# Patient Record
Sex: MALE | Race: ASIAN | NOT HISPANIC OR LATINO | Employment: OTHER | URBAN - METROPOLITAN AREA
[De-identification: names, ages, dates, MRNs, and addresses within clinical notes are randomized per-mention and may not be internally consistent; named-entity substitution may affect disease eponyms.]

---

## 2018-10-16 LAB
CHOL/HDLC RATIO: 2.2
CHOLESTEROL, TOTAL: 133
CREATININE RANDOM URINE: 62 MG/DL (ref 20–320)
HBA1C MFR BLD: 6.2 % (ref 4–5.7)
HDLC SERPL-MCNC: 61 MG/DL
LDLC SERPL CALC-MCNC: 59 MG/DL
MICROALBUMIN URINE RANDOM: 32.2
MICROALBUMIN/CREATININE RATIO: 519 MCG/MG
NONHDLC SERPL-MCNC: 72 MG/DL
TRIGL SERPL-MCNC: 53 MG/DL

## 2018-11-06 ENCOUNTER — OFFICE VISIT (OUTPATIENT)
Dept: FAMILY MEDICINE | Facility: CLINIC | Age: 68
End: 2018-11-06
Payer: MEDICARE

## 2018-11-06 VITALS
BODY MASS INDEX: 25.97 KG/M2 | HEART RATE: 97 BPM | OXYGEN SATURATION: 98 % | WEIGHT: 161.63 LBS | DIASTOLIC BLOOD PRESSURE: 80 MMHG | TEMPERATURE: 99 F | SYSTOLIC BLOOD PRESSURE: 150 MMHG | HEIGHT: 66 IN

## 2018-11-06 DIAGNOSIS — N18.30 CONTROLLED TYPE 2 DIABETES MELLITUS WITH STAGE 3 CHRONIC KIDNEY DISEASE, WITHOUT LONG-TERM CURRENT USE OF INSULIN: ICD-10-CM

## 2018-11-06 DIAGNOSIS — I10 ESSENTIAL HYPERTENSION, BENIGN: ICD-10-CM

## 2018-11-06 DIAGNOSIS — E11.22 CONTROLLED TYPE 2 DIABETES MELLITUS WITH STAGE 3 CHRONIC KIDNEY DISEASE, WITHOUT LONG-TERM CURRENT USE OF INSULIN: ICD-10-CM

## 2018-11-06 DIAGNOSIS — M35.1 MCTD (MIXED CONNECTIVE TISSUE DISEASE): ICD-10-CM

## 2018-11-06 DIAGNOSIS — E78.5 HYPERLIPIDEMIA, UNSPECIFIED HYPERLIPIDEMIA TYPE: ICD-10-CM

## 2018-11-06 DIAGNOSIS — Z00.00 ANNUAL PHYSICAL EXAM: Primary | ICD-10-CM

## 2018-11-06 DIAGNOSIS — M35.00 SJOGREN'S SYNDROME, WITH UNSPECIFIED ORGAN INVOLVEMENT: ICD-10-CM

## 2018-11-06 PROCEDURE — 3077F SYST BP >= 140 MM HG: CPT | Mod: CPTII,S$GLB,, | Performed by: FAMILY MEDICINE

## 2018-11-06 PROCEDURE — 99999 PR PBB SHADOW E&M-EST. PATIENT-LVL III: CPT | Mod: PBBFAC,,, | Performed by: FAMILY MEDICINE

## 2018-11-06 PROCEDURE — 99397 PER PM REEVAL EST PAT 65+ YR: CPT | Mod: S$GLB,,, | Performed by: FAMILY MEDICINE

## 2018-11-06 PROCEDURE — 3079F DIAST BP 80-89 MM HG: CPT | Mod: CPTII,S$GLB,, | Performed by: FAMILY MEDICINE

## 2018-11-16 NOTE — PROGRESS NOTES
Routine Office Visit    Patient Name: Darrion Bennett    : 1950  MRN: [id number]    Subjective:  Darrion is a 68 y.o. male who presents today for     1. Annual exam / establish care / new to me - pt has no issues/complaints. Pt is doing well on current medication regimen. He has most of his medical care at [facility]. His wife comes here and wants him to be here as well.       Review of Systems   Constitutional: Negative for chills and fever.   HENT: Negative for congestion.    Eyes: Negative for blurred vision.   Respiratory: Negative for cough.    Cardiovascular: Negative for chest pain.   Gastrointestinal: Negative for abdominal pain, constipation, diarrhea, heartburn, nausea and vomiting.   Genitourinary: Negative for dysuria.   Musculoskeletal: Negative for myalgias.   Skin: Negative for itching and rash.   Neurological: Negative for dizziness and headaches.   Psychiatric/Behavioral: Negative for depression.       Active Problem List  Patient Active Problem List   Diagnosis    Essential hypertension, benign    Controlled type 2 diabetes mellitus with complication, without long-term current use of insulin    Hyperlipidemia    MCTD (mixed connective tissue disease)    Sjogren's disease    Bilateral edema of lower extremity    Glaucoma    Body mass index 29.0-29.9, adult    Thyroid dysfunction    Chronic kidney disease, stage III (moderate)    Abdominal bloating    Abdominal distension    Weight gain    Jackson's esophagus without dysplasia    Anemia       Past Surgical History  Past Surgical History:   Procedure Laterality Date    COLONOSCOPY      EYE SURGERY         Family History  Family History   Problem Relation Age of Onset    Hypertension Father     Stroke Father     Rheum arthritis Maternal Aunt     Rheum arthritis Maternal Uncle     Throat cancer Paternal Grandmother     Celiac disease Neg Hx     Colon cancer Neg Hx     Cirrhosis Neg Hx     Colon polyps Neg Hx     Crohn's  disease Neg Hx     Cystic fibrosis Neg Hx     Hemochromatosis Neg Hx     Esophageal cancer Neg Hx     Inflammatory bowel disease Neg Hx     Irritable bowel syndrome Neg Hx     Liver cancer Neg Hx     Liver disease Neg Hx     Rectal cancer Neg Hx     Stomach cancer Neg Hx     Ulcerative colitis Neg Hx     Chase's disease Neg Hx        Social History  Social History     Socioeconomic History    Marital status:      Spouse name: Not on file    Number of children: 3    Years of education: Not on file    Highest education level: Not on file   Social Needs    Financial resource strain: Not on file    Food insecurity - worry: Not on file    Food insecurity - inability: Not on file    Transportation needs - medical: Not on file    Transportation needs - non-medical: Not on file   Occupational History    Not on file   Tobacco Use    Smoking status: Never Smoker    Smokeless tobacco: Never Used   Substance and Sexual Activity    Alcohol use: No    Drug use: No    Sexual activity: No   Other Topics Concern    Not on file   Social History Narrative    Not on file       Medications and Allergies  Reviewed and updated.   Current Outpatient Medications   Medication Sig    amlodipine (NORVASC) 10 MG tablet Take 10 mg by mouth once daily.    aspirin (ECOTRIN) 81 MG EC tablet Take 81 mg by mouth once daily.    azathioprine (IMURAN) 50 mg Tab Take 75 mg by mouth once daily.    cloNIDine 0.3 mg/24 hr td ptwk (CATAPRES) 0.3 mg/24 hr     docusate sodium (COLACE) 100 MG capsule     hydroxychloroquine (PLAQUENIL) 200 mg tablet Take 200 mg by mouth 2 (two) times daily.    insulin glargine (LANTUS) 100 unit/mL injection Inject 20 Units into the skin every evening.    isosorbide dinitrate (ISORDIL) 10 MG tablet     linaclotide (LINZESS) 145 mcg Cap capsule Take 145 mcg by mouth once daily.    methylPREDNISolone (MEDROL) 4 MG Tab Take 4 mg by mouth once daily.     omeprazole (PRILOSEC) 40 MG  "capsule Take 40 mg by mouth once daily.    torsemide (DEMADEX) 20 MG Tab     TRADJENTA 5 mg Tab tablet     TRAVATAN Z 0.004 % Drop     acetaminophen (TYLENOL) 500 mg Cap     adhesive bandage 1 X 3 " Bndg     atorvastatin (LIPITOR) 40 MG tablet Take 1 tablet (40 mg total) by mouth once daily.     No current facility-administered medications for this visit.        Physical Exam  BP (!) 150/80 (BP Location: Left arm, Patient Position: Standing, BP Method: Large (Manual))   Pulse 97   Temp 98.6 °F (37 °C) (Oral)   Ht 5' 6" (1.676 m)   Wt 73.3 kg (161 lb 9.6 oz)   SpO2 98%   BMI 26.08 kg/m²   Physical Exam   Constitutional: He is oriented to person, place, and time. He appears well-developed and well-nourished.   HENT:   Head: Normocephalic and atraumatic.   Eyes: Conjunctivae and EOM are normal. Pupils are equal, round, and reactive to light.   Neck: Normal range of motion. Neck supple. No JVD present. No thyromegaly present.   Cardiovascular: Normal rate, regular rhythm and normal heart sounds.   Pulmonary/Chest: Effort normal and breath sounds normal. He has no wheezes.   Abdominal: Soft. Bowel sounds are normal. He exhibits no distension. There is no tenderness. There is no guarding.   Musculoskeletal: Normal range of motion.   Lymphadenopathy:     He has no cervical adenopathy.   Neurological: He is alert and oriented to person, place, and time.   Skin: Skin is warm and dry.   Psychiatric: He has a normal mood and affect. His behavior is normal.         Assessment/Plan:  Darrion Bennett is a 68 y.o. male who presents today for :    Annual physical exam  Health Maintenance       Date Due Completion Date    Hepatitis C Screening 1950 ---    Eye Exam 02/12/1960 ---    TETANUS VACCINE 02/12/1968 ---    Zoster Vaccine 02/12/2010 ---    Hemoglobin A1c 11/30/2014 5/30/2014    Pneumococcal (65+) (1 of 2 - PCV13) 02/12/2015 ---    Lipid Panel 05/30/2015 5/30/2014    Foot Exam 07/23/2016 7/23/2015    Influenza " Vaccine 08/01/2018 5/16/2018 (Done)    [facility]/16/2018[facility]    Colonoscopy 05/22/2024 5/22/2014 (N/S)    Override on 5/22/2014: (N/S)        Will obtain Flu, pneumonia, shingles and tetanus vaccine information - [facility] [place]   TERRI signed  Eye exam - Dr. Hall   Colonoscopy - Dr. Tin Chambers    Essential hypertension, benign  The current medical regimen is effective;  continue present plan and medications.  Continue f/u with Cardiology - Dr. Brannon     Hyperlipidemia, unspecified hyperlipidemia type  The current medical regimen is effective;  continue present plan and medications.    Sjogren's syndrome, with unspecified organ involvement / MCTD (mixed connective tissue disease)  Continue f/u with Dr. Vazquez (\[facility]") - Rheumatology  The current medical regimen is effective;  continue present plan and medications.    Controlled type 2 diabetes mellitus with stage 3 chronic kidney disease, without long-term current use of insulin  Continue f/u with Endocrine (Dr. Marrufo)   Obtain Blood work              Follow-up in about 6 months (around 5/6/2019).

## 2018-11-20 ENCOUNTER — TELEPHONE (OUTPATIENT)
Dept: ADMINISTRATIVE | Facility: HOSPITAL | Age: 68
End: 2018-11-20

## 2018-12-06 ENCOUNTER — HOSPITAL ENCOUNTER (OUTPATIENT)
Dept: RADIOLOGY | Facility: HOSPITAL | Age: 68
Discharge: HOME | End: 2018-12-06
Attending: NURSE PRACTITIONER
Payer: MEDICARE

## 2018-12-06 ENCOUNTER — CLINICAL SUPPORT (OUTPATIENT)
Dept: OPHTHALMOLOGY | Facility: CLINIC | Age: 68
End: 2018-12-06
Attending: NURSE PRACTITIONER
Payer: MEDICARE

## 2018-12-06 ENCOUNTER — OFFICE VISIT (OUTPATIENT)
Dept: FAMILY MEDICINE | Facility: CLINIC | Age: 68
End: 2018-12-06
Payer: MEDICARE

## 2018-12-06 VITALS
TEMPERATURE: 98 F | SYSTOLIC BLOOD PRESSURE: 156 MMHG | WEIGHT: 163.56 LBS | HEIGHT: 66 IN | DIASTOLIC BLOOD PRESSURE: 88 MMHG | HEART RATE: 77 BPM | BODY MASS INDEX: 26.29 KG/M2 | OXYGEN SATURATION: 97 %

## 2018-12-06 DIAGNOSIS — E11.8 CONTROLLED TYPE 2 DIABETES MELLITUS WITH COMPLICATION, WITHOUT LONG-TERM CURRENT USE OF INSULIN: ICD-10-CM

## 2018-12-06 DIAGNOSIS — M79.89 SWELLING OF LEFT FOOT: ICD-10-CM

## 2018-12-06 DIAGNOSIS — R05.9 COUGH: ICD-10-CM

## 2018-12-06 DIAGNOSIS — M79.672 LEFT FOOT PAIN: Primary | ICD-10-CM

## 2018-12-06 DIAGNOSIS — Z11.59 NEED FOR HEPATITIS C SCREENING TEST: ICD-10-CM

## 2018-12-06 DIAGNOSIS — I10 ESSENTIAL HYPERTENSION, BENIGN: ICD-10-CM

## 2018-12-06 DIAGNOSIS — H40.9 GLAUCOMA OF BOTH EYES, UNSPECIFIED GLAUCOMA TYPE: Primary | ICD-10-CM

## 2018-12-06 DIAGNOSIS — M79.672 LEFT FOOT PAIN: ICD-10-CM

## 2018-12-06 DIAGNOSIS — Z23 NEED FOR TD VACCINE: ICD-10-CM

## 2018-12-06 PROCEDURE — 90714 TD VACC NO PRESV 7 YRS+ IM: CPT | Mod: S$GLB,,, | Performed by: NURSE PRACTITIONER

## 2018-12-06 PROCEDURE — 92250 FUNDUS PHOTOGRAPHY W/I&R: CPT | Mod: S$GLB,,, | Performed by: OPHTHALMOLOGY

## 2018-12-06 PROCEDURE — 73630 X-RAY EXAM OF FOOT: CPT | Mod: 26,LT,, | Performed by: RADIOLOGY

## 2018-12-06 PROCEDURE — 3079F DIAST BP 80-89 MM HG: CPT | Mod: CPTII,S$GLB,, | Performed by: NURSE PRACTITIONER

## 2018-12-06 PROCEDURE — 73630 X-RAY EXAM OF FOOT: CPT | Mod: TC,FY,PO,LT

## 2018-12-06 PROCEDURE — 3077F SYST BP >= 140 MM HG: CPT | Mod: CPTII,S$GLB,, | Performed by: NURSE PRACTITIONER

## 2018-12-06 PROCEDURE — 90471 IMMUNIZATION ADMIN: CPT | Mod: S$GLB,,, | Performed by: NURSE PRACTITIONER

## 2018-12-06 PROCEDURE — 99214 OFFICE O/P EST MOD 30 MIN: CPT | Mod: 25,S$GLB,, | Performed by: NURSE PRACTITIONER

## 2018-12-06 PROCEDURE — 1101F PT FALLS ASSESS-DOCD LE1/YR: CPT | Mod: CPTII,S$GLB,, | Performed by: NURSE PRACTITIONER

## 2018-12-06 PROCEDURE — 3044F HG A1C LEVEL LT 7.0%: CPT | Mod: CPTII,S$GLB,, | Performed by: NURSE PRACTITIONER

## 2018-12-06 PROCEDURE — 99999 PR PBB SHADOW E&M-EST. PATIENT-LVL V: CPT | Mod: PBBFAC,,, | Performed by: NURSE PRACTITIONER

## 2018-12-06 RX ORDER — INSULIN ASPART 100 [IU]/ML
INJECTION, SOLUTION INTRAVENOUS; SUBCUTANEOUS
COMMUNITY
Start: 2018-09-23 | End: 2019-07-08 | Stop reason: ALTCHOICE

## 2018-12-06 RX ORDER — ACETAMINOPHEN AND CODEINE PHOSPHATE 300; 30 MG/1; MG/1
1 TABLET ORAL 2 TIMES DAILY PRN
Qty: 12 TABLET | Refills: 0 | Status: SHIPPED | OUTPATIENT
Start: 2018-12-06 | End: 2018-12-11 | Stop reason: SDUPTHER

## 2018-12-06 RX ORDER — METHYLPREDNISOLONE 4 MG/1
TABLET ORAL
Qty: 1 PACKAGE | Refills: 0 | Status: SHIPPED | OUTPATIENT
Start: 2018-12-06 | End: 2019-05-02

## 2018-12-06 RX ORDER — BENZONATATE 200 MG/1
200 CAPSULE ORAL 3 TIMES DAILY PRN
Qty: 30 CAPSULE | Refills: 0 | Status: SHIPPED | OUTPATIENT
Start: 2018-12-06 | End: 2018-12-16

## 2018-12-06 RX ORDER — CLONIDINE HYDROCHLORIDE 0.1 MG/1
0.1 TABLET ORAL 3 TIMES DAILY
Refills: 5 | COMMUNITY
Start: 2018-11-30 | End: 2019-02-26

## 2018-12-06 RX ORDER — ACETAMINOPHEN 500 MG
TABLET ORAL
COMMUNITY
Start: 2018-10-01 | End: 2019-07-08

## 2018-12-06 RX ORDER — DORZOLAMIDE HCL 20 MG/ML
SOLUTION/ DROPS OPHTHALMIC
COMMUNITY
Start: 2018-11-25 | End: 2019-08-27 | Stop reason: SDUPTHER

## 2018-12-06 RX ORDER — VALSARTAN 160 MG/1
160 TABLET ORAL DAILY
Refills: 2 | COMMUNITY
Start: 2018-11-02 | End: 2019-05-02 | Stop reason: SDUPTHER

## 2018-12-06 RX ORDER — PEDIATRIC MULTIVITAMIN NO.238
TABLET,CHEWABLE ORAL
COMMUNITY
Start: 2018-10-01

## 2018-12-06 NOTE — PROGRESS NOTES
Subjective:       Patient ID: Darrion Bennett is a 68 y.o. male.    Chief Complaint: Foot Pain (left foot pain X yesterday ) and URI (cough X 2 weeks )    Pt is a 69 y/o male who presents with a chief complaint of left foot pain. He reports that his foot was hurting when he woke up. He tried ice, heat, and tylenol for the pain and no relief was provided.       Pain   This is a new problem. The current episode started yesterday. The problem occurs constantly. The problem has been gradually worsening. Associated symptoms include arthralgias. Pertinent negatives include no joint swelling, numbness or weakness. The symptoms are aggravated by walking. He has tried ice, heat and acetaminophen for the symptoms. The treatment provided no relief.       Past Medical History:   Diagnosis Date    Anemia     Arthritis     Cataract     Chronic constipation     Diabetes mellitus, type 2     Glaucoma     Hypertension     MCTD (mixed connective tissue disease)     Sjogren's disease     Ulcerative colitis     Urolithiasis        Social History     Socioeconomic History    Marital status:      Spouse name: Not on file    Number of children: 3    Years of education: Not on file    Highest education level: Not on file   Social Needs    Financial resource strain: Not on file    Food insecurity - worry: Not on file    Food insecurity - inability: Not on file    Transportation needs - medical: Not on file    Transportation needs - non-medical: Not on file   Occupational History    Not on file   Tobacco Use    Smoking status: Never Smoker    Smokeless tobacco: Never Used   Substance and Sexual Activity    Alcohol use: No    Drug use: No    Sexual activity: No   Other Topics Concern    Not on file   Social History Narrative    Not on file       Past Surgical History:   Procedure Laterality Date    COLONOSCOPY  2014    EYE SURGERY         Review of Systems   Musculoskeletal: Positive for arthralgias and gait  "problem. Negative for joint swelling.   Neurological: Negative for weakness and numbness.   All other systems reviewed and are negative.      Objective:   BP (!) 156/88 (BP Location: Right arm, Patient Position: Sitting, BP Method: Large (Manual))   Pulse 77   Temp 98.3 °F (36.8 °C) (Oral)   Ht 5' 6" (1.676 m)   Wt 74.2 kg (163 lb 9.3 oz)   SpO2 97%   BMI 26.40 kg/m²      Physical Exam   Constitutional: He is oriented to person, place, and time. He appears well-developed and well-nourished.   HENT:   Head: Normocephalic and atraumatic.   Cardiovascular: Normal rate, regular rhythm and normal heart sounds.   Pulmonary/Chest: Effort normal and breath sounds normal. No stridor. No respiratory distress. He has no decreased breath sounds. He has no wheezes. He has no rhonchi. He has no rales.   Musculoskeletal:        Left foot: There is decreased range of motion, tenderness, bony tenderness and swelling. There is no deformity.        Feet:    Feet:   Left Foot:   Skin Integrity: Positive for erythema and warmth.   Neurological: He is alert and oriented to person, place, and time.   Skin: He is not diaphoretic. No pallor.   Psychiatric: He has a normal mood and affect. His speech is normal and behavior is normal.       Assessment:       1. Left foot pain    2. Swelling of left foot    3. Controlled type 2 diabetes mellitus with complication, without long-term current use of insulin    4. Need for Td vaccine    5. Need for hepatitis C screening test    6. Cough        Plan:       Darrion was seen today for foot pain and uri.    Diagnoses and all orders for this visit:    Left foot pain  -     Uric acid; Future  -     X-Ray Foot Complete Left; Future  -     methylPREDNISolone (MEDROL DOSEPACK) 4 mg tablet; use as directed  -     acetaminophen-codeine 300-30mg (TYLENOL #3) 300-30 mg Tab; Take 1 tablet by mouth 2 (two) times daily as needed.    Swelling of left foot  -     Uric acid; Future    Controlled type 2 diabetes " mellitus with complication, without long-term current use of insulin  -     Diabetic Eye Screening Photo; Future    Need for Td vaccine  -     (In Office Administered) Td Vaccine - Preservative Free    Need for hepatitis C screening test  -     Hepatitis C antibody; Future    Cough  -     benzonatate (TESSALON) 200 MG capsule; Take 1 capsule (200 mg total) by mouth 3 (three) times daily as needed for Cough.        Follow-up if symptoms worsen or fail to improve.

## 2018-12-06 NOTE — PROGRESS NOTES
HPI     69 Y/o here for screening for diabetic retinopathy with non-dilated   fundus photos per      Last edited by Jose Vicente MA on 12/6/2018  9:49 AM. (History)            Assessment /Plan     For exam results, see Encounter Report.    Controlled type 2 diabetes mellitus with complication, without long-term current use of insulin  -     Diabetic Eye Screening Photo      Please see Dr. Vivas progress note for interpretation

## 2018-12-06 NOTE — PATIENT INSTRUCTIONS
Parts of a Foot  Your foot is made up of soft tissue and bones that work together to form a healthy, functioning, and pain-free foot.    Date Last Reviewed: 9/10/2015  © 7588-4475 The "[employer]. [address] [place] [address], [place] [address]. All rights reserved. This information is not intended as a substitute for professional medical care. Always follow your healthcare professional's instructions.

## 2018-12-06 NOTE — ASSESSMENT & PLAN NOTE
This problem is currently not controlled. Please follow up with your PCP as planned to discuss adjustments to your treatment plan.  The patient is asked to make an attempt to improve diet and exercise patterns to aid in medical management of this problem.  Patient has not taken his medication today. He will continue to monitor his blood pressure and keep a log. He is followed by cardiology at [facility].

## 2018-12-07 ENCOUNTER — TELEPHONE (OUTPATIENT)
Dept: FAMILY MEDICINE | Facility: CLINIC | Age: 68
End: 2018-12-07

## 2018-12-07 NOTE — TELEPHONE ENCOUNTER
Patient wife informed regarding results. Also advised per Rg that a referral can be put in for the patient to see podiatry. If pain does not subside over the weekend after taking medication.

## 2018-12-07 NOTE — TELEPHONE ENCOUNTER
Spoke to spouse concerning the labs and medication prescribed. She stated she wants to see Dr. Brock. She wanted earlier appointment I explained Dr. Brock does not have any appointments sooner.

## 2018-12-07 NOTE — TELEPHONE ENCOUNTER
----- Message from Waldo Callaway, MATP-C sent at 12/6/2018  3:42 PM CST -----  Please inform patient his hepatitis C screening test is normal. Uric acid is elevated. This may be due to gout or kidney disease. May be due to kidney disease.

## 2018-12-07 NOTE — TELEPHONE ENCOUNTER
----- Message from Laisha Claudio sent at 12/7/2018  7:21 AM CST -----  Contact: Pam/ Wife/ [phone]  Wife calling to request call back from Dr. Brock of results from OV with BRANNON Callaway 12/6/18. Thank you.

## 2018-12-11 ENCOUNTER — OFFICE VISIT (OUTPATIENT)
Dept: FAMILY MEDICINE | Facility: CLINIC | Age: 68
End: 2018-12-11
Payer: MEDICARE

## 2018-12-11 ENCOUNTER — TELEPHONE (OUTPATIENT)
Dept: OPHTHALMOLOGY | Facility: CLINIC | Age: 68
End: 2018-12-11

## 2018-12-11 VITALS
OXYGEN SATURATION: 99 % | DIASTOLIC BLOOD PRESSURE: 78 MMHG | TEMPERATURE: 98 F | SYSTOLIC BLOOD PRESSURE: 152 MMHG | WEIGHT: 165.13 LBS | BODY MASS INDEX: 26.54 KG/M2 | HEART RATE: 84 BPM | HEIGHT: 66 IN

## 2018-12-11 DIAGNOSIS — E78.5 HYPERLIPIDEMIA, UNSPECIFIED HYPERLIPIDEMIA TYPE: ICD-10-CM

## 2018-12-11 DIAGNOSIS — M35.1 MCTD (MIXED CONNECTIVE TISSUE DISEASE): ICD-10-CM

## 2018-12-11 DIAGNOSIS — M10.9 ACUTE GOUT OF LEFT FOOT, UNSPECIFIED CAUSE: Primary | ICD-10-CM

## 2018-12-11 DIAGNOSIS — E11.8 CONTROLLED TYPE 2 DIABETES MELLITUS WITH COMPLICATION, WITHOUT LONG-TERM CURRENT USE OF INSULIN: ICD-10-CM

## 2018-12-11 DIAGNOSIS — I10 ESSENTIAL HYPERTENSION, BENIGN: ICD-10-CM

## 2018-12-11 DIAGNOSIS — M35.00 SJOGREN'S SYNDROME, WITH UNSPECIFIED ORGAN INVOLVEMENT: ICD-10-CM

## 2018-12-11 DIAGNOSIS — N18.30 CHRONIC KIDNEY DISEASE, STAGE III (MODERATE): ICD-10-CM

## 2018-12-11 PROCEDURE — 1101F PT FALLS ASSESS-DOCD LE1/YR: CPT | Mod: CPTII,S$GLB,, | Performed by: FAMILY MEDICINE

## 2018-12-11 PROCEDURE — 99214 OFFICE O/P EST MOD 30 MIN: CPT | Mod: S$GLB,,, | Performed by: FAMILY MEDICINE

## 2018-12-11 PROCEDURE — 99999 PR PBB SHADOW E&M-EST. PATIENT-LVL IV: CPT | Mod: PBBFAC,,, | Performed by: FAMILY MEDICINE

## 2018-12-11 PROCEDURE — 3078F DIAST BP <80 MM HG: CPT | Mod: CPTII,S$GLB,, | Performed by: FAMILY MEDICINE

## 2018-12-11 PROCEDURE — 3077F SYST BP >= 140 MM HG: CPT | Mod: CPTII,S$GLB,, | Performed by: FAMILY MEDICINE

## 2018-12-11 PROCEDURE — 3044F HG A1C LEVEL LT 7.0%: CPT | Mod: CPTII,S$GLB,, | Performed by: FAMILY MEDICINE

## 2018-12-11 RX ORDER — ISOSORBIDE DINITRATE 10 MG/1
TABLET ORAL
Refills: 1 | COMMUNITY
Start: 2018-12-06 | End: 2019-05-09

## 2018-12-11 RX ORDER — ACETAMINOPHEN AND CODEINE PHOSPHATE 300; 30 MG/1; MG/1
1 TABLET ORAL 2 TIMES DAILY PRN
Qty: 30 TABLET | Refills: 0 | Status: SHIPPED | OUTPATIENT
Start: 2018-12-11 | End: 2018-12-21

## 2018-12-11 NOTE — TELEPHONE ENCOUNTER
Called patient no answer regarding diabetic eye screenig results;Follw up in 1 month        ----- Message from Padmini Lopes sent at 12/10/2018  9:19 AM CST -----      ----- Message -----  From: Sergey Vivas MD  Sent: 12/8/2018   4:28 PM  To: [facility] Staff    May have outside eye doc

## 2018-12-11 NOTE — PROGRESS NOTES
Routine Office Visit    Patient Name: Darrion Bennett    : 1950  MRN: [id number]    Subjective:  Darrion is a 68 y.o. male who presents today for     1. Left gout flare up - pt c/o severe pain in his left foot. He was recently diagnosed with gout flare and states that redness, swelling and pain have improved in his foot. He still has some residual pain on dorsal lateral foot. He has completed the course of steroid. He has poor kidney function and is unable to take nsaids or take additional steroid course. He is requesting a refill on pain medication. No fever. No trauma / injuries.     Review of Systems   Constitutional: Negative for chills and fever.   HENT: Negative for congestion.    Eyes: Negative for blurred vision.   Respiratory: Negative for cough.    Cardiovascular: Negative for chest pain.   Gastrointestinal: Negative for abdominal pain, constipation, diarrhea, heartburn, nausea and vomiting.   Genitourinary: Negative for dysuria.   Musculoskeletal: Negative for myalgias.   Skin: Negative for itching and rash.   Neurological: Negative for dizziness and headaches.   Psychiatric/Behavioral: Negative for depression.       Active Problem List  Patient Active Problem List   Diagnosis    Essential hypertension, benign    Controlled type 2 diabetes mellitus with complication, without long-term current use of insulin    Hyperlipidemia    MCTD (mixed connective tissue disease)    Sjogren's disease    Bilateral edema of lower extremity    Glaucoma    Body mass index 29.0-29.9, adult    Thyroid dysfunction    Chronic kidney disease, stage III (moderate)    Abdominal bloating    Abdominal distension    Weight gain    Jackson's esophagus without dysplasia    Anemia       Past Surgical History  Past Surgical History:   Procedure Laterality Date    COLONOSCOPY      EYE SURGERY         Family History  Family History   Problem Relation Age of Onset    Hypertension Father     Stroke Father     Rheum  arthritis Maternal Aunt     Rheum arthritis Maternal Uncle     Throat cancer Paternal Grandmother     Celiac disease Neg Hx     Colon cancer Neg Hx     Cirrhosis Neg Hx     Colon polyps Neg Hx     Crohn's disease Neg Hx     Cystic fibrosis Neg Hx     Hemochromatosis Neg Hx     Esophageal cancer Neg Hx     Inflammatory bowel disease Neg Hx     Irritable bowel syndrome Neg Hx     Liver cancer Neg Hx     Liver disease Neg Hx     Rectal cancer Neg Hx     Stomach cancer Neg Hx     Ulcerative colitis Neg Hx     Chase's disease Neg Hx        Social History  Social History     Socioeconomic History    Marital status:      Spouse name: Not on file    Number of children: 3    Years of education: Not on file    Highest education level: Not on file   Social Needs    Financial resource strain: Not on file    Food insecurity - worry: Not on file    Food insecurity - inability: Not on file    Transportation needs - medical: Not on file    Transportation needs - non-medical: Not on file   Occupational History    Not on file   Tobacco Use    Smoking status: Never Smoker    Smokeless tobacco: Never Used   Substance and Sexual Activity    Alcohol use: No    Drug use: No    Sexual activity: No   Other Topics Concern    Not on file   Social History Narrative    Not on file       Medications and Allergies  Reviewed and updated.   Current Outpatient Medications   Medication Sig    acetaminophen-codeine 300-30mg (TYLENOL #3) 300-30 mg Tab Take 1 tablet by mouth 2 (two) times daily as needed.    amlodipine (NORVASC) 10 MG tablet Take 10 mg by mouth once daily.    aspirin (ECOTRIN) 81 MG EC tablet Take 81 mg by mouth once daily.    benzonatate (TESSALON) 200 MG capsule Take 1 capsule (200 mg total) by mouth 3 (three) times daily as needed for Cough.    cloNIDine (CATAPRES) 0.1 MG tablet Take 0.1 mg by mouth 3 (three) times daily.    cloNIDine 0.3 mg/24 hr td ptwk (CATAPRES) 0.3 mg/24 hr      "dorzolamide (TRUSOPT) 2 % ophthalmic solution     FLUZONE HIGH-DOSE 2018-19, PF, 180 mcg/0.5 mL vaccine TO BE ADMINISTERED BY PHARMACIST FOR IMMUNIZATION    hydroxychloroquine (PLAQUENIL) 200 mg tablet Take 200 mg by mouth 2 (two) times daily.    insulin glargine (LANTUS) 100 unit/mL injection Inject 20 Units into the skin every evening. (Patient taking differently: Inject 20 Units into the skin every evening. Pt states 10 units every evening.)    isosorbide dinitrate (ISORDIL) 10 MG tablet TAKE 1 TABLET EVERY 8 HOURS DAILY.    linaclotide (LINZESS) 145 mcg Cap capsule Take 145 mcg by mouth once daily.    melatonin 5 mg Tab     methylPREDNISolone (MEDROL DOSEPACK) 4 mg tablet use as directed    multivit with min-folic acid 200 mcg Chew     NOVOLOG U-100 INSULIN ASPART 100 unit/mL injection     torsemide (DEMADEX) 20 MG Tab 10 mg.     TRADJENTA 5 mg Tab tablet     TRAVATAN Z 0.004 % Drop     valsartan (DIOVAN) 160 MG tablet Take 160 mg by mouth once daily.     No current facility-administered medications for this visit.        Physical Exam  BP (!) 152/78 (BP Location: Left arm, Patient Position: Sitting, BP Method: Large (Manual))   Pulse 84   Temp 97.6 °F (36.4 °C) (Oral)   Ht 5' 6" (1.676 m)   Wt 74.9 kg (165 lb 2 oz)   SpO2 99%   BMI 26.65 kg/m²   Physical Exam   Constitutional: He is oriented to person, place, and time. He appears well-developed and well-nourished.   HENT:   Head: Normocephalic and atraumatic.   Eyes: Conjunctivae and EOM are normal. Pupils are equal, round, and reactive to light.   Neck: Normal range of motion. Neck supple. No JVD present. No thyromegaly present.   Cardiovascular: Normal rate, regular rhythm and normal heart sounds.   Pulmonary/Chest: Effort normal and breath sounds normal. He has no wheezes.   Abdominal: Soft. Bowel sounds are normal. He exhibits no distension. There is no tenderness. There is no guarding.   Musculoskeletal: Normal range of motion.        " Feet:    Feet:   Left Foot:   Skin Integrity: Positive for dry skin. Negative for ulcer, blister, skin breakdown, erythema, warmth or callus.   Lymphadenopathy:     He has no cervical adenopathy.   Neurological: He is alert and oriented to person, place, and time.   Skin: Skin is warm and dry.   Psychiatric: He has a normal mood and affect. His behavior is normal.         Assessment/Plan:  Darrion Bennett is a 68 y.o. male who presents today for :    Problem List Items Addressed This Visit        Cardiac/Vascular    Essential hypertension, benign    Relevant Medications    isosorbide dinitrate (ISORDIL) 10 MG tablet  The current medical regimen is effective;  continue present plan and medications.      Hyperlipidemia  The current medical regimen is effective;  continue present plan and medications.         Renal/    Chronic kidney disease, stage III (moderate)  Noted in chart         Immunology/Multi System    MCTD (mixed connective tissue disease)    Sjogren's disease  Continue f/u with rheumatology - Dr. Vazquez.        Endocrine    Controlled type 2 diabetes mellitus with complication, without long-term current use of insulin  The current medical regimen is effective;  continue present plan and medications.  F/u with Dr. Marrufo         Other Visit Diagnoses     Acute gout of left foot, unspecified cause    -  Primary    Relevant Medications    acetaminophen-codeine 300-30mg (TYLENOL #3) 300-30 mg Tab    Other Relevant Orders    Comprehensive metabolic panel    Uric acid  Reviewed LA   F/u with uric acid in ~4 weeks to repeat uric acid level  May benefit from uric acid lowering medications             Follow-up in about 6 months (around 6/11/2019), or if symptoms worsen or fail to improve.

## 2018-12-11 NOTE — PATIENT INSTRUCTIONS

## 2018-12-13 ENCOUNTER — TELEPHONE (OUTPATIENT)
Dept: OPHTHALMOLOGY | Facility: CLINIC | Age: 68
End: 2018-12-13

## 2018-12-13 NOTE — TELEPHONE ENCOUNTER
Called patient regarding diabetic eye exam spoke to his wife will follow up with there optometrist.      ----- Message from Padmini Lopes sent at 12/10/2018  9:19 AM CST -----      ----- Message -----  From: Sergey Vivas MD  Sent: 12/8/2018   4:28 PM  To: [facility] Staff    May have outside eye doc

## 2019-01-08 ENCOUNTER — LAB VISIT (OUTPATIENT)
Dept: LAB | Facility: HOSPITAL | Age: 69
End: 2019-01-08
Attending: FAMILY MEDICINE
Payer: MEDICARE

## 2019-01-08 DIAGNOSIS — M10.9 ACUTE GOUT OF LEFT FOOT, UNSPECIFIED CAUSE: ICD-10-CM

## 2019-01-08 LAB
ALBUMIN SERPL BCP-MCNC: 3.3 G/DL
ALP SERPL-CCNC: 67 U/L
ALT SERPL W/O P-5'-P-CCNC: 15 U/L
ANION GAP SERPL CALC-SCNC: 8 MMOL/L
AST SERPL-CCNC: 14 U/L
BILIRUB SERPL-MCNC: 0.3 MG/DL
BUN SERPL-MCNC: 29 MG/DL
CALCIUM SERPL-MCNC: 9.2 MG/DL
CHLORIDE SERPL-SCNC: 102 MMOL/L
CO2 SERPL-SCNC: 25 MMOL/L
CREAT SERPL-MCNC: 1.3 MG/DL
EST. GFR  (AFRICAN AMERICAN): >60 ML/MIN/1.73 M^2
EST. GFR  (NON AFRICAN AMERICAN): 56.1 ML/MIN/1.73 M^2
GLUCOSE SERPL-MCNC: 128 MG/DL
POTASSIUM SERPL-SCNC: 4.2 MMOL/L
PROT SERPL-MCNC: 6.5 G/DL
SODIUM SERPL-SCNC: 135 MMOL/L
URATE SERPL-MCNC: 8 MG/DL

## 2019-01-08 PROCEDURE — 36415 COLL VENOUS BLD VENIPUNCTURE: CPT | Mod: PO

## 2019-01-08 PROCEDURE — 84550 ASSAY OF BLOOD/URIC ACID: CPT

## 2019-01-08 PROCEDURE — 80053 COMPREHEN METABOLIC PANEL: CPT

## 2019-01-16 RX ORDER — ALLOPURINOL 100 MG/1
100 TABLET ORAL DAILY
Qty: 90 TABLET | Refills: 1 | Status: SHIPPED | OUTPATIENT
Start: 2019-01-16 | End: 2019-06-15 | Stop reason: SDUPTHER

## 2019-02-19 ENCOUNTER — TELEPHONE (OUTPATIENT)
Dept: FAMILY MEDICINE | Facility: CLINIC | Age: 69
End: 2019-02-19

## 2019-02-19 ENCOUNTER — LAB VISIT (OUTPATIENT)
Dept: LAB | Facility: HOSPITAL | Age: 69
End: 2019-02-19
Attending: INTERNAL MEDICINE
Payer: MEDICARE

## 2019-02-19 ENCOUNTER — OFFICE VISIT (OUTPATIENT)
Dept: RHEUMATOLOGY | Facility: CLINIC | Age: 69
End: 2019-02-19
Payer: MEDICARE

## 2019-02-19 ENCOUNTER — TELEPHONE (OUTPATIENT)
Dept: RHEUMATOLOGY | Facility: CLINIC | Age: 69
End: 2019-02-19

## 2019-02-19 VITALS
BODY MASS INDEX: 26.87 KG/M2 | HEART RATE: 100 BPM | WEIGHT: 166.44 LBS | SYSTOLIC BLOOD PRESSURE: 147 MMHG | DIASTOLIC BLOOD PRESSURE: 73 MMHG

## 2019-02-19 DIAGNOSIS — E11.8 CONTROLLED TYPE 2 DIABETES MELLITUS WITH COMPLICATION, WITHOUT LONG-TERM CURRENT USE OF INSULIN: Primary | ICD-10-CM

## 2019-02-19 DIAGNOSIS — M25.50 POLYARTHRALGIA: ICD-10-CM

## 2019-02-19 DIAGNOSIS — M25.50 POLYARTHRALGIA: Primary | ICD-10-CM

## 2019-02-19 DIAGNOSIS — D61.818 PANCYTOPENIA: Primary | ICD-10-CM

## 2019-02-19 LAB
ALBUMIN SERPL BCP-MCNC: 3.8 G/DL
ALP SERPL-CCNC: 88 U/L
ALT SERPL W/O P-5'-P-CCNC: 22 U/L
ANION GAP SERPL CALC-SCNC: 10 MMOL/L
ANISOCYTOSIS BLD QL SMEAR: SLIGHT
AST SERPL-CCNC: 20 U/L
BASOPHILS NFR BLD: 0 %
BILIRUB SERPL-MCNC: 0.4 MG/DL
BUN SERPL-MCNC: 32 MG/DL
C3 SERPL-MCNC: 124 MG/DL
C4 SERPL-MCNC: 31 MG/DL
CALCIUM SERPL-MCNC: 9.9 MG/DL
CCP AB SER IA-ACNC: 1.4 U/ML
CHLORIDE SERPL-SCNC: 110 MMOL/L
CO2 SERPL-SCNC: 22 MMOL/L
CREAT SERPL-MCNC: 1.6 MG/DL
CRP SERPL-MCNC: 11.4 MG/L
DIFFERENTIAL METHOD: ABNORMAL
EOSINOPHIL NFR BLD: 21 %
ERYTHROCYTE [DISTWIDTH] IN BLOOD BY AUTOMATED COUNT: 14.4 %
ERYTHROCYTE [SEDIMENTATION RATE] IN BLOOD BY WESTERGREN METHOD: 48 MM/HR
EST. GFR  (AFRICAN AMERICAN): 50.1 ML/MIN/1.73 M^2
EST. GFR  (NON AFRICAN AMERICAN): 43.3 ML/MIN/1.73 M^2
GLUCOSE SERPL-MCNC: 139 MG/DL
HBV CORE IGM SERPL QL IA: NEGATIVE
HBV SURFACE AB SER-ACNC: NEGATIVE M[IU]/ML
HBV SURFACE AG SERPL QL IA: NEGATIVE
HCT VFR BLD AUTO: 26.5 %
HCV AB SERPL QL IA: NEGATIVE
HGB BLD-MCNC: 8.4 G/DL
IMM GRANULOCYTES # BLD AUTO: ABNORMAL K/UL
IMM GRANULOCYTES NFR BLD AUTO: ABNORMAL %
LYMPHOCYTES NFR BLD: 22 %
MCH RBC QN AUTO: 30.7 PG
MCHC RBC AUTO-ENTMCNC: 31.7 G/DL
MCV RBC AUTO: 97 FL
MONOCYTES NFR BLD: 0 %
NEUTROPHILS NFR BLD: 56 %
NEUTS BAND NFR BLD MANUAL: 1 %
NRBC BLD-RTO: 0 /100 WBC
OVALOCYTES BLD QL SMEAR: ABNORMAL
PLATELET # BLD AUTO: 82 K/UL
PLATELET BLD QL SMEAR: ABNORMAL
PMV BLD AUTO: 10.9 FL
POIKILOCYTOSIS BLD QL SMEAR: SLIGHT
POTASSIUM SERPL-SCNC: 4.3 MMOL/L
PROT SERPL-MCNC: 7.1 G/DL
RBC # BLD AUTO: 2.74 M/UL
RHEUMATOID FACT SERPL-ACNC: <10 IU/ML
SODIUM SERPL-SCNC: 142 MMOL/L
URATE SERPL-MCNC: 7.1 MG/DL
WBC # BLD AUTO: 1.14 K/UL

## 2019-02-19 PROCEDURE — 99205 PR OFFICE/OUTPT VISIT, NEW, LEVL V, 60-74 MIN: ICD-10-PCS | Mod: S$GLB,,, | Performed by: INTERNAL MEDICINE

## 2019-02-19 PROCEDURE — 85652 RBC SED RATE AUTOMATED: CPT

## 2019-02-19 PROCEDURE — 86200 CCP ANTIBODY: CPT

## 2019-02-19 PROCEDURE — 86140 C-REACTIVE PROTEIN: CPT

## 2019-02-19 PROCEDURE — 86235 NUCLEAR ANTIGEN ANTIBODY: CPT | Mod: 59

## 2019-02-19 PROCEDURE — 87340 HEPATITIS B SURFACE AG IA: CPT

## 2019-02-19 PROCEDURE — 86706 HEP B SURFACE ANTIBODY: CPT

## 2019-02-19 PROCEDURE — 99999 PR PBB SHADOW E&M-EST. PATIENT-LVL III: CPT | Mod: PBBFAC,,, | Performed by: INTERNAL MEDICINE

## 2019-02-19 PROCEDURE — 86803 HEPATITIS C AB TEST: CPT

## 2019-02-19 PROCEDURE — 3078F DIAST BP <80 MM HG: CPT | Mod: CPTII,S$GLB,, | Performed by: INTERNAL MEDICINE

## 2019-02-19 PROCEDURE — 84550 ASSAY OF BLOOD/URIC ACID: CPT

## 2019-02-19 PROCEDURE — 3077F SYST BP >= 140 MM HG: CPT | Mod: CPTII,S$GLB,, | Performed by: INTERNAL MEDICINE

## 2019-02-19 PROCEDURE — 86038 ANTINUCLEAR ANTIBODIES: CPT

## 2019-02-19 PROCEDURE — 99999 PR PBB SHADOW E&M-EST. PATIENT-LVL III: ICD-10-PCS | Mod: PBBFAC,,, | Performed by: INTERNAL MEDICINE

## 2019-02-19 PROCEDURE — 86039 ANTINUCLEAR ANTIBODIES (ANA): CPT

## 2019-02-19 PROCEDURE — 85007 BL SMEAR W/DIFF WBC COUNT: CPT

## 2019-02-19 PROCEDURE — 3077F PR MOST RECENT SYSTOLIC BLOOD PRESSURE >= 140 MM HG: ICD-10-PCS | Mod: CPTII,S$GLB,, | Performed by: INTERNAL MEDICINE

## 2019-02-19 PROCEDURE — 86160 COMPLEMENT ANTIGEN: CPT

## 2019-02-19 PROCEDURE — 86705 HEP B CORE ANTIBODY IGM: CPT

## 2019-02-19 PROCEDURE — 1101F PT FALLS ASSESS-DOCD LE1/YR: CPT | Mod: CPTII,S$GLB,, | Performed by: INTERNAL MEDICINE

## 2019-02-19 PROCEDURE — 86431 RHEUMATOID FACTOR QUANT: CPT

## 2019-02-19 PROCEDURE — 3078F PR MOST RECENT DIASTOLIC BLOOD PRESSURE < 80 MM HG: ICD-10-PCS | Mod: CPTII,S$GLB,, | Performed by: INTERNAL MEDICINE

## 2019-02-19 PROCEDURE — 1101F PR PT FALLS ASSESS DOC 0-1 FALLS W/OUT INJ PAST YR: ICD-10-PCS | Mod: CPTII,S$GLB,, | Performed by: INTERNAL MEDICINE

## 2019-02-19 PROCEDURE — 85027 COMPLETE CBC AUTOMATED: CPT

## 2019-02-19 PROCEDURE — 99205 OFFICE O/P NEW HI 60 MIN: CPT | Mod: S$GLB,,, | Performed by: INTERNAL MEDICINE

## 2019-02-19 PROCEDURE — 80053 COMPREHEN METABOLIC PANEL: CPT

## 2019-02-19 RX ORDER — HYDROXYCHLOROQUINE SULFATE 200 MG/1
200 TABLET, FILM COATED ORAL 2 TIMES DAILY
Qty: 60 TABLET | Refills: 6 | Status: SHIPPED | OUTPATIENT
Start: 2019-02-19 | End: 2019-08-02

## 2019-02-19 ASSESSMENT — ROUTINE ASSESSMENT OF PATIENT INDEX DATA (RAPID3)
TOTAL RAPID3 SCORE: 4.05
PSYCHOLOGICAL DISTRESS SCORE: 1.1
PAIN SCORE: 0
FATIGUE SCORE: 0
PATIENT GLOBAL ASSESSMENT SCORE: 5.5
AM STIFFNESS SCORE: 0, NO
MDHAQ FUNCTION SCORE: 2

## 2019-02-19 NOTE — TELEPHONE ENCOUNTER
Spoke with patient's wife regarding abnormal results.  The last labs I have from patient are done outside on  1/2017  Wbc-2.9  Hematocrit-26.1  Platelets-213  Creat-1.4  Ssa>8  Ssb>8    Patients former rheumatologist Dr. Lau asked patient to stop both imuran and plaquenil in October.  Patient decided to start them back on his own in late October.  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  I have asked them to stop plaquenil and imuran and we will do labs in a week.  I discussed admission for closer monitoring, but patient and wife decline.  We will also set them up with hematologist.

## 2019-02-19 NOTE — TELEPHONE ENCOUNTER
----- Message from Elizabeth Bronson sent at 2/19/2019 10:42 AM CST -----  Contact: Qi-Wife  Wife called to request a referral for podiatry. Please call at [phone]

## 2019-02-19 NOTE — PROGRESS NOTES
Subjective:       Patient ID: Darrion Bennett is a 69 y.o. male.    Chief Complaint: Follow-up     HPI  69 year old male with type II, cataracts, HTN, gout,  Sjogrens, urolithiasis, CHF, hypothyroidism, CKD here for evaluation.  He reports that he was diagnosed with Sjogrens when he had dry eyes and dry mouth in 2014.. He takes plaquenil 200mg po BID. He is  taking azathioprine 50mg po TID and medrol 4mg po qqday.   He was put on imuran by Dr. Corona.  He was followed by Dr. Corona from 2014 to 2017.  In October 2018, imuran and medrol was stopped. Patient restarted imuran in November 2018.  Reports that he feels that imuran helps him with joint.   Today he denies pain, stiffness or swelling.  He denies sry eyes or dry mouth.  Denies trouble making a fist.    He was diagnosed in January in left aspect of foot with pain, swelling and redness.  He is on allopurinol po qday since January.  He stopped allopurinol about 3 weeks ago.   He reports today of right shoulder pain aspect.   Denies chest pain or sob.  Past Medical History:   Diagnosis Date    Anemia     Arthritis     Cataract     Chronic constipation     Diabetes mellitus, type 2     Glaucoma     Hypertension     MCTD (mixed connective tissue disease)     Sjogren's disease     Ulcerative colitis     Urolithiasis        Review of Systems   Constitutional: Negative for activity change, appetite change, chills, diaphoresis, fatigue and fever.   HENT: Negative for ear discharge, ear pain, hearing loss, mouth sores, nosebleeds and sinus pressure.    Eyes: Negative.  Negative for photophobia, pain, discharge, redness and itching.   Respiratory: Negative for cough, chest tightness and shortness of breath.    Cardiovascular: Negative for chest pain, palpitations and leg swelling.   Gastrointestinal: Negative for abdominal distention, blood in stool and nausea.   Endocrine: Negative for cold intolerance, heat intolerance, polydipsia and polyphagia.    Genitourinary: Negative for flank pain, genital sores and hematuria.   Musculoskeletal: Positive for arthralgias. Negative for back pain, gait problem, joint swelling, myalgias, neck pain and neck stiffness.   Skin: Negative for color change, pallor and rash.   Neurological: Negative for dizziness, weakness, light-headedness and headaches.   Hematological: Negative for adenopathy. Does not bruise/bleed easily.   Psychiatric/Behavioral: Negative for decreased concentration and hallucinations. The patient is not nervous/anxious.              Objective:   BP (!) 147/73   Pulse 100   Wt 75.5 kg (166 lb 7.2 oz)   BMI 26.87 kg/m²      Physical Exam   Constitutional: He is well-developed, well-nourished, and in no distress. No distress.   HENT:   Head: Normocephalic and atraumatic.   Right Ear: External ear normal.   Left Ear: External ear normal.   Nose: Nose normal.   Mouth/Throat: Oropharynx is clear and moist. No oropharyngeal exudate.   Eyes: Conjunctivae and EOM are normal. Pupils are equal, round, and reactive to light. Right eye exhibits no discharge. Left eye exhibits no discharge. No scleral icterus.   Neck: Normal range of motion. Neck supple. No JVD present. No tracheal deviation present. No thyromegaly present.   Cardiovascular: Normal rate, normal heart sounds and intact distal pulses.  Exam reveals no gallop and no friction rub.    No murmur heard.  Pulmonary/Chest: Effort normal. No stridor. No respiratory distress. He has no wheezes. He has no rales. He exhibits no tenderness.   Abdominal: Soft. Bowel sounds are normal. He exhibits no distension and no mass. There is no tenderness. There is no rebound and no guarding.   Lymphadenopathy:     He has no cervical adenopathy.   Neurological: No cranial nerve deficit. Coordination normal.   Skin: Skin is warm and dry. No rash noted. He is not diaphoretic. No erythema. No pallor.     Musculoskeletal: Normal range of motion. He exhibits no edema, tenderness  or deformity.           Outside labs: reviewed  Outside notes reviewed       Assessment:       69 year old male with type II, cataracts, HTN, gout,  Sjogrens, urolithiasis, CHF, hypothyroidism, CKD here for evaluation.  He reports that he was diagnosed with Sjogrens when he had dry eyes and dry mouth in 2014.. He takes plaquenil 200mg po BID, azathioprine 50mg po TID and medrol 4mg po qqday. His last rheumatologist discontinued his medications and patient decided to restart the medications himself since he was having so much pain.  I told him in the future not to restart medications without talking to his physician.    No diagnosis found.        Plan:     labs  Will plan on medication regimen after I review his labs   last eye exam was in November 2018 -lainey magana   45 minutes spent with patient and wife to answer questions  Outside labs and notes reviewed    **

## 2019-02-19 NOTE — TELEPHONE ENCOUNTER
Spoke to spouse to ask the reason for the referral to podiatry. She said because he is a diabetic and it has been a whild since he had  His feet checked.

## 2019-02-20 ENCOUNTER — HOSPITAL ENCOUNTER (EMERGENCY)
Facility: HOSPITAL | Age: 69
Discharge: HOME | End: 2019-02-20
Attending: EMERGENCY MEDICINE
Payer: MEDICARE

## 2019-02-20 ENCOUNTER — OFFICE VISIT (OUTPATIENT)
Dept: FAMILY MEDICINE | Facility: CLINIC | Age: 69
End: 2019-02-20
Payer: MEDICARE

## 2019-02-20 VITALS
DIASTOLIC BLOOD PRESSURE: 82 MMHG | TEMPERATURE: 99 F | SYSTOLIC BLOOD PRESSURE: 160 MMHG | HEIGHT: 65 IN | HEART RATE: 106 BPM | BODY MASS INDEX: 26.66 KG/M2 | WEIGHT: 160 LBS | OXYGEN SATURATION: 97 %

## 2019-02-20 VITALS
HEART RATE: 94 BPM | DIASTOLIC BLOOD PRESSURE: 78 MMHG | BODY MASS INDEX: 26.03 KG/M2 | HEIGHT: 66 IN | TEMPERATURE: 99 F | RESPIRATION RATE: 18 BRPM | OXYGEN SATURATION: 96 % | SYSTOLIC BLOOD PRESSURE: 186 MMHG | WEIGHT: 162 LBS

## 2019-02-20 DIAGNOSIS — R05.9 COUGH: ICD-10-CM

## 2019-02-20 DIAGNOSIS — M35.00 SJOGREN'S SYNDROME, WITH UNSPECIFIED ORGAN INVOLVEMENT: ICD-10-CM

## 2019-02-20 DIAGNOSIS — R09.89 SYMPTOMS OF UPPER RESPIRATORY INFECTION (URI): ICD-10-CM

## 2019-02-20 DIAGNOSIS — D61.818 PANCYTOPENIA: Primary | ICD-10-CM

## 2019-02-20 DIAGNOSIS — E11.8 CONTROLLED TYPE 2 DIABETES MELLITUS WITH COMPLICATION, WITHOUT LONG-TERM CURRENT USE OF INSULIN: ICD-10-CM

## 2019-02-20 DIAGNOSIS — N18.30 CHRONIC KIDNEY DISEASE, STAGE III (MODERATE): ICD-10-CM

## 2019-02-20 DIAGNOSIS — N18.30 STAGE 3 CHRONIC KIDNEY DISEASE: ICD-10-CM

## 2019-02-20 DIAGNOSIS — M35.1 MCTD (MIXED CONNECTIVE TISSUE DISEASE): ICD-10-CM

## 2019-02-20 LAB
ALBUMIN SERPL BCP-MCNC: 3.6 G/DL
ALP SERPL-CCNC: 87 U/L
ALT SERPL W/O P-5'-P-CCNC: 22 U/L
ANA SER QL IF: POSITIVE
ANA TITR SER IF: NORMAL {TITER}
ANION GAP SERPL CALC-SCNC: 8 MMOL/L
ANISOCYTOSIS BLD QL SMEAR: SLIGHT
AST SERPL-CCNC: 22 U/L
BACTERIA #/AREA URNS AUTO: NORMAL /HPF
BASOPHILS # BLD AUTO: ABNORMAL K/UL
BASOPHILS NFR BLD: 0 %
BILIRUB SERPL-MCNC: 0.5 MG/DL
BILIRUB UR QL STRIP: NEGATIVE
BUN SERPL-MCNC: 24 MG/DL
CALCIUM SERPL-MCNC: 10 MG/DL
CHLORIDE SERPL-SCNC: 110 MMOL/L
CLARITY UR REFRACT.AUTO: CLEAR
CO2 SERPL-SCNC: 21 MMOL/L
COLOR UR AUTO: ABNORMAL
CREAT SERPL-MCNC: 1.5 MG/DL
CTP QC/QA: YES
DIFFERENTIAL METHOD: ABNORMAL
EOSINOPHIL # BLD AUTO: ABNORMAL K/UL
EOSINOPHIL NFR BLD: 7.5 %
ERYTHROCYTE [DISTWIDTH] IN BLOOD BY AUTOMATED COUNT: 14.2 %
EST. GFR  (AFRICAN AMERICAN): 54.1 ML/MIN/1.73 M^2
EST. GFR  (NON AFRICAN AMERICAN): 46.8 ML/MIN/1.73 M^2
GLUCOSE SERPL-MCNC: 105 MG/DL
GLUCOSE UR QL STRIP: NEGATIVE
HCT VFR BLD AUTO: 25 %
HGB BLD-MCNC: 8.3 G/DL
HGB UR QL STRIP: NEGATIVE
HYALINE CASTS UR QL AUTO: 0 /LPF
HYPOCHROMIA BLD QL SMEAR: ABNORMAL
IMM GRANULOCYTES # BLD AUTO: ABNORMAL K/UL
IMM GRANULOCYTES NFR BLD AUTO: ABNORMAL %
KETONES UR QL STRIP: NEGATIVE
LACTATE SERPL-SCNC: 1 MMOL/L
LEUKOCYTE ESTERASE UR QL STRIP: NEGATIVE
LYMPHOCYTES # BLD AUTO: ABNORMAL K/UL
LYMPHOCYTES NFR BLD: 27.5 %
MCH RBC QN AUTO: 30.1 PG
MCHC RBC AUTO-ENTMCNC: 33.2 G/DL
MCV RBC AUTO: 91 FL
MICROSCOPIC COMMENT: NORMAL
MONOCYTES # BLD AUTO: ABNORMAL K/UL
MONOCYTES NFR BLD: 2.5 %
NEUTROPHILS NFR BLD: 62.5 %
NITRITE UR QL STRIP: NEGATIVE
NRBC BLD-RTO: 0 /100 WBC
OVALOCYTES BLD QL SMEAR: ABNORMAL
PH UR STRIP: 5 [PH] (ref 5–8)
PLATELET # BLD AUTO: 84 K/UL
PLATELET BLD QL SMEAR: ABNORMAL
PMV BLD AUTO: 11.3 FL
POC MOLECULAR INFLUENZA A AGN: NEGATIVE
POC MOLECULAR INFLUENZA B AGN: NEGATIVE
POIKILOCYTOSIS BLD QL SMEAR: SLIGHT
POLYCHROMASIA BLD QL SMEAR: ABNORMAL
POTASSIUM SERPL-SCNC: 4.4 MMOL/L
PROT SERPL-MCNC: 7.2 G/DL
PROT UR QL STRIP: ABNORMAL
RBC # BLD AUTO: 2.76 M/UL
RBC #/AREA URNS AUTO: 1 /HPF (ref 0–4)
SODIUM SERPL-SCNC: 139 MMOL/L
SP GR UR STRIP: 1 (ref 1–1.03)
URN SPEC COLLECT METH UR: ABNORMAL
WBC # BLD AUTO: 0.99 K/UL
WBC #/AREA URNS AUTO: 0 /HPF (ref 0–5)

## 2019-02-20 PROCEDURE — 99999 PR PBB SHADOW E&M-EST. PATIENT-LVL IV: CPT | Mod: PBBFAC,,, | Performed by: PHYSICIAN ASSISTANT

## 2019-02-20 PROCEDURE — 99285 EMERGENCY DEPT VISIT HI MDM: CPT | Mod: 25

## 2019-02-20 PROCEDURE — 93010 ELECTROCARDIOGRAM REPORT: CPT | Mod: ,,, | Performed by: INTERNAL MEDICINE

## 2019-02-20 PROCEDURE — 3044F HG A1C LEVEL LT 7.0%: CPT | Mod: CPTII,S$GLB,, | Performed by: PHYSICIAN ASSISTANT

## 2019-02-20 PROCEDURE — 99999 PR PBB SHADOW E&M-EST. PATIENT-LVL IV: ICD-10-PCS | Mod: PBBFAC,,, | Performed by: PHYSICIAN ASSISTANT

## 2019-02-20 PROCEDURE — 3079F DIAST BP 80-89 MM HG: CPT | Mod: CPTII,S$GLB,, | Performed by: PHYSICIAN ASSISTANT

## 2019-02-20 PROCEDURE — 1101F PR PT FALLS ASSESS DOC 0-1 FALLS W/OUT INJ PAST YR: ICD-10-PCS | Mod: CPTII,S$GLB,, | Performed by: PHYSICIAN ASSISTANT

## 2019-02-20 PROCEDURE — 85007 BL SMEAR W/DIFF WBC COUNT: CPT

## 2019-02-20 PROCEDURE — 3077F PR MOST RECENT SYSTOLIC BLOOD PRESSURE >= 140 MM HG: ICD-10-PCS | Mod: CPTII,S$GLB,, | Performed by: PHYSICIAN ASSISTANT

## 2019-02-20 PROCEDURE — 99215 PR OFFICE/OUTPT VISIT, EST, LEVL V, 40-54 MIN: ICD-10-PCS | Mod: S$GLB,,, | Performed by: PHYSICIAN ASSISTANT

## 2019-02-20 PROCEDURE — 1101F PT FALLS ASSESS-DOCD LE1/YR: CPT | Mod: CPTII,S$GLB,, | Performed by: PHYSICIAN ASSISTANT

## 2019-02-20 PROCEDURE — 87040 BLOOD CULTURE FOR BACTERIA: CPT | Mod: 59

## 2019-02-20 PROCEDURE — 3077F SYST BP >= 140 MM HG: CPT | Mod: CPTII,S$GLB,, | Performed by: PHYSICIAN ASSISTANT

## 2019-02-20 PROCEDURE — 81001 URINALYSIS AUTO W/SCOPE: CPT

## 2019-02-20 PROCEDURE — 3079F PR MOST RECENT DIASTOLIC BLOOD PRESSURE 80-89 MM HG: ICD-10-PCS | Mod: CPTII,S$GLB,, | Performed by: PHYSICIAN ASSISTANT

## 2019-02-20 PROCEDURE — 80053 COMPREHEN METABOLIC PANEL: CPT

## 2019-02-20 PROCEDURE — 99285 EMERGENCY DEPT VISIT HI MDM: CPT | Mod: ,,, | Performed by: EMERGENCY MEDICINE

## 2019-02-20 PROCEDURE — 3044F PR MOST RECENT HEMOGLOBIN A1C LEVEL <7.0%: ICD-10-PCS | Mod: CPTII,S$GLB,, | Performed by: PHYSICIAN ASSISTANT

## 2019-02-20 PROCEDURE — 85027 COMPLETE CBC AUTOMATED: CPT

## 2019-02-20 PROCEDURE — 93010 EKG 12-LEAD: ICD-10-PCS | Mod: ,,, | Performed by: INTERNAL MEDICINE

## 2019-02-20 PROCEDURE — 93005 ELECTROCARDIOGRAM TRACING: CPT

## 2019-02-20 PROCEDURE — 99285 PR EMERGENCY DEPT VISIT,LEVEL V: ICD-10-PCS | Mod: ,,, | Performed by: EMERGENCY MEDICINE

## 2019-02-20 PROCEDURE — 99215 OFFICE O/P EST HI 40 MIN: CPT | Mod: S$GLB,,, | Performed by: PHYSICIAN ASSISTANT

## 2019-02-20 PROCEDURE — 83605 ASSAY OF LACTIC ACID: CPT

## 2019-02-20 PROCEDURE — 87502 INFLUENZA DNA AMP PROBE: CPT

## 2019-02-20 NOTE — ED NOTES
All discharge instructions reviewed with patient and questions addressed. VSS, NAD noted, and patient A&Ox4. All belongings with patient at time of departure. Patient ambulating without difficulty from department; steady gait noted.

## 2019-02-20 NOTE — ASSESSMENT & PLAN NOTE
chronic and stable as reviewed in record, controlled with medication   followed by Rheumatology   Continue current treatment plan

## 2019-02-20 NOTE — ED PROVIDER NOTES
Encounter Date: 2/20/2019    SCRIBE #1 NOTE: I, Esteban Rosa, am scribing for, and in the presence of,  Dr. Thomas. I have scribed the following portions of the note - the APC attestation.       History     Chief Complaint   Patient presents with    Abnormal Lab     low wbc, cough     Patient is a 69 year old male with PMHX of Sjogren's disease, mixed connective tissue disease, HTN, DM2, CKD stage 3, ulcerative colitis, and anemia. He presents to the ED for ABN lab. Patient seen in rheumatology clinic yesterday and found to have new pancytopenia. He reports having dry non productive cough for approximately five days. Reports associated bilateral ear pain and SOB. Patient last dose of imuran two days ago. Patient reports son is physician and prescribed 500 mg levofloxacin, which patient has taken one dose. He denies fever,chills, nausea, vomiting, chest pain, abd pain, dysuria, diarrhea, or constipation. He is a non smoker and denies alcohol use.          Review of patient's allergies indicates:   Allergen Reactions    Penicillins Nausea And Vomiting     Past Medical History:   Diagnosis Date    Anemia     Arthritis     Cataract     Chronic constipation     Diabetes mellitus, type 2     Glaucoma     Hypertension     MCTD (mixed connective tissue disease)     Sjogren's disease     Ulcerative colitis     Urolithiasis      Past Surgical History:   Procedure Laterality Date    COLONOSCOPY  2014    EYE SURGERY       Family History   Problem Relation Age of Onset    Hypertension Father     Stroke Father     Rheum arthritis Maternal Aunt     Rheum arthritis Maternal Uncle     Throat cancer Paternal Grandmother     Celiac disease Neg Hx     Colon cancer Neg Hx     Cirrhosis Neg Hx     Colon polyps Neg Hx     Crohn's disease Neg Hx     Cystic fibrosis Neg Hx     Hemochromatosis Neg Hx     Esophageal cancer Neg Hx     Inflammatory bowel disease Neg Hx     Irritable bowel syndrome Neg Hx      Liver cancer Neg Hx     Liver disease Neg Hx     Rectal cancer Neg Hx     Stomach cancer Neg Hx     Ulcerative colitis Neg Hx     Chase's disease Neg Hx      Social History     Tobacco Use    Smoking status: Never Smoker    Smokeless tobacco: Never Used   Substance Use Topics    Alcohol use: No    Drug use: No     Review of Systems   Constitutional: Negative for fever.   HENT: Positive for ear pain. Negative for sore throat.    Respiratory: Positive for cough and shortness of breath.    Cardiovascular: Negative for chest pain.   Gastrointestinal: Negative for abdominal pain, nausea and vomiting.   Genitourinary: Negative for dysuria.   Musculoskeletal: Negative for back pain.   Skin: Negative for rash.   Neurological: Negative for weakness.   Hematological: Does not bruise/bleed easily.       Physical Exam     Initial Vitals [02/20/19 0916]   BP Pulse Resp Temp SpO2   (!) 168/76 103 18 98.7 °F (37.1 °C) 100 %      MAP       --         Physical Exam    Vitals reviewed.  Constitutional: He appears well-developed and well-nourished. No distress.   HENT:   Head: Normocephalic.   Right Ear: Tympanic membrane, external ear and ear canal normal. No tenderness.   Left Ear: Tympanic membrane, external ear and ear canal normal. No tenderness.   Mouth/Throat: Uvula is midline, oropharynx is clear and moist and mucous membranes are normal. No trismus in the jaw. No uvula swelling. No oropharyngeal exudate, posterior oropharyngeal edema or posterior oropharyngeal erythema.   Eyes: Conjunctivae are normal.   Neck: Normal range of motion.   Cardiovascular: Normal rate and regular rhythm.   No murmur heard.  Pulmonary/Chest: Breath sounds normal. No respiratory distress. He has no wheezes. He has no rales.   Abdominal: Soft. Bowel sounds are normal. He exhibits no distension. There is no tenderness.   Musculoskeletal: Normal range of motion.   Neurological: He is alert and oriented to person, place, and time. He has  normal strength.   Skin: Skin is warm and dry. No erythema.         ED Course   Procedures  Labs Reviewed   CBC W/ AUTO DIFFERENTIAL - Abnormal; Notable for the following components:       Result Value    WBC 0.99 (*)     RBC 2.76 (*)     Hemoglobin 8.3 (*)     Hematocrit 25.0 (*)     Platelets 84 (*)     Mono% 2.5 (*)     Platelet Estimate Decreased (*)     All other components within normal limits    Narrative:     WBC critical result(s) called and verbal readback obtained from DIANA DEXTER RN, 02/20/2019 10:46   COMPREHENSIVE METABOLIC PANEL - Abnormal; Notable for the following components:    CO2 21 (*)     BUN, Bld 24 (*)     Creatinine 1.5 (*)     eGFR if  54.1 (*)     eGFR if non  46.8 (*)     All other components within normal limits   URINALYSIS, REFLEX TO URINE CULTURE - Abnormal; Notable for the following components:    Protein, UA 1+ (*)     All other components within normal limits    Narrative:     Preferred Collection Type->Urine, Clean Catch   CULTURE, BLOOD    Narrative:     Aerobic and anaerobic   CULTURE, BLOOD    Narrative:     Aerobic and anaerobic   LACTIC ACID, PLASMA   URINALYSIS MICROSCOPIC    Narrative:     Preferred Collection Type->Urine, Clean Catch   POCT INFLUENZA A/B MOLECULAR        Imaging Results          X-Ray Chest PA And Lateral (Final result)  Result time 02/20/19 11:26:37    Final result by Gage Brown MD (02/20/19 11:26:37)                 Impression:      See above      Electronically signed by: Gage Brown MD  Date:    02/20/2019  Time:    11:26             Narrative:    EXAMINATION:  XR CHEST PA AND LATERAL    CLINICAL HISTORY:  Cough    TECHNIQUE:  PA and lateral views of the chest were performed.    COMPARISON:  None    FINDINGS:  Cardiac size is normal.  Lungs are clear.  No infiltrate is seen.                                 Medical Decision Making:   History:   Old Medical Records: I decided to obtain old medical  records.  Clinical Tests:   Lab Tests: Ordered and Reviewed  Radiological Study: Ordered and Reviewed  Medical Tests: Ordered and Reviewed       APC / Resident Notes:   Patient is a 69 year old male presents to the ED for emergent evaluation of ABN lab and cough.     Will order septic work up. Will consult hematology, awaiting recommendations. Will continue to monitor.     Differential diagnoses include, but are not limited to: sepsis, pneumonia, influenza, or electrolyte imbalance.     Influenza negative. Moderate neutropenia. hemodynamically stable. Thrombocytopenia. Elevated BUN 24 and Cr 1.5. Patient with hx of CKD stage 3. Lactate WNL. Blood cultures pending. UA unremarkable for infectious process. CXR found to have no acute process.     Appreciate hematology consult. They believe patient is stable for discharge and to F/U in clinic tomorrow. They believe new pancytopenia to be secondary to imuran. Patient instructed again to discontinue medication.     Patient reassessed, reports symptoms improved with ED management. I have discussed emergency department findings, and plan with the patient. Will discharge home with F/U hematology tomorrow in clinic. Patient verbalizes understanding of plan and agrees. Return precautions given.     I have discussed and reviewed with my supervising physician.       Scribe Attestation:   Scribe #1: I performed the above scribed service and the documentation accurately describes the services I performed. I attest to the accuracy of the note.    Attending Attestation:     Physician Attestation Statement for NP/PA:   I have conducted a face to face encounter with this patient in addition to the NP/PA, due to Medical Complexity    Other NP/PA Attestation Additions:    History of Present Illness: Patient sent in for pancytopenia   Physical Exam: Patient is nontoxic.  There is no petechiae or purpura   Medical Decision Making: We reviewed labs medical record and discussed with  Heme-Onc.  They will see tomorrow in clinic           Clinical Impression:   The primary encounter diagnosis was Pancytopenia. Diagnoses of Cough and Stage 3 chronic kidney disease were also pertinent to this visit.      Disposition:   Disposition: Discharged  Condition: Stable                        Jelena Rhoades PA-C  02/20/19 2110

## 2019-02-20 NOTE — ASSESSMENT & PLAN NOTE
chronic and stable as reviewed in record, controlled with medication   followed by PCP and Rheumatology   Continue current treatment plan

## 2019-02-20 NOTE — PROGRESS NOTES
Patient Name: Darrion Bennett    : 1950  MRN: [id number]    Subjective:  Darrion is a 69 y.o. male who presents today for:    Chief Complaint   Patient presents with    Cough    Sinus Problem    Tinnitus    Results     low WBC please review labs     Anorexia     poor    Back Pain     possibly from coughing       HPI  Patient has multiple medical diagnoses as listed below in the history. The patient is new to me. I have reviewed his records and recent labs. He was seen yesterday by rheumatology and had labs done. His blood counts showed critical pancytopenia. The rheumatologist discontinued his Imuran immediately and placed a referral for hematology. The rheumatologist also suggested the patient be admitted for closer monitoring, her note reports the patient decline and would like to wait for the hematology appt.  Today, he complains of upper respiratory symptoms since yesterday that are gradually worsening. He has taken one dose of Levaquin which was prescribed by his son, an MD. Associated symptoms include fatigue, rhinorrhea, cough, wheezing and body aches. He reports not feeling well. He is accompanied by his wife who is concerned about his blood counts and possible infection. He denies fever, dyspnea or chest pain.    I spoke with his son via phone, who is a physician with [facility], about his recent labs and current clinical status. His son is in [place] currently. He requested that the ER be notified prior to the arrival of his father. I reassured him this would be done.     Past Medical History  Past Medical History:   Diagnosis Date    Anemia     Arthritis     Cataract     Chronic constipation     Diabetes mellitus, type 2     Glaucoma     Hypertension     MCTD (mixed connective tissue disease)     Sjogren's disease     Ulcerative colitis     Urolithiasis        Past Surgical History  Past Surgical History:   Procedure Laterality Date    COLONOSCOPY      EYE SURGERY         Family  History  Family History   Problem Relation Age of Onset    Hypertension Father     Stroke Father     Rheum arthritis Maternal Aunt     Rheum arthritis Maternal Uncle     Throat cancer Paternal Grandmother     Celiac disease Neg Hx     Colon cancer Neg Hx     Cirrhosis Neg Hx     Colon polyps Neg Hx     Crohn's disease Neg Hx     Cystic fibrosis Neg Hx     Hemochromatosis Neg Hx     Esophageal cancer Neg Hx     Inflammatory bowel disease Neg Hx     Irritable bowel syndrome Neg Hx     Liver cancer Neg Hx     Liver disease Neg Hx     Rectal cancer Neg Hx     Stomach cancer Neg Hx     Ulcerative colitis Neg Hx     Chase's disease Neg Hx        Social History  Social History     Socioeconomic History    Marital status:      Spouse name: Not on file    Number of children: 3    Years of education: Not on file    Highest education level: Not on file   Social Needs    Financial resource strain: Not on file    Food insecurity - worry: Not on file    Food insecurity - inability: Not on file    Transportation needs - medical: Not on file    Transportation needs - non-medical: Not on file   Occupational History    Not on file   Tobacco Use    Smoking status: Never Smoker    Smokeless tobacco: Never Used   Substance and Sexual Activity    Alcohol use: No    Drug use: No    Sexual activity: No   Other Topics Concern    Not on file   Social History Narrative    Not on file       Current Medications  Current Outpatient Medications on File Prior to Visit   Medication Sig Dispense Refill    allopurinol (ZYLOPRIM) 100 MG tablet Take 1 tablet (100 mg total) by mouth once daily. 90 tablet 1    amlodipine (NORVASC) 10 MG tablet Take 10 mg by mouth once daily.  2    aspirin (ECOTRIN) 81 MG EC tablet Take 81 mg by mouth once daily.      cloNIDine (CATAPRES) 0.1 MG tablet Take 0.1 mg by mouth 3 (three) times daily.  5    cloNIDine 0.3 mg/24 hr td ptwk (CATAPRES) 0.3 mg/24 hr   1     dorzolamide (TRUSOPT) 2 % ophthalmic solution       FLUZONE HIGH-DOSE 2018-19, PF, 180 mcg/0.5 mL vaccine TO BE ADMINISTERED BY PHARMACIST FOR IMMUNIZATION  0    hydroxychloroquine (PLAQUENIL) 200 mg tablet Take 1 tablet (200 mg total) by mouth 2 (two) times daily. 60 tablet 6    insulin glargine (LANTUS) 100 unit/mL injection Inject 20 Units into the skin every evening. (Patient taking differently: Inject 20 Units into the skin every evening. Pt states 10 units every evening.) 6 vial 6    isosorbide dinitrate (ISORDIL) 10 MG tablet TAKE 1 TABLET EVERY 8 HOURS DAILY.  1    linaclotide (LINZESS) 145 mcg Cap capsule Take 145 mcg by mouth once daily.      melatonin 5 mg Tab       methylPREDNISolone (MEDROL DOSEPACK) 4 mg tablet use as directed 1 Package 0    multivit with min-folic acid 200 mcg Chew       NOVOLOG U-100 INSULIN ASPART 100 unit/mL injection       torsemide (DEMADEX) 20 MG Tab 10 mg.       TRADJENTA 5 mg Tab tablet       TRAVATAN Z 0.004 % Drop       valsartan (DIOVAN) 160 MG tablet Take 160 mg by mouth once daily.  2     No current facility-administered medications on file prior to visit.        Allergies   Review of patient's allergies indicates:   Allergen Reactions    Penicillins Nausea And Vomiting         ROS  Review of Systems   Constitutional: Positive for appetite change and fatigue. Negative for chills and fever.   HENT: Positive for congestion, ear pain, postnasal drip and rhinorrhea. Negative for sinus pressure, sinus pain and sore throat.    Respiratory: Positive for cough and wheezing. Negative for shortness of breath.    Cardiovascular: Negative for chest pain and palpitations.   Gastrointestinal: Negative for abdominal pain and nausea.   Musculoskeletal: Positive for arthralgias, back pain and myalgias.   Skin: Positive for pallor. Negative for rash.   Allergic/Immunologic: Negative for environmental allergies.   Neurological: Positive for light-headedness. Negative for  "syncope and headaches.   Hematological: Negative for adenopathy.         Objective:    BP (!) 160/82 (BP Location: Left arm, Patient Position: Sitting, BP Method: Medium (Manual))   Pulse 106   Temp 98.9 °F (37.2 °C) (Oral)   Ht 5' 5" (1.651 m)   Wt 72.6 kg (160 lb)   SpO2 97%   BMI 26.63 kg/m²     Physical Exam   Constitutional: He appears ill.   HENT:   Head: Normocephalic.   Right Ear: Hearing, tympanic membrane, external ear and ear canal normal. Tympanic membrane is not erythematous and not bulging. No middle ear effusion.   Left Ear: Hearing, tympanic membrane, external ear and ear canal normal. Tympanic membrane is not erythematous and not bulging.  No middle ear effusion.   Nose: Mucosal edema and rhinorrhea present. Right sinus exhibits no maxillary sinus tenderness and no frontal sinus tenderness. Left sinus exhibits no maxillary sinus tenderness and no frontal sinus tenderness.   Mouth/Throat: Uvula is midline. Mucous membranes are pale. Posterior oropharyngeal erythema present.   Eyes: Conjunctivae, EOM and lids are normal. Pupils are equal, round, and reactive to light.   Neck: Carotid bruit is not present. No thyroid mass and no thyromegaly present.   Cardiovascular: Regular rhythm, S1 normal, S2 normal and normal heart sounds. Tachycardia present. Exam reveals no gallop and no friction rub.   No murmur heard.  Pulmonary/Chest: Effort normal. He has wheezes in the right upper field, the left upper field and the left lower field.   Skin: Skin is warm and dry. No rash noted. There is pallor.       Assessment/Plan:  Darrion Bennett is a 69 y.o. male who presents today for :    Darrion was seen today for cough, sinus problem, tinnitus, results, anorexia and back pain.    Diagnoses and all orders for this visit:    Pancytopenia, likely drug toxicity due to Imuran, Advised yesterday by current Rheumatology physician to discontinue immediately. Based on critically low blood counts and my exam today I feel " it is in the best interest of the patient to go to the ER. A referral to hematology was ordered yesterday by rheumatology, I feel the patient needs urgent care and monitoring due to low blood counts posing a threat to bodily function. Patient has agreed to go to ER on [address]   WBC 1.14  Platelets 82  RBC 2.74  Hemoglobin 8.4      Symptoms of upper respiratory infection (URI)  Patient is afebrile in clinic today, but with such a low white count and acute symptoms infection cannot be ruled out.  In setting of pancytopenia and possible infection based on physical exam findings, I feel the patient needs urgent Hematologic monitoring  Patient was sent to the ER on [address]     Patient gave verbal understanding and agreement of plan    I spent >50% of this 25 minute encounter counseling the patient on diagnoses, risk factors, and treatment.      Problem list issues addresses during this visit    MCTD (mixed connective tissue disease)  chronic and stable as reviewed in record, controlled with medication   followed by PCP and Rheumatology   Continue current treatment plan      Controlled type 2 diabetes mellitus with complication, without long-term current use of insulin  chronic and stable as reviewed in record, controlled with medication   followed by PCP  Continue current treatment plan      Chronic kidney disease, stage III (moderate)  chronic and stable as reviewed in record  followed by PCP  Continue current treatment plans      Sjogren's disease  chronic and stable as reviewed in record, controlled with medication   followed by Rheumatology   Continue current treatment plan         Health maintenance reviewed and disussed, deferred at this time due to acute illness         Encouraged to call/return to clinic if symptoms persist or worsen    Kassandra Philip PA-C  [facility]/ Internal Med/ Peds

## 2019-02-20 NOTE — PLAN OF CARE
Patient seen in the ED for pancytopenia  Patient has sjogrens and has been on azathioprine and Plaquenil since 2014.  In October 2018 patient was advised to stop the azathioprine by his rheumatologist however he himself restarted in November 2018 as it helped him with his joints  He was seen at Rheumatology Clinic yesterday and his lab revealed pancytopenia and was advised to follow-up with the Hematology Clinic  He had recent viral infection but currently denies any shortness of breath, cough, fever, chills, chest pain or dizziness.  He presented to emergency room today for pancytopenia  Labs reviewed.  He has flu negative.  Urinalysis is clear.  Chest x-ray is normal    Vitals:    02/20/19 1345   BP: (!) 186/78   Pulse: 94   Resp: 18   Temp:      Physical exam  He appears well  Lungs are clear  Heart examination- normal S1,S2  Neuro- no acute focal neurological deficits  Abdominal- soft, nontender, bowel sounds present  Extremity- no edema    Assessment  1.  Pancytopenia likely secondary to medication induced and recent viral illness.   2.  Sjogren syndrome  3.  Hypertension  4.  Type 2 diabetes mellitus  5.  CKD stage 3    Plan  1.  Recommended to follow up with Hematology tomorrow at 3:00 p.m..  We made an appointment for him and he is aware  2.  I personally spoke to his wife Donald Zelaya who will accompany him to the appointment tomorrow  3.  We believe his leukopenia is partly related to the drug Imuran and partly from his recent viral infection.  His pancytopenia is more drug related, as documented in the past  4.  Patient has been off azathioprine for the last 2 days, the plan is to recheck his labs in a few weeks and if the counts do not improve then we might consider for a bone marrow biopsy.       Plan of care discussed with Dr. Jesus Campos MD PGY4  Hematology/oncology

## 2019-02-20 NOTE — ED TRIAGE NOTES
Patient presents for further evaluation of low WBC. Patient seen in rheumatology clinic yesterday for blood work. Patient also reports 5 day hx of dry cough. Patient is A&Ox4 and following commands at this time.

## 2019-02-21 LAB
ANTI SM ANTIBODY: 3.9 EU
ANTI SM/RNP ANTIBODY: 216.3 EU
ANTI-SM INTERPRETATION: NEGATIVE
ANTI-SM/RNP INTERPRETATION: POSITIVE
ANTI-SSA ANTIBODY: 77.03 EU
ANTI-SSA INTERPRETATION: POSITIVE
ANTI-SSB ANTIBODY: 101.12 EU
ANTI-SSB INTERPRETATION: POSITIVE
DSDNA AB SER-ACNC: ABNORMAL [IU]/ML

## 2019-02-25 LAB
BACTERIA BLD CULT: NORMAL
BACTERIA BLD CULT: NORMAL

## 2019-02-26 ENCOUNTER — TELEPHONE (OUTPATIENT)
Dept: HEMATOLOGY/ONCOLOGY | Facility: CLINIC | Age: 69
End: 2019-02-26

## 2019-02-26 ENCOUNTER — INITIAL CONSULT (OUTPATIENT)
Dept: HEMATOLOGY/ONCOLOGY | Facility: CLINIC | Age: 69
End: 2019-02-26
Payer: MEDICARE

## 2019-02-26 ENCOUNTER — PATIENT MESSAGE (OUTPATIENT)
Dept: FAMILY MEDICINE | Facility: CLINIC | Age: 69
End: 2019-02-26

## 2019-02-26 ENCOUNTER — LAB VISIT (OUTPATIENT)
Dept: LAB | Facility: HOSPITAL | Age: 69
End: 2019-02-26
Attending: INTERNAL MEDICINE
Payer: MEDICARE

## 2019-02-26 ENCOUNTER — PATIENT MESSAGE (OUTPATIENT)
Dept: RHEUMATOLOGY | Facility: CLINIC | Age: 69
End: 2019-02-26

## 2019-02-26 VITALS
WEIGHT: 158.75 LBS | HEART RATE: 83 BPM | SYSTOLIC BLOOD PRESSURE: 121 MMHG | DIASTOLIC BLOOD PRESSURE: 82 MMHG | TEMPERATURE: 98 F | HEIGHT: 65 IN | OXYGEN SATURATION: 99 % | BODY MASS INDEX: 26.45 KG/M2

## 2019-02-26 DIAGNOSIS — D69.6 THROMBOCYTOPENIA: ICD-10-CM

## 2019-02-26 DIAGNOSIS — D64.9 ANEMIA, UNSPECIFIED TYPE: ICD-10-CM

## 2019-02-26 DIAGNOSIS — M35.9 CONNECTIVE TISSUE DISORDER: ICD-10-CM

## 2019-02-26 DIAGNOSIS — D70.9 NEUTROPENIA, UNSPECIFIED TYPE: ICD-10-CM

## 2019-02-26 DIAGNOSIS — D64.9 ANEMIA, UNSPECIFIED TYPE: Primary | ICD-10-CM

## 2019-02-26 PROCEDURE — 99999 PR PBB SHADOW E&M-EST. PATIENT-LVL III: ICD-10-PCS | Mod: PBBFAC,,, | Performed by: INTERNAL MEDICINE

## 2019-02-26 PROCEDURE — 85027 COMPLETE CBC AUTOMATED: CPT

## 2019-02-26 PROCEDURE — 3074F SYST BP LT 130 MM HG: CPT | Mod: CPTII,S$GLB,, | Performed by: INTERNAL MEDICINE

## 2019-02-26 PROCEDURE — 99205 OFFICE O/P NEW HI 60 MIN: CPT | Mod: S$GLB,,, | Performed by: INTERNAL MEDICINE

## 2019-02-26 PROCEDURE — 99205 PR OFFICE/OUTPT VISIT, NEW, LEVL V, 60-74 MIN: ICD-10-PCS | Mod: S$GLB,,, | Performed by: INTERNAL MEDICINE

## 2019-02-26 PROCEDURE — 99999 PR PBB SHADOW E&M-EST. PATIENT-LVL III: CPT | Mod: PBBFAC,,, | Performed by: INTERNAL MEDICINE

## 2019-02-26 PROCEDURE — 1101F PT FALLS ASSESS-DOCD LE1/YR: CPT | Mod: CPTII,S$GLB,, | Performed by: INTERNAL MEDICINE

## 2019-02-26 PROCEDURE — 3079F DIAST BP 80-89 MM HG: CPT | Mod: CPTII,S$GLB,, | Performed by: INTERNAL MEDICINE

## 2019-02-26 PROCEDURE — 85007 BL SMEAR W/DIFF WBC COUNT: CPT

## 2019-02-26 PROCEDURE — 3079F PR MOST RECENT DIASTOLIC BLOOD PRESSURE 80-89 MM HG: ICD-10-PCS | Mod: CPTII,S$GLB,, | Performed by: INTERNAL MEDICINE

## 2019-02-26 PROCEDURE — 36415 COLL VENOUS BLD VENIPUNCTURE: CPT

## 2019-02-26 PROCEDURE — 3074F PR MOST RECENT SYSTOLIC BLOOD PRESSURE < 130 MM HG: ICD-10-PCS | Mod: CPTII,S$GLB,, | Performed by: INTERNAL MEDICINE

## 2019-02-26 PROCEDURE — 1101F PR PT FALLS ASSESS DOC 0-1 FALLS W/OUT INJ PAST YR: ICD-10-PCS | Mod: CPTII,S$GLB,, | Performed by: INTERNAL MEDICINE

## 2019-02-26 RX ORDER — TORSEMIDE 10 MG/1
10 TABLET ORAL DAILY
Refills: 3 | COMMUNITY
Start: 2019-01-18 | End: 2019-08-02 | Stop reason: SDUPTHER

## 2019-02-26 RX ORDER — LEVOTHYROXINE SODIUM 25 UG/1
25 CAPSULE ORAL
COMMUNITY
End: 2019-07-08

## 2019-02-26 RX ORDER — AZATHIOPRINE 50 MG/1
TABLET ORAL
COMMUNITY
Start: 2015-03-18 | End: 2019-05-09

## 2019-02-26 NOTE — LETTER
February 26, 2019      Roxanna Salazar MD  [address]  [address]  [place] [address]           [facility]  [address] [facility] [address]  [place] [address]  Phone: [phone]          Patient: Darrion Bennett   MR Number: [id number]   YOB: 1950   Date of Visit: 2/26/2019       Dear Dr. Roxanna Salazar:    Thank you for referring Darrion Bennett to me for evaluation. Attached you will find relevant portions of my assessment and plan of care.    If you have questions, please do not hesitate to call me. I look forward to following Darrion Bennett along with you.    Sincerely,    Shalini Kirk MD    Enclosure  CC:  No Recipients    If you would like to receive this communication electronically, please contact [email or web address] or [phone] to request more information on [facility] Link access.    For providers and/or their staff who would like to refer a patient to [facility], please contact us through our one-stop-shop provider referral line, [facility], at [phone].    If you feel you have received this communication in error or would no longer like to receive these types of communications, please e-mail [email or web address]

## 2019-02-26 NOTE — PROGRESS NOTES
Chief Complaint :   Pancytopenia.     Hx of Present illness :  Patient is a 69 y.o. year old male who presents to the clinic today for   Oncology evaluation. Labs revealed significant decrease in blood counts. Dx'd to have Sjogren's syndrome four years ago.  Was on Plaquenil & immuran  Stopped one week ago.      Allergies :    Review of patient's allergies indicates:   Allergen Reactions    Penicillins Nausea And Vomiting       Occupation :   Businessman    Transfusion :  None    Menstrual & obstetric Hx :      Present Meds :   Medication List with Changes/Refills   Current Medications    ALLOPURINOL (ZYLOPRIM) 100 MG TABLET    Take 1 tablet (100 mg total) by mouth once daily.    AMLODIPINE (NORVASC) 10 MG TABLET    Take 10 mg by mouth once daily.    ASPIRIN (ECOTRIN) 81 MG EC TABLET    Take 81 mg by mouth once daily.    CLONIDINE (CATAPRES) 0.1 MG TABLET    Take 0.1 mg by mouth 3 (three) times daily.    CLONIDINE 0.3 MG/24 HR TD PTWK (CATAPRES) 0.3 MG/24 HR        DORZOLAMIDE (TRUSOPT) 2 % OPHTHALMIC SOLUTION        FLUZONE HIGH-DOSE 2018-19, PF, 180 MCG/0.5 ML VACCINE    TO BE ADMINISTERED BY PHARMACIST FOR IMMUNIZATION    HYDROXYCHLOROQUINE (PLAQUENIL) 200 MG TABLET    Take 1 tablet (200 mg total) by mouth 2 (two) times daily.    INSULIN GLARGINE (LANTUS) 100 UNIT/ML INJECTION    Inject 20 Units into the skin every evening.    ISOSORBIDE DINITRATE (ISORDIL) 10 MG TABLET    TAKE 1 TABLET EVERY 8 HOURS DAILY.    LINACLOTIDE (LINZESS) 145 MCG CAP CAPSULE    Take 145 mcg by mouth once daily.    MELATONIN 5 MG TAB        METHYLPREDNISOLONE (MEDROL DOSEPACK) 4 MG TABLET    use as directed    MULTIVIT WITH MIN-FOLIC ACID 200 MCG CHEW        NOVOLOG U-100 INSULIN ASPART 100 UNIT/ML INJECTION        TORSEMIDE (DEMADEX) 20 MG TAB    10 mg.     TRADJENTA 5 MG TAB TABLET        TRAVATAN Z 0.004 % DROP        VALSARTAN (DIOVAN) 160 MG TABLET    Take 160 mg by mouth once daily.       Past Medical Hx :  Reviewed.     Past  Medical Hx :  Past Medical History:   Diagnosis Date    Anemia     Arthritis     Cataract     Chronic constipation     Diabetes mellitus, type 2     Glaucoma     Hypertension     MCTD (mixed connective tissue disease)     Sjogren's disease     Ulcerative colitis     Urolithiasis        Travel Hx :  N/A    Immunization :  Immunization History   Administered Date(s) Administered    Influenza 10/26/2014    Influenza - High Dose 10/12/2015, 08/21/2017, 09/12/2018    Influenza - Quadrivalent 10/17/2016    Influenza A (H1N1) 2009 Monovalent - IM 10/26/2009    Pneumococcal Conjugate - 13 Valent 11/20/2015    Td - PF (ADULT) 12/06/2018    Zoster 09/26/2014       Family Hx :  Family History   Problem Relation Age of Onset    Hypertension Father     Stroke Father     Rheum arthritis Maternal Aunt     Rheum arthritis Maternal Uncle     Throat cancer Paternal Grandmother     Celiac disease Neg Hx     Colon cancer Neg Hx     Cirrhosis Neg Hx     Colon polyps Neg Hx     Crohn's disease Neg Hx     Cystic fibrosis Neg Hx     Hemochromatosis Neg Hx     Esophageal cancer Neg Hx     Inflammatory bowel disease Neg Hx     Irritable bowel syndrome Neg Hx     Liver cancer Neg Hx     Liver disease Neg Hx     Rectal cancer Neg Hx     Stomach cancer Neg Hx     Ulcerative colitis Neg Hx     Chase's disease Neg Hx        Social Hx :  Social History     Socioeconomic History    Marital status:      Spouse name: Not on file    Number of children: 3    Years of education: Not on file    Highest education level: Not on file   Social Needs    Financial resource strain: Not on file    Food insecurity - worry: Not on file    Food insecurity - inability: Not on file    Transportation needs - medical: Not on file    Transportation needs - non-medical: Not on file   Occupational History    Not on file   Tobacco Use    Smoking status: Never Smoker    Smokeless tobacco: Never Used   Substance and  Sexual Activity    Alcohol use: No    Drug use: No    Sexual activity: No   Other Topics Concern    Not on file   Social History Narrative    Not on file       Surgery :  Bilateral cataract surgery; Colonoscopy 2018.  Kidney stone surgery thirty five years ago.    Symptoms :    Review of Systems   Constitutional: Positive for malaise/fatigue and weight loss (lost two pounds). Negative for chills and fever.   HENT: Negative for congestion, hearing loss, nosebleeds, sore throat and tinnitus.    Eyes: Negative.  Negative for blurred vision, double vision, photophobia, pain, discharge and redness.   Respiratory: Positive for shortness of breath (Mild). Negative for cough and hemoptysis.    Cardiovascular: Negative for chest pain, palpitations, orthopnea, claudication and leg swelling.   Gastrointestinal: Positive for constipation and vomiting. Negative for abdominal pain, blood in stool, diarrhea, heartburn and nausea.   Genitourinary: Negative.  Negative for dysuria, flank pain, frequency, hematuria and urgency.   Musculoskeletal: Positive for back pain (Lot of back pain). Negative for joint pain.   Skin: Positive for itching.   Neurological: Negative for dizziness, tingling, tremors, sensory change and headaches.   Endo/Heme/Allergies: Negative for environmental allergies. Does not bruise/bleed easily.   Psychiatric/Behavioral: Negative for depression. The patient has insomnia. The patient is not nervous/anxious.        Physical Exam :   Physical Exam   Constitutional: He is oriented to person, place, and time and well-developed, well-nourished, and in no distress. Vital signs are normal. No distress.   HENT:   Head: Normocephalic and atraumatic.   Right Ear: External ear normal.   Left Ear: External ear normal.   Nose: Nose normal.   Mouth/Throat: Oropharynx is clear and moist. No oropharyngeal exudate.   Eyes: Conjunctivae, EOM and lids are normal. Pupils are equal, round, and reactive to light. Lids are  everted and swept, no foreign bodies found. Right eye exhibits no discharge. Left eye exhibits no discharge. No scleral icterus.       Neck: Trachea normal, normal range of motion, full passive range of motion without pain and phonation normal. Neck supple. Normal carotid pulses, no hepatojugular reflux and no JVD present. No tracheal tenderness present. Carotid bruit is not present. No tracheal deviation present. No thyroid mass and no thyromegaly present.   Cardiovascular: Normal rate, regular rhythm, normal heart sounds, intact distal pulses and normal pulses. PMI is not displaced. Exam reveals no gallop and no friction rub.   No murmur heard.  Pulmonary/Chest: Effort normal and breath sounds normal. No stridor. No apnea. No respiratory distress. He has no wheezes. He has no rales. He exhibits no tenderness.   Abdominal: Soft. Normal appearance, normal aorta and bowel sounds are normal. He exhibits no distension, no ascites and no mass. There is no hepatosplenomegaly. There is no tenderness. There is no rebound, no guarding and no CVA tenderness. No hernia.   Musculoskeletal: Normal range of motion. He exhibits no edema, tenderness or deformity.   Lymphadenopathy:        Head (right side): No submental, no submandibular, no tonsillar, no preauricular, no posterior auricular and no occipital adenopathy present.        Head (left side): No submental, no submandibular, no tonsillar, no preauricular, no posterior auricular and no occipital adenopathy present.     He has no cervical adenopathy.     He has no axillary adenopathy.        Right: No inguinal, no supraclavicular and no epitrochlear adenopathy present.        Left: No inguinal, no supraclavicular and no epitrochlear adenopathy present.   Neurological: He is alert and oriented to person, place, and time. He has normal motor skills, normal sensation, normal strength, normal reflexes and intact cranial nerves. He displays normal reflexes. No cranial nerve  deficit. He exhibits normal muscle tone. Gait normal. Coordination normal. GCS score is 15.   Skin: Skin is warm, dry and intact. No rash noted. He is not diaphoretic. No cyanosis or erythema. No pallor. Nails show no clubbing.   Psychiatric: Mood, memory, affect and judgment normal.         Labs & Imaging :  GILMA +. Titre 1:640.  Rheumatoid Factor normal.  02/20/19 :  NFBS 105; Cr.1.5; eGFR 46.5;  Ca 10.0.  ALP 87; Bili 9.5.  ; Hgb 8.3; Hct 25; MCV 91. Platelets 84,000; hepatis B & C studies normal.  2/19/19 : chest X ray : no acute pathology.    Dx :  Severe pancytopenia. Mixed connective tissue Disorder. DM. CKD 3.      Assessment & Plan: reviewed with patient & spouse. Pancytopenia of multifactorial etiology. Connective tissue disorder, therapeutic use of plaquenil and immuran. underlying bone Marrow problems should be kept in differential dx. Will get CBC, Diff today. Re evaluate with results. Continue followup with Rheumatologist & PCP.

## 2019-02-27 ENCOUNTER — OFFICE VISIT (OUTPATIENT)
Dept: FAMILY MEDICINE | Facility: CLINIC | Age: 69
End: 2019-02-27
Payer: MEDICARE

## 2019-02-27 ENCOUNTER — HOSPITAL ENCOUNTER (OUTPATIENT)
Dept: RADIOLOGY | Facility: HOSPITAL | Age: 69
Discharge: HOME | End: 2019-02-27
Attending: INTERNAL MEDICINE
Payer: MEDICARE

## 2019-02-27 ENCOUNTER — TELEPHONE (OUTPATIENT)
Dept: HEMATOLOGY/ONCOLOGY | Facility: CLINIC | Age: 69
End: 2019-02-27

## 2019-02-27 ENCOUNTER — PATIENT MESSAGE (OUTPATIENT)
Dept: RHEUMATOLOGY | Facility: CLINIC | Age: 69
End: 2019-02-27

## 2019-02-27 VITALS
TEMPERATURE: 98 F | BODY MASS INDEX: 26.94 KG/M2 | HEIGHT: 65 IN | OXYGEN SATURATION: 98 % | SYSTOLIC BLOOD PRESSURE: 140 MMHG | DIASTOLIC BLOOD PRESSURE: 80 MMHG | HEART RATE: 90 BPM | WEIGHT: 161.69 LBS

## 2019-02-27 DIAGNOSIS — D69.6 THROMBOCYTOPENIA: ICD-10-CM

## 2019-02-27 DIAGNOSIS — G89.29 CHRONIC MIDLINE THORACIC BACK PAIN: ICD-10-CM

## 2019-02-27 DIAGNOSIS — M54.6 CHRONIC MIDLINE THORACIC BACK PAIN: ICD-10-CM

## 2019-02-27 DIAGNOSIS — G89.29 CHRONIC MIDLINE THORACIC BACK PAIN: Primary | ICD-10-CM

## 2019-02-27 DIAGNOSIS — M35.9 CONNECTIVE TISSUE DISORDER: ICD-10-CM

## 2019-02-27 DIAGNOSIS — D70.9 NEUTROPENIA, UNSPECIFIED TYPE: ICD-10-CM

## 2019-02-27 DIAGNOSIS — M54.6 CHRONIC MIDLINE THORACIC BACK PAIN: Primary | ICD-10-CM

## 2019-02-27 DIAGNOSIS — Z79.52 CURRENT CHRONIC USE OF SYSTEMIC STEROIDS: ICD-10-CM

## 2019-02-27 DIAGNOSIS — M54.2 NECK PAIN: ICD-10-CM

## 2019-02-27 DIAGNOSIS — S22.000A COMPRESSION FRACTURE OF BODY OF THORACIC VERTEBRA: ICD-10-CM

## 2019-02-27 DIAGNOSIS — D64.9 ANEMIA, UNSPECIFIED TYPE: Primary | ICD-10-CM

## 2019-02-27 PROCEDURE — 72040 XR CERVICAL SPINE AP LATERAL: ICD-10-PCS | Mod: 26,,, | Performed by: RADIOLOGY

## 2019-02-27 PROCEDURE — 72040 X-RAY EXAM NECK SPINE 2-3 VW: CPT | Mod: 26,,, | Performed by: RADIOLOGY

## 2019-02-27 PROCEDURE — 72040 X-RAY EXAM NECK SPINE 2-3 VW: CPT | Mod: TC,FY,PO

## 2019-02-27 PROCEDURE — 3077F PR MOST RECENT SYSTOLIC BLOOD PRESSURE >= 140 MM HG: ICD-10-PCS | Mod: CPTII,S$GLB,, | Performed by: INTERNAL MEDICINE

## 2019-02-27 PROCEDURE — 1101F PT FALLS ASSESS-DOCD LE1/YR: CPT | Mod: CPTII,S$GLB,, | Performed by: INTERNAL MEDICINE

## 2019-02-27 PROCEDURE — 3077F SYST BP >= 140 MM HG: CPT | Mod: CPTII,S$GLB,, | Performed by: INTERNAL MEDICINE

## 2019-02-27 PROCEDURE — 72070 X-RAY EXAM THORAC SPINE 2VWS: CPT | Mod: TC,FY,PO

## 2019-02-27 PROCEDURE — 99999 PR PBB SHADOW E&M-EST. PATIENT-LVL IV: CPT | Mod: PBBFAC,,, | Performed by: INTERNAL MEDICINE

## 2019-02-27 PROCEDURE — 99214 OFFICE O/P EST MOD 30 MIN: CPT | Mod: S$GLB,,, | Performed by: INTERNAL MEDICINE

## 2019-02-27 PROCEDURE — 72070 XR THORACIC SPINE AP LATERAL: ICD-10-PCS | Mod: 26,,, | Performed by: RADIOLOGY

## 2019-02-27 PROCEDURE — 3079F PR MOST RECENT DIASTOLIC BLOOD PRESSURE 80-89 MM HG: ICD-10-PCS | Mod: CPTII,S$GLB,, | Performed by: INTERNAL MEDICINE

## 2019-02-27 PROCEDURE — 1101F PR PT FALLS ASSESS DOC 0-1 FALLS W/OUT INJ PAST YR: ICD-10-PCS | Mod: CPTII,S$GLB,, | Performed by: INTERNAL MEDICINE

## 2019-02-27 PROCEDURE — 72070 X-RAY EXAM THORAC SPINE 2VWS: CPT | Mod: 26,,, | Performed by: RADIOLOGY

## 2019-02-27 PROCEDURE — 99214 PR OFFICE/OUTPT VISIT, EST, LEVL IV, 30-39 MIN: ICD-10-PCS | Mod: S$GLB,,, | Performed by: INTERNAL MEDICINE

## 2019-02-27 PROCEDURE — 99999 PR PBB SHADOW E&M-EST. PATIENT-LVL IV: ICD-10-PCS | Mod: PBBFAC,,, | Performed by: INTERNAL MEDICINE

## 2019-02-27 PROCEDURE — 3079F DIAST BP 80-89 MM HG: CPT | Mod: CPTII,S$GLB,, | Performed by: INTERNAL MEDICINE

## 2019-02-27 RX ORDER — ALENDRONATE SODIUM 70 MG/1
70 TABLET ORAL
Qty: 4 TABLET | Refills: 11 | Status: SHIPPED | OUTPATIENT
Start: 2019-02-27 | End: 2019-08-02

## 2019-02-27 RX ORDER — ACETAMINOPHEN AND CODEINE PHOSPHATE 300; 30 MG/1; MG/1
1 TABLET ORAL DAILY
Qty: 7 TABLET | Refills: 0 | Status: SHIPPED | OUTPATIENT
Start: 2019-02-27 | End: 2019-03-06

## 2019-02-27 NOTE — PATIENT INSTRUCTIONS
RESTART THE METHYLPREDNISONE    TYLENOL FOR PAIN    IF ABSOLUTELY NECESSARY - TYLENOL #3.    LINZESS CAN TAKE 2 TABLETS DAILY FOR TEMPORARY.

## 2019-02-27 NOTE — PROGRESS NOTES
Bones are demineralized.  Compression T12 anteriorly.  Mild disc space narrowing.  Mild exaggeration of the normal kyphosis at T12.    Notified wife of results. Start fosamax. Results to pt via my [facility].

## 2019-02-27 NOTE — Clinical Note
Hello, saw this pt for acute follow up, reports longstanding methylprednisone and stopped a week ago, I advised him to restart at previous dose 4 mg. Is that OK? May I defer to you re: further recommendations?  Thanks,  Farooq

## 2019-02-27 NOTE — PROGRESS NOTES
Bones are demineralized.  Disc space narrowing C4-5, C5-6 and C6-7.  Vascular calcification in the region of the carotid vessels.  Tip of the odontoid not seen on the frontal projection.    Results to pt via my [facility]. Started alendronate for T12 compression fx. Hx of statin. Is he still taking?

## 2019-02-27 NOTE — TELEPHONE ENCOUNTER
Reviewed Labs from 02/26/19 : Platelets 258,000. WBC 1,980.  Hgb 8.4; Hct 23.7. Spoke with Patient's wife. Reviewed results. Asked patient to stay off plaquenil and Immura. Recheck CBC,platelets in 2 weeks and see MD. She understood and verbalised.

## 2019-02-27 NOTE — PROGRESS NOTES
This note was created by combination of typed  and Dragon dictation.  Transcription errors may be present.  If there are any questions, please contact me.    Assessment & Plan:   Chronic midline thoracic back pain  Neck pain  Connective tissue disorder  -notes longstanding mixed connective tissue disorder and reports a longstanding history of methylprednisone daily use for years.  Also Plaquenil and Imuran which were stopped with possible side effects of pancytopenia.  He has been off of these for the past week.  I do not know the alternative treatments to use instead of Plaquenil or Imuran.  I will need to defer to Rheumatology.  Given that he has been using methylprednisone 4 mg daily for years, I have recommended that he resume that.  I will give him Tylenol No.  3 for nighttime use for breakthrough pain.  He is already having troubles with constipation and this may worsen it.  He will use it only if needed.  Check x-ray of the C-spine and T-spine to rule out anything new IE new fractures or lytic lesions or compression fracture.  -     X-Ray Thoracic Spine AP Lateral; Future; Expected date: 02/27/2019  -     acetaminophen-codeine 300-30mg (TYLENOL #3) 300-30 mg Tab; Take 1 tablet by mouth once daily. for 7 days  Dispense: 7 tablet; Refill: 0  -     X-Ray Cervical Spine AP And Lateral; Future; Expected date: 02/27/2019    Other orders  -     Cancel: (In Office Administered) Pneumococcal Polysaccharide Vaccine (23 Valent) (SQ/IM)        Medications Discontinued During This Encounter   Medication Reason    FLUZONE HIGH-DOSE 2018-19, PF, 180 mcg/0.5 mL vaccine Therapy completed       meds sent this encounter:       Follow Up: No Follow-up on file.    Subjective:     Chief Complaint   Patient presents with    Back Pain       HPI  Darrion is a 69 y.o. male, last appointment with this clinic was 2/20/2019.    Sjogrens.  MCTD    Comes in today accompanied by his wife.  He had taken methylprednisone 4 mg daily  for the past 4 years or so.  He has also been taking Imuran and Plaquenil on a regular basis.  He was seen by Rheumatology recently, had labs done which showed pancytopenia.  He stopped the methylprednisone, Imuran and Plaquenil and saw Hematology.  Repeat labs done yesterday showed significant improvement in the platelet count and some modest improvement in the white blood cell count.    With stopping the medicines he has had significant worsening of the pain in his cervical spine as well as thoracic spine.  Radiating to the shoulders and somewhat distal to that.  No complaints of dyspnea, though he does feel some fatigue.  And also some general itching in his arms.  He also has chronic constipation for which he uses Linzess.  Has found that of late the Linzess at his current dose 145 isn't really helping.  He did try taking it 2 in a day and did find it helpful.  History of MiraLax which did not really help.    Answers for HPI/ROS submitted by the patient on 2/27/2019   Back pain  Chronicity: new  Onset: in the past 7 days  Frequency: constantly  Progression since onset: waxing and waning  Pain location: thoracic spine  Pain quality: stabbing  Radiates to: does not radiate  Pain - numeric: 9/10  Pain is: the same all the time  Aggravated by: sitting  Stiffness is present: all day  bowel incontinence: Yes  leg pain: No  numbness: No  paresis: No  paresthesias: No  pelvic pain: No  perianal numbness: No  genital pain: No  Risk factors: history of steroid use, history of renal stones  Pain severity: severe  Treatments tried: NSAIDs, bed rest, heat, ice, muscle relaxant  Improvement on treatment: mild      Patient Care Team:  Alisha Brock MD as PCP - General (Family Medicine)    Patient Active Problem List    Diagnosis Date Noted    Neutropenia 02/26/2019    Thrombocytopenia 02/26/2019    Connective tissue disorder 02/26/2019    Abdominal bloating 03/14/2016    Abdominal distension 03/14/2016    Weight gain  03/14/2016    Jackson's esophagus without dysplasia 03/14/2016    Anemia 03/14/2016    Body mass index 29.0-29.9, adult 07/15/2014    Thyroid dysfunction 07/15/2014    Chronic kidney disease, stage III (moderate) 07/15/2014    Essential hypertension, benign 05/13/2014    Controlled type 2 diabetes mellitus with complication, without long-term current use of insulin 05/13/2014    Hyperlipidemia 05/13/2014    MCTD (mixed connective tissue disease) 05/13/2014    Sjogren's disease 05/13/2014    Bilateral edema of lower extremity 05/13/2014    Glaucoma 05/13/2014       PAST MEDICAL HISTORY:  Past Medical History:   Diagnosis Date    Anemia     Arthritis     Cataract     Chronic constipation     Diabetes mellitus, type 2     Glaucoma     Hypertension     MCTD (mixed connective tissue disease)     Sjogren's disease     Ulcerative colitis     Urolithiasis        PAST SURGICAL HISTORY:  Past Surgical History:   Procedure Laterality Date    COLONOSCOPY  2014    EYE SURGERY         SOCIAL HISTORY:  Social History     Socioeconomic History    Marital status:      Spouse name: Not on file    Number of children: 3    Years of education: Not on file    Highest education level: Not on file   Social Needs    Financial resource strain: Not on file    Food insecurity - worry: Not on file    Food insecurity - inability: Not on file    Transportation needs - medical: Not on file    Transportation needs - non-medical: Not on file   Occupational History    Not on file   Tobacco Use    Smoking status: Never Smoker    Smokeless tobacco: Never Used   Substance and Sexual Activity    Alcohol use: No    Drug use: No    Sexual activity: No   Other Topics Concern    Not on file   Social History Narrative    Not on file       ALLERGIES AND MEDICATIONS: updated and reviewed.  Review of patient's allergies indicates:   Allergen Reactions    Penicillins Nausea And Vomiting     Current Outpatient  Medications   Medication Sig Dispense Refill    allopurinol (ZYLOPRIM) 100 MG tablet Take 1 tablet (100 mg total) by mouth once daily. 90 tablet 1    amlodipine (NORVASC) 10 MG tablet Take 10 mg by mouth once daily.  2    aspirin (ECOTRIN) 81 MG EC tablet Take 81 mg by mouth once daily.      azaTHIOprine (IMURAN) 50 mg Tab       cloNIDine 0.3 mg/24 hr td ptwk (CATAPRES) 0.3 mg/24 hr   1    dorzolamide (TRUSOPT) 2 % ophthalmic solution       hydroxychloroquine (PLAQUENIL) 200 mg tablet Take 1 tablet (200 mg total) by mouth 2 (two) times daily. 60 tablet 6    insulin glargine (LANTUS) 100 unit/mL injection Inject 20 Units into the skin every evening. (Patient taking differently: Inject 20 Units into the skin every evening. Pt states 10 units every evening.) 6 vial 6    isosorbide dinitrate (ISORDIL) 10 MG tablet TAKE 1 TABLET EVERY 8 HOURS DAILY.  1    levothyroxine (TIROSINT) 25 mcg Cap Take 25 mcg by mouth.      linaclotide (LINZESS) 145 mcg Cap capsule Take 145 mcg by mouth once daily.      melatonin 5 mg Tab       methylPREDNISolone (MEDROL DOSEPACK) 4 mg tablet use as directed 1 Package 0    multivit with min-folic acid 200 mcg Chew       NOVOLOG U-100 INSULIN ASPART 100 unit/mL injection       torsemide (DEMADEX) 10 MG Tab Take 10 mg by mouth once daily.  3    TRADJENTA 5 mg Tab tablet       TRAVATAN Z 0.004 % Drop       valsartan (DIOVAN) 160 MG tablet Take 160 mg by mouth once daily.  2     No current facility-administered medications for this visit.        Review of Systems   Constitutional: Negative for fever and weight loss.   Cardiovascular: Negative for chest pain.   Genitourinary: Negative for dysuria and hematuria.   Musculoskeletal: Positive for back pain.   Neurological: Positive for weakness. Negative for tingling and headaches.       Objective:   Physical Exam   Vitals:    02/27/19 1417   BP: (!) 140/80   Pulse: 90   Temp: 98.3 °F (36.8 °C)   SpO2: 98%   Weight: 73.3 kg (161 lb 11  "oz)   Height: 5' 4.5" (1.638 m)    Body mass index is 27.32 kg/m².  Weight: 73.3 kg (161 lb 11 oz)   Height: 5' 4.5" (163.8 cm)     Physical Exam   Constitutional: He is oriented to person, place, and time. He appears well-developed and well-nourished. No distress.   Eyes: EOM are normal.   Cardiovascular: Normal rate, regular rhythm and normal heart sounds.   No murmur heard.  Pulmonary/Chest: Effort normal and breath sounds normal.   Musculoskeletal: Normal range of motion.   Some mild tenderness on palpation of the paraspinal muscles the cervical spine posteriorly.  And the lower thoracic back without obvious deformity or asymmetry   Neurological: He is alert and oriented to person, place, and time. Coordination normal.   Skin: Skin is warm and dry.   Psychiatric: He has a normal mood and affect. His behavior is normal. Thought content normal.     "

## 2019-02-28 LAB
BASOPHILS NFR BLD: 0 %
DIFFERENTIAL METHOD: ABNORMAL
EOSINOPHIL NFR BLD: 2 %
ERYTHROCYTE [DISTWIDTH] IN BLOOD BY AUTOMATED COUNT: 15.4 %
HCT VFR BLD AUTO: 23.7 %
HGB BLD-MCNC: 8.4 G/DL
LYMPHOCYTES NFR BLD: 17 %
MCH RBC QN AUTO: 31.8 PG
MCHC RBC AUTO-ENTMCNC: 35.4 G/DL
MCV RBC AUTO: 90 FL
MONOCYTES NFR BLD: 35 %
NEUTROPHILS NFR BLD: 41 %
NEUTS BAND NFR BLD MANUAL: 5 %
PLATELET # BLD AUTO: 231 K/UL
PMV BLD AUTO: 10.2 FL
RBC # BLD AUTO: 2.64 M/UL
WBC # BLD AUTO: 1.98 K/UL

## 2019-03-06 ENCOUNTER — PATIENT MESSAGE (OUTPATIENT)
Dept: FAMILY MEDICINE | Facility: CLINIC | Age: 69
End: 2019-03-06

## 2019-03-08 ENCOUNTER — PATIENT MESSAGE (OUTPATIENT)
Dept: FAMILY MEDICINE | Facility: CLINIC | Age: 69
End: 2019-03-08

## 2019-03-08 NOTE — TELEPHONE ENCOUNTER
Spoke with wife who states she will call back with name of cardiologist who patient is requesting to see under [facility]

## 2019-03-11 ENCOUNTER — PATIENT MESSAGE (OUTPATIENT)
Dept: RHEUMATOLOGY | Facility: CLINIC | Age: 69
End: 2019-03-11

## 2019-03-11 ENCOUNTER — LAB VISIT (OUTPATIENT)
Dept: LAB | Facility: HOSPITAL | Age: 69
End: 2019-03-11
Attending: INTERNAL MEDICINE
Payer: MEDICARE

## 2019-03-11 DIAGNOSIS — D70.9 NEUTROPENIA, UNSPECIFIED TYPE: ICD-10-CM

## 2019-03-11 DIAGNOSIS — M35.9 CONNECTIVE TISSUE DISORDER: ICD-10-CM

## 2019-03-11 DIAGNOSIS — D69.6 THROMBOCYTOPENIA: ICD-10-CM

## 2019-03-11 DIAGNOSIS — D64.9 ANEMIA, UNSPECIFIED TYPE: ICD-10-CM

## 2019-03-11 LAB
ANISOCYTOSIS BLD QL SMEAR: SLIGHT
BASOPHILS # BLD AUTO: 0.02 K/UL
BASOPHILS NFR BLD: 0.3 %
DIFFERENTIAL METHOD: ABNORMAL
EOSINOPHIL # BLD AUTO: 0.1 K/UL
EOSINOPHIL NFR BLD: 1 %
ERYTHROCYTE [DISTWIDTH] IN BLOOD BY AUTOMATED COUNT: 21.2 %
HCT VFR BLD AUTO: 26.7 %
HGB BLD-MCNC: 8.5 G/DL
LYMPHOCYTES # BLD AUTO: 1.3 K/UL
LYMPHOCYTES NFR BLD: 21.5 %
MCH RBC QN AUTO: 30.6 PG
MCHC RBC AUTO-ENTMCNC: 31.8 G/DL
MCV RBC AUTO: 96 FL
MONOCYTES # BLD AUTO: 1.5 K/UL
MONOCYTES NFR BLD: 25 %
NEUTROPHILS # BLD AUTO: 3.1 K/UL
NEUTROPHILS NFR BLD: 52.2 %
PLATELET # BLD AUTO: 330 K/UL
PLATELET BLD QL SMEAR: ABNORMAL
PMV BLD AUTO: 9.8 FL
POLYCHROMASIA BLD QL SMEAR: ABNORMAL
RBC # BLD AUTO: 2.78 M/UL
WBC # BLD AUTO: 5.91 K/UL

## 2019-03-11 PROCEDURE — 36415 COLL VENOUS BLD VENIPUNCTURE: CPT | Mod: PO

## 2019-03-11 PROCEDURE — 85025 COMPLETE CBC W/AUTO DIFF WBC: CPT | Mod: PO

## 2019-03-13 ENCOUNTER — PATIENT MESSAGE (OUTPATIENT)
Dept: RHEUMATOLOGY | Facility: CLINIC | Age: 69
End: 2019-03-13

## 2019-03-13 ENCOUNTER — OFFICE VISIT (OUTPATIENT)
Dept: HEMATOLOGY/ONCOLOGY | Facility: CLINIC | Age: 69
End: 2019-03-13
Payer: MEDICARE

## 2019-03-13 VITALS
HEART RATE: 72 BPM | BODY MASS INDEX: 26.43 KG/M2 | HEIGHT: 66 IN | WEIGHT: 164.44 LBS | SYSTOLIC BLOOD PRESSURE: 160 MMHG | OXYGEN SATURATION: 99 % | TEMPERATURE: 98 F | DIASTOLIC BLOOD PRESSURE: 67 MMHG

## 2019-03-13 DIAGNOSIS — D63.1 ANEMIA OF CHRONIC RENAL FAILURE, STAGE 3 (MODERATE): ICD-10-CM

## 2019-03-13 DIAGNOSIS — N18.30 ANEMIA OF CHRONIC RENAL FAILURE, STAGE 3 (MODERATE): ICD-10-CM

## 2019-03-13 DIAGNOSIS — D70.9 NEUTROPENIA, UNSPECIFIED TYPE: ICD-10-CM

## 2019-03-13 DIAGNOSIS — N18.30 CHRONIC KIDNEY DISEASE, STAGE III (MODERATE): ICD-10-CM

## 2019-03-13 DIAGNOSIS — M35.9 CONNECTIVE TISSUE DISORDER: ICD-10-CM

## 2019-03-13 DIAGNOSIS — D69.6 THROMBOCYTOPENIA: Primary | ICD-10-CM

## 2019-03-13 PROCEDURE — 1101F PT FALLS ASSESS-DOCD LE1/YR: CPT | Mod: CPTII,S$GLB,, | Performed by: INTERNAL MEDICINE

## 2019-03-13 PROCEDURE — 99999 PR PBB SHADOW E&M-EST. PATIENT-LVL III: ICD-10-PCS | Mod: PBBFAC,,, | Performed by: INTERNAL MEDICINE

## 2019-03-13 PROCEDURE — 99999 PR PBB SHADOW E&M-EST. PATIENT-LVL III: CPT | Mod: PBBFAC,,, | Performed by: INTERNAL MEDICINE

## 2019-03-13 PROCEDURE — 3077F SYST BP >= 140 MM HG: CPT | Mod: CPTII,S$GLB,, | Performed by: INTERNAL MEDICINE

## 2019-03-13 PROCEDURE — 99213 OFFICE O/P EST LOW 20 MIN: CPT | Mod: S$GLB,,, | Performed by: INTERNAL MEDICINE

## 2019-03-13 PROCEDURE — 3077F PR MOST RECENT SYSTOLIC BLOOD PRESSURE >= 140 MM HG: ICD-10-PCS | Mod: CPTII,S$GLB,, | Performed by: INTERNAL MEDICINE

## 2019-03-13 PROCEDURE — 99213 PR OFFICE/OUTPT VISIT, EST, LEVL III, 20-29 MIN: ICD-10-PCS | Mod: S$GLB,,, | Performed by: INTERNAL MEDICINE

## 2019-03-13 PROCEDURE — 3078F PR MOST RECENT DIASTOLIC BLOOD PRESSURE < 80 MM HG: ICD-10-PCS | Mod: CPTII,S$GLB,, | Performed by: INTERNAL MEDICINE

## 2019-03-13 PROCEDURE — 1101F PR PT FALLS ASSESS DOC 0-1 FALLS W/OUT INJ PAST YR: ICD-10-PCS | Mod: CPTII,S$GLB,, | Performed by: INTERNAL MEDICINE

## 2019-03-13 PROCEDURE — 3078F DIAST BP <80 MM HG: CPT | Mod: CPTII,S$GLB,, | Performed by: INTERNAL MEDICINE

## 2019-03-13 RX ORDER — CLONIDINE HYDROCHLORIDE 0.1 MG/1
0.1 TABLET ORAL 3 TIMES DAILY
Refills: 5 | COMMUNITY
Start: 2019-03-01 | End: 2019-05-31 | Stop reason: ALTCHOICE

## 2019-03-13 NOTE — PROGRESS NOTES
Chief Complaint :   Pancytopenia.     Hx of Present illness :  Patient is a 69 y.o. year old male who presents to the clinic today for   Oncology evaluation. Labs revealed significant decrease in blood counts. Dx'd to have Sjogren's syndrome four years ago.  Was on Plaquenil & immuran  Stopped one week ago.      Allergies :    Review of patient's allergies indicates:   Allergen Reactions    Penicillins Nausea And Vomiting       Occupation :   Businessman    Transfusion :  None    Menstrual & obstetric Hx :      Present Meds :   Medication List with Changes/Refills   Current Medications    ALENDRONATE (FOSAMAX) 70 MG TABLET    Take 1 tablet (70 mg total) by mouth every 7 days.    ALLOPURINOL (ZYLOPRIM) 100 MG TABLET    Take 1 tablet (100 mg total) by mouth once daily.    AMLODIPINE (NORVASC) 10 MG TABLET    Take 10 mg by mouth once daily.    ASPIRIN (ECOTRIN) 81 MG EC TABLET    Take 81 mg by mouth once daily.    AZATHIOPRINE (IMURAN) 50 MG TAB        CLONIDINE 0.3 MG/24 HR TD PTWK (CATAPRES) 0.3 MG/24 HR        DORZOLAMIDE (TRUSOPT) 2 % OPHTHALMIC SOLUTION        HYDROXYCHLOROQUINE (PLAQUENIL) 200 MG TABLET    Take 1 tablet (200 mg total) by mouth 2 (two) times daily.    INSULIN GLARGINE (LANTUS) 100 UNIT/ML INJECTION    Inject 20 Units into the skin every evening.    ISOSORBIDE DINITRATE (ISORDIL) 10 MG TABLET    TAKE 1 TABLET EVERY 8 HOURS DAILY.    LEVOTHYROXINE (TIROSINT) 25 MCG CAP    Take 25 mcg by mouth.    LINACLOTIDE (LINZESS) 145 MCG CAP CAPSULE    Take 145 mcg by mouth once daily.    MELATONIN 5 MG TAB        METHYLPREDNISOLONE (MEDROL DOSEPACK) 4 MG TABLET    use as directed    MULTIVIT WITH MIN-FOLIC ACID 200 MCG CHEW        NOVOLOG U-100 INSULIN ASPART 100 UNIT/ML INJECTION        TORSEMIDE (DEMADEX) 10 MG TAB    Take 10 mg by mouth once daily.    TRADJENTA 5 MG TAB TABLET        TRAVATAN Z 0.004 % DROP        VALSARTAN (DIOVAN) 160 MG TABLET    Take 160 mg by mouth once daily.       Past Medical Hx :   Reviewed.     Past Medical Hx :  Past Medical History:   Diagnosis Date    Anemia     Arthritis     Cataract     Chronic constipation     Diabetes mellitus, type 2     Glaucoma     Hypertension     MCTD (mixed connective tissue disease)     Sjogren's disease     Ulcerative colitis     Urolithiasis        Travel Hx :  N/A    Immunization :  Immunization History   Administered Date(s) Administered    Influenza 10/26/2014    Influenza - High Dose 10/12/2015, 08/21/2017, 09/12/2018    Influenza - Quadrivalent 10/17/2016    Influenza - Quadrivalent - PF 10/26/2014    Influenza A (H1N1) 2009 Monovalent - IM 10/26/2009    Pneumococcal Conjugate - 13 Valent 11/20/2015    Td - PF (ADULT) 12/06/2018    Zoster 09/26/2014       Family Hx :  Family History   Problem Relation Age of Onset    Hypertension Father     Stroke Father     Rheum arthritis Maternal Aunt     Rheum arthritis Maternal Uncle     Throat cancer Paternal Grandmother     Celiac disease Neg Hx     Colon cancer Neg Hx     Cirrhosis Neg Hx     Colon polyps Neg Hx     Crohn's disease Neg Hx     Cystic fibrosis Neg Hx     Hemochromatosis Neg Hx     Esophageal cancer Neg Hx     Inflammatory bowel disease Neg Hx     Irritable bowel syndrome Neg Hx     Liver cancer Neg Hx     Liver disease Neg Hx     Rectal cancer Neg Hx     Stomach cancer Neg Hx     Ulcerative colitis Neg Hx     Chase's disease Neg Hx        Social Hx :  Social History     Socioeconomic History    Marital status:      Spouse name: Not on file    Number of children: 3    Years of education: Not on file    Highest education level: Not on file   Social Needs    Financial resource strain: Not on file    Food insecurity - worry: Not on file    Food insecurity - inability: Not on file    Transportation needs - medical: Not on file    Transportation needs - non-medical: Not on file   Occupational History    Not on file   Tobacco Use    Smoking status:  Never Smoker    Smokeless tobacco: Never Used   Substance and Sexual Activity    Alcohol use: No    Drug use: No    Sexual activity: No   Other Topics Concern    Not on file   Social History Narrative    Not on file       Surgery :  Bilateral cataract surgery; Colonoscopy 2018.  Kidney stone surgery thirty five years ago.    Symptoms :    Review of Systems   Constitutional: Positive for malaise/fatigue and weight loss (lost two pounds). Negative for chills and fever.   HENT: Negative for congestion, hearing loss, nosebleeds, sore throat and tinnitus.    Eyes: Negative.  Negative for blurred vision, double vision, photophobia, pain, discharge and redness.   Respiratory: Positive for shortness of breath (Mild). Negative for cough and hemoptysis.    Cardiovascular: Negative for chest pain, palpitations, orthopnea, claudication and leg swelling.   Gastrointestinal: Positive for constipation and vomiting. Negative for abdominal pain, blood in stool, diarrhea, heartburn and nausea.   Genitourinary: Negative.  Negative for dysuria, flank pain, frequency, hematuria and urgency.   Musculoskeletal: Positive for back pain (Lot of back pain). Negative for joint pain.   Skin: Positive for itching.   Neurological: Negative for dizziness, tingling, tremors, sensory change and headaches.   Endo/Heme/Allergies: Negative for environmental allergies. Does not bruise/bleed easily.   Psychiatric/Behavioral: Negative for depression. The patient has insomnia. The patient is not nervous/anxious.        Physical Exam :   Physical Exam   Constitutional: He is oriented to person, place, and time and well-developed, well-nourished, and in no distress. Vital signs are normal. No distress.   HENT:   Head: Normocephalic and atraumatic.   Right Ear: External ear normal.   Left Ear: External ear normal.   Nose: Nose normal.   Mouth/Throat: Oropharynx is clear and moist. No oropharyngeal exudate.   Eyes: Conjunctivae, EOM and lids are normal.  Pupils are equal, round, and reactive to light. Lids are everted and swept, no foreign bodies found. Right eye exhibits no discharge. Left eye exhibits no discharge. No scleral icterus.       Neck: Trachea normal, normal range of motion, full passive range of motion without pain and phonation normal. Neck supple. Normal carotid pulses, no hepatojugular reflux and no JVD present. No tracheal tenderness present. Carotid bruit is not present. No tracheal deviation present. No thyroid mass and no thyromegaly present.   Cardiovascular: Normal rate, regular rhythm, normal heart sounds, intact distal pulses and normal pulses. PMI is not displaced. Exam reveals no gallop and no friction rub.   No murmur heard.  Pulmonary/Chest: Effort normal and breath sounds normal. No stridor. No apnea. No respiratory distress. He has no wheezes. He has no rales. He exhibits no tenderness.   Abdominal: Soft. Normal appearance, normal aorta and bowel sounds are normal. He exhibits no distension, no ascites and no mass. There is no hepatosplenomegaly. There is no tenderness. There is no rebound, no guarding and no CVA tenderness. No hernia.   Musculoskeletal: Normal range of motion. He exhibits no edema, tenderness or deformity.   Lymphadenopathy:        Head (right side): No submental, no submandibular, no tonsillar, no preauricular, no posterior auricular and no occipital adenopathy present.        Head (left side): No submental, no submandibular, no tonsillar, no preauricular, no posterior auricular and no occipital adenopathy present.     He has no cervical adenopathy.     He has no axillary adenopathy.        Right: No inguinal, no supraclavicular and no epitrochlear adenopathy present.        Left: No inguinal, no supraclavicular and no epitrochlear adenopathy present.   Neurological: He is alert and oriented to person, place, and time. He has normal motor skills, normal sensation, normal strength, normal reflexes and intact cranial  nerves. No cranial nerve deficit. He exhibits normal muscle tone. Gait normal. Coordination normal. GCS score is 15.   Skin: Skin is warm, dry and intact. No rash noted. He is not diaphoretic. No cyanosis or erythema. No pallor. Nails show no clubbing.   Psychiatric: Mood, memory, affect and judgment normal.         Labs & Imaging :  GILMA +. Titre 1:640.  Rheumatoid Factor normal.  02/20/19 :  NFBS 105; Cr.1.5; eGFR 46.5;  Ca 10.0.  ALP 87; Bili 9.5.  ; Hgb 8.3; Hct 25; MCV 91. Platelets 84,000; hepatis B & C studies normal.  2/19/19 : chest X ray : no acute pathology.  03/11/19 :  WBC 5,910; ANC 3,100. Hgb 8.5; Hct 26.2. MCV 96. Platelets 330,000.    Dx :  Severe pancytopenia. Mixed connective tissue Disorder. DM. CKD 3.      Assessment & Plan: reviewed with patient & spouse. Labs show continued improvement  Since Immuran and Plaquenil, placed on hold.  Continue followup with Rheumatologist & PCP.  Initiate Rx with  Procrit for management of anemia of CKD. . Monitor CBC,

## 2019-03-19 ENCOUNTER — OFFICE VISIT (OUTPATIENT)
Dept: PODIATRY | Facility: CLINIC | Age: 69
End: 2019-03-19
Payer: MEDICARE

## 2019-03-19 VITALS
DIASTOLIC BLOOD PRESSURE: 80 MMHG | WEIGHT: 164 LBS | SYSTOLIC BLOOD PRESSURE: 172 MMHG | BODY MASS INDEX: 27.32 KG/M2 | HEIGHT: 65 IN

## 2019-03-19 DIAGNOSIS — M20.5X1 HALLUX LIMITUS, ACQUIRED, RIGHT: ICD-10-CM

## 2019-03-19 DIAGNOSIS — M20.42 HAMMER TOES OF BOTH FEET: ICD-10-CM

## 2019-03-19 DIAGNOSIS — E11.9 COMPREHENSIVE DIABETIC FOOT EXAMINATION, TYPE 2 DM, ENCOUNTER FOR: Primary | ICD-10-CM

## 2019-03-19 DIAGNOSIS — N18.30 CHRONIC KIDNEY DISEASE, STAGE III (MODERATE): ICD-10-CM

## 2019-03-19 DIAGNOSIS — B35.1 ONYCHOMYCOSIS DUE TO DERMATOPHYTE: ICD-10-CM

## 2019-03-19 DIAGNOSIS — M20.5X2 HALLUX LIMITUS, ACQUIRED, LEFT: ICD-10-CM

## 2019-03-19 DIAGNOSIS — M20.41 HAMMER TOES OF BOTH FEET: ICD-10-CM

## 2019-03-19 PROCEDURE — 99999 PR PBB SHADOW E&M-EST. PATIENT-LVL III: CPT | Mod: PBBFAC,,, | Performed by: PODIATRIST

## 2019-03-19 PROCEDURE — 99214 OFFICE O/P EST MOD 30 MIN: CPT | Mod: S$GLB,,, | Performed by: PODIATRIST

## 2019-03-19 PROCEDURE — 1101F PR PT FALLS ASSESS DOC 0-1 FALLS W/OUT INJ PAST YR: ICD-10-PCS | Mod: CPTII,S$GLB,, | Performed by: PODIATRIST

## 2019-03-19 PROCEDURE — 99214 PR OFFICE/OUTPT VISIT, EST, LEVL IV, 30-39 MIN: ICD-10-PCS | Mod: S$GLB,,, | Performed by: PODIATRIST

## 2019-03-19 PROCEDURE — 2024F 7 FLD RTA PHOTO EVC RTNOPTHY: CPT | Mod: S$GLB,,, | Performed by: PODIATRIST

## 2019-03-19 PROCEDURE — 1101F PT FALLS ASSESS-DOCD LE1/YR: CPT | Mod: CPTII,S$GLB,, | Performed by: PODIATRIST

## 2019-03-19 PROCEDURE — 3079F DIAST BP 80-89 MM HG: CPT | Mod: CPTII,S$GLB,, | Performed by: PODIATRIST

## 2019-03-19 PROCEDURE — 3044F HG A1C LEVEL LT 7.0%: CPT | Mod: CPTII,S$GLB,, | Performed by: PODIATRIST

## 2019-03-19 PROCEDURE — 2024F PR 7 FIELD PHOTOS WITH INTERP/ REVIEW: ICD-10-PCS | Mod: S$GLB,,, | Performed by: PODIATRIST

## 2019-03-19 PROCEDURE — 3077F SYST BP >= 140 MM HG: CPT | Mod: CPTII,S$GLB,, | Performed by: PODIATRIST

## 2019-03-19 PROCEDURE — 3044F PR MOST RECENT HEMOGLOBIN A1C LEVEL <7.0%: ICD-10-PCS | Mod: CPTII,S$GLB,, | Performed by: PODIATRIST

## 2019-03-19 PROCEDURE — 99999 PR PBB SHADOW E&M-EST. PATIENT-LVL III: ICD-10-PCS | Mod: PBBFAC,,, | Performed by: PODIATRIST

## 2019-03-19 PROCEDURE — 3077F PR MOST RECENT SYSTOLIC BLOOD PRESSURE >= 140 MM HG: ICD-10-PCS | Mod: CPTII,S$GLB,, | Performed by: PODIATRIST

## 2019-03-19 PROCEDURE — 3079F PR MOST RECENT DIASTOLIC BLOOD PRESSURE 80-89 MM HG: ICD-10-PCS | Mod: CPTII,S$GLB,, | Performed by: PODIATRIST

## 2019-03-19 NOTE — PROGRESS NOTES
Subjective:      Patient ID: Darrion Bennett is a 69 y.o. male.    Chief Complaint: Diabetes Mellitus (Pcp Dr. Brock  seen Dr. Domingo 2/27/19); Diabetic Foot Exam; and Nail Care    Darrion is a 69 y.o. male who presents to the clinic upon referral from Dr. Giraldo  for evaluation and treatment of diabetic feet. Darrion has a past medical history of Anemia, Arthritis, Cataract, Chronic constipation, Diabetes mellitus, type 2, Glaucoma, Hypertension, MCTD (mixed connective tissue disease), Sjogren's disease, Ulcerative colitis, and Urolithiasis. Patient relates no major problem with feet. Only complaints today consist of discolored, thick, elongated nails.    PCP: Alisha Brock MD    Date Last Seen by PCP:   Chief Complaint   Patient presents with    Diabetes Mellitus     Pcp Dr. Brock  seen Dr. Domingo 2/27/19    Diabetic Foot Exam    Nail Care       Current shoe gear:  rx shoes    Hemoglobin A1C   Date Value Ref Range Status   10/16/2018 6.2 (H) 4.0 - 5.7 % Final     Comment:     Dr. Jurgen Ward ( Endocrinology )   05/30/2014 7.9 (H) 4.8 - 5.6 % Final     Comment:              Increased risk for diabetes: 5.7 - 6.4           Diabetes: >6.4           Glycemic control for adults with diabetes: <7.0       Patient Active Problem List   Diagnosis    Essential hypertension, benign    Controlled type 2 diabetes mellitus with complication, without long-term current use of insulin    Hyperlipidemia    MCTD (mixed connective tissue disease)    Sjogren's disease    Bilateral edema of lower extremity    Glaucoma    Body mass index 29.0-29.9, adult    Thyroid dysfunction    Chronic kidney disease, stage III (moderate)    Abdominal bloating    Abdominal distension    Weight gain    Jackson's esophagus without dysplasia    Anemia    Neutropenia    Thrombocytopenia    Connective tissue disorder    Current chronic use of systemic steroids    Compression fracture of body of thoracic vertebra on XR 2/2019; 2/2019 alendronate     Anemia of chronic renal failure, stage 3 (moderate)     Current Outpatient Medications on File Prior to Visit   Medication Sig Dispense Refill    alendronate (FOSAMAX) 70 MG tablet Take 1 tablet (70 mg total) by mouth every 7 days. 4 tablet 11    allopurinol (ZYLOPRIM) 100 MG tablet Take 1 tablet (100 mg total) by mouth once daily. 90 tablet 1    amlodipine (NORVASC) 10 MG tablet Take 10 mg by mouth once daily.  2    aspirin (ECOTRIN) 81 MG EC tablet Take 81 mg by mouth once daily.      azaTHIOprine (IMURAN) 50 mg Tab       cloNIDine (CATAPRES) 0.1 MG tablet Take 0.1 mg by mouth 3 (three) times daily.  5    dorzolamide (TRUSOPT) 2 % ophthalmic solution       hydroxychloroquine (PLAQUENIL) 200 mg tablet Take 1 tablet (200 mg total) by mouth 2 (two) times daily. 60 tablet 6    insulin glargine (LANTUS) 100 unit/mL injection Inject 20 Units into the skin every evening. (Patient taking differently: Inject 20 Units into the skin every evening. Pt states 10 units every evening.) 6 vial 6    isosorbide dinitrate (ISORDIL) 10 MG tablet TAKE 1 TABLET EVERY 8 HOURS DAILY.  1    levothyroxine (TIROSINT) 25 mcg Cap Take 25 mcg by mouth.      linaclotide (LINZESS) 145 mcg Cap capsule Take 145 mcg by mouth once daily.      melatonin 5 mg Tab       methylPREDNISolone (MEDROL DOSEPACK) 4 mg tablet use as directed (Patient taking differently: Take 4 mg by mouth once daily. use as directed) 1 Package 0    multivit with min-folic acid 200 mcg Chew       NOVOLOG U-100 INSULIN ASPART 100 unit/mL injection       torsemide (DEMADEX) 10 MG Tab Take 10 mg by mouth once daily.  3    TRADJENTA 5 mg Tab tablet       TRAVATAN Z 0.004 % Drop       valsartan (DIOVAN) 160 MG tablet Take 160 mg by mouth once daily.  2     No current facility-administered medications on file prior to visit.      Review of patient's allergies indicates:   Allergen Reactions    Penicillins Nausea And Vomiting        Past Surgical History:    Procedure Laterality Date    COLONOSCOPY  2014    EYE SURGERY       Family History   Problem Relation Age of Onset    Hypertension Father     Stroke Father     Rheum arthritis Maternal Aunt     Rheum arthritis Maternal Uncle     Throat cancer Paternal Grandmother     Celiac disease Neg Hx     Colon cancer Neg Hx     Cirrhosis Neg Hx     Colon polyps Neg Hx     Crohn's disease Neg Hx     Cystic fibrosis Neg Hx     Hemochromatosis Neg Hx     Esophageal cancer Neg Hx     Inflammatory bowel disease Neg Hx     Irritable bowel syndrome Neg Hx     Liver cancer Neg Hx     Liver disease Neg Hx     Rectal cancer Neg Hx     Stomach cancer Neg Hx     Ulcerative colitis Neg Hx     Chase's disease Neg Hx      Social History     Socioeconomic History    Marital status:      Spouse name: Not on file    Number of children: 3    Years of education: Not on file    Highest education level: Not on file   Social Needs    Financial resource strain: Not on file    Food insecurity - worry: Not on file    Food insecurity - inability: Not on file    Transportation needs - medical: Not on file    Transportation needs - non-medical: Not on file   Occupational History    Not on file   Tobacco Use    Smoking status: Never Smoker    Smokeless tobacco: Never Used   Substance and Sexual Activity    Alcohol use: No    Drug use: No    Sexual activity: No   Other Topics Concern    Not on file   Social History Narrative    Not on file     Review of Systems   Constitution: Negative for chills, fever, weakness and malaise/fatigue.   Cardiovascular: Positive for leg swelling. Negative for chest pain and claudication.   Respiratory: Negative for cough and shortness of breath.    Skin: Positive for dry skin and nail changes. Negative for itching and rash.   Musculoskeletal: Positive for arthritis, back pain and joint pain. Negative for falls, joint swelling and muscle weakness.   Gastrointestinal: Negative  "for diarrhea, nausea and vomiting.   Neurological: Negative for numbness, paresthesias and tremors.   Psychiatric/Behavioral: Negative for altered mental status and hallucinations.         Objective:       Vitals:    03/19/19 1640   BP: (!) 172/80   Weight: 74.4 kg (164 lb)   Height: 5' 5" (1.651 m)   PainSc: 0-No pain       Physical Exam   Constitutional:   General: Pt. is well-developed, well-nourished, appears stated age, in no acute distress, alert and oriented x 3. No evidence of depression, anxiety, or agitation. Calm, cooperative, and communicative. Appropriate interactions and affect.       Cardiovascular:   Pulses:       Dorsalis pedis pulses are 2+ on the right side, and 2+ on the left side.        Posterior tibial pulses are 1+ on the right side, and 1+ on the left side.   There is decreased digital hair. Skin is atrophic   Musculoskeletal:        Right ankle: He exhibits normal range of motion and no swelling. No tenderness. Achilles tendon exhibits no pain and no defect.        Left ankle: He exhibits normal range of motion and no swelling. No tenderness. Achilles tendon exhibits no pain and no defect.        Right foot: There is normal range of motion and no tenderness.        Left foot: There is normal range of motion and no tenderness.   There is equinus deformity bilateral with decreased dorsiflexion at the ankle joint bilateral.     Decreased first MPJ range of motion both weightbearing and nonweightbearing, no crepitus observed the first MP joint, + dorsal flag sign. Mild  bunion deformity is observed .    Patient has hammertoes of digits 2-5 bilateral partially reducible without symptom today.    Fat pad atrophy to heels and met heads bilateral   Neurological: A sensory deficit is present.   [place] 5.07 monofilamant testing is diminished Fareed feet. Sharp/dull sensation diminished Bilaterally. Light touch absent Bilaterally.       Skin: Skin is warm, dry and intact. No abrasion, no " ecchymosis, no lesion and no rash noted. He is not diaphoretic. No cyanosis or erythema. No pallor. Nails show no clubbing.   Toenails 1-5 of the left foot are thickened by 2-3 mm, discolored/yellowed, dystrophic, brittle with subungual debris    Interdigital Spaces clean, dry and without evidence of break in skin integrity   Psychiatric: He has a normal mood and affect. His speech is normal.   Nursing note and vitals reviewed.            Assessment:       Encounter Diagnoses   Name Primary?    Comprehensive diabetic foot examination, type 2 DM, encounter for Yes    Chronic kidney disease, stage III (moderate)     Hammer toes of both feet     Hallux limitus, acquired, left     Hallux limitus, acquired, right     Onychomycosis due to dermatophyte - Left Foot          Plan:       Darrion was seen today for diabetes mellitus, diabetic foot exam and nail care.    Diagnoses and all orders for this visit:    Comprehensive diabetic foot examination, type 2 DM, encounter for  -     DIABETIC SHOES FOR HOME USE    Chronic kidney disease, stage III (moderate)    Hammer toes of both feet  -     DIABETIC SHOES FOR HOME USE    Hallux limitus, acquired, left  -     DIABETIC SHOES FOR HOME USE    Hallux limitus, acquired, right  -     DIABETIC SHOES FOR HOME USE    Onychomycosis due to dermatophyte - Left Foot      I counseled the patient on his conditions, their implications and medical management.      Greater than 50% of this visit spent on counseling and coordination of care.    Education about the diabetic foot, neuropathy, and prevention of limb loss.    Shoe inspection. Diabetic Foot Education. Patient reminded of the importance of good nutrition/healthy diet/weight management and blood sugar control to help prevent podiatric complications of diabetes. Patient instructed on proper foot hygeine. Wear comfortable, proper fitting shoes. Wash feet daily. Dry well. After drying, apply moisturizer to feet (no lotion to  bob). Inspect feet daily for skin breaks, blisters, swelling, or redness. Wear cotton socks (preferably white)  Change socks every day. Do NOT walk barefoot. Do NOT use heating pads or hot water soaks. We discussed wearing proper shoe gear, daily foot inspections, never walking without protective shoe gear.     Discussed edema control and the importance of daily moisturizer to the feet such as Gold bonds diabetic foot cream    Recommend applying vicks vaporub to thick abnormal toenails daily x 6 months to treat fungal nail infection.    Rx diabetic shoes for protection and support    He will continue to monitor the areas daily, inspect his feet, wear protective shoe gear when ambulatory, moisturizer to maintain skin integrity and follow in this office in approximately 12 months, sooner p.r.n.

## 2019-03-19 NOTE — PATIENT INSTRUCTIONS
Recommend lotions: eucerin, eucerin for diabetics, aquaphor, A&D ointment, gold bond for diabetics, sween, [place]'s Bees all purpose baby ointment,  urea 40 with aloe (found on amazon.com)    Shoe recommendations: (try 6pm.com, zappos.com , nordstromrack.Guvera, or shoes.Guvera for discounted prices) you can visit DSW shoes in [place]  or [employer] [facility] in the [facility] (there are also several shoe brand outlets in the [facility])    Asics (GT 2000 or gel foundations), new balance stability type shoes, saucony (stabil c3),  Rush (GTS or Beast or transcend), propet (tennis shoe)    Sofgregg Spring (women) Kulwant&Trace (men), clarks, crocs, aerosoles, naturalizers, SAS, ecco, born, jazzy yates, rockports (dress shoes)    Vionic, burkenstocks, fitflops, propet (sandals)  Nike comfort thong sandals, crocs, propet (house shoes)    Nail Home remedy:  Vicks Vapor rub to nails for easier manageability      Diabetes: Inspecting Your Feet  Diabetes increases your chances of developing foot problems. So inspect your feet every day. This helps you find small skin irritations before they become serious infections. If you have trouble seeing the bottoms of your feet, use a mirror or ask a family member or friend to help.     Pressure spots on the bottom of the foot are common areas where problems develop.   How to check your feet  Below are tips to help you look for foot problems. Try to check your feet at the same time each day, such as when you get out of bed in the morning:  · Check the top of each foot. The tops of toes, back of the heel, and outer edge of the foot can get a lot of rubbing from poor-fitting shoes.  · Check the bottom of each foot. Daily wear and tear often leads to problems at pressure spots.  · Check the toes and nails. Fungal infections often occur between toes. Toenail problems can also be a sign of fungal infections or lead to breaks in the skin.  · Check your shoes, too. Loose objects inside a shoe can injure the  foot. Use your hand to feel inside your shoes for things like chani, loose stitching, or rough areas that could irritate your skin.  Warning signs  Look for any color changes in the foot. Redness with streaks can signal a severe infection, which needs immediate medical attention. Tell your doctor right away if you have any of these problems:  · Swelling, sometimes with color changes, may be a sign of poor blood flow or infection. Symptoms include tenderness and an increase in the size of your foot.  · Warm or hot areas on your feet may be signs of infection. A foot that is cold may not be getting enough blood.  · Sensations such as burning, tingling, or pins and needles can be signs of a problem. Also check for areas that may be numb.  · Hot spots are caused by friction or pressure. Look for hot spots in areas that get a lot of rubbing. Hot spots can turn into blisters, calluses, or sores.  · Cracks and sores are caused by dry or irritated skin. They are a sign that the skin is breaking down, which can lead to infection.  · Toenail problems to watch for include nails growing into the skin (ingrown toenail) and causing redness or pain. Thick, yellow, or discolored nails can signal a fungal infection.  · Drainage and odor can develop from untreated sores and ulcers. Call your doctor right away if you notice white or yellow drainage, bleeding, or unpleasant odor.   © 2067-0284 [employer] [address] [place] [address]. All rights reserved. This information is not intended as a substitute for professional medical care. Always follow your healthcare professional's instructions.        Step-by-Step:  Inspecting Your Feet (Diabetes)    Date Last Reviewed: 10/1/2016  © 3951-8269 [employer] [address] [place] [address]. All rights reserved. This information is not intended as a substitute for professional medical care. Always follow your healthcare professional's  instructions.

## 2019-03-20 ENCOUNTER — INFUSION (OUTPATIENT)
Dept: INFUSION THERAPY | Facility: HOSPITAL | Age: 69
End: 2019-03-20
Attending: INTERNAL MEDICINE
Payer: MEDICARE

## 2019-03-20 VITALS
HEART RATE: 77 BPM | SYSTOLIC BLOOD PRESSURE: 169 MMHG | TEMPERATURE: 98 F | OXYGEN SATURATION: 100 % | DIASTOLIC BLOOD PRESSURE: 71 MMHG | RESPIRATION RATE: 20 BRPM

## 2019-03-20 DIAGNOSIS — N18.30 ANEMIA OF CHRONIC RENAL FAILURE, STAGE 3 (MODERATE): Primary | ICD-10-CM

## 2019-03-20 DIAGNOSIS — D63.1 ANEMIA OF CHRONIC RENAL FAILURE, STAGE 3 (MODERATE): Primary | ICD-10-CM

## 2019-03-20 DIAGNOSIS — M35.1 MCTD (MIXED CONNECTIVE TISSUE DISEASE): ICD-10-CM

## 2019-03-20 PROCEDURE — 96372 THER/PROPH/DIAG INJ SC/IM: CPT

## 2019-03-20 PROCEDURE — 63600175 PHARM REV CODE 636 W HCPCS: Mod: JG | Performed by: INTERNAL MEDICINE

## 2019-03-20 RX ADMIN — ERYTHROPOIETIN 20000 UNITS: 20000 INJECTION, SOLUTION INTRAVENOUS; SUBCUTANEOUS at 11:03

## 2019-03-20 NOTE — PLAN OF CARE
Problem: Adult Inpatient Plan of Care  Goal: Plan of Care Review  Outcome: Ongoing (interventions implemented as appropriate)  Reviewed indications and possible side effects of Procrit with patient. Verbalized understanding. Received Procrit injection. Monitored 10 minutes after administration. No reactions noted during visit. Received discharge instructions and follow up appointments. Verbalized understanding and ambulated unassisted off unit.

## 2019-03-25 DIAGNOSIS — N18.30 ANEMIA OF CHRONIC RENAL FAILURE, STAGE 3 (MODERATE): ICD-10-CM

## 2019-03-25 DIAGNOSIS — D63.1 ANEMIA OF CHRONIC RENAL FAILURE, STAGE 3 (MODERATE): ICD-10-CM

## 2019-03-25 DIAGNOSIS — D64.9 ANEMIA, UNSPECIFIED TYPE: Primary | ICD-10-CM

## 2019-03-25 DIAGNOSIS — N18.30 CHRONIC KIDNEY DISEASE, STAGE III (MODERATE): ICD-10-CM

## 2019-03-25 DIAGNOSIS — M35.9 CONNECTIVE TISSUE DISORDER: ICD-10-CM

## 2019-04-03 LAB
LEFT EYE DM RETINOPATHY: NEGATIVE
RIGHT EYE DM RETINOPATHY: NEGATIVE

## 2019-04-09 ENCOUNTER — PATIENT MESSAGE (OUTPATIENT)
Dept: RHEUMATOLOGY | Facility: CLINIC | Age: 69
End: 2019-04-09

## 2019-04-09 ENCOUNTER — PATIENT MESSAGE (OUTPATIENT)
Dept: HEMATOLOGY/ONCOLOGY | Facility: CLINIC | Age: 69
End: 2019-04-09

## 2019-04-18 ENCOUNTER — PATIENT OUTREACH (OUTPATIENT)
Dept: ADMINISTRATIVE | Facility: HOSPITAL | Age: 69
End: 2019-04-18

## 2019-04-18 DIAGNOSIS — E11.8 CONTROLLED TYPE 2 DIABETES MELLITUS WITH COMPLICATION, WITHOUT LONG-TERM CURRENT USE OF INSULIN: Primary | ICD-10-CM

## 2019-04-18 NOTE — LETTER
April 18, 2019      Dr. Tin Chambers             [facility]  [address]  [facility] [address]  Phone: [phone] April 18, 2019     Patient: Darrion Bennett    YOB: 1950   Date of Visit: 4/18/2019       To Whom It May Concern:                                             We are seeing Darrion Bennett in the clinic at [facility].  Alisha Brock MD is their PCP.  She/He has an outstanding lab/procedure at the time we reviewed their chart.  To help with our Health Maintenance records will you please supply the following report(s):      []  Mammogram                                                [x]  Colonoscopy   []  Pap Smear                                                   []  Outside Lab Results   []  Dexa scans                                                   []  Eye Exam   []  Foot Exam                                                     [] Other___________   []  Outside Immunizations                                               Please Fax to [facility] at [phone].    Thank you for your help. If my Care Coordinator can be of any assistance, you can call Amy Gamez MA-[facility] at [phone].

## 2019-04-26 ENCOUNTER — PATIENT OUTREACH (OUTPATIENT)
Dept: ADMINISTRATIVE | Facility: HOSPITAL | Age: 69
End: 2019-04-26

## 2019-04-26 DIAGNOSIS — Z12.11 COLON CANCER SCREENING: ICD-10-CM

## 2019-04-27 ENCOUNTER — PATIENT MESSAGE (OUTPATIENT)
Dept: FAMILY MEDICINE | Facility: CLINIC | Age: 69
End: 2019-04-27

## 2019-04-29 ENCOUNTER — PATIENT MESSAGE (OUTPATIENT)
Dept: FAMILY MEDICINE | Facility: CLINIC | Age: 69
End: 2019-04-29

## 2019-05-02 ENCOUNTER — OFFICE VISIT (OUTPATIENT)
Dept: FAMILY MEDICINE | Facility: CLINIC | Age: 69
End: 2019-05-02
Payer: MEDICARE

## 2019-05-02 VITALS
HEART RATE: 50 BPM | HEIGHT: 65 IN | SYSTOLIC BLOOD PRESSURE: 112 MMHG | WEIGHT: 163 LBS | BODY MASS INDEX: 27.16 KG/M2 | DIASTOLIC BLOOD PRESSURE: 58 MMHG | TEMPERATURE: 98 F | OXYGEN SATURATION: 99 %

## 2019-05-02 DIAGNOSIS — I10 ESSENTIAL HYPERTENSION, BENIGN: ICD-10-CM

## 2019-05-02 DIAGNOSIS — Z12.11 SCREENING FOR COLON CANCER: ICD-10-CM

## 2019-05-02 DIAGNOSIS — Z23 NEED FOR SHINGLES VACCINE: ICD-10-CM

## 2019-05-02 DIAGNOSIS — I50.22 CHRONIC SYSTOLIC CONGESTIVE HEART FAILURE: ICD-10-CM

## 2019-05-02 DIAGNOSIS — I10 ESSENTIAL HYPERTENSION: ICD-10-CM

## 2019-05-02 DIAGNOSIS — K59.09 CHRONIC CONSTIPATION: ICD-10-CM

## 2019-05-02 DIAGNOSIS — J06.9 VIRAL URI WITH COUGH: Primary | ICD-10-CM

## 2019-05-02 DIAGNOSIS — E78.00 PURE HYPERCHOLESTEROLEMIA: ICD-10-CM

## 2019-05-02 DIAGNOSIS — E11.8 CONTROLLED TYPE 2 DIABETES MELLITUS WITH COMPLICATION, WITHOUT LONG-TERM CURRENT USE OF INSULIN: ICD-10-CM

## 2019-05-02 PROCEDURE — 3078F PR MOST RECENT DIASTOLIC BLOOD PRESSURE < 80 MM HG: ICD-10-PCS | Mod: CPTII,S$GLB,, | Performed by: INTERNAL MEDICINE

## 2019-05-02 PROCEDURE — 99999 PR PBB SHADOW E&M-EST. PATIENT-LVL IV: ICD-10-PCS | Mod: PBBFAC,,, | Performed by: INTERNAL MEDICINE

## 2019-05-02 PROCEDURE — 99214 PR OFFICE/OUTPT VISIT, EST, LEVL IV, 30-39 MIN: ICD-10-PCS | Mod: S$GLB,,, | Performed by: INTERNAL MEDICINE

## 2019-05-02 PROCEDURE — 2024F 7 FLD RTA PHOTO EVC RTNOPTHY: CPT | Mod: S$GLB,,, | Performed by: INTERNAL MEDICINE

## 2019-05-02 PROCEDURE — 99999 PR PBB SHADOW E&M-EST. PATIENT-LVL IV: CPT | Mod: PBBFAC,,, | Performed by: INTERNAL MEDICINE

## 2019-05-02 PROCEDURE — 99214 OFFICE O/P EST MOD 30 MIN: CPT | Mod: S$GLB,,, | Performed by: INTERNAL MEDICINE

## 2019-05-02 PROCEDURE — 3074F SYST BP LT 130 MM HG: CPT | Mod: CPTII,S$GLB,, | Performed by: INTERNAL MEDICINE

## 2019-05-02 PROCEDURE — 1101F PR PT FALLS ASSESS DOC 0-1 FALLS W/OUT INJ PAST YR: ICD-10-PCS | Mod: CPTII,S$GLB,, | Performed by: INTERNAL MEDICINE

## 2019-05-02 PROCEDURE — 3074F PR MOST RECENT SYSTOLIC BLOOD PRESSURE < 130 MM HG: ICD-10-PCS | Mod: CPTII,S$GLB,, | Performed by: INTERNAL MEDICINE

## 2019-05-02 PROCEDURE — 3078F DIAST BP <80 MM HG: CPT | Mod: CPTII,S$GLB,, | Performed by: INTERNAL MEDICINE

## 2019-05-02 PROCEDURE — 3044F PR MOST RECENT HEMOGLOBIN A1C LEVEL <7.0%: ICD-10-PCS | Mod: CPTII,S$GLB,, | Performed by: INTERNAL MEDICINE

## 2019-05-02 PROCEDURE — 3044F HG A1C LEVEL LT 7.0%: CPT | Mod: CPTII,S$GLB,, | Performed by: INTERNAL MEDICINE

## 2019-05-02 PROCEDURE — 1101F PT FALLS ASSESS-DOCD LE1/YR: CPT | Mod: CPTII,S$GLB,, | Performed by: INTERNAL MEDICINE

## 2019-05-02 PROCEDURE — 2024F PR 7 FIELD PHOTOS WITH INTERP/ REVIEW: ICD-10-PCS | Mod: S$GLB,,, | Performed by: INTERNAL MEDICINE

## 2019-05-02 RX ORDER — BENZONATATE 100 MG/1
100 CAPSULE ORAL 3 TIMES DAILY PRN
Qty: 30 CAPSULE | Refills: 0 | Status: SHIPPED | OUTPATIENT
Start: 2019-05-02 | End: 2019-05-12

## 2019-05-02 RX ORDER — VALSARTAN 160 MG/1
160 TABLET ORAL DAILY
Qty: 90 TABLET | Refills: 3 | Status: SHIPPED | OUTPATIENT
Start: 2019-05-02 | End: 2019-05-09

## 2019-05-02 RX ORDER — IPRATROPIUM BROMIDE 42 UG/1
2 SPRAY, METERED NASAL 3 TIMES DAILY
Qty: 30 ML | Refills: 0 | Status: SHIPPED | OUTPATIENT
Start: 2019-05-02 | End: 2019-06-06 | Stop reason: SDUPTHER

## 2019-05-02 RX ORDER — ATORVASTATIN CALCIUM 20 MG/1
20 TABLET, FILM COATED ORAL DAILY
Qty: 90 TABLET | Refills: 3 | Status: SHIPPED | OUTPATIENT
Start: 2019-05-02 | End: 2019-05-07 | Stop reason: SINTOL

## 2019-05-02 RX ORDER — POLYETHYLENE GLYCOL 3350 17 G/17G
17 POWDER, FOR SOLUTION ORAL DAILY
Qty: 850 G | Refills: 11 | Status: SHIPPED | OUTPATIENT
Start: 2019-05-02 | End: 2020-04-29 | Stop reason: ALTCHOICE

## 2019-05-02 NOTE — PROGRESS NOTES
This note was created by combination of typed  and Dragon dictation.  Transcription errors may be present.  If there are any questions, please contact me.    Assessment & Plan:   Viral URI with cough  -Tessalon Atrovent prescriptions to pharmacy.  No indication for antibiotic at this time.  -     benzonatate (TESSALON) 100 MG capsule; Take 1 capsule (100 mg total) by mouth 3 (three) times daily as needed for Cough.  Dispense: 30 capsule; Refill: 0  -     ipratropium (ATROVENT) 42 mcg (0.06 %) nasal spray; 2 sprays by Nasal route 3 (three) times daily. AS NEEDED FOR NASAL CONGESTION AND DRIP for 7 days  Dispense: 30 mL; Refill: 0    Chronic systolic congestive heart failure  Essential hypertension, benign  -has been followed by [facility] but he would like to transfer care to [facility].  Referral submitted.  History of CHF, last echo function that I could see in the system from 2014 at [facility], LVEF greater than 55%.  Normal diastolic function.  Refilled valsartan.  He is also on diuretic, not on beta-blocker.  Restart statin.  -     valsartan (DIOVAN) 160 MG tablet; Take 1 tablet (160 mg total) by mouth once daily.  Dispense: 90 tablet; Refill: 3  -     Ambulatory referral to Cardiology    Controlled type 2 diabetes mellitus with complication, without long-term current use of insulin  -last A1c was good.  On Lantus and NovoLog, also on Tradjenta  -     Ambulatory referral to Endocrinology    Chronic constipation  -on max recommended dose of Linzess.  He can supplement with MiraLax, and I have sent in prescriptions for both.  -     linaclotide (LINZESS) 145 mcg Cap capsule; Take 1 capsule (145 mcg total) by mouth once daily.  Dispense: 90 capsule; Refill: 3  -     polyethylene glycol (GLYCOLAX) 17 gram/dose powder; Take 17 g by mouth once daily.  Dispense: 850 g; Refill: 11    Pure hypercholesterolemia  -low-dose atorvastatin.  Last lipid profile was actually pretty good not on statin.  History of  statin myalgias.  If he experiences that he can take over-the-counter coenzyme Q10 100.  -     atorvastatin (LIPITOR) 20 MG tablet; Take 1 tablet (20 mg total) by mouth once daily.  Dispense: 90 tablet; Refill: 3    Need for shingles vaccine  -prescription sent to pharmacy.  -     varicella-zoster gE-AS01B, PF, (SHINGRIX, PF,) 50 mcg/0.5 mL injection; Inject 0.5 mLs into the muscle once. Repeat in 2 months for 1 dose  Dispense: 0.5 mL; Refill: 1        Medications Discontinued During This Encounter   Medication Reason    methylPREDNISolone (MEDROL DOSEPACK) 4 mg tablet Patient no longer taking    valsartan (DIOVAN) 160 MG tablet Reorder    linaclotide (LINZESS) 145 mcg Cap capsule Reorder       meds sent this encounter:  Medications Ordered This Encounter   Medications    atorvastatin (LIPITOR) 20 MG tablet     Sig: Take 1 tablet (20 mg total) by mouth once daily.     Dispense:  90 tablet     Refill:  3    benzonatate (TESSALON) 100 MG capsule     Sig: Take 1 capsule (100 mg total) by mouth 3 (three) times daily as needed for Cough.     Dispense:  30 capsule     Refill:  0    ipratropium (ATROVENT) 42 mcg (0.06 %) nasal spray     Si sprays by Nasal route 3 (three) times daily. AS NEEDED FOR NASAL CONGESTION AND DRIP for 7 days     Dispense:  30 mL     Refill:  0    linaclotide (LINZESS) 145 mcg Cap capsule     Sig: Take 1 capsule (145 mcg total) by mouth once daily.     Dispense:  90 capsule     Refill:  3    polyethylene glycol (GLYCOLAX) 17 gram/dose powder     Sig: Take 17 g by mouth once daily.     Dispense:  850 g     Refill:  11    valsartan (DIOVAN) 160 MG tablet     Sig: Take 1 tablet (160 mg total) by mouth once daily.     Dispense:  90 tablet     Refill:  3    varicella-zoster gE-AS01B, PF, (SHINGRIX, PF,) 50 mcg/0.5 mL injection     Sig: Inject 0.5 mLs into the muscle once. Repeat in 2 months for 1 dose     Dispense:  0.5 mL     Refill:  1       Follow Up: No follow-ups on file.  Follow-up 3  months.  Plan for labs at that time.    Subjective:     Chief Complaint   Patient presents with    Follow-up     Blood pressure     Cough       HPI  Darrion is a 69 y.o. male, last appointment with this clinic was 2/27/2019.    Requesting to change to me.    Mixed connective tissue disorder, on chronic steroids.  History of Imuran and Plaquenil side effect of pancytopenia    Hypertension on amlodipine, clonidine, valsartan  Hx of lipitor 80    C/o 1 week duration of cough, rhinorrhea, ear congestion bilaterally; no headache.  No sore throat.  The cough is nonproductive. Compared to inception no improvement no worse. Worse with lying down. OTC meds for this delsym and Flonase with modest improvement.     Needs RF for flonase.  Takes PRN for rhinorrhea.     He was seeing [facility] and wants to transfer all care to [facility] so is requesting cardiology consult.     Is off of statin x years. Due to myalgias.  Lipid 10/2018 was actually good not on statin.    Is planning travel to [facility]. No definite scheduled trip.  Would recommend typhoid, check for malaria wherever he is going.  Outside labs, from 2014, hepatitis a IgM was negative.  No IgG available.    Still with constipation. linzess and miralax.  Found linzess helpful with 2 daily doses.    Answers for HPI/ROS submitted by the patient on 4/27/2019   Cough  Chronicity: new  Onset: in the past 7 days  Progression since onset: unchanged  Frequency: every few minutes  Cough characteristics: non-productive  ear congestion: No  nasal congestion: Yes  postnasal drip: Yes  rhinorrhea: Yes  sweats: No  Aggravated by: nothing  asthma: No  bronchiectasis: No  bronchitis: No  COPD: No  emphysema: No  pneumonia: No  Improvement on treatment: no relief      Patient Care Team:  Alisha Brock MD as PCP - General (Family Medicine)  Tin Chambers MD (Gastroenterology)    Patient Active Problem List    Diagnosis Date Noted    Anemia of chronic renal failure, stage 3 (moderate) 03/13/2019     Current chronic use of systemic steroids 02/27/2019    Compression fracture of body of thoracic vertebra on XR 2/2019; 2/2019 alendronate 02/27/2019    Neutropenia 02/26/2019    Thrombocytopenia 02/26/2019    Connective tissue disorder 02/26/2019    Abdominal bloating 03/14/2016    Abdominal distension 03/14/2016    Weight gain 03/14/2016    Jackson's esophagus without dysplasia 03/14/2016    Anemia 03/14/2016    Body mass index 29.0-29.9, adult 07/15/2014    Thyroid dysfunction 07/15/2014    Chronic kidney disease, stage III (moderate) 07/15/2014    Essential hypertension, benign 05/13/2014    Controlled type 2 diabetes mellitus with complication, without long-term current use of insulin 05/13/2014    Hyperlipidemia 05/13/2014    MCTD (mixed connective tissue disease) 05/13/2014    Sjogren's disease 05/13/2014    Bilateral edema of lower extremity 05/13/2014    Glaucoma 05/13/2014       PAST MEDICAL HISTORY:  Past Medical History:   Diagnosis Date    Anemia     Arthritis     Cataract     Chronic constipation     Diabetes mellitus, type 2     Glaucoma     Hypertension     MCTD (mixed connective tissue disease)     Sjogren's disease     Ulcerative colitis     Urolithiasis        PAST SURGICAL HISTORY:  Past Surgical History:   Procedure Laterality Date    COLONOSCOPY  2014    EYE SURGERY         SOCIAL HISTORY:  Social History     Socioeconomic History    Marital status:      Spouse name: Not on file    Number of children: 3    Years of education: Not on file    Highest education level: Not on file   Occupational History    Not on file   Social Needs    Financial resource strain: Somewhat hard    Food insecurity:     Worry: Never true     Inability: Never true    Transportation needs:     Medical: No     Non-medical: No   Tobacco Use    Smoking status: Never Smoker    Smokeless tobacco: Never Used   Substance and Sexual Activity    Alcohol use: No     Frequency:  Never     Drinks per session: Patient refused     Binge frequency: Never    Drug use: No    Sexual activity: Never   Lifestyle    Physical activity:     Days per week: 3 days     Minutes per session: Not on file    Stress: Only a little   Relationships    Social connections:     Talks on phone: Once a week     Gets together: Once a week     Attends Mosque service: Not on file     Active member of club or organization: No     Attends meetings of clubs or organizations: Never     Relationship status:    Other Topics Concern    Not on file   Social History Narrative    Not on file       ALLERGIES AND MEDICATIONS: updated and reviewed.  Review of patient's allergies indicates:   Allergen Reactions    Penicillins Nausea And Vomiting     Current Outpatient Medications   Medication Sig Dispense Refill    alendronate (FOSAMAX) 70 MG tablet Take 1 tablet (70 mg total) by mouth every 7 days. 4 tablet 11    allopurinol (ZYLOPRIM) 100 MG tablet Take 1 tablet (100 mg total) by mouth once daily. 90 tablet 1    amlodipine (NORVASC) 10 MG tablet Take 10 mg by mouth once daily.  2    aspirin (ECOTRIN) 81 MG EC tablet Take 81 mg by mouth once daily.      azaTHIOprine (IMURAN) 50 mg Tab       cloNIDine (CATAPRES) 0.1 MG tablet Take 0.1 mg by mouth 3 (three) times daily.  5    dorzolamide (TRUSOPT) 2 % ophthalmic solution       hydroxychloroquine (PLAQUENIL) 200 mg tablet Take 1 tablet (200 mg total) by mouth 2 (two) times daily. 60 tablet 6    insulin glargine (LANTUS) 100 unit/mL injection Inject 20 Units into the skin every evening. (Patient taking differently: Inject 20 Units into the skin every evening. Pt states 10 units every evening.) 6 vial 6    isosorbide dinitrate (ISORDIL) 10 MG tablet TAKE 1 TABLET EVERY 8 HOURS DAILY.  1    levothyroxine (TIROSINT) 25 mcg Cap Take 25 mcg by mouth.      linaclotide (LINZESS) 145 mcg Cap capsule Take 145 mcg by mouth once daily.      melatonin 5 mg Tab        "methylPREDNISolone (MEDROL DOSEPACK) 4 mg tablet use as directed (Patient taking differently: Take 4 mg by mouth once daily. use as directed) 1 Package 0    multivit with min-folic acid 200 mcg Chew       NOVOLOG U-100 INSULIN ASPART 100 unit/mL injection       torsemide (DEMADEX) 10 MG Tab Take 10 mg by mouth once daily.  3    TRADJENTA 5 mg Tab tablet       TRAVATAN Z 0.004 % Drop       valsartan (DIOVAN) 160 MG tablet Take 160 mg by mouth once daily.  2     No current facility-administered medications for this visit.        Review of Systems   Constitutional: Negative for chills, fever and weight loss.   HENT: Positive for sore throat. Negative for ear pain.    Respiratory: Positive for cough. Negative for hemoptysis, shortness of breath and wheezing.    Cardiovascular: Negative for chest pain.   Gastrointestinal: Negative for heartburn.   Musculoskeletal: Negative for myalgias.   Skin: Negative for rash.   Neurological: Negative for headaches.   Endo/Heme/Allergies: Negative for environmental allergies.       Objective:   Physical Exam   Vitals:    05/02/19 0838   BP: (!) 112/58   BP Location: Right arm   Patient Position: Sitting   BP Method: Medium (Manual)   Pulse: (!) 50   Temp: 97.9 °F (36.6 °C)   TempSrc: Oral   SpO2: 99%   Weight: 73.9 kg (163 lb)   Height: 5' 5" (1.651 m)    Body mass index is 27.12 kg/m².            Physical Exam   Constitutional: He is oriented to person, place, and time. He appears well-developed and well-nourished.   HENT:   TMs grey/clear bilaterally.  OP no erythema no exudates   Eyes: EOM are normal.   Neck: Neck supple.   Cardiovascular: Normal rate, regular rhythm and normal heart sounds.   No JVD   Pulmonary/Chest: Effort normal and breath sounds normal. He has no wheezes.   Musculoskeletal: He exhibits edema.   Mild edema ankles bilaterally   Lymphadenopathy:     He has no cervical adenopathy.   Neurological: He is alert and oriented to person, place, and time.   Skin: " Skin is warm and dry.   Psychiatric: He has a normal mood and affect. His behavior is normal.

## 2019-05-06 ENCOUNTER — PATIENT MESSAGE (OUTPATIENT)
Dept: RHEUMATOLOGY | Facility: CLINIC | Age: 69
End: 2019-05-06

## 2019-05-07 ENCOUNTER — TELEPHONE (OUTPATIENT)
Dept: FAMILY MEDICINE | Facility: CLINIC | Age: 69
End: 2019-05-07

## 2019-05-07 ENCOUNTER — TELEPHONE (OUTPATIENT)
Dept: RHEUMATOLOGY | Facility: CLINIC | Age: 69
End: 2019-05-07

## 2019-05-07 DIAGNOSIS — E78.00 PURE HYPERCHOLESTEROLEMIA: Primary | ICD-10-CM

## 2019-05-07 DIAGNOSIS — M79.644 FINGER PAIN, RIGHT: Primary | ICD-10-CM

## 2019-05-07 RX ORDER — ROSUVASTATIN CALCIUM 10 MG/1
10 TABLET, COATED ORAL DAILY
Qty: 90 TABLET | Refills: 3 | Status: SHIPPED | OUTPATIENT
Start: 2019-05-07 | End: 2019-10-15

## 2019-05-07 NOTE — TELEPHONE ENCOUNTER
Patient was given name of new medications. Patient wanted to know what should he do about the swelling and pain to middle finger. Per Dr Domingo; will see if improvement with change of medication. If no improvement; he will need to be seen in the office. Patient verbalize understanding.

## 2019-05-07 NOTE — TELEPHONE ENCOUNTER
----- Message from Gloria Reed sent at 5/7/2019  8:54 AM CDT -----  Contact: Darrion [phone]  Type: Patient Call Back    Who called:Darrion     What is the request in detail: The patient is requesting a call back from the staff. He reports that he has side effects such as joint pain, every time he takes his cholesterol medication: atorvastatin (LIPITOR) 20 MG table. He would like to discuss another alternative medication.    Can the clinic reply by [facility]?no    Would the patient rather a call back or a response via [facility]? Call back    Best call back number:[phone]

## 2019-05-07 NOTE — TELEPHONE ENCOUNTER
I will try him on crestor 10 mg. Is he taking coenzyme Q10?  If not start 100 mg.  If he is - keep taking it.

## 2019-05-07 NOTE — TELEPHONE ENCOUNTER
Spoke with patient and he informed me that he started taking his Lipitor on Saturday 5/4/19 and now having joint pain. He said that the middle finger on his right hand is swollen and painful. He said that he remembered in the pass that when he was on cholesterol medication; he experienced joint pain.

## 2019-05-09 ENCOUNTER — OFFICE VISIT (OUTPATIENT)
Dept: FAMILY MEDICINE | Facility: CLINIC | Age: 69
End: 2019-05-09
Payer: MEDICARE

## 2019-05-09 ENCOUNTER — PATIENT MESSAGE (OUTPATIENT)
Dept: FAMILY MEDICINE | Facility: CLINIC | Age: 69
End: 2019-05-09

## 2019-05-09 ENCOUNTER — OFFICE VISIT (OUTPATIENT)
Dept: ORTHOPEDICS | Facility: CLINIC | Age: 69
End: 2019-05-09
Payer: MEDICARE

## 2019-05-09 VITALS
HEIGHT: 65 IN | HEART RATE: 60 BPM | WEIGHT: 162 LBS | SYSTOLIC BLOOD PRESSURE: 146 MMHG | TEMPERATURE: 98 F | DIASTOLIC BLOOD PRESSURE: 74 MMHG | OXYGEN SATURATION: 99 % | BODY MASS INDEX: 26.99 KG/M2

## 2019-05-09 VITALS
DIASTOLIC BLOOD PRESSURE: 60 MMHG | HEART RATE: 60 BPM | HEIGHT: 65 IN | SYSTOLIC BLOOD PRESSURE: 120 MMHG | BODY MASS INDEX: 27.22 KG/M2 | WEIGHT: 163.38 LBS

## 2019-05-09 DIAGNOSIS — L03.011 CELLULITIS OF RIGHT MIDDLE FINGER: Primary | ICD-10-CM

## 2019-05-09 DIAGNOSIS — M79.641 PAIN OF RIGHT HAND: Primary | ICD-10-CM

## 2019-05-09 DIAGNOSIS — I10 ESSENTIAL HYPERTENSION, BENIGN: ICD-10-CM

## 2019-05-09 DIAGNOSIS — M1A.9XX1 TOPHACEOUS GOUT: Primary | ICD-10-CM

## 2019-05-09 DIAGNOSIS — L03.011 FELON OF FINGER OF RIGHT HAND: ICD-10-CM

## 2019-05-09 DIAGNOSIS — M1A.9XX1 TOPHACEOUS GOUT: ICD-10-CM

## 2019-05-09 DIAGNOSIS — I50.22 CHRONIC SYSTOLIC CONGESTIVE HEART FAILURE: ICD-10-CM

## 2019-05-09 PROCEDURE — 1101F PT FALLS ASSESS-DOCD LE1/YR: CPT | Mod: CPTII,S$GLB,, | Performed by: ORTHOPAEDIC SURGERY

## 2019-05-09 PROCEDURE — 3074F PR MOST RECENT SYSTOLIC BLOOD PRESSURE < 130 MM HG: ICD-10-PCS | Mod: CPTII,S$GLB,, | Performed by: ORTHOPAEDIC SURGERY

## 2019-05-09 PROCEDURE — 3077F SYST BP >= 140 MM HG: CPT | Mod: CPTII,S$GLB,, | Performed by: INTERNAL MEDICINE

## 2019-05-09 PROCEDURE — 3078F PR MOST RECENT DIASTOLIC BLOOD PRESSURE < 80 MM HG: ICD-10-PCS | Mod: CPTII,S$GLB,, | Performed by: ORTHOPAEDIC SURGERY

## 2019-05-09 PROCEDURE — 3077F PR MOST RECENT SYSTOLIC BLOOD PRESSURE >= 140 MM HG: ICD-10-PCS | Mod: CPTII,S$GLB,, | Performed by: INTERNAL MEDICINE

## 2019-05-09 PROCEDURE — 99214 OFFICE O/P EST MOD 30 MIN: CPT | Mod: 25,S$GLB,, | Performed by: INTERNAL MEDICINE

## 2019-05-09 PROCEDURE — 10060 PR DRAIN SKIN ABSCESS SIMPLE: ICD-10-PCS | Mod: S$GLB,,, | Performed by: INTERNAL MEDICINE

## 2019-05-09 PROCEDURE — 3074F SYST BP LT 130 MM HG: CPT | Mod: CPTII,S$GLB,, | Performed by: ORTHOPAEDIC SURGERY

## 2019-05-09 PROCEDURE — 3078F PR MOST RECENT DIASTOLIC BLOOD PRESSURE < 80 MM HG: ICD-10-PCS | Mod: CPTII,S$GLB,, | Performed by: INTERNAL MEDICINE

## 2019-05-09 PROCEDURE — 99999 PR PBB SHADOW E&M-EST. PATIENT-LVL V: CPT | Mod: PBBFAC,,, | Performed by: ORTHOPAEDIC SURGERY

## 2019-05-09 PROCEDURE — 99999 PR PBB SHADOW E&M-EST. PATIENT-LVL IV: ICD-10-PCS | Mod: PBBFAC,,, | Performed by: INTERNAL MEDICINE

## 2019-05-09 PROCEDURE — 99203 OFFICE O/P NEW LOW 30 MIN: CPT | Mod: S$GLB,,, | Performed by: ORTHOPAEDIC SURGERY

## 2019-05-09 PROCEDURE — 3078F DIAST BP <80 MM HG: CPT | Mod: CPTII,S$GLB,, | Performed by: INTERNAL MEDICINE

## 2019-05-09 PROCEDURE — 1101F PT FALLS ASSESS-DOCD LE1/YR: CPT | Mod: CPTII,S$GLB,, | Performed by: INTERNAL MEDICINE

## 2019-05-09 PROCEDURE — 99999 PR PBB SHADOW E&M-EST. PATIENT-LVL IV: CPT | Mod: PBBFAC,,, | Performed by: INTERNAL MEDICINE

## 2019-05-09 PROCEDURE — 99999 PR PBB SHADOW E&M-EST. PATIENT-LVL V: ICD-10-PCS | Mod: PBBFAC,,, | Performed by: ORTHOPAEDIC SURGERY

## 2019-05-09 PROCEDURE — 3078F DIAST BP <80 MM HG: CPT | Mod: CPTII,S$GLB,, | Performed by: ORTHOPAEDIC SURGERY

## 2019-05-09 PROCEDURE — 99203 PR OFFICE/OUTPT VISIT, NEW, LEVL III, 30-44 MIN: ICD-10-PCS | Mod: S$GLB,,, | Performed by: ORTHOPAEDIC SURGERY

## 2019-05-09 PROCEDURE — 10060 I&D ABSCESS SIMPLE/SINGLE: CPT | Mod: S$GLB,,, | Performed by: INTERNAL MEDICINE

## 2019-05-09 PROCEDURE — 1101F PR PT FALLS ASSESS DOC 0-1 FALLS W/OUT INJ PAST YR: ICD-10-PCS | Mod: CPTII,S$GLB,, | Performed by: INTERNAL MEDICINE

## 2019-05-09 PROCEDURE — 1101F PR PT FALLS ASSESS DOC 0-1 FALLS W/OUT INJ PAST YR: ICD-10-PCS | Mod: CPTII,S$GLB,, | Performed by: ORTHOPAEDIC SURGERY

## 2019-05-09 PROCEDURE — 99214 PR OFFICE/OUTPT VISIT, EST, LEVL IV, 30-39 MIN: ICD-10-PCS | Mod: 25,S$GLB,, | Performed by: INTERNAL MEDICINE

## 2019-05-09 RX ORDER — VALSARTAN 160 MG/1
160 TABLET ORAL 2 TIMES DAILY
Qty: 180 TABLET | Refills: 3 | Status: SHIPPED | OUTPATIENT
Start: 2019-05-09 | End: 2019-07-10 | Stop reason: SDUPTHER

## 2019-05-09 RX ORDER — SULFAMETHOXAZOLE AND TRIMETHOPRIM 800; 160 MG/1; MG/1
1 TABLET ORAL 2 TIMES DAILY
Qty: 20 TABLET | Refills: 0 | Status: SHIPPED | OUTPATIENT
Start: 2019-05-09 | End: 2019-05-19

## 2019-05-09 RX ORDER — COLCHICINE 0.6 MG/1
0.6 TABLET ORAL DAILY
Qty: 7 TABLET | Refills: 0 | Status: SHIPPED | OUTPATIENT
Start: 2019-05-09 | End: 2020-03-04

## 2019-05-09 RX ORDER — AMLODIPINE BESYLATE 5 MG/1
5 TABLET ORAL DAILY
Refills: 2 | COMMUNITY
Start: 2019-04-25 | End: 2019-05-21 | Stop reason: SDUPTHER

## 2019-05-09 RX ORDER — HYDROCODONE BITARTRATE AND ACETAMINOPHEN 5; 325 MG/1; MG/1
1 TABLET ORAL EVERY 12 HOURS PRN
Qty: 14 TABLET | Refills: 0 | Status: SHIPPED | OUTPATIENT
Start: 2019-05-09 | End: 2019-08-02

## 2019-05-09 NOTE — PROGRESS NOTES
Chief Complaint   Patient presents with    Right Hand - Pain, Swelling, index finger       This patient was seen in consultation at the request of Dr. Farooq Domingo     HPI: Darrion Bennett is a 69 y.o. male who presents today complaining of Pain, Swelling, and index finger of the Right Hand     He first noted swelling and pain of the finger on Saturday almost a week ago.  He does have a dx of gout but has not had symptoms in his hand or fingers before.  The swelling nad pain got worse and he noted a white discoloration of the tip of the finger. He presented to his PCP where I&D was attempted and PO antibiotics were prescribed.  His PCP did not feel that an adequate drainage was performed and referred him to me for further evaluation.  He denies fevers or chills. No numbness or paresthesias in the finger     This is the extent of the patient's complaints at this time.     Hand dominance: Right       Review of Systems   All other systems reviewed and are negative.       Review of patient's allergies indicates:   Allergen Reactions    Penicillins Nausea And Vomiting         Current Outpatient Medications:     alendronate (FOSAMAX) 70 MG tablet, Take 1 tablet (70 mg total) by mouth every 7 days., Disp: 4 tablet, Rfl: 11    allopurinol (ZYLOPRIM) 100 MG tablet, Take 1 tablet (100 mg total) by mouth once daily., Disp: 90 tablet, Rfl: 1    amLODIPine (NORVASC) 5 MG tablet, Take 5 mg by mouth once daily., Disp: , Rfl: 2    aspirin (ECOTRIN) 81 MG EC tablet, Take 81 mg by mouth once daily., Disp: , Rfl:     benzonatate (TESSALON) 100 MG capsule, Take 1 capsule (100 mg total) by mouth 3 (three) times daily as needed for Cough., Disp: 30 capsule, Rfl: 0    cloNIDine (CATAPRES) 0.1 MG tablet, Take 0.1 mg by mouth 3 (three) times daily., Disp: , Rfl: 5    dorzolamide (TRUSOPT) 2 % ophthalmic solution, , Disp: , Rfl:     HYDROcodone-acetaminophen (NORCO) 5-325 mg per tablet, Take 1 tablet by mouth every 12 (twelve)  hours as needed for Pain., Disp: 14 tablet, Rfl: 0    hydroxychloroquine (PLAQUENIL) 200 mg tablet, Take 1 tablet (200 mg total) by mouth 2 (two) times daily., Disp: 60 tablet, Rfl: 6    insulin glargine (LANTUS) 100 unit/mL injection, Inject 20 Units into the skin every evening. (Patient taking differently: Inject 20 Units into the skin every evening. Pt states 10 units every evening.), Disp: 6 vial, Rfl: 6    ipratropium (ATROVENT) 42 mcg (0.06 %) nasal spray, 2 sprays by Nasal route 3 (three) times daily. AS NEEDED FOR NASAL CONGESTION AND DRIP for 7 days, Disp: 30 mL, Rfl: 0    levothyroxine (TIROSINT) 25 mcg Cap, Take 25 mcg by mouth., Disp: , Rfl:     linaclotide (LINZESS) 145 mcg Cap capsule, Take 1 capsule (145 mcg total) by mouth once daily., Disp: 90 capsule, Rfl: 3    melatonin 5 mg Tab, , Disp: , Rfl:     multivit with min-folic acid 200 mcg Chew, , Disp: , Rfl:     NOVOLOG U-100 INSULIN ASPART 100 unit/mL injection, , Disp: , Rfl:     polyethylene glycol (GLYCOLAX) 17 gram/dose powder, Take 17 g by mouth once daily., Disp: 850 g, Rfl: 11    rosuvastatin (CRESTOR) 10 MG tablet, Take 1 tablet (10 mg total) by mouth once daily., Disp: 90 tablet, Rfl: 3    sulfamethoxazole-trimethoprim 800-160mg (BACTRIM DS) 800-160 mg Tab, Take 1 tablet by mouth 2 (two) times daily. for 10 days, Disp: 20 tablet, Rfl: 0    torsemide (DEMADEX) 10 MG Tab, Take 10 mg by mouth once daily., Disp: , Rfl: 3    TRADJENTA 5 mg Tab tablet, , Disp: , Rfl:     TRAVATAN Z 0.004 % Drop, , Disp: , Rfl:     valsartan (DIOVAN) 160 MG tablet, Take 1 tablet (160 mg total) by mouth 2 (two) times daily., Disp: 180 tablet, Rfl: 3    Past Medical History:   Diagnosis Date    Anemia     Arthritis     Cataract     Chronic constipation     Diabetes mellitus, type 2     Glaucoma     Hyperlipidemia 5/13/2014    Hypertension     MCTD (mixed connective tissue disease)     Sjogren's disease     Ulcerative colitis      Urolithiasis        Patient Active Problem List   Diagnosis    Essential hypertension    Controlled type 2 diabetes mellitus with complication, without long-term current use of insulin    Pure hypercholesterolemia    MCTD (mixed connective tissue disease)    Sjogren's disease    Bilateral edema of lower extremity    Glaucoma    Body mass index 29.0-29.9, adult    Thyroid dysfunction    Chronic kidney disease, stage III (moderate)    Weight gain    Jackson's esophagus without dysplasia    Anemia from plaquenil and imuran    Neutropenia    Thrombocytopenia    Connective tissue disorder    Current chronic use of systemic steroids    Compression fracture of body of thoracic vertebra on XR 2/2019; 2/2019 alendronate    Anemia of chronic renal failure, stage 3 (moderate)    Chronic constipation    Chronic systolic congestive heart failure though TTE 2014 normal    Screening for colon cancer 07/26/2018 colonoscopy transverse colon polyp path showing benign inflammatory polyp.    Tophaceous gout       Past Surgical History:   Procedure Laterality Date    COLONOSCOPY  2014    EYE SURGERY         Social History     Tobacco Use    Smoking status: Never Smoker    Smokeless tobacco: Never Used   Substance Use Topics    Alcohol use: No     Frequency: Never     Drinks per session: Patient refused     Binge frequency: Never    Drug use: No       Family History   Problem Relation Age of Onset    Hypertension Father     Stroke Father     Rheum arthritis Maternal Aunt     Rheum arthritis Maternal Uncle     Throat cancer Paternal Grandmother     Celiac disease Neg Hx     Colon cancer Neg Hx     Cirrhosis Neg Hx     Colon polyps Neg Hx     Crohn's disease Neg Hx     Cystic fibrosis Neg Hx     Hemochromatosis Neg Hx     Esophageal cancer Neg Hx     Inflammatory bowel disease Neg Hx     Irritable bowel syndrome Neg Hx     Liver cancer Neg Hx     Liver disease Neg Hx     Rectal cancer Neg Hx      Stomach cancer Neg Hx     Ulcerative colitis Neg Hx     Chase's disease Neg Hx        Physical Exam:     Vitals:    05/09/19 1454   BP: 120/60   Pulse: 60           General: Weight: 74.1 kg (163 lb 5.8 oz) Body mass index is 27.18 kg/m².  Patient is alert, awake and oriented to time, place and person. Mood and affect are appropriate.  Patient does not appear to be in any distress, denies any constitutional symptoms and appears stated age.   HEENT: Pupils are equal and round, sclera are not injected. External examination of ears and nose reveals no abnormalities. Cranial nerves II-X are grossly intact  Skin: no rashes, abrasions or open wounds on the affected extremity   Resp: No respiratory distress or audible wheezing   CV: 2+  pulses, all extremities warm and well perfused   Right hand  Swelling and erythema of tip of long finger consistent in appearance with a felon. There is a pre-I&d photograph in the media section  There is a transverse incision previously made during the I&D this morning with a small amount of sanguinous drainage  Negative Knavel signs -- does not appear to involve flexor tendon sheath   The cellulitis and swelling do not extend significantly proximal to the DIP joint   After digital block, the wound was explored with sterile hemostats.  There was a white granular material expressed consistent in appearance with uric acid crystals. No purulent material.   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  Models and/or pictures were used to help in the explanation.    Plan:   - Repeat I&D done in clinic today, wound irrigated with sterile saline and dressing applied  - Continue abx, recommended gout treatment to PCP in addition to treatment of  infection   - Return to clinic in 1 week. Return sooner if symptoms worsen or fail to improve.    All questions were answered in detail. The patient is in full agreement with the treatment plan and will proceed accordingly.    A note notifying Dr. Farooq Domingo of my findings was sent via the electronic medical record     This note was created by combination of typed  and M-Modal dictation. Transcription and phonetic errors may be present.  If there are any questions, please contact me.

## 2019-05-09 NOTE — LETTER
May 11, 2019      Farooq Domingo MD  [address]  [place] [address]           [facility]  [address] [place] [address]  Phone: [phone]          Patient: Darrion Bennett   MR Number: [id number]   YOB: 1950   Date of Visit: 5/9/2019       Dear Dr. Farooq Domingo:    Thank you for referring Darrion Bennett to me for evaluation. Attached you will find relevant portions of my assessment and plan of care.    If you have questions, please do not hesitate to call me. I look forward to following Darrion Bennett along with you.    Sincerely,    Brynn Bo MD    Enclosure  CC:  No Recipients    If you would like to receive this communication electronically, please contact [email or web address] or [phone] to request more information on [employer] Link access.    For providers and/or their staff who would like to refer a patient to [facility], please contact us through our one-stop-shop provider referral line, [facility], at [phone].    If you feel you have received this communication in error or would no longer like to receive these types of communications, please e-mail [email or web address]

## 2019-05-09 NOTE — PROGRESS NOTES
This note was created by combination of typed  and Dragon dictation.  Transcription errors may be present.  If there are any questions, please contact me.    Assessment & Plan:   Cellulitis of right middle finger  -status post incision and drainage today.  Urgent referral to Orthopedics for follow-up.  Bactrim antibiotic in the meantime.  Hydrocodone for breakthrough pain.  -     sulfamethoxazole-trimethoprim 800-160mg (BACTRIM DS) 800-160 mg Tab; Take 1 tablet by mouth 2 (two) times daily. for 10 days  Dispense: 20 tablet; Refill: 0  -     Ambulatory referral to General Surgery  -     HYDROcodone-acetaminophen (NORCO) 5-325 mg per tablet; Take 1 tablet by mouth every 12 (twelve) hours as needed for Pain.  Dispense: 14 tablet; Refill: 0  -     Ambulatory referral to Orthopedics    Chronic systolic congestive heart failure  Essential hypertension, benign  Revised his valsartan to reflect his previous dosing, 160 mg b.i.d.  -     valsartan (DIOVAN) 160 MG tablet; Take 1 tablet (160 mg total) by mouth 2 (two) times daily.  Dispense: 180 tablet; Refill: 3      Medications Discontinued During This Encounter   Medication Reason    azaTHIOprine (IMURAN) 50 mg Tab Patient no longer taking    isosorbide dinitrate (ISORDIL) 10 MG tablet Patient no longer taking    amlodipine (NORVASC) 10 MG tablet Patient no longer taking    valsartan (DIOVAN) 160 MG tablet        meds sent this encounter:  Medications Ordered This Encounter   Medications    HYDROcodone-acetaminophen (NORCO) 5-325 mg per tablet     Sig: Take 1 tablet by mouth every 12 (twelve) hours as needed for Pain.     Dispense:  14 tablet     Refill:  0    sulfamethoxazole-trimethoprim 800-160mg (BACTRIM DS) 800-160 mg Tab     Sig: Take 1 tablet by mouth 2 (two) times daily. for 10 days     Dispense:  20 tablet     Refill:  0    valsartan (DIOVAN) 160 MG tablet     Sig: Take 1 tablet (160 mg total) by mouth 2 (two) times daily.     Dispense:  180 tablet      Refill:  3       Follow Up: No follow-ups on file.    Subjective:     Chief Complaint   Patient presents with    Edema     middle finger on right hand    Medication Reaction       VANESSA Maier is a 69 y.o. male, last appointment with this clinic was 5/2/2019.    Comes in with a painful swollen finger.  Right middle finger pad.  Started maybe on Saturday with some initial redness but then became white and then started growing.  It is painful.  No systemic fevers or chills.  Cannot recall any sort of injury to the finger pad, did not cut it, did not hit it on anything.    Started the statin 1 day prior.  I do not think that this is related.    I had filled his valsartan and he reports that he had previously taken it twice daily and so I need to amend the prescription.    Patient Care Team:  Farooq Domingo MD as PCP - General (Internal Medicine)  Tin Chambers MD (Gastroenterology)  Harrison Brannon MD (Cardiology)  Roxanna Salazar MD as Nurse Practitioner (Rheumatology)  Malcolm Irwin MD as  (Nephrology)    Patient Active Problem List    Diagnosis Date Noted    Chronic constipation 05/02/2019    Chronic systolic congestive heart failure though TTE 2014 normal 05/02/2019    Screening for colon cancer 07/26/2018 colonoscopy transverse colon polyp path showing benign inflammatory polyp. 05/02/2019    Anemia of chronic renal failure, stage 3 (moderate) 03/13/2019    Current chronic use of systemic steroids 02/27/2019    Compression fracture of body of thoracic vertebra on XR 2/2019; 2/2019 alendronate 02/27/2019    Neutropenia 02/26/2019    Thrombocytopenia 02/26/2019    Connective tissue disorder 02/26/2019    Weight gain 03/14/2016    Jackson's esophagus without dysplasia 03/14/2016    Anemia from plaquenil and imuran 03/14/2016    Body mass index 29.0-29.9, adult 07/15/2014    Thyroid dysfunction 07/15/2014    Chronic kidney disease, stage III (moderate) 07/15/2014     Essential hypertension 05/13/2014    Controlled type 2 diabetes mellitus with complication, without long-term current use of insulin 05/13/2014    Pure hypercholesterolemia 05/13/2014    MCTD (mixed connective tissue disease) 05/13/2014    Sjogren's disease 05/13/2014    Bilateral edema of lower extremity 05/13/2014    Glaucoma 05/13/2014       PAST MEDICAL HISTORY:  Past Medical History:   Diagnosis Date    Anemia     Arthritis     Cataract     Chronic constipation     Diabetes mellitus, type 2     Glaucoma     Hyperlipidemia 5/13/2014    Hypertension     MCTD (mixed connective tissue disease)     Sjogren's disease     Ulcerative colitis     Urolithiasis        PAST SURGICAL HISTORY:  Past Surgical History:   Procedure Laterality Date    COLONOSCOPY  2014    EYE SURGERY         SOCIAL HISTORY:  Social History     Socioeconomic History    Marital status:      Spouse name: Not on file    Number of children: 3    Years of education: Not on file    Highest education level: Not on file   Occupational History    Not on file   Social Needs    Financial resource strain: Somewhat hard    Food insecurity:     Worry: Never true     Inability: Never true    Transportation needs:     Medical: No     Non-medical: No   Tobacco Use    Smoking status: Never Smoker    Smokeless tobacco: Never Used   Substance and Sexual Activity    Alcohol use: No     Frequency: Never     Drinks per session: Patient refused     Binge frequency: Never    Drug use: No    Sexual activity: Never   Lifestyle    Physical activity:     Days per week: 3 days     Minutes per session: Not on file    Stress: Only a little   Relationships    Social connections:     Talks on phone: Once a week     Gets together: Once a week     Attends Ten[employer] service: Not on file     Active member of club or organization: No     Attends meetings of clubs or organizations: Never     Relationship status:    Other Topics Concern     Not on file   Social History Narrative    Not on file       ALLERGIES AND MEDICATIONS: updated and reviewed.  Review of patient's allergies indicates:   Allergen Reactions    Penicillins Nausea And Vomiting     Current Outpatient Medications   Medication Sig Dispense Refill    alendronate (FOSAMAX) 70 MG tablet Take 1 tablet (70 mg total) by mouth every 7 days. 4 tablet 11    allopurinol (ZYLOPRIM) 100 MG tablet Take 1 tablet (100 mg total) by mouth once daily. 90 tablet 1    amLODIPine (NORVASC) 5 MG tablet Take 5 mg by mouth once daily.  2    aspirin (ECOTRIN) 81 MG EC tablet Take 81 mg by mouth once daily.      benzonatate (TESSALON) 100 MG capsule Take 1 capsule (100 mg total) by mouth 3 (three) times daily as needed for Cough. 30 capsule 0    cloNIDine (CATAPRES) 0.1 MG tablet Take 0.1 mg by mouth 3 (three) times daily.  5    dorzolamide (TRUSOPT) 2 % ophthalmic solution       hydroxychloroquine (PLAQUENIL) 200 mg tablet Take 1 tablet (200 mg total) by mouth 2 (two) times daily. 60 tablet 6    insulin glargine (LANTUS) 100 unit/mL injection Inject 20 Units into the skin every evening. (Patient taking differently: Inject 20 Units into the skin every evening. Pt states 10 units every evening.) 6 vial 6    ipratropium (ATROVENT) 42 mcg (0.06 %) nasal spray 2 sprays by Nasal route 3 (three) times daily. AS NEEDED FOR NASAL CONGESTION AND DRIP for 7 days 30 mL 0    levothyroxine (TIROSINT) 25 mcg Cap Take 25 mcg by mouth.      linaclotide (LINZESS) 145 mcg Cap capsule Take 1 capsule (145 mcg total) by mouth once daily. 90 capsule 3    melatonin 5 mg Tab       multivit with min-folic acid 200 mcg Chew       NOVOLOG U-100 INSULIN ASPART 100 unit/mL injection       polyethylene glycol (GLYCOLAX) 17 gram/dose powder Take 17 g by mouth once daily. 850 g 11    torsemide (DEMADEX) 10 MG Tab Take 10 mg by mouth once daily.  3    TRADJENTA 5 mg Tab tablet       TRAVATAN Z 0.004 % Drop        "valsartan (DIOVAN) 160 MG tablet Take 1 tablet (160 mg total) by mouth once daily. 90 tablet 3    rosuvastatin (CRESTOR) 10 MG tablet Take 1 tablet (10 mg total) by mouth once daily. 90 tablet 3     No current facility-administered medications for this visit.        Review of Systems   Constitutional: Negative for chills and fever.       Objective:   Physical Exam   Vitals:    05/09/19 0805   BP: (!) 146/74   BP Location: Right arm   Patient Position: Sitting   BP Method: Medium (Manual)   Pulse: 60   Temp: 97.7 °F (36.5 °C)   TempSrc: Oral   SpO2: 99%   Weight: 73.5 kg (162 lb)   Height: 5' 5" (1.651 m)    Body mass index is 26.96 kg/m².  Weight: 73.5 kg (162 lb)   Height: 5' 5" (165.1 cm)     Physical Exam   Constitutional: He is oriented to person, place, and time. He appears well-developed and well-nourished. No distress.   HENT:   Head: Normocephalic and atraumatic.   Eyes: No scleral icterus.   Pulmonary/Chest: Effort normal.   Musculoskeletal:   See the photographs-the pad of his right hand middle finger distal phalanx is swollen, firm with subcutaneous purulence and fluctuance.  No proximal streaking or swelling   Neurological: He is alert and oriented to person, place, and time.   Skin: Skin is warm and dry.   Psychiatric: He has a normal mood and affect. His behavior is normal. Thought content normal.           Procedure note-incision and drainage-areas clean with rubbing alcohol and using a 15.  Scalpel blade a 5 mm incision was made in the superficial skin with return of significant purulent material, approximately 1 mL of purulent drainage returned, towards the end it was mixed with sanguinous fluid-I used the blunt end of a sterile Q-tip to explore the wound area and break up any loculations.  It did appear that there was still some significant amount of clot remaining in the finger pad but I was able to remove most of the purulent drainage.  "

## 2019-05-11 PROBLEM — L03.011 FELON OF FINGER OF RIGHT HAND: Status: ACTIVE | Noted: 2019-05-11

## 2019-05-11 NOTE — PROCEDURES
Digital block of the right long finger performed with 10 cc of plain lidocaine.  Once adequate anesthesia had been achieved, the wound was cleaned with chlorhexidine and reexplored with sterile hemostats.  About 2 cc of white granular material was expressed, consistent in appearance with a gout tophus.  There is no purulence found.  Upon exploration, the wound was copiously irrigated with sterile saline and sterile dressings were reapplied.   The patient tolerated procedure well

## 2019-05-12 ENCOUNTER — PATIENT MESSAGE (OUTPATIENT)
Dept: RHEUMATOLOGY | Facility: CLINIC | Age: 69
End: 2019-05-12

## 2019-05-13 ENCOUNTER — TELEPHONE (OUTPATIENT)
Dept: ADMINISTRATIVE | Facility: HOSPITAL | Age: 69
End: 2019-05-13

## 2019-05-13 ENCOUNTER — OFFICE VISIT (OUTPATIENT)
Dept: ORTHOPEDICS | Facility: CLINIC | Age: 69
End: 2019-05-13
Payer: MEDICARE

## 2019-05-13 VITALS
WEIGHT: 163.38 LBS | BODY MASS INDEX: 27.22 KG/M2 | HEIGHT: 65 IN | DIASTOLIC BLOOD PRESSURE: 52 MMHG | SYSTOLIC BLOOD PRESSURE: 128 MMHG | HEART RATE: 46 BPM

## 2019-05-13 DIAGNOSIS — L03.011 FELON OF FINGER OF RIGHT HAND: Primary | ICD-10-CM

## 2019-05-13 PROCEDURE — 3074F SYST BP LT 130 MM HG: CPT | Mod: CPTII,S$GLB,, | Performed by: ORTHOPAEDIC SURGERY

## 2019-05-13 PROCEDURE — 1101F PT FALLS ASSESS-DOCD LE1/YR: CPT | Mod: CPTII,S$GLB,, | Performed by: ORTHOPAEDIC SURGERY

## 2019-05-13 PROCEDURE — 1101F PR PT FALLS ASSESS DOC 0-1 FALLS W/OUT INJ PAST YR: ICD-10-PCS | Mod: CPTII,S$GLB,, | Performed by: ORTHOPAEDIC SURGERY

## 2019-05-13 PROCEDURE — 99999 PR PBB SHADOW E&M-EST. PATIENT-LVL IV: ICD-10-PCS | Mod: PBBFAC,,, | Performed by: ORTHOPAEDIC SURGERY

## 2019-05-13 PROCEDURE — 99999 PR PBB SHADOW E&M-EST. PATIENT-LVL IV: CPT | Mod: PBBFAC,,, | Performed by: ORTHOPAEDIC SURGERY

## 2019-05-13 PROCEDURE — 3078F DIAST BP <80 MM HG: CPT | Mod: CPTII,S$GLB,, | Performed by: ORTHOPAEDIC SURGERY

## 2019-05-13 PROCEDURE — 3078F PR MOST RECENT DIASTOLIC BLOOD PRESSURE < 80 MM HG: ICD-10-PCS | Mod: CPTII,S$GLB,, | Performed by: ORTHOPAEDIC SURGERY

## 2019-05-13 PROCEDURE — 99213 OFFICE O/P EST LOW 20 MIN: CPT | Mod: S$GLB,,, | Performed by: ORTHOPAEDIC SURGERY

## 2019-05-13 PROCEDURE — 3074F PR MOST RECENT SYSTOLIC BLOOD PRESSURE < 130 MM HG: ICD-10-PCS | Mod: CPTII,S$GLB,, | Performed by: ORTHOPAEDIC SURGERY

## 2019-05-13 PROCEDURE — 99213 PR OFFICE/OUTPT VISIT, EST, LEVL III, 20-29 MIN: ICD-10-PCS | Mod: S$GLB,,, | Performed by: ORTHOPAEDIC SURGERY

## 2019-05-13 NOTE — PROGRESS NOTES
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  He underwent an I&D in his primary care physician's clinic last week and was seen by me later that same day for revision I&D  But he is concerned about the appearance of the finger  No fevers or chills  He is still taking a total of 1 week of Bactrim and Colchrys prescribed by his PCP    ROS: unremarkable and no change since last visit    Physical Examination:    NAD  Right hand  Swelling and erythema over the distal aspect of the long finger have resolved  He is tender over the area of the incision but otherwise is not tender  There is serous drainage on his dressing, no purulence  Gout remains visible however there is healthy granulation tissue visible at the base of the incision   NV status: Unchanged    Radiographic imaging:  No new imaging    Assessment/Plan:  1. Right long finger romain, gout tophus    We discussed the etiology of persistent pain and further treatment options.  1. Continue Bactrim for total of 7 days  2. Continue Colchrys as prescribed by his PCP  3. I recommended follow-up in 1 week for a wound check however he and his wife will be in [place] for the next 2 weeks.  I will see him back whenever he returns to [facility].  I educated him and his wife on signs and symptoms of worsening infection instructed him to go to the ER in [place] if he experiences any of these changes    All questions were answered in detail. The patient  verbalized the understanding of the treatment plan and is in full agreement with the treatment plan.

## 2019-05-14 ENCOUNTER — TELEPHONE (OUTPATIENT)
Dept: FAMILY MEDICINE | Facility: CLINIC | Age: 69
End: 2019-05-14

## 2019-05-14 NOTE — TELEPHONE ENCOUNTER
Left a message to contact the clinic regarding a referral to Endocrinology and Cardiology. Letter mailed

## 2019-05-14 NOTE — LETTER
May 14, 2019    Darrion Bennett  [address]  [facility] [address]             [facility]  [address] [address]  Phone: [phone]  Fax: [phone] Dear Mr. Bennett:    I have been unable to reach you by phone for your appointment to Cardiology and Endocrinology.  Please call me at the clinic [phone] to book your appointment.      If you have any questions or concerns, please don't hesitate to call.    Sincerely,        Delaney Miller MA

## 2019-05-17 ENCOUNTER — PATIENT MESSAGE (OUTPATIENT)
Dept: FAMILY MEDICINE | Facility: CLINIC | Age: 69
End: 2019-05-17

## 2019-05-17 DIAGNOSIS — K59.09 CHRONIC CONSTIPATION: Primary | ICD-10-CM

## 2019-05-20 RX ORDER — LUBIPROSTONE 8 UG/1
8 CAPSULE ORAL 2 TIMES DAILY WITH MEALS
Qty: 60 CAPSULE | Refills: 5 | Status: SHIPPED | OUTPATIENT
Start: 2019-05-20 | End: 2019-06-12

## 2019-05-21 ENCOUNTER — OFFICE VISIT (OUTPATIENT)
Dept: CARDIOLOGY | Facility: CLINIC | Age: 69
End: 2019-05-21
Payer: MEDICARE

## 2019-05-21 ENCOUNTER — TELEPHONE (OUTPATIENT)
Dept: FAMILY MEDICINE | Facility: CLINIC | Age: 69
End: 2019-05-21

## 2019-05-21 VITALS
OXYGEN SATURATION: 100 % | HEIGHT: 66 IN | WEIGHT: 158.75 LBS | DIASTOLIC BLOOD PRESSURE: 66 MMHG | HEART RATE: 59 BPM | BODY MASS INDEX: 25.51 KG/M2 | SYSTOLIC BLOOD PRESSURE: 152 MMHG

## 2019-05-21 DIAGNOSIS — I10 HYPERTENSION, UNSPECIFIED TYPE: ICD-10-CM

## 2019-05-21 DIAGNOSIS — I10 HYPERTENSION: ICD-10-CM

## 2019-05-21 DIAGNOSIS — I10 ESSENTIAL HYPERTENSION: ICD-10-CM

## 2019-05-21 DIAGNOSIS — I73.9 CLAUDICATION: Primary | ICD-10-CM

## 2019-05-21 DIAGNOSIS — N18.30 CHRONIC KIDNEY DISEASE, STAGE III (MODERATE): ICD-10-CM

## 2019-05-21 DIAGNOSIS — I50.22 CHRONIC SYSTOLIC CONGESTIVE HEART FAILURE: ICD-10-CM

## 2019-05-21 DIAGNOSIS — E78.00 PURE HYPERCHOLESTEROLEMIA: ICD-10-CM

## 2019-05-21 PROCEDURE — 3077F PR MOST RECENT SYSTOLIC BLOOD PRESSURE >= 140 MM HG: ICD-10-PCS | Mod: CPTII,S$GLB,, | Performed by: INTERNAL MEDICINE

## 2019-05-21 PROCEDURE — 3078F PR MOST RECENT DIASTOLIC BLOOD PRESSURE < 80 MM HG: ICD-10-PCS | Mod: CPTII,S$GLB,, | Performed by: INTERNAL MEDICINE

## 2019-05-21 PROCEDURE — 93000 ELECTROCARDIOGRAM COMPLETE: CPT | Mod: S$GLB,,, | Performed by: INTERNAL MEDICINE

## 2019-05-21 PROCEDURE — 99204 OFFICE O/P NEW MOD 45 MIN: CPT | Mod: S$GLB,,, | Performed by: INTERNAL MEDICINE

## 2019-05-21 PROCEDURE — 93000 EKG 12-LEAD: ICD-10-PCS | Mod: S$GLB,,, | Performed by: INTERNAL MEDICINE

## 2019-05-21 PROCEDURE — 99204 PR OFFICE/OUTPT VISIT, NEW, LEVL IV, 45-59 MIN: ICD-10-PCS | Mod: S$GLB,,, | Performed by: INTERNAL MEDICINE

## 2019-05-21 PROCEDURE — 3077F SYST BP >= 140 MM HG: CPT | Mod: CPTII,S$GLB,, | Performed by: INTERNAL MEDICINE

## 2019-05-21 PROCEDURE — 1101F PR PT FALLS ASSESS DOC 0-1 FALLS W/OUT INJ PAST YR: ICD-10-PCS | Mod: CPTII,S$GLB,, | Performed by: INTERNAL MEDICINE

## 2019-05-21 PROCEDURE — 3078F DIAST BP <80 MM HG: CPT | Mod: CPTII,S$GLB,, | Performed by: INTERNAL MEDICINE

## 2019-05-21 PROCEDURE — 99999 PR PBB SHADOW E&M-EST. PATIENT-LVL IV: ICD-10-PCS | Mod: PBBFAC,,, | Performed by: INTERNAL MEDICINE

## 2019-05-21 PROCEDURE — 99999 PR PBB SHADOW E&M-EST. PATIENT-LVL IV: CPT | Mod: PBBFAC,,, | Performed by: INTERNAL MEDICINE

## 2019-05-21 PROCEDURE — 1101F PT FALLS ASSESS-DOCD LE1/YR: CPT | Mod: CPTII,S$GLB,, | Performed by: INTERNAL MEDICINE

## 2019-05-21 RX ORDER — AMLODIPINE BESYLATE 10 MG/1
10 TABLET ORAL DAILY
Qty: 90 TABLET | Refills: 3 | Status: SHIPPED | OUTPATIENT
Start: 2019-05-21 | End: 2019-07-10 | Stop reason: SDUPTHER

## 2019-05-21 NOTE — LETTER
May 21, 2019      Farooq Domingo MD  [address]  [place] [address]           [facility]  [address] [facility] [address]  [place] [address]  Phone: [phone]          Patient: Darrion Bennett   MR Number: [id number]   YOB: 1950   Date of Visit: 5/21/2019       Dear Dr. Farooq Domingo:    Thank you for referring Darrion Bennett to me for evaluation. Attached you will find relevant portions of my assessment and plan of care.    If you have questions, please do not hesitate to call me. I look forward to following Darrion Bennett along with you.    Sincerely,    Greg Alvares MD    Enclosure  CC:  No Recipients    If you would like to receive this communication electronically, please contact [email or web address] or [phone] to request more information on [employer] access.    For providers and/or their staff who would like to refer a patient to [facility], please contact us through our one-stop-shop provider referral line, [facility], at [phone].    If you feel you have received this communication in error or would no longer like to receive these types of communications, please e-mail [email or web address]

## 2019-05-21 NOTE — PROGRESS NOTES
CARDIOVASCULAR CONSULTATION    REASON FOR CONSULT:   Darrion Bennett is a 69 y.o. male who presents for establishing care with Cardiology.      HISTORY OF PRESENT ILLNESS:     Patient is a very pleasant 69-year-old man with a past medical history significant for hypertension, diabetes, Sjogren's disease who wants to establish care with [facility].  Patient has been followed with [facility].  Patient states that in 2014 he was diagnosed with heart failure.  He states that he had passed out and had gone to the hospital, he was feeling short of breath also and at that point he was told that he had heart failure.  I can see an echocardiogram from his outside medical records in 2014 which showed normal left ventricular systolic function.  He also had a stress echocardiogram done in 2013 which did not reveal any ischemia.  He denies any chest pains at rest on exertion, orthopnea, PND, swelling of feet.  States that since then he has been okay and walks about 2-3 blocks every day.  On walking about 2-3 blocks he does experience some tightness in his left calf.  This goes away after rest.  He denies any resting calf tightness.  He denies any ulcer on his feet.  He states that he checks his blood pressure at home and has found that it is elevated around 160-170 mm of systolic multiple times.    PAST MEDICAL HISTORY:     Past Medical History:   Diagnosis Date    Anemia     Arthritis     Cataract     Chronic constipation     Diabetes mellitus, type 2     Glaucoma     Hyperlipidemia 5/13/2014    Hypertension     MCTD (mixed connective tissue disease)     Sjogren's disease     Ulcerative colitis     Urolithiasis        PAST SURGICAL HISTORY:     Past Surgical History:   Procedure Laterality Date    COLONOSCOPY  2014    EYE SURGERY         ALLERGIES AND MEDICATION:     Review of patient's allergies indicates:   Allergen Reactions    Penicillins Nausea And Vomiting        Medication List            Accurate as of 5/21/19  8:19 AM. If you have any questions, ask your nurse or doctor.               CHANGE how you take these medications    insulin glargine 100 unit/mL injection  Commonly known as:  LANTUS U-100 INSULIN  Inject 20 Units into the skin every evening.  What changed:  additional instructions        CONTINUE taking these medications    alendronate 70 MG tablet  Commonly known as:  FOSAMAX  Take 1 tablet (70 mg total) by mouth every 7 days.     allopurinol 100 MG tablet  Commonly known as:  ZYLOPRIM  Take 1 tablet (100 mg total) by mouth once daily.     amLODIPine 5 MG tablet  Commonly known as:  NORVASC     aspirin 81 MG EC tablet  Commonly known as:  ECOTRIN     cloNIDine 0.1 MG tablet  Commonly known as:  CATAPRES     colchicine 0.6 mg tablet  Commonly known as:  COLCRYS  Take 1 tablet (0.6 mg total) by mouth once daily. for 7 days     dorzolamide 2 % ophthalmic solution  Commonly known as:  TRUSOPT     HYDROcodone-acetaminophen 5-325 mg per tablet  Commonly known as:  NORCO  Take 1 tablet by mouth every 12 (twelve) hours as needed for Pain.     hydroxychloroquine 200 mg tablet  Commonly known as:  PLAQUENIL  Take 1 tablet (200 mg total) by mouth 2 (two) times daily.     levothyroxine 25 mcg Cap  Commonly known as:  TIROSINT     lubiprostone 8 MCG Cap  Commonly known as:  AMITIZA  Take 1 capsule (8 mcg total) by mouth 2 (two) times daily with meals.     melatonin 5 mg Tab     multivit with min-folic acid 200 mcg Chew     NovoLOG U-100 Insulin aspart 100 unit/mL injection  Generic drug:  insulin aspart U-100     polyethylene glycol 17 gram/dose powder  Commonly known as:  GLYCOLAX  Take 17 g by mouth once daily.     rosuvastatin 10 MG tablet  Commonly known as:  CRESTOR  Take 1 tablet (10 mg total) by mouth once daily.     torsemide 10 MG Tab  Commonly known as:  DEMADEX     TRADJENTA 5 mg Tab tablet  Generic drug:  linagliptin     TRAVATAN Z 0.004 % ophthalmic solution  Generic drug:  travoprost      valsartan 160 MG tablet  Commonly known as:  DIOVAN  Take 1 tablet (160 mg total) by mouth 2 (two) times daily.            SOCIAL HISTORY:     Social History     Socioeconomic History    Marital status:      Spouse name: Not on file    Number of children: 3    Years of education: Not on file    Highest education level: Not on file   Occupational History    Not on file   Social Needs    Financial resource strain: Somewhat hard    Food insecurity:     Worry: Never true     Inability: Never true    Transportation needs:     Medical: No     Non-medical: No   Tobacco Use    Smoking status: Never Smoker    Smokeless tobacco: Never Used   Substance and Sexual Activity    Alcohol use: No     Frequency: Never     Drinks per session: Patient refused     Binge frequency: Never    Drug use: No    Sexual activity: Never   Lifestyle    Physical activity:     Days per week: 3 days     Minutes per session: Not on file    Stress: Only a little   Relationships    Social connections:     Talks on phone: Once a week     Gets together: Once a week     Attends Orthodoxy service: Not on file     Active member of club or organization: No     Attends meetings of clubs or organizations: Never     Relationship status:    Other Topics Concern    Not on file   Social History Narrative    Not on file       FAMILY HISTORY:     Family History   Problem Relation Age of Onset    Hypertension Father     Stroke Father     Rheum arthritis Maternal Aunt     Rheum arthritis Maternal Uncle     Throat cancer Paternal Grandmother     Celiac disease Neg Hx     Colon cancer Neg Hx     Cirrhosis Neg Hx     Colon polyps Neg Hx     Crohn's disease Neg Hx     Cystic fibrosis Neg Hx     Hemochromatosis Neg Hx     Esophageal cancer Neg Hx     Inflammatory bowel disease Neg Hx     Irritable bowel syndrome Neg Hx     Liver cancer Neg Hx     Liver disease Neg Hx     Rectal cancer Neg Hx     Stomach cancer Neg Hx   "   Ulcerative colitis Neg Hx     Chase's disease Neg Hx        REVIEW OF SYSTEMS:   Review of Systems   Constitutional: Negative.    HENT: Negative.    Eyes: Negative.    Respiratory: Negative.    Cardiovascular: Positive for claudication.   Gastrointestinal: Negative.    Genitourinary: Negative.    Musculoskeletal: Negative.    Skin: Negative.    Neurological: Negative.    Endo/Heme/Allergies: Negative.    Psychiatric/Behavioral: Negative.    All other systems reviewed and are negative.      A 10 point review of systems was performed and all the pertinent positives have been mentioned. Rest of review of systems was negative.        PHYSICAL EXAM:     Vitals:    05/21/19 0816   BP: (!) 152/66   Pulse: (!) 59    Body mass index is 26.01 kg/m².  Weight: 72 kg (158 lb 11.7 oz)   Height: 5' 5.5" (166.4 cm)     Physical Exam   Constitutional: He is oriented to person, place, and time. He appears well-developed and well-nourished. He is active.   HENT:   Head: Normocephalic and atraumatic.   Right Ear: Hearing normal.   Left Ear: Hearing normal.   Nose: Nose normal.   Mouth/Throat: Mucous membranes are normal.   Eyes: Pupils are equal, round, and reactive to light. Conjunctivae and lids are normal.   Neck: Normal range of motion. Neck supple.   Cardiovascular: Normal rate, regular rhythm, normal heart sounds, intact distal pulses and normal pulses.   Pulmonary/Chest: Effort normal and breath sounds normal.   Abdominal: Soft. Normal appearance. There is no tenderness.   Musculoskeletal: He exhibits no edema or deformity.   Neurological: He is alert and oriented to person, place, and time.   Skin: Skin is warm, dry and intact.   Psychiatric: He has a normal mood and affect. His speech is normal.   Nursing note and vitals reviewed.        DATA:     Laboratory:  CBC:  Recent Labs   Lab 02/20/19  0959 02/26/19  1514 03/11/19  0854   WHITE BLOOD CELL COUNT 0.99 LL 1.98 LL 5.91   HEMOGLOBIN 8.3 L 8.4 L 8.5 L   HEMATOCRIT 25.0 " L 23.7 L 26.7 L   PLATELETS 84 L 231 330       CHEMISTRIES:  Recent Labs   Lab 01/08/19  0738 02/19/19  0907 02/20/19  0959   GLUCOSE 128 H 139 H 105   SODIUM 135 L 142 139   POTASSIUM 4.2 4.3 4.4   BUN BLD 29 H 32 H 24 H   CREATININE 1.3 1.6 H 1.5 H   EGFR IF  >60.0 50.1 A 54.1 A   EGFR IF NON- 56.1 A 43.3 A 46.8 A   CALCIUM 9.2 9.9 10.0       CARDIAC BIOMARKERS:        COAGS:        LIPIDS/LFTS:  Recent Labs   Lab 10/16/18 01/08/19  0738 02/19/19  0907 02/20/19  0959   TRIGLYCERIDES 53  --   --   --    HDL 61  --   --   --    LDL CHOLESTEROL 59  --   --   --    NON-HDL CHOLESTEROL 72  --   --   --    AST  --  14 20 22   ALT  --  15 22 22       Hemoglobin A1C   Date Value Ref Range Status   10/16/2018 6.2 (H) 4.0 - 5.7 % Final     Comment:     Dr. Jurgen Ward ( Endocrinology )   05/30/2014 7.9 (H) 4.8 - 5.6 % Final     Comment:              Increased risk for diabetes: 5.7 - 6.4           Diabetes: >6.4           Glycemic control for adults with diabetes: <7.0         TSH        The ASCVD Risk score (Meloluis carlos POOLE Jr., et al., 2013) failed to calculate for the following reasons:    Cannot find a previous total cholesterol lab           Cardiovascular Testing:    EKG: (personally reviewed tracing)  Sinus bradycardia with first-degree AV block left anterior fascicle, septal infarct.      2D echocardiogram 2014 done [facility]    HEIGHT: 167.6 cm  WEIGHT: 74.8 kg  BP: 157/82  BSA:  MEASUREMENTS  ------------  IVSd: 1.2 cm  LVIDd: 3.8 cm  LVPWd: 1.2 cm  LVIDs: 2.6 cm  LA Diam: 3.2 cm  Ao Diam: 3.1 cm  LAESV Index (A-L): 32.40 ml/m²  LVCO Dopp: 6.98 l/min  LVCI Dopp: 3.79 l/minm²    FINDINGS  -------  CPT CODE:40886-79.  Transthoracic echocardiogram (complete).  The attending physician   listed herein personally reviewed all stored images and directly supervised the   fellow-in-training (if listed) in the study's acquistion and/or report   generation.     Study priority:  Routine.  ICD-9  Indication(s):786.05 - Dyspnea.  Study Quality:The study was technically adequate.  ECG Rhythm:Sinus rhythm.  CHAMBER SIZE:All cardiac chamber sizes are within normal limits.  AORTA:Normal aortic root and proximal ascending aorta.  VALVULAR STRUCTURES:The visualized cardiac valves are structurally normal.  LEFT/RIGHT VENTRICULAR FUNCTION:LV size, wall thickness and systolic function are normal, with an EF > 55%.       The right ventricle is normal in size and function.  DOPPLER:  Color:No valvular insufficiencies.  DIASTOLOGY:The diastolic filling pattern is normal for the age of the patient.  PERICARDIUM / EFFUSIONS:No effusions noted.  INFERIOR VENA CAVA:Normal size inferior vena cava.  PA PRESSURE:The estimated systolic pulmonary artery pressure is normal at < 35 mm Hg (RA   pressure = 3 mm Hg).  CARDIOLOGY:    Electronically signed:  STAFF:ELIAS JAIME M.D.  Fellow:G. MOHSEN, M.D.    CONCLUSIONS  -----------  1. LV size, wall thickness and systolic function are normal, with an EF > 55%.  2. The diastolic filling pattern is normal for the age of the patient.    ASSESSMENT AND PLAN     Patient Active Problem List   Diagnosis    Essential hypertension    Controlled type 2 diabetes mellitus with complication, without long-term current use of insulin    Pure hypercholesterolemia    MCTD (mixed connective tissue disease)    Sjogren's disease    Bilateral edema of lower extremity    Glaucoma    Body mass index 29.0-29.9, adult    Thyroid dysfunction    Chronic kidney disease, stage III (moderate)    Weight gain    Jackson's esophagus without dysplasia    Anemia from plaquenil and imuran    Neutropenia    Thrombocytopenia    Connective tissue disorder    Current chronic use of systemic steroids    Compression fracture of body of thoracic vertebra on XR 2/2019; 2/2019 alendronate    Anemia of chronic renal failure, stage 3 (moderate)    Chronic constipation not responding to linzess, miralax, senna     Chronic systolic congestive heart failure though TTE 2014 normal    Screening for colon cancer 07/26/2018 colonoscopy transverse colon polyp path showing benign inflammatory polyp.    Tophaceous gout    Felon of finger of right hand       1.  Patient states he was diagnosed with congestive heart failure in the past.  Currently he is euvolemic.  His last echocardiogram in 2014 demonstrated normal left ventricular systolic function.  Not on long-acting beta blockers because of bradycardia.  I will recheck a 2D echocardiogram on the patient    2.  Hypertension:  Uncontrolled I will increase his Norvasc to 10 mg daily.    3.  Dyslipidemia:  On rosuvastatin.     4.  Claudication check rest and stress ABIs.      Follow-up in 1 month            Thank you very much for involving me in the care of your patient.  Please do not hesitate to contact me if there are any questions.      Greg Alvares MD, FACC, [facility]  Interventional Cardiologist, [facility].           This note was dictated with the help of speech recognition software.  There might be un-intended errors and/or substitutions.

## 2019-05-30 ENCOUNTER — OFFICE VISIT (OUTPATIENT)
Dept: ORTHOPEDICS | Facility: CLINIC | Age: 69
End: 2019-05-30
Payer: MEDICARE

## 2019-05-30 VITALS
HEIGHT: 65 IN | BODY MASS INDEX: 26.74 KG/M2 | HEART RATE: 85 BPM | DIASTOLIC BLOOD PRESSURE: 70 MMHG | WEIGHT: 160.5 LBS | SYSTOLIC BLOOD PRESSURE: 140 MMHG

## 2019-05-30 DIAGNOSIS — L03.011 FELON OF FINGER OF RIGHT HAND: Primary | ICD-10-CM

## 2019-05-30 PROCEDURE — 3077F PR MOST RECENT SYSTOLIC BLOOD PRESSURE >= 140 MM HG: ICD-10-PCS | Mod: CPTII,S$GLB,, | Performed by: ORTHOPAEDIC SURGERY

## 2019-05-30 PROCEDURE — 99213 OFFICE O/P EST LOW 20 MIN: CPT | Mod: S$GLB,,, | Performed by: ORTHOPAEDIC SURGERY

## 2019-05-30 PROCEDURE — 99999 PR PBB SHADOW E&M-EST. PATIENT-LVL IV: CPT | Mod: PBBFAC,,, | Performed by: ORTHOPAEDIC SURGERY

## 2019-05-30 PROCEDURE — 3078F PR MOST RECENT DIASTOLIC BLOOD PRESSURE < 80 MM HG: ICD-10-PCS | Mod: CPTII,S$GLB,, | Performed by: ORTHOPAEDIC SURGERY

## 2019-05-30 PROCEDURE — 99213 PR OFFICE/OUTPT VISIT, EST, LEVL III, 20-29 MIN: ICD-10-PCS | Mod: S$GLB,,, | Performed by: ORTHOPAEDIC SURGERY

## 2019-05-30 PROCEDURE — 3077F SYST BP >= 140 MM HG: CPT | Mod: CPTII,S$GLB,, | Performed by: ORTHOPAEDIC SURGERY

## 2019-05-30 PROCEDURE — 99999 PR PBB SHADOW E&M-EST. PATIENT-LVL IV: ICD-10-PCS | Mod: PBBFAC,,, | Performed by: ORTHOPAEDIC SURGERY

## 2019-05-30 PROCEDURE — 1101F PT FALLS ASSESS-DOCD LE1/YR: CPT | Mod: CPTII,S$GLB,, | Performed by: ORTHOPAEDIC SURGERY

## 2019-05-30 PROCEDURE — 3078F DIAST BP <80 MM HG: CPT | Mod: CPTII,S$GLB,, | Performed by: ORTHOPAEDIC SURGERY

## 2019-05-30 PROCEDURE — 1101F PR PT FALLS ASSESS DOC 0-1 FALLS W/OUT INJ PAST YR: ICD-10-PCS | Mod: CPTII,S$GLB,, | Performed by: ORTHOPAEDIC SURGERY

## 2019-05-30 NOTE — PROGRESS NOTES
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 He no longer has pain. Swelling has improved but the wound has not completely healed yet     ROS: unremarkable and no change since last visit    Physical Examination:    NAD  Right long finger  AROM: MCP 0-90, PIP 0-70, DIP 0-20  Minimal swelling over tip of finger  Wound with good granulation tissue at base, healing by secondary intention  No sign of active infection   NV status: Unchanged    Radiographic imaging: no new imaging     Assessment/Plan:  1. Gout tophus right long finger  2. Felon right long finger      Patient is improving with conservative management.   1. Oral antiinflammatory agents (Aleve/Advil/Tylenol-OTC, prn if tolerated)     2. Instructed in ROM of the digit.  3. RCT 2 weeks     All questions were answered in detail. The patient  verbalized the understanding of the treatment plan and is in full agreement with the treatment plan.

## 2019-05-31 ENCOUNTER — OFFICE VISIT (OUTPATIENT)
Dept: FAMILY MEDICINE | Facility: CLINIC | Age: 69
End: 2019-05-31
Payer: MEDICARE

## 2019-05-31 VITALS
HEART RATE: 77 BPM | HEIGHT: 65 IN | WEIGHT: 158 LBS | OXYGEN SATURATION: 99 % | SYSTOLIC BLOOD PRESSURE: 150 MMHG | TEMPERATURE: 98 F | BODY MASS INDEX: 26.33 KG/M2 | DIASTOLIC BLOOD PRESSURE: 78 MMHG

## 2019-05-31 DIAGNOSIS — L50.9 URTICARIA: Primary | ICD-10-CM

## 2019-05-31 DIAGNOSIS — I10 ESSENTIAL HYPERTENSION: ICD-10-CM

## 2019-05-31 DIAGNOSIS — I50.22 CHRONIC SYSTOLIC CONGESTIVE HEART FAILURE: ICD-10-CM

## 2019-05-31 PROCEDURE — 3077F PR MOST RECENT SYSTOLIC BLOOD PRESSURE >= 140 MM HG: ICD-10-PCS | Mod: CPTII,S$GLB,, | Performed by: INTERNAL MEDICINE

## 2019-05-31 PROCEDURE — 3078F PR MOST RECENT DIASTOLIC BLOOD PRESSURE < 80 MM HG: ICD-10-PCS | Mod: CPTII,S$GLB,, | Performed by: INTERNAL MEDICINE

## 2019-05-31 PROCEDURE — 99999 PR PBB SHADOW E&M-EST. PATIENT-LVL III: ICD-10-PCS | Mod: PBBFAC,,, | Performed by: INTERNAL MEDICINE

## 2019-05-31 PROCEDURE — 3078F DIAST BP <80 MM HG: CPT | Mod: CPTII,S$GLB,, | Performed by: INTERNAL MEDICINE

## 2019-05-31 PROCEDURE — 99214 PR OFFICE/OUTPT VISIT, EST, LEVL IV, 30-39 MIN: ICD-10-PCS | Mod: S$GLB,,, | Performed by: INTERNAL MEDICINE

## 2019-05-31 PROCEDURE — 99999 PR PBB SHADOW E&M-EST. PATIENT-LVL III: CPT | Mod: PBBFAC,,, | Performed by: INTERNAL MEDICINE

## 2019-05-31 PROCEDURE — 1101F PR PT FALLS ASSESS DOC 0-1 FALLS W/OUT INJ PAST YR: ICD-10-PCS | Mod: CPTII,S$GLB,, | Performed by: INTERNAL MEDICINE

## 2019-05-31 PROCEDURE — 3077F SYST BP >= 140 MM HG: CPT | Mod: CPTII,S$GLB,, | Performed by: INTERNAL MEDICINE

## 2019-05-31 PROCEDURE — 1101F PT FALLS ASSESS-DOCD LE1/YR: CPT | Mod: CPTII,S$GLB,, | Performed by: INTERNAL MEDICINE

## 2019-05-31 PROCEDURE — 99214 OFFICE O/P EST MOD 30 MIN: CPT | Mod: S$GLB,,, | Performed by: INTERNAL MEDICINE

## 2019-05-31 RX ORDER — LEVOCETIRIZINE DIHYDROCHLORIDE 5 MG/1
5 TABLET, FILM COATED ORAL NIGHTLY
Qty: 30 TABLET | Refills: 0 | Status: SHIPPED | OUTPATIENT
Start: 2019-05-31 | End: 2019-06-22 | Stop reason: SDUPTHER

## 2019-05-31 RX ORDER — CLONIDINE HYDROCHLORIDE 0.1 MG/1
0.1 TABLET ORAL
COMMUNITY
Start: 2019-05-29 | End: 2019-05-31 | Stop reason: SDUPTHER

## 2019-05-31 RX ORDER — CLONIDINE 0.1 MG/24H
1 PATCH, EXTENDED RELEASE TRANSDERMAL
Qty: 4 PATCH | Refills: 11 | Status: SHIPPED | OUTPATIENT
Start: 2019-05-31 | End: 2019-08-02 | Stop reason: SDUPTHER

## 2019-05-31 NOTE — PROGRESS NOTES
This note was created by combination of typed  and Dragon dictation.  Transcription errors may be present.  If there are any questions, please contact me.    Assessment & Plan:   Urticaria  -generalized, for the past week or so.  Relatively recently started on Crestor as well as allopurinol.  Stop both of those.  Xyzal for itching.  When the itching stops, rechallenge 1 at a time-hyper further Crestor.  Restart that and take it monotherapy for 2 weeks.  If no itching rechallenge on allopurinol and if itching resumes, would stop allopurinol.  Alternative maybe Uloric.  History of atorvastatin with myalgias.  No rash.  Denies any new environmental changes.  If urticaria does not resolve would have him see allergist.  -     levocetirizine (XYZAL) 5 MG tablet; Take 1 tablet (5 mg total) by mouth every evening.  Dispense: 30 tablet; Refill: 0    Essential hypertension  Chronic systolic congestive heart failure though TTE 2014 normal  -not controlled.  He has only been taking valsartan 160 daily.  He tells me that previously he was on 160 b.i.d..  Even though I sent in new prescription for b.i.d. he is still only taking it once daily.  Increase it to twice a day.  He also reports a history of clonidine patch and he is taking the pills t.i.d..  He found the patch more convenience I will change to the patch.  Has room to increase needed.  He is taking amlodipine 10, stay on that dose.  -     cloNIDine 0.1 mg/24 hr td ptwk (CATAPRES) 0.1 mg/24 hr; Place 1 patch onto the skin every 7 days.  Dispense: 4 patch; Refill: 11        Medications Discontinued During This Encounter   Medication Reason    cloNIDine (CATAPRES) 0.1 MG tablet Duplicate Order    cloNIDine (CATAPRES) 0.1 MG tablet Therapy completed       meds sent this encounter:  Medications Ordered This Encounter   Medications    cloNIDine 0.1 mg/24 hr td ptwk (CATAPRES) 0.1 mg/24 hr     Sig: Place 1 patch onto the skin every 7 days.     Dispense:  4 patch      Refill:  11    levocetirizine (XYZAL) 5 MG tablet     Sig: Take 1 tablet (5 mg total) by mouth every evening.     Dispense:  30 tablet     Refill:  0       Follow Up: No follow-ups on file.  Follow-up with me 1 month.  Follow-up on BP check.  Would probably repeat BMP and A1c at that time.    Subjective:     Chief Complaint   Patient presents with    Itching    Gout    Constipation       VANESSA Maier is a 69 y.o. male, last appointment with this clinic was 5/9/2019.    Last seen for what I thought was cellulitis/abscess, but was gout tophus    10/16/2018 lipid profile good  A1c 6.2    Finished the abx > 10 days now. Abdirahman is healing.     But in the past week itching on hands and legs. And on trunk and chest.  Seems to be anywhere he can reach. No fever no chills.  No new diarrhea.  No dyspnea.  No fevers no chills.  He is off of his levothyroxine.  He is not taking Plaquenil either.    Had not been on allopurinol, though had previously been rx'd.  Restarted with the gouty tophus.      His amitiza was approved; has not started that yet.    And started on statin. crestor earlier this month.  Hx of atorvastatin with SE. Aching.     No new foods; no new lotions or soaps or detergents.      BP still high.  Has been taking the valsartan 160, once daily; he had previously been taking it 160 BID.  I sent in the new rx for 160 BID but he is still taking once daily    Cardiology increased amlodipine from 5 to 10. So he is taking 10 mg of that.     Hx of clonidine patch and now the pill.     Patient Care Team:  Farooq Domingo MD as PCP - General (Internal Medicine)  Tin Chamebrs MD (Gastroenterology)  Harrison Brannon MD (Cardiology)  Roxanna Salazar MD as Nurse Practitioner (Rheumatology)  Malcolm Irwin MD as  (Nephrology)    Patient Active Problem List    Diagnosis Date Noted    Felon of finger of right hand 05/11/2019    Tophaceous gout 05/09/2019    Chronic constipation not responding  to linzess, miralax, senna 05/02/2019    Chronic systolic congestive heart failure though TTE 2014 normal 05/02/2019    Screening for colon cancer 07/26/2018 colonoscopy transverse colon polyp path showing benign inflammatory polyp. 05/02/2019    Anemia of chronic renal failure, stage 3 (moderate) 03/13/2019    Current chronic use of systemic steroids 02/27/2019    Compression fracture of body of thoracic vertebra on XR 2/2019; 2/2019 alendronate 02/27/2019    Neutropenia 02/26/2019    Thrombocytopenia 02/26/2019    Connective tissue disorder 02/26/2019    Weight gain 03/14/2016    Jackson's esophagus without dysplasia 03/14/2016    Anemia from plaquenil and imuran 03/14/2016    Body mass index 29.0-29.9, adult 07/15/2014    Thyroid dysfunction 07/15/2014    Chronic kidney disease, stage III (moderate) 07/15/2014    Essential hypertension 05/13/2014    Controlled type 2 diabetes mellitus with complication, without long-term current use of insulin 05/13/2014    Pure hypercholesterolemia 05/13/2014    MCTD (mixed connective tissue disease) 05/13/2014    Sjogren's disease 05/13/2014    Bilateral edema of lower extremity 05/13/2014    Glaucoma 05/13/2014       PAST MEDICAL HISTORY:  Past Medical History:   Diagnosis Date    Anemia     Arthritis     Cataract     Chronic constipation     Diabetes mellitus, type 2     Glaucoma     Hyperlipidemia 5/13/2014    Hypertension     MCTD (mixed connective tissue disease)     Sjogren's disease     Ulcerative colitis     Urolithiasis        PAST SURGICAL HISTORY:  Past Surgical History:   Procedure Laterality Date    COLONOSCOPY  2014    EYE SURGERY         SOCIAL HISTORY:  Social History     Socioeconomic History    Marital status:      Spouse name: Not on file    Number of children: 3    Years of education: Not on file    Highest education level: Not on file   Occupational History    Not on file   Social Needs    Financial resource  strain: Somewhat hard    Food insecurity:     Worry: Never true     Inability: Never true    Transportation needs:     Medical: No     Non-medical: No   Tobacco Use    Smoking status: Never Smoker    Smokeless tobacco: Never Used   Substance and Sexual Activity    Alcohol use: No     Frequency: Never     Drinks per session: Patient refused     Binge frequency: Never    Drug use: No    Sexual activity: Never   Lifestyle    Physical activity:     Days per week: 3 days     Minutes per session: Not on file    Stress: Only a little   Relationships    Social connections:     Talks on phone: Once a week     Gets together: Once a week     Attends Quaker service: Not on file     Active member of club or organization: No     Attends meetings of clubs or organizations: Never     Relationship status:    Other Topics Concern    Not on file   Social History Narrative    Not on file       ALLERGIES AND MEDICATIONS: updated and reviewed.  Review of patient's allergies indicates:   Allergen Reactions    Penicillins Nausea And Vomiting     Current Outpatient Medications   Medication Sig Dispense Refill    alendronate (FOSAMAX) 70 MG tablet Take 1 tablet (70 mg total) by mouth every 7 days. 4 tablet 11    allopurinol (ZYLOPRIM) 100 MG tablet Take 1 tablet (100 mg total) by mouth once daily. 90 tablet 1    amLODIPine (NORVASC) 10 MG tablet Take 1 tablet (10 mg total) by mouth once daily. 90 tablet 3    aspirin (ECOTRIN) 81 MG EC tablet Take 81 mg by mouth once daily.      cloNIDine (CATAPRES) 0.1 MG tablet Take 0.1 mg by mouth 3 (three) times daily.  5    dorzolamide (TRUSOPT) 2 % ophthalmic solution       HYDROcodone-acetaminophen (NORCO) 5-325 mg per tablet Take 1 tablet by mouth every 12 (twelve) hours as needed for Pain. 14 tablet 0    hydroxychloroquine (PLAQUENIL) 200 mg tablet Take 1 tablet (200 mg total) by mouth 2 (two) times daily. 60 tablet 6    insulin glargine (LANTUS) 100 unit/mL injection  "Inject 20 Units into the skin every evening. (Patient taking differently: Inject 20 Units into the skin every evening. Pt states 10 units every evening.) 6 vial 6    levothyroxine (TIROSINT) 25 mcg Cap Take 25 mcg by mouth.      lubiprostone (AMITIZA) 8 MCG Cap Take 1 capsule (8 mcg total) by mouth 2 (two) times daily with meals. 60 capsule 5    melatonin 5 mg Tab       multivit with min-folic acid 200 mcg Chew       NOVOLOG U-100 INSULIN ASPART 100 unit/mL injection       polyethylene glycol (GLYCOLAX) 17 gram/dose powder Take 17 g by mouth once daily. 850 g 11    rosuvastatin (CRESTOR) 10 MG tablet Take 1 tablet (10 mg total) by mouth once daily. 90 tablet 3    torsemide (DEMADEX) 10 MG Tab Take 10 mg by mouth once daily.  3    TRADJENTA 5 mg Tab tablet       TRAVATAN Z 0.004 % Drop       valsartan (DIOVAN) 160 MG tablet Take 1 tablet (160 mg total) by mouth 2 (two) times daily. 180 tablet 3    colchicine (COLCRYS) 0.6 mg tablet Take 1 tablet (0.6 mg total) by mouth once daily. for 7 days 7 tablet 0     No current facility-administered medications for this visit.        Review of Systems   All other systems reviewed and are negative.      Objective:   Physical Exam   Vitals:    05/31/19 0820   BP: (!) 150/78   BP Location: Left arm   Patient Position: Sitting   BP Method: Medium (Manual)   Pulse: 77   Temp: 97.7 °F (36.5 °C)   TempSrc: Oral   SpO2: 99%   Weight: 71.7 kg (158 lb)   Height: 5' 5" (1.651 m)    Body mass index is 26.29 kg/m².  Weight: 71.7 kg (158 lb)   Height: 5' 5" (165.1 cm)     Physical Exam   Constitutional: He is oriented to person, place, and time. He appears well-developed and well-nourished. No distress.   Eyes: EOM are normal.   Cardiovascular: Normal rate, regular rhythm and normal heart sounds.   No murmur heard.  Pulmonary/Chest: Effort normal and breath sounds normal.   Musculoskeletal: Normal range of motion. He exhibits no edema.   Neurological: He is alert and oriented to " person, place, and time. Coordination normal.   Skin: Skin is warm and dry.   Excoriation on the extensor arms  The right middle finger distal phalanx digit pad with healing felon.    Psychiatric: He has a normal mood and affect. His behavior is normal. Thought content normal.

## 2019-06-05 ENCOUNTER — HOSPITAL ENCOUNTER (OUTPATIENT)
Dept: CARDIOLOGY | Facility: HOSPITAL | Age: 69
Discharge: HOME | End: 2019-06-05
Attending: INTERNAL MEDICINE
Payer: MEDICARE

## 2019-06-05 VITALS
SYSTOLIC BLOOD PRESSURE: 150 MMHG | DIASTOLIC BLOOD PRESSURE: 78 MMHG | WEIGHT: 158 LBS | HEART RATE: 68 BPM | BODY MASS INDEX: 26.33 KG/M2 | HEIGHT: 65 IN

## 2019-06-05 DIAGNOSIS — I10 HYPERTENSION, UNSPECIFIED TYPE: ICD-10-CM

## 2019-06-05 DIAGNOSIS — I73.9 CLAUDICATION: ICD-10-CM

## 2019-06-05 LAB
AORTIC ROOT ANNULUS: 3.2 CM
AORTIC VALVE CUSP SEPERATION: 1.81 CM
ASCENDING AORTA: 3.37 CM
AV INDEX (PROSTH): 0.66
AV MEAN GRADIENT: 7.37 MMHG
AV PEAK GRADIENT: 12.67 MMHG
AV VALVE AREA: 2.37 CM2
AV VELOCITY RATIO: 0.62
BSA FOR ECHO PROCEDURE: 1.81 M2
CV ECHO LV RWT: 0.53 CM
DOP CALC AO PEAK VEL: 1.78 M/S
DOP CALC AO VTI: 46.66 CM
DOP CALC LVOT AREA: 3.56 CM2
DOP CALC LVOT DIAMETER: 2.13 CM
DOP CALC LVOT PEAK VEL: 1.1 M/S
DOP CALC LVOT STROKE VOLUME: 110.37 CM3
DOP CALCLVOT PEAK VEL VTI: 30.99 CM
E WAVE DECELERATION TIME: 235.13 MSEC
E/A RATIO: 0.98
ECHO LV POSTERIOR WALL: 1.1 CM (ref 0.6–1.1)
FRACTIONAL SHORTENING: 39 % (ref 28–44)
IMMEDIATE ARM BP: 192 MMHG
IMMEDIATE LEFT ABI: 1.14
IMMEDIATE LEFT TIBIAL: 218 MMHG
IMMEDIATE RIGHT ABI: 1.08
IMMEDIATE RIGHT TIBIAL: 207 MMHG
INTERVENTRICULAR SEPTUM: 1.09 CM (ref 0.6–1.1)
IVRT: 0.1 MSEC
LA MAJOR: 4.66 CM
LA MINOR: 6.31 CM
LA WIDTH: 4.53 CM
LEFT ABI: 1.16
LEFT ARM BP: 171 MMHG
LEFT ATRIUM SIZE: 3.91 CM
LEFT ATRIUM VOLUME INDEX: 45.1 ML/M2
LEFT ATRIUM VOLUME: 80.71 CM3
LEFT DORSALIS PEDIS: 192 MMHG
LEFT INTERNAL DIMENSION IN SYSTOLE: 2.53 CM (ref 2.1–4)
LEFT POSTERIOR TIBIAL: 199 MMHG
LEFT TBI: 0.7
LEFT TOE PRESSURE: 120 MMHG
LEFT VENTRICLE DIASTOLIC VOLUME INDEX: 42.66 ML/M2
LEFT VENTRICLE DIASTOLIC VOLUME: 76.35 ML
LEFT VENTRICLE MASS INDEX: 85.6 G/M2
LEFT VENTRICLE SYSTOLIC VOLUME INDEX: 12.9 ML/M2
LEFT VENTRICLE SYSTOLIC VOLUME: 23.05 ML
LEFT VENTRICULAR INTERNAL DIMENSION IN DIASTOLE: 4.15 CM (ref 3.5–6)
LEFT VENTRICULAR MASS: 153.16 G
MV PEAK A VEL: 1.07 M/S
MV PEAK E VEL: 1.05 M/S
PISA TR MAX VEL: 1.68 M/S
PULM VEIN S/D RATIO: 0.96
PV PEAK D VEL: 0.54 M/S
PV PEAK S VEL: 0.52 M/S
PV PEAK VELOCITY: 1.24 CM/S
RA MAJOR: 4.82 CM
RA PRESSURE: 3 MMHG
RA WIDTH: 3.19 CM
RIGHT ABI: 1.13
RIGHT ARM BP: 168 MMHG
RIGHT DORSALIS PEDIS: 193 MMHG
RIGHT POSTERIOR TIBIAL: 194 MMHG
RIGHT TBI: 0.55
RIGHT TOE PRESSURE: 94 MMHG
RIGHT VENTRICULAR END-DIASTOLIC DIMENSION: 2.33 CM
RV TISSUE DOPPLER FREE WALL SYSTOLIC VELOCITY 1 (APICAL 4 CHAMBER VIEW): 7.2 M/S
SINUS: 3.54 CM
STJ: 2.76 CM
TOE RAISES: 100
TR MAX PG: 11.29 MMHG
TRICUSPID ANNULAR PLANE SYSTOLIC EXCURSION: 2.54 CM
TV REST PULMONARY ARTERY PRESSURE: 14 MMHG

## 2019-06-05 PROCEDURE — 93924 LWR XTR VASC STDY BILAT: CPT | Mod: 26,,, | Performed by: INTERNAL MEDICINE

## 2019-06-05 PROCEDURE — 93924 CV US ANKLE BRACHIAL INDICES W STRESS W TOE TRACINGS (CUPID ONLY): ICD-10-PCS | Mod: 26,,, | Performed by: INTERNAL MEDICINE

## 2019-06-05 PROCEDURE — 93306 TTE W/DOPPLER COMPLETE: CPT

## 2019-06-05 PROCEDURE — 93306 TRANSTHORACIC ECHO (TTE) COMPLETE (CUPID ONLY): ICD-10-PCS | Mod: 26,,, | Performed by: INTERNAL MEDICINE

## 2019-06-05 PROCEDURE — 93306 TTE W/DOPPLER COMPLETE: CPT | Mod: 26,,, | Performed by: INTERNAL MEDICINE

## 2019-06-05 PROCEDURE — 93924 LWR XTR VASC STDY BILAT: CPT | Mod: 50

## 2019-06-06 DIAGNOSIS — J06.9 VIRAL URI WITH COUGH: ICD-10-CM

## 2019-06-06 RX ORDER — IPRATROPIUM BROMIDE 42 UG/1
SPRAY, METERED NASAL
Qty: 30 ML | Refills: 0 | Status: SHIPPED | OUTPATIENT
Start: 2019-06-06 | End: 2021-02-09

## 2019-06-11 ENCOUNTER — LAB VISIT (OUTPATIENT)
Dept: LAB | Facility: HOSPITAL | Age: 69
End: 2019-06-11
Attending: INTERNAL MEDICINE
Payer: MEDICARE

## 2019-06-11 DIAGNOSIS — N18.30 ANEMIA OF CHRONIC RENAL FAILURE, STAGE 3 (MODERATE): ICD-10-CM

## 2019-06-11 DIAGNOSIS — E11.8 CONTROLLED TYPE 2 DIABETES MELLITUS WITH COMPLICATION, WITHOUT LONG-TERM CURRENT USE OF INSULIN: ICD-10-CM

## 2019-06-11 DIAGNOSIS — N18.30 CHRONIC KIDNEY DISEASE, STAGE III (MODERATE): ICD-10-CM

## 2019-06-11 DIAGNOSIS — M35.9 CONNECTIVE TISSUE DISORDER: ICD-10-CM

## 2019-06-11 DIAGNOSIS — D63.1 ANEMIA OF CHRONIC RENAL FAILURE, STAGE 3 (MODERATE): ICD-10-CM

## 2019-06-11 LAB
ALBUMIN SERPL BCP-MCNC: 3.5 G/DL (ref 3.5–5.2)
ALP SERPL-CCNC: 69 U/L (ref 55–135)
ALT SERPL W/O P-5'-P-CCNC: 13 U/L (ref 10–44)
ANION GAP SERPL CALC-SCNC: 8 MMOL/L (ref 8–16)
AST SERPL-CCNC: 17 U/L (ref 10–40)
BASOPHILS # BLD AUTO: 0.01 K/UL (ref 0–0.2)
BASOPHILS NFR BLD: 0.2 % (ref 0–1.9)
BILIRUB SERPL-MCNC: 0.3 MG/DL (ref 0.1–1)
BUN SERPL-MCNC: 29 MG/DL (ref 8–23)
CALCIUM SERPL-MCNC: 9.6 MG/DL (ref 8.7–10.5)
CHLORIDE SERPL-SCNC: 102 MMOL/L (ref 95–110)
CO2 SERPL-SCNC: 23 MMOL/L (ref 23–29)
CREAT SERPL-MCNC: 1.4 MG/DL (ref 0.5–1.4)
DIFFERENTIAL METHOD: ABNORMAL
EOSINOPHIL # BLD AUTO: 0.1 K/UL (ref 0–0.5)
EOSINOPHIL NFR BLD: 2.5 % (ref 0–8)
ERYTHROCYTE [DISTWIDTH] IN BLOOD BY AUTOMATED COUNT: 15.2 % (ref 11.5–14.5)
EST. GFR  (AFRICAN AMERICAN): 58.8 ML/MIN/1.73 M^2
EST. GFR  (NON AFRICAN AMERICAN): 50.9 ML/MIN/1.73 M^2
ESTIMATED AVG GLUCOSE: 151 MG/DL (ref 68–131)
GLUCOSE SERPL-MCNC: 137 MG/DL (ref 70–110)
HBA1C MFR BLD HPLC: 6.9 % (ref 4–5.6)
HCT VFR BLD AUTO: 32.5 % (ref 40–54)
HGB BLD-MCNC: 10.8 G/DL (ref 14–18)
LYMPHOCYTES # BLD AUTO: 0.9 K/UL (ref 1–4.8)
LYMPHOCYTES NFR BLD: 16.1 % (ref 18–48)
MCH RBC QN AUTO: 29.3 PG (ref 27–31)
MCHC RBC AUTO-ENTMCNC: 33.2 G/DL (ref 32–36)
MCV RBC AUTO: 88 FL (ref 82–98)
MONOCYTES # BLD AUTO: 0.9 K/UL (ref 0.3–1)
MONOCYTES NFR BLD: 15.7 % (ref 4–15)
NEUTROPHILS # BLD AUTO: 3.6 K/UL (ref 1.8–7.7)
NEUTROPHILS NFR BLD: 65.5 % (ref 38–73)
PLATELET # BLD AUTO: 171 K/UL (ref 150–350)
PMV BLD AUTO: 10.7 FL (ref 9.2–12.9)
POTASSIUM SERPL-SCNC: 4.4 MMOL/L (ref 3.5–5.1)
PROT SERPL-MCNC: 7.2 G/DL (ref 6–8.4)
RBC # BLD AUTO: 3.69 M/UL (ref 4.6–6.2)
SODIUM SERPL-SCNC: 133 MMOL/L (ref 136–145)
WBC # BLD AUTO: 5.53 K/UL (ref 3.9–12.7)

## 2019-06-11 PROCEDURE — 36415 COLL VENOUS BLD VENIPUNCTURE: CPT | Mod: PO

## 2019-06-11 PROCEDURE — 85025 COMPLETE CBC W/AUTO DIFF WBC: CPT | Mod: PO

## 2019-06-11 PROCEDURE — 83036 HEMOGLOBIN GLYCOSYLATED A1C: CPT

## 2019-06-11 PROCEDURE — 80053 COMPREHEN METABOLIC PANEL: CPT

## 2019-06-12 ENCOUNTER — PATIENT MESSAGE (OUTPATIENT)
Dept: FAMILY MEDICINE | Facility: CLINIC | Age: 69
End: 2019-06-12

## 2019-06-12 ENCOUNTER — OFFICE VISIT (OUTPATIENT)
Dept: HEMATOLOGY/ONCOLOGY | Facility: CLINIC | Age: 69
End: 2019-06-12
Payer: MEDICARE

## 2019-06-12 ENCOUNTER — OFFICE VISIT (OUTPATIENT)
Dept: CARDIOLOGY | Facility: CLINIC | Age: 69
End: 2019-06-12
Payer: MEDICARE

## 2019-06-12 VITALS
WEIGHT: 163.38 LBS | HEART RATE: 62 BPM | BODY MASS INDEX: 27.22 KG/M2 | TEMPERATURE: 98 F | SYSTOLIC BLOOD PRESSURE: 152 MMHG | HEIGHT: 65 IN | DIASTOLIC BLOOD PRESSURE: 69 MMHG | OXYGEN SATURATION: 98 %

## 2019-06-12 VITALS
WEIGHT: 163.5 LBS | DIASTOLIC BLOOD PRESSURE: 60 MMHG | BODY MASS INDEX: 27.24 KG/M2 | HEART RATE: 61 BPM | OXYGEN SATURATION: 100 % | RESPIRATION RATE: 16 BRPM | SYSTOLIC BLOOD PRESSURE: 132 MMHG | HEIGHT: 65 IN

## 2019-06-12 DIAGNOSIS — K59.09 CHRONIC CONSTIPATION: ICD-10-CM

## 2019-06-12 DIAGNOSIS — E78.00 PURE HYPERCHOLESTEROLEMIA: ICD-10-CM

## 2019-06-12 DIAGNOSIS — M35.9 CONNECTIVE TISSUE DISORDER: ICD-10-CM

## 2019-06-12 DIAGNOSIS — D64.9 ANEMIA, UNSPECIFIED TYPE: Primary | ICD-10-CM

## 2019-06-12 DIAGNOSIS — E11.8 CONTROLLED TYPE 2 DIABETES MELLITUS WITH COMPLICATION, WITHOUT LONG-TERM CURRENT USE OF INSULIN: Primary | ICD-10-CM

## 2019-06-12 DIAGNOSIS — N18.30 CHRONIC KIDNEY DISEASE, STAGE III (MODERATE): ICD-10-CM

## 2019-06-12 DIAGNOSIS — I50.22 CHRONIC SYSTOLIC CONGESTIVE HEART FAILURE: ICD-10-CM

## 2019-06-12 DIAGNOSIS — I10 ESSENTIAL HYPERTENSION: ICD-10-CM

## 2019-06-12 PROCEDURE — 2024F PR 7 FIELD PHOTOS WITH INTERP/ REVIEW: ICD-10-PCS | Mod: S$GLB,,, | Performed by: INTERNAL MEDICINE

## 2019-06-12 PROCEDURE — 3044F HG A1C LEVEL LT 7.0%: CPT | Mod: CPTII,S$GLB,, | Performed by: INTERNAL MEDICINE

## 2019-06-12 PROCEDURE — 3078F PR MOST RECENT DIASTOLIC BLOOD PRESSURE < 80 MM HG: ICD-10-PCS | Mod: CPTII,S$GLB,, | Performed by: INTERNAL MEDICINE

## 2019-06-12 PROCEDURE — 99999 PR PBB SHADOW E&M-EST. PATIENT-LVL III: CPT | Mod: PBBFAC,,, | Performed by: INTERNAL MEDICINE

## 2019-06-12 PROCEDURE — 99213 PR OFFICE/OUTPT VISIT, EST, LEVL III, 20-29 MIN: ICD-10-PCS | Mod: S$GLB,,, | Performed by: INTERNAL MEDICINE

## 2019-06-12 PROCEDURE — 3078F DIAST BP <80 MM HG: CPT | Mod: CPTII,S$GLB,, | Performed by: INTERNAL MEDICINE

## 2019-06-12 PROCEDURE — 3075F SYST BP GE 130 - 139MM HG: CPT | Mod: CPTII,S$GLB,, | Performed by: INTERNAL MEDICINE

## 2019-06-12 PROCEDURE — 1101F PR PT FALLS ASSESS DOC 0-1 FALLS W/OUT INJ PAST YR: ICD-10-PCS | Mod: CPTII,S$GLB,, | Performed by: INTERNAL MEDICINE

## 2019-06-12 PROCEDURE — 99213 OFFICE O/P EST LOW 20 MIN: CPT | Mod: S$GLB,,, | Performed by: INTERNAL MEDICINE

## 2019-06-12 PROCEDURE — 1101F PT FALLS ASSESS-DOCD LE1/YR: CPT | Mod: CPTII,S$GLB,, | Performed by: INTERNAL MEDICINE

## 2019-06-12 PROCEDURE — 3044F PR MOST RECENT HEMOGLOBIN A1C LEVEL <7.0%: ICD-10-PCS | Mod: CPTII,S$GLB,, | Performed by: INTERNAL MEDICINE

## 2019-06-12 PROCEDURE — 3077F SYST BP >= 140 MM HG: CPT | Mod: CPTII,S$GLB,, | Performed by: INTERNAL MEDICINE

## 2019-06-12 PROCEDURE — 3075F PR MOST RECENT SYSTOLIC BLOOD PRESS GE 130-139MM HG: ICD-10-PCS | Mod: CPTII,S$GLB,, | Performed by: INTERNAL MEDICINE

## 2019-06-12 PROCEDURE — 3077F PR MOST RECENT SYSTOLIC BLOOD PRESSURE >= 140 MM HG: ICD-10-PCS | Mod: CPTII,S$GLB,, | Performed by: INTERNAL MEDICINE

## 2019-06-12 PROCEDURE — 99214 OFFICE O/P EST MOD 30 MIN: CPT | Mod: S$GLB,,, | Performed by: INTERNAL MEDICINE

## 2019-06-12 PROCEDURE — 99214 PR OFFICE/OUTPT VISIT, EST, LEVL IV, 30-39 MIN: ICD-10-PCS | Mod: S$GLB,,, | Performed by: INTERNAL MEDICINE

## 2019-06-12 PROCEDURE — 99999 PR PBB SHADOW E&M-EST. PATIENT-LVL III: ICD-10-PCS | Mod: PBBFAC,,, | Performed by: INTERNAL MEDICINE

## 2019-06-12 PROCEDURE — 2024F 7 FLD RTA PHOTO EVC RTNOPTHY: CPT | Mod: S$GLB,,, | Performed by: INTERNAL MEDICINE

## 2019-06-12 RX ORDER — LUBIPROSTONE 24 UG/1
24 CAPSULE ORAL 2 TIMES DAILY WITH MEALS
Qty: 60 CAPSULE | Refills: 11 | Status: SHIPPED | OUTPATIENT
Start: 2019-06-12 | End: 2019-06-14 | Stop reason: SDUPTHER

## 2019-06-12 NOTE — PROGRESS NOTES
Chief Complaint :   Pancytopenia.     Hx of Present illness :  Patient is a 69 y.o. year old male who presents to the clinic today for   Oncology evaluation. Labs revealed significant decrease in blood counts. Dx'd to have Sjogren's syndrome four years ago.  Was on Plaquenil & immuran in the past. That is still on hold.  Last Month had flare up of gout.      Allergies :    Review of patient's allergies indicates:   Allergen Reactions    Penicillins Nausea And Vomiting       Occupation :   Businessman    Transfusion :  None    Menstrual & obstetric Hx :      Present Meds :   Medication List with Changes/Refills   Current Medications    ALENDRONATE (FOSAMAX) 70 MG TABLET    Take 1 tablet (70 mg total) by mouth every 7 days.    ALLOPURINOL (ZYLOPRIM) 100 MG TABLET    Take 1 tablet (100 mg total) by mouth once daily.    AMLODIPINE (NORVASC) 10 MG TABLET    Take 1 tablet (10 mg total) by mouth once daily.    ASPIRIN (ECOTRIN) 81 MG EC TABLET    Take 81 mg by mouth once daily.    CLONIDINE 0.1 MG/24 HR TD PTWK (CATAPRES) 0.1 MG/24 HR    Place 1 patch onto the skin every 7 days.    COLCHICINE (COLCRYS) 0.6 MG TABLET    Take 1 tablet (0.6 mg total) by mouth once daily. for 7 days    DORZOLAMIDE (TRUSOPT) 2 % OPHTHALMIC SOLUTION        HYDROCODONE-ACETAMINOPHEN (NORCO) 5-325 MG PER TABLET    Take 1 tablet by mouth every 12 (twelve) hours as needed for Pain.    HYDROXYCHLOROQUINE (PLAQUENIL) 200 MG TABLET    Take 1 tablet (200 mg total) by mouth 2 (two) times daily.    INSULIN GLARGINE (LANTUS) 100 UNIT/ML INJECTION    Inject 20 Units into the skin every evening.    IPRATROPIUM (ATROVENT) 42 MCG (0.06 %) NASAL SPRAY    INSTILL 2 SPRAYS BY NASAL ROUTE 3 TIMES DAILY. AS NEEDED FOR NASAL CONGESTION AND DRIP FOR 7 DAYS    LEVOCETIRIZINE (XYZAL) 5 MG TABLET    Take 1 tablet (5 mg total) by mouth every evening.    LEVOTHYROXINE (TIROSINT) 25 MCG CAP    Take 25 mcg by mouth.    LUBIPROSTONE (AMITIZA) 8 MCG CAP    Take 1 capsule (8  mcg total) by mouth 2 (two) times daily with meals.    MELATONIN 5 MG TAB        MULTIVIT WITH MIN-FOLIC ACID 200 MCG CHEW        NOVOLOG U-100 INSULIN ASPART 100 UNIT/ML INJECTION        POLYETHYLENE GLYCOL (GLYCOLAX) 17 GRAM/DOSE POWDER    Take 17 g by mouth once daily.    ROSUVASTATIN (CRESTOR) 10 MG TABLET    Take 1 tablet (10 mg total) by mouth once daily.    TORSEMIDE (DEMADEX) 10 MG TAB    Take 10 mg by mouth once daily.    TRADJENTA 5 MG TAB TABLET        TRAVATAN Z 0.004 % DROP        VALSARTAN (DIOVAN) 160 MG TABLET    Take 1 tablet (160 mg total) by mouth 2 (two) times daily.       Past Medical Hx :  Reviewed.     Past Medical Hx :  Past Medical History:   Diagnosis Date    Anemia     Arthritis     Cataract     Chronic constipation     Diabetes mellitus, type 2     Glaucoma     Hyperlipidemia 5/13/2014    Hypertension     MCTD (mixed connective tissue disease)     Sjogren's disease     Ulcerative colitis     Urolithiasis        Travel Hx :  N/A    Immunization :  Immunization History   Administered Date(s) Administered    Influenza 10/26/2014    Influenza - High Dose 10/12/2015, 08/21/2017, 09/12/2018    Influenza - Quadrivalent 10/17/2016    Influenza - Quadrivalent - PF 10/26/2014    Influenza A (H1N1) 2009 Monovalent - IM 10/26/2009    Pneumococcal Conjugate - 13 Valent 11/20/2015    Pneumococcal Polysaccharide - 23 Valent 05/25/2018    Td - PF (ADULT) 12/06/2018    Zoster 09/26/2014    Zoster Recombinant 05/25/2018       Family Hx :  Family History   Problem Relation Age of Onset    Hypertension Father     Stroke Father     Rheum arthritis Maternal Aunt     Rheum arthritis Maternal Uncle     Throat cancer Paternal Grandmother     Celiac disease Neg Hx     Colon cancer Neg Hx     Cirrhosis Neg Hx     Colon polyps Neg Hx     Crohn's disease Neg Hx     Cystic fibrosis Neg Hx     Hemochromatosis Neg Hx     Esophageal cancer Neg Hx     Inflammatory bowel disease Neg Hx      Irritable bowel syndrome Neg Hx     Liver cancer Neg Hx     Liver disease Neg Hx     Rectal cancer Neg Hx     Stomach cancer Neg Hx     Ulcerative colitis Neg Hx     Chase's disease Neg Hx        Social Hx :  Social History     Socioeconomic History    Marital status:      Spouse name: Not on file    Number of children: 3    Years of education: Not on file    Highest education level: Not on file   Occupational History    Not on file   Social Needs    Financial resource strain: Somewhat hard    Food insecurity:     Worry: Never true     Inability: Never true    Transportation needs:     Medical: No     Non-medical: No   Tobacco Use    Smoking status: Never Smoker    Smokeless tobacco: Never Used   Substance and Sexual Activity    Alcohol use: No     Frequency: Never     Drinks per session: Patient refused     Binge frequency: Never    Drug use: No    Sexual activity: Never   Lifestyle    Physical activity:     Days per week: 3 days     Minutes per session: Not on file    Stress: Only a little   Relationships    Social connections:     Talks on phone: Once a week     Gets together: Once a week     Attends [employer] service: Not on file     Active member of club or organization: No     Attends meetings of clubs or organizations: Never     Relationship status:    Other Topics Concern    Not on file   Social History Narrative    Not on file       Surgery :  Bilateral cataract surgery; Colonoscopy 2018.  Kidney stone surgery thirty five years ago.    Symptoms :    Review of Systems   Constitutional: Positive for malaise/fatigue. Negative for chills, fever and weight loss (lost two pounds).   HENT: Negative for congestion, hearing loss, nosebleeds, sore throat and tinnitus.    Eyes: Negative.  Negative for blurred vision, double vision, photophobia, pain, discharge and redness.   Respiratory: Positive for shortness of breath (Mild). Negative for cough and hemoptysis.     Cardiovascular: Negative for chest pain, palpitations, orthopnea, claudication and leg swelling.   Gastrointestinal: Positive for constipation. Negative for abdominal pain, blood in stool, diarrhea, heartburn, nausea and vomiting.   Genitourinary: Negative.  Negative for dysuria, flank pain, frequency, hematuria and urgency.   Musculoskeletal: Positive for back pain (Lot of back pain) and joint pain.   Skin: Positive for itching.   Neurological: Negative for dizziness, tingling, tremors, sensory change and headaches.   Endo/Heme/Allergies: Negative for environmental allergies. Does not bruise/bleed easily.   Psychiatric/Behavioral: Negative for depression. The patient has insomnia. The patient is not nervous/anxious.        Physical Exam :   Physical Exam   Constitutional: He is oriented to person, place, and time and well-developed, well-nourished, and in no distress. Vital signs are normal. No distress.   HENT:   Head: Normocephalic and atraumatic.   Right Ear: External ear normal.   Left Ear: External ear normal.   Nose: Nose normal.   Mouth/Throat: Oropharynx is clear and moist. No oropharyngeal exudate.   Eyes: Pupils are equal, round, and reactive to light. Conjunctivae, EOM and lids are normal. Lids are everted and swept, no foreign bodies found. Right eye exhibits no discharge. Left eye exhibits no discharge. No scleral icterus.       Neck: Trachea normal, normal range of motion, full passive range of motion without pain and phonation normal. Neck supple. Normal carotid pulses, no hepatojugular reflux and no JVD present. No tracheal tenderness present. Carotid bruit is not present. No tracheal deviation present. No thyroid mass and no thyromegaly present.   Cardiovascular: Normal rate, regular rhythm, normal heart sounds, intact distal pulses and normal pulses. PMI is not displaced. Exam reveals no gallop and no friction rub.   No murmur heard.  Pulmonary/Chest: Effort normal and breath sounds normal. No  stridor. No apnea. No respiratory distress. He has no wheezes. He has no rales. He exhibits no tenderness.   Abdominal: Soft. Normal appearance, normal aorta and bowel sounds are normal. He exhibits no distension, no ascites and no mass. There is no hepatosplenomegaly. There is no tenderness. There is no rebound, no guarding and no CVA tenderness. No hernia.   Musculoskeletal: Normal range of motion. He exhibits no edema, tenderness or deformity.   Lymphadenopathy:        Head (right side): No submental, no submandibular, no tonsillar, no preauricular, no posterior auricular and no occipital adenopathy present.        Head (left side): No submental, no submandibular, no tonsillar, no preauricular, no posterior auricular and no occipital adenopathy present.     He has no cervical adenopathy.     He has no axillary adenopathy.        Right: No inguinal, no supraclavicular and no epitrochlear adenopathy present.        Left: No inguinal, no supraclavicular and no epitrochlear adenopathy present.   Neurological: He is alert and oriented to person, place, and time. He has normal motor skills, normal sensation, normal strength, normal reflexes and intact cranial nerves. No cranial nerve deficit. He exhibits normal muscle tone. Gait normal. Coordination normal. GCS score is 15.   Skin: Skin is warm, dry and intact. No rash noted. He is not diaphoretic. No cyanosis or erythema. No pallor. Nails show no clubbing.   Psychiatric: Mood, memory, affect and judgment normal.         Labs & Imaging :  GILMA +. Titre 1:640.  Rheumatoid Factor normal.  06/11/19 :  NFBS 137; cr.1.4; eGFR 50.9; Ca 9.6; Bili  0.3; liver enzymes normal.  Hgb 10.8; Hct 32.5; MCV 88; platelet 171,000. ANC 3,600    Dx :  Anemia.  Mixed connective tissue Disorder. DM. CKD 3.      Assessment & Plan: Reviewed with patient . Labs show continued improvement   Continue followup with Rheumatologist & PCP.   Does not meet criteria for Procrit therapy. . Monitor CBC,  and Iron Studies.

## 2019-06-12 NOTE — PROGRESS NOTES
CBC normocytic anemia, seen previously.  Platelet count greater than 150.  History of thrombocytopenia  CMP with chronic kidney disease stable  A1c 6.9 from 6.2  Results to patient via my [facility]. No changes.

## 2019-06-12 NOTE — PROGRESS NOTES
CARDIOVASCULAR CONSULTATION    REASON FOR CONSULT:   Darrion Bennett is a 69 y.o. male who presents for establishing care with Cardiology.      HISTORY OF PRESENT ILLNESS:     Patient is here for follow-up today.  Denies any chest pains at rest on exertion, orthopnea, PND.  Denies any.  Of dizziness or passing out.  Blood pressure is much better controlled in clinic today.    Initial clinic visit:  Patient is a very pleasant 69-year-old man with a past medical history significant for hypertension, diabetes, Sjogren's disease who wants to establish care with [facility].  Patient has been followed with [facility].  Patient states that in 2014 he was diagnosed with heart failure.  He states that he had passed out and had gone to the hospital, he was feeling short of breath also and at that point he was told that he had heart failure.  I can see an echocardiogram from his outside medical records in 2014 which showed normal left ventricular systolic function.  He also had a stress echocardiogram done in 2013 which did not reveal any ischemia.  He denies any chest pains at rest on exertion, orthopnea, PND, swelling of feet.  States that since then he has been okay and walks about 2-3 blocks every day.  On walking about 2-3 blocks he does experience some tightness in his left calf.  This goes away after rest.  He denies any resting calf tightness.  He denies any ulcer on his feet.  He states that he checks his blood pressure at home and has found that it is elevated around 160-170 mm of systolic multiple times.    PAST MEDICAL HISTORY:     Past Medical History:   Diagnosis Date    Anemia     Arthritis     Cataract     Chronic constipation     Diabetes mellitus, type 2     Glaucoma     Hyperlipidemia 5/13/2014    Hypertension     MCTD (mixed connective tissue disease)     Sjogren's disease     Ulcerative colitis     Urolithiasis        PAST SURGICAL HISTORY:     Past Surgical History:   Procedure  Laterality Date    COLONOSCOPY  2014    EYE SURGERY         ALLERGIES AND MEDICATION:     Review of patient's allergies indicates:   Allergen Reactions    Penicillins Nausea And Vomiting        Medication List           Accurate as of 6/12/19 11:11 AM. If you have any questions, ask your nurse or doctor.               CHANGE how you take these medications    insulin glargine 100 unit/mL injection  Commonly known as:  LANTUS U-100 INSULIN  Inject 20 Units into the skin every evening.  What changed:  additional instructions        CONTINUE taking these medications    alendronate 70 MG tablet  Commonly known as:  FOSAMAX  Take 1 tablet (70 mg total) by mouth every 7 days.     allopurinol 100 MG tablet  Commonly known as:  ZYLOPRIM  Take 1 tablet (100 mg total) by mouth once daily.     amLODIPine 10 MG tablet  Commonly known as:  NORVASC  Take 1 tablet (10 mg total) by mouth once daily.     aspirin 81 MG EC tablet  Commonly known as:  ECOTRIN     cloNIDine 0.1 mg/24 hr td ptwk 0.1 mg/24 hr  Commonly known as:  CATAPRES  Place 1 patch onto the skin every 7 days.     colchicine 0.6 mg tablet  Commonly known as:  COLCRYS  Take 1 tablet (0.6 mg total) by mouth once daily. for 7 days     dorzolamide 2 % ophthalmic solution  Commonly known as:  TRUSOPT     HYDROcodone-acetaminophen 5-325 mg per tablet  Commonly known as:  NORCO  Take 1 tablet by mouth every 12 (twelve) hours as needed for Pain.     hydroxychloroquine 200 mg tablet  Commonly known as:  PLAQUENIL  Take 1 tablet (200 mg total) by mouth 2 (two) times daily.     ipratropium 42 mcg (0.06 %) nasal spray  Commonly known as:  ATROVENT  INSTILL 2 SPRAYS BY NASAL ROUTE 3 TIMES DAILY. AS NEEDED FOR NASAL CONGESTION AND DRIP FOR 7 DAYS     levocetirizine 5 MG tablet  Commonly known as:  XYZAL  Take 1 tablet (5 mg total) by mouth every evening.     levothyroxine 25 mcg Cap  Commonly known as:  TIROSINT     lubiprostone 8 MCG Cap  Commonly known as:  AMITIZA  Take 1  capsule (8 mcg total) by mouth 2 (two) times daily with meals.     melatonin 5 mg Tab     multivit with min-folic acid 200 mcg Chew     NovoLOG U-100 Insulin aspart 100 unit/mL injection  Generic drug:  insulin aspart U-100     polyethylene glycol 17 gram/dose powder  Commonly known as:  GLYCOLAX  Take 17 g by mouth once daily.     rosuvastatin 10 MG tablet  Commonly known as:  CRESTOR  Take 1 tablet (10 mg total) by mouth once daily.     torsemide 10 MG Tab  Commonly known as:  DEMADEX     TRADJENTA 5 mg Tab tablet  Generic drug:  linagliptin     TRAVATAN Z 0.004 % ophthalmic solution  Generic drug:  travoprost     valsartan 160 MG tablet  Commonly known as:  DIOVAN  Take 1 tablet (160 mg total) by mouth 2 (two) times daily.            SOCIAL HISTORY:     Social History     Socioeconomic History    Marital status:      Spouse name: Not on file    Number of children: 3    Years of education: Not on file    Highest education level: Not on file   Occupational History    Not on file   Social Needs    Financial resource strain: Somewhat hard    Food insecurity:     Worry: Never true     Inability: Never true    Transportation needs:     Medical: No     Non-medical: No   Tobacco Use    Smoking status: Never Smoker    Smokeless tobacco: Never Used   Substance and Sexual Activity    Alcohol use: No     Frequency: Never     Drinks per session: Patient refused     Binge frequency: Never    Drug use: No    Sexual activity: Never   Lifestyle    Physical activity:     Days per week: 3 days     Minutes per session: Not on file    Stress: Only a little   Relationships    Social connections:     Talks on phone: Once a week     Gets together: Once a week     Attends [employer] service: Not on file     Active member of club or organization: No     Attends meetings of clubs or organizations: Never     Relationship status:    Other Topics Concern    Not on file   Social History Narrative    Not on file  "      FAMILY HISTORY:     Family History   Problem Relation Age of Onset    Hypertension Father     Stroke Father     Rheum arthritis Maternal Aunt     Rheum arthritis Maternal Uncle     Throat cancer Paternal Grandmother     Celiac disease Neg Hx     Colon cancer Neg Hx     Cirrhosis Neg Hx     Colon polyps Neg Hx     Crohn's disease Neg Hx     Cystic fibrosis Neg Hx     Hemochromatosis Neg Hx     Esophageal cancer Neg Hx     Inflammatory bowel disease Neg Hx     Irritable bowel syndrome Neg Hx     Liver cancer Neg Hx     Liver disease Neg Hx     Rectal cancer Neg Hx     Stomach cancer Neg Hx     Ulcerative colitis Neg Hx     Chase's disease Neg Hx        REVIEW OF SYSTEMS:   Review of Systems   Constitutional: Negative.    HENT: Negative.    Eyes: Negative.    Respiratory: Negative.    Gastrointestinal: Negative.    Genitourinary: Negative.    Musculoskeletal: Negative.    Skin: Negative.    Neurological: Negative.    Endo/Heme/Allergies: Negative.    Psychiatric/Behavioral: Negative.    All other systems reviewed and are negative.      A 10 point review of systems was performed and all the pertinent positives have been mentioned. Rest of review of systems was negative.        PHYSICAL EXAM:     Vitals:    06/12/19 1102   BP: 132/60   Pulse: 61   Resp: 16    Body mass index is 27.2 kg/m².  Weight: 74.2 kg (163 lb 7.5 oz)   Height: 5' 5" (165.1 cm)     Physical Exam   Constitutional: He is oriented to person, place, and time. He appears well-developed and well-nourished. He is active.   HENT:   Head: Normocephalic and atraumatic.   Right Ear: Hearing normal.   Left Ear: Hearing normal.   Nose: Nose normal.   Mouth/Throat: Mucous membranes are normal.   Eyes: Pupils are equal, round, and reactive to light. Conjunctivae and lids are normal.   Neck: Normal range of motion. Neck supple.   Cardiovascular: Normal rate, regular rhythm, intact distal pulses and normal pulses.   Murmur heard.   " Systolic murmur is present with a grade of 2/6.  Pulmonary/Chest: Effort normal and breath sounds normal.   Abdominal: Soft. Normal appearance. There is no tenderness.   Musculoskeletal: He exhibits no edema or deformity.   Neurological: He is alert and oriented to person, place, and time.   Skin: Skin is warm, dry and intact.   Psychiatric: He has a normal mood and affect. His speech is normal.   Nursing note and vitals reviewed.        DATA:     Laboratory:  CBC:  Recent Labs   Lab 02/26/19  1514 03/11/19  0854 06/11/19  0949   WHITE BLOOD CELL COUNT 1.98 LL 5.91 5.53   HEMOGLOBIN 8.4 L 8.5 L 10.8 L   HEMATOCRIT 23.7 L 26.7 L 32.5 L   PLATELETS 231 330 171       CHEMISTRIES:  Recent Labs   Lab 02/19/19  0907 02/20/19  0959 06/11/19  0949   GLUCOSE 139 H 105 137 H   SODIUM 142 139 133 L   POTASSIUM 4.3 4.4 4.4   BUN BLD 32 H 24 H 29 H   CREATININE 1.6 H 1.5 H 1.4   EGFR IF  50.1 A 54.1 A 58.8 A   EGFR IF NON- 43.3 A 46.8 A 50.9 A   CALCIUM 9.9 10.0 9.6       CARDIAC BIOMARKERS:        COAGS:        LIPIDS/LFTS:  Recent Labs   Lab 10/16/18  02/19/19  0907 02/20/19  0959 06/11/19  0949   TRIGLYCERIDES 53  --   --   --   --    HDL 61  --   --   --   --    LDL CHOLESTEROL 59  --   --   --   --    NON-HDL CHOLESTEROL 72  --   --   --   --    AST  --    < > 20 22 17   ALT  --    < > 22 22 13    < > = values in this interval not displayed.       Hemoglobin A1C   Date Value Ref Range Status   06/11/2019 6.9 (H) 4.0 - 5.6 % Final     Comment:     ADA Screening Guidelines:  5.7-6.4%  Consistent with prediabetes  >or=6.5%  Consistent with diabetes  High levels of fetal hemoglobin interfere with the HbA1C  assay. Heterozygous hemoglobin variants (HbS, HgC, etc)do  not significantly interfere with this assay.   However, presence of multiple variants may affect accuracy.     10/16/2018 6.2 (H) 4.0 - 5.7 % Final     Comment:     Dr. Jurgen Ward ( Endocrinology )   05/30/2014 7.9 (H) 4.8 - 5.6 %  Final     Comment:              Increased risk for diabetes: 5.7 - 6.4           Diabetes: >6.4           Glycemic control for adults with diabetes: <7.0         TSH        The ASCVD Risk score ([place] ZULEMA Jr., et al., 2013) failed to calculate for the following reasons:    Cannot find a previous total cholesterol lab           Cardiovascular Testing:    EKG: (personally reviewed tracing)  Sinus bradycardia with first-degree AV block left anterior fascicle, septal infarct.    KIRT in June 2019:  Normal resting KIRT/PVR waveforms bilaterally.  Normal exercise KIRT bilaterally (with minimal drop from resting KIRT measurements).        2D echocardiogram in June 2019:  Normal left ventricular systolic function. The estimated ejection fraction is 65%  Moderate left atrial enlargement.  Normal LV diastolic function.  Mild right atrial enlargement.  Normal central venous pressure (3 mm Hg).  The estimated PA systolic pressure is 14 mm Hg            2D echocardiogram 2014 done at [facility]    HEIGHT: 167.6 cm  WEIGHT: 74.8 kg  BP: 157/82  BSA:  MEASUREMENTS  ------------  IVSd: 1.2 cm  LVIDd: 3.8 cm  LVPWd: 1.2 cm  LVIDs: 2.6 cm  LA Diam: 3.2 cm  Ao Diam: 3.1 cm  LAESV Index (A-L): 32.40 ml/m²  LVCO Dopp: 6.98 l/min  LVCI Dopp: 3.79 l/minm²    FINDINGS  -------  CPT CODE:55374-17.  Transthoracic echocardiogram (complete).  The attending physician   listed herein personally reviewed all stored images and directly supervised the   fellow-in-training (if listed) in the study's acquistion and/or report   generation.     Study priority:  Routine.  ICD-9 Indication(s):786.05 - Dyspnea.  Study Quality:The study was technically adequate.  ECG Rhythm:Sinus rhythm.  CHAMBER SIZE:All cardiac chamber sizes are within normal limits.  AORTA:Normal aortic root and proximal ascending aorta.  VALVULAR STRUCTURES:The visualized cardiac valves are structurally normal.  LEFT/RIGHT VENTRICULAR FUNCTION:LV size, wall thickness and systolic function are  normal, with an EF > 55%.       The right ventricle is normal in size and function.  DOPPLER:  Color:No valvular insufficiencies.  DIASTOLOGY:The diastolic filling pattern is normal for the age of the patient.  PERICARDIUM / EFFUSIONS:No effusions noted.  INFERIOR VENA CAVA:Normal size inferior vena cava.  PA PRESSURE:The estimated systolic pulmonary artery pressure is normal at < 35 mm Hg (RA   pressure = 3 mm Hg).  CARDIOLOGY:    Electronically signed:  STAFF:ELIAS JAIME M.D.  Fellow:G. MOHSEN, M.D.    CONCLUSIONS  -----------  1. LV size, wall thickness and systolic function are normal, with an EF > 55%.  2. The diastolic filling pattern is normal for the age of the patient.    ASSESSMENT AND PLAN     Patient Active Problem List   Diagnosis    Essential hypertension    Controlled type 2 diabetes mellitus with complication, without long-term current use of insulin    Pure hypercholesterolemia    MCTD (mixed connective tissue disease)    Sjogren's disease    Bilateral edema of lower extremity    Glaucoma    Body mass index 29.0-29.9, adult    Thyroid dysfunction    Chronic kidney disease, stage III (moderate)    Weight gain    Jackson's esophagus without dysplasia    Anemia from plaquenil and imuran    Neutropenia    Thrombocytopenia    Connective tissue disorder    Current chronic use of systemic steroids    Compression fracture of body of thoracic vertebra on XR 2/2019; 2/2019 alendronate    Anemia of chronic renal failure, stage 3 (moderate)    Chronic constipation not responding to linzess, miralax, senna    Chronic systolic congestive heart failure though TTE 2014 normal    Screening for colon cancer 07/26/2018 colonoscopy transverse colon polyp path showing benign inflammatory polyp.    Tophaceous gout    Felon of finger of right hand       1.  Patient states he was diagnosed with congestive heart failure in the past.  Currently he is euvolemic.  His recent echocardiogram demonstrated  normal left ventricular systolic function in 2019.    2.  Hypertension:  Well controlled now on current therapy    3.  Dyslipidemia:  On rosuvastatin.     4.  ABIs in June 2019 were within normal limits.      Follow-up in 3 months            Thank you very much for involving me in the care of your patient.  Please do not hesitate to contact me if there are any questions.      Greg Alvares MD, FACC, [facility]  Interventional Cardiologist, [facility].           This note was dictated with the help of speech recognition software.  There might be un-intended errors and/or substitutions.

## 2019-06-13 ENCOUNTER — OFFICE VISIT (OUTPATIENT)
Dept: RHEUMATOLOGY | Facility: CLINIC | Age: 69
End: 2019-06-13
Payer: MEDICARE

## 2019-06-13 ENCOUNTER — LAB VISIT (OUTPATIENT)
Dept: LAB | Facility: HOSPITAL | Age: 69
End: 2019-06-13
Attending: INTERNAL MEDICINE
Payer: MEDICARE

## 2019-06-13 ENCOUNTER — PATIENT MESSAGE (OUTPATIENT)
Dept: FAMILY MEDICINE | Facility: CLINIC | Age: 69
End: 2019-06-13

## 2019-06-13 VITALS
BODY MASS INDEX: 27.81 KG/M2 | DIASTOLIC BLOOD PRESSURE: 71 MMHG | SYSTOLIC BLOOD PRESSURE: 146 MMHG | HEIGHT: 65 IN | WEIGHT: 166.88 LBS | HEART RATE: 65 BPM

## 2019-06-13 DIAGNOSIS — K59.09 CHRONIC CONSTIPATION: ICD-10-CM

## 2019-06-13 DIAGNOSIS — M1A.9XX1 TOPHACEOUS GOUT: ICD-10-CM

## 2019-06-13 DIAGNOSIS — M35.01 SJOGREN'S SYNDROME WITH KERATOCONJUNCTIVITIS SICCA: Primary | ICD-10-CM

## 2019-06-13 DIAGNOSIS — R80.9 PROTEINURIA, UNSPECIFIED TYPE: ICD-10-CM

## 2019-06-13 DIAGNOSIS — M35.01 SJOGREN'S SYNDROME WITH KERATOCONJUNCTIVITIS SICCA: ICD-10-CM

## 2019-06-13 LAB
C3 SERPL-MCNC: 129 MG/DL (ref 50–180)
C4 SERPL-MCNC: 30 MG/DL (ref 11–44)
CRP SERPL-MCNC: 11.4 MG/L (ref 0–8.2)
ERYTHROCYTE [SEDIMENTATION RATE] IN BLOOD BY WESTERGREN METHOD: 69 MM/HR (ref 0–23)

## 2019-06-13 PROCEDURE — 86160 COMPLEMENT ANTIGEN: CPT

## 2019-06-13 PROCEDURE — 84165 PROTEIN E-PHORESIS SERUM: CPT

## 2019-06-13 PROCEDURE — 84165 PATHOLOGIST INTERPRETATION SPE: ICD-10-PCS | Mod: 26,,, | Performed by: PATHOLOGY

## 2019-06-13 PROCEDURE — 86225 DNA ANTIBODY NATIVE: CPT

## 2019-06-13 PROCEDURE — 3077F SYST BP >= 140 MM HG: CPT | Mod: CPTII,S$GLB,, | Performed by: INTERNAL MEDICINE

## 2019-06-13 PROCEDURE — 1101F PR PT FALLS ASSESS DOC 0-1 FALLS W/OUT INJ PAST YR: ICD-10-PCS | Mod: CPTII,S$GLB,, | Performed by: INTERNAL MEDICINE

## 2019-06-13 PROCEDURE — 99999 PR PBB SHADOW E&M-EST. PATIENT-LVL III: ICD-10-PCS | Mod: PBBFAC,,, | Performed by: INTERNAL MEDICINE

## 2019-06-13 PROCEDURE — 3077F PR MOST RECENT SYSTOLIC BLOOD PRESSURE >= 140 MM HG: ICD-10-PCS | Mod: CPTII,S$GLB,, | Performed by: INTERNAL MEDICINE

## 2019-06-13 PROCEDURE — 86235 NUCLEAR ANTIGEN ANTIBODY: CPT

## 2019-06-13 PROCEDURE — 3078F DIAST BP <80 MM HG: CPT | Mod: CPTII,S$GLB,, | Performed by: INTERNAL MEDICINE

## 2019-06-13 PROCEDURE — 86147 CARDIOLIPIN ANTIBODY EA IG: CPT

## 2019-06-13 PROCEDURE — 84165 PROTEIN E-PHORESIS SERUM: CPT | Mod: 26,,, | Performed by: PATHOLOGY

## 2019-06-13 PROCEDURE — 86160 COMPLEMENT ANTIGEN: CPT | Mod: 59

## 2019-06-13 PROCEDURE — 99999 PR PBB SHADOW E&M-EST. PATIENT-LVL III: CPT | Mod: PBBFAC,,, | Performed by: INTERNAL MEDICINE

## 2019-06-13 PROCEDURE — 99214 OFFICE O/P EST MOD 30 MIN: CPT | Mod: S$GLB,,, | Performed by: INTERNAL MEDICINE

## 2019-06-13 PROCEDURE — 85598 HEXAGNAL PHOSPH PLTLT NEUTRL: CPT

## 2019-06-13 PROCEDURE — 86146 BETA-2 GLYCOPROTEIN ANTIBODY: CPT | Mod: 59

## 2019-06-13 PROCEDURE — 99214 PR OFFICE/OUTPT VISIT, EST, LEVL IV, 30-39 MIN: ICD-10-PCS | Mod: S$GLB,,, | Performed by: INTERNAL MEDICINE

## 2019-06-13 PROCEDURE — 1101F PT FALLS ASSESS-DOCD LE1/YR: CPT | Mod: CPTII,S$GLB,, | Performed by: INTERNAL MEDICINE

## 2019-06-13 PROCEDURE — 85613 RUSSELL VIPER VENOM DILUTED: CPT

## 2019-06-13 PROCEDURE — 84550 ASSAY OF BLOOD/URIC ACID: CPT

## 2019-06-13 PROCEDURE — 86481 TB AG RESPONSE T-CELL SUSP: CPT

## 2019-06-13 PROCEDURE — 86140 C-REACTIVE PROTEIN: CPT

## 2019-06-13 PROCEDURE — 85652 RBC SED RATE AUTOMATED: CPT

## 2019-06-13 PROCEDURE — 3078F PR MOST RECENT DIASTOLIC BLOOD PRESSURE < 80 MM HG: ICD-10-PCS | Mod: CPTII,S$GLB,, | Performed by: INTERNAL MEDICINE

## 2019-06-13 RX ORDER — FEBUXOSTAT 40 MG/1
40 TABLET, FILM COATED ORAL DAILY
Qty: 30 TABLET | Refills: 4 | Status: SHIPPED | OUTPATIENT
Start: 2019-06-13 | End: 2019-08-02

## 2019-06-13 ASSESSMENT — ROUTINE ASSESSMENT OF PATIENT INDEX DATA (RAPID3)
PSYCHOLOGICAL DISTRESS SCORE: 0
PAIN SCORE: 3
FATIGUE SCORE: 4
AM STIFFNESS SCORE: 0, NO
MDHAQ FUNCTION SCORE: 1
TOTAL RAPID3 SCORE: 3.44
PATIENT GLOBAL ASSESSMENT SCORE: 4

## 2019-06-13 NOTE — PROGRESS NOTES
Subjective:       Patient ID: Darrion Bennett is a 69 y.o. male.    Chief Complaint: Follow-up     HPI  69 year old male with type II, cataracts, HTN, gout,  Sjogrens, urolithiasis, CHF, hypothyroidism, CKD here for evaluation.  He reports that he was diagnosed with Sjogrens when he had dry eyes and dry mouth in 2014.. He takes plaquenil 200mg po BID. He is  taking azathioprine 50mg po TID and medrol 4mg po qqday.   He was put on imuran by Dr. Corona.  He was followed by Dr. Corona from 2014 to 2017.  In October 2018, imuran and medrol was stopped. Patient restarted imuran in November 2018.  Reports that he feels that imuran helps him with joint.   Today he denies pain, stiffness or swelling.  He denies sry eyes or dry mouth.  Denies trouble making a fist.    He was diagnosed in January in left aspect of foot with pain, swelling and redness.            Interval history: He had diagnosed initially with cellulitis of right middle finger and then saw orthopedic in may .    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   He recently started allopurinol and had rash. Denies swelling of hands or feet.  Denies rashes, oral ulcers,  raynauds, or pleurisy.      Past Medical History:   Diagnosis Date    Anemia     Arthritis     Cataract     Chronic constipation     Diabetes mellitus, type 2     Glaucoma     Hypertension     MCTD (mixed connective tissue disease)     Sjogren's disease     Ulcerative colitis     Urolithiasis        Review of Systems   Constitutional: Negative for activity change, appetite change, chills, diaphoresis, fatigue and fever.   HENT: Negative for ear discharge, ear pain, hearing loss, mouth sores, nosebleeds and sinus pressure.    Eyes: Negative.  Negative for photophobia, pain, discharge, redness and itching.   Respiratory: Negative for cough, chest tightness and shortness of breath.    Cardiovascular: Negative for chest pain, palpitations and leg swelling.    Gastrointestinal: Negative for abdominal distention, blood in stool and nausea.   Endocrine: Negative for cold intolerance, heat intolerance, polydipsia and polyphagia.   Genitourinary: Negative for flank pain, genital sores and hematuria.   Musculoskeletal: Positive for arthralgias. Negative for back pain, gait problem, joint swelling, myalgias, neck pain and neck stiffness.   Skin: Negative for color change, pallor and rash.   Neurological: Negative for dizziness, weakness, light-headedness and headaches.   Hematological: Negative for adenopathy. Does not bruise/bleed easily.   Psychiatric/Behavioral: Negative for decreased concentration and hallucinations. The patient is not nervous/anxious.              Objective:   BP (!) 147/73   Pulse 100   Wt 75.5 kg (166 lb 7.2 oz)   BMI 26.87 kg/m²      Physical Exam   Constitutional: He is well-developed, well-nourished, and in no distress. No distress.   HENT:   Head: Normocephalic and atraumatic.   Right Ear: External ear normal.   Left Ear: External ear normal.   Nose: Nose normal.   Mouth/Throat: Oropharynx is clear and moist. No oropharyngeal exudate.   Eyes: Conjunctivae and EOM are normal. Pupils are equal, round, and reactive to light. Right eye exhibits no discharge. Left eye exhibits no discharge. No scleral icterus.   Neck: Normal range of motion. Neck supple. No JVD present. No tracheal deviation present. No thyromegaly present.   Cardiovascular: Normal rate, normal heart sounds and intact distal pulses.  Exam reveals no gallop and no friction rub.    No murmur heard.  Pulmonary/Chest: Effort normal. No stridor. No respiratory distress. He has no wheezes. He has no rales. He exhibits no tenderness.   Abdominal: Soft. Bowel sounds are normal. He exhibits no distension and no mass. There is no tenderness. There is no rebound and no guarding.   Lymphadenopathy:     He has no cervical adenopathy.   Neurological: No cranial nerve deficit. Coordination normal.    Skin: Skin is warm and dry. No rash noted. He is not diaphoretic. No erythema. No pallor.     Musculoskeletal: Normal range of motion. He exhibits no edema, tenderness or deformity.     Labs:  Gilma-+  Ssa+  Ssb+  +RNP  Ccp,rf-negative      Right hand xray (5/2019): I personally reviewed; Soft tissue swelling tip of long finger.  Negative for fracture.     Assessment:       69 year old male with type II, cataracts, HTN, gout,  Sjogrens, urolithiasis, CHF, hypothyroidism, CKD here for follow up. He was previously followed by Dr. Lau.  He reports that he was diagnosed with Sjogrens when he had dry eyes and dry mouth in 2014. Serologies are +GILMA, +SSA,+SSB, and +RNP. He denies raynauds, rashes, oral ulcers or symptoms of SLE. His main issue before we met was inflammatory arthritis which was being treated with plaquenil, imuran, and medrol.  I noted proteinuria in last labs so asked him to make sure he follows up with nephrology ASAP.    When we initially met in feb 2019 , he was taking plaquenil 200mg po BID, azathioprine 50mg po TID and medrol 4mg po qqday. His last rheumatologist discontinued his medications and patient decided to restart the medications himself since he was having so much pain. He developed imuran toxicity so all his medications were discontinued.      With regards to gout, he was recently diagnosed with tophaceous gout, but had allergy to allopurinol so will have to use uloric.  Goal uric acid will be less than 5.        Plan:    -nephrology consult  -labs  -plan to start uloric 40mg a day (risks discussed) with labs in a month

## 2019-06-14 ENCOUNTER — PATIENT MESSAGE (OUTPATIENT)
Dept: FAMILY MEDICINE | Facility: CLINIC | Age: 69
End: 2019-06-14

## 2019-06-14 LAB
ALBUMIN SERPL ELPH-MCNC: 3.83 G/DL (ref 3.35–5.55)
ALPHA1 GLOB SERPL ELPH-MCNC: 0.34 G/DL (ref 0.17–0.41)
ALPHA2 GLOB SERPL ELPH-MCNC: 0.9 G/DL (ref 0.43–0.99)
ANTI SM/RNP ANTIBODY: 192.74 EU (ref 0–19.99)
ANTI-SM/RNP INTERPRETATION: POSITIVE
B-GLOBULIN SERPL ELPH-MCNC: 0.87 G/DL (ref 0.5–1.1)
CARDIOLIPIN IGG SER IA-ACNC: <9.4 GPL (ref 0–14.99)
CARDIOLIPIN IGM SER IA-ACNC: <9.4 MPL (ref 0–12.49)
DSDNA AB SER-ACNC: NORMAL [IU]/ML
GAMMA GLOB SERPL ELPH-MCNC: 1.15 G/DL (ref 0.67–1.58)
PATHOLOGIST INTERPRETATION SPE: NORMAL
PROT SERPL-MCNC: 7.1 G/DL (ref 6–8.4)
URATE SERPL-MCNC: 9 MG/DL (ref 3.4–7)

## 2019-06-14 RX ORDER — LUBIPROSTONE 24 UG/1
24 CAPSULE ORAL 2 TIMES DAILY WITH MEALS
Qty: 60 CAPSULE | Refills: 11 | Status: SHIPPED | OUTPATIENT
Start: 2019-06-14 | End: 2020-04-29 | Stop reason: ALTCHOICE

## 2019-06-15 LAB
B2 GLYCOPROT1 IGA SER QL: <9 SAU
B2 GLYCOPROT1 IGG SER QL: <9 SGU
B2 GLYCOPROT1 IGM SER QL: <9 SMU

## 2019-06-17 ENCOUNTER — PATIENT MESSAGE (OUTPATIENT)
Dept: FAMILY MEDICINE | Facility: CLINIC | Age: 69
End: 2019-06-17

## 2019-06-17 RX ORDER — ALLOPURINOL 100 MG/1
TABLET ORAL
Qty: 90 TABLET | Refills: 0 | Status: SHIPPED | OUTPATIENT
Start: 2019-06-17 | End: 2019-07-08 | Stop reason: SINTOL

## 2019-06-19 LAB — T-SPOT TB SCREENING TEST: NORMAL

## 2019-06-21 LAB
APTT HEX PL PPP: POSITIVE S
LA PPP-IMP: POSITIVE

## 2019-06-22 ENCOUNTER — PATIENT MESSAGE (OUTPATIENT)
Dept: RHEUMATOLOGY | Facility: CLINIC | Age: 69
End: 2019-06-22

## 2019-06-22 DIAGNOSIS — L50.9 URTICARIA: ICD-10-CM

## 2019-06-22 RX ORDER — LEVOCETIRIZINE DIHYDROCHLORIDE 5 MG/1
TABLET, FILM COATED ORAL
Qty: 30 TABLET | Refills: 5 | Status: SHIPPED | OUTPATIENT
Start: 2019-06-22 | End: 2019-07-08

## 2019-06-25 ENCOUNTER — PATIENT MESSAGE (OUTPATIENT)
Dept: RHEUMATOLOGY | Facility: CLINIC | Age: 69
End: 2019-06-25

## 2019-06-25 ENCOUNTER — TELEPHONE (OUTPATIENT)
Dept: RHEUMATOLOGY | Facility: CLINIC | Age: 69
End: 2019-06-25

## 2019-06-25 DIAGNOSIS — M10.9 GOUTY ARTHRITIS: Primary | ICD-10-CM

## 2019-06-26 ENCOUNTER — PATIENT OUTREACH (OUTPATIENT)
Dept: ADMINISTRATIVE | Facility: HOSPITAL | Age: 69
End: 2019-06-26

## 2019-07-01 ENCOUNTER — OFFICE VISIT (OUTPATIENT)
Dept: ORTHOPEDICS | Facility: CLINIC | Age: 69
End: 2019-07-01
Payer: MEDICARE

## 2019-07-01 VITALS
HEIGHT: 65 IN | BODY MASS INDEX: 27.58 KG/M2 | HEART RATE: 61 BPM | DIASTOLIC BLOOD PRESSURE: 60 MMHG | SYSTOLIC BLOOD PRESSURE: 112 MMHG | WEIGHT: 165.56 LBS

## 2019-07-01 DIAGNOSIS — M1A.9XX1 TOPHACEOUS GOUT: Primary | ICD-10-CM

## 2019-07-01 PROCEDURE — 3074F PR MOST RECENT SYSTOLIC BLOOD PRESSURE < 130 MM HG: ICD-10-PCS | Mod: CPTII,S$GLB,, | Performed by: ORTHOPAEDIC SURGERY

## 2019-07-01 PROCEDURE — 3074F SYST BP LT 130 MM HG: CPT | Mod: CPTII,S$GLB,, | Performed by: ORTHOPAEDIC SURGERY

## 2019-07-01 PROCEDURE — 3078F DIAST BP <80 MM HG: CPT | Mod: CPTII,S$GLB,, | Performed by: ORTHOPAEDIC SURGERY

## 2019-07-01 PROCEDURE — 1101F PR PT FALLS ASSESS DOC 0-1 FALLS W/OUT INJ PAST YR: ICD-10-PCS | Mod: CPTII,S$GLB,, | Performed by: ORTHOPAEDIC SURGERY

## 2019-07-01 PROCEDURE — 99213 PR OFFICE/OUTPT VISIT, EST, LEVL III, 20-29 MIN: ICD-10-PCS | Mod: S$GLB,,, | Performed by: ORTHOPAEDIC SURGERY

## 2019-07-01 PROCEDURE — 3078F PR MOST RECENT DIASTOLIC BLOOD PRESSURE < 80 MM HG: ICD-10-PCS | Mod: CPTII,S$GLB,, | Performed by: ORTHOPAEDIC SURGERY

## 2019-07-01 PROCEDURE — 99999 PR PBB SHADOW E&M-EST. PATIENT-LVL IV: ICD-10-PCS | Mod: PBBFAC,,, | Performed by: ORTHOPAEDIC SURGERY

## 2019-07-01 PROCEDURE — 99999 PR PBB SHADOW E&M-EST. PATIENT-LVL IV: CPT | Mod: PBBFAC,,, | Performed by: ORTHOPAEDIC SURGERY

## 2019-07-01 PROCEDURE — 99213 OFFICE O/P EST LOW 20 MIN: CPT | Mod: S$GLB,,, | Performed by: ORTHOPAEDIC SURGERY

## 2019-07-01 PROCEDURE — 1101F PT FALLS ASSESS-DOCD LE1/YR: CPT | Mod: CPTII,S$GLB,, | Performed by: ORTHOPAEDIC SURGERY

## 2019-07-01 NOTE — PROGRESS NOTES
Follow up visit    History of Present Illness:   Darrion comes to the office for follow up evaluation of right long finger romain gay.  He was treated with I&D and PO abx.  He is here today for wound check.  The finger does not bother him most the time. Occasionally he does have some burning pain and some redness over the finger tip but overall he is happy with his progress    ROS: unremarkable and no change since last visit    Physical Examination:    NAD  Right hand   Wound over long finger tip healed   No erythema or swelling  FROM all digits  LTSI m/u/r  2+ RP  + EPL, IO, FDS, FDP    Radiographic imaging:  No new images    Assessment/Plan:  69 y.o. male  with Right long finger romain, tophaceous gout     Patient is improving with conservative management.   1. Oral antiinflammatory agents (Aleve/Advil/Tylenol-OTC, prn if tolerated)     2. Continue gout treatment  3. Follow-up as needed    All questions were answered in detail. The patient  verbalized the understanding of the treatment plan and is in full agreement with the treatment plan.

## 2019-07-04 ENCOUNTER — PATIENT MESSAGE (OUTPATIENT)
Dept: FAMILY MEDICINE | Facility: CLINIC | Age: 69
End: 2019-07-04

## 2019-07-05 PROBLEM — I50.22 CHRONIC SYSTOLIC CONGESTIVE HEART FAILURE: Status: RESOLVED | Noted: 2019-05-02 | Resolved: 2019-07-05

## 2019-07-08 ENCOUNTER — PATIENT MESSAGE (OUTPATIENT)
Dept: ENDOCRINOLOGY | Facility: CLINIC | Age: 69
End: 2019-07-08

## 2019-07-08 ENCOUNTER — OFFICE VISIT (OUTPATIENT)
Dept: ENDOCRINOLOGY | Facility: CLINIC | Age: 69
End: 2019-07-08
Payer: MEDICARE

## 2019-07-08 VITALS
DIASTOLIC BLOOD PRESSURE: 56 MMHG | WEIGHT: 165.31 LBS | SYSTOLIC BLOOD PRESSURE: 111 MMHG | HEART RATE: 52 BPM | BODY MASS INDEX: 27.51 KG/M2

## 2019-07-08 DIAGNOSIS — E78.00 PURE HYPERCHOLESTEROLEMIA: ICD-10-CM

## 2019-07-08 DIAGNOSIS — E11.8 CONTROLLED TYPE 2 DIABETES MELLITUS WITH COMPLICATION, WITHOUT LONG-TERM CURRENT USE OF INSULIN: Primary | ICD-10-CM

## 2019-07-08 DIAGNOSIS — I10 ESSENTIAL HYPERTENSION: ICD-10-CM

## 2019-07-08 PROCEDURE — 99999 PR PBB SHADOW E&M-EST. PATIENT-LVL IV: CPT | Mod: PBBFAC,,, | Performed by: NURSE PRACTITIONER

## 2019-07-08 PROCEDURE — 3074F PR MOST RECENT SYSTOLIC BLOOD PRESSURE < 130 MM HG: ICD-10-PCS | Mod: CPTII,S$GLB,, | Performed by: NURSE PRACTITIONER

## 2019-07-08 PROCEDURE — 3044F HG A1C LEVEL LT 7.0%: CPT | Mod: CPTII,S$GLB,, | Performed by: NURSE PRACTITIONER

## 2019-07-08 PROCEDURE — 1101F PT FALLS ASSESS-DOCD LE1/YR: CPT | Mod: CPTII,S$GLB,, | Performed by: NURSE PRACTITIONER

## 2019-07-08 PROCEDURE — 3044F PR MOST RECENT HEMOGLOBIN A1C LEVEL <7.0%: ICD-10-PCS | Mod: CPTII,S$GLB,, | Performed by: NURSE PRACTITIONER

## 2019-07-08 PROCEDURE — 99999 PR PBB SHADOW E&M-EST. PATIENT-LVL IV: ICD-10-PCS | Mod: PBBFAC,,, | Performed by: NURSE PRACTITIONER

## 2019-07-08 PROCEDURE — 1101F PR PT FALLS ASSESS DOC 0-1 FALLS W/OUT INJ PAST YR: ICD-10-PCS | Mod: CPTII,S$GLB,, | Performed by: NURSE PRACTITIONER

## 2019-07-08 PROCEDURE — 99214 OFFICE O/P EST MOD 30 MIN: CPT | Mod: S$GLB,,, | Performed by: NURSE PRACTITIONER

## 2019-07-08 PROCEDURE — 3078F DIAST BP <80 MM HG: CPT | Mod: CPTII,S$GLB,, | Performed by: NURSE PRACTITIONER

## 2019-07-08 PROCEDURE — 3078F PR MOST RECENT DIASTOLIC BLOOD PRESSURE < 80 MM HG: ICD-10-PCS | Mod: CPTII,S$GLB,, | Performed by: NURSE PRACTITIONER

## 2019-07-08 PROCEDURE — 99214 PR OFFICE/OUTPT VISIT, EST, LEVL IV, 30-39 MIN: ICD-10-PCS | Mod: S$GLB,,, | Performed by: NURSE PRACTITIONER

## 2019-07-08 PROCEDURE — 3074F SYST BP LT 130 MM HG: CPT | Mod: CPTII,S$GLB,, | Performed by: NURSE PRACTITIONER

## 2019-07-08 RX ORDER — SYRING-NEEDL,DISP,INSUL,0.3 ML 31GX15/64"
SYRINGE, EMPTY DISPOSABLE MISCELLANEOUS
Refills: 3 | COMMUNITY
Start: 2019-06-06 | End: 2019-10-10 | Stop reason: ALTCHOICE

## 2019-07-08 NOTE — LETTER
July 8, 2019      Farooq Domingo MD  [address] [place] [address]           [facility]  [address], [place] [address]  Phone: [phone]  Fax: [phone]          Patient: Darrion Bennett   MR Number: [id number]   YOB: 1950   Date of Visit: 7/8/2019       Dear Dr. Farooq Domingo:    Thank you for referring Darrion Bennett to me for evaluation. Attached you will find relevant portions of my assessment and plan of care.    If you have questions, please do not hesitate to call me. I look forward to following Darrion Bennett along with you.    Sincerely,    Bianca Mares NP    Enclosure  CC:  No Recipients    If you would like to receive this communication electronically, please contact [email or web address] or [phone] to request more information on [employer] access.    For providers and/or their staff who would like to refer a patient to [facility], please contact us through our one-stop-shop provider referral line, [facility], at [phone].    If you feel you have received this communication in error or would no longer like to receive these types of communications, please e-mail [email or web address]

## 2019-07-08 NOTE — PROGRESS NOTES
"CC: This 69 y.o.  male  is here for evaluation of  T2DM along with comorbidities indicated in the Visit Diagnosis section of this encounter.    HPI: Darrion Bennett was diagnosed with T2DM at age 35. Oral medications started years later.      New to Endocrine.    Previously seen by Dr. Marrufo's office, Endocrinology at [place]. He is transferring all his care to [facility].   Patient states that his Humalog dose was cut down in May from 5 units ac to 2-5 units ac per Mya Harvey NP, at Dr. Marrufo's office, d/t low A1c of 5.9%.     [facility] - 4/23/19  Total chol 188 - HDL 44 - trig 98 -   Creatinine 1.37   eGFR non AA - 52   A1c 5.9%   Hemoglobin 10/hematocrit 31  Vitamin D 42      PMHX: Sjogren's syndrome - sees Rheumatology     LAST DIABETES EDUCATION: multiple times, years ago     HOSPITALIZED FOR DIABETES  -  No     PRESCRIBED DIABETES MEDICATIONS: Tradjenta 5 mg once daily, Lantus (vial) 10 units hs, Humalog (vial) 1-5 units ac     Misses medication doses - No    DM COMPLICATIONS: nephropathy    SIGNIFICANT DIABETES MED HISTORY: has been told that metformin is bad for his kidneys and that's why he stopped it     SELF MONITORING BLOOD GLUCOSE: Checks blood glucose at home 1x/day. Mostly fasting glucoses ; 136 today is high for him, r/t eating candy last night. Usually less than 100; as low as 70s.   Checks BG more often if he feels BG is high - Feels dry mouth and fatigue if his BG is in the 200s.     HYPOGLYCEMIC EPISODES: denies symptoms; has no symptoms at BGs of 70s      CURRENT DIET: drinks water, diet soda; has some juice to take with his meds. Eats 3 meals/day but does not have an appetite to eat. Breakfast today was regular Cheerios with milk. Dinner was "bad" and starchy, typical [place] food - his wife brings food home from [place] on Sundays.     CURRENT EXERCISE: none x 2 months - exercise limited by joint pains, fatigue, stiffness     SOCIAL: his son is neurologist, daughter in law " is gastroenterologist, daughter is internal med resident       BP (!) 111/56 (BP Location: Left arm, Patient Position: Sitting, BP Method: Large (Automatic))   Pulse (!) 52   Wt 75 kg (165 lb 4.8 oz)   BMI 27.51 kg/m²     ROS:   CONSTITUTIONAL: Appetite - low, + fatigue  SKIN: No rash or pruritis   EYES: No visual disturbances  RESPIRATORY: No shortness of breath or cough  CARDIAC: No chest pain or palpitations  GI:  + chronic constipation   : No urinary frequency or dysuria   MS: + generalized arthralgias and back pain    NEURO: No paresthesias or tremors.   OTHER: + dry mouth       PHYSICAL EXAM:  GENERAL: Well developed, well nourished. No acute distress.   PSYCH: AAOx3, appropriate mood and affect, conversant, well-groomed. Judgement and insight good.   NEURO: Cranial nerves grossly intact. Speech clear, no tremor.   NECK: Trachea midline, no thyromegaly or lymphadenopathy.   CHEST: Respirations even and unlabored. CTA bilaterally.  CARDIOVASCULAR: Regular rate and rhythm. No bruits. No murmur. + 1 pitting edema to BLE   ABDOMEN: Soft, non-tender, non-distended. Bowel sounds present.   MS: Gait steady. No clubbing.   SKIN: Normal skin turgor. Skin warm and dry. No areas of breakdown. No acanthosis nigricans.          Hemoglobin A1C   Date Value Ref Range Status   06/11/2019 6.9 (H) 4.0 - 5.6 % Final     Comment:     ADA Screening Guidelines:  5.7-6.4%  Consistent with prediabetes  >or=6.5%  Consistent with diabetes  High levels of fetal hemoglobin interfere with the HbA1C  assay. Heterozygous hemoglobin variants (HbS, HgC, etc)do  not significantly interfere with this assay.   However, presence of multiple variants may affect accuracy.     10/16/2018 6.2 (H) 4.0 - 5.7 % Final     Comment:     Dr. Jurgen Ward ( Endocrinology )   05/30/2014 7.9 (H) 4.8 - 5.6 % Final     Comment:              Increased risk for diabetes: 5.7 - 6.4           Diabetes: >6.4           Glycemic control for adults with  diabetes: <7.0             Chemistry        Component Value Date/Time     (L) 06/11/2019 0949    K 4.4 06/11/2019 0949     06/11/2019 0949    CO2 23 06/11/2019 0949    BUN 29 (H) 06/11/2019 0949    CREATININE 1.4 06/11/2019 0949     (H) 06/11/2019 0949        Component Value Date/Time    CALCIUM 9.6 06/11/2019 0949    ALKPHOS 69 06/11/2019 0949    AST 17 06/11/2019 0949    ALT 13 06/11/2019 0949    BILITOT 0.3 06/11/2019 0949    ESTGFRAFRICA 58.8 (A) 06/11/2019 0949    EGFRNONAA 50.9 (A) 06/11/2019 0949          Lab Results   Component Value Date    LDLCALC 59 10/16/2018       Lab Results   Component Value Date    MICALBCREAT 519 (H) 10/16/2018               ASSESSMENT and PLAN:    A1C GOAL: 7 %     1. Controlled type 2 diabetes mellitus with complication, without long-term current use of insulin  Will try to limit hypoglycemia risk by eliminating prandial insulin. He is taking very minimal amounts anyway.   Start combination basal insulin with GLP1-RA.     Start Xultophy (degludec insulin/liraglutide) 10 units once daily.   Stop Lantus since Xultophy includes a long acting insulin already.   Stop Tradjenta since liraglutide is a stronger version of it.   Stop Humalog.     Monitor blood sugar 2x/day - fasting and bedtime or before dinner.   Keep a blood sugar log and bring to each visit.     Return to clinic in 4 weeks.      2. Essential hypertension  controlled   3. Pure hypercholesterolemia  Continue crestor        No orders of the defined types were placed in this encounter.       Follow up in about 1 month (around 8/5/2019).     Thank you very much for allowing me to participate in Darrion Serranore's care.

## 2019-07-08 NOTE — PATIENT INSTRUCTIONS
Will try to limit hypoglycemia risk by eliminating prandial insulin. He is taking very minimal amounts anyway.   Start combination basal insulin with GLP1-RA.     Start Xultophy (degludec insulin/liraglutide) 10 units once daily.   Stop Lantus since Xultophy includes a long acting insulin already.   Stop Tradjenta since liraglutide is a stronger version of it.   Stop Humalog.     Monitor blood sugar 2x/day - fasting and bedtime or before dinner.   Keep a blood sugar log and bring to each visit.     Return to clinic in 4 weeks.

## 2019-07-09 ENCOUNTER — PATIENT MESSAGE (OUTPATIENT)
Dept: ENDOCRINOLOGY | Facility: CLINIC | Age: 69
End: 2019-07-09

## 2019-07-09 ENCOUNTER — TELEPHONE (OUTPATIENT)
Dept: ENDOCRINOLOGY | Facility: CLINIC | Age: 69
End: 2019-07-09

## 2019-07-09 RX ORDER — PEN NEEDLE, DIABETIC 30 GX3/16"
1 NEEDLE, DISPOSABLE MISCELLANEOUS DAILY
Qty: 100 EACH | Refills: 3 | Status: SHIPPED | OUTPATIENT
Start: 2019-07-09 | End: 2019-10-10 | Stop reason: SDUPTHER

## 2019-07-09 NOTE — TELEPHONE ENCOUNTER
----- Message from Mary Fuentes LPN sent at 7/9/2019  9:02 AM CDT -----  Contact: Self       ----- Message -----  From: Candy Pelaez  Sent: 7/9/2019   8:14 AM  To: [facility] Trivedi Staff    Type: Patient Call Back    What is the request in detail: Pt calling to speak to a nurse regarding his insulin needles.    Can the clinic reply by [facility]? no    Would the patient rather a call back or a response via [facility]? call    Best call back number: [phone]

## 2019-07-09 NOTE — PROGRESS NOTES
This note was created by combination of typed  and Dragon dictation.  Transcription errors may be present.  If there are any questions, please contact me.    Assessment & Plan:   Malaise and fatigue  Unspecified symptoms and signs involving cognitive functions and awareness   -unclear etiology.  No fever on intake.  Lung exam is unremarkable.  Slight pitting edema to the legs.  Recent echo showed normal systolic and diastolic function.  He has been taking torsemide on a regular basis.  His blood pressures are reasonable and he is only taking amlodipine 5 currently.  Not taking valsartan.  He is still taking clonidine.  Check CBC, CMP.  Check BNP.  Checking EKG.  Check chest x-ray, urinalysis and urine culture.  Recalls previous episode that seem to improve with B12 subcutaneous.  No formal diagnosis of B12 deficiency however.  I would check a B12 level.  If normal I would not supplement.  -     Urinalysis; Future; Expected date: 07/10/2019  -     Urine culture; Future; Expected date: 07/10/2019  -     X-Ray Chest PA And Lateral; Future; Expected date: 07/10/2019  -     Brain natriuretic peptide; Future; Expected date: 07/10/2019  -     EKG 12-lead  -     Vitamin B12; Future; Expected date: 07/10/2019    Chronic systolic congestive heart failure  Essential hypertension, benign  -he needs a refill on his valsartan 160 b.i.d..  And amlodipine 10.  Echo recently showed normal systolic and diastolic function.  I have asked him to hold off on his valsartan for the time being with his blood pressures being controlled currently with amlodipine 5.  -     valsartan (DIOVAN) 160 MG tablet; Take 1 tablet (160 mg total) by mouth 2 (two) times daily.  Dispense: 180 tablet; Refill: 3  -     amLODIPine (NORVASC) 10 MG tablet; Take 1 tablet (10 mg total) by mouth once daily.  Dispense: 90 tablet; Refill: 3    Medications Discontinued During This Encounter   Medication Reason    valsartan (DIOVAN) 160 MG tablet Reorder     "amLODIPine (NORVASC) 10 MG tablet Reorder       meds sent this encounter:  Medications Ordered This Encounter   Medications    amLODIPine (NORVASC) 10 MG tablet     Sig: Take 1 tablet (10 mg total) by mouth once daily.     Dispense:  90 tablet     Refill:  3    valsartan (DIOVAN) 160 MG tablet     Sig: Take 1 tablet (160 mg total) by mouth 2 (two) times daily.     Dispense:  180 tablet     Refill:  3       Follow Up: No follow-ups on file.    Subjective:     Chief Complaint   Patient presents with    Fatigue     times two weeks    Generalized Body Aches     times two weeks     Dry mouth       HPI  Darrion is a 69 y.o. male, last appointment with this clinic was 5/31/2019.    Gout, with felon on finger, allergic rxn to allopurinol, rheum started on uloric  Saw ortho in follow up - stable  Rheum called in uloric  Proteinuria referred to nephrology  Constipation rx sent for amitiza high dose.  Saw cardiology - no CHF on TTE (was told years ago he had heart failure)  Sx of claudication but normal ABIs - cardiology just wanted to monitor this.  Endo DM NP changed insulin to combo insulin    2 weeks sensation of chills in the evening; weakness; arthralgias. Sleepy. He's checking BP and they have been systolic 130, but has been taking amlodipine 5 and not 10 mg. And not taking valsartan. Is taking torsemide and clonidine.  Out of valsartan and pharmacy has not refilled "too early". Has been out of valsartan x 1 week and taking amlodipine 5.  Last rx was for 10 mg. He needs rx's re-sent for amlodipine 10 and valsartan 160 BID to pharmacy.    Minimal cough. Urination is OK. No burning but urinary hesitancy. That is chronic/not new. Constipation chronic.  Did get amitiza approved.     Symptoms preceded change of insulin regimen.     Recalls similar episode previously a few years ago and was given B12 SC. This was a few years ago.  Never formal dx of B12 deficiency.     Taking torsemide daily longstanding; changed " allopurinol to uloric. 6/13/19    Answers for HPI/ROS submitted by the patient on 7/3/2019   activity change: No  unexpected weight change: No  rhinorrhea: No  trouble swallowing: No  visual disturbance: No  chest tightness: No  polyuria: No  difficulty urinating: No  joint swelling: Yes  arthralgias: Yes  confusion: No  dysphoric mood: No      Patient Care Team:  Farooq Domingo MD as PCP - General (Internal Medicine)  Tin Chambers MD (Gastroenterology)  Harrison Brannon MD (Cardiology)  Roxanna Salazar MD as Nurse Practitioner (Rheumatology)  Malcolm Irwin MD as  (Nephrology)  Amy Gamez MA as Care Coordinator    Patient Active Problem List    Diagnosis Date Noted    Felon of finger of right hand 05/11/2019    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 05/02/2019    Anemia of chronic renal failure, stage 3 (moderate) 03/13/2019    Current chronic use of systemic steroids 02/27/2019    Compression fracture of body of thoracic vertebra on XR 2/2019; 2/2019 alendronate 02/27/2019    Neutropenia 02/26/2019    Thrombocytopenia 02/26/2019    Connective tissue disorder 02/26/2019    Weight gain 03/14/2016    Jackson's esophagus without dysplasia 03/14/2016    Anemia from plaquenil and imuran 03/14/2016    Body mass index 29.0-29.9, adult 07/15/2014    Thyroid dysfunction 07/15/2014    Chronic kidney disease, stage III (moderate) 07/15/2014    Essential hypertension 05/13/2014 6/2019 TTE normal LV systolic and diastolic function; ABIs normal      Controlled type 2 diabetes mellitus with complication, without long-term current use of insulin 05/13/2014    Pure hypercholesterolemia 05/13/2014    MCTD (mixed connective tissue disease) 05/13/2014    Sjogren's disease 05/13/2014     Bilateral edema of lower extremity 6/2019 TTE normal LV systolic and diastolic function; ABIs normal 05/13/2014    Glaucoma 05/13/2014       PAST MEDICAL HISTORY:  Past Medical History:   Diagnosis Date    Anemia     Arthritis     Cataract     Chronic constipation     Diabetes mellitus, type 2     Glaucoma     Hyperlipidemia 5/13/2014    Hypertension     MCTD (mixed connective tissue disease)     Sjogren's disease     Ulcerative colitis     Urolithiasis        PAST SURGICAL HISTORY:  Past Surgical History:   Procedure Laterality Date    COLONOSCOPY  2014    EYE SURGERY         SOCIAL HISTORY:  Social History     Socioeconomic History    Marital status:      Spouse name: Not on file    Number of children: 3    Years of education: Not on file    Highest education level: Not on file   Occupational History    Not on file   Social Needs    Financial resource strain: Somewhat hard    Food insecurity:     Worry: Never true     Inability: Never true    Transportation needs:     Medical: No     Non-medical: No   Tobacco Use    Smoking status: Never Smoker    Smokeless tobacco: Never Used   Substance and Sexual Activity    Alcohol use: No     Frequency: Never     Drinks per session: Patient refused     Binge frequency: Never    Drug use: No    Sexual activity: Never   Lifestyle    Physical activity:     Days per week: 3 days     Minutes per session: Not on file    Stress: Only a little   Relationships    Social connections:     Talks on phone: Once a week     Gets together: Once a week     Attends Church service: Not on file     Active member of club or organization: No     Attends meetings of clubs or organizations: Never     Relationship status:    Other Topics Concern    Not on file   Social History Narrative    Not on file       ALLERGIES AND MEDICATIONS: updated and reviewed.  Review of patient's allergies indicates:   Allergen Reactions    Penicillins Nausea And  "Vomiting    Allopurinol analogues Rash     Current Outpatient Medications   Medication Sig Dispense Refill    alendronate (FOSAMAX) 70 MG tablet Take 1 tablet (70 mg total) by mouth every 7 days. 4 tablet 11    amLODIPine (NORVASC) 10 MG tablet Take 1 tablet (10 mg total) by mouth once daily. 90 tablet 3    aspirin (ECOTRIN) 81 MG EC tablet Take 81 mg by mouth once daily.      BD VEO INSULIN SYRINGE UF 1/2 mL 31 gauge x 15/64" Syrg BASAL/BOLUS INSULIN 4-5X/DAY  3    cloNIDine 0.1 mg/24 hr td ptwk (CATAPRES) 0.1 mg/24 hr Place 1 patch onto the skin every 7 days. 4 patch 11    colchicine (COLCRYS) 0.6 mg tablet Take 1 tablet (0.6 mg total) by mouth once daily. for 7 days 7 tablet 0    dorzolamide (TRUSOPT) 2 % ophthalmic solution       febuxostat (ULORIC) 40 mg Tab Take 1 tablet (40 mg total) by mouth once daily. 30 tablet 4    insulin degludec-liraglutide (XULTOPHY 100/3.6) 100 unit-3.6 mg /mL (3 mL) InPn Inject 16 Units into the skin once daily. 5 Syringe 0    ipratropium (ATROVENT) 42 mcg (0.06 %) nasal spray INSTILL 2 SPRAYS BY NASAL ROUTE 3 TIMES DAILY. AS NEEDED FOR NASAL CONGESTION AND DRIP FOR 7 DAYS 30 mL 0    lubiprostone (AMITIZA) 24 MCG Cap Take 1 capsule (24 mcg total) by mouth 2 (two) times daily with meals. 60 capsule 11    multivit with min-folic acid 200 mcg Chew       pen needle, diabetic (BD ULTRA-FINE RICHARD PEN NEEDLE) 32 gauge x 5/32" Ndle 1 Device by Misc.(Non-Drug; Combo Route) route once daily. 100 each 3    polyethylene glycol (GLYCOLAX) 17 gram/dose powder Take 17 g by mouth once daily. 850 g 11    rosuvastatin (CRESTOR) 10 MG tablet Take 1 tablet (10 mg total) by mouth once daily. 90 tablet 3    torsemide (DEMADEX) 10 MG Tab Take 10 mg by mouth once daily.  3    TRAVATAN Z 0.004 % Drop       valsartan (DIOVAN) 160 MG tablet Take 1 tablet (160 mg total) by mouth 2 (two) times daily. 180 tablet 3    HYDROcodone-acetaminophen (NORCO) 5-325 mg per tablet Take 1 tablet by mouth " "every 12 (twelve) hours as needed for Pain. 14 tablet 0    hydroxychloroquine (PLAQUENIL) 200 mg tablet Take 1 tablet (200 mg total) by mouth 2 (two) times daily. 60 tablet 6     No current facility-administered medications for this visit.        Review of Systems   HENT: Negative for hearing loss.    Eyes: Negative for discharge.   Respiratory: Negative for wheezing.    Cardiovascular: Negative for chest pain and palpitations.   Gastrointestinal: Positive for constipation. Negative for blood in stool, diarrhea and vomiting.   Genitourinary: Negative for hematuria and urgency.   Musculoskeletal: Negative for neck pain.   Neurological: Positive for weakness. Negative for headaches.   Endo/Heme/Allergies: Negative for polydipsia.       Objective:   Physical Exam   Vitals:    07/10/19 0905   BP: 132/62   BP Location: Left arm   Patient Position: Sitting   BP Method: Medium (Manual)   Pulse: (!) 57   Temp: 99.1 °F (37.3 °C)   TempSrc: Oral   SpO2: 99%   Weight: 74.4 kg (164 lb 0.4 oz)   Height: 5' 5" (1.651 m)    Body mass index is 27.29 kg/m².  Weight: 74.4 kg (164 lb 0.4 oz)   Height: 5' 5" (165.1 cm)     Physical Exam   Constitutional: He is oriented to person, place, and time. He appears well-developed and well-nourished. No distress.   Tired but nontoxic appearing   Eyes: EOM are normal.   Cardiovascular: Normal rate, regular rhythm and normal heart sounds.   No murmur heard.  No JVD I could see   Pulmonary/Chest: Effort normal and breath sounds normal.   Abdominal: Soft. He exhibits no distension. There is no tenderness. There is no guarding.   Musculoskeletal: Normal range of motion. He exhibits edema.   Slight pitting edema ankles bilaterally   Neurological: He is alert and oriented to person, place, and time. Coordination normal.   Skin: Skin is warm and dry.   Psychiatric: He has a normal mood and affect. His behavior is normal. Thought content normal.       eKG no acute findings. 1st degree AVB  "

## 2019-07-10 ENCOUNTER — OFFICE VISIT (OUTPATIENT)
Dept: FAMILY MEDICINE | Facility: CLINIC | Age: 69
End: 2019-07-10
Payer: MEDICARE

## 2019-07-10 ENCOUNTER — HOSPITAL ENCOUNTER (OUTPATIENT)
Dept: RADIOLOGY | Facility: HOSPITAL | Age: 69
Discharge: HOME | End: 2019-07-10
Attending: INTERNAL MEDICINE
Payer: MEDICARE

## 2019-07-10 VITALS
WEIGHT: 164 LBS | HEART RATE: 57 BPM | TEMPERATURE: 99 F | SYSTOLIC BLOOD PRESSURE: 132 MMHG | HEIGHT: 65 IN | OXYGEN SATURATION: 99 % | DIASTOLIC BLOOD PRESSURE: 62 MMHG | BODY MASS INDEX: 27.32 KG/M2

## 2019-07-10 DIAGNOSIS — R53.83 MALAISE AND FATIGUE: ICD-10-CM

## 2019-07-10 DIAGNOSIS — R53.81 MALAISE AND FATIGUE: ICD-10-CM

## 2019-07-10 DIAGNOSIS — I50.22 CHRONIC SYSTOLIC CONGESTIVE HEART FAILURE: ICD-10-CM

## 2019-07-10 DIAGNOSIS — I10 ESSENTIAL HYPERTENSION, BENIGN: ICD-10-CM

## 2019-07-10 DIAGNOSIS — R53.81 MALAISE AND FATIGUE: Primary | ICD-10-CM

## 2019-07-10 DIAGNOSIS — R53.83 MALAISE AND FATIGUE: Primary | ICD-10-CM

## 2019-07-10 DIAGNOSIS — R41.9 UNSPECIFIED SYMPTOMS AND SIGNS INVOLVING COGNITIVE FUNCTIONS AND AWARENESS: ICD-10-CM

## 2019-07-10 PROCEDURE — 3075F PR MOST RECENT SYSTOLIC BLOOD PRESS GE 130-139MM HG: ICD-10-PCS | Mod: CPTII,S$GLB,, | Performed by: INTERNAL MEDICINE

## 2019-07-10 PROCEDURE — 71046 X-RAY EXAM CHEST 2 VIEWS: CPT | Mod: 26,,, | Performed by: RADIOLOGY

## 2019-07-10 PROCEDURE — 93010 EKG 12-LEAD: ICD-10-PCS | Mod: S$GLB,,, | Performed by: INTERNAL MEDICINE

## 2019-07-10 PROCEDURE — 1101F PT FALLS ASSESS-DOCD LE1/YR: CPT | Mod: CPTII,S$GLB,, | Performed by: INTERNAL MEDICINE

## 2019-07-10 PROCEDURE — 3078F DIAST BP <80 MM HG: CPT | Mod: CPTII,S$GLB,, | Performed by: INTERNAL MEDICINE

## 2019-07-10 PROCEDURE — 93005 ELECTROCARDIOGRAM TRACING: CPT | Mod: S$GLB,,, | Performed by: INTERNAL MEDICINE

## 2019-07-10 PROCEDURE — 1101F PR PT FALLS ASSESS DOC 0-1 FALLS W/OUT INJ PAST YR: ICD-10-PCS | Mod: CPTII,S$GLB,, | Performed by: INTERNAL MEDICINE

## 2019-07-10 PROCEDURE — 3078F PR MOST RECENT DIASTOLIC BLOOD PRESSURE < 80 MM HG: ICD-10-PCS | Mod: CPTII,S$GLB,, | Performed by: INTERNAL MEDICINE

## 2019-07-10 PROCEDURE — 99999 PR PBB SHADOW E&M-EST. PATIENT-LVL III: ICD-10-PCS | Mod: PBBFAC,,, | Performed by: INTERNAL MEDICINE

## 2019-07-10 PROCEDURE — 99999 PR PBB SHADOW E&M-EST. PATIENT-LVL III: CPT | Mod: PBBFAC,,, | Performed by: INTERNAL MEDICINE

## 2019-07-10 PROCEDURE — 99214 OFFICE O/P EST MOD 30 MIN: CPT | Mod: S$GLB,,, | Performed by: INTERNAL MEDICINE

## 2019-07-10 PROCEDURE — 93005 EKG 12-LEAD: ICD-10-PCS | Mod: S$GLB,,, | Performed by: INTERNAL MEDICINE

## 2019-07-10 PROCEDURE — 71046 X-RAY EXAM CHEST 2 VIEWS: CPT | Mod: TC,FY,PO

## 2019-07-10 PROCEDURE — 93010 ELECTROCARDIOGRAM REPORT: CPT | Mod: S$GLB,,, | Performed by: INTERNAL MEDICINE

## 2019-07-10 PROCEDURE — 99214 PR OFFICE/OUTPT VISIT, EST, LEVL IV, 30-39 MIN: ICD-10-PCS | Mod: S$GLB,,, | Performed by: INTERNAL MEDICINE

## 2019-07-10 PROCEDURE — 71046 XR CHEST PA AND LATERAL: ICD-10-PCS | Mod: 26,,, | Performed by: RADIOLOGY

## 2019-07-10 PROCEDURE — 3075F SYST BP GE 130 - 139MM HG: CPT | Mod: CPTII,S$GLB,, | Performed by: INTERNAL MEDICINE

## 2019-07-10 RX ORDER — VALSARTAN 160 MG/1
160 TABLET ORAL 2 TIMES DAILY
Qty: 180 TABLET | Refills: 3 | Status: SHIPPED | OUTPATIENT
Start: 2019-07-10 | End: 2019-08-02 | Stop reason: SDUPTHER

## 2019-07-10 RX ORDER — AMLODIPINE BESYLATE 10 MG/1
10 TABLET ORAL DAILY
Qty: 90 TABLET | Refills: 3 | Status: SHIPPED | OUTPATIENT
Start: 2019-07-10 | End: 2019-08-02

## 2019-07-10 NOTE — PATIENT INSTRUCTIONS
For right now, stay on the amlodipine 5 mg.  Hold on the valsartan while your blood pressure is low.

## 2019-07-10 NOTE — PROGRESS NOTES
1. Borderline cardiomegaly.  2. No acute pulmonary process.  3. Chronic wedge compression fracture of T12.    CXR no pneumonia await labs/urine; results to pt via [facility].

## 2019-07-11 ENCOUNTER — TELEPHONE (OUTPATIENT)
Dept: FAMILY MEDICINE | Facility: CLINIC | Age: 69
End: 2019-07-11

## 2019-07-11 ENCOUNTER — PATIENT MESSAGE (OUTPATIENT)
Dept: CARDIOLOGY | Facility: CLINIC | Age: 69
End: 2019-07-11

## 2019-07-11 ENCOUNTER — PATIENT MESSAGE (OUTPATIENT)
Dept: FAMILY MEDICINE | Facility: CLINIC | Age: 69
End: 2019-07-11

## 2019-07-11 NOTE — TELEPHONE ENCOUNTER
----- Message from Olimpia Cabrales sent at 7/11/2019 11:09 AM CDT -----  Contact: Spouse-Qi  Type:  Test Results    Who Called: Qi    Name of Test (Lab/Mammo/Etc): LAB    Date of Test: 07/10/19    Ordering Provider:     [facility]      Would the patient rather a call back or a response via [facility]? Call    Best Call Back Number: [phone]

## 2019-07-15 ENCOUNTER — TELEPHONE (OUTPATIENT)
Dept: RHEUMATOLOGY | Facility: CLINIC | Age: 69
End: 2019-07-15

## 2019-07-15 ENCOUNTER — PATIENT MESSAGE (OUTPATIENT)
Dept: RHEUMATOLOGY | Facility: CLINIC | Age: 69
End: 2019-07-15

## 2019-07-15 NOTE — TELEPHONE ENCOUNTER
Please schedule Nephrology consult as previously requested by Dr. Salazar 6/13/19. He should discuss his anemia with Dr. Domingo his primary. Thanks KARI

## 2019-07-16 ENCOUNTER — TELEPHONE (OUTPATIENT)
Dept: NEPHROLOGY | Facility: CLINIC | Age: 69
End: 2019-07-16

## 2019-07-16 NOTE — TELEPHONE ENCOUNTER
Samantha Morel, ASHLEY  P [facility] Nephro Clinical Staff   Cc: Yovani Babb MD   Caller: Unspecified (Yesterday,  4:45 PM)             Hi,     Are you able to help get this patient scheduled? I could not do so on your schedule. Let me know if I can be of assistance.       Thanks, Samantha Rowley    Previous Messages         appt scheduled and mailed

## 2019-07-18 ENCOUNTER — PATIENT MESSAGE (OUTPATIENT)
Dept: FAMILY MEDICINE | Facility: CLINIC | Age: 69
End: 2019-07-18

## 2019-07-18 DIAGNOSIS — R53.81 DEBILITY: ICD-10-CM

## 2019-07-18 DIAGNOSIS — N18.30 CHRONIC KIDNEY DISEASE, STAGE III (MODERATE): Primary | ICD-10-CM

## 2019-07-19 ENCOUNTER — PATIENT OUTREACH (OUTPATIENT)
Dept: ADMINISTRATIVE | Facility: HOSPITAL | Age: 69
End: 2019-07-19

## 2019-07-22 ENCOUNTER — PATIENT MESSAGE (OUTPATIENT)
Dept: FAMILY MEDICINE | Facility: CLINIC | Age: 69
End: 2019-07-22

## 2019-08-01 NOTE — PROGRESS NOTES
This note was created by combination of typed  and Dragon dictation.  Transcription errors may be present.  If there are any questions, please contact me.    Assessment & Plan:   Tophaceous gout  -feeling better off of uloric.  History of allopurinol with rash. Will discuss with rheumatology about any other options.   On valsartan and may change to losartan as it has some modest uricosuric properties.    Essential hypertension  -he has been taking clonidine pills t.i.d. and supplementing with clonidine patch with elevated blood pressures.  I thought we had changed him from oral to patch only.  Written instructions provided for the patient-use clonidine patch and change out every week; take amlodipine, he finds that 10 mg controlled his blood pressure and 5 mg does not; take the valsartan; take the torsemide.  If systolic blood pressure greater than 160 may take supplemental oral clonidine but marked down how frequently this happens.  If it occurs frequently may increase the clonidine patch to 0.2 mg.  -     cloNIDine 0.1 mg/24 hr td ptwk (CATAPRES) 0.1 mg/24 hr; Place 1 patch onto the skin every 7 days.  Dispense: 4 patch; Refill: 11  -     torsemide (DEMADEX) 10 MG Tab; Take 1 tablet (10 mg total) by mouth once daily.  Dispense: 90 tablet; Refill: 3  -     valsartan (DIOVAN) 160 MG tablet; Take 1 tablet (160 mg total) by mouth 2 (two) times daily.  Dispense: 180 tablet; Refill: 3  -     amLODIPine (NORVASC) 10 MG tablet; Take 1 tablet (10 mg total) by mouth once daily.  Dispense: 30 tablet; Refill: 11    History of heart failure though most recent echo showed normal LV systolic and diastolic function.  Not on beta-blocker because of bradycardia.    Chronic kidney disease, stage III (moderate)  -he has an upcoming visit with [facility].  On torsemide.  -     torsemide (DEMADEX) 10 MG Tab; Take 1 tablet (10 mg total) by mouth once daily.  Dispense: 90 tablet; Refill: 3    Glaucoma of both eyes,  unspecified glaucoma type  -     Ambulatory referral to Optometry    Controlled type 2 diabetes mellitus with complication, without long-term current use of insulin  -he would like to see [facility] for glaucoma.  I will refer to our Optometry.  He has an upcoming visit with diabetes nurse practitioner.  Future labs ordered.  -     Ambulatory referral to Optometry  -     Comprehensive metabolic panel; Future; Expected date: 10/31/2019  -     Lipid panel; Future; Expected date: 10/31/2019  -     Hemoglobin A1c; Future; Expected date: 10/31/2019    Polypharmacy-I think he is getting refills from previous Cardiology, our Cardiology, previous primary care, and me, and I printed out an active list of medications for him and asked him to bring it to the pharmacy and have them synchronize med lists.    We have given him Crestor but I think he has gotten refills on atorvastatin.  I asked him to stay with medications I have listed for him.    Medications Discontinued During This Encounter   Medication Reason    alendronate (FOSAMAX) 70 MG tablet Patient no longer taking    febuxostat (ULORIC) 40 mg Tab Patient no longer taking    HYDROcodone-acetaminophen (NORCO) 5-325 mg per tablet Patient no longer taking    hydroxychloroquine (PLAQUENIL) 200 mg tablet Patient no longer taking    amLODIPine (NORVASC) 10 MG tablet Patient no longer taking    cloNIDine 0.1 mg/24 hr td ptwk (CATAPRES) 0.1 mg/24 hr Reorder    torsemide (DEMADEX) 10 MG Tab Reorder    valsartan (DIOVAN) 160 MG tablet Reorder    amLODIPine (NORVASC) 5 MG tablet Dose adjustment    atorvastatin (LIPITOR) 20 MG tablet Therapy completed    LINZESS 145 mcg Cap capsule Therapy completed       meds sent this encounter:  Medications Ordered This Encounter   Medications    amLODIPine (NORVASC) 10 MG tablet     Sig: Take 1 tablet (10 mg total) by mouth once daily.     Dispense:  30 tablet     Refill:  11    cloNIDine 0.1 mg/24 hr td ptwk (CATAPRES) 0.1  mg/24 hr     Sig: Place 1 patch onto the skin every 7 days.     Dispense:  4 patch     Refill:  11    torsemide (DEMADEX) 10 MG Tab     Sig: Take 1 tablet (10 mg total) by mouth once daily.     Dispense:  90 tablet     Refill:  3    valsartan (DIOVAN) 160 MG tablet     Sig: Take 1 tablet (160 mg total) by mouth 2 (two) times daily.     Dispense:  180 tablet     Refill:  3       Follow Up: No follow-ups on file.  Follow-up with me 3 months, pre visit labs ordered, hopefully will be tracking his blood pressures and frequency of as needed clonidine use.  Hopefully he will be taking just Crestor at that time and not Lipitor.    Subjective:     Chief Complaint   Patient presents with    Hypertension       HPI  Darrion is a 69 y.o. male, last appointment with this clinic was 7/10/2019.    I previously saw him in mid July.  At the time malaise and fatigue.  Unclear the etiology.  Labs were relatively unrevealing.  Did show worsening of chronic kidney disease.  Was referred to Nephrology.  At the time his blood pressures were actually low, off of medication, and I instructed him to hold his valsartan and take the lower dose amlodipine  Trial of stopping Uloric.    Thinks that since stopping the uloric, the aches and pains and stiffness got better.  It's been about 2 weeks off of the meds. Still gets stiffness and back pain but not as severe.   Fatigue seems to have improved.     And no more hypotension.  In fact, with 5 mg of amlodipine his systolic will be in the 150 range.  When he takes amlodipine 10 mg it seems to be better controlled.    Reviewed his med list.  In addition to the amlodipine and the valsartan and the torsemide, it seems he is simultaneously taking clonidine patch and the pill.  His cardiologist was formerly WILNER.  Was seeing him every 6 months until changing to Dr. Alvares.  Takes clonidine 0.1 mg po TID in addition to the patch 0.1 mg.  Has been doing this for about 6 months by his estimate.     In May  2019 we had discussed changing from pill to patch. At that time he told me he was just taking oral not the patch.    Now he tells me that he takes the pill TID and if BP high > 160 he'll apply a patch and just leave it on for 2 weeks. Cannot tell me how often this occurs/frequency.    Is taking valsartan; the torsemide; amlodipine; and the clonidine pill supplemented by patch.     Sees Dr. Hall every 6 months for glaucoma [facility]     It seems he is taking atorvastatin.  I had sent in rx for crestor.  I think the pharmacy ascending refill requests to his previous cardiologist as well as requests to our cardiologist, sending in refill request to his previous primary care doctor and sending in request to me as well.    Answers for HPI/ROS submitted by the patient on 7/26/2019   activity change: No  unexpected weight change: No  rhinorrhea: No  trouble swallowing: No  visual disturbance: No  chest tightness: No  polyuria: No  difficulty urinating: No  joint swelling: No  arthralgias: Yes  confusion: No  dysphoric mood: No      Patient Care Team:  Farooq Domingo MD as PCP - General (Internal Medicine)  Tin Chambers MD (Gastroenterology)  Harrison Brannon MD (Cardiology)  Roxanna Salazar MD as Nurse Practitioner (Rheumatology)  Malcolm Irwin MD as  (Nephrology)  Amy Gamez MA as Care Coordinator    Patient Active Problem List    Diagnosis Date Noted    Felon of finger of right hand 05/11/2019    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 05/02/2019    Anemia of chronic renal failure, stage 3 (moderate) 03/13/2019    Current chronic use of systemic steroids 02/27/2019    Compression fracture of body of thoracic vertebra on XR 2/2019; 2/2019 alendronate 02/27/2019     Neutropenia 02/26/2019    Thrombocytopenia 02/26/2019    Connective tissue disorder 02/26/2019    Weight gain 03/14/2016    Jackson's esophagus without dysplasia 03/14/2016    Anemia from plaquenil and imuran 03/14/2016    Body mass index 29.0-29.9, adult 07/15/2014    Thyroid dysfunction 07/15/2014    Chronic kidney disease, stage III (moderate) 07/15/2014    Essential hypertension 05/13/2014 6/2019 TTE normal LV systolic and diastolic function; ABIs normal      Controlled type 2 diabetes mellitus with complication, without long-term current use of insulin 05/13/2014    Pure hypercholesterolemia 05/13/2014    MCTD (mixed connective tissue disease) 05/13/2014    Sjogren's disease 05/13/2014    Bilateral edema of lower extremity 6/2019 TTE normal LV systolic and diastolic function; ABIs normal 05/13/2014    Glaucoma 05/13/2014       PAST MEDICAL HISTORY:  Past Medical History:   Diagnosis Date    Anemia     Arthritis     Cataract     Chronic constipation     Diabetes mellitus, type 2     Glaucoma     Hyperlipidemia 5/13/2014    Hypertension     MCTD (mixed connective tissue disease)     Sjogren's disease     Ulcerative colitis     Urolithiasis        PAST SURGICAL HISTORY:  Past Surgical History:   Procedure Laterality Date    COLONOSCOPY  2014    EYE SURGERY         SOCIAL HISTORY:  Social History     Socioeconomic History    Marital status:      Spouse name: Not on file    Number of children: 3    Years of education: Not on file    Highest education level: Not on file   Occupational History    Not on file   Social Needs    Financial resource strain: Somewhat hard    Food insecurity:     Worry: Never true     Inability: Never true    Transportation needs:     Medical: No     Non-medical: No   Tobacco Use    Smoking status: Never Smoker    Smokeless tobacco: Never Used   Substance and Sexual Activity    Alcohol use: No     Frequency: Never     Drinks per session:  "Patient refused     Binge frequency: Never    Drug use: No    Sexual activity: Never   Lifestyle    Physical activity:     Days per week: 3 days     Minutes per session: Not on file    Stress: Only a little   Relationships    Social connections:     Talks on phone: Once a week     Gets together: Once a week     Attends [employer] witness service: Not on file     Active member of club or organization: No     Attends meetings of clubs or organizations: Never     Relationship status:    Other Topics Concern    Not on file   Social History Narrative    Not on file       ALLERGIES AND MEDICATIONS: updated and reviewed.  Review of patient's allergies indicates:   Allergen Reactions    Penicillins Nausea And Vomiting    Allopurinol analogues Rash     Current Outpatient Medications   Medication Sig Dispense Refill    amLODIPine (NORVASC) 5 MG tablet Take 5 mg by mouth once daily.  2    aspirin (ECOTRIN) 81 MG EC tablet Take 81 mg by mouth once daily.      atorvastatin (LIPITOR) 20 MG tablet Take 20 mg by mouth once daily.  3    BD VEO INSULIN SYRINGE UF 1/2 mL 31 gauge x 15/64" Syrg BASAL/BOLUS INSULIN 4-5X/DAY  3    cloNIDine 0.1 mg/24 hr td ptwk (CATAPRES) 0.1 mg/24 hr Place 1 patch onto the skin every 7 days. 4 patch 11    dorzolamide (TRUSOPT) 2 % ophthalmic solution       insulin degludec-liraglutide (XULTOPHY 100/3.6) 100 unit-3.6 mg /mL (3 mL) InPn Inject 16 Units into the skin once daily. 5 Syringe 0    ipratropium (ATROVENT) 42 mcg (0.06 %) nasal spray INSTILL 2 SPRAYS BY NASAL ROUTE 3 TIMES DAILY. AS NEEDED FOR NASAL CONGESTION AND DRIP FOR 7 DAYS 30 mL 0    LINZESS 145 mcg Cap capsule Take 145 mcg by mouth once daily.  1    lubiprostone (AMITIZA) 24 MCG Cap Take 1 capsule (24 mcg total) by mouth 2 (two) times daily with meals. 60 capsule 11    multivit with min-folic acid 200 mcg Chew       pen needle, diabetic (BD ULTRA-FINE RICHARD PEN NEEDLE) 32 gauge x 5/32" Ndle 1 Device by Misc.(Non-Drug; " "Combo Route) route once daily. 100 each 3    polyethylene glycol (GLYCOLAX) 17 gram/dose powder Take 17 g by mouth once daily. 850 g 11    rosuvastatin (CRESTOR) 10 MG tablet Take 1 tablet (10 mg total) by mouth once daily. 90 tablet 3    torsemide (DEMADEX) 10 MG Tab Take 10 mg by mouth once daily.  3    TRAVATAN Z 0.004 % Drop       valsartan (DIOVAN) 160 MG tablet Take 1 tablet (160 mg total) by mouth 2 (two) times daily. 180 tablet 3    colchicine (COLCRYS) 0.6 mg tablet Take 1 tablet (0.6 mg total) by mouth once daily. for 7 days 7 tablet 0     No current facility-administered medications for this visit.        Review of Systems   HENT: Negative for hearing loss.    Eyes: Negative for discharge.   Respiratory: Negative for wheezing.    Cardiovascular: Negative for chest pain and palpitations.   Gastrointestinal: Positive for constipation. Negative for blood in stool, diarrhea and vomiting.   Genitourinary: Negative for hematuria and urgency.   Musculoskeletal: Positive for neck pain.   Neurological: Negative for weakness and headaches.   Endo/Heme/Allergies: Negative for polydipsia.       Objective:   Physical Exam   Vitals:    08/02/19 0751   Pulse: 70   Temp: 98.3 °F (36.8 °C)   TempSrc: Oral   SpO2: 99%   Weight: 71.2 kg (157 lb)   Height: 5' 5" (1.651 m)    Body mass index is 26.13 kg/m².  Weight: 71.2 kg (157 lb)   Height: 5' 5" (165.1 cm)     Physical Exam   Constitutional: He is oriented to person, place, and time. He appears well-developed and well-nourished. No distress.   Eyes: EOM are normal.   Cardiovascular: Normal rate and regular rhythm.   Murmur heard.  2/6 systolic murmur   Pulmonary/Chest: Effort normal and breath sounds normal.   Musculoskeletal: Normal range of motion. He exhibits edema.   Trace ankle edema   Neurological: He is alert and oriented to person, place, and time. Coordination normal.   Skin: Skin is warm and dry.   Psychiatric: He has a normal mood and affect. His behavior " is normal. Thought content normal.

## 2019-08-02 ENCOUNTER — OFFICE VISIT (OUTPATIENT)
Dept: FAMILY MEDICINE | Facility: CLINIC | Age: 69
End: 2019-08-02
Payer: MEDICARE

## 2019-08-02 VITALS
BODY MASS INDEX: 26.16 KG/M2 | OXYGEN SATURATION: 99 % | SYSTOLIC BLOOD PRESSURE: 108 MMHG | HEIGHT: 65 IN | WEIGHT: 157 LBS | DIASTOLIC BLOOD PRESSURE: 60 MMHG | TEMPERATURE: 98 F | HEART RATE: 70 BPM

## 2019-08-02 DIAGNOSIS — E11.8 CONTROLLED TYPE 2 DIABETES MELLITUS WITH COMPLICATION, WITHOUT LONG-TERM CURRENT USE OF INSULIN: ICD-10-CM

## 2019-08-02 DIAGNOSIS — M1A.9XX1 TOPHACEOUS GOUT: Primary | ICD-10-CM

## 2019-08-02 DIAGNOSIS — N18.30 CHRONIC KIDNEY DISEASE, STAGE III (MODERATE): ICD-10-CM

## 2019-08-02 DIAGNOSIS — H40.9 GLAUCOMA OF BOTH EYES, UNSPECIFIED GLAUCOMA TYPE: ICD-10-CM

## 2019-08-02 DIAGNOSIS — I10 ESSENTIAL HYPERTENSION: ICD-10-CM

## 2019-08-02 PROCEDURE — 3044F PR MOST RECENT HEMOGLOBIN A1C LEVEL <7.0%: ICD-10-PCS | Mod: HCNC,CPTII,S$GLB, | Performed by: INTERNAL MEDICINE

## 2019-08-02 PROCEDURE — 2024F 7 FLD RTA PHOTO EVC RTNOPTHY: CPT | Mod: HCNC,S$GLB,, | Performed by: INTERNAL MEDICINE

## 2019-08-02 PROCEDURE — 99999 PR PBB SHADOW E&M-EST. PATIENT-LVL IV: CPT | Mod: PBBFAC,HCNC,, | Performed by: INTERNAL MEDICINE

## 2019-08-02 PROCEDURE — 2024F PR 7 FIELD PHOTOS WITH INTERP/ REVIEW: ICD-10-PCS | Mod: HCNC,S$GLB,, | Performed by: INTERNAL MEDICINE

## 2019-08-02 PROCEDURE — 99214 PR OFFICE/OUTPT VISIT, EST, LEVL IV, 30-39 MIN: ICD-10-PCS | Mod: HCNC,S$GLB,, | Performed by: INTERNAL MEDICINE

## 2019-08-02 PROCEDURE — 3044F HG A1C LEVEL LT 7.0%: CPT | Mod: HCNC,CPTII,S$GLB, | Performed by: INTERNAL MEDICINE

## 2019-08-02 PROCEDURE — 99214 OFFICE O/P EST MOD 30 MIN: CPT | Mod: HCNC,S$GLB,, | Performed by: INTERNAL MEDICINE

## 2019-08-02 PROCEDURE — 3078F PR MOST RECENT DIASTOLIC BLOOD PRESSURE < 80 MM HG: ICD-10-PCS | Mod: HCNC,CPTII,S$GLB, | Performed by: INTERNAL MEDICINE

## 2019-08-02 PROCEDURE — 3074F PR MOST RECENT SYSTOLIC BLOOD PRESSURE < 130 MM HG: ICD-10-PCS | Mod: HCNC,CPTII,S$GLB, | Performed by: INTERNAL MEDICINE

## 2019-08-02 PROCEDURE — 99999 PR PBB SHADOW E&M-EST. PATIENT-LVL IV: ICD-10-PCS | Mod: PBBFAC,HCNC,, | Performed by: INTERNAL MEDICINE

## 2019-08-02 PROCEDURE — 3078F DIAST BP <80 MM HG: CPT | Mod: HCNC,CPTII,S$GLB, | Performed by: INTERNAL MEDICINE

## 2019-08-02 PROCEDURE — 1101F PR PT FALLS ASSESS DOC 0-1 FALLS W/OUT INJ PAST YR: ICD-10-PCS | Mod: HCNC,CPTII,S$GLB, | Performed by: INTERNAL MEDICINE

## 2019-08-02 PROCEDURE — 1101F PT FALLS ASSESS-DOCD LE1/YR: CPT | Mod: HCNC,CPTII,S$GLB, | Performed by: INTERNAL MEDICINE

## 2019-08-02 PROCEDURE — 3074F SYST BP LT 130 MM HG: CPT | Mod: HCNC,CPTII,S$GLB, | Performed by: INTERNAL MEDICINE

## 2019-08-02 RX ORDER — AMLODIPINE BESYLATE 10 MG/1
10 TABLET ORAL DAILY
Qty: 90 TABLET | Refills: 3 | Status: CANCELLED | OUTPATIENT
Start: 2019-08-02

## 2019-08-02 RX ORDER — LINACLOTIDE 145 UG/1
145 CAPSULE, GELATIN COATED ORAL DAILY
Refills: 1 | COMMUNITY
Start: 2019-07-29 | End: 2019-08-02 | Stop reason: ALTCHOICE

## 2019-08-02 RX ORDER — ATORVASTATIN CALCIUM 20 MG/1
20 TABLET, FILM COATED ORAL DAILY
Refills: 3 | COMMUNITY
Start: 2019-07-28 | End: 2019-08-02 | Stop reason: ALTCHOICE

## 2019-08-02 RX ORDER — CLONIDINE 0.1 MG/24H
1 PATCH, EXTENDED RELEASE TRANSDERMAL
Qty: 4 PATCH | Refills: 11 | Status: SHIPPED | OUTPATIENT
Start: 2019-08-02 | End: 2020-08-06

## 2019-08-02 RX ORDER — AMLODIPINE BESYLATE 5 MG/1
5 TABLET ORAL DAILY
Refills: 2 | COMMUNITY
Start: 2019-07-10 | End: 2019-08-02 | Stop reason: DRUGHIGH

## 2019-08-02 RX ORDER — VALSARTAN 160 MG/1
160 TABLET ORAL 2 TIMES DAILY
Qty: 180 TABLET | Refills: 3 | Status: SHIPPED | OUTPATIENT
Start: 2019-08-02 | End: 2020-09-14

## 2019-08-02 RX ORDER — AMLODIPINE BESYLATE 10 MG/1
10 TABLET ORAL DAILY
Qty: 30 TABLET | Refills: 11 | Status: SHIPPED | OUTPATIENT
Start: 2019-08-02 | End: 2019-09-12

## 2019-08-02 RX ORDER — TORSEMIDE 10 MG/1
10 TABLET ORAL DAILY
Qty: 90 TABLET | Refills: 3 | Status: SHIPPED | OUTPATIENT
Start: 2019-08-02 | End: 2019-12-10 | Stop reason: SDUPTHER

## 2019-08-02 NOTE — PATIENT INSTRUCTIONS
BLOOD PRESSURE  TAKE AMLODIPINE 10, VALSARTAN, TORSEMIDE, AND CLONIDINE PATCH.  THE PATCH - CHANGE EVERY WEEK AS INSTRUCTED.    CHECK BP.  IF BLOOD PRESSURE > 160 - TAKE A CLONIDINE PILL, AND CHECK YOUR PRESSURE 8 HOURS LATER.  IF STILL ABOVE 160 - TAKE ANOTHER CLONIDINE.  IF LESS THAN 160, DO NOT TAKE ANOTHER CLONIDINE PILL.    PLEASE WRITE DOWN YOUR BLOOD PRESSURE READINGS AND WRITE DOWN IF YOU TAKE AN ADDITIONAL CLONIDINE PILL.    IF YOU KEEP TAKING CLONIDINE PILLS FREQUENTLY THEN I WILL HAVE TO INCREASE THE PATCH.      FOLLOW UP 3 MONTHS.  DO BLOOD TESTS 3 DAYS BEFORE YOUR VISIT.

## 2019-08-06 ENCOUNTER — TELEPHONE (OUTPATIENT)
Dept: RHEUMATOLOGY | Facility: CLINIC | Age: 69
End: 2019-08-06

## 2019-08-06 ENCOUNTER — PATIENT MESSAGE (OUTPATIENT)
Dept: RHEUMATOLOGY | Facility: CLINIC | Age: 69
End: 2019-08-06

## 2019-08-06 DIAGNOSIS — M1A.9XX1 TOPHACEOUS GOUT: Primary | ICD-10-CM

## 2019-08-06 RX ORDER — FEBUXOSTAT 80 MG/1
80 TABLET, FILM COATED ORAL DAILY
Qty: 30 TABLET | Refills: 3 | Status: SHIPPED | OUTPATIENT
Start: 2019-08-06 | End: 2019-09-05

## 2019-08-06 NOTE — TELEPHONE ENCOUNTER
----- Message from Melisa Boyd MA sent at 8/6/2019  9:37 AM CDT -----  Pt's wife is calling asking what the pt's blood work stated that he had a few months ago. He is Dr. Salazar's pt and whether or not he needs to be seen by some one else before she comes back    Melisa

## 2019-08-06 NOTE — TELEPHONE ENCOUNTER
Spoke with the wife aayush and let her know that dr baron was still on maternity leave and that I will send message to dr robbins to check his lab work

## 2019-08-06 NOTE — TELEPHONE ENCOUNTER
----- Message from Mansoor Zheng MD sent at 8/6/2019 12:05 PM CDT -----  His uric acid level is better but still not as low as it should be.  He should increase his Uloric to 80 mg daily.  He should take 2 of the 40 mg tablets until he runs out.  I will call in a prescription for the 80 mg size.  He will need repeat uric acid in 1 month.  His blood count is better but his kidney test is worse.  He is scheduled to see Nephrology next month.  The elevated inflammation test (ESR) can be due to his kidney disease.  ----- Message -----  From: Melisa Boyd MA  Sent: 8/6/2019   9:37 AM  To: Mansoor Zheng MD    Pt's wife is calling asking what the pt's blood work stated that he had a few months ago. He is Dr. Salazar's pt and whether or not he needs to be seen by some one else before she comes back    Melisa

## 2019-08-08 DIAGNOSIS — E78.00 PURE HYPERCHOLESTEROLEMIA: ICD-10-CM

## 2019-08-08 RX ORDER — ATORVASTATIN CALCIUM 20 MG/1
TABLET, FILM COATED ORAL
Qty: 90 TABLET | Refills: 3 | OUTPATIENT
Start: 2019-08-08

## 2019-08-12 ENCOUNTER — OFFICE VISIT (OUTPATIENT)
Dept: ENDOCRINOLOGY | Facility: CLINIC | Age: 69
End: 2019-08-12
Payer: MEDICARE

## 2019-08-12 VITALS
DIASTOLIC BLOOD PRESSURE: 71 MMHG | WEIGHT: 154.31 LBS | SYSTOLIC BLOOD PRESSURE: 127 MMHG | HEIGHT: 65 IN | BODY MASS INDEX: 25.71 KG/M2 | HEART RATE: 77 BPM

## 2019-08-12 DIAGNOSIS — E11.649 HYPOGLYCEMIA ASSOCIATED WITH DIABETES: ICD-10-CM

## 2019-08-12 DIAGNOSIS — I10 ESSENTIAL HYPERTENSION: ICD-10-CM

## 2019-08-12 DIAGNOSIS — N18.30 CKD (CHRONIC KIDNEY DISEASE) STAGE 3, GFR 30-59 ML/MIN: ICD-10-CM

## 2019-08-12 PROCEDURE — 99214 PR OFFICE/OUTPT VISIT, EST, LEVL IV, 30-39 MIN: ICD-10-PCS | Mod: HCNC,S$GLB,, | Performed by: NURSE PRACTITIONER

## 2019-08-12 PROCEDURE — 3044F HG A1C LEVEL LT 7.0%: CPT | Mod: HCNC,CPTII,S$GLB, | Performed by: NURSE PRACTITIONER

## 2019-08-12 PROCEDURE — 3074F SYST BP LT 130 MM HG: CPT | Mod: HCNC,CPTII,S$GLB, | Performed by: NURSE PRACTITIONER

## 2019-08-12 PROCEDURE — 2024F PR 7 FIELD PHOTOS WITH INTERP/ REVIEW: ICD-10-PCS | Mod: HCNC,S$GLB,, | Performed by: NURSE PRACTITIONER

## 2019-08-12 PROCEDURE — 2024F 7 FLD RTA PHOTO EVC RTNOPTHY: CPT | Mod: HCNC,S$GLB,, | Performed by: NURSE PRACTITIONER

## 2019-08-12 PROCEDURE — 3078F PR MOST RECENT DIASTOLIC BLOOD PRESSURE < 80 MM HG: ICD-10-PCS | Mod: HCNC,CPTII,S$GLB, | Performed by: NURSE PRACTITIONER

## 2019-08-12 PROCEDURE — 99214 OFFICE O/P EST MOD 30 MIN: CPT | Mod: HCNC,S$GLB,, | Performed by: NURSE PRACTITIONER

## 2019-08-12 PROCEDURE — 3074F PR MOST RECENT SYSTOLIC BLOOD PRESSURE < 130 MM HG: ICD-10-PCS | Mod: HCNC,CPTII,S$GLB, | Performed by: NURSE PRACTITIONER

## 2019-08-12 PROCEDURE — 3044F PR MOST RECENT HEMOGLOBIN A1C LEVEL <7.0%: ICD-10-PCS | Mod: HCNC,CPTII,S$GLB, | Performed by: NURSE PRACTITIONER

## 2019-08-12 PROCEDURE — 99999 PR PBB SHADOW E&M-EST. PATIENT-LVL V: ICD-10-PCS | Mod: PBBFAC,HCNC,, | Performed by: NURSE PRACTITIONER

## 2019-08-12 PROCEDURE — 99999 PR PBB SHADOW E&M-EST. PATIENT-LVL V: CPT | Mod: PBBFAC,HCNC,, | Performed by: NURSE PRACTITIONER

## 2019-08-12 PROCEDURE — 1101F PR PT FALLS ASSESS DOC 0-1 FALLS W/OUT INJ PAST YR: ICD-10-PCS | Mod: HCNC,CPTII,S$GLB, | Performed by: NURSE PRACTITIONER

## 2019-08-12 PROCEDURE — 1101F PT FALLS ASSESS-DOCD LE1/YR: CPT | Mod: HCNC,CPTII,S$GLB, | Performed by: NURSE PRACTITIONER

## 2019-08-12 PROCEDURE — 3078F DIAST BP <80 MM HG: CPT | Mod: HCNC,CPTII,S$GLB, | Performed by: NURSE PRACTITIONER

## 2019-08-12 NOTE — PATIENT INSTRUCTIONS
Please lower Xultophy dose to 7 units once daily.   Maintain healthy diet.   Continue to test blood sugar 2x/day.   Please call if blood sugars continue to be less than 80.   Return to clinic in 4 weeks with a1c prior. Will not start glp1-RA monotherapy like Trulicity because he already has low A1c.

## 2019-08-12 NOTE — PROGRESS NOTES
CC: This 69 y.o.  male  is here for evaluation of  T2DM along with comorbidities indicated in the Visit Diagnosis section of this encounter.    HPI: Darrion Bennett was diagnosed with T2DM at age 35. Oral medications started years later.      Initial visit 7/8/19  New to Endocrine.  Previously seen by Dr. Marrufo's office, Endocrinology at [place]. He is transferring all his care to [facility].   Patient states that his Humalog dose was cut down in May from 5 units ac to 2-5 units ac per Mya Harvey NP, at Dr. Marrufo's office, d/t low A1c of 5.9%.   Plan Will try to limit hypoglycemia risk by eliminating prandial insulin. He is taking very minimal amounts anyway.   Start combination basal insulin with GLP1-RA.   Start Xultophy (degludec insulin/liraglutide) 10 units once daily.   Stop Lantus since Xultophy includes a long acting insulin already.   Stop Tradjenta since liraglutide is a stronger version of it.   Stop Humalog.   Monitor blood sugar 2x/day - fasting and bedtime or before dinner.   Keep a blood sugar log and bring to each visit.   Return to clinic in 4 weeks.     Interval history  Patient has started Xultophy and dc'd all other DM meds as previously advised. Denies GI upset or other complaints on Xultophy. Appetite continues to be low but no worse.       [facility] - 4/23/19  Total chol 188 - HDL 44 - trig 98 -   Creatinine 1.37   eGFR non AA - 52   A1c 5.9%   Hemoglobin 10/hematocrit 31  Vitamin D 42      PMHX: Sjogren's syndrome - sees Rheumatology     LAST DIABETES EDUCATION: multiple times, years ago     HOSPITALIZED FOR DIABETES  -  No     PRESCRIBED DIABETES MEDICATIONS: Xultophy 10 units once daily     Misses medication doses - No    DM COMPLICATIONS: nephropathy    SIGNIFICANT DIABETES MED HISTORY: has been told that metformin is bad for his kidneys and that's why he stopped it     SELF MONITORING BLOOD GLUCOSE: Checks blood glucose at home 2x/day.             HYPOGLYCEMIC EPISODES:  "feels different at BGs in the 60s.      CURRENT DIET: drinks water, diet soda; has some juice to take with his meds. Eats 3 meals/day but does not have an appetite to eat.     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 No acute distress.   PSYCH: AAOx3, appropriate mood and affect, conversant, well-groomed. Judgement and insight good.   NEURO: Cranial nerves grossly intact. Speech clear, no tremor.   CHEST: Respirations even and unlabored.       Hemoglobin A1C   Date Value Ref Range Status   06/11/2019 6.9 (H) 4.0 - 5.6 % Final     Comment:     ADA Screening Guidelines:  5.7-6.4%  Consistent with prediabetes  >or=6.5%  Consistent with diabetes  High levels of fetal hemoglobin interfere with the HbA1C  assay. Heterozygous hemoglobin variants (HbS, HgC, etc)do  not significantly interfere with this assay.   However, presence of multiple variants may affect accuracy.     10/16/2018 6.2 (H) 4.0 - 5.7 % Final     Comment:     Dr. Jurgen Ward ( Endocrinology )   05/30/2014 7.9 (H) 4.8 - 5.6 % Final     Comment:              Increased risk for diabetes: 5.7 - 6.4           Diabetes: >6.4           Glycemic control for adults with diabetes: <7.0             Chemistry        Component Value Date/Time     07/10/2019 1002     07/10/2019 1002    K 4.6 07/10/2019 1002    K 4.6 07/10/2019 1002     07/10/2019 1002     07/10/2019 1002    CO2 24 07/10/2019 1002    CO2 24 07/10/2019 1002    BUN 37 (H) " 07/10/2019 1002    BUN 37 (H) 07/10/2019 1002    CREATININE 1.8 (H) 07/10/2019 1002    CREATININE 1.8 (H) 07/10/2019 1002     (H) 07/10/2019 1002     (H) 07/10/2019 1002        Component Value Date/Time    CALCIUM 9.4 07/10/2019 1002    CALCIUM 9.4 07/10/2019 1002    ALKPHOS 70 07/10/2019 1002    ALKPHOS 70 07/10/2019 1002    AST 17 07/10/2019 1002    AST 17 07/10/2019 1002    ALT 18 07/10/2019 1002    ALT 18 07/10/2019 1002    BILITOT 0.5 07/10/2019 1002    BILITOT 0.5 07/10/2019 1002    ESTGFRAFRICA 43.4 (A) 07/10/2019 1002    ESTGFRAFRICA 43.4 (A) 07/10/2019 1002    EGFRNONAA 37.6 (A) 07/10/2019 1002    EGFRNONAA 37.6 (A) 07/10/2019 1002          Lab Results   Component Value Date    LDLCALC 59 10/16/2018       Lab Results   Component Value Date    MICALBCREAT 519 (H) 10/16/2018               ASSESSMENT and PLAN:    A1C GOAL: 7.5 %     1. Diabetes mellitus type 2, uncontrolled, with complications  Will decrease Xultophy dose d/t low FBGs but this will unfortunately affect his daytime postprandial BGs. Will see if we need to change therapy at next visit. Advised pt -     Please lower Xultophy dose to 7 units once daily.   Maintain healthy diet.   Continue to test blood sugar 2x/day.   Please call if blood sugars continue to be less than 80.   Return to clinic in 4 weeks with a1c prior. Will not start glp1-RA monotherapy like Trulicity because he already has low A1c.           Hemoglobin A1c    C-peptide    Glucose, random   2. CKD (chronic kidney disease) stage 3, GFR 30-59 ml/min  Avoid hypoglycemia.      3. Essential hypertension  controlled   4. Hypoglycemia associated with diabetes  Mild hypoglycemia - as above.      Orders Placed This Encounter   Procedures    Hemoglobin A1c     Standing Status:   Future     Standing Expiration Date:   10/10/2020    C-peptide     Standing Status:   Future     Standing Expiration Date:   10/10/2020    Glucose, random     Standing Status:   Future     Standing  Expiration Date:   10/10/2020        Follow up in about 4 weeks (around 9/9/2019).

## 2019-08-16 ENCOUNTER — CLINICAL SUPPORT (OUTPATIENT)
Dept: REHABILITATION | Facility: HOSPITAL | Age: 69
End: 2019-08-16
Attending: INTERNAL MEDICINE
Payer: MEDICARE

## 2019-08-16 DIAGNOSIS — M62.81 MUSCLE WEAKNESS OF LOWER EXTREMITY: ICD-10-CM

## 2019-08-16 DIAGNOSIS — Z74.09 IMPAIRED FUNCTIONAL MOBILITY AND ENDURANCE: ICD-10-CM

## 2019-08-16 DIAGNOSIS — R29.3 POSTURAL IMBALANCE: ICD-10-CM

## 2019-08-16 PROCEDURE — G8978 MOBILITY CURRENT STATUS: HCPCS | Mod: CL,HCNC,PN

## 2019-08-16 PROCEDURE — 97161 PT EVAL LOW COMPLEX 20 MIN: CPT | Mod: HCNC,PN

## 2019-08-16 PROCEDURE — G8979 MOBILITY GOAL STATUS: HCPCS | Mod: CK,HCNC,PN

## 2019-08-16 NOTE — PROGRESS NOTES
[facility]  Physical Therapy Initial Evaluation    Name: Darrion Bennett  Clinic Number: [id number]    Therapy Diagnosis:   Encounter Diagnoses   Name Primary?    Muscle weakness of lower extremity     Postural imbalance     Impaired functional mobility and endurance      Physician: Farooq Domingo MD    Physician Orders: PT Eval and Treat   Medical Diagnosis from Referral: debility  Evaluation Date: 8/16/2019  Authorization Period Expiration: 12/31/19  Plan of Care Expiration: 11/16/19  Visit # / Visits authorized: 1/ 20    Time In: 0910  Time Out: 1002  Total Billable Time: 52 minutes    Precautions: CHF, DM 2, stage 4 kidney disease, glaucoma- blurry vision    See full physical therapy evaluation in POC.     CMS Impairment/Limitation/Restriction for FOTO NOC-musculo-skeletal disorder Survey    Therapist reviewed FOTO scores for Darrion Bennett on 8/16/2019.   FOTO documents entered into [employer] - see Media section.    Limitation Score: 62%  Category: Mobility    Current : CL = least 60% but < 80% impaired, limited or restricted  Goal: CK = at least 40% but < 60% impaired, limited or restricted  Discharge: CL = least 60% but < 80% impaired, limited or restricted       TREATMENT     No treatment provided this session.     Home Exercises and Patient Education Provided    Education provided:   - importance of proper upright posture    Assessment   Darrion is a 69 y.o. male referred to outpatient Physical Therapy with a medical diagnosis of debility. Pt presents with back and B LE pain, B LE and UE weakness, muscle tightness, postural imbalance, and impaired mobility, endurance, gait, and functional mobility. He demonstrates SOB with transfers and low level activities indicating decreased cardiovascular endurance and functional mobility.    Pt prognosis is Good.   Pt will benefit from skilled outpatient Physical Therapy to address the deficits stated above and in the chart below, provide pt/family  education, and to maximize pt's level of independence.     Plan of care discussed with patient: Yes  Pt's spiritual, cultural and educational needs considered and patient is agreeable to the plan of care and goals as stated below:     Anticipated Barriers for therapy: none    Medical Necessity is demonstrated by the following  History  Co-morbidities and personal factors that may impact the plan of care Co-morbidities:   CHF, CKD stage 4, diabetes and transportation assistance required    Personal Factors:   lifestyle     high   Examination  Body Structures and Functions, activity limitations and participation restrictions that may impact the plan of care Body Regions:   neck  back  lower extremities  upper extremities  trunk    Body Systems:    gross symmetry  ROM  strength  balance  gait  transfers  transitions  motor control  motor learning  heart rate  respiratory rate  blood pressure    Participation Restrictions:   ADLs, IADLs, domestic duties    Activity limitations:   Learning and applying knowledge  no deficits    General Tasks and Commands  no deficits    Communication  no deficits    Mobility  lifting and carrying objects  walking  driving (bike, car, motorcycle)    Self care  no deficits    Domestic Life  shopping  cooking  doing house work (cleaning house, washing dishes, laundry)    Interactions/Relationships  no deficits    Life Areas  no deficits    Community and Social Life  recreation and leisure         high   Clinical Presentation stable and uncomplicated low   Decision Making/ Complexity Score: low     Medical necessity is demonstrated by the following IMPAIRMENTS/PROBLEM LIST:   1) Increase in pain level limiting function   2) LE weakness   3) Difficulty walking long distances   4) Decreased endurance   5) Lack of HEP    GOALS: Short Term Goals:  6 weeks  1. Report decreased body pain  <  / =  5/10 at worst to increase tolerance for prolonged standing.   2. Pt will be able to tolerate  multi-directional LE strengthening in order to improve ability to perform household chores.  3. Pt will report 50% improvement in ability to walk long distances since start of care to indicate improved functional mobility.   4. Pt will ambulate on treadmill for 5 minutes without fatigue to indicate improved endurance.   5. Pt to tolerate HEP to improve ROM and independence with ADL's.    Long Term Goals: 12 weeks  1. Report decreased body pain  <  / =  3/10 at worst to increase tolerance for prolonged standing.   2. Pt will be able to perform 2 x 10 multi-directional LE strengthening without fatigue in order to improve ability to perform household chores.  3. Pt will report 80% improvement in ability to walk long distances since start of care to indicate improved functional mobility.   4. Pt will ambulate on treadmill for 10 minutes without fatigue to indicate improved endurance.   5. Pt to be Independent with HEP to improve ROM and independence with ADL's.    Plan   Plan of care Certification: 8/16/2019 to 11/16/19.    Outpatient Physical Therapy 2 times weekly for 12 weeks to include the following interventions: Gait Training, Manual Therapy, Moist Heat/ Ice, Neuromuscular Re-ed, Patient Education, Therapeutic Activites and Therapeutic Exercise.     Tiffanie Hinojosa, PT

## 2019-08-16 NOTE — PLAN OF CARE
[facility]  Physical Therapy Initial Evaluation    Name: Darrion Bennett  Clinic Number: [id number]    Therapy Diagnosis:   Encounter Diagnoses   Name Primary?    Muscle weakness of lower extremity     Postural imbalance     Impaired functional mobility and endurance      Physician: Farooq Domingo MD    Physician Orders: PT Eval and Treat   Medical Diagnosis from Referral: debility  Evaluation Date: 8/16/2019  Authorization Period Expiration: 12/31/19  Plan of Care Expiration: 11/16/19  Visit # / Visits authorized: 1/ 20    Time In: 0910  Time Out: 1002  Total Billable Time: 52 minutes    Precautions: CHF, DM 2, stage 4 kidney disease, glaucoma- blurry vision    Subjective   Date of onset: 1512-5638  History of current condition - Darrion reports: in 7190-1588 he was pass out spontaneously. At the time, he was working at a [employer] store. After seeking medical attention, it was found that his kidneys were failing and his blood count was really low. Pt requiring supplemental O2 and CPAP machine but no longer needs it. He started to feel better with treatment. Since 2015, he has slowly declined in strength and stopped working. Pt with history of L foot gout with improvements following treatment. R 3rd digit had gout and was infected resulting in need for injections and draining about 3 months ago. Pt is R hand dominant.      Medical History:   Past Medical History:   Diagnosis Date    Anemia     Arthritis     Cataract     Chronic constipation     Diabetes mellitus, type 2     Glaucoma     Hyperlipidemia 5/13/2014    Hypertension     MCTD (mixed connective tissue disease)     Sjogren's disease     Ulcerative colitis     Urolithiasis        Surgical History:   Darrion Bennett  has a past surgical history that includes Eye surgery and Colonoscopy (2014).    Medications:   Darrion has a current medication list which includes the following prescription(s): amlodipine, aspirin, bd veo  "insulin syringe uf, clonidine 0.1 mg/24 hr td ptwk, colchicine, dorzolamide, febuxostat, insulin degludec-liraglutide, ipratropium, lubiprostone, multivit with min-folic acid, pen needle, diabetic, polyethylene glycol, rosuvastatin, torsemide, travatan z, and valsartan.    Allergies:   Review of patient's allergies indicates:   Allergen Reactions    Penicillins Nausea And Vomiting    Uloric [febuxostat]      Aches and pain    Allopurinol analogues Rash      Imagin/10/19 chest x-ray: "1. Borderline cardiomegaly.  2. No acute pulmonary process.  3. Chronic wedge compression fracture of T12."    Prior Therapy: none  Social History: lives with wife  Home environment: 1 story home with 0 steps to enter  Physical activity: tried walking about half a block about 2 weeks ago  Hobbies: loved to cook but it is difficult  Occupation: not employed  Prior Level of Function: independent with all ADLs, was driving prior to onset of deconditioning   Current Level of Function: difficulty with walking long distances, prolonged standing, stair ambulation, household chores, cooking    Pain:  Current 8/10, worst 9/10, best 7/10   Location: neck, back, shoulders, and B LEs (L>R)  Description: Sharp  Aggravating Factors: Standing, Walking, and movement  Easing Factors: heating pad and rest    Pts goals: to improve strength and endurance to improve ability to work and household chores    Objective     Observation: pleasant and cooperative    Posture: forward head, slouched, rounded shoulders    Upper Extremity Strength  (R) UE  (L) UE    Elbow flexion: 4+/5 Elbow flexion: 4+/5   Elbow extension: 5/5 Elbow extension: 5/5   Shoulder flexion: 4+/5 Shoulder flexion: 4+/5   Shoulder Abduction: 5/5 Shoulder abduction: 5/5     Lower Extremity Strength    Right LE  Left LE    Hip flexion: 4/5 Hip flexion: 4-/5   Knee extension: 5/5 Knee extension: 5/5   Knee flexion: 4+/5 Knee flexion: 4+/5   Hip extension:  4+/5 Hip extension: 4+/5   Hip " abduction: 4+/5 Hip abduction: 4/5   Hip adduction: 4+/5 Hip adduction 4/5   Ankle dorsiflexion: 4+/5 Ankle dorsiflexion: 5/5   Ankle plantarflexion: 5/5 Ankle plantarflexion: 5/5     Special Tests:  -6MWT on treadmill (max 0.6 mph):    Rigo 0-10 Rating Scale of Perceived Exertion Scale:   Rest 0   1 min 1   2 min 2   3 min 2   4 min 3   5 min 3   6 min 3    Distance: 0.06 miles   Start HR: 65 bpm   End HR: 90 bpm   Able to carry on conversation    Sensation: B LEs and UEs grossly intact to light touch    Flexibility:    Ely's test: B WNL   Popliteal Angle: R = -45 degrees ; L = -45 degrees    CMS Impairment/Limitation/Restriction for FOTO NOC-musculo-skeletal disorder Survey    Therapist reviewed FOTO scores for Darrion Bennett on 8/16/2019.   FOTO documents entered into [employer] - see Media section.    Limitation Score: 62%  Category: Mobility    Current : CL = least 60% but < 80% impaired, limited or restricted  Goal: CK = at least 40% but < 60% impaired, limited or restricted  Discharge: CL = least 60% but < 80% impaired, limited or restricted       TREATMENT     No treatment provided this session.     Home Exercises and Patient Education Provided    Education provided:   - importance of proper upright posture    Assessment   Darrion is a 69 y.o. male referred to outpatient Physical Therapy with a medical diagnosis of debility. Pt presents with back and B LE pain, B LE and UE weakness, muscle tightness, postural imbalance, and impaired mobility, endurance, gait, and functional mobility. He demonstrates SOB with transfers and low level activities indicating decreased cardiovascular endurance and functional mobility.    Pt prognosis is Good.   Pt will benefit from skilled outpatient Physical Therapy to address the deficits stated above and in the chart below, provide pt/family education, and to maximize pt's level of independence.     Plan of care discussed with patient: Yes  Pt's spiritual, cultural and educational needs  considered and patient is agreeable to the plan of care and goals as stated below:     Anticipated Barriers for therapy: none    Medical Necessity is demonstrated by the following  History  Co-morbidities and personal factors that may impact the plan of care Co-morbidities:   CHF, CKD stage 4, diabetes and transportation assistance required    Personal Factors:   lifestyle     high   Examination  Body Structures and Functions, activity limitations and participation restrictions that may impact the plan of care Body Regions:   neck  back  lower extremities  upper extremities  trunk    Body Systems:    gross symmetry  ROM  strength  balance  gait  transfers  transitions  motor control  motor learning  heart rate  respiratory rate  blood pressure    Participation Restrictions:   ADLs, IADLs, domestic duties    Activity limitations:   Learning and applying knowledge  no deficits    General Tasks and Commands  no deficits    Communication  no deficits    Mobility  lifting and carrying objects  walking  driving (bike, car, motorcycle)    Self care  no deficits    Domestic Life  shopping  cooking  doing house work (cleaning house, washing dishes, laundry)    Interactions/Relationships  no deficits    Life Areas  no deficits    Community and Social Life  recreation and leisure         high   Clinical Presentation stable and uncomplicated low   Decision Making/ Complexity Score: low     Medical necessity is demonstrated by the following IMPAIRMENTS/PROBLEM LIST:   1) Increase in pain level limiting function   2) LE weakness   3) Difficulty walking long distances   4) Decreased endurance   5) Lack of HEP    GOALS: Short Term Goals:  6 weeks  1. Report decreased body pain  <  / =  5/10 at worst to increase tolerance for prolonged standing.   2. Pt will be able to tolerate multi-directional LE strengthening in order to improve ability to perform household chores.  3. Pt will report 50% improvement in ability to walk long  distances since start of care to indicate improved functional mobility.   4. Pt will ambulate on treadmill for 5 minutes without fatigue to indicate improved endurance.   5. Pt to tolerate HEP to improve ROM and independence with ADL's.    Long Term Goals: 12 weeks  1. Report decreased body pain  <  / =  3/10 at worst to increase tolerance for prolonged standing.   2. Pt will be able to perform 2 x 10 multi-directional LE strengthening without fatigue in order to improve ability to perform household chores.  3. Pt will report 80% improvement in ability to walk long distances since start of care to indicate improved functional mobility.   4. Pt will ambulate on treadmill for 10 minutes without fatigue to indicate improved endurance.   5. Pt to be Independent with HEP to improve ROM and independence with ADL's.    Plan   Plan of care Certification: 8/16/2019 to 11/16/19.    Outpatient Physical Therapy 2 times weekly for 12 weeks to include the following interventions: Gait Training, Manual Therapy, Moist Heat/ Ice, Neuromuscular Re-ed, Patient Education, Therapeutic Activites and Therapeutic Exercise.     iTffanie Hinojosa, PT

## 2019-08-26 ENCOUNTER — CLINICAL SUPPORT (OUTPATIENT)
Dept: REHABILITATION | Facility: HOSPITAL | Age: 69
End: 2019-08-26
Attending: INTERNAL MEDICINE
Payer: MEDICARE

## 2019-08-26 DIAGNOSIS — Z74.09 IMPAIRED FUNCTIONAL MOBILITY AND ENDURANCE: Primary | ICD-10-CM

## 2019-08-26 DIAGNOSIS — R29.3 POSTURAL IMBALANCE: ICD-10-CM

## 2019-08-26 DIAGNOSIS — M62.81 MUSCLE WEAKNESS OF LOWER EXTREMITY: ICD-10-CM

## 2019-08-26 PROCEDURE — 97110 THERAPEUTIC EXERCISES: CPT | Mod: HCNC,PN

## 2019-08-26 NOTE — PROGRESS NOTES
"                                                  Physical Therapy Daily Note     Date: 08/26/2019  Name: Darrion Bennett  [facility] Number: [id number]  Diagnosis:   Encounter Diagnoses   Name Primary?    Muscle weakness of lower extremity     Postural imbalance     Impaired functional mobility and endurance Yes     Physician: Farooq Domingo MD    Physician: Farooq Domingo MD    Physician Orders: PT Eval and Treat   Medical Diagnosis from Referral: debility  Evaluation Date: 8/16/2019  Authorization Period Expiration: 12/31/19  Plan of Care Expiration: 11/16/19  Visit # / Visits authorized: 2/ 20    Time In: 800  Time Out: 908  Total Billable Time: 35 minutes    Precautions: CHF, DM 2, stage 4 kidney disease, glaucoma- blurry vision      Subjective     Pt reports low back and BLE pain pre-tx.    Objective     Patient received individual therapy to increase strength, endurance, ROM, flexibility, posture and core stabilization with activities as follows:     Darrion received therapeutic exercises to develop strength, endurance, ROM, flexibility, posture and core stabilization for 35 minutes including:     Adduction squeeze 20x5"  Bridges 2x10  SLR 2x10 B  Mini squats 2x10 at // bars  Standing hip abduction/extension 2x10  Calf raises 2x10  Calf st on slant board x1 min  Side steps with PT guard x2 laps  Step ups on foam x15 B  Step ups 4" step 2x10 B  Standing shoulder extension RTB 2x10  Rows RTB 2x10  Tandem stance 2x30" B  Walking laps around gym x6 min (6 laps)      Pt received moist heat to low back for 10 minutes post tx. Skin checked post theramally.   Supine with bloster    Written Home Exercises Provided: no new exercises provided this session  Pt demo good understanding of the education provided. Darrion demonstrated good return demonstration of activities.     Education provided: AJSMIN Maier verbalized good understanding of education provided.   No spiritual or educational barriers to learning " identified.    Assessment     Patient tolerated tx well. Pt with poor motor control and difficulty with proper form despite max verbal and tactile cues. Added various exercises to improve strength and balance. Patient with reports that walking on the treadmill gives him blisters despite wearing recommended diabetic shoes. Walking performed around the gym today with close supervision due to instability, particularly on curves. Patient will continue to benefit from skilled PT in order to decrease pain, increase strength/stability/balance, and improve functional mobility.     GOALS: Short Term Goals:  6 weeks  1. Report decreased body pain  <  / =  5/10 at worst to increase tolerance for prolonged standing.   2. Pt will be able to tolerate multi-directional LE strengthening in order to improve ability to perform household chores.  3. Pt will report 50% improvement in ability to walk long distances since start of care to indicate improved functional mobility.   4. Pt will ambulate on treadmill for 5 minutes without fatigue to indicate improved endurance.   5. Pt to tolerate HEP to improve ROM and independence with ADL's.     Long Term Goals: 12 weeks  1. Report decreased body pain  <  / =  3/10 at worst to increase tolerance for prolonged standing.   2. Pt will be able to perform 2 x 10 multi-directional LE strengthening without fatigue in order to improve ability to perform household chores.  3. Pt will report 80% improvement in ability to walk long distances since start of care to indicate improved functional mobility.   4. Pt will ambulate on treadmill for 10 minutes without fatigue to indicate improved endurance.   5. Pt to be Independent with HEP to improve ROM and independence with ADL's.     Plan   Continue with established Plan of Care towards PT goals.      Therapist: Sherin Harley, PT, DPT

## 2019-08-27 ENCOUNTER — OFFICE VISIT (OUTPATIENT)
Dept: OPTOMETRY | Facility: CLINIC | Age: 69
End: 2019-08-27
Payer: MEDICARE

## 2019-08-27 DIAGNOSIS — H40.1134 PRIMARY OPEN ANGLE GLAUCOMA (POAG) OF BOTH EYES, INDETERMINATE STAGE: ICD-10-CM

## 2019-08-27 DIAGNOSIS — E11.9 DIABETIC EYE EXAM: Primary | ICD-10-CM

## 2019-08-27 DIAGNOSIS — Z96.1 PSEUDOPHAKIA OF BOTH EYES: ICD-10-CM

## 2019-08-27 DIAGNOSIS — Z01.00 DIABETIC EYE EXAM: Primary | ICD-10-CM

## 2019-08-27 DIAGNOSIS — H52.7 REFRACTIVE ERROR: ICD-10-CM

## 2019-08-27 PROCEDURE — 99999 PR PBB SHADOW E&M-EST. PATIENT-LVL II: CPT | Mod: PBBFAC,HCNC,, | Performed by: OPTOMETRIST

## 2019-08-27 PROCEDURE — 92015 DETERMINE REFRACTIVE STATE: CPT | Mod: HCNC,S$GLB,, | Performed by: OPTOMETRIST

## 2019-08-27 PROCEDURE — 92004 COMPRE OPH EXAM NEW PT 1/>: CPT | Mod: HCNC,S$GLB,, | Performed by: OPTOMETRIST

## 2019-08-27 PROCEDURE — 92015 PR REFRACTION: ICD-10-PCS | Mod: HCNC,S$GLB,, | Performed by: OPTOMETRIST

## 2019-08-27 PROCEDURE — 92004 PR EYE EXAM, NEW PATIENT,COMPREHESV: ICD-10-PCS | Mod: HCNC,S$GLB,, | Performed by: OPTOMETRIST

## 2019-08-27 PROCEDURE — 92133 POSTERIOR SEGMENT OCT OPTIC NERVE(OCULAR COHERENCE TOMOGRAPHY) - OU - BOTH EYES: ICD-10-PCS | Mod: HCNC,S$GLB,, | Performed by: OPTOMETRIST

## 2019-08-27 PROCEDURE — 92133 CPTRZD OPH DX IMG PST SGM ON: CPT | Mod: HCNC,S$GLB,, | Performed by: OPTOMETRIST

## 2019-08-27 PROCEDURE — 99999 PR PBB SHADOW E&M-EST. PATIENT-LVL II: ICD-10-PCS | Mod: PBBFAC,HCNC,, | Performed by: OPTOMETRIST

## 2019-08-27 RX ORDER — TRAVOPROST OPHTHALMIC SOLUTION 0.04 MG/ML
1 SOLUTION OPHTHALMIC NIGHTLY
Qty: 5 ML | Refills: 4 | Status: SHIPPED | OUTPATIENT
Start: 2019-08-27 | End: 2020-05-18 | Stop reason: SDUPTHER

## 2019-08-27 RX ORDER — DORZOLAMIDE HCL 20 MG/ML
1 SOLUTION/ DROPS OPHTHALMIC 2 TIMES DAILY
Qty: 10 ML | Refills: 3 | Status: SHIPPED | OUTPATIENT
Start: 2019-08-27 | End: 2020-03-09 | Stop reason: SDUPTHER

## 2019-08-27 NOTE — PROGRESS NOTES
Subjective:       Patient ID: Darrion Bennett is a 69 y.o. male      Chief Complaint   Patient presents with    Glaucoma    Diabetic Eye Exam     History of Present Illness  Dls: 6 months Dr. Hall    68 y/o male presents today for diabetic eye exam and glaucoma ck.  Pt states no changes in vision. Pt wears otc readers.      this am     No tearing  No itching  No burning  No pain  No ha's  No floaters  No flashes    Eye meds  Trusopt OU BID last dose last night  Travatan Z OU Q HS last dose last night     Hemoglobin A1C       Date                     Value               Ref Range           Status               06/11/2019               6.9 (H)             4.0 - 5.6 %         Final        10/16/2018               6.2 (H)             4.0 - 5.7 %         Final            05/30/2014               7.9 (H)             4.8 - 5.6 %         Final    Assessment/Plan:     1. Diabetic eye exam  [facility] vision    No diabetic retinopathy. Discussed with pt the effects of diabetes on vision, importance of good blood sugar control, compliance with meds, and follow up care with PCP. Return in 1 year for dilated eye exam, sooner PRN.    2. Primary open angle glaucoma (POAG) of both eyes, indeterminate stage  Pt states glc x 20 years. Previously being followed by Dr. Hall. +FHx (father). OCT RNFL with thinning OD > OS. Doing well with travatan QHS OU and trusopt BID OU. CPM. 6 months IOP/HVF/CCT.  - Posterior Segment OCT Optic Nerve- Both eyes  - dorzolamide (TRUSOPT) 2 % ophthalmic solution; Place 1 drop into both eyes 2 (two) times daily.  Dispense: 10 mL; Refill: 3  - travoprost (TRAVATAN Z) 0.004 % ophthalmic solution; Place 1 drop into both eyes every evening.  Dispense: 5 mL; Refill: 4  - [place] Visual Field - OU - Extended - Both Eyes; Future    3. Pseudophakia of both eyes  Well-centered. Clear.     4. Refractive error  Optional Rx. Readers PRN.    Eyeglass Final Rx     Eyeglass Final Rx       Sphere Cylinder  [place] Add    Right [place] +0.50 170 +2.50    Left -0.75 Sphere  +2.50    Expiration Date:  8/27/2020                  Follow up in about 6 months (around 2/27/2020) for IOP check, HVF 24-2, CCT.

## 2019-08-27 NOTE — LETTER
August 27, 2019      Farooq Domingo MD  [address]  [place] [address]           [facility]  [address]  [place] [address]  Phone: [phone]  Fax: [phone]          Patient: Darrion Bennett   MR Number: [id number]   YOB: 1950   Date of Visit: 8/27/2019       Dear Dr. Farooq Domingo:    Thank you for referring Darrion Bennett to me for evaluation. Attached you will find relevant portions of my assessment and plan of care.    If you have questions, please do not hesitate to call me. I look forward to following Darrion Bennett along with you.    Sincerely,    Viry Esquivel, OD    Enclosure  CC:  No Recipients    If you would like to receive this communication electronically, please contact [email or web address] or [phone] to request more information on [employer] access.    For providers and/or their staff who would like to refer a patient to [facility], please contact us through our one-stop-shop provider referral line, [facility], at [phone].    If you feel you have received this communication in error or would no longer like to receive these types of communications, please e-mail [email or web address]

## 2019-08-28 ENCOUNTER — PATIENT MESSAGE (OUTPATIENT)
Dept: FAMILY MEDICINE | Facility: CLINIC | Age: 69
End: 2019-08-28

## 2019-08-28 ENCOUNTER — CLINICAL SUPPORT (OUTPATIENT)
Dept: REHABILITATION | Facility: HOSPITAL | Age: 69
End: 2019-08-28
Attending: INTERNAL MEDICINE
Payer: MEDICARE

## 2019-08-28 DIAGNOSIS — R29.3 POSTURAL IMBALANCE: ICD-10-CM

## 2019-08-28 DIAGNOSIS — Z74.09 IMPAIRED FUNCTIONAL MOBILITY AND ENDURANCE: ICD-10-CM

## 2019-08-28 DIAGNOSIS — M62.81 MUSCLE WEAKNESS OF LOWER EXTREMITY: ICD-10-CM

## 2019-08-28 PROCEDURE — 97110 THERAPEUTIC EXERCISES: CPT | Mod: HCNC,PN

## 2019-08-28 NOTE — PROGRESS NOTES
"                                                  Physical Therapy Daily Note     Date: 08/28/2019  Name: Darrion Bennett  [facility] Number: [id number]  Diagnosis:   Encounter Diagnoses   Name Primary?    Muscle weakness of lower extremity     Postural imbalance     Impaired functional mobility and endurance      Physician: Farooq Domingo MD    Physician: Farooq Domingo MD    Physician Orders: PT Eval and Treat   Medical Diagnosis from Referral: debility  Evaluation Date: 8/16/2019  Authorization Period Expiration: 12/31/19  Plan of Care Expiration: 11/16/19  Visit # / Visits authorized: 3/ 20    Time In: 0835  Time Out: 0940  Total Time: 65 minutes  Total Billable Time: 30 minutes    Precautions: CHF, DM 2, stage 4 kidney disease, glaucoma- blurry vision      Subjective     Pt reports he is in pain all the time.  Pt reports pain all over.  Pt with reports of B cervical, B back and B knee pain.    Objective     Patient received individual therapy to increase strength, endurance, ROM, flexibility, posture and core stabilization with activities as follows:     Darrion received therapeutic exercises to develop strength, endurance, ROM, flexibility, posture and core stabilization for 65 minutes including:     Supine:  Adduction squeeze 20x5"  HSS 2x30"  Hip flexor stretch (no strap) 3x30"    Standing:  Mini squats 2x10 at // bars  Standing hip abduction/extension 2x10  Calf raises 2x10  Calf st on slant board 3x30" (2nd low level on gray wedge)  Side steps with PT guard x2 laps  Step ups on foam x15 B  Step ups 4" step 2x10 B  Standing shoulder extension RTB 2x10  Rows RTB 2x10  Tandem stance 2x30" B  Walking laps around gym x6 min (6 laps)    NP:  Bridges 2x10- NP  SLR 2x10 B- NP  Pt received moist heat to low back for 10 minutes post tx. Skin checked post theramally.   Supine with bloster    Written Home Exercises Provided: no new exercises provided this session  Pt demo good understanding of the education provided. " Darrion demonstrated good return demonstration of activities.     Education provided: JASMIN Maier verbalized good understanding of education provided.   No spiritual or educational barriers to learning identified.    Assessment     Patient tolerated tx good today.  Pt requires frequent rest breaks due to being deconditioned.  Muscle restriction throughout B hip flexor and hamstring region L>R with tenderness noted.  Decreased pain to BLEs post stretches. Pt would continue to benefit from stretches to improve muscle extensibility and strengthening.  Continue to progress as tolerated by pt.         GOALS: Short Term Goals:  6 weeks  1. Report decreased body pain  <  / =  5/10 at worst to increase tolerance for prolonged standing.   2. Pt will be able to tolerate multi-directional LE strengthening in order to improve ability to perform household chores.  3. Pt will report 50% improvement in ability to walk long distances since start of care to indicate improved functional mobility.   4. Pt will ambulate on treadmill for 5 minutes without fatigue to indicate improved endurance.   5. Pt to tolerate HEP to improve ROM and independence with ADL's.     Long Term Goals: 12 weeks  1. Report decreased body pain  <  / =  3/10 at worst to increase tolerance for prolonged standing.   2. Pt will be able to perform 2 x 10 multi-directional LE strengthening without fatigue in order to improve ability to perform household chores.  3. Pt will report 80% improvement in ability to walk long distances since start of care to indicate improved functional mobility.   4. Pt will ambulate on treadmill for 10 minutes without fatigue to indicate improved endurance.   5. Pt to be Independent with HEP to improve ROM and independence with ADL's.     Plan   Continue with established Plan of Care towards PT goals.      Therapist: Jessi Curry, PTA, DPT

## 2019-08-30 ENCOUNTER — OFFICE VISIT (OUTPATIENT)
Dept: FAMILY MEDICINE | Facility: CLINIC | Age: 69
End: 2019-08-30
Payer: MEDICARE

## 2019-08-30 VITALS
HEART RATE: 70 BPM | WEIGHT: 154 LBS | OXYGEN SATURATION: 99 % | DIASTOLIC BLOOD PRESSURE: 80 MMHG | HEIGHT: 65 IN | SYSTOLIC BLOOD PRESSURE: 144 MMHG | BODY MASS INDEX: 25.66 KG/M2

## 2019-08-30 DIAGNOSIS — L03.012 FELON OF FINGER OF LEFT HAND: Primary | ICD-10-CM

## 2019-08-30 DIAGNOSIS — E78.00 PURE HYPERCHOLESTEROLEMIA: ICD-10-CM

## 2019-08-30 DIAGNOSIS — I10 ESSENTIAL HYPERTENSION: ICD-10-CM

## 2019-08-30 DIAGNOSIS — E11.8 CONTROLLED TYPE 2 DIABETES MELLITUS WITH COMPLICATION, WITHOUT LONG-TERM CURRENT USE OF INSULIN: ICD-10-CM

## 2019-08-30 PROCEDURE — 99214 PR OFFICE/OUTPT VISIT, EST, LEVL IV, 30-39 MIN: ICD-10-PCS | Mod: HCNC,S$GLB,, | Performed by: FAMILY MEDICINE

## 2019-08-30 PROCEDURE — 3044F PR MOST RECENT HEMOGLOBIN A1C LEVEL <7.0%: ICD-10-PCS | Mod: HCNC,CPTII,S$GLB, | Performed by: FAMILY MEDICINE

## 2019-08-30 PROCEDURE — 1101F PT FALLS ASSESS-DOCD LE1/YR: CPT | Mod: HCNC,CPTII,S$GLB, | Performed by: FAMILY MEDICINE

## 2019-08-30 PROCEDURE — 3079F DIAST BP 80-89 MM HG: CPT | Mod: HCNC,CPTII,S$GLB, | Performed by: FAMILY MEDICINE

## 2019-08-30 PROCEDURE — 3079F PR MOST RECENT DIASTOLIC BLOOD PRESSURE 80-89 MM HG: ICD-10-PCS | Mod: HCNC,CPTII,S$GLB, | Performed by: FAMILY MEDICINE

## 2019-08-30 PROCEDURE — 3044F HG A1C LEVEL LT 7.0%: CPT | Mod: HCNC,CPTII,S$GLB, | Performed by: FAMILY MEDICINE

## 2019-08-30 PROCEDURE — 99999 PR PBB SHADOW E&M-EST. PATIENT-LVL III: CPT | Mod: PBBFAC,HCNC,, | Performed by: FAMILY MEDICINE

## 2019-08-30 PROCEDURE — 99214 OFFICE O/P EST MOD 30 MIN: CPT | Mod: HCNC,S$GLB,, | Performed by: FAMILY MEDICINE

## 2019-08-30 PROCEDURE — 3077F SYST BP >= 140 MM HG: CPT | Mod: HCNC,CPTII,S$GLB, | Performed by: FAMILY MEDICINE

## 2019-08-30 PROCEDURE — 3077F PR MOST RECENT SYSTOLIC BLOOD PRESSURE >= 140 MM HG: ICD-10-PCS | Mod: HCNC,CPTII,S$GLB, | Performed by: FAMILY MEDICINE

## 2019-08-30 PROCEDURE — 99999 PR PBB SHADOW E&M-EST. PATIENT-LVL III: ICD-10-PCS | Mod: PBBFAC,HCNC,, | Performed by: FAMILY MEDICINE

## 2019-08-30 PROCEDURE — 1101F PR PT FALLS ASSESS DOC 0-1 FALLS W/OUT INJ PAST YR: ICD-10-PCS | Mod: HCNC,CPTII,S$GLB, | Performed by: FAMILY MEDICINE

## 2019-08-30 RX ORDER — CLINDAMYCIN HYDROCHLORIDE 300 MG/1
300 CAPSULE ORAL EVERY 8 HOURS
Qty: 30 CAPSULE | Refills: 0 | Status: SHIPPED | OUTPATIENT
Start: 2019-08-30 | End: 2019-10-15

## 2019-08-30 NOTE — PROGRESS NOTES
Office Visit    Patient Name: Darrion Bennett    : 1950  MRN: [id number]      Assessment/Plan:  Darrion Bennett is a 69 y.o. male who presents today for :    Felon of finger of left hand  -     clindamycin (CLEOCIN) 300 MG capsule; Take 1 capsule (300 mg total) by mouth every 8 (eight) hours.  Dispense: 30 capsule; Refill: 0  -Tetanus prophylaxis UTD - advised warm soaks  -Elevate the hand, Tylenol PRN for pain.  -Antibiotics for 7 to 10 days   -f/u as needed    Essential hypertension  Controlled type 2 diabetes mellitus with complication, without long-term current use of insulin  Pure hypercholesterolemia  Body mass index 29.0-29.9, adult  -BP acceptable for age - continue current med regimen  -previous labs reviewed  -BP controlled - continue current med regimen  -advised DASH diet, regular exercise - as well as portion control.   -f/u with BP logs in 2 weeks if BP is not consistently <150/90      Follow up for worsening Sx. Urgent care/ED precautions provided.     This note was created by combination of typed  and Dragon dictation.  Transcription errors may be present.  If there are any questions, please contact me.        ----------------------------------------------------------------------------------------------------------------------      HPI:  Patient Care Team:  Farooq Domingo MD as PCP - General (Internal Medicine)  Tin Chambers MD (Gastroenterology)  Harrison Brannon MD (Cardiology)  Roxanna Salazar MD as Nurse Practitioner (Rheumatology)  Malcolm Irwin MD as  (Nephrology)  Amy Gamez MA as Care Coordinator  Dangelo Hall MD as Consulting Physician (Ophthalmology)    Darrion is a 69 y.o. male with      Patient Active Problem List   Diagnosis    Essential hypertension    Controlled type 2 diabetes mellitus with complication, without long-term current use of insulin    Pure hypercholesterolemia    MCTD (mixed connective tissue disease)     Sjogren's disease    Bilateral edema of lower extremity 6/2019 TTE normal LV systolic and diastolic function; ABIs normal    Glaucoma    Body mass index 29.0-29.9, adult    Thyroid dysfunction    Chronic kidney disease, stage III (moderate)    Weight gain    Jackson's esophagus without dysplasia    Anemia from plaquenil and imuran    Neutropenia    Thrombocytopenia    Connective tissue disorder    Current chronic use of systemic steroids    Compression fracture of body of thoracic vertebra on XR 2/2019; 2/2019 alendronate    Anemia of chronic renal failure, stage 3 (moderate)    Chronic constipation not responding to linzess, miralax, senna    Screening for colon cancer 07/26/2018 colonoscopy transverse colon polyp path showing benign inflammatory polyp.    Tophaceous gout    Felon of finger of right hand    Muscle weakness of lower extremity    Postural imbalance    Impaired functional mobility and endurance    Pseudophakia of both eyes    Refractive error     This patient is new to me      Patient presents today for :  finger infection  of the L ring finger that started 5 days ago. He had a similar occurrence about 3 months ago that required Abx and I&D. 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  No fever/chills/pain spreading to L hand.     Pt is compliant with daily DM/BP medications at home - no side effects, FBG/BP log is noted to be controlled at home.  No CP/SOB/leg swelling.  No polyuria/polydipsia/hypoglycemia/tingling/numbness/weakness        Additional ROS    No F/C/wt changes/fatigue  No dysphagia/sore throat/rhinorrhea  No CP/SOB/palpitations/swelling  No cough/wheezing/SOB  No nausea/vomiting/abd pain/no diarrhea  No MSK weakness/HA/tingling/numbness  No weakness/HA/tingling/numbness          Current  "Medications  Medications reviewed and updated.       Current Outpatient Medications:     amLODIPine (NORVASC) 10 MG tablet, Take 1 tablet (10 mg total) by mouth once daily., Disp: 30 tablet, Rfl: 11    aspirin (ECOTRIN) 81 MG EC tablet, Take 81 mg by mouth once daily., Disp: , Rfl:     BD VEO INSULIN SYRINGE UF 1/2 mL 31 gauge x 15/64" Syrg, BASAL/BOLUS INSULIN 4-5X/DAY, Disp: , Rfl: 3    cloNIDine 0.1 mg/24 hr td ptwk (CATAPRES) 0.1 mg/24 hr, Place 1 patch onto the skin every 7 days., Disp: 4 patch, Rfl: 11    dorzolamide (TRUSOPT) 2 % ophthalmic solution, Place 1 drop into both eyes 2 (two) times daily., Disp: 10 mL, Rfl: 3    febuxostat (ULORIC) 80 mg Tab, Take 1 tablet (80 mg total) by mouth once daily., Disp: 30 tablet, Rfl: 3    insulin degludec-liraglutide (XULTOPHY 100/3.6) 100 unit-3.6 mg /mL (3 mL) InPn, Inject 7 Units into the skin once daily., Disp: 5 Syringe, Rfl: 0    ipratropium (ATROVENT) 42 mcg (0.06 %) nasal spray, INSTILL 2 SPRAYS BY NASAL ROUTE 3 TIMES DAILY. AS NEEDED FOR NASAL CONGESTION AND DRIP FOR 7 DAYS, Disp: 30 mL, Rfl: 0    lubiprostone (AMITIZA) 24 MCG Cap, Take 1 capsule (24 mcg total) by mouth 2 (two) times daily with meals., Disp: 60 capsule, Rfl: 11    multivit with min-folic acid 200 mcg Chew, , Disp: , Rfl:     pen needle, diabetic (BD ULTRA-FINE RICHARD PEN NEEDLE) 32 gauge x 5/32" Ndle, 1 Device by Misc.(Non-Drug; Combo Route) route once daily., Disp: 100 each, Rfl: 3    polyethylene glycol (GLYCOLAX) 17 gram/dose powder, Take 17 g by mouth once daily., Disp: 850 g, Rfl: 11    rosuvastatin (CRESTOR) 10 MG tablet, Take 1 tablet (10 mg total) by mouth once daily., Disp: 90 tablet, Rfl: 3    torsemide (DEMADEX) 10 MG Tab, Take 1 tablet (10 mg total) by mouth once daily., Disp: 90 tablet, Rfl: 3    travoprost (TRAVATAN Z) 0.004 % ophthalmic solution, Place 1 drop into both eyes every evening., Disp: 5 mL, Rfl: 4    valsartan (DIOVAN) 160 MG tablet, Take 1 tablet (160 mg " total) by mouth 2 (two) times daily., Disp: 180 tablet, Rfl: 3    clindamycin (CLEOCIN) 300 MG capsule, Take 1 capsule (300 mg total) by mouth every 8 (eight) hours., Disp: 30 capsule, Rfl: 0    colchicine (COLCRYS) 0.6 mg tablet, Take 1 tablet (0.6 mg total) by mouth once daily. for 7 days, Disp: 7 tablet, Rfl: 0    Past Surgical History:   Procedure Laterality Date    CATARACT EXTRACTION Bilateral 2005    COLONOSCOPY  2014    EYE SURGERY         Family History   Problem Relation Age of Onset    Hypertension Father     Stroke Father     Cataracts Father     Glaucoma Father     Cataracts Mother     No Known Problems Sister     No Known Problems Brother     Rheum arthritis Maternal Aunt     Rheum arthritis Maternal Uncle     No Known Problems Paternal Aunt     No Known Problems Paternal Uncle     No Known Problems Maternal Grandmother     No Known Problems Maternal Grandfather     Throat cancer Paternal Grandmother     Glaucoma Paternal Grandfather     Celiac disease Neg Hx     Colon cancer Neg Hx     Cirrhosis Neg Hx     Colon polyps Neg Hx     Crohn's disease Neg Hx     Cystic fibrosis Neg Hx     Hemochromatosis Neg Hx     Esophageal cancer Neg Hx     Inflammatory bowel disease Neg Hx     Irritable bowel syndrome Neg Hx     Liver cancer Neg Hx     Liver disease Neg Hx     Rectal cancer Neg Hx     Stomach cancer Neg Hx     Ulcerative colitis Neg Hx     Chase's disease Neg Hx     Amblyopia Neg Hx     Blindness Neg Hx     Cancer Neg Hx     Diabetes Neg Hx     Macular degeneration Neg Hx     Retinal detachment Neg Hx     Strabismus Neg Hx     Thyroid disease Neg Hx        Social History     Socioeconomic History    Marital status:      Spouse name: Not on file    Number of children: 3    Years of education: Not on file    Highest education level: Not on file   Occupational History    Not on file   Social Needs    Financial resource strain: Somewhat hard    Food  "insecurity:     Worry: Never true     Inability: Never true    Transportation needs:     Medical: No     Non-medical: No   Tobacco Use    Smoking status: Never Smoker    Smokeless tobacco: Never Used   Substance and Sexual Activity    Alcohol use: No     Frequency: Never     Drinks per session: Patient refused     Binge frequency: Never    Drug use: No    Sexual activity: Never   Lifestyle    Physical activity:     Days per week: 3 days     Minutes per session: Not on file    Stress: Only a little   Relationships    Social connections:     Talks on phone: Once a week     Gets together: Once a week     Attends [employer] day service: Not on file     Active member of club or organization: No     Attends meetings of clubs or organizations: Never     Relationship status:    Other Topics Concern    Not on file   Social History Narrative    Not on file           Allergies   Review of patient's allergies indicates:   Allergen Reactions    Penicillins Nausea And Vomiting    Uloric [febuxostat]      Aches and pain    Allopurinol analogues Rash             Review of Systems  See HPI      Physical Exam  BP (!) 144/80   Pulse 70   Ht 5' 5" (1.651 m)   Wt 69.9 kg (154 lb)   SpO2 99%   BMI 25.63 kg/m²     GEN: NAD, well developed, pleasant, well nourished  HEENT: NCAT, PERRLA, EOMI, sclera clear, anicteric, O/P clear, MMM with no lesions  NECK: normal, supple with midline trachea, no LAD, no thyromegaly  LUNGS: CTAB, no w/r/r, no increased work of breathing   HEART: RRR, normal S1 and S2, no m/r/g, no edema  ABD: s/nt/nd, NABS  SKIN: normal turgor, +L 4th finger felon localized distal to DIP - mild TTP with redness and swelling  PSYCH: AOx3, appropriate mood and affect  MSK: warm/well perfused, normal ROM in all extremities, no c/c/e.              "

## 2019-09-04 ENCOUNTER — CLINICAL SUPPORT (OUTPATIENT)
Dept: REHABILITATION | Facility: HOSPITAL | Age: 69
End: 2019-09-04
Attending: INTERNAL MEDICINE
Payer: MEDICARE

## 2019-09-04 DIAGNOSIS — M62.81 MUSCLE WEAKNESS OF LOWER EXTREMITY: ICD-10-CM

## 2019-09-04 DIAGNOSIS — R29.3 POSTURAL IMBALANCE: ICD-10-CM

## 2019-09-04 DIAGNOSIS — Z74.09 IMPAIRED FUNCTIONAL MOBILITY AND ENDURANCE: ICD-10-CM

## 2019-09-04 PROCEDURE — 97110 THERAPEUTIC EXERCISES: CPT | Mod: HCNC,PN

## 2019-09-04 NOTE — PROGRESS NOTES
"                                                  Physical Therapy Daily Note     Date: 09/04/2019  Name: Darrion Bennett  [facility] Number: [id number]  Diagnosis:   Encounter Diagnoses   Name Primary?    Muscle weakness of lower extremity     Postural imbalance     Impaired functional mobility and endurance      Physician: Farooq Domingo MD    Physician Orders: PT Eval and Treat   Medical Diagnosis from Referral: debility  Evaluation Date: 8/16/2019  Authorization Period Expiration: 12/31/19  Plan of Care Expiration: 11/16/19  Visit # / Visits authorized: 4/ 20    Time In: 0900  Time Out: 0955  Total Time: 55 minutes  Total Billable Time: 34 minutes    Precautions: CHF, DM 2, stage 4 kidney disease, glaucoma- blurry vision    Subjective     Pt reports: he is not performing exercises at home because he is lazy but he is walking. He does not like to walk on treadmills because it gives him blisters on his feet.   He was not compliant with home exercise program.  Response to previous treatment: good  Functional change: walking better and more    Pain: 0/10  Location: NA    Objective     BOLD= performed today    Patient received individual therapy to increase strength, endurance, ROM, flexibility, posture and core stabilization with activities as follows:     Darrion received therapeutic exercises to develop strength, endurance, ROM, flexibility, posture and core stabilization for 55 minutes including:     +Upright bike x 10 min     Supine:  +Bridges x 20  +SL hip abd x 20 ea  Adduction squeeze 20x5"  HSS 2x30"  Hip flexor stretch (no strap) 3x30"    Standing:  Mini squats 2x10 at // bars  Hip abduction x 20 ea  Hip extension 2x10 ea  Calf raises 2x10  Calf st on slant board 3x30" (2nd low level on gray wedge)  Side steps with green TB 40' x 2 laps  Step ups on foam x15 B  Step ups 6" step 2x10 B  Standing shoulder extension RTB 2x10  Rows green TB 2x10  Tandem stance 2x30" B  Walking laps around gym x6 min (6 " laps)  +Standing march x 20 ea  +Sit to stand from chair x 20  +Shuttle squats 2.5 bands 3 x 10    NP:  Bridges 2x10- NP  SLR 2x10 B- NP    Pt received moist heat to low back for 0 minutes post tx. Skin checked post theramally.   Supine with bloster    Written Home Exercises Provided: no new exercises provided this session  Pt demo good understanding of the education provided. Darrion demonstrated good return demonstration of activities.     Education provided: JASMIN  Darrion verbalized good understanding of education provided.   No spiritual or educational barriers to learning identified.    Assessment     Patient had good tolerance to treatment today with no adverse effects. Post-treatment pain rated as 0/10. Pt with good response to progression of exercise program. Appropriate exercise-induced fatigue achieved by end of session. Occasional need for rest breaks but decreased frequency compared to prior visits. He continues with decreased B LE strength and endurance.     GOALS: Short Term Goals:  6 weeks  1. Report decreased body pain  <  / =  5/10 at worst to increase tolerance for prolonged standing.   2. Pt will be able to tolerate multi-directional LE strengthening in order to improve ability to perform household chores.  3. Pt will report 50% improvement in ability to walk long distances since start of care to indicate improved functional mobility.   4. Pt will ambulate on treadmill for 5 minutes without fatigue to indicate improved endurance.   5. Pt to tolerate HEP to improve ROM and independence with ADL's.     Long Term Goals: 12 weeks  1. Report decreased body pain  <  / =  3/10 at worst to increase tolerance for prolonged standing.   2. Pt will be able to perform 2 x 10 multi-directional LE strengthening without fatigue in order to improve ability to perform household chores.  3. Pt will report 80% improvement in ability to walk long distances since start of care to indicate improved functional mobility.   4.  Pt will ambulate on treadmill for 10 minutes without fatigue to indicate improved endurance.   5. Pt to be Independent with HEP to improve ROM and independence with ADL's.     Plan     Progress B LE strength and endurance. Attempt treadmill walking if appropriate.     Therapist: Tiffanie Hinojosa, PT, DPT

## 2019-09-06 ENCOUNTER — CLINICAL SUPPORT (OUTPATIENT)
Dept: REHABILITATION | Facility: HOSPITAL | Age: 69
End: 2019-09-06
Attending: INTERNAL MEDICINE
Payer: MEDICARE

## 2019-09-06 DIAGNOSIS — Z74.09 IMPAIRED FUNCTIONAL MOBILITY AND ENDURANCE: ICD-10-CM

## 2019-09-06 DIAGNOSIS — R29.3 POSTURAL IMBALANCE: ICD-10-CM

## 2019-09-06 DIAGNOSIS — M62.81 MUSCLE WEAKNESS OF LOWER EXTREMITY: ICD-10-CM

## 2019-09-06 PROCEDURE — 97110 THERAPEUTIC EXERCISES: CPT | Mod: HCNC,PN

## 2019-09-06 NOTE — PROGRESS NOTES
"                                                  Physical Therapy Daily Note     Date: 09/06/2019  Name: Darrion Bennett  [facility] Number: [id number]  Diagnosis:   Encounter Diagnoses   Name Primary?    Muscle weakness of lower extremity     Postural imbalance     Impaired functional mobility and endurance      Physician: Farooq Domingo MD    Physician Orders: PT Eval and Treat   Medical Diagnosis from Referral: debility  Evaluation Date: 8/16/2019  Authorization Period Expiration: 12/31/19  Plan of Care Expiration: 11/16/19  Visit # / Visits authorized: 5/20    Time In: 0900  Time Out: 1000  Total Time: 60 minutes    Precautions: CHF, DM 2, stage 4 kidney disease, glaucoma- blurry vision    Subjective     Pt reports: he is at normal constant pain level now for his back and knees.  He was not compliant with home exercise program.  Response to previous treatment: good  Functional change: walking better and more    Pain: 7/10  Location: back and R knee    Objective     Patient received individual therapy to increase strength, endurance, ROM, flexibility, posture and core stabilization with activities as follows:     Darrion received therapeutic exercises to develop strength, endurance, ROM, flexibility, posture and core stabilization for 45 minutes including:   Upright bike x 8 min   Standing:  Mini squats 2x10 at // bars  Hip abduction x 30 ea  Hip extension 3x10 ea  Calf raises 3x10  Calf st on slant board 3x30" (2nd low level on gray wedge)  Side steps with green TB 40' x 2 laps  Step ups on foam x15 B  Step ups 6" step 2x10 B    Supine:  +Bridges x 20  +SL hip abd x 20 ea  Adduction squeeze 20x5"  HSS 2x30"  Hip flexor stretch (no strap) 3x30"    Standing shoulder extension RTB 2x10  Rows green TB 2x10  Tandem stance 2x30" B  Walking laps around gym x6 min (6 laps)  Standing march x 20 ea  Sit to stand from chair x 20  Shuttle squats 2.5 bands 3 x 10      Pt received moist heat to low back for 0 minutes post tx. Skin " checked post theramally.   Supine with bloster    Written Home Exercises Provided: no new exercises provided this session  Pt demo good understanding of the education provided. Darrion demonstrated good return demonstration of activities.     Education provided: JASMIN Maier verbalized good understanding of education provided.   No spiritual or educational barriers to learning identified.    Assessment   Pt with improved tolerance to strengthening as he only required 2 rest breaks today throughout activities.     GOALS: Short Term Goals:  6 weeks  1. Report decreased body pain  <  / =  5/10 at worst to increase tolerance for prolonged standing.   2. Pt will be able to tolerate multi-directional LE strengthening in order to improve ability to perform household chores.  3. Pt will report 50% improvement in ability to walk long distances since start of care to indicate improved functional mobility.   4. Pt will ambulate on treadmill for 5 minutes without fatigue to indicate improved endurance.   5. Pt to tolerate HEP to improve ROM and independence with ADL's.     Long Term Goals: 12 weeks  1. Report decreased body pain  <  / =  3/10 at worst to increase tolerance for prolonged standing.   2. Pt will be able to perform 2 x 10 multi-directional LE strengthening without fatigue in order to improve ability to perform household chores.  3. Pt will report 80% improvement in ability to walk long distances since start of care to indicate improved functional mobility.   4. Pt will ambulate on treadmill for 10 minutes without fatigue to indicate improved endurance.   5. Pt to be Independent with HEP to improve ROM and independence with ADL's.     Plan     Progress B LE strength and endurance. Attempt treadmill walking if appropriate.     Therapist: Fiona Tobias, PT, DPT

## 2019-09-09 ENCOUNTER — CLINICAL SUPPORT (OUTPATIENT)
Dept: REHABILITATION | Facility: HOSPITAL | Age: 69
End: 2019-09-09
Attending: INTERNAL MEDICINE
Payer: MEDICARE

## 2019-09-09 DIAGNOSIS — R29.3 POSTURAL IMBALANCE: ICD-10-CM

## 2019-09-09 DIAGNOSIS — Z74.09 IMPAIRED FUNCTIONAL MOBILITY AND ENDURANCE: ICD-10-CM

## 2019-09-09 DIAGNOSIS — M62.81 MUSCLE WEAKNESS OF LOWER EXTREMITY: ICD-10-CM

## 2019-09-09 PROCEDURE — G8978 MOBILITY CURRENT STATUS: HCPCS | Mod: CK,HCNC,PN

## 2019-09-09 PROCEDURE — G8979 MOBILITY GOAL STATUS: HCPCS | Mod: CK,HCNC,PN

## 2019-09-09 PROCEDURE — 97110 THERAPEUTIC EXERCISES: CPT | Mod: HCNC,PN

## 2019-09-09 NOTE — PROGRESS NOTES
"                                                  Physical Therapy Daily Note     Date: 09/09/2019  Name: Darrion Bennett  [facility] Number: [id number]  Diagnosis:   Encounter Diagnoses   Name Primary?    Muscle weakness of lower extremity     Postural imbalance     Impaired functional mobility and endurance      Physician: Farooq Domingo MD    Physician Orders: PT Eval and Treat   Medical Diagnosis from Referral: debility  Evaluation Date: 8/16/2019  Last PN: 9/9/19 (visit 6)  Authorization Period Expiration: 12/31/19  Plan of Care Expiration: 11/16/19  Visit # / Visits authorized: 6/20    Time In: 0702  Time Out: 0755  Total Time: 53 minutes    Precautions: CHF, DM 2, stage 4 kidney disease, glaucoma- blurry vision    Subjective     Pt reports: after performing HEP this weekend, he is sore. Pain is at worst 6/10. 15% improvement in ability to walk long distances since start of care. He still can't make the block but is making it farther down his street. Calf raises were bothering his ankle. He ate toast for breakfast. He lies in bed most of the day.   He was somewhat compliant with home exercise program.  Response to previous treatment: good- soreness that has improved since Friday.   Functional change: able to walk farther down his street    Pain: 6/10  Location: L foot/ankle    Objective     Patient received individual therapy to increase strength, endurance, ROM, flexibility, posture and core stabilization with activities as follows:     Darrion received therapeutic exercises to develop strength, endurance, ROM, flexibility, posture and core stabilization for 53 minutes including:     +Treadmill x 5 min @ 1.3-1.5 mph  Upright bike x 5 min level 2    Standing:  Mini squats 2x10 at // bar-  NP  Standing march x 30 ea  Hip abduction x 30 ea  Hip extension 3x10 ea  Sit to stand from chair x 20  Calf stretch on slant board 3x30"  Side steps with green TB 40' x 2 laps  Step ups 6" step 2x10 B  Tandem stance 2x30" B- " "NP  Walking laps around gym x6 min (6 laps)- NP  Shuttle squats 2.5 bands 3 x 10- NP    Standing shoulder extension RTB 2x10- NP  Rows green TB 2x10- NP    Supine:- NP  Bridges x 20  SL hip abd x 20 ea  Adduction squeeze 20x5"  HSS 2x30"  Hip flexor stretch (no strap) 3x30"    Pt received moist heat to low back for 0 minutes post tx. Skin checked post theramally.   Supine with bloster    Written Home Exercises Provided: no new exercises provided this session  Pt demo good understanding of the education provided. Darrion demonstrated good return demonstration of activities.     Education provided: DOMS, detriments of excessive bed rest  Darrion verbalized good understanding of education provided.   No spiritual or educational barriers to learning identified.    CMS Impairment/Limitation/Restriction for FOTO NOC-musculo-skeletal disorder Survey    Therapist reviewed FOTO scores for Darrion Bennett on 9/9/2019.   FOTO documents entered into [employer] - see Media section.    Limitation Score: 57%  Category: Mobility    Current : CK = at least 40% but < 60% impaired, limited or restricted  Goal: CK = at least 40% but < 60% impaired, limited or restricted       Assessment     Pt was re-assessed today with 3/5 STGs being met indicating improvements in walking endurance and tolerance for LE strengthening and HEP since start of care. He continues with B LE weakness, decreased endurance, difficulty walking long distances, body pain, decreased HEP compliance, and impaired functional mobility. Pt could benefit from continued physical therapy services to address deficits.     He had good tolerance to treatment today with no adverse effects. Post-treatment body pain rated as 5/10. Pt was challenged with standing exercises with little seated rest breaks. He only took 2 seated rest breaks throughout session. 1 seated rest break was due to feeling dizzy. Improvement in symptoms following rest. Able to return to standing LE strengthening until " end of session. Good response to addition of treadmill walking warm up. Pt strongly advised to avoid excessive bed rest throughout the day and to increase standing activity and compliance with HEP.     GOALS: Short Term Goals:  6 weeks  1. Report decreased body pain  <  / =  5/10 at worst to increase tolerance for prolonged standing.- in progress   2. Pt will be able to tolerate multi-directional LE strengthening in order to improve ability to perform household chores.- met 9/9/19  3. Pt will report 50% improvement in ability to walk long distances since start of care to indicate improved functional mobility. - in progress   4. Pt will ambulate on treadmill for 5 minutes without fatigue to indicate improved endurance.- met 9/9/19   5. Pt to tolerate HEP to improve ROM and independence with ADL's.- met 9/9/19     Long Term Goals: 12 weeks  1. Report decreased body pain  <  / =  3/10 at worst to increase tolerance for prolonged standing.   2. Pt will be able to perform 2 x 10 multi-directional LE strengthening without fatigue in order to improve ability to perform household chores.  3. Pt will report 80% improvement in ability to walk long distances since start of care to indicate improved functional mobility.   4. Pt will ambulate on treadmill for 10 minutes without fatigue to indicate improved endurance.   5. Pt to be Independent with HEP to improve ROM and independence with ADL's.     Plan     Progress B LE strength and endurance.     Therapist: Tiffanie Hinojosa, PT, DPT

## 2019-09-11 ENCOUNTER — CLINICAL SUPPORT (OUTPATIENT)
Dept: REHABILITATION | Facility: HOSPITAL | Age: 69
End: 2019-09-11
Attending: INTERNAL MEDICINE
Payer: MEDICARE

## 2019-09-11 DIAGNOSIS — M62.81 MUSCLE WEAKNESS OF LOWER EXTREMITY: ICD-10-CM

## 2019-09-11 DIAGNOSIS — R29.3 POSTURAL IMBALANCE: ICD-10-CM

## 2019-09-11 DIAGNOSIS — Z74.09 IMPAIRED FUNCTIONAL MOBILITY AND ENDURANCE: ICD-10-CM

## 2019-09-11 PROCEDURE — 97110 THERAPEUTIC EXERCISES: CPT | Mod: HCNC,PN

## 2019-09-11 NOTE — PROGRESS NOTES
"                                                  Physical Therapy Daily Note     Date: 09/11/2019  Name: Darrion Bennett  [facility] Number: [id number]  Diagnosis:   Encounter Diagnoses   Name Primary?    Muscle weakness of lower extremity     Postural imbalance     Impaired functional mobility and endurance      Physician: Farooq Domingo MD    Physician Orders: PT Eval and Treat   Medical Diagnosis from Referral: debility  Evaluation Date: 8/16/2019  Last PN: 9/9/19 (visit 6)  Authorization Period Expiration: 12/31/19  Plan of Care Expiration: 11/16/19  Visit # / Visits authorized: 7/20    Time In: 0803  Time Out: 0858  Total Time: 55 minutes    Precautions: CHF, DM 2, stage 4 kidney disease, glaucoma- blurry vision    Subjective     Pt reports: he is getting out of bed more often and feels like he has more energy.   He was compliant with home exercise program.  Response to previous treatment: good- no soreness  Functional change: none to note    Pain: 5/10  Location: L LE    Objective     Patient received individual therapy to increase strength, endurance, ROM, flexibility, posture and core stabilization with activities as follows:     Darrion received therapeutic exercises to develop strength, endurance, ROM, flexibility, posture and core stabilization for 55 minutes including:     Treadmill x 6 min @ 1.5 mph  Upright bike x 5 min level 2- NP    Mini squats 2x10 at // bar  Standing march x 30 ea  Hip abduction x 30 ea  Hip extension 3x10 ea  Sit to stand from chair x 20- NP  Calf stretch on slant board 3x30"  Side steps with green TB 40' x 2 laps  Step ups 6" step 2x10 B  Tandem stance 2x30" B- NP  Walking laps around gym x6 min (6 laps)- NP  Shuttle squats 2.5 bands 3 x 10- NP  +Matrix hip abd 40# 2 x 10  +Matrix HS curl 25# 2 x 10  +Standing hamstring stretch on steps 3 x 30 sec ea  +Standing L hip adductor stretch 3 x 30 sec     Standing shoulder extension RTB 2x10- NP  Rows green TB x 30  Wall push ups 2 x " "10  Bicep curls 3# x 30 ea    Supine:- NP  Bridges x 20  SL hip abd x 20 ea  Adduction squeeze 20x5"  HSS 2x30"  Hip flexor stretch (no strap) 3x30"    Pt received moist heat to low back for 0 minutes post tx. Skin checked post theramally.   Supine with bloster    Written Home Exercises Provided: no new exercises provided this session  Pt demo good understanding of the education provided. Darrion demonstrated good return demonstration of activities.     Education provided: DOMS, detriments of excessive bed rest  Darrion verbalized good understanding of education provided.   No spiritual or educational barriers to learning identified.    Assessment     Pt had good tolerance to treatment today with no adverse effects. Post-treatment body pain rated as 5/10. Pt with appropriate exercise-induced fatigue achieved by end of session. Good response to progression of exercise program. Pt with no seated rest breaks throughout session and able to ambulate for longer duration on treadmill indicating improvements. Pt required manual and verbal cueing for correct form with squats. Possible L hip adductor tightness. Improvements in L LE pain following stretching.     GOALS: Short Term Goals:  6 weeks  1. Report decreased body pain  <  / =  5/10 at worst to increase tolerance for prolonged standing.- in progress   2. Pt will be able to tolerate multi-directional LE strengthening in order to improve ability to perform household chores.- met 9/9/19  3. Pt will report 50% improvement in ability to walk long distances since start of care to indicate improved functional mobility. - in progress   4. Pt will ambulate on treadmill for 5 minutes without fatigue to indicate improved endurance.- met 9/9/19   5. Pt to tolerate HEP to improve ROM and independence with ADL's.- met 9/9/19     Long Term Goals: 12 weeks  1. Report decreased body pain  <  / =  3/10 at worst to increase tolerance for prolonged standing.   2. Pt will be able to perform 2 " x 10 multi-directional LE strengthening without fatigue in order to improve ability to perform household chores.  3. Pt will report 80% improvement in ability to walk long distances since start of care to indicate improved functional mobility.   4. Pt will ambulate on treadmill for 10 minutes without fatigue to indicate improved endurance.   5. Pt to be Independent with HEP to improve ROM and independence with ADL's.     Plan     Progress B LE strength and endurance.     Therapist: Tiffanie Hinojosa, PT, DPT

## 2019-09-12 ENCOUNTER — OFFICE VISIT (OUTPATIENT)
Dept: ORTHOPEDICS | Facility: CLINIC | Age: 69
End: 2019-09-12
Payer: MEDICARE

## 2019-09-12 ENCOUNTER — PATIENT MESSAGE (OUTPATIENT)
Dept: ORTHOPEDICS | Facility: CLINIC | Age: 69
End: 2019-09-12

## 2019-09-12 ENCOUNTER — OFFICE VISIT (OUTPATIENT)
Dept: CARDIOLOGY | Facility: CLINIC | Age: 69
End: 2019-09-12
Payer: MEDICARE

## 2019-09-12 VITALS
DIASTOLIC BLOOD PRESSURE: 60 MMHG | RESPIRATION RATE: 16 BRPM | SYSTOLIC BLOOD PRESSURE: 136 MMHG | HEART RATE: 54 BPM | WEIGHT: 149.5 LBS | HEIGHT: 66 IN | BODY MASS INDEX: 24.03 KG/M2 | OXYGEN SATURATION: 99 %

## 2019-09-12 VITALS
SYSTOLIC BLOOD PRESSURE: 137 MMHG | WEIGHT: 147.69 LBS | OXYGEN SATURATION: 100 % | DIASTOLIC BLOOD PRESSURE: 65 MMHG | HEIGHT: 66 IN | BODY MASS INDEX: 23.74 KG/M2 | HEART RATE: 59 BPM

## 2019-09-12 DIAGNOSIS — N18.30 CHRONIC KIDNEY DISEASE, STAGE III (MODERATE): ICD-10-CM

## 2019-09-12 DIAGNOSIS — E11.8 CONTROLLED TYPE 2 DIABETES MELLITUS WITH COMPLICATION, WITHOUT LONG-TERM CURRENT USE OF INSULIN: ICD-10-CM

## 2019-09-12 DIAGNOSIS — M79.644 FINGER PAIN, RIGHT: Primary | ICD-10-CM

## 2019-09-12 DIAGNOSIS — E78.00 PURE HYPERCHOLESTEROLEMIA: ICD-10-CM

## 2019-09-12 DIAGNOSIS — M79.645 PAIN IN FINGER OF LEFT HAND: ICD-10-CM

## 2019-09-12 DIAGNOSIS — I10 ESSENTIAL HYPERTENSION: Primary | ICD-10-CM

## 2019-09-12 DIAGNOSIS — L03.012 FELON OF FINGER OF LEFT HAND: Primary | ICD-10-CM

## 2019-09-12 PROCEDURE — 3075F PR MOST RECENT SYSTOLIC BLOOD PRESS GE 130-139MM HG: ICD-10-PCS | Mod: HCNC,CPTII,S$GLB, | Performed by: ORTHOPAEDIC SURGERY

## 2019-09-12 PROCEDURE — 3078F PR MOST RECENT DIASTOLIC BLOOD PRESSURE < 80 MM HG: ICD-10-PCS | Mod: HCNC,CPTII,S$GLB, | Performed by: INTERNAL MEDICINE

## 2019-09-12 PROCEDURE — 3075F PR MOST RECENT SYSTOLIC BLOOD PRESS GE 130-139MM HG: ICD-10-PCS | Mod: HCNC,CPTII,S$GLB, | Performed by: INTERNAL MEDICINE

## 2019-09-12 PROCEDURE — 99999 PR PBB SHADOW E&M-EST. PATIENT-LVL III: ICD-10-PCS | Mod: PBBFAC,HCNC,, | Performed by: INTERNAL MEDICINE

## 2019-09-12 PROCEDURE — 3044F PR MOST RECENT HEMOGLOBIN A1C LEVEL <7.0%: ICD-10-PCS | Mod: HCNC,CPTII,S$GLB, | Performed by: INTERNAL MEDICINE

## 2019-09-12 PROCEDURE — 99213 OFFICE O/P EST LOW 20 MIN: CPT | Mod: 25,HCNC,S$GLB, | Performed by: ORTHOPAEDIC SURGERY

## 2019-09-12 PROCEDURE — 1101F PR PT FALLS ASSESS DOC 0-1 FALLS W/OUT INJ PAST YR: ICD-10-PCS | Mod: HCNC,CPTII,S$GLB, | Performed by: ORTHOPAEDIC SURGERY

## 2019-09-12 PROCEDURE — 3078F DIAST BP <80 MM HG: CPT | Mod: HCNC,CPTII,S$GLB, | Performed by: ORTHOPAEDIC SURGERY

## 2019-09-12 PROCEDURE — 3078F PR MOST RECENT DIASTOLIC BLOOD PRESSURE < 80 MM HG: ICD-10-PCS | Mod: HCNC,CPTII,S$GLB, | Performed by: ORTHOPAEDIC SURGERY

## 2019-09-12 PROCEDURE — 99214 OFFICE O/P EST MOD 30 MIN: CPT | Mod: HCNC,S$GLB,, | Performed by: INTERNAL MEDICINE

## 2019-09-12 PROCEDURE — 3075F SYST BP GE 130 - 139MM HG: CPT | Mod: HCNC,CPTII,S$GLB, | Performed by: ORTHOPAEDIC SURGERY

## 2019-09-12 PROCEDURE — 3078F DIAST BP <80 MM HG: CPT | Mod: HCNC,CPTII,S$GLB, | Performed by: INTERNAL MEDICINE

## 2019-09-12 PROCEDURE — 99214 PR OFFICE/OUTPT VISIT, EST, LEVL IV, 30-39 MIN: ICD-10-PCS | Mod: HCNC,S$GLB,, | Performed by: INTERNAL MEDICINE

## 2019-09-12 PROCEDURE — 1101F PT FALLS ASSESS-DOCD LE1/YR: CPT | Mod: HCNC,CPTII,S$GLB, | Performed by: ORTHOPAEDIC SURGERY

## 2019-09-12 PROCEDURE — 10060 PR DRAIN SKIN ABSCESS SIMPLE: ICD-10-PCS | Mod: HCNC,S$GLB,, | Performed by: ORTHOPAEDIC SURGERY

## 2019-09-12 PROCEDURE — 3044F HG A1C LEVEL LT 7.0%: CPT | Mod: HCNC,CPTII,S$GLB, | Performed by: INTERNAL MEDICINE

## 2019-09-12 PROCEDURE — 99999 PR PBB SHADOW E&M-EST. PATIENT-LVL V: CPT | Mod: PBBFAC,HCNC,, | Performed by: ORTHOPAEDIC SURGERY

## 2019-09-12 PROCEDURE — 3075F SYST BP GE 130 - 139MM HG: CPT | Mod: HCNC,CPTII,S$GLB, | Performed by: INTERNAL MEDICINE

## 2019-09-12 PROCEDURE — 1101F PR PT FALLS ASSESS DOC 0-1 FALLS W/OUT INJ PAST YR: ICD-10-PCS | Mod: HCNC,CPTII,S$GLB, | Performed by: INTERNAL MEDICINE

## 2019-09-12 PROCEDURE — 99213 PR OFFICE/OUTPT VISIT, EST, LEVL III, 20-29 MIN: ICD-10-PCS | Mod: 25,HCNC,S$GLB, | Performed by: ORTHOPAEDIC SURGERY

## 2019-09-12 PROCEDURE — 1101F PT FALLS ASSESS-DOCD LE1/YR: CPT | Mod: HCNC,CPTII,S$GLB, | Performed by: INTERNAL MEDICINE

## 2019-09-12 PROCEDURE — 10060 I&D ABSCESS SIMPLE/SINGLE: CPT | Mod: HCNC,S$GLB,, | Performed by: ORTHOPAEDIC SURGERY

## 2019-09-12 PROCEDURE — 99999 PR PBB SHADOW E&M-EST. PATIENT-LVL V: ICD-10-PCS | Mod: PBBFAC,HCNC,, | Performed by: ORTHOPAEDIC SURGERY

## 2019-09-12 PROCEDURE — 99999 PR PBB SHADOW E&M-EST. PATIENT-LVL III: CPT | Mod: PBBFAC,HCNC,, | Performed by: INTERNAL MEDICINE

## 2019-09-12 RX ORDER — TRAMADOL HYDROCHLORIDE 50 MG/1
50 TABLET ORAL EVERY 8 HOURS PRN
Qty: 15 TABLET | Refills: 0 | Status: SHIPPED | OUTPATIENT
Start: 2019-09-12 | End: 2019-10-15

## 2019-09-12 RX ORDER — AMLODIPINE BESYLATE 5 MG/1
5 TABLET ORAL 2 TIMES DAILY
Qty: 180 TABLET | Refills: 3 | Status: SHIPPED | OUTPATIENT
Start: 2019-09-12 | End: 2020-10-27

## 2019-09-12 NOTE — PROGRESS NOTES
CARDIOVASCULAR CONSULTATION    REASON FOR CONSULT:   Darrion Bennett is a 69 y.o. male who presents for establishing care with Cardiology.      HISTORY OF PRESENT ILLNESS:     Notes from September 2019:  Patient here for follow-up today.  Denies any chest pains at rest on exertion, orthopnea, PND.  Denies any other cardiac symptoms.    Notes from June 2019:  Patient is here for follow-up today.  Denies any chest pains at rest on exertion, orthopnea, PND.  Denies any.  Of dizziness or passing out.  Blood pressure is much better controlled in clinic today.    Initial clinic visit:  Patient is a very pleasant 69-year-old man with a past medical history significant for hypertension, diabetes, Sjogren's disease who wants to establish care with [facility].  Patient has been followed with [facility].  Patient states that in 2014 he was diagnosed with heart failure.  He states that he had passed out and had gone to the hospital, he was feeling short of breath also and at that point he was told that he had heart failure.  I can see an echocardiogram from his outside medical records in 2014 which showed normal left ventricular systolic function.  He also had a stress echocardiogram done in 2013 which did not reveal any ischemia.  He denies any chest pains at rest on exertion, orthopnea, PND, swelling of feet.  States that since then he has been okay and walks about 2-3 blocks every day.  On walking about 2-3 blocks he does experience some tightness in his left calf.  This goes away after rest.  He denies any resting calf tightness.  He denies any ulcer on his feet.  He states that he checks his blood pressure at home and has found that it is elevated around 160-170 mm of systolic multiple times.    PAST MEDICAL HISTORY:     Past Medical History:   Diagnosis Date    Anemia     Arthritis     Cataract     Chronic constipation     Diabetes mellitus, type 2     Glaucoma     Hyperlipidemia 5/13/2014     "Hypertension     MCTD (mixed connective tissue disease)     Sjogren's disease     Ulcerative colitis     Urolithiasis        PAST SURGICAL HISTORY:     Past Surgical History:   Procedure Laterality Date    CATARACT EXTRACTION Bilateral 2005    COLONOSCOPY  2014    EYE SURGERY         ALLERGIES AND MEDICATION:     Review of patient's allergies indicates:   Allergen Reactions    Penicillins Nausea And Vomiting    Uloric [febuxostat]      Aches and pain    Allopurinol analogues Rash        Medication List           Accurate as of 9/12/19  8:58 AM. If you have any questions, ask your nurse or doctor.               CONTINUE taking these medications    amLODIPine 10 MG tablet  Commonly known as:  NORVASC  Take 1 tablet (10 mg total) by mouth once daily.     aspirin 81 MG EC tablet  Commonly known as:  ECOTRIN     BD VEO INSULIN SYRINGE UF 1/2 mL 31 gauge x 15/64" Syrg  Generic drug:  insulin syringe-needle U-100     clindamycin 300 MG capsule  Commonly known as:  CLEOCIN  Take 1 capsule (300 mg total) by mouth every 8 (eight) hours.     cloNIDine 0.1 mg/24 hr td ptwk 0.1 mg/24 hr  Commonly known as:  CATAPRES  Place 1 patch onto the skin every 7 days.     colchicine 0.6 mg tablet  Commonly known as:  COLCRYS  Take 1 tablet (0.6 mg total) by mouth once daily. for 7 days     dorzolamide 2 % ophthalmic solution  Commonly known as:  TRUSOPT  Place 1 drop into both eyes 2 (two) times daily.     insulin degludec-liraglutide 100 unit-3.6 mg /mL (3 mL) Inpn  Commonly known as:  XULTOPHY 100/3.6  Inject 7 Units into the skin once daily.     ipratropium 42 mcg (0.06 %) nasal spray  Commonly known as:  ATROVENT  INSTILL 2 SPRAYS BY NASAL ROUTE 3 TIMES DAILY. AS NEEDED FOR NASAL CONGESTION AND DRIP FOR 7 DAYS     lubiprostone 24 MCG Cap  Commonly known as:  AMITIZA  Take 1 capsule (24 mcg total) by mouth 2 (two) times daily with meals.     multivit with min-folic acid 200 mcg Chew     pen needle, diabetic 32 gauge x 5/32" " Ndle  Commonly known as:  BD ULTRA-FINE RICHARD PEN NEEDLE  1 Device by Misc.(Non-Drug; Combo Route) route once daily.     polyethylene glycol 17 gram/dose powder  Commonly known as:  GLYCOLAX  Take 17 g by mouth once daily.     rosuvastatin 10 MG tablet  Commonly known as:  CRESTOR  Take 1 tablet (10 mg total) by mouth once daily.     torsemide 10 MG Tab  Commonly known as:  DEMADEX  Take 1 tablet (10 mg total) by mouth once daily.     travoprost 0.004 % ophthalmic solution  Commonly known as:  TRAVATAN Z  Place 1 drop into both eyes every evening.     valsartan 160 MG tablet  Commonly known as:  DIOVAN  Take 1 tablet (160 mg total) by mouth 2 (two) times daily.            SOCIAL HISTORY:     Social History     Socioeconomic History    Marital status:      Spouse name: Not on file    Number of children: 3    Years of education: Not on file    Highest education level: Not on file   Occupational History    Not on file   Social Needs    Financial resource strain: Somewhat hard    Food insecurity:     Worry: Never true     Inability: Never true    Transportation needs:     Medical: No     Non-medical: No   Tobacco Use    Smoking status: Never Smoker    Smokeless tobacco: Never Used   Substance and Sexual Activity    Alcohol use: No     Frequency: Never     Drinks per session: Patient refused     Binge frequency: Never    Drug use: No    Sexual activity: Never   Lifestyle    Physical activity:     Days per week: 3 days     Minutes per session: Not on file    Stress: Only a little   Relationships    Social connections:     Talks on phone: Once a week     Gets together: Once a week     Attends Gnosticism service: Not on file     Active member of club or organization: No     Attends meetings of clubs or organizations: Never     Relationship status:    Other Topics Concern    Not on file   Social History Narrative    Not on file       FAMILY HISTORY:     Family History   Problem Relation Age of  "Onset    Hypertension Father     Stroke Father     Cataracts Father     Glaucoma Father     Cataracts Mother     No Known Problems Sister     No Known Problems Brother     Rheum arthritis Maternal Aunt     Rheum arthritis Maternal Uncle     No Known Problems Paternal Aunt     No Known Problems Paternal Uncle     No Known Problems Maternal Grandmother     No Known Problems Maternal Grandfather     Throat cancer Paternal Grandmother     Glaucoma Paternal Grandfather     Celiac disease Neg Hx     Colon cancer Neg Hx     Cirrhosis Neg Hx     Colon polyps Neg Hx     Crohn's disease Neg Hx     Cystic fibrosis Neg Hx     Hemochromatosis Neg Hx     Esophageal cancer Neg Hx     Inflammatory bowel disease Neg Hx     Irritable bowel syndrome Neg Hx     Liver cancer Neg Hx     Liver disease Neg Hx     Rectal cancer Neg Hx     Stomach cancer Neg Hx     Ulcerative colitis Neg Hx     Chase's disease Neg Hx     Amblyopia Neg Hx     Blindness Neg Hx     Cancer Neg Hx     Diabetes Neg Hx     Macular degeneration Neg Hx     Retinal detachment Neg Hx     Strabismus Neg Hx     Thyroid disease Neg Hx        REVIEW OF SYSTEMS:   Review of Systems   Constitutional: Negative.    HENT: Negative.    Eyes: Negative.    Respiratory: Negative.    Gastrointestinal: Negative.    Genitourinary: Negative.    Musculoskeletal: Negative.    Skin: Negative.    Neurological: Negative.    Endo/Heme/Allergies: Negative.    Psychiatric/Behavioral: Negative.    All other systems reviewed and are negative.      A 10 point review of systems was performed and all the pertinent positives have been mentioned. Rest of review of systems was negative.        PHYSICAL EXAM:     Vitals:    09/12/19 0852   BP: 137/65   Pulse: (!) 59    Body mass index is 23.84 kg/m².  Weight: 67 kg (147 lb 11.3 oz)   Height: 5' 6" (167.6 cm)     Physical Exam   Constitutional: He is oriented to person, place, and time. He appears well-developed " and well-nourished. He is active.   HENT:   Head: Normocephalic and atraumatic.   Right Ear: Hearing normal.   Left Ear: Hearing normal.   Nose: Nose normal.   Mouth/Throat: Mucous membranes are normal.   Eyes: Pupils are equal, round, and reactive to light. Conjunctivae and lids are normal.   Neck: Normal range of motion. Neck supple.   Cardiovascular: Normal rate, regular rhythm, intact distal pulses and normal pulses.   Murmur heard.   Systolic murmur is present with a grade of 2/6.  Pulmonary/Chest: Effort normal and breath sounds normal.   Abdominal: Soft. Normal appearance. There is no tenderness.   Musculoskeletal: He exhibits no edema or deformity.   Neurological: He is alert and oriented to person, place, and time.   Skin: Skin is warm, dry and intact.   Psychiatric: He has a normal mood and affect. His speech is normal.   Nursing note and vitals reviewed.        DATA:     Laboratory:  CBC:  Recent Labs   Lab 03/11/19  0854 06/11/19  0949 07/10/19  1002   WBC 5.91 5.53 7.69   Hemoglobin 8.5 L 10.8 L 10.6 L   Hematocrit 26.7 L 32.5 L 33.2 L   Platelets 330 171 206       CHEMISTRIES:  Recent Labs   Lab 02/20/19  0959 06/11/19  0949 07/10/19  1002   Glucose 105 137 H 126 H  126 H   Sodium 139 133 L 136  136   Potassium 4.4 4.4 4.6  4.6   BUN, Bld 24 H 29 H 37 H  37 H   Creatinine 1.5 H 1.4 1.8 H  1.8 H   eGFR if  54.1 A 58.8 A 43.4 A  43.4 A   eGFR if non  46.8 A 50.9 A 37.6 A  37.6 A   Calcium 10.0 9.6 9.4  9.4       CARDIAC BIOMARKERS:        COAGS:        LIPIDS/LFTS:  Recent Labs   Lab 10/16/18  02/20/19  0959 06/11/19  0949 07/10/19  1002   Triglycerides 53  --   --   --   --    HDL 61  --   --   --   --    LDL Cholesterol 59  --   --   --   --    Non-HDL Cholesterol 72  --   --   --   --    AST  --    < > 22 17 17  17   ALT  --    < > 22 13 18  18    < > = values in this interval not displayed.       Hemoglobin A1C   Date Value Ref Range Status   06/11/2019 6.9  (H) 4.0 - 5.6 % Final     Comment:     ADA Screening Guidelines:  5.7-6.4%  Consistent with prediabetes  >or=6.5%  Consistent with diabetes  High levels of fetal hemoglobin interfere with the HbA1C  assay. Heterozygous hemoglobin variants (HbS, HgC, etc)do  not significantly interfere with this assay.   However, presence of multiple variants may affect accuracy.     10/16/2018 6.2 (H) 4.0 - 5.7 % Final     Comment:     Dr. Jurgen Ward ( Endocrinology )   05/30/2014 7.9 (H) 4.8 - 5.6 % Final     Comment:              Increased risk for diabetes: 5.7 - 6.4           Diabetes: >6.4           Glycemic control for adults with diabetes: <7.0         TSH        The ASCVD Risk score (Meloluis carlos POOLE Jr., et al., 2013) failed to calculate for the following reasons:    Cannot find a previous total cholesterol lab           Cardiovascular Testing:    EKG: (personally reviewed tracing)  Sinus bradycardia with first-degree AV block left anterior fascicle, septal infarct.    KIRT in June 2019:  Normal resting KIRT/PVR waveforms bilaterally.  Normal exercise KIRT bilaterally (with minimal drop from resting KIRT measurements).        2D echocardiogram in June 2019:  Normal left ventricular systolic function. The estimated ejection fraction is 65%  Moderate left atrial enlargement.  Normal LV diastolic function.  Mild right atrial enlargement.  Normal central venous pressure (3 mm Hg).  The estimated PA systolic pressure is 14 mm Hg            2D echocardiogram 2014 [facility] at [facility]    HEIGHT: 167.6 cm  WEIGHT: 74.8 kg  BP: 157/82  BSA:  MEASUREMENTS  ------------  IVSd: 1.2 cm  LVIDd: 3.8 cm  LVPWd: 1.2 cm  LVIDs: 2.6 cm  LA Diam: 3.2 cm  Ao Diam: 3.1 cm  LAESV Index (A-L): 32.40 ml/m²  LVCO Dopp: 6.98 l/min  LVCI Dopp: 3.79 l/minm²    FINDINGS  -------  CPT CODE:46209-61.  Transthoracic echocardiogram (complete).  The attending physician   listed herein personally reviewed all stored images and directly supervised the   fellow-in-training  (if listed) in the study's acquistion and/or report   generation.     Study priority:  Routine.  ICD-9 Indication(s):786.05 - Dyspnea.  Study Quality:The study was technically adequate.  ECG Rhythm:Sinus rhythm.  CHAMBER SIZE:All cardiac chamber sizes are within normal limits.  AORTA:Normal aortic root and proximal ascending aorta.  VALVULAR STRUCTURES:The visualized cardiac valves are structurally normal.  LEFT/RIGHT VENTRICULAR FUNCTION:LV size, wall thickness and systolic function are normal, with an EF > 55%.       The right ventricle is normal in size and function.  DOPPLER:  Color:No valvular insufficiencies.  DIASTOLOGY:The diastolic filling pattern is normal for the age of the patient.  PERICARDIUM / EFFUSIONS:No effusions noted.  INFERIOR VENA CAVA:Normal size inferior vena cava.  PA PRESSURE:The estimated systolic pulmonary artery pressure is normal at < 35 mm Hg (RA   pressure = 3 mm Hg).  CARDIOLOGY:    Electronically signed:  STAFF:ELIAS JAIME M.D.  Fellow:G. MOHSEN, M.D.    CONCLUSIONS  -----------  1. LV size, wall thickness and systolic function are normal, with an EF > 55%.  2. The diastolic filling pattern is normal for the age of the patient.    ASSESSMENT AND PLAN     Patient Active Problem List   Diagnosis    Essential hypertension    Controlled type 2 diabetes mellitus with complication, without long-term current use of insulin    Pure hypercholesterolemia    MCTD (mixed connective tissue disease)    Sjogren's disease    Bilateral edema of lower extremity 6/2019 TTE normal LV systolic and diastolic function; ABIs normal    Glaucoma    Body mass index 29.0-29.9, adult    Thyroid dysfunction    Chronic kidney disease, stage III (moderate)    Weight gain    Jackson's esophagus without dysplasia    Anemia from plaquenil and imuran    Neutropenia    Thrombocytopenia    Connective tissue disorder    Current chronic use of systemic steroids    Compression fracture of body of thoracic  vertebra on XR 2/2019; 2/2019 alendronate    Anemia of chronic renal failure, stage 3 (moderate)    Chronic constipation not responding to linzess, miralax, senna    Screening for colon cancer 07/26/2018 colonoscopy transverse colon polyp path showing benign inflammatory polyp.    Tophaceous gout    Felon of finger of right hand    Muscle weakness of lower extremity    Postural imbalance    Impaired functional mobility and endurance    Pseudophakia of both eyes    Refractive error       1.  Patient states he was diagnosed with congestive heart failure in the past.  Currently he is euvolemic.  His recent echocardiogram demonstrated normal left ventricular systolic function in 2019.  Continues to be euvolemic.  Continue current therapy.    2.  Hypertension:  Well controlled now on current therapy.  Is requesting that Norvasc PE refilled as 5 mg twice daily as taking 10 mg at the same time makes him dizzy.    3.  Dyslipidemia:  On rosuvastatin.     4.  ABIs in June 2019 were within normal limits.    5.  Chronic kidney disease:  States that he is going to see a nephrologist tomorrow.    Follow-up in 6 months            Thank you very much for involving me in the care of your patient.  Please do not hesitate to contact me if there are any questions.      Greg Alvares MD, FACC, [facility]  Interventional Cardiologist, [facility].           This note was dictated with the help of speech recognition software.  There might be un-intended errors and/or substitutions.

## 2019-09-13 ENCOUNTER — PATIENT MESSAGE (OUTPATIENT)
Dept: ORTHOPEDICS | Facility: CLINIC | Age: 69
End: 2019-09-13

## 2019-09-13 ENCOUNTER — OFFICE VISIT (OUTPATIENT)
Dept: NEPHROLOGY | Facility: CLINIC | Age: 69
End: 2019-09-13
Payer: MEDICARE

## 2019-09-13 VITALS
WEIGHT: 147.69 LBS | HEART RATE: 76 BPM | SYSTOLIC BLOOD PRESSURE: 138 MMHG | BODY MASS INDEX: 21.87 KG/M2 | DIASTOLIC BLOOD PRESSURE: 64 MMHG | HEIGHT: 69 IN | OXYGEN SATURATION: 96 %

## 2019-09-13 DIAGNOSIS — E11.22 CONTROLLED TYPE 2 DIABETES MELLITUS WITH STAGE 3 CHRONIC KIDNEY DISEASE, WITH LONG-TERM CURRENT USE OF INSULIN: ICD-10-CM

## 2019-09-13 DIAGNOSIS — Z79.4 CONTROLLED TYPE 2 DIABETES MELLITUS WITH STAGE 3 CHRONIC KIDNEY DISEASE, WITH LONG-TERM CURRENT USE OF INSULIN: ICD-10-CM

## 2019-09-13 DIAGNOSIS — N18.30 CHRONIC KIDNEY DISEASE, STAGE III (MODERATE): Primary | ICD-10-CM

## 2019-09-13 DIAGNOSIS — N18.30 CONTROLLED TYPE 2 DIABETES MELLITUS WITH STAGE 3 CHRONIC KIDNEY DISEASE, WITH LONG-TERM CURRENT USE OF INSULIN: ICD-10-CM

## 2019-09-13 PROCEDURE — 3075F PR MOST RECENT SYSTOLIC BLOOD PRESS GE 130-139MM HG: ICD-10-PCS | Mod: HCNC,CPTII,S$GLB, | Performed by: INTERNAL MEDICINE

## 2019-09-13 PROCEDURE — 99999 PR PBB SHADOW E&M-EST. PATIENT-LVL III: ICD-10-PCS | Mod: PBBFAC,HCNC,, | Performed by: INTERNAL MEDICINE

## 2019-09-13 PROCEDURE — 99499 RISK ADDL DX/OHS AUDIT: ICD-10-PCS | Mod: HCNC,S$GLB,, | Performed by: INTERNAL MEDICINE

## 2019-09-13 PROCEDURE — 99999 PR PBB SHADOW E&M-EST. PATIENT-LVL III: CPT | Mod: PBBFAC,HCNC,, | Performed by: INTERNAL MEDICINE

## 2019-09-13 PROCEDURE — 99204 OFFICE O/P NEW MOD 45 MIN: CPT | Mod: HCNC,S$GLB,, | Performed by: INTERNAL MEDICINE

## 2019-09-13 PROCEDURE — 3078F PR MOST RECENT DIASTOLIC BLOOD PRESSURE < 80 MM HG: ICD-10-PCS | Mod: HCNC,CPTII,S$GLB, | Performed by: INTERNAL MEDICINE

## 2019-09-13 PROCEDURE — 3075F SYST BP GE 130 - 139MM HG: CPT | Mod: HCNC,CPTII,S$GLB, | Performed by: INTERNAL MEDICINE

## 2019-09-13 PROCEDURE — 3078F DIAST BP <80 MM HG: CPT | Mod: HCNC,CPTII,S$GLB, | Performed by: INTERNAL MEDICINE

## 2019-09-13 PROCEDURE — 99204 PR OFFICE/OUTPT VISIT, NEW, LEVL IV, 45-59 MIN: ICD-10-PCS | Mod: HCNC,S$GLB,, | Performed by: INTERNAL MEDICINE

## 2019-09-13 PROCEDURE — 1101F PR PT FALLS ASSESS DOC 0-1 FALLS W/OUT INJ PAST YR: ICD-10-PCS | Mod: HCNC,CPTII,S$GLB, | Performed by: INTERNAL MEDICINE

## 2019-09-13 PROCEDURE — 3044F HG A1C LEVEL LT 7.0%: CPT | Mod: HCNC,CPTII,S$GLB, | Performed by: INTERNAL MEDICINE

## 2019-09-13 PROCEDURE — 1101F PT FALLS ASSESS-DOCD LE1/YR: CPT | Mod: HCNC,CPTII,S$GLB, | Performed by: INTERNAL MEDICINE

## 2019-09-13 PROCEDURE — 3044F PR MOST RECENT HEMOGLOBIN A1C LEVEL <7.0%: ICD-10-PCS | Mod: HCNC,CPTII,S$GLB, | Performed by: INTERNAL MEDICINE

## 2019-09-13 PROCEDURE — 99499 UNLISTED E&M SERVICE: CPT | Mod: HCNC,S$GLB,, | Performed by: INTERNAL MEDICINE

## 2019-09-13 NOTE — LETTER
September 25, 2019      Roxanna Salazar MD  [address]  [address]  [place] [address]           [facility] - [facility]ph[facility]  [address]  [place] [address]  Phone: [phone]  Fax: [phone]          Patient: Darrion Bennett   MR Number: [id number]   YOB: 1950   Date of Visit: 9/13/2019       Dear Dr. Roxanna Salazar:    Thank you for referring Darrion Bennett to me for evaluation. Attached you will find relevant portions of my assessment and plan of care.    If you have questions, please do not hesitate to call me. I look forward to following Darrion Bennett along with you.    Sincerely,    Derek Vizcaino MD    Enclosure  CC:  No Recipients    If you would like to receive this communication electronically, please contact [email or web address] or [phone] to request more information on [employer] access.    For providers and/or their staff who would like to refer a patient to [facility], please contact us through our one-stop-shop provider referral line, [facility], at [phone].    If you feel you have received this communication in error or would no longer like to receive these types of communications, please e-mail [email or web address]

## 2019-09-13 NOTE — Clinical Note
Please call to let him know kidney function and proteinuria improved, we will continue to monitor.  Kidney US showed benign findings: one stone, a couple of simple cysts and one spot of vascular fatty tissue, all nothing to be concerned about.  Thank you.

## 2019-09-16 ENCOUNTER — HOSPITAL ENCOUNTER (OUTPATIENT)
Dept: RADIOLOGY | Facility: HOSPITAL | Age: 69
Discharge: HOME | End: 2019-09-16
Attending: INTERNAL MEDICINE
Payer: MEDICARE

## 2019-09-16 DIAGNOSIS — N18.30 CHRONIC KIDNEY DISEASE, STAGE III (MODERATE): ICD-10-CM

## 2019-09-16 PROCEDURE — 93975 US DOPPLER RENAL ARTERY AND VEIN (XPD): ICD-10-PCS | Mod: 26,HCNC,, | Performed by: RADIOLOGY

## 2019-09-16 PROCEDURE — 93975 VASCULAR STUDY: CPT | Mod: TC,HCNC

## 2019-09-16 PROCEDURE — 93975 VASCULAR STUDY: CPT | Mod: 26,HCNC,, | Performed by: RADIOLOGY

## 2019-09-17 ENCOUNTER — PATIENT MESSAGE (OUTPATIENT)
Dept: NEPHROLOGY | Facility: CLINIC | Age: 69
End: 2019-09-17

## 2019-09-18 ENCOUNTER — CLINICAL SUPPORT (OUTPATIENT)
Dept: REHABILITATION | Facility: HOSPITAL | Age: 69
End: 2019-09-18
Attending: INTERNAL MEDICINE
Payer: MEDICARE

## 2019-09-18 DIAGNOSIS — R29.3 POSTURAL IMBALANCE: ICD-10-CM

## 2019-09-18 DIAGNOSIS — Z74.09 IMPAIRED FUNCTIONAL MOBILITY AND ENDURANCE: ICD-10-CM

## 2019-09-18 DIAGNOSIS — M62.81 MUSCLE WEAKNESS OF LOWER EXTREMITY: ICD-10-CM

## 2019-09-18 PROCEDURE — 97110 THERAPEUTIC EXERCISES: CPT | Mod: HCNC,PN

## 2019-09-18 NOTE — PROGRESS NOTES
"Chief Complaint :   Pancytopenia.     Hx of Present illness :  Patient is a 69 y.o. year old male who presents to the clinic today for   Oncology evaluation. Labs revealed significant decrease in blood counts. Dx'd to have Sjogren's syndrome four years ago.  Was on Plaquenil & immuran in the past. That is still on hold.  Last Month had flare up of gout.      Allergies :    Review of patient's allergies indicates:   Allergen Reactions    Penicillins Nausea And Vomiting       Occupation :   Businessman    Transfusion :  None    Menstrual & obstetric Hx :      Present Meds :   Medication List with Changes/Refills   Current Medications    AMLODIPINE (NORVASC) 5 MG TABLET    Take 1 tablet (5 mg total) by mouth 2 (two) times daily.    ASPIRIN (ECOTRIN) 81 MG EC TABLET    Take 81 mg by mouth once daily.    BD VEO INSULIN SYRINGE UF 1/2 ML 31 GAUGE X 15/64" SYRG    BASAL/BOLUS INSULIN 4-5X/DAY    CLINDAMYCIN (CLEOCIN) 300 MG CAPSULE    Take 1 capsule (300 mg total) by mouth every 8 (eight) hours.    CLONIDINE 0.1 MG/24 HR TD PTWK (CATAPRES) 0.1 MG/24 HR    Place 1 patch onto the skin every 7 days.    COLCHICINE (COLCRYS) 0.6 MG TABLET    Take 1 tablet (0.6 mg total) by mouth once daily. for 7 days    DORZOLAMIDE (TRUSOPT) 2 % OPHTHALMIC SOLUTION    Place 1 drop into both eyes 2 (two) times daily.    INSULIN DEGLUDEC-LIRAGLUTIDE (XULTOPHY 100/3.6) 100 UNIT-3.6 MG /ML (3 ML) INPN    Inject 7 Units into the skin once daily.    IPRATROPIUM (ATROVENT) 42 MCG (0.06 %) NASAL SPRAY    INSTILL 2 SPRAYS BY NASAL ROUTE 3 TIMES DAILY. AS NEEDED FOR NASAL CONGESTION AND DRIP FOR 7 DAYS    LUBIPROSTONE (AMITIZA) 24 MCG CAP    Take 1 capsule (24 mcg total) by mouth 2 (two) times daily with meals.    MULTIVIT WITH MIN-FOLIC ACID 200 MCG CHEW        PEN NEEDLE, DIABETIC (BD ULTRA-FINE RICHARD PEN NEEDLE) 32 GAUGE X 5/32" NDLE    1 Device by Misc.(Non-Drug; Combo Route) route once daily.    POLYETHYLENE GLYCOL (GLYCOLAX) 17 GRAM/DOSE POWDER    " Take 17 g by mouth once daily.    ROSUVASTATIN (CRESTOR) 10 MG TABLET    Take 1 tablet (10 mg total) by mouth once daily.    TORSEMIDE (DEMADEX) 10 MG TAB    Take 1 tablet (10 mg total) by mouth once daily.    TRAMADOL (ULTRAM) 50 MG TABLET    Take 1 tablet (50 mg total) by mouth every 8 (eight) hours as needed for Pain.    TRAVOPROST (TRAVATAN Z) 0.004 % OPHTHALMIC SOLUTION    Place 1 drop into both eyes every evening.    VALSARTAN (DIOVAN) 160 MG TABLET    Take 1 tablet (160 mg total) by mouth 2 (two) times daily.       Past Medical Hx :  Reviewed.  Aortic atherosclerosis. No hx of DVT or PE.  No hx of cancer, chemotherapy or radiation therapy.     Past Medical Hx :  Past Medical History:   Diagnosis Date    Anemia     Arthritis     Cataract     Chronic constipation     Diabetes mellitus, type 2     Glaucoma     Hyperlipidemia 5/13/2014    Hypertension     MCTD (mixed connective tissue disease)     Sjogren's disease     Ulcerative colitis     Urolithiasis        Travel Hx :  N/A    Immunization :  Immunization History   Administered Date(s) Administered    Influenza 10/26/2014    Influenza - High Dose - PF (65 years and older) 10/12/2015, 08/21/2017, 09/12/2018    Influenza - Quadrivalent 10/17/2016    Influenza - Quadrivalent - PF (6 months and older) 10/26/2014    Influenza A (H1N1) 2009 Monovalent - IM 10/26/2009    Pneumococcal Conjugate - 13 Valent 11/20/2015    Pneumococcal Polysaccharide - 23 Valent 05/25/2018    Td - PF (ADULT) 12/06/2018    Zoster 09/26/2014    Zoster Recombinant 05/25/2018       Family Hx :  Family History   Problem Relation Age of Onset    Hypertension Father     Stroke Father     Cataracts Father     Glaucoma Father     Cataracts Mother     No Known Problems Sister     No Known Problems Brother     Rheum arthritis Maternal Aunt     Rheum arthritis Maternal Uncle     No Known Problems Paternal Aunt     No Known Problems Paternal Uncle     No Known  Problems Maternal Grandmother     No Known Problems Maternal Grandfather     Throat cancer Paternal Grandmother     Glaucoma Paternal Grandfather     Celiac disease Neg Hx     Colon cancer Neg Hx     Cirrhosis Neg Hx     Colon polyps Neg Hx     Crohn's disease Neg Hx     Cystic fibrosis Neg Hx     Hemochromatosis Neg Hx     Esophageal cancer Neg Hx     Inflammatory bowel disease Neg Hx     Irritable bowel syndrome Neg Hx     Liver cancer Neg Hx     Liver disease Neg Hx     Rectal cancer Neg Hx     Stomach cancer Neg Hx     Ulcerative colitis Neg Hx     Chase's disease Neg Hx     Amblyopia Neg Hx     Blindness Neg Hx     Cancer Neg Hx     Diabetes Neg Hx     Macular degeneration Neg Hx     Retinal detachment Neg Hx     Strabismus Neg Hx     Thyroid disease Neg Hx        Social Hx :  Social History     Socioeconomic History    Marital status:      Spouse name: Not on file    Number of children: 3    Years of education: Not on file    Highest education level: Not on file   Occupational History    Not on file   Social Needs    Financial resource strain: Somewhat hard    Food insecurity:     Worry: Never true     Inability: Never true    Transportation needs:     Medical: No     Non-medical: No   Tobacco Use    Smoking status: Never Smoker    Smokeless tobacco: Never Used   Substance and Sexual Activity    Alcohol use: No     Frequency: Never     Drinks per session: Patient refused     Binge frequency: Never    Drug use: No    Sexual activity: Never   Lifestyle    Physical activity:     Days per week: 3 days     Minutes per session: Not on file    Stress: Only a little   Relationships    Social connections:     Talks on phone: Once a week     Gets together: Once a week     Attends Confucianist service: Not on file     Active member of club or organization: No     Attends meetings of clubs or organizations: Never     Relationship status:    Other Topics Concern     Not on file   Social History Narrative    Not on file       Surgery :  Bilateral cataract surgery; Colonoscopy 2018.  Kidney stone surgery thirty five years ago.    Symptoms :    Review of Systems   Constitutional: Positive for malaise/fatigue. Negative for chills, fever and weight loss (lost two pounds).   HENT: Negative for congestion, hearing loss, nosebleeds, sore throat and tinnitus.    Eyes: Negative.  Negative for blurred vision, double vision, photophobia, pain, discharge and redness.   Respiratory: Positive for shortness of breath (Mild). Negative for cough and hemoptysis.    Cardiovascular: Negative for chest pain, palpitations, orthopnea, claudication and leg swelling.   Gastrointestinal: Positive for constipation. Negative for abdominal pain, blood in stool, diarrhea, heartburn, nausea and vomiting.   Genitourinary: Negative.  Negative for dysuria, flank pain, frequency, hematuria and urgency.   Musculoskeletal: Positive for back pain (Lot of back pain) and joint pain.   Skin: Positive for itching.   Neurological: Negative for dizziness, tingling, tremors, sensory change and headaches.   Endo/Heme/Allergies: Negative for environmental allergies. Does not bruise/bleed easily.   Psychiatric/Behavioral: Negative for depression. The patient has insomnia. The patient is not nervous/anxious.        Physical Exam :   Physical Exam   Constitutional: He is oriented to person, place, and time and well-developed, well-nourished, and in no distress. Vital signs are normal. No distress.   HENT:   Head: Normocephalic and atraumatic.   Right Ear: External ear normal.   Left Ear: External ear normal.   Nose: Nose normal.   Mouth/Throat: Oropharynx is clear and moist. No oropharyngeal exudate.   Eyes: Pupils are equal, round, and reactive to light. Conjunctivae, EOM and lids are normal. Lids are everted and swept, no foreign bodies found. Right eye exhibits no discharge. Left eye exhibits no discharge. No scleral  icterus.       Neck: Trachea normal, normal range of motion, full passive range of motion without pain and phonation normal. Neck supple. Normal carotid pulses, no hepatojugular reflux and no JVD present. No tracheal tenderness present. Carotid bruit is not present. No tracheal deviation present. No thyroid mass and no thyromegaly present.   Cardiovascular: Normal rate, regular rhythm, normal heart sounds, intact distal pulses and normal pulses. PMI is not displaced. Exam reveals no gallop and no friction rub.   No murmur heard.  Pulmonary/Chest: Effort normal and breath sounds normal. No stridor. No apnea. No respiratory distress. He has no wheezes. He has no rales. He exhibits no tenderness.   Abdominal: Soft. Normal appearance, normal aorta and bowel sounds are normal. He exhibits no distension, no ascites and no mass. There is no hepatosplenomegaly. There is no tenderness. There is no rebound, no guarding and no CVA tenderness. No hernia.   Musculoskeletal: Normal range of motion. He exhibits no edema, tenderness or deformity.   Lymphadenopathy:        Head (right side): No submental, no submandibular, no tonsillar, no preauricular, no posterior auricular and no occipital adenopathy present.        Head (left side): No submental, no submandibular, no tonsillar, no preauricular, no posterior auricular and no occipital adenopathy present.     He has no cervical adenopathy.     He has no axillary adenopathy.        Right: No inguinal, no supraclavicular and no epitrochlear adenopathy present.        Left: No inguinal, no supraclavicular and no epitrochlear adenopathy present.   Neurological: He is alert and oriented to person, place, and time. He has normal motor skills, normal sensation, normal strength, normal reflexes and intact cranial nerves. No cranial nerve deficit. He exhibits normal muscle tone. Gait normal. Coordination normal. GCS score is 15.   Skin: Skin is warm, dry and intact. No rash noted. He is  "not diaphoretic. No cyanosis or erythema. No pallor. Nails show no clubbing.   Psychiatric: Mood, memory, affect and judgment normal.         Labs & Imaging :  GILMA +. Titre 1:640.  Rheumatoid Factor normal.  06/11/19 :  NFBS 137; cr.1.4; eGFR 50.9; Ca 9.6; Bili  0.3; liver enzymes normal.  Hgb 10.8; Hct 32.5; MCV 88; platelet 171,000. ANC 3,600    Dx :  Anemia.  Mixed connective tissue Disorder. DM. CKD 3.      Assessment & Plan: Reviewed with patient . Labs show continued improvement   Continue followup with Rheumatologist & PCP.   Does not meet criteria for Procrit therapy. . Monitor CBC, and Iron Studies. RTC 3 months.   Chief Complaint :   Pancytopenia.     Hx of Present illness :  Patient is a 69 y.o. year old male who presents to the clinic today for   Oncology evaluation. Labs revealed significant decrease in blood counts. Dx'd to have Sjogren's syndrome four years ago.  Was on Plaquenil & immuran in the past. That is still on hold.  Last Month had flare up of gout.      Allergies :    Review of patient's allergies indicates:   Allergen Reactions    Penicillins Nausea And Vomiting       Occupation :   [employer]    Transfusion :  None    Menstrual & obstetric Hx :      Present Meds :   Medication List with Changes/Refills   Current Medications    AMLODIPINE (NORVASC) 5 MG TABLET    Take 1 tablet (5 mg total) by mouth 2 (two) times daily.    ASPIRIN (ECOTRIN) 81 MG EC TABLET    Take 81 mg by mouth once daily.    BD VEO INSULIN SYRINGE UF 1/2 ML 31 GAUGE X 15/64" SYRG    BASAL/BOLUS INSULIN 4-5X/DAY    CLINDAMYCIN (CLEOCIN) 300 MG CAPSULE    Take 1 capsule (300 mg total) by mouth every 8 (eight) hours.    CLONIDINE 0.1 MG/24 HR TD PTWK (CATAPRES) 0.1 MG/24 HR    Place 1 patch onto the skin every 7 days.    COLCHICINE (COLCRYS) 0.6 MG TABLET    Take 1 tablet (0.6 mg total) by mouth once daily. for 7 days    DORZOLAMIDE (TRUSOPT) 2 % OPHTHALMIC SOLUTION    Place 1 drop into both eyes 2 (two) times daily.    " "INSULIN DEGLUDEC-LIRAGLUTIDE (XULTOPHY 100/3.6) 100 UNIT-3.6 MG /ML (3 ML) INPN    Inject 7 Units into the skin once daily.    IPRATROPIUM (ATROVENT) 42 MCG (0.06 %) NASAL SPRAY    INSTILL 2 SPRAYS BY NASAL ROUTE 3 TIMES DAILY. AS NEEDED FOR NASAL CONGESTION AND DRIP FOR 7 DAYS    LUBIPROSTONE (AMITIZA) 24 MCG CAP    Take 1 capsule (24 mcg total) by mouth 2 (two) times daily with meals.    MULTIVIT WITH MIN-FOLIC ACID 200 MCG CHEW        PEN NEEDLE, DIABETIC (BD ULTRA-FINE RICHARD PEN NEEDLE) 32 GAUGE X 5/32" NDLE    1 Device by Misc.(Non-Drug; Combo Route) route once daily.    POLYETHYLENE GLYCOL (GLYCOLAX) 17 GRAM/DOSE POWDER    Take 17 g by mouth once daily.    ROSUVASTATIN (CRESTOR) 10 MG TABLET    Take 1 tablet (10 mg total) by mouth once daily.    TORSEMIDE (DEMADEX) 10 MG TAB    Take 1 tablet (10 mg total) by mouth once daily.    TRAMADOL (ULTRAM) 50 MG TABLET    Take 1 tablet (50 mg total) by mouth every 8 (eight) hours as needed for Pain.    TRAVOPROST (TRAVATAN Z) 0.004 % OPHTHALMIC SOLUTION    Place 1 drop into both eyes every evening.    VALSARTAN (DIOVAN) 160 MG TABLET    Take 1 tablet (160 mg total) by mouth 2 (two) times daily.       Past Medical Hx :  Reviewed.     Past Medical Hx :  Past Medical History:   Diagnosis Date    Anemia     Arthritis     Cataract     Chronic constipation     Diabetes mellitus, type 2     Glaucoma     Hyperlipidemia 5/13/2014    Hypertension     MCTD (mixed connective tissue disease)     Sjogren's disease     Ulcerative colitis     Urolithiasis        Travel Hx :  N/A    Immunization :  Immunization History   Administered Date(s) Administered    Influenza 10/26/2014    Influenza - High Dose - PF (65 years and older) 10/12/2015, 08/21/2017, 09/12/2018    Influenza - Quadrivalent 10/17/2016    Influenza - Quadrivalent - PF (6 months and older) 10/26/2014    Influenza A (H1N1) 2009 Monovalent - IM 10/26/2009    Pneumococcal Conjugate - 13 Valent 11/20/2015    " Pneumococcal Polysaccharide - 23 Valent 05/25/2018    Td - PF (ADULT) 12/06/2018    Zoster 09/26/2014    Zoster Recombinant 05/25/2018       Family Hx :  Family History   Problem Relation Age of Onset    Hypertension Father     Stroke Father     Cataracts Father     Glaucoma Father     Cataracts Mother     No Known Problems Sister     No Known Problems Brother     Rheum arthritis Maternal Aunt     Rheum arthritis Maternal Uncle     No Known Problems Paternal Aunt     No Known Problems Paternal Uncle     No Known Problems Maternal Grandmother     No Known Problems Maternal Grandfather     Throat cancer Paternal Grandmother     Glaucoma Paternal Grandfather     Celiac disease Neg Hx     Colon cancer Neg Hx     Cirrhosis Neg Hx     Colon polyps Neg Hx     Crohn's disease Neg Hx     Cystic fibrosis Neg Hx     Hemochromatosis Neg Hx     Esophageal cancer Neg Hx     Inflammatory bowel disease Neg Hx     Irritable bowel syndrome Neg Hx     Liver cancer Neg Hx     Liver disease Neg Hx     Rectal cancer Neg Hx     Stomach cancer Neg Hx     Ulcerative colitis Neg Hx     Chase's disease Neg Hx     Amblyopia Neg Hx     Blindness Neg Hx     Cancer Neg Hx     Diabetes Neg Hx     Macular degeneration Neg Hx     Retinal detachment Neg Hx     Strabismus Neg Hx     Thyroid disease Neg Hx        Social Hx :  Social History     Socioeconomic History    Marital status:      Spouse name: Not on file    Number of children: 3    Years of education: Not on file    Highest education level: Not on file   Occupational History    Not on file   Social Needs    Financial resource strain: Somewhat hard    Food insecurity:     Worry: Never true     Inability: Never true    Transportation needs:     Medical: No     Non-medical: No   Tobacco Use    Smoking status: Never Smoker    Smokeless tobacco: Never Used   Substance and Sexual Activity    Alcohol use: No     Frequency: Never     Drinks  per session: Patient refused     Binge frequency: Never    Drug use: No    Sexual activity: Never   Lifestyle    Physical activity:     Days per week: 3 days     Minutes per session: Not on file    Stress: Only a little   Relationships    Social connections:     Talks on phone: Once a week     Gets together: Once a week     Attends Rastafarian service: Not on file     Active member of club or organization: No     Attends meetings of clubs or organizations: Never     Relationship status:    Other Topics Concern    Not on file   Social History Narrative    Not on file       Surgery :  Bilateral cataract surgery; Colonoscopy 2018.  Kidney stone surgery thirty five years ago.    Symptoms :    Review of Systems   Constitutional: Positive for malaise/fatigue. Negative for chills, fever and weight loss (lost two pounds).        Appetite poor. Less fatigue. No fever, pruritis, night sweats.    HENT: Negative for congestion, hearing loss, nosebleeds, sore throat and tinnitus.    Eyes: Negative.  Negative for blurred vision, double vision, photophobia, pain, discharge and redness.   Respiratory: Positive for shortness of breath (Mild). Negative for cough and hemoptysis.    Cardiovascular: Negative for chest pain, palpitations, orthopnea, claudication and leg swelling.   Gastrointestinal: Positive for constipation. Negative for abdominal pain, blood in stool, diarrhea, heartburn, nausea and vomiting.   Genitourinary: Negative for dysuria, flank pain, frequency, hematuria and urgency.        Slow stream.   Musculoskeletal: Positive for back pain (Lot of back pain). Negative for joint pain.   Skin: Positive for itching.   Neurological: Negative for dizziness, tingling, tremors, sensory change and headaches.   Endo/Heme/Allergies: Negative for environmental allergies. Does not bruise/bleed easily.   Psychiatric/Behavioral: Negative for depression. The patient is not nervous/anxious and does not have insomnia.         Physical Exam :   Physical Exam   Constitutional: He is oriented to person, place, and time and well-developed, well-nourished, and in no distress. Vital signs are normal. No distress.   HENT:   Head: Normocephalic and atraumatic.   Right Ear: External ear normal.   Left Ear: External ear normal.   Nose: Nose normal.   Mouth/Throat: Oropharynx is clear and moist. No oropharyngeal exudate.   Eyes: Pupils are equal, round, and reactive to light. Conjunctivae, EOM and lids are normal. Lids are everted and swept, no foreign bodies found. Right eye exhibits no discharge. Left eye exhibits no discharge. No scleral icterus.       Neck: Trachea normal, normal range of motion, full passive range of motion without pain and phonation normal. Neck supple. Normal carotid pulses, no hepatojugular reflux and no JVD present. No tracheal tenderness present. Carotid bruit is not present. No tracheal deviation present. No thyroid mass and no thyromegaly present.   Cardiovascular: Normal rate, regular rhythm, normal heart sounds, intact distal pulses and normal pulses. PMI is not displaced. Exam reveals no gallop and no friction rub.   No murmur heard.  Pulmonary/Chest: Effort normal and breath sounds normal. No stridor. No apnea. No respiratory distress. He has no wheezes. He has no rales. He exhibits no tenderness.   Abdominal: Soft. Normal appearance, normal aorta and bowel sounds are normal. He exhibits no distension, no ascites and no mass. There is no hepatosplenomegaly. There is no tenderness. There is no rebound, no guarding and no CVA tenderness. No hernia.   Musculoskeletal: Normal range of motion. He exhibits no edema, tenderness or deformity.   Lymphadenopathy:        Head (right side): No submental, no submandibular, no tonsillar, no preauricular, no posterior auricular and no occipital adenopathy present.        Head (left side): No submental, no submandibular, no tonsillar, no preauricular, no posterior auricular  and no occipital adenopathy present.     He has no cervical adenopathy.     He has no axillary adenopathy.        Right: No inguinal, no supraclavicular and no epitrochlear adenopathy present.        Left: No inguinal, no supraclavicular and no epitrochlear adenopathy present.   Neurological: He is alert and oriented to person, place, and time. He has normal motor skills, normal sensation, normal strength, normal reflexes and intact cranial nerves. No cranial nerve deficit. He exhibits normal muscle tone. Gait normal. Coordination normal. GCS score is 15.   Skin: Skin is warm, dry and intact. No rash noted. He is not diaphoretic. No cyanosis or erythema. No pallor. Nails show no clubbing.   Psychiatric: Mood, memory, affect and judgment normal.       Labs & Imaging :  GILMA +. Titre 1:640.  Rheumatoid Factor normal.  09/19/19 :  Hgb 10.9; hct 34 .1. MCV 87; platelets 246,000 ANC 3,900. Serum iron 44; TIBC 296. Sat 15 . NFBS 170. Cr.1.6.  Ca 9.3  Bili 0.4. eGFR 43.3.  Liver Enzymes normal.     Dx :  Anemia.  Mixed connective tissue Disorder. DM. CKD 3.      Assessment & Plan: Reviewed with patient . Labs show continued improvement      Does not meet criteria for Procrit therapy. . Monitor CBC, and Iron Studies. Continue followup with rheumatologist and PCP,   Advance Care Planning     Power of   I initiated the process of advance care planning today and explained the importance of this process to the patient.  I introduced the concept of advance directives to the patient, as well. Then the patient received detailed information about the importance of designating a Health Care Power of  (HCPOA). He was also instructed to communicate with this person about their wishes for future healthcare, should he become sick and lose decision-making capacity. The patient has not previously appointed a HCPOA. After our discussion, the patient has not decided to complete a HCPOA.          Living Will  During this  visit, I engaged the {ACP PT MICHAEL [id number]}  in the advance care planning process.  The patient and I reviewed the role for advance directives and their purpose in directing future healthcare if the patient's unable to speak for him/herself.  At this point in time, the patient does have full decision-making capacity.  We discussed different extreme health states that he could experience, and reviewed what kind of medical care he would want in those  Conditions.  communicated that if he were comatose and had little chance of a meaningful recovery, he  want       Brochures given

## 2019-09-18 NOTE — PROGRESS NOTES
"                                                  Physical Therapy Daily Note     Date: 09/18/2019  Name: Darrion Bennett  [facility] Number: [id number]  Diagnosis:   Encounter Diagnoses   Name Primary?    Muscle weakness of lower extremity     Postural imbalance     Impaired functional mobility and endurance      Physician: Farooq Domingo MD    Physician Orders: PT Eval and Treat   Medical Diagnosis from Referral: debility  Evaluation Date: 8/16/2019  Last PN: 9/9/19 (visit 6)  Authorization Period Expiration: 12/31/19  Plan of Care Expiration: 11/16/19  Visit # / Visits authorized: 8/20    Time In: 0700  Time Out: 0755  Total Time: 55 minutes    Precautions: CHF, DM 2, stage 4 kidney disease, glaucoma- blurry vision    Subjective     Pt reports: he walked on the treadmill at home at a higher speed for 8 minutes. He feels blisters on his feet might be coming on.  He was compliant with home exercise program.  Response to previous treatment: good- no soreness  Functional change: none to note    Pain: 0/10  Location: L LE    Objective     BOLD= performed today    Patient received individual therapy to increase strength, endurance, ROM, flexibility, posture and core stabilization with activities as follows:     Darrion received therapeutic exercises to develop strength, endurance, ROM, flexibility, posture and core stabilization for 55 minutes including:     Treadmill x 8 min @ 1.8 mph  Upright bike x 5 min level 2- NP    Mini squats 2x10 at // bar  Standing march x 30 ea  Hip abduction x 30 ea  Hip extension 3x10 ea  Sit to stand from chair x 20- NP  Calf stretch on slant board 3x30"  Side steps with green TB 40' x 2 laps  Step ups 6" step 2x10 B  Tandem stance 2x30" B- NP  Walking laps around gym x6 min (6 laps)- NP  Shuttle squats 2.5 bands 3 x 10  Matrix hip abd 45# 4 x 10  Matrix HS curl 25# 2 x 10  Standing hamstring stretch on steps 3 x 30 sec ea  Standing L hip adductor stretch 3 x 30 sec     Standing shoulder " "extension RTB 2x10- NP  Rows green TB x 30  Wall push ups 2 x 10  +Freemotion rows 3# 3 x 10  +Freemotion B sh ext 3# 3 x 10  Bicep curls 5# x 30 ea    Supine:- NP  Bridges x 20  SL hip abd x 20 ea  Adduction squeeze 20x5"  HSS 2x30"  Hip flexor stretch (no strap) 3x30"    Pt received moist heat to low back for 0 minutes post tx. Skin checked post theramally.   Supine with bloster    Written Home Exercises Provided: no new exercises provided this session  Pt demo good understanding of the education provided. Darrion demonstrated good return demonstration of activities.     Education provided: DOMS, detriments of excessive bed rest  Darrion verbalized good understanding of education provided.   No spiritual or educational barriers to learning identified.    Assessment     Pt had good tolerance to treatment today with no adverse effects. Post-treatment body pain rated as 0/10. Able to increase duration and speed on treadmill without difficulties. He was able to progress to using only 1 UE support with standing hip strengthening. He continues to improve with standing endurance. Increased energy levels noted. He is progressing with repetitions and weight without difficulties.     GOALS: Short Term Goals:  6 weeks  1. Report decreased body pain  <  / =  5/10 at worst to increase tolerance for prolonged standing.- in progress   2. Pt will be able to tolerate multi-directional LE strengthening in order to improve ability to perform household chores.- met 9/9/19  3. Pt will report 50% improvement in ability to walk long distances since start of care to indicate improved functional mobility. - in progress   4. Pt will ambulate on treadmill for 5 minutes without fatigue to indicate improved endurance.- met 9/9/19   5. Pt to tolerate HEP to improve ROM and independence with ADL's.- met 9/9/19     Long Term Goals: 12 weeks  1. Report decreased body pain  <  / =  3/10 at worst to increase tolerance for prolonged standing.- in " progress    2. Pt will be able to perform 2 x 10 multi-directional LE strengthening without fatigue in order to improve ability to perform household chores.- in progress   3. Pt will report 80% improvement in ability to walk long distances since start of care to indicate improved functional mobility.- in progress    4. Pt will ambulate on treadmill for 10 minutes without fatigue to indicate improved endurance.- in progress    5. Pt to be Independent with HEP to improve ROM and independence with ADL's.- in progress      Plan     Progress B LE strength and endurance.     Therapist: Tiffanie Hinojosa, PT, DPT

## 2019-09-19 ENCOUNTER — LAB VISIT (OUTPATIENT)
Dept: LAB | Facility: HOSPITAL | Age: 69
End: 2019-09-19
Attending: INTERNAL MEDICINE
Payer: MEDICARE

## 2019-09-19 DIAGNOSIS — N18.30 CHRONIC KIDNEY DISEASE, STAGE III (MODERATE): ICD-10-CM

## 2019-09-19 DIAGNOSIS — M35.9 CONNECTIVE TISSUE DISORDER: ICD-10-CM

## 2019-09-19 DIAGNOSIS — D64.9 ANEMIA, UNSPECIFIED TYPE: ICD-10-CM

## 2019-09-19 LAB
ALBUMIN SERPL BCP-MCNC: 3.5 G/DL (ref 3.5–5.2)
ALP SERPL-CCNC: 69 U/L (ref 55–135)
ALT SERPL W/O P-5'-P-CCNC: 11 U/L (ref 10–44)
ANION GAP SERPL CALC-SCNC: 8 MMOL/L (ref 8–16)
AST SERPL-CCNC: 16 U/L (ref 10–40)
BASOPHILS # BLD AUTO: 0.01 K/UL (ref 0–0.2)
BASOPHILS NFR BLD: 0.2 % (ref 0–1.9)
BILIRUB SERPL-MCNC: 0.4 MG/DL (ref 0.1–1)
BUN SERPL-MCNC: 22 MG/DL (ref 8–23)
C PEPTIDE SERPL-MCNC: 1.33 NG/ML (ref 0.78–5.19)
CALCIUM SERPL-MCNC: 9.3 MG/DL (ref 8.7–10.5)
CHLORIDE SERPL-SCNC: 105 MMOL/L (ref 95–110)
CO2 SERPL-SCNC: 22 MMOL/L (ref 23–29)
CREAT SERPL-MCNC: 1.6 MG/DL (ref 0.5–1.4)
DIFFERENTIAL METHOD: ABNORMAL
EOSINOPHIL # BLD AUTO: 0.1 K/UL (ref 0–0.5)
EOSINOPHIL NFR BLD: 2.2 % (ref 0–8)
ERYTHROCYTE [DISTWIDTH] IN BLOOD BY AUTOMATED COUNT: 15.3 % (ref 11.5–14.5)
EST. GFR  (AFRICAN AMERICAN): 50.1 ML/MIN/1.73 M^2
EST. GFR  (NON AFRICAN AMERICAN): 43.3 ML/MIN/1.73 M^2
ESTIMATED AVG GLUCOSE: 151 MG/DL (ref 68–131)
GLUCOSE SERPL-MCNC: 170 MG/DL (ref 70–110)
GLUCOSE SERPL-MCNC: 170 MG/DL (ref 70–110)
HBA1C MFR BLD HPLC: 6.9 % (ref 4–5.6)
HCT VFR BLD AUTO: 34.1 % (ref 40–54)
HGB BLD-MCNC: 10.9 G/DL (ref 14–18)
IRON SERPL-MCNC: 44 UG/DL (ref 45–160)
LYMPHOCYTES # BLD AUTO: 1.1 K/UL (ref 1–4.8)
LYMPHOCYTES NFR BLD: 19.4 % (ref 18–48)
MCH RBC QN AUTO: 27.9 PG (ref 27–31)
MCHC RBC AUTO-ENTMCNC: 32 G/DL (ref 32–36)
MCV RBC AUTO: 87 FL (ref 82–98)
MONOCYTES # BLD AUTO: 0.7 K/UL (ref 0.3–1)
MONOCYTES NFR BLD: 11.5 % (ref 4–15)
NEUTROPHILS # BLD AUTO: 3.9 K/UL (ref 1.8–7.7)
NEUTROPHILS NFR BLD: 66.9 % (ref 38–73)
PLATELET # BLD AUTO: 246 K/UL (ref 150–350)
PMV BLD AUTO: 11.3 FL (ref 9.2–12.9)
POTASSIUM SERPL-SCNC: 4.4 MMOL/L (ref 3.5–5.1)
PROT SERPL-MCNC: 7.6 G/DL (ref 6–8.4)
RBC # BLD AUTO: 3.91 M/UL (ref 4.6–6.2)
SATURATED IRON: 15 % (ref 20–50)
SODIUM SERPL-SCNC: 135 MMOL/L (ref 136–145)
TOTAL IRON BINDING CAPACITY: 296 UG/DL (ref 250–450)
TRANSFERRIN SERPL-MCNC: 200 MG/DL (ref 200–375)
WBC # BLD AUTO: 5.89 K/UL (ref 3.9–12.7)

## 2019-09-19 PROCEDURE — 80053 COMPREHEN METABOLIC PANEL: CPT | Mod: HCNC

## 2019-09-19 PROCEDURE — 83540 ASSAY OF IRON: CPT | Mod: HCNC

## 2019-09-19 PROCEDURE — 84681 ASSAY OF C-PEPTIDE: CPT | Mod: HCNC

## 2019-09-19 PROCEDURE — 36415 COLL VENOUS BLD VENIPUNCTURE: CPT | Mod: HCNC,PO

## 2019-09-19 PROCEDURE — 83036 HEMOGLOBIN GLYCOSYLATED A1C: CPT | Mod: HCNC

## 2019-09-19 PROCEDURE — 85025 COMPLETE CBC W/AUTO DIFF WBC: CPT | Mod: HCNC

## 2019-09-19 NOTE — PROGRESS NOTES
"                                                  Physical Therapy Daily Note     Date: 09/20/2019  Name: Darrion Bennett  [facility] Number: [id number]  Diagnosis:   Encounter Diagnoses   Name Primary?    Muscle weakness of lower extremity     Postural imbalance     Impaired functional mobility and endurance      Physician: Farooq Domingo MD    Physician Orders: PT Eval and Treat   Medical Diagnosis from Referral: debility  Evaluation Date: 8/16/2019  Last PN: 9/9/19 (visit 6)  Authorization Period Expiration: 12/31/19  Plan of Care Expiration: 11/16/19  Visit # / Visits authorized: 9/20    Time In: 0900  Time Out: 1000  Total Time: 30 minutes    Precautions: CHF, DM 2, stage 4 kidney disease, glaucoma- blurry vision    Subjective     Pt reports: he didn't perform HEP yesterday because he was sore. His L leg is bothering him this morning.   He was compliant with home exercise program.  Response to previous treatment: good- soreness  Functional change: none to note    Pain: 3/10  Location: L LE    Objective     BOLD= performed today    Patient received individual therapy to increase strength, endurance, ROM, flexibility, posture and core stabilization with activities as follows:     Darrion received therapeutic exercises to develop strength, endurance, ROM, flexibility, posture and core stabilization for 60 minutes including:     Treadmill x 8 min @ 1.8 mph  Upright bike x 5 min level 2- NP    Mini squats 2x10 at // bar  Standing march x 30 ea  Hip abduction x 30 ea  Hip extension 3x10 ea  Sit to stand from chair x 20- NP  Calf stretch on slant board 3x30"  Side steps with green TB 40' x 2 laps  Step ups 6" step 2x10 B  Tandem stance 2x30" B- NP  Walking laps around gym x6 min (6 laps)- NP  Shuttle squats 2 bands 3 x 10  Matrix hip abd 45# 4 x 10  Matrix HS curl 25# 2 x 10  Standing hamstring stretch on steps 3 x 30 sec ea  Standing L hip adductor stretch 3 x 30 sec     Standing shoulder extension RTB 2x10- NP  Rows green " "TB x 30  Wall push ups 2 x 10  Freemotion rows 3# 3 x 10  Freemotion B sh ext 3# 3 x 10  Bicep curls 5# x 30 ea    Supine:- NP  Bridges x 20  SL hip abd x 20 ea  Adduction squeeze 20x5"  HSS 2x30"  Hip flexor stretch (no strap) 3x30"    Pt received moist heat to low back for 0 minutes post tx. Skin checked post theramally.   Supine with bloster    Written Home Exercises Provided: no new exercises provided this session  Pt demo good understanding of the education provided. Darrion demonstrated good return demonstration of activities.     Education provided: DOMS, detriments of excessive bed rest  Darrion verbalized good understanding of education provided.   No spiritual or educational barriers to learning identified.    Assessment     Pt had good tolerance to treatment today with no adverse effects. Post-treatment L LE pain rated as 0/10. Pt demonstrating increased independence with exercise program. No progression today due to soreness. Slight reduction in squat resistance. He continues with improvement in standing endurance. Will progress treadmill walking next visit.     GOALS: Short Term Goals:  6 weeks  1. Report decreased body pain  <  / =  5/10 at worst to increase tolerance for prolonged standing.- in progress   2. Pt will be able to tolerate multi-directional LE strengthening in order to improve ability to perform household chores.- met 9/9/19  3. Pt will report 50% improvement in ability to walk long distances since start of care to indicate improved functional mobility. - in progress   4. Pt will ambulate on treadmill for 5 minutes without fatigue to indicate improved endurance.- met 9/9/19   5. Pt to tolerate HEP to improve ROM and independence with ADL's.- met 9/9/19     Long Term Goals: 12 weeks  1. Report decreased body pain  <  / =  3/10 at worst to increase tolerance for prolonged standing.- in progress    2. Pt will be able to perform 2 x 10 multi-directional LE strengthening without fatigue in order " to improve ability to perform household chores.- in progress   3. Pt will report 80% improvement in ability to walk long distances since start of care to indicate improved functional mobility.- in progress    4. Pt will ambulate on treadmill for 10 minutes without fatigue to indicate improved endurance.- in progress    5. Pt to be Independent with HEP to improve ROM and independence with ADL's.- in progress      Plan     Progress B LE strength and endurance. Progress treadmill walking.     Therapist: Tiffanie Hinojosa, PT, DPT

## 2019-09-20 ENCOUNTER — CLINICAL SUPPORT (OUTPATIENT)
Dept: REHABILITATION | Facility: HOSPITAL | Age: 69
End: 2019-09-20
Attending: INTERNAL MEDICINE
Payer: MEDICARE

## 2019-09-20 ENCOUNTER — OFFICE VISIT (OUTPATIENT)
Dept: ORTHOPEDICS | Facility: CLINIC | Age: 69
End: 2019-09-20
Payer: MEDICARE

## 2019-09-20 VITALS
HEIGHT: 69 IN | HEART RATE: 77 BPM | OXYGEN SATURATION: 99 % | RESPIRATION RATE: 16 BRPM | SYSTOLIC BLOOD PRESSURE: 122 MMHG | BODY MASS INDEX: 22.27 KG/M2 | DIASTOLIC BLOOD PRESSURE: 80 MMHG | WEIGHT: 150.38 LBS

## 2019-09-20 DIAGNOSIS — Z74.09 IMPAIRED FUNCTIONAL MOBILITY AND ENDURANCE: ICD-10-CM

## 2019-09-20 DIAGNOSIS — R29.3 POSTURAL IMBALANCE: ICD-10-CM

## 2019-09-20 DIAGNOSIS — M62.81 MUSCLE WEAKNESS OF LOWER EXTREMITY: ICD-10-CM

## 2019-09-20 DIAGNOSIS — M1A.9XX1 TOPHACEOUS GOUT: Primary | ICD-10-CM

## 2019-09-20 PROCEDURE — 99999 PR PBB SHADOW E&M-EST. PATIENT-LVL IV: CPT | Mod: PBBFAC,HCNC,, | Performed by: ORTHOPAEDIC SURGERY

## 2019-09-20 PROCEDURE — 99024 PR POST-OP FOLLOW-UP VISIT: ICD-10-PCS | Mod: HCNC,S$GLB,, | Performed by: ORTHOPAEDIC SURGERY

## 2019-09-20 PROCEDURE — 3074F PR MOST RECENT SYSTOLIC BLOOD PRESSURE < 130 MM HG: ICD-10-PCS | Mod: HCNC,CPTII,S$GLB, | Performed by: ORTHOPAEDIC SURGERY

## 2019-09-20 PROCEDURE — 1101F PT FALLS ASSESS-DOCD LE1/YR: CPT | Mod: HCNC,CPTII,S$GLB, | Performed by: ORTHOPAEDIC SURGERY

## 2019-09-20 PROCEDURE — 3079F DIAST BP 80-89 MM HG: CPT | Mod: HCNC,CPTII,S$GLB, | Performed by: ORTHOPAEDIC SURGERY

## 2019-09-20 PROCEDURE — 1101F PR PT FALLS ASSESS DOC 0-1 FALLS W/OUT INJ PAST YR: ICD-10-PCS | Mod: HCNC,CPTII,S$GLB, | Performed by: ORTHOPAEDIC SURGERY

## 2019-09-20 PROCEDURE — 3074F SYST BP LT 130 MM HG: CPT | Mod: HCNC,CPTII,S$GLB, | Performed by: ORTHOPAEDIC SURGERY

## 2019-09-20 PROCEDURE — 3079F PR MOST RECENT DIASTOLIC BLOOD PRESSURE 80-89 MM HG: ICD-10-PCS | Mod: HCNC,CPTII,S$GLB, | Performed by: ORTHOPAEDIC SURGERY

## 2019-09-20 PROCEDURE — 99024 POSTOP FOLLOW-UP VISIT: CPT | Mod: HCNC,S$GLB,, | Performed by: ORTHOPAEDIC SURGERY

## 2019-09-20 PROCEDURE — 97110 THERAPEUTIC EXERCISES: CPT | Mod: HCNC,PN

## 2019-09-20 PROCEDURE — 99999 PR PBB SHADOW E&M-EST. PATIENT-LVL IV: ICD-10-PCS | Mod: PBBFAC,HCNC,, | Performed by: ORTHOPAEDIC SURGERY

## 2019-09-20 NOTE — PATIENT INSTRUCTIONS

## 2019-09-20 NOTE — PROGRESS NOTES
"                                                  Physical Therapy Daily Note     Date: 09/23/2019  Name: Darrion Bennett  [facility] Number: [id number]  Diagnosis:   Encounter Diagnoses   Name Primary?    Muscle weakness of lower extremity     Postural imbalance     Impaired functional mobility and endurance      Physician: Farooq Domingo MD    Physician Orders: PT Eval and Treat   Medical Diagnosis from Referral: debility  Evaluation Date: 8/16/2019  Last PN: 9/9/19 (visit 6)  Authorization Period Expiration: 12/31/19  Plan of Care Expiration: 11/16/19  Visit # / Visits authorized: 10/20    Time In: 0800  Time Out: 0858  Total Time: 29 minutes    Precautions: CHF, DM 2, stage 4 kidney disease, glaucoma- blurry vision    Subjective     Pt reports: he was able to cook over the weekend without difficulties. He did not lay in bed all day. Pt continues with L LE pain.  He was compliant with home exercise program.  Response to previous treatment: good- soreness  Functional change: able to perform more daily tasks    Pain: 8/10  Location: L LE    Objective     BOLD= performed today    Patient received individual therapy to increase strength, endurance, ROM, flexibility, posture and core stabilization with activities as follows:     Darrion received therapeutic exercises to develop strength, endurance, ROM, flexibility, posture and core stabilization for 48 minutes including:     Treadmill x 10 min @ 1.8 mph  Upright bike x 5 min level 2- NP    Mini squats 2x10 at // bar  Standing march x 30 ea  Hip abduction x 30 ea  Hip extension 3x10 ea  Sit to stand from chair x 20- NP  Calf stretch on slant board 3x30"  Side steps with green TB 40' x 2 laps  Step ups 6" step 2x10 B  +Heel raises on 4 in step x 20  +SL ankle inversion x 20   Tandem stance 2x30" B- NP  Walking laps around gym x6 min (6 laps)- NP  Shuttle squats 2 bands 3 x 10  Matrix hip abd 50# 4 x 10  Matrix HS curl 25# 2 x 10  Standing hamstring stretch on steps 3 x 30 " "sec ea  Standing L hip adductor stretch 3 x 30 sec     Standing shoulder extension RTB 2x10- NP  Rows green TB x 30  Mat push ups 2 x 10  Freemotion rows 3# 3 x 10  Freemotion B sh ext 3# 3 x 10  Bicep curls 5# x 30 ea    Supine:- NP  Bridges x 20  SL hip abd x 20 ea  Adduction squeeze 20x5"  HSS 2x30"  Hip flexor stretch (no strap) 3x30"    Pt received moist heat to low back for 0 minutes post tx. Skin checked post theramally.   Supine with bolster    Pt received the following manual therapy techniques x 10 minutes: STM to L posterior tibialis.    Written Home Exercises Provided: no new exercises provided this session  Pt demo good understanding of the education provided. Darrion demonstrated good return demonstration of activities.     Education provided: DOMS, detriments of excessive bed rest  Darrion verbalized good understanding of education provided.   No spiritual or educational barriers to learning identified.    Assessment     Pt had good tolerance to treatment today with no adverse effects. Post-treatment L LE pain rated as 5/10. Pt was challenged with progression to mat push ups. L posterior tibialis tenderness noted upon palpation. Improvement in symptoms following manual therapy techniques. Possible posterior shin splints. No difficulties with increased weight with hip abduction machine. Good response to addition of ankle inversion strengthening exercises. Able to ambulate for longer duration on treadmill.     GOALS: Short Term Goals:  6 weeks  1. Report decreased body pain  <  / =  5/10 at worst to increase tolerance for prolonged standing.- in progress   2. Pt will be able to tolerate multi-directional LE strengthening in order to improve ability to perform household chores.- met 9/9/19  3. Pt will report 50% improvement in ability to walk long distances since start of care to indicate improved functional mobility. - in progress   4. Pt will ambulate on treadmill for 5 minutes without fatigue to indicate " improved endurance.- met 9/9/19   5. Pt to tolerate HEP to improve ROM and independence with ADL's.- met 9/9/19     Long Term Goals: 12 weeks  1. Report decreased body pain  <  / =  3/10 at worst to increase tolerance for prolonged standing.- in progress    2. Pt will be able to perform 2 x 10 multi-directional LE strengthening without fatigue in order to improve ability to perform household chores.- in progress   3. Pt will report 80% improvement in ability to walk long distances since start of care to indicate improved functional mobility.- in progress    4. Pt will ambulate on treadmill for 10 minutes without fatigue to indicate improved endurance.- in progress    5. Pt to be Independent with HEP to improve ROM and independence with ADL's.- in progress      Plan     Progress B LE strength and endurance. Progress treadmill walking.     Therapist: Tiffanie Hinojosa, PT, DPT

## 2019-09-20 NOTE — PROGRESS NOTES
Follow up visit    History of Present Illness:   Darrion comes to the office for follow up evaluation of left finger pain.  I&D was done 1 week ago for suspected felon - no purulence, uric acid crystals present. I communicated with his PCP regarding need for gout treatment if possible. He is allergic to allopurinol. He follows the gout diet very closely and does not eat processed foods. He has an appt with his rheumatologist on 10/18    Since the last visit his finger has healed. It is no longer sensitive. He states that the tramadol gave him a rash    ROS: unremarkable and no change since last visit    Physical Examination:    NAD  Left hand   Incision over ring finger healing well  Swelling improved, skin peeling over distal phalanx   Non tender   LTSI m/u/r  2+ RP  + EPL, IO, FDS, FDP    Radiographic imaging: no new imaging     Assessment/Plan:  69 y.o. male  with Left ring finger gouty tophus, s/p I&D for suspected felon    We discussed the etiology of persistent pain and further treatment options.  1. OTC medications PRN  2. Keep f/u with rheumatology to see if there are any other options for gout treatment   3. I gave him the gout diet handout today. It sounds like he is already compliant with it but I would like for him to review it again to make sure he isn't eating anything that could be causing these flares  4. Return for follow up visit: PRN    All questions were answered in detail. The patient  verbalized the understanding of the treatment plan and is in full agreement with the treatment plan.

## 2019-09-23 ENCOUNTER — CLINICAL SUPPORT (OUTPATIENT)
Dept: REHABILITATION | Facility: HOSPITAL | Age: 69
End: 2019-09-23
Attending: INTERNAL MEDICINE
Payer: MEDICARE

## 2019-09-23 DIAGNOSIS — Z74.09 IMPAIRED FUNCTIONAL MOBILITY AND ENDURANCE: ICD-10-CM

## 2019-09-23 DIAGNOSIS — M62.81 MUSCLE WEAKNESS OF LOWER EXTREMITY: ICD-10-CM

## 2019-09-23 DIAGNOSIS — R29.3 POSTURAL IMBALANCE: ICD-10-CM

## 2019-09-23 PROCEDURE — 97110 THERAPEUTIC EXERCISES: CPT | Mod: HCNC,PN

## 2019-09-24 ENCOUNTER — OFFICE VISIT (OUTPATIENT)
Dept: HEMATOLOGY/ONCOLOGY | Facility: CLINIC | Age: 69
End: 2019-09-24
Payer: MEDICARE

## 2019-09-24 VITALS
DIASTOLIC BLOOD PRESSURE: 60 MMHG | HEIGHT: 65 IN | HEART RATE: 77 BPM | SYSTOLIC BLOOD PRESSURE: 127 MMHG | WEIGHT: 150.81 LBS | BODY MASS INDEX: 25.13 KG/M2 | TEMPERATURE: 99 F | OXYGEN SATURATION: 97 %

## 2019-09-24 DIAGNOSIS — N18.30 ANEMIA OF CHRONIC RENAL FAILURE, STAGE 3 (MODERATE): ICD-10-CM

## 2019-09-24 DIAGNOSIS — D64.9 ANEMIA, UNSPECIFIED TYPE: Primary | ICD-10-CM

## 2019-09-24 DIAGNOSIS — D63.1 ANEMIA OF CHRONIC RENAL FAILURE, STAGE 3 (MODERATE): ICD-10-CM

## 2019-09-24 DIAGNOSIS — M35.9 CONNECTIVE TISSUE DISORDER: ICD-10-CM

## 2019-09-24 DIAGNOSIS — N18.30 CHRONIC KIDNEY DISEASE, STAGE III (MODERATE): ICD-10-CM

## 2019-09-24 DIAGNOSIS — I70.0 AORTIC ATHEROSCLEROSIS: ICD-10-CM

## 2019-09-24 PROCEDURE — 99999 PR PBB SHADOW E&M-EST. PATIENT-LVL III: ICD-10-PCS | Mod: PBBFAC,HCNC,, | Performed by: INTERNAL MEDICINE

## 2019-09-24 PROCEDURE — 99499 RISK ADDL DX/OHS AUDIT: ICD-10-PCS | Mod: HCNC,S$GLB,, | Performed by: INTERNAL MEDICINE

## 2019-09-24 PROCEDURE — 3074F PR MOST RECENT SYSTOLIC BLOOD PRESSURE < 130 MM HG: ICD-10-PCS | Mod: HCNC,CPTII,S$GLB, | Performed by: INTERNAL MEDICINE

## 2019-09-24 PROCEDURE — 1101F PR PT FALLS ASSESS DOC 0-1 FALLS W/OUT INJ PAST YR: ICD-10-PCS | Mod: HCNC,CPTII,S$GLB, | Performed by: INTERNAL MEDICINE

## 2019-09-24 PROCEDURE — 3078F PR MOST RECENT DIASTOLIC BLOOD PRESSURE < 80 MM HG: ICD-10-PCS | Mod: HCNC,CPTII,S$GLB, | Performed by: INTERNAL MEDICINE

## 2019-09-24 PROCEDURE — 99213 OFFICE O/P EST LOW 20 MIN: CPT | Mod: HCNC,S$GLB,, | Performed by: INTERNAL MEDICINE

## 2019-09-24 PROCEDURE — 99499 UNLISTED E&M SERVICE: CPT | Mod: HCNC,S$GLB,, | Performed by: INTERNAL MEDICINE

## 2019-09-24 PROCEDURE — 3078F DIAST BP <80 MM HG: CPT | Mod: HCNC,CPTII,S$GLB, | Performed by: INTERNAL MEDICINE

## 2019-09-24 PROCEDURE — 99213 PR OFFICE/OUTPT VISIT, EST, LEVL III, 20-29 MIN: ICD-10-PCS | Mod: HCNC,S$GLB,, | Performed by: INTERNAL MEDICINE

## 2019-09-24 PROCEDURE — 99999 PR PBB SHADOW E&M-EST. PATIENT-LVL III: CPT | Mod: PBBFAC,HCNC,, | Performed by: INTERNAL MEDICINE

## 2019-09-24 PROCEDURE — 1101F PT FALLS ASSESS-DOCD LE1/YR: CPT | Mod: HCNC,CPTII,S$GLB, | Performed by: INTERNAL MEDICINE

## 2019-09-24 PROCEDURE — 3074F SYST BP LT 130 MM HG: CPT | Mod: HCNC,CPTII,S$GLB, | Performed by: INTERNAL MEDICINE

## 2019-09-24 NOTE — PROGRESS NOTES
"                                                  Physical Therapy Daily Note     Date: 09/25/2019  Name: Darrion Bennett  [facility] Number: [id number]  Diagnosis:   Encounter Diagnoses   Name Primary?    Muscle weakness of lower extremity     Postural imbalance     Impaired functional mobility and endurance      Physician: Farooq Domingo MD    Physician Orders: PT Eval and Treat   Medical Diagnosis from Referral: debility  Evaluation Date: 8/16/2019  Last PN: 9/9/19 (visit 6)  Authorization Period Expiration: 12/31/19  Plan of Care Expiration: 11/16/19  Visit # / Visits authorized: 11/20    Time In: 0805 (pt late to session)  Time Out: 0901  Total Time: 28 minutes    Precautions: CHF, DM 2, stage 4 kidney disease, glaucoma- blurry vision    Subjective     Pt reports: improvement in L leg pain.   He was compliant with home exercise program.  Response to previous treatment: good- soreness  Functional change: able to perform more daily tasks    Pain: 4/10  Location: L LE    Objective     BOLD= performed today    Patient received individual therapy to increase strength, endurance, ROM, flexibility, posture and core stabilization with activities as follows:     Darrion received therapeutic exercises to develop strength, endurance, ROM, flexibility, posture and core stabilization for 51 minutes including:     Treadmill x 10 min @ 1.8 mph  Upright bike x 5 min level 2- NP    Mini squats 2x10 at // bar  Standing march x 30 ea  Hip abduction x 30 ea  Hip extension 3x10 ea  Sit to stand from chair x 20- NP  Calf stretch on slant board 3x30"  Side steps with green TB 40' x 2 laps  Step ups 6" step 2x10 B  Heel raises on 4 in step x 20  SL ankle inversion x 20   Tandem stance 2x30" B- NP  Walking laps around gym x6 min (6 laps)- NP  Shuttle squats 2 bands 3 x 10  Matrix hip abd 50# 4 x 10  Matrix HS curl 25# 2 x 10  Standing hamstring stretch on steps 3 x 30 sec ea  Standing L hip adductor stretch 3 x 30 sec     Standing shoulder " "extension RTB 2x10- NP  Rows green TB x 30  Mat push ups 2 x 10  Freemotion rows 3# 3 x 10  Freemotion B sh ext 3# 3 x 10  Bicep curls 5# x 30 ea  +Overhead press 2# x 20 ea    Supine:- NP  Bridges x 20  SL hip abd x 20 ea  Adduction squeeze 20x5"  HSS 2x30"  Hip flexor stretch (no strap) 3x30"    Pt received moist heat to low back for 0 minutes post tx. Skin checked post theramally.   Supine with bolster    Pt received the following manual therapy techniques x 5 minutes: STM to L posterior tibialis.    Written Home Exercises Provided: no new exercises provided this session  Pt demo good understanding of the education provided. Darrion demonstrated good return demonstration of activities.     Education provided: DOMS, detriments of excessive bed rest  Darrion verbalized good understanding of education provided.   No spiritual or educational barriers to learning identified.    Assessment     Pt had good tolerance to treatment today with no adverse effects. Post-treatment L LE pain rated as 0/10. Pt with decreased tenderness to L posterior tibialis. Improvement in symptoms following manual therapy techniques. Good response to progression of exercise program. Decreased need for rest breaks indicating improvements in endurance.     GOALS: Short Term Goals:  6 weeks  1. Report decreased body pain  <  / =  5/10 at worst to increase tolerance for prolonged standing.- in progress   2. Pt will be able to tolerate multi-directional LE strengthening in order to improve ability to perform household chores.- met 9/9/19  3. Pt will report 50% improvement in ability to walk long distances since start of care to indicate improved functional mobility. - in progress   4. Pt will ambulate on treadmill for 5 minutes without fatigue to indicate improved endurance.- met 9/9/19   5. Pt to tolerate HEP to improve ROM and independence with ADL's.- met 9/9/19     Long Term Goals: 12 weeks  1. Report decreased body pain  <  / =  3/10 at worst to " increase tolerance for prolonged standing.- in progress    2. Pt will be able to perform 2 x 10 multi-directional LE strengthening without fatigue in order to improve ability to perform household chores.- in progress   3. Pt will report 80% improvement in ability to walk long distances since start of care to indicate improved functional mobility.- in progress    4. Pt will ambulate on treadmill for 10 minutes without fatigue to indicate improved endurance.- in progress    5. Pt to be Independent with HEP to improve ROM and independence with ADL's.- in progress      Plan     Progress B LE strength and endurance.     Therapist: Tiffanie Hinojosa, PT, DPT

## 2019-09-24 NOTE — PROGRESS NOTES
"Chief Complaint :   Pancytopenia.     Hx of Present illness :  Patient is a 69 y.o. year old male who presents to the clinic today for   Oncology evaluation. Labs revealed significant decrease in blood counts. Dx'd to have Sjogren's syndrome four years ago.  Was on Plaquenil & immuran in the past. That is still on hold.  Last Month had flare up of gout.      Allergies :    Review of patient's allergies indicates:   Allergen Reactions    Penicillins Nausea And Vomiting       Occupation :   Businessman    Transfusion :  None    Menstrual & obstetric Hx :      Present Meds :   Medication List with Changes/Refills   Current Medications    AMLODIPINE (NORVASC) 5 MG TABLET    Take 1 tablet (5 mg total) by mouth 2 (two) times daily.    ASPIRIN (ECOTRIN) 81 MG EC TABLET    Take 81 mg by mouth once daily.    BD VEO INSULIN SYRINGE UF 1/2 ML 31 GAUGE X 15/64" SYRG    BASAL/BOLUS INSULIN 4-5X/DAY    CLINDAMYCIN (CLEOCIN) 300 MG CAPSULE    Take 1 capsule (300 mg total) by mouth every 8 (eight) hours.    CLONIDINE 0.1 MG/24 HR TD PTWK (CATAPRES) 0.1 MG/24 HR    Place 1 patch onto the skin every 7 days.    COLCHICINE (COLCRYS) 0.6 MG TABLET    Take 1 tablet (0.6 mg total) by mouth once daily. for 7 days    DORZOLAMIDE (TRUSOPT) 2 % OPHTHALMIC SOLUTION    Place 1 drop into both eyes 2 (two) times daily.    INSULIN DEGLUDEC-LIRAGLUTIDE (XULTOPHY 100/3.6) 100 UNIT-3.6 MG /ML (3 ML) INPN    Inject 7 Units into the skin once daily.    IPRATROPIUM (ATROVENT) 42 MCG (0.06 %) NASAL SPRAY    INSTILL 2 SPRAYS BY NASAL ROUTE 3 TIMES DAILY. AS NEEDED FOR NASAL CONGESTION AND DRIP FOR 7 DAYS    LUBIPROSTONE (AMITIZA) 24 MCG CAP    Take 1 capsule (24 mcg total) by mouth 2 (two) times daily with meals.    MULTIVIT WITH MIN-FOLIC ACID 200 MCG CHEW        PEN NEEDLE, DIABETIC (BD ULTRA-FINE RICHARD PEN NEEDLE) 32 GAUGE X 5/32" NDLE    1 Device by Misc.(Non-Drug; Combo Route) route once daily.    POLYETHYLENE GLYCOL (GLYCOLAX) 17 GRAM/DOSE POWDER    " Take 17 g by mouth once daily.    ROSUVASTATIN (CRESTOR) 10 MG TABLET    Take 1 tablet (10 mg total) by mouth once daily.    TORSEMIDE (DEMADEX) 10 MG TAB    Take 1 tablet (10 mg total) by mouth once daily.    TRAMADOL (ULTRAM) 50 MG TABLET    Take 1 tablet (50 mg total) by mouth every 8 (eight) hours as needed for Pain.    TRAVOPROST (TRAVATAN Z) 0.004 % OPHTHALMIC SOLUTION    Place 1 drop into both eyes every evening.    VALSARTAN (DIOVAN) 160 MG TABLET    Take 1 tablet (160 mg total) by mouth 2 (two) times daily.       Past Medical Hx :  Reviewed.     Past Medical Hx :  Past Medical History:   Diagnosis Date    Anemia     Arthritis     Cataract     Chronic constipation     Diabetes mellitus, type 2     Glaucoma     Hyperlipidemia 5/13/2014    Hypertension     MCTD (mixed connective tissue disease)     Sjogren's disease     Ulcerative colitis     Urolithiasis        Travel Hx :  N/A    Immunization :  Immunization History   Administered Date(s) Administered    Influenza 10/26/2014    Influenza - High Dose - PF (65 years and older) 10/12/2015, 08/21/2017, 09/12/2018    Influenza - Quadrivalent 10/17/2016    Influenza - Quadrivalent - PF (6 months and older) 10/26/2014    Influenza A (H1N1) 2009 Monovalent - IM 10/26/2009    Pneumococcal Conjugate - 13 Valent 11/20/2015    Pneumococcal Polysaccharide - 23 Valent 05/25/2018    Td - PF (ADULT) 12/06/2018    Zoster 09/26/2014    Zoster Recombinant 05/25/2018       Family Hx :  Family History   Problem Relation Age of Onset    Hypertension Father     Stroke Father     Cataracts Father     Glaucoma Father     Cataracts Mother     No Known Problems Sister     No Known Problems Brother     Rheum arthritis Maternal Aunt     Rheum arthritis Maternal Uncle     No Known Problems Paternal Aunt     No Known Problems Paternal Uncle     No Known Problems Maternal Grandmother     No Known Problems Maternal Grandfather     Throat cancer Paternal  Grandmother     Glaucoma Paternal Grandfather     Celiac disease Neg Hx     Colon cancer Neg Hx     Cirrhosis Neg Hx     Colon polyps Neg Hx     Crohn's disease Neg Hx     Cystic fibrosis Neg Hx     Hemochromatosis Neg Hx     Esophageal cancer Neg Hx     Inflammatory bowel disease Neg Hx     Irritable bowel syndrome Neg Hx     Liver cancer Neg Hx     Liver disease Neg Hx     Rectal cancer Neg Hx     Stomach cancer Neg Hx     Ulcerative colitis Neg Hx     Chase's disease Neg Hx     Amblyopia Neg Hx     Blindness Neg Hx     Cancer Neg Hx     Diabetes Neg Hx     Macular degeneration Neg Hx     Retinal detachment Neg Hx     Strabismus Neg Hx     Thyroid disease Neg Hx        Social Hx :  Social History     Socioeconomic History    Marital status:      Spouse name: Not on file    Number of children: 3    Years of education: Not on file    Highest education level: Not on file   Occupational History    Not on file   Social Needs    Financial resource strain: Somewhat hard    Food insecurity:     Worry: Never true     Inability: Never true    Transportation needs:     Medical: No     Non-medical: No   Tobacco Use    Smoking status: Never Smoker    Smokeless tobacco: Never Used   Substance and Sexual Activity    Alcohol use: No     Frequency: Never     Drinks per session: Patient refused     Binge frequency: Never    Drug use: No    Sexual activity: Never   Lifestyle    Physical activity:     Days per week: 3 days     Minutes per session: Not on file    Stress: Only a little   Relationships    Social connections:     Talks on phone: Once a week     Gets together: Once a week     Attends Christian service: Not on file     Active member of club or organization: No     Attends meetings of clubs or organizations: Never     Relationship status:    Other Topics Concern    Not on file   Social History Narrative    Not on file       Surgery :  Bilateral cataract surgery;  Colonoscopy 2018.  Kidney stone surgery thirty five years ago.    Symptoms :    Review of Systems   Constitutional: Positive for malaise/fatigue. Negative for chills, fever and weight loss (lost two pounds).   HENT: Negative for congestion, hearing loss, nosebleeds, sore throat and tinnitus.    Eyes: Negative.  Negative for blurred vision, double vision, photophobia, pain, discharge and redness.   Respiratory: Positive for shortness of breath (Mild). Negative for cough and hemoptysis.    Cardiovascular: Negative for chest pain, palpitations, orthopnea, claudication and leg swelling.   Gastrointestinal: Positive for constipation. Negative for abdominal pain, blood in stool, diarrhea, heartburn, nausea and vomiting.   Genitourinary: Negative.  Negative for dysuria, flank pain, frequency, hematuria and urgency.   Musculoskeletal: Positive for back pain (Lot of back pain) and joint pain.   Skin: Positive for itching.   Neurological: Negative for dizziness, tingling, tremors, sensory change and headaches.   Endo/Heme/Allergies: Negative for environmental allergies. Does not bruise/bleed easily.   Psychiatric/Behavioral: Negative for depression. The patient has insomnia. The patient is not nervous/anxious.        Physical Exam :   Physical Exam   Constitutional: He is oriented to person, place, and time and well-developed, well-nourished, and in no distress. Vital signs are normal. No distress.   HENT:   Head: Normocephalic and atraumatic.   Right Ear: External ear normal.   Left Ear: External ear normal.   Nose: Nose normal.   Mouth/Throat: Oropharynx is clear and moist. No oropharyngeal exudate.   Eyes: Pupils are equal, round, and reactive to light. Conjunctivae, EOM and lids are normal. Lids are everted and swept, no foreign bodies found. Right eye exhibits no discharge. Left eye exhibits no discharge. No scleral icterus.       Neck: Trachea normal, normal range of motion, full passive range of motion without pain and  phonation normal. Neck supple. Normal carotid pulses, no hepatojugular reflux and no JVD present. No tracheal tenderness present. Carotid bruit is not present. No tracheal deviation present. No thyroid mass and no thyromegaly present.   Cardiovascular: Normal rate, regular rhythm, normal heart sounds, intact distal pulses and normal pulses. PMI is not displaced. Exam reveals no gallop and no friction rub.   No murmur heard.  Pulmonary/Chest: Effort normal and breath sounds normal. No stridor. No apnea. No respiratory distress. He has no wheezes. He has no rales. He exhibits no tenderness.   Abdominal: Soft. Normal appearance, normal aorta and bowel sounds are normal. He exhibits no distension, no ascites and no mass. There is no hepatosplenomegaly. There is no tenderness. There is no rebound, no guarding and no CVA tenderness. No hernia.   Musculoskeletal: Normal range of motion. He exhibits no edema, tenderness or deformity.   Lymphadenopathy:        Head (right side): No submental, no submandibular, no tonsillar, no preauricular, no posterior auricular and no occipital adenopathy present.        Head (left side): No submental, no submandibular, no tonsillar, no preauricular, no posterior auricular and no occipital adenopathy present.     He has no cervical adenopathy.     He has no axillary adenopathy.        Right: No inguinal, no supraclavicular and no epitrochlear adenopathy present.        Left: No inguinal, no supraclavicular and no epitrochlear adenopathy present.   Neurological: He is alert and oriented to person, place, and time. He has normal motor skills, normal sensation, normal strength, normal reflexes and intact cranial nerves. No cranial nerve deficit. He exhibits normal muscle tone. Gait normal. Coordination normal. GCS score is 15.   Skin: Skin is warm, dry and intact. No rash noted. He is not diaphoretic. No cyanosis or erythema. No pallor. Nails show no clubbing.   Psychiatric: Mood, memory,  affect and judgment normal.       Labs & Imaging :  GILMA +. Titre 1:640.  Rheumatoid Factor normal.  09/9/19 : NFBS 170. Cr.1.6. eGFR 43.3. Ca 9.3 Bili 0.4. Liver enzymes normal.  Hgb 10.9; Hct 34.1. MCV 87. Platelets 246,000 ANC 3,900    Dx :  Anemia.  Mixed connective tissue Disorder. DM. CKD 3.      Assessment & Plan: Reviewed with patient . Labs show continued improvement   Continue followup with Rheumatologist & PCP.   Does not meet criteria for Procrit therapy. . Monitor CBC,

## 2019-09-25 ENCOUNTER — CLINICAL SUPPORT (OUTPATIENT)
Dept: REHABILITATION | Facility: HOSPITAL | Age: 69
End: 2019-09-25
Attending: INTERNAL MEDICINE
Payer: MEDICARE

## 2019-09-25 DIAGNOSIS — M62.81 MUSCLE WEAKNESS OF LOWER EXTREMITY: ICD-10-CM

## 2019-09-25 DIAGNOSIS — R29.3 POSTURAL IMBALANCE: ICD-10-CM

## 2019-09-25 DIAGNOSIS — Z74.09 IMPAIRED FUNCTIONAL MOBILITY AND ENDURANCE: ICD-10-CM

## 2019-09-25 PROCEDURE — 97110 THERAPEUTIC EXERCISES: CPT | Mod: HCNC,PN

## 2019-09-26 ENCOUNTER — TELEPHONE (OUTPATIENT)
Dept: NEPHROLOGY | Facility: CLINIC | Age: 69
End: 2019-09-26

## 2019-09-26 NOTE — TELEPHONE ENCOUNTER
----- Message from Derek Vizcaino MD sent at 9/25/2019  8:13 PM CDT -----  Please call to let him know kidney function and proteinuria improved, we will continue to monitor.  Kidney US showed benign findings: one stone, a couple of simple cysts and one spot of vascular fatty tissue, all nothing to be concerned about.  Thank you.     Called pt and spoke to wife informed her of results per Dr. Vizcaino /verbalized understanding

## 2019-09-26 NOTE — PROGRESS NOTES
Subjective:       Patient ID: Darrion Bennett is a 69 y.o.  male who presents for re-evaluation of progression of Chronic Kidney Disease    HPI  Mr. Bennett is a 69 year old man with medical history of diabetes, hypertension, Sjogren's presenting for evaluation of chronic kidney disease.  Patient accompanied by his wife who assisted with history.  Patient creatinine mainly 1.3-1.4mg/dL, elevated February 2019 during ED visit, again elevated July 2019 to 1.8mg/dL.  Patient reports adequate fluid intake, denies any NSAID use.  He reports blood sugars usually well-controlled, blood pressure usually similar to that in clinic.  He otherwise denies any fever, chest pain, shortness of breath, abdominal pain, diarrhea, dysuria/hematuria.  He denies any recent acute illness/infections/injury, no recent hospitalizations/ED visits/procedures.    Review of Systems   Constitutional: Negative for appetite change, fatigue and fever.   Respiratory: Negative for cough and shortness of breath.    Cardiovascular: Negative for chest pain and leg swelling.   Gastrointestinal: Negative for abdominal pain, constipation, diarrhea, nausea and vomiting.   Genitourinary: Negative for dysuria, flank pain, frequency, hematuria and urgency.   Musculoskeletal: Negative for arthralgias, back pain and joint swelling.   Skin: Negative for rash.   Neurological: Negative for dizziness and light-headedness.   All other systems reviewed and are negative.      Objective:      Physical Exam   Constitutional: He appears well-developed and well-nourished.   Cardiovascular: Normal rate and regular rhythm. Exam reveals no gallop and no friction rub.   No murmur heard.  Pulmonary/Chest: Effort normal and breath sounds normal. No respiratory distress. He has no wheezes. He has no rales.   Musculoskeletal: He exhibits no edema.   Neurological: He is alert.   Skin: Skin is warm and dry. No rash noted.   Vitals reviewed.      Assessment:       1. Chronic kidney  disease, stage III (moderate)    2. Controlled type 2 diabetes mellitus with stage 3 chronic kidney disease, with long-term current use of insulin        Plan:      Mr. Bennett is a 69 year old man with medical history of diabetes, hypertension, Sjogren's presenting for evaluation of chronic kidney disease.  Patient creatinine mainly 1.3-1.4mg/dL, consistent with CKD stage III, suspect due to diabetic nephropathy given subnephrotic proteinuria.  Creatinine elevated February 2019 during ED visit, again elevated July 2019 to 1.8mg/dL, will repeat to trend along with proteinuria (patient on ARB).  Will obtain urine studies to rule out infectious/glomerular etiology, will obtain renal US with doppler to rule out obstructive/vascular etiology. Encouraged fluid intake with sodium restriction, stressed importance of blood pressure/glycemic control to prevent any further progression of kidney disease, patient voiced understanding.  Further recommendations pending results of above.  - Anemia: Hgb at goal, no indication for erythropoetin therapy  - Bone/mineral metabolism: will check PTH/VitD    Return to clinic in 2-4 months pending renal panel, then with renal/heme panel, iron/TIBC/ferritin, urinalysis/culture, urine protein/creatinine ratio, PTH/VitD prior to next visit

## 2019-09-30 ENCOUNTER — CLINICAL SUPPORT (OUTPATIENT)
Dept: REHABILITATION | Facility: HOSPITAL | Age: 69
End: 2019-09-30
Attending: INTERNAL MEDICINE
Payer: MEDICARE

## 2019-09-30 DIAGNOSIS — R29.3 POSTURAL IMBALANCE: ICD-10-CM

## 2019-09-30 DIAGNOSIS — Z74.09 IMPAIRED FUNCTIONAL MOBILITY AND ENDURANCE: ICD-10-CM

## 2019-09-30 DIAGNOSIS — M62.81 MUSCLE WEAKNESS OF LOWER EXTREMITY: ICD-10-CM

## 2019-09-30 PROCEDURE — 97110 THERAPEUTIC EXERCISES: CPT | Mod: HCNC,PN

## 2019-09-30 NOTE — PROGRESS NOTES
"                                                  Physical Therapy Daily Note     Date: 09/30/2019  Name: Darrion Bennett  [facility] Number: [id number]  Diagnosis:   Encounter Diagnoses   Name Primary?    Muscle weakness of lower extremity     Postural imbalance     Impaired functional mobility and endurance      Physician: Farooq Domingo MD    Physician Orders: PT Eval and Treat   Medical Diagnosis from Referral: debility  Evaluation Date: 8/16/2019  Last PN: 9/9/19 (visit 6)  Authorization Period Expiration: 12/31/19  Plan of Care Expiration: 11/16/19  Visit # / Visits authorized: 12/20    Time In: 0700  Time Out: 0800  Total Time: 60 minutes    Precautions: CHF, DM 2, stage 4 kidney disease, glaucoma- blurry vision    Subjective     Pt reports: he exercised and cooked this weekend. His L leg was bothering him yesterday but not today. He would like to go back to work at the [employer] but his fingers bother him and he has gout. Occasional L leg pain also keeps him from returning to work.   He was compliant with home exercise program.  Response to previous treatment: good  Functional change: none to note    Pain: 0/10  Location: L LE    Objective     BOLD= performed today    Patient received individual therapy to increase strength, endurance, ROM, flexibility, posture and core stabilization with activities as follows:     Darrion received therapeutic exercises to develop strength, endurance, ROM, flexibility, posture and core stabilization for 60 minutes including:     Treadmill x 10 min @ 1.8 mph + 3.0 incline  Upright bike x 5 min level 2- NP    Mini squats 2x10 at // bar  Standing march x 30 ea  Hip abduction x 30 ea  Hip extension 3x10 ea  Sit to stand from chair x 20- NP  Calf stretch on slant board 3x30"  +Standing quad stretch at // bars 3 x 30 sec ea  Side steps with green TB 40' x 2 laps  Step ups 6" step 2x10 B  Heel raises on 4 in step x 20  SL ankle inversion x 20   Tandem stance 2x30" B- NP  Walking laps " "around gym x6 min (6 laps)- NP  Shuttle squats 2 bands 3 x 10  Matrix hip abd 50# 3 x 10  Matrix HS curl 30# 3 x 10  +Matrix LAQ 25# 2 x 10  Standing hamstring stretch on steps 3 x 30 sec ea  Standing L hip adductor stretch 3 x 30 sec     Standing shoulder extension RTB 2x10- NP  Rows green TB x 30  Mat push ups 2 x 10  Freemotion rows 7# 3 x 10  Freemotion B sh ext 3# 3 x 10  Bicep curls 5# x 30 ea  Overhead press 2# x 20 ea    Supine:- NP  Bridges x 20  SL hip abd x 20 ea  Adduction squeeze 20x5"  HSS 2x30"  Hip flexor stretch (no strap) 3x30"    Pt received moist heat to low back for 0 minutes post tx. Skin checked post theramally.   Supine with bolster    Pt received the following manual therapy techniques x 0 minutes: STM to L posterior tibialis.    Written Home Exercises Provided: no new exercises provided this session  Pt demo good understanding of the education provided. Darrion demonstrated good return demonstration of activities.     Education provided: DOMS, detriments of excessive bed rest  Darrion verbalized good understanding of education provided.   No spiritual or educational barriers to learning identified.    Assessment     Pt had good tolerance to treatment today with no adverse effects. Post-treatment L LE pain rated as 0/10. Good response to addition of incline with treadmill walking. He also progressed with LE and UE strengthening on machines. Pt educated on independent use of gym equipment in preparation for self-management of condition following future discharge. B quad tightness noted with improvements following stretching. Pt advised to continue stretching after session and educated on potential soreness.     GOALS: Short Term Goals:  6 weeks  1. Report decreased body pain  <  / =  5/10 at worst to increase tolerance for prolonged standing.- in progress   2. Pt will be able to tolerate multi-directional LE strengthening in order to improve ability to perform household chores.- met 9/9/19  3. Pt " will report 50% improvement in ability to walk long distances since start of care to indicate improved functional mobility. - in progress   4. Pt will ambulate on treadmill for 5 minutes without fatigue to indicate improved endurance.- met 9/9/19   5. Pt to tolerate HEP to improve ROM and independence with ADL's.- met 9/9/19     Long Term Goals: 12 weeks  1. Report decreased body pain  <  / =  3/10 at worst to increase tolerance for prolonged standing.- in progress    2. Pt will be able to perform 2 x 10 multi-directional LE strengthening without fatigue in order to improve ability to perform household chores.- in progress   3. Pt will report 80% improvement in ability to walk long distances since start of care to indicate improved functional mobility.- in progress    4. Pt will ambulate on treadmill for 10 minutes without fatigue to indicate improved endurance.- in progress    5. Pt to be Independent with HEP to improve ROM and independence with ADL's.- in progress      Plan     Progress B LE strength and endurance.     Therapist: Tiffanie Hinojosa, PT, DPT

## 2019-10-02 ENCOUNTER — CLINICAL SUPPORT (OUTPATIENT)
Dept: REHABILITATION | Facility: HOSPITAL | Age: 69
End: 2019-10-02
Attending: INTERNAL MEDICINE
Payer: MEDICARE

## 2019-10-02 DIAGNOSIS — M62.81 MUSCLE WEAKNESS OF LOWER EXTREMITY: ICD-10-CM

## 2019-10-02 DIAGNOSIS — R29.3 POSTURAL IMBALANCE: ICD-10-CM

## 2019-10-02 DIAGNOSIS — Z74.09 IMPAIRED FUNCTIONAL MOBILITY AND ENDURANCE: ICD-10-CM

## 2019-10-02 PROCEDURE — 97110 THERAPEUTIC EXERCISES: CPT | Mod: HCNC,PN

## 2019-10-02 NOTE — PROGRESS NOTES
"                                                  Physical Therapy Daily Note     Date: 10/02/2019  Name: Darrion Bennett  [facility] Number: [id number]  Diagnosis:   Encounter Diagnoses   Name Primary?    Muscle weakness of lower extremity     Postural imbalance     Impaired functional mobility and endurance      Physician: Farooq Domingo MD    Physician Orders: PT Eval and Treat   Medical Diagnosis from Referral: debility  Evaluation Date: 8/16/2019  Last PN: 9/9/19 (visit 6)  Authorization Period Expiration: 12/31/19  Plan of Care Expiration: 11/16/19  Visit # / Visits authorized: 12/20    Time In: 0700  Time Out: 0758  Total Time: 26 minutes    Precautions: CHF, DM 2, stage 4 kidney disease, glaucoma- blurry vision    Subjective     Pt reports: he thinks therapy is helping. He is feeling good with no pain today.   He was compliant with home exercise program.  Response to previous treatment: good- some soreness that has resolved  Functional change: walking better    Pain: 0/10  Location: L LE    Objective     BOLD= performed today    Patient received individual therapy to increase strength, endurance, ROM, flexibility, posture and core stabilization with activities as follows:     Darrion received therapeutic exercises to develop strength, endurance, ROM, flexibility, posture and core stabilization for 58 minutes including:     Treadmill x 10 min @ 2.0 mph + 3.0 incline  Upright bike x 5 min level 2- NP    Mini squats 2x10 at // bar  Standing march x 30 ea  Hip abduction x 30 ea  Hip extension 3x10 ea  Sit to stand from chair x 20- NP  Calf stretch on slant board 3x30"  Standing quad stretch at // bars 3 x 30 sec ea  Side steps with green TB 40' x 2 laps  Step ups 12 in step 2x10 B  Heel raises on 4 in step x 20  SL ankle inversion x 20   Tandem stance 2x30" B- NP  Walking laps around gym x6 min (6 laps)- NP  Shuttle squats 2 bands 3 x 10  Matrix hip abd 50# 3 x 10  Matrix HS curl 30# 3 x 10  Matrix LAQ 25# 2 x " "10  Standing hamstring stretch on steps 3 x 30 sec ea  Standing L hip adductor stretch 3 x 30 sec     Standing shoulder extension RTB 2x10- NP  Rows green TB x 30  Mat push ups 2 x 10  Freemotion rows 7# 3 x 10  Freemotion B sh ext 3# 3 x 10  Bicep curls 5# x 30 ea  Overhead press 2# x 20 ea    Supine:- NP  Bridges x 20  SL hip abd x 20 ea  Adduction squeeze 20x5"  HSS 2x30"  Hip flexor stretch (no strap) 3x30"    Pt received moist heat to low back for 0 minutes post tx. Skin checked post theramally.   Supine with bolster    Pt received the following manual therapy techniques x 0 minutes: STM to L posterior tibialis.    Written Home Exercises Provided: no new exercises provided this session  Pt demo good understanding of the education provided. Darrion demonstrated good return demonstration of activities.     Education provided: DOMS, detriments of excessive bed rest  Darrion verbalized good understanding of education provided.   No spiritual or educational barriers to learning identified.    Assessment     Pt had good tolerance to treatment today with no adverse effects. Post-treatment L LE pain rated as 0/10. Pt challenged with progression to 12 inch step ups but no exacerbation of symptoms. He demonstrates increased independence with therapeutic exercises and decreased need for assistance with setup on gym machines. Able to increase speed on treadmill with no difficulties.     GOALS: Short Term Goals:  6 weeks  1. Report decreased body pain  <  / =  5/10 at worst to increase tolerance for prolonged standing.- in progress   2. Pt will be able to tolerate multi-directional LE strengthening in order to improve ability to perform household chores.- met 9/9/19  3. Pt will report 50% improvement in ability to walk long distances since start of care to indicate improved functional mobility. - in progress   4. Pt will ambulate on treadmill for 5 minutes without fatigue to indicate improved endurance.- met 9/9/19   5. Pt to " tolerate HEP to improve ROM and independence with ADL's.- met 9/9/19     Long Term Goals: 12 weeks  1. Report decreased body pain  <  / =  3/10 at worst to increase tolerance for prolonged standing.- in progress    2. Pt will be able to perform 2 x 10 multi-directional LE strengthening without fatigue in order to improve ability to perform household chores.- in progress   3. Pt will report 80% improvement in ability to walk long distances since start of care to indicate improved functional mobility.- in progress    4. Pt will ambulate on treadmill for 10 minutes without fatigue to indicate improved endurance.- in progress    5. Pt to be Independent with HEP to improve ROM and independence with ADL's.- in progress      Plan     Progress B LE strength and endurance.     Therapist: Tiffanie Hinojosa, PT, DPT

## 2019-10-07 ENCOUNTER — OFFICE VISIT (OUTPATIENT)
Dept: RHEUMATOLOGY | Facility: CLINIC | Age: 69
End: 2019-10-07
Payer: MEDICARE

## 2019-10-07 VITALS
HEIGHT: 66 IN | BODY MASS INDEX: 24.34 KG/M2 | HEART RATE: 82 BPM | SYSTOLIC BLOOD PRESSURE: 137 MMHG | DIASTOLIC BLOOD PRESSURE: 67 MMHG | WEIGHT: 151.44 LBS

## 2019-10-07 DIAGNOSIS — M10.9 GOUTY ARTHRITIS: Primary | ICD-10-CM

## 2019-10-07 DIAGNOSIS — M35.00 SJOGREN'S SYNDROME, WITH UNSPECIFIED ORGAN INVOLVEMENT: ICD-10-CM

## 2019-10-07 PROCEDURE — 99214 PR OFFICE/OUTPT VISIT, EST, LEVL IV, 30-39 MIN: ICD-10-PCS | Mod: HCNC,S$GLB,, | Performed by: INTERNAL MEDICINE

## 2019-10-07 PROCEDURE — 3075F SYST BP GE 130 - 139MM HG: CPT | Mod: HCNC,CPTII,S$GLB, | Performed by: INTERNAL MEDICINE

## 2019-10-07 PROCEDURE — 99499 UNLISTED E&M SERVICE: CPT | Mod: HCNC,S$GLB,, | Performed by: INTERNAL MEDICINE

## 2019-10-07 PROCEDURE — 3078F PR MOST RECENT DIASTOLIC BLOOD PRESSURE < 80 MM HG: ICD-10-PCS | Mod: HCNC,CPTII,S$GLB, | Performed by: INTERNAL MEDICINE

## 2019-10-07 PROCEDURE — 99214 OFFICE O/P EST MOD 30 MIN: CPT | Mod: HCNC,S$GLB,, | Performed by: INTERNAL MEDICINE

## 2019-10-07 PROCEDURE — 1101F PR PT FALLS ASSESS DOC 0-1 FALLS W/OUT INJ PAST YR: ICD-10-PCS | Mod: HCNC,CPTII,S$GLB, | Performed by: INTERNAL MEDICINE

## 2019-10-07 PROCEDURE — 99999 PR PBB SHADOW E&M-EST. PATIENT-LVL III: ICD-10-PCS | Mod: PBBFAC,HCNC,, | Performed by: INTERNAL MEDICINE

## 2019-10-07 PROCEDURE — 99999 PR PBB SHADOW E&M-EST. PATIENT-LVL III: CPT | Mod: PBBFAC,HCNC,, | Performed by: INTERNAL MEDICINE

## 2019-10-07 PROCEDURE — 1101F PT FALLS ASSESS-DOCD LE1/YR: CPT | Mod: HCNC,CPTII,S$GLB, | Performed by: INTERNAL MEDICINE

## 2019-10-07 PROCEDURE — 99499 RISK ADDL DX/OHS AUDIT: ICD-10-PCS | Mod: HCNC,S$GLB,, | Performed by: INTERNAL MEDICINE

## 2019-10-07 PROCEDURE — 3078F DIAST BP <80 MM HG: CPT | Mod: HCNC,CPTII,S$GLB, | Performed by: INTERNAL MEDICINE

## 2019-10-07 PROCEDURE — 3075F PR MOST RECENT SYSTOLIC BLOOD PRESS GE 130-139MM HG: ICD-10-PCS | Mod: HCNC,CPTII,S$GLB, | Performed by: INTERNAL MEDICINE

## 2019-10-07 ASSESSMENT — ROUTINE ASSESSMENT OF PATIENT INDEX DATA (RAPID3)
FATIGUE SCORE: 6.5
TOTAL RAPID3 SCORE: 4.94
PAIN SCORE: 5.5
MDHAQ FUNCTION SCORE: .7
AM STIFFNESS SCORE: 1, YES
PSYCHOLOGICAL DISTRESS SCORE: 1
PATIENT GLOBAL ASSESSMENT SCORE: 7

## 2019-10-07 NOTE — PROGRESS NOTES
Subjective:       Patient ID: Darrion Bennett is a 69 y.o. male.    Chief Complaint: Follow-up     HPI  69 year old male with type II, cataracts, HTN, gout,  Sjogrens, urolithiasis, CHF, hypothyroidism, CKD here for evaluation.  He reports that he was diagnosed with Sjogrens when he had dry eyes and dry mouth in 2014.. He takes plaquenil 200mg po BID. He is  taking azathioprine 50mg po TID and medrol 4mg po qqday.   He was put on imuran by Dr. Corona.  He was followed by Dr. Corona from 2014 to 2017.  In October 2018, imuran and medrol was stopped. Patient restarted imuran in November 2018.  Reports that he feels that imuran helps him with joint.   Today he denies pain, stiffness or swelling.  He denies sry eyes or dry mouth.  Denies trouble making a fist.    He was diagnosed in January in left aspect of foot with pain, swelling and redness.            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   He has new swelling in left fourth finger. He took uloric for a few days and then skin started itching.  When he stopped the uloric, the itching went away.Denies swelling of hands or feet.  Denies rashes, oral ulcers,  raynauds, or pleurisy. Denies sicca symptoms.      Past Medical History:   Diagnosis Date    Anemia     Arthritis     Cataract     Chronic constipation     Diabetes mellitus, type 2     Glaucoma     Hypertension     MCTD (mixed connective tissue disease)     Sjogren's disease     Ulcerative colitis     Urolithiasis        Review of Systems   Constitutional: Negative for activity change, appetite change, chills, diaphoresis, fatigue and fever.   HENT: Negative for ear discharge, ear pain, hearing loss, mouth sores, nosebleeds and sinus pressure.    Eyes: Negative.  Negative for photophobia, pain, discharge, redness and itching.   Respiratory: Negative for cough, chest tightness and shortness of breath.    Cardiovascular: Negative for chest pain, palpitations and leg swelling.    Gastrointestinal: Negative for abdominal distention, blood in stool and nausea.   Endocrine: Negative for cold intolerance, heat intolerance, polydipsia and polyphagia.   Genitourinary: Negative for flank pain, genital sores and hematuria.   Musculoskeletal: Positive for arthralgias. Negative for back pain, gait problem, joint swelling, myalgias, neck pain and neck stiffness.   Skin: Negative for color change, pallor and rash.   Neurological: Negative for dizziness, weakness, light-headedness and headaches.   Hematological: Negative for adenopathy. Does not bruise/bleed easily.   Psychiatric/Behavioral: Negative for decreased concentration and hallucinations. The patient is not nervous/anxious.              Objective:   BP (!) 147/73   Pulse 100   Wt 75.5 kg (166 lb 7.2 oz)   BMI 26.87 kg/m²      Physical Exam   Constitutional: He is well-developed, well-nourished, and in no distress. No distress.   HENT:   Head: Normocephalic and atraumatic.   Right Ear: External ear normal.   Left Ear: External ear normal.   Nose: Nose normal.   Mouth/Throat: Oropharynx is clear and moist. No oropharyngeal exudate.   Eyes: Conjunctivae and EOM are normal. Pupils are equal, round, and reactive to light. Right eye exhibits no discharge. Left eye exhibits no discharge. No scleral icterus.   Neck: Normal range of motion. Neck supple. No JVD present. No tracheal deviation present. No thyromegaly present.   Cardiovascular: Normal rate, normal heart sounds and intact distal pulses.  Exam reveals no gallop and no friction rub.    No murmur heard.  Pulmonary/Chest: Effort normal. No stridor. No respiratory distress. He has no wheezes. He has no rales. He exhibits no tenderness.   Abdominal: Soft. Bowel sounds are normal. He exhibits no distension and no mass. There is no tenderness. There is no rebound and no guarding.   Lymphadenopathy:     He has no cervical adenopathy.   Neurological: No cranial nerve deficit. Coordination normal.    Skin: Skin is warm and dry. No rash noted. He is not diaphoretic. No erythema. No pallor.     Musculoskeletal: Normal range of motion. He exhibits no edema, tenderness or deformity.     Labs:  Gilma-+  Ssa+  Ssb+  +RNP  Ccp,rf-negative      Right hand xray (5/2019): I personally reviewed; Soft tissue swelling tip of long finger.  Negative for fracture.     Assessment:       69 year old male with type II, cataracts, HTN, gout,  Sjogrens, urolithiasis, CHF, hypothyroidism, CKD here for follow up. He was previously followed by Dr. Lau.  He reports that he was diagnosed with Sjogrens when he had dry eyes and dry mouth in 2014. Serologies are +GILMA, +SSA,+SSB, and +RNP. He denies raynauds, rashes, oral ulcers or symptoms of SLE. His main issue before we met was inflammatory arthritis which was being treated with plaquenil, imuran, and medrol.    When we initially met in feb 2019 , he was taking plaquenil 200mg po BID, azathioprine 50mg po TID and medrol 4mg po qqday. His last rheumatologist discontinued his medications and patient decided to restart the medications himself since he was having so much pain. He developed imuran toxicity so all his medications were discontinued. He is asymptomatic from Sjogrens at this time.      With regards to gout, he was recently diagnosed with tophaceous gout, but had allergy to allopurinol so tried uloric but he reports itching with it.  Because of his kidney dysfunction, probenecid is not an option. Discussed with patient diet for gout.  Will hold off on urate lowering therapy.  Labs in nov  rtc in 3 months

## 2019-10-09 ENCOUNTER — CLINICAL SUPPORT (OUTPATIENT)
Dept: REHABILITATION | Facility: HOSPITAL | Age: 69
End: 2019-10-09
Attending: INTERNAL MEDICINE
Payer: MEDICARE

## 2019-10-09 DIAGNOSIS — Z74.09 IMPAIRED FUNCTIONAL MOBILITY AND ENDURANCE: ICD-10-CM

## 2019-10-09 DIAGNOSIS — R29.3 POSTURAL IMBALANCE: ICD-10-CM

## 2019-10-09 DIAGNOSIS — M62.81 MUSCLE WEAKNESS OF LOWER EXTREMITY: ICD-10-CM

## 2019-10-09 PROCEDURE — G8979 MOBILITY GOAL STATUS: HCPCS | Mod: CK,HCNC,PN

## 2019-10-09 PROCEDURE — 97110 THERAPEUTIC EXERCISES: CPT | Mod: HCNC,PN

## 2019-10-09 PROCEDURE — G8978 MOBILITY CURRENT STATUS: HCPCS | Mod: CK,HCNC,PN

## 2019-10-09 NOTE — PROGRESS NOTES
"                                                  Physical Therapy Daily Note     Date: 10/09/2019  Name: Darrion Bennett  [facility] Number: [id number]  Diagnosis:   Encounter Diagnoses   Name Primary?    Muscle weakness of lower extremity     Postural imbalance     Impaired functional mobility and endurance      Physician: Farooq Domingo MD    Physician Orders: PT Eval and Treat   Medical Diagnosis from Referral: debility  Evaluation Date: 8/16/2019  Last PN: 10/9/19 (visit 13)  Authorization Period Expiration: 12/31/19  Plan of Care Expiration: 11/16/19  Visit # / Visits authorized: 13/20    Time In: 0800  Time Out: 0900  Total Time: 26 minutes    Precautions: CHF, DM 2, stage 4 kidney disease, glaucoma- blurry vision    Subjective     Pt reports: he is doing well. He woke up this morning and his stomach was feeling bad. He wasn't sure if he was going to come to therapy. Pain is at worst 4/10. 60% improvement in ability to walk long distances since start of care. He attempted to climb stairs at the gym recently but he got out of breath.   He was compliant with home exercise program.  Response to previous treatment: good  Functional change: none to note    Pain: 0/10  Location: L LE    Objective     BOLD= performed today    Patient received individual therapy to increase strength, endurance, ROM, flexibility, posture and core stabilization with activities as follows:     Darrion received therapeutic exercises to develop strength, endurance, ROM, flexibility, posture and core stabilization for 60 minutes including:     Treadmill x 10 min @ 2.0 mph + 3.0 incline  Upright bike x 5 min level 2- NP    Mini squats 2x10 at // bar  Standing march x 30 ea  Hip abduction x 30 ea  Hip extension 3x10 ea  Sit to stand from chair x 20- NP  Calf stretch on slant board 3x30"  Standing quad stretch at // bars 3 x 30 sec ea  Side steps with green TB 40' x 2 laps  Step ups 12 in step 2x10 B  Heel raises on 4 in step x 20  SL ankle " "inversion x 20   Tandem stance 2x30" B- NP  Walking laps around gym x6 min (6 laps)- NP  Shuttle squats 2 bands 3 x 10  Matrix hip abd 50# 3 x 10  Matrix HS curl 30# 3 x 10  Matrix LAQ 25# 2 x 10  +Sled push + pull 80' x 2 laps  Standing hamstring stretch on steps 3 x 30 sec ea  Standing L hip adductor stretch 3 x 30 sec     Standing shoulder extension RTB 2x10- NP  Rows green TB x 30  Mat push ups 2 x 10  Freemotion rows 7# 3 x 10  Freemotion B sh ext 3# 3 x 10  Bicep curls 5# x 30 ea  Overhead press 2# x 20 ea    Supine:- NP  Bridges x 20  SL hip abd x 20 ea  Adduction squeeze 20x5"  HSS 2x30"  Hip flexor stretch (no strap) 3x30"    Pt received moist heat to low back for 0 minutes post tx. Skin checked post theramally.   Supine with bolster    Pt received the following manual therapy techniques x 0 minutes: STM to L posterior tibialis.    Written Home Exercises Provided: no new exercises provided this session  Pt demo good understanding of the education provided. Darrion demonstrated good return demonstration of activities.     Education provided: DOMS, detriments of excessive bed rest  Darrion verbalized good understanding of education provided.   No spiritual or educational barriers to learning identified.    CMS Impairment/Limitation/Restriction for FOTO NOC-musculo-skeletal disorder Survey    Therapist reviewed FOTO scores for Darrion Bennett on 10/9/2019.   FOTO documents entered into [employer] - see Media section.    Limitation Score: 45%  Category: Mobility    Current : CK = at least 40% but < 60% impaired, limited or restricted  Goal: CK = at least 40% but < 60% impaired, limited or restricted       Assessment     Pt was re-assessed today with 5/5 STGs and 2/5 LTGs being met indicating improvements in body pain, ability to walk long distances, and LE strengthening since start of care. He continues with B LE and UE weakness and impaired endurance and functional mobility. Pt could benefit from continued physical " therapy services to address deficits.    He had good tolerance to treatment today with no adverse effects. Post-treatment L LE pain rated as 0/10. Pt challenged with sled pushing and pulling requiring intermittent rest breaks. Increased ease noted with step ups but continues to be challenged. Increased independence with gym machines. Appropriate exercise-induced fatigue achieved by end of session.     GOALS: Short Term Goals:  6 weeks  1. Report decreased body pain  <  / =  5/10 at worst to increase tolerance for prolonged standing.- met 10/9/19  2. Pt will be able to tolerate multi-directional LE strengthening in order to improve ability to perform household chores.- met 9/9/19  3. Pt will report 50% improvement in ability to walk long distances since start of care to indicate improved functional mobility. - met 10/9/19  4. Pt will ambulate on treadmill for 5 minutes without fatigue to indicate improved endurance.- met 9/9/19   5. Pt to tolerate HEP to improve ROM and independence with ADL's.- met 9/9/19     Long Term Goals: 12 weeks  1. Report decreased body pain  <  / =  3/10 at worst to increase tolerance for prolonged standing.- in progress    2. Pt will be able to perform 2 x 10 multi-directional LE strengthening without fatigue in order to improve ability to perform household chores.- met 10/9/19  3. Pt will report 80% improvement in ability to walk long distances since start of care to indicate improved functional mobility.- in progress    4. Pt will ambulate on treadmill for 10 minutes without fatigue to indicate improved endurance.- met 10/9/19  5. Pt to be Independent with HEP to improve ROM and independence with ADL's.- in progress      Plan     Progress B LE strength and endurance. Attempt elliptical next visit.     Therapist: Tiffanie Hinojosa, PT, DPT

## 2019-10-10 ENCOUNTER — OFFICE VISIT (OUTPATIENT)
Dept: ENDOCRINOLOGY | Facility: CLINIC | Age: 69
End: 2019-10-10
Payer: MEDICARE

## 2019-10-10 VITALS
HEART RATE: 68 BPM | SYSTOLIC BLOOD PRESSURE: 136 MMHG | BODY MASS INDEX: 24.19 KG/M2 | WEIGHT: 149.88 LBS | DIASTOLIC BLOOD PRESSURE: 58 MMHG

## 2019-10-10 DIAGNOSIS — N18.30 CKD (CHRONIC KIDNEY DISEASE) STAGE 3, GFR 30-59 ML/MIN: ICD-10-CM

## 2019-10-10 DIAGNOSIS — I10 ESSENTIAL HYPERTENSION: ICD-10-CM

## 2019-10-10 PROCEDURE — 99214 PR OFFICE/OUTPT VISIT, EST, LEVL IV, 30-39 MIN: ICD-10-PCS | Mod: HCNC,S$GLB,, | Performed by: NURSE PRACTITIONER

## 2019-10-10 PROCEDURE — 1101F PR PT FALLS ASSESS DOC 0-1 FALLS W/OUT INJ PAST YR: ICD-10-PCS | Mod: HCNC,CPTII,S$GLB, | Performed by: NURSE PRACTITIONER

## 2019-10-10 PROCEDURE — 99999 PR PBB SHADOW E&M-EST. PATIENT-LVL V: CPT | Mod: PBBFAC,HCNC,, | Performed by: NURSE PRACTITIONER

## 2019-10-10 PROCEDURE — 99214 OFFICE O/P EST MOD 30 MIN: CPT | Mod: HCNC,S$GLB,, | Performed by: NURSE PRACTITIONER

## 2019-10-10 PROCEDURE — 3044F PR MOST RECENT HEMOGLOBIN A1C LEVEL <7.0%: ICD-10-PCS | Mod: HCNC,CPTII,S$GLB, | Performed by: NURSE PRACTITIONER

## 2019-10-10 PROCEDURE — 1101F PT FALLS ASSESS-DOCD LE1/YR: CPT | Mod: HCNC,CPTII,S$GLB, | Performed by: NURSE PRACTITIONER

## 2019-10-10 PROCEDURE — 99999 PR PBB SHADOW E&M-EST. PATIENT-LVL V: ICD-10-PCS | Mod: PBBFAC,HCNC,, | Performed by: NURSE PRACTITIONER

## 2019-10-10 PROCEDURE — 99499 UNLISTED E&M SERVICE: CPT | Mod: HCNC,S$GLB,, | Performed by: NURSE PRACTITIONER

## 2019-10-10 PROCEDURE — 99499 RISK ADDL DX/OHS AUDIT: ICD-10-PCS | Mod: HCNC,S$GLB,, | Performed by: NURSE PRACTITIONER

## 2019-10-10 PROCEDURE — 3078F PR MOST RECENT DIASTOLIC BLOOD PRESSURE < 80 MM HG: ICD-10-PCS | Mod: HCNC,CPTII,S$GLB, | Performed by: NURSE PRACTITIONER

## 2019-10-10 PROCEDURE — 3078F DIAST BP <80 MM HG: CPT | Mod: HCNC,CPTII,S$GLB, | Performed by: NURSE PRACTITIONER

## 2019-10-10 PROCEDURE — 3075F PR MOST RECENT SYSTOLIC BLOOD PRESS GE 130-139MM HG: ICD-10-PCS | Mod: HCNC,CPTII,S$GLB, | Performed by: NURSE PRACTITIONER

## 2019-10-10 PROCEDURE — 3044F HG A1C LEVEL LT 7.0%: CPT | Mod: HCNC,CPTII,S$GLB, | Performed by: NURSE PRACTITIONER

## 2019-10-10 PROCEDURE — 3075F SYST BP GE 130 - 139MM HG: CPT | Mod: HCNC,CPTII,S$GLB, | Performed by: NURSE PRACTITIONER

## 2019-10-10 RX ORDER — ISOPROPYL ALCOHOL 70 ML/100ML
1 SWAB TOPICAL 4 TIMES DAILY
Qty: 400 EACH | Refills: 3 | Status: SHIPPED | OUTPATIENT
Start: 2019-10-10 | End: 2020-05-07 | Stop reason: SDUPTHER

## 2019-10-10 RX ORDER — PEN NEEDLE, DIABETIC 30 GX3/16"
1 NEEDLE, DISPOSABLE MISCELLANEOUS 2 TIMES DAILY
Qty: 200 EACH | Refills: 3 | Status: SHIPPED | OUTPATIENT
Start: 2019-10-10 | End: 2020-02-24 | Stop reason: SDUPTHER

## 2019-10-10 RX ORDER — LANCETS
EACH MISCELLANEOUS
Qty: 300 EACH | Refills: 3 | Status: SHIPPED | OUTPATIENT
Start: 2019-10-10 | End: 2020-06-23 | Stop reason: SDUPTHER

## 2019-10-10 RX ORDER — INSULIN ASPART 100 [IU]/ML
INJECTION, SOLUTION INTRAVENOUS; SUBCUTANEOUS
Qty: 1 BOX | Refills: 1 | Status: SHIPPED | OUTPATIENT
Start: 2019-10-10 | End: 2020-01-10 | Stop reason: SDUPTHER

## 2019-10-10 NOTE — PATIENT INSTRUCTIONS
Continue Xultophy 7 units once daily.   Restart insulin before meals. Inject Novolog 3 units before lunch.   Test blood sugar 2-3x/day.   Return to clinic in 3 months with labs prior. Please call with any issues.

## 2019-10-11 ENCOUNTER — CLINICAL SUPPORT (OUTPATIENT)
Dept: REHABILITATION | Facility: HOSPITAL | Age: 69
End: 2019-10-11
Attending: INTERNAL MEDICINE
Payer: MEDICARE

## 2019-10-11 DIAGNOSIS — Z74.09 IMPAIRED FUNCTIONAL MOBILITY AND ENDURANCE: ICD-10-CM

## 2019-10-11 DIAGNOSIS — R29.3 POSTURAL IMBALANCE: ICD-10-CM

## 2019-10-11 DIAGNOSIS — M62.81 MUSCLE WEAKNESS OF LOWER EXTREMITY: ICD-10-CM

## 2019-10-11 PROCEDURE — 97110 THERAPEUTIC EXERCISES: CPT | Mod: HCNC,PN

## 2019-10-11 NOTE — PROGRESS NOTES
"                                                  Physical Therapy Daily Note     Date: 10/11/2019  Name: Darrion Bennett  [facility] Number: [id number]  Diagnosis:   Encounter Diagnoses   Name Primary?    Muscle weakness of lower extremity     Postural imbalance     Impaired functional mobility and endurance      Physician: Farooq Domingo MD    Physician Orders: PT Eval and Treat   Medical Diagnosis from Referral: debility  Evaluation Date: 8/16/2019  Last PN: 10/9/19 (visit 13)  Authorization Period Expiration: 12/31/19  Plan of Care Expiration: 11/16/19  Visit # / Visits authorized: 14/20    Time In: 0800  Time Out: 0858  Total Time: 58 minutes    Precautions: CHF, DM 2, stage 4 kidney disease, glaucoma- blurry vision    Subjective     Pt reports: he is feeling good. Therapy has helped his energy levels, digestion, and has decreased his need for medication. He does not stay in bed all day anymore.   He was compliant with home exercise program.  Response to previous treatment: good- some soreness  Functional change: none to note    Pain: 0/10  Location: L LE    Objective     BOLD= performed today    Patient received individual therapy to increase strength, endurance, ROM, flexibility, posture and core stabilization with activities as follows:     Darrion received therapeutic exercises to develop strength, endurance, ROM, flexibility, posture and core stabilization for 58 minutes including:     Treadmill x 10 min @ 2.0 mph + 3.0 incline  +Elliptical x 5 min at crossramp 5  Upright bike x 5 min level 2- NP    Mini squats 2x10 at // bar  Standing march x 30 ea  Hip abduction x 30 ea  Hip extension 3x10 ea  Sit to stand from chair x 20- NP  Calf stretch on slant board 3x30"  Standing quad stretch at // bars 3 x 30 sec ea  Side steps with green TB 40' x 2 laps  Step ups 12 in step 2x10 B  Heel raises on 4 in step x 20  SL ankle inversion x 20   Tandem stance 2x30" B- NP  Walking laps around gym x6 min (6 laps)- NP  Shuttle " "squats 2 bands 3 x 10  Matrix hip abd 50# 3 x 10  Matrix HS curl 30# 3 x 10  Matrix LAQ 25# 2 x 10  +Sled push + pull 80' x 2 laps  Standing hamstring stretch on steps 3 x 30 sec ea  Standing L hip adductor stretch 3 x 30 sec     Standing shoulder extension RTB 2x10- NP  Rows green TB x 30  Mat push ups 2 x 10  Freemotion rows 10# 3 x 10  Freemotion B sh ext 7# 3 x 10  +B sh horiz abduction w green TB x 20  +D2 flexion w green TB x 20 ea  Bicep curls 5# x 30 ea  Overhead press 2# x 20 ea    Supine:- NP  Bridges x 20  SL hip abd x 20 ea  Adduction squeeze 20x5"  HSS 2x30"  Hip flexor stretch (no strap) 3x30"    Pt received moist heat to low back for 0 minutes post tx. Skin checked post theramally.   Supine with bolster    Pt received the following manual therapy techniques x 0 minutes: STM to L posterior tibialis.    Written Home Exercises Provided: no new exercises provided this session. Pt given green theraband for home use.   Pt demo good understanding of the education provided. Darrion demonstrated good return demonstration of activities.     Education provided: DOMS, detriments of excessive bed rest  Darrion verbalized good understanding of education provided.   No spiritual or educational barriers to learning identified.    Assessment     Pt had good tolerance to treatment today with no adverse effects. Post-treatment L LE pain rated as 0/10. Appropriate exercise-induced fatigue achieved by of session. Pt with occasional need for standing rest break with sled pushing/pulling. Good response to progression of exercise program. Able to increase in weight resistance with UE strengthening. Pt with need for occasional verbal cues for posture throughout session.     GOALS: Short Term Goals:  6 weeks  1. Report decreased body pain  <  / =  5/10 at worst to increase tolerance for prolonged standing.- met 10/9/19  2. Pt will be able to tolerate multi-directional LE strengthening in order to improve ability to perform " household chores.- met 9/9/19  3. Pt will report 50% improvement in ability to walk long distances since start of care to indicate improved functional mobility. - met 10/9/19  4. Pt will ambulate on treadmill for 5 minutes without fatigue to indicate improved endurance.- met 9/9/19   5. Pt to tolerate HEP to improve ROM and independence with ADL's.- met 9/9/19     Long Term Goals: 12 weeks  1. Report decreased body pain  <  / =  3/10 at worst to increase tolerance for prolonged standing.- in progress    2. Pt will be able to perform 2 x 10 multi-directional LE strengthening without fatigue in order to improve ability to perform household chores.- met 10/9/19  3. Pt will report 80% improvement in ability to walk long distances since start of care to indicate improved functional mobility.- in progress    4. Pt will ambulate on treadmill for 10 minutes without fatigue to indicate improved endurance.- met 10/9/19  5. Pt to be Independent with HEP to improve ROM and independence with ADL's.- in progress      Plan     Progress B LE strength and endurance.     Therapist: Tiffanie Hinojosa, PT, DPT

## 2019-10-14 ENCOUNTER — CLINICAL SUPPORT (OUTPATIENT)
Dept: REHABILITATION | Facility: HOSPITAL | Age: 69
End: 2019-10-14
Attending: INTERNAL MEDICINE
Payer: MEDICARE

## 2019-10-14 DIAGNOSIS — Z74.09 IMPAIRED FUNCTIONAL MOBILITY AND ENDURANCE: ICD-10-CM

## 2019-10-14 DIAGNOSIS — M62.81 MUSCLE WEAKNESS OF LOWER EXTREMITY: ICD-10-CM

## 2019-10-14 DIAGNOSIS — R29.3 POSTURAL IMBALANCE: ICD-10-CM

## 2019-10-14 PROCEDURE — 97110 THERAPEUTIC EXERCISES: CPT | Mod: HCNC,PN

## 2019-10-14 NOTE — PROGRESS NOTES
"                                                  Physical Therapy Daily Note     Date: 10/14/2019  Name: Darrion Bennett  [facility] Number: [id number]  Diagnosis:   Encounter Diagnoses   Name Primary?    Muscle weakness of lower extremity     Postural imbalance     Impaired functional mobility and endurance      Physician: Farooq Domingo MD    Physician Orders: PT Eval and Treat   Medical Diagnosis from Referral: debility  Evaluation Date: 8/16/2019  Last PN: 10/9/19 (visit 13)  Authorization Period Expiration: 12/31/19  Plan of Care Expiration: 11/16/19  Visit # / Visits authorized: 15/20    Time In: 0800  Time Out: 0900  Total Time:  60 minutes    Precautions: CHF, DM 2, stage 4 kidney disease, glaucoma- blurry vision    Subjective     Pt reports: Saturday and Sunday he did calf stretches on incline. He hurt his foot on Sunday. Pain reported with walking.    He was compliant with home exercise program.  Response to previous treatment: good- no soreness  Functional change: difficulty walking    Pain: 9/10  Location: L foot    Objective     BOLD= performed today    Patient received individual therapy to increase strength, endurance, ROM, flexibility, posture and core stabilization with activities as follows:     Darrion received therapeutic exercises to develop strength, endurance, ROM, flexibility, posture and core stabilization for 60 minutes including:     Treadmill x 10 min @ 2.0 mph + 3.0 incline- NP  Elliptical x 5 min at crossramp 5- NP  Upright bike x 10 min level 3    Mini squats 2x10 at // bar  Standing march x 30 ea  Hip abduction x 30 ea  Hip extension 3x10 ea  Sit to stand from chair x 20- NP  Calf stretch on slant board 3x30"  Standing quad stretch at // bars 3 x 30 sec ea  Side steps with green TB 40' x 2 laps  Step ups 6 in step 2x10 B  +SL L ankle eversion x 20  +SL L ankle inversion x 20  Heel raises x 20  SL ankle inversion x 20   Tandem stance 2x30" B- NP  Walking laps around gym x6 min (6 laps)- " "NP  Shuttle squats 2 bands 3 x 10  Matrix hip abd 50# 3 x 10  Matrix HS curl 30# 3 x 10  Matrix LAQ 25# 2 x 10  Sled push + pull 80' x 2 laps  Standing hamstring stretch on steps 3 x 30 sec ea  Standing L hip adductor stretch 3 x 30 sec     Standing shoulder extension RTB 2x10- NP  Rows green TB x 30  Mat push ups 2 x 10  Freemotion rows 10# 3 x 10  Freemotion B sh ext 7# 3 x 10  B sh horiz abduction w green TB x 20  D2 flexion w green TB x 20 ea  Bicep curls 5# x 30 ea  Overhead press 2# x 20 ea    Supine:- NP  Bridges x 20  SL hip abd x 20 ea  Adduction squeeze 20x5"  HSS 2x30"  Hip flexor stretch (no strap) 3x30"    Pt received moist heat to low back for 0 minutes post tx. Skin checked post theramally.   Supine with bolster    Pt received the following manual therapy techniques x 0 minutes: STM to L posterior tibialis.    Written Home Exercises Provided: no new exercises provided this session. Pt given green theraband for home use.   Pt demo good understanding of the education provided. Darrion demonstrated good return demonstration of activities.     Education provided: DOMS, detriments of excessive bed rest  Darrion verbalized good understanding of education provided.   No spiritual or educational barriers to learning identified.    Assessment     Pt had good tolerance to treatment today with no adverse effects. Post-treatment L LE pain rated as 0/10. Pt presents to clinic with antalgic gait and pain in L foot. Possible foot strain when stretching on incline at gym. Improvement in symptoms following AROM ankle inversion and eversion with verbal cueing to avoid L ankle pronation and toe out in standing and when walking. Decreased L foot pain with metatarsal joint mobilizations. Pt able to perform sled pushing/pulling with less rest breaks indicating progression in endurance. No exacerbation of L foot pain throughout treatment session.    GOALS: Short Term Goals:  6 weeks  1. Report decreased body pain  <  / =  5/10 " at worst to increase tolerance for prolonged standing.- met 10/9/19  2. Pt will be able to tolerate multi-directional LE strengthening in order to improve ability to perform household chores.- met 9/9/19  3. Pt will report 50% improvement in ability to walk long distances since start of care to indicate improved functional mobility. - met 10/9/19  4. Pt will ambulate on treadmill for 5 minutes without fatigue to indicate improved endurance.- met 9/9/19   5. Pt to tolerate HEP to improve ROM and independence with ADL's.- met 9/9/19     Long Term Goals: 12 weeks  1. Report decreased body pain  <  / =  3/10 at worst to increase tolerance for prolonged standing.- in progress    2. Pt will be able to perform 2 x 10 multi-directional LE strengthening without fatigue in order to improve ability to perform household chores.- met 10/9/19  3. Pt will report 80% improvement in ability to walk long distances since start of care to indicate improved functional mobility.- in progress    4. Pt will ambulate on treadmill for 10 minutes without fatigue to indicate improved endurance.- met 10/9/19  5. Pt to be Independent with HEP to improve ROM and independence with ADL's.- in progress      Plan     Progress B LE strength and endurance.     Therapist: Tiffanie Hinojosa, PT, DPT

## 2019-10-15 ENCOUNTER — OFFICE VISIT (OUTPATIENT)
Dept: FAMILY MEDICINE | Facility: CLINIC | Age: 69
End: 2019-10-15
Payer: MEDICARE

## 2019-10-15 ENCOUNTER — PATIENT MESSAGE (OUTPATIENT)
Dept: FAMILY MEDICINE | Facility: CLINIC | Age: 69
End: 2019-10-15

## 2019-10-15 ENCOUNTER — HOSPITAL ENCOUNTER (OUTPATIENT)
Dept: RADIOLOGY | Facility: HOSPITAL | Age: 69
Discharge: HOME | End: 2019-10-15
Attending: NURSE PRACTITIONER
Payer: MEDICARE

## 2019-10-15 VITALS
HEART RATE: 67 BPM | DIASTOLIC BLOOD PRESSURE: 72 MMHG | SYSTOLIC BLOOD PRESSURE: 136 MMHG | BODY MASS INDEX: 23.77 KG/M2 | HEIGHT: 66 IN | WEIGHT: 147.94 LBS | TEMPERATURE: 99 F | OXYGEN SATURATION: 99 % | RESPIRATION RATE: 16 BRPM

## 2019-10-15 DIAGNOSIS — I10 ESSENTIAL HYPERTENSION: ICD-10-CM

## 2019-10-15 DIAGNOSIS — M79.672 LEFT FOOT PAIN: Primary | ICD-10-CM

## 2019-10-15 DIAGNOSIS — E78.00 PURE HYPERCHOLESTEROLEMIA: ICD-10-CM

## 2019-10-15 DIAGNOSIS — M79.672 LEFT FOOT PAIN: ICD-10-CM

## 2019-10-15 PROBLEM — J90 PLEURAL EFFUSION: Status: ACTIVE | Noted: 2019-10-15

## 2019-10-15 PROBLEM — L03.012 FELON OF FINGER OF LEFT HAND: Status: RESOLVED | Noted: 2019-05-11 | Resolved: 2019-10-15

## 2019-10-15 PROCEDURE — 3078F DIAST BP <80 MM HG: CPT | Mod: HCNC,CPTII,S$GLB, | Performed by: NURSE PRACTITIONER

## 2019-10-15 PROCEDURE — 1101F PT FALLS ASSESS-DOCD LE1/YR: CPT | Mod: HCNC,CPTII,S$GLB, | Performed by: NURSE PRACTITIONER

## 2019-10-15 PROCEDURE — 99214 PR OFFICE/OUTPT VISIT, EST, LEVL IV, 30-39 MIN: ICD-10-PCS | Mod: HCNC,S$GLB,, | Performed by: NURSE PRACTITIONER

## 2019-10-15 PROCEDURE — 73630 X-RAY EXAM OF FOOT: CPT | Mod: TC,HCNC,FY,PO,LT

## 2019-10-15 PROCEDURE — 3075F PR MOST RECENT SYSTOLIC BLOOD PRESS GE 130-139MM HG: ICD-10-PCS | Mod: HCNC,CPTII,S$GLB, | Performed by: NURSE PRACTITIONER

## 2019-10-15 PROCEDURE — 1101F PR PT FALLS ASSESS DOC 0-1 FALLS W/OUT INJ PAST YR: ICD-10-PCS | Mod: HCNC,CPTII,S$GLB, | Performed by: NURSE PRACTITIONER

## 2019-10-15 PROCEDURE — 99999 PR PBB SHADOW E&M-EST. PATIENT-LVL IV: CPT | Mod: PBBFAC,HCNC,, | Performed by: NURSE PRACTITIONER

## 2019-10-15 PROCEDURE — 73630 X-RAY EXAM OF FOOT: CPT | Mod: 26,HCNC,LT, | Performed by: RADIOLOGY

## 2019-10-15 PROCEDURE — 99999 PR PBB SHADOW E&M-EST. PATIENT-LVL IV: ICD-10-PCS | Mod: PBBFAC,HCNC,, | Performed by: NURSE PRACTITIONER

## 2019-10-15 PROCEDURE — 3075F SYST BP GE 130 - 139MM HG: CPT | Mod: HCNC,CPTII,S$GLB, | Performed by: NURSE PRACTITIONER

## 2019-10-15 PROCEDURE — 3078F PR MOST RECENT DIASTOLIC BLOOD PRESSURE < 80 MM HG: ICD-10-PCS | Mod: HCNC,CPTII,S$GLB, | Performed by: NURSE PRACTITIONER

## 2019-10-15 PROCEDURE — 99214 OFFICE O/P EST MOD 30 MIN: CPT | Mod: HCNC,S$GLB,, | Performed by: NURSE PRACTITIONER

## 2019-10-15 PROCEDURE — 73630 XR FOOT COMPLETE 3 VIEW LEFT: ICD-10-PCS | Mod: 26,HCNC,LT, | Performed by: RADIOLOGY

## 2019-10-15 RX ORDER — DICLOFENAC SODIUM 10 MG/G
2-4 GEL TOPICAL DAILY
Qty: 100 G | Refills: 2 | Status: SHIPPED | OUTPATIENT
Start: 2019-10-15 | End: 2020-01-05 | Stop reason: SDUPTHER

## 2019-10-15 RX ORDER — COLCHICINE 0.6 MG/1
0.6 TABLET ORAL DAILY
Qty: 7 TABLET | Refills: 0 | Status: CANCELLED | OUTPATIENT
Start: 2019-10-15 | End: 2019-10-22

## 2019-10-15 NOTE — PROGRESS NOTES
Subjective:       Patient ID: Darrion Bennett is a 69 y.o. male.    Chief Complaint: Foot Pain (Left foot. Started yesterday. )    Chief Complaint   Patient presents with    Foot Pain     Left foot. Started yesterday.        HPI  Darrion Bennett is a 69 y.o. male with multiple medical diagnoses as listed in the medical history and problem list that presents for left lateral midfoot pain since yesterday.  This patient is new to me. He reports that he was walking on an incline machine at the gym yesterday and his foot started hurting afterwards and he went to PT yesterday as well with pain. He denies numbness or tingling to the extremity. He has been having difficulty bearing weight and has needed to walk with a cane over the last 24 hours. He has applied ice with some relief. He has not taken any other medication.     He presents today with his wife who is extremely concerned that he may have fractured his foot.     Additionally, he has been experiencing dry/itchy skin to the back of his neck, behind his ears and to his upper back. He reports that he does not apply any daily moisturizer. He does take very hot baths daily.         PAST MEDICAL HISTORY:  Past Medical History:   Diagnosis Date    Anemia     Arthritis     Cataract     Chronic constipation     Diabetes mellitus, type 2     Felon of finger of left hand 5/11/2019    Glaucoma     Hyperlipidemia 5/13/2014    Hypertension     MCTD (mixed connective tissue disease)     Sjogren's disease     Ulcerative colitis     Urolithiasis        PAST SURGICAL HISTORY:  Past Surgical History:   Procedure Laterality Date    CATARACT EXTRACTION Bilateral 2005    COLONOSCOPY  2014    EYE SURGERY         SOCIAL HISTORY:  Social History     Socioeconomic History    Marital status:      Spouse name: Not on file    Number of children: 3    Years of education: Not on file    Highest education level: Not on file   Occupational History    Not on file   Social  Needs    Financial resource strain: Somewhat hard    Food insecurity:     Worry: Never true     Inability: Never true    Transportation needs:     Medical: No     Non-medical: No   Tobacco Use    Smoking status: Never Smoker    Smokeless tobacco: Never Used   Substance and Sexual Activity    Alcohol use: No     Frequency: Never     Drinks per session: Patient refused     Binge frequency: Never    Drug use: No    Sexual activity: Never   Lifestyle    Physical activity:     Days per week: 3 days     Minutes per session: Not on file    Stress: Only a little   Relationships    Social connections:     Talks on phone: Once a week     Gets together: Once a week     Attends Alevism service: Not on file     Active member of club or organization: No     Attends meetings of clubs or organizations: Never     Relationship status:    Other Topics Concern    Not on file   Social History Narrative    Not on file       FAMILY HISTORY:  Family History   Problem Relation Age of Onset    Hypertension Father     Stroke Father     Cataracts Father     Glaucoma Father     Cataracts Mother     No Known Problems Sister     No Known Problems Brother     Rheum arthritis Maternal Aunt     Rheum arthritis Maternal Uncle     No Known Problems Paternal Aunt     No Known Problems Paternal Uncle     No Known Problems Maternal Grandmother     No Known Problems Maternal Grandfather     Throat cancer Paternal Grandmother     Glaucoma Paternal Grandfather     Celiac disease Neg Hx     Colon cancer Neg Hx     Cirrhosis Neg Hx     Colon polyps Neg Hx     Crohn's disease Neg Hx     Cystic fibrosis Neg Hx     Hemochromatosis Neg Hx     Esophageal cancer Neg Hx     Inflammatory bowel disease Neg Hx     Irritable bowel syndrome Neg Hx     Liver cancer Neg Hx     Liver disease Neg Hx     Rectal cancer Neg Hx     Stomach cancer Neg Hx     Ulcerative colitis Neg Hx     Chase's disease Neg Hx     Amblyopia  "Neg Hx     Blindness Neg Hx     Cancer Neg Hx     Diabetes Neg Hx     Macular degeneration Neg Hx     Retinal detachment Neg Hx     Strabismus Neg Hx     Thyroid disease Neg Hx        ALLERGIES AND MEDICATIONS: updated and reviewed.  Review of patient's allergies indicates:   Allergen Reactions    Penicillins Nausea And Vomiting    Uloric [febuxostat]      Aches and pain    Allopurinol analogues Rash     Current Outpatient Medications   Medication Sig Dispense Refill    alcohol swabs (ALCOHOL PADS) PadM Apply 1 each topically 4 (four) times daily. 400 each 3    amLODIPine (NORVASC) 5 MG tablet Take 1 tablet (5 mg total) by mouth 2 (two) times daily. 180 tablet 3    aspirin (ECOTRIN) 81 MG EC tablet Take 81 mg by mouth once daily.      blood sugar diagnostic Strp Check blood glucose 3x/day. e11.65 300 strip 3    cloNIDine 0.1 mg/24 hr td ptwk (CATAPRES) 0.1 mg/24 hr Place 1 patch onto the skin every 7 days. 4 patch 11    dorzolamide (TRUSOPT) 2 % ophthalmic solution Place 1 drop into both eyes 2 (two) times daily. 10 mL 3    insulin aspart U-100 (NOVOLOG FLEXPEN U-100 INSULIN) 100 unit/mL (3 mL) InPn pen Inject 3 units once daily 1 Box 1    insulin degludec-liraglutide (XULTOPHY 100/3.6) 100 unit-3.6 mg /mL (3 mL) InPn Inject 7 Units into the skin once daily. 5 Syringe 1    ipratropium (ATROVENT) 42 mcg (0.06 %) nasal spray INSTILL 2 SPRAYS BY NASAL ROUTE 3 TIMES DAILY. AS NEEDED FOR NASAL CONGESTION AND DRIP FOR 7 DAYS 30 mL 0    lancets Misc Check blood glucose 3x/day. e11.65 300 each 3    lubiprostone (AMITIZA) 24 MCG Cap Take 1 capsule (24 mcg total) by mouth 2 (two) times daily with meals. 60 capsule 11    multivit with min-folic acid 200 mcg Chew       pen needle, diabetic (BD ULTRA-FINE RICHARD PEN NEEDLE) 32 gauge x 5/32" Ndle 1 Device by Misc.(Non-Drug; Combo Route) route 2 (two) times daily. 200 each 3    polyethylene glycol (GLYCOLAX) 17 gram/dose powder Take 17 g by mouth once daily. " "850 g 11    torsemide (DEMADEX) 10 MG Tab Take 1 tablet (10 mg total) by mouth once daily. 90 tablet 3    travoprost (TRAVATAN Z) 0.004 % ophthalmic solution Place 1 drop into both eyes every evening. 5 mL 4    valsartan (DIOVAN) 160 MG tablet Take 1 tablet (160 mg total) by mouth 2 (two) times daily. 180 tablet 3    colchicine (COLCRYS) 0.6 mg tablet Take 1 tablet (0.6 mg total) by mouth once daily. for 7 days (Patient not taking: Reported on 10/15/2019) 7 tablet 0    diclofenac sodium (VOLTAREN) 1 % Gel Apply 2-4 g topically once daily. 100 g 2     No current facility-administered medications for this visit.          ROS  Review of Systems   Constitutional: Negative for chills, diaphoresis and fever.   Cardiovascular: Negative for chest pain.   Gastrointestinal: Negative for constipation, diarrhea, nausea and vomiting.   Genitourinary: Negative for difficulty urinating, dysuria and enuresis.   Musculoskeletal: Positive for arthralgias and gait problem. Negative for back pain and joint swelling.   Skin: Negative for color change and wound.        Dry, itchy skin to back of neck/upper back     Neurological: Negative for dizziness and headaches.   Psychiatric/Behavioral: Negative for dysphoric mood. The patient is not nervous/anxious.            Objective:     Physical Exam  Vitals:    10/15/19 1125 10/15/19 1201   BP: (!) 148/68 136/72   BP Location: Left arm Right arm   Patient Position: Sitting Sitting   BP Method: Small (Manual) Small (Manual)   Pulse: 67    Resp: 16    Temp: 98.5 °F (36.9 °C)    TempSrc: Oral    SpO2: 99%    Weight: 67.1 kg (147 lb 14.9 oz)    Height: 5' 6" (1.676 m)     Body mass index is 23.88 kg/m².  Weight: 67.1 kg (147 lb 14.9 oz)   Height: 5' 6" (167.6 cm)   Physical Exam   Constitutional: He appears well-developed and well-nourished. No distress.   HENT:   Head: Normocephalic and atraumatic.   Eyes: Pupils are equal, round, and reactive to light. Conjunctivae and EOM are normal. "   Cardiovascular: Normal rate.   Musculoskeletal: He exhibits tenderness.        Left ankle: Normal.        Left foot: There is decreased range of motion, tenderness (as  depicted), bony tenderness and swelling. There is normal capillary refill, no crepitus, no deformity and no laceration.        Feet:    Neurological: He is alert. He displays normal reflexes. No cranial nerve deficit or sensory deficit. He exhibits normal muscle tone.   Skin: Skin is warm, dry and intact. Capillary refill takes less than 2 seconds. No rash noted.   Psychiatric: He has a normal mood and affect. His behavior is normal.         Assessment:     1. Left foot pain    2. Essential hypertension    3. Pure hypercholesterolemia      Plan:     Darrion was seen today for foot pain.    Diagnoses and all orders for this visit:    Left foot pain  -     X-Ray Foot Complete Left; Future - to be completed today.  -     diclofenac sodium (VOLTAREN) 1 % Gel; Apply 2-4 g topically once daily.  -     Will avoid oral NSAIDs to protect kidney function  -     Will rule out fracture. Encouraged RICE. ACE bandage applied following x-ray.     Essential hypertension  -    BP controlled presently - reviewed anti-hypertensive regimen - continue current therapy      Health Maintenance       Date Due Completion Date    Shingles Vaccine (3 of 3) 07/20/2018 5/25/2018    Lipid Panel 10/16/2019 10/16/2018    Foot Exam 03/19/2020 3/19/2019 (Done)    Override on 3/19/2019: Done    Hemoglobin A1c 03/19/2020 9/19/2019    Eye Exam 08/27/2020 8/27/2019    Override on 8/27/2019: Done    High Dose Statin 10/10/2020 10/10/2019    Colonoscopy 07/26/2028 7/26/2018    TETANUS VACCINE 12/06/2028 12/6/2018          Health Maintenance reviewed, as per orders.    Follow-up: Follow up in about 1 week (around 10/22/2019), or if symptoms worsen or fail to improve.    The patient expressed understanding and no barriers to adherence were identified.     1. The patient indicates  understanding of these issues and agrees with the plan. Brief care plan is updated and reviewed with the patient as applicable.     2. The patient is given an After Visit Summary that lists all medications with directions, allergies, orders placed during this encounter and follow-up instructions.     3. I have reviewed the patient's medical information including past medical, family, and social history sections including the medications and allergies.     4. We discussed the patient's current medications. I reconciled the patient's medication list and prepared and supplied needed refills.     Jen Hernandez, DNP, APRN, FNP-c  Family Medicine    My collaborating physicians are Dr. Damien Garcia and Dr. Boogie Morris

## 2019-10-15 NOTE — PATIENT INSTRUCTIONS

## 2019-10-21 ENCOUNTER — PATIENT MESSAGE (OUTPATIENT)
Dept: FAMILY MEDICINE | Facility: CLINIC | Age: 69
End: 2019-10-21

## 2019-10-21 ENCOUNTER — CLINICAL SUPPORT (OUTPATIENT)
Dept: REHABILITATION | Facility: HOSPITAL | Age: 69
End: 2019-10-21
Attending: INTERNAL MEDICINE
Payer: MEDICARE

## 2019-10-21 DIAGNOSIS — Z74.09 IMPAIRED FUNCTIONAL MOBILITY AND ENDURANCE: ICD-10-CM

## 2019-10-21 DIAGNOSIS — M62.81 MUSCLE WEAKNESS OF LOWER EXTREMITY: ICD-10-CM

## 2019-10-21 DIAGNOSIS — R29.3 POSTURAL IMBALANCE: ICD-10-CM

## 2019-10-21 PROCEDURE — 97110 THERAPEUTIC EXERCISES: CPT | Mod: HCNC,PN

## 2019-10-21 PROCEDURE — 97140 MANUAL THERAPY 1/> REGIONS: CPT | Mod: HCNC,PN

## 2019-10-21 NOTE — PROGRESS NOTES
"                                                  Physical Therapy Daily Note     Date: 10/21/2019  Name: Darrion Bennett  [facility] Number: [id number]  Diagnosis:   Encounter Diagnoses   Name Primary?    Muscle weakness of lower extremity     Postural imbalance     Impaired functional mobility and endurance      Physician: Farooq Domingo MD    Physician Orders: PT Eval and Treat   Medical Diagnosis from Referral: debility  Evaluation Date: 8/16/2019  Last PN: 10/9/19 (visit 13)  Authorization Period Expiration: 12/31/19  Plan of Care Expiration: 11/16/19  Visit # / Visits authorized: 16/20    Time In: 0800  Time Out: 0856  Total Time:  56 minutes    Precautions: CHF, DM 2, stage 4 kidney disease, glaucoma- blurry vision    Subjective     Pt reports: he was not able to attend his last treatment due to L foot pain and going to the doctor. X-ray revealed no fracture and he was told he has gout but he doesn't feel like it's gout pain. He had trouble walking after last treatment.  He was compliant with home exercise program.  Response to previous treatment: L foot pain  Functional change: difficulty walking    Pain: 4/10  Location: L foot    Objective     BOLD= performed today    Patient received individual therapy to increase strength, endurance, ROM, flexibility, posture and core stabilization with activities as follows:     Darrion received therapeutic exercises to develop strength, endurance, ROM, flexibility, posture and core stabilization for 46 minutes including:     Treadmill x 10 min @ 2.0 mph + 3.0 incline- NP  Elliptical x 5 min at crossramp 5- NP  Upright bike x 8 min level 3    Mini squats 2x10 at // bar  Standing march x 30 ea  Hip abduction x 30 ea  Hip extension 3x10 ea  Sit to stand from chair x 20- NP  Calf stretch on slant board 3x30"  Standing quad stretch at // bars 3 x 30 sec ea  Side steps with green TB 40' x 2 laps  Step ups 6 in step 2x10 B  SL L ankle eversion x 20  SL L ankle inversion x 20  Heel " "raises x 20  SL ankle inversion x 20   Tandem stance 2x30" B- NP  Walking laps around gym x6 min (6 laps)- NP  Shuttle squats 2 bands 3 x 10  Matrix hip abd 50# 3 x 10  Matrix HS curl 35# 3 x 10  Matrix LAQ 25# 3 x 10  Sled push + pull 80' x 2 laps  Standing hamstring stretch on steps 3 x 30 sec ea  Standing L hip adductor stretch 3 x 30 sec     Standing shoulder extension RTB 2x10- NP  Rows green TB x 30  Mat push ups 2 x 10  Freemotion rows 10# 3 x 10  Freemotion B sh ext 7# 3 x 10  B sh horiz abduction w green TB x 20  D2 flexion w green TB x 20 ea  Bicep curls 5# x 30 ea  Overhead press 2# x 20 ea    Pt received moist heat to low back for 0 minutes post tx. Skin checked post theramally.   Supine with bolster    Pt received the following manual therapy techniques x 10 minutes:   -Kinesiotape: I-strip for anterior support to 4th and 5th metatarsal heads. Pt received education regarding appropriate care and removal of Kinesiotape. Pt instructed in proper removal techniques if skin irritation occur.    Written Home Exercises Provided: no new exercises provided this session. Pt given green theraband for home use.   Pt demo good understanding of the education provided. Darrion demonstrated good return demonstration of activities.     Education provided: DOMS, detriments of excessive bed rest  Darrion verbalized good understanding of education provided.   No spiritual or educational barriers to learning identified.    Assessment     Pt had good tolerance to treatment today with no adverse effects. Post-treatment L foot pain rated as 0/10. Improvement in L foot pain with taping techniques at beginning of session. Pt able to weight bear and ambulate with less pain. Possible 4th and 5th metatarsal head strain. He demonstrates decreased rest break needed with sled push and pull indicating improvements in strength and endurance. Able to set gym machines with more independence.     GOALS: Short Term Goals:  6 weeks  1. Report " decreased body pain  <  / =  5/10 at worst to increase tolerance for prolonged standing.- met 10/9/19  2. Pt will be able to tolerate multi-directional LE strengthening in order to improve ability to perform household chores.- met 9/9/19  3. Pt will report 50% improvement in ability to walk long distances since start of care to indicate improved functional mobility. - met 10/9/19  4. Pt will ambulate on treadmill for 5 minutes without fatigue to indicate improved endurance.- met 9/9/19   5. Pt to tolerate HEP to improve ROM and independence with ADL's.- met 9/9/19     Long Term Goals: 12 weeks  1. Report decreased body pain  <  / =  3/10 at worst to increase tolerance for prolonged standing.- in progress    2. Pt will be able to perform 2 x 10 multi-directional LE strengthening without fatigue in order to improve ability to perform household chores.- met 10/9/19  3. Pt will report 80% improvement in ability to walk long distances since start of care to indicate improved functional mobility.- in progress    4. Pt will ambulate on treadmill for 10 minutes without fatigue to indicate improved endurance.- met 10/9/19  5. Pt to be Independent with HEP to improve ROM and independence with ADL's.- in progress      Plan     Progress B LE strength and endurance.     Therapist: Tiffanie Hinojosa, PT, DPT

## 2019-10-22 NOTE — PROGRESS NOTES
"                                                  Physical Therapy Daily Note     Date: 10/23/2019  Name: Darrion Bennett  [facility] Number: [id number]  Diagnosis:   Encounter Diagnoses   Name Primary?    Muscle weakness of lower extremity     Postural imbalance     Impaired functional mobility and endurance      Physician: Farooq Domingo MD    Physician Orders: PT Eval and Treat   Medical Diagnosis from Referral: debility  Evaluation Date: 8/16/2019  Last PN: 10/9/19 (visit 13)  Authorization Period Expiration: 12/31/19  Plan of Care Expiration: 11/16/19  Visit # / Visits authorized: 17/20    Time In: 0803  Time Out: 0900  Total Time:  27 minutes    Precautions: CHF, DM 2, stage 4 kidney disease, glaucoma- blurry vision    Subjective     Pt reports: his foot is feeling better. He was sick yesterday with a slight cough and body pain. That is gone.   He was compliant with home exercise program.  Response to previous treatment: good  Functional change: improvement in walking    Pain: 2/10   Location: L foot    Objective     BOLD= performed today    Patient received individual therapy to increase strength, endurance, ROM, flexibility, posture and core stabilization with activities as follows:     Darrion received therapeutic exercises to develop strength, endurance, ROM, flexibility, posture and core stabilization for 57 minutes including:     Treadmill x 10 min @ 2.0 mph + 3.0 incline  Elliptical x 5 min at crossramp 5- NP  Upright bike x 8 min level 3- NP    Mini squats 2x10 at // bar  Standing march x 30 ea  Hip abduction x 30 ea  Hip extension 3x10 ea  Sit to stand from chair x 20- NP  Calf stretch on slant board 3x30"  Standing quad stretch at // bars 3 x 30 sec ea  Side steps with green TB 40' x 2 laps  Step ups 6 in step 2x10 B  SL L ankle eversion x 20  SL L ankle inversion x 20  Heel raises x 20  SL ankle inversion x 20   Tandem stance 2x30" B- NP  Walking laps around gym x6 min (6 laps)- NP  Shuttle squats 2 bands " 3 x 10  Matrix hip abd 50# 3 x 10  Matrix HS curl 40# 3 x 10  Matrix LAQ 25# 3 x 10  Sled push + pull 80' x 2 laps  +Epps carries 10# unilaterally x 1 lap around clinic then switch hands  Standing hamstring stretch on steps 3 x 30 sec ea  Standing L hip adductor stretch 3 x 30 sec     Standing shoulder extension RTB 2x10- NP  Rows green TB x 30  Mat push ups 2 x 10  Freemotion rows 10# 3 x 10  Freemotion B sh ext 7# 3 x 10  B sh horiz abduction w green TB x 20  D2 flexion w green TB x 20 ea  Bicep curls 5# x 30 ea  Overhead press 2# x 20 ea    Pt received moist heat to low back for 0 minutes post tx. Skin checked post theramally.   Supine with bolster    Pt received the following manual therapy techniques x 0 minutes:   -Kinesiotape: I-strip for anterior support to 4th and 5th metatarsal heads. Pt received education regarding appropriate care and removal of Kinesiotape. Pt instructed in proper removal techniques if skin irritation occur.    Written Home Exercises Provided: no new exercises provided this session. Pt given green theraband for home use.   Pt demo good understanding of the education provided. Darrion demonstrated good return demonstration of activities.     Education provided: DOMS, detriments of excessive bed rest  Darrion verbalized good understanding of education provided.   No spiritual or educational barriers to learning identified.    Assessment     Pt had good tolerance to treatment today with no adverse effects. Post-treatment L foot pain rated as 0/10. Appropriate exercise-induced fatigue achieved by end of session. Good independence with gym machines. He demonstrates need for occasional short rest breaks. No exacerbation of L foot pain with treatment and able to return to treadmill walking.     GOALS: Short Term Goals:  6 weeks  1. Report decreased body pain  <  / =  5/10 at worst to increase tolerance for prolonged standing.- met 10/9/19  2. Pt will be able to tolerate multi-directional LE  strengthening in order to improve ability to perform household chores.- met 9/9/19  3. Pt will report 50% improvement in ability to walk long distances since start of care to indicate improved functional mobility. - met 10/9/19  4. Pt will ambulate on treadmill for 5 minutes without fatigue to indicate improved endurance.- met 9/9/19   5. Pt to tolerate HEP to improve ROM and independence with ADL's.- met 9/9/19     Long Term Goals: 12 weeks  1. Report decreased body pain  <  / =  3/10 at worst to increase tolerance for prolonged standing.- in progress    2. Pt will be able to perform 2 x 10 multi-directional LE strengthening without fatigue in order to improve ability to perform household chores.- met 10/9/19  3. Pt will report 80% improvement in ability to walk long distances since start of care to indicate improved functional mobility.- in progress    4. Pt will ambulate on treadmill for 10 minutes without fatigue to indicate improved endurance.- met 10/9/19  5. Pt to be Independent with HEP to improve ROM and independence with ADL's.- in progress      Plan     Progress B LE and UE strength and endurance.     Therapist: Tiffanie Hinojosa, PT, DPT

## 2019-10-23 ENCOUNTER — CLINICAL SUPPORT (OUTPATIENT)
Dept: REHABILITATION | Facility: HOSPITAL | Age: 69
End: 2019-10-23
Attending: INTERNAL MEDICINE
Payer: MEDICARE

## 2019-10-23 DIAGNOSIS — Z74.09 IMPAIRED FUNCTIONAL MOBILITY AND ENDURANCE: ICD-10-CM

## 2019-10-23 DIAGNOSIS — M62.81 MUSCLE WEAKNESS OF LOWER EXTREMITY: ICD-10-CM

## 2019-10-23 DIAGNOSIS — R29.3 POSTURAL IMBALANCE: ICD-10-CM

## 2019-10-23 PROCEDURE — 97110 THERAPEUTIC EXERCISES: CPT | Mod: HCNC,PN

## 2019-10-25 NOTE — PROGRESS NOTES
"                                                  Physical Therapy Daily Note     Date: 10/28/2019  Name: Darrion Bennett  [facility] Number: [id number]  Diagnosis:   Encounter Diagnoses   Name Primary?    Muscle weakness of lower extremity     Postural imbalance     Impaired functional mobility and endurance      Physician: Farooq Domingo MD    Physician Orders: PT Eval and Treat   Medical Diagnosis from Referral: debility  Evaluation Date: 8/16/2019  Last PN: 10/9/19 (visit 13)  Authorization Period Expiration: 12/31/19  Plan of Care Expiration: 11/16/19  Visit # / Visits authorized: 18/20    Time In: 0800  Time Out: 0856  Total Time:  28 minutes    Precautions: CHF, DM 2, stage 4 kidney disease, glaucoma- blurry vision    Subjective     Pt reports: no new complaints.   He was compliant with home exercise program.  Response to previous treatment: good   Functional change: none to note    Pain: 0/10   Location: L foot    Objective     BOLD= performed today    Patient received individual therapy to increase strength, endurance, ROM, flexibility, posture and core stabilization with activities as follows:     Darrion received therapeutic exercises to develop strength, endurance, ROM, flexibility, posture and core stabilization for 56 minutes including:     Treadmill x 10 min @ 2.0 mph + 3.0 incline- NP  Elliptical x 7 min at crossramp 10  Upright bike x 8 min level 3- NP    Mini squats 2x10 at // bar  Standing march x 30 ea  Hip abduction x 30 ea  Hip extension 3x10 ea  Sit to stand from chair x 20- NP  Calf stretch on slant board 3x30"  Standing quad stretch at // bars 3 x 30 sec ea  Side steps with green TB 40' x 2 laps  Step ups 6 in step 2x10 B  SL L ankle eversion x 20  SL L ankle inversion x 20  Heel raises x 20  SL ankle inversion x 20   Tandem stance 2x30" B- NP  Walking laps around gym x6 min (6 laps)- NP  Shuttle squats 2 bands 3 x 10  +Leg press 30# 2 x 10  Matrix hip abd 55# 2 x 10  Matrix HS curl 40# 3 x " 10  Matrix LAQ 25# 3 x 10  Sled push + pull 80' x 2 laps  Epps carries 10# unilaterally x 1 lap around clinic then switch hands  Standing hamstring stretch on steps 3 x 30 sec ea  Standing L hip adductor stretch 3 x 30 sec     Standing shoulder extension RTB 2x10- NP  Rows green TB x 30  Mat push ups 2 x 10  Freemotion rows 10# 3 x 10  Freemotion B sh ext 7# 3 x 10  +Matrix chest press 25# 2 x 10  +Matrix tricep ext 27.5-37.5# 2 x 10  +Matrix biceps curl 10# 2 x 10  B sh horiz abduction w green TB x 20  D2 flexion w green TB x 20 ea  Bicep curls 5# x 30 ea  Overhead press 2# x 20 ea  +Corner stretch 3 x 30 sec    Pt received moist heat to low back for 0 minutes post tx. Skin checked post theramally.   Supine with bolster    Pt received the following manual therapy techniques x 0 minutes:   -Kinesiotape: I-strip for anterior support to 4th and 5th metatarsal heads. Pt received education regarding appropriate care and removal of Kinesiotape. Pt instructed in proper removal techniques if skin irritation occur.    Written Home Exercises Provided: no new exercises provided this session. Pt given green theraband for home use.   Pt demo good understanding of the education provided. Darrion demonstrated good return demonstration of activities.     Education provided: DOMS, detriments of excessive bed rest  Darrion verbalized good understanding of education provided.   No spiritual or educational barriers to learning identified.    Assessment     Pt had good tolerance to treatment today with no adverse effects. Post-treatment L foot pain rated as 0/10. Appropriate exercise-induced fatigue achieved. Good response to progression of exercise program. He was challenged with addition of UE strengthening on gym machines. Slight increase in L foot pain with step ups but pt reporting it was tolerable throughout session. Reduction in symptoms with leg press at end of session.     GOALS: Short Term Goals:  6 weeks  1. Report decreased  body pain  <  / =  5/10 at worst to increase tolerance for prolonged standing.- met 10/9/19  2. Pt will be able to tolerate multi-directional LE strengthening in order to improve ability to perform household chores.- met 9/9/19  3. Pt will report 50% improvement in ability to walk long distances since start of care to indicate improved functional mobility. - met 10/9/19  4. Pt will ambulate on treadmill for 5 minutes without fatigue to indicate improved endurance.- met 9/9/19   5. Pt to tolerate HEP to improve ROM and independence with ADL's.- met 9/9/19     Long Term Goals: 12 weeks  1. Report decreased body pain  <  / =  3/10 at worst to increase tolerance for prolonged standing.- in progress    2. Pt will be able to perform 2 x 10 multi-directional LE strengthening without fatigue in order to improve ability to perform household chores.- met 10/9/19  3. Pt will report 80% improvement in ability to walk long distances since start of care to indicate improved functional mobility.- in progress    4. Pt will ambulate on treadmill for 10 minutes without fatigue to indicate improved endurance.- met 10/9/19  5. Pt to be Independent with HEP to improve ROM and independence with ADL's.- in progress      Plan     Progress B LE and UE strength and endurance.     Therapist: Tiffanie Hinojosa, PT, DPT

## 2019-10-28 ENCOUNTER — CLINICAL SUPPORT (OUTPATIENT)
Dept: REHABILITATION | Facility: HOSPITAL | Age: 69
End: 2019-10-28
Attending: INTERNAL MEDICINE
Payer: MEDICARE

## 2019-10-28 DIAGNOSIS — Z74.09 IMPAIRED FUNCTIONAL MOBILITY AND ENDURANCE: ICD-10-CM

## 2019-10-28 DIAGNOSIS — M62.81 MUSCLE WEAKNESS OF LOWER EXTREMITY: ICD-10-CM

## 2019-10-28 DIAGNOSIS — R29.3 POSTURAL IMBALANCE: ICD-10-CM

## 2019-10-28 PROCEDURE — 97110 THERAPEUTIC EXERCISES: CPT | Mod: PN

## 2019-10-29 NOTE — PROGRESS NOTES
"                                                  Physical Therapy Daily Note     Date: 10/30/2019  Name: Darrion Bennett  [facility] Number: [id number]  Diagnosis:   Encounter Diagnoses   Name Primary?    Muscle weakness of lower extremity     Postural imbalance     Impaired functional mobility and endurance      Physician: Farooq Domingo MD    Physician Orders: PT Eval and Treat   Medical Diagnosis from Referral: debility  Evaluation Date: 8/16/2019  Last PN: 10/9/19 (visit 13)  Authorization Period Expiration: 12/31/19  Plan of Care Expiration: 11/16/19  Visit # / Visits authorized: 19/20    Time In: 0800  Time Out: 0855  Total Time:  49 minutes    Precautions: CHF, DM 2, stage 4 kidney disease, glaucoma- blurry vision    Subjective     Pt reports: his foot still bothers him.   He was compliant with home exercise program.  Response to previous treatment: good- soreness in arms that has improved  Functional change: none to note     Pain: 3/10   Location: L foot    Objective     BOLD= performed today    Patient received individual therapy to increase strength, endurance, ROM, flexibility, posture and core stabilization with activities as follows:     Darrion received therapeutic exercises to develop strength, endurance, ROM, flexibility, posture and core stabilization for 50 minutes including:     Treadmill x 10 min @ 2.0 mph + 3.0 incline- NP  Elliptical x 8 min at crossramp 10  Upright bike x 8 min level 3- NP    Mini squats 2x10 at // bar  Standing march x 30 ea  Hip abduction x 30 ea  Hip extension 3x10 ea  Sit to stand from chair x 20- NP  Calf stretch on slant board 3x30"  Standing quad stretch at // bars 3 x 30 sec ea  Side steps with green TB 40' x 2 laps  Step ups 6 in step 2x10 B  SL L ankle eversion x 20  SL L ankle inversion x 20  Heel raises x 20  SL ankle inversion x 20   Tandem stance 2x30" B- NP  Walking laps around gym x6 min (6 laps)- NP  Shuttle squats 2 bands 3 x 10  Leg press 30# 3 x 10  Matrix hip " abd 55# 2 x 10  Matrix HS curl 40# 3 x 10  Matrix LAQ 25# 3 x 10  Sled push + pull 80' x 2 laps  Epps carries 10# unilaterally x 1 lap around clinic then switch hands  Standing hamstring stretch on steps 3 x 30 sec ea  Standing L hip adductor stretch 3 x 30 sec     Standing shoulder extension RTB 2x10- NP  Rows green TB x 30  Mat push ups 2 x 10  Freemotion rows 10# 3 x 10  Freemotion B sh ext 7# 3 x 10  Matrix chest press 25# 2 x 10  Matrix tricep ext 37.5# 4 x 10  Matrix biceps curl 10# 3 x 10  B sh horiz abduction w green TB x 20  D2 flexion w green TB x 20 ea  Bicep curls 5# x 30 ea  Overhead press 2# x 20 ea  Corner stretch 3 x 30 sec    Pt received moist heat to low back for 0 minutes post tx. Skin checked post theramally.   Supine with bolster    Pt received the following manual therapy techniques x 5 minutes:   -Kinesiotape: I-strip for anterior support to 4th and 5th metatarsal heads. Pt received education regarding appropriate care and removal of Kinesiotape. Pt instructed in proper removal techniques if skin irritation occur.    Written Home Exercises Provided: no new exercises provided this session. Pt given green theraband for home use.   Pt demo good understanding of the education provided. Darrion demonstrated good return demonstration of activities.     Education provided: DOMS, detriments of excessive bed rest  Darrion verbalized good understanding of education provided.   No spiritual or educational barriers to learning identified.    Assessment     Pt had good tolerance to treatment today with no adverse effects. Post-treatment L foot pain rated as 0/10. Pt presents to clinic with L foot pain. Improvements in symptoms following taping techniques. Pt with possible strain of 4th and 5th metatarsal. He demonstrates good response to progression of duration on elliptical. Increase in L foot pain with sled push but improvements with rest. Pt challenged with UE strengthening but no pain.     GOALS: Short  Term Goals:  6 weeks  1. Report decreased body pain  <  / =  5/10 at worst to increase tolerance for prolonged standing.- met 10/9/19  2. Pt will be able to tolerate multi-directional LE strengthening in order to improve ability to perform household chores.- met 9/9/19  3. Pt will report 50% improvement in ability to walk long distances since start of care to indicate improved functional mobility. - met 10/9/19  4. Pt will ambulate on treadmill for 5 minutes without fatigue to indicate improved endurance.- met 9/9/19   5. Pt to tolerate HEP to improve ROM and independence with ADL's.- met 9/9/19     Long Term Goals: 12 weeks  1. Report decreased body pain  <  / =  3/10 at worst to increase tolerance for prolonged standing.- in progress    2. Pt will be able to perform 2 x 10 multi-directional LE strengthening without fatigue in order to improve ability to perform household chores.- met 10/9/19  3. Pt will report 80% improvement in ability to walk long distances since start of care to indicate improved functional mobility.- in progress    4. Pt will ambulate on treadmill for 10 minutes without fatigue to indicate improved endurance.- met 10/9/19  5. Pt to be Independent with HEP to improve ROM and independence with ADL's.- in progress      Plan     Progress B LE and UE strength and endurance.     Therapist: Tiffanie Hinojosa, PT, DPT

## 2019-10-30 ENCOUNTER — CLINICAL SUPPORT (OUTPATIENT)
Dept: REHABILITATION | Facility: HOSPITAL | Age: 69
End: 2019-10-30
Attending: INTERNAL MEDICINE
Payer: MEDICARE

## 2019-10-30 DIAGNOSIS — R29.3 POSTURAL IMBALANCE: ICD-10-CM

## 2019-10-30 DIAGNOSIS — M62.81 MUSCLE WEAKNESS OF LOWER EXTREMITY: ICD-10-CM

## 2019-10-30 DIAGNOSIS — Z74.09 IMPAIRED FUNCTIONAL MOBILITY AND ENDURANCE: ICD-10-CM

## 2019-10-30 PROCEDURE — 97110 THERAPEUTIC EXERCISES: CPT | Mod: HCNC,PN

## 2019-11-06 ENCOUNTER — CLINICAL SUPPORT (OUTPATIENT)
Dept: REHABILITATION | Facility: HOSPITAL | Age: 69
End: 2019-11-06
Attending: INTERNAL MEDICINE
Payer: MEDICARE

## 2019-11-06 DIAGNOSIS — M62.81 MUSCLE WEAKNESS OF LOWER EXTREMITY: ICD-10-CM

## 2019-11-06 DIAGNOSIS — Z74.09 IMPAIRED FUNCTIONAL MOBILITY AND ENDURANCE: ICD-10-CM

## 2019-11-06 DIAGNOSIS — R29.3 POSTURAL IMBALANCE: ICD-10-CM

## 2019-11-06 PROCEDURE — 97110 THERAPEUTIC EXERCISES: CPT | Mod: HCNC,PN

## 2019-11-06 NOTE — PROGRESS NOTES
"                                                  Physical Therapy Daily Note     Date: 11/11/2019  Name: Darrion Bennett  [facility] Number: [id number]  Diagnosis:   Encounter Diagnoses   Name Primary?    Muscle weakness of lower extremity     Postural imbalance     Impaired functional mobility and endurance      Physician: Farooq Domingo MD    Physician Orders: PT Eval and Treat   Medical Diagnosis from Referral: debility  Evaluation Date: 8/16/2019  Last PN: 11/11/19 (visit 20)   Authorization Period Expiration: 12/31/19  Plan of Care Expiration: 11/16/19  Visit # / Visits authorized: 3/5 (visit 20)    Time In: 0800  Time Out: 0902  Total Time: 31 minutes    Precautions: CHF, DM 2, stage 4 kidney disease, glaucoma- blurry vision    Subjective     Pt reports: the sled might be making his foot hurt. 100% improvement in ability to walk long distances since start of care. He tried to tape his foot yesterday that has already fallen off. It helped with his swelling.   He was compliant with home exercise program.  Response to previous treatment: good- L foot swelling  Functional change: daily activities are easier    Pain: 4/10   Location: L foot    Objective     BOLD= performed today    Patient received individual therapy to increase strength, endurance, ROM, flexibility, posture and core stabilization with activities as follows:     Darrion received therapeutic exercises to develop strength, endurance, ROM, flexibility, posture and core stabilization for 57 minutes including:     Treadmill x 10 min @ 2.0 mph + 3.0 incline  Elliptical x 8 min at crossramp 10- NP  Upright bike x 8 min level 3- NP    Mini squats 2x10 at // bar  Standing march x 30 ea  Hip abduction x 30 ea  Hip extension 3x10 ea  Sit to stand from chair x 20- NP  Calf stretch on slant board 3x30"  Standing quad stretch at // bars 3 x 30 sec ea  Side steps with green TB 40' x 2 laps  Step ups 6 in step 2x10 B  SL L ankle eversion 2# x 30  SL L ankle inversion x " "20  Heel raises x 20  SL ankle inversion x 20   Tandem stance 2x30" B- NP  Walking laps around gym x6 min (6 laps)- NP  Shuttle squats 2 bands 3 x 10  Leg press 30# 3 x 10  Matrix hip abd 55# 2 x 10  Matrix HS curl 40# 3 x 10  Matrix LAQ 25# 3 x 10  Sled push + pull 80' x 2 laps- NP  Epps carries 10# unilaterally x 1 lap around clinic then switch hands  Standing hamstring stretch on steps 3 x 30 sec ea  Standing L hip adductor stretch 3 x 30 sec     Standing shoulder extension RTB 2x10- NP  Rows green TB x 30  Mat push ups 2 x 10  Freemotion rows 10# 3 x 10  Freemotion B sh ext 7# 3 x 10  Matrix chest press 25# 2 x 10  Matrix tricep ext 37.5# 4 x 10  Matrix biceps curl 10# 3 x 10  B sh horiz abduction w green TB x 20  D2 flexion w green TB x 20 ea  Bicep curls 5# x 30 ea  Overhead press 2# x 20 ea  Corner stretch 3 x 30 sec    Pt received moist heat to low back for 0 minutes post tx. Skin checked post theramally.   Supine with bolster    Pt received the following manual therapy techniques x 5 minutes:   -Kinesiotape: I-strip for anterior support to 4th and 5th metatarsal heads. Pt received education regarding appropriate care and removal of Kinesiotape. Pt instructed in proper removal techniques if skin irritation occur.    Written Home Exercises Provided: no new exercises provided this session. Pt given green theraband for home use.   Pt demo good understanding of the education provided. Darrion demonstrated good return demonstration of activities.     Education provided: DOMS, detriments of excessive bed rest  Darrion verbalized good understanding of education provided.   No spiritual or educational barriers to learning identified.    CMS Impairment/Limitation/Restriction for FOTO NOC-musculo-skeletal disorder Survey    Therapist reviewed FOTO scores for Darrion Bennett on 11/11/2019.   FOTO documents entered into [email or web address] - see Media section.    Limitation Score: 44%  Category: Mobility    Current : CK = at least 40% but " < 60% impaired, limited or restricted  Goal: CK = at least 40% but < 60% impaired, limited or restricted       Assessment     Pt was re-assessed today with 5/5 STGs and 4/5 LTGs being met indicating improvements in body pain, LE strength, ability to walk long distances, and independence with HEP since start of care. He continues with L foot pain but improvements with taping techniques. Pt demonstrates ability to self-manage condition. Will see pt for 1 more visit to address HEP and independence with taping.     He had good tolerance to treatment today with no adverse effects. Post-treatment L foot pain rated as 0/10. Pt with improvement in symptoms following kinesiotaping. He reports no longer having pain with ambulation. Some increase in L foot symptoms with sidestepping. Relief with overloading foot and performing ankle eversion. Increased independence with exercise program.     GOALS: Short Term Goals:  6 weeks  1. Report decreased body pain  <  / =  5/10 at worst to increase tolerance for prolonged standing.- met 10/9/19  2. Pt will be able to tolerate multi-directional LE strengthening in order to improve ability to perform household chores.- met 9/9/19  3. Pt will report 50% improvement in ability to walk long distances since start of care to indicate improved functional mobility. - met 10/9/19  4. Pt will ambulate on treadmill for 5 minutes without fatigue to indicate improved endurance.- met 9/9/19   5. Pt to tolerate HEP to improve ROM and independence with ADL's.- met 9/9/19     Long Term Goals: 12 weeks  1. Report decreased body pain  <  / =  3/10 at worst to increase tolerance for prolonged standing.- in progress    2. Pt will be able to perform 2 x 10 multi-directional LE strengthening without fatigue in order to improve ability to perform household chores.- met 10/9/19  3. Pt will report 80% improvement in ability to walk long distances since start of care to indicate improved functional mobility.- met  11/11/19  4. Pt will ambulate on treadmill for 10 minutes without fatigue to indicate improved endurance.- met 10/9/19  5. Pt to be Independent with HEP to improve ROM and independence with ADL's.- met 11/11/19     Plan     Progress independence with L foot pain management. Teach taping techniques.     Therapist: Tiffanie Hinojosa, PT, DPT

## 2019-11-06 NOTE — PROGRESS NOTES
"                                                  Physical Therapy Daily Note     Date: 11/06/2019  Name: Darrion Bennett  [facility] Number: [id number]  Diagnosis:   Encounter Diagnoses   Name Primary?    Muscle weakness of lower extremity     Postural imbalance     Impaired functional mobility and endurance      Physician: Farooq Domingo MD    Physician Orders: PT Eval and Treat   Medical Diagnosis from Referral: debility  Evaluation Date: 8/16/2019  Last PN: 10/9/19 (visit 13)  Authorization Period Expiration: 12/31/19  Plan of Care Expiration: 11/16/19  Visit # / Visits authorized: 20/20  New Auth 1/5    Time In: 0800  Time Out: 0900  Total Time:  30 minutes    Precautions: CHF, DM 2, stage 4 kidney disease, glaucoma- blurry vision    Subjective     Pt reports: continued pain in L foot, reports he was still feeling pain with and without taping.    He was compliant with home exercise program.  Response to previous treatment: good- soreness in arms that has improved  Functional change: none to note     Pain: 3/10   Location: L foot    Objective     BOLD= performed today    Patient received individual therapy to increase strength, endurance, ROM, flexibility, posture and core stabilization with activities as follows:     Darrion received therapeutic exercises to develop strength, endurance, ROM, flexibility, posture and core stabilization for 50 minutes including:     Treadmill x 10 min @ 2.0 mph + 3.0 incline   Elliptical x 8 min at crossramp 10 NP  Upright bike x 8 min level 3- NP    Mini squats 2x10 at // bar  Standing march x 30 ea  Hip abduction x 30 ea  Hip extension 3x10 ea  Sit to stand from chair x 20- NP  Calf stretch on slant board 3x30"  Standing quad stretch at // bars 3 x 30 sec ea  Side steps with green TB 40' x 2 laps  Step ups 6 in step 2x10 B  SL L ankle eversion x 20  SL L ankle inversion x 20  Heel raises x 20  SL ankle inversion x 20   Tandem stance 2x30" B- NP  Walking laps around gym x6 min (6 " laps)- NP  Shuttle squats 2 bands 3 x 10  Leg press 30# 3 x 10  Matrix hip abd 55# 2 x 10  Matrix HS curl 40# 3 x 10  Matrix LAQ 25# 3 x 10  Sled push + pull 80' x 2 laps  Epps carries 10# unilaterally x 30 ft then switch hands (progressed to tandem walking)  Standing hamstring stretch on steps 3 x 30 sec ea  Standing L hip adductor stretch 3 x 30 sec     Standing shoulder extension RTB 2x10- NP  Rows green TB x 30  Mat push ups 2 x 10  Freemotion rows 10# 3 x 10  Freemotion B sh ext 7# 3 x 10  +Matrix Rows wide  #20 2 x 10  +Matrix Rows narrow  #25 2 x 10  Matrix chest press 25# 2 x 10  Matrix tricep ext 37.5# 4 x 10  Matrix biceps curl 10# 3 x 10  B sh horiz abduction w green TB x 20  D2 flexion w green TB x 20 ea  Bicep curls 5# x 30 ea  Overhead press 2# x 20 ea  Corner stretch 3 x 30 sec    Pt received moist heat to low back for 0 minutes post tx. Skin checked post theramally.   Supine with bolster    Pt received the following manual therapy techniques x 0 minutes:   -Kinesiotape: I-strip for anterior support to 4th and 5th metatarsal heads. Pt received education regarding appropriate care and removal of Kinesiotape. Pt instructed in proper removal techniques if skin irritation occur.    Written Home Exercises Provided: no new exercises provided this session. Pt given green theraband for home use.   Pt demo good understanding of the education provided. Darrion demonstrated good return demonstration of activities.     Education provided: DOMS, detriments of excessive bed rest  Darrion verbalized good understanding of education provided.   No spiritual or educational barriers to learning identified.    Assessment     Pt tolerated treatment well with appropriate response to prescribed therex. Pt notes continued L lateral foot pain, limiting activity tolerance. Pt progressed with farmers carry to tandem walks to promote ankle stability/balance strategy. Increase in L foot pain with sled push but improvements  with rest.      GOALS: Short Term Goals:  6 weeks  1. Report decreased body pain  <  / =  5/10 at worst to increase tolerance for prolonged standing.- met 10/9/19  2. Pt will be able to tolerate multi-directional LE strengthening in order to improve ability to perform household chores.- met 9/9/19  3. Pt will report 50% improvement in ability to walk long distances since start of care to indicate improved functional mobility. - met 10/9/19  4. Pt will ambulate on treadmill for 5 minutes without fatigue to indicate improved endurance.- met 9/9/19   5. Pt to tolerate HEP to improve ROM and independence with ADL's.- met 9/9/19     Long Term Goals: 12 weeks  1. Report decreased body pain  <  / =  3/10 at worst to increase tolerance for prolonged standing.- in progress    2. Pt will be able to perform 2 x 10 multi-directional LE strengthening without fatigue in order to improve ability to perform household chores.- met 10/9/19  3. Pt will report 80% improvement in ability to walk long distances since start of care to indicate improved functional mobility.- in progress    4. Pt will ambulate on treadmill for 10 minutes without fatigue to indicate improved endurance.- met 10/9/19  5. Pt to be Independent with HEP to improve ROM and independence with ADL's.- in progress      Plan     Progress B LE and UE strength and endurance.     Therapist: Samira Woodward, PTA

## 2019-11-08 ENCOUNTER — CLINICAL SUPPORT (OUTPATIENT)
Dept: REHABILITATION | Facility: HOSPITAL | Age: 69
End: 2019-11-08
Attending: INTERNAL MEDICINE
Payer: MEDICARE

## 2019-11-08 DIAGNOSIS — R29.3 POSTURAL IMBALANCE: ICD-10-CM

## 2019-11-08 DIAGNOSIS — M62.81 MUSCLE WEAKNESS OF LOWER EXTREMITY: ICD-10-CM

## 2019-11-08 DIAGNOSIS — Z74.09 IMPAIRED FUNCTIONAL MOBILITY AND ENDURANCE: ICD-10-CM

## 2019-11-08 PROCEDURE — 97110 THERAPEUTIC EXERCISES: CPT | Mod: HCNC,PN

## 2019-11-08 NOTE — PROGRESS NOTES
"                                                  Physical Therapy Daily Note     Date: 11/08/2019  Name: Darrion Bennett  [facility] Number: [id number]  Diagnosis:   Encounter Diagnoses   Name Primary?    Muscle weakness of lower extremity     Postural imbalance     Impaired functional mobility and endurance      Physician: Farooq Domingo MD    Physician Orders: PT Eval and Treat   Medical Diagnosis from Referral: debility  Evaluation Date: 8/16/2019  Last PN: 10/9/19 (visit 13)  Authorization Period Expiration: 12/31/19  Plan of Care Expiration: 11/16/19  Visit # / Visits authorized: 20/20  New Auth 2/5    Time In: 0755  Time Out: 0855  Total Time:  30 minutes    Precautions: CHF, DM 2, stage 4 kidney disease, glaucoma- blurry vision    Subjective     Pt reports: continued pain in L foot. Reports pain is no better and no worse.  He was compliant with home exercise program.  Response to previous treatment: good- soreness in arms that has improved  Functional change: none to note     Pain: 3/10   Location: L foot    Objective     BOLD= performed today    Patient received individual therapy to increase strength, endurance, ROM, flexibility, posture and core stabilization with activities as follows:     Darrion received therapeutic exercises to develop strength, endurance, ROM, flexibility, posture and core stabilization for 50 minutes including:     Treadmill x 10 min @ 2.0 mph + 3.0 incline   Elliptical x 8 min at crossramp 10 NP  Upright bike x 8 min level 3- NP    Mini squats 2x10 at // bar  Standing march x 30 ea  Hip abduction x 30 ea  Hip extension 3x10 ea  Sit to stand from chair x 20- NP  Calf stretch on slant board 3x30"  Standing quad stretch at // bars 3 x 30 sec ea  Side steps with green TB 40' x 2 laps  Step ups 6 in step 2x10 B  SL L ankle eversion x 20  SL L ankle inversion x 20  Heel raises x 20  SL ankle inversion x 20   Tandem stance 2x30" B- NP  Walking laps around gym x6 min (6 laps)- NP  Shuttle squats " 2 bands 3 x 10  Leg press 30# 3 x 10  Matrix hip abd 55# 2 x 10  Matrix HS curl 40# 3 x 10  Matrix LAQ 25# 3 x 10  Sled push + pull 80' x 2 laps  Epps carries 10# unilaterally x 30 ft then switch hands (progressed to tandem walking)  Standing hamstring stretch on steps 3 x 30 sec ea  Standing L hip adductor stretch 3 x 30 sec     Standing shoulder extension RTB 2x10- NP  Rows green TB x 30  Mat push ups 2 x 10  Freemotion rows 10# 3 x 10  Freemotion B sh ext 7# 3 x 10  Matrix Rows wide  #25 2 x 10  +Matrix Rows narrow  #25 2 x 10  Matrix chest press 25# 2 x 10  Matrix tricep ext 37.5# 4 x 10  Matrix biceps curl 10# 3 x 10  B sh horiz abduction w green TB x 20  D2 flexion w green TB x 20 ea  Bicep curls 5# x 30 ea  Overhead press 2# x 20 ea  Corner stretch 3 x 30 sec    Pt received moist heat to low back for 0 minutes post tx. Skin checked post theramally.   Supine with bolster    Pt received the following manual therapy techniques x 0 minutes:   -Kinesiotape: I-strip for anterior support to 4th and 5th metatarsal heads. Pt received education regarding appropriate care and removal of Kinesiotape. Pt instructed in proper removal techniques if skin irritation occur.    Written Home Exercises Provided: no new exercises provided this session. Pt given green theraband for home use.   Pt demo good understanding of the education provided. Darrion demonstrated good return demonstration of activities.     Education provided: DOMS, detriments of excessive bed rest  Darrion verbalized good understanding of education provided.   No spiritual or educational barriers to learning identified.    Assessment     Pt tolerated treatment well with no provoked pain. Pt demonstrates difficulty with balance and stability during tandem walks, requiring modification.  Pt progressing well with both LE and UE strengthening. Pt given green tb to perform side steps at home.     GOALS: Short Term Goals:  6 weeks  1. Report decreased body  pain  <  / =  5/10 at worst to increase tolerance for prolonged standing.- met 10/9/19  2. Pt will be able to tolerate multi-directional LE strengthening in order to improve ability to perform household chores.- met 9/9/19  3. Pt will report 50% improvement in ability to walk long distances since start of care to indicate improved functional mobility. - met 10/9/19  4. Pt will ambulate on treadmill for 5 minutes without fatigue to indicate improved endurance.- met 9/9/19   5. Pt to tolerate HEP to improve ROM and independence with ADL's.- met 9/9/19     Long Term Goals: 12 weeks  1. Report decreased body pain  <  / =  3/10 at worst to increase tolerance for prolonged standing.- in progress    2. Pt will be able to perform 2 x 10 multi-directional LE strengthening without fatigue in order to improve ability to perform household chores.- met 10/9/19  3. Pt will report 80% improvement in ability to walk long distances since start of care to indicate improved functional mobility.- in progress    4. Pt will ambulate on treadmill for 10 minutes without fatigue to indicate improved endurance.- met 10/9/19  5. Pt to be Independent with HEP to improve ROM and independence with ADL's.- in progress      Plan     Progress B LE and UE strength and endurance.     Therapist: Samira Woodward, PTA

## 2019-11-11 ENCOUNTER — CLINICAL SUPPORT (OUTPATIENT)
Dept: REHABILITATION | Facility: HOSPITAL | Age: 69
End: 2019-11-11
Attending: INTERNAL MEDICINE
Payer: MEDICARE

## 2019-11-11 DIAGNOSIS — Z74.09 IMPAIRED FUNCTIONAL MOBILITY AND ENDURANCE: ICD-10-CM

## 2019-11-11 DIAGNOSIS — R29.3 POSTURAL IMBALANCE: ICD-10-CM

## 2019-11-11 DIAGNOSIS — M62.81 MUSCLE WEAKNESS OF LOWER EXTREMITY: ICD-10-CM

## 2019-11-11 PROCEDURE — G8978 MOBILITY CURRENT STATUS: HCPCS | Mod: CK,HCNC,PN

## 2019-11-11 PROCEDURE — G8979 MOBILITY GOAL STATUS: HCPCS | Mod: CK,HCNC,PN

## 2019-11-11 PROCEDURE — 97110 THERAPEUTIC EXERCISES: CPT | Mod: HCNC,PN

## 2019-11-15 ENCOUNTER — CLINICAL SUPPORT (OUTPATIENT)
Dept: REHABILITATION | Facility: HOSPITAL | Age: 69
End: 2019-11-15
Attending: INTERNAL MEDICINE
Payer: MEDICARE

## 2019-11-15 DIAGNOSIS — R29.3 POSTURAL IMBALANCE: ICD-10-CM

## 2019-11-15 DIAGNOSIS — Z74.09 IMPAIRED FUNCTIONAL MOBILITY AND ENDURANCE: ICD-10-CM

## 2019-11-15 DIAGNOSIS — M62.81 MUSCLE WEAKNESS OF LOWER EXTREMITY: ICD-10-CM

## 2019-11-15 PROCEDURE — G8980 MOBILITY D/C STATUS: HCPCS | Mod: CK,HCNC,PN

## 2019-11-15 PROCEDURE — 97110 THERAPEUTIC EXERCISES: CPT | Mod: HCNC,PN

## 2019-11-15 PROCEDURE — G8979 MOBILITY GOAL STATUS: HCPCS | Mod: CK,HCNC,PN

## 2019-11-15 NOTE — PROGRESS NOTES
"                                                  Physical Therapy Daily Note     Date: 11/15/2019  Name: Darrion Bennett  [facility] Number: [id number]  Diagnosis:   Encounter Diagnoses   Name Primary?    Muscle weakness of lower extremity     Postural imbalance     Impaired functional mobility and endurance      Physician: Farooq Domingo MD    Physician Orders: PT Eval and Treat   Medical Diagnosis from Referral: debility  Evaluation Date: 8/16/2019  Last PN: 11/11/19 (visit 20)   Authorization Period Expiration: 12/31/19  Plan of Care Expiration: 11/16/19  Visit # / Visits authorized: 4/5 (visit 21)    Time In: 0800  Time Out: 0856  Total Time: 56 minutes    Precautions: CHF, DM 2, stage 4 kidney disease, glaucoma- blurry vision    Subjective     Pt reports: he has had tape on his foot since Monday and it feels good. He is tired this morning. His daughter came into town at 2 AM.   He was compliant with home exercise program.  Response to previous treatment: good  Functional change: able to perform daily activities without difficulty    Pain: 0/10   Location: L foot    Objective     BOLD= performed today    Patient received individual therapy to increase strength, endurance, ROM, flexibility, posture and core stabilization with activities as follows:     Darrion received therapeutic exercises to develop strength, endurance, ROM, flexibility, posture and core stabilization for 56 minutes including:     Treadmill x 10 min @ 2.3 mph + 3.0 incline  Elliptical x 8 min at crossramp 10- NP  Upright bike x 8 min level 3- NP    Mini squats 2x10 at // bar  Standing march x 30 ea  Hip abduction x 30 ea  Hip extension 3x10 ea  Sit to stand from chair x 20- NP  Calf stretch on slant board 3x30"  Standing quad stretch at // bars 3 x 30 sec ea  Side steps with green TB 40' x 2 laps  Step ups 6 in step 2x10 B  SL L ankle eversion 2# x 30  SL L ankle inversion x 20  Heel raises x 20  SL ankle inversion x 20   Tandem stance 2x30" B- " NP  Walking laps around gym x6 min (6 laps)- NP  Shuttle squats 2 bands 3 x 10  Leg press 40# 3 x 10  Matrix hip abd 55# 2 x 10  Matrix HS curl 40-25# 3 x 10  Matrix LAQ 35# 3 x 10  Sled push + pull 80' x 2 laps- NP  Epps carries 10# unilaterally x 1 lap around clinic then switch hands  Standing hamstring stretch on steps 3 x 30 sec ea  Standing L hip adductor stretch 3 x 30 sec     Standing shoulder extension RTB 2x10- NP  Rows green TB x 30  Mat push ups 2 x 10  Freemotion rows 10# 3 x 10  Freemotion B sh ext 7# 3 x 10   Matrix chest press 25# 3 x 10  Matrix tricep ext 37.5# 3 x 10  Matrix biceps curl 15# 2 x 10  B sh horiz abduction w green TB x 20  D2 flexion w green TB x 20 ea  Bicep curls 5# x 30 ea  Overhead press 2# x 20 ea  Corner stretch 3 x 30 sec    Pt received moist heat to low back for 0 minutes post tx. Skin checked post theramally.   Supine with bolster    Pt received the following manual therapy techniques x 10 minutes:   -Kinesiotape: I-strip for anterior support to 4th and 5th metatarsal heads. Pt received education regarding appropriate care and removal of Kinesiotape. Pt instructed in proper removal techniques if skin irritation occur.    Written Home Exercises Provided: no new exercises provided this session.   Pt demo good understanding of the education provided. Darrion demonstrated good return demonstration of activities.     Education provided: DOMS, detriments of excessive bed rest  Darrion verbalized good understanding of education provided.   No spiritual or educational barriers to learning identified.    CMS Impairment/Limitation/Restriction for FOTO NOC-musculo-skeletal disorder Survey    Therapist reviewed FOTO scores for Darrion Bennett on 11/15/2019.   FOTO documents entered into [id number] - see Media section.    Limitation Score: 44%  Category: Mobility    Current : CK = at least 40% but < 60% impaired, limited or restricted  Goal: CK = at least 40% but < 60% impaired, limited or  restricted  Discharge: CK = at least 40% but < 60% impaired, limited or restricted       Assessment     Pt was re-assessed today with 5/5 STGs and 5/5 LTGs being met indicating improvements in body pain, LE strength, ability to walk long distances, and independence with HEP since start of care. Pt has made great progress with physical therapy services. He had good tolerance to treatment today with no adverse effects. Post-treatment L foot pain rated as 0/10. No exacerbation of pain throughout treatment. Pt able to perform treadmill walking with increased speed without difficulty. Good response to B UE and LE strengthening. He has demonstrates ability to self-manage condition. Pt is appropriate for discharge at this time.     GOALS: Short Term Goals:  6 weeks  1. Report decreased body pain  <  / =  5/10 at worst to increase tolerance for prolonged standing.- met 10/9/19  2. Pt will be able to tolerate multi-directional LE strengthening in order to improve ability to perform household chores.- met 9/9/19  3. Pt will report 50% improvement in ability to walk long distances since start of care to indicate improved functional mobility. - met 10/9/19  4. Pt will ambulate on treadmill for 5 minutes without fatigue to indicate improved endurance.- met 9/9/19   5. Pt to tolerate HEP to improve ROM and independence with ADL's.- met 9/9/19     Long Term Goals: 12 weeks  1. Report decreased body pain  <  / =  3/10 at worst to increase tolerance for prolonged standing.- met 11/15/19  2. Pt will be able to perform 2 x 10 multi-directional LE strengthening without fatigue in order to improve ability to perform household chores.- met 10/9/19  3. Pt will report 80% improvement in ability to walk long distances since start of care to indicate improved functional mobility.- met 11/11/19  4. Pt will ambulate on treadmill for 10 minutes without fatigue to indicate improved endurance.- met 10/9/19  5. Pt to be Independent with HEP to  improve ROM and independence with ADL's.- met 11/11/19     Plan     Pt is discharged from physical therapy services.    Therapist: Tiffanie Hinojosa, PT, DPT

## 2019-11-29 ENCOUNTER — LAB VISIT (OUTPATIENT)
Dept: LAB | Facility: HOSPITAL | Age: 69
End: 2019-11-29
Attending: INTERNAL MEDICINE
Payer: MEDICARE

## 2019-11-29 DIAGNOSIS — M10.9 GOUTY ARTHRITIS: ICD-10-CM

## 2019-11-29 LAB
ALBUMIN SERPL BCP-MCNC: 3.6 G/DL (ref 3.5–5.2)
ALP SERPL-CCNC: 80 U/L (ref 55–135)
ALT SERPL W/O P-5'-P-CCNC: 12 U/L (ref 10–44)
ANION GAP SERPL CALC-SCNC: 11 MMOL/L (ref 8–16)
AST SERPL-CCNC: 17 U/L (ref 10–40)
BASOPHILS # BLD AUTO: 0.03 K/UL (ref 0–0.2)
BASOPHILS NFR BLD: 0.4 % (ref 0–1.9)
BILIRUB SERPL-MCNC: 0.4 MG/DL (ref 0.1–1)
BUN SERPL-MCNC: 25 MG/DL (ref 8–23)
C3 SERPL-MCNC: 120 MG/DL (ref 50–180)
C4 SERPL-MCNC: 26 MG/DL (ref 11–44)
CALCIUM SERPL-MCNC: 9.9 MG/DL (ref 8.7–10.5)
CHLORIDE SERPL-SCNC: 102 MMOL/L (ref 95–110)
CO2 SERPL-SCNC: 24 MMOL/L (ref 23–29)
CREAT SERPL-MCNC: 1.8 MG/DL (ref 0.5–1.4)
CRP SERPL-MCNC: 5.6 MG/L (ref 0–8.2)
DIFFERENTIAL METHOD: ABNORMAL
EOSINOPHIL # BLD AUTO: 0.3 K/UL (ref 0–0.5)
EOSINOPHIL NFR BLD: 4.1 % (ref 0–8)
ERYTHROCYTE [DISTWIDTH] IN BLOOD BY AUTOMATED COUNT: 14.4 % (ref 11.5–14.5)
ERYTHROCYTE [SEDIMENTATION RATE] IN BLOOD BY WESTERGREN METHOD: 42 MM/HR (ref 0–23)
EST. GFR  (AFRICAN AMERICAN): 43.4 ML/MIN/1.73 M^2
EST. GFR  (NON AFRICAN AMERICAN): 37.6 ML/MIN/1.73 M^2
GLUCOSE SERPL-MCNC: 141 MG/DL (ref 70–110)
HCT VFR BLD AUTO: 38.6 % (ref 40–54)
HGB BLD-MCNC: 11.5 G/DL (ref 14–18)
IMM GRANULOCYTES # BLD AUTO: 0.02 K/UL (ref 0–0.04)
IMM GRANULOCYTES NFR BLD AUTO: 0.3 % (ref 0–0.5)
LYMPHOCYTES # BLD AUTO: 1.3 K/UL (ref 1–4.8)
LYMPHOCYTES NFR BLD: 16.6 % (ref 18–48)
MCH RBC QN AUTO: 26.7 PG (ref 27–31)
MCHC RBC AUTO-ENTMCNC: 29.8 G/DL (ref 32–36)
MCV RBC AUTO: 90 FL (ref 82–98)
MONOCYTES # BLD AUTO: 1.2 K/UL (ref 0.3–1)
MONOCYTES NFR BLD: 15.4 % (ref 4–15)
NEUTROPHILS # BLD AUTO: 4.8 K/UL (ref 1.8–7.7)
NEUTROPHILS NFR BLD: 63.2 % (ref 38–73)
NRBC BLD-RTO: 0 /100 WBC
PLATELET # BLD AUTO: 248 K/UL (ref 150–350)
PMV BLD AUTO: 12.3 FL (ref 9.2–12.9)
POTASSIUM SERPL-SCNC: 4.5 MMOL/L (ref 3.5–5.1)
PROT SERPL-MCNC: 8.2 G/DL (ref 6–8.4)
RBC # BLD AUTO: 4.3 M/UL (ref 4.6–6.2)
SODIUM SERPL-SCNC: 137 MMOL/L (ref 136–145)
URATE SERPL-MCNC: 10.1 MG/DL (ref 3.4–7)
WBC # BLD AUTO: 7.64 K/UL (ref 3.9–12.7)

## 2019-11-29 PROCEDURE — 84550 ASSAY OF BLOOD/URIC ACID: CPT | Mod: HCNC

## 2019-11-29 PROCEDURE — 80053 COMPREHEN METABOLIC PANEL: CPT | Mod: HCNC

## 2019-11-29 PROCEDURE — 86225 DNA ANTIBODY NATIVE: CPT | Mod: HCNC

## 2019-11-29 PROCEDURE — 85652 RBC SED RATE AUTOMATED: CPT | Mod: HCNC

## 2019-11-29 PROCEDURE — 86160 COMPLEMENT ANTIGEN: CPT | Mod: HCNC

## 2019-11-29 PROCEDURE — 86160 COMPLEMENT ANTIGEN: CPT | Mod: 59,HCNC

## 2019-11-29 PROCEDURE — 36415 COLL VENOUS BLD VENIPUNCTURE: CPT | Mod: HCNC,PO

## 2019-11-29 PROCEDURE — 85025 COMPLETE CBC W/AUTO DIFF WBC: CPT | Mod: HCNC

## 2019-11-29 PROCEDURE — 86140 C-REACTIVE PROTEIN: CPT | Mod: HCNC

## 2019-12-02 LAB — DSDNA AB SER-ACNC: NORMAL [IU]/ML

## 2019-12-04 RX ORDER — FEBUXOSTAT 80 MG/1
TABLET, FILM COATED ORAL
Qty: 90 TABLET | Refills: 1 | OUTPATIENT
Start: 2019-12-04

## 2019-12-09 ENCOUNTER — PATIENT MESSAGE (OUTPATIENT)
Dept: RHEUMATOLOGY | Facility: CLINIC | Age: 69
End: 2019-12-09

## 2019-12-09 ENCOUNTER — PATIENT MESSAGE (OUTPATIENT)
Dept: NEPHROLOGY | Facility: CLINIC | Age: 69
End: 2019-12-09

## 2019-12-10 ENCOUNTER — OFFICE VISIT (OUTPATIENT)
Dept: FAMILY MEDICINE | Facility: CLINIC | Age: 69
End: 2019-12-10
Payer: MEDICARE

## 2019-12-10 VITALS
OXYGEN SATURATION: 97 % | BODY MASS INDEX: 23.46 KG/M2 | HEIGHT: 66 IN | HEART RATE: 67 BPM | DIASTOLIC BLOOD PRESSURE: 58 MMHG | TEMPERATURE: 98 F | WEIGHT: 146 LBS | SYSTOLIC BLOOD PRESSURE: 108 MMHG

## 2019-12-10 DIAGNOSIS — I10 ESSENTIAL HYPERTENSION: ICD-10-CM

## 2019-12-10 DIAGNOSIS — Z23 NEED FOR SHINGLES VACCINE: ICD-10-CM

## 2019-12-10 DIAGNOSIS — N18.30 CHRONIC KIDNEY DISEASE, STAGE III (MODERATE): ICD-10-CM

## 2019-12-10 DIAGNOSIS — L50.9 URTICARIA: Primary | ICD-10-CM

## 2019-12-10 PROCEDURE — 1126F PR PAIN SEVERITY QUANTIFIED, NO PAIN PRESENT: ICD-10-PCS | Mod: HCNC,S$GLB,, | Performed by: INTERNAL MEDICINE

## 2019-12-10 PROCEDURE — 99214 OFFICE O/P EST MOD 30 MIN: CPT | Mod: HCNC,S$GLB,, | Performed by: INTERNAL MEDICINE

## 2019-12-10 PROCEDURE — 1159F MED LIST DOCD IN RCRD: CPT | Mod: HCNC,S$GLB,, | Performed by: INTERNAL MEDICINE

## 2019-12-10 PROCEDURE — 99999 PR PBB SHADOW E&M-EST. PATIENT-LVL IV: ICD-10-PCS | Mod: PBBFAC,HCNC,, | Performed by: INTERNAL MEDICINE

## 2019-12-10 PROCEDURE — 99214 PR OFFICE/OUTPT VISIT, EST, LEVL IV, 30-39 MIN: ICD-10-PCS | Mod: HCNC,S$GLB,, | Performed by: INTERNAL MEDICINE

## 2019-12-10 PROCEDURE — 1126F AMNT PAIN NOTED NONE PRSNT: CPT | Mod: HCNC,S$GLB,, | Performed by: INTERNAL MEDICINE

## 2019-12-10 PROCEDURE — 3074F PR MOST RECENT SYSTOLIC BLOOD PRESSURE < 130 MM HG: ICD-10-PCS | Mod: HCNC,CPTII,S$GLB, | Performed by: INTERNAL MEDICINE

## 2019-12-10 PROCEDURE — 1159F PR MEDICATION LIST DOCUMENTED IN MEDICAL RECORD: ICD-10-PCS | Mod: HCNC,S$GLB,, | Performed by: INTERNAL MEDICINE

## 2019-12-10 PROCEDURE — 3074F SYST BP LT 130 MM HG: CPT | Mod: HCNC,CPTII,S$GLB, | Performed by: INTERNAL MEDICINE

## 2019-12-10 PROCEDURE — 3078F PR MOST RECENT DIASTOLIC BLOOD PRESSURE < 80 MM HG: ICD-10-PCS | Mod: HCNC,CPTII,S$GLB, | Performed by: INTERNAL MEDICINE

## 2019-12-10 PROCEDURE — 1101F PR PT FALLS ASSESS DOC 0-1 FALLS W/OUT INJ PAST YR: ICD-10-PCS | Mod: HCNC,CPTII,S$GLB, | Performed by: INTERNAL MEDICINE

## 2019-12-10 PROCEDURE — 99999 PR PBB SHADOW E&M-EST. PATIENT-LVL IV: CPT | Mod: PBBFAC,HCNC,, | Performed by: INTERNAL MEDICINE

## 2019-12-10 PROCEDURE — 1101F PT FALLS ASSESS-DOCD LE1/YR: CPT | Mod: HCNC,CPTII,S$GLB, | Performed by: INTERNAL MEDICINE

## 2019-12-10 PROCEDURE — 3078F DIAST BP <80 MM HG: CPT | Mod: HCNC,CPTII,S$GLB, | Performed by: INTERNAL MEDICINE

## 2019-12-10 RX ORDER — TORSEMIDE 10 MG/1
10 TABLET ORAL DAILY
Qty: 90 TABLET | Refills: 3 | Status: SHIPPED | OUTPATIENT
Start: 2019-12-10 | End: 2020-01-28 | Stop reason: SDUPTHER

## 2019-12-10 RX ORDER — LINACLOTIDE 145 UG/1
CAPSULE, GELATIN COATED ORAL
Refills: 0 | COMMUNITY
Start: 2019-11-01 | End: 2020-07-29

## 2019-12-10 NOTE — PROGRESS NOTES
This note was created by combination of typed  and M-Modal dictation.  Transcription errors may be present.  If there are any questions, please contact me.    Assessment & Plan:   Urticaria  -unclear etiology but it seems to affect him the most from the chest on up as that seems to be the most excoriated.  Do not know if this is just a matter of reach.  Unclear the trigger.  It has been going on for maybe 2 months.  He did start Xultophy around July.   Otherwise all other meds are chronic  Will discuss with endo NP leonard changing back to levemir and seeing if this resolves?  Referral to derm.  If unable to elucidate cause then may have him see allergist  Bilirubin was normal. BUN was only mildly elevated  Eosinophil fraction on labs was normal.    He had had urticaria or around the time of trial of allopurinol back in May.  Is he very sensitive to medication?  Or is there are some other underlying process coincidental with initiation of medication?  -     Ambulatory referral to Dermatology    Essential hypertension  Chronic kidney disease, stage III (moderate)  -refilled torsemide  -     torsemide (DEMADEX) 10 MG Tab; Take 1 tablet (10 mg total) by mouth once daily.  Dispense: 90 tablet; Refill: 3    Need for shingles vaccine  -shingrix #2 to pharmacy  -     varicella-zoster gE-AS01B, PF, (SHINGRIX, PF,) 50 mcg/0.5 mL injection; Inject 0.5 mLs into the muscle once. This will be #2 for 1 dose  Dispense: 0.5 mL; Refill: 0        Medications Discontinued During This Encounter   Medication Reason    torsemide (DEMADEX) 10 MG Tab Reorder       meds sent this encounter:  Medications Ordered This Encounter   Medications    torsemide (DEMADEX) 10 MG Tab     Sig: Take 1 tablet (10 mg total) by mouth once daily.     Dispense:  90 tablet     Refill:  3    varicella-zoster gE-AS01B, PF, (SHINGRIX, PF,) 50 mcg/0.5 mL injection     Sig: Inject 0.5 mLs into the muscle once. This will be #2 for 1 dose     Dispense:   0.5 mL     Refill:  0       Follow Up: No follow-ups on file.    Subjective:     Chief Complaint   Patient presents with    Itching    Rash       VANESSA Maier is a 69 y.o. male, last appointment with this clinic was 10/15/2019.    Last visit with endo - decreased the Xultophy b/c of low fasting glc  Rheum - rising uric acid - consider restarting uloric with zyrtec    Recent labs - CKD stage 3 closer to stage 4  Uric acid high   bilirubin normal  BUN mild elevated.  C3, C4 and dsDNA normal    Comes in complaining of urticaria.  Itching all over.  The arms, the head, the back, the abdomen, the legs.  It has been going on for maybe 2 months.  Unclear the precipitant.  No new lotions, soaps, detergents.  Most of his medications have been chronic/longstanding.  He did start Xultophy around July.    Not taking amitiza in case that could have been a side effect of that.  He started Linzess for the past month.  But the rash has persisted.    His excoriations seem to be most present on his upper back, some on his chest, and his arms.  I can see a little bit on the back of his neck but nothing on his face and really nothing on the legs with the abdomen or the lower back.  He does demonstrate that he can reach the lower back and the abdomen and the legs    No systemic fevers or chills.  No generalized aches or pains.    No new lotions/soaps/detergents.        Answers for HPI/ROS submitted by the patient on 12/9/2019   Rash  Chronicity: recurrent  Onset: 1 to 4 weeks ago  Progression since onset: waxing and waning  Affected locations: back  Characteristics: dryness  Exposed to: nothing  anorexia: No  facial edema: No  fatigue: No  nail changes: No  rhinorrhea: No  Treatments tried: moisturizer  Improvement on treatment: no relief  asthma: No  allergies: No  eczema: No  varicella: No      Patient Care Team:  Farooq Domingo MD as PCP - General (Internal Medicine)  Tin Chambers MD (Gastroenterology)  Harrison Brannon MD  (Cardiology)  Roxanna Salazar MD as Nurse Practitioner (Rheumatology)  Malcolm Irwin MD as  (Nephrology)  Amy Gamez MA as Care Coordinator  Dangelo Hall MD as Consulting Physician (Ophthalmology)    Patient Active Problem List    Diagnosis Date Noted    Pleural effusion 10/15/2019     Overview:   thoracentesis 01/2015      Aortic atherosclerosis 09/24/2019    Pseudophakia of both eyes 08/27/2019    Refractive error 08/27/2019    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 05/02/2019    Anemia of chronic renal failure, stage 3 (moderate) 03/13/2019    Current chronic use of systemic steroids 02/27/2019    Compression fracture of body of thoracic vertebra on XR 2/2019; 2/2019 alendronate 02/27/2019    Neutropenia 02/26/2019    Thrombocytopenia 02/26/2019    Jackson's esophagus without dysplasia 03/14/2016    Anemia from plaquenil and imuran 03/14/2016    Diastolic heart failure, NYHA class 2 09/26/2014    BMI 23.0-23.9, adult 07/15/2014    Essential hypertension 05/13/2014 6/2019 TTE normal LV systolic and diastolic function; ABIs normal      Controlled type 2 diabetes mellitus with complication, without long-term current use of insulin 05/13/2014    Pure hypercholesterolemia 05/13/2014             MCTD (mixed connective tissue disease) 05/13/2014    Sjogren's disease 05/13/2014    Bilateral edema of lower extremity 6/2019 TTE normal LV systolic and diastolic function; ABIs normal 05/13/2014    Glaucoma 05/13/2014    Celiac disease 11/12/2013     Overview:   Gluten intolerant         PAST MEDICAL HISTORY:  Past Medical History:   Diagnosis Date    Anemia     Arthritis     Cataract     Chronic constipation     Diabetes mellitus, type 2     Felon of finger of left hand  5/11/2019    Glaucoma     Hyperlipidemia 5/13/2014    Hypertension     MCTD (mixed connective tissue disease)     Sjogren's disease     Ulcerative colitis     Urolithiasis        PAST SURGICAL HISTORY:  Past Surgical History:   Procedure Laterality Date    CATARACT EXTRACTION Bilateral 2005    COLONOSCOPY  2014    EYE SURGERY         SOCIAL HISTORY:  Social History     Socioeconomic History    Marital status:      Spouse name: Not on file    Number of children: 3    Years of education: Not on file    Highest education level: Not on file   Occupational History    Not on file   Social Needs    Financial resource strain: Somewhat hard    Food insecurity:     Worry: Never true     Inability: Never true    Transportation needs:     Medical: No     Non-medical: No   Tobacco Use    Smoking status: Never Smoker    Smokeless tobacco: Never Used   Substance and Sexual Activity    Alcohol use: No     Frequency: Never     Drinks per session: Patient refused     Binge frequency: Never    Drug use: No    Sexual activity: Never   Lifestyle    Physical activity:     Days per week: 3 days     Minutes per session: Not on file    Stress: Only a little   Relationships    Social connections:     Talks on phone: Once a week     Gets together: Once a week     Attends Yarsanism service: Not on file     Active member of club or organization: No     Attends meetings of clubs or organizations: Never     Relationship status:    Other Topics Concern    Not on file   Social History Narrative    Not on file       ALLERGIES AND MEDICATIONS: updated and reviewed.  Review of patient's allergies indicates:   Allergen Reactions    Penicillins Nausea And Vomiting    Uloric [febuxostat]      Aches and pain    Allopurinol analogues Rash     Current Outpatient Medications   Medication Sig Dispense Refill    alcohol swabs (ALCOHOL PADS) PadM Apply 1 each topically 4 (four) times daily. 400 each 3    amLODIPine  "(NORVASC) 5 MG tablet Take 1 tablet (5 mg total) by mouth 2 (two) times daily. 180 tablet 3    aspirin (ECOTRIN) 81 MG EC tablet Take 81 mg by mouth once daily.      blood sugar diagnostic Strp Check blood glucose 3x/day. e11.65 300 strip 3    cloNIDine 0.1 mg/24 hr td ptwk (CATAPRES) 0.1 mg/24 hr Place 1 patch onto the skin every 7 days. 4 patch 11    diclofenac sodium (VOLTAREN) 1 % Gel Apply 2-4 g topically once daily. 100 g 2    dorzolamide (TRUSOPT) 2 % ophthalmic solution Place 1 drop into both eyes 2 (two) times daily. 10 mL 3    insulin aspart U-100 (NOVOLOG FLEXPEN U-100 INSULIN) 100 unit/mL (3 mL) InPn pen Inject 3 units once daily 1 Box 1    insulin degludec-liraglutide (XULTOPHY 100/3.6) 100 unit-3.6 mg /mL (3 mL) InPn Inject 7 Units into the skin once daily. 5 Syringe 1    ipratropium (ATROVENT) 42 mcg (0.06 %) nasal spray INSTILL 2 SPRAYS BY NASAL ROUTE 3 TIMES DAILY. AS NEEDED FOR NASAL CONGESTION AND DRIP FOR 7 DAYS 30 mL 0    lancets Misc Check blood glucose 3x/day. e11.65 300 each 3    LINZESS 145 mcg Cap capsule TAKE ONE CAPSULE BY MOUTH EVERY DAY ON EMPTY STOMACH 30 MINUTES BEFORE BREAKFAST  0    lubiprostone (AMITIZA) 24 MCG Cap Take 1 capsule (24 mcg total) by mouth 2 (two) times daily with meals. 60 capsule 11    multivit with min-folic acid 200 mcg Chew       pen needle, diabetic (BD ULTRA-FINE RICHARD PEN NEEDLE) 32 gauge x 5/32" Ndle 1 Device by Misc.(Non-Drug; Combo Route) route 2 (two) times daily. 200 each 3    polyethylene glycol (GLYCOLAX) 17 gram/dose powder Take 17 g by mouth once daily. 850 g 11    torsemide (DEMADEX) 10 MG Tab Take 1 tablet (10 mg total) by mouth once daily. 90 tablet 3    travoprost (TRAVATAN Z) 0.004 % ophthalmic solution Place 1 drop into both eyes every evening. 5 mL 4    valsartan (DIOVAN) 160 MG tablet Take 1 tablet (160 mg total) by mouth 2 (two) times daily. 180 tablet 3    colchicine (COLCRYS) 0.6 mg tablet Take 1 tablet (0.6 mg total) by " "mouth once daily. for 7 days (Patient not taking: Reported on 10/15/2019) 7 tablet 0     No current facility-administered medications for this visit.        Review of Systems   Constitutional: Negative for fever.   HENT: Negative for congestion and sore throat.    Eyes: Negative for pain.   Respiratory: Negative for cough and shortness of breath.    Gastrointestinal: Negative for diarrhea and vomiting.   Musculoskeletal: Negative for joint pain.   Skin: Positive for rash.       Objective:   Physical Exam   Vitals:    12/10/19 1023   BP: (!) 108/58   BP Location: Left arm   Patient Position: Sitting   BP Method: Medium (Manual)   Pulse: 67   Temp: 97.9 °F (36.6 °C)   TempSrc: Oral   SpO2: 97%   Weight: 66.2 kg (146 lb)   Height: 5' 6" (1.676 m)    Body mass index is 23.57 kg/m².  Weight: 66.2 kg (146 lb)   Height: 5' 6" (167.6 cm)     Physical Exam   Constitutional: He is oriented to person, place, and time. He appears well-developed and well-nourished. No distress.   Eyes: EOM are normal.   Cardiovascular: Normal rate, regular rhythm and normal heart sounds.   No murmur heard.  Pulmonary/Chest: Effort normal and breath sounds normal.   Abdominal: Soft. He exhibits no distension and no mass. There is no tenderness. There is no guarding.   Musculoskeletal: Normal range of motion.   Neurological: He is alert and oriented to person, place, and time. Coordination normal.   Skin: Skin is warm and dry.   Excoriations on the flexor arms, the upper back, few areas of excoriation on the chest, a few areas of excoriation on the posterior neck but the face is spared, the scalp, the anterior neck, the lower back, the abdomen without significant excoriation, no papules or macules, no discoloration.  The lower extremities as well are generally free of excoriations.   Psychiatric: He has a normal mood and affect. His behavior is normal. Thought content normal.       Component      Latest Ref Rng & Units 11/29/2019   WBC      3.90 - " 12.70 K/uL 7.64   RBC      4.60 - 6.20 M/uL 4.30 (L)   Hemoglobin      14.0 - 18.0 g/dL 11.5 (L)   Hematocrit      40.0 - 54.0 % 38.6 (L)   MCV      82 - 98 fL 90   MCH      27.0 - 31.0 pg 26.7 (L)   MCHC      32.0 - 36.0 g/dL 29.8 (L)   RDW      11.5 - 14.5 % 14.4   Platelets      150 - 350 K/uL 248   MPV      9.2 - 12.9 fL 12.3   Immature Granulocytes      0.0 - 0.5 % 0.3   Gran # (ANC)      1.8 - 7.7 K/uL 4.8   Immature Grans (Abs)      0.00 - 0.04 K/uL 0.02   Lymph #      1.0 - 4.8 K/uL 1.3   Mono #      0.3 - 1.0 K/uL 1.2 (H)   Eos #      0.0 - 0.5 K/uL 0.3   Baso #      0.00 - 0.20 K/uL 0.03   nRBC      0 /100 WBC 0   Gran%      38.0 - 73.0 % 63.2   Lymph%      18.0 - 48.0 % 16.6 (L)   Mono%      4.0 - 15.0 % 15.4 (H)   Eosinophil%      0.0 - 8.0 % 4.1   Basophil%      0.0 - 1.9 % 0.4   Differential Method       Automated   Sodium      136 - 145 mmol/L 137   Potassium      3.5 - 5.1 mmol/L 4.5   Chloride      95 - 110 mmol/L 102   CO2      23 - 29 mmol/L 24   Glucose      70 - 110 mg/dL 141 (H)   BUN, Bld      8 - 23 mg/dL 25 (H)   Creatinine      0.5 - 1.4 mg/dL 1.8 (H)   Calcium      8.7 - 10.5 mg/dL 9.9   PROTEIN TOTAL      6.0 - 8.4 g/dL 8.2   Albumin      3.5 - 5.2 g/dL 3.6   BILIRUBIN TOTAL      0.1 - 1.0 mg/dL 0.4   Alkaline Phosphatase      55 - 135 U/L 80   AST      10 - 40 U/L 17   ALT      10 - 44 U/L 12   Anion Gap      8 - 16 mmol/L 11   eGFR if African American      >60 mL/min/1.73 m:2 43.4 (A)   eGFR if non African American      >60 mL/min/1.73 m:2 37.6 (A)   Uric Acid      3.4 - 7.0 mg/dL 10.1 (H)   CRP      0.0 - 8.2 mg/L 5.6   Sed Rate      0 - 23 mm/Hr 42 (H)   Complement (C-3)      50 - 180 mg/dL 120   Complement (C-4)      11 - 44 mg/dL 26   ds DNA Ab      Negative 1:10 Negative 1:10

## 2019-12-11 ENCOUNTER — TELEPHONE (OUTPATIENT)
Dept: ENDOCRINOLOGY | Facility: CLINIC | Age: 69
End: 2019-12-11

## 2019-12-11 RX ORDER — INSULIN GLARGINE 100 [IU]/ML
8 INJECTION, SOLUTION SUBCUTANEOUS DAILY
Qty: 1 BOX | Refills: 0 | Status: SHIPPED | OUTPATIENT
Start: 2019-12-11 | End: 2020-01-08

## 2019-12-11 NOTE — TELEPHONE ENCOUNTER
----- Message from Farooq Domingo MD sent at 12/10/2019  2:42 PM CST -----  Regarding: urticaria possible SE of xultophy?  Hello,    Saw our mutual pt today.  He has urticaria for the past few months.  Only new med I could think of was the xultophy which was started in august.  I am referring him to derm.  I believe he was taking levemir before - would it be OK to change him back to that, to see if his itching goes away? He seems to have hypersensitivity to a lot of meds - to allopurinol and uloric too.    Thanks,    Farooq

## 2019-12-11 NOTE — TELEPHONE ENCOUNTER
Please advise pt that Dr. Domingo reached out to me about switching from Xultophy to Levemir to see if that will help to relieve the itching. Please go ahead and stop Xultophy. Restart Lantus (gray pen), which he reported taking back in July, at 8 units once daily. A rx has been sent to [facility].

## 2019-12-12 ENCOUNTER — PATIENT MESSAGE (OUTPATIENT)
Dept: RHEUMATOLOGY | Facility: CLINIC | Age: 69
End: 2019-12-12

## 2019-12-12 DIAGNOSIS — L29.9 PRURITUS: Primary | ICD-10-CM

## 2020-01-05 DIAGNOSIS — M79.672 LEFT FOOT PAIN: ICD-10-CM

## 2020-01-05 RX ORDER — DICLOFENAC SODIUM 10 MG/G
2 GEL TOPICAL 4 TIMES DAILY PRN
Qty: 100 G | Refills: 0 | Status: SHIPPED | OUTPATIENT
Start: 2020-01-05 | End: 2020-03-18 | Stop reason: SDUPTHER

## 2020-01-08 RX ORDER — INSULIN GLARGINE 100 [IU]/ML
INJECTION, SOLUTION SUBCUTANEOUS
Qty: 5 SYRINGE | Refills: 0 | Status: SHIPPED | OUTPATIENT
Start: 2020-01-08 | End: 2020-01-10 | Stop reason: ALTCHOICE

## 2020-01-09 ENCOUNTER — PATIENT MESSAGE (OUTPATIENT)
Dept: ENDOCRINOLOGY | Facility: CLINIC | Age: 70
End: 2020-01-09

## 2020-01-09 ENCOUNTER — TELEPHONE (OUTPATIENT)
Dept: ENDOCRINOLOGY | Facility: CLINIC | Age: 70
End: 2020-01-09

## 2020-01-09 ENCOUNTER — LAB VISIT (OUTPATIENT)
Dept: LAB | Facility: HOSPITAL | Age: 70
End: 2020-01-09
Attending: INTERNAL MEDICINE
Payer: MEDICARE

## 2020-01-09 DIAGNOSIS — M1A.9XX1 TOPHACEOUS GOUT: ICD-10-CM

## 2020-01-09 DIAGNOSIS — E11.8 CONTROLLED TYPE 2 DIABETES MELLITUS WITH COMPLICATION, WITHOUT LONG-TERM CURRENT USE OF INSULIN: ICD-10-CM

## 2020-01-09 LAB
ALBUMIN SERPL BCP-MCNC: 3.1 G/DL (ref 3.5–5.2)
ALP SERPL-CCNC: 82 U/L (ref 55–135)
ALT SERPL W/O P-5'-P-CCNC: 11 U/L (ref 10–44)
ANION GAP SERPL CALC-SCNC: 9 MMOL/L (ref 8–16)
AST SERPL-CCNC: 16 U/L (ref 10–40)
BILIRUB SERPL-MCNC: 0.3 MG/DL (ref 0.1–1)
BUN SERPL-MCNC: 27 MG/DL (ref 8–23)
CALCIUM SERPL-MCNC: 9.4 MG/DL (ref 8.7–10.5)
CHLORIDE SERPL-SCNC: 103 MMOL/L (ref 95–110)
CHOLEST SERPL-MCNC: 231 MG/DL (ref 120–199)
CHOLEST/HDLC SERPL: 6.4 {RATIO} (ref 2–5)
CO2 SERPL-SCNC: 25 MMOL/L (ref 23–29)
CREAT SERPL-MCNC: 1.6 MG/DL (ref 0.5–1.4)
EST. GFR  (AFRICAN AMERICAN): 50.1 ML/MIN/1.73 M^2
EST. GFR  (NON AFRICAN AMERICAN): 43.3 ML/MIN/1.73 M^2
ESTIMATED AVG GLUCOSE: 212 MG/DL (ref 68–131)
GLUCOSE SERPL-MCNC: 137 MG/DL (ref 70–110)
HBA1C MFR BLD HPLC: 9 % (ref 4–5.6)
HDLC SERPL-MCNC: 36 MG/DL (ref 40–75)
HDLC SERPL: 15.6 % (ref 20–50)
LDLC SERPL CALC-MCNC: 177 MG/DL (ref 63–159)
NONHDLC SERPL-MCNC: 195 MG/DL
POTASSIUM SERPL-SCNC: 4.8 MMOL/L (ref 3.5–5.1)
PROT SERPL-MCNC: 7.9 G/DL (ref 6–8.4)
SODIUM SERPL-SCNC: 137 MMOL/L (ref 136–145)
TRIGL SERPL-MCNC: 90 MG/DL (ref 30–150)
URATE SERPL-MCNC: 8.3 MG/DL (ref 3.4–7)

## 2020-01-09 PROCEDURE — 83036 HEMOGLOBIN GLYCOSYLATED A1C: CPT | Mod: HCNC

## 2020-01-09 PROCEDURE — 80061 LIPID PANEL: CPT | Mod: HCNC

## 2020-01-09 PROCEDURE — 36415 COLL VENOUS BLD VENIPUNCTURE: CPT | Mod: HCNC,PO

## 2020-01-09 PROCEDURE — 80053 COMPREHEN METABOLIC PANEL: CPT | Mod: HCNC

## 2020-01-09 PROCEDURE — 84550 ASSAY OF BLOOD/URIC ACID: CPT | Mod: HCNC

## 2020-01-09 NOTE — TELEPHONE ENCOUNTER
Spoke with pt, he had already seen his A1C results before I can call him and he requested and earlier appt. He wants 01/10/2020 @10:30AM. Pt wanted an earlier time but I informed him [facility] did not have anything early until 01/22/2020.

## 2020-01-10 ENCOUNTER — PES CALL (OUTPATIENT)
Dept: ADMINISTRATIVE | Facility: CLINIC | Age: 70
End: 2020-01-10

## 2020-01-10 ENCOUNTER — OFFICE VISIT (OUTPATIENT)
Dept: ENDOCRINOLOGY | Facility: CLINIC | Age: 70
End: 2020-01-10
Payer: MEDICARE

## 2020-01-10 VITALS
DIASTOLIC BLOOD PRESSURE: 67 MMHG | HEART RATE: 70 BPM | SYSTOLIC BLOOD PRESSURE: 128 MMHG | BODY MASS INDEX: 23.47 KG/M2 | WEIGHT: 145.38 LBS

## 2020-01-10 DIAGNOSIS — I10 ESSENTIAL HYPERTENSION: ICD-10-CM

## 2020-01-10 DIAGNOSIS — N18.30 CKD (CHRONIC KIDNEY DISEASE) STAGE 3, GFR 30-59 ML/MIN: ICD-10-CM

## 2020-01-10 DIAGNOSIS — E78.5 HYPERLIPIDEMIA, UNSPECIFIED HYPERLIPIDEMIA TYPE: ICD-10-CM

## 2020-01-10 PROCEDURE — 1159F PR MEDICATION LIST DOCUMENTED IN MEDICAL RECORD: ICD-10-PCS | Mod: HCNC,S$GLB,, | Performed by: NURSE PRACTITIONER

## 2020-01-10 PROCEDURE — 3074F PR MOST RECENT SYSTOLIC BLOOD PRESSURE < 130 MM HG: ICD-10-PCS | Mod: HCNC,CPTII,S$GLB, | Performed by: NURSE PRACTITIONER

## 2020-01-10 PROCEDURE — 99999 PR PBB SHADOW E&M-EST. PATIENT-LVL IV: ICD-10-PCS | Mod: PBBFAC,HCNC,, | Performed by: NURSE PRACTITIONER

## 2020-01-10 PROCEDURE — 3074F SYST BP LT 130 MM HG: CPT | Mod: HCNC,CPTII,S$GLB, | Performed by: NURSE PRACTITIONER

## 2020-01-10 PROCEDURE — 3078F PR MOST RECENT DIASTOLIC BLOOD PRESSURE < 80 MM HG: ICD-10-PCS | Mod: HCNC,CPTII,S$GLB, | Performed by: NURSE PRACTITIONER

## 2020-01-10 PROCEDURE — 99214 PR OFFICE/OUTPT VISIT, EST, LEVL IV, 30-39 MIN: ICD-10-PCS | Mod: HCNC,S$GLB,, | Performed by: NURSE PRACTITIONER

## 2020-01-10 PROCEDURE — 1126F PR PAIN SEVERITY QUANTIFIED, NO PAIN PRESENT: ICD-10-PCS | Mod: HCNC,S$GLB,, | Performed by: NURSE PRACTITIONER

## 2020-01-10 PROCEDURE — 99214 OFFICE O/P EST MOD 30 MIN: CPT | Mod: HCNC,S$GLB,, | Performed by: NURSE PRACTITIONER

## 2020-01-10 PROCEDURE — 99999 PR PBB SHADOW E&M-EST. PATIENT-LVL IV: CPT | Mod: PBBFAC,HCNC,, | Performed by: NURSE PRACTITIONER

## 2020-01-10 PROCEDURE — 1126F AMNT PAIN NOTED NONE PRSNT: CPT | Mod: HCNC,S$GLB,, | Performed by: NURSE PRACTITIONER

## 2020-01-10 PROCEDURE — 1101F PR PT FALLS ASSESS DOC 0-1 FALLS W/OUT INJ PAST YR: ICD-10-PCS | Mod: HCNC,CPTII,S$GLB, | Performed by: NURSE PRACTITIONER

## 2020-01-10 PROCEDURE — 1159F MED LIST DOCD IN RCRD: CPT | Mod: HCNC,S$GLB,, | Performed by: NURSE PRACTITIONER

## 2020-01-10 PROCEDURE — 1101F PT FALLS ASSESS-DOCD LE1/YR: CPT | Mod: HCNC,CPTII,S$GLB, | Performed by: NURSE PRACTITIONER

## 2020-01-10 PROCEDURE — 3078F DIAST BP <80 MM HG: CPT | Mod: HCNC,CPTII,S$GLB, | Performed by: NURSE PRACTITIONER

## 2020-01-10 RX ORDER — INSULIN ASPART 100 [IU]/ML
INJECTION, SOLUTION INTRAVENOUS; SUBCUTANEOUS
Qty: 1 BOX | Refills: 1 | Status: SHIPPED | OUTPATIENT
Start: 2020-01-10 | End: 2020-01-30

## 2020-01-10 NOTE — PROGRESS NOTES
CC: This 69 y.o.  male  is here for evaluation of  T2DM along with comorbidities indicated in the Visit Diagnosis section of this encounter.    HPI: Darrion Bennett was diagnosed with T2DM at age 35. Oral medications started years later.    Previously seen by Dr. Marrufo's office, Endocrinology at [facility]. He is transferred all his care to [facility].         Prior visit 10/10/19  a1c is the same at 6.9%.   Plan A1c is lower than expected compared to glucose readings - perhaps because d/t anemia.   Will restart prandial insulin -  start Novolog 3 units before lunch to help avoid glucoses in the 200s.   Continue Xultophy 7 units once daily.   Test blood sugar 2-3x/day.   Return to clinic in 3 months with labs prior. Please call with any issues.       Interval history  Upon Dr. Domingo's suggestion (pt's PCP), a rx for Lantus was sent to replace Xultophy d/t question of it causing hives. However, patient never got the message to switch and has continued to take Xultophy. Rash has resolved.       PMHX: Sjogren's syndrome, gouty arthritis - sees Rheumatology     LAST DIABETES EDUCATION: multiple times, years ago     HOSPITALIZED FOR DIABETES  -  No     PRESCRIBED DIABETES MEDICATIONS: Xultophy 7 units once daily, Novolog 3 units before lunch     Misses medication doses - No    DM COMPLICATIONS: nephropathy    SIGNIFICANT DIABETES MED HISTORY: has been told that metformin is bad for his kidneys and that's why he stopped it   Lantus, Humalog, and Tradjenta stopped at initial visit 7/2019 and he was switched to Xultophy.     SELF MONITORING BLOOD GLUCOSE: Checks blood glucose at home 2x/day fasting and predinner. No logs.    -140s  predinner 240-260s, lowest 180s     HYPOGLYCEMIC EPISODES: none recent;  feels different at BGs in the 60s.      CURRENT DIET: drinks water, diet soda; has some juice to take with his meds. Eats 3 meals/day but does not have an appetite to eat. Skips breakfast sometimes. Lunch is  steamed vegetables. Eats home cooked foods, no processed foods.     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 No acute distress.   PSYCH: AAOx3, appropriate mood and affect, conversant, well-groomed. Judgement and insight good.   NEURO: Cranial nerves grossly intact. Speech clear, no tremor.   CHEST: Respirations even and unlabored.         [facility] - 4/23/19  Total chol 188 - HDL 44 - trig 98 -   Creatinine 1.37   eGFR non AA - 52   A1c 5.9%   Hemoglobin 10/hematocrit 31  Vitamin D 42      Hemoglobin A1C   Date Value Ref Range Status   01/09/2020 9.0 (H) 4.0 - 5.6 % Final     Comment:     ADA Screening Guidelines:  5.7-6.4%  Consistent with prediabetes  >or=6.5%  Consistent with diabetes  High levels of fetal hemoglobin interfere with the HbA1C  assay. Heterozygous hemoglobin variants (HbS, HgC, etc)do  not significantly interfere with this assay.   However, presence of multiple variants may affect accuracy.     09/19/2019 6.9 (H) 4.0 - 5.6 % Final     Comment:     ADA Screening Guidelines:  5.7-6.4%  Consistent with prediabetes  >or=6.5%  Consistent with diabetes  High levels of fetal hemoglobin interfere with the HbA1C  assay. Heterozygous hemoglobin variants (HbS, HgC, etc)do  not significantly interfere with this assay.   However, presence of multiple variants may affect accuracy.     06/11/2019 6.9 (H) 4.0 - 5.6 % Final     Comment:     ADA Screening Guidelines:  5.7-6.4%  Consistent with prediabetes  >or=6.5%  Consistent with diabetes  High levels of  fetal hemoglobin interfere with the HbA1C  assay. Heterozygous hemoglobin variants (HbS, HgC, etc)do  not significantly interfere with this assay.   However, presence of multiple variants may affect accuracy.     10/16/2018 6.2 (H) 4.0 - 5.7 % Final     Comment:     Dr. Jurgen Ward ( Endocrinology )        Ref. Range 9/19/2019 08:13   Glucose Latest Ref Range: 70 - 110 mg/dL 170 (H)   C-Peptide Latest Ref Range: 0.78 - 5.19 ng/mL 1.33         Chemistry        Component Value Date/Time     01/09/2020 0742    K 4.8 01/09/2020 0742     01/09/2020 0742    CO2 25 01/09/2020 0742    BUN 27 (H) 01/09/2020 0742    CREATININE 1.6 (H) 01/09/2020 0742     (H) 01/09/2020 0742        Component Value Date/Time    CALCIUM 9.4 01/09/2020 0742    ALKPHOS 82 01/09/2020 0742    AST 16 01/09/2020 0742    ALT 11 01/09/2020 0742    BILITOT 0.3 01/09/2020 0742    ESTGFRAFRICA 50.1 (A) 01/09/2020 0742    EGFRNONAA 43.3 (A) 01/09/2020 0742          Lab Results   Component Value Date    LDLCALC 177.0 (H) 01/09/2020       Lab Results   Component Value Date    MICALBCREAT 519 (H) 10/16/2018               ASSESSMENT and PLAN:    A1C GOAL: 7.5 %     1. Diabetes mellitus type 2, uncontrolled, with complications  Patient is taking such a small amount of Xultophy at 7 units a day that he is not really getting much of any benefit from the liraglutide component. Reluctant to start patient on Trulcity or similar agent (with goal of eliminating prandial insulin) due to appetite suppressant effect, as his appetite is already low.  Advised -     When you finish the Xultophy, you can switch back to Lantus which you already have at home. The lantus will probably be just as effective.     Continue Novolog for now - start taking Novolog twice daily. Inject Novolog 3 units before breakfast and increase to 4 units before lunch.   Test blood sugar 3x/day. Keep a log and note on the log when you skip breakfast.   Return to clinic in 4-6  weeks. Send a blood sugar log in 1-2 weeks. Bring written blood sugar logs to every visit.      2. CKD (chronic kidney disease) stage 3, GFR 30-59 ml/min  Improve glycemic control.      3. Essential hypertension  controlled   4.  HLD H/o statin intolerance.   Prefers not to start Zetia d/t potential high cost.          No orders of the defined types were placed in this encounter.       Follow up in about 4 weeks (around 2/7/2020).

## 2020-01-10 NOTE — PATIENT INSTRUCTIONS
Patient is taking such a small amount of Xultophy at 7 units a day that he is not really getting much of any benefit from the liraglutide component.   When you finish the Xultophy, you can switch back to Lantus which you already have at home. The lantus will probably be just as effective.   Reluctant to start patient on Trulcity or similar agent (with goal of eliminating prandial insulin) due to appetite suppressant effect, as his appetite is already low.   Continue Novolog for now - start taking Novolog twice daily. Inject Novolog 3 units before breakfast and increase to 4 units before lunch.   Test blood sugar 3x/day. Keep a log and note on the log when you skip breakfast.   Return to clinic in 4-6 weeks. Send a blood sugar log in 1-2 weeks. Bring written blood sugar logs to every visit.

## 2020-01-16 ENCOUNTER — OFFICE VISIT (OUTPATIENT)
Dept: ORTHOPEDICS | Facility: CLINIC | Age: 70
End: 2020-01-16
Payer: MEDICARE

## 2020-01-16 ENCOUNTER — OFFICE VISIT (OUTPATIENT)
Dept: HEMATOLOGY/ONCOLOGY | Facility: CLINIC | Age: 70
End: 2020-01-16
Payer: MEDICARE

## 2020-01-16 VITALS
RESPIRATION RATE: 16 BRPM | HEART RATE: 61 BPM | WEIGHT: 146.38 LBS | DIASTOLIC BLOOD PRESSURE: 80 MMHG | HEIGHT: 65 IN | SYSTOLIC BLOOD PRESSURE: 122 MMHG | OXYGEN SATURATION: 97 % | BODY MASS INDEX: 24.39 KG/M2

## 2020-01-16 VITALS
DIASTOLIC BLOOD PRESSURE: 65 MMHG | WEIGHT: 145.5 LBS | TEMPERATURE: 98 F | OXYGEN SATURATION: 100 % | HEIGHT: 65 IN | HEART RATE: 66 BPM | BODY MASS INDEX: 24.24 KG/M2 | SYSTOLIC BLOOD PRESSURE: 140 MMHG

## 2020-01-16 DIAGNOSIS — N18.30 CHRONIC KIDNEY DISEASE, STAGE III (MODERATE): ICD-10-CM

## 2020-01-16 DIAGNOSIS — N18.30 ANEMIA OF CHRONIC RENAL FAILURE, STAGE 3 (MODERATE): ICD-10-CM

## 2020-01-16 DIAGNOSIS — D63.1 ANEMIA OF CHRONIC RENAL FAILURE, STAGE 3 (MODERATE): ICD-10-CM

## 2020-01-16 DIAGNOSIS — M35.9 CONNECTIVE TISSUE DISORDER: Primary | ICD-10-CM

## 2020-01-16 DIAGNOSIS — M1A.9XX1 TOPHUS OF HAND DUE TO GOUT: ICD-10-CM

## 2020-01-16 DIAGNOSIS — M1A.9XX1 TOPHACEOUS GOUT: Primary | ICD-10-CM

## 2020-01-16 PROCEDURE — 1159F MED LIST DOCD IN RCRD: CPT | Mod: HCNC,S$GLB,, | Performed by: ORTHOPAEDIC SURGERY

## 2020-01-16 PROCEDURE — 99999 PR PBB SHADOW E&M-EST. PATIENT-LVL III: ICD-10-PCS | Mod: PBBFAC,HCNC,, | Performed by: INTERNAL MEDICINE

## 2020-01-16 PROCEDURE — 99213 OFFICE O/P EST LOW 20 MIN: CPT | Mod: HCNC,S$GLB,, | Performed by: INTERNAL MEDICINE

## 2020-01-16 PROCEDURE — 1159F MED LIST DOCD IN RCRD: CPT | Mod: HCNC,S$GLB,, | Performed by: INTERNAL MEDICINE

## 2020-01-16 PROCEDURE — 88305 TISSUE EXAM BY PATHOLOGIST: ICD-10-PCS | Mod: 26,HCNC,, | Performed by: PATHOLOGY

## 2020-01-16 PROCEDURE — 3078F DIAST BP <80 MM HG: CPT | Mod: HCNC,CPTII,S$GLB, | Performed by: INTERNAL MEDICINE

## 2020-01-16 PROCEDURE — 3079F DIAST BP 80-89 MM HG: CPT | Mod: HCNC,CPTII,S$GLB, | Performed by: ORTHOPAEDIC SURGERY

## 2020-01-16 PROCEDURE — 1125F PR PAIN SEVERITY QUANTIFIED, PAIN PRESENT: ICD-10-PCS | Mod: HCNC,S$GLB,, | Performed by: INTERNAL MEDICINE

## 2020-01-16 PROCEDURE — 99213 PR OFFICE/OUTPT VISIT, EST, LEVL III, 20-29 MIN: ICD-10-PCS | Mod: 25,HCNC,S$GLB, | Performed by: ORTHOPAEDIC SURGERY

## 2020-01-16 PROCEDURE — 99999 PR PBB SHADOW E&M-EST. PATIENT-LVL V: CPT | Mod: PBBFAC,HCNC,, | Performed by: ORTHOPAEDIC SURGERY

## 2020-01-16 PROCEDURE — 3078F PR MOST RECENT DIASTOLIC BLOOD PRESSURE < 80 MM HG: ICD-10-PCS | Mod: HCNC,CPTII,S$GLB, | Performed by: INTERNAL MEDICINE

## 2020-01-16 PROCEDURE — 99213 OFFICE O/P EST LOW 20 MIN: CPT | Mod: 25,HCNC,S$GLB, | Performed by: ORTHOPAEDIC SURGERY

## 2020-01-16 PROCEDURE — 1101F PR PT FALLS ASSESS DOC 0-1 FALLS W/OUT INJ PAST YR: ICD-10-PCS | Mod: HCNC,CPTII,S$GLB, | Performed by: INTERNAL MEDICINE

## 2020-01-16 PROCEDURE — 99499 UNLISTED E&M SERVICE: CPT | Mod: HCNC,S$GLB,, | Performed by: INTERNAL MEDICINE

## 2020-01-16 PROCEDURE — 99499 RISK ADDL DX/OHS AUDIT: ICD-10-PCS | Mod: HCNC,S$GLB,, | Performed by: INTERNAL MEDICINE

## 2020-01-16 PROCEDURE — 99999 PR PBB SHADOW E&M-EST. PATIENT-LVL V: ICD-10-PCS | Mod: PBBFAC,HCNC,, | Performed by: ORTHOPAEDIC SURGERY

## 2020-01-16 PROCEDURE — 1101F PT FALLS ASSESS-DOCD LE1/YR: CPT | Mod: HCNC,CPTII,S$GLB, | Performed by: INTERNAL MEDICINE

## 2020-01-16 PROCEDURE — 3079F PR MOST RECENT DIASTOLIC BLOOD PRESSURE 80-89 MM HG: ICD-10-PCS | Mod: HCNC,CPTII,S$GLB, | Performed by: ORTHOPAEDIC SURGERY

## 2020-01-16 PROCEDURE — 3077F PR MOST RECENT SYSTOLIC BLOOD PRESSURE >= 140 MM HG: ICD-10-PCS | Mod: HCNC,CPTII,S$GLB, | Performed by: INTERNAL MEDICINE

## 2020-01-16 PROCEDURE — 88305 TISSUE EXAM BY PATHOLOGIST: CPT | Mod: 26,HCNC,, | Performed by: PATHOLOGY

## 2020-01-16 PROCEDURE — 1101F PT FALLS ASSESS-DOCD LE1/YR: CPT | Mod: HCNC,CPTII,S$GLB, | Performed by: ORTHOPAEDIC SURGERY

## 2020-01-16 PROCEDURE — 99213 PR OFFICE/OUTPT VISIT, EST, LEVL III, 20-29 MIN: ICD-10-PCS | Mod: HCNC,S$GLB,, | Performed by: INTERNAL MEDICINE

## 2020-01-16 PROCEDURE — 99999 PR PBB SHADOW E&M-EST. PATIENT-LVL III: CPT | Mod: PBBFAC,HCNC,, | Performed by: INTERNAL MEDICINE

## 2020-01-16 PROCEDURE — 3074F SYST BP LT 130 MM HG: CPT | Mod: HCNC,CPTII,S$GLB, | Performed by: ORTHOPAEDIC SURGERY

## 2020-01-16 PROCEDURE — 1125F PR PAIN SEVERITY QUANTIFIED, PAIN PRESENT: ICD-10-PCS | Mod: HCNC,S$GLB,, | Performed by: ORTHOPAEDIC SURGERY

## 2020-01-16 PROCEDURE — 3074F PR MOST RECENT SYSTOLIC BLOOD PRESSURE < 130 MM HG: ICD-10-PCS | Mod: HCNC,CPTII,S$GLB, | Performed by: ORTHOPAEDIC SURGERY

## 2020-01-16 PROCEDURE — 1159F PR MEDICATION LIST DOCUMENTED IN MEDICAL RECORD: ICD-10-PCS | Mod: HCNC,S$GLB,, | Performed by: INTERNAL MEDICINE

## 2020-01-16 PROCEDURE — 1125F AMNT PAIN NOTED PAIN PRSNT: CPT | Mod: HCNC,S$GLB,, | Performed by: ORTHOPAEDIC SURGERY

## 2020-01-16 PROCEDURE — 1125F AMNT PAIN NOTED PAIN PRSNT: CPT | Mod: HCNC,S$GLB,, | Performed by: INTERNAL MEDICINE

## 2020-01-16 PROCEDURE — 88305 TISSUE EXAM BY PATHOLOGIST: CPT | Mod: HCNC | Performed by: PATHOLOGY

## 2020-01-16 PROCEDURE — 3077F SYST BP >= 140 MM HG: CPT | Mod: HCNC,CPTII,S$GLB, | Performed by: INTERNAL MEDICINE

## 2020-01-16 PROCEDURE — 1159F PR MEDICATION LIST DOCUMENTED IN MEDICAL RECORD: ICD-10-PCS | Mod: HCNC,S$GLB,, | Performed by: ORTHOPAEDIC SURGERY

## 2020-01-16 PROCEDURE — 1101F PR PT FALLS ASSESS DOC 0-1 FALLS W/OUT INJ PAST YR: ICD-10-PCS | Mod: HCNC,CPTII,S$GLB, | Performed by: ORTHOPAEDIC SURGERY

## 2020-01-16 NOTE — Clinical Note
CBC today at [facility]. I will call results. Standing orders for CBC,CMP before next visit.

## 2020-01-16 NOTE — PROGRESS NOTES
Follow up visit    History of Present Illness:   Darrion comes to the office for follow up evaluation of tophaceous gout. Has has previous tophi drained from tips of multiple fingers. Has seen rheum regarding treatment - they requested drained material be sent for path. Cannot tolerate uric acid lowering meds because of CKD/itching    Now has swelling and pain to right index and long fingers. Wants them drained    ROS: unremarkable and no change since last visit    Physical Examination:    NAD  Right hand   Visible tophus below skin on volar tip of index finger. With associated tenderness and erythema  + tenderness and erythema over volar long finger tip but no palpable mass or visible tophus   LTSI m/u/r  2+ RP  + EPL, IO, FDS, FDP    Radiographic imaging: no new imaging     Assessment/Plan:  69 y.o. male  With right index gouty tophus    We discussed the etiology of persistent pain and further treatment options.  1. Tophus removed in clinic, sent for path. See procedure note.   2. Will observe long finger     All questions were answered in detail. The patient  verbalized the understanding of the treatment plan and is in full agreement with the treatment plan.

## 2020-01-16 NOTE — PROGRESS NOTES
A1c jumped.  Had subsequent follow-up with diabetes nurse practitioner  Uric acid still high.  Had had adverse reactions to both allopurinol as well as Uloric.  Last plan with Rheumatology was to stay off of these and monitor  Chronic kidney disease. Stable.  Followed by Nephrology  Hyperlipidemia, history of statin with adverse reaction.  Zetia was expensive.  upcoming consult with derm next month

## 2020-01-16 NOTE — PROCEDURES
The right index finger was anesthetized using 10 cc of plain lidocaine with a digital block. Incision was made over the visible tophus.  It was drained from the finger & sent to pathology. No purulent material was seen. The patient tolerated the procedure well

## 2020-01-16 NOTE — PROGRESS NOTES
Chief Complaint :   Pancytopenia.     Hx of Present illness :  Patient is a 69 y.o. year old male who presents to the clinic today for   Oncology evaluation. Labs revealed significant decrease in blood counts. Dx'd to have Sjogren's syndrome four years ago.  Was on Plaquenil & immuran in the past. That is still on hold.   Has been having problems with gout. Followed by orthopedic surgeon .      Allergies :    Review of patient's allergies indicates:   Allergen Reactions    Penicillins Nausea And Vomiting       Occupation :   [employer]    Transfusion :  None    Menstrual & obstetric Hx :      Present Meds :   Medication List with Changes/Refills   Current Medications    ALCOHOL SWABS (ALCOHOL PADS) PADM    Apply 1 each topically 4 (four) times daily.    AMLODIPINE (NORVASC) 5 MG TABLET    Take 1 tablet (5 mg total) by mouth 2 (two) times daily.    ASPIRIN (ECOTRIN) 81 MG EC TABLET    Take 81 mg by mouth once daily.    BLOOD SUGAR DIAGNOSTIC STRP    Check blood glucose 3x/day. e11.65    CLONIDINE 0.1 MG/24 HR TD PTWK (CATAPRES) 0.1 MG/24 HR    Place 1 patch onto the skin every 7 days.    COLCHICINE (COLCRYS) 0.6 MG TABLET    Take 1 tablet (0.6 mg total) by mouth once daily. for 7 days    DICLOFENAC SODIUM (VOLTAREN) 1 % GEL    Apply 2 g topically 4 (four) times daily as needed. Apply to aching joints    DORZOLAMIDE (TRUSOPT) 2 % OPHTHALMIC SOLUTION    Place 1 drop into both eyes 2 (two) times daily.    INSULIN ASPART U-100 (NOVOLOG FLEXPEN U-100 INSULIN) 100 UNIT/ML (3 ML) INPN PEN    Inject 5 units three times daily before each meal    IPRATROPIUM (ATROVENT) 42 MCG (0.06 %) NASAL SPRAY    INSTILL 2 SPRAYS BY NASAL ROUTE 3 TIMES DAILY. AS NEEDED FOR NASAL CONGESTION AND DRIP FOR 7 DAYS    LANCETS MISC    Check blood glucose 3x/day. e11.65    LINZESS 145 MCG CAP CAPSULE    TAKE ONE CAPSULE BY MOUTH EVERY DAY ON EMPTY STOMACH 30 MINUTES BEFORE BREAKFAST    LUBIPROSTONE (AMITIZA) 24 MCG CAP    Take 1 capsule  "(24 mcg total) by mouth 2 (two) times daily with meals.    MULTIVIT WITH MIN-FOLIC ACID 200 MCG CHEW        PEN NEEDLE, DIABETIC (BD ULTRA-FINE RICHARD PEN NEEDLE) 32 GAUGE X 5/32" NDLE    1 Device by Misc.(Non-Drug; Combo Route) route 2 (two) times daily.    POLYETHYLENE GLYCOL (GLYCOLAX) 17 GRAM/DOSE POWDER    Take 17 g by mouth once daily.    TORSEMIDE (DEMADEX) 10 MG TAB    Take 1 tablet (10 mg total) by mouth once daily.    TRAVOPROST (TRAVATAN Z) 0.004 % OPHTHALMIC SOLUTION    Place 1 drop into both eyes every evening.    VALSARTAN (DIOVAN) 160 MG TABLET    Take 1 tablet (160 mg total) by mouth 2 (two) times daily.       Past Medical Hx :  Reviewed.     Past Medical Hx :  Past Medical History:   Diagnosis Date    Anemia     Arthritis     Cataract     Chronic constipation     Diabetes mellitus, type 2     Felon of finger of left hand 5/11/2019    Glaucoma     Hyperlipidemia 5/13/2014    Hypertension     MCTD (mixed connective tissue disease)     Sjogren's disease     Ulcerative colitis     Urolithiasis        Travel Hx :  N/A    Immunization :  Immunization History   Administered Date(s) Administered    Influenza 10/26/2014, 08/18/2019    Influenza - High Dose - PF (65 years and older) 10/12/2015, 08/21/2017, 09/12/2018    Influenza - Quadrivalent 10/17/2016    Influenza - Quadrivalent - PF (6 months and older) 10/26/2014    Influenza A (H1N1) 2009 Monovalent - IM 10/26/2009    Pneumococcal Conjugate - 13 Valent 11/20/2015    Pneumococcal Polysaccharide - 23 Valent 05/25/2018    Td - PF (ADULT) 12/06/2018    Zoster 09/26/2014    Zoster Recombinant 05/25/2018       Family Hx :  Family History   Problem Relation Age of Onset    Hypertension Father     Stroke Father     Cataracts Father     Glaucoma Father     Cataracts Mother     No Known Problems Sister     No Known Problems Brother     Rheum arthritis Maternal Aunt     Rheum arthritis Maternal Uncle     No Known Problems Paternal " Aunt     No Known Problems Paternal Uncle     No Known Problems Maternal Grandmother     No Known Problems Maternal Grandfather     Throat cancer Paternal Grandmother     Glaucoma Paternal Grandfather     Celiac disease Neg Hx     Colon cancer Neg Hx     Cirrhosis Neg Hx     Colon polyps Neg Hx     Crohn's disease Neg Hx     Cystic fibrosis Neg Hx     Hemochromatosis Neg Hx     Esophageal cancer Neg Hx     Inflammatory bowel disease Neg Hx     Irritable bowel syndrome Neg Hx     Liver cancer Neg Hx     Liver disease Neg Hx     Rectal cancer Neg Hx     Stomach cancer Neg Hx     Ulcerative colitis Neg Hx     Chase's disease Neg Hx     Amblyopia Neg Hx     Blindness Neg Hx     Cancer Neg Hx     Diabetes Neg Hx     Macular degeneration Neg Hx     Retinal detachment Neg Hx     Strabismus Neg Hx     Thyroid disease Neg Hx        Social Hx :  Social History     Socioeconomic History    Marital status:      Spouse name: Not on file    Number of children: 3    Years of education: Not on file    Highest education level: Not on file   Occupational History    Not on file   Social Needs    Financial resource strain: Somewhat hard    Food insecurity:     Worry: Never true     Inability: Never true    Transportation needs:     Medical: No     Non-medical: No   Tobacco Use    Smoking status: Never Smoker    Smokeless tobacco: Never Used   Substance and Sexual Activity    Alcohol use: No     Frequency: Never     Drinks per session: Patient refused     Binge frequency: Never    Drug use: No    Sexual activity: Never   Lifestyle    Physical activity:     Days per week: 3 days     Minutes per session: Not on file    Stress: Only a little   Relationships    Social connections:     Talks on phone: Once a week     Gets together: Once a week     Attends Worship service: Not on file     Active member of club or organization: No     Attends meetings of clubs or organizations: Never      Relationship status:    Other Topics Concern    Not on file   Social History Narrative    Not on file       Surgery :  Bilateral cataract surgery; Colonoscopy 2018.  Kidney stone surgery thirty five years ago.    Symptoms :    Review of Systems   Constitutional: Positive for malaise/fatigue. Negative for chills, fever and weight loss (lost two pounds).   HENT: Negative for congestion, hearing loss, nosebleeds, sore throat and tinnitus.    Eyes: Negative.  Negative for blurred vision, double vision, photophobia, pain, discharge and redness.   Respiratory: Positive for cough (Non productive) and shortness of breath (Mild). Negative for hemoptysis.    Cardiovascular: Negative for chest pain, palpitations, orthopnea, claudication and leg swelling.   Gastrointestinal: Positive for constipation. Negative for abdominal pain, blood in stool, diarrhea, heartburn, nausea and vomiting.   Genitourinary: Negative.  Negative for dysuria, flank pain, frequency, hematuria and urgency.   Musculoskeletal: Positive for back pain (Lot of back pain) and joint pain.        Problems related to gout.   Skin: Positive for itching.   Neurological: Negative for dizziness, tingling, tremors, sensory change and headaches.   Endo/Heme/Allergies: Negative for environmental allergies. Does not bruise/bleed easily.   Psychiatric/Behavioral: Negative for depression. The patient is not nervous/anxious and does not have insomnia.        Physical Exam :   Physical Exam   Constitutional: He is oriented to person, place, and time and well-developed, well-nourished, and in no distress. Vital signs are normal. No distress.   HENT:   Head: Normocephalic and atraumatic.   Right Ear: External ear normal.   Left Ear: External ear normal.   Nose: Nose normal.   Mouth/Throat: Oropharynx is clear and moist. No oropharyngeal exudate.   Eyes: Pupils are equal, round, and reactive to light. Conjunctivae, EOM and lids are normal. Lids are everted and swept,  no foreign bodies found. Right eye exhibits no discharge. Left eye exhibits no discharge. No scleral icterus.       Neck: Trachea normal, normal range of motion, full passive range of motion without pain and phonation normal. Neck supple. Normal carotid pulses, no hepatojugular reflux and no JVD present. No tracheal tenderness present. Carotid bruit is not present. No tracheal deviation present. No thyroid mass and no thyromegaly present.   Cardiovascular: Normal rate, regular rhythm, normal heart sounds, intact distal pulses and normal pulses. PMI is not displaced. Exam reveals no gallop and no friction rub.   No murmur heard.  Pulmonary/Chest: Effort normal and breath sounds normal. No stridor. No apnea. No respiratory distress. He has no wheezes. He has no rales. He exhibits no tenderness.   Abdominal: Soft. Normal appearance, normal aorta and bowel sounds are normal. He exhibits no distension, no ascites and no mass. There is no hepatosplenomegaly. There is no tenderness. There is no rebound, no guarding and no CVA tenderness. No hernia.   Musculoskeletal: Normal range of motion. He exhibits no edema, tenderness or deformity.   Lymphadenopathy:        Head (right side): No submental, no submandibular, no tonsillar, no preauricular, no posterior auricular and no occipital adenopathy present.        Head (left side): No submental, no submandibular, no tonsillar, no preauricular, no posterior auricular and no occipital adenopathy present.     He has no cervical adenopathy.     He has no axillary adenopathy.        Right: No inguinal, no supraclavicular and no epitrochlear adenopathy present.        Left: No inguinal, no supraclavicular and no epitrochlear adenopathy present.   Neurological: He is alert and oriented to person, place, and time. He has normal motor skills, normal sensation, normal strength, normal reflexes and intact cranial nerves. No cranial nerve deficit. He exhibits normal muscle tone. Gait  normal. Coordination normal. GCS score is 15.   Skin: Skin is warm, dry and intact. No rash noted. He is not diaphoretic. No cyanosis or erythema. No pallor. Nails show no clubbing.   Psychiatric: Mood, memory, affect and judgment normal.       Labs & Imaging :  GILMA +. Titre 1:640.  Rheumatoid Factor normal.  01/09/20 : hgb a1 c 9.0; Uric acid 8.3;  NFBS 137; cr. 1.6; Ca 9.4 Bili 0.3. Liver enzymes normal;. eGFR 43.7    Dx :  Anemia.  Mixed connective tissue Disorder. DM. CKD 3.      Assessment & Plan: Reviewed with patient .  NO CBC done. Check CBC today.  Continue followup with Rheumatologist & PCP.   Does not meet criteria for Procrit therapy. . Monitor CBC,

## 2020-01-21 ENCOUNTER — OFFICE VISIT (OUTPATIENT)
Dept: CARDIOLOGY | Facility: CLINIC | Age: 70
End: 2020-01-21
Payer: MEDICARE

## 2020-01-21 ENCOUNTER — LAB VISIT (OUTPATIENT)
Dept: LAB | Facility: HOSPITAL | Age: 70
End: 2020-01-21
Attending: INTERNAL MEDICINE
Payer: MEDICARE

## 2020-01-21 VITALS
HEART RATE: 94 BPM | OXYGEN SATURATION: 98 % | RESPIRATION RATE: 16 BRPM | BODY MASS INDEX: 24.14 KG/M2 | DIASTOLIC BLOOD PRESSURE: 65 MMHG | WEIGHT: 145.06 LBS | SYSTOLIC BLOOD PRESSURE: 127 MMHG

## 2020-01-21 DIAGNOSIS — I50.30 HEART FAILURE WITH PRESERVED EJECTION FRACTION, UNSPECIFIED HF CHRONICITY: ICD-10-CM

## 2020-01-21 DIAGNOSIS — D63.1 ANEMIA OF CHRONIC RENAL FAILURE, STAGE 3 (MODERATE): ICD-10-CM

## 2020-01-21 DIAGNOSIS — N18.30 ANEMIA OF CHRONIC RENAL FAILURE, STAGE 3 (MODERATE): ICD-10-CM

## 2020-01-21 DIAGNOSIS — D64.9 ANEMIA, UNSPECIFIED TYPE: ICD-10-CM

## 2020-01-21 DIAGNOSIS — M35.9 CONNECTIVE TISSUE DISORDER: ICD-10-CM

## 2020-01-21 DIAGNOSIS — I10 ESSENTIAL HYPERTENSION: Primary | ICD-10-CM

## 2020-01-21 DIAGNOSIS — N18.30 CHRONIC KIDNEY DISEASE, STAGE III (MODERATE): ICD-10-CM

## 2020-01-21 LAB
ALBUMIN SERPL BCP-MCNC: 3.2 G/DL (ref 3.5–5.2)
ALP SERPL-CCNC: 77 U/L (ref 55–135)
ALT SERPL W/O P-5'-P-CCNC: 11 U/L (ref 10–44)
ANION GAP SERPL CALC-SCNC: 6 MMOL/L (ref 8–16)
AST SERPL-CCNC: 17 U/L (ref 10–40)
BASOPHILS # BLD AUTO: 0.02 K/UL (ref 0–0.2)
BASOPHILS NFR BLD: 0.3 % (ref 0–1.9)
BILIRUB SERPL-MCNC: 0.3 MG/DL (ref 0.1–1)
BUN SERPL-MCNC: 28 MG/DL (ref 8–23)
CALCIUM SERPL-MCNC: 9.3 MG/DL (ref 8.7–10.5)
CHLORIDE SERPL-SCNC: 104 MMOL/L (ref 95–110)
CO2 SERPL-SCNC: 26 MMOL/L (ref 23–29)
CREAT SERPL-MCNC: 1.6 MG/DL (ref 0.5–1.4)
DIFFERENTIAL METHOD: ABNORMAL
EOSINOPHIL # BLD AUTO: 0.2 K/UL (ref 0–0.5)
EOSINOPHIL NFR BLD: 3.4 % (ref 0–8)
ERYTHROCYTE [DISTWIDTH] IN BLOOD BY AUTOMATED COUNT: 14.3 % (ref 11.5–14.5)
EST. GFR  (AFRICAN AMERICAN): 50.1 ML/MIN/1.73 M^2
EST. GFR  (NON AFRICAN AMERICAN): 43.3 ML/MIN/1.73 M^2
GLUCOSE SERPL-MCNC: 127 MG/DL (ref 70–110)
HCT VFR BLD AUTO: 35 % (ref 40–54)
HGB BLD-MCNC: 11 G/DL (ref 14–18)
LYMPHOCYTES # BLD AUTO: 0.8 K/UL (ref 1–4.8)
LYMPHOCYTES NFR BLD: 11.2 % (ref 18–48)
MCH RBC QN AUTO: 27.8 PG (ref 27–31)
MCHC RBC AUTO-ENTMCNC: 31.4 G/DL (ref 32–36)
MCV RBC AUTO: 88 FL (ref 82–98)
MONOCYTES # BLD AUTO: 1.1 K/UL (ref 0.3–1)
MONOCYTES NFR BLD: 15.5 % (ref 4–15)
NEUTROPHILS # BLD AUTO: 4.7 K/UL (ref 1.8–7.7)
NEUTROPHILS NFR BLD: 69.3 % (ref 38–73)
NRBC BLD-RTO: 0 /100 WBC
PLATELET # BLD AUTO: 217 K/UL (ref 150–350)
PMV BLD AUTO: 11.9 FL (ref 9.2–12.9)
POTASSIUM SERPL-SCNC: 4.2 MMOL/L (ref 3.5–5.1)
PROT SERPL-MCNC: 8.2 G/DL (ref 6–8.4)
RBC # BLD AUTO: 3.96 M/UL (ref 4.6–6.2)
SODIUM SERPL-SCNC: 136 MMOL/L (ref 136–145)
WBC # BLD AUTO: 6.79 K/UL (ref 3.9–12.7)

## 2020-01-21 PROCEDURE — 99499 RISK ADDL DX/OHS AUDIT: ICD-10-PCS | Mod: HCNC,S$GLB,, | Performed by: INTERNAL MEDICINE

## 2020-01-21 PROCEDURE — 99999 PR PBB SHADOW E&M-EST. PATIENT-LVL III: ICD-10-PCS | Mod: PBBFAC,HCNC,, | Performed by: INTERNAL MEDICINE

## 2020-01-21 PROCEDURE — 3078F PR MOST RECENT DIASTOLIC BLOOD PRESSURE < 80 MM HG: ICD-10-PCS | Mod: HCNC,CPTII,S$GLB, | Performed by: INTERNAL MEDICINE

## 2020-01-21 PROCEDURE — 1159F PR MEDICATION LIST DOCUMENTED IN MEDICAL RECORD: ICD-10-PCS | Mod: HCNC,S$GLB,, | Performed by: INTERNAL MEDICINE

## 2020-01-21 PROCEDURE — 3078F DIAST BP <80 MM HG: CPT | Mod: HCNC,CPTII,S$GLB, | Performed by: INTERNAL MEDICINE

## 2020-01-21 PROCEDURE — 1126F PR PAIN SEVERITY QUANTIFIED, NO PAIN PRESENT: ICD-10-PCS | Mod: HCNC,S$GLB,, | Performed by: INTERNAL MEDICINE

## 2020-01-21 PROCEDURE — 1126F AMNT PAIN NOTED NONE PRSNT: CPT | Mod: HCNC,S$GLB,, | Performed by: INTERNAL MEDICINE

## 2020-01-21 PROCEDURE — 80053 COMPREHEN METABOLIC PANEL: CPT | Mod: HCNC

## 2020-01-21 PROCEDURE — 99999 PR PBB SHADOW E&M-EST. PATIENT-LVL III: CPT | Mod: PBBFAC,HCNC,, | Performed by: INTERNAL MEDICINE

## 2020-01-21 PROCEDURE — 3074F SYST BP LT 130 MM HG: CPT | Mod: HCNC,CPTII,S$GLB, | Performed by: INTERNAL MEDICINE

## 2020-01-21 PROCEDURE — 99214 PR OFFICE/OUTPT VISIT, EST, LEVL IV, 30-39 MIN: ICD-10-PCS | Mod: HCNC,S$GLB,, | Performed by: INTERNAL MEDICINE

## 2020-01-21 PROCEDURE — 1159F MED LIST DOCD IN RCRD: CPT | Mod: HCNC,S$GLB,, | Performed by: INTERNAL MEDICINE

## 2020-01-21 PROCEDURE — 1101F PR PT FALLS ASSESS DOC 0-1 FALLS W/OUT INJ PAST YR: ICD-10-PCS | Mod: HCNC,CPTII,S$GLB, | Performed by: INTERNAL MEDICINE

## 2020-01-21 PROCEDURE — 99214 OFFICE O/P EST MOD 30 MIN: CPT | Mod: HCNC,S$GLB,, | Performed by: INTERNAL MEDICINE

## 2020-01-21 PROCEDURE — 3074F PR MOST RECENT SYSTOLIC BLOOD PRESSURE < 130 MM HG: ICD-10-PCS | Mod: HCNC,CPTII,S$GLB, | Performed by: INTERNAL MEDICINE

## 2020-01-21 PROCEDURE — 85025 COMPLETE CBC W/AUTO DIFF WBC: CPT | Mod: HCNC

## 2020-01-21 PROCEDURE — 99499 UNLISTED E&M SERVICE: CPT | Mod: HCNC,S$GLB,, | Performed by: INTERNAL MEDICINE

## 2020-01-21 PROCEDURE — 36415 COLL VENOUS BLD VENIPUNCTURE: CPT | Mod: HCNC,PO

## 2020-01-21 PROCEDURE — 1101F PT FALLS ASSESS-DOCD LE1/YR: CPT | Mod: HCNC,CPTII,S$GLB, | Performed by: INTERNAL MEDICINE

## 2020-01-21 NOTE — PROGRESS NOTES
CARDIOVASCULAR CONSULTATION    REASON FOR CONSULT:   Darrion Bennett is a 69 y.o. male who presents for establishing care with Cardiology.      HISTORY OF PRESENT ILLNESS:     Notes from September 2019:  Patient here for follow-up today.  Denies any chest pains at rest on exertion, orthopnea, PND.  Denies any other cardiac symptoms.    Notes from June 2019:  Patient is here for follow-up today.  Denies any chest pains at rest on exertion, orthopnea, PND.  Denies any.  Of dizziness or passing out.  Blood pressure is much better controlled in clinic today.    Initial clinic visit:  Patient is a very pleasant 69-year-old man with a past medical history significant for hypertension, diabetes, Sjogren's disease who wants to establish care with [facility].  Patient has been followed with [facility].  Patient states that in 2014 he was diagnosed with heart failure.  He states that he had passed out and had gone to the hospital, he was feeling short of breath also and at that point he was told that he had heart failure.  I can see an echocardiogram from his outside medical records in 2014 which showed normal left ventricular systolic function.  He also had a stress echocardiogram done in 2013 which did not reveal any ischemia.  He denies any chest pains at rest on exertion, orthopnea, PND, swelling of feet.  States that since then he has been okay and walks about 2-3 blocks every day.  On walking about 2-3 blocks he does experience some tightness in his left calf.  This goes away after rest.  He denies any resting calf tightness.  He denies any ulcer on his feet.  He states that he checks his blood pressure at home and has found that it is elevated around 160-170 mm of systolic multiple times.    PAST MEDICAL HISTORY:     Past Medical History:   Diagnosis Date    Anemia     Arthritis     Cataract     Chronic constipation     Diabetes mellitus, type 2     Felon of finger of left hand 5/11/2019    Glaucoma      Hyperlipidemia 5/13/2014    Hypertension     MCTD (mixed connective tissue disease)     Sjogren's disease     Ulcerative colitis     Urolithiasis        PAST SURGICAL HISTORY:     Past Surgical History:   Procedure Laterality Date    CATARACT EXTRACTION Bilateral 2005    COLONOSCOPY  2014    EYE SURGERY         ALLERGIES AND MEDICATION:     Review of patient's allergies indicates:   Allergen Reactions    Penicillins Nausea And Vomiting    Uloric [febuxostat]      Aches and pain    Allopurinol analogues Rash        Medication List           Accurate as of January 21, 2020  8:54 AM. If you have any questions, ask your nurse or doctor.               CONTINUE taking these medications    alcohol swabs Padm  Commonly known as:  Alcohol Pads  Apply 1 each topically 4 (four) times daily.     amLODIPine 5 MG tablet  Commonly known as:  NORVASC  Take 1 tablet (5 mg total) by mouth 2 (two) times daily.     aspirin 81 MG EC tablet  Commonly known as:  ECOTRIN     blood sugar diagnostic Strp  Check blood glucose 3x/day. e11.65     cloNIDine 0.1 mg/24 hr td ptwk 0.1 mg/24 hr  Commonly known as:  CATAPRES  Place 1 patch onto the skin every 7 days.     colchicine 0.6 mg tablet  Commonly known as:  COLCRYS  Take 1 tablet (0.6 mg total) by mouth once daily. for 7 days     diclofenac sodium 1 % Gel  Commonly known as:  VOLTAREN  Apply 2 g topically 4 (four) times daily as needed. Apply to aching joints     dorzolamide 2 % ophthalmic solution  Commonly known as:  TRUSOPT  Place 1 drop into both eyes 2 (two) times daily.     insulin aspart U-100 100 unit/mL (3 mL) Inpn pen  Commonly known as:  NovoLOG Flexpen U-100 Insulin  Inject 5 units three times daily before each meal     ipratropium 42 mcg (0.06 %) nasal spray  Commonly known as:  ATROVENT  INSTILL 2 SPRAYS BY NASAL ROUTE 3 TIMES DAILY. AS NEEDED FOR NASAL CONGESTION AND DRIP FOR 7 DAYS     lancets Misc  Check blood glucose 3x/day. e11.65     Linzess 145 mcg Cap  "capsule  Generic drug:  linaCLOtide     lubiprostone 24 MCG Cap  Commonly known as:  AMITIZA  Take 1 capsule (24 mcg total) by mouth 2 (two) times daily with meals.     multivit with min-folic acid 200 mcg Chew     pen needle, diabetic 32 gauge x 5/32" Ndle  Commonly known as:  BD Ultra-Fine Sylvie Pen Needle  1 Device by Misc.(Non-Drug; Combo Route) route 2 (two) times daily.     polyethylene glycol 17 gram/dose powder  Commonly known as:  GLYCOLAX  Take 17 g by mouth once daily.     torsemide 10 MG Tab  Commonly known as:  DEMADEX  Take 1 tablet (10 mg total) by mouth once daily.     travoprost 0.004 % ophthalmic solution  Commonly known as:  Travatan Z  Place 1 drop into both eyes every evening.     valsartan 160 MG tablet  Commonly known as:  DIOVAN  Take 1 tablet (160 mg total) by mouth 2 (two) times daily.            SOCIAL HISTORY:     Social History     Socioeconomic History    Marital status:      Spouse name: Not on file    Number of children: 3    Years of education: Not on file    Highest education level: Not on file   Occupational History    Not on file   Social Needs    Financial resource strain: Somewhat hard    Food insecurity:     Worry: Never true     Inability: Never true    Transportation needs:     Medical: No     Non-medical: No   Tobacco Use    Smoking status: Never Smoker    Smokeless tobacco: Never Used   Substance and Sexual Activity    Alcohol use: No     Frequency: Never     Drinks per session: Patient refused     Binge frequency: Never    Drug use: No    Sexual activity: Never   Lifestyle    Physical activity:     Days per week: 3 days     Minutes per session: Not on file    Stress: Only a little   Relationships    Social connections:     Talks on phone: Once a week     Gets together: Once a week     Attends Druze service: Not on file     Active member of club or organization: No     Attends meetings of clubs or organizations: Never     Relationship status: "    Other Topics Concern    Not on file   Social History Narrative    Not on file       FAMILY HISTORY:     Family History   Problem Relation Age of Onset    Hypertension Father     Stroke Father     Cataracts Father     Glaucoma Father     Cataracts Mother     No Known Problems Sister     No Known Problems Brother     Rheum arthritis Maternal Aunt     Rheum arthritis Maternal Uncle     No Known Problems Paternal Aunt     No Known Problems Paternal Uncle     No Known Problems Maternal Grandmother     No Known Problems Maternal Grandfather     Throat cancer Paternal Grandmother     Glaucoma Paternal Grandfather     Celiac disease Neg Hx     Colon cancer Neg Hx     Cirrhosis Neg Hx     Colon polyps Neg Hx     Crohn's disease Neg Hx     Cystic fibrosis Neg Hx     Hemochromatosis Neg Hx     Esophageal cancer Neg Hx     Inflammatory bowel disease Neg Hx     Irritable bowel syndrome Neg Hx     Liver cancer Neg Hx     Liver disease Neg Hx     Rectal cancer Neg Hx     Stomach cancer Neg Hx     Ulcerative colitis Neg Hx     Chase's disease Neg Hx     Amblyopia Neg Hx     Blindness Neg Hx     Cancer Neg Hx     Diabetes Neg Hx     Macular degeneration Neg Hx     Retinal detachment Neg Hx     Strabismus Neg Hx     Thyroid disease Neg Hx        REVIEW OF SYSTEMS:   Review of Systems   Constitutional: Negative.    HENT: Negative.    Eyes: Negative.    Respiratory: Negative.    Gastrointestinal: Negative.    Genitourinary: Negative.    Musculoskeletal: Negative.    Skin: Negative.    Neurological: Negative.    Endo/Heme/Allergies: Negative.    Psychiatric/Behavioral: Negative.    All other systems reviewed and are negative.      A 10 point review of systems was performed and all the pertinent positives have been mentioned. Rest of review of systems was negative.        PHYSICAL EXAM:     Vitals:    01/21/20 0838   BP: 127/65   Pulse: 94   Resp: 16    Body mass index is 24.14  kg/m².  Weight: 65.8 kg (145 lb 1 oz)         Physical Exam   Constitutional: He is oriented to person, place, and time. He appears well-developed and well-nourished. He is active.   HENT:   Head: Normocephalic and atraumatic.   Right Ear: Hearing normal.   Left Ear: Hearing normal.   Nose: Nose normal.   Mouth/Throat: Mucous membranes are normal.   Eyes: Pupils are equal, round, and reactive to light. Conjunctivae and lids are normal.   Neck: Normal range of motion. Neck supple.   Cardiovascular: Normal rate, regular rhythm, intact distal pulses and normal pulses.   Murmur heard.   Systolic murmur is present with a grade of 2/6.  Pulmonary/Chest: Effort normal and breath sounds normal.   Abdominal: Soft. Normal appearance. There is no tenderness.   Musculoskeletal: He exhibits no edema or deformity.   Neurological: He is alert and oriented to person, place, and time.   Skin: Skin is warm, dry and intact.   Psychiatric: He has a normal mood and affect. His speech is normal.   Nursing note and vitals reviewed.        DATA:     Laboratory:  CBC:  Recent Labs   Lab 09/19/19  0813 11/29/19  0715 01/16/20  1020   WBC 5.89 7.64 7.31   Hemoglobin 10.9 L 11.5 L 11.2 L   Hematocrit 34.1 L 38.6 L 35.4 L   Platelets 246 248 211       CHEMISTRIES:  Recent Labs   Lab 09/19/19  0813 11/29/19  0715 01/09/20  0742   Glucose 170 H  170 H 141 H 137 H   Sodium 135 L 137 137   Potassium 4.4 4.5 4.8   BUN, Bld 22 25 H 27 H   Creatinine 1.6 H 1.8 H 1.6 H   eGFR if  50.1 A 43.4 A 50.1 A   eGFR if non  43.3 A 37.6 A 43.3 A   Calcium 9.3 9.9 9.4       CARDIAC BIOMARKERS:        COAGS:        LIPIDS/LFTS:  Recent Labs   Lab 10/16/18  09/19/19  0813 11/29/19  0715 01/09/20  0742   Cholesterol  --   --   --   --  231 H   Triglycerides 53  --   --   --  90   HDL 61  --   --   --  36 L   LDL Cholesterol 59  --   --   --  177.0 H   Non-HDL Cholesterol 72  --   --   --  195   AST  --    < > 16 17 16   ALT  --    <  > 11 12 11    < > = values in this interval not displayed.       Hemoglobin A1C   Date Value Ref Range Status   01/09/2020 9.0 (H) 4.0 - 5.6 % Final     Comment:     ADA Screening Guidelines:  5.7-6.4%  Consistent with prediabetes  >or=6.5%  Consistent with diabetes  High levels of fetal hemoglobin interfere with the HbA1C  assay. Heterozygous hemoglobin variants (HbS, HgC, etc)do  not significantly interfere with this assay.   However, presence of multiple variants may affect accuracy.     09/19/2019 6.9 (H) 4.0 - 5.6 % Final     Comment:     ADA Screening Guidelines:  5.7-6.4%  Consistent with prediabetes  >or=6.5%  Consistent with diabetes  High levels of fetal hemoglobin interfere with the HbA1C  assay. Heterozygous hemoglobin variants (HbS, HgC, etc)do  not significantly interfere with this assay.   However, presence of multiple variants may affect accuracy.     06/11/2019 6.9 (H) 4.0 - 5.6 % Final     Comment:     ADA Screening Guidelines:  5.7-6.4%  Consistent with prediabetes  >or=6.5%  Consistent with diabetes  High levels of fetal hemoglobin interfere with the HbA1C  assay. Heterozygous hemoglobin variants (HbS, HgC, etc)do  not significantly interfere with this assay.   However, presence of multiple variants may affect accuracy.     10/16/2018 6.2 (H) 4.0 - 5.7 % Final     Comment:     Dr. Jurgen Ward ( Endocrinology )       TSH        The 10-year ASCVD risk score (Elmsfordluis carlos POOLE Jr., et al., 2013) is: 41.1%    Values used to calculate the score:      Age: 69 years      Sex: Male      Is Non- : No      Diabetic: Yes      Tobacco smoker: No      Systolic Blood Pressure: 127 mmHg      Is BP treated: Yes      HDL Cholesterol: 36 mg/dL      Total Cholesterol: 231 mg/dL           Cardiovascular Testing:    EKG: (personally reviewed tracing)  Sinus bradycardia with first-degree AV block left anterior fascicle, septal infarct.    KIRT in June 2019:  Normal resting KIRT/PVR waveforms  bilaterally.  Normal exercise KIRT bilaterally (with minimal drop from resting KIRT measurements).        2D echocardiogram in June 2019:  Normal left ventricular systolic function. The estimated ejection fraction is 65%  Moderate left atrial enlargement.  Normal LV diastolic function.  Mild right atrial enlargement.  Normal central venous pressure (3 mm Hg).  The estimated PA systolic pressure is 14 mm Hg            2D echocardiogram 2014 [place] [facility] [place]    HEIGHT: 167.6 cm  WEIGHT: 74.8 kg  BP: 157/82  BSA:  MEASUREMENTS  ------------  IVSd: 1.2 cm  LVIDd: 3.8 cm  LVPWd: 1.2 cm  LVIDs: 2.6 cm  LA Diam: 3.2 cm  Ao Diam: 3.1 cm  LAESV Index (A-L): 32.40 ml/m²  LVCO Dopp: 6.98 l/min  LVCI Dopp: 3.79 l/minm²    FINDINGS  -------  CPT CODE:09612-37.  Transthoracic echocardiogram (complete).  The attending physician   listed herein personally reviewed all stored images and directly supervised the   fellow-in-training (if listed) in the study's acquistion and/or report   generation.     Study priority:  Routine.  ICD-9 Indication(s):786.05 - Dyspnea.  Study Quality:The study was technically adequate.  ECG Rhythm:Sinus rhythm.  CHAMBER SIZE:All cardiac chamber sizes are within normal limits.  AORTA:Normal aortic root and proximal ascending aorta.  VALVULAR STRUCTURES:The visualized cardiac valves are structurally normal.  LEFT/RIGHT VENTRICULAR FUNCTION:LV size, wall thickness and systolic function are normal, with an EF > 55%.       The right ventricle is normal in size and function.  DOPPLER:  Color:No valvular insufficiencies.  DIASTOLOGY:The diastolic filling pattern is normal for the age of the patient.  PERICARDIUM / EFFUSIONS:No effusions noted.  INFERIOR VENA CAVA:Normal size inferior vena cava.  PA PRESSURE:The estimated systolic pulmonary artery pressure is normal at < 35 mm Hg (RA   pressure = 3 mm Hg).  CARDIOLOGY:    Electronically signed:  STAFF:ELIAS JAIME M.D.  Fellow:G. MOHSEN,  M.D.    CONCLUSIONS  -----------  1. LV size, wall thickness and systolic function are normal, with an EF > 55%.  2. The diastolic filling pattern is normal for the age of the patient.    ASSESSMENT AND PLAN     Patient Active Problem List   Diagnosis    Essential hypertension    Controlled type 2 diabetes mellitus with complication, without long-term current use of insulin    Pure hypercholesterolemia    MCTD (mixed connective tissue disease)    Sjogren's disease    Bilateral edema of lower extremity 6/2019 TTE normal LV systolic and diastolic function; ABIs normal    Glaucoma    BMI 23.0-23.9, adult    Jackson's esophagus without dysplasia    Anemia from plaquenil and imuran    Neutropenia    Thrombocytopenia    Current chronic use of systemic steroids    Compression fracture of body of thoracic vertebra on XR 2/2019; 2/2019 alendronate    Anemia of chronic renal failure, stage 3 (moderate)    Chronic constipation not responding to linzess, miralax, senna    Screening for colon cancer 07/26/2018 colonoscopy transverse colon polyp path showing benign inflammatory polyp.    Tophaceous gout    Pseudophakia of both eyes    Refractive error    Aortic atherosclerosis    Celiac disease    Diastolic heart failure, NYHA class 2    Pleural effusion    Connective tissue disorder    Chronic kidney disease, stage III (moderate)       1.  Patient states he was diagnosed with congestive heart failure in the past.  Currently he is euvolemic.  His recent echocardiogram demonstrated normal left ventricular systolic function in 2019.  Continues to be euvolemic.  Continue current therapy.    2.  Hypertension:  Well controlled now on current therapy.  Is requesting that Norvasc PE refilled as 5 mg twice daily as taking 10 mg at the same time makes him dizzy.    3.  Dyslipidemia:  On rosuvastatin.     4.  ABIs in June 2019 were within normal limits.    5.  Chronic kidney disease:  States that he is going to  see a nephrologist tomorrow.    Follow-up in 6 months            Thank you very much for involving me in the care of your patient.  Please do not hesitate to contact me if there are any questions.      Greg Alvares MD, FAC, [facility]  Interventional Cardiologist, [facility].           This note was dictated with the help of speech recognition software.  There might be un-intended errors and/or substitutions.

## 2020-01-28 ENCOUNTER — OFFICE VISIT (OUTPATIENT)
Dept: NEPHROLOGY | Facility: CLINIC | Age: 70
End: 2020-01-28
Payer: MEDICARE

## 2020-01-28 VITALS
HEIGHT: 65 IN | OXYGEN SATURATION: 95 % | HEART RATE: 73 BPM | DIASTOLIC BLOOD PRESSURE: 70 MMHG | SYSTOLIC BLOOD PRESSURE: 160 MMHG | WEIGHT: 145.06 LBS | BODY MASS INDEX: 24.17 KG/M2

## 2020-01-28 DIAGNOSIS — N18.30 CHRONIC KIDNEY DISEASE, STAGE III (MODERATE): Primary | ICD-10-CM

## 2020-01-28 DIAGNOSIS — I10 ESSENTIAL HYPERTENSION: ICD-10-CM

## 2020-01-28 PROCEDURE — 99213 PR OFFICE/OUTPT VISIT, EST, LEVL III, 20-29 MIN: ICD-10-PCS | Mod: HCNC,S$GLB,, | Performed by: INTERNAL MEDICINE

## 2020-01-28 PROCEDURE — 1159F PR MEDICATION LIST DOCUMENTED IN MEDICAL RECORD: ICD-10-PCS | Mod: HCNC,S$GLB,, | Performed by: INTERNAL MEDICINE

## 2020-01-28 PROCEDURE — 3077F PR MOST RECENT SYSTOLIC BLOOD PRESSURE >= 140 MM HG: ICD-10-PCS | Mod: HCNC,CPTII,S$GLB, | Performed by: INTERNAL MEDICINE

## 2020-01-28 PROCEDURE — 1126F AMNT PAIN NOTED NONE PRSNT: CPT | Mod: HCNC,S$GLB,, | Performed by: INTERNAL MEDICINE

## 2020-01-28 PROCEDURE — 99999 PR PBB SHADOW E&M-EST. PATIENT-LVL III: ICD-10-PCS | Mod: PBBFAC,HCNC,, | Performed by: INTERNAL MEDICINE

## 2020-01-28 PROCEDURE — 1101F PR PT FALLS ASSESS DOC 0-1 FALLS W/OUT INJ PAST YR: ICD-10-PCS | Mod: HCNC,CPTII,S$GLB, | Performed by: INTERNAL MEDICINE

## 2020-01-28 PROCEDURE — 99999 PR PBB SHADOW E&M-EST. PATIENT-LVL III: CPT | Mod: PBBFAC,HCNC,, | Performed by: INTERNAL MEDICINE

## 2020-01-28 PROCEDURE — 3078F PR MOST RECENT DIASTOLIC BLOOD PRESSURE < 80 MM HG: ICD-10-PCS | Mod: HCNC,CPTII,S$GLB, | Performed by: INTERNAL MEDICINE

## 2020-01-28 PROCEDURE — 1126F PR PAIN SEVERITY QUANTIFIED, NO PAIN PRESENT: ICD-10-PCS | Mod: HCNC,S$GLB,, | Performed by: INTERNAL MEDICINE

## 2020-01-28 PROCEDURE — 1159F MED LIST DOCD IN RCRD: CPT | Mod: HCNC,S$GLB,, | Performed by: INTERNAL MEDICINE

## 2020-01-28 PROCEDURE — 1101F PT FALLS ASSESS-DOCD LE1/YR: CPT | Mod: HCNC,CPTII,S$GLB, | Performed by: INTERNAL MEDICINE

## 2020-01-28 PROCEDURE — 99213 OFFICE O/P EST LOW 20 MIN: CPT | Mod: HCNC,S$GLB,, | Performed by: INTERNAL MEDICINE

## 2020-01-28 PROCEDURE — 3078F DIAST BP <80 MM HG: CPT | Mod: HCNC,CPTII,S$GLB, | Performed by: INTERNAL MEDICINE

## 2020-01-28 PROCEDURE — 3077F SYST BP >= 140 MM HG: CPT | Mod: HCNC,CPTII,S$GLB, | Performed by: INTERNAL MEDICINE

## 2020-01-28 RX ORDER — TORSEMIDE 10 MG/1
10 TABLET ORAL DAILY
Qty: 90 TABLET | Refills: 3 | Status: SHIPPED | OUTPATIENT
Start: 2020-01-28 | End: 2020-05-07 | Stop reason: SDUPTHER

## 2020-01-28 NOTE — PROGRESS NOTES
Subjective:       Patient ID: Darrion Bennett is a 69 y.o.  male who presents for follow up of Chronic Kidney Disease    HPI    Mr. Bennett is a 69 year old man with medical history of diabetes, hypertension, Sjogren's presenting for follow up of chronic kidney disease.  Patient creatinine mainly 1.3-1.4mg/dL, elevated February 2019 during ED visit, again elevated July 2019 to 1.8mg/dL.  Patient reports adequate fluid intake, denies any NSAID use.  He reports blood sugars usually well-controlled, blood pressure usually similar to that in clinic.  He otherwise denies any fever, chest pain, shortness of breath, abdominal pain, diarrhea, dysuria/hematuria.  He denies any recent acute illness/infections/injury, no recent hospitalizations/ED visits/procedures.    Review of Systems   Constitutional: Negative for appetite change, fatigue and fever.   Respiratory: Negative for cough and shortness of breath.    Cardiovascular: Negative for chest pain and leg swelling.   Gastrointestinal: Negative for abdominal pain, constipation, diarrhea, nausea and vomiting.   Genitourinary: Negative for dysuria, flank pain, frequency, hematuria and urgency.   Musculoskeletal: Negative for arthralgias, back pain and joint swelling.   Skin: Negative for rash.   Neurological: Negative for dizziness and light-headedness.   All other systems reviewed and are negative.      Objective:      Physical Exam   Constitutional: He appears well-developed and well-nourished.   Cardiovascular: Normal rate and regular rhythm. Exam reveals no gallop and no friction rub.   No murmur heard.  Pulmonary/Chest: Effort normal and breath sounds normal. No respiratory distress. He has no wheezes. He has no rales.   Musculoskeletal: He exhibits no edema.   Neurological: He is alert.   Skin: Skin is warm and dry. No rash noted.   Vitals reviewed.      Assessment:       1. Chronic kidney disease, stage III (moderate)    2. Essential hypertension        Plan:       Mr. Bennett is a 69 year old man with medical history of diabetes, hypertension, Sjogren's presenting for follow up of chronic kidney disease.  Patient creatinine mainly 1.3-1.4mg/dL, consistent with CKD stage III, suspect due to diabetic nephropathy given subnephrotic proteinuria.  Creatinine elevated February 2019 during ED visit, again elevated July 2019 to 1.8mg/dL, improved since to 1.6mg/dL, will repeat to trend, along with subnephrotic proteinuria (patient on ARB).  Reviewed renal US with doppler 9.19 to rule out obstructive/vascular etiology. Encouraged fluid intake with sodium restriction, stressed importance of blood pressure/glycemic control to prevent any further progression of kidney disease, patient voiced understanding.  Further recommendations pending results of above.  - Anemia: Hgb at goal, no indication for erythropoetin therapy  - Bone/mineral metabolism: will check PTH/VitD    Return to clinic in 4-6 months pending renal panel 2.28.20, then with renal/heme panel, iron/TIBC/ferritin, urinalysis/culture, urine protein/creatinine ratio, PTH/VitD prior to next visit

## 2020-01-29 ENCOUNTER — OFFICE VISIT (OUTPATIENT)
Dept: ORTHOPEDICS | Facility: CLINIC | Age: 70
End: 2020-01-29
Payer: MEDICARE

## 2020-01-29 VITALS
HEART RATE: 73 BPM | WEIGHT: 147.25 LBS | RESPIRATION RATE: 18 BRPM | BODY MASS INDEX: 24.53 KG/M2 | SYSTOLIC BLOOD PRESSURE: 140 MMHG | OXYGEN SATURATION: 97 % | HEIGHT: 65 IN | DIASTOLIC BLOOD PRESSURE: 80 MMHG

## 2020-01-29 DIAGNOSIS — M1A.9XX1 TOPHACEOUS GOUT: Primary | ICD-10-CM

## 2020-01-29 LAB
FINAL PATHOLOGIC DIAGNOSIS: NORMAL
GROSS: NORMAL

## 2020-01-29 PROCEDURE — 3079F DIAST BP 80-89 MM HG: CPT | Mod: HCNC,CPTII,S$GLB, | Performed by: ORTHOPAEDIC SURGERY

## 2020-01-29 PROCEDURE — 99212 OFFICE O/P EST SF 10 MIN: CPT | Mod: HCNC,S$GLB,, | Performed by: ORTHOPAEDIC SURGERY

## 2020-01-29 PROCEDURE — 3077F SYST BP >= 140 MM HG: CPT | Mod: HCNC,CPTII,S$GLB, | Performed by: ORTHOPAEDIC SURGERY

## 2020-01-29 PROCEDURE — 3079F PR MOST RECENT DIASTOLIC BLOOD PRESSURE 80-89 MM HG: ICD-10-PCS | Mod: HCNC,CPTII,S$GLB, | Performed by: ORTHOPAEDIC SURGERY

## 2020-01-29 PROCEDURE — 1159F MED LIST DOCD IN RCRD: CPT | Mod: HCNC,S$GLB,, | Performed by: ORTHOPAEDIC SURGERY

## 2020-01-29 PROCEDURE — 1101F PR PT FALLS ASSESS DOC 0-1 FALLS W/OUT INJ PAST YR: ICD-10-PCS | Mod: HCNC,CPTII,S$GLB, | Performed by: ORTHOPAEDIC SURGERY

## 2020-01-29 PROCEDURE — 1159F PR MEDICATION LIST DOCUMENTED IN MEDICAL RECORD: ICD-10-PCS | Mod: HCNC,S$GLB,, | Performed by: ORTHOPAEDIC SURGERY

## 2020-01-29 PROCEDURE — 99999 PR PBB SHADOW E&M-EST. PATIENT-LVL IV: CPT | Mod: PBBFAC,HCNC,, | Performed by: ORTHOPAEDIC SURGERY

## 2020-01-29 PROCEDURE — 1126F PR PAIN SEVERITY QUANTIFIED, NO PAIN PRESENT: ICD-10-PCS | Mod: HCNC,S$GLB,, | Performed by: ORTHOPAEDIC SURGERY

## 2020-01-29 PROCEDURE — 1101F PT FALLS ASSESS-DOCD LE1/YR: CPT | Mod: HCNC,CPTII,S$GLB, | Performed by: ORTHOPAEDIC SURGERY

## 2020-01-29 PROCEDURE — 3077F PR MOST RECENT SYSTOLIC BLOOD PRESSURE >= 140 MM HG: ICD-10-PCS | Mod: HCNC,CPTII,S$GLB, | Performed by: ORTHOPAEDIC SURGERY

## 2020-01-29 PROCEDURE — 1126F AMNT PAIN NOTED NONE PRSNT: CPT | Mod: HCNC,S$GLB,, | Performed by: ORTHOPAEDIC SURGERY

## 2020-01-29 PROCEDURE — 99212 PR OFFICE/OUTPT VISIT, EST, LEVL II, 10-19 MIN: ICD-10-PCS | Mod: HCNC,S$GLB,, | Performed by: ORTHOPAEDIC SURGERY

## 2020-01-29 PROCEDURE — 99999 PR PBB SHADOW E&M-EST. PATIENT-LVL IV: ICD-10-PCS | Mod: PBBFAC,HCNC,, | Performed by: ORTHOPAEDIC SURGERY

## 2020-01-29 NOTE — PROGRESS NOTES
Follow up visit    History of Present Illness:   Darrion comes to the office for follow up evaluation of tophaceous gout.  I&D of tophus of right index finger tip performed at last visit. Pain has resolved. Wound healed. No other flares at this time. pathology was sent of crystals per rheumatology request    ROS: \unremarkable and no change since last visit    Physical Examination:    NAD  Right hand   Incision over finger tip healed  Mild soreness over radial ulnar aspects of the digit, improving  LTSI m/u/r.  Sensation intact to radial aspects of ring finger tip  2+ RP  + EPL, IO, FDS, FDP    Pathology:  Crystals consistent with gout    Radiographic imaging:  No new images    Assessment/Plan:  69 y.o. male  with tophaceous gout, here for wound check after right index finger I & D    We discussed the etiology of persistent pain and further treatment options.  1. Keep follow up with rheumatology  2. Return to clinic as needed    All questions were answered in detail. The patient  verbalized the understanding of the treatment plan and is in full agreement with the treatment plan.

## 2020-01-30 ENCOUNTER — OFFICE VISIT (OUTPATIENT)
Dept: RHEUMATOLOGY | Facility: CLINIC | Age: 70
End: 2020-01-30
Payer: MEDICARE

## 2020-01-30 ENCOUNTER — TELEPHONE (OUTPATIENT)
Dept: ENDOCRINOLOGY | Facility: CLINIC | Age: 70
End: 2020-01-30

## 2020-01-30 VITALS
BODY MASS INDEX: 24.06 KG/M2 | SYSTOLIC BLOOD PRESSURE: 137 MMHG | WEIGHT: 149.69 LBS | DIASTOLIC BLOOD PRESSURE: 65 MMHG | HEART RATE: 65 BPM | HEIGHT: 66 IN

## 2020-01-30 DIAGNOSIS — M1A.9XX1 TOPHACEOUS GOUT: Primary | ICD-10-CM

## 2020-01-30 PROCEDURE — 99214 OFFICE O/P EST MOD 30 MIN: CPT | Mod: HCNC,S$GLB,, | Performed by: INTERNAL MEDICINE

## 2020-01-30 PROCEDURE — 3075F SYST BP GE 130 - 139MM HG: CPT | Mod: HCNC,CPTII,S$GLB, | Performed by: INTERNAL MEDICINE

## 2020-01-30 PROCEDURE — 3078F PR MOST RECENT DIASTOLIC BLOOD PRESSURE < 80 MM HG: ICD-10-PCS | Mod: HCNC,CPTII,S$GLB, | Performed by: INTERNAL MEDICINE

## 2020-01-30 PROCEDURE — 1126F AMNT PAIN NOTED NONE PRSNT: CPT | Mod: HCNC,S$GLB,, | Performed by: INTERNAL MEDICINE

## 2020-01-30 PROCEDURE — 3078F DIAST BP <80 MM HG: CPT | Mod: HCNC,CPTII,S$GLB, | Performed by: INTERNAL MEDICINE

## 2020-01-30 PROCEDURE — 99999 PR PBB SHADOW E&M-EST. PATIENT-LVL III: ICD-10-PCS | Mod: PBBFAC,HCNC,, | Performed by: INTERNAL MEDICINE

## 2020-01-30 PROCEDURE — 1159F MED LIST DOCD IN RCRD: CPT | Mod: HCNC,S$GLB,, | Performed by: INTERNAL MEDICINE

## 2020-01-30 PROCEDURE — 99214 PR OFFICE/OUTPT VISIT, EST, LEVL IV, 30-39 MIN: ICD-10-PCS | Mod: HCNC,S$GLB,, | Performed by: INTERNAL MEDICINE

## 2020-01-30 PROCEDURE — 3075F PR MOST RECENT SYSTOLIC BLOOD PRESS GE 130-139MM HG: ICD-10-PCS | Mod: HCNC,CPTII,S$GLB, | Performed by: INTERNAL MEDICINE

## 2020-01-30 PROCEDURE — 1101F PT FALLS ASSESS-DOCD LE1/YR: CPT | Mod: HCNC,CPTII,S$GLB, | Performed by: INTERNAL MEDICINE

## 2020-01-30 PROCEDURE — 1126F PR PAIN SEVERITY QUANTIFIED, NO PAIN PRESENT: ICD-10-PCS | Mod: HCNC,S$GLB,, | Performed by: INTERNAL MEDICINE

## 2020-01-30 PROCEDURE — 99999 PR PBB SHADOW E&M-EST. PATIENT-LVL III: CPT | Mod: PBBFAC,HCNC,, | Performed by: INTERNAL MEDICINE

## 2020-01-30 PROCEDURE — 1159F PR MEDICATION LIST DOCUMENTED IN MEDICAL RECORD: ICD-10-PCS | Mod: HCNC,S$GLB,, | Performed by: INTERNAL MEDICINE

## 2020-01-30 PROCEDURE — 1101F PR PT FALLS ASSESS DOC 0-1 FALLS W/OUT INJ PAST YR: ICD-10-PCS | Mod: HCNC,CPTII,S$GLB, | Performed by: INTERNAL MEDICINE

## 2020-01-30 RX ORDER — DIPHENHYDRAMINE HCL 25 MG
25 CAPSULE ORAL NIGHTLY
Qty: 30 CAPSULE | Refills: 6 | Status: SHIPPED | OUTPATIENT
Start: 2020-01-30 | End: 2021-08-02

## 2020-01-30 RX ORDER — FEBUXOSTAT 40 MG/1
40 TABLET, FILM COATED ORAL EVERY OTHER DAY
Qty: 30 TABLET | Refills: 3 | Status: SHIPPED | OUTPATIENT
Start: 2020-01-30 | End: 2020-02-07 | Stop reason: SDUPTHER

## 2020-01-30 RX ORDER — CETIRIZINE HYDROCHLORIDE 10 MG/1
10 TABLET ORAL DAILY
Qty: 30 TABLET | Refills: 11 | Status: SHIPPED | OUTPATIENT
Start: 2020-01-30 | End: 2020-03-04 | Stop reason: ALTCHOICE

## 2020-01-30 RX ORDER — INSULIN ASPART 100 [IU]/ML
INJECTION, SOLUTION INTRAVENOUS; SUBCUTANEOUS
Qty: 1 BOX | Refills: 1
Start: 2020-01-30 | End: 2020-02-24 | Stop reason: SDUPTHER

## 2020-01-30 NOTE — PROGRESS NOTES
Subjective:       Patient ID: Darrion Bennett is a 69 y.o. male.    Chief Complaint: Follow-up     HPI  69 year old male with type II, cataracts, HTN, gout,  Sjogrens, urolithiasis, CHF, hypothyroidism, CKD here for evaluation.  He reports that he was diagnosed with Sjogrens when he had dry eyes and dry mouth in 2014.. He takes plaquenil 200mg po BID. He is  taking azathioprine 50mg po TID and medrol 4mg po qqday.   He was put on imuran by Dr. Corona.  He was followed by Dr. Corona from 2014 to 2017.  In October 2018, imuran and medrol was stopped. Patient restarted imuran in November 2018.  Reports that he feels that imuran helps him with joint.   Today he denies pain, stiffness or swelling.  He denies sry eyes or dry mouth.  Denies trouble making a fist.    He was diagnosed in January in left aspect of foot with pain, swelling and redness.            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  Denies more itching.  He is concerned about recurrent tophi.    Past Medical History:   Diagnosis Date    Anemia     Arthritis     Cataract     Chronic constipation     Diabetes mellitus, type 2     Glaucoma     Hypertension     MCTD (mixed connective tissue disease)     Sjogren's disease     Ulcerative colitis     Urolithiasis        Review of Systems   Constitutional: Negative for activity change, appetite change, chills, diaphoresis, fatigue and fever.   HENT: Negative for ear discharge, ear pain, hearing loss, mouth sores, nosebleeds and sinus pressure.    Eyes: Negative.  Negative for photophobia, pain, discharge, redness and itching.   Respiratory: Negative for cough, chest tightness and shortness of breath.    Cardiovascular: Negative for chest pain, palpitations and leg swelling.   Gastrointestinal: Negative for abdominal distention, blood in stool and nausea.   Endocrine: Negative for cold intolerance, heat intolerance, polydipsia and polyphagia.   Genitourinary: Negative for flank  pain, genital sores and hematuria.   Musculoskeletal: Positive for arthralgias. Negative for back pain, gait problem, joint swelling, myalgias, neck pain and neck stiffness.   Skin: Negative for color change, pallor and rash.   Neurological: Negative for dizziness, weakness, light-headedness and headaches.   Hematological: Negative for adenopathy. Does not bruise/bleed easily.   Psychiatric/Behavioral: Negative for decreased concentration and hallucinations. The patient is not nervous/anxious.              Objective:        Physical Exam   Constitutional: He is well-developed, well-nourished, and in no distress. No distress.   HENT:   Head: Normocephalic and atraumatic.   Right Ear: External ear normal.   Left Ear: External ear normal.   Nose: Nose normal.   Mouth/Throat: Oropharynx is clear and moist. No oropharyngeal exudate.   Eyes: Conjunctivae and EOM are normal. Pupils are equal, round, and reactive to light. Right eye exhibits no discharge. Left eye exhibits no discharge. No scleral icterus.   Neck: Normal range of motion. Neck supple. No JVD present. No tracheal deviation present. No thyromegaly present.   Cardiovascular: Normal rate, normal heart sounds and intact distal pulses.  Exam reveals no gallop and no friction rub.    No murmur heard.  Pulmonary/Chest: Effort normal. No stridor. No respiratory distress. He has no wheezes. He has no rales. He exhibits no tenderness.   Abdominal: Soft. Bowel sounds are normal. He exhibits no distension and no mass. There is no tenderness. There is no rebound and no guarding.   Lymphadenopathy:     He has no cervical adenopathy.   Neurological: No cranial nerve deficit. Coordination normal.   Skin: Skin is warm and dry. No rash noted. He is not diaphoretic. No erythema. No pallor.     Musculoskeletal: Normal range of motion. He exhibits no edema, tenderness or deformity.     Labs:  Marisa-+  Ssa+  Ssb+  +RNP  Ccp,rf-negative      Right hand xray (5/2019): I personally  reviewed; Soft tissue swelling tip of long finger.  Negative for fracture.     Assessment:       69 year old male with type II, cataracts, HTN, gout,  Sjogrens, urolithiasis, CHF, hypothyroidism, CKD here for follow up. He was previously followed by Dr. Lau.  He reports that he was diagnosed with Sjogrens when he had dry eyes and dry mouth in 2014. Serologies are +GILMA, +SSA,+SSB, and +RNP. He denies raynauds, rashes, oral ulcers or symptoms of SLE. His main issue before we met was inflammatory arthritis which was being treated with plaquenil, imuran, and medrol.    When we initially met in feb 2019 , he was taking plaquenil 200mg po BID, azathioprine 50mg po TID and medrol 4mg po qqday. His last rheumatologist discontinued his medications and patient decided to restart the medications himself since he was having so much pain. He developed imuran toxicity so all his medications were discontinued. He is asymptomatic from Sjogrens at this time.      With regards to gout, he was recently diagnosed with tophaceous gout, but had allergy to allopurinol so tried uloric but he reports itching with it.  Because of his kidney dysfunction, probenecid is not an option. Discussed with patient diet for gout.  I discussed with wife we will try uloric 40mg every other day. He will take zyrtec in the morning and benadryl before bedtime.  If he still has itching, will send him to allergy for desensitization.  Labs in a month

## 2020-01-30 NOTE — TELEPHONE ENCOUNTER
bg log received.   Advised pt to increase Novolog from 4 to 5 units before lunch. He voiced understanding.

## 2020-02-06 ENCOUNTER — PATIENT MESSAGE (OUTPATIENT)
Dept: RHEUMATOLOGY | Facility: CLINIC | Age: 70
End: 2020-02-06

## 2020-02-07 DIAGNOSIS — M1A.9XX1 TOPHACEOUS GOUT: ICD-10-CM

## 2020-02-07 RX ORDER — FEBUXOSTAT 40 MG/1
40 TABLET, FILM COATED ORAL EVERY OTHER DAY
Qty: 30 TABLET | Refills: 3 | Status: SHIPPED | OUTPATIENT
Start: 2020-02-07 | End: 2020-03-04 | Stop reason: SDUPTHER

## 2020-02-14 ENCOUNTER — PATIENT MESSAGE (OUTPATIENT)
Dept: FAMILY MEDICINE | Facility: CLINIC | Age: 70
End: 2020-02-14

## 2020-02-20 ENCOUNTER — OFFICE VISIT (OUTPATIENT)
Dept: DERMATOLOGY | Facility: CLINIC | Age: 70
End: 2020-02-20
Payer: MEDICARE

## 2020-02-20 DIAGNOSIS — L29.9 PRURITUS: Primary | ICD-10-CM

## 2020-02-20 PROCEDURE — 1126F PR PAIN SEVERITY QUANTIFIED, NO PAIN PRESENT: ICD-10-PCS | Mod: HCNC,S$GLB,, | Performed by: DERMATOLOGY

## 2020-02-20 PROCEDURE — 99202 PR OFFICE/OUTPT VISIT, NEW, LEVL II, 15-29 MIN: ICD-10-PCS | Mod: HCNC,S$GLB,, | Performed by: DERMATOLOGY

## 2020-02-20 PROCEDURE — 99202 OFFICE O/P NEW SF 15 MIN: CPT | Mod: HCNC,S$GLB,, | Performed by: DERMATOLOGY

## 2020-02-20 PROCEDURE — 1101F PT FALLS ASSESS-DOCD LE1/YR: CPT | Mod: HCNC,CPTII,S$GLB, | Performed by: DERMATOLOGY

## 2020-02-20 PROCEDURE — 99999 PR PBB SHADOW E&M-EST. PATIENT-LVL II: CPT | Mod: PBBFAC,HCNC,, | Performed by: DERMATOLOGY

## 2020-02-20 PROCEDURE — 1126F AMNT PAIN NOTED NONE PRSNT: CPT | Mod: HCNC,S$GLB,, | Performed by: DERMATOLOGY

## 2020-02-20 PROCEDURE — 1159F MED LIST DOCD IN RCRD: CPT | Mod: HCNC,S$GLB,, | Performed by: DERMATOLOGY

## 2020-02-20 PROCEDURE — 99999 PR PBB SHADOW E&M-EST. PATIENT-LVL II: ICD-10-PCS | Mod: PBBFAC,HCNC,, | Performed by: DERMATOLOGY

## 2020-02-20 PROCEDURE — 1101F PR PT FALLS ASSESS DOC 0-1 FALLS W/OUT INJ PAST YR: ICD-10-PCS | Mod: HCNC,CPTII,S$GLB, | Performed by: DERMATOLOGY

## 2020-02-20 PROCEDURE — 1159F PR MEDICATION LIST DOCUMENTED IN MEDICAL RECORD: ICD-10-PCS | Mod: HCNC,S$GLB,, | Performed by: DERMATOLOGY

## 2020-02-20 NOTE — PROGRESS NOTES
Subjective:       Patient ID:  Darrion Bennett is a 70 y.o. male who presents for   Chief Complaint   Patient presents with    Itching     all over     Pt sent to me for diffuse itching (no rash) that started immediately after beginning Uloric for gout 10/19. Took x 2 weeks then d/c'ed 2/2 severe itching. Restarted uloric 2 weeks ago and itching began again. + worse day of dose (pt takes qoday). Takes hot showers. Failed allopurinol. Benadryl qhs helps - pt able to sleep through night      Review of Systems   Skin: Positive for itching. Negative for rash.        Objective:    Physical Exam   Constitutional: He appears well-developed and well-nourished. No distress.   Neurological: He is alert and oriented to person, place, and time. He is not disoriented.   Psychiatric: He has a normal mood and affect.   Skin:   Areas Examined (abnormalities noted in diagram):   Head / Face Inspection Performed  Neck Inspection Performed  Chest / Axilla Inspection Performed  Abdomen Inspection Performed  Back Inspection Performed  RUE Inspected  LUE Inspection Performed              Diagram Legend     Erythematous scaling macule/papule c/w actinic keratosis       Vascular papule c/w angioma      Pigmented verrucoid papule/plaque c/w seborrheic keratosis      Yellow umbilicated papule c/w sebaceous hyperplasia      Irregularly shaped tan macule c/w lentigo     1-2 mm smooth white papules consistent with Milia      Movable subcutaneous cyst with punctum c/w epidermal inclusion cyst      Subcutaneous movable cyst c/w pilar cyst      Firm pink to brown papule c/w dermatofibroma      Pedunculated fleshy papule(s) c/w skin tag(s)      Evenly pigmented macule c/w junctional nevus     Mildly variegated pigmented, slightly irregular-bordered macule c/w mildly atypical nevus      Flesh colored to evenly pigmented papule c/w intradermal nevus       Pink pearly papule/plaque c/w basal cell carcinoma      Erythematous hyperkeratotic cursted  plaque c/w SCC      Surgical scar with no sign of skin cancer recurrence      Open and closed comedones      Inflammatory papules and pustules      Verrucoid papule consistent consistent with wart     Erythematous eczematous patches and plaques     Dystrophic onycholytic nail with subungual debris c/w onychomycosis     Umbilicated papule    Erythematous-base heme-crusted tan verrucoid plaque consistent with inflamed seborrheic keratosis     Erythematous Silvery Scaling Plaque c/w Psoriasis     See annotation      Assessment / Plan:        Pruritus 2/2 uloric  Risks and benefits for continuing uloric must be weighed  Rec cont benadryl qhs  Start claritin or zyrtec or allegra qam  D/c hot showers  Rec cerave anti itch lotion - can put in fridge.              Follow up if symptoms worsen or fail to improve.

## 2020-02-20 NOTE — PATIENT INSTRUCTIONS
XEROSIS (DRY SKIN)        1. Definition    Xerosis is the term for dry skin.  We all have a natural oil coating over our skin produced by the skin oil glands.  If this oil is removed, the skin becomes dry which can lead to cracking, which can lead to inflammation.  Xerosis is usually a long-term problem that recurs often, especially in the winter.    2. Cause     Long hot baths or showers can remove our natural oil and lead to xerosis.  One should never take more than one bath or shower a day and for no longer than ten minutes.   Use of harsh soaps such as Zest, Dial, and Ivory can worsen and cause xerosis.   Cold winter weather worsens xerosis because the amount of moisture contained in cold air is much less than the amount of moisture in warm air.    3. Treatment     Treatment is intended to restore the natural oil to your skin.  Keep the skin lubricated.     Do not take more than one bath or shower a day.  Use lukewarm water, not hot.  Hot water dries out the skin.     Use a gentle moisturizing soap such as Cetaphil soap, Oil of Olay, Dove, Basis, Ivory moisture care, Restoraderm cleanser.     When toweling dry, dont rub.  Blot the skin so there is still some water left on the skin.  You should apply a moisturizing cream to all of the skin such as Cerave cream, Cetaphil cream, Restoraderm or Eucerin Original Formula cream.   Alpha hydroxyacid lotions, i.e., AmLactin, also work very well for preventing dry skin, but may burn when used on inflamed or reddened skin.     If you like to swim during the winter months, you should not use soap when getting out of the pool.  When you have finished swimming, rinse off the chlorine with cool to warm water.  If this will be the only shower of the day, then you may use Cetaphil or another mild soap to cleanse your skin.  After the shower, apply a moisturizing cream to all of the skin as above.        [address] [place] [address]/ [phone]  [phone] FAX/ [email or web address]    Continue benadryl every night  Start zyrtec or allegra or claritin every morning

## 2020-02-20 NOTE — LETTER
February 20, 2020      Roxanna Salazar MD  [address]  [address]  [facility] [address]           [facility]  [address]  [place] [address]  Phone: [phone]  Fax: [phone]          Patient: Darrion Bennett   MR Number: [id number]   YOB: 1950   Date of Visit: 2/20/2020       Dear Dr. Roxanna Salazar:    Thank you for referring Darrion Bennett to me for evaluation. Attached you will find relevant portions of my assessment and plan of care.    If you have questions, please do not hesitate to call me. I look forward to following Darrion Bennett along with you.    Sincerely,    Lizette Hutchins MD    Enclosure  CC:  No Recipients    If you would like to receive this communication electronically, please contact [email or web address] or [phone] to request more information on [employer] access.    For providers and/or their staff who would like to refer a patient to [facility], please contact us through our one-stop-shop provider referral line, [facility], at [phone].    If you feel you have received this communication in error or would no longer like to receive these types of communications, please e-mail [email or web address]

## 2020-02-24 ENCOUNTER — OFFICE VISIT (OUTPATIENT)
Dept: ENDOCRINOLOGY | Facility: CLINIC | Age: 70
End: 2020-02-24
Payer: MEDICARE

## 2020-02-24 VITALS
WEIGHT: 145.19 LBS | SYSTOLIC BLOOD PRESSURE: 142 MMHG | DIASTOLIC BLOOD PRESSURE: 70 MMHG | HEART RATE: 83 BPM | BODY MASS INDEX: 23.44 KG/M2

## 2020-02-24 DIAGNOSIS — N18.30 CKD (CHRONIC KIDNEY DISEASE) STAGE 3, GFR 30-59 ML/MIN: ICD-10-CM

## 2020-02-24 DIAGNOSIS — I10 ESSENTIAL HYPERTENSION: ICD-10-CM

## 2020-02-24 PROCEDURE — 99214 PR OFFICE/OUTPT VISIT, EST, LEVL IV, 30-39 MIN: ICD-10-PCS | Mod: HCNC,S$GLB,, | Performed by: NURSE PRACTITIONER

## 2020-02-24 PROCEDURE — 1101F PR PT FALLS ASSESS DOC 0-1 FALLS W/OUT INJ PAST YR: ICD-10-PCS | Mod: HCNC,CPTII,S$GLB, | Performed by: NURSE PRACTITIONER

## 2020-02-24 PROCEDURE — 99999 PR PBB SHADOW E&M-EST. PATIENT-LVL V: CPT | Mod: PBBFAC,HCNC,, | Performed by: NURSE PRACTITIONER

## 2020-02-24 PROCEDURE — 3052F HG A1C>EQUAL 8.0%<EQUAL 9.0%: CPT | Mod: HCNC,CPTII,S$GLB, | Performed by: NURSE PRACTITIONER

## 2020-02-24 PROCEDURE — 1125F PR PAIN SEVERITY QUANTIFIED, PAIN PRESENT: ICD-10-PCS | Mod: HCNC,S$GLB,, | Performed by: NURSE PRACTITIONER

## 2020-02-24 PROCEDURE — 99499 RISK ADDL DX/OHS AUDIT: ICD-10-PCS | Mod: HCNC,S$GLB,, | Performed by: NURSE PRACTITIONER

## 2020-02-24 PROCEDURE — 1101F PT FALLS ASSESS-DOCD LE1/YR: CPT | Mod: HCNC,CPTII,S$GLB, | Performed by: NURSE PRACTITIONER

## 2020-02-24 PROCEDURE — 1125F AMNT PAIN NOTED PAIN PRSNT: CPT | Mod: HCNC,S$GLB,, | Performed by: NURSE PRACTITIONER

## 2020-02-24 PROCEDURE — 1159F MED LIST DOCD IN RCRD: CPT | Mod: HCNC,S$GLB,, | Performed by: NURSE PRACTITIONER

## 2020-02-24 PROCEDURE — 99214 OFFICE O/P EST MOD 30 MIN: CPT | Mod: HCNC,S$GLB,, | Performed by: NURSE PRACTITIONER

## 2020-02-24 PROCEDURE — 3077F SYST BP >= 140 MM HG: CPT | Mod: HCNC,CPTII,S$GLB, | Performed by: NURSE PRACTITIONER

## 2020-02-24 PROCEDURE — 3078F PR MOST RECENT DIASTOLIC BLOOD PRESSURE < 80 MM HG: ICD-10-PCS | Mod: HCNC,CPTII,S$GLB, | Performed by: NURSE PRACTITIONER

## 2020-02-24 PROCEDURE — 3077F PR MOST RECENT SYSTOLIC BLOOD PRESSURE >= 140 MM HG: ICD-10-PCS | Mod: HCNC,CPTII,S$GLB, | Performed by: NURSE PRACTITIONER

## 2020-02-24 PROCEDURE — 3052F PR MOST RECENT HEMOGLOBIN A1C LEVEL 8.0 - < 9.0%: ICD-10-PCS | Mod: HCNC,CPTII,S$GLB, | Performed by: NURSE PRACTITIONER

## 2020-02-24 PROCEDURE — 3078F DIAST BP <80 MM HG: CPT | Mod: HCNC,CPTII,S$GLB, | Performed by: NURSE PRACTITIONER

## 2020-02-24 PROCEDURE — 99999 PR PBB SHADOW E&M-EST. PATIENT-LVL V: ICD-10-PCS | Mod: PBBFAC,HCNC,, | Performed by: NURSE PRACTITIONER

## 2020-02-24 PROCEDURE — 99499 UNLISTED E&M SERVICE: CPT | Mod: HCNC,S$GLB,, | Performed by: NURSE PRACTITIONER

## 2020-02-24 PROCEDURE — 1159F PR MEDICATION LIST DOCUMENTED IN MEDICAL RECORD: ICD-10-PCS | Mod: HCNC,S$GLB,, | Performed by: NURSE PRACTITIONER

## 2020-02-24 RX ORDER — INSULIN GLARGINE 100 [IU]/ML
7 INJECTION, SOLUTION SUBCUTANEOUS DAILY
Qty: 1 BOX | Refills: 0 | Status: SHIPPED | OUTPATIENT
Start: 2020-02-24 | End: 2020-03-27

## 2020-02-24 RX ORDER — PEN NEEDLE, DIABETIC 30 GX3/16"
1 NEEDLE, DISPOSABLE MISCELLANEOUS 3 TIMES DAILY
Qty: 100 EACH | Refills: 3 | Status: SHIPPED | OUTPATIENT
Start: 2020-02-24 | End: 2020-05-19

## 2020-02-24 RX ORDER — INSULIN ASPART 100 [IU]/ML
INJECTION, SOLUTION INTRAVENOUS; SUBCUTANEOUS
Qty: 1 SYRINGE | Refills: 0 | Status: SHIPPED | OUTPATIENT
Start: 2020-02-24 | End: 2020-03-27

## 2020-02-24 NOTE — PROGRESS NOTES
CC: This 70 y.o.  male  is here for evaluation of  T2DM along with comorbidities indicated in the Visit Diagnosis section of this encounter.    HPI: Darrion Benntet was diagnosed with T2DM at age 35. Oral medications started years later.    Previously seen by Dr. Marrufo's office, Endocrinology at [place]. He is transferred all his care to [facility].         Prior visit 1/10/20  Upon Dr. Domingo's suggestion (pt's PCP), a rx for Lantus was sent to replace Xultophy d/t question of it causing hives. However, patient never got the message to switch and has continued to take Xultophy. Rash has resolved.   Plan Patient is taking such a small amount of Xultophy at 7 units a day that he is not really getting much of any benefit from the liraglutide component. Reluctant to start patient on Trulcity or similar agent (with goal of eliminating prandial insulin) due to appetite suppressant effect, as his appetite is already low.  Advised -   When you finish the Xultophy, you can switch back to Lantus which you already have at home. The lantus will probably be just as effective.   Continue Novolog for now - start taking Novolog twice daily. Inject Novolog 3 units before breakfast and increase to 4 units before lunch.   Test blood sugar 3x/day. Keep a log and note on the log when you skip breakfast.   Return to clinic in 4-6 weeks. Send a blood sugar log in 1-2 weeks. Bring written blood sugar logs to every visit.       Interval history  He notes that his appetite has increased and his portions are larger. BGs karina to 200s over one weekend d/t eating out. Interested in Trulicity.       PMHX: Sjogren's syndrome, gouty arthritis - sees Rheumatology     LAST DIABETES EDUCATION: multiple times, years ago     HOSPITALIZED FOR DIABETES  -  No     PRESCRIBED DIABETES MEDICATIONS: Xultophy 7 units once daily, Novolog 3 units before lunch and 5 units before dinner     Misses medication doses - No    DM COMPLICATIONS:  nephropathy    SIGNIFICANT DIABETES MED HISTORY: has been told that metformin is bad for his kidneys and that's why he stopped it   Lantus, Humalog, and Tradjenta stopped at initial visit 7/2019 and he was switched to Xultophy.     SELF MONITORING BLOOD GLUCOSE: Checks blood glucose at home 3x/day             HYPOGLYCEMIC EPISODES: none recent;  feels different at BGs in the 60s.      CURRENT DIET: drinks water, diet soda; has some juice to take with his meds. Eats 3 meals/day. Dinner is bigger than lunch. Eats home cooked foods, no processed foods.     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  Pulse 83   Wt 65.9 kg (145 lb 3.2 oz)   BMI 23.44 kg/m²       ROS:   CONSTITUTIONAL: Appetite - higher now, + fatigue  MS: left thigh pain       PHYSICAL EXAM:  GENERAL: Well developed, well nourished. No acute distress.   PSYCH: AAOx3, appropriate mood and affect, conversant, well-groomed. Judgement and insight good.   NEURO: Cranial nerves grossly intact. Speech clear, no tremor.   CHEST: Respirations even and unlabored.         [facility] - 4/23/19  Total chol 188 - HDL 44 - trig 98 -   Creatinine 1.37   eGFR non AA - 52   A1c 5.9%   Hemoglobin 10/hematocrit 31  Vitamin D 42      Hemoglobin A1C   Date Value Ref Range Status   01/09/2020 9.0 (H) 4.0 - 5.6 % Final     Comment:     ADA Screening Guidelines:  5.7-6.4%  Consistent with prediabetes  >or=6.5%  Consistent with diabetes  High levels of fetal hemoglobin interfere with the HbA1C  assay. Heterozygous hemoglobin variants (HbS, HgC, etc)do  not significantly interfere with this assay.   However, presence of multiple variants may affect accuracy.     09/19/2019 6.9 (H) 4.0 - 5.6 % Final      Comment:     ADA Screening Guidelines:  5.7-6.4%  Consistent with prediabetes  >or=6.5%  Consistent with diabetes  High levels of fetal hemoglobin interfere with the HbA1C  assay. Heterozygous hemoglobin variants (HbS, HgC, etc)do  not significantly interfere with this assay.   However, presence of multiple variants may affect accuracy.     06/11/2019 6.9 (H) 4.0 - 5.6 % Final     Comment:     ADA Screening Guidelines:  5.7-6.4%  Consistent with prediabetes  >or=6.5%  Consistent with diabetes  High levels of fetal hemoglobin interfere with the HbA1C  assay. Heterozygous hemoglobin variants (HbS, HgC, etc)do  not significantly interfere with this assay.   However, presence of multiple variants may affect accuracy.     10/16/2018 6.2 (H) 4.0 - 5.7 % Final     Comment:     Dr. Jurgen Ward ( Endocrinology )        Ref. Range 9/19/2019 08:13   Glucose Latest Ref Range: 70 - 110 mg/dL 170 (H)   C-Peptide Latest Ref Range: 0.78 - 5.19 ng/mL 1.33         Chemistry        Component Value Date/Time     01/21/2020 0927    K 4.2 01/21/2020 0927     01/21/2020 0927    CO2 26 01/21/2020 0927    BUN 28 (H) 01/21/2020 0927    CREATININE 1.6 (H) 01/21/2020 0927     (H) 01/21/2020 0927        Component Value Date/Time    CALCIUM 9.3 01/21/2020 0927    ALKPHOS 77 01/21/2020 0927    AST 17 01/21/2020 0927    ALT 11 01/21/2020 0927    BILITOT 0.3 01/21/2020 0927    ESTGFRAFRICA 50.1 (A) 01/21/2020 0927    EGFRNONAA 43.3 (A) 01/21/2020 0927          Lab Results   Component Value Date    LDLCALC 177.0 (H) 01/09/2020       Lab Results   Component Value Date    MICALBCREAT 519 (H) 10/16/2018               ASSESSMENT and PLAN:    A1C GOAL: 7.5 %     1. Diabetes mellitus type 2, uncontrolled, with complications  Stop Xultophy.   Start Trulicity 0.75 mg once weekly.   Start Lantus 7 units once daily.   Continue Novolog at this time at 3 units before lunch and 5 units before dinner.   Test blood sugar 3x/day.   Return to  "clinic in a month. Call if blood sugars drop less than 80. Suspect you will not need any Novolog if Trulicity is effective.         pen needle, diabetic (BD ULTRA-FINE RICHARD PEN NEEDLE) 32 gauge x 5/32" Ndle    insulin aspart U-100 (NOVOLOG FLEXPEN U-100 INSULIN) 100 unit/mL (3 mL) InPn pen    insulin (LANTUS SOLOSTAR U-100 INSULIN) glargine 100 units/mL (3mL) SubQ pen    dulaglutide (TRULICITY) 0.75 mg/0.5 mL PnIj   2. Essential hypertension  Has not taken his meds yet.    3. CKD (chronic kidney disease) stage 3, GFR 30-59 ml/min  Improve glycemic control.   Avoid hypoglycemia. -- trulicity has low risk of hypoglycemia    4.  HLD H/o statin intolerance.   Declines Zetia d/t potential cost.        No orders of the defined types were placed in this encounter.       Follow up in about 4 weeks (around 3/23/2020).     "

## 2020-02-24 NOTE — PATIENT INSTRUCTIONS
Stop Xultophy.   Start Trulicity 0.75 mg once weekly.   Start Lantus 7 units once daily.   Continue Novolog at this time at 3 units before lunch and 5 units before dinner.   Test blood sugar 3x/day.   Return to clinic in a month. Call if blood sugars drop less than 80. Suspect you will not need any Novolog if Trulicity is effective.

## 2020-02-28 ENCOUNTER — LAB VISIT (OUTPATIENT)
Dept: LAB | Facility: HOSPITAL | Age: 70
End: 2020-02-28
Attending: INTERNAL MEDICINE
Payer: MEDICARE

## 2020-02-28 DIAGNOSIS — M1A.9XX1 TOPHACEOUS GOUT: ICD-10-CM

## 2020-02-28 LAB
ALBUMIN SERPL BCP-MCNC: 2.9 G/DL (ref 3.5–5.2)
ALP SERPL-CCNC: 76 U/L (ref 55–135)
ALT SERPL W/O P-5'-P-CCNC: 15 U/L (ref 10–44)
ANION GAP SERPL CALC-SCNC: 9 MMOL/L (ref 8–16)
AST SERPL-CCNC: 19 U/L (ref 10–40)
BILIRUB SERPL-MCNC: 0.3 MG/DL (ref 0.1–1)
BUN SERPL-MCNC: 31 MG/DL (ref 8–23)
CALCIUM SERPL-MCNC: 9 MG/DL (ref 8.7–10.5)
CHLORIDE SERPL-SCNC: 104 MMOL/L (ref 95–110)
CO2 SERPL-SCNC: 24 MMOL/L (ref 23–29)
CREAT SERPL-MCNC: 1.7 MG/DL (ref 0.5–1.4)
EST. GFR  (AFRICAN AMERICAN): 46.2 ML/MIN/1.73 M^2
EST. GFR  (NON AFRICAN AMERICAN): 40 ML/MIN/1.73 M^2
GLUCOSE SERPL-MCNC: 132 MG/DL (ref 70–110)
POTASSIUM SERPL-SCNC: 4.8 MMOL/L (ref 3.5–5.1)
PROT SERPL-MCNC: 7.7 G/DL (ref 6–8.4)
SODIUM SERPL-SCNC: 137 MMOL/L (ref 136–145)
URATE SERPL-MCNC: 7.8 MG/DL (ref 3.4–7)

## 2020-02-28 PROCEDURE — 36415 COLL VENOUS BLD VENIPUNCTURE: CPT | Mod: HCNC,PO

## 2020-02-28 PROCEDURE — 80053 COMPREHEN METABOLIC PANEL: CPT | Mod: HCNC

## 2020-02-28 PROCEDURE — 84550 ASSAY OF BLOOD/URIC ACID: CPT | Mod: HCNC

## 2020-03-04 ENCOUNTER — OFFICE VISIT (OUTPATIENT)
Dept: FAMILY MEDICINE | Facility: CLINIC | Age: 70
End: 2020-03-04
Payer: MEDICARE

## 2020-03-04 VITALS
BODY MASS INDEX: 23.63 KG/M2 | HEIGHT: 66 IN | TEMPERATURE: 99 F | DIASTOLIC BLOOD PRESSURE: 66 MMHG | OXYGEN SATURATION: 97 % | WEIGHT: 147 LBS | SYSTOLIC BLOOD PRESSURE: 124 MMHG | HEART RATE: 61 BPM

## 2020-03-04 DIAGNOSIS — M46.1 SACROILIITIS: Primary | ICD-10-CM

## 2020-03-04 DIAGNOSIS — M1A.9XX1 TOPHACEOUS GOUT: ICD-10-CM

## 2020-03-04 DIAGNOSIS — D69.6 THROMBOCYTOPENIA: ICD-10-CM

## 2020-03-04 DIAGNOSIS — M79.672 LEFT FOOT PAIN: ICD-10-CM

## 2020-03-04 DIAGNOSIS — S22.000A COMPRESSION FRACTURE OF BODY OF THORACIC VERTEBRA: ICD-10-CM

## 2020-03-04 DIAGNOSIS — K22.70 BARRETT'S ESOPHAGUS WITHOUT DYSPLASIA: ICD-10-CM

## 2020-03-04 DIAGNOSIS — J39.2 THROAT IRRITATION: ICD-10-CM

## 2020-03-04 DIAGNOSIS — R09.82 POST-NASAL DRIP: ICD-10-CM

## 2020-03-04 DIAGNOSIS — I70.0 AORTIC ATHEROSCLEROSIS: ICD-10-CM

## 2020-03-04 DIAGNOSIS — D70.9 NEUTROPENIA, UNSPECIFIED TYPE: ICD-10-CM

## 2020-03-04 PROCEDURE — 3074F SYST BP LT 130 MM HG: CPT | Mod: HCNC,CPTII,S$GLB, | Performed by: INTERNAL MEDICINE

## 2020-03-04 PROCEDURE — 1159F PR MEDICATION LIST DOCUMENTED IN MEDICAL RECORD: ICD-10-PCS | Mod: HCNC,S$GLB,, | Performed by: INTERNAL MEDICINE

## 2020-03-04 PROCEDURE — 1159F MED LIST DOCD IN RCRD: CPT | Mod: HCNC,S$GLB,, | Performed by: INTERNAL MEDICINE

## 2020-03-04 PROCEDURE — 3074F PR MOST RECENT SYSTOLIC BLOOD PRESSURE < 130 MM HG: ICD-10-PCS | Mod: HCNC,CPTII,S$GLB, | Performed by: INTERNAL MEDICINE

## 2020-03-04 PROCEDURE — 1101F PR PT FALLS ASSESS DOC 0-1 FALLS W/OUT INJ PAST YR: ICD-10-PCS | Mod: HCNC,CPTII,S$GLB, | Performed by: INTERNAL MEDICINE

## 2020-03-04 PROCEDURE — 1125F PR PAIN SEVERITY QUANTIFIED, PAIN PRESENT: ICD-10-PCS | Mod: HCNC,S$GLB,, | Performed by: INTERNAL MEDICINE

## 2020-03-04 PROCEDURE — 99499 RISK ADDL DX/OHS AUDIT: ICD-10-PCS | Mod: HCNC,S$GLB,, | Performed by: INTERNAL MEDICINE

## 2020-03-04 PROCEDURE — 99214 PR OFFICE/OUTPT VISIT, EST, LEVL IV, 30-39 MIN: ICD-10-PCS | Mod: HCNC,S$GLB,, | Performed by: INTERNAL MEDICINE

## 2020-03-04 PROCEDURE — 1125F AMNT PAIN NOTED PAIN PRSNT: CPT | Mod: HCNC,S$GLB,, | Performed by: INTERNAL MEDICINE

## 2020-03-04 PROCEDURE — 99999 PR PBB SHADOW E&M-EST. PATIENT-LVL V: ICD-10-PCS | Mod: PBBFAC,HCNC,, | Performed by: INTERNAL MEDICINE

## 2020-03-04 PROCEDURE — 99214 OFFICE O/P EST MOD 30 MIN: CPT | Mod: HCNC,S$GLB,, | Performed by: INTERNAL MEDICINE

## 2020-03-04 PROCEDURE — 99999 PR PBB SHADOW E&M-EST. PATIENT-LVL V: CPT | Mod: PBBFAC,HCNC,, | Performed by: INTERNAL MEDICINE

## 2020-03-04 PROCEDURE — 1101F PT FALLS ASSESS-DOCD LE1/YR: CPT | Mod: HCNC,CPTII,S$GLB, | Performed by: INTERNAL MEDICINE

## 2020-03-04 PROCEDURE — 3078F PR MOST RECENT DIASTOLIC BLOOD PRESSURE < 80 MM HG: ICD-10-PCS | Mod: HCNC,CPTII,S$GLB, | Performed by: INTERNAL MEDICINE

## 2020-03-04 PROCEDURE — 99499 UNLISTED E&M SERVICE: CPT | Mod: HCNC,S$GLB,, | Performed by: INTERNAL MEDICINE

## 2020-03-04 PROCEDURE — 3078F DIAST BP <80 MM HG: CPT | Mod: HCNC,CPTII,S$GLB, | Performed by: INTERNAL MEDICINE

## 2020-03-04 RX ORDER — BENZONATATE 100 MG/1
100 CAPSULE ORAL EVERY MORNING
Qty: 30 CAPSULE | Refills: 0 | Status: SHIPPED | OUTPATIENT
Start: 2020-03-04 | End: 2020-03-14

## 2020-03-04 RX ORDER — FEBUXOSTAT 40 MG/1
40 TABLET, FILM COATED ORAL DAILY
Status: CANCELLED | OUTPATIENT
Start: 2020-03-04

## 2020-03-04 RX ORDER — IPRATROPIUM BROMIDE 21 UG/1
2 SPRAY, METERED NASAL 3 TIMES DAILY
Qty: 15 ML | Refills: 0 | Status: SHIPPED | OUTPATIENT
Start: 2020-03-04 | End: 2020-04-03

## 2020-03-04 RX ORDER — OMEPRAZOLE 20 MG/1
40 CAPSULE, DELAYED RELEASE ORAL DAILY
Qty: 180 CAPSULE | Refills: 1 | Status: SHIPPED | OUTPATIENT
Start: 2020-03-04 | End: 2020-08-28

## 2020-03-04 RX ORDER — FEBUXOSTAT 40 MG/1
40 TABLET, FILM COATED ORAL EVERY OTHER DAY
Qty: 30 TABLET | Refills: 3 | Status: SHIPPED | OUTPATIENT
Start: 2020-03-04 | End: 2020-03-05 | Stop reason: SDUPTHER

## 2020-03-04 NOTE — PROGRESS NOTES
This note was created by combination of typed  and M-Modal dictation.  Transcription errors may be present.  If there are any questions, please contact me.    Assessment & Plan:   Sacroiliitis.  -seems to be having migratory arthralgias as well.  Seems to correlate with starting up the Uloric which is he is a possible side effect of the medication.  However he wants to continue the Uloric because he found the gout felon to be quite painful.   Heat packs, home PT. If no improvement to pain mgmt.    Tophaceous gout  -tolerating every other day but with itching.  Benadryl helps with this. Is wary of stopping b/c he found the tophi so painful to drain from his fingers.  Uric acid modestly decreased on recent labs.  Future labs ordered.  -     febuxostat (ULORIC) 40 mg Tab; Take 1 tablet (40 mg total) by mouth every other day.  Dispense: 30 tablet; Refill: 3  -     Uric acid; Future; Expected date: 06/04/2020    Post-nasal drip  Throat irritation  -only in the AM. Trial of atrovent.  And benzonatate.  He does not feel like this is acid brash.  But he does have a known history of GERD.  -     benzonatate (TESSALON) 100 MG capsule; Take 1 capsule (100 mg total) by mouth every morning. for 10 days  Dispense: 30 capsule; Refill: 0  -     ipratropium (ATROVENT) 0.03 % nasal spray; 2 sprays by Nasal route 3 (three) times daily.  Dispense: 15 mL; Refill: 0    Aortic atherosclerosis    Neutropenia, unspecified type  Thrombocytopenia  -followed by Hematology    Compression fracture of body of thoracic vertebra on XR 2/2019; 2/2019 alendronate  -stable.    Jackson's esophagus without dysplasia  -previously.  Had previously been on omeprazole but had not taken it in some time.  Does not have any and would like to take it again to take as needed.  Reports he had an outside EGD done 2019?  With [facility].  I will try to get those results.  But I will refer him back to our GI for recommendations about PPI  ongoing.  Talked about some associated risk between PPI therapy and kidney disease. So for the time being he can take it as needed and not scheduled.  [facility] for [facility] EGD  -     omeprazole (PRILOSEC) 20 MG capsule; Take 2 capsules (40 mg total) by mouth once daily.  Dispense: 180 capsule; Refill: 1  -     Ambulatory referral/consult to Gastroenterology; Future; Expected date: 03/11/2020    Left foot pain  -I think this may be loss of plantar fat pads.  Referral to Podiatry.  -     Ambulatory referral/consult to Podiatry; Future; Expected date: 03/11/2020      Medications Discontinued During This Encounter   Medication Reason    colchicine (COLCRYS) 0.6 mg tablet     cetirizine (ZYRTEC) 10 MG tablet Therapy completed    febuxostat (ULORIC) 40 mg Tab Reorder       meds sent this encounter:       Follow Up: No follow-ups on file.    Subjective:     Chief Complaint   Patient presents with    Cough     dry cough for several weeks now. using nasal spray and took robtussin    Joint Pain     using icey hot       HPI  Darrion is a 70 y.o. male, last appointment with this clinic was 12/10/2019.    Seen recently by DM NP; changed xultophy to lantus. And lantus. novolog no changes. And started on trulicity  Gout, uloric with generalized itching. Even with every other day.   Plan was referral to allergy to consider desensitization.  Hx of statin intolerance. hxof zetia expensive    6/2019 KIRT normal    2 weeks ago - pain in the left thigh.  Tylenol and icy hot.  It lasted 1 week. And now it's in the right buttock area.  It radiates into the right thigh maybe.  The right hand middle and index fingers hurt to flex. The PIPs. Started up after most recent rheum appointment.     Does seem to correlate with restarting the Uloric.  He has been taking it every other day.  For maybe the past month.  He ran out 2 days ago.  He finds that the every other day compared to daily, he still gets a lot of itching but not as  intolerable.  He takes Benadryl and that does seem to help.  At least it allows him to sleep.  He would like to continue the Uloric.  He found the to patient's gout to be quite painful and does not want to experience that again.    No fever no chills.     trulicity started first dose last Wednesday.     Morning cough. Tickling in the throat. Just in the morning. Hx of gerd with spicy foods.  Does not wake with acid brash taste.     Has not seen GI in some time.  Hx of Jackson's.  And history of PPI which he has not taken in some time and would like a refill.  He would like to take it as needed.  Thinks he had E[facility] done 2019 at [facility].    But I will get him back in to GI to reestablish and get the recommendations about screening intervals for EGD with history of Jackson's.  And recommendations about PPI.  He does have chronic kidney disease and we talked about the association between PPI use and risk for kidney disease. At this time he may just take it as needed.    And foot pain and pain with walking.  Would like to see podiatry. No swelling. Notes the left forefoot.    Answers for HPI/ROS submitted by the patient on 3/4/2020   activity change: No  unexpected weight change: No  rhinorrhea: No  trouble swallowing: No  visual disturbance: No  chest tightness: No  polyuria: No  difficulty urinating: No  joint swelling: No  arthralgias: No  confusion: No  dysphoric mood: No      Patient Care Team:  Farooq Domingo MD as PCP - General (Internal Medicine)  Tin Chambers MD (Gastroenterology)  Harrison Brannon MD (Cardiology)  Roxanna Salazar MD as Nurse Practitioner (Rheumatology)  Malcolm Irwin MD as  (Nephrology)  Amy Gamez MA as Care Coordinator  Dangelo Hall MD as Consulting Physician (Ophthalmology)    Patient Active Problem List    Diagnosis Date Noted    Connective tissue disorder 01/16/2020    Chronic kidney disease, stage III (moderate) 01/16/2020     Pleural effusion 10/15/2019     Overview:   thoracentesis 01/2015      Aortic atherosclerosis 09/24/2019    Pseudophakia of both eyes 08/27/2019    Refractive error 08/27/2019    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 05/02/2019    Anemia of chronic renal failure, stage 3 (moderate) 03/13/2019    Current chronic use of systemic steroids 02/27/2019    Compression fracture of body of thoracic vertebra on XR 2/2019; 2/2019 alendronate 02/27/2019    Neutropenia 02/26/2019    Thrombocytopenia 02/26/2019    Jackson's esophagus without dysplasia 03/14/2016    Anemia from plaquenil and imuran 03/14/2016    Diastolic heart failure, NYHA class 2 09/26/2014    BMI 23.0-23.9, adult 07/15/2014    Essential hypertension 05/13/2014 6/2019 TTE normal LV systolic and diastolic function; ABIs normal      Controlled type 2 diabetes mellitus with complication, without long-term current use of insulin 05/13/2014    Pure hypercholesterolemia 05/13/2014             MCTD (mixed connective tissue disease) 05/13/2014    Sjogren's disease 05/13/2014    Bilateral edema of lower extremity 6/2019 TTE normal LV systolic and diastolic function; ABIs normal 05/13/2014    Glaucoma 05/13/2014    Celiac disease 11/12/2013     Overview:   Gluten intolerant         PAST MEDICAL HISTORY:  Past Medical History:   Diagnosis Date    Anemia     Arthritis     Cataract     Chronic constipation     Diabetes mellitus, type 2     Felon of finger of left hand 5/11/2019    Glaucoma     Hyperlipidemia 5/13/2014    Hypertension     MCTD (mixed connective tissue disease)     Sjogren's disease     Ulcerative colitis     Urolithiasis        PAST SURGICAL HISTORY:  Past Surgical History:   Procedure Laterality Date    CATARACT EXTRACTION Bilateral 2005     COLONOSCOPY  2014    EYE SURGERY         SOCIAL HISTORY:  Social History     Socioeconomic History    Marital status:      Spouse name: Not on file    Number of children: 3    Years of education: Not on file    Highest education level: Not on file   Occupational History    Not on file   Social Needs    Financial resource strain: Somewhat hard    Food insecurity:     Worry: Never true     Inability: Never true    Transportation needs:     Medical: No     Non-medical: No   Tobacco Use    Smoking status: Never Smoker    Smokeless tobacco: Never Used   Substance and Sexual Activity    Alcohol use: No     Frequency: Never     Drinks per session: Patient refused     Binge frequency: Never    Drug use: No    Sexual activity: Never   Lifestyle    Physical activity:     Days per week: 3 days     Minutes per session: Not on file    Stress: Only a little   Relationships    Social connections:     Talks on phone: Once a week     Gets together: Once a week     Attends Oriental orthodox service: Not on file     Active member of club or organization: No     Attends meetings of clubs or organizations: Never     Relationship status:    Other Topics Concern    Not on file   Social History Narrative    Not on file       ALLERGIES AND MEDICATIONS: updated and reviewed.  Review of patient's allergies indicates:   Allergen Reactions    Penicillins Nausea And Vomiting    Uloric [febuxostat]      Aches and pain    Allopurinol analogues Rash     Current Outpatient Medications   Medication Sig Dispense Refill    alcohol swabs (ALCOHOL PADS) PadM Apply 1 each topically 4 (four) times daily. 400 each 3    amLODIPine (NORVASC) 5 MG tablet Take 1 tablet (5 mg total) by mouth 2 (two) times daily. 180 tablet 3    aspirin (ECOTRIN) 81 MG EC tablet Take 81 mg by mouth once daily.      blood sugar diagnostic Strp Check blood glucose 3x/day. e11.65 300 strip 3    cloNIDine 0.1 mg/24 hr td ptwk (CATAPRES) 0.1 mg/24  "hr Place 1 patch onto the skin every 7 days. 4 patch 11    diclofenac sodium (VOLTAREN) 1 % Gel Apply 2 g topically 4 (four) times daily as needed. Apply to aching joints 100 g 0    diphenhydrAMINE (BENADRYL) 25 mg capsule Take 1 capsule (25 mg total) by mouth every evening. 30 capsule 6    dorzolamide (TRUSOPT) 2 % ophthalmic solution Place 1 drop into both eyes 2 (two) times daily. 10 mL 3    dulaglutide (TRULICITY) 0.75 mg/0.5 mL PnIj Inject 0.5 mLs (0.75 mg total) into the skin every 7 days. 4 Syringe 1    febuxostat (ULORIC) 40 mg Tab Take 1 tablet (40 mg total) by mouth every other day. 30 tablet 3    insulin (LANTUS SOLOSTAR U-100 INSULIN) glargine 100 units/mL (3mL) SubQ pen Inject 7 Units into the skin once daily. 1 Box 0    insulin aspart U-100 (NOVOLOG FLEXPEN U-100 INSULIN) 100 unit/mL (3 mL) InPn pen Inject 3 units before breakfast and 5 units before lunch 1 Syringe 0    ipratropium (ATROVENT) 42 mcg (0.06 %) nasal spray INSTILL 2 SPRAYS BY NASAL ROUTE 3 TIMES DAILY. AS NEEDED FOR NASAL CONGESTION AND DRIP FOR 7 DAYS 30 mL 0    lancets Misc Check blood glucose 3x/day. e11.65 300 each 3    LINZESS 145 mcg Cap capsule TAKE ONE CAPSULE BY MOUTH EVERY DAY ON EMPTY STOMACH 30 MINUTES BEFORE BREAKFAST  0    lubiprostone (AMITIZA) 24 MCG Cap Take 1 capsule (24 mcg total) by mouth 2 (two) times daily with meals. 60 capsule 11    multivit with min-folic acid 200 mcg Chew       pen needle, diabetic (BD ULTRA-FINE RICHARD PEN NEEDLE) 32 gauge x 5/32" Ndle 1 Device by Misc.(Non-Drug; Combo Route) route 3 (three) times daily. 100 each 3    polyethylene glycol (GLYCOLAX) 17 gram/dose powder Take 17 g by mouth once daily. 850 g 11    torsemide (DEMADEX) 10 MG Tab Take 1 tablet (10 mg total) by mouth once daily. 90 tablet 3    travoprost (TRAVATAN Z) 0.004 % ophthalmic solution Place 1 drop into both eyes every evening. 5 mL 4    valsartan (DIOVAN) 160 MG tablet Take 1 tablet (160 mg total) by mouth 2 " "(two) times daily. 180 tablet 3    cetirizine (ZYRTEC) 10 MG tablet Take 1 tablet (10 mg total) by mouth once daily. (Patient not taking: Reported on 2/24/2020) 30 tablet 11     No current facility-administered medications for this visit.        Review of Systems   HENT: Negative for hearing loss.    Eyes: Negative for discharge.   Respiratory: Negative for wheezing.    Cardiovascular: Negative for chest pain and palpitations.   Gastrointestinal: Negative for blood in stool, constipation, diarrhea and vomiting.   Genitourinary: Negative for hematuria and urgency.   Musculoskeletal: Negative for neck pain.   Neurological: Negative for weakness and headaches.   Endo/Heme/Allergies: Negative for polydipsia.       Objective:   Physical Exam   Vitals:    03/04/20 1335   BP: 124/66   BP Location: Right arm   Patient Position: Sitting   BP Method: Medium (Manual)   Pulse: 61   Temp: 98.5 °F (36.9 °C)   TempSrc: Oral   SpO2: 97%   Weight: 66.7 kg (147 lb)   Height: 5' 6" (1.676 m)    Body mass index is 23.73 kg/m².  Weight: 66.7 kg (147 lb)   Height: 5' 6" (167.6 cm)     Physical Exam   Constitutional: He is oriented to person, place, and time. He appears well-developed and well-nourished. No distress.   Eyes: EOM are normal.   Cardiovascular: Normal rate, regular rhythm and normal heart sounds.   No murmur heard.  Pulmonary/Chest: Effort normal and breath sounds normal.   Musculoskeletal:   The right hand pointer and middle fingers, notes pain in the PIP is, no at right swelling or warmth or redness in these joints.  The right SI joint area is tender and elicits discomfort that he is complaining of.  Seated straight leg raise without pain.  Plantar flexion 5/5 bilaterally.  Toe dorsiflexion 5/5 against resistance.  Foot inversion and eversion on the right 5/5.  Hip flexion against resistance 5/5   Neurological: He is alert and oriented to person, place, and time. Coordination normal.   Skin: Skin is warm and dry. "   Psychiatric: He has a normal mood and affect. His behavior is normal. Thought content normal.

## 2020-03-04 NOTE — LETTER
March 4, 2020    [facility]               [facility]  [address]  [place] [address]  Phone: [phone]  Fax: [phone] March 4, 2020     Patient: Darrion Bennett    YOB: 1950   Date of Visit: 3/4/2020     AUTHORIZATION FOR RELEASE OF   CONFIDENTIAL INFORMATION     Dear Ryan TARIQ,     We are seeing Darrion Bennett, date of birth 1950, in the clinic at [facility] FAMILY [facility]/ INTERNAL MED/ PEDS. Farooq Domingo MD is the patient's PCP. Darrion Bennett  has an outstanding lab/procedure at the time we reviewed him chart. In order to help keep his health information updated, he has authorized us to request the following medical record(s):          (  )  MAMMOGRAM                                      (  )  COLONOSCOPY      (  )  PAP SMEAR                                          (  )  OUTSIDE LAB RESULTS      (  )  DEXA SCAN                                          (  )  EYE EXAM             (  )  FOOT EXAM                                          (  )  ENTIRE RECORD      (  )  OUTSIDE IMMUNIZATIONS                 (xx  )  EGD_______________            Please fax records to [facility], Marvin P Dair, MD,  [phone]   [address] [place] [address]         If you have any questions, please contact Amy Gamez MA at [phone].

## 2020-03-05 ENCOUNTER — TELEPHONE (OUTPATIENT)
Dept: PHARMACY | Facility: CLINIC | Age: 70
End: 2020-03-05

## 2020-03-05 ENCOUNTER — PATIENT MESSAGE (OUTPATIENT)
Dept: RHEUMATOLOGY | Facility: CLINIC | Age: 70
End: 2020-03-05

## 2020-03-05 DIAGNOSIS — M1A.9XX1 TOPHACEOUS GOUT: ICD-10-CM

## 2020-03-05 RX ORDER — FEBUXOSTAT 40 MG/1
40 TABLET, FILM COATED ORAL EVERY OTHER DAY
Qty: 30 TABLET | Refills: 11 | Status: SHIPPED | OUTPATIENT
Start: 2020-03-05 | End: 2020-03-05 | Stop reason: SDUPTHER

## 2020-03-05 RX ORDER — FEBUXOSTAT 40 MG/1
40 TABLET, FILM COATED ORAL EVERY OTHER DAY
Qty: 30 TABLET | Refills: 11 | Status: SHIPPED | OUTPATIENT
Start: 2020-03-05 | End: 2020-05-05 | Stop reason: SDUPTHER

## 2020-03-05 NOTE — TELEPHONE ENCOUNTER
I informed doctor the request assistance for uloric shows on his profile  under allergies that he is allergic to Uloric.  I'm waiting for clarification.

## 2020-03-06 ENCOUNTER — TELEPHONE (OUTPATIENT)
Dept: PHARMACY | Facility: CLINIC | Age: 70
End: 2020-03-06

## 2020-03-06 NOTE — TELEPHONE ENCOUNTER
The patient do not qualify for Pharmacy Patient Assistance because he has Low Income Subsidy.  The patient pays no more than $3.35 for generic and $8.50 for brand.  The patient stated [employer] told them a P/A needs to be done.

## 2020-03-09 DIAGNOSIS — H40.1134 PRIMARY OPEN ANGLE GLAUCOMA (POAG) OF BOTH EYES, INDETERMINATE STAGE: ICD-10-CM

## 2020-03-09 RX ORDER — DORZOLAMIDE HCL 20 MG/ML
1 SOLUTION/ DROPS OPHTHALMIC 2 TIMES DAILY
Qty: 10 ML | Refills: 0 | Status: SHIPPED | OUTPATIENT
Start: 2020-03-09 | End: 2020-04-27

## 2020-03-13 ENCOUNTER — TELEPHONE (OUTPATIENT)
Dept: PHARMACY | Facility: CLINIC | Age: 70
End: 2020-03-13

## 2020-03-17 ENCOUNTER — TELEPHONE (OUTPATIENT)
Dept: FAMILY MEDICINE | Facility: CLINIC | Age: 70
End: 2020-03-17

## 2020-03-18 ENCOUNTER — OFFICE VISIT (OUTPATIENT)
Dept: PODIATRY | Facility: CLINIC | Age: 70
End: 2020-03-18
Payer: MEDICARE

## 2020-03-18 VITALS
DIASTOLIC BLOOD PRESSURE: 69 MMHG | SYSTOLIC BLOOD PRESSURE: 149 MMHG | WEIGHT: 147.06 LBS | HEIGHT: 66 IN | HEART RATE: 76 BPM | BODY MASS INDEX: 23.64 KG/M2

## 2020-03-18 DIAGNOSIS — M20.40 HAMMER TOE, UNSPECIFIED LATERALITY: ICD-10-CM

## 2020-03-18 DIAGNOSIS — E11.9 COMPREHENSIVE DIABETIC FOOT EXAMINATION, TYPE 2 DM, ENCOUNTER FOR: Primary | ICD-10-CM

## 2020-03-18 DIAGNOSIS — M77.40 METATARSALGIA, UNSPECIFIED LATERALITY: ICD-10-CM

## 2020-03-18 DIAGNOSIS — M79.672 LEFT FOOT PAIN: ICD-10-CM

## 2020-03-18 PROCEDURE — 3078F DIAST BP <80 MM HG: CPT | Mod: HCNC,CPTII,S$GLB, | Performed by: PODIATRIST

## 2020-03-18 PROCEDURE — 3052F HG A1C>EQUAL 8.0%<EQUAL 9.0%: CPT | Mod: HCNC,CPTII,S$GLB, | Performed by: PODIATRIST

## 2020-03-18 PROCEDURE — 3077F SYST BP >= 140 MM HG: CPT | Mod: HCNC,CPTII,S$GLB, | Performed by: PODIATRIST

## 2020-03-18 PROCEDURE — 99999 PR PBB SHADOW E&M-EST. PATIENT-LVL IV: CPT | Mod: PBBFAC,HCNC,, | Performed by: PODIATRIST

## 2020-03-18 PROCEDURE — 3077F PR MOST RECENT SYSTOLIC BLOOD PRESSURE >= 140 MM HG: ICD-10-PCS | Mod: HCNC,CPTII,S$GLB, | Performed by: PODIATRIST

## 2020-03-18 PROCEDURE — 1101F PT FALLS ASSESS-DOCD LE1/YR: CPT | Mod: HCNC,CPTII,S$GLB, | Performed by: PODIATRIST

## 2020-03-18 PROCEDURE — 1101F PR PT FALLS ASSESS DOC 0-1 FALLS W/OUT INJ PAST YR: ICD-10-PCS | Mod: HCNC,CPTII,S$GLB, | Performed by: PODIATRIST

## 2020-03-18 PROCEDURE — 99214 OFFICE O/P EST MOD 30 MIN: CPT | Mod: HCNC,S$GLB,, | Performed by: PODIATRIST

## 2020-03-18 PROCEDURE — 1125F AMNT PAIN NOTED PAIN PRSNT: CPT | Mod: HCNC,S$GLB,, | Performed by: PODIATRIST

## 2020-03-18 PROCEDURE — 99999 PR PBB SHADOW E&M-EST. PATIENT-LVL IV: ICD-10-PCS | Mod: PBBFAC,HCNC,, | Performed by: PODIATRIST

## 2020-03-18 PROCEDURE — 1159F MED LIST DOCD IN RCRD: CPT | Mod: HCNC,S$GLB,, | Performed by: PODIATRIST

## 2020-03-18 PROCEDURE — 3052F PR MOST RECENT HEMOGLOBIN A1C LEVEL 8.0 - < 9.0%: ICD-10-PCS | Mod: HCNC,CPTII,S$GLB, | Performed by: PODIATRIST

## 2020-03-18 PROCEDURE — 3078F PR MOST RECENT DIASTOLIC BLOOD PRESSURE < 80 MM HG: ICD-10-PCS | Mod: HCNC,CPTII,S$GLB, | Performed by: PODIATRIST

## 2020-03-18 PROCEDURE — 1125F PR PAIN SEVERITY QUANTIFIED, PAIN PRESENT: ICD-10-PCS | Mod: HCNC,S$GLB,, | Performed by: PODIATRIST

## 2020-03-18 PROCEDURE — 99214 PR OFFICE/OUTPT VISIT, EST, LEVL IV, 30-39 MIN: ICD-10-PCS | Mod: HCNC,S$GLB,, | Performed by: PODIATRIST

## 2020-03-18 PROCEDURE — 1159F PR MEDICATION LIST DOCUMENTED IN MEDICAL RECORD: ICD-10-PCS | Mod: HCNC,S$GLB,, | Performed by: PODIATRIST

## 2020-03-18 RX ORDER — DICLOFENAC SODIUM 10 MG/G
2 GEL TOPICAL 4 TIMES DAILY PRN
Qty: 100 G | Refills: 3 | Status: SHIPPED | OUTPATIENT
Start: 2020-03-18

## 2020-03-18 NOTE — LETTER
March 24, 2020      Farooq Domingo MD  [address]  [place] [address]           [facility]  [address]  [place] [address]  Phone: [phone]          Patient: Darrion Bennett   MR Number: [id number]   YOB: 1950   Date of Visit: 3/18/2020       Dear Dr. Farooq Domingo:    Thank you for referring Darrion Bennett to me for evaluation. Attached you will find relevant portions of my assessment and plan of care.    If you have questions, please do not hesitate to call me. I look forward to following Darrion Bennett along with you.    Sincerely,    Mel Keenan, ANGEL    Enclosure  CC:  No Recipients    If you would like to receive this communication electronically, please contact [email or web address] or [phone] to request more information on [employer] access.    For providers and/or their staff who would like to refer a patient to [facility], please contact us through our one-stop-shop provider referral line, [facility] , at [phone].    If you feel you have received this communication in error or would no longer like to receive these types of communications, please e-mail [email or web address]

## 2020-03-20 ENCOUNTER — PATIENT MESSAGE (OUTPATIENT)
Dept: ADMINISTRATIVE | Facility: OTHER | Age: 70
End: 2020-03-20

## 2020-03-24 NOTE — PROGRESS NOTES
Subjective:      Patient ID: Darrion Bennett is a 70 y.o. male.    Chief Complaint: Diabetes Mellitus (ov 3/4/20 [facility] pcp) and Foot Pain (left foot dorsal)    Darrion is a 70 y.o. male who presents to the clinic upon referral from Dr. Domingo  for evaluation and treatment of diabetic feet. Darrion has a past medical history of Anemia, Arthritis, Cataract, Chronic constipation, Diabetes mellitus, type 2, Felon of finger of left hand (5/11/2019), Glaucoma, Hyperlipidemia (5/13/2014), Hypertension, MCTD (mixed connective tissue disease), Sjogren's disease, Ulcerative colitis, and Urolithiasis. Reports left foot pain worse after long periods of WB.     PCP: Farooq Domingo MD    Date Last Seen by PCP:   Chief Complaint   Patient presents with    Diabetes Mellitus     ov 3/4/20 [facility] pcp    Foot Pain     left foot dorsal       Current shoe gear:  rx shoes    Hemoglobin A1C   Date Value Ref Range Status   01/09/2020 9.0 (H) 4.0 - 5.6 % Final     Comment:     ADA Screening Guidelines:  5.7-6.4%  Consistent with prediabetes  >or=6.5%  Consistent with diabetes  High levels of fetal hemoglobin interfere with the HbA1C  assay. Heterozygous hemoglobin variants (HbS, HgC, etc)do  not significantly interfere with this assay.   However, presence of multiple variants may affect accuracy.     09/19/2019 6.9 (H) 4.0 - 5.6 % Final     Comment:     ADA Screening Guidelines:  5.7-6.4%  Consistent with prediabetes  >or=6.5%  Consistent with diabetes  High levels of fetal hemoglobin interfere with the HbA1C  assay. Heterozygous hemoglobin variants (HbS, HgC, etc)do  not significantly interfere with this assay.   However, presence of multiple variants may affect accuracy.     06/11/2019 6.9 (H) 4.0 - 5.6 % Final     Comment:     ADA Screening Guidelines:  5.7-6.4%  Consistent with prediabetes  >or=6.5%  Consistent with diabetes  High levels of fetal hemoglobin interfere with the HbA1C  assay. Heterozygous hemoglobin variants (HbS, HgC, etc)do  not  significantly interfere with this assay.   However, presence of multiple variants may affect accuracy.     10/16/2018 6.2 (H) 4.0 - 5.7 % Final     Comment:     Dr. Jurgen Ward ( Endocrinology )     Patient Active Problem List   Diagnosis    Essential hypertension    Controlled type 2 diabetes mellitus with complication, without long-term current use of insulin    Pure hypercholesterolemia    MCTD (mixed connective tissue disease)    Sjogren's disease    Bilateral edema of lower extremity 6/2019 TTE normal LV systolic and diastolic function; ABIs normal    Glaucoma    BMI 23.0-23.9, adult    Jackson's esophagus without dysplasia    Anemia from plaquenil and imuran    Neutropenia    Thrombocytopenia    Current chronic use of systemic steroids    Compression fracture of body of thoracic vertebra on XR 2/2019; 2/2019 alendronate    Anemia of chronic renal failure, stage 3 (moderate)    Chronic constipation not responding to linzess, miralax, senna    Screening for colon cancer 07/26/2018 colonoscopy transverse colon polyp path showing benign inflammatory polyp.    Tophaceous gout    Pseudophakia of both eyes    Refractive error    Aortic atherosclerosis    Celiac disease    Diastolic heart failure, NYHA class 2    Pleural effusion    Connective tissue disorder    Chronic kidney disease, stage III (moderate)     Current Outpatient Medications on File Prior to Visit   Medication Sig Dispense Refill    alcohol swabs (ALCOHOL PADS) PadM Apply 1 each topically 4 (four) times daily. 400 each 3    amLODIPine (NORVASC) 5 MG tablet Take 1 tablet (5 mg total) by mouth 2 (two) times daily. 180 tablet 3    aspirin (ECOTRIN) 81 MG EC tablet Take 81 mg by mouth once daily.      blood sugar diagnostic Strp Check blood glucose 3x/day. e11.65 300 strip 3    cloNIDine 0.1 mg/24 hr td ptwk (CATAPRES) 0.1 mg/24 hr Place 1 patch onto the skin every 7 days. 4 patch 11    diphenhydrAMINE (BENADRYL) 25 mg  "capsule Take 1 capsule (25 mg total) by mouth every evening. 30 capsule 6    dorzolamide (TRUSOPT) 2 % ophthalmic solution Place 1 drop into both eyes 2 (two) times daily. 10 mL 0    dulaglutide (TRULICITY) 0.75 mg/0.5 mL PnIj Inject 0.5 mLs (0.75 mg total) into the skin every 7 days. 4 Syringe 1    febuxostat (ULORIC) 40 mg Tab Take 1 tablet (40 mg total) by mouth every other day. 30 tablet 11    insulin (LANTUS SOLOSTAR U-100 INSULIN) glargine 100 units/mL (3mL) SubQ pen Inject 7 Units into the skin once daily. 1 Box 0    insulin aspart U-100 (NOVOLOG FLEXPEN U-100 INSULIN) 100 unit/mL (3 mL) InPn pen Inject 3 units before breakfast and 5 units before lunch 1 Syringe 0    ipratropium (ATROVENT) 0.03 % nasal spray 2 sprays by Nasal route 3 (three) times daily. 15 mL 0    ipratropium (ATROVENT) 42 mcg (0.06 %) nasal spray INSTILL 2 SPRAYS BY NASAL ROUTE 3 TIMES DAILY. AS NEEDED FOR NASAL CONGESTION AND DRIP FOR 7 DAYS 30 mL 0    lancets Misc Check blood glucose 3x/day. e11.65 300 each 3    LINZESS 145 mcg Cap capsule TAKE ONE CAPSULE BY MOUTH EVERY DAY ON EMPTY STOMACH 30 MINUTES BEFORE BREAKFAST  0    lubiprostone (AMITIZA) 24 MCG Cap Take 1 capsule (24 mcg total) by mouth 2 (two) times daily with meals. 60 capsule 11    multivit with min-folic acid 200 mcg Chew       omeprazole (PRILOSEC) 20 MG capsule Take 2 capsules (40 mg total) by mouth once daily. 180 capsule 1    pen needle, diabetic (BD ULTRA-FINE RICHARD PEN NEEDLE) 32 gauge x 5/32" Ndle 1 Device by Misc.(Non-Drug; Combo Route) route 3 (three) times daily. 100 each 3    polyethylene glycol (GLYCOLAX) 17 gram/dose powder Take 17 g by mouth once daily. 850 g 11    torsemide (DEMADEX) 10 MG Tab Take 1 tablet (10 mg total) by mouth once daily. 90 tablet 3    travoprost (TRAVATAN Z) 0.004 % ophthalmic solution Place 1 drop into both eyes every evening. 5 mL 4    valsartan (DIOVAN) 160 MG tablet Take 1 tablet (160 mg total) by mouth 2 (two) times " daily. 180 tablet 3     No current facility-administered medications on file prior to visit.      Review of patient's allergies indicates:   Allergen Reactions    Penicillins Nausea And Vomiting    Uloric [febuxostat]      Aches and pain    Allopurinol analogues Rash        Past Surgical History:   Procedure Laterality Date    CATARACT EXTRACTION Bilateral 2005    COLONOSCOPY  2014    EYE SURGERY       Family History   Problem Relation Age of Onset    Hypertension Father     Stroke Father     Cataracts Father     Glaucoma Father     Cataracts Mother     No Known Problems Sister     No Known Problems Brother     Rheum arthritis Maternal Aunt     Rheum arthritis Maternal Uncle     No Known Problems Paternal Aunt     No Known Problems Paternal Uncle     No Known Problems Maternal Grandmother     No Known Problems Maternal Grandfather     Throat cancer Paternal Grandmother     Glaucoma Paternal Grandfather     Celiac disease Neg Hx     Colon cancer Neg Hx     Cirrhosis Neg Hx     Colon polyps Neg Hx     Crohn's disease Neg Hx     Cystic fibrosis Neg Hx     Hemochromatosis Neg Hx     Esophageal cancer Neg Hx     Inflammatory bowel disease Neg Hx     Irritable bowel syndrome Neg Hx     Liver cancer Neg Hx     Liver disease Neg Hx     Rectal cancer Neg Hx     Stomach cancer Neg Hx     Ulcerative colitis Neg Hx     Chase's disease Neg Hx     Amblyopia Neg Hx     Blindness Neg Hx     Cancer Neg Hx     Diabetes Neg Hx     Macular degeneration Neg Hx     Retinal detachment Neg Hx     Strabismus Neg Hx     Thyroid disease Neg Hx     Melanoma Neg Hx      Social History     Socioeconomic History    Marital status:      Spouse name: Not on file    Number of children: 3    Years of education: Not on file    Highest education level: Not on file   Occupational History    Not on file   Social Needs    Financial resource strain: Somewhat hard    Food insecurity:     Worry:  "Never true     Inability: Never true    Transportation needs:     Medical: No     Non-medical: No   Tobacco Use    Smoking status: Never Smoker    Smokeless tobacco: Never Used   Substance and Sexual Activity    Alcohol use: No     Frequency: Never     Drinks per session: Patient refused     Binge frequency: Never    Drug use: No    Sexual activity: Never   Lifestyle    Physical activity:     Days per week: 3 days     Minutes per session: Not on file    Stress: Only a little   Relationships    Social connections:     Talks on phone: Once a week     Gets together: Once a week     Attends Rastafarian service: Not on file     Active member of club or organization: No     Attends meetings of clubs or organizations: Never     Relationship status:    Other Topics Concern    Not on file   Social History Narrative    Not on file     Review of Systems   Constitution: Negative for chills, fever and malaise/fatigue.   Cardiovascular: Positive for leg swelling. Negative for chest pain and claudication.   Respiratory: Negative for cough and shortness of breath.    Skin: Positive for dry skin and nail changes. Negative for itching and rash.   Musculoskeletal: Positive for arthritis, back pain and joint pain. Negative for falls, joint swelling and muscle weakness.   Gastrointestinal: Negative for diarrhea, nausea and vomiting.   Neurological: Negative for numbness, paresthesias, tremors and weakness.   Psychiatric/Behavioral: Negative for altered mental status and hallucinations.         Objective:       Vitals:    03/18/20 0839   BP: (!) 149/69   Pulse: 76   Weight: 66.7 kg (147 lb 0.8 oz)   Height: 5' 6" (1.676 m)   PainSc:   6       Physical Exam   Constitutional:   General: Pt. is well-developed, well-nourished, appears stated age, in no acute distress, alert and oriented x 3. No evidence of depression, anxiety, or agitation. Calm, cooperative, and communicative. Appropriate interactions and affect.     "   Cardiovascular:   Pulses:       Dorsalis pedis pulses are 2+ on the right side, and 2+ on the left side.        Posterior tibial pulses are 1+ on the right side, and 1+ on the left side.   There is decreased digital hair. Skin is atrophic   Musculoskeletal:        Right ankle: He exhibits normal range of motion and no swelling. No tenderness. Achilles tendon exhibits no pain and no defect.        Left ankle: He exhibits normal range of motion and no swelling. No tenderness. Achilles tendon exhibits no pain and no defect.        Right foot: There is normal range of motion and no tenderness.        Left foot: There is normal range of motion and no tenderness.   There is equinus deformity bilateral with decreased dorsiflexion at the ankle joint bilateral.     Decreased first MPJ range of motion both weightbearing and nonweightbearing, no crepitus observed the first MP joint, + dorsal flag sign. Mild  bunion deformity is observed .    Patient has hammertoes of digits 2-5 bilateral partially reducible without symptom today.    Fat pad atrophy to heels and met heads bilateral    .neuro   Neurological: A sensory deficit is present.   Barre-Kennedy 5.07 monofilamant testing is diminished Fareed feet. Sharp/dull sensation diminished Bilaterally. Light touch absent Bilaterally.       Skin: Skin is warm, dry and intact. No abrasion, no ecchymosis, no lesion and no rash noted. He is not diaphoretic. No cyanosis or erythema. No pallor. Nails show no clubbing.   Toenails 1-5 of the left foot are thickened by 2-3 mm, discolored/yellowed, dystrophic, brittle with subungual debris    Interdigital Spaces clean, dry and without evidence of break in skin integrity   Psychiatric: He has a normal mood and affect. His speech is normal.   Nursing note and vitals reviewed.            Assessment:       Encounter Diagnoses   Name Primary?    Left foot pain     Comprehensive diabetic foot examination, type 2 DM, encounter for Yes     Hammer toe, unspecified laterality     Metatarsalgia, unspecified laterality          Plan:       Darrion was seen today for diabetes mellitus and foot pain.    Diagnoses and all orders for this visit:    Comprehensive diabetic foot examination, type 2 DM, encounter for  -     DIABETIC SHOES FOR HOME USE    Left foot pain  -     Ambulatory referral/consult to Podiatry  -     diclofenac sodium (VOLTAREN) 1 % Gel; Apply 2 g topically 4 (four) times daily as needed. Apply to aching joints  -     DIABETIC SHOES FOR HOME USE    Hammer toe, unspecified laterality  -     DIABETIC SHOES FOR HOME USE    Metatarsalgia, unspecified laterality  -     DIABETIC SHOES FOR HOME USE      I counseled the patient on his conditions, their implications and medical management.    - Shoe inspection. Diabetic Foot Education. Patient reminded of the importance of good nutrition and blood sugar control to help prevent podiatric complications of diabetes. Patient instructed on proper foot hygeine. We discussed wearing proper shoe gear, daily foot inspections, never walking without protective shoe gear, caution putting sharp instruments to feet     - Discussed DM foot care:  Wear comfortable, proper fitting shoes. Wash feet daily. Dry well. After drying, apply moisturizer to feet (no lotion to webspaces). Inspect feet daily for skin breaks, blisters, swelling, or redness. Wear cotton socks (preferably white)  Change socks every day. Do NOT walk barefoot. Do NOT use heating pads or warm/hot water soaks     Rx Voltaren gel to be applied to affected area up to 3-4 x daily as needed for pain    Rx diabetic shoes with custom molded inserts to be worn at all times while ambulating. Prescription provided with list of local retailers.

## 2020-03-26 ENCOUNTER — PATIENT MESSAGE (OUTPATIENT)
Dept: ADMINISTRATIVE | Facility: OTHER | Age: 70
End: 2020-03-26

## 2020-03-27 ENCOUNTER — TELEPHONE (OUTPATIENT)
Dept: ENDOCRINOLOGY | Facility: CLINIC | Age: 70
End: 2020-03-27

## 2020-03-27 DIAGNOSIS — E11.9 TYPE 2 DIABETES MELLITUS: ICD-10-CM

## 2020-03-27 RX ORDER — INSULIN GLARGINE 100 [IU]/ML
7 INJECTION, SOLUTION SUBCUTANEOUS DAILY
Qty: 1 BOX | Refills: 2 | Status: SHIPPED | OUTPATIENT
Start: 2020-03-27 | End: 2020-06-23 | Stop reason: SDUPTHER

## 2020-03-27 RX ORDER — INSULIN ASPART 100 [IU]/ML
INJECTION, SOLUTION INTRAVENOUS; SUBCUTANEOUS
Qty: 1 BOX | Refills: 2 | Status: SHIPPED | OUTPATIENT
Start: 2020-03-27 | End: 2020-06-23 | Stop reason: SDUPTHER

## 2020-03-27 NOTE — TELEPHONE ENCOUNTER
Discussed with patient on the phone regarding diabetes instead of an in-person visit d/t COVID19 risk. Reviewed importance of DM control to avoid severe COVID-19 infection and death.    Taking the following meds:   Novolog 3 units before breakfast, 5 units before lunch   Lantus 7 units hs  Trulicity 0.75 mg once weekly     Tests 3x/day.   Fasting 120s   Lunch 180s   predinner 100-120s    Advised - increase Novolog to 4 units before breakfast. rtc in 3 mo with labs prior. He voiced understanding. Will consider increasing Trulicity at next visit.

## 2020-04-03 DIAGNOSIS — R09.82 POST-NASAL DRIP: ICD-10-CM

## 2020-04-03 RX ORDER — IPRATROPIUM BROMIDE 21 UG/1
2 SPRAY, METERED NASAL 3 TIMES DAILY
Qty: 30 ML | Refills: 0 | Status: SHIPPED | OUTPATIENT
Start: 2020-04-03 | End: 2020-04-28

## 2020-04-09 ENCOUNTER — TELEPHONE (OUTPATIENT)
Dept: RHEUMATOLOGY | Facility: CLINIC | Age: 70
End: 2020-04-09

## 2020-04-09 NOTE — TELEPHONE ENCOUNTER
----- Message from Roxanna Esparza sent at 3/6/2020  9:43 AM CST -----  Hi Dr. Salazar,   The patient do not qualify for Pharmacy Patient Assistance because he has Low Income Subsidy.  The patient pays no more than $3.35 for generic and $8.50 for brand.  The patient stated [employer] told them a P/A needs to be done.   ----- Message -----  From: Roxanna Salazar MD  Sent: 3/5/2020   4:06 PM CST  To: Roxanna Esparza    Hi  He gets itching. He is taking uloric right now and tolerating it well.  Dr. Salazar  ----- Message -----  From: Roxanna Esparza  Sent: 3/5/2020   3:31 PM CST  To: Roxanna Salazar MD    Hi Dr. Salazar,   Under the patient allergies it states he is allergic to Uloric.  Can you please advise    ----- Message -----  From: Roxanna Salazar MD  Sent: 3/5/2020   1:16 PM CST  To: Pharmacy Patient Assistance Team     Hi  This is Dr. Salazar. Will you be able to help us get the uloric (assist with PA).  He is allergic to allopurinol.  Dr. RIVAS

## 2020-04-14 ENCOUNTER — LAB VISIT (OUTPATIENT)
Dept: LAB | Facility: HOSPITAL | Age: 70
End: 2020-04-14
Attending: INTERNAL MEDICINE
Payer: MEDICARE

## 2020-04-14 DIAGNOSIS — D63.1 ANEMIA OF CHRONIC RENAL FAILURE, STAGE 3 (MODERATE): ICD-10-CM

## 2020-04-14 DIAGNOSIS — N18.30 CHRONIC KIDNEY DISEASE, STAGE III (MODERATE): ICD-10-CM

## 2020-04-14 DIAGNOSIS — N18.30 ANEMIA OF CHRONIC RENAL FAILURE, STAGE 3 (MODERATE): ICD-10-CM

## 2020-04-14 DIAGNOSIS — M35.9 CONNECTIVE TISSUE DISORDER: ICD-10-CM

## 2020-04-14 LAB
ALBUMIN SERPL BCP-MCNC: 3.4 G/DL (ref 3.5–5.2)
ALP SERPL-CCNC: 77 U/L (ref 55–135)
ALT SERPL W/O P-5'-P-CCNC: 10 U/L (ref 10–44)
ANION GAP SERPL CALC-SCNC: 8 MMOL/L (ref 8–16)
AST SERPL-CCNC: 17 U/L (ref 10–40)
BASOPHILS # BLD AUTO: 0.03 K/UL (ref 0–0.2)
BASOPHILS NFR BLD: 0.4 % (ref 0–1.9)
BILIRUB SERPL-MCNC: 0.3 MG/DL (ref 0.1–1)
BUN SERPL-MCNC: 33 MG/DL (ref 8–23)
CALCIUM SERPL-MCNC: 9.5 MG/DL (ref 8.7–10.5)
CHLORIDE SERPL-SCNC: 104 MMOL/L (ref 95–110)
CO2 SERPL-SCNC: 22 MMOL/L (ref 23–29)
CREAT SERPL-MCNC: 1.7 MG/DL (ref 0.5–1.4)
DIFFERENTIAL METHOD: ABNORMAL
EOSINOPHIL # BLD AUTO: 0.2 K/UL (ref 0–0.5)
EOSINOPHIL NFR BLD: 2.8 % (ref 0–8)
ERYTHROCYTE [DISTWIDTH] IN BLOOD BY AUTOMATED COUNT: 15 % (ref 11.5–14.5)
EST. GFR  (AFRICAN AMERICAN): 46 ML/MIN/1.73 M^2
EST. GFR  (NON AFRICAN AMERICAN): 40 ML/MIN/1.73 M^2
GLUCOSE SERPL-MCNC: 111 MG/DL (ref 70–110)
HCT VFR BLD AUTO: 36.9 % (ref 40–54)
HGB BLD-MCNC: 11.7 G/DL (ref 14–18)
IMM GRANULOCYTES # BLD AUTO: 0.03 K/UL (ref 0–0.04)
IMM GRANULOCYTES NFR BLD AUTO: 0.4 % (ref 0–0.5)
LYMPHOCYTES # BLD AUTO: 1.3 K/UL (ref 1–4.8)
LYMPHOCYTES NFR BLD: 16.5 % (ref 18–48)
MCH RBC QN AUTO: 26.7 PG (ref 27–31)
MCHC RBC AUTO-ENTMCNC: 31.7 G/DL (ref 32–36)
MCV RBC AUTO: 84 FL (ref 82–98)
MONOCYTES # BLD AUTO: 1.3 K/UL (ref 0.3–1)
MONOCYTES NFR BLD: 15.9 % (ref 4–15)
NEUTROPHILS # BLD AUTO: 5.2 K/UL (ref 1.8–7.7)
NEUTROPHILS NFR BLD: 64 % (ref 38–73)
NRBC BLD-RTO: 0 /100 WBC
PLATELET # BLD AUTO: 245 K/UL (ref 150–350)
PMV BLD AUTO: 11.5 FL (ref 9.2–12.9)
POTASSIUM SERPL-SCNC: 4.3 MMOL/L (ref 3.5–5.1)
PROT SERPL-MCNC: 8.8 G/DL (ref 6–8.4)
RBC # BLD AUTO: 4.38 M/UL (ref 4.6–6.2)
SODIUM SERPL-SCNC: 134 MMOL/L (ref 136–145)
WBC # BLD AUTO: 8.1 K/UL (ref 3.9–12.7)

## 2020-04-14 PROCEDURE — 80053 COMPREHEN METABOLIC PANEL: CPT | Mod: HCNC

## 2020-04-14 PROCEDURE — 85025 COMPLETE CBC W/AUTO DIFF WBC: CPT | Mod: HCNC

## 2020-04-14 PROCEDURE — 36415 COLL VENOUS BLD VENIPUNCTURE: CPT | Mod: HCNC,PO

## 2020-04-26 DIAGNOSIS — H40.1134 PRIMARY OPEN ANGLE GLAUCOMA (POAG) OF BOTH EYES, INDETERMINATE STAGE: ICD-10-CM

## 2020-04-27 ENCOUNTER — PATIENT MESSAGE (OUTPATIENT)
Dept: ENDOCRINOLOGY | Facility: CLINIC | Age: 70
End: 2020-04-27

## 2020-04-27 ENCOUNTER — PATIENT MESSAGE (OUTPATIENT)
Dept: FAMILY MEDICINE | Facility: CLINIC | Age: 70
End: 2020-04-27

## 2020-04-27 RX ORDER — DORZOLAMIDE HCL 20 MG/ML
SOLUTION/ DROPS OPHTHALMIC
Qty: 10 ML | Refills: 0 | Status: SHIPPED | OUTPATIENT
Start: 2020-04-27 | End: 2020-05-29 | Stop reason: SDUPTHER

## 2020-04-28 ENCOUNTER — TELEPHONE (OUTPATIENT)
Dept: FAMILY MEDICINE | Facility: CLINIC | Age: 70
End: 2020-04-28

## 2020-04-28 DIAGNOSIS — R09.82 POST-NASAL DRIP: ICD-10-CM

## 2020-04-28 RX ORDER — IPRATROPIUM BROMIDE 21 UG/1
SPRAY, METERED NASAL
Qty: 30 ML | Refills: 11 | Status: SHIPPED | OUTPATIENT
Start: 2020-04-28 | End: 2021-02-04

## 2020-04-29 ENCOUNTER — TELEPHONE (OUTPATIENT)
Dept: FAMILY MEDICINE | Facility: CLINIC | Age: 70
End: 2020-04-29

## 2020-04-29 ENCOUNTER — LAB VISIT (OUTPATIENT)
Dept: LAB | Facility: HOSPITAL | Age: 70
End: 2020-04-29
Attending: INTERNAL MEDICINE
Payer: MEDICARE

## 2020-04-29 ENCOUNTER — OFFICE VISIT (OUTPATIENT)
Dept: FAMILY MEDICINE | Facility: CLINIC | Age: 70
End: 2020-04-29
Payer: MEDICARE

## 2020-04-29 DIAGNOSIS — N18.30 CHRONIC KIDNEY DISEASE, STAGE III (MODERATE): ICD-10-CM

## 2020-04-29 DIAGNOSIS — M79.10 MYALGIA: ICD-10-CM

## 2020-04-29 DIAGNOSIS — M25.50 ARTHRALGIA, UNSPECIFIED JOINT: ICD-10-CM

## 2020-04-29 DIAGNOSIS — E11.8 CONTROLLED TYPE 2 DIABETES MELLITUS WITH COMPLICATION, WITHOUT LONG-TERM CURRENT USE OF INSULIN: Primary | ICD-10-CM

## 2020-04-29 DIAGNOSIS — M1A.9XX1 TOPHACEOUS GOUT: ICD-10-CM

## 2020-04-29 LAB
25(OH)D3+25(OH)D2 SERPL-MCNC: 41 NG/ML (ref 30–96)
ALBUMIN SERPL BCP-MCNC: 3 G/DL (ref 3.5–5.2)
ALP SERPL-CCNC: 71 U/L (ref 55–135)
ALT SERPL W/O P-5'-P-CCNC: 8 U/L (ref 10–44)
ANION GAP SERPL CALC-SCNC: 8 MMOL/L (ref 8–16)
AST SERPL-CCNC: 14 U/L (ref 10–40)
BASOPHILS # BLD AUTO: 0.03 K/UL (ref 0–0.2)
BASOPHILS NFR BLD: 0.3 % (ref 0–1.9)
BILIRUB SERPL-MCNC: 0.4 MG/DL (ref 0.1–1)
BUN SERPL-MCNC: 33 MG/DL (ref 8–23)
CALCIUM SERPL-MCNC: 9.3 MG/DL (ref 8.7–10.5)
CHLORIDE SERPL-SCNC: 102 MMOL/L (ref 95–110)
CHOLEST SERPL-MCNC: 213 MG/DL (ref 120–199)
CHOLEST/HDLC SERPL: 8.9 {RATIO} (ref 2–5)
CK SERPL-CCNC: 33 U/L (ref 20–200)
CO2 SERPL-SCNC: 22 MMOL/L (ref 23–29)
CREAT SERPL-MCNC: 1.9 MG/DL (ref 0.5–1.4)
CRP SERPL-MCNC: 23.9 MG/L (ref 0–8.2)
DIFFERENTIAL METHOD: ABNORMAL
EOSINOPHIL # BLD AUTO: 0.3 K/UL (ref 0–0.5)
EOSINOPHIL NFR BLD: 2.6 % (ref 0–8)
ERYTHROCYTE [DISTWIDTH] IN BLOOD BY AUTOMATED COUNT: 15.1 % (ref 11.5–14.5)
ERYTHROCYTE [SEDIMENTATION RATE] IN BLOOD BY WESTERGREN METHOD: >120 MM/HR (ref 0–23)
EST. GFR  (AFRICAN AMERICAN): 40.4 ML/MIN/1.73 M^2
EST. GFR  (NON AFRICAN AMERICAN): 34.9 ML/MIN/1.73 M^2
ESTIMATED AVG GLUCOSE: 160 MG/DL (ref 68–131)
GLUCOSE SERPL-MCNC: 134 MG/DL (ref 70–110)
HBA1C MFR BLD HPLC: 7.2 % (ref 4–5.6)
HCT VFR BLD AUTO: 35 % (ref 40–54)
HDLC SERPL-MCNC: 24 MG/DL (ref 40–75)
HDLC SERPL: 11.3 % (ref 20–50)
HGB BLD-MCNC: 10.8 G/DL (ref 14–18)
IMM GRANULOCYTES # BLD AUTO: 0.03 K/UL (ref 0–0.04)
IMM GRANULOCYTES NFR BLD AUTO: 0.3 % (ref 0–0.5)
LDLC SERPL CALC-MCNC: 155.2 MG/DL (ref 63–159)
LYMPHOCYTES # BLD AUTO: 1.3 K/UL (ref 1–4.8)
LYMPHOCYTES NFR BLD: 13.5 % (ref 18–48)
MCH RBC QN AUTO: 26.5 PG (ref 27–31)
MCHC RBC AUTO-ENTMCNC: 30.9 G/DL (ref 32–36)
MCV RBC AUTO: 86 FL (ref 82–98)
MONOCYTES # BLD AUTO: 1.4 K/UL (ref 0.3–1)
MONOCYTES NFR BLD: 14.5 % (ref 4–15)
NEUTROPHILS # BLD AUTO: 6.5 K/UL (ref 1.8–7.7)
NEUTROPHILS NFR BLD: 68.8 % (ref 38–73)
NONHDLC SERPL-MCNC: 189 MG/DL
NRBC BLD-RTO: 0 /100 WBC
PLATELET # BLD AUTO: 268 K/UL (ref 150–350)
PMV BLD AUTO: 11.9 FL (ref 9.2–12.9)
POTASSIUM SERPL-SCNC: 4.6 MMOL/L (ref 3.5–5.1)
PROT SERPL-MCNC: 8.5 G/DL (ref 6–8.4)
PTH-INTACT SERPL-MCNC: 79 PG/ML (ref 9–77)
RBC # BLD AUTO: 4.07 M/UL (ref 4.6–6.2)
SODIUM SERPL-SCNC: 132 MMOL/L (ref 136–145)
TRIGL SERPL-MCNC: 169 MG/DL (ref 30–150)
URATE SERPL-MCNC: 9.4 MG/DL (ref 3.4–7)
WBC # BLD AUTO: 9.44 K/UL (ref 3.9–12.7)

## 2020-04-29 PROCEDURE — 1101F PT FALLS ASSESS-DOCD LE1/YR: CPT | Mod: HCNC,CPTII,95, | Performed by: INTERNAL MEDICINE

## 2020-04-29 PROCEDURE — 82550 ASSAY OF CK (CPK): CPT | Mod: HCNC

## 2020-04-29 PROCEDURE — 1159F PR MEDICATION LIST DOCUMENTED IN MEDICAL RECORD: ICD-10-PCS | Mod: HCNC,95,, | Performed by: INTERNAL MEDICINE

## 2020-04-29 PROCEDURE — 1101F PR PT FALLS ASSESS DOC 0-1 FALLS W/OUT INJ PAST YR: ICD-10-PCS | Mod: HCNC,CPTII,95, | Performed by: INTERNAL MEDICINE

## 2020-04-29 PROCEDURE — 85652 RBC SED RATE AUTOMATED: CPT | Mod: HCNC

## 2020-04-29 PROCEDURE — 86140 C-REACTIVE PROTEIN: CPT | Mod: HCNC

## 2020-04-29 PROCEDURE — 1159F MED LIST DOCD IN RCRD: CPT | Mod: HCNC,95,, | Performed by: INTERNAL MEDICINE

## 2020-04-29 PROCEDURE — 84550 ASSAY OF BLOOD/URIC ACID: CPT | Mod: HCNC

## 2020-04-29 PROCEDURE — 83036 HEMOGLOBIN GLYCOSYLATED A1C: CPT | Mod: HCNC

## 2020-04-29 PROCEDURE — 36415 COLL VENOUS BLD VENIPUNCTURE: CPT | Mod: HCNC,PO

## 2020-04-29 PROCEDURE — 99214 PR OFFICE/OUTPT VISIT, EST, LEVL IV, 30-39 MIN: ICD-10-PCS | Mod: HCNC,95,, | Performed by: INTERNAL MEDICINE

## 2020-04-29 PROCEDURE — 82306 VITAMIN D 25 HYDROXY: CPT | Mod: HCNC

## 2020-04-29 PROCEDURE — 85025 COMPLETE CBC W/AUTO DIFF WBC: CPT | Mod: HCNC

## 2020-04-29 PROCEDURE — 99214 OFFICE O/P EST MOD 30 MIN: CPT | Mod: HCNC,95,, | Performed by: INTERNAL MEDICINE

## 2020-04-29 PROCEDURE — 83970 ASSAY OF PARATHORMONE: CPT | Mod: HCNC

## 2020-04-29 PROCEDURE — 80053 COMPREHEN METABOLIC PANEL: CPT | Mod: HCNC

## 2020-04-29 PROCEDURE — 80061 LIPID PANEL: CPT | Mod: HCNC

## 2020-04-29 NOTE — PROGRESS NOTES
This note was created by combination of typed  and M-Modal dictation.  Transcription errors may be present.  If there are any questions, please contact me.    Assessment & Plan:   Arthralgia, unspecified joint  Myalgia  -he wonders if this is due to over exertion but that was 3 weeks ago and he continues to have pain.  He had previous complaint of migratory arthralgias which I had attributed to Uloric use and he had found tolerable at that time.  His history of Sjogren's, is off of all immune suppressants at this time.  Had side effects of the Imuran.  Not taking statin.  He had previously found Tylenol No.  3 helpful in is inquiring about that.  But I want to do further testing 1st.  Denies headaches, pain chewing, vision changes.  But consider PMR  Check labs, CBC, CMP, ESR, CRP.  I will also include other labs such as A1c and lipid profile.  PTH and vitamin-D with a known history of chronic kidney disease.  If abnormal consider steroid  -     CBC auto differential; Future; Expected date: 04/29/2020  -     Comprehensive metabolic panel; Future; Expected date: 04/29/2020  -     C-Reactive Protein; Future; Expected date: 04/29/2020  -     Sedimentation rate; Future; Expected date: 04/29/2020  -     CK; Future; Expected date: 04/29/2020    Medications Discontinued During This Encounter   Medication Reason    polyethylene glycol (GLYCOLAX) 17 gram/dose powder Therapy completed    lubiprostone (AMITIZA) 24 MCG Cap Alternate therapy       meds sent this encounter:       Follow Up: No follow-ups on file.    Subjective:     Chief Complaint   Patient presents with    Muscle Pain       HPI  Darrion is a 70 y.o. male, last appointment with this clinic was 3/4/2020.    The patient location is: [place]  The chief complaint leading to consultation is: arthralgia and myalgia  Visit type: Virtual visit with synchronous audio and video  Total time spent with patient: 15 min  Each patient to whom he or she provides  medical services by telemedicine is:  (1) informed of the relationship between the physician and patient and the respective role of any other health care provider with respect to management of the patient; and (2) notified that he or she may decline to receive medical services by telemedicine and may withdraw from such care at any time.    Notes:  Last visit with me 3/4  Sacroiliitis and some migratory arthralgias.  Seemed to correlate temporally with starting up Uloric. But he wanted to continue the uloric.  Tophaceous gout, uloric every other day, SE of itching and aching.   Hx of Jackson's, I was trying to get most recent EGD results (2019?) and referred to GI    Uloric - rheum had inquired about PA program, pt did not qualify.     Saw podiatry for DM foot care.  voltaren gel.    Most recent labs - normal plt count, not neutropenic (hx of such)    Last note from rheum:  When we initially met in feb 2019 , he was taking plaquenil 200mg po BID, azathioprine 50mg po TID and medrol 4mg po qqday. His last rheumatologist discontinued his medications and patient decided to restart the medications himself since he was having so much pain. He developed imuran toxicity so all his medications were discontinued. He is asymptomatic from Sjogrens at this time.    Phone message from pt:  I am having lot of muscle pain since 10 days took Tylenol every night no relief. Applied ice & muscle rub ointment. Especially in my arms. When can I make appointment to see you.     For the past 3 weeks he has been having aches and pains.  Predominantly upper body.  The shoulders, the neck, the hands especially.  The right seems to be worse than the left.  No accompanying fevers or chills.  No pain with chewing.  No vision changes.  No headaches.  He wonders if this started 3 weeks ago when he and his wife did a lot of housework, pressure washing the driveway and cleaning out gutters.  He did this for 6 days, and spent maybe 4 hours at a time.   But this was 3 weeks ago and he continues to have symptoms.  Feels like yesterday was worse than usual.  Today not as severe.  It seems to occur all day.  Hard time gripping a cup with his right hand.  Has to use his left to assist.  Because the achiness in the shoulder as well as the hands.  Notes especially in the MCPs.  No swelling that he can see.  Not really in his hips.  But maybe some in his lower back/buttock.  His right knee has some pain with standing but not his left.  No ankle pain.    No fevers, no chills.  He went to get coronavirus testing yesterday through the community and has not received those results yet.    No change in regimen.  Taking all his medicines.  The Uloric he is taking every other day so there is no change in that.  Patient Care Team:  Farooq Domingo MD as PCP - General (Internal Medicine)  Tin Chambers MD (Gastroenterology)  Harrison Brannon MD (Cardiology)  Roxanna Salazar MD as Nurse Practitioner (Rheumatology)  Malcolm Irwin MD as  (Nephrology)  Amy Gamez MA as Care Coordinator  Dangelo Hall MD as Consulting Physician (Ophthalmology)  [facility] (Gastroenterology)    Patient Active Problem List    Diagnosis Date Noted    Connective tissue disorder 01/16/2020    Chronic kidney disease, stage III (moderate) 01/16/2020    Pleural effusion 10/15/2019     Overview:   thoracentesis 01/2015      Aortic atherosclerosis 09/24/2019    Pseudophakia of both eyes 08/27/2019    Refractive error 08/27/2019    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 05/02/2019    Anemia of chronic renal failure, stage 3 (moderate) 03/13/2019    Current chronic use of systemic steroids 02/27/2019    Compression fracture of body of  thoracic vertebra on XR 2/2019; 2/2019 alendronate 02/27/2019    Neutropenia 02/26/2019    Thrombocytopenia 02/26/2019    Jackson's esophagus without dysplasia 03/14/2016    Anemia from plaquenil and imuran 03/14/2016    Diastolic heart failure, NYHA class 2 09/26/2014    BMI 23.0-23.9, adult 07/15/2014    Essential hypertension 05/13/2014 6/2019 TTE normal LV systolic and diastolic function; ABIs normal      Controlled type 2 diabetes mellitus with complication, without long-term current use of insulin 05/13/2014    Pure hypercholesterolemia 05/13/2014             MCTD (mixed connective tissue disease) 05/13/2014    Sjogren's disease 05/13/2014    Bilateral edema of lower extremity 6/2019 TTE normal LV systolic and diastolic function; ABIs normal 05/13/2014    Glaucoma 05/13/2014    Celiac disease 11/12/2013     Overview:   Gluten intolerant         PAST MEDICAL HISTORY:  Past Medical History:   Diagnosis Date    Anemia     Arthritis     Cataract     Chronic constipation     Diabetes mellitus, type 2     Felon of finger of left hand 5/11/2019    Glaucoma     Hyperlipidemia 5/13/2014    Hypertension     MCTD (mixed connective tissue disease)     Sjogren's disease     Ulcerative colitis     Urolithiasis        PAST SURGICAL HISTORY:  Past Surgical History:   Procedure Laterality Date    CATARACT EXTRACTION Bilateral 2005    COLONOSCOPY  2014    EYE SURGERY         SOCIAL HISTORY:  Social History     Socioeconomic History    Marital status:      Spouse name: Not on file    Number of children: 3    Years of education: Not on file    Highest education level: Not on file   Occupational History    Not on file   Social Needs    Financial resource strain: Somewhat hard    Food insecurity:     Worry: Never true     Inability: Never true    Transportation needs:     Medical: No     Non-medical: No   Tobacco Use    Smoking status: Never Smoker    Smokeless tobacco: Never Used    Substance and Sexual Activity    Alcohol use: No     Frequency: Never     Drinks per session: Patient refused     Binge frequency: Never    Drug use: No    Sexual activity: Never   Lifestyle    Physical activity:     Days per week: 3 days     Minutes per session: Not on file    Stress: Only a little   Relationships    Social connections:     Talks on phone: Once a week     Gets together: Once a week     Attends Faith service: More than 4 times per year     Active member of club or organization: No     Attends meetings of clubs or organizations: Never     Relationship status:    Other Topics Concern    Not on file   Social History Narrative    Not on file       ALLERGIES AND MEDICATIONS: updated and reviewed.  Review of patient's allergies indicates:   Allergen Reactions    Penicillins Nausea And Vomiting    Uloric [febuxostat]      Aches and pain    Allopurinol analogues Rash     Current Outpatient Medications   Medication Sig Dispense Refill    alcohol swabs (ALCOHOL PADS) PadM Apply 1 each topically 4 (four) times daily. 400 each 3    amLODIPine (NORVASC) 5 MG tablet Take 1 tablet (5 mg total) by mouth 2 (two) times daily. 180 tablet 3    aspirin (ECOTRIN) 81 MG EC tablet Take 81 mg by mouth once daily.      blood sugar diagnostic Strp Check blood glucose 3x/day. e11.65 300 strip 3    cloNIDine 0.1 mg/24 hr td ptwk (CATAPRES) 0.1 mg/24 hr Place 1 patch onto the skin every 7 days. 4 patch 11    diclofenac sodium (VOLTAREN) 1 % Gel Apply 2 g topically 4 (four) times daily as needed. Apply to aching joints 100 g 3    diphenhydrAMINE (BENADRYL) 25 mg capsule Take 1 capsule (25 mg total) by mouth every evening. 30 capsule 6    dorzolamide (TRUSOPT) 2 % ophthalmic solution INSTILL 1 DROP INTO BOTH EYES TWICE A DAY 10 mL 0    dulaglutide (TRULICITY) 0.75 mg/0.5 mL PnIj Inject 0.5 mLs (0.75 mg total) into the skin every 7 days. 4 Syringe 5    febuxostat (ULORIC) 40 mg Tab Take 1 tablet  "(40 mg total) by mouth every other day. 30 tablet 11    insulin (LANTUS SOLOSTAR U-100 INSULIN) glargine 100 units/mL (3mL) SubQ pen Inject 7 Units into the skin once daily. 1 Box 2    insulin aspart U-100 (NOVOLOG FLEXPEN U-100 INSULIN) 100 unit/mL (3 mL) InPn pen Inject 4 units before breakfast and 5 units before lunch 1 Box 2    ipratropium (ATROVENT) 0.03 % nasal spray USE 2 SPRAYS BY NASAL ROUTE 3 (THREE) TIMES DAILY. 30 mL 11    ipratropium (ATROVENT) 42 mcg (0.06 %) nasal spray INSTILL 2 SPRAYS BY NASAL ROUTE 3 TIMES DAILY. AS NEEDED FOR NASAL CONGESTION AND DRIP FOR 7 DAYS 30 mL 0    lancets Misc Check blood glucose 3x/day. e11.65 300 each 3    LINZESS 145 mcg Cap capsule TAKE ONE CAPSULE BY MOUTH EVERY DAY ON EMPTY STOMACH 30 MINUTES BEFORE BREAKFAST  0    lubiprostone (AMITIZA) 24 MCG Cap Take 1 capsule (24 mcg total) by mouth 2 (two) times daily with meals. 60 capsule 11    multivit with min-folic acid 200 mcg Chew       omeprazole (PRILOSEC) 20 MG capsule Take 2 capsules (40 mg total) by mouth once daily. 180 capsule 1    pen needle, diabetic (BD ULTRA-FINE RICHARD PEN NEEDLE) 32 gauge x 5/32" Ndle 1 Device by Misc.(Non-Drug; Combo Route) route 3 (three) times daily. 100 each 3    polyethylene glycol (GLYCOLAX) 17 gram/dose powder Take 17 g by mouth once daily. 850 g 11    torsemide (DEMADEX) 10 MG Tab Take 1 tablet (10 mg total) by mouth once daily. 90 tablet 3    travoprost (TRAVATAN Z) 0.004 % ophthalmic solution Place 1 drop into both eyes every evening. 5 mL 4    valsartan (DIOVAN) 160 MG tablet Take 1 tablet (160 mg total) by mouth 2 (two) times daily. 180 tablet 3     No current facility-administered medications for this visit.        Review of Systems   Constitutional: Negative for chills and fever.   Respiratory: Negative for cough and shortness of breath.    Cardiovascular: Negative for chest pain and palpitations.   Gastrointestinal: Negative for abdominal pain.   Neurological: " Negative for sensory change, speech change, focal weakness and headaches.       Objective:   Physical Exam   There were no vitals filed for this visit. There is no height or weight on file to calculate BMI.            Physical Exam   Constitutional: He is oriented to person, place, and time. He appears well-developed and well-nourished. No distress.   HENT:   Head: Normocephalic and atraumatic.   Pulmonary/Chest: Effort normal.   Neurological: He is alert and oriented to person, place, and time.   Psychiatric: He has a normal mood and affect. His behavior is normal. Thought content normal.

## 2020-04-30 ENCOUNTER — PATIENT MESSAGE (OUTPATIENT)
Dept: FAMILY MEDICINE | Facility: CLINIC | Age: 70
End: 2020-04-30

## 2020-04-30 ENCOUNTER — PATIENT MESSAGE (OUTPATIENT)
Dept: NEPHROLOGY | Facility: CLINIC | Age: 70
End: 2020-04-30

## 2020-04-30 DIAGNOSIS — M35.3 POLYMYALGIA RHEUMATICA: Primary | ICD-10-CM

## 2020-04-30 DIAGNOSIS — E78.00 PURE HYPERCHOLESTEROLEMIA: Primary | ICD-10-CM

## 2020-04-30 RX ORDER — ROSUVASTATIN CALCIUM 5 MG/1
5 TABLET, COATED ORAL DAILY
Qty: 30 TABLET | Refills: 5 | Status: SHIPPED | OUTPATIENT
Start: 2020-04-30 | End: 2020-10-29

## 2020-05-01 RX ORDER — PREDNISONE 5 MG/1
15 TABLET ORAL DAILY
Qty: 90 TABLET | Refills: 0 | Status: SHIPPED | OUTPATIENT
Start: 2020-05-01 | End: 2020-06-16

## 2020-05-05 ENCOUNTER — OFFICE VISIT (OUTPATIENT)
Dept: RHEUMATOLOGY | Facility: CLINIC | Age: 70
End: 2020-05-05
Payer: MEDICARE

## 2020-05-05 DIAGNOSIS — M1A.9XX1 TOPHACEOUS GOUT: ICD-10-CM

## 2020-05-05 DIAGNOSIS — M10.9 GOUTY ARTHRITIS: Primary | ICD-10-CM

## 2020-05-05 DIAGNOSIS — M1A.9XX1 TOPHACEOUS GOUT: Primary | ICD-10-CM

## 2020-05-05 PROCEDURE — 1101F PT FALLS ASSESS-DOCD LE1/YR: CPT | Mod: HCNC,CPTII,95, | Performed by: INTERNAL MEDICINE

## 2020-05-05 PROCEDURE — 1159F MED LIST DOCD IN RCRD: CPT | Mod: HCNC,95,, | Performed by: INTERNAL MEDICINE

## 2020-05-05 PROCEDURE — 99215 OFFICE O/P EST HI 40 MIN: CPT | Mod: HCNC,95,, | Performed by: INTERNAL MEDICINE

## 2020-05-05 PROCEDURE — 1159F PR MEDICATION LIST DOCUMENTED IN MEDICAL RECORD: ICD-10-PCS | Mod: HCNC,95,, | Performed by: INTERNAL MEDICINE

## 2020-05-05 PROCEDURE — 1101F PR PT FALLS ASSESS DOC 0-1 FALLS W/OUT INJ PAST YR: ICD-10-PCS | Mod: HCNC,CPTII,95, | Performed by: INTERNAL MEDICINE

## 2020-05-05 PROCEDURE — 99215 PR OFFICE/OUTPT VISIT, EST, LEVL V, 40-54 MIN: ICD-10-PCS | Mod: HCNC,95,, | Performed by: INTERNAL MEDICINE

## 2020-05-05 RX ORDER — FEBUXOSTAT 40 MG/1
40 TABLET, FILM COATED ORAL DAILY
Qty: 30 TABLET | Refills: 11 | Status: SHIPPED | OUTPATIENT
Start: 2020-05-05 | End: 2020-05-11 | Stop reason: ALTCHOICE

## 2020-05-05 NOTE — PROGRESS NOTES
Subjective:       Patient ID: Drarion Bennett is a 69 y.o. male.    Chief Complaint: Follow-up     HPI  69 year old male with type II, cataracts, HTN, gout,  Sjogrens, urolithiasis, CHF, hypothyroidism, CKD here for evaluation.  He reports that he was diagnosed with Sjogrens when he had dry eyes and dry mouth in 2014.. He takes plaquenil 200mg po BID. He is  taking azathioprine 50mg po TID and medrol 4mg po qqday.   He was put on imuran by Dr. Corona.  He was followed by Dr. Corona from 2014 to 2017.  In October 2018, imuran and medrol was stopped. Patient restarted imuran in November 2018.  Reports that he feels that imuran helps him with joint.   Today he denies pain, stiffness or swelling.  He denies sry eyes or dry mouth.  Denies trouble making a fist.    He was diagnosed in January in left aspect of foot with pain, swelling and redness.            Interval history:  He is doing uloric every other day. About a week ago, he had difficulty with making a fist and also had pain in shoulders.  Denies any hip pain or, jaw claudication, or visual changes. He reports his hands and shoulders feel better.  He reports his diabetes and BP are worse on the steroid.      Denies any nodules.  Past Medical History:   Diagnosis Date    Anemia     Arthritis     Cataract     Chronic constipation     Diabetes mellitus, type 2     Glaucoma     Hypertension     MCTD (mixed connective tissue disease)     Sjogren's disease     Ulcerative colitis     Urolithiasis        Review of Systems   Constitutional: Negative for activity change, appetite change, chills, diaphoresis, fatigue and fever.   HENT: Negative for ear discharge, ear pain, hearing loss, mouth sores, nosebleeds and sinus pressure.    Eyes: Negative.  Negative for photophobia, pain, discharge, redness and itching.   Respiratory: Negative for cough, chest tightness and shortness of breath.    Cardiovascular: Negative for chest pain, palpitations and leg swelling.    Gastrointestinal: Negative for abdominal distention, blood in stool and nausea.   Endocrine: Negative for cold intolerance, heat intolerance, polydipsia and polyphagia.   Genitourinary: Negative for flank pain, genital sores and hematuria.   Musculoskeletal: Positive for arthralgias. Negative for back pain, gait problem, joint swelling, myalgias, neck pain and neck stiffness.   Skin: Negative for color change, pallor and rash.   Neurological: Negative for dizziness, weakness, light-headedness and headaches.   Hematological: Negative for adenopathy. Does not bruise/bleed easily.   Psychiatric/Behavioral: Negative for decreased concentration and hallucinations. The patient is not nervous/anxious.              Objective:        Physical Exam   Constitutional: He is well-developed, well-nourished, and in no distress. No distress.   HENT:   Head: Normocephalic and atraumatic.   Right Ear: External ear normal.   Left Ear: External ear normal.   Nose: Nose normal.   Mouth/Throat: Oropharynx is clear and moist. No oropharyngeal exudate.   Eyes: Conjunctivae and EOM are normal. Pupils are equal, round, and reactive to light. Right eye exhibits no discharge. Left eye exhibits no discharge. No scleral icterus.   Neck: Normal range of motion. Neck supple. No JVD present. No tracheal deviation present. No thyromegaly present.   Cardiovascular: Normal rate, normal heart sounds and intact distal pulses.  Exam reveals no gallop and no friction rub.    No murmur heard.  Pulmonary/Chest: Effort normal. No stridor. No respiratory distress. He has no wheezes. He has no rales. He exhibits no tenderness.   Abdominal: Soft. Bowel sounds are normal. He exhibits no distension and no mass. There is no tenderness. There is no rebound and no guarding.   Lymphadenopathy:     He has no cervical adenopathy.   Neurological: No cranial nerve deficit. Coordination normal.   Skin: Skin is warm and dry. No rash noted. He is not diaphoretic. No  erythema. No pallor.     Musculoskeletal: Normal range of motion. He exhibits no edema, tenderness or deformity.     Labs:  Gilma-+  Ssa+  Ssb+  +RNP  Ccp,rf-negative      Right hand xray (5/2019): I personally reviewed; Soft tissue swelling tip of long finger.  Negative for fracture.     Assessment:       69 year old male with type II, cataracts, HTN, gout,  Sjogrens, urolithiasis, CHF, hypothyroidism, CKD here for follow up. He was previously followed by Dr. Lau.  He reports that he was diagnosed with Sjogrens when he had dry eyes and dry mouth in 2014. Serologies are +GILMA, +SSA,+SSB, and +RNP. He denies raynauds, rashes, oral ulcers or symptoms of SLE. His main issue before we met was inflammatory arthritis which was being treated with plaquenil, imuran, and medrol.    When we initially met in feb 2019 , he was taking plaquenil 200mg po BID, azathioprine 50mg po TID and medrol 4mg po qqday. His last rheumatologist discontinued his medications and patient decided to restart the medications himself since he was having so much pain. He developed imuran toxicity so all his medications were discontinued. He is asymptomatic from Sjogrens at this time.      With regards to gout, he was recently diagnosed with tophaceous gout, but had allergy to allopurinol so tried uloric but he reports itching with it so he has been doing it every other day.  Will increase uloric to 40mg po qday to improve the uric acid.      With regards to elevated esr and arthralgias, it could be from his gout but it looks like he also had an inflammatory arthritis since he was on plaquenil and imuran.  I will also run additional studies with labs in a month.    Once I get his uric acid under control, can consider putting him on low dose plaquenil to see if it helps.  -decrease prednisone from 15mg a day to 10mg a day  -increase uloric from 40mg every other day to every day  Labs in a month  rtc in a month       The patient location is: home  The  chief complaint leading to consultation is: joint pain   Visit type: audiovisual  Total time spent with patient: 30 minutes  Each patient to whom he or she provides medical services by telemedicine is:  (1) informed of the relationship between the physician and patient and the respective role of any other health care provider with respect to management of the patient; and (2) notified that he or she may decline to receive medical services by telemedicine and may withdraw from such care at any time.

## 2020-05-06 ENCOUNTER — LAB VISIT (OUTPATIENT)
Dept: LAB | Facility: HOSPITAL | Age: 70
End: 2020-05-06
Attending: INTERNAL MEDICINE
Payer: MEDICARE

## 2020-05-06 DIAGNOSIS — M10.9 GOUTY ARTHRITIS: ICD-10-CM

## 2020-05-06 LAB
ALBUMIN SERPL BCP-MCNC: 3.1 G/DL (ref 3.5–5.2)
ALP SERPL-CCNC: 69 U/L (ref 55–135)
ALT SERPL W/O P-5'-P-CCNC: 14 U/L (ref 10–44)
ANION GAP SERPL CALC-SCNC: 9 MMOL/L (ref 8–16)
AST SERPL-CCNC: 18 U/L (ref 10–40)
BASOPHILS # BLD AUTO: 0.03 K/UL (ref 0–0.2)
BASOPHILS NFR BLD: 0.3 % (ref 0–1.9)
BILIRUB SERPL-MCNC: 0.2 MG/DL (ref 0.1–1)
BUN SERPL-MCNC: 32 MG/DL (ref 8–23)
C3 SERPL-MCNC: 103 MG/DL (ref 50–180)
C4 SERPL-MCNC: 17 MG/DL (ref 11–44)
CALCIUM SERPL-MCNC: 9.3 MG/DL (ref 8.7–10.5)
CHLORIDE SERPL-SCNC: 99 MMOL/L (ref 95–110)
CO2 SERPL-SCNC: 21 MMOL/L (ref 23–29)
CREAT SERPL-MCNC: 1.7 MG/DL (ref 0.5–1.4)
CRP SERPL-MCNC: 14.1 MG/L (ref 0–8.2)
DIFFERENTIAL METHOD: ABNORMAL
EOSINOPHIL # BLD AUTO: 0.1 K/UL (ref 0–0.5)
EOSINOPHIL NFR BLD: 0.9 % (ref 0–8)
ERYTHROCYTE [DISTWIDTH] IN BLOOD BY AUTOMATED COUNT: 15 % (ref 11.5–14.5)
ERYTHROCYTE [SEDIMENTATION RATE] IN BLOOD BY WESTERGREN METHOD: 117 MM/HR (ref 0–23)
EST. GFR  (AFRICAN AMERICAN): 46.2 ML/MIN/1.73 M^2
EST. GFR  (NON AFRICAN AMERICAN): 40 ML/MIN/1.73 M^2
GLUCOSE SERPL-MCNC: 257 MG/DL (ref 70–110)
HCT VFR BLD AUTO: 33.6 % (ref 40–54)
HGB BLD-MCNC: 10.3 G/DL (ref 14–18)
IMM GRANULOCYTES # BLD AUTO: 0.05 K/UL (ref 0–0.04)
IMM GRANULOCYTES NFR BLD AUTO: 0.5 % (ref 0–0.5)
LYMPHOCYTES # BLD AUTO: 1 K/UL (ref 1–4.8)
LYMPHOCYTES NFR BLD: 10.4 % (ref 18–48)
MCH RBC QN AUTO: 26.3 PG (ref 27–31)
MCHC RBC AUTO-ENTMCNC: 30.7 G/DL (ref 32–36)
MCV RBC AUTO: 86 FL (ref 82–98)
MONOCYTES # BLD AUTO: 0.8 K/UL (ref 0.3–1)
MONOCYTES NFR BLD: 7.9 % (ref 4–15)
NEUTROPHILS # BLD AUTO: 8 K/UL (ref 1.8–7.7)
NEUTROPHILS NFR BLD: 80 % (ref 38–73)
NRBC BLD-RTO: 0 /100 WBC
PLATELET # BLD AUTO: 315 K/UL (ref 150–350)
PMV BLD AUTO: 12.1 FL (ref 9.2–12.9)
POTASSIUM SERPL-SCNC: 4.4 MMOL/L (ref 3.5–5.1)
PROT SERPL-MCNC: 8.6 G/DL (ref 6–8.4)
RBC # BLD AUTO: 3.91 M/UL (ref 4.6–6.2)
SODIUM SERPL-SCNC: 129 MMOL/L (ref 136–145)
URATE SERPL-MCNC: 6.9 MG/DL (ref 3.4–7)
WBC # BLD AUTO: 10.04 K/UL (ref 3.9–12.7)

## 2020-05-06 PROCEDURE — 84550 ASSAY OF BLOOD/URIC ACID: CPT | Mod: HCNC

## 2020-05-06 PROCEDURE — 86038 ANTINUCLEAR ANTIBODIES: CPT | Mod: HCNC

## 2020-05-06 PROCEDURE — 84165 PATHOLOGIST INTERPRETATION SPE: ICD-10-PCS | Mod: 26,HCNC,, | Performed by: PATHOLOGY

## 2020-05-06 PROCEDURE — 86140 C-REACTIVE PROTEIN: CPT | Mod: HCNC

## 2020-05-06 PROCEDURE — 86160 COMPLEMENT ANTIGEN: CPT | Mod: HCNC

## 2020-05-06 PROCEDURE — 84165 PROTEIN E-PHORESIS SERUM: CPT | Mod: 26,HCNC,, | Performed by: PATHOLOGY

## 2020-05-06 PROCEDURE — 86235 NUCLEAR ANTIGEN ANTIBODY: CPT | Mod: 59,HCNC

## 2020-05-06 PROCEDURE — 86039 ANTINUCLEAR ANTIBODIES (ANA): CPT | Mod: HCNC

## 2020-05-06 PROCEDURE — 80053 COMPREHEN METABOLIC PANEL: CPT | Mod: HCNC

## 2020-05-06 PROCEDURE — 85652 RBC SED RATE AUTOMATED: CPT | Mod: HCNC

## 2020-05-06 PROCEDURE — 85025 COMPLETE CBC W/AUTO DIFF WBC: CPT | Mod: HCNC

## 2020-05-06 PROCEDURE — 86160 COMPLEMENT ANTIGEN: CPT | Mod: 59,HCNC

## 2020-05-06 PROCEDURE — 36415 COLL VENOUS BLD VENIPUNCTURE: CPT | Mod: HCNC,PO

## 2020-05-06 PROCEDURE — 84165 PROTEIN E-PHORESIS SERUM: CPT | Mod: HCNC

## 2020-05-07 ENCOUNTER — PATIENT MESSAGE (OUTPATIENT)
Dept: RHEUMATOLOGY | Facility: CLINIC | Age: 70
End: 2020-05-07

## 2020-05-07 LAB
ALBUMIN SERPL ELPH-MCNC: 3.3 G/DL (ref 3.35–5.55)
ALPHA1 GLOB SERPL ELPH-MCNC: 0.67 G/DL (ref 0.17–0.41)
ALPHA2 GLOB SERPL ELPH-MCNC: 1.02 G/DL (ref 0.43–0.99)
ANA SER QL IF: POSITIVE
ANA TITR SER IF: NORMAL {TITER}
B-GLOBULIN SERPL ELPH-MCNC: 0.91 G/DL (ref 0.5–1.1)
GAMMA GLOB SERPL ELPH-MCNC: 2.3 G/DL (ref 0.67–1.58)
PATHOLOGIST INTERPRETATION SPE: NORMAL
PROT SERPL-MCNC: 8.2 G/DL (ref 6–8.4)

## 2020-05-07 RX ORDER — FEBUXOSTAT 40 MG/1
40 TABLET, FILM COATED ORAL DAILY
Qty: 30 TABLET | Refills: 11 | Status: SHIPPED | OUTPATIENT
Start: 2020-05-07 | End: 2021-05-21 | Stop reason: SDUPTHER

## 2020-05-08 RX ORDER — ISOPROPYL ALCOHOL 70 ML/100ML
1 SWAB TOPICAL 4 TIMES DAILY
Qty: 400 EACH | Refills: 3 | Status: SHIPPED | OUTPATIENT
Start: 2020-05-08 | End: 2021-07-01

## 2020-05-11 ENCOUNTER — PATIENT MESSAGE (OUTPATIENT)
Dept: RHEUMATOLOGY | Facility: CLINIC | Age: 70
End: 2020-05-11

## 2020-05-11 ENCOUNTER — TELEPHONE (OUTPATIENT)
Dept: RHEUMATOLOGY | Facility: CLINIC | Age: 70
End: 2020-05-11

## 2020-05-11 DIAGNOSIS — M25.50 POLYARTHRALGIA: Primary | ICD-10-CM

## 2020-05-11 LAB
ANTI SM ANTIBODY: 1.91 RATIO (ref 0–0.99)
ANTI SM/RNP ANTIBODY: 6.6 RATIO (ref 0–0.99)
ANTI-SM INTERPRETATION: POSITIVE
ANTI-SM/RNP INTERPRETATION: POSITIVE
ANTI-SSA ANTIBODY: 3.67 RATIO (ref 0–0.99)
ANTI-SSA INTERPRETATION: POSITIVE
ANTI-SSB ANTIBODY: 6.09 RATIO (ref 0–0.99)
ANTI-SSB INTERPRETATION: POSITIVE
DSDNA AB SER-ACNC: ABNORMAL [IU]/ML

## 2020-05-11 NOTE — TELEPHONE ENCOUNTER
Spoke with the patient to see if he still wanted his visit today being that her saw the doctor on last week.   He said he just needed his lab results

## 2020-05-18 DIAGNOSIS — H40.1134 PRIMARY OPEN ANGLE GLAUCOMA (POAG) OF BOTH EYES, INDETERMINATE STAGE: ICD-10-CM

## 2020-05-18 RX ORDER — TRAVOPROST OPHTHALMIC SOLUTION 0.04 MG/ML
1 SOLUTION OPHTHALMIC NIGHTLY
Qty: 5 ML | Refills: 0 | Status: SHIPPED | OUTPATIENT
Start: 2020-05-18 | End: 2020-05-29 | Stop reason: SDUPTHER

## 2020-05-18 NOTE — PROGRESS NOTES
Assessment /Plan     For exam results, see Encounter Report.    Primary open angle glaucoma (POAG) of both eyes, indeterminate stage  -     travoprost (TRAVATAN Z) 0.004 % ophthalmic solution; Place 1 drop into both eyes every evening.  Dispense: 5 mL; Refill: 0      Date last seen 08/27/19. One refill sent to pharmacy. Needs appt for additional refills.

## 2020-05-19 ENCOUNTER — PATIENT MESSAGE (OUTPATIENT)
Dept: ENDOCRINOLOGY | Facility: CLINIC | Age: 70
End: 2020-05-19

## 2020-05-19 RX ORDER — PEN NEEDLE, DIABETIC 30 GX3/16"
1 NEEDLE, DISPOSABLE MISCELLANEOUS 3 TIMES DAILY
Qty: 100 EACH | Refills: 3 | Status: SHIPPED | OUTPATIENT
Start: 2020-05-19 | End: 2020-06-23 | Stop reason: SDUPTHER

## 2020-05-29 ENCOUNTER — OFFICE VISIT (OUTPATIENT)
Dept: OPTOMETRY | Facility: CLINIC | Age: 70
End: 2020-05-29
Attending: OPTOMETRIST
Payer: MEDICARE

## 2020-05-29 ENCOUNTER — CLINICAL SUPPORT (OUTPATIENT)
Dept: OPHTHALMOLOGY | Facility: CLINIC | Age: 70
End: 2020-05-29
Attending: OPTOMETRIST
Payer: MEDICARE

## 2020-05-29 DIAGNOSIS — H52.7 REFRACTIVE ERROR: Primary | ICD-10-CM

## 2020-05-29 DIAGNOSIS — H40.1132 PRIMARY OPEN ANGLE GLAUCOMA (POAG) OF BOTH EYES, MODERATE STAGE: ICD-10-CM

## 2020-05-29 DIAGNOSIS — H40.1134 PRIMARY OPEN ANGLE GLAUCOMA (POAG) OF BOTH EYES, INDETERMINATE STAGE: ICD-10-CM

## 2020-05-29 PROCEDURE — 99999 PR PBB SHADOW E&M-EST. PATIENT-LVL II: ICD-10-PCS | Mod: PBBFAC,HCNC,, | Performed by: OPTOMETRIST

## 2020-05-29 PROCEDURE — 92012 INTRM OPH EXAM EST PATIENT: CPT | Mod: HCNC,S$GLB,, | Performed by: OPTOMETRIST

## 2020-05-29 PROCEDURE — 92012 PR EYE EXAM, EST PATIENT,INTERMED: ICD-10-PCS | Mod: HCNC,S$GLB,, | Performed by: OPTOMETRIST

## 2020-05-29 PROCEDURE — 92083 HUMPHREY VISUAL FIELD - OU - BOTH EYES: ICD-10-PCS | Mod: HCNC,S$GLB,, | Performed by: OPTOMETRIST

## 2020-05-29 PROCEDURE — 76514 ECHO EXAM OF EYE THICKNESS: CPT | Mod: HCNC,S$GLB,, | Performed by: OPTOMETRIST

## 2020-05-29 PROCEDURE — 76514 PR  US, EYE, FOR CORNEAL THICKNESS: ICD-10-PCS | Mod: HCNC,S$GLB,, | Performed by: OPTOMETRIST

## 2020-05-29 PROCEDURE — 92083 EXTENDED VISUAL FIELD XM: CPT | Mod: HCNC,S$GLB,, | Performed by: OPTOMETRIST

## 2020-05-29 PROCEDURE — 99999 PR PBB SHADOW E&M-EST. PATIENT-LVL II: CPT | Mod: PBBFAC,HCNC,, | Performed by: OPTOMETRIST

## 2020-05-29 RX ORDER — DORZOLAMIDE HCL 20 MG/ML
1 SOLUTION/ DROPS OPHTHALMIC 2 TIMES DAILY
Qty: 10 ML | Refills: 3 | Status: SHIPPED | OUTPATIENT
Start: 2020-05-29 | End: 2020-12-09 | Stop reason: SDUPTHER

## 2020-05-29 RX ORDER — TRAVOPROST OPHTHALMIC SOLUTION 0.04 MG/ML
1 SOLUTION OPHTHALMIC NIGHTLY
Qty: 5 ML | Refills: 3 | Status: SHIPPED | OUTPATIENT
Start: 2020-05-29 | End: 2020-12-09 | Stop reason: SDUPTHER

## 2020-05-29 NOTE — PROGRESS NOTES
Subjective:       Patient ID: Darrion Bennett is a 70 y.o. male      Chief Complaint   Patient presents with    Concerns About Ocular Health    Glaucoma     History of Present Illness  Dls: 8/27/19 Dr. Esquivel     69 y/o male presents today for glaucoma ck.   Pt states no changes in vision. Pt wears otc readers.      this am     No tearing  No itching   No burning  No pain  No ha's  No floaters  No flashes    Eye meds  Trusopt OU BID last dose last night   Travatan Z OU Q HS last dose last night       Assessment/Plan:     1. Primary open angle glaucoma (POAG) of both eyes, moderate stage  IOP stable. Continue travatan QHS OU and Trusopt BID OU. 6 month IOP/OCT/DFE. Sooner PRN.   - travoprost (TRAVATAN Z) 0.004 % ophthalmic solution; Place 1 drop into both eyes every evening.  Dispense: 5 mL; Refill: 3  - dorzolamide (TRUSOPT) 2 % ophthalmic solution; Place 1 drop into both eyes 2 (two) times daily.  Dispense: 10 mL; Refill: 3    2. Refractive error  Pt c/o blurry vision OS. Did not get Rx from last visit filled. Reprinted Rx for pt.      Follow up in about 6 months (around 11/29/2020) for IOP check, OCT optic nerve, DFE.

## 2020-06-16 ENCOUNTER — LAB VISIT (OUTPATIENT)
Dept: LAB | Facility: HOSPITAL | Age: 70
End: 2020-06-16
Attending: NURSE PRACTITIONER
Payer: MEDICARE

## 2020-06-16 DIAGNOSIS — M25.50 POLYARTHRALGIA: ICD-10-CM

## 2020-06-16 LAB
ALBUMIN SERPL BCP-MCNC: 2.9 G/DL (ref 3.5–5.2)
ALP SERPL-CCNC: 67 U/L (ref 55–135)
ALT SERPL W/O P-5'-P-CCNC: 11 U/L (ref 10–44)
ANION GAP SERPL CALC-SCNC: 7 MMOL/L (ref 8–16)
AST SERPL-CCNC: 14 U/L (ref 10–40)
BILIRUB SERPL-MCNC: 0.4 MG/DL (ref 0.1–1)
BUN SERPL-MCNC: 27 MG/DL (ref 8–23)
CALCIUM SERPL-MCNC: 8.6 MG/DL (ref 8.7–10.5)
CHLORIDE SERPL-SCNC: 100 MMOL/L (ref 95–110)
CO2 SERPL-SCNC: 25 MMOL/L (ref 23–29)
CREAT SERPL-MCNC: 1.5 MG/DL (ref 0.5–1.4)
EST. GFR  (AFRICAN AMERICAN): 53.8 ML/MIN/1.73 M^2
EST. GFR  (NON AFRICAN AMERICAN): 46.5 ML/MIN/1.73 M^2
GLUCOSE SERPL-MCNC: 142 MG/DL (ref 70–110)
POTASSIUM SERPL-SCNC: 4.6 MMOL/L (ref 3.5–5.1)
PROT SERPL-MCNC: 7 G/DL (ref 6–8.4)
SODIUM SERPL-SCNC: 132 MMOL/L (ref 136–145)
URATE SERPL-MCNC: 4.7 MG/DL (ref 3.4–7)

## 2020-06-16 PROCEDURE — 80053 COMPREHEN METABOLIC PANEL: CPT | Mod: HCNC

## 2020-06-16 PROCEDURE — 36415 COLL VENOUS BLD VENIPUNCTURE: CPT | Mod: HCNC,PO

## 2020-06-16 PROCEDURE — 84550 ASSAY OF BLOOD/URIC ACID: CPT | Mod: HCNC

## 2020-06-18 ENCOUNTER — OFFICE VISIT (OUTPATIENT)
Dept: RHEUMATOLOGY | Facility: CLINIC | Age: 70
End: 2020-06-18
Payer: MEDICARE

## 2020-06-18 VITALS
DIASTOLIC BLOOD PRESSURE: 52 MMHG | BODY MASS INDEX: 22.92 KG/M2 | WEIGHT: 142.63 LBS | SYSTOLIC BLOOD PRESSURE: 102 MMHG | HEIGHT: 66 IN | HEART RATE: 70 BPM | TEMPERATURE: 99 F

## 2020-06-18 DIAGNOSIS — M1A.9XX1 TOPHACEOUS GOUT: Primary | ICD-10-CM

## 2020-06-18 DIAGNOSIS — Z51.81 ENCOUNTER FOR MONITORING FEBUXOSTAT THERAPY: ICD-10-CM

## 2020-06-18 DIAGNOSIS — M79.641 BILATERAL HAND PAIN: ICD-10-CM

## 2020-06-18 DIAGNOSIS — Z79.899 ENCOUNTER FOR MONITORING FEBUXOSTAT THERAPY: Primary | ICD-10-CM

## 2020-06-18 DIAGNOSIS — M79.642 BILATERAL HAND PAIN: ICD-10-CM

## 2020-06-18 DIAGNOSIS — Z79.899 ENCOUNTER FOR MONITORING FEBUXOSTAT THERAPY: ICD-10-CM

## 2020-06-18 DIAGNOSIS — M25.511 BILATERAL SHOULDER PAIN, UNSPECIFIED CHRONICITY: ICD-10-CM

## 2020-06-18 DIAGNOSIS — M25.512 BILATERAL SHOULDER PAIN, UNSPECIFIED CHRONICITY: ICD-10-CM

## 2020-06-18 DIAGNOSIS — Z51.81 ENCOUNTER FOR MONITORING FEBUXOSTAT THERAPY: Primary | ICD-10-CM

## 2020-06-18 DIAGNOSIS — M25.50 POLYARTHRALGIA: Primary | ICD-10-CM

## 2020-06-18 PROCEDURE — 99214 OFFICE O/P EST MOD 30 MIN: CPT | Mod: HCNC,S$GLB,, | Performed by: INTERNAL MEDICINE

## 2020-06-18 PROCEDURE — 99214 PR OFFICE/OUTPT VISIT, EST, LEVL IV, 30-39 MIN: ICD-10-PCS | Mod: HCNC,S$GLB,, | Performed by: INTERNAL MEDICINE

## 2020-06-18 PROCEDURE — 1101F PT FALLS ASSESS-DOCD LE1/YR: CPT | Mod: HCNC,CPTII,S$GLB, | Performed by: INTERNAL MEDICINE

## 2020-06-18 PROCEDURE — 3008F PR BODY MASS INDEX (BMI) DOCUMENTED: ICD-10-PCS | Mod: HCNC,CPTII,S$GLB, | Performed by: INTERNAL MEDICINE

## 2020-06-18 PROCEDURE — 3078F PR MOST RECENT DIASTOLIC BLOOD PRESSURE < 80 MM HG: ICD-10-PCS | Mod: HCNC,CPTII,S$GLB, | Performed by: INTERNAL MEDICINE

## 2020-06-18 PROCEDURE — 99999 PR PBB SHADOW E&M-EST. PATIENT-LVL V: ICD-10-PCS | Mod: PBBFAC,HCNC,, | Performed by: INTERNAL MEDICINE

## 2020-06-18 PROCEDURE — 3078F DIAST BP <80 MM HG: CPT | Mod: HCNC,CPTII,S$GLB, | Performed by: INTERNAL MEDICINE

## 2020-06-18 PROCEDURE — 1159F MED LIST DOCD IN RCRD: CPT | Mod: HCNC,S$GLB,, | Performed by: INTERNAL MEDICINE

## 2020-06-18 PROCEDURE — 99999 PR PBB SHADOW E&M-EST. PATIENT-LVL V: CPT | Mod: PBBFAC,HCNC,, | Performed by: INTERNAL MEDICINE

## 2020-06-18 PROCEDURE — 3008F BODY MASS INDEX DOCD: CPT | Mod: HCNC,CPTII,S$GLB, | Performed by: INTERNAL MEDICINE

## 2020-06-18 PROCEDURE — 3074F SYST BP LT 130 MM HG: CPT | Mod: HCNC,CPTII,S$GLB, | Performed by: INTERNAL MEDICINE

## 2020-06-18 PROCEDURE — 1125F PR PAIN SEVERITY QUANTIFIED, PAIN PRESENT: ICD-10-PCS | Mod: HCNC,S$GLB,, | Performed by: INTERNAL MEDICINE

## 2020-06-18 PROCEDURE — 1101F PR PT FALLS ASSESS DOC 0-1 FALLS W/OUT INJ PAST YR: ICD-10-PCS | Mod: HCNC,CPTII,S$GLB, | Performed by: INTERNAL MEDICINE

## 2020-06-18 PROCEDURE — 1125F AMNT PAIN NOTED PAIN PRSNT: CPT | Mod: HCNC,S$GLB,, | Performed by: INTERNAL MEDICINE

## 2020-06-18 PROCEDURE — 1159F PR MEDICATION LIST DOCUMENTED IN MEDICAL RECORD: ICD-10-PCS | Mod: HCNC,S$GLB,, | Performed by: INTERNAL MEDICINE

## 2020-06-18 PROCEDURE — 3074F PR MOST RECENT SYSTOLIC BLOOD PRESSURE < 130 MM HG: ICD-10-PCS | Mod: HCNC,CPTII,S$GLB, | Performed by: INTERNAL MEDICINE

## 2020-06-18 RX ORDER — TRAMADOL HYDROCHLORIDE 50 MG/1
50 TABLET ORAL EVERY 12 HOURS PRN
Qty: 60 TABLET | Refills: 5 | Status: SHIPPED | OUTPATIENT
Start: 2020-06-18 | End: 2020-11-30 | Stop reason: SDUPTHER

## 2020-06-18 RX ORDER — PREDNISONE 10 MG/1
10 TABLET ORAL DAILY
Qty: 90 TABLET | Refills: 3 | Status: SHIPPED | OUTPATIENT
Start: 2020-06-18 | End: 2020-09-16

## 2020-06-18 RX ORDER — COLCHICINE 0.6 MG/1
TABLET ORAL
Qty: 3 TABLET | Refills: 0 | Status: SHIPPED | OUTPATIENT
Start: 2020-06-18 | End: 2021-04-15 | Stop reason: SDUPTHER

## 2020-06-18 NOTE — PROGRESS NOTES
Subjective:       Patient ID: Darrion Bennett is a 69 y.o. male.    Chief Complaint: Follow-up     HPI  69 year old male with type II, cataracts, HTN, gout,  Sjogrens, urolithiasis, CHF, hypothyroidism, CKD here for evaluation.  He reports that he was diagnosed with Sjogrens when he had dry eyes and dry mouth in 2014.. He takes plaquenil 200mg po BID. He is  taking azathioprine 50mg po TID and medrol 4mg po qqday.   He was put on imuran by Dr. Corona.  He was followed by Dr. Corona from 2014 to 2017.  In October 2018, imuran and medrol was stopped. Patient restarted imuran in November 2018.  Reports that he feels that imuran helps him with joint.   Today he denies pain, stiffness or swelling.  He denies sry eyes or dry mouth.  Denies trouble making a fist.    He was diagnosed in January in left aspect of foot with pain, swelling and redness.        Interval history:  He ran out of the prednisone since June 14th the next day.   He is doing uloric every day since last time. He has pain in shoulders, hands, wrists. Reports swelling since the 15th.   About a week ago, he had difficulty with making a fist and also had pain in shoulders.  Denies any hip pain or, jaw claudication, or visual changes. He reports his hands and shoulders feel better.  He reports his diabetes and BP are worse on the steroid.  Denies any nodules.  Past Medical History:   Diagnosis Date    Anemia     Arthritis     Cataract     Chronic constipation     Diabetes mellitus, type 2     Glaucoma     Hypertension     MCTD (mixed connective tissue disease)     Sjogren's disease     Ulcerative colitis     Urolithiasis        Review of Systems   Constitutional: Negative for activity change, appetite change, chills, diaphoresis, fatigue and fever.   HENT: Negative for ear discharge, ear pain, hearing loss, mouth sores, nosebleeds and sinus pressure.    Eyes: Negative.  Negative for photophobia, pain, discharge, redness and itching.   Respiratory:  Negative for cough, chest tightness and shortness of breath.    Cardiovascular: Negative for chest pain, palpitations and leg swelling.   Gastrointestinal: Negative for abdominal distention, blood in stool and nausea.   Endocrine: Negative for cold intolerance, heat intolerance, polydipsia and polyphagia.   Genitourinary: Negative for flank pain, genital sores and hematuria.   Musculoskeletal: Positive for arthralgias. Negative for back pain, gait problem, joint swelling, myalgias, neck pain and neck stiffness.   Skin: Negative for color change, pallor and rash.   Neurological: Negative for dizziness, weakness, light-headedness and headaches.   Hematological: Negative for adenopathy. Does not bruise/bleed easily.   Psychiatric/Behavioral: Negative for decreased concentration and hallucinations. The patient is not nervous/anxious.              Objective:        Physical Exam   Constitutional: He is well-developed, well-nourished, and in no distress. No distress.   HENT:   Head: Normocephalic and atraumatic.   Right Ear: External ear normal.   Left Ear: External ear normal.   Nose: Nose normal.   Mouth/Throat: Oropharynx is clear and moist. No oropharyngeal exudate.   Eyes: Conjunctivae and EOM are normal. Pupils are equal, round, and reactive to light. Right eye exhibits no discharge. Left eye exhibits no discharge. No scleral icterus.   Neck: Normal range of motion. Neck supple. No JVD present. No tracheal deviation present. No thyromegaly present.   Cardiovascular: Normal rate, normal heart sounds and intact distal pulses.  Exam reveals no gallop and no friction rub.    No murmur heard.  Pulmonary/Chest: Effort normal. No stridor. No respiratory distress. He has no wheezes. He has no rales. He exhibits no tenderness.   Abdominal: Soft. Bowel sounds are normal. He exhibits no distension and no mass. There is no tenderness. There is no rebound and no guarding.   Lymphadenopathy:     He has no cervical adenopathy.    Neurological: No cranial nerve deficit. Coordination normal.   Skin: Skin is warm and dry. No rash noted. He is not diaphoretic. No erythema. No pallor.     Musculoskeletal: Normal range of motion. He exhibits no edema, tenderness or deformity.     Labs:  Gilma-+  Ssa+  Ssb+  +RNP  Ccp,rf-negative      Right hand xray (5/2019): I personally reviewed; Soft tissue swelling tip of long finger.  Negative for fracture.     Assessment:    70 year old male with type II DM, cataracts, HTN, gout,  Sjogrens, urolithiasis, CHF, hypothyroidism, CKD here for follow up. He was previously followed by Dr. Lau.  He reports that he was diagnosed with Sjogrens when he had dry eyes and dry mouth in 2014. Serologies are +GILMA, +SSA,+SSB, and +RNP. He denies raynauds, rashes, oral ulcers or symptoms of SLE. His main issue before we met was inflammatory arthritis which was being treated with plaquenil, imuran, and medrol.    When we initially met in feb 2019 , he was taking plaquenil 200mg po BID, azathioprine 50mg po TID and medrol 4mg po qqday. His last rheumatologist discontinued his medications and patient decided to restart the medications himself since he was having so much pain. He developed imuran toxicity so all his medications were discontinued. He is asymptomatic from Sjogrens at this time.      With regards to gout, he was recently diagnosed with tophaceous gout, but had allergy to allopurinol so tried uloric.  He has been taking uloric 40mg a day for a month and was taking prednisone 10mg a day and stopped it on his own.  He now has had flare.            With regards to elevated esr and arthralgias, it could be from his gout but it looks like he also had an inflammatory arthritis since he was on plaquenil and imuran.  -restart prednisone 10mg a day  Continue uloric 40mg po qday  Labs in a month and plan to increase uloric to BID   - Once I get his uric acid under control, can consider putting him on orencia for his  inflammatory arthritis  to see if it helps.    rtc in  2 months

## 2020-06-23 ENCOUNTER — OFFICE VISIT (OUTPATIENT)
Dept: ENDOCRINOLOGY | Facility: CLINIC | Age: 70
End: 2020-06-23
Payer: MEDICARE

## 2020-06-23 VITALS
WEIGHT: 139.38 LBS | SYSTOLIC BLOOD PRESSURE: 150 MMHG | HEART RATE: 96 BPM | DIASTOLIC BLOOD PRESSURE: 78 MMHG | BODY MASS INDEX: 22.5 KG/M2

## 2020-06-23 DIAGNOSIS — N18.30 CKD (CHRONIC KIDNEY DISEASE) STAGE 3, GFR 30-59 ML/MIN: ICD-10-CM

## 2020-06-23 DIAGNOSIS — E78.5 HYPERLIPIDEMIA, UNSPECIFIED HYPERLIPIDEMIA TYPE: ICD-10-CM

## 2020-06-23 DIAGNOSIS — R73.9 STEROID-INDUCED HYPERGLYCEMIA: ICD-10-CM

## 2020-06-23 DIAGNOSIS — T38.0X5A STEROID-INDUCED HYPERGLYCEMIA: ICD-10-CM

## 2020-06-23 PROCEDURE — 99999 PR PBB SHADOW E&M-EST. PATIENT-LVL V: ICD-10-PCS | Mod: PBBFAC,HCNC,, | Performed by: NURSE PRACTITIONER

## 2020-06-23 PROCEDURE — 1126F AMNT PAIN NOTED NONE PRSNT: CPT | Mod: HCNC,S$GLB,, | Performed by: NURSE PRACTITIONER

## 2020-06-23 PROCEDURE — 1159F PR MEDICATION LIST DOCUMENTED IN MEDICAL RECORD: ICD-10-PCS | Mod: HCNC,S$GLB,, | Performed by: NURSE PRACTITIONER

## 2020-06-23 PROCEDURE — 99999 PR PBB SHADOW E&M-EST. PATIENT-LVL V: CPT | Mod: PBBFAC,HCNC,, | Performed by: NURSE PRACTITIONER

## 2020-06-23 PROCEDURE — 3008F BODY MASS INDEX DOCD: CPT | Mod: HCNC,CPTII,S$GLB, | Performed by: NURSE PRACTITIONER

## 2020-06-23 PROCEDURE — 1126F PR PAIN SEVERITY QUANTIFIED, NO PAIN PRESENT: ICD-10-PCS | Mod: HCNC,S$GLB,, | Performed by: NURSE PRACTITIONER

## 2020-06-23 PROCEDURE — 99214 OFFICE O/P EST MOD 30 MIN: CPT | Mod: HCNC,S$GLB,, | Performed by: NURSE PRACTITIONER

## 2020-06-23 PROCEDURE — 1101F PT FALLS ASSESS-DOCD LE1/YR: CPT | Mod: HCNC,CPTII,S$GLB, | Performed by: NURSE PRACTITIONER

## 2020-06-23 PROCEDURE — 99214 PR OFFICE/OUTPT VISIT, EST, LEVL IV, 30-39 MIN: ICD-10-PCS | Mod: HCNC,S$GLB,, | Performed by: NURSE PRACTITIONER

## 2020-06-23 PROCEDURE — 3008F PR BODY MASS INDEX (BMI) DOCUMENTED: ICD-10-PCS | Mod: HCNC,CPTII,S$GLB, | Performed by: NURSE PRACTITIONER

## 2020-06-23 PROCEDURE — 3078F PR MOST RECENT DIASTOLIC BLOOD PRESSURE < 80 MM HG: ICD-10-PCS | Mod: HCNC,CPTII,S$GLB, | Performed by: NURSE PRACTITIONER

## 2020-06-23 PROCEDURE — 3051F PR MOST RECENT HEMOGLOBIN A1C LEVEL 7.0 - < 8.0%: ICD-10-PCS | Mod: HCNC,CPTII,S$GLB, | Performed by: NURSE PRACTITIONER

## 2020-06-23 PROCEDURE — 3077F PR MOST RECENT SYSTOLIC BLOOD PRESSURE >= 140 MM HG: ICD-10-PCS | Mod: HCNC,CPTII,S$GLB, | Performed by: NURSE PRACTITIONER

## 2020-06-23 PROCEDURE — 3078F DIAST BP <80 MM HG: CPT | Mod: HCNC,CPTII,S$GLB, | Performed by: NURSE PRACTITIONER

## 2020-06-23 PROCEDURE — 1159F MED LIST DOCD IN RCRD: CPT | Mod: HCNC,S$GLB,, | Performed by: NURSE PRACTITIONER

## 2020-06-23 PROCEDURE — 3051F HG A1C>EQUAL 7.0%<8.0%: CPT | Mod: HCNC,CPTII,S$GLB, | Performed by: NURSE PRACTITIONER

## 2020-06-23 PROCEDURE — 99499 UNLISTED E&M SERVICE: CPT | Mod: HCNC,S$GLB,, | Performed by: NURSE PRACTITIONER

## 2020-06-23 PROCEDURE — 3077F SYST BP >= 140 MM HG: CPT | Mod: HCNC,CPTII,S$GLB, | Performed by: NURSE PRACTITIONER

## 2020-06-23 PROCEDURE — 99499 RISK ADDL DX/OHS AUDIT: ICD-10-PCS | Mod: HCNC,S$GLB,, | Performed by: NURSE PRACTITIONER

## 2020-06-23 PROCEDURE — 1101F PR PT FALLS ASSESS DOC 0-1 FALLS W/OUT INJ PAST YR: ICD-10-PCS | Mod: HCNC,CPTII,S$GLB, | Performed by: NURSE PRACTITIONER

## 2020-06-23 RX ORDER — INSULIN GLARGINE 100 [IU]/ML
INJECTION, SOLUTION SUBCUTANEOUS
Qty: 2 BOX | Refills: 3 | Status: SHIPPED | OUTPATIENT
Start: 2020-06-23 | End: 2020-07-10

## 2020-06-23 RX ORDER — DULAGLUTIDE 1.5 MG/.5ML
1.5 INJECTION, SOLUTION SUBCUTANEOUS
Qty: 12 SYRINGE | Refills: 3 | Status: SHIPPED | OUTPATIENT
Start: 2020-06-23 | End: 2021-08-03

## 2020-06-23 RX ORDER — LANCETS
EACH MISCELLANEOUS
Qty: 400 EACH | Refills: 3 | Status: SHIPPED | OUTPATIENT
Start: 2020-06-23 | End: 2021-07-01 | Stop reason: SDUPTHER

## 2020-06-23 RX ORDER — PEN NEEDLE, DIABETIC 30 GX3/16"
1 NEEDLE, DISPOSABLE MISCELLANEOUS 4 TIMES DAILY
Qty: 400 EACH | Refills: 3 | Status: SHIPPED | OUTPATIENT
Start: 2020-06-23 | End: 2020-09-02 | Stop reason: SDUPTHER

## 2020-06-23 RX ORDER — INSULIN ASPART 100 [IU]/ML
INJECTION, SOLUTION INTRAVENOUS; SUBCUTANEOUS
Qty: 3 BOX | Refills: 2 | Status: SHIPPED | OUTPATIENT
Start: 2020-06-23 | End: 2020-07-10

## 2020-06-23 NOTE — PATIENT INSTRUCTIONS
Increase Trulicity to 1.5 mg once weekly.   Increase Lantus to 13 units once daily.   Increase Novolog to 10 units before each meal. After about 3-4 days, if blood sugars are still high, add a correction scale: 150-200=+1, 201-250=+2, 251-300=+3; 301-350=+4, over 350=+5 units  Test glucose 4x/day.   Drop off a glucose log in 1-2 weeks. Likely you will need additional insulin. Please alert office whenever prednisone dosing changes.   Return to clinic in 6 weeks or sooner as needed.

## 2020-06-23 NOTE — PROGRESS NOTES
CC: This 70 y.o.  male  is here for evaluation of  T2DM along with comorbidities indicated in the Visit Diagnosis section of this encounter.    HPI: Darrion Bennett was diagnosed with T2DM at age 35. Oral medications started years later.    Previously seen by Dr. Marrufo's office, Endocrinology at [place]. He is transferred all his care to [facility].         Prior visit 2/24/20  He notes that his appetite has increased and his portions are larger. BGs karina to 200s over one weekend d/t eating out. Interested in Trulicity.   Plan Stop Xultophy.   Start Trulicity 0.75 mg once weekly.   Start Lantus 7 units once daily.   Continue Novolog at this time at 3 units before lunch and 5 units before dinner.   Test blood sugar 3x/day.   Return to clinic in a month. Call if blood sugars drop less than 80. Suspect you will not need any Novolog if Trulicity is effective    Interval history  Most recent A1c was 7.2% on April 29. However, he now c/o highly uncontrolled BGs. He took prednisone from 5/15 to 6/14 for pain to his hands. Resumed it on 6/18 per Dr. Salazar, his rheumatologist. Takes prednisone 5 mg bid. He increased Novolog to 6 units ac since May when he started the prednisone.       PMHX: Sjogren's syndrome, gouty arthritis - sees Rheumatology     LAST DIABETES EDUCATION: multiple times, years ago     HOSPITALIZED FOR DIABETES  -  No     PRESCRIBED DIABETES MEDICATIONS: Trulicity 0.75 mg once daily, Lantus 10 units once daily, Novolog 6 units ac as above    Misses medication doses - No    DM COMPLICATIONS: nephropathy    SIGNIFICANT DIABETES MED HISTORY: has been told that metformin is bad for his kidneys and that's why he stopped it   Lantus, Humalog, and Tradjenta stopped at initial visit 7/2019 and he was switched to Xultophy.     SELF MONITORING BLOOD GLUCOSE: Checks blood glucose at home 3x/day   FBGs average 270s  prelunch 300s  predinner 390-450s.      HYPOGLYCEMIC EPISODES: none recent;  feels different at  BGs in the 60s.      CURRENT DIET: drinks water, diet soda; has some orange juice to take with his meds. Eats 3 meals/day. Dinner is bigger than lunch. Eats home cooked foods, no processed foods.     CURRENT EXERCISE: none; previously exercised in gym.     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 No acute distress.   PSYCH: AAOx3, appropriate mood and affect, conversant, well-groomed. Judgement and insight good.   NEURO: Cranial nerves grossly intact. Speech clear, no tremor.   CHEST: Respirations even and unlabored.         [facility] - 4/23/19  Total chol 188 - HDL 44 - trig 98 -   Creatinine 1.37   eGFR non AA - 52   A1c 5.9%   Hemoglobin 10/hematocrit 31  Vitamin D 42      Hemoglobin A1C   Date Value Ref Range Status   04/29/2020 7.2 (H) 4.0 - 5.6 % Final     Comment:     ADA Screening Guidelines:  5.7-6.4%  Consistent with prediabetes  >or=6.5%  Consistent with diabetes  High levels of fetal hemoglobin interfere with the HbA1C  assay. Heterozygous hemoglobin variants (HbS, HgC, etc)do  not significantly interfere with this assay.   However, presence of multiple variants may affect accuracy.     01/09/2020 9.0 (H) 4.0 - 5.6 % Final     Comment:     ADA Screening Guidelines:  5.7-6.4%  Consistent with prediabetes  >or=6.5%  Consistent with diabetes  High levels of fetal hemoglobin interfere with the HbA1C  assay. Heterozygous hemoglobin variants (HbS, HgC, etc)do  not significantly interfere with this assay.   However, presence of multiple variants may affect accuracy.     09/19/2019 6.9 (H) 4.0 - 5.6 % Final     Comment:     ADA Screening  "Guidelines:  5.7-6.4%  Consistent with prediabetes  >or=6.5%  Consistent with diabetes  High levels of fetal hemoglobin interfere with the HbA1C  assay. Heterozygous hemoglobin variants (HbS, HgC, etc)do  not significantly interfere with this assay.   However, presence of multiple variants may affect accuracy.     10/16/2018 6.2 (H) 4.0 - 5.7 % Final     Comment:     Dr. Jurgen Ward ( Endocrinology )        Ref. Range 9/19/2019 08:13   Glucose Latest Ref Range: 70 - 110 mg/dL 170 (H)   C-Peptide Latest Ref Range: 0.78 - 5.19 ng/mL 1.33         Chemistry        Component Value Date/Time     (L) 06/16/2020 0740    K 4.6 06/16/2020 0740     06/16/2020 0740    CO2 25 06/16/2020 0740    BUN 27 (H) 06/16/2020 0740    CREATININE 1.5 (H) 06/16/2020 0740     (H) 06/16/2020 0740        Component Value Date/Time    CALCIUM 8.6 (L) 06/16/2020 0740    ALKPHOS 67 06/16/2020 0740    AST 14 06/16/2020 0740    ALT 11 06/16/2020 0740    BILITOT 0.4 06/16/2020 0740    ESTGFRAFRICA 53.8 (A) 06/16/2020 0740    EGFRNONAA 46.5 (A) 06/16/2020 0740          Lab Results   Component Value Date    LDLCALC 155.2 04/29/2020       Lab Results   Component Value Date    MICALBCREAT 519 (H) 10/16/2018               ASSESSMENT and PLAN:    A1C GOAL: 7.5 %     1. Diabetes mellitus type 2, uncontrolled, with complications  Increase Trulicity to 1.5 mg once weekly.   Increase Lantus to 13 units once daily.   Increase Novolog to 10 units before each meal. After about 3-4 days, if blood sugars are still high, add a correction scale: 150-200=+1, 201-250=+2, 251-300=+3; 301-350=+4, over 350=+5 units  Test glucose 4x/day.   Drop off a glucose log in 1-2 weeks. Likely you will need additional insulin. Please alert office whenever prednisone dosing changes.   Return to clinic in 6 weeks or sooner as needed.       pen needle, diabetic (BD ULTRA-FINE RICHARD PEN NEEDLE) 32 gauge x 5/32" Ndle    insulin aspart U-100 (NOVOLOG FLEXPEN U-100 " INSULIN) 100 unit/mL (3 mL) InPn pen    insulin (LANTUS SOLOSTAR U-100 INSULIN) glargine 100 units/mL (3mL) SubQ pen    dulaglutide (TRULICITY) 1.5 mg/0.5 mL PnIj    lancets Misc    blood sugar diagnostic Strp   2. CKD (chronic kidney disease) stage 3, GFR 30-59 ml/min  Improve glycemic control.      3. Steroid-induced hyperglycemia  As above   4. Hyperlipidemia, unspecified hyperlipidemia type  H/o statin intolerance.   Declines Zetia d/t potential cost.          No orders of the defined types were placed in this encounter.       Follow up in about 6 weeks (around 8/4/2020).

## 2020-07-10 ENCOUNTER — TELEPHONE (OUTPATIENT)
Dept: ENDOCRINOLOGY | Facility: CLINIC | Age: 70
End: 2020-07-10

## 2020-07-10 RX ORDER — INSULIN GLARGINE 100 [IU]/ML
INJECTION, SOLUTION SUBCUTANEOUS
Qty: 2 BOX | Refills: 3
Start: 2020-07-10 | End: 2020-09-02 | Stop reason: SDUPTHER

## 2020-07-10 RX ORDER — INSULIN ASPART 100 [IU]/ML
INJECTION, SOLUTION INTRAVENOUS; SUBCUTANEOUS
Qty: 3 BOX | Refills: 2
Start: 2020-07-10 | End: 2020-09-02

## 2020-07-10 NOTE — TELEPHONE ENCOUNTER
BG log received. Advised pt to increase Lantus to 15 units and Novolog to 12 units before breakfast. He is still taking Trulicity 0.75 mg weekly and has 3 more syringes until he can  the 1.5 mg dose.   Will reschedule f/u appt to 6 weeks since BGs have stabilized. He voiced understanding.

## 2020-07-20 ENCOUNTER — OFFICE VISIT (OUTPATIENT)
Dept: NEPHROLOGY | Facility: CLINIC | Age: 70
End: 2020-07-20
Payer: MEDICARE

## 2020-07-20 DIAGNOSIS — N18.30 CONTROLLED TYPE 2 DIABETES MELLITUS WITH STAGE 3 CHRONIC KIDNEY DISEASE, WITH LONG-TERM CURRENT USE OF INSULIN: Primary | ICD-10-CM

## 2020-07-20 DIAGNOSIS — Z79.4 CONTROLLED TYPE 2 DIABETES MELLITUS WITH STAGE 3 CHRONIC KIDNEY DISEASE, WITH LONG-TERM CURRENT USE OF INSULIN: Primary | ICD-10-CM

## 2020-07-20 DIAGNOSIS — E11.22 CONTROLLED TYPE 2 DIABETES MELLITUS WITH STAGE 3 CHRONIC KIDNEY DISEASE, WITH LONG-TERM CURRENT USE OF INSULIN: Primary | ICD-10-CM

## 2020-07-20 PROCEDURE — 1101F PT FALLS ASSESS-DOCD LE1/YR: CPT | Mod: HCNC,CPTII,95, | Performed by: INTERNAL MEDICINE

## 2020-07-20 PROCEDURE — 1159F MED LIST DOCD IN RCRD: CPT | Mod: HCNC,95,, | Performed by: INTERNAL MEDICINE

## 2020-07-20 PROCEDURE — 3051F HG A1C>EQUAL 7.0%<8.0%: CPT | Mod: HCNC,CPTII,95, | Performed by: INTERNAL MEDICINE

## 2020-07-20 PROCEDURE — 3051F PR MOST RECENT HEMOGLOBIN A1C LEVEL 7.0 - < 8.0%: ICD-10-PCS | Mod: HCNC,CPTII,95, | Performed by: INTERNAL MEDICINE

## 2020-07-20 PROCEDURE — 99214 OFFICE O/P EST MOD 30 MIN: CPT | Mod: HCNC,95,, | Performed by: INTERNAL MEDICINE

## 2020-07-20 PROCEDURE — 1101F PR PT FALLS ASSESS DOC 0-1 FALLS W/OUT INJ PAST YR: ICD-10-PCS | Mod: HCNC,CPTII,95, | Performed by: INTERNAL MEDICINE

## 2020-07-20 PROCEDURE — 1159F PR MEDICATION LIST DOCUMENTED IN MEDICAL RECORD: ICD-10-PCS | Mod: HCNC,95,, | Performed by: INTERNAL MEDICINE

## 2020-07-20 PROCEDURE — 99499 RISK ADDL DX/OHS AUDIT: ICD-10-PCS | Mod: 95,,, | Performed by: INTERNAL MEDICINE

## 2020-07-20 PROCEDURE — 99499 UNLISTED E&M SERVICE: CPT | Mod: 95,,, | Performed by: INTERNAL MEDICINE

## 2020-07-20 PROCEDURE — 99214 PR OFFICE/OUTPT VISIT, EST, LEVL IV, 30-39 MIN: ICD-10-PCS | Mod: HCNC,95,, | Performed by: INTERNAL MEDICINE

## 2020-07-20 NOTE — Clinical Note
Dr. Salazar, Mr. Bennett would like to know if you wanted to try another med to replace his prednisone, before his labs/visit in August, especially if his uric acid is better controlled currently.  Thank you.

## 2020-07-21 NOTE — PROGRESS NOTES
Subjective:       Patient ID: Darrion Bennett is a 70 y.o.  male who presents for follow up of Chronic Kidney Disease    HPI  Mr. Bennett is a 70 year old man with medical history of diabetes, hypertension, Sjogren's, gout presenting for follow up of chronic kidney disease.  Patient reports minimal generalized arthralgias, controlled on prednisone (followed by Rheumatology).  Patient reports adequate fluid intake, denies any NSAID use.  He reports blood sugars usually well-controlled, though difficult to control on prednisone; he reports blood pressure usually well-controlled.  He otherwise denies any fever, chest pain, shortness of breath, abdominal pain, diarrhea, dysuria/hematuria.    Review of Systems   Constitutional: Negative for appetite change, fatigue and fever.   Respiratory: Negative for cough and shortness of breath.    Cardiovascular: Negative for chest pain and leg swelling.   Gastrointestinal: Negative for abdominal pain, constipation, diarrhea, nausea and vomiting.   Genitourinary: Negative for dysuria, flank pain, frequency, hematuria and urgency.   Musculoskeletal: Positive for arthralgias. Negative for back pain and joint swelling.   Skin: Negative for rash.   Neurological: Negative for dizziness and light-headedness.   All other systems reviewed and are negative.      Objective:      Physical Exam  Vitals signs reviewed.   Constitutional:       General: He is not in acute distress.     Appearance: He is well-developed.   Pulmonary:      Effort: Pulmonary effort is normal. No respiratory distress.   Musculoskeletal:         General: No swelling (per patient).   Neurological:      Mental Status: He is alert.         Assessment:       1. Controlled type 2 diabetes mellitus with stage 3 chronic kidney disease, with long-term current use of insulin        Plan:      Mr. Bennett is a 70 year old man with medical history of diabetes, hypertension, Sjogren's presenting for follow up of chronic kidney  disease.  Patient creatinine previously 1.3-1.4mg/dL, consistent with CKD stage III, suspect due to diabetic nephropathy given subnephrotic proteinuria.  Creatinine elevated since July 2019, mainly 1.6-1.8mg/dL, improved since to 1.5mg/dL recently, will repeat to trend (along with subnephrotic proteinuria, improved as well, patient on ARB).  Reviewed renal US with doppler 9.19 to rule out obstructive/vascular etiology.  Encouraged fluid intake with sodium restriction, stressed importance of blood pressure/glycemic control to prevent any further progression of kidney disease, patient voiced understanding.  Will discuss alternate therapy with Rheumatology (patient hoping to taper off prednisone).  Further recommendations pending results of above.  - Anemia: Hgb at goal, no indication for erythropoetin therapy  - Bone/mineral metabolism: patient with secondary hyperparathyroidism, PTH/VitD at goal for stage CKD, will continue to trend    Return to clinic in 4-6 months pending CMP 8.26.20, then with renal/heme panel, iron/TIBC/ferritin, urinalysis/culture, urine protein/creatinine ratio, PTH/VitD prior to next visit      The patient location is: Home  The chief complaint leading to consultation is: CKD    Visit type: audiovisual    Face to Face time with patient: 15 min  20 minutes of total time spent on the encounter, which includes face to face time and non-face to face time preparing to see the patient (eg, review of tests), Obtaining and/or reviewing separately obtained history, Documenting clinical information in the electronic or other health record, Independently interpreting results (not separately reported) and communicating results to the patient/family/caregiver, or Care coordination (not separately reported).         Each patient to whom he or she provides medical services by telemedicine is:  (1) informed of the relationship between the physician and patient and the respective role of any other health care  provider with respect to management of the patient; and (2) notified that he or she may decline to receive medical services by telemedicine and may withdraw from such care at any time.    Notes: n/a

## 2020-07-28 ENCOUNTER — TELEPHONE (OUTPATIENT)
Dept: FAMILY MEDICINE | Facility: CLINIC | Age: 70
End: 2020-07-28

## 2020-07-28 NOTE — TELEPHONE ENCOUNTER
----- Message from Cam Kc sent at 7/27/2020  3:51 PM CDT -----  Regarding: chart notes  Type: Patient Call Back    Who called:Josselyn Rodriguez    What is the request in detail:Need most recent chart notes within the past 6 months regarding diabetic history    Can the clinic reply by [facility]? NO    Would the patient rather a call back or a response via [facility]? Callback    Best call back number:[phone]

## 2020-07-29 RX ORDER — LINACLOTIDE 145 UG/1
CAPSULE, GELATIN COATED ORAL
Qty: 90 CAPSULE | Refills: 1 | Status: SHIPPED | OUTPATIENT
Start: 2020-07-29 | End: 2021-01-21

## 2020-08-18 DIAGNOSIS — M19.90 INFLAMMATORY ARTHRITIS: Primary | ICD-10-CM

## 2020-08-18 RX ORDER — PREDNISONE 2.5 MG/1
2.5 TABLET ORAL DAILY
Qty: 30 TABLET | Refills: 0 | Status: SHIPPED | OUTPATIENT
Start: 2020-08-18 | End: 2020-09-16

## 2020-08-26 ENCOUNTER — PATIENT OUTREACH (OUTPATIENT)
Dept: ADMINISTRATIVE | Facility: OTHER | Age: 70
End: 2020-08-26

## 2020-08-26 ENCOUNTER — LAB VISIT (OUTPATIENT)
Dept: LAB | Facility: HOSPITAL | Age: 70
End: 2020-08-26
Attending: INTERNAL MEDICINE
Payer: MEDICARE

## 2020-08-26 DIAGNOSIS — E11.8 CONTROLLED TYPE 2 DIABETES MELLITUS WITH COMPLICATION, WITHOUT LONG-TERM CURRENT USE OF INSULIN: Primary | ICD-10-CM

## 2020-08-26 DIAGNOSIS — M25.50 POLYARTHRALGIA: ICD-10-CM

## 2020-08-26 LAB
ALBUMIN SERPL BCP-MCNC: 3.2 G/DL (ref 3.5–5.2)
ALP SERPL-CCNC: 63 U/L (ref 55–135)
ALT SERPL W/O P-5'-P-CCNC: 11 U/L (ref 10–44)
ANION GAP SERPL CALC-SCNC: 12 MMOL/L (ref 8–16)
AST SERPL-CCNC: 21 U/L (ref 10–40)
BILIRUB SERPL-MCNC: 0.3 MG/DL (ref 0.1–1)
BUN SERPL-MCNC: 38 MG/DL (ref 8–23)
CALCIUM SERPL-MCNC: 9.1 MG/DL (ref 8.7–10.5)
CHLORIDE SERPL-SCNC: 103 MMOL/L (ref 95–110)
CO2 SERPL-SCNC: 18 MMOL/L (ref 23–29)
CREAT SERPL-MCNC: 2.7 MG/DL (ref 0.5–1.4)
EST. GFR  (AFRICAN AMERICAN): 26.4 ML/MIN/1.73 M^2
EST. GFR  (NON AFRICAN AMERICAN): 22.8 ML/MIN/1.73 M^2
GLUCOSE SERPL-MCNC: 90 MG/DL (ref 70–110)
POTASSIUM SERPL-SCNC: 4.7 MMOL/L (ref 3.5–5.1)
PROT SERPL-MCNC: 7.9 G/DL (ref 6–8.4)
SODIUM SERPL-SCNC: 133 MMOL/L (ref 136–145)
URATE SERPL-MCNC: 4.6 MG/DL (ref 3.4–7)

## 2020-08-26 PROCEDURE — 80053 COMPREHEN METABOLIC PANEL: CPT | Mod: HCNC

## 2020-08-26 PROCEDURE — 36415 COLL VENOUS BLD VENIPUNCTURE: CPT | Mod: HCNC,PO

## 2020-08-26 PROCEDURE — 84550 ASSAY OF BLOOD/URIC ACID: CPT | Mod: HCNC

## 2020-08-27 ENCOUNTER — OFFICE VISIT (OUTPATIENT)
Dept: RHEUMATOLOGY | Facility: CLINIC | Age: 70
End: 2020-08-27
Payer: MEDICARE

## 2020-08-27 ENCOUNTER — TELEPHONE (OUTPATIENT)
Dept: RHEUMATOLOGY | Facility: CLINIC | Age: 70
End: 2020-08-27

## 2020-08-27 DIAGNOSIS — Z79.899 ENCOUNTER FOR MONITORING FEBUXOSTAT THERAPY: Primary | ICD-10-CM

## 2020-08-27 DIAGNOSIS — M1A.9XX1 TOPHACEOUS GOUT: ICD-10-CM

## 2020-08-27 DIAGNOSIS — Z51.81 ENCOUNTER FOR MONITORING FEBUXOSTAT THERAPY: Primary | ICD-10-CM

## 2020-08-27 PROCEDURE — 1159F MED LIST DOCD IN RCRD: CPT | Mod: HCNC,95,, | Performed by: INTERNAL MEDICINE

## 2020-08-27 PROCEDURE — 1101F PT FALLS ASSESS-DOCD LE1/YR: CPT | Mod: HCNC,CPTII,95, | Performed by: INTERNAL MEDICINE

## 2020-08-27 PROCEDURE — 99499 RISK ADDL DX/OHS AUDIT: ICD-10-PCS | Mod: 95,,, | Performed by: INTERNAL MEDICINE

## 2020-08-27 PROCEDURE — 1159F PR MEDICATION LIST DOCUMENTED IN MEDICAL RECORD: ICD-10-PCS | Mod: HCNC,95,, | Performed by: INTERNAL MEDICINE

## 2020-08-27 PROCEDURE — 99499 UNLISTED E&M SERVICE: CPT | Mod: 95,,, | Performed by: INTERNAL MEDICINE

## 2020-08-27 PROCEDURE — 99215 OFFICE O/P EST HI 40 MIN: CPT | Mod: HCNC,95,, | Performed by: INTERNAL MEDICINE

## 2020-08-27 PROCEDURE — 99215 PR OFFICE/OUTPT VISIT, EST, LEVL V, 40-54 MIN: ICD-10-PCS | Mod: HCNC,95,, | Performed by: INTERNAL MEDICINE

## 2020-08-27 PROCEDURE — 1101F PR PT FALLS ASSESS DOC 0-1 FALLS W/OUT INJ PAST YR: ICD-10-PCS | Mod: HCNC,CPTII,95, | Performed by: INTERNAL MEDICINE

## 2020-08-27 NOTE — PROGRESS NOTES
Subjective:       Patient ID: Darrion Bennett is a 69 y.o. male.    Chief Complaint: Follow-up     HPI  69 year old male with type II, cataracts, HTN, gout,  Sjogrens, urolithiasis, CHF, hypothyroidism, CKD here for evaluation.  He reports that he was diagnosed with Sjogrens when he had dry eyes and dry mouth in 2014.. He takes plaquenil 200mg po BID. He is  taking azathioprine 50mg po TID and medrol 4mg po qqday.   He was put on imuran by Dr. Corona.  He was followed by Dr. Corona from 2014 to 2017.  In October 2018, imuran and medrol was stopped. Patient restarted imuran in November 2018.  Reports that he feels that imuran helps him with joint.   Today he denies pain, stiffness or swelling.  He denies sry eyes or dry mouth.  Denies trouble making a fist.    He was diagnosed in January in left aspect of foot with pain, swelling and redness.        Interval history:  He is taking prednisone 5mg  A day for 2 weeks.   He is doing uloric every day since last time. Denies any flares.Denies any hip pain or, jaw claudication, or visual changes. He reports his hands and shoulders feel better.    Past Medical History:   Diagnosis Date    Anemia     Arthritis     Cataract     Chronic constipation     Diabetes mellitus, type 2     Glaucoma     Hypertension     MCTD (mixed connective tissue disease)     Sjogren's disease     Ulcerative colitis     Urolithiasis        Review of Systems   Constitutional: Negative for activity change, appetite change, chills, diaphoresis, fatigue and fever.   HENT: Negative for ear discharge, ear pain, hearing loss, mouth sores, nosebleeds and sinus pressure.    Eyes: Negative.  Negative for photophobia, pain, discharge, redness and itching.   Respiratory: Negative for cough, chest tightness and shortness of breath.    Cardiovascular: Negative for chest pain, palpitations and leg swelling.   Gastrointestinal: Negative for abdominal distention, blood in stool and nausea.   Endocrine:  Negative for cold intolerance, heat intolerance, polydipsia and polyphagia.   Genitourinary: Negative for flank pain, genital sores and hematuria.   Musculoskeletal: Positive for arthralgias. Negative for back pain, gait problem, joint swelling, myalgias, neck pain and neck stiffness.   Skin: Negative for color change, pallor and rash.   Neurological: Negative for dizziness, weakness, light-headedness and headaches.   Hematological: Negative for adenopathy. Does not bruise/bleed easily.   Psychiatric/Behavioral: Negative for decreased concentration and hallucinations. The patient is not nervous/anxious.              Objective:        Physical Exam   Constitutional: He is well-developed, well-nourished, and in no distress. No distress.   HENT:   Head: Normocephalic and atraumatic.   Right Ear: External ear normal.   Left Ear: External ear normal.   Nose: Nose normal.   Mouth/Throat: Oropharynx is clear and moist. No oropharyngeal exudate.   Eyes: Conjunctivae and EOM are normal. Pupils are equal, round, and reactive to light. Right eye exhibits no discharge. Left eye exhibits no discharge. No scleral icterus.   Neck: Normal range of motion. Neck supple. No JVD present. No tracheal deviation present. No thyromegaly present.   Cardiovascular: Normal rate, normal heart sounds and intact distal pulses.  Exam reveals no gallop and no friction rub.    No murmur heard.  Pulmonary/Chest: Effort normal. No stridor. No respiratory distress. He has no wheezes. He has no rales. He exhibits no tenderness.   Abdominal: Soft. Bowel sounds are normal. He exhibits no distension and no mass. There is no tenderness. There is no rebound and no guarding.   Lymphadenopathy:     He has no cervical adenopathy.   Neurological: No cranial nerve deficit. Coordination normal.   Skin: Skin is warm and dry. No rash noted. He is not diaphoretic. No erythema. No pallor.     Musculoskeletal: Normal range of motion. He exhibits no edema, tenderness  or deformity.     Labs:  Gilma-+  Ssa+  Ssb+  +RNP  Ccp,rf-negative      Right hand xray (5/2019): I personally reviewed; Soft tissue swelling tip of long finger.  Negative for fracture.     Assessment:    70 year old male with type II DM, cataracts, HTN, gout,  Sjogrens, urolithiasis, CHF, hypothyroidism, CKD here for follow up. He was previously followed by Dr. Lau.  He reports that he was diagnosed with Sjogrens when he had dry eyes and dry mouth in 2014. Serologies are +GILMA, +SSA,+SSB, and +RNP. He denies raynauds, rashes, oral ulcers or symptoms of SLE. His main issue before we met was inflammatory arthritis which was being treated with plaquenil, imuran, and medrol.     When we initially met in feb 2019 , he was taking plaquenil 200mg po BID, azathioprine 50mg po TID and medrol 4mg po qqday. His last rheumatologist discontinued his medications and patient decided to restart the medications himself since he was having so much pain. He developed imuran toxicity so all his medications were discontinued. He is asymptomatic from Sjogrens at this time.      With regards to gout, he was recently diagnosed with tophaceous gout, but had allergy to allopurinol so tried uloric.  He has been taking uloric 40mg a day and doing well.  -wean prednisone from 5mg a day to 2.5mg a day  Continue uloric 40mg po qday  - Once I get his uric acid under control, can consider putting him on orencia for his inflammatory arthritis  to see if it helps.      #acute on chronic renal failure: I emailed his nephrologist and asked pt to reach out to him as well.    rtc in  2 months      The patient location is: home  The chief complaint leading to consultation is: joint pain    Visit type: audiovisual    Face to Face time with patient: 30 minutes   minutes of total time spent on the encounter, which includes face to face time and non-face to face time preparing to see the patient (eg, review of tests), Obtaining and/or reviewing separately  obtained history, Documenting clinical information in the electronic or other health record, Independently interpreting results (not separately reported) and communicating results to the patient/family/caregiver, or Care coordination (not separately reported).         Each patient to whom he or she provides medical services by telemedicine is:  (1) informed of the relationship between the physician and patient and the respective role of any other health care provider with respect to management of the patient; and (2) notified that he or she may decline to receive medical services by telemedicine and may withdraw from such care at any time.

## 2020-08-28 ENCOUNTER — TELEPHONE (OUTPATIENT)
Dept: NEPHROLOGY | Facility: CLINIC | Age: 70
End: 2020-08-28

## 2020-08-28 DIAGNOSIS — N17.9 ACUTE KIDNEY INJURY (NONTRAUMATIC): Primary | ICD-10-CM

## 2020-09-02 ENCOUNTER — OFFICE VISIT (OUTPATIENT)
Dept: ENDOCRINOLOGY | Facility: CLINIC | Age: 70
End: 2020-09-02
Payer: MEDICARE

## 2020-09-02 VITALS
TEMPERATURE: 98 F | WEIGHT: 144.19 LBS | BODY MASS INDEX: 23.27 KG/M2 | DIASTOLIC BLOOD PRESSURE: 67 MMHG | SYSTOLIC BLOOD PRESSURE: 147 MMHG | HEART RATE: 75 BPM

## 2020-09-02 DIAGNOSIS — T38.0X5A STEROID-INDUCED HYPERGLYCEMIA: ICD-10-CM

## 2020-09-02 DIAGNOSIS — I10 ESSENTIAL HYPERTENSION: ICD-10-CM

## 2020-09-02 DIAGNOSIS — N18.30 CKD (CHRONIC KIDNEY DISEASE) STAGE 3, GFR 30-59 ML/MIN: ICD-10-CM

## 2020-09-02 DIAGNOSIS — E11.649 HYPOGLYCEMIA ASSOCIATED WITH DIABETES: ICD-10-CM

## 2020-09-02 DIAGNOSIS — R73.9 STEROID-INDUCED HYPERGLYCEMIA: ICD-10-CM

## 2020-09-02 PROCEDURE — 3077F SYST BP >= 140 MM HG: CPT | Mod: HCNC,CPTII,S$GLB, | Performed by: NURSE PRACTITIONER

## 2020-09-02 PROCEDURE — 3078F DIAST BP <80 MM HG: CPT | Mod: HCNC,CPTII,S$GLB, | Performed by: NURSE PRACTITIONER

## 2020-09-02 PROCEDURE — 1159F MED LIST DOCD IN RCRD: CPT | Mod: HCNC,S$GLB,, | Performed by: NURSE PRACTITIONER

## 2020-09-02 PROCEDURE — 1101F PT FALLS ASSESS-DOCD LE1/YR: CPT | Mod: HCNC,CPTII,S$GLB, | Performed by: NURSE PRACTITIONER

## 2020-09-02 PROCEDURE — 99214 OFFICE O/P EST MOD 30 MIN: CPT | Mod: HCNC,S$GLB,, | Performed by: NURSE PRACTITIONER

## 2020-09-02 PROCEDURE — 3008F PR BODY MASS INDEX (BMI) DOCUMENTED: ICD-10-PCS | Mod: HCNC,CPTII,S$GLB, | Performed by: NURSE PRACTITIONER

## 2020-09-02 PROCEDURE — 3077F PR MOST RECENT SYSTOLIC BLOOD PRESSURE >= 140 MM HG: ICD-10-PCS | Mod: HCNC,CPTII,S$GLB, | Performed by: NURSE PRACTITIONER

## 2020-09-02 PROCEDURE — 1126F PR PAIN SEVERITY QUANTIFIED, NO PAIN PRESENT: ICD-10-PCS | Mod: HCNC,S$GLB,, | Performed by: NURSE PRACTITIONER

## 2020-09-02 PROCEDURE — 3078F PR MOST RECENT DIASTOLIC BLOOD PRESSURE < 80 MM HG: ICD-10-PCS | Mod: HCNC,CPTII,S$GLB, | Performed by: NURSE PRACTITIONER

## 2020-09-02 PROCEDURE — 3051F HG A1C>EQUAL 7.0%<8.0%: CPT | Mod: HCNC,CPTII,S$GLB, | Performed by: NURSE PRACTITIONER

## 2020-09-02 PROCEDURE — 3008F BODY MASS INDEX DOCD: CPT | Mod: HCNC,CPTII,S$GLB, | Performed by: NURSE PRACTITIONER

## 2020-09-02 PROCEDURE — 1126F AMNT PAIN NOTED NONE PRSNT: CPT | Mod: HCNC,S$GLB,, | Performed by: NURSE PRACTITIONER

## 2020-09-02 PROCEDURE — 3051F PR MOST RECENT HEMOGLOBIN A1C LEVEL 7.0 - < 8.0%: ICD-10-PCS | Mod: HCNC,CPTII,S$GLB, | Performed by: NURSE PRACTITIONER

## 2020-09-02 PROCEDURE — 99499 UNLISTED E&M SERVICE: CPT | Mod: HCNC,S$GLB,, | Performed by: NURSE PRACTITIONER

## 2020-09-02 PROCEDURE — 99499 RISK ADDL DX/OHS AUDIT: ICD-10-PCS | Mod: HCNC,S$GLB,, | Performed by: NURSE PRACTITIONER

## 2020-09-02 PROCEDURE — 99999 PR PBB SHADOW E&M-EST. PATIENT-LVL V: CPT | Mod: PBBFAC,HCNC,, | Performed by: NURSE PRACTITIONER

## 2020-09-02 PROCEDURE — 1159F PR MEDICATION LIST DOCUMENTED IN MEDICAL RECORD: ICD-10-PCS | Mod: HCNC,S$GLB,, | Performed by: NURSE PRACTITIONER

## 2020-09-02 PROCEDURE — 99999 PR PBB SHADOW E&M-EST. PATIENT-LVL V: ICD-10-PCS | Mod: PBBFAC,HCNC,, | Performed by: NURSE PRACTITIONER

## 2020-09-02 PROCEDURE — 1101F PR PT FALLS ASSESS DOC 0-1 FALLS W/OUT INJ PAST YR: ICD-10-PCS | Mod: HCNC,CPTII,S$GLB, | Performed by: NURSE PRACTITIONER

## 2020-09-02 PROCEDURE — 99214 PR OFFICE/OUTPT VISIT, EST, LEVL IV, 30-39 MIN: ICD-10-PCS | Mod: HCNC,S$GLB,, | Performed by: NURSE PRACTITIONER

## 2020-09-02 RX ORDER — INSULIN ASPART 100 [IU]/ML
INJECTION, SOLUTION INTRAVENOUS; SUBCUTANEOUS
Qty: 1 BOX | Refills: 2 | Status: SHIPPED | OUTPATIENT
Start: 2020-09-02 | End: 2021-05-05 | Stop reason: SDUPTHER

## 2020-09-02 RX ORDER — INSULIN GLARGINE 100 [IU]/ML
INJECTION, SOLUTION SUBCUTANEOUS
Qty: 1 BOX | Refills: 2 | Status: SHIPPED | OUTPATIENT
Start: 2020-09-02 | End: 2020-11-04

## 2020-09-02 RX ORDER — PEN NEEDLE, DIABETIC 30 GX3/16"
1 NEEDLE, DISPOSABLE MISCELLANEOUS 4 TIMES DAILY
Qty: 400 EACH | Refills: 3 | Status: SHIPPED | OUTPATIENT
Start: 2020-09-02 | End: 2021-08-09 | Stop reason: SDUPTHER

## 2020-09-02 NOTE — PROGRESS NOTES
CC: This 70 y.o.  male  is here for evaluation of  T2DM along with comorbidities indicated in the Visit Diagnosis section of this encounter.    HPI: Darrion Bennett was diagnosed with T2DM at age 35. Oral medications started years later.    Previously seen by Dr. Marrufo's office, Endocrinology at [place]. He is transferred all his care to [facility].         Prior visit 2/24/20  He notes that his appetite has increased and his portions are larger. BGs karina to 200s over one weekend d/t eating out. Interested in Trulicity.   Plan Stop Xultophy.   Start Trulicity 0.75 mg once weekly.   Start Lantus 7 units once daily.   Continue Novolog at this time at 3 units before lunch and 5 units before dinner.   Test blood sugar 3x/day.   Return to clinic in a month. Call if blood sugars drop less than 80. Suspect you will not need any Novolog if Trulicity is effective    Prior visit 6/23/20  Most recent A1c was 7.2% on April 29. However, he now c/o highly uncontrolled BGs. He took prednisone from 5/15 to 6/14 for pain to his hands. Resumed it on 6/18 per Dr. Salazar, his rheumatologist. Takes prednisone 5 mg bid. He increased Novolog to 6 units ac since May when he started the prednisone.   Plan Increase Trulicity to 1.5 mg once weekly.   Increase Lantus to 13 units once daily.   Increase Novolog to 10 units before each meal. After about 3-4 days, if blood sugars are still high, add a correction scale: 150-200=+1, 201-250=+2, 251-300=+3; 301-350=+4, over 350=+5 units  Test glucose 4x/day.   Drop off a glucose log in 1-2 weeks. Likely you will need additional insulin. Please alert office whenever prednisone dosing changes.   Return to clinic in 6 weeks or sooner as needed.       Interval history  He was advised shortly after lov to increase Lantus to 15 units and Novolog to 12 units before breakfast. Reports BGs were high last month but his prednisone was just reduced to 2.5 mg once daily by his rheumatologist last week.      Kidney function has worsened and eGFR is up from mid 40s to now 26.         PMHX: Sjogren's syndrome, gouty arthritis - sees Rheumatology     LAST DIABETES EDUCATION: multiple times, years ago     HOSPITALIZED FOR DIABETES  -  No     PRESCRIBED DIABETES MEDICATIONS: Trulicity 1.5 mg once daily, Lantus 15 units once daily, Novolog ac depending on his BG     Misses medication doses - No    DM COMPLICATIONS: nephropathy    SIGNIFICANT DIABETES MED HISTORY: has been told that metformin is bad for his kidneys and that's why he stopped it   Lantus, Humalog, and Tradjenta stopped at initial visit 7/2019 and he was switched to Xultophy.     SELF MONITORING BLOOD GLUCOSE: Checks blood glucose at home 3x/day                   HYPOGLYCEMIC EPISODES: feels cold when his BG is 60s - no major symptoms.      CURRENT DIET: drinks water, diet soda; has some orange juice to take with his meds. Eats 3 meals/day. Lunch is the biggest meal.  Eats home cooked foods, no processed foods.     CURRENT EXERCISE: none; previously exercised in gym.     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  Pulse 75   Temp 98.1 °F (36.7 °C) (Oral)   Wt 65.4 kg (144 lb 3.2 oz)   BMI 23.27 kg/m²       ROS:   CONSTITUTIONAL: Appetite normal, + fatigue  MS: arthralgias generalized     PHYSICAL EXAM:  GENERAL: Well developed, well nourished. No acute distress.   PSYCH: AAOx3, appropriate mood and affect, conversant, well-groomed. Judgement and insight good.   NEURO: Cranial nerves grossly intact. Speech clear, no tremor.   CHEST: Respirations even and unlabored.         [facility] - 4/23/19  Total chol 188 - HDL 44 - trig 98 -   Creatinine 1.37   eGFR non AA - 52   A1c 5.9%   Hemoglobin 10/hematocrit 31  Vitamin D 42      Hemoglobin A1C    Date Value Ref Range Status   04/29/2020 7.2 (H) 4.0 - 5.6 % Final     Comment:     ADA Screening Guidelines:  5.7-6.4%  Consistent with prediabetes  >or=6.5%  Consistent with diabetes  High levels of fetal hemoglobin interfere with the HbA1C  assay. Heterozygous hemoglobin variants (HbS, HgC, etc)do  not significantly interfere with this assay.   However, presence of multiple variants may affect accuracy.     01/09/2020 9.0 (H) 4.0 - 5.6 % Final     Comment:     ADA Screening Guidelines:  5.7-6.4%  Consistent with prediabetes  >or=6.5%  Consistent with diabetes  High levels of fetal hemoglobin interfere with the HbA1C  assay. Heterozygous hemoglobin variants (HbS, HgC, etc)do  not significantly interfere with this assay.   However, presence of multiple variants may affect accuracy.     09/19/2019 6.9 (H) 4.0 - 5.6 % Final     Comment:     ADA Screening Guidelines:  5.7-6.4%  Consistent with prediabetes  >or=6.5%  Consistent with diabetes  High levels of fetal hemoglobin interfere with the HbA1C  assay. Heterozygous hemoglobin variants (HbS, HgC, etc)do  not significantly interfere with this assay.   However, presence of multiple variants may affect accuracy.     10/16/2018 6.2 (H) 4.0 - 5.7 % Final     Comment:     Dr. Jurgen Ward ( Endocrinology )        Ref. Range 9/19/2019 08:13   Glucose Latest Ref Range: 70 - 110 mg/dL 170 (H)   C-Peptide Latest Ref Range: 0.78 - 5.19 ng/mL 1.33         Chemistry        Component Value Date/Time     (L) 08/26/2020 0805    K 4.7 08/26/2020 0805     08/26/2020 0805    CO2 18 (L) 08/26/2020 0805    BUN 38 (H) 08/26/2020 0805    CREATININE 2.7 (H) 08/26/2020 0805    GLU 90 08/26/2020 0805        Component Value Date/Time    CALCIUM 9.1 08/26/2020 0805    ALKPHOS 63 08/26/2020 0805    AST 21 08/26/2020 0805    ALT 11 08/26/2020 0805    BILITOT 0.3 08/26/2020 0805    ESTGFRAFRICA 26.4 (A) 08/26/2020 0805    EGFRNONAA 22.8 (A) 08/26/2020 0805          Lab Results    Component Value Date    LDLCALC 155.2 04/29/2020       Lab Results   Component Value Date    MICALBCREAT 519 (H) 10/16/2018               ASSESSMENT and PLAN:    A1C GOAL: 7.5 %     1. Diabetes mellitus type 2, uncontrolled, with complications  Decrease Lantus from 15 to 10 units once daily.   Take Novolog 5 units before most meals but take 7 units if eating very large  Meal.   Once prednisone is weaned off, you most likely can stop the Novolog.   Continue Trulicity.   Test glucose 4x/day. Keep a log.   Return to clinic in 2 months with a1c prior. a1c today.     Hemoglobin A1C    Hemoglobin A1C    insulin aspart U-100 (NOVOLOG FLEXPEN U-100 INSULIN) 100 unit/mL (3 mL) InPn pen    insulin (LANTUS SOLOSTAR U-100 INSULIN) glargine 100 units/mL (3mL) SubQ pen   2. CKD (chronic kidney disease) stage 3, GFR 30-59 ml/min  Improve glycemic control.      3. Hypoglycemia associated with diabetes  Reduce insulin as above.    4.  HTN Has not taken any meds yet today.    5.  Steroid induced hyperglycemia  As above.          Orders Placed This Encounter   Procedures    Hemoglobin A1C     Standing Status:   Future     Standing Expiration Date:   11/1/2021    Hemoglobin A1C     Standing Status:   Future     Standing Expiration Date:   11/1/2021        Follow up in about 3 months (around 12/2/2020).

## 2020-09-02 NOTE — PATIENT INSTRUCTIONS
Decrease Lantus from 15 to 10 units once daily.   Take Novolog 5 units before most meals but take 7 units if eating very large  Meal.   Once prednisone is weaned off, you most likely can stop the Novolog.   Continue Trulicity.   Test glucose 4x/day. Keep a log.   Return to clinic in 2 months with a1c prior. a1c today.

## 2020-09-17 ENCOUNTER — PATIENT MESSAGE (OUTPATIENT)
Dept: RHEUMATOLOGY | Facility: CLINIC | Age: 70
End: 2020-09-17

## 2020-10-01 ENCOUNTER — PATIENT MESSAGE (OUTPATIENT)
Dept: RHEUMATOLOGY | Facility: CLINIC | Age: 70
End: 2020-10-01

## 2020-10-01 DIAGNOSIS — M25.50 POLYARTHRALGIA: Primary | ICD-10-CM

## 2020-10-05 ENCOUNTER — LAB VISIT (OUTPATIENT)
Dept: LAB | Facility: HOSPITAL | Age: 70
End: 2020-10-05
Attending: INTERNAL MEDICINE
Payer: MEDICARE

## 2020-10-05 ENCOUNTER — PATIENT MESSAGE (OUTPATIENT)
Dept: RHEUMATOLOGY | Facility: CLINIC | Age: 70
End: 2020-10-05

## 2020-10-05 DIAGNOSIS — M25.50 POLYARTHRALGIA: ICD-10-CM

## 2020-10-05 PROCEDURE — 86480 TB TEST CELL IMMUN MEASURE: CPT | Mod: HCNC

## 2020-10-06 ENCOUNTER — PATIENT MESSAGE (OUTPATIENT)
Dept: RHEUMATOLOGY | Facility: CLINIC | Age: 70
End: 2020-10-06

## 2020-10-07 ENCOUNTER — PATIENT MESSAGE (OUTPATIENT)
Dept: RHEUMATOLOGY | Facility: CLINIC | Age: 70
End: 2020-10-07

## 2020-10-07 LAB
GAMMA INTERFERON BACKGROUND BLD IA-ACNC: 0.05 IU/ML
M TB IFN-G CD4+ BCKGRND COR BLD-ACNC: 0 IU/ML
MITOGEN IGNF BCKGRD COR BLD-ACNC: 0.7 IU/ML
TB GOLD PLUS: NEGATIVE
TB2 - NIL: 0 IU/ML

## 2020-10-12 ENCOUNTER — PATIENT MESSAGE (OUTPATIENT)
Dept: RHEUMATOLOGY | Facility: CLINIC | Age: 70
End: 2020-10-12

## 2020-10-14 ENCOUNTER — TELEPHONE (OUTPATIENT)
Dept: ENDOCRINOLOGY | Facility: CLINIC | Age: 70
End: 2020-10-14

## 2020-10-19 ENCOUNTER — HOSPITAL ENCOUNTER (OUTPATIENT)
Dept: RADIOLOGY | Facility: HOSPITAL | Age: 70
Discharge: HOME | End: 2020-10-19
Attending: PODIATRIST
Payer: MEDICARE

## 2020-10-19 ENCOUNTER — PATIENT MESSAGE (OUTPATIENT)
Dept: RHEUMATOLOGY | Facility: CLINIC | Age: 70
End: 2020-10-19

## 2020-10-19 ENCOUNTER — OFFICE VISIT (OUTPATIENT)
Dept: PODIATRY | Facility: CLINIC | Age: 70
End: 2020-10-19
Payer: MEDICARE

## 2020-10-19 VITALS
HEIGHT: 66 IN | DIASTOLIC BLOOD PRESSURE: 76 MMHG | WEIGHT: 144.19 LBS | SYSTOLIC BLOOD PRESSURE: 142 MMHG | BODY MASS INDEX: 23.17 KG/M2

## 2020-10-19 DIAGNOSIS — E11.49 TYPE II DIABETES MELLITUS WITH NEUROLOGICAL MANIFESTATIONS: ICD-10-CM

## 2020-10-19 DIAGNOSIS — M77.40 METATARSALGIA, UNSPECIFIED LATERALITY: ICD-10-CM

## 2020-10-19 DIAGNOSIS — M20.40 HAMMER TOE, UNSPECIFIED LATERALITY: ICD-10-CM

## 2020-10-19 DIAGNOSIS — M77.40 METATARSALGIA, UNSPECIFIED LATERALITY: Primary | ICD-10-CM

## 2020-10-19 PROCEDURE — 1125F AMNT PAIN NOTED PAIN PRSNT: CPT | Mod: HCNC,S$GLB,, | Performed by: PODIATRIST

## 2020-10-19 PROCEDURE — 99999 PR PBB SHADOW E&M-EST. PATIENT-LVL V: ICD-10-PCS | Mod: PBBFAC,HCNC,, | Performed by: PODIATRIST

## 2020-10-19 PROCEDURE — 3077F SYST BP >= 140 MM HG: CPT | Mod: HCNC,CPTII,S$GLB, | Performed by: PODIATRIST

## 2020-10-19 PROCEDURE — 99213 OFFICE O/P EST LOW 20 MIN: CPT | Mod: HCNC,S$GLB,, | Performed by: PODIATRIST

## 2020-10-19 PROCEDURE — 99999 PR PBB SHADOW E&M-EST. PATIENT-LVL V: CPT | Mod: PBBFAC,HCNC,, | Performed by: PODIATRIST

## 2020-10-19 PROCEDURE — 1125F PR PAIN SEVERITY QUANTIFIED, PAIN PRESENT: ICD-10-PCS | Mod: HCNC,S$GLB,, | Performed by: PODIATRIST

## 2020-10-19 PROCEDURE — 73630 XR FOOT COMPLETE 3 VIEW BILATERAL: ICD-10-PCS | Mod: 26,50,HCNC, | Performed by: RADIOLOGY

## 2020-10-19 PROCEDURE — 3008F BODY MASS INDEX DOCD: CPT | Mod: HCNC,CPTII,S$GLB, | Performed by: PODIATRIST

## 2020-10-19 PROCEDURE — 1159F MED LIST DOCD IN RCRD: CPT | Mod: HCNC,S$GLB,, | Performed by: PODIATRIST

## 2020-10-19 PROCEDURE — 3078F DIAST BP <80 MM HG: CPT | Mod: HCNC,CPTII,S$GLB, | Performed by: PODIATRIST

## 2020-10-19 PROCEDURE — 3078F PR MOST RECENT DIASTOLIC BLOOD PRESSURE < 80 MM HG: ICD-10-PCS | Mod: HCNC,CPTII,S$GLB, | Performed by: PODIATRIST

## 2020-10-19 PROCEDURE — 3077F PR MOST RECENT SYSTOLIC BLOOD PRESSURE >= 140 MM HG: ICD-10-PCS | Mod: HCNC,CPTII,S$GLB, | Performed by: PODIATRIST

## 2020-10-19 PROCEDURE — 1159F PR MEDICATION LIST DOCUMENTED IN MEDICAL RECORD: ICD-10-PCS | Mod: HCNC,S$GLB,, | Performed by: PODIATRIST

## 2020-10-19 PROCEDURE — 73630 X-RAY EXAM OF FOOT: CPT | Mod: TC,50,HCNC,FY,PO

## 2020-10-19 PROCEDURE — 99213 PR OFFICE/OUTPT VISIT, EST, LEVL III, 20-29 MIN: ICD-10-PCS | Mod: HCNC,S$GLB,, | Performed by: PODIATRIST

## 2020-10-19 PROCEDURE — 3008F PR BODY MASS INDEX (BMI) DOCUMENTED: ICD-10-PCS | Mod: HCNC,CPTII,S$GLB, | Performed by: PODIATRIST

## 2020-10-19 PROCEDURE — 1101F PR PT FALLS ASSESS DOC 0-1 FALLS W/OUT INJ PAST YR: ICD-10-PCS | Mod: HCNC,CPTII,S$GLB, | Performed by: PODIATRIST

## 2020-10-19 PROCEDURE — 1101F PT FALLS ASSESS-DOCD LE1/YR: CPT | Mod: HCNC,CPTII,S$GLB, | Performed by: PODIATRIST

## 2020-10-19 PROCEDURE — 3051F HG A1C>EQUAL 7.0%<8.0%: CPT | Mod: HCNC,CPTII,S$GLB, | Performed by: PODIATRIST

## 2020-10-19 PROCEDURE — 3051F PR MOST RECENT HEMOGLOBIN A1C LEVEL 7.0 - < 8.0%: ICD-10-PCS | Mod: HCNC,CPTII,S$GLB, | Performed by: PODIATRIST

## 2020-10-19 PROCEDURE — 73630 X-RAY EXAM OF FOOT: CPT | Mod: 26,50,HCNC, | Performed by: RADIOLOGY

## 2020-10-20 ENCOUNTER — CLINICAL SUPPORT (OUTPATIENT)
Dept: REHABILITATION | Facility: HOSPITAL | Age: 70
End: 2020-10-20
Attending: PODIATRIST
Payer: MEDICARE

## 2020-10-20 DIAGNOSIS — M20.40 HAMMER TOE, UNSPECIFIED LATERALITY: ICD-10-CM

## 2020-10-20 DIAGNOSIS — M25.50 ARTHRALGIA, UNSPECIFIED JOINT: ICD-10-CM

## 2020-10-20 DIAGNOSIS — M77.40 METATARSALGIA, UNSPECIFIED LATERALITY: ICD-10-CM

## 2020-10-20 DIAGNOSIS — E11.49 TYPE II DIABETES MELLITUS WITH NEUROLOGICAL MANIFESTATIONS: ICD-10-CM

## 2020-10-20 DIAGNOSIS — M25.632 DECREASED RANGE OF MOTION OF BOTH WRISTS: ICD-10-CM

## 2020-10-20 DIAGNOSIS — M25.631 DECREASED RANGE OF MOTION OF BOTH WRISTS: ICD-10-CM

## 2020-10-20 DIAGNOSIS — M25.672 DECREASED RANGE OF MOTION OF BOTH ANKLES: ICD-10-CM

## 2020-10-20 DIAGNOSIS — R29.898 WEAKNESS OF EXTREMITY: ICD-10-CM

## 2020-10-20 DIAGNOSIS — M25.671 DECREASED RANGE OF MOTION OF BOTH ANKLES: ICD-10-CM

## 2020-10-20 PROCEDURE — 97162 PT EVAL MOD COMPLEX 30 MIN: CPT | Mod: HCNC,PN

## 2020-10-20 PROCEDURE — 97110 THERAPEUTIC EXERCISES: CPT | Mod: HCNC,PN

## 2020-10-21 NOTE — PLAN OF CARE
[facility]  Physical Therapy Initial Evaluation    Date: 10/20/2020   Name: Darrion Bennett  Clinic Number: [id number]    Therapy Diagnosis:   Encounter Diagnoses   Name Primary?    Metatarsalgia, unspecified laterality     Hammer toe, unspecified laterality     Type II diabetes mellitus with neurological manifestations     Arthralgia, unspecified joint     Decreased range of motion of both ankles     Decreased range of motion of both wrists     Weakness of extremity      Physician: Mel Keenan DPM    Physician Orders: PT Eval and Treat   Medical Diagnosis from Referral:   M77.40 (ICD-10-CM) - Metatarsalgia, unspecified laterality   M20.40 (ICD-10-CM) - Hammer toe, unspecified laterality   E11.49 (ICD-10-CM) - Type II diabetes mellitus with neurological manifestations   Evaluation Date: 10/20/2020  Authorization Period Expiration: 11/03/2020  Plan of Care Expiration: 1/20/2020  Visit # / Visits authorized: 1/ 1    Time In: 0850  Time Out: 0945  Total Appointment Time (timed & untimed codes): 55 minutes    Precautions: Standard, Diabetes, CHF and stage 4 kidney disease, glaucoma - blurry vision    Subjective   Date of onset: August 2020   History of current condition - Darrion reports: Pain in all joints, most significantly fingers, wrists, shoulders, knees, and feet. He attributes this pain to rheumatoid arthritis. He is taking prednisone prescribed by rheumatoid arthritis. Pain is symmetrical and unaffected by time of day. Pain limits fine motor skills.      Medical History:   Past Medical History:   Diagnosis Date    Anemia     Arthritis     Cataract     Chronic constipation     Diabetes mellitus, type 2     Felon of finger of left hand 5/11/2019    Glaucoma     Hyperlipidemia 5/13/2014    Hypertension     MCTD (mixed connective tissue disease)     Sjogren's disease     Ulcerative colitis     Urolithiasis        Surgical History:   Darrion Bennett  has a past surgical  "history that includes Eye surgery; Colonoscopy (2014); and Cataract extraction (Bilateral, 2005).    Medications:   Darrion has a current medication list which includes the following prescription(s): alcohol swabs, amlodipine, aspirin, blood sugar diagnostic, clonidine 0.1 mg/24 hr td ptwk, colchicine, diclofenac sodium, diphenhydramine, dorzolamide, trulicity, febuxostat, lantus solostar u-100 insulin, insulin aspart u-100, ipratropium, ipratropium, lancets, linzess, multivit with min-folic acid, omeprazole, pen needle, diabetic, prednisone, prednisone, rosuvastatin, torsemide, tramadol, travoprost, and valsartan.    Allergies:   Review of patient's allergies indicates:   Allergen Reactions    Penicillins Nausea And Vomiting    Uloric [febuxostat]      Aches and pain    Allopurinol analogues Rash        Imaging, x-ray B foot (10/19/2020) : No evidence for acute fracture, bone destruction, or dislocation.    Prior Therapy: Yes, PT for debility   Social History:  lives with their spouse  Occupation: not employed   Prior Level of Function: independent with ADLs and household chores   Current Level of Function: Unable to  or open objects, unable to lift or carry larger items, poor endurance with activity     Pain:  Current 8/10, worst 10/10, best 7/10   Location: bilateral ankles, feet , fingers , hands , joint, knee , shoulder , toes and wrists    Description: Aching  Aggravating Factors: "Any activity"   Easing Factors: Stretching     Patients goals: Get hands "back to normal" to resume typical activities    Objective     Observation: Pleasant and cooperative, A/O x4     Posture: FHP, rounded shoulders, hyperkyphosis, decreased lumbar lordosis     Gait: shuffling gait, hip ER, forward trunk lean, antalgic pattern with decreased WBing on R.     Active Range of Motion:  Hip Left Right   Flexion WFL WFL   Abduction WFL WFL   Ext Rotation 14 degrees 21 degrees   Int Rotation 12 degrees 15 degrees     Knee Left " Right   Extension WFL WFL   Flexion WFL WFL       Ankle Left Right   Dorsiflexion Lacks 10 degrees Lacks 3 degrees   Plantarflexion 35 degrees 38 degrees   Inversion 19 degrees 21 degrees   Eversion 12 degrees 10 degrees       Strength:  Hip Left Right   Flexion 3-/5 4-/5   Abduction 4/5 4/5   Adduction 3+/5 3+/5   Extension 3+/5 3/5   External Rot 3/5 3+/5   Internal Rot 4-/5 4-/5     Knee Left Right   Extension 4/5 4/5   Flexion 4/5 4/5     Ankle Left Right   Dorsiflexion 4+/5 4+/5   Plantarflexion 4/5 4/5       Flexibility: Moderate limitation in flexibility of B hamstrings, gastroc/soleus, hip flexor, and piriformis     Active Range of Motion: Measured in degrees    Shoulder Left Right   Flexion WFL WFL   Abduction WFL WFL   ER at 0 WFL WFL   ER at 90 WFL WFL   IR WFL WFL       Elbow Left Right   Flexion WFL WFL   Extension WFL WFL   Pronation 41 37   Supination 66 24       Wrist Left Right   Flexion 28 27   Extension 32 30   Radial Deviation 16 18   Ulnar Deviation 11 8         Upper Extremity Strength  Elbow Left Right   Biceps  4-/5 4-/5   Triceps  3+/5 3+/5   Forearm Pronation 3+/5 3+/5   Forearm Supination 4-/5 4-/5     Wrist flexion 3/5 extension 3+/5    Shoulder Left Right   Shoulder flexion: 4/5 4-/5   Shoulder Abduction: 4/5 4/5     Shoulder Extension: 4/5 4/5   Shoulder ER 3/5 3/5   Shoulder IR 4-/5 4-/5        Strength: (SHIRLEY Dynamometer in lbs.) Average 3 trials:     10/20/2020 10/20/2020    Left Right   Elbow Flexed 14# 10#   Elbow Extended 15# 12#       Limitation/Restriction for FOTO Foot Survey    Therapist reviewed FOTO scores for Darrion Bennett on 10/20/2020.   FOTO documents entered into [employer] - see Media section.    Limitation Score: 74%         TREATMENT   Treatment Time In: 0932  Treatment Time Out: 0945  Total Treatment time (time-based codes) separate from Evaluation: 13 minutes    Darrion received therapeutic exercises to develop strength, endurance, ROM, flexibility and posture for 13  "minutes including:    Standing HS stretch 2x20" ea   Standing G/S stretch 2x20" ea   Supine piriformis stretch 2x20" ea   Wrist rolls x30"   Yellow Foam squeeze 5-way x5 ea    Home Exercises and Patient Education Provided    Education provided:   - role of PT, plan of care, involved anatomy and pathology,  goals, rational for treatment and exercise, scheduling limitations  - HEP, intended use, proper form    Written Home Exercises Provided: yes.  Exercises were reviewed and Darrion was able to demonstrate them prior to the end of the session.  Darrion demonstrated fair  understanding of the education provided.     See EMR under Patient Instructions for exercises provided 10/20/2020.    Assessment   Darrion is a 70 y.o. male referred to outpatient Physical Therapy with a medical diagnosis of metatarsalgia, hammer toe, and diabetes-related neuropathy. Patient presents with widespread joint pain secondary to rheumatoid arthritis. Pain is most notable in hands, feet, knees, and shoulders. Pain is accompanied by limited ROM, especially at wrists, ankles, hands, and feet, as well as widespread weakness. Symptoms have resulted in difficulty performing ADL's and household chores, as well as walking community distances. LE flexibility limitations noted at hamstring, hip flexor, piriformis, gastrocnemius, and soleus bilaterally. Together, these limitations have contributed to significant postural and gait abnormalities. Darrion would benefit from strengthening and stretching to improve muscular balance, as well as postural re-education and endurance training.     Patient prognosis is Good.   Patientt will benefit from skilled outpatient Physical Therapy to address the deficits stated above and in the chart below, provide patient /family education, and to maximize patientt's level of independence.     Plan of care discussed with patient: Yes  Patient's spiritual, cultural and educational needs considered and patient is agreeable to " the plan of care and goals as stated below:     Anticipated Barriers for therapy: Co-morbidities     Medical Necessity is demonstrated by the following  History  Co-morbidities and personal factors that may impact the plan of care Co-morbidities:   CHF, CKD stage 4, diabetes and transportation assistance required    Personal Factors:   lifestyle     high   Examination  Body Structures and Functions, activity limitations and participation restrictions that may impact the plan of care Body Regions:   neck  back  lower extremities  upper extremities  trunk    Body Systems:    gross symmetry  ROM  strength  gross coordinated movement  balance  gait  transfers  transitions  motor control  motor learning    Participation Restrictions:   ADLs, IADLs, household chores, walking in community     Activity limitations:   Learning and applying knowledge  no deficits    General Tasks and Commands  no deficits    Communication  no deficits    Mobility  lifting and carrying objects  fine hand use (grasping/picking up)  walking    Self care  caring for body parts (brushing teeth, shaving, grooming)  dressing  eating  drinking  no deficits    Domestic Life  shopping  cooking  doing house work (cleaning house, washing dishes, laundry)  assisting others    Interactions/Relationships  no deficits    Life Areas  no deficits    Community and Social Life  community life  recreation and leisure         high   Clinical Presentation evolving clinical presentation with changing clinical characteristics moderate   Decision Making/ Complexity Score: moderate     Goals:    Short Term Goals (6 Weeks):     1. Pt to increase strength of LE muscles to 4-/5 to allow for improvement in transitional movements.  2. Pt to increase strength of UE muscles to 4-/5 to allow for improvement in reaching, holding, and lifting.  2. Pt to improve wrist and ankle range of motion by >3 degrees to allow for improved balance and fine motor skills.   3. Pt to  demonstrate understanding of pathology and use of HEP for improved self-management of symptoms.   4. Pt to report decreased pain to 8/10 at worst for increased participation in social/community activities.   5. Pt to increased FOTO performance to 60% limitation for improved participation.   7. Pt to increase  strength to 17 pounds for improved grasping abilities.     Long Term Goals (12 Weeks):     1. Pt to increase strength of LE muscles to 4/5 to allow for improvement in transitional movements.  2. Pt to increase strength of UE muscles to 4/5 to allow for improvement in reaching, holding, and lifting.   2. Pt to improve wrist and ankle range of motion by >5 degrees to allow for improved balance and fine motor skills.   3. Pt to demonstrate proficiency with HEP for improved independence with self-management of condition/symptoms and prevention of re-injury.   4. Pt to report decreased pain to 6/10 at worst for increased participation in social/community activities.   5. Pt to increased FOTO performance to 47% limitation for improved participation.   7. Pt to increase  strength to 20 pounds for improved grasping abilities.       Plan   Plan of care Certification: 10/20/2020 to 1/20/2020.    Outpatient Physical Therapy 2 times weekly for 12 weeks to include the following interventions: Gait Training, Manual Therapy, Moist Heat/ Ice, Neuromuscular Re-ed, Orthotic Management and Training, Patient Education, Self Care, Therapeutic Activites and Therapeutic Exercise.     ZANDRA VENCES, PT  10/20/2020    I CERTIFY THE NEED FOR THESE SERVICES FURNISHED UNDER THIS PLAN OF TREATMENT AND WHILE UNDER MY CARE    Physician's comments:        Physician's Signature: ___________________________________________________

## 2020-10-27 ENCOUNTER — CLINICAL SUPPORT (OUTPATIENT)
Dept: REHABILITATION | Facility: HOSPITAL | Age: 70
End: 2020-10-27
Attending: PODIATRIST
Payer: MEDICARE

## 2020-10-27 DIAGNOSIS — M25.50 ARTHRALGIA, UNSPECIFIED JOINT: ICD-10-CM

## 2020-10-27 DIAGNOSIS — M25.632 DECREASED RANGE OF MOTION OF BOTH WRISTS: ICD-10-CM

## 2020-10-27 DIAGNOSIS — M25.671 DECREASED RANGE OF MOTION OF BOTH ANKLES: ICD-10-CM

## 2020-10-27 DIAGNOSIS — M25.631 DECREASED RANGE OF MOTION OF BOTH WRISTS: ICD-10-CM

## 2020-10-27 DIAGNOSIS — M25.672 DECREASED RANGE OF MOTION OF BOTH ANKLES: ICD-10-CM

## 2020-10-27 DIAGNOSIS — R29.898 WEAKNESS OF EXTREMITY: ICD-10-CM

## 2020-10-27 PROCEDURE — 97110 THERAPEUTIC EXERCISES: CPT | Mod: HCNC,PN

## 2020-10-27 NOTE — PROGRESS NOTES
"  Physical Therapy Treatment Note     Name: Drarion Bennett  Clinic Number: [id number]    Therapy Diagnosis: No diagnosis found.  Physician: Mel Keenan DPM    Visit Date: 10/27/2020    Physician Orders: PT Eval and Treat   Medical Diagnosis from Referral:   M77.40 (ICD-10-CM) - Metatarsalgia, unspecified laterality   M20.40 (ICD-10-CM) - Hammer toe, unspecified laterality   E11.49 (ICD-10-CM) - Type II diabetes mellitus with neurological manifestations   Evaluation Date: 10/20/2020  Authorization Period Expiration: 11/03/2020  Plan of Care Expiration: 1/20/2020  Visit # / Visits authorized: 1/ 5      Time In: 0731  Time Out: 0816  Total Billable Time: 45 minutes    Precautions: Standard, Diabetes, CHF and stage 4 kidney disease, glaucoma - blurry vision    Subjective     Pt reports: no significant changes in symptoms since evaluation. Has used HEP with good results.  He was compliant with home exercise program.  Response to previous treatment: No adverse reactions noted  Functional change: Ongoing     Pain: 8/10  Location: bilateral ankles, feet , fingers , hands , joint, knee , shoulder , toes and wrists     Objective     Darrion received therapeutic exercises to develop strength, endurance, ROM, flexibility, posture and core stabilization for 45 minutes including:     Nu-step x5'    Standing HS stretch 2x20" ea   Standing G/S stretch 2x20" ea   + Mini squats 2x10 in // bars   + Toe taps on 4" step 3x30" ea     Supine piriformis stretch 2x20" ea   + Ball squeezes 20x5" hold   + Bridges 2x8   + SL Clamshells x10 ea     + Matrix row 10# 2x10   + Seated pull down RTB 2x10   Wrist rolls x30"   Yellow Foam squeeze 5-way x5 ea    NV: ER, Supine hip flexor stretch 2x20" ea, Corner Stretch 3x20"     Darrion received the following manual therapy techniques: Joint mobilizations were applied to the:  for 00 minutes, including:    Darrion participated in neuromuscular re-education activities to improve: Balance and Coordination " for 00 minutes. The following activities were included:    Darrion participated in dynamic functional therapeutic activities to improve functional performance for 00  minutes, including:    Darrion participated in gait training to improve functional mobility and safety for 00  minutes, including:    Darrion received hot pack for 00 minutes to .    Darrion received cold pack for 00 minutes to .      Home Exercises Provided and Patient Education Provided     Education provided:   - Continued use of HEP   - Rationale for POC and progression     Written Home Exercises Provided: Patient instructed to cont prior HEP.  Exercises were reviewed and Darrion was able to demonstrate them prior to the end of the session.  Darrion demonstrated good  understanding of the education provided.     See EMR under Patient Instructions for exercises provided prior visit.    Assessment     Darrion was seen for first follow up since evaluation. He performed well, tolerating addition of new exercises without exacerbation of pain or significant difficulty. He did experience slight difficulty with seated pull downs due to decreased  strength; improvement noted with use of band handles. PT to continue to use thicker handles and decreasing size as  strength and tolerance improve. He continues to demonstrate slightly stooped posture and antalgic gait pattern with decreased WB on R. PT to continue with strengthening, endurance, and postural re-education.     Darrion is progressing well towards his goals.   Pt prognosis is Good.     Pt will continue to benefit from skilled outpatient physical therapy to address the deficits listed in the problem list box on initial evaluation, provide pt/family education and to maximize pt's level of independence in the home and community environment.     Pt's spiritual, cultural and educational needs considered and pt agreeable to plan of care and goals.     Anticipated barriers to physical therapy: Co-morbidities      Goals:   1. Pt to increase strength of LE muscles to 4-/5 to allow for improvement in transitional movements. -progressing  2. Pt to increase strength of UE muscles to 4-/5 to allow for improvement in reaching, holding, and lifting. -progressing  3. Pt to improve wrist and ankle range of motion by >3 degrees to allow for improved balance and fine motor skills. -progressing  4. Pt to demonstrate understanding of pathology and use of HEP for improved self-management of symptoms. -progressing  5. Pt to report decreased pain to 8/10 at worst for increased participation in social/community activities. -progressing  6. Pt to increased FOTO performance to 60% limitation for improved participation. -progressing  7. Pt to increase  strength to 17 pounds for improved grasping abilities. -progressing     Long Term Goals (12 Weeks):      1. Pt to increase strength of LE muscles to 4/5 to allow for improvement in transitional movements. -progressing  2. Pt to increase strength of UE muscles to 4/5 to allow for improvement in reaching, holding, and lifting. -progressing  3. Pt to improve wrist and ankle range of motion by >5 degrees to allow for improved balance and fine motor skills. -progressing  4. Pt to demonstrate proficiency with HEP for improved independence with self-management of condition/symptoms and prevention of re-injury. -progressing  5. Pt to report decreased pain to 6/10 at worst for increased participation in social/community activities. -progressing  6. Pt to increased FOTO performance to 47% limitation for improved participation. -progressing  7. Pt to increase  strength to 20 pounds for improved grasping abilities. -progressing    Plan     Cont with POC, progressing with strengthening, functional endurance, and postural re-education.    ZANDRA VENCES, PT, DPT  10/27/2020

## 2020-10-28 ENCOUNTER — LAB VISIT (OUTPATIENT)
Dept: LAB | Facility: HOSPITAL | Age: 70
End: 2020-10-28
Attending: NURSE PRACTITIONER
Payer: MEDICARE

## 2020-10-28 LAB
ESTIMATED AVG GLUCOSE: 134 MG/DL (ref 68–131)
HBA1C MFR BLD HPLC: 6.3 % (ref 4–5.6)

## 2020-10-28 PROCEDURE — 83036 HEMOGLOBIN GLYCOSYLATED A1C: CPT | Mod: HCNC

## 2020-10-28 PROCEDURE — 36415 COLL VENOUS BLD VENIPUNCTURE: CPT | Mod: HCNC,PO

## 2020-11-02 ENCOUNTER — PATIENT MESSAGE (OUTPATIENT)
Dept: RHEUMATOLOGY | Facility: CLINIC | Age: 70
End: 2020-11-02

## 2020-11-03 ENCOUNTER — PATIENT MESSAGE (OUTPATIENT)
Dept: OPTOMETRY | Facility: CLINIC | Age: 70
End: 2020-11-03

## 2020-11-03 NOTE — PROGRESS NOTES
CC: This 70 y.o.  male  is here for evaluation of  T2DM along with comorbidities indicated in the Visit Diagnosis section of this encounter.    HPI: Darrion Bennett was diagnosed with T2DM at age 35. Oral medications started years later.    Previously seen by Dr. Marrufo's office, Endocrinology at [place]. He is transferred all his care to [facility].         Prior visit 9/2/20  He was advised shortly after lov to increase Lantus to 15 units and Novolog to 12 units before breakfast. Reports BGs were high last month but his prednisone was just reduced to 2.5 mg once daily by his rheumatologist last week.   Kidney function has worsened and eGFR is up from mid 40s to now 26.   Plan Decrease Lantus from 15 to 10 units once daily.   Take Novolog 5 units before most meals but take 7 units if eating very large  Meal.   Once prednisone is weaned off, you most likely can stop the Novolog.   Continue Trulicity.   Test glucose 4x/day. Keep a log.   Return to clinic in 2 months with a1c prior. a1c today.       Interval history  a1c is down from 7.1 to 6.3%.   Patient continues to take prednisone, now at 5 mg once daily in AM for arthritis. C/o myalgias - wonders if it's r/t rosuvastatin.    He finds his BGs have been good. He inquires if there's anything else to take instead of multiple daily injections. Has been taking Lantus 7 units hs and Novolog 4-6 units ac. See log below.     States home BPs at home have been high with SBP > 160.     PMHX: Sjogren's syndrome, gouty arthritis - sees Rheumatology     LAST DIABETES EDUCATION: multiple times, years ago     HOSPITALIZED FOR DIABETES  -  No     PRESCRIBED DIABETES MEDICATIONS: Trulicity 1.5 mg once daily, Lantus 7 units hs, Novolog 4-6 units ac      Misses medication doses - No    DM COMPLICATIONS: nephropathy    SIGNIFICANT DIABETES MED HISTORY: has been told that metformin is bad for his kidneys and that's why he stopped it   - Lantus, Humalog, and Tradjenta stopped at  initial visit 7/2019 and he was switched to Xultophy.   - Xultophy stopped and switched to Trulicity and Lantus 2/2020    SELF MONITORING BLOOD GLUCOSE: Checks blood glucose at home 3x/day               HYPOGLYCEMIC EPISODES:none      CURRENT DIET: drinks water, diet soda; has some orange juice to take with his meds. Eats 3 meals/day. Lunch is the biggest meal.  Eats home cooked foods, no processed foods.     CURRENT EXERCISE: none; previously exercised in gym.     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 No acute distress.   PSYCH: AAOx3, appropriate mood and affect, conversant, well-groomed. Judgement and insight good.   NEURO: Cranial nerves grossly intact. Speech clear, no tremor.   CHEST: Respirations even and unlabored.         [facility] - 4/23/19  Total chol 188 - HDL 44 - trig 98 -   Creatinine 1.37   eGFR non AA - 52   A1c 5.9%   Hemoglobin 10/hematocrit 31  Vitamin D 42      Hemoglobin A1C   Date Value Ref Range Status   10/28/2020 6.3 (H) 4.0 - 5.6 % Final     Comment:     ADA Screening Guidelines:  5.7-6.4%  Consistent with prediabetes  >or=6.5%  Consistent with diabetes  High levels of fetal hemoglobin interfere with the HbA1C  assay. Heterozygous hemoglobin variants (HbS, HgC, etc)do  not significantly interfere with this assay.   However, presence of multiple variants may affect accuracy.     09/02/2020 7.1 (H) 4.0 - 5.6 % Final     Comment:     ADA Screening Guidelines:  5.7-6.4%  Consistent with prediabetes  >or=6.5%  Consistent with diabetes  High levels of fetal hemoglobin interfere with the  HbA1C  assay. Heterozygous hemoglobin variants (HbS, HgC, etc)do  not significantly interfere with this assay.   However, presence of multiple variants may affect accuracy.     04/29/2020 7.2 (H) 4.0 - 5.6 % Final     Comment:     ADA Screening Guidelines:  5.7-6.4%  Consistent with prediabetes  >or=6.5%  Consistent with diabetes  High levels of fetal hemoglobin interfere with the HbA1C  assay. Heterozygous hemoglobin variants (HbS, HgC, etc)do  not significantly interfere with this assay.   However, presence of multiple variants may affect accuracy.     10/16/2018 6.2 (H) 4.0 - 5.7 % Final     Comment:     Dr. Jurgen Ward ( Endocrinology )        Ref. Range 9/19/2019 08:13   Glucose Latest Ref Range: 70 - 110 mg/dL 170 (H)   C-Peptide Latest Ref Range: 0.78 - 5.19 ng/mL 1.33         Chemistry        Component Value Date/Time     10/05/2020 1025    K 4.1 10/05/2020 1025     10/05/2020 1025    CO2 20 (L) 10/05/2020 1025    BUN 30 (H) 10/05/2020 1025    CREATININE 1.4 10/05/2020 1025     (H) 10/05/2020 1025        Component Value Date/Time    CALCIUM 9.4 10/05/2020 1025    ALKPHOS 51 (L) 10/05/2020 1025    AST 13 10/05/2020 1025    ALT 11 10/05/2020 1025    BILITOT 0.4 10/05/2020 1025    ESTGFRAFRICA 58.4 (A) 10/05/2020 1025    EGFRNONAA 50.5 (A) 10/05/2020 1025          Lab Results   Component Value Date    LDLCALC 155.2 04/29/2020       Lab Results   Component Value Date    MICALBCREAT 519 (H) 10/16/2018               ASSESSMENT and PLAN:    A1C GOAL: 7.5 %      1. Diabetes mellitus type 2, controlled, with complications  Discussed switching from Novolog to glimepiride but he prefers  Injections over orals.   Continue current therapy.   Test glucose 4x/day.   Follow up in 3 months with labs prior.       blood sugar diagnostic Strp    Hemoglobin A1C   2. Essential hypertension  F/u with PCP.    3. Hyperlipidemia, unspecified hyperlipidemia type  F/u with PCP.    4. Steroid-induced hyperglycemia   Increases insulin resistance.   Prandial insulin >> basal    5. Stage 3a chronic kidney disease  Followed by Dr. Vizcaino.   Maintain diabetes control.            Orders Placed This Encounter   Procedures    Hemoglobin A1C     Standing Status:   Future     Standing Expiration Date:   1/3/2022        Follow up in about 3 months (around 2/4/2021).

## 2020-11-04 ENCOUNTER — OFFICE VISIT (OUTPATIENT)
Dept: ENDOCRINOLOGY | Facility: CLINIC | Age: 70
End: 2020-11-04
Payer: MEDICARE

## 2020-11-04 VITALS
BODY MASS INDEX: 22.6 KG/M2 | DIASTOLIC BLOOD PRESSURE: 75 MMHG | TEMPERATURE: 97 F | SYSTOLIC BLOOD PRESSURE: 168 MMHG | WEIGHT: 140 LBS | HEART RATE: 94 BPM

## 2020-11-04 DIAGNOSIS — E78.5 HYPERLIPIDEMIA, UNSPECIFIED HYPERLIPIDEMIA TYPE: ICD-10-CM

## 2020-11-04 DIAGNOSIS — R73.9 STEROID-INDUCED HYPERGLYCEMIA: ICD-10-CM

## 2020-11-04 DIAGNOSIS — N18.31 STAGE 3A CHRONIC KIDNEY DISEASE: ICD-10-CM

## 2020-11-04 DIAGNOSIS — E11.8 CONTROLLED TYPE 2 DIABETES MELLITUS WITH COMPLICATION, WITH LONG-TERM CURRENT USE OF INSULIN: Primary | ICD-10-CM

## 2020-11-04 DIAGNOSIS — I10 ESSENTIAL HYPERTENSION: ICD-10-CM

## 2020-11-04 DIAGNOSIS — Z79.4 CONTROLLED TYPE 2 DIABETES MELLITUS WITH COMPLICATION, WITH LONG-TERM CURRENT USE OF INSULIN: Primary | ICD-10-CM

## 2020-11-04 DIAGNOSIS — T38.0X5A STEROID-INDUCED HYPERGLYCEMIA: ICD-10-CM

## 2020-11-04 PROCEDURE — 1159F PR MEDICATION LIST DOCUMENTED IN MEDICAL RECORD: ICD-10-PCS | Mod: S$GLB,,, | Performed by: NURSE PRACTITIONER

## 2020-11-04 PROCEDURE — 3044F PR MOST RECENT HEMOGLOBIN A1C LEVEL <7.0%: ICD-10-PCS | Mod: CPTII,S$GLB,, | Performed by: NURSE PRACTITIONER

## 2020-11-04 PROCEDURE — 99214 OFFICE O/P EST MOD 30 MIN: CPT | Mod: S$GLB,,, | Performed by: NURSE PRACTITIONER

## 2020-11-04 PROCEDURE — 3008F PR BODY MASS INDEX (BMI) DOCUMENTED: ICD-10-PCS | Mod: CPTII,S$GLB,, | Performed by: NURSE PRACTITIONER

## 2020-11-04 PROCEDURE — 1125F AMNT PAIN NOTED PAIN PRSNT: CPT | Mod: S$GLB,,, | Performed by: NURSE PRACTITIONER

## 2020-11-04 PROCEDURE — 99214 PR OFFICE/OUTPT VISIT, EST, LEVL IV, 30-39 MIN: ICD-10-PCS | Mod: S$GLB,,, | Performed by: NURSE PRACTITIONER

## 2020-11-04 PROCEDURE — 3078F DIAST BP <80 MM HG: CPT | Mod: CPTII,S$GLB,, | Performed by: NURSE PRACTITIONER

## 2020-11-04 PROCEDURE — 1101F PR PT FALLS ASSESS DOC 0-1 FALLS W/OUT INJ PAST YR: ICD-10-PCS | Mod: CPTII,S$GLB,, | Performed by: NURSE PRACTITIONER

## 2020-11-04 PROCEDURE — 99499 RISK ADDL DX/OHS AUDIT: ICD-10-PCS | Mod: S$GLB,,, | Performed by: NURSE PRACTITIONER

## 2020-11-04 PROCEDURE — 1101F PT FALLS ASSESS-DOCD LE1/YR: CPT | Mod: CPTII,S$GLB,, | Performed by: NURSE PRACTITIONER

## 2020-11-04 PROCEDURE — 99999 PR PBB SHADOW E&M-EST. PATIENT-LVL V: ICD-10-PCS | Mod: PBBFAC,,, | Performed by: NURSE PRACTITIONER

## 2020-11-04 PROCEDURE — 1125F PR PAIN SEVERITY QUANTIFIED, PAIN PRESENT: ICD-10-PCS | Mod: S$GLB,,, | Performed by: NURSE PRACTITIONER

## 2020-11-04 PROCEDURE — 3044F HG A1C LEVEL LT 7.0%: CPT | Mod: CPTII,S$GLB,, | Performed by: NURSE PRACTITIONER

## 2020-11-04 PROCEDURE — 99999 PR PBB SHADOW E&M-EST. PATIENT-LVL V: CPT | Mod: PBBFAC,,, | Performed by: NURSE PRACTITIONER

## 2020-11-04 PROCEDURE — 3077F PR MOST RECENT SYSTOLIC BLOOD PRESSURE >= 140 MM HG: ICD-10-PCS | Mod: CPTII,S$GLB,, | Performed by: NURSE PRACTITIONER

## 2020-11-04 PROCEDURE — 3078F PR MOST RECENT DIASTOLIC BLOOD PRESSURE < 80 MM HG: ICD-10-PCS | Mod: CPTII,S$GLB,, | Performed by: NURSE PRACTITIONER

## 2020-11-04 PROCEDURE — 99499 UNLISTED E&M SERVICE: CPT | Mod: S$GLB,,, | Performed by: NURSE PRACTITIONER

## 2020-11-04 PROCEDURE — 3077F SYST BP >= 140 MM HG: CPT | Mod: CPTII,S$GLB,, | Performed by: NURSE PRACTITIONER

## 2020-11-04 PROCEDURE — 3008F BODY MASS INDEX DOCD: CPT | Mod: CPTII,S$GLB,, | Performed by: NURSE PRACTITIONER

## 2020-11-04 PROCEDURE — 1159F MED LIST DOCD IN RCRD: CPT | Mod: S$GLB,,, | Performed by: NURSE PRACTITIONER

## 2020-11-04 RX ORDER — INSULIN PUMP SYRINGE, 3 ML
EACH MISCELLANEOUS
Qty: 1 EACH | Refills: 0 | Status: SHIPPED | OUTPATIENT
Start: 2020-11-04 | End: 2021-07-01 | Stop reason: SDUPTHER

## 2020-11-04 RX ORDER — INSULIN GLARGINE 100 [IU]/ML
INJECTION, SOLUTION SUBCUTANEOUS
Qty: 1 BOX | Refills: 2
Start: 2020-11-04 | End: 2021-05-05

## 2020-11-04 NOTE — PATIENT INSTRUCTIONS
Discussed switching from Novolog to glimepiride but he prefers  Injections over orals.   Continue current therapy.   Test glucose 4x/day.   Follow up in 3 months with labs prior.

## 2020-11-06 ENCOUNTER — PATIENT MESSAGE (OUTPATIENT)
Dept: RHEUMATOLOGY | Facility: CLINIC | Age: 70
End: 2020-11-06

## 2020-11-06 ENCOUNTER — CLINICAL SUPPORT (OUTPATIENT)
Dept: REHABILITATION | Facility: HOSPITAL | Age: 70
End: 2020-11-06
Attending: PODIATRIST
Payer: MEDICARE

## 2020-11-06 DIAGNOSIS — R29.898 WEAKNESS OF EXTREMITY: ICD-10-CM

## 2020-11-06 DIAGNOSIS — M25.672 DECREASED RANGE OF MOTION OF BOTH ANKLES: ICD-10-CM

## 2020-11-06 DIAGNOSIS — M25.631 DECREASED RANGE OF MOTION OF BOTH WRISTS: ICD-10-CM

## 2020-11-06 DIAGNOSIS — M25.50 ARTHRALGIA, UNSPECIFIED JOINT: ICD-10-CM

## 2020-11-06 DIAGNOSIS — M25.632 DECREASED RANGE OF MOTION OF BOTH WRISTS: ICD-10-CM

## 2020-11-06 DIAGNOSIS — M25.671 DECREASED RANGE OF MOTION OF BOTH ANKLES: ICD-10-CM

## 2020-11-06 PROCEDURE — 97110 THERAPEUTIC EXERCISES: CPT | Mod: HCNC,PN

## 2020-11-06 NOTE — PROGRESS NOTES
"  Physical Therapy Treatment Note     Name: Darrion Bennett  [facility] Number: [id number]    Therapy Diagnosis:   Encounter Diagnoses   Name Primary?   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 Most frequent achy sensation in leg. Exercise helps with pain.     He was compliant with home exercise program.  Response to previous treatment: No adverse reactions noted  Functional change: Ongoing     Pain: 8/10  Location: bilateral ankles, feet , fingers , hands , joint, knee , shoulder , toes and wrists     Objective     Darrion received therapeutic exercises to develop strength, endurance, ROM, flexibility, posture and core stabilization for 45 minutes including:     Nu-step x5'    Standing HS stretch 2x20" ea   Standing G/S stretch 2x20" ea   Mini squats 2x10 in // bars   Toe taps on 4" step 3x30" ea   + Alt Step ups to 4" 2x8 ea   + Sit to stands from 18 " plyo + foam 2x10    Supine piriformis stretch 2x20" ea   Ball squeezes 20x5" hold   Bridges 2x8   + Supine hip flexor stretch 2x20" ea  SL Clamshells 2x10 ea     Matrix row 10# 2x10   Seated pull down 5# 2x10   Wrist rolls x30"   Yellow Foam squeeze 5-way x5 ea    NV: " "ER, Supine hip flexor stretch 2x20" ea, Corner Stretch 3x20"     Darrion received the following manual therapy techniques: Joint mobilizations were applied to the:  for 00 minutes, including:    Darrion participated in neuromuscular re-education activities to improve: Balance and Coordination for 00 minutes. The following activities were included:    Darrion participated in dynamic functional therapeutic activities to improve functional performance for 00  minutes, including:    Darrion participated in gait training to improve functional mobility and safety for 00  minutes, including:    Darrion received hot pack for 00 minutes to .    Darrion received cold pack for 00 minutes to .      Home Exercises Provided and Patient Education Provided     Education provided:   - Continued use of HEP   - Rationale for POC and progression     Written Home Exercises Provided: Patient instructed to cont prior HEP.  Exercises were reviewed and Darrion was able to demonstrate them prior to the end of the session.  Darrion demonstrated good  understanding of the education provided.     See EMR under Patient Instructions for exercises provided prior visit.    Assessment     Darrion performed well in today's session. Due to increased leg pain on arrival, increased emphasis on LE exercises this session. Most difficulty noted with supine hip flexor stretching; he reported pain with controlled lowering of leg from edge of mat, but felt a comfortable stretch once relaxed. Seated pull downs resumed this session at power rack, wide  bar utilized for improved  with good results. Ease noted with 10# of resistance on seated row machine; PT to progress to 15# NV.      Darrion is progressing well towards his goals.   Pt prognosis is Good.     Pt will continue to benefit from skilled outpatient physical therapy to address the deficits listed in the problem list box on initial evaluation, provide pt/family education and to maximize pt's level of independence " in the home and community environment.     Pt's spiritual, cultural and educational needs considered and pt agreeable to plan of care and goals.     Anticipated barriers to physical therapy: Co-morbidities     Goals:   1. Pt to increase strength of LE muscles to 4-/5 to allow for improvement in transitional movements. -progressing  2. Pt to increase strength of UE muscles to 4-/5 to allow for improvement in reaching, holding, and lifting. -progressing  3. Pt to improve wrist and ankle range of motion by >3 degrees to allow for improved balance and fine motor skills. -progressing  4. Pt to demonstrate understanding of pathology and use of HEP for improved self-management of symptoms. -progressing  5. Pt to report decreased pain to 8/10 at worst for increased participation in social/community activities. -progressing  6. Pt to increased FOTO performance to 60% limitation for improved participation. -progressing  7. Pt to increase  strength to 17 pounds for improved grasping abilities. -progressing     Long Term Goals (12 Weeks):      1. Pt to increase strength of LE muscles to 4/5 to allow for improvement in transitional movements. -progressing  2. Pt to increase strength of UE muscles to 4/5 to allow for improvement in reaching, holding, and lifting. -progressing  3. Pt to improve wrist and ankle range of motion by >5 degrees to allow for improved balance and fine motor skills. -progressing  4. Pt to demonstrate proficiency with HEP for improved independence with self-management of condition/symptoms and prevention of re-injury. -progressing  5. Pt to report decreased pain to 6/10 at worst for increased participation in social/community activities. -progressing  6. Pt to increased FOTO performance to 47% limitation for improved participation. -progressing  7. Pt to increase  strength to 20 pounds for improved grasping abilities. -progressing    Plan     Cont with POC, progressing with strengthening,  functional endurance, and postural re-education.    ZANDRA VENCES, PT, DPT  11/6/2020

## 2020-11-07 NOTE — PROGRESS NOTES
"  Physical Therapy Treatment Note     Name: Darrion Bennett  Clinic Number: [id number]    Therapy Diagnosis:  Encounter Diagnoses   Name Primary?   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 The pain is described as sharp pain.     He was compliant with home exercise program.  Response to previous treatment: sore  Functional change: Ongoing     Pain: 8/10  Location: bilateral ankles, feet , fingers , hands , joint, knee , shoulder , toes and wrists     Objective     Darrion received therapeutic exercises to develop strength, endurance, ROM, flexibility, posture and core stabilization for 45 minutes including:     Nu-step     5 min    Standing HS stretch   2x20" ea   Standing G/S stretch   2x20" ea   Mini squats in // bars   2x10   Toe taps on 4" step    3x30" ea   Sit<>stand from chair w/ foam  10x  Sit<> stand from chair w/o foam  10x  Doorway (Corner) Stretch   3x20"   Matrix row 10#    2x10 (15# NV)  Finger Flexion (making a fist)  20x    NV: ER, Supine hip flexor stretch 2x20" ea,     NP: Supine piriformis stretch 2x20" ea   Ball squeezes 20x5" hold   Bridges 2x8   + Supine hip " "flexor stretch 2x20" ea  SL Clamshells 2x10 ea   Seated pull down 5#   2x10  + Alt Step ups to 4" 2x8 ea   + Sit to stands from 18 " plyo + foam 2x10  Wrist rolls x30"   Yellow Foam squeeze 5-way x5 ea    Darrion received the following manual therapy techniques: Joint mobilizations were applied to the:  for 00 minutes, including:    Darrion participated in gait training to improve functional mobility and safety for 00  minutes, including:    Darrion received hot pack for 10 minutes to L knee & quad.    Darrion received cold pack for 00 minutes to .      Home Exercises Provided and Patient Education Provided     Education provided:   - Continued use of HEP   - Rationale for POC and progression     Written Home Exercises Provided: Patient instructed to cont prior HEP.  Exercises were reviewed and Darrion was able to demonstrate them prior to the end of the session.  Darrion demonstrated good  understanding of the education provided.     See EMR under Patient Instructions for exercises provided prior visit.    Assessment   Pt tolerated the above therex well today with minimal complaints of pain. Mr. Bennett presents to therapy amb w/ antalgic gait pattern with decreased step length on right side due to pain in L knee and decreased rc. He came in stating he had pain all over, and that he was having trouble opening bottles and holding onto his steering wheel. Mr. Bennett's hands were swollen, and making a fist was tough but opening and closing his hands did provide some relief in regards to pain. Overall, Mr. Bennett is doing well in spite of his ailments, and will continue to benefit from PT.   Darrion is progressing well towards his goals.   Pt prognosis is Good.     Pt will continue to benefit from skilled outpatient physical therapy to address the deficits listed in the problem list box on initial evaluation, provide pt/family education and to maximize pt's level of independence in the home and community environment.     Pt's " spiritual, cultural and educational needs considered and pt agreeable to plan of care and goals.     Anticipated barriers to physical therapy: Co-morbidities     Goals:   1. Pt to increase strength of LE muscles to 4-/5 to allow for improvement in transitional movements. -progressing  2. Pt to increase strength of UE muscles to 4-/5 to allow for improvement in reaching, holding, and lifting. -progressing  3. Pt to improve wrist and ankle range of motion by >3 degrees to allow for improved balance and fine motor skills. -progressing  4. Pt to demonstrate understanding of pathology and use of HEP for improved self-management of symptoms. -progressing  5. Pt to report decreased pain to 8/10 at worst for increased participation in social/community activities. -progressing  6. Pt to increased FOTO performance to 60% limitation for improved participation. -progressing  7. Pt to increase  strength to 17 pounds for improved grasping abilities. -progressing     Long Term Goals (12 Weeks):      1. Pt to increase strength of LE muscles to 4/5 to allow for improvement in transitional movements. -progressing  2. Pt to increase strength of UE muscles to 4/5 to allow for improvement in reaching, holding, and lifting. -progressing  3. Pt to improve wrist and ankle range of motion by >5 degrees to allow for improved balance and fine motor skills. -progressing  4. Pt to demonstrate proficiency with HEP for improved independence with self-management of condition/symptoms and prevention of re-injury. -progressing  5. Pt to report decreased pain to 6/10 at worst for increased participation in social/community activities. -progressing  6. Pt to increased FOTO performance to 47% limitation for improved participation. -progressing  7. Pt to increase  strength to 20 pounds for improved grasping abilities. -progressing    Plan     Cont with POC, progressing with strengthening, functional endurance, and postural re-education.    Irena  Galileo, PTA  11/10/2020

## 2020-11-10 ENCOUNTER — CLINICAL SUPPORT (OUTPATIENT)
Dept: REHABILITATION | Facility: HOSPITAL | Age: 70
End: 2020-11-10
Attending: PODIATRIST
Payer: MEDICARE

## 2020-11-10 DIAGNOSIS — M25.672 DECREASED RANGE OF MOTION OF BOTH ANKLES: ICD-10-CM

## 2020-11-10 DIAGNOSIS — M25.50 ARTHRALGIA, UNSPECIFIED JOINT: Primary | ICD-10-CM

## 2020-11-10 DIAGNOSIS — R29.898 WEAKNESS OF EXTREMITY: ICD-10-CM

## 2020-11-10 DIAGNOSIS — M25.632 DECREASED RANGE OF MOTION OF BOTH WRISTS: ICD-10-CM

## 2020-11-10 DIAGNOSIS — M25.671 DECREASED RANGE OF MOTION OF BOTH ANKLES: ICD-10-CM

## 2020-11-10 DIAGNOSIS — M25.631 DECREASED RANGE OF MOTION OF BOTH WRISTS: ICD-10-CM

## 2020-11-10 PROCEDURE — 97110 THERAPEUTIC EXERCISES: CPT | Mod: HCNC,PN,CQ

## 2020-11-12 ENCOUNTER — CLINICAL SUPPORT (OUTPATIENT)
Dept: REHABILITATION | Facility: HOSPITAL | Age: 70
End: 2020-11-12
Attending: PODIATRIST
Payer: MEDICARE

## 2020-11-12 DIAGNOSIS — R29.898 WEAKNESS OF EXTREMITY: ICD-10-CM

## 2020-11-12 DIAGNOSIS — M25.631 DECREASED RANGE OF MOTION OF BOTH WRISTS: ICD-10-CM

## 2020-11-12 DIAGNOSIS — M25.671 DECREASED RANGE OF MOTION OF BOTH ANKLES: ICD-10-CM

## 2020-11-12 DIAGNOSIS — M25.632 DECREASED RANGE OF MOTION OF BOTH WRISTS: ICD-10-CM

## 2020-11-12 DIAGNOSIS — M25.50 ARTHRALGIA, UNSPECIFIED JOINT: ICD-10-CM

## 2020-11-12 DIAGNOSIS — M25.672 DECREASED RANGE OF MOTION OF BOTH ANKLES: ICD-10-CM

## 2020-11-12 PROCEDURE — 97110 THERAPEUTIC EXERCISES: CPT | Mod: HCNC,PN,CQ

## 2020-11-12 NOTE — PROGRESS NOTES
"  Physical Therapy Treatment Note     Name: Darrion Bennett  [facility] Number: [id number]    Therapy Diagnosis:  Encounter Diagnoses   Name Primary?   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 He is having trouble closing his hands and making a fist.     He was compliant with home exercise program.  Response to previous treatment: sore  Functional change: Ongoing     Pain: 8/10  Location: bilateral ankles, feet , fingers , hands , joint, knee , shoulder , toes and wrists     Objective     Darrion received therapeutic exercises to develop strength, endurance, ROM, flexibility, posture and core stabilization for 45 minutes including:     Nu-step     5 min    Standing G/S stretch   2x20" ea   Supine HS stretch    2x20" ea     +Step ups to 4" step   x15 ea  +Sit<>stand from 18" w/ foam         2x10  Doorway (Corner) Stretch   3x20"     +Red digi-flex    20x ea  +Yellow flex-bar wrist flx/ext  20x ea  +Yellow flex-bar smiles/rainbows  20x ea    NV: ER, Supine hip flexor stretch 2x20" ea,     NP:   Mini squats in // bars   2x10   Matrix row 15#   2x10   Supine piriformis stretch 2x20" ea   Ball " "squeezes 20x5" hold   Bridges 2x8   + Supine hip flexor stretch 2x20" ea  SL Clamshells 2x10 ea   Seated pull down 5#   2x10    Wrist rolls x30"   Yellow Foam squeeze 5-way x5 ea    Darrion received the following manual therapy techniques: Joint mobilizations were applied to the:  for 00 minutes, including:    Darrion participated in gait training to improve functional mobility and safety for 00  minutes, including:    Darrion received hot pack for 10 minutes to L knee & quad.          Home Exercises Provided and Patient Education Provided     Education provided:   - Continued use of HEP   - Rationale for POC and progression     Written Home Exercises Provided: Patient instructed to cont prior HEP.  Exercises were reviewed and Darrion was able to demonstrate them prior to the end of the session.  Darrion demonstrated good  understanding of the education provided.     See EMR under Patient Instructions for exercises provided prior visit.    Assessment     Darrion performed fairly well in today's session with fair tolerance to exercises. He presents with reports of multi joint pain and c/o being unable to close hands 2' joint pain. Introduced light resistance  strengthening via digi-flex and flex-bar. He expressed ease of mobility and improved  tolerance following these exercises.  Also, progressed with various exercises to promote global LE and trunk strengthening and activity tolerance.  Will continue to progress multi region strengthening to promote improved functional mobility and pain/activity tolerance.      Darrion is progressing well towards his goals.   Pt prognosis is Good.     Pt will continue to benefit from skilled outpatient physical therapy to address the deficits listed in the problem list box on initial evaluation, provide pt/family education and to maximize pt's level of independence in the home and community environment.     Pt's spiritual, cultural and educational needs considered and pt agreeable to " plan of care and goals.     Anticipated barriers to physical therapy: Co-morbidities     Goals:   1. Pt to increase strength of LE muscles to 4-/5 to allow for improvement in transitional movements. -progressing  2. Pt to increase strength of UE muscles to 4-/5 to allow for improvement in reaching, holding, and lifting. -progressing  3. Pt to improve wrist and ankle range of motion by >3 degrees to allow for improved balance and fine motor skills. -progressing  4. Pt to demonstrate understanding of pathology and use of HEP for improved self-management of symptoms. -progressing  5. Pt to report decreased pain to 8/10 at worst for increased participation in social/community activities. -progressing  6. Pt to increased FOTO performance to 60% limitation for improved participation. -progressing  7. Pt to increase  strength to 17 pounds for improved grasping abilities. -progressing     Long Term Goals (12 Weeks):      1. Pt to increase strength of LE muscles to 4/5 to allow for improvement in transitional movements. -progressing  2. Pt to increase strength of UE muscles to 4/5 to allow for improvement in reaching, holding, and lifting. -progressing  3. Pt to improve wrist and ankle range of motion by >5 degrees to allow for improved balance and fine motor skills. -progressing  4. Pt to demonstrate proficiency with HEP for improved independence with self-management of condition/symptoms and prevention of re-injury. -progressing  5. Pt to report decreased pain to 6/10 at worst for increased participation in social/community activities. -progressing  6. Pt to increased FOTO performance to 47% limitation for improved participation. -progressing  7. Pt to increase  strength to 20 pounds for improved grasping abilities. -progressing    Plan     Cont with POC, progressing with strengthening, functional endurance, and postural re-education.    Samira Woodward, PTA  11/12/2020

## 2020-11-13 NOTE — PROGRESS NOTES
"  Physical Therapy Treatment Note     Name: Darrion Bennett  Clinic Number: [id number]    Therapy Diagnosis:  Encounter Diagnoses   Name Primary?   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     He was compliant with home exercise program.  Response to previous treatment: sore  Functional change: Ongoing     Pain: 8/10  Location: bilateral ankles, feet , fingers , hands , joint, knee , shoulder , toes and wrists     Objective     Darrion received therapeutic exercises to develop strength, endurance, ROM, flexibility, posture and core stabilization for 45 minutes including:     Nu-step     5 min  +UBE      3 min  Standing G/S stretch   3x20" ea  Step ups to 4" step    15x ea  +Sit<>stand from 18" w/ foam 5#     2x10    Doorway (Corner) Stretch   3x20"   +ER w/ YTB    2 x 10  Red digi-flex    20x ea  Yellow flex-bar wrist flx/ext  20x ea  +Wrist Flex/Ext Str   10 x 5" ea direction & hand  +4 way wrist ROM w/ YTB  10x ea    NP:  +Yellow flex-bar smiles/rainbows  20x ea  Supine HS stretch    2x20" ea   Mini squats in // bars   2x10   Matrix row 15#   2x10   Supine piriformis stretch 2x20" ea   Ball " "squeezes 20x5" hold   Bridges 2x8   + Supine hip flexor stretch 2x20" ea  SL Clamshells 2x10 ea   Seated pull down 5#   2x10  Wrist rolls x30"   Yellow Foam squeeze 5-way x5 ea    Darrion received the following manual therapy techniques: Joint mobilizations were applied to the:  for 00 minutes, including:    Darrion participated in gait training to improve functional mobility and safety for 00  minutes, including:    Darrion received hot pack for 10 minutes to hands in sitting position rested on table.     Home Exercises Provided and Patient Education Provided     Education provided:   - Continued use of HEP   - Rationale for POC and progression     Written Home Exercises Provided: Patient instructed to cont prior HEP.  Exercises were reviewed and Darrion was able to demonstrate them prior to the end of the session.  Darrion demonstrated good  understanding of the education provided.     See EMR under Patient Instructions for exercises provided prior visit.    Assessment   Mr. Maier tolerated the above therex fair to well today with minimal complaints of pain. He presents to therapy session with swelling in hands and complaints of pain in all joints. Pt is doing well with LE exercises, but still has difficulty with exercises for BUE. Wrist flex/ext stretch as well as 4-way wrist ROM with t-band was added today to aid in improvement of wrist ROM as noted in therapy goals. Pt had no exacerbation of symptoms with said exercises. Mr. Maier is progressing towards goals slowly, but surely and will continue to benefit from PT to address deficits in upper and lower extremities.     Darrion is progressing well towards his goals.   Pt prognosis is Good.     Pt will continue to benefit from skilled outpatient physical therapy to address the deficits listed in the problem list box on initial evaluation, provide pt/family education and to maximize pt's level of independence in the home and community environment.     Pt's spiritual, cultural " and educational needs considered and pt agreeable to plan of care and goals.     Anticipated barriers to physical therapy: Co-morbidities     Goals:   1. Pt to increase strength of LE muscles to 4-/5 to allow for improvement in transitional movements. -progressing  2. Pt to increase strength of UE muscles to 4-/5 to allow for improvement in reaching, holding, and lifting. -progressing  3. Pt to improve wrist and ankle range of motion by >3 degrees to allow for improved balance and fine motor skills. -progressing  4. Pt to demonstrate understanding of pathology and use of HEP for improved self-management of symptoms. -progressing  5. Pt to report decreased pain to 8/10 at worst for increased participation in social/community activities. -progressing  6. Pt to increased FOTO performance to 60% limitation for improved participation. -progressing  7. Pt to increase  strength to 17 pounds for improved grasping abilities. -progressing     Long Term Goals (12 Weeks):      1. Pt to increase strength of LE muscles to 4/5 to allow for improvement in transitional movements. -progressing  2. Pt to increase strength of UE muscles to 4/5 to allow for improvement in reaching, holding, and lifting. -progressing  3. Pt to improve wrist and ankle range of motion by >5 degrees to allow for improved balance and fine motor skills. -progressing  4. Pt to demonstrate proficiency with HEP for improved independence with self-management of condition/symptoms and prevention of re-injury. -progressing  5. Pt to report decreased pain to 6/10 at worst for increased participation in social/community activities. -progressing  6. Pt to increased FOTO performance to 47% limitation for improved participation. -progressing  7. Pt to increase  strength to 20 pounds for improved grasping abilities. -progressing    Plan     Cont with POC, progressing with strengthening, functional endurance, and postural re-education.    Irena Gurrola,  PTA  11/17/2020

## 2020-11-17 ENCOUNTER — CLINICAL SUPPORT (OUTPATIENT)
Dept: REHABILITATION | Facility: HOSPITAL | Age: 70
End: 2020-11-17
Attending: PODIATRIST
Payer: MEDICARE

## 2020-11-17 DIAGNOSIS — M25.671 DECREASED RANGE OF MOTION OF BOTH ANKLES: ICD-10-CM

## 2020-11-17 DIAGNOSIS — M25.631 DECREASED RANGE OF MOTION OF BOTH WRISTS: ICD-10-CM

## 2020-11-17 DIAGNOSIS — M25.632 DECREASED RANGE OF MOTION OF BOTH WRISTS: ICD-10-CM

## 2020-11-17 DIAGNOSIS — M25.672 DECREASED RANGE OF MOTION OF BOTH ANKLES: ICD-10-CM

## 2020-11-17 DIAGNOSIS — R29.898 WEAKNESS OF EXTREMITY: ICD-10-CM

## 2020-11-17 DIAGNOSIS — M25.50 ARTHRALGIA, UNSPECIFIED JOINT: Primary | ICD-10-CM

## 2020-11-17 PROCEDURE — 97110 THERAPEUTIC EXERCISES: CPT | Mod: HCNC,PN,CQ

## 2020-11-30 ENCOUNTER — TELEPHONE (OUTPATIENT)
Dept: RHEUMATOLOGY | Facility: CLINIC | Age: 70
End: 2020-11-30

## 2020-11-30 ENCOUNTER — OFFICE VISIT (OUTPATIENT)
Dept: RHEUMATOLOGY | Facility: CLINIC | Age: 70
End: 2020-11-30
Payer: MEDICARE

## 2020-11-30 DIAGNOSIS — M19.90 INFLAMMATORY ARTHRITIS: Primary | ICD-10-CM

## 2020-11-30 DIAGNOSIS — M06.9 RHEUMATOID ARTHRITIS INVOLVING MULTIPLE JOINTS: Primary | ICD-10-CM

## 2020-11-30 DIAGNOSIS — M35.00 SJOGREN'S SYNDROME, WITH UNSPECIFIED ORGAN INVOLVEMENT: ICD-10-CM

## 2020-11-30 DIAGNOSIS — M1A.9XX1 TOPHACEOUS GOUT: ICD-10-CM

## 2020-11-30 PROCEDURE — 99499 UNLISTED E&M SERVICE: CPT | Mod: S$PBB,,, | Performed by: INTERNAL MEDICINE

## 2020-11-30 PROCEDURE — 99499 RISK ADDL DX/OHS AUDIT: ICD-10-PCS | Mod: 95,,, | Performed by: INTERNAL MEDICINE

## 2020-11-30 PROCEDURE — 99215 OFFICE O/P EST HI 40 MIN: CPT | Mod: HCNC,95,, | Performed by: INTERNAL MEDICINE

## 2020-11-30 PROCEDURE — 99215 PR OFFICE/OUTPT VISIT, EST, LEVL V, 40-54 MIN: ICD-10-PCS | Mod: HCNC,95,, | Performed by: INTERNAL MEDICINE

## 2020-11-30 PROCEDURE — 1159F MED LIST DOCD IN RCRD: CPT | Mod: HCNC,95,, | Performed by: INTERNAL MEDICINE

## 2020-11-30 PROCEDURE — 99499 RISK ADDL DX/OHS AUDIT: ICD-10-PCS | Mod: S$PBB,,, | Performed by: INTERNAL MEDICINE

## 2020-11-30 PROCEDURE — 1159F PR MEDICATION LIST DOCUMENTED IN MEDICAL RECORD: ICD-10-PCS | Mod: HCNC,95,, | Performed by: INTERNAL MEDICINE

## 2020-11-30 PROCEDURE — 3072F LOW RISK FOR RETINOPATHY: CPT | Mod: HCNC,95,, | Performed by: INTERNAL MEDICINE

## 2020-11-30 PROCEDURE — 3072F PR LOW RISK FOR RETINOPATHY: ICD-10-PCS | Mod: HCNC,95,, | Performed by: INTERNAL MEDICINE

## 2020-11-30 PROCEDURE — 99499 UNLISTED E&M SERVICE: CPT | Mod: 95,,, | Performed by: INTERNAL MEDICINE

## 2020-11-30 RX ORDER — TRAMADOL HYDROCHLORIDE 50 MG/1
50 TABLET ORAL EVERY 12 HOURS PRN
Qty: 60 TABLET | Refills: 5 | Status: SHIPPED | OUTPATIENT
Start: 2020-11-30 | End: 2022-01-25 | Stop reason: SDUPTHER

## 2020-11-30 NOTE — PROGRESS NOTES
Subjective:       Patient ID: Darrion Bennett is a 69 y.o. male.    Chief Complaint: Follow-up     HPI  69 year old male with type II, cataracts, HTN, gout,  Sjogrens, urolithiasis, CHF, hypothyroidism, CKD here for evaluation.  He reports that he was diagnosed with Sjogrens when he had dry eyes and dry mouth in 2014.. He takes plaquenil 200mg po BID. He is  taking azathioprine 50mg po TID and medrol 4mg po qqday.   He was put on imuran by Dr. Corona.  He was followed by Dr. Corona from 2014 to 2017.  In October 2018, imuran and medrol was stopped. Patient restarted imuran in November 2018.  Reports that he feels that imuran helps him with joint.   Today he denies pain, stiffness or swelling.  He denies sry eyes or dry mouth.  Denies trouble making a fist.    He was diagnosed in January in left aspect of foot with pain, swelling and redness.        Interval history:  He is taking uloric 40mg once a day. He is taking prednisone 5 mg a day.  He has pain and swelling in both hands.He also has pain in shoulder, feet, knees. He is stiff for a long time in morning.  .Denies any hip pain or, jaw claudication, or visual changes. He reports his hands and shoulders feel better.    Past Medical History:   Diagnosis Date    Anemia     Arthritis     Cataract     Chronic constipation     Diabetes mellitus, type 2     Glaucoma     Hypertension     MCTD (mixed connective tissue disease)     Sjogren's disease     Ulcerative colitis     Urolithiasis        Review of Systems   Constitutional: Negative for activity change, appetite change, chills, diaphoresis, fatigue and fever.   HENT: Negative for ear discharge, ear pain, hearing loss, mouth sores, nosebleeds and sinus pressure.    Eyes: Negative.  Negative for photophobia, pain, discharge, redness and itching.   Respiratory: Negative for cough, chest tightness and shortness of breath.    Cardiovascular: Negative for chest pain, palpitations and leg swelling.    Gastrointestinal: Negative for abdominal distention, blood in stool and nausea.   Endocrine: Negative for cold intolerance, heat intolerance, polydipsia and polyphagia.   Genitourinary: Negative for flank pain, genital sores and hematuria.   Musculoskeletal: Positive for arthralgias. Negative for back pain, gait problem, joint swelling, myalgias, neck pain and neck stiffness.   Skin: Negative for color change, pallor and rash.   Neurological: Negative for dizziness, weakness, light-headedness and headaches.   Hematological: Negative for adenopathy. Does not bruise/bleed easily.   Psychiatric/Behavioral: Negative for decreased concentration and hallucinations. The patient is not nervous/anxious.              Objective:        Physical Exam   Constitutional: He is well-developed, well-nourished, and in no distress. No distress.   HENT:   Head: Normocephalic and atraumatic.   Right Ear: External ear normal.   Left Ear: External ear normal.   Nose: Nose normal.   Mouth/Throat: Oropharynx is clear and moist. No oropharyngeal exudate.   Eyes: Conjunctivae and EOM are normal. Pupils are equal, round, and reactive to light. Right eye exhibits no discharge. Left eye exhibits no discharge. No scleral icterus.   Neck: Normal range of motion. Neck supple. No JVD present. No tracheal deviation present. No thyromegaly present.   Cardiovascular: Normal rate, normal heart sounds and intact distal pulses.  Exam reveals no gallop and no friction rub.    No murmur heard.  Pulmonary/Chest: Effort normal. No stridor. No respiratory distress. He has no wheezes. He has no rales. He exhibits no tenderness.   Abdominal: Soft. Bowel sounds are normal. He exhibits no distension and no mass. There is no tenderness. There is no rebound and no guarding.   Lymphadenopathy:     He has no cervical adenopathy.   Neurological: No cranial nerve deficit. Coordination normal.   Skin: Skin is warm and dry. No rash noted. He is not diaphoretic. No  erythema. No pallor.     Musculoskeletal: Normal range of motion. He exhibits no edema, tenderness or deformity.     Labs:  Gilma-+  Ssa+  Ssb+  +RNP  Ccp,rf-negative      Right hand xray (5/2019): I personally reviewed; Soft tissue swelling tip of long finger.  Negative for fracture.     Assessment:    70 year old male with type II DM, cataracts, HTN, gout,  Sjogrens, urolithiasis, CHF, hypothyroidism, CKD here for follow up. He was previously followed by Dr. Lau.  He reports that he was diagnosed with Sjogrens when he had dry eyes and dry mouth in 2014. Serologies are +GILMA, +SSA,+SSB, and +RNP. He denies raynauds, rashes, oral ulcers or symptoms of SLE. His main issue before we met was inflammatory arthritis which was being treated with plaquenil, imuran, and medrol.     When we initially met in feb 2019 , he was taking plaquenil 200mg po BID, azathioprine 50mg po TID and medrol 4mg po qqday. His last rheumatologist discontinued his medications and patient decided to restart the medications himself since he was having so much pain. He developed imuran toxicity so all his medications were discontinued. He is asymptomatic from Sjogrens at this time.      # tophaceous gout, but had allergy to allopurinol so tried uloric.  He has been taking uloric 40mg a day and doing well.  -continue Continue uloric 40mg po qday  Labs in 6 weeks    #Inflammatory arthritis: secondary to Sjogrens. Was previously on plaquenil and imuran but developed cytopenias so had to stop.  Given +GILMA and +RNP, will avoid ANTI- TNF therapy  Start orencia 125 mg sub q once a week (Risks  discussed with patient and not limited to cell count abnormalities, malignancy, allergic  reaction to medication, and increased risk of infection. Patient agrees with starting medication.)  Labs in 6 weeks        #acute on chronic renal failure: I emailed his nephrologist and asked pt to reach out to him as well.    rtc in  2 months      The patient location is:  home  The chief complaint leading to consultation is: joint pain    Visit type: audiovisual    Face to Face time with patient: 30 minutes   minutes of total time spent on the encounter, which includes face to face time and non-face to face time preparing to see the patient (eg, review of tests), Obtaining and/or reviewing separately obtained history, Documenting clinical information in the electronic or other health record, Independently interpreting results (not separately reported) and communicating results to the patient/family/caregiver, or Care coordination (not separately reported).         Each patient to whom he or she provides medical services by telemedicine is:  (1) informed of the relationship between the physician and patient and the respective role of any other health care provider with respect to management of the patient; and (2) notified that he or she may decline to receive medical services by telemedicine and may withdraw from such care at any time.

## 2020-12-09 ENCOUNTER — SPECIALTY PHARMACY (OUTPATIENT)
Dept: PHARMACY | Facility: CLINIC | Age: 70
End: 2020-12-09

## 2020-12-09 ENCOUNTER — OFFICE VISIT (OUTPATIENT)
Dept: OPTOMETRY | Facility: CLINIC | Age: 70
End: 2020-12-09
Payer: MEDICARE

## 2020-12-09 DIAGNOSIS — H40.1132 PRIMARY OPEN ANGLE GLAUCOMA (POAG) OF BOTH EYES, MODERATE STAGE: Primary | ICD-10-CM

## 2020-12-09 DIAGNOSIS — Z96.1 PSEUDOPHAKIA OF BOTH EYES: ICD-10-CM

## 2020-12-09 DIAGNOSIS — E11.9 TYPE 2 DIABETES MELLITUS WITHOUT RETINOPATHY: ICD-10-CM

## 2020-12-09 DIAGNOSIS — M06.9 RHEUMATOID ARTHRITIS, INVOLVING UNSPECIFIED SITE, UNSPECIFIED WHETHER RHEUMATOID FACTOR PRESENT: ICD-10-CM

## 2020-12-09 PROCEDURE — 3288F FALL RISK ASSESSMENT DOCD: CPT | Mod: HCNC,CPTII,S$GLB, | Performed by: OPTOMETRIST

## 2020-12-09 PROCEDURE — 1101F PT FALLS ASSESS-DOCD LE1/YR: CPT | Mod: HCNC,CPTII,S$GLB, | Performed by: OPTOMETRIST

## 2020-12-09 PROCEDURE — 99999 PR PBB SHADOW E&M-EST. PATIENT-LVL III: ICD-10-PCS | Mod: PBBFAC,HCNC,, | Performed by: OPTOMETRIST

## 2020-12-09 PROCEDURE — 92014 COMPRE OPH EXAM EST PT 1/>: CPT | Mod: HCNC,S$GLB,, | Performed by: OPTOMETRIST

## 2020-12-09 PROCEDURE — 1126F PR PAIN SEVERITY QUANTIFIED, NO PAIN PRESENT: ICD-10-PCS | Mod: HCNC,S$GLB,, | Performed by: OPTOMETRIST

## 2020-12-09 PROCEDURE — 1101F PR PT FALLS ASSESS DOC 0-1 FALLS W/OUT INJ PAST YR: ICD-10-PCS | Mod: HCNC,CPTII,S$GLB, | Performed by: OPTOMETRIST

## 2020-12-09 PROCEDURE — 3288F PR FALLS RISK ASSESSMENT DOCUMENTED: ICD-10-PCS | Mod: HCNC,CPTII,S$GLB, | Performed by: OPTOMETRIST

## 2020-12-09 PROCEDURE — 1126F AMNT PAIN NOTED NONE PRSNT: CPT | Mod: HCNC,S$GLB,, | Performed by: OPTOMETRIST

## 2020-12-09 PROCEDURE — 92014 PR EYE EXAM, EST PATIENT,COMPREHESV: ICD-10-PCS | Mod: HCNC,S$GLB,, | Performed by: OPTOMETRIST

## 2020-12-09 PROCEDURE — 99999 PR PBB SHADOW E&M-EST. PATIENT-LVL III: CPT | Mod: PBBFAC,HCNC,, | Performed by: OPTOMETRIST

## 2020-12-09 RX ORDER — TRAVOPROST OPHTHALMIC SOLUTION 0.04 MG/ML
1 SOLUTION OPHTHALMIC NIGHTLY
Qty: 5 ML | Refills: 3 | Status: SHIPPED | OUTPATIENT
Start: 2020-12-09 | End: 2021-06-11 | Stop reason: SDUPTHER

## 2020-12-09 RX ORDER — DORZOLAMIDE HCL 20 MG/ML
1 SOLUTION/ DROPS OPHTHALMIC 2 TIMES DAILY
Qty: 10 ML | Refills: 3 | Status: SHIPPED | OUTPATIENT
Start: 2020-12-09 | End: 2020-12-16

## 2020-12-09 NOTE — PROGRESS NOTES
Subjective:       Patient ID: Darrion Bennett is a 70 y.o. male      Chief Complaint   Patient presents with    Concerns About Ocular Health    Glaucoma    Diabetic Eye Exam     History of Present Illness  Dls: 5/29/20 Vo.     71 y/o male presents today for diabetic eye exam and glaucoma ck.   Pt states no changes in vision. Pt wears otc readers.      this am     No tearing  No itching  No burning  No pain  No ha's  No floaters  No flashes    Eye meds  Trusopt OU BID last dose this am   Travatan Z OU Q HS last dose last night        Assessment/Plan:     1. Primary open angle glaucoma (POAG) of both eyes, moderate stage  Pt states glc x 20 years. Previously being followed by Dr. Hall. +FHx (father). Prior OCT RNFL with thinning OD > OS.     IOP doing well. Continue trusopt BID OU and travatan QHS OU. Pt overdue for testing. 6 month IOP/HVF/OCT  - dorzolamide (TRUSOPT) 2 % ophthalmic solution; Place 1 drop into both eyes 2 (two) times daily.  Dispense: 10 mL; Refill: 3  - travoprost (TRAVATAN Z) 0.004 % ophthalmic solution; Place 1 drop into both eyes every evening.  Dispense: 5 mL; Refill: 3    2. Type 2 diabetes mellitus without retinopathy  No diabetic retinopathy. Discussed with pt the effects of diabetes on vision, importance of good blood sugar control, compliance with meds, and follow up care with PCP. Return in 1 year for dilated eye exam, sooner PRN.    3. Pseudophakia of both eyes  Well-centered. Clear.   Declined MRx. Readers PRN.    Follow up in about 6 months (around 6/9/2021) for HVF 24-2, IOP check, OCT optic nerve.

## 2020-12-15 NOTE — TELEPHONE ENCOUNTER
Denial overturned. Appeal approved. [employer] approved 1/1/2020-12/31/21 Reference #[id number].     Patient forwarded to benefits investigation team.   
Orencia PA denied. Patient's plan prefers Cosentyx, Kevzara, or Rinvoq ER. Appeal submitted 12/12 tp fax #[phone].   
Orencia PA submitted: Key: [id number] - PA Case ID: [id number]  
Patent

## 2020-12-18 ENCOUNTER — SPECIALTY PHARMACY (OUTPATIENT)
Dept: PHARMACY | Facility: CLINIC | Age: 70
End: 2020-12-18

## 2020-12-18 ENCOUNTER — CLINICAL SUPPORT (OUTPATIENT)
Dept: REHABILITATION | Facility: HOSPITAL | Age: 70
End: 2020-12-18
Attending: PODIATRIST
Payer: MEDICARE

## 2020-12-18 DIAGNOSIS — M25.50 ARTHRALGIA, UNSPECIFIED JOINT: ICD-10-CM

## 2020-12-18 DIAGNOSIS — R29.898 WEAKNESS OF EXTREMITY: ICD-10-CM

## 2020-12-18 DIAGNOSIS — M25.631 DECREASED RANGE OF MOTION OF BOTH WRISTS: ICD-10-CM

## 2020-12-18 DIAGNOSIS — M25.671 DECREASED RANGE OF MOTION OF BOTH ANKLES: ICD-10-CM

## 2020-12-18 DIAGNOSIS — M25.632 DECREASED RANGE OF MOTION OF BOTH WRISTS: ICD-10-CM

## 2020-12-18 DIAGNOSIS — M25.672 DECREASED RANGE OF MOTION OF BOTH ANKLES: ICD-10-CM

## 2020-12-18 PROCEDURE — 97110 THERAPEUTIC EXERCISES: CPT | Mod: HCNC,PN

## 2020-12-18 NOTE — TELEPHONE ENCOUNTER
Specialty Pharmacy - Initial Clinical Assessment    Specialty Medication Orders Linked to Encounter      Most Recent Value   Medication #1  abatacept (ORENCIA CLICKJECT) 125 mg/mL AtIn (Order#[id number], Rx#[id number])        Subjective    Darrion Bennett is a 70 y.o. male, who is followed by the specialty pharmacy service for management and education.    Recent Encounters     Date Type Provider Description    12/18/2020 Specialty Pharmacy Alicia Vicente PharmD Initial Clinical Assessment    12/09/2020 Specialty Pharmacy Alicia Vicente PharmD Referral Authorization        Clinical call attempts since last clinical assessment   No call attempts found.     Today he received education before his first fill with [facility].    Current Outpatient Medications   Medication Sig    abatacept (ORENCIA CLICKJECT) 125 mg/mL AtIn Inject 125 mg into the skin once a week.    alcohol swabs (ALCOHOL PADS) PadM Apply 1 each topically 4 (four) times daily.    amLODIPine (NORVASC) 5 MG tablet TAKE 1 TABLET BY MOUTH TWICE A DAY    aspirin (ECOTRIN) 81 MG EC tablet Take 81 mg by mouth once daily.    blood sugar diagnostic Strp Check blood glucose 4x/day. e11.65    blood-glucose meter kit Use as instructed    cloNIDine 0.1 mg/24 hr td ptwk (CATAPRES) 0.1 mg/24 hr PLACE 1 PATCH ONTO THE SKIN EVERY 7 DAYS.    colchicine (COLCRYS) 0.6 mg tablet Take 2 pills at once and then one pill an hour    diclofenac sodium (VOLTAREN) 1 % Gel Apply 2 g topically 4 (four) times daily as needed. Apply to aching joints    diphenhydrAMINE (BENADRYL) 25 mg capsule Take 1 capsule (25 mg total) by mouth every evening.    dorzolamide (TRUSOPT) 2 % ophthalmic solution INSTILL 1 DROP INTO BOTH EYES TWICE A DAY    dulaglutide (TRULICITY) 1.5 mg/0.5 mL PnIj Inject 1.5 mg into the skin every 7 days.    febuxostat (ULORIC) 40 mg Tab Take 1 tablet (40 mg total) by mouth once daily.    insulin (LANTUS SOLOSTAR U-100 INSULIN) glargine 100  "units/mL (3mL) SubQ pen Inject 7 units once daily    insulin aspart U-100 (NOVOLOG FLEXPEN U-100 INSULIN) 100 unit/mL (3 mL) InPn pen Inject 5 units three times daily before each meal with sliding scale.    ipratropium (ATROVENT) 0.03 % nasal spray USE 2 SPRAYS BY NASAL ROUTE 3 (THREE) TIMES DAILY.    ipratropium (ATROVENT) 42 mcg (0.06 %) nasal spray INSTILL 2 SPRAYS BY NASAL ROUTE 3 TIMES DAILY. AS NEEDED FOR NASAL CONGESTION AND DRIP FOR 7 DAYS    lancets Misc Check blood glucose 4x/day. e11.65    LINZESS 145 mcg Cap capsule TAKE 1 CAPSULE (145 MCG TOTAL) BY MOUTH ONCE DAILY.    multivit with min-folic acid 200 mcg Chew     omeprazole (PRILOSEC) 20 MG capsule TAKE 2 CAPSULES (40 MG TOTAL) BY MOUTH ONCE DAILY.    pen needle, diabetic (BD ULTRA-FINE RICHARD PEN NEEDLE) 32 gauge x 5/32" Ndle 1 Device by Misc.(Non-Drug; Combo Route) route 4 (four) times daily.    predniSONE (DELTASONE) 5 MG tablet TAKE 3 TABLETS (15 MG TOTAL) BY MOUTH ONCE DAILY. (Patient taking differently: Take 5 mg by mouth once daily. )    rosuvastatin (CRESTOR) 5 MG tablet TAKE 1 TABLET BY MOUTH EVERY DAY (Patient taking differently: 5 mg once daily. )    torsemide (DEMADEX) 10 MG Tab TAKE 1 TABLET EVERY DAY    traMADoL (ULTRAM) 50 mg tablet Take 1 tablet (50 mg total) by mouth every 12 (twelve) hours as needed for Pain.    travoprost (TRAVATAN Z) 0.004 % ophthalmic solution Place 1 drop into both eyes every evening.    valsartan (DIOVAN) 160 MG tablet TAKE 1 TABLET (160 MG TOTAL) BY MOUTH 2 (TWO) TIMES DAILY.   Last reviewed on 12/18/2020 10:25 AM by Alicia Vicente PharmD    Review of patient's allergies indicates:   Allergen Reactions    Penicillins Nausea And Vomiting    Uloric [febuxostat]      Aches and pain    Allopurinol analogues Rash   Last reviewed on  12/9/2020 9:59 AM by Viry Esquivel        Adverse Effects    Unable to perform a full ROS: Yes           Objective    He has a past medical history of Anemia, Arthritis, " "Cataract, Chronic constipation, Diabetes mellitus, type 2, Felon of finger of left hand (5/11/2019), Glaucoma, Hyperlipidemia (5/13/2014), Hypertension, MCTD (mixed connective tissue disease), Sjogren's disease, Ulcerative colitis, and Urolithiasis.    Tried/failed medications: Plaquenil, Azathioprine.     BP Readings from Last 4 Encounters:   11/04/20 (!) 168/75   10/19/20 (!) 142/76   09/02/20 (!) 147/67   06/23/20 (!) 150/78     Ht Readings from Last 4 Encounters:   10/19/20 5' 6" (1.676 m)   06/18/20 5' 6" (1.676 m)   03/18/20 5' 6" (1.676 m)   03/04/20 5' 6" (1.676 m)     Wt Readings from Last 4 Encounters:   11/04/20 63.5 kg (139 lb 15.9 oz)   10/19/20 65.4 kg (144 lb 2.9 oz)   09/02/20 65.4 kg (144 lb 3.2 oz)   06/23/20 63.2 kg (139 lb 6.4 oz)     Recent Labs   Lab Result Units 10/05/20  1025   Creatinine mg/dL 1.4   ALT U/L 11   AST U/L 13   Hepatitis B Surface Ag  Negative   Hep B S Ab  Negative     The goals of rheumatoid arthritis treatment include:  · Relieving pain and suppressing inflammation  · Achieving remission and preventing joint and organ damage  · Increasing joint mobility and strength  · Preventing infection and other complications of treatment  · Reducing long term complications of rheumatoid arthritis  · Improving or maintaining physical function and optimal well-being  · Improving or maintaining quality of life  · Maintaining optimal therapy adherence  · Minimizing and managing side effects         Assessment/Plan  Patient plans to start therapy on  12/22/2020      Indication, dosage, appropriateness, effectiveness, safety and convenience of his specialty medication(s) were reviewed today.     Patient Counseling    Counseled the patient on the following: safe handling, storage, and disposal discussed, possible adverse effects and management discussed, possible drug and prescription drug interactions discussed, possible drug and OTC drug and food interactions discussed, lab monitoring and " follow-up discussed, use of contraception discussed, therapeutic rationale discussed, cost of medications and cost implications discussed, adherence and missed doses discussed, pharmacy contact information discussed       natalInitial Orencia Clickjet Injection consult completed on  and will be shipped on .  $ 0.00 copay. Patient  Pt will start Orencia Clickjet on . Address confirmed, CC on file. Confirmed 2 patient identifiers - name and . Therapy Appropriate.    Counseled patient on administration directions:  -  Inject Orencia Clickjet into the skin every 7 days.  .- Take out of the refrigerator 30 minutes prior to injection.  - Wash hands before and after injection.  - Monthly RX will come with gauze, bandaids, and alcohol swabs.  - Patient may inject in either the tops of the thighs, abdomen- but at least 2 inches away from her belly button. Patient was instructed to rotate injections sites.  - Patient is to wipe down the injection site with the alcohol pad, wait to dry.  Pull orange needle cover straight off (do not twist.) Gently squeeze the area of the cleaned skin and hold it firmly.   Push down on the raised area of skin that is being squeezed at a 90 degree angle to unlock the autoinjector.  Press button and hold for 15 seconds and wait until the blue indicator stops moving in  the window.    - Patient will use sharps container; once full, per [place] law, she/ he may lock the sharps container and place in her trash. She/ he can then contact the Pharmacy and we will replace the sharps at no additional charge.    Patient was counseled on possible side effects:  - Injection site reaction: redness, soreness, itching, bruising, which should resolve within 3-5 days.  - Increased risk for infections.  If patient becomes sick, patient is to hold Orencia use until she/ he is better.     DDIs:  Medication list reviewed and potential DDIs addressed.    Patient verbalized understanding. Compliance  stressed. Patient advised to keep a calendar marking dates of injections to ensure better compliance. Patient advised to call myself or provider should any questions arise. . Consultation included: indication; goals of treatment; administration; storage and handling; side effects; how to handle side effects; the importance of compliance; how to handle missed doses; the importance of laboratory monitoring; the importance of keeping all follow up appointments.  Patient understands to report any medication changes to [facility] and provider. All questions answered and addressed to patients satisfaction. I will f/u with her in 1 week from start, [facility] to contact patient in 3 weeks for refills.       Patient reports his RA pain today is 8.5/10. Paint and joint stiffness is mainly in his hands and he cannot open a bottle of war of hold a coffe cup. Patient going to therapy and has his wife for help.       Tasks added this encounter   1/11/2021 - Refill Call  3/11/2021 - Clinical - Follow Up Assesement (90 day)   Tasks due within next 3 months   No tasks due.     Alicia Vicente, Adriana  [facility]  [address] [place] [id number]  Phone: [phone]  Fax: [phone]

## 2020-12-18 NOTE — PROGRESS NOTES
Physical Therapy Treatment Note     Name: Darrion Bennett  [facility] Number: [id number]    Therapy Diagnosis:  Encounter Diagnoses   Name Primary?   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 He was unable to attend PT due to intense pain and swelling in B hands. He would like to work more on hands. Has started taking a pain medication prescribed by Dr. Keenan every 12 hours; believes this is decreasing pain slightly, though not fully.     He was compliant with home exercise program.  Response to previous treatment: sore  Functional change: Ongoing     Pain: 9/10  Location: bilateral ankles, feet , fingers , hands , joint, knee , shoulder , toes and wrists     Objective     Active Range of Motion:  Hip Left Right   Flexion WFL WFL   Abduction WFL WFL   Ext Rotation 23 degrees 26 degrees   Int Rotation 26 degrees 25 degrees      Knee Left Right   Extension WFL WFL   Flexion WFL WFL         Ankle  Left Right   Dorsiflexion Lacks 1 degrees 0   Plantarflexion 35 degrees 38 degrees   Inversion 19 degrees 21 degrees  "  Eversion 12 degrees 10 degrees         Strength:  Hip Left Right   Flexion 4-/5 4-/5   Abduction 4+/5 4+/5   Adduction 4-/5 4/5   Extension 3+/5 3+/5   External Rot 3+/5 3+/5   Internal Rot 4/5 4/5      Knee Left Right   Extension 4/5 4/5   Flexion 4/5 4/5      Ankle Left Right   Dorsiflexion 4+/5 4+/5   Plantarflexion 4+/5 4+/5        Darrion received therapeutic exercises to develop strength, endurance, ROM, flexibility, posture and core stabilization for 40 minutes including:     Nu-step     5 min  UBE      3 min  Standing G/S stretch    3x20" ea  Step ups to 4" step    15x ea  Sit<>stand from 18" w/ foam 5#      2x10    Doorway (Corner) Stretch   3x20"   ER w/ YTB    2 x 10  Red digi-flex    2x10 ea  + 5-way  c/ green foam   3x10 ea   Yellow flex-bar wrist flx/ext   20x ea  Wrist Flex/Ext Str   10 x 5" ea direction & hand  4 way wrist ROM w/ YTB  10x ea  + Digi Extend Y    2x10 ea   + Wrist flexion/extension 2# 2x10 ea   Yellow flex-bar smiles/rainbows   2x10 ea    Supine HS stretch    2x20" ea   Mini squats in // bars    2x10   Matrix row 15#    2x10   Supine piriformis stretch   2x20" ea   Ball squeezes    20x5" hold   Bridges     2x8   Supine hip flexor stretch  2x20" ea  SL Clamshells    2x10 ea   Seated pull down 5#    2x10  Wrist rolls     x30"       Darrion received the following manual therapy techniques: Joint mobilizations were applied to the:  for 00 minutes, including:    Darrion participated in gait training to improve functional mobility and safety for 00  minutes, including:    Darrion received hot pack for 5 minutes to hands in sitting position rested on table.     Home Exercises Provided and Patient Education Provided     Education provided:   - Continued use of HEP   - Rationale for POC and progression   - Potential benefits of OT     Written Home Exercises Provided: Patient instructed to cont prior HEP.  Exercises were reviewed and Darrion was able to demonstrate them prior to the end of the " session.  Darrion demonstrated good  understanding of the education provided.     See EMR under Patient Instructions for exercises provided prior visit.    Assessment   Darrion performed fairly well in today's session, though he was limited by significant pain in B hands and wrists. Due to recent increase in pain, PT educated on potential benefits of occupational therapy. Darrion demonstrates weakness, limited ROM, pain and swelling in B hands and wrists. Reassessment performed today for B LE, with Darrion demonstrating improvements in strength and ROM. Strength continues to be most limited at glutes, as demonstrated by weakness with hip extension and ER. FOTO administered with Darrion reporting improvement to 53% limitation; performance limited by focus on feet and ankles. UE not assessed fully this session due to anticipation of more thorough occupational therapy evaluation. Overall Darrion has met 3/7 STG.     Darrion is progressing well towards his goals.   Pt prognosis is Good.     Pt will continue to benefit from skilled outpatient physical therapy to address the deficits listed in the problem list box on initial evaluation, provide pt/family education and to maximize pt's level of independence in the home and community environment.     Pt's spiritual, cultural and educational needs considered and pt agreeable to plan of care and goals.     Anticipated barriers to physical therapy: Co-morbidities     Goals:   1. Pt to increase strength of LE muscles to 4-/5 to allow for improvement in transitional movements. -progressing  2. Pt to increase strength of UE muscles to 4-/5 to allow for improvement in reaching, holding, and lifting. -progressing  3. Pt to improve wrist and ankle range of motion by >3 degrees to allow for improved balance and fine motor skills. -MET 12/18/2020  4. Pt to demonstrate understanding of pathology and use of HEP for improved self-management of symptoms. -progressing  5. Pt to report decreased pain to  8/10 at worst for increased participation in social/community activities. - -MET 12/18/2020 with use of pain medication  6. Pt to increased FOTO performance to 60% limitation for improved participation. - -MET 12/18/2020  7. Pt to increase  strength to 17 pounds for improved grasping abilities. -progressing     Long Term Goals (12 Weeks):      1. Pt to increase strength of LE muscles to 4/5 to allow for improvement in transitional movements. -progressing  2. Pt to increase strength of UE muscles to 4/5 to allow for improvement in reaching, holding, and lifting. -progressing  3. Pt to improve wrist and ankle range of motion by >5 degrees to allow for improved balance and fine motor skills. -progressing  4. Pt to demonstrate proficiency with HEP for improved independence with self-management of condition/symptoms and prevention of re-injury. -progressing  5. Pt to report decreased pain to 6/10 at worst for increased participation in social/community activities. -progressing  6. Pt to increased FOTO performance to 47% limitation for improved participation. -progressing  7. Pt to increase  strength to 20 pounds for improved grasping abilities. -progressing    Plan     Cont with POC, progressing with LE and trunk strengthening, functional endurance, and postural re-education.    Recommend referral to OT for more thorough evaluation and treatment of B hands and wrists to address weakness, limited ROM, and pain.     ZANDRA VENCES, PT  12/18/2020

## 2020-12-19 DIAGNOSIS — M25.50 ARTHRALGIA, UNSPECIFIED JOINT: Primary | ICD-10-CM

## 2020-12-19 DIAGNOSIS — R53.81 DEBILITY: ICD-10-CM

## 2020-12-29 ENCOUNTER — CLINICAL SUPPORT (OUTPATIENT)
Dept: REHABILITATION | Facility: HOSPITAL | Age: 70
End: 2020-12-29
Attending: PODIATRIST
Payer: MEDICARE

## 2020-12-29 ENCOUNTER — CLINICAL SUPPORT (OUTPATIENT)
Dept: REHABILITATION | Facility: HOSPITAL | Age: 70
End: 2020-12-29
Attending: INTERNAL MEDICINE
Payer: MEDICARE

## 2020-12-29 DIAGNOSIS — M25.50 ARTHRALGIA, UNSPECIFIED JOINT: ICD-10-CM

## 2020-12-29 DIAGNOSIS — M25.632 DECREASED RANGE OF MOTION OF BOTH WRISTS: ICD-10-CM

## 2020-12-29 DIAGNOSIS — M25.671 DECREASED RANGE OF MOTION OF BOTH ANKLES: ICD-10-CM

## 2020-12-29 DIAGNOSIS — R53.81 DEBILITY: ICD-10-CM

## 2020-12-29 DIAGNOSIS — R29.898 DECREASED GRIP STRENGTH: ICD-10-CM

## 2020-12-29 DIAGNOSIS — R29.898 WEAKNESS OF EXTREMITY: ICD-10-CM

## 2020-12-29 DIAGNOSIS — M79.89 SWELLING OF BOTH HANDS: ICD-10-CM

## 2020-12-29 DIAGNOSIS — M25.50 ARTHRALGIA, UNSPECIFIED JOINT: Primary | ICD-10-CM

## 2020-12-29 DIAGNOSIS — M25.672 DECREASED RANGE OF MOTION OF BOTH ANKLES: ICD-10-CM

## 2020-12-29 DIAGNOSIS — M25.631 DECREASED RANGE OF MOTION OF BOTH WRISTS: ICD-10-CM

## 2020-12-29 PROCEDURE — 97110 THERAPEUTIC EXERCISES: CPT | Mod: HCNC,PN,CQ

## 2020-12-29 PROCEDURE — 97165 OT EVAL LOW COMPLEX 30 MIN: CPT | Mod: HCNC,PN

## 2020-12-29 NOTE — PROGRESS NOTES
Physical Therapy Treatment Note     Name: Darrion Bennett  [facility] Number: [id number]    Therapy Diagnosis:   Encounter Diagnoses   Name Primary?   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    He was compliant with home exercise program.  Response to previous treatment: sore  Functional change: Ongoing     Pain: 8/10  Location: left leg and balls of his feet    Objective     Active Range of Motion:  Hip Left Right   Flexion WFL WFL   Abduction WFL WFL   Ext Rotation 23 degrees 26 degrees   Int Rotation 26 degrees 25 degrees      Knee Left Right   Extension WFL WFL   Flexion WFL WFL         Ankle  Left Right   Dorsiflexion Lacks 1 degrees 0   Plantarflexion 35 degrees 38 degrees   Inversion 19 degrees 21 degrees   Eversion 12 degrees 10 degrees         Strength:  Hip Left Right   Flexion 4-/5 4-/5   Abduction 4+/5 4+/5   Adduction 4-/5 4/5   Extension 3+/5 3+/5   External Rot 3+/5 3+/5   Internal Rot 4/5 4/5      Knee Left Right   Extension 4/5 4/5   Flexion 4/5 4/5      Ankle Left Right   Dorsiflexion 4+/5 4+/5   Plantarflexion 4+/5 4+/5        Darrion received therapeutic exercises to develop  "strength, endurance, ROM, flexibility, posture and core stabilization for 45 minutes including:     Nu-step     5 min  UBE      3 min  Standing G/S stretch   3x30"   Step ups to 4" step    2x10 ea  Standing Marching 2#  2x10  Sit<>stand from 18" w/ foam 5#      2x10  Seated IR/ER w/ RTB  2 x 10 ea  Bridges     2x10, 3" hold  Supine HS stretch    3x30" ea (done by pta)  Supine Hip flexor stretch  2x30" ea (done by pta)    NP:  Doorway (Corner) Stretch   3x20"   Red digi-flex    2x10 ea  + 5-way  c/ green foam   3x10 ea   Yellow flex-bar wrist flx/ext   20x ea  Wrist Flex/Ext Str   10 x 5" ea direction & hand  4 way wrist ROM w/ YTB  10x ea  + Digi Extend Y    2x10 ea   + Wrist flexion/extension 2# 2x10 ea   Yellow flex-bar smiles/rainbows   2x10 ea  Mini squats in // bars    2x10   Matrix row 15#    2x10   Supine piriformis stretch   2x20" ea   Ball squeezes    20x5" hold ea  SL Clamshells    2x10 ea   Seated pull down 5#    2x10  Wrist rolls     x30"     Darrion received the following manual therapy techniques: Joint mobilizations were applied to the:  for 00 minutes, including:    Darrion participated in gait training to improve functional mobility and safety for 00  minutes, including:    Darrion received hot pack for 15 minutes to hamstrings, knees, and quads in supine.  Home Exercises Provided and Patient Education Provided     Education provided:   - Continued use of HEP   - Rationale for POC and progression   - Potential benefits of OT     Written Home Exercises Provided: Patient instructed to cont prior HEP.  Exercises were reviewed and Darrion was able to demonstrate them prior to the end of the session.  Darrion demonstrated good  understanding of the education provided.     See EMR under Patient Instructions for exercises provided prior visit.    Assessment   Darrion performed well during today's session. Pt has an occupational therapy eval later today, so we focused more on LE strengthening. Darrion had mild " difficulty with seated IR/ER on left leg, but there were no other exercises that exacerbated symptoms. Per PT reassessment we will continue to work on strength and ROM deficits in BLE progressing as tolerated.     Darrion is progressing well towards his goals.   Pt prognosis is Good.     Pt will continue to benefit from skilled outpatient physical therapy to address the deficits listed in the problem list box on initial evaluation, provide pt/family education and to maximize pt's level of independence in the home and community environment.     Pt's spiritual, cultural and educational needs considered and pt agreeable to plan of care and goals.     Anticipated barriers to physical therapy: Co-morbidities     Goals:   1. Pt to increase strength of LE muscles to 4-/5 to allow for improvement in transitional movements. -progressing  2. Pt to increase strength of UE muscles to 4-/5 to allow for improvement in reaching, holding, and lifting. -progressing  3. Pt to improve wrist and ankle range of motion by >3 degrees to allow for improved balance and fine motor skills. -MET 12/18/2020  4. Pt to demonstrate understanding of pathology and use of HEP for improved self-management of symptoms. -progressing  5. Pt to report decreased pain to 8/10 at worst for increased participation in social/community activities. - -MET 12/18/2020 with use of pain medication  6. Pt to increased FOTO performance to 60% limitation for improved participation. - -MET 12/18/2020  7. Pt to increase  strength to 17 pounds for improved grasping abilities. -progressing     Long Term Goals (12 Weeks):      1. Pt to increase strength of LE muscles to 4/5 to allow for improvement in transitional movements. -progressing  2. Pt to increase strength of UE muscles to 4/5 to allow for improvement in reaching, holding, and lifting. -progressing  3. Pt to improve wrist and ankle range of motion by >5 degrees to allow for improved balance and fine motor skills.  -progressing  4. Pt to demonstrate proficiency with HEP for improved independence with self-management of condition/symptoms and prevention of re-injury. -progressing  5. Pt to report decreased pain to 6/10 at worst for increased participation in social/community activities. -progressing  6. Pt to increased FOTO performance to 47% limitation for improved participation. -progressing  7. Pt to increase  strength to 20 pounds for improved grasping abilities. -progressing    Plan     Cont with POC, progressing with LE and trunk strengthening, functional endurance, and postural re-education.    Recommend referral to OT for more thorough evaluation and treatment of B hands and wrists to address weakness, limited ROM, and pain.     Irena Gurrola, PTA  12/29/2020

## 2020-12-31 ENCOUNTER — CLINICAL SUPPORT (OUTPATIENT)
Dept: REHABILITATION | Facility: HOSPITAL | Age: 70
End: 2020-12-31
Attending: PODIATRIST
Payer: MEDICARE

## 2020-12-31 DIAGNOSIS — M25.672 DECREASED RANGE OF MOTION OF BOTH ANKLES: ICD-10-CM

## 2020-12-31 DIAGNOSIS — M25.50 ARTHRALGIA, UNSPECIFIED JOINT: Primary | ICD-10-CM

## 2020-12-31 DIAGNOSIS — M25.631 DECREASED RANGE OF MOTION OF BOTH WRISTS: ICD-10-CM

## 2020-12-31 DIAGNOSIS — M25.632 DECREASED RANGE OF MOTION OF BOTH WRISTS: ICD-10-CM

## 2020-12-31 DIAGNOSIS — R29.898 DECREASED PINCH STRENGTH: ICD-10-CM

## 2020-12-31 DIAGNOSIS — M25.671 DECREASED RANGE OF MOTION OF BOTH ANKLES: ICD-10-CM

## 2020-12-31 PROCEDURE — 97110 THERAPEUTIC EXERCISES: CPT | Mod: HCNC,PN,CQ

## 2020-12-31 NOTE — PROGRESS NOTES
Physical Therapy Treatment Note     Name: Darrion Bennett  [facility] Number: [id number]    Therapy Diagnosis:   Encounter Diagnoses   Name Primary?   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    He was compliant with home exercise program.  Response to previous treatment: sore  Functional change: Ongoing     Pain: 0/10  Location: left leg and balls of his feet    Objective     Active Range of Motion:  Hip Left Right   Flexion WFL WFL   Abduction WFL WFL   Ext Rotation 23 degrees 26 degrees   Int Rotation 26 degrees 25 degrees      Knee Left Right   Extension WFL WFL   Flexion WFL WFL         Ankle  Left Right   Dorsiflexion Lacks 1 degrees 0   Plantarflexion 35 degrees 38 degrees   Inversion 19 degrees 21 degrees   Eversion 12 degrees 10 degrees         Strength:  Hip Left Right   Flexion 4-/5 4-/5   Abduction 4+/5 4+/5   Adduction 4-/5 4/5   Extension 3+/5 3+/5   External Rot 3+/5 3+/5   Internal Rot 4/5 4/5      Knee Left Right   Extension 4/5 4/5   Flexion 4/5 4/5      Ankle Left Right   Dorsiflexion 4+/5 4+/5   Plantarflexion 4+/5 4+/5        Darrion received therapeutic exercises to develop strength, endurance,  "ROM, flexibility, posture and core stabilization for 45 minutes including:     Recumbent Bike    5 min    Standing G/S stretch   3x30"   Step ups to 6" step    2x10 ea  Standing Marching 2#  2x10/ea  Standing Hip Abd 2#   2x10/ea  Sit<>stand from 18" 5# KB      2x10  Seated IR/ER w/ RTB  2 x 10 ea  +Hamstring Curls w/ RTB  2x10/ea  Bridges     2x10, 3" hold  HS stretch @stairs    3x30" ea   Supine Hip flexor stretch  2x30" ea (done by pta)      NP:UBE     3 min  Doorway (Corner) Stretch   3x20"   Red digi-flex    2x10 ea  + 5-way  c/ green foam   3x10 ea   Yellow flex-bar wrist flx/ext   20x ea  Wrist Flex/Ext Str   10 x 5" ea direction & hand  4 way wrist ROM w/ YTB  10x ea  + Digi Extend Y    2x10 ea   + Wrist flexion/extension 2# 2x10 ea   Yellow flex-bar smiles/rainbows   2x10 ea  Mini squats in // bars    2x10   Matrix row 15#    2x10   Supine piriformis stretch   2x20" ea   Ball squeezes    20x5" hold ea  SL Clamshells    2x10 ea   Seated pull down 5#    2x10  Wrist rolls     x30"     Darrion received the following manual therapy techniques: Joint mobilizations were applied to the:  for 00 minutes, including:    Darrion participated in gait training to improve functional mobility and safety for 00  minutes, including:    Darrion received hot pack for 10 minutes to knees in supine.  Home Exercises Provided and Patient Education Provided     Education provided:   - Continued use of HEP   - Rationale for POC and progression   - Potential benefits of OT     Written Home Exercises Provided: Patient instructed to cont prior HEP.  Exercises were reviewed and Darrion was able to demonstrate them prior to the end of the session.  Darrion demonstrated good  understanding of the education provided.     See EMR under Patient Instructions for exercises provided prior visit.    Assessment   Darrion tolerated the above therex well today without any complaints of pain. Today was the first time in a while that Mr. Maier has " presented to therapy with 0/10 pain which is really good. There was no provocation of pain noted during today's session. Mr. Maier remains an appropriate candidate for therapy in order to continue LE strengthen and overall functional mobility.     Darrion is progressing well towards his goals.   Pt prognosis is Good.     Pt will continue to benefit from skilled outpatient physical therapy to address the deficits listed in the problem list box on initial evaluation, provide pt/family education and to maximize pt's level of independence in the home and community environment.     Pt's spiritual, cultural and educational needs considered and pt agreeable to plan of care and goals.     Anticipated barriers to physical therapy: Co-morbidities     Goals:   1. Pt to increase strength of LE muscles to 4-/5 to allow for improvement in transitional movements. -progressing  2. Pt to increase strength of UE muscles to 4-/5 to allow for improvement in reaching, holding, and lifting. -progressing  3. Pt to improve wrist and ankle range of motion by >3 degrees to allow for improved balance and fine motor skills. -MET 12/18/2020  4. Pt to demonstrate understanding of pathology and use of HEP for improved self-management of symptoms. -progressing  5. Pt to report decreased pain to 8/10 at worst for increased participation in social/community activities. - -MET 12/18/2020 with use of pain medication  6. Pt to increased FOTO performance to 60% limitation for improved participation. - -MET 12/18/2020  7. Pt to increase  strength to 17 pounds for improved grasping abilities. -progressing     Long Term Goals (12 Weeks):      1. Pt to increase strength of LE muscles to 4/5 to allow for improvement in transitional movements. -progressing  2. Pt to increase strength of UE muscles to 4/5 to allow for improvement in reaching, holding, and lifting. -progressing  3. Pt to improve wrist and ankle range of motion by >5 degrees to allow for  improved balance and fine motor skills. -progressing  4. Pt to demonstrate proficiency with HEP for improved independence with self-management of condition/symptoms and prevention of re-injury. -progressing  5. Pt to report decreased pain to 6/10 at worst for increased participation in social/community activities. -progressing  6. Pt to increased FOTO performance to 47% limitation for improved participation. -progressing  7. Pt to increase  strength to 20 pounds for improved grasping abilities. -progressing    Plan     Pt to update POC (specifically goals). Progress with LE and trunk strengthening, functional endurance, and postural re-education as tolerated.    Irena Gurrola, PTA  12/31/2020

## 2021-01-04 ENCOUNTER — PATIENT MESSAGE (OUTPATIENT)
Dept: RHEUMATOLOGY | Facility: CLINIC | Age: 71
End: 2021-01-04

## 2021-01-04 ENCOUNTER — PATIENT MESSAGE (OUTPATIENT)
Dept: FAMILY MEDICINE | Facility: CLINIC | Age: 71
End: 2021-01-04

## 2021-01-04 ENCOUNTER — CLINICAL SUPPORT (OUTPATIENT)
Dept: REHABILITATION | Facility: HOSPITAL | Age: 71
End: 2021-01-04
Attending: PODIATRIST
Payer: MEDICARE

## 2021-01-04 DIAGNOSIS — R29.898 DECREASED GRIP STRENGTH: Primary | ICD-10-CM

## 2021-01-04 DIAGNOSIS — M25.632 DECREASED RANGE OF MOTION OF BOTH WRISTS: ICD-10-CM

## 2021-01-04 DIAGNOSIS — M25.541 ARTHRALGIA OF BOTH HANDS: ICD-10-CM

## 2021-01-04 DIAGNOSIS — M79.89 SWELLING OF BOTH HANDS: ICD-10-CM

## 2021-01-04 DIAGNOSIS — M25.631 DECREASED RANGE OF MOTION OF BOTH WRISTS: ICD-10-CM

## 2021-01-04 DIAGNOSIS — M25.542 ARTHRALGIA OF BOTH HANDS: ICD-10-CM

## 2021-01-04 PROCEDURE — 97140 MANUAL THERAPY 1/> REGIONS: CPT | Mod: HCNC,PN

## 2021-01-04 PROCEDURE — 97110 THERAPEUTIC EXERCISES: CPT | Mod: HCNC,PN

## 2021-01-09 ENCOUNTER — IMMUNIZATION (OUTPATIENT)
Dept: OBSTETRICS AND GYNECOLOGY | Facility: CLINIC | Age: 71
End: 2021-01-09
Payer: MEDICARE

## 2021-01-09 DIAGNOSIS — Z23 NEED FOR VACCINATION: ICD-10-CM

## 2021-01-09 PROCEDURE — 91300 COVID-19, MRNA, LNP-S, PF, 30 MCG/0.3 ML DOSE VACCINE: CPT | Mod: PBBFAC | Performed by: FAMILY MEDICINE

## 2021-01-11 ENCOUNTER — LAB VISIT (OUTPATIENT)
Dept: LAB | Facility: HOSPITAL | Age: 71
End: 2021-01-11
Attending: INTERNAL MEDICINE
Payer: MEDICARE

## 2021-01-11 DIAGNOSIS — M06.9 RHEUMATOID ARTHRITIS INVOLVING MULTIPLE JOINTS: ICD-10-CM

## 2021-01-11 LAB
ALBUMIN SERPL BCP-MCNC: 3.3 G/DL (ref 3.5–5.2)
ALP SERPL-CCNC: 65 U/L (ref 55–135)
ALT SERPL W/O P-5'-P-CCNC: 10 U/L (ref 10–44)
ANION GAP SERPL CALC-SCNC: 6 MMOL/L (ref 8–16)
AST SERPL-CCNC: 13 U/L (ref 10–40)
BASOPHILS # BLD AUTO: 0.02 K/UL (ref 0–0.2)
BASOPHILS NFR BLD: 0.3 % (ref 0–1.9)
BILIRUB SERPL-MCNC: 0.3 MG/DL (ref 0.1–1)
BUN SERPL-MCNC: 32 MG/DL (ref 8–23)
CALCIUM SERPL-MCNC: 9 MG/DL (ref 8.7–10.5)
CHLORIDE SERPL-SCNC: 104 MMOL/L (ref 95–110)
CO2 SERPL-SCNC: 26 MMOL/L (ref 23–29)
CREAT SERPL-MCNC: 1.5 MG/DL (ref 0.5–1.4)
CRP SERPL-MCNC: 9.1 MG/L (ref 0–8.2)
DIFFERENTIAL METHOD: ABNORMAL
EOSINOPHIL # BLD AUTO: 0.1 K/UL (ref 0–0.5)
EOSINOPHIL NFR BLD: 1.6 % (ref 0–8)
ERYTHROCYTE [DISTWIDTH] IN BLOOD BY AUTOMATED COUNT: 15.8 % (ref 11.5–14.5)
ERYTHROCYTE [SEDIMENTATION RATE] IN BLOOD BY WESTERGREN METHOD: 58 MM/HR (ref 0–23)
EST. GFR  (AFRICAN AMERICAN): 53.8 ML/MIN/1.73 M^2
EST. GFR  (NON AFRICAN AMERICAN): 46.5 ML/MIN/1.73 M^2
GLUCOSE SERPL-MCNC: 189 MG/DL (ref 70–110)
HCT VFR BLD AUTO: 37.2 % (ref 40–54)
HGB BLD-MCNC: 11.2 G/DL (ref 14–18)
IMM GRANULOCYTES # BLD AUTO: 0.02 K/UL (ref 0–0.04)
IMM GRANULOCYTES NFR BLD AUTO: 0.3 % (ref 0–0.5)
LYMPHOCYTES # BLD AUTO: 1.5 K/UL (ref 1–4.8)
LYMPHOCYTES NFR BLD: 20.5 % (ref 18–48)
MCH RBC QN AUTO: 26.4 PG (ref 27–31)
MCHC RBC AUTO-ENTMCNC: 30.1 G/DL (ref 32–36)
MCV RBC AUTO: 88 FL (ref 82–98)
MONOCYTES # BLD AUTO: 1 K/UL (ref 0.3–1)
MONOCYTES NFR BLD: 14.2 % (ref 4–15)
NEUTROPHILS # BLD AUTO: 4.6 K/UL (ref 1.8–7.7)
NEUTROPHILS NFR BLD: 63.1 % (ref 38–73)
NRBC BLD-RTO: 0 /100 WBC
PLATELET # BLD AUTO: 274 K/UL (ref 150–350)
PMV BLD AUTO: 12.1 FL (ref 9.2–12.9)
POTASSIUM SERPL-SCNC: 4.3 MMOL/L (ref 3.5–5.1)
PROT SERPL-MCNC: 7.5 G/DL (ref 6–8.4)
RBC # BLD AUTO: 4.25 M/UL (ref 4.6–6.2)
SODIUM SERPL-SCNC: 136 MMOL/L (ref 136–145)
URATE SERPL-MCNC: 4.1 MG/DL (ref 3.4–7)
WBC # BLD AUTO: 7.33 K/UL (ref 3.9–12.7)

## 2021-01-11 PROCEDURE — 85652 RBC SED RATE AUTOMATED: CPT | Mod: HCNC

## 2021-01-11 PROCEDURE — 85025 COMPLETE CBC W/AUTO DIFF WBC: CPT | Mod: HCNC

## 2021-01-11 PROCEDURE — 84550 ASSAY OF BLOOD/URIC ACID: CPT | Mod: HCNC

## 2021-01-11 PROCEDURE — 36415 COLL VENOUS BLD VENIPUNCTURE: CPT | Mod: HCNC,PO

## 2021-01-11 PROCEDURE — 80053 COMPREHEN METABOLIC PANEL: CPT | Mod: HCNC

## 2021-01-11 PROCEDURE — 86140 C-REACTIVE PROTEIN: CPT | Mod: HCNC

## 2021-01-13 ENCOUNTER — PATIENT MESSAGE (OUTPATIENT)
Dept: RHEUMATOLOGY | Facility: CLINIC | Age: 71
End: 2021-01-13

## 2021-01-13 ENCOUNTER — SPECIALTY PHARMACY (OUTPATIENT)
Dept: PHARMACY | Facility: CLINIC | Age: 71
End: 2021-01-13

## 2021-01-13 ENCOUNTER — CLINICAL SUPPORT (OUTPATIENT)
Dept: REHABILITATION | Facility: HOSPITAL | Age: 71
End: 2021-01-13
Attending: PODIATRIST
Payer: MEDICARE

## 2021-01-13 DIAGNOSIS — Z79.52 LONG TERM SYSTEMIC STEROID USER: Primary | ICD-10-CM

## 2021-01-13 DIAGNOSIS — R29.898 DECREASED PINCH STRENGTH: ICD-10-CM

## 2021-01-13 DIAGNOSIS — M25.50 ARTHRALGIA, UNSPECIFIED JOINT: ICD-10-CM

## 2021-01-13 DIAGNOSIS — M25.631 DECREASED RANGE OF MOTION OF BOTH WRISTS: ICD-10-CM

## 2021-01-13 DIAGNOSIS — M25.672 DECREASED RANGE OF MOTION OF BOTH ANKLES: ICD-10-CM

## 2021-01-13 DIAGNOSIS — M25.632 DECREASED RANGE OF MOTION OF BOTH WRISTS: ICD-10-CM

## 2021-01-13 DIAGNOSIS — M06.9 RHEUMATOID ARTHRITIS INVOLVING MULTIPLE JOINTS: ICD-10-CM

## 2021-01-13 DIAGNOSIS — M25.671 DECREASED RANGE OF MOTION OF BOTH ANKLES: ICD-10-CM

## 2021-01-13 PROCEDURE — 97110 THERAPEUTIC EXERCISES: CPT | Mod: HCNC,PN

## 2021-01-14 ENCOUNTER — CLINICAL SUPPORT (OUTPATIENT)
Dept: REHABILITATION | Facility: HOSPITAL | Age: 71
End: 2021-01-14
Attending: PODIATRIST
Payer: MEDICARE

## 2021-01-14 DIAGNOSIS — R29.898 DECREASED GRIP STRENGTH: Primary | ICD-10-CM

## 2021-01-14 DIAGNOSIS — M25.541 ARTHRALGIA OF BOTH HANDS: ICD-10-CM

## 2021-01-14 DIAGNOSIS — M25.632 DECREASED RANGE OF MOTION OF BOTH WRISTS: ICD-10-CM

## 2021-01-14 DIAGNOSIS — M25.631 DECREASED RANGE OF MOTION OF BOTH WRISTS: ICD-10-CM

## 2021-01-14 DIAGNOSIS — M25.542 ARTHRALGIA OF BOTH HANDS: ICD-10-CM

## 2021-01-14 DIAGNOSIS — M79.89 SWELLING OF BOTH HANDS: ICD-10-CM

## 2021-01-14 PROCEDURE — 97140 MANUAL THERAPY 1/> REGIONS: CPT | Mod: HCNC,PN

## 2021-01-14 PROCEDURE — 97110 THERAPEUTIC EXERCISES: CPT | Mod: HCNC,PN

## 2021-01-25 ENCOUNTER — CLINICAL SUPPORT (OUTPATIENT)
Dept: REHABILITATION | Facility: HOSPITAL | Age: 71
End: 2021-01-25
Attending: PODIATRIST
Payer: MEDICARE

## 2021-01-25 DIAGNOSIS — M25.632 DECREASED RANGE OF MOTION OF BOTH WRISTS: ICD-10-CM

## 2021-01-25 DIAGNOSIS — M25.631 DECREASED RANGE OF MOTION OF BOTH WRISTS: ICD-10-CM

## 2021-01-25 DIAGNOSIS — M25.541 ARTHRALGIA OF BOTH HANDS: ICD-10-CM

## 2021-01-25 DIAGNOSIS — M79.89 SWELLING OF BOTH HANDS: ICD-10-CM

## 2021-01-25 DIAGNOSIS — R29.898 DECREASED GRIP STRENGTH: Primary | ICD-10-CM

## 2021-01-25 DIAGNOSIS — M25.542 ARTHRALGIA OF BOTH HANDS: ICD-10-CM

## 2021-01-25 PROCEDURE — 97110 THERAPEUTIC EXERCISES: CPT | Mod: HCNC,PN

## 2021-01-25 PROCEDURE — 97140 MANUAL THERAPY 1/> REGIONS: CPT | Mod: HCNC,PN

## 2021-01-26 ENCOUNTER — CLINICAL SUPPORT (OUTPATIENT)
Dept: REHABILITATION | Facility: HOSPITAL | Age: 71
End: 2021-01-26
Attending: PODIATRIST
Payer: MEDICARE

## 2021-01-26 DIAGNOSIS — M25.671 DECREASED RANGE OF MOTION OF BOTH ANKLES: ICD-10-CM

## 2021-01-26 DIAGNOSIS — M25.50 ARTHRALGIA, UNSPECIFIED JOINT: Primary | ICD-10-CM

## 2021-01-26 DIAGNOSIS — R29.898 DECREASED PINCH STRENGTH: ICD-10-CM

## 2021-01-26 DIAGNOSIS — M25.672 DECREASED RANGE OF MOTION OF BOTH ANKLES: ICD-10-CM

## 2021-01-26 DIAGNOSIS — M25.631 DECREASED RANGE OF MOTION OF BOTH WRISTS: ICD-10-CM

## 2021-01-26 DIAGNOSIS — M25.632 DECREASED RANGE OF MOTION OF BOTH WRISTS: ICD-10-CM

## 2021-01-26 PROCEDURE — 97110 THERAPEUTIC EXERCISES: CPT | Mod: HCNC,PN,CQ

## 2021-01-27 ENCOUNTER — HOSPITAL ENCOUNTER (OUTPATIENT)
Dept: RADIOLOGY | Facility: CLINIC | Age: 71
Discharge: HOME | End: 2021-01-27
Attending: INTERNAL MEDICINE
Payer: MEDICARE

## 2021-01-27 DIAGNOSIS — Z79.52 LONG TERM SYSTEMIC STEROID USER: ICD-10-CM

## 2021-01-27 PROCEDURE — 77080 DEXA BONE DENSITY SPINE HIP: ICD-10-PCS | Mod: 26,,, | Performed by: INTERNAL MEDICINE

## 2021-01-27 PROCEDURE — 77080 DXA BONE DENSITY AXIAL: CPT | Mod: 26,,, | Performed by: INTERNAL MEDICINE

## 2021-01-27 PROCEDURE — 77080 DXA BONE DENSITY AXIAL: CPT | Mod: TC,HCNC

## 2021-01-28 ENCOUNTER — LAB VISIT (OUTPATIENT)
Dept: LAB | Facility: HOSPITAL | Age: 71
End: 2021-01-28
Attending: NURSE PRACTITIONER
Payer: MEDICARE

## 2021-01-28 DIAGNOSIS — E11.8 CONTROLLED TYPE 2 DIABETES MELLITUS WITH COMPLICATION, WITH LONG-TERM CURRENT USE OF INSULIN: ICD-10-CM

## 2021-01-28 DIAGNOSIS — Z79.4 CONTROLLED TYPE 2 DIABETES MELLITUS WITH COMPLICATION, WITH LONG-TERM CURRENT USE OF INSULIN: ICD-10-CM

## 2021-01-28 LAB
ESTIMATED AVG GLUCOSE: 137 MG/DL (ref 68–131)
HBA1C MFR BLD: 6.4 % (ref 4–5.6)

## 2021-01-28 PROCEDURE — 83036 HEMOGLOBIN GLYCOSYLATED A1C: CPT | Mod: HCNC

## 2021-01-28 PROCEDURE — 36415 COLL VENOUS BLD VENIPUNCTURE: CPT | Mod: HCNC,PO

## 2021-01-29 ENCOUNTER — CLINICAL SUPPORT (OUTPATIENT)
Dept: REHABILITATION | Facility: HOSPITAL | Age: 71
End: 2021-01-29
Attending: PODIATRIST
Payer: MEDICARE

## 2021-01-29 DIAGNOSIS — M25.672 DECREASED RANGE OF MOTION OF BOTH ANKLES: ICD-10-CM

## 2021-01-29 DIAGNOSIS — M25.50 ARTHRALGIA, UNSPECIFIED JOINT: ICD-10-CM

## 2021-01-29 DIAGNOSIS — M25.631 DECREASED RANGE OF MOTION OF BOTH WRISTS: ICD-10-CM

## 2021-01-29 DIAGNOSIS — R29.898 DECREASED PINCH STRENGTH: ICD-10-CM

## 2021-01-29 DIAGNOSIS — M25.671 DECREASED RANGE OF MOTION OF BOTH ANKLES: ICD-10-CM

## 2021-01-29 DIAGNOSIS — M25.632 DECREASED RANGE OF MOTION OF BOTH WRISTS: ICD-10-CM

## 2021-01-29 PROCEDURE — 97110 THERAPEUTIC EXERCISES: CPT | Mod: HCNC,PN

## 2021-01-30 ENCOUNTER — IMMUNIZATION (OUTPATIENT)
Dept: OBSTETRICS AND GYNECOLOGY | Facility: CLINIC | Age: 71
End: 2021-01-30
Payer: MEDICARE

## 2021-01-30 DIAGNOSIS — Z23 NEED FOR VACCINATION: Primary | ICD-10-CM

## 2021-01-30 PROCEDURE — 91300 COVID-19, MRNA, LNP-S, PF, 30 MCG/0.3 ML DOSE VACCINE: CPT | Mod: PBBFAC | Performed by: FAMILY MEDICINE

## 2021-01-30 PROCEDURE — 0002A COVID-19, MRNA, LNP-S, PF, 30 MCG/0.3 ML DOSE VACCINE: CPT | Mod: PBBFAC | Performed by: FAMILY MEDICINE

## 2021-02-04 ENCOUNTER — OFFICE VISIT (OUTPATIENT)
Dept: ENDOCRINOLOGY | Facility: CLINIC | Age: 71
End: 2021-02-04
Payer: MEDICARE

## 2021-02-04 VITALS
HEART RATE: 85 BPM | DIASTOLIC BLOOD PRESSURE: 86 MMHG | SYSTOLIC BLOOD PRESSURE: 167 MMHG | BODY MASS INDEX: 23.41 KG/M2 | HEIGHT: 66 IN | TEMPERATURE: 98 F | WEIGHT: 145.69 LBS

## 2021-02-04 DIAGNOSIS — T38.0X5A STEROID-INDUCED HYPERGLYCEMIA: ICD-10-CM

## 2021-02-04 DIAGNOSIS — N18.31 STAGE 3A CHRONIC KIDNEY DISEASE: ICD-10-CM

## 2021-02-04 DIAGNOSIS — E11.649 HYPOGLYCEMIA ASSOCIATED WITH DIABETES: ICD-10-CM

## 2021-02-04 DIAGNOSIS — I10 ESSENTIAL HYPERTENSION: ICD-10-CM

## 2021-02-04 DIAGNOSIS — R73.9 STEROID-INDUCED HYPERGLYCEMIA: ICD-10-CM

## 2021-02-04 PROCEDURE — 99214 OFFICE O/P EST MOD 30 MIN: CPT | Mod: S$GLB,,, | Performed by: NURSE PRACTITIONER

## 2021-02-04 PROCEDURE — 3008F PR BODY MASS INDEX (BMI) DOCUMENTED: ICD-10-PCS | Mod: CPTII,S$GLB,, | Performed by: NURSE PRACTITIONER

## 2021-02-04 PROCEDURE — 99499 UNLISTED E&M SERVICE: CPT | Mod: S$GLB,,, | Performed by: NURSE PRACTITIONER

## 2021-02-04 PROCEDURE — 3079F DIAST BP 80-89 MM HG: CPT | Mod: CPTII,S$GLB,, | Performed by: NURSE PRACTITIONER

## 2021-02-04 PROCEDURE — 3044F PR MOST RECENT HEMOGLOBIN A1C LEVEL <7.0%: ICD-10-PCS | Mod: CPTII,S$GLB,, | Performed by: NURSE PRACTITIONER

## 2021-02-04 PROCEDURE — 99499 RISK ADDL DX/OHS AUDIT: ICD-10-PCS | Mod: S$GLB,,, | Performed by: NURSE PRACTITIONER

## 2021-02-04 PROCEDURE — 99999 PR PBB SHADOW E&M-EST. PATIENT-LVL V: ICD-10-PCS | Mod: PBBFAC,,, | Performed by: NURSE PRACTITIONER

## 2021-02-04 PROCEDURE — 3077F SYST BP >= 140 MM HG: CPT | Mod: CPTII,S$GLB,, | Performed by: NURSE PRACTITIONER

## 2021-02-04 PROCEDURE — 99214 PR OFFICE/OUTPT VISIT, EST, LEVL IV, 30-39 MIN: ICD-10-PCS | Mod: S$GLB,,, | Performed by: NURSE PRACTITIONER

## 2021-02-04 PROCEDURE — 3077F PR MOST RECENT SYSTOLIC BLOOD PRESSURE >= 140 MM HG: ICD-10-PCS | Mod: CPTII,S$GLB,, | Performed by: NURSE PRACTITIONER

## 2021-02-04 PROCEDURE — 3044F HG A1C LEVEL LT 7.0%: CPT | Mod: CPTII,S$GLB,, | Performed by: NURSE PRACTITIONER

## 2021-02-04 PROCEDURE — 3079F PR MOST RECENT DIASTOLIC BLOOD PRESSURE 80-89 MM HG: ICD-10-PCS | Mod: CPTII,S$GLB,, | Performed by: NURSE PRACTITIONER

## 2021-02-04 PROCEDURE — 1159F PR MEDICATION LIST DOCUMENTED IN MEDICAL RECORD: ICD-10-PCS | Mod: S$GLB,,, | Performed by: NURSE PRACTITIONER

## 2021-02-04 PROCEDURE — 3008F BODY MASS INDEX DOCD: CPT | Mod: CPTII,S$GLB,, | Performed by: NURSE PRACTITIONER

## 2021-02-04 PROCEDURE — 99999 PR PBB SHADOW E&M-EST. PATIENT-LVL V: CPT | Mod: PBBFAC,,, | Performed by: NURSE PRACTITIONER

## 2021-02-04 PROCEDURE — 1159F MED LIST DOCD IN RCRD: CPT | Mod: S$GLB,,, | Performed by: NURSE PRACTITIONER

## 2021-02-05 ENCOUNTER — CLINICAL SUPPORT (OUTPATIENT)
Dept: DIABETES | Facility: CLINIC | Age: 71
End: 2021-02-05
Payer: MEDICARE

## 2021-02-05 ENCOUNTER — CLINICAL SUPPORT (OUTPATIENT)
Dept: REHABILITATION | Facility: HOSPITAL | Age: 71
End: 2021-02-05
Attending: PODIATRIST
Payer: MEDICARE

## 2021-02-05 DIAGNOSIS — E11.8 CONTROLLED TYPE 2 DIABETES MELLITUS WITH COMPLICATION, WITHOUT LONG-TERM CURRENT USE OF INSULIN: Primary | ICD-10-CM

## 2021-02-05 DIAGNOSIS — M25.631 DECREASED RANGE OF MOTION OF BOTH WRISTS: ICD-10-CM

## 2021-02-05 DIAGNOSIS — M25.632 DECREASED RANGE OF MOTION OF BOTH WRISTS: ICD-10-CM

## 2021-02-05 DIAGNOSIS — M79.89 SWELLING OF BOTH HANDS: ICD-10-CM

## 2021-02-05 DIAGNOSIS — R29.898 DECREASED GRIP STRENGTH: Primary | ICD-10-CM

## 2021-02-05 PROBLEM — N18.31 STAGE 3A CHRONIC KIDNEY DISEASE: Status: ACTIVE | Noted: 2020-01-16

## 2021-02-05 PROBLEM — N18.31 ANEMIA OF CHRONIC RENAL FAILURE, STAGE 3A: Status: ACTIVE | Noted: 2019-03-13

## 2021-02-05 PROCEDURE — 97018 PARAFFIN BATH THERAPY: CPT | Mod: PN

## 2021-02-05 PROCEDURE — G0108 DIAB MANAGE TRN  PER INDIV: HCPCS | Mod: S$GLB,,, | Performed by: DIETITIAN, REGISTERED

## 2021-02-05 PROCEDURE — 97140 MANUAL THERAPY 1/> REGIONS: CPT | Mod: PN

## 2021-02-05 PROCEDURE — 97110 THERAPEUTIC EXERCISES: CPT | Mod: PN

## 2021-02-05 PROCEDURE — G0108 PR DIAB MANAGE TRN  PER INDIV: ICD-10-PCS | Mod: S$GLB,,, | Performed by: DIETITIAN, REGISTERED

## 2021-02-08 ENCOUNTER — OFFICE VISIT (OUTPATIENT)
Dept: FAMILY MEDICINE | Facility: CLINIC | Age: 71
End: 2021-02-08
Payer: MEDICARE

## 2021-02-08 VITALS
DIASTOLIC BLOOD PRESSURE: 70 MMHG | BODY MASS INDEX: 23.3 KG/M2 | HEIGHT: 66 IN | WEIGHT: 145 LBS | HEART RATE: 65 BPM | TEMPERATURE: 98 F | SYSTOLIC BLOOD PRESSURE: 150 MMHG

## 2021-02-08 DIAGNOSIS — E11.8 CONTROLLED TYPE 2 DIABETES MELLITUS WITH COMPLICATION, WITHOUT LONG-TERM CURRENT USE OF INSULIN: ICD-10-CM

## 2021-02-08 DIAGNOSIS — D69.6 THROMBOCYTOPENIA: ICD-10-CM

## 2021-02-08 DIAGNOSIS — I50.30 DIASTOLIC HEART FAILURE, NYHA CLASS 2: ICD-10-CM

## 2021-02-08 DIAGNOSIS — E78.00 PURE HYPERCHOLESTEROLEMIA: ICD-10-CM

## 2021-02-08 DIAGNOSIS — N18.31 ANEMIA OF CHRONIC RENAL FAILURE, STAGE 3A: ICD-10-CM

## 2021-02-08 DIAGNOSIS — N25.81 SECONDARY HYPERPARATHYROIDISM OF RENAL ORIGIN: ICD-10-CM

## 2021-02-08 DIAGNOSIS — I10 ESSENTIAL HYPERTENSION: Primary | ICD-10-CM

## 2021-02-08 DIAGNOSIS — M35.00 SJOGREN'S SYNDROME, WITH UNSPECIFIED ORGAN INVOLVEMENT: ICD-10-CM

## 2021-02-08 DIAGNOSIS — M06.9 RHEUMATOID ARTHRITIS INVOLVING MULTIPLE JOINTS: ICD-10-CM

## 2021-02-08 DIAGNOSIS — M1A.9XX1 TOPHACEOUS GOUT: ICD-10-CM

## 2021-02-08 DIAGNOSIS — D70.9 NEUTROPENIA, UNSPECIFIED TYPE: ICD-10-CM

## 2021-02-08 DIAGNOSIS — D63.1 ANEMIA OF CHRONIC RENAL FAILURE, STAGE 3A: ICD-10-CM

## 2021-02-08 DIAGNOSIS — N18.31 STAGE 3A CHRONIC KIDNEY DISEASE: ICD-10-CM

## 2021-02-08 DIAGNOSIS — I70.0 AORTIC ATHEROSCLEROSIS: ICD-10-CM

## 2021-02-08 PROCEDURE — 3044F PR MOST RECENT HEMOGLOBIN A1C LEVEL <7.0%: ICD-10-PCS | Mod: CPTII,S$GLB,, | Performed by: INTERNAL MEDICINE

## 2021-02-08 PROCEDURE — 3008F PR BODY MASS INDEX (BMI) DOCUMENTED: ICD-10-PCS | Mod: CPTII,S$GLB,, | Performed by: INTERNAL MEDICINE

## 2021-02-08 PROCEDURE — 1159F MED LIST DOCD IN RCRD: CPT | Mod: S$GLB,,, | Performed by: INTERNAL MEDICINE

## 2021-02-08 PROCEDURE — 99999 PR PBB SHADOW E&M-EST. PATIENT-LVL V: ICD-10-PCS | Mod: PBBFAC,,, | Performed by: INTERNAL MEDICINE

## 2021-02-08 PROCEDURE — 1159F PR MEDICATION LIST DOCUMENTED IN MEDICAL RECORD: ICD-10-PCS | Mod: S$GLB,,, | Performed by: INTERNAL MEDICINE

## 2021-02-08 PROCEDURE — 3078F PR MOST RECENT DIASTOLIC BLOOD PRESSURE < 80 MM HG: ICD-10-PCS | Mod: CPTII,S$GLB,, | Performed by: INTERNAL MEDICINE

## 2021-02-08 PROCEDURE — 1126F PR PAIN SEVERITY QUANTIFIED, NO PAIN PRESENT: ICD-10-PCS | Mod: S$GLB,,, | Performed by: INTERNAL MEDICINE

## 2021-02-08 PROCEDURE — 3077F SYST BP >= 140 MM HG: CPT | Mod: CPTII,S$GLB,, | Performed by: INTERNAL MEDICINE

## 2021-02-08 PROCEDURE — 3077F PR MOST RECENT SYSTOLIC BLOOD PRESSURE >= 140 MM HG: ICD-10-PCS | Mod: CPTII,S$GLB,, | Performed by: INTERNAL MEDICINE

## 2021-02-08 PROCEDURE — 3078F DIAST BP <80 MM HG: CPT | Mod: CPTII,S$GLB,, | Performed by: INTERNAL MEDICINE

## 2021-02-08 PROCEDURE — 99499 UNLISTED E&M SERVICE: CPT | Mod: S$GLB,,, | Performed by: INTERNAL MEDICINE

## 2021-02-08 PROCEDURE — 99499 RISK ADDL DX/OHS AUDIT: ICD-10-PCS | Mod: S$GLB,,, | Performed by: INTERNAL MEDICINE

## 2021-02-08 PROCEDURE — 99214 OFFICE O/P EST MOD 30 MIN: CPT | Mod: S$GLB,,, | Performed by: INTERNAL MEDICINE

## 2021-02-08 PROCEDURE — 99999 PR PBB SHADOW E&M-EST. PATIENT-LVL V: CPT | Mod: PBBFAC,,, | Performed by: INTERNAL MEDICINE

## 2021-02-08 PROCEDURE — 99214 PR OFFICE/OUTPT VISIT, EST, LEVL IV, 30-39 MIN: ICD-10-PCS | Mod: S$GLB,,, | Performed by: INTERNAL MEDICINE

## 2021-02-08 PROCEDURE — 3044F HG A1C LEVEL LT 7.0%: CPT | Mod: CPTII,S$GLB,, | Performed by: INTERNAL MEDICINE

## 2021-02-08 PROCEDURE — 1126F AMNT PAIN NOTED NONE PRSNT: CPT | Mod: S$GLB,,, | Performed by: INTERNAL MEDICINE

## 2021-02-08 PROCEDURE — 3008F BODY MASS INDEX DOCD: CPT | Mod: CPTII,S$GLB,, | Performed by: INTERNAL MEDICINE

## 2021-02-08 RX ORDER — CLONIDINE 0.2 MG/24H
1 PATCH, EXTENDED RELEASE TRANSDERMAL
Qty: 4 PATCH | Refills: 11 | Status: SHIPPED | OUTPATIENT
Start: 2021-02-08 | End: 2021-11-05

## 2021-02-09 ENCOUNTER — OFFICE VISIT (OUTPATIENT)
Dept: RHEUMATOLOGY | Facility: CLINIC | Age: 71
End: 2021-02-09
Payer: MEDICARE

## 2021-02-09 DIAGNOSIS — M06.9 RHEUMATOID ARTHRITIS INVOLVING MULTIPLE JOINTS: ICD-10-CM

## 2021-02-09 DIAGNOSIS — M1A.9XX1 TOPHACEOUS GOUT: ICD-10-CM

## 2021-02-09 DIAGNOSIS — Z79.52 LONG TERM SYSTEMIC STEROID USER: Primary | ICD-10-CM

## 2021-02-09 PROCEDURE — 1125F AMNT PAIN NOTED PAIN PRSNT: CPT | Mod: 95,,, | Performed by: INTERNAL MEDICINE

## 2021-02-09 PROCEDURE — 1101F PT FALLS ASSESS-DOCD LE1/YR: CPT | Mod: CPTII,95,, | Performed by: INTERNAL MEDICINE

## 2021-02-09 PROCEDURE — 1125F PR PAIN SEVERITY QUANTIFIED, PAIN PRESENT: ICD-10-PCS | Mod: 95,,, | Performed by: INTERNAL MEDICINE

## 2021-02-09 PROCEDURE — 1101F PR PT FALLS ASSESS DOC 0-1 FALLS W/OUT INJ PAST YR: ICD-10-PCS | Mod: CPTII,95,, | Performed by: INTERNAL MEDICINE

## 2021-02-09 PROCEDURE — 1159F PR MEDICATION LIST DOCUMENTED IN MEDICAL RECORD: ICD-10-PCS | Mod: 95,,, | Performed by: INTERNAL MEDICINE

## 2021-02-09 PROCEDURE — 99214 OFFICE O/P EST MOD 30 MIN: CPT | Mod: 95,,, | Performed by: INTERNAL MEDICINE

## 2021-02-09 PROCEDURE — 3288F PR FALLS RISK ASSESSMENT DOCUMENTED: ICD-10-PCS | Mod: CPTII,95,, | Performed by: INTERNAL MEDICINE

## 2021-02-09 PROCEDURE — 99499 UNLISTED E&M SERVICE: CPT | Mod: 95,,, | Performed by: INTERNAL MEDICINE

## 2021-02-09 PROCEDURE — 1159F MED LIST DOCD IN RCRD: CPT | Mod: 95,,, | Performed by: INTERNAL MEDICINE

## 2021-02-09 PROCEDURE — 3288F FALL RISK ASSESSMENT DOCD: CPT | Mod: CPTII,95,, | Performed by: INTERNAL MEDICINE

## 2021-02-09 PROCEDURE — 99214 PR OFFICE/OUTPT VISIT, EST, LEVL IV, 30-39 MIN: ICD-10-PCS | Mod: 95,,, | Performed by: INTERNAL MEDICINE

## 2021-02-09 PROCEDURE — 99499 RISK ADDL DX/OHS AUDIT: ICD-10-PCS | Mod: 95,,, | Performed by: INTERNAL MEDICINE

## 2021-02-09 RX ORDER — ABATACEPT 125 MG/ML
125 INJECTION, SOLUTION SUBCUTANEOUS WEEKLY
Qty: 12 ML | Refills: 4 | Status: SHIPPED | OUTPATIENT
Start: 2021-02-09 | End: 2021-05-10

## 2021-02-09 ASSESSMENT — ROUTINE ASSESSMENT OF PATIENT INDEX DATA (RAPID3)
PAIN SCORE: 6
MDHAQ FUNCTION SCORE: 1.4
TOTAL RAPID3 SCORE: 5.39
PSYCHOLOGICAL DISTRESS SCORE: 1.1
WHEN YOU AWAKENED IN THE MORNING OVER THE LAST WEEK, PLEASE INDICATE THE AMOUNT OF TIME IT TAKES UNTIL YOU ARE AS LIMBER AS YOU WILL BE FOR THE DAY: 1 HOURS
FATIGUE SCORE: 7
PATIENT GLOBAL ASSESSMENT SCORE: 5.5
AM STIFFNESS SCORE: 1, YES

## 2021-02-10 ENCOUNTER — CLINICAL SUPPORT (OUTPATIENT)
Dept: REHABILITATION | Facility: HOSPITAL | Age: 71
End: 2021-02-10
Attending: PODIATRIST
Payer: MEDICARE

## 2021-02-10 DIAGNOSIS — R29.898 DECREASED GRIP STRENGTH: ICD-10-CM

## 2021-02-10 PROCEDURE — 97140 MANUAL THERAPY 1/> REGIONS: CPT | Mod: PN

## 2021-02-10 PROCEDURE — 97110 THERAPEUTIC EXERCISES: CPT | Mod: PN

## 2021-02-11 ENCOUNTER — CLINICAL SUPPORT (OUTPATIENT)
Dept: REHABILITATION | Facility: HOSPITAL | Age: 71
End: 2021-02-11
Attending: PODIATRIST
Payer: MEDICARE

## 2021-02-11 DIAGNOSIS — M25.631 DECREASED RANGE OF MOTION OF BOTH WRISTS: ICD-10-CM

## 2021-02-11 DIAGNOSIS — M25.50 ARTHRALGIA, UNSPECIFIED JOINT: ICD-10-CM

## 2021-02-11 DIAGNOSIS — M25.672 DECREASED RANGE OF MOTION OF BOTH ANKLES: ICD-10-CM

## 2021-02-11 DIAGNOSIS — R29.898 DECREASED PINCH STRENGTH: ICD-10-CM

## 2021-02-11 DIAGNOSIS — M25.671 DECREASED RANGE OF MOTION OF BOTH ANKLES: ICD-10-CM

## 2021-02-11 DIAGNOSIS — M25.632 DECREASED RANGE OF MOTION OF BOTH WRISTS: ICD-10-CM

## 2021-02-11 PROCEDURE — 97110 THERAPEUTIC EXERCISES: CPT | Mod: PN,CQ

## 2021-02-22 ENCOUNTER — CLINICAL SUPPORT (OUTPATIENT)
Dept: REHABILITATION | Facility: HOSPITAL | Age: 71
End: 2021-02-22
Attending: PODIATRIST
Payer: MEDICARE

## 2021-02-22 DIAGNOSIS — M25.672 DECREASED RANGE OF MOTION OF BOTH ANKLES: ICD-10-CM

## 2021-02-22 DIAGNOSIS — M25.632 DECREASED RANGE OF MOTION OF BOTH WRISTS: ICD-10-CM

## 2021-02-22 DIAGNOSIS — M25.50 ARTHRALGIA, UNSPECIFIED JOINT: ICD-10-CM

## 2021-02-22 DIAGNOSIS — M25.671 DECREASED RANGE OF MOTION OF BOTH ANKLES: ICD-10-CM

## 2021-02-22 DIAGNOSIS — M25.631 DECREASED RANGE OF MOTION OF BOTH WRISTS: ICD-10-CM

## 2021-02-22 DIAGNOSIS — R29.898 DECREASED PINCH STRENGTH: ICD-10-CM

## 2021-02-22 PROCEDURE — 97110 THERAPEUTIC EXERCISES: CPT | Mod: PN,CQ

## 2021-02-23 ENCOUNTER — PATIENT MESSAGE (OUTPATIENT)
Dept: RHEUMATOLOGY | Facility: CLINIC | Age: 71
End: 2021-02-23

## 2021-02-24 ENCOUNTER — CLINICAL SUPPORT (OUTPATIENT)
Dept: REHABILITATION | Facility: HOSPITAL | Age: 71
End: 2021-02-24
Attending: PODIATRIST
Payer: MEDICARE

## 2021-02-24 ENCOUNTER — OFFICE VISIT (OUTPATIENT)
Dept: NEPHROLOGY | Facility: CLINIC | Age: 71
End: 2021-02-24
Payer: MEDICARE

## 2021-02-24 ENCOUNTER — PATIENT MESSAGE (OUTPATIENT)
Dept: NEPHROLOGY | Facility: CLINIC | Age: 71
End: 2021-02-24

## 2021-02-24 VITALS
SYSTOLIC BLOOD PRESSURE: 150 MMHG | DIASTOLIC BLOOD PRESSURE: 70 MMHG | HEIGHT: 66 IN | WEIGHT: 146.63 LBS | BODY MASS INDEX: 23.57 KG/M2

## 2021-02-24 DIAGNOSIS — R29.898 DECREASED GRIP STRENGTH: Primary | ICD-10-CM

## 2021-02-24 DIAGNOSIS — N18.30 CONTROLLED TYPE 2 DIABETES MELLITUS WITH STAGE 3 CHRONIC KIDNEY DISEASE, WITH LONG-TERM CURRENT USE OF INSULIN: Primary | ICD-10-CM

## 2021-02-24 DIAGNOSIS — M25.631 DECREASED RANGE OF MOTION OF BOTH WRISTS: ICD-10-CM

## 2021-02-24 DIAGNOSIS — E11.22 CONTROLLED TYPE 2 DIABETES MELLITUS WITH STAGE 3 CHRONIC KIDNEY DISEASE, WITH LONG-TERM CURRENT USE OF INSULIN: Primary | ICD-10-CM

## 2021-02-24 DIAGNOSIS — M79.89 SWELLING OF BOTH HANDS: ICD-10-CM

## 2021-02-24 DIAGNOSIS — M25.632 DECREASED RANGE OF MOTION OF BOTH WRISTS: ICD-10-CM

## 2021-02-24 DIAGNOSIS — Z79.4 CONTROLLED TYPE 2 DIABETES MELLITUS WITH STAGE 3 CHRONIC KIDNEY DISEASE, WITH LONG-TERM CURRENT USE OF INSULIN: Primary | ICD-10-CM

## 2021-02-24 DIAGNOSIS — M25.542 ARTHRALGIA OF BOTH HANDS: ICD-10-CM

## 2021-02-24 DIAGNOSIS — M25.541 ARTHRALGIA OF BOTH HANDS: ICD-10-CM

## 2021-02-24 PROCEDURE — 1126F AMNT PAIN NOTED NONE PRSNT: CPT | Mod: S$GLB,,, | Performed by: INTERNAL MEDICINE

## 2021-02-24 PROCEDURE — 97110 THERAPEUTIC EXERCISES: CPT | Mod: PN

## 2021-02-24 PROCEDURE — 99213 PR OFFICE/OUTPT VISIT, EST, LEVL III, 20-29 MIN: ICD-10-PCS | Mod: S$GLB,,, | Performed by: INTERNAL MEDICINE

## 2021-02-24 PROCEDURE — 99213 OFFICE O/P EST LOW 20 MIN: CPT | Mod: S$GLB,,, | Performed by: INTERNAL MEDICINE

## 2021-02-24 PROCEDURE — 99999 PR PBB SHADOW E&M-EST. PATIENT-LVL IV: ICD-10-PCS | Mod: PBBFAC,,, | Performed by: INTERNAL MEDICINE

## 2021-02-24 PROCEDURE — 3078F PR MOST RECENT DIASTOLIC BLOOD PRESSURE < 80 MM HG: ICD-10-PCS | Mod: CPTII,S$GLB,, | Performed by: INTERNAL MEDICINE

## 2021-02-24 PROCEDURE — 3077F PR MOST RECENT SYSTOLIC BLOOD PRESSURE >= 140 MM HG: ICD-10-PCS | Mod: CPTII,S$GLB,, | Performed by: INTERNAL MEDICINE

## 2021-02-24 PROCEDURE — 1126F PR PAIN SEVERITY QUANTIFIED, NO PAIN PRESENT: ICD-10-PCS | Mod: S$GLB,,, | Performed by: INTERNAL MEDICINE

## 2021-02-24 PROCEDURE — 99999 PR PBB SHADOW E&M-EST. PATIENT-LVL IV: CPT | Mod: PBBFAC,,, | Performed by: INTERNAL MEDICINE

## 2021-02-24 PROCEDURE — 1159F PR MEDICATION LIST DOCUMENTED IN MEDICAL RECORD: ICD-10-PCS | Mod: S$GLB,,, | Performed by: INTERNAL MEDICINE

## 2021-02-24 PROCEDURE — 1101F PT FALLS ASSESS-DOCD LE1/YR: CPT | Mod: CPTII,S$GLB,, | Performed by: INTERNAL MEDICINE

## 2021-02-24 PROCEDURE — 3078F DIAST BP <80 MM HG: CPT | Mod: CPTII,S$GLB,, | Performed by: INTERNAL MEDICINE

## 2021-02-24 PROCEDURE — 3077F SYST BP >= 140 MM HG: CPT | Mod: CPTII,S$GLB,, | Performed by: INTERNAL MEDICINE

## 2021-02-24 PROCEDURE — 1101F PR PT FALLS ASSESS DOC 0-1 FALLS W/OUT INJ PAST YR: ICD-10-PCS | Mod: CPTII,S$GLB,, | Performed by: INTERNAL MEDICINE

## 2021-02-24 PROCEDURE — 3008F PR BODY MASS INDEX (BMI) DOCUMENTED: ICD-10-PCS | Mod: CPTII,S$GLB,, | Performed by: INTERNAL MEDICINE

## 2021-02-24 PROCEDURE — 3008F BODY MASS INDEX DOCD: CPT | Mod: CPTII,S$GLB,, | Performed by: INTERNAL MEDICINE

## 2021-02-24 PROCEDURE — 1159F MED LIST DOCD IN RCRD: CPT | Mod: S$GLB,,, | Performed by: INTERNAL MEDICINE

## 2021-02-24 PROCEDURE — 3044F PR MOST RECENT HEMOGLOBIN A1C LEVEL <7.0%: ICD-10-PCS | Mod: CPTII,S$GLB,, | Performed by: INTERNAL MEDICINE

## 2021-02-24 PROCEDURE — 3288F FALL RISK ASSESSMENT DOCD: CPT | Mod: CPTII,S$GLB,, | Performed by: INTERNAL MEDICINE

## 2021-02-24 PROCEDURE — 3288F PR FALLS RISK ASSESSMENT DOCUMENTED: ICD-10-PCS | Mod: CPTII,S$GLB,, | Performed by: INTERNAL MEDICINE

## 2021-02-24 PROCEDURE — 3044F HG A1C LEVEL LT 7.0%: CPT | Mod: CPTII,S$GLB,, | Performed by: INTERNAL MEDICINE

## 2021-03-01 ENCOUNTER — CLINICAL SUPPORT (OUTPATIENT)
Dept: REHABILITATION | Facility: HOSPITAL | Age: 71
End: 2021-03-01
Attending: PODIATRIST
Payer: MEDICARE

## 2021-03-01 DIAGNOSIS — M79.89 SWELLING OF BOTH HANDS: ICD-10-CM

## 2021-03-01 DIAGNOSIS — M25.632 DECREASED RANGE OF MOTION OF BOTH WRISTS: ICD-10-CM

## 2021-03-01 DIAGNOSIS — R29.898 DECREASED GRIP STRENGTH: ICD-10-CM

## 2021-03-01 DIAGNOSIS — M25.631 DECREASED RANGE OF MOTION OF BOTH WRISTS: ICD-10-CM

## 2021-03-01 PROCEDURE — 97110 THERAPEUTIC EXERCISES: CPT | Mod: PN,CQ

## 2021-03-04 ENCOUNTER — PATIENT MESSAGE (OUTPATIENT)
Dept: ADMINISTRATIVE | Facility: OTHER | Age: 71
End: 2021-03-04

## 2021-03-10 ENCOUNTER — TELEPHONE (OUTPATIENT)
Dept: ADMINISTRATIVE | Facility: HOSPITAL | Age: 71
End: 2021-03-10

## 2021-03-10 ENCOUNTER — CLINICAL SUPPORT (OUTPATIENT)
Dept: REHABILITATION | Facility: HOSPITAL | Age: 71
End: 2021-03-10
Attending: PODIATRIST
Payer: MEDICARE

## 2021-03-10 DIAGNOSIS — M25.632 DECREASED RANGE OF MOTION OF BOTH WRISTS: ICD-10-CM

## 2021-03-10 DIAGNOSIS — M25.671 DECREASED RANGE OF MOTION OF BOTH ANKLES: ICD-10-CM

## 2021-03-10 DIAGNOSIS — M25.50 ARTHRALGIA, UNSPECIFIED JOINT: ICD-10-CM

## 2021-03-10 DIAGNOSIS — R29.898 DECREASED PINCH STRENGTH: ICD-10-CM

## 2021-03-10 DIAGNOSIS — M25.672 DECREASED RANGE OF MOTION OF BOTH ANKLES: ICD-10-CM

## 2021-03-10 DIAGNOSIS — M25.631 DECREASED RANGE OF MOTION OF BOTH WRISTS: ICD-10-CM

## 2021-03-10 PROCEDURE — 97110 THERAPEUTIC EXERCISES: CPT | Mod: PN

## 2021-03-12 ENCOUNTER — CLINICAL SUPPORT (OUTPATIENT)
Dept: REHABILITATION | Facility: HOSPITAL | Age: 71
End: 2021-03-12
Attending: PODIATRIST
Payer: MEDICARE

## 2021-03-12 DIAGNOSIS — M25.542 ARTHRALGIA OF BOTH HANDS: ICD-10-CM

## 2021-03-12 DIAGNOSIS — M79.89 SWELLING OF BOTH HANDS: ICD-10-CM

## 2021-03-12 DIAGNOSIS — M25.632 DECREASED RANGE OF MOTION OF BOTH WRISTS: ICD-10-CM

## 2021-03-12 DIAGNOSIS — M25.541 ARTHRALGIA OF BOTH HANDS: ICD-10-CM

## 2021-03-12 DIAGNOSIS — M25.631 DECREASED RANGE OF MOTION OF BOTH WRISTS: ICD-10-CM

## 2021-03-12 DIAGNOSIS — R29.898 DECREASED GRIP STRENGTH: Primary | ICD-10-CM

## 2021-03-12 PROCEDURE — 97110 THERAPEUTIC EXERCISES: CPT | Mod: PN

## 2021-03-17 ENCOUNTER — CLINICAL SUPPORT (OUTPATIENT)
Dept: REHABILITATION | Facility: HOSPITAL | Age: 71
End: 2021-03-17
Attending: PODIATRIST
Payer: MEDICARE

## 2021-03-17 DIAGNOSIS — M25.631 DECREASED RANGE OF MOTION OF BOTH WRISTS: ICD-10-CM

## 2021-03-17 DIAGNOSIS — M25.671 DECREASED RANGE OF MOTION OF BOTH ANKLES: ICD-10-CM

## 2021-03-17 DIAGNOSIS — R29.898 DECREASED PINCH STRENGTH: ICD-10-CM

## 2021-03-17 DIAGNOSIS — M25.632 DECREASED RANGE OF MOTION OF BOTH WRISTS: ICD-10-CM

## 2021-03-17 DIAGNOSIS — M25.50 ARTHRALGIA, UNSPECIFIED JOINT: ICD-10-CM

## 2021-03-17 DIAGNOSIS — M25.672 DECREASED RANGE OF MOTION OF BOTH ANKLES: ICD-10-CM

## 2021-03-17 PROCEDURE — 97110 THERAPEUTIC EXERCISES: CPT | Mod: PN

## 2021-03-18 ENCOUNTER — OFFICE VISIT (OUTPATIENT)
Dept: CARDIOLOGY | Facility: CLINIC | Age: 71
End: 2021-03-18
Payer: MEDICARE

## 2021-03-18 VITALS
WEIGHT: 145.31 LBS | HEART RATE: 58 BPM | DIASTOLIC BLOOD PRESSURE: 82 MMHG | OXYGEN SATURATION: 96 % | HEIGHT: 66 IN | BODY MASS INDEX: 23.35 KG/M2 | SYSTOLIC BLOOD PRESSURE: 148 MMHG

## 2021-03-18 DIAGNOSIS — M25.672 DECREASED RANGE OF MOTION OF BOTH ANKLES: ICD-10-CM

## 2021-03-18 DIAGNOSIS — M25.671 DECREASED RANGE OF MOTION OF BOTH ANKLES: ICD-10-CM

## 2021-03-18 DIAGNOSIS — M35.1 MCTD (MIXED CONNECTIVE TISSUE DISEASE): ICD-10-CM

## 2021-03-18 DIAGNOSIS — N18.31 STAGE 3A CHRONIC KIDNEY DISEASE: ICD-10-CM

## 2021-03-18 DIAGNOSIS — I10 ESSENTIAL HYPERTENSION: ICD-10-CM

## 2021-03-18 DIAGNOSIS — K22.70 BARRETT'S ESOPHAGUS WITHOUT DYSPLASIA: ICD-10-CM

## 2021-03-18 DIAGNOSIS — M25.632 DECREASED RANGE OF MOTION OF BOTH WRISTS: ICD-10-CM

## 2021-03-18 DIAGNOSIS — E78.00 PURE HYPERCHOLESTEROLEMIA: ICD-10-CM

## 2021-03-18 DIAGNOSIS — N25.81 SECONDARY HYPERPARATHYROIDISM OF RENAL ORIGIN: ICD-10-CM

## 2021-03-18 DIAGNOSIS — I70.0 AORTIC ATHEROSCLEROSIS: Primary | ICD-10-CM

## 2021-03-18 DIAGNOSIS — Z79.52 CURRENT CHRONIC USE OF SYSTEMIC STEROIDS: ICD-10-CM

## 2021-03-18 DIAGNOSIS — R29.898 DECREASED GRIP STRENGTH: ICD-10-CM

## 2021-03-18 DIAGNOSIS — Z12.11 SCREENING FOR COLON CANCER: ICD-10-CM

## 2021-03-18 DIAGNOSIS — N18.31 ANEMIA OF CHRONIC RENAL FAILURE, STAGE 3A: ICD-10-CM

## 2021-03-18 DIAGNOSIS — E11.8 CONTROLLED TYPE 2 DIABETES MELLITUS WITH COMPLICATION, WITHOUT LONG-TERM CURRENT USE OF INSULIN: ICD-10-CM

## 2021-03-18 DIAGNOSIS — I50.30 DIASTOLIC HEART FAILURE, NYHA CLASS 2: ICD-10-CM

## 2021-03-18 DIAGNOSIS — M35.9 CONNECTIVE TISSUE DISORDER: ICD-10-CM

## 2021-03-18 DIAGNOSIS — M25.631 DECREASED RANGE OF MOTION OF BOTH WRISTS: ICD-10-CM

## 2021-03-18 DIAGNOSIS — M06.9 RHEUMATOID ARTHRITIS, INVOLVING UNSPECIFIED SITE, UNSPECIFIED WHETHER RHEUMATOID FACTOR PRESENT: ICD-10-CM

## 2021-03-18 DIAGNOSIS — K59.09 CHRONIC CONSTIPATION: ICD-10-CM

## 2021-03-18 DIAGNOSIS — D63.1 ANEMIA OF CHRONIC RENAL FAILURE, STAGE 3A: ICD-10-CM

## 2021-03-18 PROCEDURE — 99214 PR OFFICE/OUTPT VISIT, EST, LEVL IV, 30-39 MIN: ICD-10-PCS | Mod: S$GLB,,, | Performed by: INTERNAL MEDICINE

## 2021-03-18 PROCEDURE — 1159F PR MEDICATION LIST DOCUMENTED IN MEDICAL RECORD: ICD-10-PCS | Mod: S$GLB,,, | Performed by: INTERNAL MEDICINE

## 2021-03-18 PROCEDURE — 1159F MED LIST DOCD IN RCRD: CPT | Mod: S$GLB,,, | Performed by: INTERNAL MEDICINE

## 2021-03-18 PROCEDURE — 1126F AMNT PAIN NOTED NONE PRSNT: CPT | Mod: S$GLB,,, | Performed by: INTERNAL MEDICINE

## 2021-03-18 PROCEDURE — 99499 RISK ADDL DX/OHS AUDIT: ICD-10-PCS | Mod: S$GLB,,, | Performed by: INTERNAL MEDICINE

## 2021-03-18 PROCEDURE — 3044F HG A1C LEVEL LT 7.0%: CPT | Mod: CPTII,S$GLB,, | Performed by: INTERNAL MEDICINE

## 2021-03-18 PROCEDURE — 99214 OFFICE O/P EST MOD 30 MIN: CPT | Mod: S$GLB,,, | Performed by: INTERNAL MEDICINE

## 2021-03-18 PROCEDURE — 3044F PR MOST RECENT HEMOGLOBIN A1C LEVEL <7.0%: ICD-10-PCS | Mod: CPTII,S$GLB,, | Performed by: INTERNAL MEDICINE

## 2021-03-18 PROCEDURE — 99999 PR PBB SHADOW E&M-EST. PATIENT-LVL V: ICD-10-PCS | Mod: PBBFAC,,, | Performed by: INTERNAL MEDICINE

## 2021-03-18 PROCEDURE — 1101F PR PT FALLS ASSESS DOC 0-1 FALLS W/OUT INJ PAST YR: ICD-10-PCS | Mod: CPTII,S$GLB,, | Performed by: INTERNAL MEDICINE

## 2021-03-18 PROCEDURE — 1101F PT FALLS ASSESS-DOCD LE1/YR: CPT | Mod: CPTII,S$GLB,, | Performed by: INTERNAL MEDICINE

## 2021-03-18 PROCEDURE — 3008F PR BODY MASS INDEX (BMI) DOCUMENTED: ICD-10-PCS | Mod: CPTII,S$GLB,, | Performed by: INTERNAL MEDICINE

## 2021-03-18 PROCEDURE — 3079F DIAST BP 80-89 MM HG: CPT | Mod: CPTII,S$GLB,, | Performed by: INTERNAL MEDICINE

## 2021-03-18 PROCEDURE — 3288F PR FALLS RISK ASSESSMENT DOCUMENTED: ICD-10-PCS | Mod: CPTII,S$GLB,, | Performed by: INTERNAL MEDICINE

## 2021-03-18 PROCEDURE — 1126F PR PAIN SEVERITY QUANTIFIED, NO PAIN PRESENT: ICD-10-PCS | Mod: S$GLB,,, | Performed by: INTERNAL MEDICINE

## 2021-03-18 PROCEDURE — 3288F FALL RISK ASSESSMENT DOCD: CPT | Mod: CPTII,S$GLB,, | Performed by: INTERNAL MEDICINE

## 2021-03-18 PROCEDURE — 99499 UNLISTED E&M SERVICE: CPT | Mod: S$GLB,,, | Performed by: INTERNAL MEDICINE

## 2021-03-18 PROCEDURE — 3077F PR MOST RECENT SYSTOLIC BLOOD PRESSURE >= 140 MM HG: ICD-10-PCS | Mod: CPTII,S$GLB,, | Performed by: INTERNAL MEDICINE

## 2021-03-18 PROCEDURE — 3008F BODY MASS INDEX DOCD: CPT | Mod: CPTII,S$GLB,, | Performed by: INTERNAL MEDICINE

## 2021-03-18 PROCEDURE — 3077F SYST BP >= 140 MM HG: CPT | Mod: CPTII,S$GLB,, | Performed by: INTERNAL MEDICINE

## 2021-03-18 PROCEDURE — 99999 PR PBB SHADOW E&M-EST. PATIENT-LVL V: CPT | Mod: PBBFAC,,, | Performed by: INTERNAL MEDICINE

## 2021-03-18 PROCEDURE — 3079F PR MOST RECENT DIASTOLIC BLOOD PRESSURE 80-89 MM HG: ICD-10-PCS | Mod: CPTII,S$GLB,, | Performed by: INTERNAL MEDICINE

## 2021-03-18 RX ORDER — HYDRALAZINE HYDROCHLORIDE 25 MG/1
25 TABLET, FILM COATED ORAL 3 TIMES DAILY
Qty: 90 TABLET | Refills: 3 | Status: SHIPPED | OUTPATIENT
Start: 2021-03-18 | End: 2021-04-28

## 2021-03-19 ENCOUNTER — CLINICAL SUPPORT (OUTPATIENT)
Dept: REHABILITATION | Facility: HOSPITAL | Age: 71
End: 2021-03-19
Attending: PODIATRIST
Payer: MEDICARE

## 2021-03-19 DIAGNOSIS — M25.542 ARTHRALGIA OF BOTH HANDS: ICD-10-CM

## 2021-03-19 DIAGNOSIS — M25.632 DECREASED RANGE OF MOTION OF BOTH WRISTS: ICD-10-CM

## 2021-03-19 DIAGNOSIS — M25.541 ARTHRALGIA OF BOTH HANDS: ICD-10-CM

## 2021-03-19 DIAGNOSIS — M25.631 DECREASED RANGE OF MOTION OF BOTH WRISTS: ICD-10-CM

## 2021-03-19 DIAGNOSIS — M79.89 SWELLING OF BOTH HANDS: ICD-10-CM

## 2021-03-19 DIAGNOSIS — R29.898 DECREASED GRIP STRENGTH: Primary | ICD-10-CM

## 2021-03-19 PROCEDURE — 97110 THERAPEUTIC EXERCISES: CPT | Mod: PN

## 2021-03-24 ENCOUNTER — CLINICAL SUPPORT (OUTPATIENT)
Dept: REHABILITATION | Facility: HOSPITAL | Age: 71
End: 2021-03-24
Attending: PODIATRIST
Payer: MEDICARE

## 2021-03-24 DIAGNOSIS — M25.671 DECREASED RANGE OF MOTION OF BOTH ANKLES: ICD-10-CM

## 2021-03-24 DIAGNOSIS — R29.898 DECREASED PINCH STRENGTH: ICD-10-CM

## 2021-03-24 DIAGNOSIS — M25.50 ARTHRALGIA, UNSPECIFIED JOINT: ICD-10-CM

## 2021-03-24 DIAGNOSIS — M25.631 DECREASED RANGE OF MOTION OF BOTH WRISTS: ICD-10-CM

## 2021-03-24 DIAGNOSIS — M25.632 DECREASED RANGE OF MOTION OF BOTH WRISTS: ICD-10-CM

## 2021-03-24 DIAGNOSIS — M25.672 DECREASED RANGE OF MOTION OF BOTH ANKLES: ICD-10-CM

## 2021-03-24 PROCEDURE — 97110 THERAPEUTIC EXERCISES: CPT | Mod: PN

## 2021-03-26 ENCOUNTER — CLINICAL SUPPORT (OUTPATIENT)
Dept: REHABILITATION | Facility: HOSPITAL | Age: 71
End: 2021-03-26
Attending: PODIATRIST
Payer: MEDICARE

## 2021-03-26 DIAGNOSIS — M25.631 DECREASED RANGE OF MOTION OF BOTH WRISTS: ICD-10-CM

## 2021-03-26 DIAGNOSIS — R29.898 DECREASED GRIP STRENGTH: Primary | ICD-10-CM

## 2021-03-26 DIAGNOSIS — M25.541 ARTHRALGIA OF BOTH HANDS: ICD-10-CM

## 2021-03-26 DIAGNOSIS — M25.632 DECREASED RANGE OF MOTION OF BOTH WRISTS: ICD-10-CM

## 2021-03-26 DIAGNOSIS — M25.542 ARTHRALGIA OF BOTH HANDS: ICD-10-CM

## 2021-03-26 DIAGNOSIS — M79.89 SWELLING OF BOTH HANDS: ICD-10-CM

## 2021-03-26 PROCEDURE — 97110 THERAPEUTIC EXERCISES: CPT | Mod: PN

## 2021-04-07 ENCOUNTER — DOCUMENTATION ONLY (OUTPATIENT)
Dept: REHABILITATION | Facility: HOSPITAL | Age: 71
End: 2021-04-07

## 2021-04-07 ENCOUNTER — CLINICAL SUPPORT (OUTPATIENT)
Dept: REHABILITATION | Facility: HOSPITAL | Age: 71
End: 2021-04-07
Attending: PODIATRIST
Payer: MEDICARE

## 2021-04-07 DIAGNOSIS — R29.898 WEAKNESS OF EXTREMITY: ICD-10-CM

## 2021-04-07 DIAGNOSIS — M25.672 DECREASED RANGE OF MOTION OF BOTH ANKLES: ICD-10-CM

## 2021-04-07 DIAGNOSIS — M25.50 ARTHRALGIA, UNSPECIFIED JOINT: Primary | ICD-10-CM

## 2021-04-07 DIAGNOSIS — M25.671 DECREASED RANGE OF MOTION OF BOTH ANKLES: ICD-10-CM

## 2021-04-07 DIAGNOSIS — R29.3 POSTURAL IMBALANCE: ICD-10-CM

## 2021-04-07 PROCEDURE — 97110 THERAPEUTIC EXERCISES: CPT | Mod: PN

## 2021-04-15 ENCOUNTER — PATIENT MESSAGE (OUTPATIENT)
Dept: RHEUMATOLOGY | Facility: CLINIC | Age: 71
End: 2021-04-15

## 2021-04-15 DIAGNOSIS — Z51.81 ENCOUNTER FOR MONITORING FEBUXOSTAT THERAPY: ICD-10-CM

## 2021-04-15 DIAGNOSIS — Z79.899 ENCOUNTER FOR MONITORING FEBUXOSTAT THERAPY: ICD-10-CM

## 2021-04-16 RX ORDER — COLCHICINE 0.6 MG/1
TABLET ORAL
Qty: 3 TABLET | Refills: 3 | Status: SHIPPED | OUTPATIENT
Start: 2021-04-16 | End: 2021-05-19

## 2021-04-21 ENCOUNTER — CLINICAL SUPPORT (OUTPATIENT)
Dept: REHABILITATION | Facility: HOSPITAL | Age: 71
End: 2021-04-21
Attending: PODIATRIST
Payer: MEDICARE

## 2021-04-21 DIAGNOSIS — M79.89 SWELLING OF BOTH HANDS: ICD-10-CM

## 2021-04-21 DIAGNOSIS — R29.898 DECREASED GRIP STRENGTH: Primary | ICD-10-CM

## 2021-04-21 DIAGNOSIS — M25.541 ARTHRALGIA OF BOTH HANDS: ICD-10-CM

## 2021-04-21 DIAGNOSIS — M25.632 DECREASED RANGE OF MOTION OF BOTH WRISTS: ICD-10-CM

## 2021-04-21 DIAGNOSIS — M25.631 DECREASED RANGE OF MOTION OF BOTH WRISTS: ICD-10-CM

## 2021-04-21 DIAGNOSIS — M25.542 ARTHRALGIA OF BOTH HANDS: ICD-10-CM

## 2021-04-21 PROCEDURE — 97110 THERAPEUTIC EXERCISES: CPT | Mod: PN

## 2021-04-28 ENCOUNTER — LAB VISIT (OUTPATIENT)
Dept: LAB | Facility: HOSPITAL | Age: 71
End: 2021-04-28
Attending: INTERNAL MEDICINE
Payer: MEDICARE

## 2021-04-28 ENCOUNTER — OFFICE VISIT (OUTPATIENT)
Dept: CARDIOLOGY | Facility: CLINIC | Age: 71
End: 2021-04-28
Payer: MEDICARE

## 2021-04-28 VITALS
WEIGHT: 147.94 LBS | HEIGHT: 66 IN | OXYGEN SATURATION: 97 % | SYSTOLIC BLOOD PRESSURE: 148 MMHG | HEART RATE: 74 BPM | DIASTOLIC BLOOD PRESSURE: 70 MMHG | BODY MASS INDEX: 23.77 KG/M2

## 2021-04-28 DIAGNOSIS — S22.000A COMPRESSION FRACTURE OF BODY OF THORACIC VERTEBRA: ICD-10-CM

## 2021-04-28 DIAGNOSIS — I50.30 DIASTOLIC HEART FAILURE, NYHA CLASS 2: ICD-10-CM

## 2021-04-28 DIAGNOSIS — M06.9 RHEUMATOID ARTHRITIS, INVOLVING UNSPECIFIED SITE, UNSPECIFIED WHETHER RHEUMATOID FACTOR PRESENT: ICD-10-CM

## 2021-04-28 DIAGNOSIS — D69.6 THROMBOCYTOPENIA: ICD-10-CM

## 2021-04-28 DIAGNOSIS — I10 ESSENTIAL HYPERTENSION: ICD-10-CM

## 2021-04-28 DIAGNOSIS — E78.00 PURE HYPERCHOLESTEROLEMIA: ICD-10-CM

## 2021-04-28 DIAGNOSIS — Z79.4 CONTROLLED TYPE 2 DIABETES MELLITUS WITH STAGE 3 CHRONIC KIDNEY DISEASE, WITH LONG-TERM CURRENT USE OF INSULIN: ICD-10-CM

## 2021-04-28 DIAGNOSIS — I70.0 AORTIC ATHEROSCLEROSIS: ICD-10-CM

## 2021-04-28 DIAGNOSIS — E11.8 CONTROLLED TYPE 2 DIABETES MELLITUS WITH COMPLICATION, WITHOUT LONG-TERM CURRENT USE OF INSULIN: ICD-10-CM

## 2021-04-28 DIAGNOSIS — M25.671 DECREASED RANGE OF MOTION OF BOTH ANKLES: ICD-10-CM

## 2021-04-28 DIAGNOSIS — N18.30 CONTROLLED TYPE 2 DIABETES MELLITUS WITH STAGE 3 CHRONIC KIDNEY DISEASE, WITH LONG-TERM CURRENT USE OF INSULIN: ICD-10-CM

## 2021-04-28 DIAGNOSIS — M25.672 DECREASED RANGE OF MOTION OF BOTH ANKLES: ICD-10-CM

## 2021-04-28 DIAGNOSIS — E11.22 CONTROLLED TYPE 2 DIABETES MELLITUS WITH STAGE 3 CHRONIC KIDNEY DISEASE, WITH LONG-TERM CURRENT USE OF INSULIN: ICD-10-CM

## 2021-04-28 DIAGNOSIS — R06.02 SOB (SHORTNESS OF BREATH): ICD-10-CM

## 2021-04-28 DIAGNOSIS — N18.31 STAGE 3A CHRONIC KIDNEY DISEASE: ICD-10-CM

## 2021-04-28 DIAGNOSIS — R06.09 DOE (DYSPNEA ON EXERTION): Primary | ICD-10-CM

## 2021-04-28 LAB
ALBUMIN SERPL BCP-MCNC: 3.1 G/DL (ref 3.5–5.2)
ANION GAP SERPL CALC-SCNC: 9 MMOL/L (ref 8–16)
BASOPHILS # BLD AUTO: 0.03 K/UL (ref 0–0.2)
BASOPHILS NFR BLD: 0.4 % (ref 0–1.9)
BUN SERPL-MCNC: 35 MG/DL (ref 8–23)
CALCIUM SERPL-MCNC: 8.5 MG/DL (ref 8.7–10.5)
CHLORIDE SERPL-SCNC: 107 MMOL/L (ref 95–110)
CO2 SERPL-SCNC: 21 MMOL/L (ref 23–29)
CREAT SERPL-MCNC: 1.6 MG/DL (ref 0.5–1.4)
DIFFERENTIAL METHOD: ABNORMAL
EOSINOPHIL # BLD AUTO: 0.1 K/UL (ref 0–0.5)
EOSINOPHIL NFR BLD: 1.8 % (ref 0–8)
ERYTHROCYTE [DISTWIDTH] IN BLOOD BY AUTOMATED COUNT: 16.5 % (ref 11.5–14.5)
EST. GFR  (AFRICAN AMERICAN): 49.4 ML/MIN/1.73 M^2
EST. GFR  (NON AFRICAN AMERICAN): 42.7 ML/MIN/1.73 M^2
ESTIMATED AVG GLUCOSE: 157 MG/DL (ref 68–131)
FERRITIN SERPL-MCNC: 67 NG/ML (ref 20–300)
GLUCOSE SERPL-MCNC: 109 MG/DL (ref 70–110)
HBA1C MFR BLD: 7.1 % (ref 4–5.6)
HCT VFR BLD AUTO: 34.9 % (ref 40–54)
HGB BLD-MCNC: 10.5 G/DL (ref 14–18)
IMM GRANULOCYTES # BLD AUTO: 0.03 K/UL (ref 0–0.04)
IMM GRANULOCYTES NFR BLD AUTO: 0.4 % (ref 0–0.5)
IRON SERPL-MCNC: 35 UG/DL (ref 45–160)
LYMPHOCYTES # BLD AUTO: 1.9 K/UL (ref 1–4.8)
LYMPHOCYTES NFR BLD: 24.2 % (ref 18–48)
MCH RBC QN AUTO: 26.1 PG (ref 27–31)
MCHC RBC AUTO-ENTMCNC: 30.1 G/DL (ref 32–36)
MCV RBC AUTO: 87 FL (ref 82–98)
MONOCYTES # BLD AUTO: 1.1 K/UL (ref 0.3–1)
MONOCYTES NFR BLD: 14.9 % (ref 4–15)
NEUTROPHILS # BLD AUTO: 4.5 K/UL (ref 1.8–7.7)
NEUTROPHILS NFR BLD: 58.3 % (ref 38–73)
NRBC BLD-RTO: 0 /100 WBC
PHOSPHATE SERPL-MCNC: 3.3 MG/DL (ref 2.7–4.5)
PLATELET # BLD AUTO: 323 K/UL (ref 150–450)
PMV BLD AUTO: 12.2 FL (ref 9.2–12.9)
POTASSIUM SERPL-SCNC: 4.8 MMOL/L (ref 3.5–5.1)
PTH-INTACT SERPL-MCNC: 92 PG/ML (ref 9–77)
RBC # BLD AUTO: 4.02 M/UL (ref 4.6–6.2)
SATURATED IRON: 12 % (ref 20–50)
SODIUM SERPL-SCNC: 137 MMOL/L (ref 136–145)
TOTAL IRON BINDING CAPACITY: 300 UG/DL (ref 250–450)
TRANSFERRIN SERPL-MCNC: 203 MG/DL (ref 200–375)
WBC # BLD AUTO: 7.64 K/UL (ref 3.9–12.7)

## 2021-04-28 PROCEDURE — 83036 HEMOGLOBIN GLYCOSYLATED A1C: CPT | Performed by: NURSE PRACTITIONER

## 2021-04-28 PROCEDURE — 99999 PR PBB SHADOW E&M-EST. PATIENT-LVL V: ICD-10-PCS | Mod: PBBFAC,,, | Performed by: INTERNAL MEDICINE

## 2021-04-28 PROCEDURE — 1101F PT FALLS ASSESS-DOCD LE1/YR: CPT | Mod: CPTII,S$GLB,, | Performed by: INTERNAL MEDICINE

## 2021-04-28 PROCEDURE — 3008F PR BODY MASS INDEX (BMI) DOCUMENTED: ICD-10-PCS | Mod: CPTII,S$GLB,, | Performed by: INTERNAL MEDICINE

## 2021-04-28 PROCEDURE — 85025 COMPLETE CBC W/AUTO DIFF WBC: CPT | Performed by: INTERNAL MEDICINE

## 2021-04-28 PROCEDURE — 99214 PR OFFICE/OUTPT VISIT, EST, LEVL IV, 30-39 MIN: ICD-10-PCS | Mod: S$GLB,,, | Performed by: INTERNAL MEDICINE

## 2021-04-28 PROCEDURE — 83540 ASSAY OF IRON: CPT | Performed by: INTERNAL MEDICINE

## 2021-04-28 PROCEDURE — 1126F PR PAIN SEVERITY QUANTIFIED, NO PAIN PRESENT: ICD-10-PCS | Mod: S$GLB,,, | Performed by: INTERNAL MEDICINE

## 2021-04-28 PROCEDURE — 1126F AMNT PAIN NOTED NONE PRSNT: CPT | Mod: S$GLB,,, | Performed by: INTERNAL MEDICINE

## 2021-04-28 PROCEDURE — 82728 ASSAY OF FERRITIN: CPT | Performed by: INTERNAL MEDICINE

## 2021-04-28 PROCEDURE — 3288F PR FALLS RISK ASSESSMENT DOCUMENTED: ICD-10-PCS | Mod: CPTII,S$GLB,, | Performed by: INTERNAL MEDICINE

## 2021-04-28 PROCEDURE — 99214 OFFICE O/P EST MOD 30 MIN: CPT | Mod: S$GLB,,, | Performed by: INTERNAL MEDICINE

## 2021-04-28 PROCEDURE — 1159F PR MEDICATION LIST DOCUMENTED IN MEDICAL RECORD: ICD-10-PCS | Mod: S$GLB,,, | Performed by: INTERNAL MEDICINE

## 2021-04-28 PROCEDURE — 3288F FALL RISK ASSESSMENT DOCD: CPT | Mod: CPTII,S$GLB,, | Performed by: INTERNAL MEDICINE

## 2021-04-28 PROCEDURE — 1159F MED LIST DOCD IN RCRD: CPT | Mod: S$GLB,,, | Performed by: INTERNAL MEDICINE

## 2021-04-28 PROCEDURE — 80069 RENAL FUNCTION PANEL: CPT | Performed by: INTERNAL MEDICINE

## 2021-04-28 PROCEDURE — 1101F PR PT FALLS ASSESS DOC 0-1 FALLS W/OUT INJ PAST YR: ICD-10-PCS | Mod: CPTII,S$GLB,, | Performed by: INTERNAL MEDICINE

## 2021-04-28 PROCEDURE — 36415 COLL VENOUS BLD VENIPUNCTURE: CPT | Mod: PO | Performed by: INTERNAL MEDICINE

## 2021-04-28 PROCEDURE — 99999 PR PBB SHADOW E&M-EST. PATIENT-LVL V: CPT | Mod: PBBFAC,,, | Performed by: INTERNAL MEDICINE

## 2021-04-28 PROCEDURE — 3008F BODY MASS INDEX DOCD: CPT | Mod: CPTII,S$GLB,, | Performed by: INTERNAL MEDICINE

## 2021-04-28 PROCEDURE — 83970 ASSAY OF PARATHORMONE: CPT | Performed by: INTERNAL MEDICINE

## 2021-04-28 RX ORDER — HYDRALAZINE HYDROCHLORIDE 50 MG/1
50 TABLET, FILM COATED ORAL 3 TIMES DAILY
Qty: 90 TABLET | Refills: 11 | Status: SHIPPED | OUTPATIENT
Start: 2021-04-28 | End: 2021-07-20 | Stop reason: SDUPTHER

## 2021-05-03 DIAGNOSIS — E78.00 PURE HYPERCHOLESTEROLEMIA: ICD-10-CM

## 2021-05-03 RX ORDER — ROSUVASTATIN CALCIUM 5 MG/1
TABLET, COATED ORAL
Qty: 90 TABLET | Refills: 1 | Status: SHIPPED | OUTPATIENT
Start: 2021-05-03 | End: 2021-05-05 | Stop reason: SINTOL

## 2021-05-05 ENCOUNTER — OFFICE VISIT (OUTPATIENT)
Dept: ENDOCRINOLOGY | Facility: CLINIC | Age: 71
End: 2021-05-05
Payer: MEDICARE

## 2021-05-05 VITALS
SYSTOLIC BLOOD PRESSURE: 133 MMHG | HEART RATE: 83 BPM | WEIGHT: 146 LBS | DIASTOLIC BLOOD PRESSURE: 62 MMHG | TEMPERATURE: 98 F | BODY MASS INDEX: 23.57 KG/M2

## 2021-05-05 DIAGNOSIS — E11.649 HYPOGLYCEMIA ASSOCIATED WITH DIABETES: ICD-10-CM

## 2021-05-05 DIAGNOSIS — E78.5 HYPERLIPIDEMIA, UNSPECIFIED HYPERLIPIDEMIA TYPE: ICD-10-CM

## 2021-05-05 DIAGNOSIS — R73.9 STEROID-INDUCED HYPERGLYCEMIA: ICD-10-CM

## 2021-05-05 DIAGNOSIS — T38.0X5A STEROID-INDUCED HYPERGLYCEMIA: ICD-10-CM

## 2021-05-05 LAB — GLUCOSE SERPL-MCNC: 100 MG/DL (ref 70–110)

## 2021-05-05 PROCEDURE — 82962 POCT GLUCOSE, HAND-HELD DEVICE: ICD-10-PCS | Mod: S$GLB,,, | Performed by: NURSE PRACTITIONER

## 2021-05-05 PROCEDURE — 1159F MED LIST DOCD IN RCRD: CPT | Mod: S$GLB,,, | Performed by: NURSE PRACTITIONER

## 2021-05-05 PROCEDURE — 99999 PR PBB SHADOW E&M-EST. PATIENT-LVL V: CPT | Mod: PBBFAC,,, | Performed by: NURSE PRACTITIONER

## 2021-05-05 PROCEDURE — 1126F PR PAIN SEVERITY QUANTIFIED, NO PAIN PRESENT: ICD-10-PCS | Mod: S$GLB,,, | Performed by: NURSE PRACTITIONER

## 2021-05-05 PROCEDURE — 3008F BODY MASS INDEX DOCD: CPT | Mod: CPTII,S$GLB,, | Performed by: NURSE PRACTITIONER

## 2021-05-05 PROCEDURE — 1126F AMNT PAIN NOTED NONE PRSNT: CPT | Mod: S$GLB,,, | Performed by: NURSE PRACTITIONER

## 2021-05-05 PROCEDURE — 3051F PR MOST RECENT HEMOGLOBIN A1C LEVEL 7.0 - < 8.0%: ICD-10-PCS | Mod: CPTII,S$GLB,, | Performed by: NURSE PRACTITIONER

## 2021-05-05 PROCEDURE — 95251 PR GLUCOSE MONITOR, 72 HOUR, PHYS INTERP: ICD-10-PCS | Mod: S$GLB,,, | Performed by: NURSE PRACTITIONER

## 2021-05-05 PROCEDURE — 3008F PR BODY MASS INDEX (BMI) DOCUMENTED: ICD-10-PCS | Mod: CPTII,S$GLB,, | Performed by: NURSE PRACTITIONER

## 2021-05-05 PROCEDURE — 99214 PR OFFICE/OUTPT VISIT, EST, LEVL IV, 30-39 MIN: ICD-10-PCS | Mod: S$GLB,,, | Performed by: NURSE PRACTITIONER

## 2021-05-05 PROCEDURE — 3051F HG A1C>EQUAL 7.0%<8.0%: CPT | Mod: CPTII,S$GLB,, | Performed by: NURSE PRACTITIONER

## 2021-05-05 PROCEDURE — 95251 CONT GLUC MNTR ANALYSIS I&R: CPT | Mod: S$GLB,,, | Performed by: NURSE PRACTITIONER

## 2021-05-05 PROCEDURE — 82962 GLUCOSE BLOOD TEST: CPT | Mod: S$GLB,,, | Performed by: NURSE PRACTITIONER

## 2021-05-05 PROCEDURE — 1159F PR MEDICATION LIST DOCUMENTED IN MEDICAL RECORD: ICD-10-PCS | Mod: S$GLB,,, | Performed by: NURSE PRACTITIONER

## 2021-05-05 PROCEDURE — 99214 OFFICE O/P EST MOD 30 MIN: CPT | Mod: S$GLB,,, | Performed by: NURSE PRACTITIONER

## 2021-05-05 PROCEDURE — 99999 PR PBB SHADOW E&M-EST. PATIENT-LVL V: ICD-10-PCS | Mod: PBBFAC,,, | Performed by: NURSE PRACTITIONER

## 2021-05-05 RX ORDER — INSULIN ASPART 100 [IU]/ML
INJECTION, SOLUTION INTRAVENOUS; SUBCUTANEOUS
Qty: 1 BOX | Refills: 2 | Status: SHIPPED | OUTPATIENT
Start: 2021-05-05 | End: 2021-08-09 | Stop reason: SDUPTHER

## 2021-05-05 RX ORDER — INSULIN GLARGINE 100 [IU]/ML
INJECTION, SOLUTION SUBCUTANEOUS
Qty: 1 BOX | Refills: 2 | Status: SHIPPED | OUTPATIENT
Start: 2021-05-05 | End: 2021-12-06 | Stop reason: SDUPTHER

## 2021-05-12 ENCOUNTER — HOSPITAL ENCOUNTER (OUTPATIENT)
Dept: RADIOLOGY | Facility: HOSPITAL | Age: 71
Discharge: HOME | End: 2021-05-12
Attending: INTERNAL MEDICINE
Payer: MEDICARE

## 2021-05-12 ENCOUNTER — HOSPITAL ENCOUNTER (OUTPATIENT)
Dept: CARDIOLOGY | Facility: HOSPITAL | Age: 71
Discharge: HOME | End: 2021-05-12
Attending: INTERNAL MEDICINE
Payer: MEDICARE

## 2021-05-12 VITALS
BODY MASS INDEX: 23.46 KG/M2 | WEIGHT: 146 LBS | DIASTOLIC BLOOD PRESSURE: 62 MMHG | SYSTOLIC BLOOD PRESSURE: 133 MMHG | HEIGHT: 66 IN

## 2021-05-12 DIAGNOSIS — R06.02 SOB (SHORTNESS OF BREATH): ICD-10-CM

## 2021-05-12 DIAGNOSIS — R06.09 DOE (DYSPNEA ON EXERTION): ICD-10-CM

## 2021-05-12 LAB
AORTIC ROOT ANNULUS: 2.73 CM
AORTIC VALVE CUSP SEPERATION: 1.73 CM
AV INDEX (PROSTH): 0.46
AV MEAN GRADIENT: 10 MMHG
AV PEAK GRADIENT: 20 MMHG
AV VALVE AREA: 1.63 CM2
AV VELOCITY RATIO: 0.47
BSA FOR ECHO PROCEDURE: 1.76 M2
CV ECHO LV RWT: 0.54 CM
CV STRESS BASE HR: 68 BPM
DIASTOLIC BLOOD PRESSURE: 68 MMHG
DOP CALC AO PEAK VEL: 2.22 M/S
DOP CALC AO VTI: 57.77 CM
DOP CALC LVOT AREA: 3.6 CM2
DOP CALC LVOT DIAMETER: 2.13 CM
DOP CALC LVOT PEAK VEL: 1.05 M/S
DOP CALC LVOT STROKE VOLUME: 94.06 CM3
DOP CALCLVOT PEAK VEL VTI: 26.41 CM
E WAVE DECELERATION TIME: 167.08 MSEC
E/A RATIO: 1.11
E/E' RATIO: 18.62 M/S
ECHO LV POSTERIOR WALL: 1.11 CM (ref 0.6–1.1)
EJECTION FRACTION: 65 %
FRACTIONAL SHORTENING: 42 % (ref 28–44)
INTERVENTRICULAR SEPTUM: 1.27 CM (ref 0.6–1.1)
IVRT: 106.11 MSEC
LA MAJOR: 5.6 CM
LA MINOR: 6.09 CM
LA WIDTH: 4.73 CM
LEFT ATRIUM SIZE: 4.65 CM
LEFT ATRIUM VOLUME INDEX: 62.3 ML/M2
LEFT ATRIUM VOLUME: 109.08 CM3
LEFT INTERNAL DIMENSION IN SYSTOLE: 2.38 CM (ref 2.1–4)
LEFT VENTRICLE DIASTOLIC VOLUME INDEX: 42.82 ML/M2
LEFT VENTRICLE DIASTOLIC VOLUME: 74.93 ML
LEFT VENTRICLE MASS INDEX: 98 G/M2
LEFT VENTRICLE SYSTOLIC VOLUME INDEX: 11.2 ML/M2
LEFT VENTRICLE SYSTOLIC VOLUME: 19.65 ML
LEFT VENTRICULAR INTERNAL DIMENSION IN DIASTOLE: 4.12 CM (ref 3.5–6)
LEFT VENTRICULAR MASS: 170.9 G
LV LATERAL E/E' RATIO: 17.29 M/S
LV SEPTAL E/E' RATIO: 20.17 M/S
MV PEAK A VEL: 1.09 M/S
MV PEAK E VEL: 1.21 M/S
NUC STRESS DIASTOLIC VOLUME INDEX: 79
NUC STRESS EJECTION FRACTION: 75 %
NUC STRESS SYSTOLIC VOLUME INDEX: 19
OHS CV CPX 1 MINUTE RECOVERY HEART RATE: 107 BPM
OHS CV CPX 85 PERCENT MAX PREDICTED HEART RATE MALE: 127
OHS CV CPX ESTIMATED METS: 7
OHS CV CPX MAX PREDICTED HEART RATE: 149
OHS CV CPX PATIENT IS FEMALE: 0
OHS CV CPX PATIENT IS MALE: 1
OHS CV CPX PEAK DIASTOLIC BLOOD PRESSURE: 60 MMHG
OHS CV CPX PEAK HEAR RATE: 136 BPM
OHS CV CPX PEAK RATE PRESSURE PRODUCT: NORMAL
OHS CV CPX PEAK SYSTOLIC BLOOD PRESSURE: 196 MMHG
OHS CV CPX PERCENT MAX PREDICTED HEART RATE ACHIEVED: 91
OHS CV CPX RATE PRESSURE PRODUCT PRESENTING: NORMAL
PISA TR MAX VEL: 2.87 M/S
PV PEAK VELOCITY: 1.49 CM/S
RA MAJOR: 5.37 CM
RA PRESSURE: 3 MMHG
RA WIDTH: 3.47 CM
RIGHT VENTRICULAR END-DIASTOLIC DIMENSION: 3.7 CM
RV TISSUE DOPPLER FREE WALL SYSTOLIC VELOCITY 1 (APICAL 4 CHAMBER VIEW): 8.69 CM/S
SINUS: 3.74 CM
STJ: 3 CM
STRESS ECHO POST EXERCISE DUR MIN: 6 MINUTES
STRESS ECHO POST EXERCISE DUR SEC: 19 SECONDS
SYSTOLIC BLOOD PRESSURE: 154 MMHG
TDI LATERAL: 0.07 M/S
TDI SEPTAL: 0.06 M/S
TDI: 0.07 M/S
TR MAX PG: 33 MMHG
TRICUSPID ANNULAR PLANE SYSTOLIC EXCURSION: 2.25 CM
TV REST PULMONARY ARTERY PRESSURE: 36 MMHG

## 2021-05-12 PROCEDURE — 93018 STRESS TEST WITH MYOCARDIAL PERFUSION (CUPID ONLY): ICD-10-PCS | Mod: ,,, | Performed by: INTERNAL MEDICINE

## 2021-05-12 PROCEDURE — 93306 TTE W/DOPPLER COMPLETE: CPT

## 2021-05-12 PROCEDURE — A9502 TC99M TETROFOSMIN: HCPCS

## 2021-05-12 PROCEDURE — 78452 STRESS TEST WITH MYOCARDIAL PERFUSION (CUPID ONLY): ICD-10-PCS | Mod: 26,,, | Performed by: INTERNAL MEDICINE

## 2021-05-12 PROCEDURE — 93018 CV STRESS TEST I&R ONLY: CPT | Mod: ,,, | Performed by: INTERNAL MEDICINE

## 2021-05-12 PROCEDURE — 78452 HT MUSCLE IMAGE SPECT MULT: CPT | Mod: 26,,, | Performed by: INTERNAL MEDICINE

## 2021-05-12 PROCEDURE — 93306 ECHO (CUPID ONLY): ICD-10-PCS | Mod: 26,,, | Performed by: INTERNAL MEDICINE

## 2021-05-12 PROCEDURE — 93016 CV STRESS TEST SUPVJ ONLY: CPT | Mod: ,,, | Performed by: INTERNAL MEDICINE

## 2021-05-12 PROCEDURE — 93016 STRESS TEST WITH MYOCARDIAL PERFUSION (CUPID ONLY): ICD-10-PCS | Mod: ,,, | Performed by: INTERNAL MEDICINE

## 2021-05-12 PROCEDURE — 93017 CV STRESS TEST TRACING ONLY: CPT

## 2021-05-12 PROCEDURE — 93306 TTE W/DOPPLER COMPLETE: CPT | Mod: 26,,, | Performed by: INTERNAL MEDICINE

## 2021-05-12 PROCEDURE — 78452 HT MUSCLE IMAGE SPECT MULT: CPT

## 2021-05-15 ENCOUNTER — PATIENT MESSAGE (OUTPATIENT)
Dept: RHEUMATOLOGY | Facility: CLINIC | Age: 71
End: 2021-05-15

## 2021-05-17 ENCOUNTER — TELEPHONE (OUTPATIENT)
Dept: FAMILY MEDICINE | Facility: CLINIC | Age: 71
End: 2021-05-17

## 2021-05-18 ENCOUNTER — PATIENT MESSAGE (OUTPATIENT)
Dept: ENDOCRINOLOGY | Facility: CLINIC | Age: 71
End: 2021-05-18

## 2021-05-18 DIAGNOSIS — S22.000A COMPRESSION FRACTURE OF BODY OF THORACIC VERTEBRA: Primary | ICD-10-CM

## 2021-05-18 DIAGNOSIS — M81.0 OSTEOPOROSIS, UNSPECIFIED OSTEOPOROSIS TYPE, UNSPECIFIED PATHOLOGICAL FRACTURE PRESENCE: ICD-10-CM

## 2021-05-18 DIAGNOSIS — Z79.4 CONTROLLED TYPE 2 DIABETES MELLITUS WITH COMPLICATION, WITH LONG-TERM CURRENT USE OF INSULIN: ICD-10-CM

## 2021-05-18 DIAGNOSIS — E11.8 CONTROLLED TYPE 2 DIABETES MELLITUS WITH COMPLICATION, WITH LONG-TERM CURRENT USE OF INSULIN: ICD-10-CM

## 2021-05-18 DIAGNOSIS — Z79.52 CURRENT CHRONIC USE OF SYSTEMIC STEROIDS: ICD-10-CM

## 2021-05-19 ENCOUNTER — PATIENT MESSAGE (OUTPATIENT)
Dept: ENDOCRINOLOGY | Facility: CLINIC | Age: 71
End: 2021-05-19

## 2021-05-19 ENCOUNTER — OFFICE VISIT (OUTPATIENT)
Dept: ENDOCRINOLOGY | Facility: CLINIC | Age: 71
End: 2021-05-19
Payer: MEDICARE

## 2021-05-19 VITALS
TEMPERATURE: 98 F | HEART RATE: 79 BPM | WEIGHT: 146.69 LBS | BODY MASS INDEX: 24.44 KG/M2 | DIASTOLIC BLOOD PRESSURE: 68 MMHG | HEIGHT: 65 IN | SYSTOLIC BLOOD PRESSURE: 150 MMHG

## 2021-05-19 DIAGNOSIS — S22.000A COMPRESSION FRACTURE OF BODY OF THORACIC VERTEBRA: ICD-10-CM

## 2021-05-19 DIAGNOSIS — N25.81 SECONDARY HYPERPARATHYROIDISM OF RENAL ORIGIN: ICD-10-CM

## 2021-05-19 DIAGNOSIS — I10 ESSENTIAL HYPERTENSION: ICD-10-CM

## 2021-05-19 DIAGNOSIS — Z79.52 CURRENT CHRONIC USE OF SYSTEMIC STEROIDS: ICD-10-CM

## 2021-05-19 DIAGNOSIS — N18.31 STAGE 3A CHRONIC KIDNEY DISEASE: ICD-10-CM

## 2021-05-19 DIAGNOSIS — M81.0 OSTEOPOROSIS, UNSPECIFIED OSTEOPOROSIS TYPE, UNSPECIFIED PATHOLOGICAL FRACTURE PRESENCE: Primary | ICD-10-CM

## 2021-05-19 DIAGNOSIS — E78.00 PURE HYPERCHOLESTEROLEMIA: ICD-10-CM

## 2021-05-19 DIAGNOSIS — E11.8 CONTROLLED TYPE 2 DIABETES MELLITUS WITH COMPLICATION, WITHOUT LONG-TERM CURRENT USE OF INSULIN: ICD-10-CM

## 2021-05-19 PROCEDURE — 99999 PR PBB SHADOW E&M-EST. PATIENT-LVL III: ICD-10-PCS | Mod: PBBFAC,,, | Performed by: HOSPITALIST

## 2021-05-19 PROCEDURE — 3051F PR MOST RECENT HEMOGLOBIN A1C LEVEL 7.0 - < 8.0%: ICD-10-PCS | Mod: CPTII,S$GLB,, | Performed by: HOSPITALIST

## 2021-05-19 PROCEDURE — 99214 PR OFFICE/OUTPT VISIT, EST, LEVL IV, 30-39 MIN: ICD-10-PCS | Mod: S$GLB,,, | Performed by: HOSPITALIST

## 2021-05-19 PROCEDURE — 1159F MED LIST DOCD IN RCRD: CPT | Mod: S$GLB,,, | Performed by: HOSPITALIST

## 2021-05-19 PROCEDURE — 1126F AMNT PAIN NOTED NONE PRSNT: CPT | Mod: S$GLB,,, | Performed by: HOSPITALIST

## 2021-05-19 PROCEDURE — 3008F BODY MASS INDEX DOCD: CPT | Mod: CPTII,S$GLB,, | Performed by: HOSPITALIST

## 2021-05-19 PROCEDURE — 99214 OFFICE O/P EST MOD 30 MIN: CPT | Mod: S$GLB,,, | Performed by: HOSPITALIST

## 2021-05-19 PROCEDURE — 3051F HG A1C>EQUAL 7.0%<8.0%: CPT | Mod: CPTII,S$GLB,, | Performed by: HOSPITALIST

## 2021-05-19 PROCEDURE — 3008F PR BODY MASS INDEX (BMI) DOCUMENTED: ICD-10-PCS | Mod: CPTII,S$GLB,, | Performed by: HOSPITALIST

## 2021-05-19 PROCEDURE — 1159F PR MEDICATION LIST DOCUMENTED IN MEDICAL RECORD: ICD-10-PCS | Mod: S$GLB,,, | Performed by: HOSPITALIST

## 2021-05-19 PROCEDURE — 1126F PR PAIN SEVERITY QUANTIFIED, NO PAIN PRESENT: ICD-10-PCS | Mod: S$GLB,,, | Performed by: HOSPITALIST

## 2021-05-19 PROCEDURE — 99999 PR PBB SHADOW E&M-EST. PATIENT-LVL III: CPT | Mod: PBBFAC,,, | Performed by: HOSPITALIST

## 2021-05-20 ENCOUNTER — TELEPHONE (OUTPATIENT)
Dept: ENDOCRINOLOGY | Facility: CLINIC | Age: 71
End: 2021-05-20

## 2021-05-21 ENCOUNTER — PATIENT MESSAGE (OUTPATIENT)
Dept: RHEUMATOLOGY | Facility: CLINIC | Age: 71
End: 2021-05-21

## 2021-05-21 DIAGNOSIS — M1A.9XX1 TOPHACEOUS GOUT: Primary | ICD-10-CM

## 2021-05-21 RX ORDER — FEBUXOSTAT 40 MG/1
40 TABLET, FILM COATED ORAL DAILY
Qty: 30 TABLET | Refills: 11 | Status: SHIPPED | OUTPATIENT
Start: 2021-05-21 | End: 2022-01-24 | Stop reason: SDUPTHER

## 2021-05-24 ENCOUNTER — OFFICE VISIT (OUTPATIENT)
Dept: CARDIOLOGY | Facility: CLINIC | Age: 71
End: 2021-05-24
Payer: MEDICARE

## 2021-05-24 VITALS
DIASTOLIC BLOOD PRESSURE: 60 MMHG | OXYGEN SATURATION: 98 % | WEIGHT: 149.94 LBS | SYSTOLIC BLOOD PRESSURE: 132 MMHG | HEIGHT: 64 IN | BODY MASS INDEX: 25.6 KG/M2 | HEART RATE: 87 BPM

## 2021-05-24 DIAGNOSIS — K22.70 BARRETT'S ESOPHAGUS WITHOUT DYSPLASIA: ICD-10-CM

## 2021-05-24 DIAGNOSIS — D64.9 ANEMIA, UNSPECIFIED TYPE: ICD-10-CM

## 2021-05-24 DIAGNOSIS — K59.09 CHRONIC CONSTIPATION: ICD-10-CM

## 2021-05-24 DIAGNOSIS — I50.30 DIASTOLIC HEART FAILURE, NYHA CLASS 2: ICD-10-CM

## 2021-05-24 DIAGNOSIS — M06.9 RHEUMATOID ARTHRITIS, INVOLVING UNSPECIFIED SITE, UNSPECIFIED WHETHER RHEUMATOID FACTOR PRESENT: ICD-10-CM

## 2021-05-24 DIAGNOSIS — I70.0 AORTIC ATHEROSCLEROSIS: Primary | ICD-10-CM

## 2021-05-24 DIAGNOSIS — M35.9 CONNECTIVE TISSUE DISORDER: ICD-10-CM

## 2021-05-24 DIAGNOSIS — D69.6 THROMBOCYTOPENIA: ICD-10-CM

## 2021-05-24 DIAGNOSIS — N18.31 STAGE 3A CHRONIC KIDNEY DISEASE: ICD-10-CM

## 2021-05-24 DIAGNOSIS — E78.00 PURE HYPERCHOLESTEROLEMIA: ICD-10-CM

## 2021-05-24 DIAGNOSIS — I10 ESSENTIAL HYPERTENSION: ICD-10-CM

## 2021-05-24 DIAGNOSIS — J90 PLEURAL EFFUSION: ICD-10-CM

## 2021-05-24 DIAGNOSIS — E11.8 CONTROLLED TYPE 2 DIABETES MELLITUS WITH COMPLICATION, WITHOUT LONG-TERM CURRENT USE OF INSULIN: ICD-10-CM

## 2021-05-24 PROCEDURE — 1101F PT FALLS ASSESS-DOCD LE1/YR: CPT | Mod: CPTII,S$GLB,, | Performed by: INTERNAL MEDICINE

## 2021-05-24 PROCEDURE — 1101F PR PT FALLS ASSESS DOC 0-1 FALLS W/OUT INJ PAST YR: ICD-10-PCS | Mod: CPTII,S$GLB,, | Performed by: INTERNAL MEDICINE

## 2021-05-24 PROCEDURE — 1159F MED LIST DOCD IN RCRD: CPT | Mod: S$GLB,,, | Performed by: INTERNAL MEDICINE

## 2021-05-24 PROCEDURE — 99214 PR OFFICE/OUTPT VISIT, EST, LEVL IV, 30-39 MIN: ICD-10-PCS | Mod: S$GLB,,, | Performed by: INTERNAL MEDICINE

## 2021-05-24 PROCEDURE — 1126F PR PAIN SEVERITY QUANTIFIED, NO PAIN PRESENT: ICD-10-PCS | Mod: S$GLB,,, | Performed by: INTERNAL MEDICINE

## 2021-05-24 PROCEDURE — 3008F PR BODY MASS INDEX (BMI) DOCUMENTED: ICD-10-PCS | Mod: CPTII,S$GLB,, | Performed by: INTERNAL MEDICINE

## 2021-05-24 PROCEDURE — 3051F PR MOST RECENT HEMOGLOBIN A1C LEVEL 7.0 - < 8.0%: ICD-10-PCS | Mod: CPTII,S$GLB,, | Performed by: INTERNAL MEDICINE

## 2021-05-24 PROCEDURE — 3008F BODY MASS INDEX DOCD: CPT | Mod: CPTII,S$GLB,, | Performed by: INTERNAL MEDICINE

## 2021-05-24 PROCEDURE — 99999 PR PBB SHADOW E&M-EST. PATIENT-LVL V: ICD-10-PCS | Mod: PBBFAC,,, | Performed by: INTERNAL MEDICINE

## 2021-05-24 PROCEDURE — 99214 OFFICE O/P EST MOD 30 MIN: CPT | Mod: S$GLB,,, | Performed by: INTERNAL MEDICINE

## 2021-05-24 PROCEDURE — 3288F PR FALLS RISK ASSESSMENT DOCUMENTED: ICD-10-PCS | Mod: CPTII,S$GLB,, | Performed by: INTERNAL MEDICINE

## 2021-05-24 PROCEDURE — 99999 PR PBB SHADOW E&M-EST. PATIENT-LVL V: CPT | Mod: PBBFAC,,, | Performed by: INTERNAL MEDICINE

## 2021-05-24 PROCEDURE — 1126F AMNT PAIN NOTED NONE PRSNT: CPT | Mod: S$GLB,,, | Performed by: INTERNAL MEDICINE

## 2021-05-24 PROCEDURE — 3051F HG A1C>EQUAL 7.0%<8.0%: CPT | Mod: CPTII,S$GLB,, | Performed by: INTERNAL MEDICINE

## 2021-05-24 PROCEDURE — 3288F FALL RISK ASSESSMENT DOCD: CPT | Mod: CPTII,S$GLB,, | Performed by: INTERNAL MEDICINE

## 2021-05-24 PROCEDURE — 1159F PR MEDICATION LIST DOCUMENTED IN MEDICAL RECORD: ICD-10-PCS | Mod: S$GLB,,, | Performed by: INTERNAL MEDICINE

## 2021-05-24 RX ORDER — FEBUXOSTAT 40 MG/1
40 TABLET, FILM COATED ORAL DAILY
Qty: 30 TABLET | Refills: 11 | Status: SHIPPED | OUTPATIENT
Start: 2021-05-24 | End: 2022-01-25 | Stop reason: SDUPTHER

## 2021-05-24 RX ORDER — PRAVASTATIN SODIUM 40 MG/1
40 TABLET ORAL NIGHTLY
Qty: 90 TABLET | Refills: 3 | Status: ON HOLD | OUTPATIENT
Start: 2021-05-24 | End: 2021-06-28 | Stop reason: HOSPADM

## 2021-05-26 ENCOUNTER — PATIENT MESSAGE (OUTPATIENT)
Dept: RHEUMATOLOGY | Facility: CLINIC | Age: 71
End: 2021-05-26

## 2021-05-26 ENCOUNTER — TELEPHONE (OUTPATIENT)
Dept: PHARMACY | Facility: CLINIC | Age: 71
End: 2021-05-26

## 2021-05-28 ENCOUNTER — INFUSION (OUTPATIENT)
Dept: INFUSION THERAPY | Facility: HOSPITAL | Age: 71
End: 2021-05-28
Attending: HOSPITALIST
Payer: MEDICARE

## 2021-05-28 VITALS
HEART RATE: 90 BPM | SYSTOLIC BLOOD PRESSURE: 131 MMHG | OXYGEN SATURATION: 99 % | RESPIRATION RATE: 17 BRPM | TEMPERATURE: 98 F | DIASTOLIC BLOOD PRESSURE: 62 MMHG

## 2021-05-28 DIAGNOSIS — M81.0 OSTEOPOROSIS, UNSPECIFIED OSTEOPOROSIS TYPE, UNSPECIFIED PATHOLOGICAL FRACTURE PRESENCE: Primary | ICD-10-CM

## 2021-05-28 DIAGNOSIS — N18.31 STAGE 3A CHRONIC KIDNEY DISEASE: ICD-10-CM

## 2021-05-28 PROCEDURE — 63600175 PHARM REV CODE 636 W HCPCS: Mod: JG | Performed by: HOSPITALIST

## 2021-05-28 PROCEDURE — 96372 THER/PROPH/DIAG INJ SC/IM: CPT

## 2021-05-28 RX ADMIN — DENOSUMAB 60 MG: 60 INJECTION SUBCUTANEOUS at 08:05

## 2021-06-10 DIAGNOSIS — H40.1132 PRIMARY OPEN ANGLE GLAUCOMA (POAG) OF BOTH EYES, MODERATE STAGE: Primary | ICD-10-CM

## 2021-06-11 ENCOUNTER — OFFICE VISIT (OUTPATIENT)
Dept: OPTOMETRY | Facility: CLINIC | Age: 71
End: 2021-06-11
Payer: MEDICARE

## 2021-06-11 ENCOUNTER — CLINICAL SUPPORT (OUTPATIENT)
Dept: OPHTHALMOLOGY | Facility: CLINIC | Age: 71
End: 2021-06-11
Payer: MEDICARE

## 2021-06-11 DIAGNOSIS — H40.1132 PRIMARY OPEN ANGLE GLAUCOMA (POAG) OF BOTH EYES, MODERATE STAGE: ICD-10-CM

## 2021-06-11 PROCEDURE — 1101F PR PT FALLS ASSESS DOC 0-1 FALLS W/OUT INJ PAST YR: ICD-10-PCS | Mod: CPTII,S$GLB,, | Performed by: OPTOMETRIST

## 2021-06-11 PROCEDURE — 99999 PR PBB SHADOW E&M-EST. PATIENT-LVL III: CPT | Mod: PBBFAC,,, | Performed by: OPTOMETRIST

## 2021-06-11 PROCEDURE — 1126F AMNT PAIN NOTED NONE PRSNT: CPT | Mod: S$GLB,,, | Performed by: OPTOMETRIST

## 2021-06-11 PROCEDURE — 1126F PR PAIN SEVERITY QUANTIFIED, NO PAIN PRESENT: ICD-10-PCS | Mod: S$GLB,,, | Performed by: OPTOMETRIST

## 2021-06-11 PROCEDURE — 3288F PR FALLS RISK ASSESSMENT DOCUMENTED: ICD-10-PCS | Mod: CPTII,S$GLB,, | Performed by: OPTOMETRIST

## 2021-06-11 PROCEDURE — 92012 INTRM OPH EXAM EST PATIENT: CPT | Mod: S$GLB,,, | Performed by: OPTOMETRIST

## 2021-06-11 PROCEDURE — 3288F FALL RISK ASSESSMENT DOCD: CPT | Mod: CPTII,S$GLB,, | Performed by: OPTOMETRIST

## 2021-06-11 PROCEDURE — 99999 PR PBB SHADOW E&M-EST. PATIENT-LVL III: ICD-10-PCS | Mod: PBBFAC,,, | Performed by: OPTOMETRIST

## 2021-06-11 PROCEDURE — 1101F PT FALLS ASSESS-DOCD LE1/YR: CPT | Mod: CPTII,S$GLB,, | Performed by: OPTOMETRIST

## 2021-06-11 PROCEDURE — 92012 PR EYE EXAM, EST PATIENT,INTERMED: ICD-10-PCS | Mod: S$GLB,,, | Performed by: OPTOMETRIST

## 2021-06-11 RX ORDER — DORZOLAMIDE HCL 20 MG/ML
1 SOLUTION/ DROPS OPHTHALMIC 2 TIMES DAILY
Qty: 10 ML | Refills: 3 | Status: SHIPPED | OUTPATIENT
Start: 2021-06-11 | End: 2022-03-03

## 2021-06-11 RX ORDER — TRAVOPROST OPHTHALMIC SOLUTION 0.04 MG/ML
1 SOLUTION OPHTHALMIC NIGHTLY
Qty: 5 ML | Refills: 3 | Status: SHIPPED | OUTPATIENT
Start: 2021-06-11 | End: 2021-12-07

## 2021-06-26 ENCOUNTER — HOSPITAL ENCOUNTER (OUTPATIENT)
Facility: HOSPITAL | Age: 71
Discharge: HOME | End: 2021-06-28
Attending: EMERGENCY MEDICINE | Admitting: HOSPITALIST
Payer: MEDICARE

## 2021-06-26 DIAGNOSIS — R55 POSTURAL DIZZINESS WITH PRESYNCOPE: ICD-10-CM

## 2021-06-26 DIAGNOSIS — R55 SYNCOPE: ICD-10-CM

## 2021-06-26 DIAGNOSIS — R79.89 ELEVATED D-DIMER: ICD-10-CM

## 2021-06-26 DIAGNOSIS — R55 NEAR SYNCOPE: Primary | ICD-10-CM

## 2021-06-26 DIAGNOSIS — R53.83 FATIGUE, UNSPECIFIED TYPE: ICD-10-CM

## 2021-06-26 DIAGNOSIS — R42 POSTURAL DIZZINESS WITH PRESYNCOPE: ICD-10-CM

## 2021-06-26 DIAGNOSIS — G45.9 TIA (TRANSIENT ISCHEMIC ATTACK): ICD-10-CM

## 2021-06-26 DIAGNOSIS — I44.1 MOBITZ I: ICD-10-CM

## 2021-06-26 DIAGNOSIS — I44.1 SECOND DEGREE AV BLOCK, MOBITZ TYPE I: ICD-10-CM

## 2021-06-26 DIAGNOSIS — I65.23 BILATERAL CAROTID ARTERY STENOSIS: ICD-10-CM

## 2021-06-26 LAB
ALBUMIN SERPL-MCNC: 3.5 G/DL (ref 3.3–5.5)
ALLENS TEST: ABNORMAL
ALP SERPL-CCNC: 58 U/L (ref 42–141)
BILIRUB SERPL-MCNC: 0.4 MG/DL (ref 0.2–1.6)
BILIRUBIN, POC UA: NEGATIVE
BLOOD, POC UA: NEGATIVE
BUN SERPL-MCNC: 35 MG/DL (ref 7–22)
CALCIUM SERPL-MCNC: 9.8 MG/DL (ref 8–10.3)
CHLORIDE SERPL-SCNC: 107 MMOL/L (ref 98–108)
CLARITY, POC UA: CLEAR
COLOR, POC UA: YELLOW
CREAT SERPL-MCNC: 1.3 MG/DL (ref 0.6–1.2)
GLUCOSE SERPL-MCNC: 105 MG/DL (ref 73–118)
GLUCOSE, POC UA: NEGATIVE
HCO3 UR-SCNC: 25.4 MMOL/L (ref 24–28)
KETONES, POC UA: NEGATIVE
LDH SERPL L TO P-CCNC: 0.68 MMOL/L (ref 0.5–2.2)
LEUKOCYTE EST, POC UA: NEGATIVE
NITRITE, POC UA: NEGATIVE
PCO2 BLDA: 43.5 MMHG (ref 35–45)
PH SMN: 7.38 [PH] (ref 7.35–7.45)
PH UR STRIP: 5.5 [PH]
PO2 BLDA: 26 MMHG (ref 40–60)
POC ALT (SGPT): 17 U/L (ref 10–47)
POC AST (SGOT): 39 U/L (ref 11–38)
POC BE: 0 MMOL/L
POC CARDIAC TROPONIN I: 0.01 NG/ML
POC D-DI: 845 NG/ML (ref 0–450)
POC PTINR: 1.1 (ref 0.9–1.2)
POC PTWBT: 12.8 SEC (ref 9.7–14.3)
POC SATURATED O2: 47 % (ref 95–100)
POC TCO2: 25 MMOL/L (ref 18–33)
POC TCO2: 27 MMOL/L (ref 24–29)
POCT GLUCOSE: 128 MG/DL (ref 70–110)
POTASSIUM BLD-SCNC: 5.2 MMOL/L (ref 3.6–5.1)
PROTEIN, POC UA: ABNORMAL
PROTEIN, POC: 7.2 G/DL (ref 6.4–8.1)
SAMPLE: ABNORMAL
SAMPLE: NORMAL
SAMPLE: NORMAL
SITE: ABNORMAL
SODIUM BLD-SCNC: 144 MMOL/L (ref 128–145)
SPECIFIC GRAVITY, POC UA: 1.01
UROBILINOGEN, POC UA: 0.2 E.U./DL

## 2021-06-26 PROCEDURE — 93010 ELECTROCARDIOGRAM REPORT: CPT | Mod: 76,,, | Performed by: INTERNAL MEDICINE

## 2021-06-26 PROCEDURE — 80053 COMPREHEN METABOLIC PANEL: CPT | Mod: ER

## 2021-06-26 PROCEDURE — 85379 FIBRIN DEGRADATION QUANT: CPT | Mod: ER

## 2021-06-26 PROCEDURE — 84484 ASSAY OF TROPONIN QUANT: CPT | Mod: ER

## 2021-06-26 PROCEDURE — 81003 URINALYSIS AUTO W/O SCOPE: CPT | Mod: ER

## 2021-06-26 PROCEDURE — 85610 PROTHROMBIN TIME: CPT | Mod: ER

## 2021-06-26 PROCEDURE — 82803 BLOOD GASES ANY COMBINATION: CPT | Mod: ER

## 2021-06-26 PROCEDURE — 93005 ELECTROCARDIOGRAM TRACING: CPT | Mod: ER

## 2021-06-26 PROCEDURE — 99285 EMERGENCY DEPT VISIT HI MDM: CPT | Mod: 25,ER

## 2021-06-26 PROCEDURE — 93010 ELECTROCARDIOGRAM REPORT: CPT | Mod: ,,, | Performed by: INTERNAL MEDICINE

## 2021-06-26 PROCEDURE — 25500020 PHARM REV CODE 255: Mod: ER

## 2021-06-26 PROCEDURE — 85025 COMPLETE CBC W/AUTO DIFF WBC: CPT | Mod: ER

## 2021-06-26 PROCEDURE — 93010 EKG 12-LEAD: ICD-10-PCS | Mod: ,,, | Performed by: INTERNAL MEDICINE

## 2021-06-26 RX ADMIN — IOHEXOL 100 ML: 350 INJECTION, SOLUTION INTRAVENOUS at 09:06

## 2021-06-27 PROBLEM — R79.89 ELEVATED D-DIMER: Status: ACTIVE | Noted: 2021-06-27

## 2021-06-27 PROBLEM — I44.1 MOBITZ I: Status: ACTIVE | Noted: 2021-06-27

## 2021-06-27 PROBLEM — I65.23 BILATERAL CAROTID ARTERY STENOSIS: Status: ACTIVE | Noted: 2021-06-27

## 2021-06-27 PROBLEM — G45.9 TIA (TRANSIENT ISCHEMIC ATTACK): Status: ACTIVE | Noted: 2021-06-26

## 2021-06-27 LAB
ALBUMIN SERPL BCP-MCNC: 3.3 G/DL (ref 3.5–5.2)
ALP SERPL-CCNC: 63 U/L (ref 55–135)
ALT SERPL W/O P-5'-P-CCNC: 11 U/L (ref 10–44)
ANION GAP SERPL CALC-SCNC: 10 MMOL/L (ref 8–16)
AORTIC ROOT ANNULUS: 3.38 CM
AORTIC VALVE CUSP SEPERATION: 1.07 CM
APTT BLDCRRT: 32.3 SEC (ref 21–32)
ASCENDING AORTA: 3.16 CM
AST SERPL-CCNC: 13 U/L (ref 10–40)
AV INDEX (PROSTH): 0.57
AV MEAN GRADIENT: 9 MMHG
AV PEAK GRADIENT: 15 MMHG
AV VALVE AREA: 1.67 CM2
AV VELOCITY RATIO: 0.5
BASOPHILS # BLD AUTO: 0.02 K/UL (ref 0–0.2)
BASOPHILS NFR BLD: 0.3 % (ref 0–1.9)
BILIRUB SERPL-MCNC: 0.3 MG/DL (ref 0.1–1)
BNP SERPL-MCNC: 142 PG/ML (ref 0–99)
BSA FOR ECHO PROCEDURE: 1.82 M2
BUN SERPL-MCNC: 32 MG/DL (ref 8–23)
CALCIUM SERPL-MCNC: 9.1 MG/DL (ref 8.7–10.5)
CHLORIDE SERPL-SCNC: 107 MMOL/L (ref 95–110)
CK MB SERPL-MCNC: 1.7 NG/ML (ref 0.1–6.5)
CK MB SERPL-RTO: 3.4 % (ref 0–5)
CK SERPL-CCNC: 50 U/L (ref 20–200)
CO2 SERPL-SCNC: 20 MMOL/L (ref 23–29)
CREAT SERPL-MCNC: 1.4 MG/DL (ref 0.5–1.4)
CV ECHO LV RWT: 0.82 CM
D DIMER PPP IA.FEU-MCNC: 1.41 MG/L FEU
DIFFERENTIAL METHOD: ABNORMAL
DOP CALC AO PEAK VEL: 1.95 M/S
DOP CALC AO VTI: 48.95 CM
DOP CALC LVOT AREA: 3 CM2
DOP CALC LVOT DIAMETER: 1.94 CM
DOP CALC LVOT PEAK VEL: 0.98 M/S
DOP CALC LVOT STROKE VOLUME: 81.96 CM3
DOP CALCLVOT PEAK VEL VTI: 27.74 CM
E WAVE DECELERATION TIME: 317.81 MSEC
E/A RATIO: 0.89
ECHO LV POSTERIOR WALL: 1.55 CM (ref 0.6–1.1)
EJECTION FRACTION: 65 %
EOSINOPHIL # BLD AUTO: 0.1 K/UL (ref 0–0.5)
EOSINOPHIL NFR BLD: 1.3 % (ref 0–8)
ERYTHROCYTE [DISTWIDTH] IN BLOOD BY AUTOMATED COUNT: 17.1 % (ref 11.5–14.5)
EST. GFR  (AFRICAN AMERICAN): 58 ML/MIN/1.73 M^2
EST. GFR  (NON AFRICAN AMERICAN): 50 ML/MIN/1.73 M^2
FRACTIONAL SHORTENING: 36 % (ref 28–44)
GLUCOSE SERPL-MCNC: 136 MG/DL (ref 70–110)
HCT VFR BLD AUTO: 36.5 % (ref 40–54)
HGB BLD-MCNC: 11.5 G/DL (ref 14–18)
IMM GRANULOCYTES # BLD AUTO: 0.03 K/UL (ref 0–0.04)
IMM GRANULOCYTES NFR BLD AUTO: 0.4 % (ref 0–0.5)
INR PPP: 1 (ref 0.8–1.2)
INTERVENTRICULAR SEPTUM: 1.56 CM (ref 0.6–1.1)
IVRT: 96.89 MSEC
LA MAJOR: 5.32 CM
LA MINOR: 5.93 CM
LA WIDTH: 3.99 CM
LEFT ATRIUM SIZE: 4.38 CM
LEFT ATRIUM VOLUME INDEX: 46.3 ML/M2
LEFT ATRIUM VOLUME: 83.31 CM3
LEFT INTERNAL DIMENSION IN SYSTOLE: 2.41 CM (ref 2.1–4)
LEFT VENTRICLE DIASTOLIC VOLUME INDEX: 34.23 ML/M2
LEFT VENTRICLE DIASTOLIC VOLUME: 61.62 ML
LEFT VENTRICLE MASS INDEX: 127 G/M2
LEFT VENTRICLE SYSTOLIC VOLUME INDEX: 11.3 ML/M2
LEFT VENTRICLE SYSTOLIC VOLUME: 20.38 ML
LEFT VENTRICULAR INTERNAL DIMENSION IN DIASTOLE: 3.79 CM (ref 3.5–6)
LEFT VENTRICULAR MASS: 228.63 G
LV SEPTAL E/E' RATIO: 23 M/S
LYMPHOCYTES # BLD AUTO: 1.4 K/UL (ref 1–4.8)
LYMPHOCYTES NFR BLD: 19 % (ref 18–48)
MAGNESIUM SERPL-MCNC: 2 MG/DL (ref 1.6–2.6)
MCH RBC QN AUTO: 26.1 PG (ref 27–31)
MCHC RBC AUTO-ENTMCNC: 31.5 G/DL (ref 32–36)
MCV RBC AUTO: 83 FL (ref 82–98)
MONOCYTES # BLD AUTO: 1 K/UL (ref 0.3–1)
MONOCYTES NFR BLD: 13.8 % (ref 4–15)
MV PEAK A VEL: 1.03 M/S
MV PEAK E VEL: 0.92 M/S
MV STENOSIS PRESSURE HALF TIME: 92.17 MS
MV VALVE AREA P 1/2 METHOD: 2.39 CM2
NEUTROPHILS # BLD AUTO: 4.9 K/UL (ref 1.8–7.7)
NEUTROPHILS NFR BLD: 65.2 % (ref 38–73)
NRBC BLD-RTO: 0 /100 WBC
PHOSPHATE SERPL-MCNC: 3.3 MG/DL (ref 2.7–4.5)
PISA MRMAX VEL: 0.04 M/S
PISA TR MAX VEL: 1.78 M/S
PLATELET # BLD AUTO: 304 K/UL (ref 150–450)
PMV BLD AUTO: 11.3 FL (ref 9.2–12.9)
POCT GLUCOSE: 243 MG/DL (ref 70–110)
POCT GLUCOSE: 335 MG/DL (ref 70–110)
POCT GLUCOSE: 99 MG/DL (ref 70–110)
POTASSIUM SERPL-SCNC: 4.2 MMOL/L (ref 3.5–5.1)
PROT SERPL-MCNC: 7 G/DL (ref 6–8.4)
PROTHROMBIN TIME: 10.4 SEC (ref 9–12.5)
PV PEAK VELOCITY: 1.4 CM/S
RA MAJOR: 5.55 CM
RA PRESSURE: 3 MMHG
RA WIDTH: 3.43 CM
RBC # BLD AUTO: 4.41 M/UL (ref 4.6–6.2)
RIGHT VENTRICULAR END-DIASTOLIC DIMENSION: 2.4 CM
RV TISSUE DOPPLER FREE WALL SYSTOLIC VELOCITY 1 (APICAL 4 CHAMBER VIEW): 6.42 CM/S
SINUS: 3.41 CM
SODIUM SERPL-SCNC: 137 MMOL/L (ref 136–145)
STJ: 2.52 CM
T4 FREE SERPL-MCNC: 1.09 NG/DL (ref 0.71–1.51)
TDI SEPTAL: 0.04 M/S
TR MAX PG: 13 MMHG
TROPONIN I SERPL DL<=0.01 NG/ML-MCNC: 0.03 NG/ML (ref 0–0.03)
TSH SERPL DL<=0.005 MIU/L-ACNC: 4.34 UIU/ML (ref 0.4–4)
TV REST PULMONARY ARTERY PRESSURE: 16 MMHG
WBC # BLD AUTO: 7.52 K/UL (ref 3.9–12.7)

## 2021-06-27 PROCEDURE — 25000003 PHARM REV CODE 250: Mod: ER | Performed by: EMERGENCY MEDICINE

## 2021-06-27 PROCEDURE — 99204 OFFICE O/P NEW MOD 45 MIN: CPT | Mod: ,,, | Performed by: PSYCHIATRY & NEUROLOGY

## 2021-06-27 PROCEDURE — 84100 ASSAY OF PHOSPHORUS: CPT | Performed by: NURSE PRACTITIONER

## 2021-06-27 PROCEDURE — 94761 N-INVAS EAR/PLS OXIMETRY MLT: CPT

## 2021-06-27 PROCEDURE — 84439 ASSAY OF FREE THYROXINE: CPT | Performed by: NURSE PRACTITIONER

## 2021-06-27 PROCEDURE — 96372 THER/PROPH/DIAG INJ SC/IM: CPT

## 2021-06-27 PROCEDURE — G0378 HOSPITAL OBSERVATION PER HR: HCPCS

## 2021-06-27 PROCEDURE — 97165 OT EVAL LOW COMPLEX 30 MIN: CPT

## 2021-06-27 PROCEDURE — 63600175 PHARM REV CODE 636 W HCPCS: Performed by: NURSE PRACTITIONER

## 2021-06-27 PROCEDURE — 36415 COLL VENOUS BLD VENIPUNCTURE: CPT | Performed by: NURSE PRACTITIONER

## 2021-06-27 PROCEDURE — 84443 ASSAY THYROID STIM HORMONE: CPT | Performed by: NURSE PRACTITIONER

## 2021-06-27 PROCEDURE — 25000003 PHARM REV CODE 250: Performed by: NURSE PRACTITIONER

## 2021-06-27 PROCEDURE — 83735 ASSAY OF MAGNESIUM: CPT | Performed by: NURSE PRACTITIONER

## 2021-06-27 PROCEDURE — 85610 PROTHROMBIN TIME: CPT | Performed by: NURSE PRACTITIONER

## 2021-06-27 PROCEDURE — 96361 HYDRATE IV INFUSION ADD-ON: CPT

## 2021-06-27 PROCEDURE — 80053 COMPREHEN METABOLIC PANEL: CPT | Performed by: NURSE PRACTITIONER

## 2021-06-27 PROCEDURE — 83880 ASSAY OF NATRIURETIC PEPTIDE: CPT | Performed by: NURSE PRACTITIONER

## 2021-06-27 PROCEDURE — 82550 ASSAY OF CK (CPK): CPT | Performed by: NURSE PRACTITIONER

## 2021-06-27 PROCEDURE — 85730 THROMBOPLASTIN TIME PARTIAL: CPT | Performed by: NURSE PRACTITIONER

## 2021-06-27 PROCEDURE — 85379 FIBRIN DEGRADATION QUANT: CPT | Performed by: NURSE PRACTITIONER

## 2021-06-27 PROCEDURE — 99220 PR INITIAL OBSERVATION CARE,LEVL III: ICD-10-PCS | Mod: 25,,, | Performed by: INTERNAL MEDICINE

## 2021-06-27 PROCEDURE — 99220 PR INITIAL OBSERVATION CARE,LEVL III: CPT | Mod: 25,,, | Performed by: INTERNAL MEDICINE

## 2021-06-27 PROCEDURE — 84484 ASSAY OF TROPONIN QUANT: CPT | Performed by: NURSE PRACTITIONER

## 2021-06-27 PROCEDURE — 85025 COMPLETE CBC W/AUTO DIFF WBC: CPT | Performed by: NURSE PRACTITIONER

## 2021-06-27 PROCEDURE — 96360 HYDRATION IV INFUSION INIT: CPT

## 2021-06-27 PROCEDURE — 99204 PR OFFICE/OUTPT VISIT, NEW, LEVL IV, 45-59 MIN: ICD-10-PCS | Mod: ,,, | Performed by: PSYCHIATRY & NEUROLOGY

## 2021-06-27 PROCEDURE — 97161 PT EVAL LOW COMPLEX 20 MIN: CPT

## 2021-06-27 RX ORDER — ENOXAPARIN SODIUM 100 MG/ML
40 INJECTION SUBCUTANEOUS EVERY 24 HOURS
Status: DISCONTINUED | OUTPATIENT
Start: 2021-06-27 | End: 2021-06-28 | Stop reason: HOSPADM

## 2021-06-27 RX ORDER — TRAVOPROST OPHTHALMIC SOLUTION 0.04 MG/ML
1 SOLUTION OPHTHALMIC NIGHTLY
Status: DISCONTINUED | OUTPATIENT
Start: 2021-06-27 | End: 2021-06-28 | Stop reason: HOSPADM

## 2021-06-27 RX ORDER — ASPIRIN 81 MG/1
81 TABLET ORAL DAILY
Status: DISCONTINUED | OUTPATIENT
Start: 2021-06-27 | End: 2021-06-28 | Stop reason: HOSPADM

## 2021-06-27 RX ORDER — SODIUM CHLORIDE 0.9 % (FLUSH) 0.9 %
10 SYRINGE (ML) INJECTION
Status: DISCONTINUED | OUTPATIENT
Start: 2021-06-27 | End: 2021-06-28 | Stop reason: HOSPADM

## 2021-06-27 RX ORDER — CLOPIDOGREL BISULFATE 75 MG/1
75 TABLET ORAL DAILY
Status: DISCONTINUED | OUTPATIENT
Start: 2021-06-27 | End: 2021-06-28 | Stop reason: HOSPADM

## 2021-06-27 RX ORDER — TALC
6 POWDER (GRAM) TOPICAL NIGHTLY PRN
Status: DISCONTINUED | OUTPATIENT
Start: 2021-06-27 | End: 2021-06-28 | Stop reason: HOSPADM

## 2021-06-27 RX ORDER — GLUCAGON 1 MG
1 KIT INJECTION
Status: DISCONTINUED | OUTPATIENT
Start: 2021-06-27 | End: 2021-06-28 | Stop reason: HOSPADM

## 2021-06-27 RX ORDER — PREDNISONE 5 MG/1
5 TABLET ORAL DAILY
Status: DISCONTINUED | OUTPATIENT
Start: 2021-06-27 | End: 2021-06-28 | Stop reason: HOSPADM

## 2021-06-27 RX ORDER — ACETAMINOPHEN 325 MG/1
650 TABLET ORAL EVERY 6 HOURS PRN
Status: DISCONTINUED | OUTPATIENT
Start: 2021-06-27 | End: 2021-06-28 | Stop reason: HOSPADM

## 2021-06-27 RX ORDER — FEBUXOSTAT 40 MG/1
40 TABLET, FILM COATED ORAL DAILY
Status: DISCONTINUED | OUTPATIENT
Start: 2021-06-27 | End: 2021-06-28 | Stop reason: HOSPADM

## 2021-06-27 RX ORDER — AMOXICILLIN 250 MG
1 CAPSULE ORAL 2 TIMES DAILY
Status: DISCONTINUED | OUTPATIENT
Start: 2021-06-27 | End: 2021-06-28 | Stop reason: HOSPADM

## 2021-06-27 RX ORDER — PANTOPRAZOLE SODIUM 40 MG/1
40 TABLET, DELAYED RELEASE ORAL DAILY
Refills: 1 | Status: DISCONTINUED | OUTPATIENT
Start: 2021-06-27 | End: 2021-06-28 | Stop reason: HOSPADM

## 2021-06-27 RX ORDER — ASPIRIN 325 MG
325 TABLET, DELAYED RELEASE (ENTERIC COATED) ORAL
Status: COMPLETED | OUTPATIENT
Start: 2021-06-27 | End: 2021-06-27

## 2021-06-27 RX ORDER — ONDANSETRON 2 MG/ML
4 INJECTION INTRAMUSCULAR; INTRAVENOUS EVERY 8 HOURS PRN
Status: DISCONTINUED | OUTPATIENT
Start: 2021-06-27 | End: 2021-06-28 | Stop reason: HOSPADM

## 2021-06-27 RX ORDER — INSULIN ASPART 100 [IU]/ML
0-5 INJECTION, SOLUTION INTRAVENOUS; SUBCUTANEOUS EVERY 6 HOURS PRN
Status: DISCONTINUED | OUTPATIENT
Start: 2021-06-27 | End: 2021-06-28 | Stop reason: HOSPADM

## 2021-06-27 RX ORDER — INSULIN ASPART 100 [IU]/ML
4 INJECTION, SOLUTION INTRAVENOUS; SUBCUTANEOUS
Status: DISCONTINUED | OUTPATIENT
Start: 2021-06-28 | End: 2021-06-28 | Stop reason: HOSPADM

## 2021-06-27 RX ORDER — PRAVASTATIN SODIUM 40 MG/1
40 TABLET ORAL NIGHTLY
Status: DISCONTINUED | OUTPATIENT
Start: 2021-06-27 | End: 2021-06-28 | Stop reason: HOSPADM

## 2021-06-27 RX ADMIN — TRAVOPROST 1 DROP: 0.04 SOLUTION/ DROPS OPHTHALMIC at 08:06

## 2021-06-27 RX ADMIN — DOCUSATE SODIUM 50 MG AND SENNOSIDES 8.6 MG 1 TABLET: 8.6; 5 TABLET, FILM COATED ORAL at 08:06

## 2021-06-27 RX ADMIN — PREDNISONE 5 MG: 5 TABLET ORAL at 08:06

## 2021-06-27 RX ADMIN — PANTOPRAZOLE SODIUM 40 MG: 40 TABLET, DELAYED RELEASE ORAL at 08:06

## 2021-06-27 RX ADMIN — INSULIN ASPART 4 UNITS: 100 INJECTION, SOLUTION INTRAVENOUS; SUBCUTANEOUS at 03:06

## 2021-06-27 RX ADMIN — ASPIRIN 81 MG: 81 TABLET, COATED ORAL at 08:06

## 2021-06-27 RX ADMIN — FEBUXOSTAT 40 MG: 40 TABLET, FILM COATED ORAL at 08:06

## 2021-06-27 RX ADMIN — INSULIN ASPART 2 UNITS: 100 INJECTION, SOLUTION INTRAVENOUS; SUBCUTANEOUS at 05:06

## 2021-06-27 RX ADMIN — SODIUM CHLORIDE 500 ML: 0.9 INJECTION, SOLUTION INTRAVENOUS at 02:06

## 2021-06-27 RX ADMIN — CLOPIDOGREL 75 MG: 75 TABLET, FILM COATED ORAL at 04:06

## 2021-06-27 RX ADMIN — ENOXAPARIN SODIUM 40 MG: 40 INJECTION SUBCUTANEOUS at 04:06

## 2021-06-27 RX ADMIN — ASPIRIN 325 MG: 325 TABLET, COATED ORAL at 12:06

## 2021-06-27 RX ADMIN — PRAVASTATIN SODIUM 40 MG: 40 TABLET ORAL at 08:06

## 2021-06-28 ENCOUNTER — PATIENT MESSAGE (OUTPATIENT)
Dept: ADMINISTRATIVE | Facility: OTHER | Age: 71
End: 2021-06-28

## 2021-06-28 VITALS
HEIGHT: 65 IN | RESPIRATION RATE: 19 BRPM | WEIGHT: 160.06 LBS | OXYGEN SATURATION: 96 % | TEMPERATURE: 98 F | DIASTOLIC BLOOD PRESSURE: 87 MMHG | SYSTOLIC BLOOD PRESSURE: 154 MMHG | BODY MASS INDEX: 26.67 KG/M2 | HEART RATE: 58 BPM

## 2021-06-28 LAB
ALBUMIN SERPL BCP-MCNC: 2.9 G/DL (ref 3.5–5.2)
ALP SERPL-CCNC: 50 U/L (ref 55–135)
ALT SERPL W/O P-5'-P-CCNC: 7 U/L (ref 10–44)
ANION GAP SERPL CALC-SCNC: 6 MMOL/L (ref 8–16)
AST SERPL-CCNC: 11 U/L (ref 10–40)
BASOPHILS # BLD AUTO: 0.02 K/UL (ref 0–0.2)
BASOPHILS NFR BLD: 0.3 % (ref 0–1.9)
BILIRUB SERPL-MCNC: 0.3 MG/DL (ref 0.1–1)
BUN SERPL-MCNC: 30 MG/DL (ref 8–23)
CALCIUM SERPL-MCNC: 8.2 MG/DL (ref 8.7–10.5)
CHLORIDE SERPL-SCNC: 110 MMOL/L (ref 95–110)
CHOLEST SERPL-MCNC: 157 MG/DL (ref 120–199)
CHOLEST/HDLC SERPL: 3.8 {RATIO} (ref 2–5)
CO2 SERPL-SCNC: 21 MMOL/L (ref 23–29)
CREAT SERPL-MCNC: 1.4 MG/DL (ref 0.5–1.4)
DIFFERENTIAL METHOD: ABNORMAL
EOSINOPHIL # BLD AUTO: 0.1 K/UL (ref 0–0.5)
EOSINOPHIL NFR BLD: 1.5 % (ref 0–8)
ERYTHROCYTE [DISTWIDTH] IN BLOOD BY AUTOMATED COUNT: 17.3 % (ref 11.5–14.5)
EST. GFR  (AFRICAN AMERICAN): 58 ML/MIN/1.73 M^2
EST. GFR  (NON AFRICAN AMERICAN): 50 ML/MIN/1.73 M^2
FERRITIN SERPL-MCNC: 55 NG/ML (ref 20–300)
GLUCOSE SERPL-MCNC: 102 MG/DL (ref 70–110)
HCT VFR BLD AUTO: 31.4 % (ref 40–54)
HDLC SERPL-MCNC: 41 MG/DL (ref 40–75)
HDLC SERPL: 26.1 % (ref 20–50)
HGB BLD-MCNC: 10 G/DL (ref 14–18)
HGB BLD-MCNC: 9.7 G/DL (ref 14–18)
IMM GRANULOCYTES # BLD AUTO: 0.02 K/UL (ref 0–0.04)
IMM GRANULOCYTES NFR BLD AUTO: 0.3 % (ref 0–0.5)
IRON SERPL-MCNC: 44 UG/DL (ref 45–160)
LDLC SERPL CALC-MCNC: 97.6 MG/DL (ref 63–159)
LYMPHOCYTES # BLD AUTO: 1.3 K/UL (ref 1–4.8)
LYMPHOCYTES NFR BLD: 20.6 % (ref 18–48)
MCH RBC QN AUTO: 26.6 PG (ref 27–31)
MCHC RBC AUTO-ENTMCNC: 30.9 G/DL (ref 32–36)
MCV RBC AUTO: 86 FL (ref 82–98)
MONOCYTES # BLD AUTO: 1 K/UL (ref 0.3–1)
MONOCYTES NFR BLD: 15 % (ref 4–15)
NEUTROPHILS # BLD AUTO: 4 K/UL (ref 1.8–7.7)
NEUTROPHILS NFR BLD: 62.3 % (ref 38–73)
NONHDLC SERPL-MCNC: 116 MG/DL
NRBC BLD-RTO: 0 /100 WBC
PLATELET # BLD AUTO: 246 K/UL (ref 150–450)
PMV BLD AUTO: 12.1 FL (ref 9.2–12.9)
POCT GLUCOSE: 137 MG/DL (ref 70–110)
POTASSIUM SERPL-SCNC: 4.7 MMOL/L (ref 3.5–5.1)
PROT SERPL-MCNC: 6 G/DL (ref 6–8.4)
RBC # BLD AUTO: 3.64 M/UL (ref 4.6–6.2)
SATURATED IRON: 17 % (ref 20–50)
SODIUM SERPL-SCNC: 137 MMOL/L (ref 136–145)
TOTAL IRON BINDING CAPACITY: 263 UG/DL (ref 250–450)
TRANSFERRIN SERPL-MCNC: 178 MG/DL (ref 200–375)
TRIGL SERPL-MCNC: 92 MG/DL (ref 30–150)
WBC # BLD AUTO: 6.46 K/UL (ref 3.9–12.7)

## 2021-06-28 PROCEDURE — 80053 COMPREHEN METABOLIC PANEL: CPT | Performed by: NURSE PRACTITIONER

## 2021-06-28 PROCEDURE — 85018 HEMOGLOBIN: CPT | Mod: 91 | Performed by: NURSE PRACTITIONER

## 2021-06-28 PROCEDURE — 99214 OFFICE O/P EST MOD 30 MIN: CPT | Mod: ,,, | Performed by: INTERNAL MEDICINE

## 2021-06-28 PROCEDURE — G0378 HOSPITAL OBSERVATION PER HR: HCPCS

## 2021-06-28 PROCEDURE — 25000003 PHARM REV CODE 250: Performed by: NURSE PRACTITIONER

## 2021-06-28 PROCEDURE — 36415 COLL VENOUS BLD VENIPUNCTURE: CPT | Performed by: PSYCHIATRY & NEUROLOGY

## 2021-06-28 PROCEDURE — 80061 LIPID PANEL: CPT | Performed by: PSYCHIATRY & NEUROLOGY

## 2021-06-28 PROCEDURE — 36415 COLL VENOUS BLD VENIPUNCTURE: CPT | Performed by: NURSE PRACTITIONER

## 2021-06-28 PROCEDURE — 83540 ASSAY OF IRON: CPT | Performed by: PSYCHIATRY & NEUROLOGY

## 2021-06-28 PROCEDURE — 92610 EVALUATE SWALLOWING FUNCTION: CPT

## 2021-06-28 PROCEDURE — 99214 PR OFFICE/OUTPT VISIT, EST, LEVL IV, 30-39 MIN: ICD-10-PCS | Mod: ,,, | Performed by: INTERNAL MEDICINE

## 2021-06-28 PROCEDURE — 99214 OFFICE O/P EST MOD 30 MIN: CPT | Mod: ,,, | Performed by: PSYCHIATRY & NEUROLOGY

## 2021-06-28 PROCEDURE — 85025 COMPLETE CBC W/AUTO DIFF WBC: CPT | Performed by: NURSE PRACTITIONER

## 2021-06-28 PROCEDURE — 63600175 PHARM REV CODE 636 W HCPCS: Performed by: NURSE PRACTITIONER

## 2021-06-28 PROCEDURE — 82728 ASSAY OF FERRITIN: CPT | Performed by: PSYCHIATRY & NEUROLOGY

## 2021-06-28 PROCEDURE — 99214 PR OFFICE/OUTPT VISIT, EST, LEVL IV, 30-39 MIN: ICD-10-PCS | Mod: ,,, | Performed by: PSYCHIATRY & NEUROLOGY

## 2021-06-28 RX ORDER — FERROUS SULFATE 325(65) MG
325 TABLET ORAL 2 TIMES DAILY
Qty: 60 TABLET | Refills: 3 | Status: SHIPPED | OUTPATIENT
Start: 2021-06-28 | End: 2021-08-02

## 2021-06-28 RX ORDER — VALSARTAN 80 MG/1
160 TABLET ORAL 2 TIMES DAILY
Status: DISCONTINUED | OUTPATIENT
Start: 2021-06-28 | End: 2021-06-28 | Stop reason: HOSPADM

## 2021-06-28 RX ORDER — ATORVASTATIN CALCIUM 80 MG/1
80 TABLET, FILM COATED ORAL DAILY
Qty: 90 TABLET | Refills: 3 | Status: SHIPPED | OUTPATIENT
Start: 2021-06-28 | End: 2021-07-20 | Stop reason: SDUPTHER

## 2021-06-28 RX ORDER — TORSEMIDE 10 MG/1
10 TABLET ORAL EVERY OTHER DAY
Status: DISCONTINUED | OUTPATIENT
Start: 2021-06-29 | End: 2021-06-28 | Stop reason: HOSPADM

## 2021-06-28 RX ORDER — AMLODIPINE BESYLATE 5 MG/1
5 TABLET ORAL 2 TIMES DAILY
Status: DISCONTINUED | OUTPATIENT
Start: 2021-06-28 | End: 2021-06-28 | Stop reason: HOSPADM

## 2021-06-28 RX ORDER — CLOPIDOGREL BISULFATE 75 MG/1
75 TABLET ORAL DAILY
Qty: 21 TABLET | Refills: 0 | Status: SHIPPED | OUTPATIENT
Start: 2021-06-29 | End: 2021-10-04

## 2021-06-28 RX ORDER — HYDRALAZINE HYDROCHLORIDE 25 MG/1
50 TABLET, FILM COATED ORAL 3 TIMES DAILY
Status: DISCONTINUED | OUTPATIENT
Start: 2021-06-28 | End: 2021-06-28 | Stop reason: HOSPADM

## 2021-06-28 RX ADMIN — FEBUXOSTAT 40 MG: 40 TABLET, FILM COATED ORAL at 08:06

## 2021-06-28 RX ADMIN — ASPIRIN 81 MG: 81 TABLET, COATED ORAL at 08:06

## 2021-06-28 RX ADMIN — DOCUSATE SODIUM 50 MG AND SENNOSIDES 8.6 MG 1 TABLET: 8.6; 5 TABLET, FILM COATED ORAL at 08:06

## 2021-06-28 RX ADMIN — PREDNISONE 5 MG: 5 TABLET ORAL at 08:06

## 2021-06-28 RX ADMIN — PANTOPRAZOLE SODIUM 40 MG: 40 TABLET, DELAYED RELEASE ORAL at 08:06

## 2021-06-28 RX ADMIN — AMLODIPINE BESYLATE 5 MG: 5 TABLET ORAL at 12:06

## 2021-06-28 RX ADMIN — VALSARTAN 160 MG: 80 TABLET, FILM COATED ORAL at 12:06

## 2021-06-28 RX ADMIN — CLOPIDOGREL 75 MG: 75 TABLET, FILM COATED ORAL at 08:06

## 2021-07-06 ENCOUNTER — TELEPHONE (OUTPATIENT)
Dept: FAMILY MEDICINE | Facility: CLINIC | Age: 71
End: 2021-07-06

## 2021-07-06 ENCOUNTER — PATIENT MESSAGE (OUTPATIENT)
Dept: ENDOCRINOLOGY | Facility: CLINIC | Age: 71
End: 2021-07-06

## 2021-07-07 ENCOUNTER — TELEPHONE (OUTPATIENT)
Dept: NEUROLOGY | Facility: CLINIC | Age: 71
End: 2021-07-07

## 2021-07-08 ENCOUNTER — OFFICE VISIT (OUTPATIENT)
Dept: CARDIOLOGY | Facility: CLINIC | Age: 71
End: 2021-07-08
Payer: MEDICARE

## 2021-07-08 VITALS
SYSTOLIC BLOOD PRESSURE: 146 MMHG | OXYGEN SATURATION: 99 % | BODY MASS INDEX: 24.21 KG/M2 | HEIGHT: 65 IN | DIASTOLIC BLOOD PRESSURE: 76 MMHG | WEIGHT: 145.31 LBS | HEART RATE: 86 BPM

## 2021-07-08 DIAGNOSIS — M25.632 DECREASED RANGE OF MOTION OF BOTH WRISTS: ICD-10-CM

## 2021-07-08 DIAGNOSIS — I50.9 CONGESTIVE HEART FAILURE, UNSPECIFIED HF CHRONICITY, UNSPECIFIED HEART FAILURE TYPE: Primary | ICD-10-CM

## 2021-07-08 DIAGNOSIS — D69.6 THROMBOCYTOPENIA: ICD-10-CM

## 2021-07-08 DIAGNOSIS — M25.631 DECREASED RANGE OF MOTION OF BOTH WRISTS: ICD-10-CM

## 2021-07-08 DIAGNOSIS — Z12.11 SCREENING FOR COLON CANCER: ICD-10-CM

## 2021-07-08 DIAGNOSIS — G45.9 TIA (TRANSIENT ISCHEMIC ATTACK): ICD-10-CM

## 2021-07-08 DIAGNOSIS — M25.50 ARTHRALGIA, UNSPECIFIED JOINT: ICD-10-CM

## 2021-07-08 DIAGNOSIS — I44.1 MOBITZ I: ICD-10-CM

## 2021-07-08 DIAGNOSIS — I70.0 AORTIC ATHEROSCLEROSIS: ICD-10-CM

## 2021-07-08 DIAGNOSIS — J90 PLEURAL EFFUSION: ICD-10-CM

## 2021-07-08 DIAGNOSIS — I65.23 BILATERAL CAROTID ARTERY STENOSIS: ICD-10-CM

## 2021-07-08 DIAGNOSIS — I10 ESSENTIAL HYPERTENSION: ICD-10-CM

## 2021-07-08 DIAGNOSIS — N18.31 STAGE 3A CHRONIC KIDNEY DISEASE: ICD-10-CM

## 2021-07-08 DIAGNOSIS — I50.30 DIASTOLIC HEART FAILURE, NYHA CLASS 2: ICD-10-CM

## 2021-07-08 DIAGNOSIS — R29.898 DECREASED GRIP STRENGTH: ICD-10-CM

## 2021-07-08 DIAGNOSIS — Z96.1 PSEUDOPHAKIA OF BOTH EYES: ICD-10-CM

## 2021-07-08 DIAGNOSIS — M81.0 OSTEOPOROSIS, UNSPECIFIED OSTEOPOROSIS TYPE, UNSPECIFIED PATHOLOGICAL FRACTURE PRESENCE: ICD-10-CM

## 2021-07-08 DIAGNOSIS — Z79.52 CURRENT CHRONIC USE OF SYSTEMIC STEROIDS: ICD-10-CM

## 2021-07-08 DIAGNOSIS — R79.89 ELEVATED D-DIMER: ICD-10-CM

## 2021-07-08 DIAGNOSIS — S22.000A COMPRESSION FRACTURE OF BODY OF THORACIC VERTEBRA: ICD-10-CM

## 2021-07-08 DIAGNOSIS — N18.31 ANEMIA OF CHRONIC RENAL FAILURE, STAGE 3A: ICD-10-CM

## 2021-07-08 DIAGNOSIS — E78.00 PURE HYPERCHOLESTEROLEMIA: ICD-10-CM

## 2021-07-08 DIAGNOSIS — N25.81 SECONDARY HYPERPARATHYROIDISM OF RENAL ORIGIN: ICD-10-CM

## 2021-07-08 DIAGNOSIS — M1A.9XX1 TOPHACEOUS GOUT: ICD-10-CM

## 2021-07-08 DIAGNOSIS — D70.9 NEUTROPENIA, UNSPECIFIED TYPE: ICD-10-CM

## 2021-07-08 DIAGNOSIS — M35.9 CONNECTIVE TISSUE DISORDER: ICD-10-CM

## 2021-07-08 DIAGNOSIS — D63.1 ANEMIA OF CHRONIC RENAL FAILURE, STAGE 3A: ICD-10-CM

## 2021-07-08 PROCEDURE — 3288F FALL RISK ASSESSMENT DOCD: CPT | Mod: CPTII,S$GLB,, | Performed by: INTERNAL MEDICINE

## 2021-07-08 PROCEDURE — 99999 PR PBB SHADOW E&M-EST. PATIENT-LVL V: CPT | Mod: PBBFAC,,, | Performed by: INTERNAL MEDICINE

## 2021-07-08 PROCEDURE — 99215 OFFICE O/P EST HI 40 MIN: CPT | Mod: S$GLB,,, | Performed by: INTERNAL MEDICINE

## 2021-07-08 PROCEDURE — 1159F PR MEDICATION LIST DOCUMENTED IN MEDICAL RECORD: ICD-10-PCS | Mod: S$GLB,,, | Performed by: INTERNAL MEDICINE

## 2021-07-08 PROCEDURE — 3288F PR FALLS RISK ASSESSMENT DOCUMENTED: ICD-10-PCS | Mod: CPTII,S$GLB,, | Performed by: INTERNAL MEDICINE

## 2021-07-08 PROCEDURE — 3077F SYST BP >= 140 MM HG: CPT | Mod: CPTII,S$GLB,, | Performed by: INTERNAL MEDICINE

## 2021-07-08 PROCEDURE — 3008F BODY MASS INDEX DOCD: CPT | Mod: CPTII,S$GLB,, | Performed by: INTERNAL MEDICINE

## 2021-07-08 PROCEDURE — 99215 PR OFFICE/OUTPT VISIT, EST, LEVL V, 40-54 MIN: ICD-10-PCS | Mod: S$GLB,,, | Performed by: INTERNAL MEDICINE

## 2021-07-08 PROCEDURE — 99499 RISK ADDL DX/OHS AUDIT: ICD-10-PCS | Mod: S$GLB,,, | Performed by: INTERNAL MEDICINE

## 2021-07-08 PROCEDURE — 1126F PR PAIN SEVERITY QUANTIFIED, NO PAIN PRESENT: ICD-10-PCS | Mod: S$GLB,,, | Performed by: INTERNAL MEDICINE

## 2021-07-08 PROCEDURE — 1101F PR PT FALLS ASSESS DOC 0-1 FALLS W/OUT INJ PAST YR: ICD-10-PCS | Mod: CPTII,S$GLB,, | Performed by: INTERNAL MEDICINE

## 2021-07-08 PROCEDURE — 3077F PR MOST RECENT SYSTOLIC BLOOD PRESSURE >= 140 MM HG: ICD-10-PCS | Mod: CPTII,S$GLB,, | Performed by: INTERNAL MEDICINE

## 2021-07-08 PROCEDURE — 1101F PT FALLS ASSESS-DOCD LE1/YR: CPT | Mod: CPTII,S$GLB,, | Performed by: INTERNAL MEDICINE

## 2021-07-08 PROCEDURE — 99999 PR PBB SHADOW E&M-EST. PATIENT-LVL V: ICD-10-PCS | Mod: PBBFAC,,, | Performed by: INTERNAL MEDICINE

## 2021-07-08 PROCEDURE — 3078F DIAST BP <80 MM HG: CPT | Mod: CPTII,S$GLB,, | Performed by: INTERNAL MEDICINE

## 2021-07-08 PROCEDURE — 99499 UNLISTED E&M SERVICE: CPT | Mod: S$GLB,,, | Performed by: INTERNAL MEDICINE

## 2021-07-08 PROCEDURE — 1159F MED LIST DOCD IN RCRD: CPT | Mod: S$GLB,,, | Performed by: INTERNAL MEDICINE

## 2021-07-08 PROCEDURE — 1126F AMNT PAIN NOTED NONE PRSNT: CPT | Mod: S$GLB,,, | Performed by: INTERNAL MEDICINE

## 2021-07-08 PROCEDURE — 3078F PR MOST RECENT DIASTOLIC BLOOD PRESSURE < 80 MM HG: ICD-10-PCS | Mod: CPTII,S$GLB,, | Performed by: INTERNAL MEDICINE

## 2021-07-08 PROCEDURE — 3008F PR BODY MASS INDEX (BMI) DOCUMENTED: ICD-10-PCS | Mod: CPTII,S$GLB,, | Performed by: INTERNAL MEDICINE

## 2021-07-12 ENCOUNTER — APPOINTMENT (OUTPATIENT)
Dept: RADIOLOGY | Facility: HOSPITAL | Age: 71
End: 2021-07-12
Attending: INTERNAL MEDICINE
Payer: MEDICARE

## 2021-07-12 DIAGNOSIS — I50.9 CONGESTIVE HEART FAILURE, UNSPECIFIED HF CHRONICITY, UNSPECIFIED HEART FAILURE TYPE: ICD-10-CM

## 2021-07-12 PROCEDURE — 71046 X-RAY EXAM CHEST 2 VIEWS: CPT | Mod: 26,,, | Performed by: RADIOLOGY

## 2021-07-12 PROCEDURE — 71046 X-RAY EXAM CHEST 2 VIEWS: CPT | Mod: TC,FY,PN

## 2021-07-12 PROCEDURE — 71046 XR CHEST PA AND LATERAL: ICD-10-PCS | Mod: 26,,, | Performed by: RADIOLOGY

## 2021-07-20 ENCOUNTER — TELEPHONE (OUTPATIENT)
Dept: HEMATOLOGY/ONCOLOGY | Facility: CLINIC | Age: 71
End: 2021-07-20

## 2021-07-20 ENCOUNTER — OFFICE VISIT (OUTPATIENT)
Dept: FAMILY MEDICINE | Facility: CLINIC | Age: 71
End: 2021-07-20
Payer: MEDICARE

## 2021-07-20 VITALS
TEMPERATURE: 98 F | DIASTOLIC BLOOD PRESSURE: 48 MMHG | SYSTOLIC BLOOD PRESSURE: 108 MMHG | HEART RATE: 82 BPM | HEIGHT: 65 IN | OXYGEN SATURATION: 99 % | BODY MASS INDEX: 24.68 KG/M2 | WEIGHT: 148.13 LBS

## 2021-07-20 DIAGNOSIS — R53.83 FATIGUE, UNSPECIFIED TYPE: Primary | ICD-10-CM

## 2021-07-20 DIAGNOSIS — M35.00 SJOGREN'S SYNDROME, WITH UNSPECIFIED ORGAN INVOLVEMENT: ICD-10-CM

## 2021-07-20 DIAGNOSIS — D64.9 ANEMIA, UNSPECIFIED TYPE: ICD-10-CM

## 2021-07-20 DIAGNOSIS — I10 ESSENTIAL HYPERTENSION: ICD-10-CM

## 2021-07-20 DIAGNOSIS — I50.30 DIASTOLIC HEART FAILURE, NYHA CLASS 2: ICD-10-CM

## 2021-07-20 DIAGNOSIS — N18.31 STAGE 3A CHRONIC KIDNEY DISEASE: ICD-10-CM

## 2021-07-20 DIAGNOSIS — Z86.73 HISTORY OF STROKE: ICD-10-CM

## 2021-07-20 DIAGNOSIS — E78.00 PURE HYPERCHOLESTEROLEMIA: ICD-10-CM

## 2021-07-20 DIAGNOSIS — E11.8 CONTROLLED TYPE 2 DIABETES MELLITUS WITH COMPLICATION, WITHOUT LONG-TERM CURRENT USE OF INSULIN: ICD-10-CM

## 2021-07-20 DIAGNOSIS — Z12.11 SCREENING FOR COLON CANCER: ICD-10-CM

## 2021-07-20 PROBLEM — J90 PLEURAL EFFUSION: Status: RESOLVED | Noted: 2019-10-15 | Resolved: 2021-07-20

## 2021-07-20 PROBLEM — R79.89 ELEVATED D-DIMER: Status: RESOLVED | Noted: 2021-06-27 | Resolved: 2021-07-20

## 2021-07-20 PROBLEM — M35.9 CONNECTIVE TISSUE DISORDER: Status: RESOLVED | Noted: 2020-01-16 | Resolved: 2021-07-20

## 2021-07-20 PROCEDURE — 1101F PT FALLS ASSESS-DOCD LE1/YR: CPT | Mod: CPTII,S$GLB,, | Performed by: INTERNAL MEDICINE

## 2021-07-20 PROCEDURE — 3078F PR MOST RECENT DIASTOLIC BLOOD PRESSURE < 80 MM HG: ICD-10-PCS | Mod: CPTII,S$GLB,, | Performed by: INTERNAL MEDICINE

## 2021-07-20 PROCEDURE — 3008F BODY MASS INDEX DOCD: CPT | Mod: CPTII,S$GLB,, | Performed by: INTERNAL MEDICINE

## 2021-07-20 PROCEDURE — 1126F AMNT PAIN NOTED NONE PRSNT: CPT | Mod: CPTII,S$GLB,, | Performed by: INTERNAL MEDICINE

## 2021-07-20 PROCEDURE — 1159F MED LIST DOCD IN RCRD: CPT | Mod: CPTII,S$GLB,, | Performed by: INTERNAL MEDICINE

## 2021-07-20 PROCEDURE — 99214 PR OFFICE/OUTPT VISIT, EST, LEVL IV, 30-39 MIN: ICD-10-PCS | Mod: S$GLB,,, | Performed by: INTERNAL MEDICINE

## 2021-07-20 PROCEDURE — 3051F PR MOST RECENT HEMOGLOBIN A1C LEVEL 7.0 - < 8.0%: ICD-10-PCS | Mod: CPTII,S$GLB,, | Performed by: INTERNAL MEDICINE

## 2021-07-20 PROCEDURE — 3008F PR BODY MASS INDEX (BMI) DOCUMENTED: ICD-10-PCS | Mod: CPTII,S$GLB,, | Performed by: INTERNAL MEDICINE

## 2021-07-20 PROCEDURE — 3078F DIAST BP <80 MM HG: CPT | Mod: CPTII,S$GLB,, | Performed by: INTERNAL MEDICINE

## 2021-07-20 PROCEDURE — 99999 PR PBB SHADOW E&M-EST. PATIENT-LVL V: ICD-10-PCS | Mod: PBBFAC,,, | Performed by: INTERNAL MEDICINE

## 2021-07-20 PROCEDURE — 99214 OFFICE O/P EST MOD 30 MIN: CPT | Mod: S$GLB,,, | Performed by: INTERNAL MEDICINE

## 2021-07-20 PROCEDURE — 3051F HG A1C>EQUAL 7.0%<8.0%: CPT | Mod: CPTII,S$GLB,, | Performed by: INTERNAL MEDICINE

## 2021-07-20 PROCEDURE — 1101F PR PT FALLS ASSESS DOC 0-1 FALLS W/OUT INJ PAST YR: ICD-10-PCS | Mod: CPTII,S$GLB,, | Performed by: INTERNAL MEDICINE

## 2021-07-20 PROCEDURE — 99999 PR PBB SHADOW E&M-EST. PATIENT-LVL V: CPT | Mod: PBBFAC,,, | Performed by: INTERNAL MEDICINE

## 2021-07-20 PROCEDURE — 3288F FALL RISK ASSESSMENT DOCD: CPT | Mod: CPTII,S$GLB,, | Performed by: INTERNAL MEDICINE

## 2021-07-20 PROCEDURE — 3074F SYST BP LT 130 MM HG: CPT | Mod: CPTII,S$GLB,, | Performed by: INTERNAL MEDICINE

## 2021-07-20 PROCEDURE — 3074F PR MOST RECENT SYSTOLIC BLOOD PRESSURE < 130 MM HG: ICD-10-PCS | Mod: CPTII,S$GLB,, | Performed by: INTERNAL MEDICINE

## 2021-07-20 PROCEDURE — 1126F PR PAIN SEVERITY QUANTIFIED, NO PAIN PRESENT: ICD-10-PCS | Mod: CPTII,S$GLB,, | Performed by: INTERNAL MEDICINE

## 2021-07-20 PROCEDURE — 1159F PR MEDICATION LIST DOCUMENTED IN MEDICAL RECORD: ICD-10-PCS | Mod: CPTII,S$GLB,, | Performed by: INTERNAL MEDICINE

## 2021-07-20 PROCEDURE — 3288F PR FALLS RISK ASSESSMENT DOCUMENTED: ICD-10-PCS | Mod: CPTII,S$GLB,, | Performed by: INTERNAL MEDICINE

## 2021-07-20 RX ORDER — AMLODIPINE BESYLATE 5 MG/1
5 TABLET ORAL 2 TIMES DAILY
Qty: 180 TABLET | Refills: 3 | Status: SHIPPED | OUTPATIENT
Start: 2021-07-20 | End: 2022-04-27 | Stop reason: ALTCHOICE

## 2021-07-20 RX ORDER — VALSARTAN 160 MG/1
160 TABLET ORAL 2 TIMES DAILY
Qty: 180 TABLET | Refills: 3 | Status: SHIPPED | OUTPATIENT
Start: 2021-07-20 | End: 2022-06-13 | Stop reason: SDUPTHER

## 2021-07-20 RX ORDER — ATORVASTATIN CALCIUM 80 MG/1
80 TABLET, FILM COATED ORAL DAILY
Qty: 90 TABLET | Refills: 3 | Status: SHIPPED | OUTPATIENT
Start: 2021-07-20 | End: 2022-07-11

## 2021-07-20 RX ORDER — HYDRALAZINE HYDROCHLORIDE 50 MG/1
50 TABLET, FILM COATED ORAL 3 TIMES DAILY
Qty: 90 TABLET | Refills: 11 | Status: SHIPPED | OUTPATIENT
Start: 2021-07-20 | End: 2022-04-27

## 2021-07-23 ENCOUNTER — PATIENT MESSAGE (OUTPATIENT)
Dept: ENDOCRINOLOGY | Facility: CLINIC | Age: 71
End: 2021-07-23

## 2021-08-02 ENCOUNTER — LAB VISIT (OUTPATIENT)
Dept: LAB | Facility: HOSPITAL | Age: 71
End: 2021-08-02
Attending: NURSE PRACTITIONER
Payer: MEDICARE

## 2021-08-02 ENCOUNTER — OFFICE VISIT (OUTPATIENT)
Dept: PULMONOLOGY | Facility: CLINIC | Age: 71
End: 2021-08-02
Payer: MEDICARE

## 2021-08-02 VITALS
HEIGHT: 65 IN | DIASTOLIC BLOOD PRESSURE: 69 MMHG | BODY MASS INDEX: 24.52 KG/M2 | WEIGHT: 147.19 LBS | TEMPERATURE: 98 F | OXYGEN SATURATION: 92 % | HEART RATE: 84 BPM | SYSTOLIC BLOOD PRESSURE: 164 MMHG

## 2021-08-02 DIAGNOSIS — Z01.812 PRE-PROCEDURE LAB EXAM: ICD-10-CM

## 2021-08-02 DIAGNOSIS — G47.33 OSA (OBSTRUCTIVE SLEEP APNEA): ICD-10-CM

## 2021-08-02 DIAGNOSIS — R06.09 DYSPNEA ON EXERTION: ICD-10-CM

## 2021-08-02 LAB
C PEPTIDE SERPL-MCNC: 1.11 NG/ML (ref 0.78–5.19)
ESTIMATED AVG GLUCOSE: 151 MG/DL (ref 68–131)
GLUCOSE SERPL-MCNC: 120 MG/DL (ref 70–110)
HBA1C MFR BLD: 6.9 % (ref 4–5.6)

## 2021-08-02 PROCEDURE — 1101F PT FALLS ASSESS-DOCD LE1/YR: CPT | Mod: CPTII,S$GLB,, | Performed by: INTERNAL MEDICINE

## 2021-08-02 PROCEDURE — 3077F SYST BP >= 140 MM HG: CPT | Mod: CPTII,S$GLB,, | Performed by: INTERNAL MEDICINE

## 2021-08-02 PROCEDURE — 1101F PR PT FALLS ASSESS DOC 0-1 FALLS W/OUT INJ PAST YR: ICD-10-PCS | Mod: CPTII,S$GLB,, | Performed by: INTERNAL MEDICINE

## 2021-08-02 PROCEDURE — 83036 HEMOGLOBIN GLYCOSYLATED A1C: CPT | Performed by: NURSE PRACTITIONER

## 2021-08-02 PROCEDURE — 3051F HG A1C>EQUAL 7.0%<8.0%: CPT | Mod: CPTII,S$GLB,, | Performed by: INTERNAL MEDICINE

## 2021-08-02 PROCEDURE — 99204 OFFICE O/P NEW MOD 45 MIN: CPT | Mod: S$GLB,,, | Performed by: INTERNAL MEDICINE

## 2021-08-02 PROCEDURE — 3078F DIAST BP <80 MM HG: CPT | Mod: CPTII,S$GLB,, | Performed by: INTERNAL MEDICINE

## 2021-08-02 PROCEDURE — 1126F AMNT PAIN NOTED NONE PRSNT: CPT | Mod: CPTII,S$GLB,, | Performed by: INTERNAL MEDICINE

## 2021-08-02 PROCEDURE — 84681 ASSAY OF C-PEPTIDE: CPT | Performed by: NURSE PRACTITIONER

## 2021-08-02 PROCEDURE — 3077F PR MOST RECENT SYSTOLIC BLOOD PRESSURE >= 140 MM HG: ICD-10-PCS | Mod: CPTII,S$GLB,, | Performed by: INTERNAL MEDICINE

## 2021-08-02 PROCEDURE — 3008F PR BODY MASS INDEX (BMI) DOCUMENTED: ICD-10-PCS | Mod: CPTII,S$GLB,, | Performed by: INTERNAL MEDICINE

## 2021-08-02 PROCEDURE — 36415 COLL VENOUS BLD VENIPUNCTURE: CPT | Mod: PO | Performed by: NURSE PRACTITIONER

## 2021-08-02 PROCEDURE — 82947 ASSAY GLUCOSE BLOOD QUANT: CPT | Performed by: NURSE PRACTITIONER

## 2021-08-02 PROCEDURE — 1159F PR MEDICATION LIST DOCUMENTED IN MEDICAL RECORD: ICD-10-PCS | Mod: CPTII,S$GLB,, | Performed by: INTERNAL MEDICINE

## 2021-08-02 PROCEDURE — 3051F PR MOST RECENT HEMOGLOBIN A1C LEVEL 7.0 - < 8.0%: ICD-10-PCS | Mod: CPTII,S$GLB,, | Performed by: INTERNAL MEDICINE

## 2021-08-02 PROCEDURE — 1159F MED LIST DOCD IN RCRD: CPT | Mod: CPTII,S$GLB,, | Performed by: INTERNAL MEDICINE

## 2021-08-02 PROCEDURE — 3288F FALL RISK ASSESSMENT DOCD: CPT | Mod: CPTII,S$GLB,, | Performed by: INTERNAL MEDICINE

## 2021-08-02 PROCEDURE — 3008F BODY MASS INDEX DOCD: CPT | Mod: CPTII,S$GLB,, | Performed by: INTERNAL MEDICINE

## 2021-08-02 PROCEDURE — 99204 PR OFFICE/OUTPT VISIT, NEW, LEVL IV, 45-59 MIN: ICD-10-PCS | Mod: S$GLB,,, | Performed by: INTERNAL MEDICINE

## 2021-08-02 PROCEDURE — 3078F PR MOST RECENT DIASTOLIC BLOOD PRESSURE < 80 MM HG: ICD-10-PCS | Mod: CPTII,S$GLB,, | Performed by: INTERNAL MEDICINE

## 2021-08-02 PROCEDURE — 99999 PR PBB SHADOW E&M-EST. PATIENT-LVL V: ICD-10-PCS | Mod: PBBFAC,,, | Performed by: INTERNAL MEDICINE

## 2021-08-02 PROCEDURE — 99999 PR PBB SHADOW E&M-EST. PATIENT-LVL V: CPT | Mod: PBBFAC,,, | Performed by: INTERNAL MEDICINE

## 2021-08-02 PROCEDURE — 3288F PR FALLS RISK ASSESSMENT DOCUMENTED: ICD-10-PCS | Mod: CPTII,S$GLB,, | Performed by: INTERNAL MEDICINE

## 2021-08-02 PROCEDURE — 1126F PR PAIN SEVERITY QUANTIFIED, NO PAIN PRESENT: ICD-10-PCS | Mod: CPTII,S$GLB,, | Performed by: INTERNAL MEDICINE

## 2021-08-02 RX ORDER — PNEUMOCOCCAL VACCINE POLYVALENT 25; 25; 25; 25; 25; 25; 25; 25; 25; 25; 25; 25; 25; 25; 25; 25; 25; 25; 25; 25; 25; 25; 25 UG/.5ML; UG/.5ML; UG/.5ML; UG/.5ML; UG/.5ML; UG/.5ML; UG/.5ML; UG/.5ML; UG/.5ML; UG/.5ML; UG/.5ML; UG/.5ML; UG/.5ML; UG/.5ML; UG/.5ML; UG/.5ML; UG/.5ML; UG/.5ML; UG/.5ML; UG/.5ML; UG/.5ML; UG/.5ML; UG/.5ML
INJECTION, SOLUTION INTRAMUSCULAR; SUBCUTANEOUS
COMMUNITY
Start: 2021-05-27 | End: 2022-01-25 | Stop reason: ALTCHOICE

## 2021-08-02 RX ORDER — TETANUS TOXOID, REDUCED DIPHTHERIA TOXOID AND ACELLULAR PERTUSSIS VACCINE, ADSORBED 5; 2.5; 8; 8; 2.5 [IU]/.5ML; [IU]/.5ML; UG/.5ML; UG/.5ML; UG/.5ML
SUSPENSION INTRAMUSCULAR
COMMUNITY
Start: 2021-05-27 | End: 2023-04-25

## 2021-08-03 ENCOUNTER — LAB VISIT (OUTPATIENT)
Dept: FAMILY MEDICINE | Facility: CLINIC | Age: 71
End: 2021-08-03
Payer: MEDICARE

## 2021-08-03 DIAGNOSIS — Z01.812 PRE-PROCEDURE LAB EXAM: ICD-10-CM

## 2021-08-03 LAB
SARS-COV-2 RNA RESP QL NAA+PROBE: NOT DETECTED
SARS-COV-2- CYCLE NUMBER: -1

## 2021-08-03 PROCEDURE — U0003 INFECTIOUS AGENT DETECTION BY NUCLEIC ACID (DNA OR RNA); SEVERE ACUTE RESPIRATORY SYNDROME CORONAVIRUS 2 (SARS-COV-2) (CORONAVIRUS DISEASE [COVID-19]), AMPLIFIED PROBE TECHNIQUE, MAKING USE OF HIGH THROUGHPUT TECHNOLOGIES AS DESCRIBED BY CMS-2020-01-R: HCPCS | Performed by: INTERNAL MEDICINE

## 2021-08-03 PROCEDURE — U0005 INFEC AGEN DETEC AMPLI PROBE: HCPCS | Performed by: INTERNAL MEDICINE

## 2021-08-06 ENCOUNTER — HOSPITAL ENCOUNTER (OUTPATIENT)
Dept: PULMONOLOGY | Facility: CLINIC | Age: 71
Discharge: HOME | End: 2021-08-06
Payer: MEDICARE

## 2021-08-06 VITALS — BODY MASS INDEX: 24.96 KG/M2 | WEIGHT: 146.19 LBS | HEIGHT: 64 IN

## 2021-08-06 DIAGNOSIS — R06.09 DYSPNEA ON EXERTION: ICD-10-CM

## 2021-08-06 PROCEDURE — 94618 PULMONARY STRESS TESTING: ICD-10-PCS | Mod: S$GLB,,, | Performed by: INTERNAL MEDICINE

## 2021-08-06 PROCEDURE — 94618 PULMONARY STRESS TESTING: CPT | Mod: S$GLB,,, | Performed by: INTERNAL MEDICINE

## 2021-08-06 PROCEDURE — 94727 PR PULM FUNCTION TEST BY GAS: ICD-10-PCS | Mod: S$GLB,,, | Performed by: INTERNAL MEDICINE

## 2021-08-06 PROCEDURE — 94729 DIFFUSING CAPACITY: CPT | Mod: S$GLB,,, | Performed by: INTERNAL MEDICINE

## 2021-08-06 PROCEDURE — 94010 BREATHING CAPACITY TEST: CPT | Mod: S$GLB,,, | Performed by: INTERNAL MEDICINE

## 2021-08-06 PROCEDURE — 94010 BREATHING CAPACITY TEST: ICD-10-PCS | Mod: S$GLB,,, | Performed by: INTERNAL MEDICINE

## 2021-08-06 PROCEDURE — 94729 PR C02/MEMBANE DIFFUSE CAPACITY: ICD-10-PCS | Mod: S$GLB,,, | Performed by: INTERNAL MEDICINE

## 2021-08-06 PROCEDURE — 94727 GAS DIL/WSHOT DETER LNG VOL: CPT | Mod: S$GLB,,, | Performed by: INTERNAL MEDICINE

## 2021-08-09 ENCOUNTER — OFFICE VISIT (OUTPATIENT)
Dept: ENDOCRINOLOGY | Facility: CLINIC | Age: 71
End: 2021-08-09
Payer: MEDICARE

## 2021-08-09 VITALS
WEIGHT: 147.38 LBS | TEMPERATURE: 98 F | DIASTOLIC BLOOD PRESSURE: 60 MMHG | BODY MASS INDEX: 25.3 KG/M2 | HEART RATE: 79 BPM | SYSTOLIC BLOOD PRESSURE: 130 MMHG

## 2021-08-09 DIAGNOSIS — I10 ESSENTIAL HYPERTENSION: ICD-10-CM

## 2021-08-09 DIAGNOSIS — N18.31 STAGE 3A CHRONIC KIDNEY DISEASE: ICD-10-CM

## 2021-08-09 DIAGNOSIS — M81.0 OSTEOPOROSIS, UNSPECIFIED OSTEOPOROSIS TYPE, UNSPECIFIED PATHOLOGICAL FRACTURE PRESENCE: ICD-10-CM

## 2021-08-09 PROCEDURE — 3008F PR BODY MASS INDEX (BMI) DOCUMENTED: ICD-10-PCS | Mod: CPTII,S$GLB,, | Performed by: NURSE PRACTITIONER

## 2021-08-09 PROCEDURE — 3078F DIAST BP <80 MM HG: CPT | Mod: CPTII,S$GLB,, | Performed by: NURSE PRACTITIONER

## 2021-08-09 PROCEDURE — 3075F PR MOST RECENT SYSTOLIC BLOOD PRESS GE 130-139MM HG: ICD-10-PCS | Mod: CPTII,S$GLB,, | Performed by: NURSE PRACTITIONER

## 2021-08-09 PROCEDURE — 1159F MED LIST DOCD IN RCRD: CPT | Mod: CPTII,S$GLB,, | Performed by: NURSE PRACTITIONER

## 2021-08-09 PROCEDURE — 3044F HG A1C LEVEL LT 7.0%: CPT | Mod: CPTII,S$GLB,, | Performed by: NURSE PRACTITIONER

## 2021-08-09 PROCEDURE — 1159F PR MEDICATION LIST DOCUMENTED IN MEDICAL RECORD: ICD-10-PCS | Mod: CPTII,S$GLB,, | Performed by: NURSE PRACTITIONER

## 2021-08-09 PROCEDURE — 3044F PR MOST RECENT HEMOGLOBIN A1C LEVEL <7.0%: ICD-10-PCS | Mod: CPTII,S$GLB,, | Performed by: NURSE PRACTITIONER

## 2021-08-09 PROCEDURE — 1126F PR PAIN SEVERITY QUANTIFIED, NO PAIN PRESENT: ICD-10-PCS | Mod: CPTII,S$GLB,, | Performed by: NURSE PRACTITIONER

## 2021-08-09 PROCEDURE — 99214 PR OFFICE/OUTPT VISIT, EST, LEVL IV, 30-39 MIN: ICD-10-PCS | Mod: S$GLB,,, | Performed by: NURSE PRACTITIONER

## 2021-08-09 PROCEDURE — 99999 PR PBB SHADOW E&M-EST. PATIENT-LVL III: CPT | Mod: PBBFAC,,, | Performed by: NURSE PRACTITIONER

## 2021-08-09 PROCEDURE — 99999 PR PBB SHADOW E&M-EST. PATIENT-LVL III: ICD-10-PCS | Mod: PBBFAC,,, | Performed by: NURSE PRACTITIONER

## 2021-08-09 PROCEDURE — 1126F AMNT PAIN NOTED NONE PRSNT: CPT | Mod: CPTII,S$GLB,, | Performed by: NURSE PRACTITIONER

## 2021-08-09 PROCEDURE — 1160F PR REVIEW ALL MEDS BY PRESCRIBER/CLIN PHARMACIST DOCUMENTED: ICD-10-PCS | Mod: CPTII,S$GLB,, | Performed by: NURSE PRACTITIONER

## 2021-08-09 PROCEDURE — 3075F SYST BP GE 130 - 139MM HG: CPT | Mod: CPTII,S$GLB,, | Performed by: NURSE PRACTITIONER

## 2021-08-09 PROCEDURE — 3078F PR MOST RECENT DIASTOLIC BLOOD PRESSURE < 80 MM HG: ICD-10-PCS | Mod: CPTII,S$GLB,, | Performed by: NURSE PRACTITIONER

## 2021-08-09 PROCEDURE — 99214 OFFICE O/P EST MOD 30 MIN: CPT | Mod: S$GLB,,, | Performed by: NURSE PRACTITIONER

## 2021-08-09 PROCEDURE — 1160F RVW MEDS BY RX/DR IN RCRD: CPT | Mod: CPTII,S$GLB,, | Performed by: NURSE PRACTITIONER

## 2021-08-09 PROCEDURE — 3008F BODY MASS INDEX DOCD: CPT | Mod: CPTII,S$GLB,, | Performed by: NURSE PRACTITIONER

## 2021-08-09 RX ORDER — PEN NEEDLE, DIABETIC 30 GX3/16"
1 NEEDLE, DISPOSABLE MISCELLANEOUS 4 TIMES DAILY
Qty: 400 EACH | Refills: 3 | Status: SHIPPED | OUTPATIENT
Start: 2021-08-09 | End: 2021-12-06 | Stop reason: SDUPTHER

## 2021-08-09 RX ORDER — INSULIN ASPART 100 [IU]/ML
INJECTION, SOLUTION INTRAVENOUS; SUBCUTANEOUS
Qty: 1 BOX | Refills: 2 | Status: SHIPPED | OUTPATIENT
Start: 2021-08-09 | End: 2021-12-06 | Stop reason: SDUPTHER

## 2021-08-17 ENCOUNTER — TELEPHONE (OUTPATIENT)
Dept: SLEEP MEDICINE | Facility: HOSPITAL | Age: 71
End: 2021-08-17

## 2021-08-26 ENCOUNTER — TELEPHONE (OUTPATIENT)
Dept: PULMONOLOGY | Facility: CLINIC | Age: 71
End: 2021-08-26

## 2021-08-26 DIAGNOSIS — J84.9 INTERSTITIAL PULMONARY DISEASE, UNSPECIFIED: ICD-10-CM

## 2021-08-26 DIAGNOSIS — J98.4 RESTRICTIVE LUNG DISEASE: Primary | ICD-10-CM

## 2021-09-07 ENCOUNTER — TELEPHONE (OUTPATIENT)
Dept: NEUROLOGY | Facility: CLINIC | Age: 71
End: 2021-09-07

## 2021-09-16 ENCOUNTER — HOSPITAL ENCOUNTER (OUTPATIENT)
Dept: RADIOLOGY | Facility: HOSPITAL | Age: 71
Discharge: HOME | End: 2021-09-16
Attending: INTERNAL MEDICINE
Payer: MEDICARE

## 2021-09-16 ENCOUNTER — HOSPITAL ENCOUNTER (OUTPATIENT)
Dept: SLEEP MEDICINE | Facility: HOSPITAL | Age: 71
Discharge: HOME | End: 2021-09-16
Attending: INTERNAL MEDICINE
Payer: MEDICARE

## 2021-09-16 DIAGNOSIS — J84.9 INTERSTITIAL PULMONARY DISEASE, UNSPECIFIED: ICD-10-CM

## 2021-09-16 DIAGNOSIS — G47.33 OSA (OBSTRUCTIVE SLEEP APNEA): ICD-10-CM

## 2021-09-16 DIAGNOSIS — J98.4 RESTRICTIVE LUNG DISEASE: ICD-10-CM

## 2021-09-16 PROCEDURE — 71250 CT THORAX DX C-: CPT | Mod: 26,,, | Performed by: RADIOLOGY

## 2021-09-16 PROCEDURE — 95800 SLP STDY UNATTENDED: CPT

## 2021-09-16 PROCEDURE — 71250 CT THORAX DX C-: CPT | Mod: TC

## 2021-09-16 PROCEDURE — 95800 PR SLEEP STUDY, UNATTENDED, RECORD HEART RATE/O2 SAT/RESP ANAL/SLEEP TIME: ICD-10-PCS | Mod: 26,,, | Performed by: INTERNAL MEDICINE

## 2021-09-16 PROCEDURE — 95800 SLP STDY UNATTENDED: CPT | Mod: 26,,, | Performed by: INTERNAL MEDICINE

## 2021-09-16 PROCEDURE — 71250 CT CHEST WITHOUT CONTRAST: ICD-10-PCS | Mod: 26,,, | Performed by: RADIOLOGY

## 2021-09-17 ENCOUNTER — TELEPHONE (OUTPATIENT)
Dept: ADMINISTRATIVE | Facility: OTHER | Age: 71
End: 2021-09-17

## 2021-09-23 ENCOUNTER — TELEPHONE (OUTPATIENT)
Dept: PULMONOLOGY | Facility: CLINIC | Age: 71
End: 2021-09-23

## 2021-09-30 ENCOUNTER — OFFICE VISIT (OUTPATIENT)
Dept: PULMONOLOGY | Facility: CLINIC | Age: 71
End: 2021-09-30
Payer: MEDICARE

## 2021-09-30 VITALS
BODY MASS INDEX: 24.24 KG/M2 | SYSTOLIC BLOOD PRESSURE: 154 MMHG | WEIGHT: 142 LBS | TEMPERATURE: 99 F | OXYGEN SATURATION: 99 % | HEIGHT: 64 IN | DIASTOLIC BLOOD PRESSURE: 70 MMHG | HEART RATE: 100 BPM

## 2021-09-30 DIAGNOSIS — R06.09 DYSPNEA ON EXERTION: ICD-10-CM

## 2021-09-30 DIAGNOSIS — I50.30 DIASTOLIC HEART FAILURE, NYHA CLASS 2: ICD-10-CM

## 2021-09-30 DIAGNOSIS — G47.33 OSA (OBSTRUCTIVE SLEEP APNEA): ICD-10-CM

## 2021-09-30 PROCEDURE — 3288F FALL RISK ASSESSMENT DOCD: CPT | Mod: HCNC,CPTII,S$GLB, | Performed by: INTERNAL MEDICINE

## 2021-09-30 PROCEDURE — 99999 PR PBB SHADOW E&M-EST. PATIENT-LVL V: CPT | Mod: PBBFAC,HCNC,, | Performed by: INTERNAL MEDICINE

## 2021-09-30 PROCEDURE — 1126F PR PAIN SEVERITY QUANTIFIED, NO PAIN PRESENT: ICD-10-PCS | Mod: HCNC,CPTII,S$GLB, | Performed by: INTERNAL MEDICINE

## 2021-09-30 PROCEDURE — 3044F HG A1C LEVEL LT 7.0%: CPT | Mod: HCNC,CPTII,S$GLB, | Performed by: INTERNAL MEDICINE

## 2021-09-30 PROCEDURE — 99999 PR PBB SHADOW E&M-EST. PATIENT-LVL V: ICD-10-PCS | Mod: PBBFAC,HCNC,, | Performed by: INTERNAL MEDICINE

## 2021-09-30 PROCEDURE — 3066F PR DOCUMENTATION OF TREATMENT FOR NEPHROPATHY: ICD-10-PCS | Mod: HCNC,CPTII,S$GLB, | Performed by: INTERNAL MEDICINE

## 2021-09-30 PROCEDURE — 4010F PR ACE/ARB THEARPY RXD/TAKEN: ICD-10-PCS | Mod: HCNC,CPTII,S$GLB, | Performed by: INTERNAL MEDICINE

## 2021-09-30 PROCEDURE — 3008F PR BODY MASS INDEX (BMI) DOCUMENTED: ICD-10-PCS | Mod: HCNC,CPTII,S$GLB, | Performed by: INTERNAL MEDICINE

## 2021-09-30 PROCEDURE — 1159F PR MEDICATION LIST DOCUMENTED IN MEDICAL RECORD: ICD-10-PCS | Mod: HCNC,CPTII,S$GLB, | Performed by: INTERNAL MEDICINE

## 2021-09-30 PROCEDURE — 3044F PR MOST RECENT HEMOGLOBIN A1C LEVEL <7.0%: ICD-10-PCS | Mod: HCNC,CPTII,S$GLB, | Performed by: INTERNAL MEDICINE

## 2021-09-30 PROCEDURE — 3077F PR MOST RECENT SYSTOLIC BLOOD PRESSURE >= 140 MM HG: ICD-10-PCS | Mod: HCNC,CPTII,S$GLB, | Performed by: INTERNAL MEDICINE

## 2021-09-30 PROCEDURE — 99214 PR OFFICE/OUTPT VISIT, EST, LEVL IV, 30-39 MIN: ICD-10-PCS | Mod: HCNC,S$GLB,, | Performed by: INTERNAL MEDICINE

## 2021-09-30 PROCEDURE — 3066F NEPHROPATHY DOC TX: CPT | Mod: HCNC,CPTII,S$GLB, | Performed by: INTERNAL MEDICINE

## 2021-09-30 PROCEDURE — 1159F MED LIST DOCD IN RCRD: CPT | Mod: HCNC,CPTII,S$GLB, | Performed by: INTERNAL MEDICINE

## 2021-09-30 PROCEDURE — 1101F PR PT FALLS ASSESS DOC 0-1 FALLS W/OUT INJ PAST YR: ICD-10-PCS | Mod: HCNC,CPTII,S$GLB, | Performed by: INTERNAL MEDICINE

## 2021-09-30 PROCEDURE — 3078F PR MOST RECENT DIASTOLIC BLOOD PRESSURE < 80 MM HG: ICD-10-PCS | Mod: HCNC,CPTII,S$GLB, | Performed by: INTERNAL MEDICINE

## 2021-09-30 PROCEDURE — 3078F DIAST BP <80 MM HG: CPT | Mod: HCNC,CPTII,S$GLB, | Performed by: INTERNAL MEDICINE

## 2021-09-30 PROCEDURE — 3008F BODY MASS INDEX DOCD: CPT | Mod: HCNC,CPTII,S$GLB, | Performed by: INTERNAL MEDICINE

## 2021-09-30 PROCEDURE — 99214 OFFICE O/P EST MOD 30 MIN: CPT | Mod: HCNC,S$GLB,, | Performed by: INTERNAL MEDICINE

## 2021-09-30 PROCEDURE — 4010F ACE/ARB THERAPY RXD/TAKEN: CPT | Mod: HCNC,CPTII,S$GLB, | Performed by: INTERNAL MEDICINE

## 2021-09-30 PROCEDURE — 1126F AMNT PAIN NOTED NONE PRSNT: CPT | Mod: HCNC,CPTII,S$GLB, | Performed by: INTERNAL MEDICINE

## 2021-09-30 PROCEDURE — 1101F PT FALLS ASSESS-DOCD LE1/YR: CPT | Mod: HCNC,CPTII,S$GLB, | Performed by: INTERNAL MEDICINE

## 2021-09-30 PROCEDURE — 3288F PR FALLS RISK ASSESSMENT DOCUMENTED: ICD-10-PCS | Mod: HCNC,CPTII,S$GLB, | Performed by: INTERNAL MEDICINE

## 2021-09-30 PROCEDURE — 3077F SYST BP >= 140 MM HG: CPT | Mod: HCNC,CPTII,S$GLB, | Performed by: INTERNAL MEDICINE

## 2021-10-01 ENCOUNTER — LAB VISIT (OUTPATIENT)
Dept: LAB | Facility: HOSPITAL | Age: 71
End: 2021-10-01
Attending: STUDENT IN AN ORGANIZED HEALTH CARE EDUCATION/TRAINING PROGRAM
Payer: MEDICARE

## 2021-10-01 ENCOUNTER — OFFICE VISIT (OUTPATIENT)
Dept: HEMATOLOGY/ONCOLOGY | Facility: CLINIC | Age: 71
End: 2021-10-01
Payer: MEDICARE

## 2021-10-01 VITALS
DIASTOLIC BLOOD PRESSURE: 61 MMHG | HEIGHT: 66 IN | HEART RATE: 86 BPM | SYSTOLIC BLOOD PRESSURE: 132 MMHG | WEIGHT: 139.75 LBS | TEMPERATURE: 98 F | BODY MASS INDEX: 22.46 KG/M2 | OXYGEN SATURATION: 99 %

## 2021-10-01 DIAGNOSIS — N18.32 ANEMIA OF CHRONIC RENAL FAILURE, STAGE 3B: Primary | ICD-10-CM

## 2021-10-01 DIAGNOSIS — D63.1 ANEMIA OF CHRONIC RENAL FAILURE, STAGE 3B: ICD-10-CM

## 2021-10-01 DIAGNOSIS — N18.32 ANEMIA OF CHRONIC RENAL FAILURE, STAGE 3B: ICD-10-CM

## 2021-10-01 DIAGNOSIS — D63.1 ANEMIA OF CHRONIC RENAL FAILURE, STAGE 3B: Primary | ICD-10-CM

## 2021-10-01 LAB
ALBUMIN SERPL BCP-MCNC: 3.8 G/DL (ref 3.5–5.2)
ALP SERPL-CCNC: 71 U/L (ref 55–135)
ALT SERPL W/O P-5'-P-CCNC: 11 U/L (ref 10–44)
ANION GAP SERPL CALC-SCNC: 11 MMOL/L (ref 8–16)
AST SERPL-CCNC: 16 U/L (ref 10–40)
BASOPHILS # BLD AUTO: 0.02 K/UL (ref 0–0.2)
BASOPHILS NFR BLD: 0.3 % (ref 0–1.9)
BILIRUB SERPL-MCNC: 0.5 MG/DL (ref 0.1–1)
BUN SERPL-MCNC: 24 MG/DL (ref 8–23)
CALCIUM SERPL-MCNC: 9.9 MG/DL (ref 8.7–10.5)
CHLORIDE SERPL-SCNC: 106 MMOL/L (ref 95–110)
CO2 SERPL-SCNC: 22 MMOL/L (ref 23–29)
CREAT SERPL-MCNC: 1.6 MG/DL (ref 0.5–1.4)
DIFFERENTIAL METHOD: ABNORMAL
EOSINOPHIL # BLD AUTO: 0.1 K/UL (ref 0–0.5)
EOSINOPHIL NFR BLD: 0.7 % (ref 0–8)
ERYTHROCYTE [DISTWIDTH] IN BLOOD BY AUTOMATED COUNT: 17 % (ref 11.5–14.5)
EST. GFR  (AFRICAN AMERICAN): 49 ML/MIN/1.73 M^2
EST. GFR  (NON AFRICAN AMERICAN): 43 ML/MIN/1.73 M^2
GLUCOSE SERPL-MCNC: 163 MG/DL (ref 70–110)
HCT VFR BLD AUTO: 37.7 % (ref 40–54)
HGB BLD-MCNC: 11.6 G/DL (ref 14–18)
IMM GRANULOCYTES # BLD AUTO: 0.02 K/UL (ref 0–0.04)
IMM GRANULOCYTES NFR BLD AUTO: 0.3 % (ref 0–0.5)
LYMPHOCYTES # BLD AUTO: 0.8 K/UL (ref 1–4.8)
LYMPHOCYTES NFR BLD: 11.9 % (ref 18–48)
MCH RBC QN AUTO: 26.9 PG (ref 27–31)
MCHC RBC AUTO-ENTMCNC: 30.8 G/DL (ref 32–36)
MCV RBC AUTO: 88 FL (ref 82–98)
MONOCYTES # BLD AUTO: 0.8 K/UL (ref 0.3–1)
MONOCYTES NFR BLD: 11 % (ref 4–15)
NEUTROPHILS # BLD AUTO: 5.2 K/UL (ref 1.8–7.7)
NEUTROPHILS NFR BLD: 75.8 % (ref 38–73)
NRBC BLD-RTO: 0 /100 WBC
PATH REV BLD -IMP: NORMAL
PATH REV BLD -IMP: NORMAL
PLATELET # BLD AUTO: 230 K/UL (ref 150–450)
PMV BLD AUTO: 11.6 FL (ref 9.2–12.9)
POTASSIUM SERPL-SCNC: 4.8 MMOL/L (ref 3.5–5.1)
PROT SERPL-MCNC: 7.6 G/DL (ref 6–8.4)
RBC # BLD AUTO: 4.31 M/UL (ref 4.6–6.2)
SODIUM SERPL-SCNC: 139 MMOL/L (ref 136–145)
WBC # BLD AUTO: 6.9 K/UL (ref 3.9–12.7)

## 2021-10-01 PROCEDURE — 3075F SYST BP GE 130 - 139MM HG: CPT | Mod: HCNC,CPTII,S$GLB, | Performed by: STUDENT IN AN ORGANIZED HEALTH CARE EDUCATION/TRAINING PROGRAM

## 2021-10-01 PROCEDURE — 36415 COLL VENOUS BLD VENIPUNCTURE: CPT | Mod: HCNC | Performed by: STUDENT IN AN ORGANIZED HEALTH CARE EDUCATION/TRAINING PROGRAM

## 2021-10-01 PROCEDURE — 1159F PR MEDICATION LIST DOCUMENTED IN MEDICAL RECORD: ICD-10-PCS | Mod: HCNC,CPTII,S$GLB, | Performed by: STUDENT IN AN ORGANIZED HEALTH CARE EDUCATION/TRAINING PROGRAM

## 2021-10-01 PROCEDURE — 1101F PT FALLS ASSESS-DOCD LE1/YR: CPT | Mod: HCNC,CPTII,S$GLB, | Performed by: STUDENT IN AN ORGANIZED HEALTH CARE EDUCATION/TRAINING PROGRAM

## 2021-10-01 PROCEDURE — 85060 PATHOLOGIST REVIEW: ICD-10-PCS | Mod: HCNC,,, | Performed by: PATHOLOGY

## 2021-10-01 PROCEDURE — 4010F PR ACE/ARB THEARPY RXD/TAKEN: ICD-10-PCS | Mod: HCNC,CPTII,S$GLB, | Performed by: STUDENT IN AN ORGANIZED HEALTH CARE EDUCATION/TRAINING PROGRAM

## 2021-10-01 PROCEDURE — 3078F DIAST BP <80 MM HG: CPT | Mod: HCNC,CPTII,S$GLB, | Performed by: STUDENT IN AN ORGANIZED HEALTH CARE EDUCATION/TRAINING PROGRAM

## 2021-10-01 PROCEDURE — 99499 UNLISTED E&M SERVICE: CPT | Mod: HCNC,S$GLB,, | Performed by: STUDENT IN AN ORGANIZED HEALTH CARE EDUCATION/TRAINING PROGRAM

## 2021-10-01 PROCEDURE — 99999 PR PBB SHADOW E&M-EST. PATIENT-LVL III: ICD-10-PCS | Mod: PBBFAC,HCNC,, | Performed by: STUDENT IN AN ORGANIZED HEALTH CARE EDUCATION/TRAINING PROGRAM

## 2021-10-01 PROCEDURE — 1126F PR PAIN SEVERITY QUANTIFIED, NO PAIN PRESENT: ICD-10-PCS | Mod: HCNC,CPTII,S$GLB, | Performed by: STUDENT IN AN ORGANIZED HEALTH CARE EDUCATION/TRAINING PROGRAM

## 2021-10-01 PROCEDURE — 1126F AMNT PAIN NOTED NONE PRSNT: CPT | Mod: HCNC,CPTII,S$GLB, | Performed by: STUDENT IN AN ORGANIZED HEALTH CARE EDUCATION/TRAINING PROGRAM

## 2021-10-01 PROCEDURE — 85060 BLOOD SMEAR INTERPRETATION: CPT | Mod: HCNC,,, | Performed by: PATHOLOGY

## 2021-10-01 PROCEDURE — 99214 PR OFFICE/OUTPT VISIT, EST, LEVL IV, 30-39 MIN: ICD-10-PCS | Mod: HCNC,S$GLB,, | Performed by: STUDENT IN AN ORGANIZED HEALTH CARE EDUCATION/TRAINING PROGRAM

## 2021-10-01 PROCEDURE — 99214 OFFICE O/P EST MOD 30 MIN: CPT | Mod: HCNC,S$GLB,, | Performed by: STUDENT IN AN ORGANIZED HEALTH CARE EDUCATION/TRAINING PROGRAM

## 2021-10-01 PROCEDURE — 1101F PR PT FALLS ASSESS DOC 0-1 FALLS W/OUT INJ PAST YR: ICD-10-PCS | Mod: HCNC,CPTII,S$GLB, | Performed by: STUDENT IN AN ORGANIZED HEALTH CARE EDUCATION/TRAINING PROGRAM

## 2021-10-01 PROCEDURE — 3066F NEPHROPATHY DOC TX: CPT | Mod: HCNC,CPTII,S$GLB, | Performed by: STUDENT IN AN ORGANIZED HEALTH CARE EDUCATION/TRAINING PROGRAM

## 2021-10-01 PROCEDURE — 3008F PR BODY MASS INDEX (BMI) DOCUMENTED: ICD-10-PCS | Mod: HCNC,CPTII,S$GLB, | Performed by: STUDENT IN AN ORGANIZED HEALTH CARE EDUCATION/TRAINING PROGRAM

## 2021-10-01 PROCEDURE — 3044F HG A1C LEVEL LT 7.0%: CPT | Mod: HCNC,CPTII,S$GLB, | Performed by: STUDENT IN AN ORGANIZED HEALTH CARE EDUCATION/TRAINING PROGRAM

## 2021-10-01 PROCEDURE — 80053 COMPREHEN METABOLIC PANEL: CPT | Mod: HCNC | Performed by: STUDENT IN AN ORGANIZED HEALTH CARE EDUCATION/TRAINING PROGRAM

## 2021-10-01 PROCEDURE — 99999 PR PBB SHADOW E&M-EST. PATIENT-LVL III: CPT | Mod: PBBFAC,HCNC,, | Performed by: STUDENT IN AN ORGANIZED HEALTH CARE EDUCATION/TRAINING PROGRAM

## 2021-10-01 PROCEDURE — 3066F PR DOCUMENTATION OF TREATMENT FOR NEPHROPATHY: ICD-10-PCS | Mod: HCNC,CPTII,S$GLB, | Performed by: STUDENT IN AN ORGANIZED HEALTH CARE EDUCATION/TRAINING PROGRAM

## 2021-10-01 PROCEDURE — 1160F RVW MEDS BY RX/DR IN RCRD: CPT | Mod: HCNC,CPTII,S$GLB, | Performed by: STUDENT IN AN ORGANIZED HEALTH CARE EDUCATION/TRAINING PROGRAM

## 2021-10-01 PROCEDURE — 1160F PR REVIEW ALL MEDS BY PRESCRIBER/CLIN PHARMACIST DOCUMENTED: ICD-10-PCS | Mod: HCNC,CPTII,S$GLB, | Performed by: STUDENT IN AN ORGANIZED HEALTH CARE EDUCATION/TRAINING PROGRAM

## 2021-10-01 PROCEDURE — 4010F ACE/ARB THERAPY RXD/TAKEN: CPT | Mod: HCNC,CPTII,S$GLB, | Performed by: STUDENT IN AN ORGANIZED HEALTH CARE EDUCATION/TRAINING PROGRAM

## 2021-10-01 PROCEDURE — 1159F MED LIST DOCD IN RCRD: CPT | Mod: HCNC,CPTII,S$GLB, | Performed by: STUDENT IN AN ORGANIZED HEALTH CARE EDUCATION/TRAINING PROGRAM

## 2021-10-01 PROCEDURE — 3044F PR MOST RECENT HEMOGLOBIN A1C LEVEL <7.0%: ICD-10-PCS | Mod: HCNC,CPTII,S$GLB, | Performed by: STUDENT IN AN ORGANIZED HEALTH CARE EDUCATION/TRAINING PROGRAM

## 2021-10-01 PROCEDURE — 3008F BODY MASS INDEX DOCD: CPT | Mod: HCNC,CPTII,S$GLB, | Performed by: STUDENT IN AN ORGANIZED HEALTH CARE EDUCATION/TRAINING PROGRAM

## 2021-10-01 PROCEDURE — 3288F FALL RISK ASSESSMENT DOCD: CPT | Mod: HCNC,CPTII,S$GLB, | Performed by: STUDENT IN AN ORGANIZED HEALTH CARE EDUCATION/TRAINING PROGRAM

## 2021-10-01 PROCEDURE — 3288F PR FALLS RISK ASSESSMENT DOCUMENTED: ICD-10-PCS | Mod: HCNC,CPTII,S$GLB, | Performed by: STUDENT IN AN ORGANIZED HEALTH CARE EDUCATION/TRAINING PROGRAM

## 2021-10-01 PROCEDURE — 3075F PR MOST RECENT SYSTOLIC BLOOD PRESS GE 130-139MM HG: ICD-10-PCS | Mod: HCNC,CPTII,S$GLB, | Performed by: STUDENT IN AN ORGANIZED HEALTH CARE EDUCATION/TRAINING PROGRAM

## 2021-10-01 PROCEDURE — 85025 COMPLETE CBC W/AUTO DIFF WBC: CPT | Mod: HCNC | Performed by: STUDENT IN AN ORGANIZED HEALTH CARE EDUCATION/TRAINING PROGRAM

## 2021-10-01 PROCEDURE — 3078F PR MOST RECENT DIASTOLIC BLOOD PRESSURE < 80 MM HG: ICD-10-PCS | Mod: HCNC,CPTII,S$GLB, | Performed by: STUDENT IN AN ORGANIZED HEALTH CARE EDUCATION/TRAINING PROGRAM

## 2021-10-01 PROCEDURE — 99499 RISK ADDL DX/OHS AUDIT: ICD-10-PCS | Mod: HCNC,S$GLB,, | Performed by: STUDENT IN AN ORGANIZED HEALTH CARE EDUCATION/TRAINING PROGRAM

## 2021-10-01 RX ORDER — HEPARIN 100 UNIT/ML
500 SYRINGE INTRAVENOUS
Status: CANCELLED | OUTPATIENT
Start: 2021-10-15

## 2021-10-01 RX ORDER — SODIUM CHLORIDE 0.9 % (FLUSH) 0.9 %
10 SYRINGE (ML) INJECTION
Status: CANCELLED | OUTPATIENT
Start: 2021-10-15

## 2021-10-01 RX ORDER — SODIUM CHLORIDE 0.9 % (FLUSH) 0.9 %
10 SYRINGE (ML) INJECTION
Status: CANCELLED | OUTPATIENT
Start: 2021-10-08

## 2021-10-01 RX ORDER — HEPARIN 100 UNIT/ML
500 SYRINGE INTRAVENOUS
Status: CANCELLED | OUTPATIENT
Start: 2021-10-08

## 2021-10-04 PROBLEM — N18.32 ANEMIA OF CHRONIC RENAL FAILURE, STAGE 3B: Status: ACTIVE | Noted: 2019-03-13

## 2021-10-07 ENCOUNTER — LAB VISIT (OUTPATIENT)
Dept: LAB | Facility: HOSPITAL | Age: 71
End: 2021-10-07
Attending: INTERNAL MEDICINE
Payer: MEDICARE

## 2021-10-07 DIAGNOSIS — I50.30 DIASTOLIC HEART FAILURE, NYHA CLASS 2: ICD-10-CM

## 2021-10-07 LAB
ANION GAP SERPL CALC-SCNC: 12 MMOL/L (ref 8–16)
BUN SERPL-MCNC: 34 MG/DL (ref 8–23)
CALCIUM SERPL-MCNC: 8.9 MG/DL (ref 8.7–10.5)
CHLORIDE SERPL-SCNC: 107 MMOL/L (ref 95–110)
CO2 SERPL-SCNC: 17 MMOL/L (ref 23–29)
CREAT SERPL-MCNC: 1.4 MG/DL (ref 0.5–1.4)
EST. GFR  (AFRICAN AMERICAN): 58 ML/MIN/1.73 M^2
EST. GFR  (NON AFRICAN AMERICAN): 50.2 ML/MIN/1.73 M^2
GLUCOSE SERPL-MCNC: 144 MG/DL (ref 70–110)
POTASSIUM SERPL-SCNC: 4.9 MMOL/L (ref 3.5–5.1)
SODIUM SERPL-SCNC: 136 MMOL/L (ref 136–145)

## 2021-10-07 PROCEDURE — 80048 BASIC METABOLIC PNL TOTAL CA: CPT | Mod: HCNC | Performed by: INTERNAL MEDICINE

## 2021-10-07 PROCEDURE — 36415 COLL VENOUS BLD VENIPUNCTURE: CPT | Mod: HCNC,PO | Performed by: INTERNAL MEDICINE

## 2021-10-12 ENCOUNTER — INFUSION (OUTPATIENT)
Dept: INFUSION THERAPY | Facility: HOSPITAL | Age: 71
End: 2021-10-12
Attending: STUDENT IN AN ORGANIZED HEALTH CARE EDUCATION/TRAINING PROGRAM
Payer: MEDICARE

## 2021-10-12 ENCOUNTER — IMMUNIZATION (OUTPATIENT)
Dept: OBSTETRICS AND GYNECOLOGY | Facility: CLINIC | Age: 71
End: 2021-10-12
Payer: MEDICARE

## 2021-10-12 DIAGNOSIS — N18.32 ANEMIA OF CHRONIC RENAL FAILURE, STAGE 3B: ICD-10-CM

## 2021-10-12 DIAGNOSIS — N18.31 STAGE 3A CHRONIC KIDNEY DISEASE: Primary | ICD-10-CM

## 2021-10-12 DIAGNOSIS — D63.1 ANEMIA OF CHRONIC RENAL FAILURE, STAGE 3B: ICD-10-CM

## 2021-10-12 DIAGNOSIS — Z23 NEED FOR VACCINATION: Primary | ICD-10-CM

## 2021-10-12 PROCEDURE — 63600175 PHARM REV CODE 636 W HCPCS: Mod: JG,HCNC | Performed by: STUDENT IN AN ORGANIZED HEALTH CARE EDUCATION/TRAINING PROGRAM

## 2021-10-12 PROCEDURE — 0003A COVID-19, MRNA, LNP-S, PF, 30 MCG/0.3 ML DOSE VACCINE: CPT | Mod: HCNC,PBBFAC | Performed by: FAMILY MEDICINE

## 2021-10-12 PROCEDURE — 91300 COVID-19, MRNA, LNP-S, PF, 30 MCG/0.3 ML DOSE VACCINE: CPT | Mod: HCNC,PBBFAC | Performed by: FAMILY MEDICINE

## 2021-10-12 PROCEDURE — 25000003 PHARM REV CODE 250: Mod: HCNC | Performed by: STUDENT IN AN ORGANIZED HEALTH CARE EDUCATION/TRAINING PROGRAM

## 2021-10-12 PROCEDURE — 96365 THER/PROPH/DIAG IV INF INIT: CPT | Mod: HCNC

## 2021-10-12 RX ADMIN — FERRIC CARBOXYMALTOSE INJECTION 750 MG: 50 INJECTION, SOLUTION INTRAVENOUS at 09:10

## 2021-10-19 ENCOUNTER — INFUSION (OUTPATIENT)
Dept: INFUSION THERAPY | Facility: HOSPITAL | Age: 71
End: 2021-10-19
Attending: STUDENT IN AN ORGANIZED HEALTH CARE EDUCATION/TRAINING PROGRAM
Payer: MEDICARE

## 2021-10-19 VITALS
OXYGEN SATURATION: 100 % | SYSTOLIC BLOOD PRESSURE: 132 MMHG | RESPIRATION RATE: 16 BRPM | HEART RATE: 87 BPM | DIASTOLIC BLOOD PRESSURE: 63 MMHG | TEMPERATURE: 98 F

## 2021-10-19 DIAGNOSIS — N18.32 ANEMIA OF CHRONIC RENAL FAILURE, STAGE 3B: ICD-10-CM

## 2021-10-19 DIAGNOSIS — D63.1 ANEMIA OF CHRONIC RENAL FAILURE, STAGE 3B: ICD-10-CM

## 2021-10-19 DIAGNOSIS — N18.31 STAGE 3A CHRONIC KIDNEY DISEASE: Primary | ICD-10-CM

## 2021-10-19 PROCEDURE — 96365 THER/PROPH/DIAG IV INF INIT: CPT | Mod: HCNC

## 2021-10-19 PROCEDURE — 63600175 PHARM REV CODE 636 W HCPCS: Mod: JG,HCNC | Performed by: STUDENT IN AN ORGANIZED HEALTH CARE EDUCATION/TRAINING PROGRAM

## 2021-10-19 PROCEDURE — 25000003 PHARM REV CODE 250: Mod: HCNC | Performed by: STUDENT IN AN ORGANIZED HEALTH CARE EDUCATION/TRAINING PROGRAM

## 2021-10-19 RX ADMIN — FERRIC CARBOXYMALTOSE INJECTION 750 MG: 50 INJECTION, SOLUTION INTRAVENOUS at 09:10

## 2021-10-20 ENCOUNTER — OFFICE VISIT (OUTPATIENT)
Dept: PODIATRY | Facility: CLINIC | Age: 71
End: 2021-10-20
Payer: MEDICARE

## 2021-10-20 VITALS — BODY MASS INDEX: 22.46 KG/M2 | WEIGHT: 139.75 LBS | HEIGHT: 66 IN

## 2021-10-20 DIAGNOSIS — M20.40 HAMMER TOE, UNSPECIFIED LATERALITY: ICD-10-CM

## 2021-10-20 DIAGNOSIS — L85.3 XEROSIS OF SKIN: ICD-10-CM

## 2021-10-20 DIAGNOSIS — E11.49 TYPE II DIABETES MELLITUS WITH NEUROLOGICAL MANIFESTATIONS: Primary | ICD-10-CM

## 2021-10-20 PROCEDURE — 3044F PR MOST RECENT HEMOGLOBIN A1C LEVEL <7.0%: ICD-10-PCS | Mod: HCNC,CPTII,S$GLB, | Performed by: PODIATRIST

## 2021-10-20 PROCEDURE — 3008F PR BODY MASS INDEX (BMI) DOCUMENTED: ICD-10-PCS | Mod: HCNC,CPTII,S$GLB, | Performed by: PODIATRIST

## 2021-10-20 PROCEDURE — 1101F PT FALLS ASSESS-DOCD LE1/YR: CPT | Mod: HCNC,CPTII,S$GLB, | Performed by: PODIATRIST

## 2021-10-20 PROCEDURE — 1159F MED LIST DOCD IN RCRD: CPT | Mod: HCNC,CPTII,S$GLB, | Performed by: PODIATRIST

## 2021-10-20 PROCEDURE — 99999 PR PBB SHADOW E&M-EST. PATIENT-LVL II: ICD-10-PCS | Mod: PBBFAC,HCNC,, | Performed by: PODIATRIST

## 2021-10-20 PROCEDURE — 3066F NEPHROPATHY DOC TX: CPT | Mod: HCNC,CPTII,S$GLB, | Performed by: PODIATRIST

## 2021-10-20 PROCEDURE — 4010F ACE/ARB THERAPY RXD/TAKEN: CPT | Mod: HCNC,CPTII,S$GLB, | Performed by: PODIATRIST

## 2021-10-20 PROCEDURE — 3066F PR DOCUMENTATION OF TREATMENT FOR NEPHROPATHY: ICD-10-PCS | Mod: HCNC,CPTII,S$GLB, | Performed by: PODIATRIST

## 2021-10-20 PROCEDURE — 3008F BODY MASS INDEX DOCD: CPT | Mod: HCNC,CPTII,S$GLB, | Performed by: PODIATRIST

## 2021-10-20 PROCEDURE — 99214 PR OFFICE/OUTPT VISIT, EST, LEVL IV, 30-39 MIN: ICD-10-PCS | Mod: HCNC,S$GLB,, | Performed by: PODIATRIST

## 2021-10-20 PROCEDURE — 99999 PR PBB SHADOW E&M-EST. PATIENT-LVL II: CPT | Mod: PBBFAC,HCNC,, | Performed by: PODIATRIST

## 2021-10-20 PROCEDURE — 1101F PR PT FALLS ASSESS DOC 0-1 FALLS W/OUT INJ PAST YR: ICD-10-PCS | Mod: HCNC,CPTII,S$GLB, | Performed by: PODIATRIST

## 2021-10-20 PROCEDURE — 99214 OFFICE O/P EST MOD 30 MIN: CPT | Mod: HCNC,S$GLB,, | Performed by: PODIATRIST

## 2021-10-20 PROCEDURE — 3044F HG A1C LEVEL LT 7.0%: CPT | Mod: HCNC,CPTII,S$GLB, | Performed by: PODIATRIST

## 2021-10-20 PROCEDURE — 3288F FALL RISK ASSESSMENT DOCD: CPT | Mod: HCNC,CPTII,S$GLB, | Performed by: PODIATRIST

## 2021-10-20 PROCEDURE — 4010F PR ACE/ARB THEARPY RXD/TAKEN: ICD-10-PCS | Mod: HCNC,CPTII,S$GLB, | Performed by: PODIATRIST

## 2021-10-20 PROCEDURE — 3288F PR FALLS RISK ASSESSMENT DOCUMENTED: ICD-10-PCS | Mod: HCNC,CPTII,S$GLB, | Performed by: PODIATRIST

## 2021-10-20 PROCEDURE — 1159F PR MEDICATION LIST DOCUMENTED IN MEDICAL RECORD: ICD-10-PCS | Mod: HCNC,CPTII,S$GLB, | Performed by: PODIATRIST

## 2021-10-20 RX ORDER — AMMONIUM LACTATE 12 G/100G
1 CREAM TOPICAL DAILY
Qty: 140 G | Refills: 5 | Status: SHIPPED | OUTPATIENT
Start: 2021-10-20

## 2021-10-26 ENCOUNTER — OFFICE VISIT (OUTPATIENT)
Dept: CARDIOLOGY | Facility: CLINIC | Age: 71
End: 2021-10-26
Payer: MEDICARE

## 2021-10-26 VITALS
HEART RATE: 80 BPM | SYSTOLIC BLOOD PRESSURE: 140 MMHG | WEIGHT: 160 LBS | DIASTOLIC BLOOD PRESSURE: 74 MMHG | BODY MASS INDEX: 25.71 KG/M2 | OXYGEN SATURATION: 100 % | HEIGHT: 66 IN

## 2021-10-26 DIAGNOSIS — M81.0 OSTEOPOROSIS, UNSPECIFIED OSTEOPOROSIS TYPE, UNSPECIFIED PATHOLOGICAL FRACTURE PRESENCE: ICD-10-CM

## 2021-10-26 DIAGNOSIS — E78.00 PURE HYPERCHOLESTEROLEMIA: ICD-10-CM

## 2021-10-26 DIAGNOSIS — Z86.73 HISTORY OF STROKE: ICD-10-CM

## 2021-10-26 DIAGNOSIS — G45.9 TIA (TRANSIENT ISCHEMIC ATTACK): ICD-10-CM

## 2021-10-26 DIAGNOSIS — R06.09 DYSPNEA ON EXERTION: ICD-10-CM

## 2021-10-26 DIAGNOSIS — Z96.1 PSEUDOPHAKIA OF BOTH EYES: ICD-10-CM

## 2021-10-26 DIAGNOSIS — M79.89 SWELLING OF BOTH HANDS: ICD-10-CM

## 2021-10-26 DIAGNOSIS — M1A.9XX1 TOPHACEOUS GOUT: ICD-10-CM

## 2021-10-26 DIAGNOSIS — N18.32 ANEMIA OF CHRONIC RENAL FAILURE, STAGE 3B: ICD-10-CM

## 2021-10-26 DIAGNOSIS — G47.33 OSA (OBSTRUCTIVE SLEEP APNEA): ICD-10-CM

## 2021-10-26 DIAGNOSIS — I65.23 BILATERAL CAROTID ARTERY STENOSIS: Primary | ICD-10-CM

## 2021-10-26 DIAGNOSIS — I70.0 AORTIC ATHEROSCLEROSIS: ICD-10-CM

## 2021-10-26 DIAGNOSIS — D63.1 ANEMIA OF CHRONIC RENAL FAILURE, STAGE 3B: ICD-10-CM

## 2021-10-26 DIAGNOSIS — N18.31 STAGE 3A CHRONIC KIDNEY DISEASE: ICD-10-CM

## 2021-10-26 DIAGNOSIS — D69.6 THROMBOCYTOPENIA: ICD-10-CM

## 2021-10-26 DIAGNOSIS — I50.30 DIASTOLIC HEART FAILURE, NYHA CLASS 2: ICD-10-CM

## 2021-10-26 DIAGNOSIS — I10 ESSENTIAL HYPERTENSION: ICD-10-CM

## 2021-10-26 PROCEDURE — 1101F PT FALLS ASSESS-DOCD LE1/YR: CPT | Mod: HCNC,CPTII,S$GLB, | Performed by: INTERNAL MEDICINE

## 2021-10-26 PROCEDURE — 3288F PR FALLS RISK ASSESSMENT DOCUMENTED: ICD-10-PCS | Mod: HCNC,CPTII,S$GLB, | Performed by: INTERNAL MEDICINE

## 2021-10-26 PROCEDURE — 1159F MED LIST DOCD IN RCRD: CPT | Mod: HCNC,CPTII,S$GLB, | Performed by: INTERNAL MEDICINE

## 2021-10-26 PROCEDURE — 3044F PR MOST RECENT HEMOGLOBIN A1C LEVEL <7.0%: ICD-10-PCS | Mod: HCNC,CPTII,S$GLB, | Performed by: INTERNAL MEDICINE

## 2021-10-26 PROCEDURE — 1160F PR REVIEW ALL MEDS BY PRESCRIBER/CLIN PHARMACIST DOCUMENTED: ICD-10-PCS | Mod: HCNC,CPTII,S$GLB, | Performed by: INTERNAL MEDICINE

## 2021-10-26 PROCEDURE — 3078F DIAST BP <80 MM HG: CPT | Mod: HCNC,CPTII,S$GLB, | Performed by: INTERNAL MEDICINE

## 2021-10-26 PROCEDURE — 3044F HG A1C LEVEL LT 7.0%: CPT | Mod: HCNC,CPTII,S$GLB, | Performed by: INTERNAL MEDICINE

## 2021-10-26 PROCEDURE — 1126F PR PAIN SEVERITY QUANTIFIED, NO PAIN PRESENT: ICD-10-PCS | Mod: HCNC,CPTII,S$GLB, | Performed by: INTERNAL MEDICINE

## 2021-10-26 PROCEDURE — 3066F NEPHROPATHY DOC TX: CPT | Mod: HCNC,CPTII,S$GLB, | Performed by: INTERNAL MEDICINE

## 2021-10-26 PROCEDURE — 99999 PR PBB SHADOW E&M-EST. PATIENT-LVL III: CPT | Mod: PBBFAC,HCNC,, | Performed by: INTERNAL MEDICINE

## 2021-10-26 PROCEDURE — 99214 OFFICE O/P EST MOD 30 MIN: CPT | Mod: HCNC,S$GLB,, | Performed by: INTERNAL MEDICINE

## 2021-10-26 PROCEDURE — 4010F ACE/ARB THERAPY RXD/TAKEN: CPT | Mod: HCNC,CPTII,S$GLB, | Performed by: INTERNAL MEDICINE

## 2021-10-26 PROCEDURE — 4010F PR ACE/ARB THEARPY RXD/TAKEN: ICD-10-PCS | Mod: HCNC,CPTII,S$GLB, | Performed by: INTERNAL MEDICINE

## 2021-10-26 PROCEDURE — 99999 PR PBB SHADOW E&M-EST. PATIENT-LVL III: ICD-10-PCS | Mod: PBBFAC,HCNC,, | Performed by: INTERNAL MEDICINE

## 2021-10-26 PROCEDURE — 3078F PR MOST RECENT DIASTOLIC BLOOD PRESSURE < 80 MM HG: ICD-10-PCS | Mod: HCNC,CPTII,S$GLB, | Performed by: INTERNAL MEDICINE

## 2021-10-26 PROCEDURE — 3066F PR DOCUMENTATION OF TREATMENT FOR NEPHROPATHY: ICD-10-PCS | Mod: HCNC,CPTII,S$GLB, | Performed by: INTERNAL MEDICINE

## 2021-10-26 PROCEDURE — 3008F BODY MASS INDEX DOCD: CPT | Mod: HCNC,CPTII,S$GLB, | Performed by: INTERNAL MEDICINE

## 2021-10-26 PROCEDURE — 3008F PR BODY MASS INDEX (BMI) DOCUMENTED: ICD-10-PCS | Mod: HCNC,CPTII,S$GLB, | Performed by: INTERNAL MEDICINE

## 2021-10-26 PROCEDURE — 1160F RVW MEDS BY RX/DR IN RCRD: CPT | Mod: HCNC,CPTII,S$GLB, | Performed by: INTERNAL MEDICINE

## 2021-10-26 PROCEDURE — 1101F PR PT FALLS ASSESS DOC 0-1 FALLS W/OUT INJ PAST YR: ICD-10-PCS | Mod: HCNC,CPTII,S$GLB, | Performed by: INTERNAL MEDICINE

## 2021-10-26 PROCEDURE — 3077F SYST BP >= 140 MM HG: CPT | Mod: HCNC,CPTII,S$GLB, | Performed by: INTERNAL MEDICINE

## 2021-10-26 PROCEDURE — 1126F AMNT PAIN NOTED NONE PRSNT: CPT | Mod: HCNC,CPTII,S$GLB, | Performed by: INTERNAL MEDICINE

## 2021-10-26 PROCEDURE — 3288F FALL RISK ASSESSMENT DOCD: CPT | Mod: HCNC,CPTII,S$GLB, | Performed by: INTERNAL MEDICINE

## 2021-10-26 PROCEDURE — 1159F PR MEDICATION LIST DOCUMENTED IN MEDICAL RECORD: ICD-10-PCS | Mod: HCNC,CPTII,S$GLB, | Performed by: INTERNAL MEDICINE

## 2021-10-26 PROCEDURE — 3077F PR MOST RECENT SYSTOLIC BLOOD PRESSURE >= 140 MM HG: ICD-10-PCS | Mod: HCNC,CPTII,S$GLB, | Performed by: INTERNAL MEDICINE

## 2021-10-26 PROCEDURE — 99214 PR OFFICE/OUTPT VISIT, EST, LEVL IV, 30-39 MIN: ICD-10-PCS | Mod: HCNC,S$GLB,, | Performed by: INTERNAL MEDICINE

## 2021-10-27 ENCOUNTER — SPECIALTY PHARMACY (OUTPATIENT)
Dept: PHARMACY | Facility: CLINIC | Age: 71
End: 2021-10-27
Payer: MEDICARE

## 2021-10-27 ENCOUNTER — OFFICE VISIT (OUTPATIENT)
Dept: NEPHROLOGY | Facility: CLINIC | Age: 71
End: 2021-10-27
Payer: MEDICARE

## 2021-10-27 VITALS
OXYGEN SATURATION: 99 % | WEIGHT: 145.06 LBS | DIASTOLIC BLOOD PRESSURE: 60 MMHG | BODY MASS INDEX: 23.31 KG/M2 | HEART RATE: 88 BPM | SYSTOLIC BLOOD PRESSURE: 140 MMHG | HEIGHT: 66 IN

## 2021-10-27 DIAGNOSIS — N18.30 CONTROLLED TYPE 2 DIABETES MELLITUS WITH STAGE 3 CHRONIC KIDNEY DISEASE, WITH LONG-TERM CURRENT USE OF INSULIN: Primary | ICD-10-CM

## 2021-10-27 DIAGNOSIS — Z79.4 CONTROLLED TYPE 2 DIABETES MELLITUS WITH STAGE 3 CHRONIC KIDNEY DISEASE, WITH LONG-TERM CURRENT USE OF INSULIN: Primary | ICD-10-CM

## 2021-10-27 DIAGNOSIS — N18.31 STAGE 3A CHRONIC KIDNEY DISEASE: ICD-10-CM

## 2021-10-27 DIAGNOSIS — E11.22 CONTROLLED TYPE 2 DIABETES MELLITUS WITH STAGE 3 CHRONIC KIDNEY DISEASE, WITH LONG-TERM CURRENT USE OF INSULIN: Primary | ICD-10-CM

## 2021-10-27 PROCEDURE — 3066F PR DOCUMENTATION OF TREATMENT FOR NEPHROPATHY: ICD-10-PCS | Mod: HCNC,CPTII,S$GLB, | Performed by: INTERNAL MEDICINE

## 2021-10-27 PROCEDURE — 99999 PR PBB SHADOW E&M-EST. PATIENT-LVL III: ICD-10-PCS | Mod: PBBFAC,HCNC,, | Performed by: INTERNAL MEDICINE

## 2021-10-27 PROCEDURE — 3288F PR FALLS RISK ASSESSMENT DOCUMENTED: ICD-10-PCS | Mod: HCNC,CPTII,S$GLB, | Performed by: INTERNAL MEDICINE

## 2021-10-27 PROCEDURE — 3066F NEPHROPATHY DOC TX: CPT | Mod: HCNC,CPTII,S$GLB, | Performed by: INTERNAL MEDICINE

## 2021-10-27 PROCEDURE — 1101F PR PT FALLS ASSESS DOC 0-1 FALLS W/OUT INJ PAST YR: ICD-10-PCS | Mod: HCNC,CPTII,S$GLB, | Performed by: INTERNAL MEDICINE

## 2021-10-27 PROCEDURE — 3288F FALL RISK ASSESSMENT DOCD: CPT | Mod: HCNC,CPTII,S$GLB, | Performed by: INTERNAL MEDICINE

## 2021-10-27 PROCEDURE — 3044F PR MOST RECENT HEMOGLOBIN A1C LEVEL <7.0%: ICD-10-PCS | Mod: HCNC,CPTII,S$GLB, | Performed by: INTERNAL MEDICINE

## 2021-10-27 PROCEDURE — 4010F PR ACE/ARB THEARPY RXD/TAKEN: ICD-10-PCS | Mod: HCNC,CPTII,S$GLB, | Performed by: INTERNAL MEDICINE

## 2021-10-27 PROCEDURE — 1101F PT FALLS ASSESS-DOCD LE1/YR: CPT | Mod: HCNC,CPTII,S$GLB, | Performed by: INTERNAL MEDICINE

## 2021-10-27 PROCEDURE — 99999 PR PBB SHADOW E&M-EST. PATIENT-LVL III: CPT | Mod: PBBFAC,HCNC,, | Performed by: INTERNAL MEDICINE

## 2021-10-27 PROCEDURE — 3044F HG A1C LEVEL LT 7.0%: CPT | Mod: HCNC,CPTII,S$GLB, | Performed by: INTERNAL MEDICINE

## 2021-10-27 PROCEDURE — 4010F ACE/ARB THERAPY RXD/TAKEN: CPT | Mod: HCNC,CPTII,S$GLB, | Performed by: INTERNAL MEDICINE

## 2021-10-27 PROCEDURE — 3008F BODY MASS INDEX DOCD: CPT | Mod: HCNC,CPTII,S$GLB, | Performed by: INTERNAL MEDICINE

## 2021-10-27 PROCEDURE — 3008F PR BODY MASS INDEX (BMI) DOCUMENTED: ICD-10-PCS | Mod: HCNC,CPTII,S$GLB, | Performed by: INTERNAL MEDICINE

## 2021-10-27 PROCEDURE — 3077F SYST BP >= 140 MM HG: CPT | Mod: HCNC,CPTII,S$GLB, | Performed by: INTERNAL MEDICINE

## 2021-10-27 PROCEDURE — 1126F AMNT PAIN NOTED NONE PRSNT: CPT | Mod: HCNC,CPTII,S$GLB, | Performed by: INTERNAL MEDICINE

## 2021-10-27 PROCEDURE — 1126F PR PAIN SEVERITY QUANTIFIED, NO PAIN PRESENT: ICD-10-PCS | Mod: HCNC,CPTII,S$GLB, | Performed by: INTERNAL MEDICINE

## 2021-10-27 PROCEDURE — 99213 PR OFFICE/OUTPT VISIT, EST, LEVL III, 20-29 MIN: ICD-10-PCS | Mod: HCNC,S$GLB,, | Performed by: INTERNAL MEDICINE

## 2021-10-27 PROCEDURE — 3078F PR MOST RECENT DIASTOLIC BLOOD PRESSURE < 80 MM HG: ICD-10-PCS | Mod: HCNC,CPTII,S$GLB, | Performed by: INTERNAL MEDICINE

## 2021-10-27 PROCEDURE — 99213 OFFICE O/P EST LOW 20 MIN: CPT | Mod: HCNC,S$GLB,, | Performed by: INTERNAL MEDICINE

## 2021-10-27 PROCEDURE — 3078F DIAST BP <80 MM HG: CPT | Mod: HCNC,CPTII,S$GLB, | Performed by: INTERNAL MEDICINE

## 2021-10-27 PROCEDURE — 3077F PR MOST RECENT SYSTOLIC BLOOD PRESSURE >= 140 MM HG: ICD-10-PCS | Mod: HCNC,CPTII,S$GLB, | Performed by: INTERNAL MEDICINE

## 2021-11-17 ENCOUNTER — SPECIALTY PHARMACY (OUTPATIENT)
Dept: PHARMACY | Facility: CLINIC | Age: 71
End: 2021-11-17
Payer: MEDICARE

## 2021-11-29 ENCOUNTER — INFUSION (OUTPATIENT)
Dept: INFUSION THERAPY | Facility: HOSPITAL | Age: 71
End: 2021-11-29
Attending: HOSPITALIST
Payer: MEDICARE

## 2021-11-29 VITALS
HEART RATE: 83 BPM | DIASTOLIC BLOOD PRESSURE: 73 MMHG | TEMPERATURE: 98 F | RESPIRATION RATE: 16 BRPM | OXYGEN SATURATION: 95 % | SYSTOLIC BLOOD PRESSURE: 166 MMHG

## 2021-11-29 DIAGNOSIS — M81.0 OSTEOPOROSIS, UNSPECIFIED OSTEOPOROSIS TYPE, UNSPECIFIED PATHOLOGICAL FRACTURE PRESENCE: Primary | ICD-10-CM

## 2021-11-29 DIAGNOSIS — N18.31 STAGE 3A CHRONIC KIDNEY DISEASE: ICD-10-CM

## 2021-11-29 PROCEDURE — 96372 THER/PROPH/DIAG INJ SC/IM: CPT | Mod: HCNC

## 2021-11-29 PROCEDURE — 63600175 PHARM REV CODE 636 W HCPCS: Mod: JG,HCNC | Performed by: HOSPITALIST

## 2021-11-29 RX ADMIN — DENOSUMAB 60 MG: 60 INJECTION SUBCUTANEOUS at 08:11

## 2021-12-06 ENCOUNTER — OFFICE VISIT (OUTPATIENT)
Dept: ENDOCRINOLOGY | Facility: CLINIC | Age: 71
End: 2021-12-06
Payer: MEDICARE

## 2021-12-06 VITALS
DIASTOLIC BLOOD PRESSURE: 60 MMHG | SYSTOLIC BLOOD PRESSURE: 128 MMHG | TEMPERATURE: 98 F | HEART RATE: 82 BPM | WEIGHT: 144.63 LBS | BODY MASS INDEX: 23.34 KG/M2

## 2021-12-06 DIAGNOSIS — E78.5 HYPERLIPIDEMIA, UNSPECIFIED HYPERLIPIDEMIA TYPE: ICD-10-CM

## 2021-12-06 DIAGNOSIS — Z79.4 CONTROLLED TYPE 2 DIABETES MELLITUS WITH COMPLICATION, WITH LONG-TERM CURRENT USE OF INSULIN: Primary | ICD-10-CM

## 2021-12-06 DIAGNOSIS — N18.31 STAGE 3A CHRONIC KIDNEY DISEASE: ICD-10-CM

## 2021-12-06 DIAGNOSIS — E11.8 CONTROLLED TYPE 2 DIABETES MELLITUS WITH COMPLICATION, WITH LONG-TERM CURRENT USE OF INSULIN: Primary | ICD-10-CM

## 2021-12-06 PROCEDURE — 95251 PR GLUCOSE MONITOR, 72 HOUR, PHYS INTERP: ICD-10-PCS | Mod: HCNC,S$GLB,, | Performed by: NURSE PRACTITIONER

## 2021-12-06 PROCEDURE — 99214 PR OFFICE/OUTPT VISIT, EST, LEVL IV, 30-39 MIN: ICD-10-PCS | Mod: HCNC,S$GLB,, | Performed by: NURSE PRACTITIONER

## 2021-12-06 PROCEDURE — 4010F PR ACE/ARB THEARPY RXD/TAKEN: ICD-10-PCS | Mod: HCNC,CPTII,S$GLB, | Performed by: NURSE PRACTITIONER

## 2021-12-06 PROCEDURE — 95251 CONT GLUC MNTR ANALYSIS I&R: CPT | Mod: HCNC,S$GLB,, | Performed by: NURSE PRACTITIONER

## 2021-12-06 PROCEDURE — 3066F NEPHROPATHY DOC TX: CPT | Mod: HCNC,CPTII,S$GLB, | Performed by: NURSE PRACTITIONER

## 2021-12-06 PROCEDURE — 99999 PR PBB SHADOW E&M-EST. PATIENT-LVL III: CPT | Mod: PBBFAC,HCNC,, | Performed by: NURSE PRACTITIONER

## 2021-12-06 PROCEDURE — 99214 OFFICE O/P EST MOD 30 MIN: CPT | Mod: HCNC,S$GLB,, | Performed by: NURSE PRACTITIONER

## 2021-12-06 PROCEDURE — 99999 PR PBB SHADOW E&M-EST. PATIENT-LVL III: ICD-10-PCS | Mod: PBBFAC,HCNC,, | Performed by: NURSE PRACTITIONER

## 2021-12-06 PROCEDURE — 3066F PR DOCUMENTATION OF TREATMENT FOR NEPHROPATHY: ICD-10-PCS | Mod: HCNC,CPTII,S$GLB, | Performed by: NURSE PRACTITIONER

## 2021-12-06 PROCEDURE — 4010F ACE/ARB THERAPY RXD/TAKEN: CPT | Mod: HCNC,CPTII,S$GLB, | Performed by: NURSE PRACTITIONER

## 2021-12-06 RX ORDER — INSULIN GLARGINE 100 [IU]/ML
INJECTION, SOLUTION SUBCUTANEOUS
Qty: 15 ML | Refills: 2 | Status: SHIPPED | OUTPATIENT
Start: 2021-12-06 | End: 2022-07-12 | Stop reason: SDUPTHER

## 2021-12-06 RX ORDER — DULAGLUTIDE 1.5 MG/.5ML
1.5 INJECTION, SOLUTION SUBCUTANEOUS
Qty: 12 PEN | Refills: 2 | Status: SHIPPED | OUTPATIENT
Start: 2021-12-06 | End: 2022-07-12 | Stop reason: SDUPTHER

## 2021-12-06 RX ORDER — PEN NEEDLE, DIABETIC 30 GX3/16"
1 NEEDLE, DISPOSABLE MISCELLANEOUS 4 TIMES DAILY
Qty: 400 EACH | Refills: 3 | Status: SHIPPED | OUTPATIENT
Start: 2021-12-06 | End: 2022-11-01 | Stop reason: SDUPTHER

## 2021-12-06 RX ORDER — INSULIN ASPART 100 [IU]/ML
INJECTION, SOLUTION INTRAVENOUS; SUBCUTANEOUS
Qty: 30 ML | Refills: 2 | Status: SHIPPED | OUTPATIENT
Start: 2021-12-06 | End: 2022-07-12 | Stop reason: SDUPTHER

## 2021-12-13 ENCOUNTER — PATIENT MESSAGE (OUTPATIENT)
Dept: ADMINISTRATIVE | Facility: OTHER | Age: 71
End: 2021-12-13
Payer: MEDICARE

## 2021-12-13 DIAGNOSIS — M06.9 RHEUMATOID ARTHRITIS INVOLVING MULTIPLE JOINTS: ICD-10-CM

## 2021-12-13 RX ORDER — ABATACEPT 125 MG/ML
125 INJECTION, SOLUTION SUBCUTANEOUS WEEKLY
Qty: 4 ML | Refills: 4 | Status: SHIPPED | OUTPATIENT
Start: 2021-12-13 | End: 2022-05-04 | Stop reason: SDUPTHER

## 2021-12-15 ENCOUNTER — SPECIALTY PHARMACY (OUTPATIENT)
Dept: PHARMACY | Facility: CLINIC | Age: 71
End: 2021-12-15
Payer: MEDICARE

## 2021-12-22 ENCOUNTER — PES CALL (OUTPATIENT)
Dept: ADMINISTRATIVE | Facility: CLINIC | Age: 71
End: 2021-12-22
Payer: MEDICARE

## 2021-12-22 ENCOUNTER — TELEPHONE (OUTPATIENT)
Dept: FAMILY MEDICINE | Facility: CLINIC | Age: 71
End: 2021-12-22
Payer: MEDICARE

## 2021-12-27 ENCOUNTER — TELEPHONE (OUTPATIENT)
Dept: FAMILY MEDICINE | Facility: CLINIC | Age: 71
End: 2021-12-27
Payer: MEDICARE

## 2021-12-29 ENCOUNTER — TELEPHONE (OUTPATIENT)
Dept: FAMILY MEDICINE | Facility: CLINIC | Age: 71
End: 2021-12-29
Payer: MEDICARE

## 2022-01-04 ENCOUNTER — TELEPHONE (OUTPATIENT)
Dept: FAMILY MEDICINE | Facility: CLINIC | Age: 72
End: 2022-01-04
Payer: MEDICARE

## 2022-01-04 NOTE — TELEPHONE ENCOUNTER
Patient phoned requesting order for diabetic shoes be re-faxed to rep at [employer] (Yadira). Order was faxed but progress notes of last visit should have been faxed as well & time frame has . The patient will need f/u visit with new order & progress notes faxed to [employer] ([phone]) after that visit. Pt was scheduled appt 22. Please advise.

## 2022-01-10 ENCOUNTER — TELEPHONE (OUTPATIENT)
Dept: FAMILY MEDICINE | Facility: CLINIC | Age: 72
End: 2022-01-10
Payer: MEDICARE

## 2022-01-12 ENCOUNTER — SPECIALTY PHARMACY (OUTPATIENT)
Dept: PHARMACY | Facility: CLINIC | Age: 72
End: 2022-01-12
Payer: MEDICARE

## 2022-01-12 NOTE — TELEPHONE ENCOUNTER
BENEFIT INVESTIGATION:  [facility] Medicare plan.   OSP in network.   LIS level one.   No deductible or TrOOP max.   Patient is in the donut hole.  Will pay $9.85 in initial & GAP and $0 in catastrophic.             MCP

## 2022-01-12 NOTE — TELEPHONE ENCOUNTER
Specialty Pharmacy - Refill Coordination    Specialty Medication Orders Linked to Encounter    Flowsheet Row Most Recent Value   Medication #1 abatacept (ORENCIA CLICKJECT) 125 mg/mL AtIn (Order#[id number], Rx#[id number])          Refill Questions - Documented Responses    Flowsheet Row Most Recent Value   Patient Availability and HIPAA Verification    Does patient want to proceed with activity? Yes   HIPAA/medical authority confirmed? Yes   Relationship to patient of person spoken to? Self   Refill Screening Questions    Changes to allergies? No   Changes to medications? No   New conditions since last clinic visit? No   Unplanned office visit, urgent care, ED, or hospital admission in the last 4 weeks? No   How does patient/caregiver feel medication is working? Good   Financial problems or insurance changes? No   How many doses of your specialty medications were missed in the last 4 weeks? 0   Would patient like to speak to a pharmacist? No   When does the patient need to receive the medication? 01/19/22   Refill Delivery Questions    How will the patient receive the medication? Delivery Sara   When does the patient need to receive the medication? 01/19/22   Shipping Address Home   Address in [facility] confirmed and updated if neccessary? Yes   Expected Copay ($) 9.85   Is the patient able to afford the medication copay? Yes   Payment Method CC on file   Days supply of Refill 28   Supplies needed? No supplies needed   Refill activity completed? Yes   Refill activity plan Refill scheduled   Shipment/Pickup Date: 01/13/22          Current Outpatient Medications   Medication Sig    abatacept (ORENCIA CLICKJECT) 125 mg/mL AtIn Inject 125 mg into the skin once a week.    ACCU-CHEK SMARTVIEW TEST STRIP Strp TEST BLOOD SUGAR FOUR TIMES DAILY    alcohol swabs (BD ALCOHOL SWABS) PadM Use 4x/day    amLODIPine (NORVASC) 5 MG tablet Take 1 tablet (5 mg total) by mouth 2 (two) times daily.    ammonium lactate 12 %  "Crea Apply 1 application topically once daily.    atorvastatin (LIPITOR) 80 MG tablet Take 1 tablet (80 mg total) by mouth once daily.    blood glucose control, low (TRUE METRIX LEVEL 1) Soln Use as indicated    BOOSTRIX TDAP 2.5-8-5 Lf-mcg-Lf/0.5mL Syrg injection     cloNIDine 0.2 mg/24 hr td ptwk (CATAPRES) 0.2 mg/24 hr PLACE 1 PATCH ONTO THE SKIN EVERY 7 DAYS.    diclofenac sodium (VOLTAREN) 1 % Gel Apply 2 g topically 4 (four) times daily as needed. Apply to aching joints    dorzolamide (TRUSOPT) 2 % ophthalmic solution Place 1 drop into both eyes 2 (two) times daily.    dulaglutide (TRULICITY) 1.5 mg/0.5 mL pen injector Inject 1.5 mg into the skin every 7 days.    febuxostat (ULORIC) 40 mg Tab Take 1 tablet (40 mg total) by mouth once daily.    febuxostat (ULORIC) 40 mg Tab Take 1 tablet (40 mg total) by mouth once daily.    hydrALAZINE (APRESOLINE) 50 MG tablet Take 1 tablet (50 mg total) by mouth 3 (three) times daily.    insulin (LANTUS SOLOSTAR U-100 INSULIN) glargine 100 units/mL (3mL) SubQ pen Inject 6 units once daily    insulin aspart U-100 (NOVOLOG FLEXPEN U-100 INSULIN) 100 unit/mL (3 mL) InPn pen Inject 4-8 units three times daily before each meal with sliding scale.    lancets (TRUEPLUS LANCETS) 33 gauge Misc Test glucose 4x/day. Dx code e11.65    LINZESS 145 mcg Cap capsule TAKE 1 CAPSULE (145 MCG TOTAL) BY MOUTH ONCE DAILY.    multivit with min-folic acid 200 mcg Chew     omeprazole (PRILOSEC) 20 MG capsule TAKE 2 CAPSULES (40 MG TOTAL) BY MOUTH ONCE DAILY.    pen needle, diabetic (BD ULTRA-FINE RICHARD PEN NEEDLE) 32 gauge x 5/32" Ndle 1 Device by Misc.(Non-Drug; Combo Route) route 4 (four) times daily.    PNEUMOVAX-23 25 mcg/0.5 mL vaccine     predniSONE (DELTASONE) 5 MG tablet Take 1 tablet (5 mg total) by mouth once daily.    torsemide (DEMADEX) 10 MG Tab Take 1 tablet (10 mg total) by mouth every other day.    traMADoL (ULTRAM) 50 mg tablet Take 1 tablet (50 mg total) by " mouth every 12 (twelve) hours as needed for Pain.    travoprost (TRAVATAN Z) 0.004 % ophthalmic solution INSTILL 1 DROP INTO BOTH EYES EVERY EVENING    TRUE METRIX GLUCOSE METER Misc USE AS DIRECTED    valsartan (DIOVAN) 160 MG tablet Take 1 tablet (160 mg total) by mouth 2 (two) times daily.   Last reviewed on 1/4/2022  4:23 PM by Darrion Mendez LPN    Review of patient's allergies indicates:   Allergen Reactions    Penicillins Nausea And Vomiting    Rosuvastatin      Muscle pain     Allopurinol analogues Rash    Last reviewed on  1/4/2022 4:23 PM by Darrion Mendez      Tasks added this encounter   2/9/2022 - Refill Call (Auto Added)   Tasks due within next 3 months   1/18/2022 - Clinical - Follow Up Assesement (90 day)     Tyra Fonseca, PharmD  [facility]  [address] [place], [place] [address]  [facility] [address]  Phone: [phone]  Fax: [phone]

## 2022-01-24 ENCOUNTER — OFFICE VISIT (OUTPATIENT)
Dept: RHEUMATOLOGY | Facility: CLINIC | Age: 72
End: 2022-01-24
Payer: MEDICARE

## 2022-01-24 DIAGNOSIS — M1A.9XX1 TOPHACEOUS GOUT: ICD-10-CM

## 2022-01-24 DIAGNOSIS — M10.9 GOUTY ARTHRITIS: ICD-10-CM

## 2022-01-24 DIAGNOSIS — M06.9 RHEUMATOID ARTHRITIS INVOLVING MULTIPLE JOINTS: ICD-10-CM

## 2022-01-24 DIAGNOSIS — Z79.52 LONG TERM SYSTEMIC STEROID USER: Primary | ICD-10-CM

## 2022-01-24 PROCEDURE — 99214 PR OFFICE/OUTPT VISIT, EST, LEVL IV, 30-39 MIN: ICD-10-PCS | Mod: HCNC,95,, | Performed by: INTERNAL MEDICINE

## 2022-01-24 PROCEDURE — 1126F PR PAIN SEVERITY QUANTIFIED, NO PAIN PRESENT: ICD-10-PCS | Mod: HCNC,CPTII,95, | Performed by: INTERNAL MEDICINE

## 2022-01-24 PROCEDURE — 99214 OFFICE O/P EST MOD 30 MIN: CPT | Mod: HCNC,95,, | Performed by: INTERNAL MEDICINE

## 2022-01-24 PROCEDURE — 99499 RISK ADDL DX/OHS AUDIT: ICD-10-PCS | Mod: HCNC,95,, | Performed by: INTERNAL MEDICINE

## 2022-01-24 PROCEDURE — 99499 UNLISTED E&M SERVICE: CPT | Mod: HCNC,95,, | Performed by: INTERNAL MEDICINE

## 2022-01-24 PROCEDURE — 1126F AMNT PAIN NOTED NONE PRSNT: CPT | Mod: HCNC,CPTII,95, | Performed by: INTERNAL MEDICINE

## 2022-01-24 RX ORDER — FEBUXOSTAT 40 MG/1
40 TABLET, FILM COATED ORAL DAILY
Qty: 30 TABLET | Refills: 11 | Status: SHIPPED | OUTPATIENT
Start: 2022-01-24 | End: 2022-01-24 | Stop reason: SDUPTHER

## 2022-01-24 RX ORDER — FEBUXOSTAT 40 MG/1
40 TABLET, FILM COATED ORAL DAILY
Qty: 30 TABLET | Refills: 11 | Status: SHIPPED | OUTPATIENT
Start: 2022-01-24 | End: 2022-03-28 | Stop reason: SDUPTHER

## 2022-01-24 NOTE — PROGRESS NOTES
Answers for HPI/ROS submitted by the patient on 1/21/2022  fever: No  eye redness: No  mouth sores: No  headaches: No  shortness of breath: No  chest pain: No  trouble swallowing: No  diarrhea: No  constipation: Yes  unexpected weight change: No  genital sore: No  During the last 3 days, have you had a skin rash?: No  Bruises or bleeds easily: No  cough: No

## 2022-01-24 NOTE — PROGRESS NOTES
Subjective:       Patient ID: Darrion Bennett is a 69 y.o. male.    Chief Complaint: Follow-up     HPI  69 year old male with type II, cataracts, HTN, gout,  Sjogrens, urolithiasis, CHF, hypothyroidism, CKD here for evaluation.  He reports that he was diagnosed with Sjogrens when he had dry eyes and dry mouth in 2014.. He takes plaquenil 200mg po BID. He is  taking azathioprine 50mg po TID and medrol 4mg po qqday.   He was put on imuran by Dr. Corona.  He was followed by Dr. Corona from 2014 to 2017.  In October 2018, imuran and medrol was stopped. Patient restarted imuran in November 2018.  Reports that he feels that imuran helps him with joint.   Today he denies pain, stiffness or swelling.  He denies sry eyes or dry mouth.  Denies trouble making a fist.    He was diagnosed in January in left aspect of foot with pain, swelling and redness.         Interval history:  He is taking uloric 40mg once a day. He is taking prednisone 5 mg a day. He is taking Orencia once a week for 7 weeks.  He has noticed improvement in pain and swelling.  He has pain and swelling in both hands.He also has pain in shoulder, feet, knees. He is stiff for a long time in morning.  .Denies any hip pain or, jaw claudication, or visual changes. He reports his hands and shoulders feel better.    Past Medical History:   Diagnosis Date    Anemia     Arthritis     Cataract     Chronic constipation     Diabetes mellitus, type 2     Glaucoma     Hypertension     MCTD (mixed connective tissue disease)     Sjogren's disease     Ulcerative colitis     Urolithiasis        Review of Systems   Constitutional: Negative for activity change, appetite change, chills, diaphoresis, fatigue and fever.   HENT: Negative for ear discharge, ear pain, hearing loss, mouth sores, nosebleeds and sinus pressure.    Eyes: Negative.  Negative for photophobia, pain, discharge, redness and itching.   Respiratory: Negative for cough, chest tightness and shortness of  breath.    Cardiovascular: Negative for chest pain, palpitations and leg swelling.   Gastrointestinal: Negative for abdominal distention, blood in stool and nausea.   Endocrine: Negative for cold intolerance, heat intolerance, polydipsia and polyphagia.   Genitourinary: Negative for flank pain, genital sores and hematuria.   Musculoskeletal: Positive for arthralgias. Negative for back pain, gait problem, joint swelling, myalgias, neck pain and neck stiffness.   Skin: Negative for color change, pallor and rash.   Neurological: Negative for dizziness, weakness, light-headedness and headaches.   Hematological: Negative for adenopathy. Does not bruise/bleed easily.   Psychiatric/Behavioral: Negative for decreased concentration and hallucinations. The patient is not nervous/anxious.              Objective:        Physical Exam   Constitutional: He is well-developed, well-nourished, and in no distress. No distress.   HENT:   Head: Normocephalic and atraumatic.   Right Ear: External ear normal.   Left Ear: External ear normal.   Nose: Nose normal.   Mouth/Throat: Oropharynx is clear and moist. No oropharyngeal exudate.   Eyes: Conjunctivae and EOM are normal. Pupils are equal, round, and reactive to light. Right eye exhibits no discharge. Left eye exhibits no discharge. No scleral icterus.   Neck: Normal range of motion. Neck supple. No JVD present. No tracheal deviation present. No thyromegaly present.   Cardiovascular: Normal rate, normal heart sounds and intact distal pulses.  Exam reveals no gallop and no friction rub.    No murmur heard.  Pulmonary/Chest: Effort normal. No stridor. No respiratory distress. He has no wheezes. He has no rales. He exhibits no tenderness.   Abdominal: Soft. Bowel sounds are normal. He exhibits no distension and no mass. There is no tenderness. There is no rebound and no guarding.   Lymphadenopathy:     He has no cervical adenopathy.   Neurological: No cranial nerve deficit. Coordination  normal.   Skin: Skin is warm and dry. No rash noted. He is not diaphoretic. No erythema. No pallor.     Musculoskeletal: Normal range of motion. He exhibits no edema, tenderness or deformity.     Labs:  Gilma-+  Ssa+  Ssb+  +RNP  Ccp,rf-negative      Right hand xray (5/2019): I personally reviewed; Soft tissue swelling tip of long finger.  Negative for fracture.     Assessment:    70 year old male with type II DM, cataracts, HTN, gout,  Sjogrens, urolithiasis, CHF, hypothyroidism, CKD here for follow up. He was previously followed by Dr. Lau.  He reports that he was diagnosed with Sjogrens when he had dry eyes and dry mouth in 2014. Serologies are +GILMA, +SSA,+SSB, and +RNP. He denies raynauds, rashes, oral ulcers or symptoms of SLE. His main issue before we met was inflammatory arthritis which was being treated with plaquenil, imuran, and medrol.     When we initially met in feb 2019 , he was taking plaquenil 200mg po BID, azathioprine 50mg po TID and medrol 4mg po qqday. His last rheumatologist discontinued his medications and patient decided to restart the medications himself since he was having so much pain. He developed imuran toxicity so all his medications were discontinued. He is asymptomatic from Sjogrens at this time.      # tophaceous gout, but had allergy to allopurinol so tried uloric.  He has been taking uloric 40mg a day and doing well.  -Labs     #Inflammatory arthritis: secondary to Sjogrens. Was previously on plaquenil and imuran but developed cytopenias so had to stop.  Doing much better on Orencia so will continue.  Given +GILMA and +RNP, will avoid ANTI- TNF therapy  Continue orencia 125 mg sub q once a week   Continue prednisone 5 mg a day      #osteporosis: he had recent dexa scan showing osteoporosis.  Have asked him to discuss this with endo who he sees for diabetes.      rtc in 12  weeks    The patient location is: home  The chief complaint leading to consultation is: joint pain    Visit  type: audiovisual    Face to Face time with patient: 30 minutes   minutes of total time spent on the encounter, which includes face to face time and non-face to face time preparing to see the patient (eg, review of tests), Obtaining and/or reviewing separately obtained history, Documenting clinical information in the electronic or other health record, Independently interpreting results (not separately reported) and communicating results to the patient/family/caregiver, or Care coordination (not separately reported).         Each patient to whom he or she provides medical services by telemedicine is:  (1) informed of the relationship between the physician and patient and the respective role of any other health care provider with respect to management of the patient; and (2) notified that he or she may decline to receive medical services by telemedicine and may withdraw from such care at any time.

## 2022-01-25 ENCOUNTER — OFFICE VISIT (OUTPATIENT)
Dept: FAMILY MEDICINE | Facility: CLINIC | Age: 72
End: 2022-01-25
Payer: MEDICARE

## 2022-01-25 VITALS
HEIGHT: 66 IN | OXYGEN SATURATION: 97 % | DIASTOLIC BLOOD PRESSURE: 60 MMHG | BODY MASS INDEX: 23.34 KG/M2 | HEART RATE: 82 BPM | SYSTOLIC BLOOD PRESSURE: 134 MMHG | TEMPERATURE: 98 F

## 2022-01-25 DIAGNOSIS — E11.42 CONTROLLED TYPE 2 DIABETES MELLITUS WITH DIABETIC POLYNEUROPATHY, WITH LONG-TERM CURRENT USE OF INSULIN: Primary | ICD-10-CM

## 2022-01-25 DIAGNOSIS — R06.09 DYSPNEA ON EXERTION: ICD-10-CM

## 2022-01-25 DIAGNOSIS — N25.81 SECONDARY HYPERPARATHYROIDISM OF RENAL ORIGIN: ICD-10-CM

## 2022-01-25 DIAGNOSIS — G89.29 CHRONIC MIDLINE LOW BACK PAIN WITHOUT SCIATICA: ICD-10-CM

## 2022-01-25 DIAGNOSIS — N18.31 STAGE 3A CHRONIC KIDNEY DISEASE: ICD-10-CM

## 2022-01-25 DIAGNOSIS — M81.0 OSTEOPOROSIS, UNSPECIFIED OSTEOPOROSIS TYPE, UNSPECIFIED PATHOLOGICAL FRACTURE PRESENCE: ICD-10-CM

## 2022-01-25 DIAGNOSIS — M54.50 CHRONIC MIDLINE LOW BACK PAIN WITHOUT SCIATICA: ICD-10-CM

## 2022-01-25 DIAGNOSIS — E78.00 PURE HYPERCHOLESTEROLEMIA: ICD-10-CM

## 2022-01-25 DIAGNOSIS — D69.6 THROMBOCYTOPENIA: ICD-10-CM

## 2022-01-25 DIAGNOSIS — N18.32 ANEMIA OF CHRONIC RENAL FAILURE, STAGE 3B: ICD-10-CM

## 2022-01-25 DIAGNOSIS — D70.9 NEUTROPENIA, UNSPECIFIED TYPE: ICD-10-CM

## 2022-01-25 DIAGNOSIS — I10 ESSENTIAL HYPERTENSION: ICD-10-CM

## 2022-01-25 DIAGNOSIS — M35.00 SJOGREN'S SYNDROME, WITH UNSPECIFIED ORGAN INVOLVEMENT: ICD-10-CM

## 2022-01-25 DIAGNOSIS — M20.30 HALLUX HAMMERTOE, UNSPECIFIED LATERALITY: ICD-10-CM

## 2022-01-25 DIAGNOSIS — Z79.4 CONTROLLED TYPE 2 DIABETES MELLITUS WITH DIABETIC POLYNEUROPATHY, WITH LONG-TERM CURRENT USE OF INSULIN: Primary | ICD-10-CM

## 2022-01-25 DIAGNOSIS — I50.30 DIASTOLIC HEART FAILURE, NYHA CLASS 2: ICD-10-CM

## 2022-01-25 DIAGNOSIS — D63.1 ANEMIA OF CHRONIC RENAL FAILURE, STAGE 3B: ICD-10-CM

## 2022-01-25 DIAGNOSIS — I70.0 AORTIC ATHEROSCLEROSIS: ICD-10-CM

## 2022-01-25 PROCEDURE — 3008F PR BODY MASS INDEX (BMI) DOCUMENTED: ICD-10-PCS | Mod: HCNC,CPTII,S$GLB, | Performed by: INTERNAL MEDICINE

## 2022-01-25 PROCEDURE — 3288F FALL RISK ASSESSMENT DOCD: CPT | Mod: HCNC,CPTII,S$GLB, | Performed by: INTERNAL MEDICINE

## 2022-01-25 PROCEDURE — 3075F PR MOST RECENT SYSTOLIC BLOOD PRESS GE 130-139MM HG: ICD-10-PCS | Mod: HCNC,CPTII,S$GLB, | Performed by: INTERNAL MEDICINE

## 2022-01-25 PROCEDURE — 99499 RISK ADDL DX/OHS AUDIT: ICD-10-PCS | Mod: HCNC,S$GLB,, | Performed by: INTERNAL MEDICINE

## 2022-01-25 PROCEDURE — 3075F SYST BP GE 130 - 139MM HG: CPT | Mod: HCNC,CPTII,S$GLB, | Performed by: INTERNAL MEDICINE

## 2022-01-25 PROCEDURE — 1101F PR PT FALLS ASSESS DOC 0-1 FALLS W/OUT INJ PAST YR: ICD-10-PCS | Mod: HCNC,CPTII,S$GLB, | Performed by: INTERNAL MEDICINE

## 2022-01-25 PROCEDURE — 3078F DIAST BP <80 MM HG: CPT | Mod: HCNC,CPTII,S$GLB, | Performed by: INTERNAL MEDICINE

## 2022-01-25 PROCEDURE — 3288F PR FALLS RISK ASSESSMENT DOCUMENTED: ICD-10-PCS | Mod: HCNC,CPTII,S$GLB, | Performed by: INTERNAL MEDICINE

## 2022-01-25 PROCEDURE — 99214 OFFICE O/P EST MOD 30 MIN: CPT | Mod: HCNC,S$GLB,, | Performed by: INTERNAL MEDICINE

## 2022-01-25 PROCEDURE — 1101F PT FALLS ASSESS-DOCD LE1/YR: CPT | Mod: HCNC,CPTII,S$GLB, | Performed by: INTERNAL MEDICINE

## 2022-01-25 PROCEDURE — 3078F PR MOST RECENT DIASTOLIC BLOOD PRESSURE < 80 MM HG: ICD-10-PCS | Mod: HCNC,CPTII,S$GLB, | Performed by: INTERNAL MEDICINE

## 2022-01-25 PROCEDURE — 1126F AMNT PAIN NOTED NONE PRSNT: CPT | Mod: HCNC,CPTII,S$GLB, | Performed by: INTERNAL MEDICINE

## 2022-01-25 PROCEDURE — 99999 PR PBB SHADOW E&M-EST. PATIENT-LVL III: ICD-10-PCS | Mod: PBBFAC,HCNC,, | Performed by: INTERNAL MEDICINE

## 2022-01-25 PROCEDURE — 1160F PR REVIEW ALL MEDS BY PRESCRIBER/CLIN PHARMACIST DOCUMENTED: ICD-10-PCS | Mod: HCNC,CPTII,S$GLB, | Performed by: INTERNAL MEDICINE

## 2022-01-25 PROCEDURE — 3008F BODY MASS INDEX DOCD: CPT | Mod: HCNC,CPTII,S$GLB, | Performed by: INTERNAL MEDICINE

## 2022-01-25 PROCEDURE — 99499 UNLISTED E&M SERVICE: CPT | Mod: HCNC,S$GLB,, | Performed by: INTERNAL MEDICINE

## 2022-01-25 PROCEDURE — 99214 PR OFFICE/OUTPT VISIT, EST, LEVL IV, 30-39 MIN: ICD-10-PCS | Mod: HCNC,S$GLB,, | Performed by: INTERNAL MEDICINE

## 2022-01-25 PROCEDURE — 1159F MED LIST DOCD IN RCRD: CPT | Mod: HCNC,CPTII,S$GLB, | Performed by: INTERNAL MEDICINE

## 2022-01-25 PROCEDURE — 99999 PR PBB SHADOW E&M-EST. PATIENT-LVL III: CPT | Mod: PBBFAC,HCNC,, | Performed by: INTERNAL MEDICINE

## 2022-01-25 PROCEDURE — 1126F PR PAIN SEVERITY QUANTIFIED, NO PAIN PRESENT: ICD-10-PCS | Mod: HCNC,CPTII,S$GLB, | Performed by: INTERNAL MEDICINE

## 2022-01-25 PROCEDURE — 1159F PR MEDICATION LIST DOCUMENTED IN MEDICAL RECORD: ICD-10-PCS | Mod: HCNC,CPTII,S$GLB, | Performed by: INTERNAL MEDICINE

## 2022-01-25 PROCEDURE — 1160F RVW MEDS BY RX/DR IN RCRD: CPT | Mod: HCNC,CPTII,S$GLB, | Performed by: INTERNAL MEDICINE

## 2022-01-25 RX ORDER — DENOSUMAB 60 MG/ML
INJECTION SUBCUTANEOUS
COMMUNITY
Start: 2021-11-29

## 2022-01-25 RX ORDER — FERRIC CARBOXYMALTOSE 50 MG/ML
INJECTION, SOLUTION INTRAVENOUS
COMMUNITY
Start: 2021-10-19

## 2022-01-25 RX ORDER — TRAMADOL HYDROCHLORIDE 50 MG/1
50 TABLET ORAL EVERY 12 HOURS PRN
Qty: 60 TABLET | Refills: 0 | Status: SHIPPED | OUTPATIENT
Start: 2022-01-25 | End: 2022-04-13

## 2022-01-25 NOTE — PROGRESS NOTES
This note was created by combination of typed  and M-Modal dictation.  Transcription errors may be present.  If there are any questions, please contact me.    Assessment and Plan:   Controlled type 2 diabetes mellitus with diabetic polyneuropathy, with long-term current use of insulin  Hallux hammertoe, unspecified laterality  -The patient has diabetes mellitus.  The patient has peripheral neuropathy with previous callus formation.  I am treating this patient and her comprehensive plan of care for his diabetes.  The patient needs custom molded shoes because of his diabetes.  Followed by DM NP  rx for shoes e-faxed to [employer]  -     DIABETIC SHOES FOR HOME USE    Essential hypertension  Diastolic heart failure, NYHA class 2  Dyspnea on exertion  -stable. On diuretic.  On ARB.  Not on beta-blocker due to bradycardia.    Stage 3a chronic kidney disease  Anemia of chronic renal failure, stage 3b  Secondary hyperparathyroidism of renal origin  -followed by Nephrology.  Stable.    Pure hypercholesterolemia  Aortic atherosclerosis  -on statin    Sjogren's syndrome, with unspecified organ involvement  -followed by Rheumatology    Neutropenia, unspecified type  Thrombocytopenia  -stable.  Followed by Hematology.    Chronic midline low back pain without sciatica  -refill tramadol for p.r.n. use, last refill 20/20.   queried no aberrancy.  Avoid NSAIDs with chronic kidney disease.  -     traMADoL (ULTRAM) 50 mg tablet; Take 1 tablet (50 mg total) by mouth every 12 (twelve) hours as needed for Pain.  Dispense: 60 tablet; Refill: 0    Osteoporosis, unspecified osteoporosis type, unspecified pathological fracture presence  -followed by Endocrinology.  Prolia.          Medications Discontinued During This Encounter   Medication Reason    febuxostat (ULORIC) 40 mg Tab Duplicate Order    PNEUMOVAX-23 25 mcg/0.5 mL vaccine Therapy completed    traMADoL (ULTRAM) 50 mg tablet Reorder       meds sent this  encounter:  Medications Ordered This Encounter   Medications    traMADoL (ULTRAM) 50 mg tablet     Sig: Take 1 tablet (50 mg total) by mouth every 12 (twelve) hours as needed for Pain.     Dispense:  60 tablet     Refill:  0     Quantity prescribed more than 7 day supply? Yes, quantity medically necessary       Follow Up: No follow-ups on file.    Subjective:     Chief Complaint   Patient presents with    Diabetes     Needs Diabetic shoes      Back Pain       VANESSA Maier is a 71 y.o. male, last appointment with this clinic was Visit date not found.  Social History     Tobacco Use    Smoking status: Never Smoker    Smokeless tobacco: Never Used   Substance Use Topics    Alcohol use: No          Social History     Social History Narrative    Not on file     Last visit with me 7/20/21  Hospitalization f/u for weakness, ? TIA. ASA, plavix x 3 weeks. MRI remote thalamic infarct.  Hx of abn TSH  Consider LT if workup negative.  BP high. May need to add doxazosin  Lipid started on statin during hospitlization    Saw pulm in f/u 9/30 for dyspnea. PFTs restrictive physiology and decreased DLCO  CT chest bilateral mosaic attenuation  Increased diuretic  JAZLYN, CPAP    Saw new heme in early October for anemia of CKD. Stable. Hold on BMBx unless new cytopenia or anemia worsening. No epo    Saw cardiology 10/26  Hx of CHF; stress test negative for ischemia, normal LV systolic function; f/u 6 months    Saw nephrology in follow-up in late October.  CKD stage IIIA.  Secondary hyper PTH.  No changes.  Follow-up lab trends  Urine with proteinuria, not quite nephrotic range.    Saw nurse practitioner for diabetes follow-up in early December.  Has continues glucose monitor.  No change    He saw Rheumatology in follow-up.  Sjogren's inflammatory arthritis.  Orencia, steroid  Osteoporosis on DEXA scan, deferred to Endocrinology.  Gout, Uloric, plan was future labs    Hx of compression fx, changed fosamax to prolia    6/2021 lipid  good  11/2021 6.6  10/2021 creatinine 1.4 CKD stage 3a  10/1/21 plt > 150  WBC WNL    Needs diabetic custom shoes  His last pair is 2 years old  lantus 6 units nightly  novolog 6 units with meals  Diabetic neuropathy.  No current callus.  Was seen by Podiatry this past fall who recommended diabetic custom-made shoes.  For diabetes neuropathy, with diminished sensation documented  With a history of hammertoe    linzess prn for constipation a few times a week    Tramadol for back pain prn use.  Last refill back in 2020 for 60 tablets.  Takes sparingly.    Patient Care Team:  Farooq Domingo MD as PCP - General (Internal Medicine)  Tin Chambers MD (Gastroenterology)  Harrison Brannon MD (Cardiology)  Roxanna Salazar MD as Nurse Practitioner (Rheumatology)  Malcolm Irwin MD as  (Nephrology)  Amy Gamez MA as Care Coordinator  Dangelo Hall MD as Consulting Physician (Ophthalmology)  [facility] (Gastroenterology)  Kassandra Eason as Digital Medicine Health   Shamir Fonseca, PharmD as Hypertension Digital Medicine Clinician (Pharmacist)  Farooq Domingo MD as Hypertension Digital Medicine Responsible Provider (Internal Medicine)  Farooq Domingo MD as Hyperlipidemia Digital Medicine Responsible Provider (Internal Medicine)  Shamir Fonseca, PharmD as Hyperlipidemia Digital Medicine Clinician  Medicaid (Non-[facility]) as Hypertension Digital Medicine Contract    Patient Active Problem List    Diagnosis Date Noted    Dyspnea on exertion 08/02/2021     +diastolic dysfunction + anemia.  pft with restrictive physiology and decreased dlco.  Ct with mosaic attenuation and enlarge pa suggesting volume overload.        JAZLYN (obstructive sleep apnea) 08/02/2021     ahi of 21; rdi of 38.   Result of sleep study d/w patient.  Recommend treatment.  Patient agree to apap.  Desensitization protocol d/w patient      History of stroke 07/20/2021      6/2021 MRI brain old stroke in thalamus      Mobitz I 06/27/2021    Bilateral carotid artery stenosis on CTA 6/26/21 06/27/2021 6/26/21 CTA head and neck  Atherosclerotic plaque throughout the aortic arch with mild shaggy soft atherosclerotic plaque in the posterior arch near the junction of the transverse and descending thoracic aorta.  Minimal atherosclerotic plaque in the cervical carotids with no stenosis or dissection within the cervical vessels.  Moderate to severe atherosclerotic plaque within the carotid siphons with 50-60% diameter stenosis, right slightly greater than left.  Moderate plaque within the V4 segment of the left vertebral segment.  No large vessel occlusion or aneurysm intracranially.     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 05/02/2019    Anemia of chronic renal failure, stage 3b 03/13/2019    Current chronic use of systemic steroids 02/27/2019    Compression fracture of body of thoracic vertebra on XR 2/2019; 2/2019 alendronate 02/27/2019    Neutropenia 02/26/2019    Thrombocytopenia 02/26/2019    Jackson's esophagus without dysplasia 03/14/2016    Anemia from plaquenil and imuran 03/14/2016    Diastolic heart  failure, NYHA class 2 09/26/2014 05/12/2021 TTE LV normal size with concentric remodeling and normal systolic function LVEF 65%.  Grade 2 diastolic dysfunction.  Severe left atrial enlargement.  Normal RV size and systolic function.  CVP 3.  PA pressure 36. Sinuses of Valsalva mildly dilated.  05/12/2021 nuclear medicine stress test normal perfusion.  No evidence of ischemia or infarction.  LVEF 75%.  Normal wall motion post stress.  During stress, rare PVCs.  Hypertensive response exercise.    -ct with mosaic attenuation.  Recommend 10 m g 2 times per week.  Recommend daily demodex      BMI 23.0-23.9, adult 07/15/2014    Essential hypertension 05/13/2014 6/2019 TTE normal LV systolic and diastolic function; ABIs normal      Controlled type 2 diabetes mellitus with complication, without long-term current use of insulin 05/13/2014    Pure hypercholesterolemia 05/13/2014 05/12/2021 nuclear medicine stress test normal perfusion.  No evidence of ischemia or infarction.  LVEF 75%.  Normal wall motion post stress.  During stress, rare PVCs.  Hypertensive response exercise.      MCTD (mixed connective tissue disease) 05/13/2014    Sjogren's disease 05/13/2014    Bilateral edema of lower extremity 6/2019 TTE normal LV systolic and diastolic function; ABIs normal 05/13/2014    Glaucoma 05/13/2014    Celiac disease 11/12/2013     Overview:   Gluten intolerant         PAST MEDICAL PROBLEMS, PAST SURGICAL HISTORY: please see relevant portions of the electronic medical record    ALLERGIES AND MEDICATIONS: updated and reviewed.  Review of patient's allergies indicates:   Allergen Reactions    Penicillins Nausea And Vomiting    Rosuvastatin      Muscle pain     Allopurinol analogues Rash       Medication List with Changes/Refills   Current Medications    ABATACEPT (ORENCIA CLICKJECT) 125 MG/ML ATIN    Inject 125 mg into the skin once a week.    ACCU-CHEK SMARTVIEW TEST STRIP STRP    TEST BLOOD SUGAR FOUR  "TIMES DAILY    ALCOHOL SWABS (BD ALCOHOL SWABS) PADM    Use 4x/day    AMLODIPINE (NORVASC) 5 MG TABLET    Take 1 tablet (5 mg total) by mouth 2 (two) times daily.    AMMONIUM LACTATE 12 % CREA    Apply 1 application topically once daily.    ATORVASTATIN (LIPITOR) 80 MG TABLET    Take 1 tablet (80 mg total) by mouth once daily.    BLOOD GLUCOSE CONTROL, LOW (TRUE METRIX LEVEL 1) SOLN    Use as indicated    BOOSTRIX TDAP 2.5-8-5 LF-MCG-LF/0.5ML SYRG INJECTION        CLONIDINE 0.2 MG/24 HR TD PTWK (CATAPRES) 0.2 MG/24 HR    PLACE 1 PATCH ONTO THE SKIN EVERY 7 DAYS.    DICLOFENAC SODIUM (VOLTAREN) 1 % GEL    Apply 2 g topically 4 (four) times daily as needed. Apply to aching joints    DORZOLAMIDE (TRUSOPT) 2 % OPHTHALMIC SOLUTION    Place 1 drop into both eyes 2 (two) times daily.    DULAGLUTIDE (TRULICITY) 1.5 MG/0.5 ML PEN INJECTOR    Inject 1.5 mg into the skin every 7 days.    FEBUXOSTAT (ULORIC) 40 MG TAB    Take 1 tablet (40 mg total) by mouth once daily.    FEBUXOSTAT (ULORIC) 40 MG TAB    Take 1 tablet (40 mg total) by mouth once daily.    HYDRALAZINE (APRESOLINE) 50 MG TABLET    Take 1 tablet (50 mg total) by mouth 3 (three) times daily.    INSULIN (LANTUS SOLOSTAR U-100 INSULIN) GLARGINE 100 UNITS/ML (3ML) SUBQ PEN    Inject 6 units once daily    INSULIN ASPART U-100 (NOVOLOG FLEXPEN U-100 INSULIN) 100 UNIT/ML (3 ML) INPN PEN    Inject 4-8 units three times daily before each meal with sliding scale.    LANCETS (TRUEPLUS LANCETS) 33 GAUGE MISC    Test glucose 4x/day. Dx code e11.65    LINZESS 145 MCG CAP CAPSULE    TAKE 1 CAPSULE (145 MCG TOTAL) BY MOUTH ONCE DAILY.    MULTIVIT WITH MIN-FOLIC ACID 200 MCG CHEW        OMEPRAZOLE (PRILOSEC) 20 MG CAPSULE    TAKE 2 CAPSULES (40 MG TOTAL) BY MOUTH ONCE DAILY.    PEN NEEDLE, DIABETIC (BD ULTRA-FINE RICHARD PEN NEEDLE) 32 GAUGE X 5/32" NDLE    1 Device by Misc.(Non-Drug; Combo Route) route 4 (four) times daily.    PNEUMOVAX-23 25 MCG/0.5 ML VACCINE        PREDNISONE " "(DELTASONE) 5 MG TABLET    Take 1 tablet (5 mg total) by mouth once daily.    TORSEMIDE (DEMADEX) 10 MG TAB    TAKE 1 TABLET EVERY DAY    TRAMADOL (ULTRAM) 50 MG TABLET    Take 1 tablet (50 mg total) by mouth every 12 (twelve) hours as needed for Pain.    TRAVOPROST (TRAVATAN Z) 0.004 % OPHTHALMIC SOLUTION    INSTILL 1 DROP INTO BOTH EYES EVERY EVENING    TRUE METRIX GLUCOSE METER MISC    USE AS DIRECTED    VALSARTAN (DIOVAN) 160 MG TABLET    Take 1 tablet (160 mg total) by mouth 2 (two) times daily.        Objective:   Physical Exam   Vitals:    01/25/22 1029 01/25/22 1046   BP: (!) 152/56 134/60   BP Location: Right arm Right arm   Patient Position: Sitting Sitting   BP Method: Medium (Automatic) Medium (Manual)   Pulse: 82    Temp: 97.8 °F (36.6 °C)    TempSrc: Oral    SpO2: 97%    Height: 5' 6" (1.676 m)     Body mass index is 23.34 kg/m².            Physical Exam  Constitutional:       General: He is not in acute distress.     Appearance: He is well-developed and well-nourished.   Eyes:      Extraocular Movements: EOM normal.   Cardiovascular:      Rate and Rhythm: Normal rate and regular rhythm.      Pulses:           Dorsalis pedis pulses are 3+ on the right side and 3+ on the left side.        Posterior tibial pulses are 3+ on the right side and 3+ on the left side.      Heart sounds: Normal heart sounds. No murmur heard.      Pulmonary:      Effort: Pulmonary effort is normal.      Breath sounds: Normal breath sounds.   Musculoskeletal:         General: Normal range of motion.      Right lower leg: Edema present.      Left lower leg: Edema present.      Right foot: No deformity.      Left foot: No deformity.   Feet:      Right foot:      Protective Sensation: 5 sites tested. 5 sites sensed.      Skin integrity: No ulcer, blister, skin breakdown, erythema or warmth.      Left foot:      Protective Sensation: 5 sites tested. 5 sites sensed.      Skin integrity: No ulcer, blister, skin breakdown, erythema or " warmth.   Skin:     General: Skin is warm and dry.   Neurological:      Mental Status: He is alert and oriented to person, place, and time.      Coordination: Coordination normal.   Psychiatric:         Mood and Affect: Mood and affect normal.         Behavior: Behavior normal.         Thought Content: Thought content normal.

## 2022-01-26 ENCOUNTER — TELEPHONE (OUTPATIENT)
Dept: FAMILY MEDICINE | Facility: CLINIC | Age: 72
End: 2022-01-26
Payer: MEDICARE

## 2022-01-26 NOTE — TELEPHONE ENCOUNTER
Call placed to Yadira at [facility] & informed that new DX script & OV notes related to diabetic boots were e-faxed via provider. Stated she had not received OV notes & they were refaxed to her this day. Verbalized thank you.

## 2022-01-27 ENCOUNTER — PATIENT MESSAGE (OUTPATIENT)
Dept: RHEUMATOLOGY | Facility: CLINIC | Age: 72
End: 2022-01-27
Payer: MEDICARE

## 2022-02-01 ENCOUNTER — LAB VISIT (OUTPATIENT)
Dept: LAB | Facility: HOSPITAL | Age: 72
End: 2022-02-01
Attending: STUDENT IN AN ORGANIZED HEALTH CARE EDUCATION/TRAINING PROGRAM
Payer: MEDICARE

## 2022-02-01 ENCOUNTER — OFFICE VISIT (OUTPATIENT)
Dept: FAMILY MEDICINE | Facility: CLINIC | Age: 72
End: 2022-02-01
Payer: MEDICARE

## 2022-02-01 VITALS
HEIGHT: 66 IN | DIASTOLIC BLOOD PRESSURE: 54 MMHG | SYSTOLIC BLOOD PRESSURE: 132 MMHG | WEIGHT: 141.56 LBS | BODY MASS INDEX: 22.75 KG/M2 | OXYGEN SATURATION: 99 % | TEMPERATURE: 98 F | HEART RATE: 69 BPM

## 2022-02-01 DIAGNOSIS — Z00.00 ENCOUNTER FOR PREVENTIVE HEALTH EXAMINATION: Primary | ICD-10-CM

## 2022-02-01 DIAGNOSIS — Z79.4 LONG-TERM INSULIN USE: ICD-10-CM

## 2022-02-01 DIAGNOSIS — D63.1 ANEMIA OF CHRONIC RENAL FAILURE, STAGE 3B: ICD-10-CM

## 2022-02-01 DIAGNOSIS — E11.42 TYPE 2 DIABETES MELLITUS WITH DIABETIC POLYNEUROPATHY, WITH LONG-TERM CURRENT USE OF INSULIN: ICD-10-CM

## 2022-02-01 DIAGNOSIS — N18.32 ANEMIA OF CHRONIC RENAL FAILURE, STAGE 3B: ICD-10-CM

## 2022-02-01 DIAGNOSIS — I50.30 DIASTOLIC HEART FAILURE, NYHA CLASS 2: ICD-10-CM

## 2022-02-01 DIAGNOSIS — M35.00 SJOGREN'S SYNDROME, WITH UNSPECIFIED ORGAN INVOLVEMENT: ICD-10-CM

## 2022-02-01 DIAGNOSIS — M1A.9XX1 TOPHACEOUS GOUT: ICD-10-CM

## 2022-02-01 DIAGNOSIS — M81.0 OSTEOPOROSIS, UNSPECIFIED OSTEOPOROSIS TYPE, UNSPECIFIED PATHOLOGICAL FRACTURE PRESENCE: ICD-10-CM

## 2022-02-01 DIAGNOSIS — I70.0 AORTIC ATHEROSCLEROSIS: ICD-10-CM

## 2022-02-01 DIAGNOSIS — M06.9 RHEUMATOID ARTHRITIS, INVOLVING UNSPECIFIED SITE, UNSPECIFIED WHETHER RHEUMATOID FACTOR PRESENT: ICD-10-CM

## 2022-02-01 DIAGNOSIS — M35.1 MCTD (MIXED CONNECTIVE TISSUE DISEASE): ICD-10-CM

## 2022-02-01 DIAGNOSIS — L20.9 ATOPIC DERMATITIS, UNSPECIFIED TYPE: ICD-10-CM

## 2022-02-01 DIAGNOSIS — I10 ESSENTIAL HYPERTENSION: ICD-10-CM

## 2022-02-01 DIAGNOSIS — D70.9 NEUTROPENIA, UNSPECIFIED TYPE: ICD-10-CM

## 2022-02-01 DIAGNOSIS — M79.89 SWELLING OF BOTH HANDS: ICD-10-CM

## 2022-02-01 DIAGNOSIS — Z79.4 TYPE 2 DIABETES MELLITUS WITH DIABETIC POLYNEUROPATHY, WITH LONG-TERM CURRENT USE OF INSULIN: ICD-10-CM

## 2022-02-01 DIAGNOSIS — N18.31 STAGE 3A CHRONIC KIDNEY DISEASE: ICD-10-CM

## 2022-02-01 DIAGNOSIS — E78.00 PURE HYPERCHOLESTEROLEMIA: ICD-10-CM

## 2022-02-01 DIAGNOSIS — D69.6 THROMBOCYTOPENIA: ICD-10-CM

## 2022-02-01 DIAGNOSIS — N25.81 SECONDARY HYPERPARATHYROIDISM OF RENAL ORIGIN: ICD-10-CM

## 2022-02-01 LAB
ALBUMIN SERPL BCP-MCNC: 3.4 G/DL (ref 3.5–5.2)
ALBUMIN SERPL BCP-MCNC: 3.4 G/DL (ref 3.5–5.2)
ALP SERPL-CCNC: 59 U/L (ref 55–135)
ALP SERPL-CCNC: 59 U/L (ref 55–135)
ALT SERPL W/O P-5'-P-CCNC: 11 U/L (ref 10–44)
ALT SERPL W/O P-5'-P-CCNC: 11 U/L (ref 10–44)
ANION GAP SERPL CALC-SCNC: 9 MMOL/L (ref 8–16)
ANION GAP SERPL CALC-SCNC: 9 MMOL/L (ref 8–16)
AST SERPL-CCNC: 15 U/L (ref 10–40)
AST SERPL-CCNC: 15 U/L (ref 10–40)
BASOPHILS # BLD AUTO: 0.03 K/UL (ref 0–0.2)
BASOPHILS # BLD AUTO: 0.03 K/UL (ref 0–0.2)
BASOPHILS NFR BLD: 0.5 % (ref 0–1.9)
BASOPHILS NFR BLD: 0.5 % (ref 0–1.9)
BILIRUB SERPL-MCNC: 0.5 MG/DL (ref 0.1–1)
BILIRUB SERPL-MCNC: 0.5 MG/DL (ref 0.1–1)
BUN SERPL-MCNC: 30 MG/DL (ref 8–23)
BUN SERPL-MCNC: 30 MG/DL (ref 8–23)
CALCIUM SERPL-MCNC: 8.9 MG/DL (ref 8.7–10.5)
CALCIUM SERPL-MCNC: 8.9 MG/DL (ref 8.7–10.5)
CHLORIDE SERPL-SCNC: 108 MMOL/L (ref 95–110)
CHLORIDE SERPL-SCNC: 108 MMOL/L (ref 95–110)
CHOLEST SERPL-MCNC: 220 MG/DL (ref 120–199)
CHOLEST/HDLC SERPL: 3.7 {RATIO} (ref 2–5)
CO2 SERPL-SCNC: 24 MMOL/L (ref 23–29)
CO2 SERPL-SCNC: 24 MMOL/L (ref 23–29)
CREAT SERPL-MCNC: 1.4 MG/DL (ref 0.5–1.4)
CREAT SERPL-MCNC: 1.4 MG/DL (ref 0.5–1.4)
CRP SERPL-MCNC: 4.1 MG/L (ref 0–8.2)
DIFFERENTIAL METHOD: ABNORMAL
DIFFERENTIAL METHOD: ABNORMAL
EOSINOPHIL # BLD AUTO: 0.1 K/UL (ref 0–0.5)
EOSINOPHIL # BLD AUTO: 0.1 K/UL (ref 0–0.5)
EOSINOPHIL NFR BLD: 2 % (ref 0–8)
EOSINOPHIL NFR BLD: 2 % (ref 0–8)
ERYTHROCYTE [DISTWIDTH] IN BLOOD BY AUTOMATED COUNT: 14.2 % (ref 11.5–14.5)
ERYTHROCYTE [DISTWIDTH] IN BLOOD BY AUTOMATED COUNT: 14.2 % (ref 11.5–14.5)
ERYTHROCYTE [SEDIMENTATION RATE] IN BLOOD BY WESTERGREN METHOD: 8 MM/HR (ref 0–10)
EST. GFR  (AFRICAN AMERICAN): 58 ML/MIN/1.73 M^2
EST. GFR  (AFRICAN AMERICAN): 58 ML/MIN/1.73 M^2
EST. GFR  (NON AFRICAN AMERICAN): 50 ML/MIN/1.73 M^2
EST. GFR  (NON AFRICAN AMERICAN): 50 ML/MIN/1.73 M^2
GLUCOSE SERPL-MCNC: 88 MG/DL (ref 70–110)
GLUCOSE SERPL-MCNC: 88 MG/DL (ref 70–110)
HBV CORE AB SERPL QL IA: NEGATIVE
HBV SURFACE AG SERPL QL IA: NEGATIVE
HCT VFR BLD AUTO: 38.8 % (ref 40–54)
HCT VFR BLD AUTO: 38.8 % (ref 40–54)
HDLC SERPL-MCNC: 59 MG/DL (ref 40–75)
HDLC SERPL: 26.8 % (ref 20–50)
HGB BLD-MCNC: 11.9 G/DL (ref 14–18)
HGB BLD-MCNC: 11.9 G/DL (ref 14–18)
IMM GRANULOCYTES # BLD AUTO: 0.03 K/UL (ref 0–0.04)
IMM GRANULOCYTES # BLD AUTO: 0.03 K/UL (ref 0–0.04)
IMM GRANULOCYTES NFR BLD AUTO: 0.5 % (ref 0–0.5)
IMM GRANULOCYTES NFR BLD AUTO: 0.5 % (ref 0–0.5)
LDLC SERPL CALC-MCNC: 141 MG/DL (ref 63–159)
LYMPHOCYTES # BLD AUTO: 1.6 K/UL (ref 1–4.8)
LYMPHOCYTES # BLD AUTO: 1.6 K/UL (ref 1–4.8)
LYMPHOCYTES NFR BLD: 26.6 % (ref 18–48)
LYMPHOCYTES NFR BLD: 26.6 % (ref 18–48)
MCH RBC QN AUTO: 29.6 PG (ref 27–31)
MCH RBC QN AUTO: 29.6 PG (ref 27–31)
MCHC RBC AUTO-ENTMCNC: 30.7 G/DL (ref 32–36)
MCHC RBC AUTO-ENTMCNC: 30.7 G/DL (ref 32–36)
MCV RBC AUTO: 97 FL (ref 82–98)
MCV RBC AUTO: 97 FL (ref 82–98)
MONOCYTES # BLD AUTO: 1 K/UL (ref 0.3–1)
MONOCYTES # BLD AUTO: 1 K/UL (ref 0.3–1)
MONOCYTES NFR BLD: 16.1 % (ref 4–15)
MONOCYTES NFR BLD: 16.1 % (ref 4–15)
NEUTROPHILS # BLD AUTO: 3.3 K/UL (ref 1.8–7.7)
NEUTROPHILS # BLD AUTO: 3.3 K/UL (ref 1.8–7.7)
NEUTROPHILS NFR BLD: 54.3 % (ref 38–73)
NEUTROPHILS NFR BLD: 54.3 % (ref 38–73)
NONHDLC SERPL-MCNC: 161 MG/DL
NRBC BLD-RTO: 0 /100 WBC
NRBC BLD-RTO: 0 /100 WBC
PLATELET # BLD AUTO: 197 K/UL (ref 150–450)
PLATELET # BLD AUTO: 197 K/UL (ref 150–450)
PMV BLD AUTO: 12.2 FL (ref 9.2–12.9)
PMV BLD AUTO: 12.2 FL (ref 9.2–12.9)
POTASSIUM SERPL-SCNC: 4.5 MMOL/L (ref 3.5–5.1)
POTASSIUM SERPL-SCNC: 4.5 MMOL/L (ref 3.5–5.1)
PROT SERPL-MCNC: 6.6 G/DL (ref 6–8.4)
PROT SERPL-MCNC: 6.6 G/DL (ref 6–8.4)
RBC # BLD AUTO: 4.02 M/UL (ref 4.6–6.2)
RBC # BLD AUTO: 4.02 M/UL (ref 4.6–6.2)
SODIUM SERPL-SCNC: 141 MMOL/L (ref 136–145)
SODIUM SERPL-SCNC: 141 MMOL/L (ref 136–145)
TRIGL SERPL-MCNC: 100 MG/DL (ref 30–150)
URATE SERPL-MCNC: 9.4 MG/DL (ref 3.4–7)
WBC # BLD AUTO: 6.09 K/UL (ref 3.9–12.7)
WBC # BLD AUTO: 6.09 K/UL (ref 3.9–12.7)

## 2022-02-01 PROCEDURE — 1160F RVW MEDS BY RX/DR IN RCRD: CPT | Mod: HCNC,CPTII,S$GLB, | Performed by: NURSE PRACTITIONER

## 2022-02-01 PROCEDURE — 3288F PR FALLS RISK ASSESSMENT DOCUMENTED: ICD-10-PCS | Mod: HCNC,CPTII,S$GLB, | Performed by: NURSE PRACTITIONER

## 2022-02-01 PROCEDURE — 1170F FXNL STATUS ASSESSED: CPT | Mod: HCNC,CPTII,S$GLB, | Performed by: NURSE PRACTITIONER

## 2022-02-01 PROCEDURE — G0439 PPPS, SUBSEQ VISIT: HCPCS | Mod: HCNC,S$GLB,, | Performed by: NURSE PRACTITIONER

## 2022-02-01 PROCEDURE — 85025 COMPLETE CBC W/AUTO DIFF WBC: CPT | Mod: HCNC | Performed by: STUDENT IN AN ORGANIZED HEALTH CARE EDUCATION/TRAINING PROGRAM

## 2022-02-01 PROCEDURE — 99499 UNLISTED E&M SERVICE: CPT | Mod: HCNC,S$GLB,, | Performed by: NURSE PRACTITIONER

## 2022-02-01 PROCEDURE — 1125F PR PAIN SEVERITY QUANTIFIED, PAIN PRESENT: ICD-10-PCS | Mod: HCNC,CPTII,S$GLB, | Performed by: NURSE PRACTITIONER

## 2022-02-01 PROCEDURE — 3078F DIAST BP <80 MM HG: CPT | Mod: HCNC,CPTII,S$GLB, | Performed by: NURSE PRACTITIONER

## 2022-02-01 PROCEDURE — 3008F PR BODY MASS INDEX (BMI) DOCUMENTED: ICD-10-PCS | Mod: HCNC,CPTII,S$GLB, | Performed by: NURSE PRACTITIONER

## 2022-02-01 PROCEDURE — 86140 C-REACTIVE PROTEIN: CPT | Mod: HCNC | Performed by: INTERNAL MEDICINE

## 2022-02-01 PROCEDURE — G9919 SCRN ND POS ND PROV OF REC: HCPCS | Mod: HCNC,CPTII,S$GLB, | Performed by: NURSE PRACTITIONER

## 2022-02-01 PROCEDURE — 99499 RISK ADDL DX/OHS AUDIT: ICD-10-PCS | Mod: HCNC,S$GLB,, | Performed by: NURSE PRACTITIONER

## 2022-02-01 PROCEDURE — G9919 PR SCREENING AND POSITIVE: ICD-10-PCS | Mod: HCNC,CPTII,S$GLB, | Performed by: NURSE PRACTITIONER

## 2022-02-01 PROCEDURE — 1159F PR MEDICATION LIST DOCUMENTED IN MEDICAL RECORD: ICD-10-PCS | Mod: HCNC,CPTII,S$GLB, | Performed by: NURSE PRACTITIONER

## 2022-02-01 PROCEDURE — 1160F PR REVIEW ALL MEDS BY PRESCRIBER/CLIN PHARMACIST DOCUMENTED: ICD-10-PCS | Mod: HCNC,CPTII,S$GLB, | Performed by: NURSE PRACTITIONER

## 2022-02-01 PROCEDURE — 1101F PT FALLS ASSESS-DOCD LE1/YR: CPT | Mod: HCNC,CPTII,S$GLB, | Performed by: NURSE PRACTITIONER

## 2022-02-01 PROCEDURE — 3075F SYST BP GE 130 - 139MM HG: CPT | Mod: HCNC,CPTII,S$GLB, | Performed by: NURSE PRACTITIONER

## 2022-02-01 PROCEDURE — 3078F PR MOST RECENT DIASTOLIC BLOOD PRESSURE < 80 MM HG: ICD-10-PCS | Mod: HCNC,CPTII,S$GLB, | Performed by: NURSE PRACTITIONER

## 2022-02-01 PROCEDURE — 1170F PR FUNCTIONAL STATUS ASSESSED: ICD-10-PCS | Mod: HCNC,CPTII,S$GLB, | Performed by: NURSE PRACTITIONER

## 2022-02-01 PROCEDURE — 3075F PR MOST RECENT SYSTOLIC BLOOD PRESS GE 130-139MM HG: ICD-10-PCS | Mod: HCNC,CPTII,S$GLB, | Performed by: NURSE PRACTITIONER

## 2022-02-01 PROCEDURE — 3008F BODY MASS INDEX DOCD: CPT | Mod: HCNC,CPTII,S$GLB, | Performed by: NURSE PRACTITIONER

## 2022-02-01 PROCEDURE — 85652 RBC SED RATE AUTOMATED: CPT | Mod: HCNC | Performed by: INTERNAL MEDICINE

## 2022-02-01 PROCEDURE — 1159F MED LIST DOCD IN RCRD: CPT | Mod: HCNC,CPTII,S$GLB, | Performed by: NURSE PRACTITIONER

## 2022-02-01 PROCEDURE — 80053 COMPREHEN METABOLIC PANEL: CPT | Mod: HCNC | Performed by: INTERNAL MEDICINE

## 2022-02-01 PROCEDURE — 86704 HEP B CORE ANTIBODY TOTAL: CPT | Mod: HCNC | Performed by: INTERNAL MEDICINE

## 2022-02-01 PROCEDURE — 3288F FALL RISK ASSESSMENT DOCD: CPT | Mod: HCNC,CPTII,S$GLB, | Performed by: NURSE PRACTITIONER

## 2022-02-01 PROCEDURE — 99999 PR PBB SHADOW E&M-EST. PATIENT-LVL IV: ICD-10-PCS | Mod: PBBFAC,HCNC,, | Performed by: NURSE PRACTITIONER

## 2022-02-01 PROCEDURE — 80061 LIPID PANEL: CPT | Mod: HCNC | Performed by: INTERNAL MEDICINE

## 2022-02-01 PROCEDURE — 1125F AMNT PAIN NOTED PAIN PRSNT: CPT | Mod: HCNC,CPTII,S$GLB, | Performed by: NURSE PRACTITIONER

## 2022-02-01 PROCEDURE — 84550 ASSAY OF BLOOD/URIC ACID: CPT | Mod: HCNC | Performed by: INTERNAL MEDICINE

## 2022-02-01 PROCEDURE — G0439 PR MEDICARE ANNUAL WELLNESS SUBSEQUENT VISIT: ICD-10-PCS | Mod: HCNC,S$GLB,, | Performed by: NURSE PRACTITIONER

## 2022-02-01 PROCEDURE — 87340 HEPATITIS B SURFACE AG IA: CPT | Mod: HCNC | Performed by: INTERNAL MEDICINE

## 2022-02-01 PROCEDURE — 1101F PR PT FALLS ASSESS DOC 0-1 FALLS W/OUT INJ PAST YR: ICD-10-PCS | Mod: HCNC,CPTII,S$GLB, | Performed by: NURSE PRACTITIONER

## 2022-02-01 PROCEDURE — 99999 PR PBB SHADOW E&M-EST. PATIENT-LVL IV: CPT | Mod: PBBFAC,HCNC,, | Performed by: NURSE PRACTITIONER

## 2022-02-01 RX ORDER — TRIAMCINOLONE ACETONIDE 1 MG/G
CREAM TOPICAL 2 TIMES DAILY
Qty: 45 G | Refills: 1 | Status: SHIPPED | OUTPATIENT
Start: 2022-02-01 | End: 2022-02-04 | Stop reason: SDUPTHER

## 2022-02-01 RX ORDER — TRIAMCINOLONE ACETONIDE 1 MG/G
CREAM TOPICAL 2 TIMES DAILY
Qty: 45 G | Refills: 1 | Status: SHIPPED | OUTPATIENT
Start: 2022-02-01 | End: 2022-02-01

## 2022-02-01 NOTE — PATIENT INSTRUCTIONS
Counseling and Referral of Other Preventative  (Italic type indicates deductible and co-insurance are waived)    Patient Name: Darrion Bennett  Today's Date: 2/1/2022    Health Maintenance       Date Due Completion Date    Diabetes Urine Screening 10/16/2019 10/16/2018    Influenza Vaccine (1) 06/30/2022 (Originally 9/1/2021) 9/9/2020    Hemoglobin A1c 02/28/2022 11/29/2021    Eye Exam 06/11/2022 6/11/2021    Override on 12/9/2020: Done    Override on 8/27/2019: Done    Lipid Panel 06/28/2022 6/28/2021    Foot Exam 01/25/2023 1/25/2022    Override on 10/20/2021: Done    Override on 10/19/2020: Done    Override on 3/19/2019: Done    DEXA SCAN 01/27/2023 1/27/2021    High Dose Statin 02/01/2023 2/1/2022    Colorectal Cancer Screening 07/26/2028 7/26/2018    TETANUS VACCINE 05/27/2031 5/27/2021        No orders of the defined types were placed in this encounter.    The following information is provided to all patients.  This information is to help you find resources for any of the problems found today that may be affecting your health:                Living healthy guide: www.[facility].[place].gov      Understanding Diabetes: www.diabetes.org      Eating healthy: www.cdc.gov/healthyweight      CDC home safety checklist: www.cdc.gov/steadi/patient.html      Agency on Aging: www.goea.[place].gov      Alcoholics anonymous (AA): www.aa.org      Physical Activity: www.jing.nih.gov/tg5ytmn      Tobacco use: www.quitwithusla.org

## 2022-02-01 NOTE — PROGRESS NOTES
"  Darrion Bennett presented for a  Medicare AWV and comprehensive Health Risk Assessment today. The following components were reviewed and updated:    · Medical history  · Family History  · Social history  · Allergies and Current Medications  · Health Risk Assessment  · Health Maintenance  · Care Team     Patient screened moderate and/or high risk for one or more social determinants of health (SDOH). Patient connected to community resources through the ED Navigator.      ** See Completed Assessments for Annual Wellness Visit within the encounter summary.**       The following assessments were completed:  · Living Situation  · CAGE  · Depression Screening  · Timed Get Up and Go  · Whisper Test  · Cognitive Function Screening  · Nutrition Screening  · ADL Screening  · PAQ Screening          Vitals:    02/01/22 0931   BP: (!) 132/54   Pulse: 69   Temp: 98.2 °F (36.8 °C)   TempSrc: Oral   SpO2: 99%   Weight: 64.2 kg (141 lb 8.6 oz)   Height: 5' 6" (1.676 m)     Body mass index is 22.84 kg/m².  Physical Exam  Vitals and nursing note reviewed.   Constitutional:       Appearance: Normal appearance.   Cardiovascular:      Rate and Rhythm: Normal rate.      Pulses: Normal pulses.      Heart sounds: Normal heart sounds.   Pulmonary:      Effort: Pulmonary effort is normal.      Breath sounds: Normal breath sounds.   Abdominal:      General: Bowel sounds are normal.      Palpations: Abdomen is soft.   Musculoskeletal:         General: Normal range of motion.      Comments: sliight bilateral hand swelling   Skin:     General: Skin is warm and dry.      Capillary Refill: Capillary refill takes less than 2 seconds.      Comments: slight redness with bumps to fingers and hands   Neurological:      Mental Status: He is alert and oriented to person, place, and time.   Psychiatric:         Mood and Affect: Mood normal.         Behavior: Behavior normal.             Diagnoses and health risks identified today and associated " recommendations/orders:    Encounter for preventive health examination  Pt was seen today for an Annual Wellness visit. Healthcare maintenance and screening recommendations were discussed and updated as indicated. Return in one year for AWV.    Review current opioid prescriptions: yes  Screen for potential Substance Use Disorders: yes     Aortic atherosclerosis  Chronic. CT head/neck 6/26/21. The current medical regimen is effective;  continue present plan and medications.     Diastolic heart failure, NYHA class 2  The current medical regimen is effective;  continue present plan and medications.    Rheumatoid arthritis, involving unspecified site, unspecified whether rheumatoid factor present  The current medical regimen is effective;  continue present plan and medications.    Sjogren's syndrome, with unspecified organ involvement  The current medical regimen is effective;  continue present plan and medications.    Type 2 diabetes mellitus with diabetic polyneuropathy, with long-term current use of insulin  Long-term insulin use  Hgb A1C 6.6 on 11/29/21. Reports home fasting cbg 120-142. The current medical regimen is effective;  continue present plan and medications.    - MICROALBUMIN / CREATININE RATIO URINE    Stage 3a chronic kidney disease  The current medical regimen is effective;  continue present plan and medications.    Anemia of chronic renal failure, stage 3b  The current medical regimen is effective;  continue present plan and medications.    Secondary hyperparathyroidism of renal origin  The current medical regimen is effective;  continue present plan and medications.     MCTD (mixed connective tissue disease)  The current medical regimen is effective;  continue present plan and medications.     Neutropenia, unspecified type  The current medical regimen is effective;  continue present plan and medications.    Thrombocytopenia  The current medical regimen is effective;  continue present plan and  medications.    Essential hypertension  The current medical regimen is effective;  continue present plan and medications.    Pure hypercholesterolemia  The current medical regimen is effective;  continue present plan and medications.    Osteoporosis, unspecified osteoporosis type, unspecified pathological fracture presence  The current medical regimen is effective;  continue present plan and medications.    Tophaceous gout  The current medical regimen is effective;  continue present plan and medications.    Swelling of both hands  The current medical regimen is effective;  continue present plan and medications.    Atopic dermatitis, unspecified type  Skin care discussed. Return to clinic if symptoms worsen or does not improve.  - triamcinolone acetonide 0.1% (KENALOG) 0.1 % cream; Apply topically 2 (two) times daily. for 10 days  Dispense: 45 g; Refill: 1      Provided Darrion with a 5-10 year written screening schedule and personal prevention plan. Recommendations were developed using the USPSTF age appropriate recommendations. Education, counseling, and referrals were provided as needed. After Visit Summary printed and given to patient which includes a list of additional screenings\tests needed.    Follow up in about 1 year (around 1/25/2023) with provider.    LIZANDRO Warren  I offered to discuss advanced care planning, including how to pick a person who would make decisions for you if you were unable to make them for yourself, called a health care power of , and what kind of decisions you might make such as use of life sustaining treatments such as ventilators and tube feeding when faced with a life limiting illness recorded on a living will that they will need to know. (How you want to be cared for as you near the end of your natural life)     X Patient is interested in learning more about how to make advanced directives.  I provided them paperwork and offered to discuss this with them.    HPI: Pt  presented today for scheduled  EAWV with c/o redness and bumps to both hands and fingers x 2 - 3 days. Stated recently seen by provider for bilateral hand swelling, redness and bumps were not present at the time. Denies fever, chills, pain, itching, discharge. Denies use of new detergents, soaps, or known irritants. Denies use of otc creams, lotions, or any other treatment. Denies limitations with full use of hands.    ROS: slight swelling, redness with bumps to both hands and fingers    Atopic dermatitis, unspecified type  Skin care discussed. Avoid known irritants. Keep hands clean and dry. Return to clinic if symptoms worsen or does not improve.  - triamcinolone acetonide 0.1% (KENALOG) 0.1 % cream; Apply topically 2 (two) times daily. for 10 days  Dispense: 45 g; Refill: 1

## 2022-02-02 ENCOUNTER — PATIENT OUTREACH (OUTPATIENT)
Dept: ADMINISTRATIVE | Facility: OTHER | Age: 72
End: 2022-02-02
Payer: MEDICARE

## 2022-02-02 DIAGNOSIS — K22.70 BARRETT'S ESOPHAGUS WITHOUT DYSPLASIA: ICD-10-CM

## 2022-02-02 RX ORDER — OMEPRAZOLE 20 MG/1
40 CAPSULE, DELAYED RELEASE ORAL DAILY
Qty: 180 CAPSULE | Refills: 3 | Status: ON HOLD | OUTPATIENT
Start: 2022-02-02 | End: 2023-09-08 | Stop reason: HOSPADM

## 2022-02-02 NOTE — TELEPHONE ENCOUNTER
No new care gaps identified.  Powered by [employer]. Reference number: [id number].   2/02/2022 12:35:49 PM CST

## 2022-02-02 NOTE — TELEPHONE ENCOUNTER
This Rx Request does not qualify for assessment with the ORC.  Please review protocol details and the Care Due Message for extra clinical information:    Reasons Rx Request may be deferred:  Labs/Vitals overdue  Labs/Vitals abnormal  Patient has been seen in the ED/Hospital since the last PCP visit  Medication is non-delegated  Provider is a non-participating provider    6. DDI Osteoporosis

## 2022-02-04 ENCOUNTER — OFFICE VISIT (OUTPATIENT)
Dept: HEMATOLOGY/ONCOLOGY | Facility: CLINIC | Age: 72
End: 2022-02-04
Payer: MEDICARE

## 2022-02-04 VITALS
WEIGHT: 141.75 LBS | DIASTOLIC BLOOD PRESSURE: 70 MMHG | OXYGEN SATURATION: 96 % | HEART RATE: 95 BPM | HEIGHT: 66 IN | SYSTOLIC BLOOD PRESSURE: 166 MMHG | TEMPERATURE: 98 F | BODY MASS INDEX: 22.78 KG/M2

## 2022-02-04 DIAGNOSIS — D69.6 THROMBOCYTOPENIA: ICD-10-CM

## 2022-02-04 DIAGNOSIS — N18.32 ANEMIA OF CHRONIC RENAL FAILURE, STAGE 3B: Primary | ICD-10-CM

## 2022-02-04 DIAGNOSIS — L20.9 ATOPIC DERMATITIS, UNSPECIFIED TYPE: ICD-10-CM

## 2022-02-04 DIAGNOSIS — N18.31 STAGE 3A CHRONIC KIDNEY DISEASE: ICD-10-CM

## 2022-02-04 DIAGNOSIS — L30.1 DYSHIDROTIC ECZEMA: ICD-10-CM

## 2022-02-04 DIAGNOSIS — D63.1 ANEMIA OF CHRONIC RENAL FAILURE, STAGE 3B: Primary | ICD-10-CM

## 2022-02-04 PROCEDURE — 1159F MED LIST DOCD IN RCRD: CPT | Mod: HCNC,CPTII,S$GLB, | Performed by: STUDENT IN AN ORGANIZED HEALTH CARE EDUCATION/TRAINING PROGRAM

## 2022-02-04 PROCEDURE — 3077F SYST BP >= 140 MM HG: CPT | Mod: HCNC,CPTII,S$GLB, | Performed by: STUDENT IN AN ORGANIZED HEALTH CARE EDUCATION/TRAINING PROGRAM

## 2022-02-04 PROCEDURE — 99999 PR PBB SHADOW E&M-EST. PATIENT-LVL III: CPT | Mod: PBBFAC,HCNC,, | Performed by: STUDENT IN AN ORGANIZED HEALTH CARE EDUCATION/TRAINING PROGRAM

## 2022-02-04 PROCEDURE — 3077F PR MOST RECENT SYSTOLIC BLOOD PRESSURE >= 140 MM HG: ICD-10-PCS | Mod: HCNC,CPTII,S$GLB, | Performed by: STUDENT IN AN ORGANIZED HEALTH CARE EDUCATION/TRAINING PROGRAM

## 2022-02-04 PROCEDURE — 1160F RVW MEDS BY RX/DR IN RCRD: CPT | Mod: HCNC,CPTII,S$GLB, | Performed by: STUDENT IN AN ORGANIZED HEALTH CARE EDUCATION/TRAINING PROGRAM

## 2022-02-04 PROCEDURE — 1160F PR REVIEW ALL MEDS BY PRESCRIBER/CLIN PHARMACIST DOCUMENTED: ICD-10-PCS | Mod: HCNC,CPTII,S$GLB, | Performed by: STUDENT IN AN ORGANIZED HEALTH CARE EDUCATION/TRAINING PROGRAM

## 2022-02-04 PROCEDURE — 3078F DIAST BP <80 MM HG: CPT | Mod: HCNC,CPTII,S$GLB, | Performed by: STUDENT IN AN ORGANIZED HEALTH CARE EDUCATION/TRAINING PROGRAM

## 2022-02-04 PROCEDURE — 1126F AMNT PAIN NOTED NONE PRSNT: CPT | Mod: HCNC,CPTII,S$GLB, | Performed by: STUDENT IN AN ORGANIZED HEALTH CARE EDUCATION/TRAINING PROGRAM

## 2022-02-04 PROCEDURE — 3288F FALL RISK ASSESSMENT DOCD: CPT | Mod: HCNC,CPTII,S$GLB, | Performed by: STUDENT IN AN ORGANIZED HEALTH CARE EDUCATION/TRAINING PROGRAM

## 2022-02-04 PROCEDURE — 1101F PT FALLS ASSESS-DOCD LE1/YR: CPT | Mod: HCNC,CPTII,S$GLB, | Performed by: STUDENT IN AN ORGANIZED HEALTH CARE EDUCATION/TRAINING PROGRAM

## 2022-02-04 PROCEDURE — 3008F BODY MASS INDEX DOCD: CPT | Mod: HCNC,CPTII,S$GLB, | Performed by: STUDENT IN AN ORGANIZED HEALTH CARE EDUCATION/TRAINING PROGRAM

## 2022-02-04 PROCEDURE — 3078F PR MOST RECENT DIASTOLIC BLOOD PRESSURE < 80 MM HG: ICD-10-PCS | Mod: HCNC,CPTII,S$GLB, | Performed by: STUDENT IN AN ORGANIZED HEALTH CARE EDUCATION/TRAINING PROGRAM

## 2022-02-04 PROCEDURE — 1159F PR MEDICATION LIST DOCUMENTED IN MEDICAL RECORD: ICD-10-PCS | Mod: HCNC,CPTII,S$GLB, | Performed by: STUDENT IN AN ORGANIZED HEALTH CARE EDUCATION/TRAINING PROGRAM

## 2022-02-04 PROCEDURE — 3008F PR BODY MASS INDEX (BMI) DOCUMENTED: ICD-10-PCS | Mod: HCNC,CPTII,S$GLB, | Performed by: STUDENT IN AN ORGANIZED HEALTH CARE EDUCATION/TRAINING PROGRAM

## 2022-02-04 PROCEDURE — 99215 PR OFFICE/OUTPT VISIT, EST, LEVL V, 40-54 MIN: ICD-10-PCS | Mod: HCNC,S$GLB,, | Performed by: STUDENT IN AN ORGANIZED HEALTH CARE EDUCATION/TRAINING PROGRAM

## 2022-02-04 PROCEDURE — 99999 PR PBB SHADOW E&M-EST. PATIENT-LVL III: ICD-10-PCS | Mod: PBBFAC,HCNC,, | Performed by: STUDENT IN AN ORGANIZED HEALTH CARE EDUCATION/TRAINING PROGRAM

## 2022-02-04 PROCEDURE — 3288F PR FALLS RISK ASSESSMENT DOCUMENTED: ICD-10-PCS | Mod: HCNC,CPTII,S$GLB, | Performed by: STUDENT IN AN ORGANIZED HEALTH CARE EDUCATION/TRAINING PROGRAM

## 2022-02-04 PROCEDURE — 1126F PR PAIN SEVERITY QUANTIFIED, NO PAIN PRESENT: ICD-10-PCS | Mod: HCNC,CPTII,S$GLB, | Performed by: STUDENT IN AN ORGANIZED HEALTH CARE EDUCATION/TRAINING PROGRAM

## 2022-02-04 PROCEDURE — 1101F PR PT FALLS ASSESS DOC 0-1 FALLS W/OUT INJ PAST YR: ICD-10-PCS | Mod: HCNC,CPTII,S$GLB, | Performed by: STUDENT IN AN ORGANIZED HEALTH CARE EDUCATION/TRAINING PROGRAM

## 2022-02-04 PROCEDURE — 99215 OFFICE O/P EST HI 40 MIN: CPT | Mod: HCNC,S$GLB,, | Performed by: STUDENT IN AN ORGANIZED HEALTH CARE EDUCATION/TRAINING PROGRAM

## 2022-02-04 RX ORDER — TRIAMCINOLONE ACETONIDE 1 MG/G
CREAM TOPICAL 2 TIMES DAILY
Qty: 45 G | Refills: 1 | Status: SHIPPED | OUTPATIENT
Start: 2022-02-04 | End: 2023-07-18

## 2022-02-04 NOTE — PROGRESS NOTES
PATIENT: Darrion Bennett  MRN: [id number]  DATE: 2/4/2022      Diagnosis:   1. Anemia of chronic renal failure, stage 3b    2. Atopic dermatitis, unspecified type    3. Dyshidrotic eczema    4. Thrombocytopenia    5. Stage 3a chronic kidney disease        Chief Complaint: Anemia      Oncologic History:   Oncologic History    Oncologic History      Oncologic Treatment      Pathology          Subjective:    Interval History: Mr. Bennett is here for follow up.  He is a former patient of Dr. Hicks.    71 year old man with multiple comorbidities including MCTD, Sjogren's, UC, diastolic HF, diabetes, essential hypertension, CKD stage III, and anemia of chronic disease.    Since his last visit he was treated with IV Iron 10/2021 with interval response in hemoglobin and normalization of RDW.  Denies epistaxis, hemoptysis, hematemesis, hematochezia, melena, or hematuria.  Always feels cold (chronic).  Recently developed hand on his rash.  He saw a nurse practitioner who prescribed triamcinolone but he hasn't been able to get it yet due to pending insurance authorization.  He is planning to leave for [place] later this afternoon.  He has a new grandbaby to see.    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  Son completed neuro fellowship and daughter is going to be in primary care.  He is alone at this visit.     Past Medical History:   Past Medical History:   Diagnosis Date    Anemia     Arthritis     Cataract     CHF (congestive heart failure)     Chronic constipation     Diabetes mellitus, type 2     Felon of finger of left hand 5/11/2019    Glaucoma     Hyperlipidemia 5/13/2014    Hypertension     MCTD (mixed connective tissue disease)     Sjogren's disease     Ulcerative colitis     Urolithiasis        Past Surgical HIstory:   Past Surgical History:   Procedure Laterality Date    CATARACT EXTRACTION Bilateral 2005    COLONOSCOPY  2014    EYE SURGERY         Family History:    Family History   Problem Relation Age of Onset    Hypertension Father     Stroke Father     Cataracts Father     Glaucoma Father     Cataracts Mother     No Known Problems Sister     No Known Problems Brother     Rheum arthritis Maternal Aunt     Rheum arthritis Maternal Uncle     No Known Problems Paternal Aunt     No Known Problems Paternal Uncle     No Known Problems Maternal Grandmother     No Known Problems Maternal Grandfather     Throat cancer Paternal Grandmother     Glaucoma Paternal Grandfather     No Known Problems Daughter     No Known Problems Son     Celiac disease Neg Hx     Colon cancer Neg Hx     Cirrhosis Neg Hx     Colon polyps Neg Hx     Crohn's disease Neg Hx     Cystic fibrosis Neg Hx     Hemochromatosis Neg Hx     Esophageal cancer Neg Hx     Inflammatory bowel disease Neg Hx     Irritable bowel syndrome Neg Hx     Liver cancer Neg Hx     Liver disease Neg Hx     Rectal cancer Neg Hx     Stomach cancer Neg Hx     Ulcerative colitis Neg Hx     Chase's disease Neg Hx     Amblyopia Neg Hx     Blindness Neg Hx     Cancer Neg Hx     Diabetes Neg Hx     Macular degeneration Neg Hx     Retinal detachment Neg Hx     Strabismus Neg Hx     Thyroid disease Neg Hx     Melanoma Neg Hx        Social History:  reports that he has never smoked. He has never used smokeless tobacco. He reports current drug use. He reports that he does not drink alcohol.    Allergies:  Review of patient's allergies indicates:   Allergen Reactions    Penicillins Nausea And Vomiting    Rosuvastatin      Muscle pain     Allopurinol analogues Rash       Medications:  Current Outpatient Medications   Medication Sig Dispense Refill    abatacept (ORENCIA CLICKJECT) 125 mg/mL AtIn Inject 125 mg into the skin once a week. 4 mL 4    ACCU-CHEK SMARTVIEW TEST STRIP Strp TEST BLOOD SUGAR FOUR TIMES DAILY 150 strip 0    alcohol swabs (BD ALCOHOL SWABS) PadM Use 4x/day 400 each 3     "amLODIPine (NORVASC) 5 MG tablet Take 1 tablet (5 mg total) by mouth 2 (two) times daily. 180 tablet 3    ammonium lactate 12 % Crea Apply 1 application topically once daily. 140 g 5    atorvastatin (LIPITOR) 80 MG tablet Take 1 tablet (80 mg total) by mouth once daily. 90 tablet 3    blood glucose control, low (TRUE METRIX LEVEL 1) Soln Use as indicated 1 each 0    BOOSTRIX TDAP 2.5-8-5 Lf-mcg-Lf/0.5mL Syrg injection       cloNIDine 0.2 mg/24 hr td ptwk (CATAPRES) 0.2 mg/24 hr PLACE 1 PATCH ONTO THE SKIN EVERY 7 DAYS. 12 patch 3    diclofenac sodium (VOLTAREN) 1 % Gel Apply 2 g topically 4 (four) times daily as needed. Apply to aching joints 100 g 3    dorzolamide (TRUSOPT) 2 % ophthalmic solution Place 1 drop into both eyes 2 (two) times daily. 10 mL 3    dulaglutide (TRULICITY) 1.5 mg/0.5 mL pen injector Inject 1.5 mg into the skin every 7 days. 12 pen 2    febuxostat (ULORIC) 40 mg Tab Take 1 tablet (40 mg total) by mouth once daily. 30 tablet 11    hydrALAZINE (APRESOLINE) 50 MG tablet Take 1 tablet (50 mg total) by mouth 3 (three) times daily. 90 tablet 11    INJECTAFER 50 iron mg/mL injection       insulin (LANTUS SOLOSTAR U-100 INSULIN) glargine 100 units/mL (3mL) SubQ pen Inject 6 units once daily 15 mL 2    insulin aspart U-100 (NOVOLOG FLEXPEN U-100 INSULIN) 100 unit/mL (3 mL) InPn pen Inject 4-8 units three times daily before each meal with sliding scale. 30 mL 2    lancets (TRUEPLUS LANCETS) 33 gauge Misc Test glucose 4x/day. Dx code e11.65 400 each 3    LINZESS 145 mcg Cap capsule TAKE 1 CAPSULE (145 MCG TOTAL) BY MOUTH ONCE DAILY. 90 capsule 1    multivit with min-folic acid 200 mcg Chew       omeprazole (PRILOSEC) 20 MG capsule TAKE 2 CAPSULES (40 MG TOTAL) BY MOUTH ONCE DAILY. 180 capsule 3    pen needle, diabetic (BD ULTRA-FINE RICHARD PEN NEEDLE) 32 gauge x 5/32" Ndle 1 Device by Misc.(Non-Drug; Combo Route) route 4 (four) times daily. 400 each 3    predniSONE (DELTASONE) 5 MG " tablet Take 1 tablet (5 mg total) by mouth once daily. 90 tablet 3    PROLIA 60 mg/mL Syrg       torsemide (DEMADEX) 10 MG Tab TAKE 1 TABLET EVERY DAY 90 tablet 3    traMADoL (ULTRAM) 50 mg tablet Take 1 tablet (50 mg total) by mouth every 12 (twelve) hours as needed for Pain. 60 tablet 0    travoprost (TRAVATAN Z) 0.004 % ophthalmic solution INSTILL 1 DROP INTO BOTH EYES EVERY EVENING 7.5 mL 1    TRUE METRIX GLUCOSE METER Misc USE AS DIRECTED 1 each 0    valsartan (DIOVAN) 160 MG tablet Take 1 tablet (160 mg total) by mouth 2 (two) times daily. 180 tablet 3    triamcinolone acetonide 0.1% (KENALOG) 0.1 % cream Apply topically 2 (two) times daily. for 10 days 45 g 1     No current facility-administered medications for this visit.       Review of Systems   Constitutional: Positive for fatigue. Negative for activity change, appetite change, chills, diaphoresis, fever and unexpected weight change.   HENT: Negative for nosebleeds and trouble swallowing.    Eyes: Negative for visual disturbance.   Respiratory: Positive for shortness of breath. Negative for cough, chest tightness and wheezing.    Cardiovascular: Negative for chest pain and leg swelling.   Gastrointestinal: Negative for abdominal distention, abdominal pain, blood in stool, constipation, diarrhea, nausea and vomiting.   Endocrine: Negative for cold intolerance and heat intolerance.   Genitourinary: Negative for difficulty urinating and dysuria.   Musculoskeletal: Negative for arthralgias and back pain.   Skin: Positive for rash. Negative for color change.   Neurological: Negative for dizziness, weakness, light-headedness, numbness and headaches.   Hematological: Negative for adenopathy. Does not bruise/bleed easily.   Psychiatric/Behavioral: Negative for confusion.       ECOG Performance Status:     ECOG SCORE    1 - Restricted in strenuous activity-ambulatory and able to carry out work of a light nature         Objective:      Vitals:   Vitals:     "02/04/22 0802   BP: (!) 166/70   BP Location: Left arm   Patient Position: Sitting   Pulse: 95   Temp: 97.8 °F (36.6 °C)   TempSrc: Oral   SpO2: 96%   Weight: 64.3 kg (141 lb 12.1 oz)   Height: 5' 6" (1.676 m)     BMI: Body mass index is 22.88 kg/m².    Physical Exam  Constitutional:       General: He is not in acute distress.     Appearance: Normal appearance. He is not ill-appearing.   HENT:      Head: Normocephalic and atraumatic.      Mouth/Throat:      Pharynx: No oropharyngeal exudate or posterior oropharyngeal erythema.   Eyes:      General: No scleral icterus.     Extraocular Movements: Extraocular movements intact.      Conjunctiva/sclera: Conjunctivae normal.      Pupils: Pupils are equal, round, and reactive to light.   Cardiovascular:      Rate and Rhythm: Normal rate and regular rhythm.      Heart sounds: No murmur heard.  No friction rub. No gallop.    Pulmonary:      Effort: Pulmonary effort is normal. No respiratory distress.      Breath sounds: No stridor. No wheezing, rhonchi or rales.   Abdominal:      General: Bowel sounds are normal. There is no distension.      Palpations: Abdomen is soft. There is no mass.      Tenderness: There is no abdominal tenderness. There is no guarding or rebound.   Musculoskeletal:         General: Normal range of motion.      Cervical back: Normal range of motion and neck supple.      Right lower leg: No edema.      Left lower leg: No edema.   Skin:     General: Skin is dry.      Findings: Erythema and rash (hands (see media tab)) present.      Comments: Fingers cool to touch   Neurological:      General: No focal deficit present.      Mental Status: He is alert.         Laboratory Data:   Recent Results (from the past 168 hour(s))   CBC Auto Differential    Collection Time: 02/01/22  8:44 AM   Result Value Ref Range    WBC 6.09 3.90 - 12.70 K/uL    RBC 4.02 (L) 4.60 - 6.20 M/uL    Hemoglobin 11.9 (L) 14.0 - 18.0 g/dL    Hematocrit 38.8 (L) 40.0 - 54.0 %    MCV 97 82 " - 98 fL    MCH 29.6 27.0 - 31.0 pg    MCHC 30.7 (L) 32.0 - 36.0 g/dL    RDW 14.2 11.5 - 14.5 %    Platelets 197 150 - 450 K/uL    MPV 12.2 9.2 - 12.9 fL    Immature Granulocytes 0.5 0.0 - 0.5 %    Gran # (ANC) 3.3 1.8 - 7.7 K/uL    Immature Grans (Abs) 0.03 0.00 - 0.04 K/uL    Lymph # 1.6 1.0 - 4.8 K/uL    Mono # 1.0 0.3 - 1.0 K/uL    Eos # 0.1 0.0 - 0.5 K/uL    Baso # 0.03 0.00 - 0.20 K/uL    nRBC 0 0 /100 WBC    Gran % 54.3 38.0 - 73.0 %    Lymph % 26.6 18.0 - 48.0 %    Mono % 16.1 (H) 4.0 - 15.0 %    Eosinophil % 2.0 0.0 - 8.0 %    Basophil % 0.5 0.0 - 1.9 %    Differential Method Automated    Lipid Panel    Collection Time: 02/01/22  8:44 AM   Result Value Ref Range    Cholesterol 220 (H) 120 - 199 mg/dL    Triglycerides 100 30 - 150 mg/dL    HDL 59 40 - 75 mg/dL    LDL Cholesterol 141.0 63.0 - 159.0 mg/dL    HDL/Cholesterol Ratio 26.8 20.0 - 50.0 %    Total Cholesterol/HDL Ratio 3.7 2.0 - 5.0    Non-HDL Cholesterol 161 mg/dL   Comprehensive Metabolic Panel    Collection Time: 02/01/22  8:44 AM   Result Value Ref Range    Sodium 141 136 - 145 mmol/L    Potassium 4.5 3.5 - 5.1 mmol/L    Chloride 108 95 - 110 mmol/L    CO2 24 23 - 29 mmol/L    Glucose 88 70 - 110 mg/dL    BUN 30 (H) 8 - 23 mg/dL    Creatinine 1.4 0.5 - 1.4 mg/dL    Calcium 8.9 8.7 - 10.5 mg/dL    Total Protein 6.6 6.0 - 8.4 g/dL    Albumin 3.4 (L) 3.5 - 5.2 g/dL    Total Bilirubin 0.5 0.1 - 1.0 mg/dL    Alkaline Phosphatase 59 55 - 135 U/L    AST 15 10 - 40 U/L    ALT 11 10 - 44 U/L    Anion Gap 9 8 - 16 mmol/L    eGFR if African American 58 (A) >60 mL/min/1.73 m^2    eGFR if non African American 50 (A) >60 mL/min/1.73 m^2   C-Reactive Protein    Collection Time: 02/01/22  8:44 AM   Result Value Ref Range    CRP 4.1 0.0 - 8.2 mg/L   Sedimentation rate    Collection Time: 02/01/22  8:44 AM   Result Value Ref Range    Sed Rate 8 0 - 10 mm/Hr   Hepatitis B Core Antibody, Total    Collection Time: 02/01/22  8:44 AM   Result Value Ref Range    Hep B  Core Total Ab Negative    Hepatitis B Surface Antigen    Collection Time: 02/01/22  8:44 AM   Result Value Ref Range    Hepatitis B Surface Ag Negative Negative   URIC ACID    Collection Time: 02/01/22  8:44 AM   Result Value Ref Range    Uric Acid 9.4 (H) 3.4 - 7.0 mg/dL   CBC W/ AUTO DIFFERENTIAL    Collection Time: 02/01/22  8:44 AM   Result Value Ref Range    WBC 6.09 3.90 - 12.70 K/uL    RBC 4.02 (L) 4.60 - 6.20 M/uL    Hemoglobin 11.9 (L) 14.0 - 18.0 g/dL    Hematocrit 38.8 (L) 40.0 - 54.0 %    MCV 97 82 - 98 fL    MCH 29.6 27.0 - 31.0 pg    MCHC 30.7 (L) 32.0 - 36.0 g/dL    RDW 14.2 11.5 - 14.5 %    Platelets 197 150 - 450 K/uL    MPV 12.2 9.2 - 12.9 fL    Immature Granulocytes 0.5 0.0 - 0.5 %    Gran # (ANC) 3.3 1.8 - 7.7 K/uL    Immature Grans (Abs) 0.03 0.00 - 0.04 K/uL    Lymph # 1.6 1.0 - 4.8 K/uL    Mono # 1.0 0.3 - 1.0 K/uL    Eos # 0.1 0.0 - 0.5 K/uL    Baso # 0.03 0.00 - 0.20 K/uL    nRBC 0 0 /100 WBC    Gran % 54.3 38.0 - 73.0 %    Lymph % 26.6 18.0 - 48.0 %    Mono % 16.1 (H) 4.0 - 15.0 %    Eosinophil % 2.0 0.0 - 8.0 %    Basophil % 0.5 0.0 - 1.9 %    Differential Method Automated    Comprehensive Metabolic Panel    Collection Time: 02/01/22  8:44 AM   Result Value Ref Range    Sodium 141 136 - 145 mmol/L    Potassium 4.5 3.5 - 5.1 mmol/L    Chloride 108 95 - 110 mmol/L    CO2 24 23 - 29 mmol/L    Glucose 88 70 - 110 mg/dL    BUN 30 (H) 8 - 23 mg/dL    Creatinine 1.4 0.5 - 1.4 mg/dL    Calcium 8.9 8.7 - 10.5 mg/dL    Total Protein 6.6 6.0 - 8.4 g/dL    Albumin 3.4 (L) 3.5 - 5.2 g/dL    Total Bilirubin 0.5 0.1 - 1.0 mg/dL    Alkaline Phosphatase 59 55 - 135 U/L    AST 15 10 - 40 U/L    ALT 11 10 - 44 U/L    Anion Gap 9 8 - 16 mmol/L    eGFR if African American 58 (A) >60 mL/min/1.73 m^2    eGFR if non African American 50 (A) >60 mL/min/1.73 m^2   Quantiferon Gold TB    Collection Time: 02/01/22  8:50 AM   Result Value Ref Range    NIL 0.050 See text IU/mL    TB1 - Nil 0.000 See text IU/mL    TB2 -  Nil 0.000 See text IU/mL    Mitogen - Nil 3.020 See text IU/mL    TB Gold Plus Negative        Imaging:    Assessment:       1. Anemia of chronic renal failure, stage 3b    2. Atopic dermatitis, unspecified type    3. Dyshidrotic eczema    4. Thrombocytopenia    5. Stage 3a chronic kidney disease           Plan:       Problem List Items Addressed This Visit        Derm    Dyshidrotic eczema    Current Assessment & Plan     Recently had flare up of pruritic blisters on hands, denies pain.  He says this has happened before and resolved on its own.  Was prescribed triamcinolone cream but hasn't got it yet due to prior auth.  -uploaded into media tab  -sent prescription to [facility] to see if they can process prescription faster.            Renal/    Anemia of chronic renal failure, stage 3b - Primary    Current Assessment & Plan     Anemia of chronic renal disease, CKD stage IIIB.  S/p iron infusion 10/2021. RDW has since normalized and his hemoglobin improved to 11.9.  -no role of epo at this hemoglobin level  -follow up with me as needed  -continue follow up with primary care.         Stage 3a chronic kidney disease    Current Assessment & Plan     Creatinine stable  -continue medical management with pcp            Hematology    RESOLVED: Thrombocytopenia      Other Visit Diagnoses     Atopic dermatitis, unspecified type        Relevant Medications    triamcinolone acetonide 0.1% (KENALOG) 0.1 % cream          No orders of the defined types were placed in this encounter.            Sarkis Moore MD  Hematology Oncology

## 2022-02-04 NOTE — ASSESSMENT & PLAN NOTE
Anemia of chronic renal disease, CKD stage IIIB.  S/p iron infusion 10/2021. RDW has since normalized and his hemoglobin improved to 11.9.  -no role of epo at this hemoglobin level  -follow up with me as needed  -continue follow up with primary care.

## 2022-02-04 NOTE — ASSESSMENT & PLAN NOTE
Recently had flare up of pruritic blisters on hands, denies pain.  He says this has happened before and resolved on its own.  Was prescribed triamcinolone cream but hasn't got it yet due to prior auth.  -uploaded into media tab  -sent prescription to [facility] to see if they can process prescription faster.

## 2022-02-09 ENCOUNTER — SPECIALTY PHARMACY (OUTPATIENT)
Dept: PHARMACY | Facility: CLINIC | Age: 72
End: 2022-02-09
Payer: MEDICARE

## 2022-02-09 NOTE — TELEPHONE ENCOUNTER
Specialty Pharmacy - Refill Coordination    Specialty Medication Orders Linked to Encounter    Flowsheet Row Most Recent Value   Medication #1 abatacept (ORENCIA CLICKJECT) 125 mg/mL AtIn (Order#[id number], Rx#[id number])        Refill Questions - Documented Responses    Flowsheet Row Most Recent Value   Patient Availability and HIPAA Verification    Does patient want to proceed with activity? Yes   HIPAA/medical authority confirmed? Yes   Relationship to patient of person spoken to? Self   Refill Screening Questions    Changes to allergies? No   Changes to medications? No   New conditions since last clinic visit? No   Unplanned office visit, urgent care, ED, or hospital admission in the last 4 weeks? No   How does patient/caregiver feel medication is working? Very good   Financial problems or insurance changes? No   How many doses of your specialty medications were missed in the last 4 weeks? 0   Would patient like to speak to a pharmacist? No   When does the patient need to receive the medication? 02/16/22   Refill Delivery Questions    How will the patient receive the medication? Delivery Sara   When does the patient need to receive the medication? 02/16/22   Shipping Address Home   Address in [facility] confirmed and updated if neccessary? Yes   Expected Copay ($) 9.85   Is the patient able to afford the medication copay? Yes   Payment Method CC on file   Days supply of Refill 28   Supplies needed? Injection Device   Refill activity completed? Yes   Refill activity plan Refill scheduled   Shipment/Pickup Date: 02/14/22          Current Outpatient Medications   Medication Sig    abatacept (ORENCIA CLICKJECT) 125 mg/mL AtIn Inject 125 mg into the skin once a week.    ACCU-CHEK SMARTVIEW TEST STRIP Strp TEST BLOOD SUGAR FOUR TIMES DAILY    alcohol swabs (BD ALCOHOL SWABS) PadM Use 4x/day    amLODIPine (NORVASC) 5 MG tablet Take 1 tablet (5 mg total) by mouth 2 (two) times daily.    ammonium lactate 12 %  "Crea Apply 1 application topically once daily.    atorvastatin (LIPITOR) 80 MG tablet Take 1 tablet (80 mg total) by mouth once daily.    blood glucose control, low (TRUE METRIX LEVEL 1) Soln Use as indicated    BOOSTRIX TDAP 2.5-8-5 Lf-mcg-Lf/0.5mL Syrg injection     cloNIDine 0.2 mg/24 hr td ptwk (CATAPRES) 0.2 mg/24 hr PLACE 1 PATCH ONTO THE SKIN EVERY 7 DAYS.    diclofenac sodium (VOLTAREN) 1 % Gel Apply 2 g topically 4 (four) times daily as needed. Apply to aching joints    dorzolamide (TRUSOPT) 2 % ophthalmic solution Place 1 drop into both eyes 2 (two) times daily.    dulaglutide (TRULICITY) 1.5 mg/0.5 mL pen injector Inject 1.5 mg into the skin every 7 days.    febuxostat (ULORIC) 40 mg Tab Take 1 tablet (40 mg total) by mouth once daily.    hydrALAZINE (APRESOLINE) 50 MG tablet Take 1 tablet (50 mg total) by mouth 3 (three) times daily.    INJECTAFER 50 iron mg/mL injection     insulin (LANTUS SOLOSTAR U-100 INSULIN) glargine 100 units/mL (3mL) SubQ pen Inject 6 units once daily    insulin aspart U-100 (NOVOLOG FLEXPEN U-100 INSULIN) 100 unit/mL (3 mL) InPn pen Inject 4-8 units three times daily before each meal with sliding scale.    lancets (TRUEPLUS LANCETS) 33 gauge Misc Test glucose 4x/day. Dx code e11.65    LINZESS 145 mcg Cap capsule TAKE 1 CAPSULE (145 MCG TOTAL) BY MOUTH ONCE DAILY.    multivit with min-folic acid 200 mcg Chew     omeprazole (PRILOSEC) 20 MG capsule TAKE 2 CAPSULES (40 MG TOTAL) BY MOUTH ONCE DAILY.    pen needle, diabetic (BD ULTRA-FINE RICHARD PEN NEEDLE) 32 gauge x 5/32" Ndle 1 Device by Misc.(Non-Drug; Combo Route) route 4 (four) times daily.    predniSONE (DELTASONE) 5 MG tablet Take 1 tablet (5 mg total) by mouth once daily.    PROLIA 60 mg/mL Syrg     torsemide (DEMADEX) 10 MG Tab TAKE 1 TABLET EVERY DAY    traMADoL (ULTRAM) 50 mg tablet Take 1 tablet (50 mg total) by mouth every 12 (twelve) hours as needed for Pain.    travoprost (TRAVATAN Z) 0.004 % " ophthalmic solution INSTILL 1 DROP INTO BOTH EYES EVERY EVENING    triamcinolone acetonide 0.1% (KENALOG) 0.1 % cream Apply topically 2 (two) times daily. for 10 days    TRUE METRIX GLUCOSE METER Misc USE AS DIRECTED    valsartan (DIOVAN) 160 MG tablet Take 1 tablet (160 mg total) by mouth 2 (two) times daily.   Last reviewed on 2/4/2022  8:57 AM by Sarkis Moore MD    Review of patient's allergies indicates:   Allergen Reactions    Penicillins Nausea And Vomiting    Rosuvastatin      Muscle pain     Allopurinol analogues Rash    Last reviewed on  2/4/2022 8:02 AM by Carrie Perkins      Tasks added this encounter   3/9/2022 - Refill Call (Auto Added)   Tasks due within next 3 months   No tasks due.     Nic Gustafson, PharmD  [facility]  [address] [place], [place] [address]  [facility] [address]  Phone: [phone]  Fax: [phone]

## 2022-02-16 NOTE — PROGRESS NOTES
CBC normocytic anemia stable seen previously  CMP with CKD stage IIIA seen previously  Lipid profile not ideal; previous lipid profile had been better.  Should be on statin.  Hep B core antibody negative, surface antigen negative  Uric acid level high; followed by rheum  Results to pt via MyChart. No changes in regimen.

## 2022-02-23 DIAGNOSIS — D84.9 IMMUNOSUPPRESSED STATUS: ICD-10-CM

## 2022-03-06 ENCOUNTER — PATIENT MESSAGE (OUTPATIENT)
Dept: RHEUMATOLOGY | Facility: CLINIC | Age: 72
End: 2022-03-06
Payer: MEDICARE

## 2022-03-06 DIAGNOSIS — M1A.9XX1 TOPHACEOUS GOUT: Primary | ICD-10-CM

## 2022-03-09 ENCOUNTER — SPECIALTY PHARMACY (OUTPATIENT)
Dept: PHARMACY | Facility: CLINIC | Age: 72
End: 2022-03-09
Payer: MEDICARE

## 2022-03-09 NOTE — TELEPHONE ENCOUNTER
Specialty Pharmacy - Refill Coordination    Specialty Medication Orders Linked to Encounter    Flowsheet Row Most Recent Value   Medication #1 abatacept (ORENCIA CLICKJECT) 125 mg/mL AtIn (Order#[id number], Rx#[id number])          Refill Questions - Documented Responses    Flowsheet Row Most Recent Value   Patient Availability and HIPAA Verification    Does patient want to proceed with activity? Yes   HIPAA/medical authority confirmed? Yes   Relationship to patient of person spoken to? Self   Refill Screening Questions    Changes to allergies? No   Changes to medications? No   New conditions since last clinic visit? No   Unplanned office visit, urgent care, ED, or hospital admission in the last 4 weeks? No   How does patient/caregiver feel medication is working? Good   Financial problems or insurance changes? No   How many doses of your specialty medications were missed in the last 4 weeks? 0   Would patient like to speak to a pharmacist? No   When does the patient need to receive the medication? 03/16/22   Refill Delivery Questions    How will the patient receive the medication? Delivery Sara   When does the patient need to receive the medication? 03/16/22   Shipping Address Home   Address in [facility] confirmed and updated if neccessary? Yes   Expected Copay ($) 0   Payment Method zero copay   Days supply of Refill 28   Supplies needed? No supplies needed   Refill activity completed? Yes   Refill activity plan Refill scheduled   Shipment/Pickup Date: 03/16/22          Current Outpatient Medications   Medication Sig    abatacept (ORENCIA CLICKJECT) 125 mg/mL AtIn Inject 125 mg into the skin once a week.    ACCU-CHEK SMARTVIEW TEST STRIP Strp TEST BLOOD SUGAR FOUR TIMES DAILY    alcohol swabs (BD ALCOHOL SWABS) PadM Use 4x/day    amLODIPine (NORVASC) 5 MG tablet Take 1 tablet (5 mg total) by mouth 2 (two) times daily.    ammonium lactate 12 % Crea Apply 1 application topically once daily.     "atorvastatin (LIPITOR) 80 MG tablet Take 1 tablet (80 mg total) by mouth once daily.    blood glucose control, low (TRUE METRIX LEVEL 1) Soln Use as indicated    BOOSTRIX TDAP 2.5-8-5 Lf-mcg-Lf/0.5mL Syrg injection     cloNIDine 0.2 mg/24 hr td ptwk (CATAPRES) 0.2 mg/24 hr PLACE 1 PATCH ONTO THE SKIN EVERY 7 DAYS.    diclofenac sodium (VOLTAREN) 1 % Gel Apply 2 g topically 4 (four) times daily as needed. Apply to aching joints    dorzolamide (TRUSOPT) 2 % ophthalmic solution INSTILL 1 DROP INTO BOTH EYES TWICE DAILY    dulaglutide (TRULICITY) 1.5 mg/0.5 mL pen injector Inject 1.5 mg into the skin every 7 days.    febuxostat (ULORIC) 40 mg Tab Take 1 tablet (40 mg total) by mouth once daily.    hydrALAZINE (APRESOLINE) 50 MG tablet Take 1 tablet (50 mg total) by mouth 3 (three) times daily.    INJECTAFER 50 iron mg/mL injection     insulin (LANTUS SOLOSTAR U-100 INSULIN) glargine 100 units/mL (3mL) SubQ pen Inject 6 units once daily    insulin aspart U-100 (NOVOLOG FLEXPEN U-100 INSULIN) 100 unit/mL (3 mL) InPn pen Inject 4-8 units three times daily before each meal with sliding scale.    lancets (TRUEPLUS LANCETS) 33 gauge Misc Test glucose 4x/day. Dx code e11.65    LINZESS 145 mcg Cap capsule TAKE 1 CAPSULE (145 MCG TOTAL) BY MOUTH ONCE DAILY.    multivit with min-folic acid 200 mcg Chew     omeprazole (PRILOSEC) 20 MG capsule TAKE 2 CAPSULES (40 MG TOTAL) BY MOUTH ONCE DAILY.    pen needle, diabetic (BD ULTRA-FINE RICHARD PEN NEEDLE) 32 gauge x 5/32" Ndle 1 Device by Misc.(Non-Drug; Combo Route) route 4 (four) times daily.    predniSONE (DELTASONE) 5 MG tablet Take 1 tablet (5 mg total) by mouth once daily.    PROLIA 60 mg/mL Syrg     torsemide (DEMADEX) 10 MG Tab TAKE 1 TABLET EVERY DAY    traMADoL (ULTRAM) 50 mg tablet Take 1 tablet (50 mg total) by mouth every 12 (twelve) hours as needed for Pain.    travoprost (TRAVATAN Z) 0.004 % ophthalmic solution INSTILL 1 DROP INTO BOTH EYES EVERY " EVENING    triamcinolone acetonide 0.1% (KENALOG) 0.1 % cream Apply topically 2 (two) times daily. for 10 days    TRUE METRIX GLUCOSE METER Misc USE AS DIRECTED    valsartan (DIOVAN) 160 MG tablet Take 1 tablet (160 mg total) by mouth 2 (two) times daily.   Last reviewed on 2/4/2022  8:57 AM by Sarkis Moore MD    Review of patient's allergies indicates:   Allergen Reactions    Penicillins Nausea And Vomiting    Rosuvastatin      Muscle pain     Allopurinol analogues Rash    Last reviewed on  2/4/2022 8:02 AM by Carrie Perkins      Tasks added this encounter   No tasks added.   Tasks due within next 3 months   3/9/2022 - Refill Call (Auto Added)     MILADIS NEAL, PharmD  [facility]  [address] [place] [id number]  Phone: [phone]  Fax: [phone]

## 2022-03-28 ENCOUNTER — PATIENT MESSAGE (OUTPATIENT)
Dept: RHEUMATOLOGY | Facility: CLINIC | Age: 72
End: 2022-03-28
Payer: MEDICARE

## 2022-03-28 ENCOUNTER — LAB VISIT (OUTPATIENT)
Dept: LAB | Facility: HOSPITAL | Age: 72
End: 2022-03-28
Attending: NURSE PRACTITIONER
Payer: MEDICARE

## 2022-03-28 DIAGNOSIS — M1A.9XX1 TOPHACEOUS GOUT: ICD-10-CM

## 2022-03-28 DIAGNOSIS — E11.8 CONTROLLED TYPE 2 DIABETES MELLITUS WITH COMPLICATION, WITH LONG-TERM CURRENT USE OF INSULIN: ICD-10-CM

## 2022-03-28 DIAGNOSIS — Z79.4 CONTROLLED TYPE 2 DIABETES MELLITUS WITH COMPLICATION, WITH LONG-TERM CURRENT USE OF INSULIN: ICD-10-CM

## 2022-03-28 LAB
ALBUMIN SERPL BCP-MCNC: 3.4 G/DL (ref 3.5–5.2)
ALP SERPL-CCNC: 66 U/L (ref 55–135)
ALT SERPL W/O P-5'-P-CCNC: 16 U/L (ref 10–44)
ANION GAP SERPL CALC-SCNC: 8 MMOL/L (ref 8–16)
AST SERPL-CCNC: 16 U/L (ref 10–40)
BASOPHILS # BLD AUTO: 0.03 K/UL (ref 0–0.2)
BASOPHILS NFR BLD: 0.4 % (ref 0–1.9)
BILIRUB SERPL-MCNC: 0.4 MG/DL (ref 0.1–1)
BUN SERPL-MCNC: 30 MG/DL (ref 8–23)
CALCIUM SERPL-MCNC: 9.5 MG/DL (ref 8.7–10.5)
CHLORIDE SERPL-SCNC: 108 MMOL/L (ref 95–110)
CO2 SERPL-SCNC: 21 MMOL/L (ref 23–29)
CREAT SERPL-MCNC: 1.2 MG/DL (ref 0.5–1.4)
CRP SERPL-MCNC: 2.9 MG/L (ref 0–8.2)
DIFFERENTIAL METHOD: ABNORMAL
EOSINOPHIL # BLD AUTO: 0.1 K/UL (ref 0–0.5)
EOSINOPHIL NFR BLD: 1.7 % (ref 0–8)
ERYTHROCYTE [DISTWIDTH] IN BLOOD BY AUTOMATED COUNT: 13.6 % (ref 11.5–14.5)
EST. GFR  (AFRICAN AMERICAN): >60 ML/MIN/1.73 M^2
EST. GFR  (NON AFRICAN AMERICAN): >60 ML/MIN/1.73 M^2
ESTIMATED AVG GLUCOSE: 134 MG/DL (ref 68–131)
GLUCOSE SERPL-MCNC: 134 MG/DL (ref 70–110)
HBA1C MFR BLD: 6.3 % (ref 4–5.6)
HCT VFR BLD AUTO: 38.7 % (ref 40–54)
HGB BLD-MCNC: 12.3 G/DL (ref 14–18)
IMM GRANULOCYTES # BLD AUTO: 0.03 K/UL (ref 0–0.04)
IMM GRANULOCYTES NFR BLD AUTO: 0.4 % (ref 0–0.5)
LYMPHOCYTES # BLD AUTO: 1.4 K/UL (ref 1–4.8)
LYMPHOCYTES NFR BLD: 20.3 % (ref 18–48)
MCH RBC QN AUTO: 29.7 PG (ref 27–31)
MCHC RBC AUTO-ENTMCNC: 31.8 G/DL (ref 32–36)
MCV RBC AUTO: 94 FL (ref 82–98)
MONOCYTES # BLD AUTO: 1 K/UL (ref 0.3–1)
MONOCYTES NFR BLD: 14.4 % (ref 4–15)
NEUTROPHILS # BLD AUTO: 4.5 K/UL (ref 1.8–7.7)
NEUTROPHILS NFR BLD: 62.8 % (ref 38–73)
NRBC BLD-RTO: 0 /100 WBC
PLATELET # BLD AUTO: 268 K/UL (ref 150–450)
PMV BLD AUTO: 12.3 FL (ref 9.2–12.9)
POTASSIUM SERPL-SCNC: 4.4 MMOL/L (ref 3.5–5.1)
PROT SERPL-MCNC: 6.5 G/DL (ref 6–8.4)
RBC # BLD AUTO: 4.14 M/UL (ref 4.6–6.2)
SODIUM SERPL-SCNC: 137 MMOL/L (ref 136–145)
URATE SERPL-MCNC: 3.7 MG/DL (ref 3.4–7)
WBC # BLD AUTO: 7.1 K/UL (ref 3.9–12.7)

## 2022-03-28 PROCEDURE — 85652 RBC SED RATE AUTOMATED: CPT | Performed by: INTERNAL MEDICINE

## 2022-03-28 PROCEDURE — 83036 HEMOGLOBIN GLYCOSYLATED A1C: CPT | Performed by: NURSE PRACTITIONER

## 2022-03-28 PROCEDURE — 36415 COLL VENOUS BLD VENIPUNCTURE: CPT | Mod: PO | Performed by: INTERNAL MEDICINE

## 2022-03-28 PROCEDURE — 86140 C-REACTIVE PROTEIN: CPT | Performed by: INTERNAL MEDICINE

## 2022-03-28 PROCEDURE — 84550 ASSAY OF BLOOD/URIC ACID: CPT | Performed by: INTERNAL MEDICINE

## 2022-03-28 PROCEDURE — 80053 COMPREHEN METABOLIC PANEL: CPT | Performed by: INTERNAL MEDICINE

## 2022-03-28 PROCEDURE — 85025 COMPLETE CBC W/AUTO DIFF WBC: CPT | Performed by: INTERNAL MEDICINE

## 2022-03-28 RX ORDER — FEBUXOSTAT 40 MG/1
40 TABLET, FILM COATED ORAL DAILY
Qty: 30 TABLET | Refills: 11 | Status: SHIPPED | OUTPATIENT
Start: 2022-03-28 | End: 2023-04-21 | Stop reason: SDUPTHER

## 2022-03-29 ENCOUNTER — TELEPHONE (OUTPATIENT)
Dept: PHARMACY | Facility: CLINIC | Age: 72
End: 2022-03-29
Payer: MEDICARE

## 2022-03-29 LAB — ERYTHROCYTE [SEDIMENTATION RATE] IN BLOOD BY WESTERGREN METHOD: 20 MM/HR (ref 0–23)

## 2022-04-04 ENCOUNTER — OFFICE VISIT (OUTPATIENT)
Dept: ENDOCRINOLOGY | Facility: CLINIC | Age: 72
End: 2022-04-04
Payer: MEDICARE

## 2022-04-04 VITALS
SYSTOLIC BLOOD PRESSURE: 142 MMHG | DIASTOLIC BLOOD PRESSURE: 75 MMHG | HEART RATE: 80 BPM | TEMPERATURE: 98 F | WEIGHT: 145.88 LBS | BODY MASS INDEX: 23.55 KG/M2

## 2022-04-04 DIAGNOSIS — E11.8 CONTROLLED TYPE 2 DIABETES MELLITUS WITH COMPLICATION, WITH LONG-TERM CURRENT USE OF INSULIN: Primary | ICD-10-CM

## 2022-04-04 DIAGNOSIS — Z86.73 HISTORY OF STROKE: ICD-10-CM

## 2022-04-04 DIAGNOSIS — I10 ESSENTIAL HYPERTENSION: ICD-10-CM

## 2022-04-04 DIAGNOSIS — Z79.4 CONTROLLED TYPE 2 DIABETES MELLITUS WITH COMPLICATION, WITH LONG-TERM CURRENT USE OF INSULIN: Primary | ICD-10-CM

## 2022-04-04 DIAGNOSIS — T38.0X5A STEROID-INDUCED HYPERGLYCEMIA: ICD-10-CM

## 2022-04-04 DIAGNOSIS — R73.9 STEROID-INDUCED HYPERGLYCEMIA: ICD-10-CM

## 2022-04-04 PROCEDURE — 1159F MED LIST DOCD IN RCRD: CPT | Mod: CPTII,S$GLB,, | Performed by: NURSE PRACTITIONER

## 2022-04-04 PROCEDURE — 99499 UNLISTED E&M SERVICE: CPT | Mod: S$GLB,,, | Performed by: NURSE PRACTITIONER

## 2022-04-04 PROCEDURE — 99999 PR PBB SHADOW E&M-EST. PATIENT-LVL V: CPT | Mod: PBBFAC,,, | Performed by: NURSE PRACTITIONER

## 2022-04-04 PROCEDURE — 99999 PR PBB SHADOW E&M-EST. PATIENT-LVL V: ICD-10-PCS | Mod: PBBFAC,,, | Performed by: NURSE PRACTITIONER

## 2022-04-04 PROCEDURE — 1160F RVW MEDS BY RX/DR IN RCRD: CPT | Mod: CPTII,S$GLB,, | Performed by: NURSE PRACTITIONER

## 2022-04-04 PROCEDURE — 4010F ACE/ARB THERAPY RXD/TAKEN: CPT | Mod: CPTII,S$GLB,, | Performed by: NURSE PRACTITIONER

## 2022-04-04 PROCEDURE — 99499 RISK ADDL DX/OHS AUDIT: ICD-10-PCS | Mod: S$GLB,,, | Performed by: NURSE PRACTITIONER

## 2022-04-04 PROCEDURE — 99214 PR OFFICE/OUTPT VISIT, EST, LEVL IV, 30-39 MIN: ICD-10-PCS | Mod: S$GLB,,, | Performed by: NURSE PRACTITIONER

## 2022-04-04 PROCEDURE — 99214 OFFICE O/P EST MOD 30 MIN: CPT | Mod: S$GLB,,, | Performed by: NURSE PRACTITIONER

## 2022-04-04 PROCEDURE — 1126F PR PAIN SEVERITY QUANTIFIED, NO PAIN PRESENT: ICD-10-PCS | Mod: CPTII,S$GLB,, | Performed by: NURSE PRACTITIONER

## 2022-04-04 PROCEDURE — 3008F BODY MASS INDEX DOCD: CPT | Mod: CPTII,S$GLB,, | Performed by: NURSE PRACTITIONER

## 2022-04-04 PROCEDURE — 3077F PR MOST RECENT SYSTOLIC BLOOD PRESSURE >= 140 MM HG: ICD-10-PCS | Mod: CPTII,S$GLB,, | Performed by: NURSE PRACTITIONER

## 2022-04-04 PROCEDURE — 3008F PR BODY MASS INDEX (BMI) DOCUMENTED: ICD-10-PCS | Mod: CPTII,S$GLB,, | Performed by: NURSE PRACTITIONER

## 2022-04-04 PROCEDURE — 3077F SYST BP >= 140 MM HG: CPT | Mod: CPTII,S$GLB,, | Performed by: NURSE PRACTITIONER

## 2022-04-04 PROCEDURE — 3044F PR MOST RECENT HEMOGLOBIN A1C LEVEL <7.0%: ICD-10-PCS | Mod: CPTII,S$GLB,, | Performed by: NURSE PRACTITIONER

## 2022-04-04 PROCEDURE — 1160F PR REVIEW ALL MEDS BY PRESCRIBER/CLIN PHARMACIST DOCUMENTED: ICD-10-PCS | Mod: CPTII,S$GLB,, | Performed by: NURSE PRACTITIONER

## 2022-04-04 PROCEDURE — 3078F PR MOST RECENT DIASTOLIC BLOOD PRESSURE < 80 MM HG: ICD-10-PCS | Mod: CPTII,S$GLB,, | Performed by: NURSE PRACTITIONER

## 2022-04-04 PROCEDURE — 3078F DIAST BP <80 MM HG: CPT | Mod: CPTII,S$GLB,, | Performed by: NURSE PRACTITIONER

## 2022-04-04 PROCEDURE — 1126F AMNT PAIN NOTED NONE PRSNT: CPT | Mod: CPTII,S$GLB,, | Performed by: NURSE PRACTITIONER

## 2022-04-04 PROCEDURE — 4010F PR ACE/ARB THEARPY RXD/TAKEN: ICD-10-PCS | Mod: CPTII,S$GLB,, | Performed by: NURSE PRACTITIONER

## 2022-04-04 PROCEDURE — 1159F PR MEDICATION LIST DOCUMENTED IN MEDICAL RECORD: ICD-10-PCS | Mod: CPTII,S$GLB,, | Performed by: NURSE PRACTITIONER

## 2022-04-04 PROCEDURE — 3044F HG A1C LEVEL LT 7.0%: CPT | Mod: CPTII,S$GLB,, | Performed by: NURSE PRACTITIONER

## 2022-04-04 NOTE — PATIENT INSTRUCTIONS
Difficult to evaluate overall glucose trends since Dariel reader could not be downloaded. BGs reviewed from reader are higher than expected compared to low A1c of 6.3%. it is possible A1c is falsely low d/t anemia. Will continue current treatment for now since pt self reports BGs have been stable and largely unchanged from last visit.     Advised pt to follow correction scale to correct high glucoses instead of guessing, which could lead to hypoglycemia - 150-200=+1, 201-250=+2, 251-300=+3; 301-350=+4, over 350=+5 units    Return to clinic in 3 months with labs prior.

## 2022-04-04 NOTE — PROGRESS NOTES
CC: This 72 y.o.  male  is here for evaluation of  T2DM along with comorbidities indicated in the Visit Diagnosis section of this encounter.    HPI: Darrion Bennett was diagnosed with T2DM at age 35. Oral medications started years later.            Prior visit 8/9/21  a1c mildly lower from 7.1 to 6.9%.   He is no longer adjusting lantus dose at night, as previously advised.   Plan Reported BGs and A1c are at goal without hypoglycemia.   Continue current treatment.   Test bg 4x/day. rtc in 4 mo with labs prior. Will send orders for Dariel sensors to [facility] again.       Prior visit 12/2021   a1c is down from 6.9 to 6.6%.   He is now using Freestyle Dariel CGM and enjoys it.   Plan Low glucose alert set for 75.   Overall glucoses noted from CGM are at goal, with minimal and infrequent hypoglycemia.  Continue current medication doses.   Avoid dosing Novolog later than 20 minutes after a meal, or else blood glucose can drop too low.   Return to clinic in 4 months with labs prior.     Interval history  a1c is down from 6.9 to 6.3%.   He has reduced Lantus from 6 to 5 units hs and Novolog from 6 to 3-5 units ac. States DM remains about the same.     C/o high frequency of alarms.     PHX: CKD stage 3, Diastolic heart failure, NYHA class 2;  Sjogren's syndrome, gouty arthritis - sees Rheumatology     LAST DIABETES EDUCATION: multiple times, years ago     HOSPITALIZED FOR DIABETES  -  No      DIABETES MEDICATIONS:   Trulicity 1.5 mg once daily   Lantus 5 units every night   Novolog 3-5 units ac      Misses medication doses - No    DM COMPLICATIONS: nephropathy    SIGNIFICANT DIABETES MED HISTORY: has been told that metformin is bad for his kidneys and that's why he stopped it   - Lantus, Humalog, and Tradjenta stopped at initial visit 7/2019 and he was switched to Xultophy.   - Xultophy stopped and switched to Trulicity and Lantus 2/2020  - Discussed switching from Novolog to glimepiride but he prefers  Injections over  orals.       SELF MONITORING BLOOD GLUCOSE: Checks blood glucose at home 4x/day at least with Freestyle Dariel 2 reader.   Unable to download most recent CGM report from reader.   Reviewed glucoses from Dariel reader logbook - FBGs are 102-182, evening BGs are mid 100s to mid 200s.     HYPOGLYCEMIC EPISODES: reports infrequent episodes, one event noted with BG down to 58. His BG karina to 333 after eating a large meal ( he took 5 units for the meal), corrected with 7 units and BG fell to 58.        CURRENT DIET: drinks water, diet soda; has some orange juice to take with his meds. Eats 3 meals/day. Lunch is the biggest meal.  Eats home cooked foods, no processed foods.     CURRENT EXERCISE: none; previously exercised in gym.     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       PHYSICAL EXAM:  GENERAL: Well developed, well nourished. No acute distress.   PSYCH: AAOx3, appropriate mood and affect, conversant, well-groomed. Judgement and insight good.   NEURO: Cranial nerves grossly intact. Speech clear, no tremor.   CHEST: Respirations even and unlabored.             Hemoglobin A1C   Date Value Ref Range Status   03/28/2022 6.3 (H) 4.0 - 5.6 % Final     Comment:     ADA Screening Guidelines:  5.7-6.4%  Consistent with prediabetes  >or=6.5%  Consistent with diabetes    High levels of fetal hemoglobin interfere with the HbA1C  assay. Heterozygous hemoglobin variants (HbS, HgC, etc)do  not significantly interfere with this assay.   However, presence of multiple variants may affect accuracy.     11/29/2021 6.6 (H) 4.0 - 5.6 % Final     Comment:     ADA Screening Guidelines:  5.7-6.4%  Consistent with prediabetes  >or=6.5%  Consistent with diabetes    High  levels of fetal hemoglobin interfere with the HbA1C  assay. Heterozygous hemoglobin variants (HbS, HgC, etc)do  not significantly interfere with this assay.   However, presence of multiple variants may affect accuracy.     08/02/2021 6.9 (H) 4.0 - 5.6 % Final     Comment:     ADA Screening Guidelines:  5.7-6.4%  Consistent with prediabetes  >or=6.5%  Consistent with diabetes    High levels of fetal hemoglobin interfere with the HbA1C  assay. Heterozygous hemoglobin variants (HbS, HgC, etc)do  not significantly interfere with this assay.   However, presence of multiple variants may affect accuracy.     10/16/2018 6.2 (H) 4.0 - 5.7 % Final     Comment:     Dr. Jurgen Ward ( Endocrinology )        Ref. Range 9/19/2019 08:13   Glucose Latest Ref Range: 70 - 110 mg/dL 170 (H)   C-Peptide Latest Ref Range: 0.78 - 5.19 ng/mL 1.33        Ref. Range 8/2/2021 07:00   Glucose Latest Ref Range: 70 - 110 mg/dL 120 (H)   C-Peptide Latest Ref Range: 0.78 - 5.19 ng/mL 1.11       Chemistry        Component Value Date/Time     03/28/2022 0712    K 4.4 03/28/2022 0712     03/28/2022 0712    CO2 21 (L) 03/28/2022 0712    BUN 30 (H) 03/28/2022 0712    CREATININE 1.2 03/28/2022 0712     (H) 03/28/2022 0712        Component Value Date/Time    CALCIUM 9.5 03/28/2022 0712    ALKPHOS 66 03/28/2022 0712    AST 16 03/28/2022 0712    ALT 16 03/28/2022 0712    BILITOT 0.4 03/28/2022 0712    ESTGFRAFRICA >60.0 03/28/2022 0712    EGFRNONAA >60.0 03/28/2022 0712          Lab Results   Component Value Date    LDLCALC 141.0 02/01/2022       Lab Results   Component Value Date    MICALBCREAT 519 (H) 10/16/2018               ASSESSMENT and PLAN:    A1C GOAL: 7.5 %       1. Controlled type 2 diabetes mellitus with complication, with long-term current use of insulin  Difficult to evaluate overall glucose trends since Dariel reader could not be downloaded. BGs reviewed from reader are higher than expected compared to low A1c of 6.3%.  it is possible A1c is falsely low d/t anemia. Will continue current treatment for now since pt self reports BGs have been stable and largely unchanged from last visit.     Advised pt to follow correction scale to correct high glucoses instead of guessing, which could lead to hypoglycemia - 150-200=+1, 201-250=+2, 251-300=+3; 301-350=+4, over 350=+5 units    Return to clinic in 3 months with labs prior.     Hemoglobin A1C   2. Steroid-induced hyperglycemia  Increases insulin resistance - pt has high prandial insulin needs compared to basal.    3. Essential hypertension  F/u with Cardiology    4. History of stroke  Continue Trulicity, has CV benefit              Patient is testing blood sugars 4x/day and taking 4 injections of insulin a day. The patient's insulin treatment regimen requires frequent adjustments by the patient on the basis of therapeutic CGM testing results.       Orders Placed This Encounter   Procedures    Hemoglobin A1C     Standing Status:   Future     Standing Expiration Date:   6/3/2023        Follow up in about 3 months (around 7/4/2022).

## 2022-04-05 ENCOUNTER — TELEPHONE (OUTPATIENT)
Dept: NEPHROLOGY | Facility: CLINIC | Age: 72
End: 2022-04-05
Payer: MEDICARE

## 2022-04-05 DIAGNOSIS — N18.30 STAGE 3 CHRONIC KIDNEY DISEASE, UNSPECIFIED WHETHER STAGE 3A OR 3B CKD: Primary | ICD-10-CM

## 2022-04-06 ENCOUNTER — SPECIALTY PHARMACY (OUTPATIENT)
Dept: PHARMACY | Facility: CLINIC | Age: 72
End: 2022-04-06
Payer: MEDICARE

## 2022-04-06 NOTE — TELEPHONE ENCOUNTER
Specialty Pharmacy - Refill Coordination    Specialty Medication Orders Linked to Encounter    Flowsheet Row Most Recent Value   Medication #1 abatacept (ORENCIA CLICKJECT) 125 mg/mL AtIn (Order#[id number], Rx#[id number])          Refill Questions - Documented Responses    Flowsheet Row Most Recent Value   Patient Availability and HIPAA Verification    Does patient want to proceed with activity? Yes   HIPAA/medical authority confirmed? Yes   Relationship to patient of person spoken to? Self  [stephanie, wife]   Refill Screening Questions    Changes to allergies? No   Changes to medications? No   New conditions since last clinic visit? No   Unplanned office visit, urgent care, ED, or hospital admission in the last 4 weeks? No   How does patient/caregiver feel medication is working? Good   Financial problems or insurance changes? No   How many doses of your specialty medications were missed in the last 4 weeks? 0   Would patient like to speak to a pharmacist? No   When does the patient need to receive the medication? 04/13/22   Refill Delivery Questions    How will the patient receive the medication? Delivery Sara   When does the patient need to receive the medication? 04/13/22   Shipping Address Home   Address in [facility] confirmed and updated if neccessary? Yes   Expected Copay ($) 0   Is the patient able to afford the medication copay? Yes   Payment Method zero copay   Days supply of Refill 28   Supplies needed? No supplies needed   Refill activity completed? Yes   Refill activity plan Refill scheduled   Shipment/Pickup Date: 04/12/22          Current Outpatient Medications   Medication Sig    abatacept (ORENCIA CLICKJECT) 125 mg/mL AtIn Inject 125 mg into the skin once a week.    ACCU-CHEK SMARTVIEW TEST STRIP Strp TEST BLOOD SUGAR FOUR TIMES DAILY    alcohol swabs (BD ALCOHOL SWABS) PadM Use 4x/day    amLODIPine (NORVASC) 5 MG tablet Take 1 tablet (5 mg total) by mouth 2 (two) times daily.    ammonium  "lactate 12 % Crea Apply 1 application topically once daily.    atorvastatin (LIPITOR) 80 MG tablet Take 1 tablet (80 mg total) by mouth once daily.    blood glucose control, low (TRUE METRIX LEVEL 1) Soln Use as indicated    BOOSTRIX TDAP 2.5-8-5 Lf-mcg-Lf/0.5mL Syrg injection     cloNIDine 0.2 mg/24 hr td ptwk (CATAPRES) 0.2 mg/24 hr PLACE 1 PATCH ONTO THE SKIN EVERY 7 DAYS.    diclofenac sodium (VOLTAREN) 1 % Gel Apply 2 g topically 4 (four) times daily as needed. Apply to aching joints    dorzolamide (TRUSOPT) 2 % ophthalmic solution INSTILL 1 DROP INTO BOTH EYES TWICE DAILY    dulaglutide (TRULICITY) 1.5 mg/0.5 mL pen injector Inject 1.5 mg into the skin every 7 days.    febuxostat (ULORIC) 40 mg Tab Take 1 tablet (40 mg total) by mouth once daily.    hydrALAZINE (APRESOLINE) 50 MG tablet Take 1 tablet (50 mg total) by mouth 3 (three) times daily.    INJECTAFER 50 iron mg/mL injection     insulin (LANTUS SOLOSTAR U-100 INSULIN) glargine 100 units/mL (3mL) SubQ pen Inject 6 units once daily    insulin aspart U-100 (NOVOLOG FLEXPEN U-100 INSULIN) 100 unit/mL (3 mL) InPn pen Inject 4-8 units three times daily before each meal with sliding scale.    lancets (TRUEPLUS LANCETS) 33 gauge Misc Test glucose 4x/day. Dx code e11.65    LINZESS 145 mcg Cap capsule TAKE 1 CAPSULE (145 MCG TOTAL) BY MOUTH ONCE DAILY.    multivit with min-folic acid 200 mcg Chew     omeprazole (PRILOSEC) 20 MG capsule TAKE 2 CAPSULES (40 MG TOTAL) BY MOUTH ONCE DAILY.    pen needle, diabetic (BD ULTRA-FINE RICHARD PEN NEEDLE) 32 gauge x 5/32" Ndle 1 Device by Misc.(Non-Drug; Combo Route) route 4 (four) times daily.    predniSONE (DELTASONE) 5 MG tablet Take 1 tablet (5 mg total) by mouth once daily.    PROLIA 60 mg/mL Syrg     torsemide (DEMADEX) 10 MG Tab TAKE 1 TABLET EVERY DAY    traMADoL (ULTRAM) 50 mg tablet Take 1 tablet (50 mg total) by mouth every 12 (twelve) hours as needed for Pain.    travoprost (TRAVATAN Z) 0.004 " % ophthalmic solution INSTILL 1 DROP INTO BOTH EYES EVERY EVENING    triamcinolone acetonide 0.1% (KENALOG) 0.1 % cream Apply topically 2 (two) times daily. for 10 days    TRUE METRIX GLUCOSE METER Misc USE AS DIRECTED    valsartan (DIOVAN) 160 MG tablet Take 1 tablet (160 mg total) by mouth 2 (two) times daily.   Last reviewed on 4/4/2022  8:26 AM by Bianca Mares NP    Review of patient's allergies indicates:   Allergen Reactions    Penicillins Nausea And Vomiting    Rosuvastatin      Muscle pain     Allopurinol analogues Rash    Last reviewed on  4/4/2022 8:26 AM by Bianca Mares      Tasks added this encounter   No tasks added.   Tasks due within next 3 months   4/6/2022 - Refill Call (Auto Added)     [facility]  [address] [facility] [address]  Phone: [phone]  Fax: [phone]

## 2022-04-07 ENCOUNTER — IMMUNIZATION (OUTPATIENT)
Dept: OBSTETRICS AND GYNECOLOGY | Facility: CLINIC | Age: 72
End: 2022-04-07
Payer: MEDICARE

## 2022-04-07 DIAGNOSIS — Z23 NEED FOR VACCINATION: Primary | ICD-10-CM

## 2022-04-07 PROCEDURE — 0054A COVID-19, MRNA, LNP-S, PF, 30 MCG/0.3 ML DOSE VACCINE (PFIZER): CPT | Mod: PBBFAC | Performed by: FAMILY MEDICINE

## 2022-04-14 ENCOUNTER — OFFICE VISIT (OUTPATIENT)
Dept: CARDIOLOGY | Facility: CLINIC | Age: 72
End: 2022-04-14
Payer: MEDICARE

## 2022-04-14 VITALS — SYSTOLIC BLOOD PRESSURE: 132 MMHG | DIASTOLIC BLOOD PRESSURE: 72 MMHG

## 2022-04-14 DIAGNOSIS — K59.09 CHRONIC CONSTIPATION: ICD-10-CM

## 2022-04-14 DIAGNOSIS — G45.9 TIA (TRANSIENT ISCHEMIC ATTACK): ICD-10-CM

## 2022-04-14 DIAGNOSIS — Z79.4 CONTROLLED TYPE 2 DIABETES MELLITUS WITH DIABETIC POLYNEUROPATHY, WITH LONG-TERM CURRENT USE OF INSULIN: ICD-10-CM

## 2022-04-14 DIAGNOSIS — E78.00 PURE HYPERCHOLESTEROLEMIA: ICD-10-CM

## 2022-04-14 DIAGNOSIS — N18.31 STAGE 3A CHRONIC KIDNEY DISEASE: ICD-10-CM

## 2022-04-14 DIAGNOSIS — R60.0 BILATERAL EDEMA OF LOWER EXTREMITY: ICD-10-CM

## 2022-04-14 DIAGNOSIS — E11.42 CONTROLLED TYPE 2 DIABETES MELLITUS WITH DIABETIC POLYNEUROPATHY, WITH LONG-TERM CURRENT USE OF INSULIN: ICD-10-CM

## 2022-04-14 DIAGNOSIS — I50.30 DIASTOLIC HEART FAILURE, NYHA CLASS 2: ICD-10-CM

## 2022-04-14 DIAGNOSIS — I44.1 MOBITZ I: ICD-10-CM

## 2022-04-14 DIAGNOSIS — I10 ESSENTIAL HYPERTENSION: Primary | ICD-10-CM

## 2022-04-14 DIAGNOSIS — I70.0 AORTIC ATHEROSCLEROSIS: ICD-10-CM

## 2022-04-14 PROCEDURE — 1160F PR REVIEW ALL MEDS BY PRESCRIBER/CLIN PHARMACIST DOCUMENTED: ICD-10-PCS | Mod: CPTII,S$GLB,, | Performed by: INTERNAL MEDICINE

## 2022-04-14 PROCEDURE — 3075F SYST BP GE 130 - 139MM HG: CPT | Mod: CPTII,S$GLB,, | Performed by: INTERNAL MEDICINE

## 2022-04-14 PROCEDURE — 99999 PR PBB SHADOW E&M-EST. PATIENT-LVL V: ICD-10-PCS | Mod: PBBFAC,,, | Performed by: INTERNAL MEDICINE

## 2022-04-14 PROCEDURE — 3044F HG A1C LEVEL LT 7.0%: CPT | Mod: CPTII,S$GLB,, | Performed by: INTERNAL MEDICINE

## 2022-04-14 PROCEDURE — 93000 EKG 12-LEAD: ICD-10-PCS | Mod: S$GLB,,, | Performed by: INTERNAL MEDICINE

## 2022-04-14 PROCEDURE — 1159F MED LIST DOCD IN RCRD: CPT | Mod: CPTII,S$GLB,, | Performed by: INTERNAL MEDICINE

## 2022-04-14 PROCEDURE — 1160F RVW MEDS BY RX/DR IN RCRD: CPT | Mod: CPTII,S$GLB,, | Performed by: INTERNAL MEDICINE

## 2022-04-14 PROCEDURE — 1159F PR MEDICATION LIST DOCUMENTED IN MEDICAL RECORD: ICD-10-PCS | Mod: CPTII,S$GLB,, | Performed by: INTERNAL MEDICINE

## 2022-04-14 PROCEDURE — 99214 OFFICE O/P EST MOD 30 MIN: CPT | Mod: S$GLB,,, | Performed by: INTERNAL MEDICINE

## 2022-04-14 PROCEDURE — 4010F ACE/ARB THERAPY RXD/TAKEN: CPT | Mod: CPTII,S$GLB,, | Performed by: INTERNAL MEDICINE

## 2022-04-14 PROCEDURE — 93000 ELECTROCARDIOGRAM COMPLETE: CPT | Mod: S$GLB,,, | Performed by: INTERNAL MEDICINE

## 2022-04-14 PROCEDURE — 99999 PR PBB SHADOW E&M-EST. PATIENT-LVL V: CPT | Mod: PBBFAC,,, | Performed by: INTERNAL MEDICINE

## 2022-04-14 PROCEDURE — 3075F PR MOST RECENT SYSTOLIC BLOOD PRESS GE 130-139MM HG: ICD-10-PCS | Mod: CPTII,S$GLB,, | Performed by: INTERNAL MEDICINE

## 2022-04-14 PROCEDURE — 3078F PR MOST RECENT DIASTOLIC BLOOD PRESSURE < 80 MM HG: ICD-10-PCS | Mod: CPTII,S$GLB,, | Performed by: INTERNAL MEDICINE

## 2022-04-14 PROCEDURE — 4010F PR ACE/ARB THEARPY RXD/TAKEN: ICD-10-PCS | Mod: CPTII,S$GLB,, | Performed by: INTERNAL MEDICINE

## 2022-04-14 PROCEDURE — 99214 PR OFFICE/OUTPT VISIT, EST, LEVL IV, 30-39 MIN: ICD-10-PCS | Mod: S$GLB,,, | Performed by: INTERNAL MEDICINE

## 2022-04-14 PROCEDURE — 3078F DIAST BP <80 MM HG: CPT | Mod: CPTII,S$GLB,, | Performed by: INTERNAL MEDICINE

## 2022-04-14 PROCEDURE — 3044F PR MOST RECENT HEMOGLOBIN A1C LEVEL <7.0%: ICD-10-PCS | Mod: CPTII,S$GLB,, | Performed by: INTERNAL MEDICINE

## 2022-04-14 NOTE — PROGRESS NOTES
CARDIOVASCULAR CONSULTATION    REASON FOR CONSULT:   Darrion Bennett is a 72 y.o. male who presents for establishing care with Cardiology.      HISTORY OF PRESENT ILLNESS:     Notes from September 2019:  Patient here for follow-up today.  Denies any chest pains at rest on exertion, orthopnea, PND.  Denies any other cardiac symptoms.    Notes from June 2019:  Patient is here for follow-up today.  Denies any chest pains at rest on exertion, orthopnea, PND.  Denies any.  Of dizziness or passing out.  Blood pressure is much better controlled in clinic today.    Initial clinic visit:  Patient is a very pleasant 69-year-old man with a past medical history significant for hypertension, diabetes, Sjogren's disease who wants to establish care with [facility].  Patient has been followed with [facility].  Patient states that in 2014 he was diagnosed with heart failure.  He states that he had passed out and had gone to the hospital, he was feeling short of breath also and at that point he was told that he had heart failure.  I can see an echocardiogram from his outside medical records in 2014 which showed normal left ventricular systolic function.  He also had a stress echocardiogram done in 2013 which did not reveal any ischemia.  He denies any chest pains at rest on exertion, orthopnea, PND, swelling of feet.  States that since then he has been okay and walks about 2-3 blocks every day.  On walking about 2-3 blocks he does experience some tightness in his left calf.  This goes away after rest.  He denies any resting calf tightness.  He denies any ulcer on his feet.  He states that he checks his blood pressure at home and has found that it is elevated around 160-170 mm of systolic multiple times.      Notes from March 2021:  Patient here for follow-up.  Denies any chest pains at rest on exertion, orthopnea, PND, swelling of feet.  Mostly blood pressure has been uncontrolled at home.  Staying around 160 mmHg.  Very  compliant with medications.  EKG done in the clinic today was personally reviewed and demonstrates sinus bradycardia with left anterior fascicular block, poor R-wave progression.      Notes from April 21:  Patient here for follow-up.  Now states that he has been experiencing dyspnea on exertion which is worse than prior.  Very concerned about that.  No chest pains or tightness, but it is gets short of breath on walking short distances and this is lifestyle limiting.  Denies orthopnea, PND, swelling of feet.  Also states that the blood pressure is staying around 140-160 mmHg at home.      Notes from May 2021:  Patient here for follow-up.  Stress test did not show any significant ischemia.  Echo showed normal left ventricle systolic function.  Symptoms have improved.  States stop taking his Crestor because it was causing myalgias      The left ventricle is normal in size with concentric remodeling and normal systolic function.  The estimated ejection fraction is 65%.  Grade II left ventricular diastolic dysfunction.  Severe left atrial enlargement.  Normal right ventricular size with normal right ventricular systolic function.  Mild right atrial enlargement.  Mild mitral regurgitation.  Normal central venous pressure (3 mmHg).  The estimated PA systolic pressure is 36 mmHg.  The sinuses of Valsalva is mildly dilated.  Normal myocardial perfusion scan. There is no evidence of myocardial ischemia or infarction.    The gated perfusion images showed an ejection fraction of 75% post stress.    There is normal wall motion post stress.    The EKG portion of this study is negative for ischemia.    The patient reported no chest pain during the stress test.    During stress, rare PVCs are noted.    Hypertensive response to exercise.  IL interval did increase during exercise.      Notes from July 2021:  Patient here for follow-up.  Has been experiencing dyspnea on exertion.  States it is getting worse.  Has an appointment  with pulmonology coming up.  Is requesting evaluation of CBC and BNP.  As well as a chest x-ray.      · The left ventricle is normal in size with concentric hypertrophy and normal systolic function.  · The estimated ejection fraction is 65%.  · Grade I left ventricular diastolic dysfunction.  · Normal right ventricular size with normal right ventricular systolic function.  · Moderate left atrial enlargement.  · Mild right atrial enlargement.  · There is mild aortic valve stenosis.  · Aortic valve area is 1.67 cm2; peak velocity is 1.95 m/s; mean gradient is 9 mmHg.  · Normal central venous pressure (3 mmHg).  · The estimated PA systolic pressure is 16 mmHg.      Notes from dec 21: doing fine. No cp, orthopnea, pnd.       February 2022:  Patient follow-up.  Doing fine.  Occasionally gets swelling in his feet at the end of the day..  States he takes torsemide every Saturday and Sunday.  States he was told by his nephrologist to do so.    PAST MEDICAL HISTORY:     Past Medical History:   Diagnosis Date    Anemia     Arthritis     Cataract     CHF (congestive heart failure)     Chronic constipation     Diabetes mellitus, type 2     Felon of finger of left hand 5/11/2019    Glaucoma     Hyperlipidemia 5/13/2014    Hypertension     MCTD (mixed connective tissue disease)     Sjogren's disease     Ulcerative colitis     Urolithiasis        PAST SURGICAL HISTORY:     Past Surgical History:   Procedure Laterality Date    CATARACT EXTRACTION Bilateral 2005    COLONOSCOPY  2014    EYE SURGERY         ALLERGIES AND MEDICATION:     Review of patient's allergies indicates:   Allergen Reactions    Penicillins Nausea And Vomiting    Rosuvastatin      Muscle pain     Allopurinol analogues Rash        Medication List          Accurate as of April 14, 2022  9:15 AM. If you have any questions, ask your nurse or doctor.            CONTINUE taking these medications    ACCU-CHEK SMARTVIEW TEST STRIP Strp  Generic drug:  blood sugar diagnostic  TEST BLOOD SUGAR FOUR TIMES DAILY     alcohol swabs Pad  Commonly known as: BD ALCOHOL SWABS  Use 4x/day     amLODIPine 5 MG tablet  Commonly known as: NORVASC  Take 1 tablet (5 mg total) by mouth 2 (two) times daily.     ammonium lactate 12 % Crea  Apply 1 application topically once daily.     atorvastatin 80 MG tablet  Commonly known as: LIPITOR  Take 1 tablet (80 mg total) by mouth once daily.     BOOSTRIX TDAP 2.5-8-5 Lf-mcg-Lf/0.5mL Syrg injection  Generic drug: diphth,pertus(acell),tetanus     cloNIDine 0.2 mg/24 hr td ptwk 0.2 mg/24 hr  Commonly known as: CATAPRES  PLACE 1 PATCH ONTO THE SKIN EVERY 7 DAYS.     diclofenac sodium 1 % Gel  Commonly known as: VOLTAREN  Apply 2 g topically 4 (four) times daily as needed. Apply to aching joints     dorzolamide 2 % ophthalmic solution  Commonly known as: TRUSOPT  INSTILL 1 DROP INTO BOTH EYES TWICE DAILY     febuxostat 40 mg Tab  Commonly known as: ULORIC  Take 1 tablet (40 mg total) by mouth once daily.     hydrALAZINE 50 MG tablet  Commonly known as: APRESOLINE  Take 1 tablet (50 mg total) by mouth 3 (three) times daily.     INJECTAFER 50 iron mg/mL injection  Generic drug: ferric carboxymaltose     insulin aspart U-100 100 unit/mL (3 mL) Inpn pen  Commonly known as: NovoLOG Flexpen U-100 Insulin  Inject 4-8 units three times daily before each meal with sliding scale.     lancets 33 gauge Misc  Commonly known as: TRUEPLUS LANCETS  Test glucose 4x/day. Dx code e11.65     LANTUS SOLOSTAR U-100 INSULIN glargine 100 units/mL (3mL) SubQ pen  Generic drug: insulin  Inject 6 units once daily     LINZESS 145 mcg Cap capsule  Generic drug: linaCLOtide  TAKE 1 CAPSULE (145 MCG TOTAL) BY MOUTH ONCE DAILY.     multivit with min-folic acid 200 mcg Chew     omeprazole 20 MG capsule  Commonly known as: PRILOSEC  TAKE 2 CAPSULES (40 MG TOTAL) BY MOUTH ONCE DAILY.     ORENCIA CLICKJECT 125 mg/mL Atin  Generic drug: abatacept  Inject 125 mg into the skin  "once a week.     pen needle, diabetic 32 gauge x 5/32" Ndle  Commonly known as: BD ULTRA-FINE RICHARD PEN NEEDLE  1 Device by Misc.(Non-Drug; Combo Route) route 4 (four) times daily.     predniSONE 5 MG tablet  Commonly known as: DELTASONE  Take 1 tablet (5 mg total) by mouth once daily.     PROLIA 60 mg/mL Syrg  Generic drug: denosumab     torsemide 10 MG Tab  Commonly known as: DEMADEX  TAKE 1 TABLET EVERY DAY     traMADoL 50 mg tablet  Commonly known as: ULTRAM  TAKE 1 TABLET BY MOUTH EVERY 12 HOURS AS NEEDED FOR PAIN.     travoprost 0.004 % ophthalmic solution  Commonly known as: TRAVATAN Z  INSTILL 1 DROP INTO BOTH EYES EVERY EVENING     triamcinolone acetonide 0.1% 0.1 % cream  Commonly known as: KENALOG  Apply topically 2 (two) times daily. for 10 days     TRUE METRIX GLUCOSE METER Misc  Generic drug: blood-glucose meter  USE AS DIRECTED     TRUE METRIX LEVEL 1 Soln  Generic drug: blood glucose control, low  Use as indicated     TRULICITY 1.5 mg/0.5 mL pen injector  Generic drug: dulaglutide  Inject 1.5 mg into the skin every 7 days.     valsartan 160 MG tablet  Commonly known as: DIOVAN  Take 1 tablet (160 mg total) by mouth 2 (two) times daily.            SOCIAL HISTORY:     Social History     Socioeconomic History    Marital status:     Number of children: 3   Tobacco Use    Smoking status: Never Smoker    Smokeless tobacco: Never Used   Substance and Sexual Activity    Alcohol use: No    Drug use: Yes     Comment: ultram 1 - 2 tabs a week     Social Determinants of Health     Financial Resource Strain: Medium Risk    Difficulty of Paying Living Expenses: Somewhat hard   Food Insecurity: Food Insecurity Present    Worried About Running Out of Food in the Last Year: Sometimes true    Ran Out of Food in the Last Year: Sometimes true   Transportation Needs: No Transportation Needs    Lack of Transportation (Medical): No    Lack of Transportation (Non-Medical): No   Physical Activity: " Insufficiently Active    Days of Exercise per Week: 1 day    Minutes of Exercise per Session: 20 min   Stress: No Stress Concern Present    Feeling of Stress : Not at all   Social Connections: Socially Integrated    Frequency of Communication with Friends and Family: Twice a week    Frequency of Social Gatherings with Friends and Family: Twice a week    Attends Jainism Services: 1 to 4 times per year    Active Member of Clubs or Organizations: Yes    Attends Club or Organization Meetings: Never    Marital Status:    Housing Stability: Low Risk     Unable to Pay for Housing in the Last Year: No    Number of Places Lived in the Last Year: 1    Unstable Housing in the Last Year: No       FAMILY HISTORY:     Family History   Problem Relation Age of Onset    Hypertension Father     Stroke Father     Cataracts Father     Glaucoma Father     Cataracts Mother     No Known Problems Sister     No Known Problems Brother     Rheum arthritis Maternal Aunt     Rheum arthritis Maternal Uncle     No Known Problems Paternal Aunt     No Known Problems Paternal Uncle     No Known Problems Maternal Grandmother     No Known Problems Maternal Grandfather     Throat cancer Paternal Grandmother     Glaucoma Paternal Grandfather     No Known Problems Daughter     No Known Problems Son     Celiac disease Neg Hx     Colon cancer Neg Hx     Cirrhosis Neg Hx     Colon polyps Neg Hx     Crohn's disease Neg Hx     Cystic fibrosis Neg Hx     Hemochromatosis Neg Hx     Esophageal cancer Neg Hx     Inflammatory bowel disease Neg Hx     Irritable bowel syndrome Neg Hx     Liver cancer Neg Hx     Liver disease Neg Hx     Rectal cancer Neg Hx     Stomach cancer Neg Hx     Ulcerative colitis Neg Hx     Chase's disease Neg Hx     Amblyopia Neg Hx     Blindness Neg Hx     Cancer Neg Hx     Diabetes Neg Hx     Macular degeneration Neg Hx     Retinal detachment Neg Hx     Strabismus Neg Hx      "Thyroid disease Neg Hx     Melanoma Neg Hx        REVIEW OF SYSTEMS:   Review of Systems   Constitutional: Negative.    HENT: Negative.    Eyes: Negative.    Respiratory: Negative.    Gastrointestinal: Negative.    Genitourinary: Negative.    Musculoskeletal: Negative.    Skin: Negative.    Neurological: Negative.    Endo/Heme/Allergies: Negative.    Psychiatric/Behavioral: Negative.    All other systems reviewed and are negative.      A 10 point review of systems was performed and all the pertinent positives have been mentioned. Rest of review of systems was negative.        PHYSICAL EXAM:     Vitals:    04/14/22 0837   BP: 132/72   Resp: (P) 15    Body mass index is 23.77 kg/m² (pended).  Weight: (P) 66.8 kg (147 lb 4.3 oz)   Height: (P) 5' 6" (167.6 cm)     Physical Exam  Vitals and nursing note reviewed.   Constitutional:       Appearance: Normal appearance. He is well-developed.   HENT:      Head: Normocephalic and atraumatic.      Right Ear: Hearing normal.      Left Ear: Hearing normal.      Nose: Nose normal.   Eyes:      General: Lids are normal.      Conjunctiva/sclera: Conjunctivae normal.      Pupils: Pupils are equal, round, and reactive to light.   Cardiovascular:      Rate and Rhythm: Normal rate and regular rhythm.      Pulses: Normal pulses.      Heart sounds: Murmur heard.    Systolic murmur is present with a grade of 2/6.  Pulmonary:      Effort: Pulmonary effort is normal.      Breath sounds: Normal breath sounds.   Abdominal:      Palpations: Abdomen is soft.      Tenderness: There is no abdominal tenderness.   Musculoskeletal:         General: No deformity.      Cervical back: Normal range of motion and neck supple.   Skin:     General: Skin is warm and dry.   Neurological:      Mental Status: He is alert and oriented to person, place, and time.   Psychiatric:         Speech: Speech normal.           DATA:     Laboratory:  CBC:  Recent Labs   Lab 10/01/21  0917 02/01/22  0844 03/28/22  0712 "   WBC 6.90 6.09  6.09 7.10   Hemoglobin 11.6 L 11.9 L  11.9 L 12.3 L   Hematocrit 37.7 L 38.8 L  38.8 L 38.7 L   Platelets 230 197  197 268       CHEMISTRIES:  Recent Labs   Lab 05/19/21  0856 06/27/21  0409 06/28/21  0438 10/07/21  0721 02/01/22  0844 03/28/22  0712   Glucose 103 136 H   < > 144 H 88  88 134 H   Sodium 138 137   < > 136 141  141 137   Potassium 4.1 4.2   < > 4.9 4.5  4.5 4.4   BUN 31 H 32 H   < > 34 H 30 H  30 H 30 H   Creatinine 1.6 H 1.4   < > 1.4 1.4  1.4 1.2   eGFR if  49 A 58 A   < > 58.0 A 58 A  58 A >60.0   eGFR if non  43 A 50 A   < > 50.2 A 50 A  50 A >60.0   Calcium 9.3 9.1   < > 8.9 8.9  8.9 9.5   Magnesium 1.8 2.0  --   --   --   --     < > = values in this interval not displayed.       CARDIAC BIOMARKERS:  Recent Labs   Lab 04/29/20  0928 06/27/21  0409   CPK 33 50   CPK MB  --  1.7   Troponin I  --  0.026       COAGS:  Recent Labs   Lab 06/27/21  0409   INR 1.0       LIPIDS/LFTS:  Recent Labs   Lab 05/19/21  0732 05/19/21  0856 06/28/21  0438 10/01/21  0917 02/01/22  0844 03/28/22  0712   Cholesterol 225 H  --  157  --  220 H  --    Triglycerides 78  --  92  --  100  --    HDL 50  --  41  --  59  --    LDL Cholesterol 159.4 H  --  97.6  --  141.0  --    Non-HDL Cholesterol 175  --  116  --  161  --    AST  --    < > 11 16 15  15 16   ALT  --    < > 7 L 11 11  11 16    < > = values in this interval not displayed.       Hemoglobin A1C   Date Value Ref Range Status   03/28/2022 6.3 (H) 4.0 - 5.6 % Final     Comment:     ADA Screening Guidelines:  5.7-6.4%  Consistent with prediabetes  >or=6.5%  Consistent with diabetes    High levels of fetal hemoglobin interfere with the HbA1C  assay. Heterozygous hemoglobin variants (HbS, HgC, etc)do  not significantly interfere with this assay.   However, presence of multiple variants may affect accuracy.     11/29/2021 6.6 (H) 4.0 - 5.6 % Final     Comment:     ADA Screening Guidelines:  5.7-6.4%   Consistent with prediabetes  >or=6.5%  Consistent with diabetes    High levels of fetal hemoglobin interfere with the HbA1C  assay. Heterozygous hemoglobin variants (HbS, HgC, etc)do  not significantly interfere with this assay.   However, presence of multiple variants may affect accuracy.     08/02/2021 6.9 (H) 4.0 - 5.6 % Final     Comment:     ADA Screening Guidelines:  5.7-6.4%  Consistent with prediabetes  >or=6.5%  Consistent with diabetes    High levels of fetal hemoglobin interfere with the HbA1C  assay. Heterozygous hemoglobin variants (HbS, HgC, etc)do  not significantly interfere with this assay.   However, presence of multiple variants may affect accuracy.     10/16/2018 6.2 (H) 4.0 - 5.7 % Final     Comment:     Dr. Jurgen Ward ( Endocrinology )       TSH  Recent Labs   Lab 05/19/21  0856 06/27/21  0409   TSH 4.040 H 4.339 H       The 10-year ASCVD risk score (Midvaleluis carlos POOLE Jr., et al., 2013) is: 41.6%    Values used to calculate the score:      Age: 72 years      Sex: Male      Is Non- : No      Diabetic: Yes      Tobacco smoker: No      Systolic Blood Pressure: 132 mmHg      Is BP treated: Yes      HDL Cholesterol: 59 mg/dL      Total Cholesterol: 220 mg/dL           Cardiovascular Testing:    EKG: (personally reviewed tracing)  Sinus bradycardia with first-degree AV block left anterior fascicle, septal infarct.    KIRT in June 2019:  Normal resting KIRT/PVR waveforms bilaterally.  Normal exercise KIRT bilaterally (with minimal drop from resting KIRT measurements).        2D echocardiogram in June 2019:  Normal left ventricular systolic function. The estimated ejection fraction is 65%  Moderate left atrial enlargement.  Normal LV diastolic function.  Mild right atrial enlargement.  Normal central venous pressure (3 mm Hg).  The estimated PA systolic pressure is 14 mm Hg            2D echocardiogram 2014 [facility] at [facility]    HEIGHT: 167.6 cm  WEIGHT: 74.8 kg  BP:  157/82  BSA:  MEASUREMENTS  ------------  IVSd: 1.2 cm  LVIDd: 3.8 cm  LVPWd: 1.2 cm  LVIDs: 2.6 cm  LA Diam: 3.2 cm  Ao Diam: 3.1 cm  LAESV Index (A-L): 32.40 ml/m²  LVCO Dopp: 6.98 l/min  LVCI Dopp: 3.79 l/minm²    FINDINGS  -------  CPT CODE:09529-45.  Transthoracic echocardiogram (complete).  The attending physician   listed herein personally reviewed all stored images and directly supervised the   fellow-in-training (if listed) in the study's acquistion and/or report   generation.     Study priority:  Routine.  ICD-9 Indication(s):786.05 - Dyspnea.  Study Quality:The study was technically adequate.  ECG Rhythm:Sinus rhythm.  CHAMBER SIZE:All cardiac chamber sizes are within normal limits.  AORTA:Normal aortic root and proximal ascending aorta.  VALVULAR STRUCTURES:The visualized cardiac valves are structurally normal.  LEFT/RIGHT VENTRICULAR FUNCTION:LV size, wall thickness and systolic function are normal, with an EF > 55%.       The right ventricle is normal in size and function.  DOPPLER:  Color:No valvular insufficiencies.  DIASTOLOGY:The diastolic filling pattern is normal for the age of the patient.  PERICARDIUM / EFFUSIONS:No effusions noted.  INFERIOR VENA CAVA:Normal size inferior vena cava.  PA PRESSURE:The estimated systolic pulmonary artery pressure is normal at < 35 mm Hg (RA   pressure = 3 mm Hg).  CARDIOLOGY:    Electronically signed:  STAFF:ELIAS JAIME M.D.  Fellow:G. MOHSEN, M.D.    CONCLUSIONS  -----------  1. LV size, wall thickness and systolic function are normal, with an EF > 55%.  2. The diastolic filling pattern is normal for the age of the patient.    ASSESSMENT AND PLAN     Patient Active Problem List   Diagnosis    Essential hypertension    Controlled type 2 diabetes mellitus with diabetic polyneuropathy, with long-term current use of insulin    Pure hypercholesterolemia    MCTD (mixed connective tissue disease)    Sjogren's disease    Bilateral edema of lower extremity 6/2019  TTE normal LV systolic and diastolic function; ABIs normal    Glaucoma    BMI 23.0-23.9, adult    Jackson's esophagus without dysplasia    Anemia from plaquenil and imuran    Neutropenia    Current chronic use of systemic steroids    Compression fracture of body of thoracic vertebra on XR 2/2019; 2/2019 alendronate    Anemia of chronic renal failure, stage 3b    Chronic constipation not responding to linzess, miralax, senna    Screening for colon cancer 07/26/2018 colonoscopy transverse colon polyp path showing benign inflammatory polyp.    Tophaceous gout    Pseudophakia of both eyes    Refractive error    Aortic atherosclerosis    Celiac disease    Diastolic heart failure, NYHA class 2    Stage 3a chronic kidney disease    Joint pain    Decreased range of motion of both ankles    Decreased range of motion of both wrists    Decreased pinch strength    RA (rheumatoid arthritis)    Decreased  strength    Swelling of both hands    Secondary hyperparathyroidism of renal origin    Osteoporosis    TIA (transient ischemic attack)    Mobitz I    Bilateral carotid artery stenosis on CTA 6/26/21    History of stroke    Dyspnea on exertion    JAZLYN (obstructive sleep apnea)    Long-term insulin use    Dyshidrotic eczema       1.  Patient states he was diagnosed with congestive heart failure in the past.  stress test did not show any significant ischemia.  Echo showed normal left ventricle systolic function    2.  Hypertension:  Well controlled on current therapy.      3.  Dyslipidemia:  Tolerating lipitor    4.  ABIs in June 2019 were within normal limits.    5.  Chronic kidney disease    7. Elevated TSH.   rx per primary    8. Torsemide to be used as needed for swelling of feet.    Follow-up in 6 months      Thank you very much for involving me in the care of your patient.  Please do not hesitate to contact me if there are any questions.      Greg Alvares MD, FACC,  [place]  Interventional Cardiologist, [facility].           This note was dictated with the help of speech recognition software.  There might be un-intended errors and/or substitutions.

## 2022-04-18 ENCOUNTER — PATIENT MESSAGE (OUTPATIENT)
Dept: ADMINISTRATIVE | Facility: OTHER | Age: 72
End: 2022-04-18
Payer: MEDICARE

## 2022-04-22 ENCOUNTER — LAB VISIT (OUTPATIENT)
Dept: LAB | Facility: HOSPITAL | Age: 72
End: 2022-04-22
Attending: INTERNAL MEDICINE
Payer: MEDICARE

## 2022-04-22 DIAGNOSIS — N18.30 STAGE 3 CHRONIC KIDNEY DISEASE, UNSPECIFIED WHETHER STAGE 3A OR 3B CKD: ICD-10-CM

## 2022-04-22 LAB
ALBUMIN SERPL BCP-MCNC: 3.5 G/DL (ref 3.5–5.2)
ALP SERPL-CCNC: 58 U/L (ref 55–135)
ALT SERPL W/O P-5'-P-CCNC: 32 U/L (ref 10–44)
ANION GAP SERPL CALC-SCNC: 10 MMOL/L (ref 8–16)
AST SERPL-CCNC: 38 U/L (ref 10–40)
BILIRUB SERPL-MCNC: 0.6 MG/DL (ref 0.1–1)
BUN SERPL-MCNC: 26 MG/DL (ref 8–23)
CALCIUM SERPL-MCNC: 9.4 MG/DL (ref 8.7–10.5)
CHLORIDE SERPL-SCNC: 104 MMOL/L (ref 95–110)
CO2 SERPL-SCNC: 22 MMOL/L (ref 23–29)
CREAT SERPL-MCNC: 1.3 MG/DL (ref 0.5–1.4)
EST. GFR  (AFRICAN AMERICAN): >60 ML/MIN/1.73 M^2
EST. GFR  (NON AFRICAN AMERICAN): 54.5 ML/MIN/1.73 M^2
GLUCOSE SERPL-MCNC: 133 MG/DL (ref 70–110)
PHOSPHATE SERPL-MCNC: 2.7 MG/DL (ref 2.7–4.5)
POTASSIUM SERPL-SCNC: 4.7 MMOL/L (ref 3.5–5.1)
PROT SERPL-MCNC: 6.7 G/DL (ref 6–8.4)
SODIUM SERPL-SCNC: 136 MMOL/L (ref 136–145)

## 2022-04-22 PROCEDURE — 36415 COLL VENOUS BLD VENIPUNCTURE: CPT | Mod: PO | Performed by: INTERNAL MEDICINE

## 2022-04-22 PROCEDURE — 80053 COMPREHEN METABOLIC PANEL: CPT | Performed by: INTERNAL MEDICINE

## 2022-04-22 PROCEDURE — 84100 ASSAY OF PHOSPHORUS: CPT | Performed by: INTERNAL MEDICINE

## 2022-04-27 ENCOUNTER — LAB VISIT (OUTPATIENT)
Dept: LAB | Facility: HOSPITAL | Age: 72
End: 2022-04-27
Attending: INTERNAL MEDICINE
Payer: MEDICARE

## 2022-04-27 ENCOUNTER — OFFICE VISIT (OUTPATIENT)
Dept: NEPHROLOGY | Facility: CLINIC | Age: 72
End: 2022-04-27
Payer: MEDICARE

## 2022-04-27 VITALS
BODY MASS INDEX: 22.66 KG/M2 | DIASTOLIC BLOOD PRESSURE: 62 MMHG | WEIGHT: 141 LBS | HEIGHT: 66 IN | SYSTOLIC BLOOD PRESSURE: 140 MMHG

## 2022-04-27 DIAGNOSIS — N18.30 CONTROLLED TYPE 2 DIABETES MELLITUS WITH STAGE 3 CHRONIC KIDNEY DISEASE, WITH LONG-TERM CURRENT USE OF INSULIN: ICD-10-CM

## 2022-04-27 DIAGNOSIS — D63.1 ANEMIA OF CHRONIC RENAL FAILURE, STAGE 3B: ICD-10-CM

## 2022-04-27 DIAGNOSIS — M35.00 SJOGREN'S SYNDROME, WITH UNSPECIFIED ORGAN INVOLVEMENT: ICD-10-CM

## 2022-04-27 DIAGNOSIS — N18.32 ANEMIA OF CHRONIC RENAL FAILURE, STAGE 3B: ICD-10-CM

## 2022-04-27 DIAGNOSIS — I10 ESSENTIAL HYPERTENSION: ICD-10-CM

## 2022-04-27 DIAGNOSIS — N18.31 STAGE 3A CHRONIC KIDNEY DISEASE: ICD-10-CM

## 2022-04-27 DIAGNOSIS — E55.9 VITAMIN D INSUFFICIENCY: ICD-10-CM

## 2022-04-27 DIAGNOSIS — E11.22 CONTROLLED TYPE 2 DIABETES MELLITUS WITH STAGE 3 CHRONIC KIDNEY DISEASE, WITH LONG-TERM CURRENT USE OF INSULIN: ICD-10-CM

## 2022-04-27 DIAGNOSIS — I10 ESSENTIAL HYPERTENSION: Primary | ICD-10-CM

## 2022-04-27 DIAGNOSIS — Z79.4 CONTROLLED TYPE 2 DIABETES MELLITUS WITH STAGE 3 CHRONIC KIDNEY DISEASE, WITH LONG-TERM CURRENT USE OF INSULIN: ICD-10-CM

## 2022-04-27 PROCEDURE — 99999 PR PBB SHADOW E&M-EST. PATIENT-LVL II: ICD-10-PCS | Mod: PBBFAC,,, | Performed by: INTERNAL MEDICINE

## 2022-04-27 PROCEDURE — 99499 UNLISTED E&M SERVICE: CPT | Mod: S$GLB,,, | Performed by: INTERNAL MEDICINE

## 2022-04-27 PROCEDURE — 1159F MED LIST DOCD IN RCRD: CPT | Mod: CPTII,S$GLB,, | Performed by: INTERNAL MEDICINE

## 2022-04-27 PROCEDURE — 3044F HG A1C LEVEL LT 7.0%: CPT | Mod: CPTII,S$GLB,, | Performed by: INTERNAL MEDICINE

## 2022-04-27 PROCEDURE — 99215 OFFICE O/P EST HI 40 MIN: CPT | Mod: S$GLB,,, | Performed by: INTERNAL MEDICINE

## 2022-04-27 PROCEDURE — 3077F PR MOST RECENT SYSTOLIC BLOOD PRESSURE >= 140 MM HG: ICD-10-PCS | Mod: CPTII,S$GLB,, | Performed by: INTERNAL MEDICINE

## 2022-04-27 PROCEDURE — 4010F ACE/ARB THERAPY RXD/TAKEN: CPT | Mod: CPTII,S$GLB,, | Performed by: INTERNAL MEDICINE

## 2022-04-27 PROCEDURE — 84244 ASSAY OF RENIN: CPT | Performed by: INTERNAL MEDICINE

## 2022-04-27 PROCEDURE — 3008F PR BODY MASS INDEX (BMI) DOCUMENTED: ICD-10-PCS | Mod: CPTII,S$GLB,, | Performed by: INTERNAL MEDICINE

## 2022-04-27 PROCEDURE — 1126F AMNT PAIN NOTED NONE PRSNT: CPT | Mod: CPTII,S$GLB,, | Performed by: INTERNAL MEDICINE

## 2022-04-27 PROCEDURE — 1126F PR PAIN SEVERITY QUANTIFIED, NO PAIN PRESENT: ICD-10-PCS | Mod: CPTII,S$GLB,, | Performed by: INTERNAL MEDICINE

## 2022-04-27 PROCEDURE — 3062F POS MACROALBUMINURIA REV: CPT | Mod: CPTII,S$GLB,, | Performed by: INTERNAL MEDICINE

## 2022-04-27 PROCEDURE — 84244 ASSAY OF RENIN: CPT | Mod: 91 | Performed by: INTERNAL MEDICINE

## 2022-04-27 PROCEDURE — 99499 RISK ADDL DX/OHS AUDIT: ICD-10-PCS | Mod: S$GLB,,, | Performed by: INTERNAL MEDICINE

## 2022-04-27 PROCEDURE — 3062F PR POS MACROALBUMINURIA RESULT DOCUMENTED/REVIEW: ICD-10-PCS | Mod: CPTII,S$GLB,, | Performed by: INTERNAL MEDICINE

## 2022-04-27 PROCEDURE — 3078F DIAST BP <80 MM HG: CPT | Mod: CPTII,S$GLB,, | Performed by: INTERNAL MEDICINE

## 2022-04-27 PROCEDURE — 3044F PR MOST RECENT HEMOGLOBIN A1C LEVEL <7.0%: ICD-10-PCS | Mod: CPTII,S$GLB,, | Performed by: INTERNAL MEDICINE

## 2022-04-27 PROCEDURE — 1101F PR PT FALLS ASSESS DOC 0-1 FALLS W/OUT INJ PAST YR: ICD-10-PCS | Mod: CPTII,S$GLB,, | Performed by: INTERNAL MEDICINE

## 2022-04-27 PROCEDURE — 3288F FALL RISK ASSESSMENT DOCD: CPT | Mod: CPTII,S$GLB,, | Performed by: INTERNAL MEDICINE

## 2022-04-27 PROCEDURE — 99999 PR PBB SHADOW E&M-EST. PATIENT-LVL II: CPT | Mod: PBBFAC,,, | Performed by: INTERNAL MEDICINE

## 2022-04-27 PROCEDURE — 4010F PR ACE/ARB THEARPY RXD/TAKEN: ICD-10-PCS | Mod: CPTII,S$GLB,, | Performed by: INTERNAL MEDICINE

## 2022-04-27 PROCEDURE — 3008F BODY MASS INDEX DOCD: CPT | Mod: CPTII,S$GLB,, | Performed by: INTERNAL MEDICINE

## 2022-04-27 PROCEDURE — 3078F PR MOST RECENT DIASTOLIC BLOOD PRESSURE < 80 MM HG: ICD-10-PCS | Mod: CPTII,S$GLB,, | Performed by: INTERNAL MEDICINE

## 2022-04-27 PROCEDURE — 3077F SYST BP >= 140 MM HG: CPT | Mod: CPTII,S$GLB,, | Performed by: INTERNAL MEDICINE

## 2022-04-27 PROCEDURE — 1101F PT FALLS ASSESS-DOCD LE1/YR: CPT | Mod: CPTII,S$GLB,, | Performed by: INTERNAL MEDICINE

## 2022-04-27 PROCEDURE — 3288F PR FALLS RISK ASSESSMENT DOCUMENTED: ICD-10-PCS | Mod: CPTII,S$GLB,, | Performed by: INTERNAL MEDICINE

## 2022-04-27 PROCEDURE — 3066F NEPHROPATHY DOC TX: CPT | Mod: CPTII,S$GLB,, | Performed by: INTERNAL MEDICINE

## 2022-04-27 PROCEDURE — 3066F PR DOCUMENTATION OF TREATMENT FOR NEPHROPATHY: ICD-10-PCS | Mod: CPTII,S$GLB,, | Performed by: INTERNAL MEDICINE

## 2022-04-27 PROCEDURE — 82088 ASSAY OF ALDOSTERONE: CPT | Performed by: INTERNAL MEDICINE

## 2022-04-27 PROCEDURE — 36415 COLL VENOUS BLD VENIPUNCTURE: CPT | Mod: PO | Performed by: INTERNAL MEDICINE

## 2022-04-27 PROCEDURE — 99215 PR OFFICE/OUTPT VISIT, EST, LEVL V, 40-54 MIN: ICD-10-PCS | Mod: S$GLB,,, | Performed by: INTERNAL MEDICINE

## 2022-04-27 PROCEDURE — 1159F PR MEDICATION LIST DOCUMENTED IN MEDICAL RECORD: ICD-10-PCS | Mod: CPTII,S$GLB,, | Performed by: INTERNAL MEDICINE

## 2022-04-27 NOTE — PROGRESS NOTES
CHIEF COMPLAINT/HPI: Darrion is a 72 y.o. man with PMH of HTN , DM , Sjogrna disease HFpEF , CKD   Patient is new to my care   Was following with Dr. Carrillo Charless fine      Past Medical History:   Diagnosis Date    Anemia     Arthritis     Cataract     CHF (congestive heart failure)     Chronic constipation     Diabetes mellitus, type 2     Felon of finger of left hand 5/11/2019    Glaucoma     Hyperlipidemia 5/13/2014    Hypertension     MCTD (mixed connective tissue disease)     Sjogren's disease     Ulcerative colitis     Urolithiasis        Darrion reports that he has never smoked. He has never used smokeless tobacco. He reports current drug use. He reports that he does not drink alcohol.    Family History   Problem Relation Age of Onset    Hypertension Father     Stroke Father     Cataracts Father     Glaucoma Father     Cataracts Mother     No Known Problems Sister     No Known Problems Brother     Rheum arthritis Maternal Aunt     Rheum arthritis Maternal Uncle     No Known Problems Paternal Aunt     No Known Problems Paternal Uncle     No Known Problems Maternal Grandmother     No Known Problems Maternal Grandfather     Throat cancer Paternal Grandmother     Glaucoma Paternal Grandfather     No Known Problems Daughter     No Known Problems Son     Celiac disease Neg Hx     Colon cancer Neg Hx     Cirrhosis Neg Hx     Colon polyps Neg Hx     Crohn's disease Neg Hx     Cystic fibrosis Neg Hx     Hemochromatosis Neg Hx     Esophageal cancer Neg Hx     Inflammatory bowel disease Neg Hx     Irritable bowel syndrome Neg Hx     Liver cancer Neg Hx     Liver disease Neg Hx     Rectal cancer Neg Hx     Stomach cancer Neg Hx     Ulcerative colitis Neg Hx     Chase's disease Neg Hx     Amblyopia Neg Hx     Blindness Neg Hx     Cancer Neg Hx     Diabetes Neg Hx     Macular degeneration Neg Hx     Retinal detachment Neg Hx     Strabismus Neg Hx     Thyroid disease Neg  "Hx     Melanoma Neg Hx        ROS:    Review of Systems   Constitutional: Negative for activity change, appetite change, chills, fever and unexpected weight change.   HENT: Negative for congestion, ear discharge, hearing loss, nosebleeds, sore throat and tinnitus.    Eyes: Negative for photophobia, pain, redness and visual disturbance.   Respiratory: Negative for cough, chest tightness, shortness of breath and wheezing.    Cardiovascular: Negative for chest pain, palpitations and leg swelling.   Gastrointestinal: Negative for abdominal distention, constipation, diarrhea, nausea and vomiting.   Endocrine: Negative for cold intolerance, heat intolerance, polydipsia and polyuria.   Genitourinary: Negative for decreased urine volume, difficulty urinating, dysuria, flank pain and hematuria.   Musculoskeletal: Negative for arthralgias, gait problem, joint swelling and neck stiffness.   Skin: Negative for rash.   Neurological: Negative for dizziness, tremors, weakness, numbness and headaches.   Psychiatric/Behavioral: Negative for confusion and sleep disturbance.         PE:    Vitals:    04/27/22 1524   BP: (!) 140/62   Weight: 64 kg (141 lb)   Height: 5' 6" (1.676 m)       Physical Exam  Constitutional:       General: He is not in acute distress.     Appearance: Normal appearance. He is normal weight.   HENT:      Head: Normocephalic and atraumatic.      Nose: Nose normal.      Mouth/Throat:      Mouth: Mucous membranes are moist.      Pharynx: Oropharynx is clear. No oropharyngeal exudate or posterior oropharyngeal erythema.   Eyes:      Extraocular Movements: Extraocular movements intact.      Conjunctiva/sclera: Conjunctivae normal.      Pupils: Pupils are equal, round, and reactive to light.   Cardiovascular:      Rate and Rhythm: Normal rate and regular rhythm.      Pulses: Normal pulses.      Heart sounds: Normal heart sounds. No murmur heard.    No friction rub. No gallop.   Pulmonary:      Effort: Pulmonary " effort is normal. No respiratory distress.      Breath sounds: Normal breath sounds. No stridor. No wheezing or rales.   Abdominal:      General: Abdomen is flat. Bowel sounds are normal.      Palpations: Abdomen is soft.      Tenderness: There is no abdominal tenderness. There is no guarding or rebound.   Musculoskeletal:         General: No swelling. Normal range of motion.      Cervical back: Normal range of motion and neck supple.      Right lower leg: No edema.      Left lower leg: No edema.   Skin:     General: Skin is warm.      Coloration: Skin is not jaundiced or pale.      Findings: No lesion.   Neurological:      General: No focal deficit present.      Mental Status: He is alert and oriented to person, place, and time.      Motor: No weakness.   Psychiatric:         Mood and Affect: Mood normal.         Impression/Plan:    No diagnosis found.  # CKD3a: in patient with HTN , diastolic dysfunction, DM and MCTD   Counseled on BP and glycemic control   Counseled on avoiding NSAID's ( not taking)    Lab Results   Component Value Date    CREATININE 1.3 04/22/2022     Protein Creatinine Ratios:    Prot/Creat Ratio, Urine   Date Value Ref Range Status   04/22/2022 1.37 (H) 0.00 - 0.20 Final   11/29/2021 2.00 (H) 0.00 - 0.20 Final   04/28/2021 0.88 (H) 0.00 - 0.20 Final      ·   ·   Acid-Base:   Lab Results   Component Value Date     04/22/2022    K 4.7 04/22/2022    CO2 22 (L) 04/22/2022     #HTN: Blood pressures , borderline , he on multiple ( not max dose ) BP medication , will stop Amlodipine 5 mg and increase hydralazine to 75 mg TID from 50 mg TID .  Cont clonidine for now but the goal is minimize clonidine dose gradually /if low BP , next step is to lower Clonidine to 0.1mg     # Renal osteodystrophy: last PTH   Lab Results   Component Value Date    PTH 82.0 (H) 05/19/2021    CALCIUM 9.4 04/22/2022    PHOS 2.7 04/22/2022       # Anemia:   Lab Results   Component Value Date    HGB 12.3 (L) 03/28/2022         # DM:  Last HbA1C   Lab Results   Component Value Date    HGBA1C 6.3 (H) 03/28/2022       # Lipid management:   Lab Results   Component Value Date    LDLCALC 141.0 02/01/2022       # ESRD planing: none    Follow up in 4-6 m

## 2022-05-02 ENCOUNTER — PATIENT MESSAGE (OUTPATIENT)
Dept: NEPHROLOGY | Facility: CLINIC | Age: 72
End: 2022-05-02
Payer: MEDICARE

## 2022-05-02 ENCOUNTER — PATIENT MESSAGE (OUTPATIENT)
Dept: ADMINISTRATIVE | Facility: OTHER | Age: 72
End: 2022-05-02
Payer: MEDICARE

## 2022-05-02 LAB
ALDOST SERPL-MCNC: 14.1 NG/DL
ALDOST SERPL-MCNC: 18.5 NG/DL
ALDOST/RENIN PLAS-RTO: 2.1 RATIO
RENIN PLAS-CCNC: 6.8 NG/ML/HR
RENIN PLAS-CCNC: 7.5 NG/ML/H

## 2022-05-04 DIAGNOSIS — M06.9 RHEUMATOID ARTHRITIS INVOLVING MULTIPLE JOINTS: ICD-10-CM

## 2022-05-04 RX ORDER — ABATACEPT 125 MG/ML
125 INJECTION, SOLUTION SUBCUTANEOUS WEEKLY
Qty: 4 ML | Refills: 4 | Status: SHIPPED | OUTPATIENT
Start: 2022-05-04 | End: 2022-08-29 | Stop reason: SDUPTHER

## 2022-05-05 ENCOUNTER — SPECIALTY PHARMACY (OUTPATIENT)
Dept: PHARMACY | Facility: CLINIC | Age: 72
End: 2022-05-05
Payer: MEDICARE

## 2022-05-05 NOTE — TELEPHONE ENCOUNTER
Specialty Pharmacy - Refill Coordination    Specialty Medication Orders Linked to Encounter    Flowsheet Row Most Recent Value   Medication #1 abatacept (ORENCIA CLICKJECT) 125 mg/mL AtIn (Order#[id number], Rx#[id number])          Refill Questions - Documented Responses    Flowsheet Row Most Recent Value   Patient Availability and HIPAA Verification    Does patient want to proceed with activity? Yes   HIPAA/medical authority confirmed? Yes   Relationship to patient of person spoken to? Spouse/Significant Other   Refill Screening Questions    Changes to allergies? No   Changes to medications? No   New conditions since last clinic visit? No   Unplanned office visit, urgent care, ED, or hospital admission in the last 4 weeks? No   How does patient/caregiver feel medication is working? Good   Financial problems or insurance changes? No   How many doses of your specialty medications were missed in the last 4 weeks? 0   Would patient like to speak to a pharmacist? No   When does the patient need to receive the medication? 05/12/22   Refill Delivery Questions    How will the patient receive the medication? Delivery Sara   When does the patient need to receive the medication? 05/12/22   Shipping Address Home   Address in [facility] confirmed and updated if neccessary? Yes   Expected Copay ($) 0   Is the patient able to afford the medication copay? Yes   Payment Method zero copay   Days supply of Refill 28   Supplies needed? No supplies needed   Refill activity completed? Yes   Refill activity plan Refill scheduled   Shipment/Pickup Date: 05/09/22          Current Outpatient Medications   Medication Sig    abatacept (ORENCIA CLICKJECT) 125 mg/mL AtIn Inject 125 mg into the skin once a week.    ACCU-CHEK SMARTVIEW TEST STRIP Strp TEST BLOOD SUGAR FOUR TIMES DAILY    alcohol swabs (BD ALCOHOL SWABS) PadM Use 4x/day    ammonium lactate 12 % Crea Apply 1 application topically once daily.    atorvastatin (LIPITOR) 80  "MG tablet Take 1 tablet (80 mg total) by mouth once daily.    blood glucose control, low (TRUE METRIX LEVEL 1) Soln Use as indicated    BOOSTRIX TDAP 2.5-8-5 Lf-mcg-Lf/0.5mL Syrg injection     cloNIDine 0.2 mg/24 hr td ptwk (CATAPRES) 0.2 mg/24 hr PLACE 1 PATCH ONTO THE SKIN EVERY 7 DAYS.    diclofenac sodium (VOLTAREN) 1 % Gel Apply 2 g topically 4 (four) times daily as needed. Apply to aching joints    dorzolamide (TRUSOPT) 2 % ophthalmic solution INSTILL 1 DROP INTO BOTH EYES TWICE DAILY    dulaglutide (TRULICITY) 1.5 mg/0.5 mL pen injector Inject 1.5 mg into the skin every 7 days.    febuxostat (ULORIC) 40 mg Tab Take 1 tablet (40 mg total) by mouth once daily.    hydrALAZINE (APRESOLINE) 25 MG Tab Take 1 tablet (25 mg total) by mouth 3 (three) times daily.    INJECTAFER 50 iron mg/mL injection     insulin (LANTUS SOLOSTAR U-100 INSULIN) glargine 100 units/mL (3mL) SubQ pen Inject 6 units once daily    insulin aspart U-100 (NOVOLOG FLEXPEN U-100 INSULIN) 100 unit/mL (3 mL) InPn pen Inject 4-8 units three times daily before each meal with sliding scale.    lancets (TRUEPLUS LANCETS) 33 gauge Misc Test glucose 4x/day. Dx code e11.65    LINZESS 145 mcg Cap capsule TAKE 1 CAPSULE (145 MCG TOTAL) BY MOUTH ONCE DAILY.    multivit with min-folic acid 200 mcg Chew     omeprazole (PRILOSEC) 20 MG capsule TAKE 2 CAPSULES (40 MG TOTAL) BY MOUTH ONCE DAILY.    pen needle, diabetic (BD ULTRA-FINE RICHARD PEN NEEDLE) 32 gauge x 5/32" Ndle 1 Device by Misc.(Non-Drug; Combo Route) route 4 (four) times daily.    predniSONE (DELTASONE) 5 MG tablet Take 1 tablet (5 mg total) by mouth once daily.    PROLIA 60 mg/mL Syrg     torsemide (DEMADEX) 10 MG Tab TAKE 1 TABLET EVERY DAY    traMADoL (ULTRAM) 50 mg tablet TAKE 1 TABLET BY MOUTH EVERY 12 HOURS AS NEEDED FOR PAIN.    travoprost (TRAVATAN Z) 0.004 % ophthalmic solution INSTILL 1 DROP INTO BOTH EYES EVERY EVENING    triamcinolone acetonide 0.1% (KENALOG) 0.1 % " cream Apply topically 2 (two) times daily. for 10 days    TRUE METRIX GLUCOSE METER Misc USE AS DIRECTED    valsartan (DIOVAN) 160 MG tablet Take 1 tablet (160 mg total) by mouth 2 (two) times daily.   Last reviewed on 4/27/2022  3:36 PM by Aye Morris LPN    Review of patient's allergies indicates:   Allergen Reactions    Penicillins Nausea And Vomiting    Rosuvastatin      Muscle pain     Allopurinol analogues Rash    Last reviewed on  4/27/2022 3:36 PM by Aye Morris      Tasks added this encounter   6/2/2022 - Refill Call (Auto Added)   Tasks due within next 3 months   No tasks due.     [facility]  [address] [facility] [place] [address]  Phone: [phone]  Fax: [phone]

## 2022-05-11 DIAGNOSIS — H40.1132 PRIMARY OPEN ANGLE GLAUCOMA (POAG) OF BOTH EYES, MODERATE STAGE: Primary | ICD-10-CM

## 2022-05-30 ENCOUNTER — INFUSION (OUTPATIENT)
Dept: INFUSION THERAPY | Facility: HOSPITAL | Age: 72
End: 2022-05-30
Attending: HOSPITALIST
Payer: MEDICARE

## 2022-05-30 VITALS
SYSTOLIC BLOOD PRESSURE: 151 MMHG | RESPIRATION RATE: 18 BRPM | HEART RATE: 78 BPM | OXYGEN SATURATION: 99 % | TEMPERATURE: 98 F | DIASTOLIC BLOOD PRESSURE: 78 MMHG

## 2022-05-30 DIAGNOSIS — M81.0 OSTEOPOROSIS, UNSPECIFIED OSTEOPOROSIS TYPE, UNSPECIFIED PATHOLOGICAL FRACTURE PRESENCE: Primary | ICD-10-CM

## 2022-05-30 DIAGNOSIS — N18.31 STAGE 3A CHRONIC KIDNEY DISEASE: ICD-10-CM

## 2022-05-30 PROCEDURE — 96372 THER/PROPH/DIAG INJ SC/IM: CPT

## 2022-05-31 NOTE — PLAN OF CARE
Patient arrived to unit for Prolia injection. Confirmed pt taking Calcium and Vitamin D supplements. Denies any recent falls, dental issues or jaw pain. Labs reviewed, Calcium 9.4 noted. Plan of care reviewed, patient agreeable to plan. Patient tolerated injection well. VSS. Discharge instructions reviewed, patient instructed to return 11/28. Patient ambulated off unit unassisted by self. Patient in NAD at time of discharge.

## 2022-06-01 ENCOUNTER — SPECIALTY PHARMACY (OUTPATIENT)
Dept: PHARMACY | Facility: CLINIC | Age: 72
End: 2022-06-01
Payer: MEDICARE

## 2022-06-01 NOTE — TELEPHONE ENCOUNTER
Specialty Pharmacy - Refill Coordination    Specialty Medication Orders Linked to Encounter    Flowsheet Row Most Recent Value   Medication #1 abatacept (ORENCIA CLICKJECT) 125 mg/mL AtIn (Order#[id number], Rx#[id number])          Refill Questions - Documented Responses    Flowsheet Row Most Recent Value   Patient Availability and HIPAA Verification    Does patient want to proceed with activity? Yes   HIPAA/medical authority confirmed? Yes   Relationship to patient of person spoken to? Self   Refill Screening Questions    Changes to allergies? No   Changes to medications? No   New conditions since last clinic visit? No   Unplanned office visit, urgent care, ED, or hospital admission in the last 4 weeks? No   How does patient/caregiver feel medication is working? Good   Financial problems or insurance changes? No   How many doses of your specialty medications were missed in the last 4 weeks? 0   Would patient like to speak to a pharmacist? No   When does the patient need to receive the medication? 06/08/22   Refill Delivery Questions    How will the patient receive the medication? Delivery Sara   When does the patient need to receive the medication? 06/08/22   Shipping Address Home   Address in [facility] confirmed and updated if neccessary? Yes   Expected Copay ($) 0   Is the patient able to afford the medication copay? Yes   Payment Method zero copay   Days supply of Refill 28   Supplies needed? No supplies needed   Refill activity completed? Yes   Refill activity plan Refill scheduled   Shipment/Pickup Date: 06/07/22          Current Outpatient Medications   Medication Sig    abatacept (ORENCIA CLICKJECT) 125 mg/mL AtIn Inject 125 mg into the skin once a week.    ACCU-CHEK SMARTVIEW TEST STRIP Strp TEST BLOOD SUGAR FOUR TIMES DAILY    alcohol swabs (BD ALCOHOL SWABS) PadM Use 4x/day    ammonium lactate 12 % Crea Apply 1 application topically once daily.    atorvastatin (LIPITOR) 80 MG tablet Take 1  "tablet (80 mg total) by mouth once daily.    blood glucose control, low (TRUE METRIX LEVEL 1) Soln Use as indicated    BOOSTRIX TDAP 2.5-8-5 Lf-mcg-Lf/0.5mL Syrg injection     cloNIDine 0.2 mg/24 hr td ptwk (CATAPRES) 0.2 mg/24 hr PLACE 1 PATCH ONTO THE SKIN EVERY 7 DAYS.    diclofenac sodium (VOLTAREN) 1 % Gel Apply 2 g topically 4 (four) times daily as needed. Apply to aching joints    dorzolamide (TRUSOPT) 2 % ophthalmic solution INSTILL 1 DROP INTO BOTH EYES TWICE DAILY    dulaglutide (TRULICITY) 1.5 mg/0.5 mL pen injector Inject 1.5 mg into the skin every 7 days.    febuxostat (ULORIC) 40 mg Tab Take 1 tablet (40 mg total) by mouth once daily.    hydrALAZINE (APRESOLINE) 25 MG Tab Take 1 tablet (25 mg total) by mouth 3 (three) times daily.    INJECTAFER 50 iron mg/mL injection     insulin (LANTUS SOLOSTAR U-100 INSULIN) glargine 100 units/mL (3mL) SubQ pen Inject 6 units once daily    insulin aspart U-100 (NOVOLOG FLEXPEN U-100 INSULIN) 100 unit/mL (3 mL) InPn pen Inject 4-8 units three times daily before each meal with sliding scale.    lancets (TRUEPLUS LANCETS) 33 gauge Misc Test glucose 4x/day. Dx code e11.65    LINZESS 145 mcg Cap capsule TAKE 1 CAPSULE (145 MCG TOTAL) BY MOUTH ONCE DAILY.    multivit with min-folic acid 200 mcg Chew     omeprazole (PRILOSEC) 20 MG capsule TAKE 2 CAPSULES (40 MG TOTAL) BY MOUTH ONCE DAILY.    pen needle, diabetic (BD ULTRA-FINE RICHARD PEN NEEDLE) 32 gauge x 5/32" Ndle 1 Device by Misc.(Non-Drug; Combo Route) route 4 (four) times daily.    predniSONE (DELTASONE) 5 MG tablet Take 1 tablet (5 mg total) by mouth once daily.    PROLIA 60 mg/mL Syrg     torsemide (DEMADEX) 10 MG Tab TAKE 1 TABLET EVERY DAY    traMADoL (ULTRAM) 50 mg tablet TAKE 1 TABLET BY MOUTH EVERY 12 HOURS AS NEEDED FOR PAIN.    travoprost (TRAVATAN Z) 0.004 % ophthalmic solution INSTILL 1 DROP INTO BOTH EYES EVERY EVENING    triamcinolone acetonide 0.1% (KENALOG) 0.1 % cream Apply " topically 2 (two) times daily. for 10 days    TRUE METRIX GLUCOSE METER Misc USE AS DIRECTED    valsartan (DIOVAN) 160 MG tablet Take 1 tablet (160 mg total) by mouth 2 (two) times daily.   Last reviewed on 5/30/2022  8:20 AM by Mihaela Piersno, RN    Review of patient's allergies indicates:   Allergen Reactions    Penicillins Nausea And Vomiting    Rosuvastatin      Muscle pain     Allopurinol analogues Rash    Last reviewed on  5/30/2022 8:20 AM by Mihaela Pierson      Tasks added this encounter   6/29/2022 - Refill Call (Auto Added)   Tasks due within next 3 months   No tasks due.     Mellissa Esteban, Patient Care Assistant  [facility]  [address] [facility]  [facility] [address]  Phone: [phone]  Fax: [phone]

## 2022-06-29 ENCOUNTER — SPECIALTY PHARMACY (OUTPATIENT)
Dept: PHARMACY | Facility: CLINIC | Age: 72
End: 2022-06-29
Payer: MEDICARE

## 2022-06-29 NOTE — TELEPHONE ENCOUNTER
Specialty Pharmacy - Refill Coordination    Specialty Medication Orders Linked to Encounter    Flowsheet Row Most Recent Value   Medication #1 abatacept (ORENCIA CLICKJECT) 125 mg/mL AtIn (Order#[id number], Rx#[id number])          Refill Questions - Documented Responses    Flowsheet Row Most Recent Value   Patient Availability and HIPAA Verification    Does patient want to proceed with activity? Yes   HIPAA/medical authority confirmed? Yes   Relationship to patient of person spoken to? Self   Refill Screening Questions    Changes to allergies? No   Changes to medications? No   New conditions since last clinic visit? No   Unplanned office visit, urgent care, ED, or hospital admission in the last 4 weeks? No   How does patient/caregiver feel medication is working? Good   Financial problems or insurance changes? No   How many doses of your specialty medications were missed in the last 4 weeks? 0   Would patient like to speak to a pharmacist? No   When does the patient need to receive the medication? 07/06/22   Refill Delivery Questions    How will the patient receive the medication? Delivery Sara   When does the patient need to receive the medication? 07/06/22   Shipping Address Home   Address in [facility] confirmed and updated if neccessary? Yes   Expected Copay ($) 0   Is the patient able to afford the medication copay? Yes   Payment Method zero copay   Days supply of Refill 28   Supplies needed? No supplies needed   Refill activity completed? Yes   Refill activity plan Refill scheduled   Shipment/Pickup Date: 07/01/22          Current Outpatient Medications   Medication Sig    abatacept (ORENCIA CLICKJECT) 125 mg/mL AtIn Inject 125 mg into the skin once a week.    alcohol swabs (BD ALCOHOL SWABS) PadM USE  4 TIMES PER DAY    ammonium lactate 12 % Crea Apply 1 application topically once daily.    atorvastatin (LIPITOR) 80 MG tablet Take 1 tablet (80 mg total) by mouth once daily.    blood glucose  "control, low (TRUE METRIX LEVEL 1) Soln Use as indicated    BOOSTRIX TDAP 2.5-8-5 Lf-mcg-Lf/0.5mL Syrg injection     cloNIDine 0.2 mg/24 hr td ptwk (CATAPRES) 0.2 mg/24 hr PLACE 1 PATCH ONTO THE SKIN EVERY 7 DAYS.    diclofenac sodium (VOLTAREN) 1 % Gel Apply 2 g topically 4 (four) times daily as needed. Apply to aching joints    dorzolamide (TRUSOPT) 2 % ophthalmic solution INSTILL 1 DROP INTO BOTH EYES TWICE DAILY    dulaglutide (TRULICITY) 1.5 mg/0.5 mL pen injector Inject 1.5 mg into the skin every 7 days.    febuxostat (ULORIC) 40 mg Tab Take 1 tablet (40 mg total) by mouth once daily.    hydrALAZINE (APRESOLINE) 25 MG Tab Take 1 tablet (25 mg total) by mouth 3 (three) times daily.    INJECTAFER 50 iron mg/mL injection     insulin (LANTUS SOLOSTAR U-100 INSULIN) glargine 100 units/mL (3mL) SubQ pen Inject 6 units once daily    insulin aspart U-100 (NOVOLOG FLEXPEN U-100 INSULIN) 100 unit/mL (3 mL) InPn pen Inject 4-8 units three times daily before each meal with sliding scale.    lancets (TRUEPLUS LANCETS) 33 gauge Misc Test glucose 4x/day. Dx code e11.65    LINZESS 145 mcg Cap capsule TAKE 1 CAPSULE BY MOUTH ONCE DAILY.    multivit with min-folic acid 200 mcg Chew     omeprazole (PRILOSEC) 20 MG capsule TAKE 2 CAPSULES (40 MG TOTAL) BY MOUTH ONCE DAILY.    pen needle, diabetic (BD ULTRA-FINE RICHARD PEN NEEDLE) 32 gauge x 5/32" Ndle 1 Device by Misc.(Non-Drug; Combo Route) route 4 (four) times daily.    predniSONE (DELTASONE) 5 MG tablet TAKE 1 TABLET BY MOUTH EVERY DAY    PROLIA 60 mg/mL Syrg     torsemide (DEMADEX) 10 MG Tab Take 1 tablet (10 mg total) by mouth every other day.    traMADoL (ULTRAM) 50 mg tablet TAKE 1 TABLET BY MOUTH EVERY 12 HOURS AS NEEDED FOR PAIN.    travoprost (TRAVATAN Z) 0.004 % ophthalmic solution INSTILL 1 DROP INTO BOTH EYES EVERY EVENING    triamcinolone acetonide 0.1% (KENALOG) 0.1 % cream Apply topically 2 (two) times daily. for 10 days    TRUE METRIX GLUCOSE " METER Misc USE AS DIRECTED    TRUE METRIX GLUCOSE TEST STRIP Strp TEST GLUCOSE 4 TIMES PER DAY.    valsartan (DIOVAN) 160 MG tablet Take 1 tablet (160 mg total) by mouth 2 (two) times daily.   Last reviewed on 5/30/2022  8:20 AM by Mihaela Pierson RN    Review of patient's allergies indicates:   Allergen Reactions    Penicillins Nausea And Vomiting    Rosuvastatin      Muscle pain     Allopurinol analogues Rash    Last reviewed on  6/13/2022 12:18 PM by Shamir Fonseca      Tasks added this encounter   No tasks added.   Tasks due within next 3 months   6/29/2022 - Refill Call (Auto Added)     [facility]  [address] [facility] [address]  Phone: [phone]  Fax: [phone]

## 2022-07-05 ENCOUNTER — LAB VISIT (OUTPATIENT)
Dept: LAB | Facility: HOSPITAL | Age: 72
End: 2022-07-05
Attending: NURSE PRACTITIONER
Payer: MEDICARE

## 2022-07-05 DIAGNOSIS — E11.8 CONTROLLED TYPE 2 DIABETES MELLITUS WITH COMPLICATION, WITH LONG-TERM CURRENT USE OF INSULIN: ICD-10-CM

## 2022-07-05 DIAGNOSIS — E78.00 PURE HYPERCHOLESTEROLEMIA: ICD-10-CM

## 2022-07-05 DIAGNOSIS — Z79.4 CONTROLLED TYPE 2 DIABETES MELLITUS WITH COMPLICATION, WITH LONG-TERM CURRENT USE OF INSULIN: ICD-10-CM

## 2022-07-05 LAB
CHOLEST SERPL-MCNC: 135 MG/DL (ref 120–199)
CHOLEST/HDLC SERPL: 3.3 {RATIO} (ref 2–5)
ESTIMATED AVG GLUCOSE: 137 MG/DL (ref 68–131)
HBA1C MFR BLD: 6.4 % (ref 4–5.6)
HDLC SERPL-MCNC: 41 MG/DL (ref 40–75)
HDLC SERPL: 30.4 % (ref 20–50)
LDLC SERPL CALC-MCNC: 80 MG/DL (ref 63–159)
NONHDLC SERPL-MCNC: 94 MG/DL
TRIGL SERPL-MCNC: 70 MG/DL (ref 30–150)

## 2022-07-05 PROCEDURE — 36415 COLL VENOUS BLD VENIPUNCTURE: CPT | Mod: PO | Performed by: INTERNAL MEDICINE

## 2022-07-05 PROCEDURE — 83036 HEMOGLOBIN GLYCOSYLATED A1C: CPT | Performed by: NURSE PRACTITIONER

## 2022-07-05 PROCEDURE — 80061 LIPID PANEL: CPT | Performed by: INTERNAL MEDICINE

## 2022-07-12 ENCOUNTER — OFFICE VISIT (OUTPATIENT)
Dept: ENDOCRINOLOGY | Facility: CLINIC | Age: 72
End: 2022-07-12
Payer: MEDICARE

## 2022-07-12 VITALS
DIASTOLIC BLOOD PRESSURE: 80 MMHG | WEIGHT: 146.19 LBS | BODY MASS INDEX: 23.6 KG/M2 | HEART RATE: 86 BPM | SYSTOLIC BLOOD PRESSURE: 163 MMHG | TEMPERATURE: 98 F

## 2022-07-12 DIAGNOSIS — Z86.73 HISTORY OF STROKE: ICD-10-CM

## 2022-07-12 DIAGNOSIS — Z79.4 TYPE 2 DIABETES MELLITUS WITH HYPOGLYCEMIA WITHOUT COMA, WITH LONG-TERM CURRENT USE OF INSULIN: Primary | ICD-10-CM

## 2022-07-12 DIAGNOSIS — T38.0X5A STEROID-INDUCED HYPERGLYCEMIA: ICD-10-CM

## 2022-07-12 DIAGNOSIS — E11.649 TYPE 2 DIABETES MELLITUS WITH HYPOGLYCEMIA WITHOUT COMA, WITH LONG-TERM CURRENT USE OF INSULIN: Primary | ICD-10-CM

## 2022-07-12 DIAGNOSIS — I10 ESSENTIAL HYPERTENSION: ICD-10-CM

## 2022-07-12 DIAGNOSIS — R73.9 STEROID-INDUCED HYPERGLYCEMIA: ICD-10-CM

## 2022-07-12 PROCEDURE — 3066F NEPHROPATHY DOC TX: CPT | Mod: CPTII,S$GLB,, | Performed by: NURSE PRACTITIONER

## 2022-07-12 PROCEDURE — 99999 PR PBB SHADOW E&M-EST. PATIENT-LVL V: ICD-10-PCS | Mod: PBBFAC,,, | Performed by: NURSE PRACTITIONER

## 2022-07-12 PROCEDURE — 99214 OFFICE O/P EST MOD 30 MIN: CPT | Mod: S$GLB,,, | Performed by: NURSE PRACTITIONER

## 2022-07-12 PROCEDURE — 3079F DIAST BP 80-89 MM HG: CPT | Mod: CPTII,S$GLB,, | Performed by: NURSE PRACTITIONER

## 2022-07-12 PROCEDURE — 4010F PR ACE/ARB THEARPY RXD/TAKEN: ICD-10-PCS | Mod: CPTII,S$GLB,, | Performed by: NURSE PRACTITIONER

## 2022-07-12 PROCEDURE — 3062F POS MACROALBUMINURIA REV: CPT | Mod: CPTII,S$GLB,, | Performed by: NURSE PRACTITIONER

## 2022-07-12 PROCEDURE — 3077F PR MOST RECENT SYSTOLIC BLOOD PRESSURE >= 140 MM HG: ICD-10-PCS | Mod: CPTII,S$GLB,, | Performed by: NURSE PRACTITIONER

## 2022-07-12 PROCEDURE — 3077F SYST BP >= 140 MM HG: CPT | Mod: CPTII,S$GLB,, | Performed by: NURSE PRACTITIONER

## 2022-07-12 PROCEDURE — 3062F PR POS MACROALBUMINURIA RESULT DOCUMENTED/REVIEW: ICD-10-PCS | Mod: CPTII,S$GLB,, | Performed by: NURSE PRACTITIONER

## 2022-07-12 PROCEDURE — 1126F PR PAIN SEVERITY QUANTIFIED, NO PAIN PRESENT: ICD-10-PCS | Mod: CPTII,S$GLB,, | Performed by: NURSE PRACTITIONER

## 2022-07-12 PROCEDURE — 3008F BODY MASS INDEX DOCD: CPT | Mod: CPTII,S$GLB,, | Performed by: NURSE PRACTITIONER

## 2022-07-12 PROCEDURE — 99999 PR PBB SHADOW E&M-EST. PATIENT-LVL V: CPT | Mod: PBBFAC,,, | Performed by: NURSE PRACTITIONER

## 2022-07-12 PROCEDURE — 1160F RVW MEDS BY RX/DR IN RCRD: CPT | Mod: CPTII,S$GLB,, | Performed by: NURSE PRACTITIONER

## 2022-07-12 PROCEDURE — 3044F PR MOST RECENT HEMOGLOBIN A1C LEVEL <7.0%: ICD-10-PCS | Mod: CPTII,S$GLB,, | Performed by: NURSE PRACTITIONER

## 2022-07-12 PROCEDURE — 1159F PR MEDICATION LIST DOCUMENTED IN MEDICAL RECORD: ICD-10-PCS | Mod: CPTII,S$GLB,, | Performed by: NURSE PRACTITIONER

## 2022-07-12 PROCEDURE — 1160F PR REVIEW ALL MEDS BY PRESCRIBER/CLIN PHARMACIST DOCUMENTED: ICD-10-PCS | Mod: CPTII,S$GLB,, | Performed by: NURSE PRACTITIONER

## 2022-07-12 PROCEDURE — 99214 PR OFFICE/OUTPT VISIT, EST, LEVL IV, 30-39 MIN: ICD-10-PCS | Mod: S$GLB,,, | Performed by: NURSE PRACTITIONER

## 2022-07-12 PROCEDURE — 4010F ACE/ARB THERAPY RXD/TAKEN: CPT | Mod: CPTII,S$GLB,, | Performed by: NURSE PRACTITIONER

## 2022-07-12 PROCEDURE — 3044F HG A1C LEVEL LT 7.0%: CPT | Mod: CPTII,S$GLB,, | Performed by: NURSE PRACTITIONER

## 2022-07-12 PROCEDURE — 1159F MED LIST DOCD IN RCRD: CPT | Mod: CPTII,S$GLB,, | Performed by: NURSE PRACTITIONER

## 2022-07-12 PROCEDURE — 95251 CONT GLUC MNTR ANALYSIS I&R: CPT | Mod: S$GLB,,, | Performed by: NURSE PRACTITIONER

## 2022-07-12 PROCEDURE — 1126F AMNT PAIN NOTED NONE PRSNT: CPT | Mod: CPTII,S$GLB,, | Performed by: NURSE PRACTITIONER

## 2022-07-12 PROCEDURE — 3079F PR MOST RECENT DIASTOLIC BLOOD PRESSURE 80-89 MM HG: ICD-10-PCS | Mod: CPTII,S$GLB,, | Performed by: NURSE PRACTITIONER

## 2022-07-12 PROCEDURE — 95251 PR GLUCOSE MONITOR, 72 HOUR, PHYS INTERP: ICD-10-PCS | Mod: S$GLB,,, | Performed by: NURSE PRACTITIONER

## 2022-07-12 PROCEDURE — 3066F PR DOCUMENTATION OF TREATMENT FOR NEPHROPATHY: ICD-10-PCS | Mod: CPTII,S$GLB,, | Performed by: NURSE PRACTITIONER

## 2022-07-12 PROCEDURE — 3008F PR BODY MASS INDEX (BMI) DOCUMENTED: ICD-10-PCS | Mod: CPTII,S$GLB,, | Performed by: NURSE PRACTITIONER

## 2022-07-12 RX ORDER — INSULIN GLARGINE 100 [IU]/ML
INJECTION, SOLUTION SUBCUTANEOUS
Qty: 15 ML | Refills: 2 | Status: SHIPPED | OUTPATIENT
Start: 2022-07-12 | End: 2023-02-03

## 2022-07-12 RX ORDER — DULAGLUTIDE 1.5 MG/.5ML
1.5 INJECTION, SOLUTION SUBCUTANEOUS
Qty: 12 PEN | Refills: 2 | Status: SHIPPED | OUTPATIENT
Start: 2022-07-12 | End: 2023-02-03

## 2022-07-12 RX ORDER — INSULIN ASPART 100 [IU]/ML
INJECTION, SOLUTION INTRAVENOUS; SUBCUTANEOUS
Qty: 30 ML | Refills: 2 | Status: SHIPPED | OUTPATIENT
Start: 2022-07-12 | End: 2022-11-01 | Stop reason: SDUPTHER

## 2022-07-12 NOTE — PROGRESS NOTES
CC: This 72 y.o.  male  is here for evaluation of  T2DM along with comorbidities indicated in the Visit Diagnosis section of this encounter.    HPI: Darrion Bennett was diagnosed with T2DM at age 35. Oral medications started years later.      Medical hx: rheumatoid arthritis on chronic prednisone, TIA, diastolic HF, carotid artery stenosis       Prior visit 12/2021   a1c is down from 6.9 to 6.6%.   He is now using Freestyle Dariel CGM and enjoys it.   Plan Low glucose alert set for 75.   Overall glucoses noted from CGM are at goal, with minimal and infrequent hypoglycemia.  Continue current medication doses.   Avoid dosing Novolog later than 20 minutes after a meal, or else blood glucose can drop too low.   Return to clinic in 4 months with labs prior.     Prior visit 4/2022  a1c is down from 6.9 to 6.3%.   He has reduced Lantus from 6 to 5 units hs and Novolog from 6 to 3-5 units ac. States DM remains about the same.   C/o high frequency of alarms.   Plan Difficult to evaluate overall glucose trends since Dariel reader could not be downloaded. BGs reviewed from reader are higher than expected compared to low A1c of 6.3%. it is possible A1c is falsely low d/t anemia. Will continue current treatment for now since pt self reports BGs have been stable and largely unchanged from last visit.   Advised pt to follow correction scale to correct high glucoses instead of guessing, which could lead to hypoglycemia - 150-200=+1, 201-250=+2, 251-300=+3; 301-350=+4, over 350=+5 units  Return to clinic in 3 months with labs prior.       Interval history  a1c is about the same from 6.3 to 6.4%.   He has increased Lantus from 5 to 6 units hs.     BP elevated today at 163/80. Amlodipine stopped and he is now taking reduced dose of hydralazine at 25 mg TID, per his nephrologist Dr. Gary.       PHX: CKD stage 3, Diastolic heart failure, NYHA class 2;  Sjogren's syndrome, gouty arthritis - sees Rheumatology     LAST DIABETES EDUCATION:  multiple times, years ago     HOSPITALIZED FOR DIABETES  -  No      DIABETES MEDICATIONS:   Trulicity 1.5 mg once daily   Lantus 6 units every night   Novolog 3-5 units ac  (mostly 4-5 units)     Misses medication doses - No    Rotation of insulin injections - to LLQ   Changes pen neeedles - yes     DM COMPLICATIONS: nephropathy and cerebrovascular disease    SIGNIFICANT DIABETES MED HISTORY: has been told that metformin is bad for his kidneys and that's why he stopped it   - Lantus, Humalog, and Tradjenta stopped at initial visit 7/2019 and he was switched to Xultophy.   - Xultophy stopped and switched to Trulicity and Lantus 2/2020  - Discussed switching from Novolog to glimepiride but he prefers  Injections over orals.       SELF MONITORING BLOOD GLUCOSE: Checks blood glucose at home 4x/day at least with Freestyle Dariel 2 reader.   CGM averages from the last few months have been 130-140s    Interpretation: BGs overall stable but he does mild hypoglycemia overnight as well as occasionally daytime. Minimal postprandial excursions.           HYPOGLYCEMIC EPISODES: denies symptoms in the 50s          CURRENT DIET: drinks water, diet soda; has some orange juice to take with his meds. Eats 3 meals/day. Lunch is the biggest meal.  Eats home cooked foods, no processed foods.     CURRENT EXERCISE: none; previously exercised in gym.     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   Other: denies HA     PHYSICAL EXAM:  GENERAL: Well developed, well nourished. No acute distress.   PSYCH: AAOx3, appropriate mood and affect, conversant, well-groomed. Judgement and insight good.   NEURO: Cranial nerves grossly intact. Speech clear, no tremor.   CHEST: Respirations  even and unlabored.             Hemoglobin A1C   Date Value Ref Range Status   07/05/2022 6.4 (H) 4.0 - 5.6 % Final     Comment:     ADA Screening Guidelines:  5.7-6.4%  Consistent with prediabetes  >or=6.5%  Consistent with diabetes    High levels of fetal hemoglobin interfere with the HbA1C  assay. Heterozygous hemoglobin variants (HbS, HgC, etc)do  not significantly interfere with this assay.   However, presence of multiple variants may affect accuracy.     03/28/2022 6.3 (H) 4.0 - 5.6 % Final     Comment:     ADA Screening Guidelines:  5.7-6.4%  Consistent with prediabetes  >or=6.5%  Consistent with diabetes    High levels of fetal hemoglobin interfere with the HbA1C  assay. Heterozygous hemoglobin variants (HbS, HgC, etc)do  not significantly interfere with this assay.   However, presence of multiple variants may affect accuracy.     11/29/2021 6.6 (H) 4.0 - 5.6 % Final     Comment:     ADA Screening Guidelines:  5.7-6.4%  Consistent with prediabetes  >or=6.5%  Consistent with diabetes    High levels of fetal hemoglobin interfere with the HbA1C  assay. Heterozygous hemoglobin variants (HbS, HgC, etc)do  not significantly interfere with this assay.   However, presence of multiple variants may affect accuracy.     10/16/2018 6.2 (H) 4.0 - 5.7 % Final     Comment:     Dr. Jurgen Ward ( Endocrinology )        Ref. Range 9/19/2019 08:13   Glucose Latest Ref Range: 70 - 110 mg/dL 170 (H)   C-Peptide Latest Ref Range: 0.78 - 5.19 ng/mL 1.33        Ref. Range 8/2/2021 07:00   Glucose Latest Ref Range: 70 - 110 mg/dL 120 (H)   C-Peptide Latest Ref Range: 0.78 - 5.19 ng/mL 1.11       Chemistry        Component Value Date/Time     04/22/2022 0845    K 4.7 04/22/2022 0845     04/22/2022 0845    CO2 22 (L) 04/22/2022 0845    BUN 26 (H) 04/22/2022 0845    CREATININE 1.3 04/22/2022 0845     (H) 04/22/2022 0845        Component Value Date/Time    CALCIUM 9.4 04/22/2022 0845    ALKPHOS 58 04/22/2022 0845     AST 38 04/22/2022 0845    ALT 32 04/22/2022 0845    BILITOT 0.6 04/22/2022 0845    ESTGFRAFRICA >60.0 04/22/2022 0845    EGFRNONAA 54.5 (A) 04/22/2022 0845          Lab Results   Component Value Date    LDLCALC 80.0 07/05/2022      Latest Reference Range & Units 07/05/22 07:13   Cholesterol 120 - 199 mg/dL 135   HDL 40 - 75 mg/dL 41   HDL/Cholesterol Ratio 20.0 - 50.0 % 30.4   LDL Cholesterol External 63.0 - 159.0 mg/dL 80.0   Non-HDL Cholesterol mg/dL 94   Total Cholesterol/HDL Ratio 2.0 - 5.0  3.3   Triglycerides 30 - 150 mg/dL 70       Lab Results   Component Value Date    MICALBCREAT 888.4 (H) 04/22/2022               ASSESSMENT and PLAN:    A1C GOAL: 7.5 %           1. Type 2 diabetes mellitus with hypoglycemia without coma, with long-term current use of insulin  Reviewed how to rotate insulin injection sites.   Reduce Lantus from 6 to 5 units nightly.   Reduce Novolog to 4 units before meals to avoid post-meal hypoglycemia, particularly after dinner.   Reviewed hypoglycemia treatment.   Return to clinic in 3 months with labs prior.       insulin aspart U-100 (NOVOLOG FLEXPEN U-100 INSULIN) 100 unit/mL (3 mL) InPn pen    insulin (LANTUS SOLOSTAR U-100 INSULIN) glargine 100 units/mL SubQ pen    dulaglutide (TRULICITY) 1.5 mg/0.5 mL pen injector    Hemoglobin A1C   2. History of stroke  dulaglutide (TRULICITY) 1.5 mg/0.5 mL pen injector   3. Essential hypertension    Message sent to Dr. Gary.    4. Steroid-induced hyperglycemia  Increases insulin resistance - pt has high prandial insulin needs compared to basal.             Patient is testing blood sugars 4x/day and taking 4 injections of insulin a day. The patient's insulin treatment regimen requires frequent adjustments by the patient on the basis of therapeutic CGM testing results.       Orders Placed This Encounter   Procedures    Hemoglobin A1C     Standing Status:   Future     Standing Expiration Date:   9/10/2023        Follow up in about 4 months  (around 11/12/2022).

## 2022-07-12 NOTE — PATIENT INSTRUCTIONS
Reviewed how to rotate insulin injection sites.   Reduce Lantus from 6 to 5 units nightly.   Reduce Novolog to 4 units before meals to avoid post-meal hypoglycemia, particularly after dinner.   Reviewed hypoglycemia treatment.   Return to clinic in 3 months with labs prior.

## 2022-07-23 DIAGNOSIS — I10 ESSENTIAL HYPERTENSION: ICD-10-CM

## 2022-07-23 NOTE — TELEPHONE ENCOUNTER
No new care gaps identified.  [facility] Embedded Care Gaps. Reference number: [id number]. 7/23/2022   12:22:34 AM RAYSAT

## 2022-07-24 RX ORDER — AMLODIPINE BESYLATE 5 MG/1
TABLET ORAL
Qty: 180 TABLET | Refills: 3 | OUTPATIENT
Start: 2022-07-24

## 2022-07-24 NOTE — TELEPHONE ENCOUNTER
Refill Decision Note   Darrion Bennett  is requesting a refill authorization.  Brief Assessment and Rationale for Refill:  Quick Discontinue     Medication Therapy Plan:  Discontinued 4/27/22    Medication Reconciliation Completed: No   Comments:     No Care Gaps recommended.     Note composed:4:47 PM 07/24/2022

## 2022-07-27 ENCOUNTER — SPECIALTY PHARMACY (OUTPATIENT)
Dept: PHARMACY | Facility: CLINIC | Age: 72
End: 2022-07-27
Payer: MEDICARE

## 2022-07-27 NOTE — TELEPHONE ENCOUNTER
Specialty Pharmacy - Refill Coordination    Specialty Medication Orders Linked to Encounter    Flowsheet Row Most Recent Value   Medication #1 abatacept (ORENCIA CLICKJECT) 125 mg/mL AtIn (Order#[id number], Rx#)          Refill Questions - Documented Responses    Flowsheet Row Most Recent Value   Patient Availability and HIPAA Verification    Does patient want to proceed with activity? Yes   HIPAA/medical authority confirmed? Yes   Relationship to patient of person spoken to? Self   Refill Screening Questions    Changes to allergies? No   Changes to medications? No   New conditions since last clinic visit? No   Unplanned office visit, urgent care, ED, or hospital admission in the last 4 weeks? No   How does patient/caregiver feel medication is working? Good   Financial problems or insurance changes? No   How many doses of your specialty medications were missed in the last 4 weeks? 0   Would patient like to speak to a pharmacist? No   When does the patient need to receive the medication? 08/03/22   Refill Delivery Questions    How will the patient receive the medication? Delivery Sara   When does the patient need to receive the medication? 08/03/22   Shipping Address Home   Address in [facility] confirmed and updated if neccessary? Yes   Expected Copay ($) 0   Is the patient able to afford the medication copay? Yes   Payment Method zero copay   Days supply of Refill 28   Supplies needed? No supplies needed   Refill activity completed? Yes   Refill activity plan Refill scheduled   Shipment/Pickup Date: 07/27/22          Current Outpatient Medications   Medication Sig    abatacept (ORENCIA CLICKJECT) 125 mg/mL AtIn Inject 125 mg into the skin once a week.    alcohol swabs (BD ALCOHOL SWABS) PadM USE  4 TIMES PER DAY    ammonium lactate 12 % Crea Apply 1 application topically once daily.    atorvastatin (LIPITOR) 80 MG tablet TAKE 1 TABLET EVERY DAY    blood glucose control, low (TRUE METRIX LEVEL 1) Soln Use  "as indicated    BOOSTRIX TDAP 2.5-8-5 Lf-mcg-Lf/0.5mL Syrg injection     cloNIDine 0.2 mg/24 hr td ptwk (CATAPRES) 0.2 mg/24 hr PLACE 1 PATCH ONTO THE SKIN EVERY 7 DAYS.    diclofenac sodium (VOLTAREN) 1 % Gel Apply 2 g topically 4 (four) times daily as needed. Apply to aching joints    dorzolamide (TRUSOPT) 2 % ophthalmic solution INSTILL 1 DROP INTO BOTH EYES TWICE DAILY    dulaglutide (TRULICITY) 1.5 mg/0.5 mL pen injector Inject 1.5 mg into the skin every 7 days.    febuxostat (ULORIC) 40 mg Tab Take 1 tablet (40 mg total) by mouth once daily.    hydrALAZINE (APRESOLINE) 25 MG Tab Take 1 tablet (25 mg total) by mouth 3 (three) times daily.    INJECTAFER 50 iron mg/mL injection     insulin (LANTUS SOLOSTAR U-100 INSULIN) glargine 100 units/mL SubQ pen Inject 5 units once daily    insulin aspart U-100 (NOVOLOG FLEXPEN U-100 INSULIN) 100 unit/mL (3 mL) InPn pen Inject 4 units three times daily before each meal with sliding scale.    lancets (TRUEPLUS LANCETS) 33 gauge Misc Test glucose 4x/day. Dx code e11.65    LINZESS 145 mcg Cap capsule TAKE 1 CAPSULE BY MOUTH ONCE DAILY.    multivit with min-folic acid 200 mcg Chew     omeprazole (PRILOSEC) 20 MG capsule TAKE 2 CAPSULES (40 MG TOTAL) BY MOUTH ONCE DAILY.    pen needle, diabetic (BD ULTRA-FINE RICHARD PEN NEEDLE) 32 gauge x 5/32" Ndle 1 Device by Misc.(Non-Drug; Combo Route) route 4 (four) times daily.    predniSONE (DELTASONE) 5 MG tablet TAKE 1 TABLET BY MOUTH EVERY DAY    PROLIA 60 mg/mL Syrg     torsemide (DEMADEX) 10 MG Tab Take 1 tablet (10 mg total) by mouth every other day.    traMADoL (ULTRAM) 50 mg tablet TAKE 1 TABLET BY MOUTH EVERY 12 HOURS AS NEEDED FOR PAIN.    travoprost (TRAVATAN Z) 0.004 % ophthalmic solution INSTILL 1 DROP INTO BOTH EYES EVERY EVENING    triamcinolone acetonide 0.1% (KENALOG) 0.1 % cream Apply topically 2 (two) times daily. for 10 days    TRUE METRIX GLUCOSE METER Misc USE AS DIRECTED    TRUE METRIX GLUCOSE " TEST STRIP Strp TEST GLUCOSE 4 TIMES PER DAY.    valsartan (DIOVAN) 160 MG tablet Take 1 tablet (160 mg total) by mouth 2 (two) times daily.   Last reviewed on 7/12/2022  8:56 AM by Bianca Mares NP    Review of patient's allergies indicates:   Allergen Reactions    Penicillins Nausea And Vomiting    Rosuvastatin      Muscle pain     Allopurinol analogues Rash    Last reviewed on  7/12/2022 8:56 AM by Bianca Mares      Tasks added this encounter   8/24/2022 - Refill Call (Auto Added)   Tasks due within next 3 months   No tasks due.     Jolanta Mares, PharmD  [facility]  [address] [facility] [address]  Phone: [phone]  Fax: [phone]

## 2022-08-02 ENCOUNTER — CLINICAL SUPPORT (OUTPATIENT)
Dept: OPHTHALMOLOGY | Facility: CLINIC | Age: 72
End: 2022-08-02
Payer: MEDICARE

## 2022-08-02 ENCOUNTER — OFFICE VISIT (OUTPATIENT)
Dept: OPTOMETRY | Facility: CLINIC | Age: 72
End: 2022-08-02
Payer: MEDICARE

## 2022-08-02 DIAGNOSIS — E11.9 TYPE 2 DIABETES MELLITUS WITHOUT RETINOPATHY: ICD-10-CM

## 2022-08-02 DIAGNOSIS — Z96.1 PSEUDOPHAKIA OF BOTH EYES: ICD-10-CM

## 2022-08-02 DIAGNOSIS — H40.1132 PRIMARY OPEN ANGLE GLAUCOMA (POAG) OF BOTH EYES, MODERATE STAGE: Primary | ICD-10-CM

## 2022-08-02 DIAGNOSIS — H40.1132 PRIMARY OPEN ANGLE GLAUCOMA (POAG) OF BOTH EYES, MODERATE STAGE: ICD-10-CM

## 2022-08-02 PROCEDURE — 99999 PR PBB SHADOW E&M-EST. PATIENT-LVL III: ICD-10-PCS | Mod: PBBFAC,,, | Performed by: OPTOMETRIST

## 2022-08-02 PROCEDURE — 3066F PR DOCUMENTATION OF TREATMENT FOR NEPHROPATHY: ICD-10-PCS | Mod: CPTII,S$GLB,, | Performed by: OPTOMETRIST

## 2022-08-02 PROCEDURE — 3044F PR MOST RECENT HEMOGLOBIN A1C LEVEL <7.0%: ICD-10-PCS | Mod: CPTII,S$GLB,, | Performed by: OPTOMETRIST

## 2022-08-02 PROCEDURE — 92014 COMPRE OPH EXAM EST PT 1/>: CPT | Mod: S$GLB,,, | Performed by: OPTOMETRIST

## 2022-08-02 PROCEDURE — 4010F PR ACE/ARB THEARPY RXD/TAKEN: ICD-10-PCS | Mod: CPTII,S$GLB,, | Performed by: OPTOMETRIST

## 2022-08-02 PROCEDURE — 1160F RVW MEDS BY RX/DR IN RCRD: CPT | Mod: CPTII,S$GLB,, | Performed by: OPTOMETRIST

## 2022-08-02 PROCEDURE — 3062F PR POS MACROALBUMINURIA RESULT DOCUMENTED/REVIEW: ICD-10-PCS | Mod: CPTII,S$GLB,, | Performed by: OPTOMETRIST

## 2022-08-02 PROCEDURE — 3062F POS MACROALBUMINURIA REV: CPT | Mod: CPTII,S$GLB,, | Performed by: OPTOMETRIST

## 2022-08-02 PROCEDURE — 2023F PR DILATED RETINAL EXAM W/O EVID OF RETINOPATHY: ICD-10-PCS | Mod: CPTII,S$GLB,, | Performed by: OPTOMETRIST

## 2022-08-02 PROCEDURE — 1126F AMNT PAIN NOTED NONE PRSNT: CPT | Mod: CPTII,S$GLB,, | Performed by: OPTOMETRIST

## 2022-08-02 PROCEDURE — 1101F PT FALLS ASSESS-DOCD LE1/YR: CPT | Mod: CPTII,S$GLB,, | Performed by: OPTOMETRIST

## 2022-08-02 PROCEDURE — 92014 PR EYE EXAM, EST PATIENT,COMPREHESV: ICD-10-PCS | Mod: S$GLB,,, | Performed by: OPTOMETRIST

## 2022-08-02 PROCEDURE — 1126F PR PAIN SEVERITY QUANTIFIED, NO PAIN PRESENT: ICD-10-PCS | Mod: CPTII,S$GLB,, | Performed by: OPTOMETRIST

## 2022-08-02 PROCEDURE — 2023F DILAT RTA XM W/O RTNOPTHY: CPT | Mod: CPTII,S$GLB,, | Performed by: OPTOMETRIST

## 2022-08-02 PROCEDURE — 3066F NEPHROPATHY DOC TX: CPT | Mod: CPTII,S$GLB,, | Performed by: OPTOMETRIST

## 2022-08-02 PROCEDURE — 1159F MED LIST DOCD IN RCRD: CPT | Mod: CPTII,S$GLB,, | Performed by: OPTOMETRIST

## 2022-08-02 PROCEDURE — 3288F FALL RISK ASSESSMENT DOCD: CPT | Mod: CPTII,S$GLB,, | Performed by: OPTOMETRIST

## 2022-08-02 PROCEDURE — 3288F PR FALLS RISK ASSESSMENT DOCUMENTED: ICD-10-PCS | Mod: CPTII,S$GLB,, | Performed by: OPTOMETRIST

## 2022-08-02 PROCEDURE — 1101F PR PT FALLS ASSESS DOC 0-1 FALLS W/OUT INJ PAST YR: ICD-10-PCS | Mod: CPTII,S$GLB,, | Performed by: OPTOMETRIST

## 2022-08-02 PROCEDURE — 1160F PR REVIEW ALL MEDS BY PRESCRIBER/CLIN PHARMACIST DOCUMENTED: ICD-10-PCS | Mod: CPTII,S$GLB,, | Performed by: OPTOMETRIST

## 2022-08-02 PROCEDURE — 99999 PR PBB SHADOW E&M-EST. PATIENT-LVL III: CPT | Mod: PBBFAC,,, | Performed by: OPTOMETRIST

## 2022-08-02 PROCEDURE — 3044F HG A1C LEVEL LT 7.0%: CPT | Mod: CPTII,S$GLB,, | Performed by: OPTOMETRIST

## 2022-08-02 PROCEDURE — 4010F ACE/ARB THERAPY RXD/TAKEN: CPT | Mod: CPTII,S$GLB,, | Performed by: OPTOMETRIST

## 2022-08-02 PROCEDURE — 1159F PR MEDICATION LIST DOCUMENTED IN MEDICAL RECORD: ICD-10-PCS | Mod: CPTII,S$GLB,, | Performed by: OPTOMETRIST

## 2022-08-02 RX ORDER — TRAVOPROST OPHTHALMIC SOLUTION 0.04 MG/ML
1 SOLUTION OPHTHALMIC NIGHTLY
Qty: 3 EACH | Refills: 4 | Status: SHIPPED | OUTPATIENT
Start: 2022-08-02 | End: 2023-08-18 | Stop reason: SDUPTHER

## 2022-08-02 RX ORDER — DORZOLAMIDE HCL 20 MG/ML
1 SOLUTION/ DROPS OPHTHALMIC 2 TIMES DAILY
Qty: 10 ML | Refills: 4 | Status: SHIPPED | OUTPATIENT
Start: 2022-08-02 | End: 2023-01-03

## 2022-08-02 NOTE — PROGRESS NOTES
Subjective:       Patient ID: Darrion Bennett is a 72 y.o. male      Chief Complaint   Patient presents with    Concerns About Ocular Health    Glaucoma    Diabetic Eye Exam     History of Present Illness  Dls: 6/11/21 Dr. Esquivel     73 y/o male presents today for diabetic eye exam and glaucoma ck.  Pt states no changes in vision. Pt wears otc readers.     LBS 90 this am    No tearing  No itching   No burning  No pain  No ha's  No floaters  No flashes    Eye meds  Trusopt OU BID last dose this am   Travatan Z OU Q HS last dose last         Assessment/Plan:     1. Primary open angle glaucoma (POAG) of both eyes, moderate stage  IOP stable. OCT/HVFdone today. Continue dorzolamide BID OU and travatan QHS OU. 6 month IOP check.  - dorzolamide (TRUSOPT) 2 % ophthalmic solution; Place 1 drop into both eyes 2 (two) times daily.  Dispense: 10 mL; Refill: 4  - travoprost (TRAVATAN Z) 0.004 % ophthalmic solution; Place 1 drop into both eyes every evening.  Dispense: 3 each; Refill: 4    2. Type 2 diabetes mellitus without retinopathy  No diabetic retinopathy. Discussed with pt the effects of diabetes on vision, importance of good blood sugar control, compliance with meds, and follow up care with PCP. Return in 1 year for dilated eye exam, sooner PRN.    3. Pseudophakia of both eyes  Well-centered. Clear.       Follow up in about 6 months (around 2/2/2023) for IOP check.

## 2022-08-02 NOTE — PROGRESS NOTES
Visual field test done.  Patient stated no latex allergies used coverlet          Glasses Prescription     Sphere Cylinder Axis Add   Right Hartly +0.50 170 +2.50   Left -0.75 Sphere  +2.50   Expiration Date: 8/27/2020        Ordered, Authorized, and Signed By: Viry Esquivel OD

## 2022-08-08 DIAGNOSIS — I10 ESSENTIAL HYPERTENSION: ICD-10-CM

## 2022-08-15 ENCOUNTER — OFFICE VISIT (OUTPATIENT)
Dept: CARDIOLOGY | Facility: CLINIC | Age: 72
End: 2022-08-15
Payer: MEDICARE

## 2022-08-15 VITALS
OXYGEN SATURATION: 98 % | HEIGHT: 66 IN | BODY MASS INDEX: 22.8 KG/M2 | WEIGHT: 141.88 LBS | HEART RATE: 87 BPM | DIASTOLIC BLOOD PRESSURE: 74 MMHG | RESPIRATION RATE: 18 BRPM | SYSTOLIC BLOOD PRESSURE: 164 MMHG

## 2022-08-15 DIAGNOSIS — M35.00 SJOGREN'S SYNDROME, WITH UNSPECIFIED ORGAN INVOLVEMENT: ICD-10-CM

## 2022-08-15 DIAGNOSIS — I10 HYPERTENSION, UNSPECIFIED TYPE: Primary | ICD-10-CM

## 2022-08-15 DIAGNOSIS — E11.42 CONTROLLED TYPE 2 DIABETES MELLITUS WITH DIABETIC POLYNEUROPATHY, WITH LONG-TERM CURRENT USE OF INSULIN: ICD-10-CM

## 2022-08-15 DIAGNOSIS — M35.1 MCTD (MIXED CONNECTIVE TISSUE DISEASE): ICD-10-CM

## 2022-08-15 DIAGNOSIS — Z79.52 CURRENT CHRONIC USE OF SYSTEMIC STEROIDS: ICD-10-CM

## 2022-08-15 DIAGNOSIS — I10 ESSENTIAL HYPERTENSION: ICD-10-CM

## 2022-08-15 DIAGNOSIS — R07.9 CHEST PAIN, UNSPECIFIED TYPE: ICD-10-CM

## 2022-08-15 DIAGNOSIS — Z79.4 CONTROLLED TYPE 2 DIABETES MELLITUS WITH DIABETIC POLYNEUROPATHY, WITH LONG-TERM CURRENT USE OF INSULIN: ICD-10-CM

## 2022-08-15 DIAGNOSIS — M06.9 RHEUMATOID ARTHRITIS, INVOLVING UNSPECIFIED SITE, UNSPECIFIED WHETHER RHEUMATOID FACTOR PRESENT: ICD-10-CM

## 2022-08-15 DIAGNOSIS — I50.30 DIASTOLIC HEART FAILURE, NYHA CLASS 2: ICD-10-CM

## 2022-08-15 DIAGNOSIS — E78.00 PURE HYPERCHOLESTEROLEMIA: ICD-10-CM

## 2022-08-15 DIAGNOSIS — N18.31 STAGE 3A CHRONIC KIDNEY DISEASE: ICD-10-CM

## 2022-08-15 PROCEDURE — 99214 PR OFFICE/OUTPT VISIT, EST, LEVL IV, 30-39 MIN: ICD-10-PCS | Mod: S$GLB,,, | Performed by: INTERNAL MEDICINE

## 2022-08-15 PROCEDURE — 3077F SYST BP >= 140 MM HG: CPT | Mod: CPTII,S$GLB,, | Performed by: INTERNAL MEDICINE

## 2022-08-15 PROCEDURE — 1125F AMNT PAIN NOTED PAIN PRSNT: CPT | Mod: CPTII,S$GLB,, | Performed by: INTERNAL MEDICINE

## 2022-08-15 PROCEDURE — 1160F RVW MEDS BY RX/DR IN RCRD: CPT | Mod: CPTII,S$GLB,, | Performed by: INTERNAL MEDICINE

## 2022-08-15 PROCEDURE — 1159F PR MEDICATION LIST DOCUMENTED IN MEDICAL RECORD: ICD-10-PCS | Mod: CPTII,S$GLB,, | Performed by: INTERNAL MEDICINE

## 2022-08-15 PROCEDURE — 3062F PR POS MACROALBUMINURIA RESULT DOCUMENTED/REVIEW: ICD-10-PCS | Mod: CPTII,S$GLB,, | Performed by: INTERNAL MEDICINE

## 2022-08-15 PROCEDURE — 3062F POS MACROALBUMINURIA REV: CPT | Mod: CPTII,S$GLB,, | Performed by: INTERNAL MEDICINE

## 2022-08-15 PROCEDURE — 3078F DIAST BP <80 MM HG: CPT | Mod: CPTII,S$GLB,, | Performed by: INTERNAL MEDICINE

## 2022-08-15 PROCEDURE — 3044F PR MOST RECENT HEMOGLOBIN A1C LEVEL <7.0%: ICD-10-PCS | Mod: CPTII,S$GLB,, | Performed by: INTERNAL MEDICINE

## 2022-08-15 PROCEDURE — 3066F PR DOCUMENTATION OF TREATMENT FOR NEPHROPATHY: ICD-10-PCS | Mod: CPTII,S$GLB,, | Performed by: INTERNAL MEDICINE

## 2022-08-15 PROCEDURE — 3008F PR BODY MASS INDEX (BMI) DOCUMENTED: ICD-10-PCS | Mod: CPTII,S$GLB,, | Performed by: INTERNAL MEDICINE

## 2022-08-15 PROCEDURE — 3078F PR MOST RECENT DIASTOLIC BLOOD PRESSURE < 80 MM HG: ICD-10-PCS | Mod: CPTII,S$GLB,, | Performed by: INTERNAL MEDICINE

## 2022-08-15 PROCEDURE — 1125F PR PAIN SEVERITY QUANTIFIED, PAIN PRESENT: ICD-10-PCS | Mod: CPTII,S$GLB,, | Performed by: INTERNAL MEDICINE

## 2022-08-15 PROCEDURE — 1160F PR REVIEW ALL MEDS BY PRESCRIBER/CLIN PHARMACIST DOCUMENTED: ICD-10-PCS | Mod: CPTII,S$GLB,, | Performed by: INTERNAL MEDICINE

## 2022-08-15 PROCEDURE — 99214 OFFICE O/P EST MOD 30 MIN: CPT | Mod: S$GLB,,, | Performed by: INTERNAL MEDICINE

## 2022-08-15 PROCEDURE — 3044F HG A1C LEVEL LT 7.0%: CPT | Mod: CPTII,S$GLB,, | Performed by: INTERNAL MEDICINE

## 2022-08-15 PROCEDURE — 3288F PR FALLS RISK ASSESSMENT DOCUMENTED: ICD-10-PCS | Mod: CPTII,S$GLB,, | Performed by: INTERNAL MEDICINE

## 2022-08-15 PROCEDURE — 4010F PR ACE/ARB THEARPY RXD/TAKEN: ICD-10-PCS | Mod: CPTII,S$GLB,, | Performed by: INTERNAL MEDICINE

## 2022-08-15 PROCEDURE — 99999 PR PBB SHADOW E&M-EST. PATIENT-LVL III: ICD-10-PCS | Mod: PBBFAC,,, | Performed by: INTERNAL MEDICINE

## 2022-08-15 PROCEDURE — 93000 ELECTROCARDIOGRAM COMPLETE: CPT | Mod: S$GLB,,, | Performed by: INTERNAL MEDICINE

## 2022-08-15 PROCEDURE — 3077F PR MOST RECENT SYSTOLIC BLOOD PRESSURE >= 140 MM HG: ICD-10-PCS | Mod: CPTII,S$GLB,, | Performed by: INTERNAL MEDICINE

## 2022-08-15 PROCEDURE — 3008F BODY MASS INDEX DOCD: CPT | Mod: CPTII,S$GLB,, | Performed by: INTERNAL MEDICINE

## 2022-08-15 PROCEDURE — 1159F MED LIST DOCD IN RCRD: CPT | Mod: CPTII,S$GLB,, | Performed by: INTERNAL MEDICINE

## 2022-08-15 PROCEDURE — 4010F ACE/ARB THERAPY RXD/TAKEN: CPT | Mod: CPTII,S$GLB,, | Performed by: INTERNAL MEDICINE

## 2022-08-15 PROCEDURE — 1101F PT FALLS ASSESS-DOCD LE1/YR: CPT | Mod: CPTII,S$GLB,, | Performed by: INTERNAL MEDICINE

## 2022-08-15 PROCEDURE — 3066F NEPHROPATHY DOC TX: CPT | Mod: CPTII,S$GLB,, | Performed by: INTERNAL MEDICINE

## 2022-08-15 PROCEDURE — 3288F FALL RISK ASSESSMENT DOCD: CPT | Mod: CPTII,S$GLB,, | Performed by: INTERNAL MEDICINE

## 2022-08-15 PROCEDURE — 1101F PR PT FALLS ASSESS DOC 0-1 FALLS W/OUT INJ PAST YR: ICD-10-PCS | Mod: CPTII,S$GLB,, | Performed by: INTERNAL MEDICINE

## 2022-08-15 PROCEDURE — 93000 EKG 12-LEAD: ICD-10-PCS | Mod: S$GLB,,, | Performed by: INTERNAL MEDICINE

## 2022-08-15 PROCEDURE — 99999 PR PBB SHADOW E&M-EST. PATIENT-LVL III: CPT | Mod: PBBFAC,,, | Performed by: INTERNAL MEDICINE

## 2022-08-15 RX ORDER — AMLODIPINE BESYLATE 5 MG/1
5 TABLET ORAL DAILY
Qty: 90 TABLET | Refills: 3 | Status: SHIPPED | OUTPATIENT
Start: 2022-08-15 | End: 2022-09-12

## 2022-08-15 NOTE — PROGRESS NOTES
CARDIOVASCULAR CONSULTATION    REASON FOR CONSULT:   Darrion Bennett is a 72 y.o. male who presents for establishing care with Cardiology.      HISTORY OF PRESENT ILLNESS:     Notes from September 2019:  Patient here for follow-up today.  Denies any chest pains at rest on exertion, orthopnea, PND.  Denies any other cardiac symptoms.    Notes from June 2019:  Patient is here for follow-up today.  Denies any chest pains at rest on exertion, orthopnea, PND.  Denies any.  Of dizziness or passing out.  Blood pressure is much better controlled in clinic today.    Initial clinic visit:  Patient is a very pleasant 69-year-old man with a past medical history significant for hypertension, diabetes, Sjogren's disease who wants to establish care with [facility].  Patient has been followed with [facility].  Patient states that in 2014 he was diagnosed with heart failure.  He states that he had passed out and had gone to the hospital, he was feeling short of breath also and at that point he was told that he had heart failure.  I can see an echocardiogram from his outside medical records in 2014 which showed normal left ventricular systolic function.  He also had a stress echocardiogram done in 2013 which did not reveal any ischemia.  He denies any chest pains at rest on exertion, orthopnea, PND, swelling of feet.  States that since then he has been okay and walks about 2-3 blocks every day.  On walking about 2-3 blocks he does experience some tightness in his left calf.  This goes away after rest.  He denies any resting calf tightness.  He denies any ulcer on his feet.  He states that he checks his blood pressure at home and has found that it is elevated around 160-170 mm of systolic multiple times.      Notes from March 2021:  Patient here for follow-up.  Denies any chest pains at rest on exertion, orthopnea, PND, swelling of feet.  Mostly blood pressure has been uncontrolled at home.  Staying around 160 mmHg.  Very  compliant with medications.  EKG done in the clinic today was personally reviewed and demonstrates sinus bradycardia with left anterior fascicular block, poor R-wave progression.      Notes from April 21:  Patient here for follow-up.  Now states that he has been experiencing dyspnea on exertion which is worse than prior.  Very concerned about that.  No chest pains or tightness, but it is gets short of breath on walking short distances and this is lifestyle limiting.  Denies orthopnea, PND, swelling of feet.  Also states that the blood pressure is staying around 140-160 mmHg at home.      Notes from May 2021:  Patient here for follow-up.  Stress test did not show any significant ischemia.  Echo showed normal left ventricle systolic function.  Symptoms have improved.  States stop taking his Crestor because it was causing myalgias      The left ventricle is normal in size with concentric remodeling and normal systolic function.  The estimated ejection fraction is 65%.  Grade II left ventricular diastolic dysfunction.  Severe left atrial enlargement.  Normal right ventricular size with normal right ventricular systolic function.  Mild right atrial enlargement.  Mild mitral regurgitation.  Normal central venous pressure (3 mmHg).  The estimated PA systolic pressure is 36 mmHg.  The sinuses of Valsalva is mildly dilated.  Normal myocardial perfusion scan. There is no evidence of myocardial ischemia or infarction.    The gated perfusion images showed an ejection fraction of 75% post stress.    There is normal wall motion post stress.    The EKG portion of this study is negative for ischemia.    The patient reported no chest pain during the stress test.    During stress, rare PVCs are noted.    Hypertensive response to exercise.  MS interval did increase during exercise.      Notes from July 2021:  Patient here for follow-up.  Has been experiencing dyspnea on exertion.  States it is getting worse.  Has an appointment  with pulmonology coming up.  Is requesting evaluation of CBC and BNP.  As well as a chest x-ray.      · The left ventricle is normal in size with concentric hypertrophy and normal systolic function.  · The estimated ejection fraction is 65%.  · Grade I left ventricular diastolic dysfunction.  · Normal right ventricular size with normal right ventricular systolic function.  · Moderate left atrial enlargement.  · Mild right atrial enlargement.  · There is mild aortic valve stenosis.  · Aortic valve area is 1.67 cm2; peak velocity is 1.95 m/s; mean gradient is 9 mmHg.  · Normal central venous pressure (3 mmHg).  · The estimated PA systolic pressure is 16 mmHg.      Notes from dec 21: doing fine. No cp, orthopnea, pnd.       February 2022:  Patient follow-up.  Doing fine.  Occasionally gets swelling in his feet at the end of the day..  States he takes torsemide every Saturday and Sunday.  States he was told by his nephrologist to do so.    Notes from August 2022: Patient states that his nephrologist got off his amlodipine and since then his blood pressure has been uncontrolled.  He is taking hydralazine at 50 mg t.i.d..  Blood pressure is running around 170 mmHg.  Also had an episode of chest pressure last week.  Did not seek medical attention for that.  Denies orthopnea, PND.    PAST MEDICAL HISTORY:     Past Medical History:   Diagnosis Date    Anemia     Arthritis     Cataract     CHF (congestive heart failure)     Chronic constipation     Diabetes mellitus, type 2     Felon of finger of left hand 5/11/2019    Glaucoma     Hyperlipidemia 5/13/2014    Hypertension     MCTD (mixed connective tissue disease)     Sjogren's disease     Ulcerative colitis     Urolithiasis        PAST SURGICAL HISTORY:     Past Surgical History:   Procedure Laterality Date    CATARACT EXTRACTION Bilateral 2005    COLONOSCOPY  2014    EYE SURGERY         ALLERGIES AND MEDICATION:     Review of patient's allergies indicates:    Allergen Reactions    Penicillins Nausea And Vomiting    Rosuvastatin      Muscle pain     Allopurinol analogues Rash        Medication List          Accurate as of August 15, 2022  9:38 AM. If you have any questions, ask your nurse or doctor.            START taking these medications    amLODIPine 5 MG tablet  Commonly known as: NORVASC  Take 1 tablet (5 mg total) by mouth once daily.  Started by: Greg Alvares MD        CONTINUE taking these medications    alcohol swabs Padm  Commonly known as: BD ALCOHOL SWABS  USE  4 TIMES PER DAY     ammonium lactate 12 % Crea  Apply 1 application topically once daily.     atorvastatin 80 MG tablet  Commonly known as: LIPITOR  TAKE 1 TABLET EVERY DAY     BOOSTRIX TDAP 2.5-8-5 Lf-mcg-Lf/0.5mL Syrg injection  Generic drug: diphth,pertus(acell),tetanus     cloNIDine 0.2 mg/24 hr td ptwk 0.2 mg/24 hr  Commonly known as: CATAPRES  PLACE 1 PATCH ONTO THE SKIN EVERY 7 DAYS.     diclofenac sodium 1 % Gel  Commonly known as: VOLTAREN  Apply 2 g topically 4 (four) times daily as needed. Apply to aching joints     dorzolamide 2 % ophthalmic solution  Commonly known as: TRUSOPT  Place 1 drop into both eyes 2 (two) times daily.     febuxostat 40 mg Tab  Commonly known as: ULORIC  Take 1 tablet (40 mg total) by mouth once daily.     INJECTAFER 50 iron mg/mL injection  Generic drug: ferric carboxymaltose     insulin aspart U-100 100 unit/mL (3 mL) Inpn pen  Commonly known as: NovoLOG Flexpen U-100 Insulin  Inject 4 units three times daily before each meal with sliding scale.     insulin glargine 100 units/mL SubQ pen  Commonly known as: LANTUS SOLOSTAR U-100 INSULIN  Inject 5 units once daily     INV hydrALAZINE 25 MG Tab  Commonly known as: APRESOLINE  Take 3 tablets (75 mg total) by mouth 3 (three) times daily.     lancets 33 gauge Misc  Commonly known as: TRUEPLUS LANCETS  Test glucose 4x/day. Dx code e11.65     LINZESS 145 mcg Cap capsule  Generic drug: linaCLOtide  TAKE 1 CAPSULE  "BY MOUTH ONCE DAILY.     multivit with min-folic acid 200 mcg Chew     omeprazole 20 MG capsule  Commonly known as: PRILOSEC  TAKE 2 CAPSULES (40 MG TOTAL) BY MOUTH ONCE DAILY.     ORENCIA CLICKJECT 125 mg/mL Atin  Generic drug: abatacept  Inject 125 mg into the skin once a week.     pen needle, diabetic 32 gauge x 5/32" Ndle  Commonly known as: BD ULTRA-FINE RICHARD PEN NEEDLE  1 Device by Misc.(Non-Drug; Combo Route) route 4 (four) times daily.     predniSONE 5 MG tablet  Commonly known as: DELTASONE  TAKE 1 TABLET BY MOUTH EVERY DAY     PROLIA 60 mg/mL Syrg  Generic drug: denosumab     torsemide 10 MG Tab  Commonly known as: DEMADEX  Take 1 tablet (10 mg total) by mouth every other day.     traMADoL 50 mg tablet  Commonly known as: ULTRAM  TAKE 1 TABLET BY MOUTH EVERY 12 HOURS AS NEEDED FOR PAIN.     travoprost 0.004 % ophthalmic solution  Commonly known as: TRAVATAN Z  Place 1 drop into both eyes every evening.     triamcinolone acetonide 0.1% 0.1 % cream  Commonly known as: KENALOG  Apply topically 2 (two) times daily. for 10 days     TRUE METRIX GLUCOSE METER Misc  Generic drug: blood-glucose meter  USE AS DIRECTED     TRUE METRIX GLUCOSE TEST STRIP Strp  Generic drug: blood sugar diagnostic  TEST GLUCOSE 4 TIMES PER DAY.     TRUE METRIX LEVEL 1 Soln  Generic drug: blood glucose control, low  Use as indicated     TRULICITY 1.5 mg/0.5 mL pen injector  Generic drug: dulaglutide  Inject 1.5 mg into the skin every 7 days.     valsartan 160 MG tablet  Commonly known as: DIOVAN  Take 1 tablet (160 mg total) by mouth 2 (two) times daily.           Where to Get Your Medications      These medications were sent to [facility]/pharmacy #[id number] - [place] - [address]  [address] [address]    Phone: [phone]   · amLODIPine 5 MG tablet         SOCIAL HISTORY:     Social History     Socioeconomic History    Marital status:     Number of children: 3   Tobacco Use    Smoking status: Never Smoker "    Smokeless tobacco: Never Used   Substance and Sexual Activity    Alcohol use: No    Drug use: Yes     Comment: ultram 1 - 2 tabs a week     Social Determinants of Health     Financial Resource Strain: Unknown    Difficulty of Paying Living Expenses: Patient refused   Food Insecurity: Unknown    Worried About Running Out of Food in the Last Year: Patient refused    Ran Out of Food in the Last Year: Patient refused   Transportation Needs: No Transportation Needs    Lack of Transportation (Medical): No    Lack of Transportation (Non-Medical): No   Physical Activity: Insufficiently Active    Days of Exercise per Week: 2 days    Minutes of Exercise per Session: 10 min   Stress: No Stress Concern Present    Feeling of Stress : Not at all   Social Connections: Socially Integrated    Frequency of Communication with Friends and Family: Three times a week    Frequency of Social Gatherings with Friends and Family: Once a week    Attends Gnosticism Services: 1 to 4 times per year    Active Member of Clubs or Organizations: Yes    Attends Club or Organization Meetings: 1 to 4 times per year    Marital Status:    Housing Stability: Low Risk     Unable to Pay for Housing in the Last Year: No    Number of Places Lived in the Last Year: 1    Unstable Housing in the Last Year: No       FAMILY HISTORY:     Family History   Problem Relation Age of Onset    Hypertension Father     Stroke Father     Cataracts Father     Glaucoma Father     Cataracts Mother     No Known Problems Sister     No Known Problems Brother     Rheum arthritis Maternal Aunt     Rheum arthritis Maternal Uncle     No Known Problems Paternal Aunt     No Known Problems Paternal Uncle     No Known Problems Maternal Grandmother     No Known Problems Maternal Grandfather     Throat cancer Paternal Grandmother     Glaucoma Paternal Grandfather     No Known Problems Daughter     No Known Problems Son     Celiac disease Neg Hx   "   Colon cancer Neg Hx     Cirrhosis Neg Hx     Colon polyps Neg Hx     Crohn's disease Neg Hx     Cystic fibrosis Neg Hx     Hemochromatosis Neg Hx     Esophageal cancer Neg Hx     Inflammatory bowel disease Neg Hx     Irritable bowel syndrome Neg Hx     Liver cancer Neg Hx     Liver disease Neg Hx     Rectal cancer Neg Hx     Stomach cancer Neg Hx     Ulcerative colitis Neg Hx     Chase's disease Neg Hx     Amblyopia Neg Hx     Blindness Neg Hx     Cancer Neg Hx     Diabetes Neg Hx     Macular degeneration Neg Hx     Retinal detachment Neg Hx     Strabismus Neg Hx     Thyroid disease Neg Hx     Melanoma Neg Hx        REVIEW OF SYSTEMS:   Review of Systems   Constitutional: Negative.    HENT: Negative.    Eyes: Negative.    Respiratory: Negative.    Gastrointestinal: Negative.    Genitourinary: Negative.    Musculoskeletal: Negative.    Skin: Negative.    Neurological: Negative.    Endo/Heme/Allergies: Negative.    Psychiatric/Behavioral: Negative.    All other systems reviewed and are negative.      A 10 point review of systems was performed and all the pertinent positives have been mentioned. Rest of review of systems was negative.        PHYSICAL EXAM:     Vitals:    08/15/22 0834   BP: (!) 164/74   Pulse: 87   Resp: 18    Body mass index is 22.9 kg/m².  Weight: 64.4 kg (141 lb 13.9 oz)   Height: 5' 6" (167.6 cm)     Physical Exam  Vitals and nursing note reviewed.   Constitutional:       Appearance: Normal appearance. He is well-developed.   HENT:      Head: Normocephalic and atraumatic.      Right Ear: Hearing normal.      Left Ear: Hearing normal.      Nose: Nose normal.   Eyes:      General: Lids are normal.      Conjunctiva/sclera: Conjunctivae normal.      Pupils: Pupils are equal, round, and reactive to light.   Cardiovascular:      Rate and Rhythm: Normal rate and regular rhythm.      Pulses: Normal pulses.      Heart sounds: Murmur heard.    Systolic murmur is present with a " grade of 2/6.  Pulmonary:      Effort: Pulmonary effort is normal.      Breath sounds: Normal breath sounds.   Abdominal:      Palpations: Abdomen is soft.      Tenderness: There is no abdominal tenderness.   Musculoskeletal:         General: No deformity.      Cervical back: Normal range of motion and neck supple.   Skin:     General: Skin is warm and dry.   Neurological:      Mental Status: He is alert and oriented to person, place, and time.   Psychiatric:         Speech: Speech normal.           DATA:     Laboratory:  CBC:  Recent Labs   Lab 10/01/21  0917 02/01/22  0844 03/28/22  0712   WBC 6.90 6.09  6.09 7.10   Hemoglobin 11.6 L 11.9 L  11.9 L 12.3 L   Hematocrit 37.7 L 38.8 L  38.8 L 38.7 L   Platelets 230 197  197 268       CHEMISTRIES:  Recent Labs   Lab 05/19/21  0856 06/27/21  0409 06/28/21  0438 02/01/22  0844 03/28/22  0712 04/22/22  0845   Glucose 103 136 H   < > 88  88 134 H 133 H   Sodium 138 137   < > 141  141 137 136   Potassium 4.1 4.2   < > 4.5  4.5 4.4 4.7   BUN 31 H 32 H   < > 30 H  30 H 30 H 26 H   Creatinine 1.6 H 1.4   < > 1.4  1.4 1.2 1.3   eGFR if  49 A 58 A   < > 58 A  58 A >60.0 >60.0   eGFR if non  43 A 50 A   < > 50 A  50 A >60.0 54.5 A   Calcium 9.3 9.1   < > 8.9  8.9 9.5 9.4   Magnesium 1.8 2.0  --   --   --   --     < > = values in this interval not displayed.       CARDIAC BIOMARKERS:  Recent Labs   Lab 04/29/20  0928 06/27/21 0409   CPK 33 50   CPK MB  --  1.7   Troponin I  --  0.026       COAGS:  Recent Labs   Lab 06/27/21  0409   INR 1.0       LIPIDS/LFTS:  Recent Labs   Lab 06/28/21  0438 10/01/21  0917 02/01/22  0844 03/28/22  0712 04/22/22  0845 07/05/22  0713   Cholesterol 157  --  220 H  --   --  135   Triglycerides 92  --  100  --   --  70   HDL 41  --  59  --   --  41   LDL Cholesterol 97.6  --  141.0  --   --  80.0   Non-HDL Cholesterol 116  --  161  --   --  94   AST 11   < > 15  15 16 38  --    ALT 7 L   < > 11  11 16 32   --     < > = values in this interval not displayed.       Hemoglobin A1C   Date Value Ref Range Status   07/05/2022 6.4 (H) 4.0 - 5.6 % Final     Comment:     ADA Screening Guidelines:  5.7-6.4%  Consistent with prediabetes  >or=6.5%  Consistent with diabetes    High levels of fetal hemoglobin interfere with the HbA1C  assay. Heterozygous hemoglobin variants (HbS, HgC, etc)do  not significantly interfere with this assay.   However, presence of multiple variants may affect accuracy.     03/28/2022 6.3 (H) 4.0 - 5.6 % Final     Comment:     ADA Screening Guidelines:  5.7-6.4%  Consistent with prediabetes  >or=6.5%  Consistent with diabetes    High levels of fetal hemoglobin interfere with the HbA1C  assay. Heterozygous hemoglobin variants (HbS, HgC, etc)do  not significantly interfere with this assay.   However, presence of multiple variants may affect accuracy.     11/29/2021 6.6 (H) 4.0 - 5.6 % Final     Comment:     ADA Screening Guidelines:  5.7-6.4%  Consistent with prediabetes  >or=6.5%  Consistent with diabetes    High levels of fetal hemoglobin interfere with the HbA1C  assay. Heterozygous hemoglobin variants (HbS, HgC, etc)do  not significantly interfere with this assay.   However, presence of multiple variants may affect accuracy.     10/16/2018 6.2 (H) 4.0 - 5.7 % Final     Comment:     Dr. Jurgen Ward ( Endocrinology )       TSH  Recent Labs   Lab 05/19/21  0856 06/27/21  0409   TSH 4.040 H 4.339 H       The 10-year ASCVD risk score (Sc[place] DC Jr., et al., 2013) is: 52.3%    Values used to calculate the score:      Age: 72 years      Sex: Male      Is Non- : No      Diabetic: Yes      Tobacco smoker: No      Systolic Blood Pressure: 164 mmHg      Is BP treated: Yes      HDL Cholesterol: 41 mg/dL      Total Cholesterol: 135 mg/dL           Cardiovascular Testing:    EKG: (personally reviewed tracing)  Sinus bradycardia with first-degree AV block left anterior fascicle, septal  infarct.    KIRT in June 2019:  Normal resting KIRT/PVR waveforms bilaterally.  Normal exercise KIRT bilaterally (with minimal drop from resting KIRT measurements).        2D echocardiogram in June 2019:  Normal left ventricular systolic function. The estimated ejection fraction is 65%  Moderate left atrial enlargement.  Normal LV diastolic function.  Mild right atrial enlargement.  Normal central venous pressure (3 mm Hg).  The estimated PA systolic pressure is 14 mm Hg            2D echocardiogram 2014 done at [facility]    HEIGHT: 167.6 cm  WEIGHT: 74.8 kg  BP: 157/82  BSA:  MEASUREMENTS  ------------  IVSd: 1.2 cm  LVIDd: 3.8 cm  LVPWd: 1.2 cm  LVIDs: 2.6 cm  LA Diam: 3.2 cm  Ao Diam: 3.1 cm  LAESV Index (A-L): 32.40 ml/m²  LVCO Dopp: 6.98 l/min  LVCI Dopp: 3.79 l/minm²    FINDINGS  -------  CPT CODE:78991-53.  Transthoracic echocardiogram (complete).  The attending physician   listed herein personally reviewed all stored images and directly supervised the   fellow-in-training (if listed) in the study's acquistion and/or report   generation.     Study priority:  Routine.  ICD-9 Indication(s):786.05 - Dyspnea.  Study Quality:The study was technically adequate.  ECG Rhythm:Sinus rhythm.  CHAMBER SIZE:All cardiac chamber sizes are within normal limits.  AORTA:Normal aortic root and proximal ascending aorta.  VALVULAR STRUCTURES:The visualized cardiac valves are structurally normal.  LEFT/RIGHT VENTRICULAR FUNCTION:LV size, wall thickness and systolic function are normal, with an EF > 55%.       The right ventricle is normal in size and function.  DOPPLER:  Color:No valvular insufficiencies.  DIASTOLOGY:The diastolic filling pattern is normal for the age of the patient.  PERICARDIUM / EFFUSIONS:No effusions noted.  INFERIOR VENA CAVA:Normal size inferior vena cava.  PA PRESSURE:The estimated systolic pulmonary artery pressure is normal at < 35 mm Hg (RA   pressure = 3 mm Hg).  CARDIOLOGY:    Electronically signed:  STAFF:ELIAS  PAOLO JAIME.  Fellow:G. MOHSEN, M.D.    CONCLUSIONS  -----------  1. LV size, wall thickness and systolic function are normal, with an EF > 55%.  2. The diastolic filling pattern is normal for the age of the patient.    ASSESSMENT AND PLAN     Patient Active Problem List   Diagnosis    Essential hypertension    Controlled type 2 diabetes mellitus with diabetic polyneuropathy, with long-term current use of insulin    Pure hypercholesterolemia    MCTD (mixed connective tissue disease)    Sjogren's disease    Bilateral edema of lower extremity 6/2019 TTE normal LV systolic and diastolic function; ABIs normal    Glaucoma    BMI 23.0-23.9, adult    Jackson's esophagus without dysplasia    Anemia from plaquenil and imuran    Neutropenia    Current chronic use of systemic steroids    Compression fracture of body of thoracic vertebra on XR 2/2019; 2/2019 alendronate    Anemia of chronic renal failure, stage 3b    Chronic constipation not responding to linzess, miralax, senna    Screening for colon cancer 07/26/2018 colonoscopy transverse colon polyp path showing benign inflammatory polyp.    Tophaceous gout    Pseudophakia of both eyes    Refractive error    Aortic atherosclerosis    Celiac disease    Diastolic heart failure, NYHA class 2    Stage 3a chronic kidney disease    Joint pain    Decreased range of motion of both ankles    Decreased range of motion of both wrists    Decreased pinch strength    RA (rheumatoid arthritis)    Decreased  strength    Swelling of both hands    Secondary hyperparathyroidism of renal origin    Osteoporosis    TIA (transient ischemic attack)    Mobitz I    Bilateral carotid artery stenosis on CTA 6/26/21    History of stroke    Dyspnea on exertion    JAZLYN (obstructive sleep apnea)    Long-term insulin use    Dyshidrotic eczema       1.  Chest pressure:  Check stress test and echocardiogram.    2.  Hypertension:  Uncontrolled.  Add Norvasc 5 mg  daily.  If after 1-2 weeks blood pressure still elevated, increase to 10 mg daily.      3.  Dyslipidemia:  Tolerating lipitor    4.  ABIs in June 2019 were within normal limits.    5.  Chronic kidney disease    7. Elevated TSH.   rx per primary    8. Torsemide to be used as needed for swelling of feet.    Follow-up in 1 months      Thank you very much for involving me in the care of your patient.  Please do not hesitate to contact me if there are any questions.      Greg Alvares MD, FAC, [facility]  Interventional Cardiologist, [facility].           This note was dictated with the help of speech recognition software.  There might be un-intended errors and/or substitutions.

## 2022-08-22 PROBLEM — G45.9 TIA (TRANSIENT ISCHEMIC ATTACK): Status: RESOLVED | Noted: 2021-06-26 | Resolved: 2022-08-22

## 2022-08-22 NOTE — PROGRESS NOTES
This note was created by combination of typed  and M-Modal dictation.  Transcription errors may be present.  If there are any questions, please contact me.    Assessment and Plan:   Chronic constipation not responding to linzess, miralax, senna  -for couple of months he was doing okay off of Linzess but then started getting constipation again.  Seems to have started prior to resuming amlodipine.  notes that when this at this current dose 145 causes unpleasant diarrhea.  We will try him on a lower dose 72 mg.  If ineffective at 72 revert back to 145  -     linaCLOtide (LINZESS) 72 mcg Cap capsule; Take 1 capsule (72 mcg total) by mouth before breakfast.  Dispense: 90 capsule; Refill: 3    Chronic midline low back pain without sciatica  -refill tramadol for p.r.n. use.  -     traMADoL (ULTRAM) 50 mg tablet; TAKE 1 TABLET BY MOUTH EVERY 12 HOURS AS NEEDED FOR PAIN.  Dispense: 60 tablet; Refill: 0    Controlled type 2 diabetes mellitus with diabetic polyneuropathy, with long-term current use of insulin  -followed by Endocrinology.  Has CGM, adjusting insulin regimen.    Pure hypercholesterolemia  -stable. On statin.    Essential hypertension  Diastolic heart failure, NYHA class 2  Stage 3a chronic kidney disease  -blood pressures today are good.  Had previously been on hydralazine 50, clonidine, amlodipine, valsartan, the amlodipine was stopped and he was supposed to increase the hydralazine to compensate but I think there was some confusion so instead of taking hydralazine 75 t.i.d. he only took hydralazine 25 t.i.d..  With result his blood pressures were high.  Recently restarted on amlodipine.  Blood pressures today are good.  So he is on amlodipine 5, hydralazine 50, valsartan 160, clonidine. No changes. Encouraged to f/u with nephrology around October (6 months)    Anemia of chronic renal failure, stage 3b  -stable.  Hx of iron infusion with improvement.  Discharged from heme clinic.    Current chronic  use of systemic steroids   -managed by rheum.  Reminded to schedule f/u.    Medications Discontinued During This Encounter   Medication Reason    LINZESS 145 mcg Cap capsule Dose adjustment    traMADoL (ULTRAM) 50 mg tablet Reorder       meds sent this encounter:  Medications Ordered This Encounter   Medications    linaCLOtide (LINZESS) 72 mcg Cap capsule     Sig: Take 1 capsule (72 mcg total) by mouth before breakfast.     Dispense:  90 capsule     Refill:  3     Decreased dose    traMADoL (ULTRAM) 50 mg tablet     Sig: TAKE 1 TABLET BY MOUTH EVERY 12 HOURS AS NEEDED FOR PAIN.     Dispense:  60 tablet     Refill:  0     Not to exceed 5 additional fills before 07/24/2022 DX Code Needed  .       Follow Up: No follow-ups on file.    Subjective:     Chief Complaint   Patient presents with    Hypertension    Abdominal Pain       HPI  Darrion is a 72 y.o. male, last appointment with this clinic was Visit date not found.  Social History     Tobacco Use    Smoking status: Never Smoker    Smokeless tobacco: Never Used   Substance Use Topics    Alcohol use: No          Social History     Social History Narrative    Not on file       Last visit me in January   Diabetes followed by diabetes nurse practitioner.  Seen in early April.  The CGM readings could not be downloaded.  The reader results are higher than expected compared to his A1c.  Possible false low A1c due to anemia.  Seen in follow-up in mid July.  Reduced NovoLog for avoiding post meal hypoglycemia.  Reduce Lantus dose.  Hypertension.  HFpEF.  On diuretic, ARB.  Not on beta-blocker due to bradycardia.  Followed by Cardiology.  Followed by digital monitoring.  Saw cardiology in mid April.  Stable plan is follow-up 6 months.  Torsemide p.r.n. for edema.  CKD stage IIIA.  Anemia of chronic renal disease with secondary hyper PTH.  Followed by Nephrology.  Last visit in late April, BP uncontrolled, stop amlodipine and increase hydralazine.  Continue clonidine  but long-term goal is to minimize clonidine and if BP is good would lower the clonidine.  Subsequently saw cardiology for uncontrolled hypertension.  Add back Norvasc.  Saw hematology for anemia of chronic renal failure.  Status post iron infusion 10/2021 with improvement in hemoglobin an improvement in the RDW.  No role for erythropoietin.  Discharged from hematology in February.  Hyperlipidemia/statin  Sjogren's, Rheumatology  Thrombocytopenia stable, Hematology last checked 3/28, platelet count normalized  Chronic back pain, tramadol p.r.n..  Avoid NSAIDs.    Osteoporosis, with history of compression fracture, endocrinology.  Prolia.  Gout, on urate lowering therapy.  Rheumatology.  Most recent uric acid level good  constipation, Linzess p.r.n.  Polymyalgia rheumatica, steroid    Constipation.   He notes that he was able to stop the Linzess for about 3 months and had been doing well with regular bowel movements.  Until earlier this month.  Estimates around August a 70 developed significant abdominal pain as he had not had a bowel movement for several days.  He restarted Linzess, did not work and ultimately had to take a suppository.  With subsequent bowel movement he had relief.  Since then he has been taking Linzess regularly.    notes that Linzess causes him to have very watery diarrhea and makes him short of breath when it works.  Has not tried lower dose.  We will try him with the lower dose and see if that can help without significant diarrhea.    He was restarted with his amlodipine Ana follow-up with Cardiology.  This occurred after the recurrence of constipation.  his blood pressure had been uncontrolled.  Off of amlodipine.  But he took hydralazine 25 t.i.d. instead of 75 t.i.d..  I think what had happened was he was supposed to take a hydralazine 50 mg tablet along with a 25 mg tablet to make 75.  But he only got a prescription for 25.  currently he is taking amlodipine 5, hydralazine 50, clonidine and  valsartan.    Torsemide takes PRN.  Estimates weekly.     Physical activity - house work, gardening.  Dyspnea with walking    Answers for HPI/ROS submitted by the patient on 8/22/2022  Chronicity: recurrent  Onset: in the past 7 days  Onset quality: undetermined  Frequency: 2 to 4 times per day  Progression since onset: gradually worsening  Pain location: epigastric region  Pain - numeric: 5/10  Pain quality: cramping  Radiates to: periumbilical region  Aggravated by: nothing, deep breathing  Relieved by: bowel movements  Pain severity: moderate  Treatments tried: antacids  Improvement on treatment: mild    Patient Care Team:  Farooq Domingo MD as PCP - General (Internal Medicine)  Tin Chambers MD (Gastroenterology)  Harrison Brannon MD (Cardiology)  Roxanna Salazar MD as Nurse Practitioner (Rheumatology)  Malcolm Irwin MD as  (Nephrology)  Amy Gamez MA as Care Coordinator  Dangelo Hall MD as Consulting Physician (Ophthalmology)  [facility] (Gastroenterology)  Kassandra Eason as Digital Medicine Health   Shamir Fonseca, PharmD as Hypertension Digital Medicine Clinician (Pharmacist)  Farooq Domingo MD as Hypertension Digital Medicine Responsible Provider (Internal Medicine)  Farooq Domingo MD as Hyperlipidemia Digital Medicine Responsible Provider (Internal Medicine)  Shamir Fonseca, PharmD as Hyperlipidemia Digital Medicine Clinician  [facility] as Hypertension Digital Medicine Contract    Patient Active Problem List    Diagnosis Date Noted    Dyshidrotic eczema 02/04/2022    Long-term insulin use 02/01/2022    Dyspnea on exertion 08/02/2021     +diastolic dysfunction + anemia.  pft with restrictive physiology and decreased dlco.  Ct with mosaic attenuation and enlarge pa suggesting volume overload.    9/16/21 CT chest:  Subtle mosaic attenuation pattern, nonspecific can be seen in the setting of inflammatory small airway  disease, also can be seen with occlusive vascular disease and subtle change of increased pulmonary venous pressure.  3 mm pulmonary nodule right upper lobe, Fleischner Society guidelines for nodules less than 6 mm in individuals with low risk for lung malignancy: no follow-up needed, and individuals with high risk for lung malignancy: optional chest  CT at 12 months      JAZLYN (obstructive sleep apnea) 08/02/2021     ahi of 21; rdi of 38.   Result of sleep study d/w patient.  Recommend treatment.  Patient agree to apap.  Desensitization protocol d/w patient      History of stroke 07/20/2021 6/2021 MRI brain old stroke in thalamus      Mobitz I 06/27/2021    Bilateral carotid artery stenosis on CTA 6/26/21 06/27/2021 6/26/21 CTA head and neck  Atherosclerotic plaque throughout the aortic arch with mild shaggy soft atherosclerotic plaque in the posterior arch near the junction of the transverse and descending thoracic aorta.  Minimal atherosclerotic plaque in the cervical carotids with no stenosis or dissection within the cervical vessels.  Moderate to severe atherosclerotic plaque within the carotid siphons with 50-60% diameter stenosis, right slightly greater than left.  Moderate plaque within the V4 segment of the left vertebral segment.  No large vessel occlusion or aneurysm intracranially.     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 05/02/2019    Anemia of chronic renal failure, stage 3b 03/13/2019    Current chronic use of systemic steroids 02/27/2019    Compression fracture of body of thoracic vertebra on XR 2/2019; 2/2019 alendronate 02/27/2019    Neutropenia 02/26/2019    Jackson's esophagus without dysplasia 03/14/2016    Anemia from plaquenil and imuran 03/14/2016    Diastolic heart failure, NYHA class 2 09/26/2014 05/12/2021 TTE LV normal size with concentric remodeling and normal systolic function LVEF 65%.  Grade 2 diastolic dysfunction.  Severe left atrial enlargement.  Normal RV size and systolic function.  CVP 3.  PA pressure 36. Sinuses of Valsalva mildly dilated.  05/12/2021 nuclear medicine stress test normal perfusion.  No evidence of ischemia or infarction.  LVEF 75%.  Normal wall motion post stress.  During stress, rare PVCs.  Hypertensive response exercise.    -ct with mosaic attenuation.  Recommend 10 m g 2 times per week.  Recommend daily demodex      BMI 23.0-23.9, adult 07/15/2014    Essential hypertension 05/13/2014 6/2019 TTE normal LV systolic and diastolic function; ABIs normal  6/27/21 TTE LV normal size with concentric hypertrophy and normal systolic function LVEF 65%.  Grade 1 diastolic dysfunction.  Normal RV size with normal systolic function.  Moderate left atrial enlargement.  Mild right atrial enlargement.  Mild aortic valve stenosis.  Valve area 1.67, peak velocity 1.95, mean gradient 9. Normal CVP 3. PA pressure 16.         Controlled type 2 diabetes mellitus with diabetic polyneuropathy, with long-term current use of insulin 05/13/2014    Pure hypercholesterolemia 05/13/2014 05/12/2021 nuclear medicine stress test normal perfusion.  No evidence of ischemia or infarction.  LVEF 75%.  Normal wall motion post stress.  During stress, rare  PVCs.  Hypertensive response exercise.      MCTD (mixed connective tissue disease) 05/13/2014    Sjogren's disease 05/13/2014    Bilateral edema of lower extremity 6/2019 TTE normal LV systolic and diastolic function; ABIs normal 05/13/2014    Glaucoma 05/13/2014    Celiac disease 11/12/2013     Overview:   Gluten intolerant         PAST MEDICAL PROBLEMS, PAST SURGICAL HISTORY: please see relevant portions of the electronic medical record    ALLERGIES AND MEDICATIONS: updated and reviewed.  Review of patient's allergies indicates:   Allergen Reactions    Penicillins Nausea And Vomiting    Rosuvastatin      Muscle pain     Allopurinol analogues Rash       Medication List with Changes/Refills   Current Medications    ABATACEPT (ORENCIA CLICKJECT) 125 MG/ML ATIN    Inject 125 mg into the skin once a week.    ALCOHOL SWABS (BD ALCOHOL SWABS) PADM    USE  4 TIMES PER DAY    AMLODIPINE (NORVASC) 5 MG TABLET    Take 1 tablet (5 mg total) by mouth once daily.    AMMONIUM LACTATE 12 % CREA    Apply 1 application topically once daily.    ATORVASTATIN (LIPITOR) 80 MG TABLET    TAKE 1 TABLET EVERY DAY    BLOOD GLUCOSE CONTROL, LOW (TRUE METRIX LEVEL 1) SOLN    Use as indicated    BOOSTRIX TDAP 2.5-8-5 LF-MCG-LF/0.5ML SYRG INJECTION        CLONIDINE 0.2 MG/24 HR TD PTWK (CATAPRES) 0.2 MG/24 HR    PLACE 1 PATCH ONTO THE SKIN EVERY 7 DAYS.    DICLOFENAC SODIUM (VOLTAREN) 1 % GEL    Apply 2 g topically 4 (four) times daily as needed. Apply to aching joints    DORZOLAMIDE (TRUSOPT) 2 % OPHTHALMIC SOLUTION    Place 1 drop into both eyes 2 (two) times daily.    DULAGLUTIDE (TRULICITY) 1.5 MG/0.5 ML PEN INJECTOR    Inject 1.5 mg into the skin every 7 days.    FEBUXOSTAT (ULORIC) 40 MG TAB    Take 1 tablet (40 mg total) by mouth once daily.    HYDRALAZINE (APRESOLINE) 50 MG TABLET    Take 1 tablet (50 mg total) by mouth every 8 (eight) hours.    INJECTAFER 50 IRON MG/ML INJECTION        INSULIN (LANTUS SOLOSTAR U-100 INSULIN)  "GLARGINE 100 UNITS/ML SUBQ PEN    Inject 5 units once daily    INSULIN ASPART U-100 (NOVOLOG FLEXPEN U-100 INSULIN) 100 UNIT/ML (3 ML) INPN PEN    Inject 4 units three times daily before each meal with sliding scale.    LANCETS (TRUEPLUS LANCETS) 33 GAUGE MISC    Test glucose 4x/day. Dx code e11.65    LINZESS 145 MCG CAP CAPSULE    TAKE 1 CAPSULE BY MOUTH ONCE DAILY.    MULTIVIT WITH MIN-FOLIC ACID 200 MCG CHEW        OMEPRAZOLE (PRILOSEC) 20 MG CAPSULE    TAKE 2 CAPSULES (40 MG TOTAL) BY MOUTH ONCE DAILY.    PEN NEEDLE, DIABETIC (BD ULTRA-FINE RICHARD PEN NEEDLE) 32 GAUGE X 5/32" NDLE    1 Device by Misc.(Non-Drug; Combo Route) route 4 (four) times daily.    PREDNISONE (DELTASONE) 5 MG TABLET    TAKE 1 TABLET BY MOUTH EVERY DAY    PROLIA 60 MG/ML SYRG        TORSEMIDE (DEMADEX) 10 MG TAB    Take 1 tablet (10 mg total) by mouth 2 (two) times a day.    TRAMADOL (ULTRAM) 50 MG TABLET    TAKE 1 TABLET BY MOUTH EVERY 12 HOURS AS NEEDED FOR PAIN.    TRAVOPROST (TRAVATAN Z) 0.004 % OPHTHALMIC SOLUTION    Place 1 drop into both eyes every evening.    TRIAMCINOLONE ACETONIDE 0.1% (KENALOG) 0.1 % CREAM    Apply topically 2 (two) times daily. for 10 days    TRUE METRIX GLUCOSE METER MISC    USE AS DIRECTED    TRUE METRIX GLUCOSE TEST STRIP STRP    TEST GLUCOSE 4 TIMES PER DAY.    VALSARTAN (DIOVAN) 160 MG TABLET    Take 1 tablet (160 mg total) by mouth 2 (two) times daily.      Review of Systems   Constitutional: Negative for chills and fever.   Respiratory: Positive for shortness of breath.         Notes dyspnea with bending over  and dyspnea with walking distances   Cardiovascular: Negative for chest pain.   Gastrointestinal: Positive for constipation and diarrhea.   Genitourinary: Negative for dysuria.       Objective:   Physical Exam   Vitals:    08/23/22 0831   BP: (!) 134/58   BP Location: Right arm   Patient Position: Sitting   BP Method: Medium (Manual)   Pulse: 84   Temp: 98.5 °F (36.9 °C)   TempSrc: Oral   SpO2: 98% " "  Height: 5' 6" (1.676 m)    Body mass index is 22.9 kg/m².      Height: 5' 6" (167.6 cm)     Physical Exam  Constitutional:       General: He is not in acute distress.     Appearance: He is well-developed.   Cardiovascular:      Rate and Rhythm: Normal rate and regular rhythm.      Heart sounds: Normal heart sounds. No murmur heard.  Pulmonary:      Effort: Pulmonary effort is normal.      Breath sounds: Normal breath sounds.   Musculoskeletal:         General: Normal range of motion.      Right lower leg: Edema present.      Left lower leg: Edema present.      Comments: 1+ edema to mid tibia   Skin:     General: Skin is warm and dry.   Neurological:      Mental Status: He is alert and oriented to person, place, and time.      Coordination: Coordination normal.   Psychiatric:         Behavior: Behavior normal.         Thought Content: Thought content normal.               "

## 2022-08-23 ENCOUNTER — OFFICE VISIT (OUTPATIENT)
Dept: FAMILY MEDICINE | Facility: CLINIC | Age: 72
End: 2022-08-23
Payer: MEDICARE

## 2022-08-23 VITALS
HEART RATE: 84 BPM | SYSTOLIC BLOOD PRESSURE: 134 MMHG | BODY MASS INDEX: 22.9 KG/M2 | TEMPERATURE: 99 F | HEIGHT: 66 IN | OXYGEN SATURATION: 98 % | DIASTOLIC BLOOD PRESSURE: 58 MMHG

## 2022-08-23 DIAGNOSIS — K59.09 CHRONIC CONSTIPATION: Primary | ICD-10-CM

## 2022-08-23 DIAGNOSIS — E78.00 PURE HYPERCHOLESTEROLEMIA: ICD-10-CM

## 2022-08-23 DIAGNOSIS — E11.42 CONTROLLED TYPE 2 DIABETES MELLITUS WITH DIABETIC POLYNEUROPATHY, WITH LONG-TERM CURRENT USE OF INSULIN: ICD-10-CM

## 2022-08-23 DIAGNOSIS — I50.30 DIASTOLIC HEART FAILURE, NYHA CLASS 2: ICD-10-CM

## 2022-08-23 DIAGNOSIS — N18.32 ANEMIA OF CHRONIC RENAL FAILURE, STAGE 3B: ICD-10-CM

## 2022-08-23 DIAGNOSIS — D63.1 ANEMIA OF CHRONIC RENAL FAILURE, STAGE 3B: ICD-10-CM

## 2022-08-23 DIAGNOSIS — M54.50 CHRONIC MIDLINE LOW BACK PAIN WITHOUT SCIATICA: ICD-10-CM

## 2022-08-23 DIAGNOSIS — I10 ESSENTIAL HYPERTENSION: ICD-10-CM

## 2022-08-23 DIAGNOSIS — Z79.52 CURRENT CHRONIC USE OF SYSTEMIC STEROIDS: ICD-10-CM

## 2022-08-23 DIAGNOSIS — Z79.4 CONTROLLED TYPE 2 DIABETES MELLITUS WITH DIABETIC POLYNEUROPATHY, WITH LONG-TERM CURRENT USE OF INSULIN: ICD-10-CM

## 2022-08-23 DIAGNOSIS — G89.29 CHRONIC MIDLINE LOW BACK PAIN WITHOUT SCIATICA: ICD-10-CM

## 2022-08-23 DIAGNOSIS — N18.31 STAGE 3A CHRONIC KIDNEY DISEASE: ICD-10-CM

## 2022-08-23 PROCEDURE — 4010F ACE/ARB THERAPY RXD/TAKEN: CPT | Mod: CPTII,S$GLB,, | Performed by: INTERNAL MEDICINE

## 2022-08-23 PROCEDURE — 3008F PR BODY MASS INDEX (BMI) DOCUMENTED: ICD-10-PCS | Mod: CPTII,S$GLB,, | Performed by: INTERNAL MEDICINE

## 2022-08-23 PROCEDURE — 3078F PR MOST RECENT DIASTOLIC BLOOD PRESSURE < 80 MM HG: ICD-10-PCS | Mod: CPTII,S$GLB,, | Performed by: INTERNAL MEDICINE

## 2022-08-23 PROCEDURE — 99999 PR PBB SHADOW E&M-EST. PATIENT-LVL III: CPT | Mod: PBBFAC,,, | Performed by: INTERNAL MEDICINE

## 2022-08-23 PROCEDURE — 3288F FALL RISK ASSESSMENT DOCD: CPT | Mod: CPTII,S$GLB,, | Performed by: INTERNAL MEDICINE

## 2022-08-23 PROCEDURE — 1101F PR PT FALLS ASSESS DOC 0-1 FALLS W/OUT INJ PAST YR: ICD-10-PCS | Mod: CPTII,S$GLB,, | Performed by: INTERNAL MEDICINE

## 2022-08-23 PROCEDURE — 3078F DIAST BP <80 MM HG: CPT | Mod: CPTII,S$GLB,, | Performed by: INTERNAL MEDICINE

## 2022-08-23 PROCEDURE — 3062F POS MACROALBUMINURIA REV: CPT | Mod: CPTII,S$GLB,, | Performed by: INTERNAL MEDICINE

## 2022-08-23 PROCEDURE — 99214 OFFICE O/P EST MOD 30 MIN: CPT | Mod: S$GLB,,, | Performed by: INTERNAL MEDICINE

## 2022-08-23 PROCEDURE — 1101F PT FALLS ASSESS-DOCD LE1/YR: CPT | Mod: CPTII,S$GLB,, | Performed by: INTERNAL MEDICINE

## 2022-08-23 PROCEDURE — 99999 PR PBB SHADOW E&M-EST. PATIENT-LVL III: ICD-10-PCS | Mod: PBBFAC,,, | Performed by: INTERNAL MEDICINE

## 2022-08-23 PROCEDURE — 3066F NEPHROPATHY DOC TX: CPT | Mod: CPTII,S$GLB,, | Performed by: INTERNAL MEDICINE

## 2022-08-23 PROCEDURE — 3288F PR FALLS RISK ASSESSMENT DOCUMENTED: ICD-10-PCS | Mod: CPTII,S$GLB,, | Performed by: INTERNAL MEDICINE

## 2022-08-23 PROCEDURE — 3075F PR MOST RECENT SYSTOLIC BLOOD PRESS GE 130-139MM HG: ICD-10-PCS | Mod: CPTII,S$GLB,, | Performed by: INTERNAL MEDICINE

## 2022-08-23 PROCEDURE — 1126F PR PAIN SEVERITY QUANTIFIED, NO PAIN PRESENT: ICD-10-PCS | Mod: CPTII,S$GLB,, | Performed by: INTERNAL MEDICINE

## 2022-08-23 PROCEDURE — 3044F PR MOST RECENT HEMOGLOBIN A1C LEVEL <7.0%: ICD-10-PCS | Mod: CPTII,S$GLB,, | Performed by: INTERNAL MEDICINE

## 2022-08-23 PROCEDURE — 3062F PR POS MACROALBUMINURIA RESULT DOCUMENTED/REVIEW: ICD-10-PCS | Mod: CPTII,S$GLB,, | Performed by: INTERNAL MEDICINE

## 2022-08-23 PROCEDURE — 3066F PR DOCUMENTATION OF TREATMENT FOR NEPHROPATHY: ICD-10-PCS | Mod: CPTII,S$GLB,, | Performed by: INTERNAL MEDICINE

## 2022-08-23 PROCEDURE — 1160F RVW MEDS BY RX/DR IN RCRD: CPT | Mod: CPTII,S$GLB,, | Performed by: INTERNAL MEDICINE

## 2022-08-23 PROCEDURE — 3008F BODY MASS INDEX DOCD: CPT | Mod: CPTII,S$GLB,, | Performed by: INTERNAL MEDICINE

## 2022-08-23 PROCEDURE — 1159F MED LIST DOCD IN RCRD: CPT | Mod: CPTII,S$GLB,, | Performed by: INTERNAL MEDICINE

## 2022-08-23 PROCEDURE — 1159F PR MEDICATION LIST DOCUMENTED IN MEDICAL RECORD: ICD-10-PCS | Mod: CPTII,S$GLB,, | Performed by: INTERNAL MEDICINE

## 2022-08-23 PROCEDURE — 1126F AMNT PAIN NOTED NONE PRSNT: CPT | Mod: CPTII,S$GLB,, | Performed by: INTERNAL MEDICINE

## 2022-08-23 PROCEDURE — 4010F PR ACE/ARB THEARPY RXD/TAKEN: ICD-10-PCS | Mod: CPTII,S$GLB,, | Performed by: INTERNAL MEDICINE

## 2022-08-23 PROCEDURE — 3044F HG A1C LEVEL LT 7.0%: CPT | Mod: CPTII,S$GLB,, | Performed by: INTERNAL MEDICINE

## 2022-08-23 PROCEDURE — 1160F PR REVIEW ALL MEDS BY PRESCRIBER/CLIN PHARMACIST DOCUMENTED: ICD-10-PCS | Mod: CPTII,S$GLB,, | Performed by: INTERNAL MEDICINE

## 2022-08-23 PROCEDURE — 3075F SYST BP GE 130 - 139MM HG: CPT | Mod: CPTII,S$GLB,, | Performed by: INTERNAL MEDICINE

## 2022-08-23 PROCEDURE — 99214 PR OFFICE/OUTPT VISIT, EST, LEVL IV, 30-39 MIN: ICD-10-PCS | Mod: S$GLB,,, | Performed by: INTERNAL MEDICINE

## 2022-08-23 RX ORDER — TRAMADOL HYDROCHLORIDE 50 MG/1
TABLET ORAL
Qty: 60 TABLET | Refills: 0 | Status: SHIPPED | OUTPATIENT
Start: 2022-08-23 | End: 2023-04-20

## 2022-08-23 NOTE — PATIENT INSTRUCTIONS
PLEASE SCHEDULE FOLLOW UP WITH RHEUMATOLOGY    AND SET UP FOLLOW UP WITH NEPHROLOGY.  I LINKED NEPHROLOGY LAB TESTING TO YOUR [facility]PC[facility]ING LAB APPOINTMENT IN October.

## 2022-08-24 ENCOUNTER — SPECIALTY PHARMACY (OUTPATIENT)
Dept: PHARMACY | Facility: CLINIC | Age: 72
End: 2022-08-24
Payer: MEDICARE

## 2022-08-24 DIAGNOSIS — M06.9 RHEUMATOID ARTHRITIS, INVOLVING UNSPECIFIED SITE, UNSPECIFIED WHETHER RHEUMATOID FACTOR PRESENT: Primary | ICD-10-CM

## 2022-08-24 NOTE — TELEPHONE ENCOUNTER
Specialty Pharmacy - Refill Coordination  Specialty Pharmacy - Clinical Reassessment    Specialty Medication Orders Linked to Encounter    Flowsheet Row Most Recent Value   Medication #1 abatacept (ORENCIA CLICKJECT) 125 mg/mL AtIn (Order#[id number], Rx#[id number])        Refill Questions - Documented Responses    Flowsheet Row Most Recent Value   Patient Availability and HIPAA Verification    Does patient want to proceed with activity? Yes   HIPAA/medical authority confirmed? Yes   Relationship to patient of person spoken to? Self   Refill Screening Questions    Changes to allergies? No   Changes to medications? No   New conditions since last clinic visit? No   Unplanned office visit, urgent care, ED, or hospital admission in the last 4 weeks? No   How does patient/caregiver feel medication is working? Good   Financial problems or insurance changes? No   How many doses of your specialty medications were missed in the last 4 weeks? 0   Would patient like to speak to a pharmacist? No   When does the patient need to receive the medication? 08/31/22   Refill Delivery Questions    How will the patient receive the medication? Delivery Sara   When does the patient need to receive the medication? 08/31/22   Shipping Address Home   Address in [facility] confirmed and updated if neccessary? Yes   Expected Copay ($) 0   Is the patient able to afford the medication copay? Yes   Payment Method zero copay   Days supply of Refill 28   Supplies needed? Injection Device   Refill activity completed? Yes   Refill activity plan Refill scheduled   Shipment/Pickup Date: 08/26/22          Current Outpatient Medications   Medication Sig    abatacept (ORENCIA CLICKJECT) 125 mg/mL AtIn Inject 125 mg into the skin once a week.    alcohol swabs (BD ALCOHOL SWABS) PadM USE  4 TIMES PER DAY    amLODIPine (NORVASC) 5 MG tablet Take 1 tablet (5 mg total) by mouth once daily.    ammonium lactate 12 % Crea Apply 1 application topically  [Patient] : patient "once daily.    atorvastatin (LIPITOR) 80 MG tablet TAKE 1 TABLET EVERY DAY    blood glucose control, low (TRUE METRIX LEVEL 1) Soln Use as indicated    BOOSTRIX TDAP 2.5-8-5 Lf-mcg-Lf/0.5mL Syrg injection     cloNIDine 0.2 mg/24 hr td ptwk (CATAPRES) 0.2 mg/24 hr PLACE 1 PATCH ONTO THE SKIN EVERY 7 DAYS.    diclofenac sodium (VOLTAREN) 1 % Gel Apply 2 g topically 4 (four) times daily as needed. Apply to aching joints    dorzolamide (TRUSOPT) 2 % ophthalmic solution Place 1 drop into both eyes 2 (two) times daily.    dulaglutide (TRULICITY) 1.5 mg/0.5 mL pen injector Inject 1.5 mg into the skin every 7 days.    febuxostat (ULORIC) 40 mg Tab Take 1 tablet (40 mg total) by mouth once daily.    hydrALAZINE (APRESOLINE) 50 MG tablet Take 1 tablet (50 mg total) by mouth every 8 (eight) hours.    INJECTAFER 50 iron mg/mL injection     insulin (LANTUS SOLOSTAR U-100 INSULIN) glargine 100 units/mL SubQ pen Inject 5 units once daily    insulin aspart U-100 (NOVOLOG FLEXPEN U-100 INSULIN) 100 unit/mL (3 mL) InPn pen Inject 4 units three times daily before each meal with sliding scale.    lancets (TRUEPLUS LANCETS) 33 gauge Misc Test glucose 4x/day. Dx code e11.65    linaCLOtide (LINZESS) 72 mcg Cap capsule Take 1 capsule (72 mcg total) by mouth before breakfast.    multivit with min-folic acid 200 mcg Chew     omeprazole (PRILOSEC) 20 MG capsule TAKE 2 CAPSULES (40 MG TOTAL) BY MOUTH ONCE DAILY.    pen needle, diabetic (BD ULTRA-FINE RICHARD PEN NEEDLE) 32 gauge x 5/32" Ndle 1 Device by Misc.(Non-Drug; Combo Route) route 4 (four) times daily.    predniSONE (DELTASONE) 5 MG tablet TAKE 1 TABLET BY MOUTH EVERY DAY    PROLIA 60 mg/mL Syrg     torsemide (DEMADEX) 10 MG Tab Take 1 tablet (10 mg total) by mouth 2 (two) times a day.    traMADoL (ULTRAM) 50 mg tablet TAKE 1 TABLET BY MOUTH EVERY 12 HOURS AS NEEDED FOR PAIN.    travoprost (TRAVATAN Z) 0.004 % ophthalmic solution Place 1 drop into both eyes every " [Mother] : mother evening.    triamcinolone acetonide 0.1% (KENALOG) 0.1 % cream Apply topically 2 (two) times daily. for 10 days    TRUE METRIX GLUCOSE METER Misc USE AS DIRECTED    TRUE METRIX GLUCOSE TEST STRIP Strp TEST GLUCOSE 4 TIMES PER DAY.    valsartan (DIOVAN) 160 MG tablet Take 1 tablet (160 mg total) by mouth 2 (two) times daily.   Last reviewed on 8/23/2022 10:47 AM by Farooq Domingo MD    Review of patient's allergies indicates:   Allergen Reactions    Penicillins Nausea And Vomiting    Rosuvastatin      Muscle pain     Allopurinol analogues Rash    Last reviewed on  8/23/2022 10:47 AM by Farooq Domingo    Interventions added this encounter   Closed: [facility] Patient Assessment: abatacept (ORENCIA CLICKJECT) 125 mg/mL AtIn     Tasks added this encounter   5/24/2023 - Clinical - Follow Up Assesement (Annual)  9/21/2022 - Refill Call (Auto Added)   Tasks due within next 3 months   No tasks due.     Nic Gustafson, PharmD  [facility]  [address] [place] [id number]  Phone: [phone]  Fax: [phone]

## 2022-08-29 ENCOUNTER — OFFICE VISIT (OUTPATIENT)
Dept: RHEUMATOLOGY | Facility: CLINIC | Age: 72
End: 2022-08-29
Payer: MEDICARE

## 2022-08-29 VITALS
BODY MASS INDEX: 24.48 KG/M2 | HEIGHT: 66 IN | SYSTOLIC BLOOD PRESSURE: 176 MMHG | HEART RATE: 78 BPM | DIASTOLIC BLOOD PRESSURE: 73 MMHG | WEIGHT: 152.31 LBS

## 2022-08-29 DIAGNOSIS — M06.9 RHEUMATOID ARTHRITIS INVOLVING MULTIPLE JOINTS: ICD-10-CM

## 2022-08-29 DIAGNOSIS — M1A.9XX1 TOPHACEOUS GOUT: Primary | ICD-10-CM

## 2022-08-29 PROCEDURE — 3077F SYST BP >= 140 MM HG: CPT | Mod: CPTII,S$GLB,, | Performed by: INTERNAL MEDICINE

## 2022-08-29 PROCEDURE — 1125F PR PAIN SEVERITY QUANTIFIED, PAIN PRESENT: ICD-10-PCS | Mod: CPTII,S$GLB,, | Performed by: INTERNAL MEDICINE

## 2022-08-29 PROCEDURE — 3008F BODY MASS INDEX DOCD: CPT | Mod: CPTII,S$GLB,, | Performed by: INTERNAL MEDICINE

## 2022-08-29 PROCEDURE — 99214 OFFICE O/P EST MOD 30 MIN: CPT | Mod: S$GLB,,, | Performed by: INTERNAL MEDICINE

## 2022-08-29 PROCEDURE — 3062F PR POS MACROALBUMINURIA RESULT DOCUMENTED/REVIEW: ICD-10-PCS | Mod: CPTII,S$GLB,, | Performed by: INTERNAL MEDICINE

## 2022-08-29 PROCEDURE — 1125F AMNT PAIN NOTED PAIN PRSNT: CPT | Mod: CPTII,S$GLB,, | Performed by: INTERNAL MEDICINE

## 2022-08-29 PROCEDURE — 1159F MED LIST DOCD IN RCRD: CPT | Mod: CPTII,S$GLB,, | Performed by: INTERNAL MEDICINE

## 2022-08-29 PROCEDURE — 3044F HG A1C LEVEL LT 7.0%: CPT | Mod: CPTII,S$GLB,, | Performed by: INTERNAL MEDICINE

## 2022-08-29 PROCEDURE — 4010F ACE/ARB THERAPY RXD/TAKEN: CPT | Mod: CPTII,S$GLB,, | Performed by: INTERNAL MEDICINE

## 2022-08-29 PROCEDURE — 99999 PR PBB SHADOW E&M-EST. PATIENT-LVL IV: ICD-10-PCS | Mod: PBBFAC,,, | Performed by: INTERNAL MEDICINE

## 2022-08-29 PROCEDURE — 3078F PR MOST RECENT DIASTOLIC BLOOD PRESSURE < 80 MM HG: ICD-10-PCS | Mod: CPTII,S$GLB,, | Performed by: INTERNAL MEDICINE

## 2022-08-29 PROCEDURE — 99999 PR PBB SHADOW E&M-EST. PATIENT-LVL IV: CPT | Mod: PBBFAC,,, | Performed by: INTERNAL MEDICINE

## 2022-08-29 PROCEDURE — 3066F PR DOCUMENTATION OF TREATMENT FOR NEPHROPATHY: ICD-10-PCS | Mod: CPTII,S$GLB,, | Performed by: INTERNAL MEDICINE

## 2022-08-29 PROCEDURE — 1159F PR MEDICATION LIST DOCUMENTED IN MEDICAL RECORD: ICD-10-PCS | Mod: CPTII,S$GLB,, | Performed by: INTERNAL MEDICINE

## 2022-08-29 PROCEDURE — 3044F PR MOST RECENT HEMOGLOBIN A1C LEVEL <7.0%: ICD-10-PCS | Mod: CPTII,S$GLB,, | Performed by: INTERNAL MEDICINE

## 2022-08-29 PROCEDURE — 3077F PR MOST RECENT SYSTOLIC BLOOD PRESSURE >= 140 MM HG: ICD-10-PCS | Mod: CPTII,S$GLB,, | Performed by: INTERNAL MEDICINE

## 2022-08-29 PROCEDURE — 3066F NEPHROPATHY DOC TX: CPT | Mod: CPTII,S$GLB,, | Performed by: INTERNAL MEDICINE

## 2022-08-29 PROCEDURE — 4010F PR ACE/ARB THEARPY RXD/TAKEN: ICD-10-PCS | Mod: CPTII,S$GLB,, | Performed by: INTERNAL MEDICINE

## 2022-08-29 PROCEDURE — 3078F DIAST BP <80 MM HG: CPT | Mod: CPTII,S$GLB,, | Performed by: INTERNAL MEDICINE

## 2022-08-29 PROCEDURE — 3008F PR BODY MASS INDEX (BMI) DOCUMENTED: ICD-10-PCS | Mod: CPTII,S$GLB,, | Performed by: INTERNAL MEDICINE

## 2022-08-29 PROCEDURE — 3062F POS MACROALBUMINURIA REV: CPT | Mod: CPTII,S$GLB,, | Performed by: INTERNAL MEDICINE

## 2022-08-29 PROCEDURE — 99214 PR OFFICE/OUTPT VISIT, EST, LEVL IV, 30-39 MIN: ICD-10-PCS | Mod: S$GLB,,, | Performed by: INTERNAL MEDICINE

## 2022-08-29 RX ORDER — ABATACEPT 125 MG/ML
125 INJECTION, SOLUTION SUBCUTANEOUS WEEKLY
Qty: 4 ML | Refills: 11 | Status: SHIPPED | OUTPATIENT
Start: 2022-08-29 | End: 2022-09-22 | Stop reason: SDUPTHER

## 2022-08-29 NOTE — PROGRESS NOTES
Subjective:       Patient ID: Darrion Bennett is a 69 y.o. male.    Chief Complaint: Follow-up     HPI  69 year old male with type II, cataracts, HTN, gout,  Sjogrens, urolithiasis, CHF, hypothyroidism, CKD here for evaluation.  He reports that he was diagnosed with Sjogrens when he had dry eyes and dry mouth in 2014.. He takes plaquenil 200mg po BID. He is  taking azathioprine 50mg po TID and medrol 4mg po qqday.   He was put on imuran by Dr. Corona.  He was followed by Dr. Corona from 2014 to 2017.  In October 2018, imuran and medrol was stopped. Patient restarted imuran in November 2018.  Reports that he feels that imuran helps him with joint.   Today he denies pain, stiffness or swelling.  He denies sry eyes or dry mouth.  Denies trouble making a fist.    He was diagnosed in January in left aspect of foot with pain, swelling and redness.         Interval history:  He is taking uloric 40mg once a day. He is taking prednisone 5 mg a day. He is taking Orencia once a week for 7 weeks.  He has noticed improvement in pain and swelling.  He has pain and swelling in both hands.He also has pain in shoulder, feet, knees. He is stiff for a long time in morning.  .Denies any hip pain or, jaw claudication, or visual changes. He reports his hands and shoulders feel better.    Past Medical History:   Diagnosis Date    Anemia     Arthritis     Cataract     Chronic constipation     Diabetes mellitus, type 2     Glaucoma     Hypertension     MCTD (mixed connective tissue disease)     Sjogren's disease     Ulcerative colitis     Urolithiasis        Review of Systems   Constitutional: Negative for activity change, appetite change, chills, diaphoresis, fatigue and fever.   HENT: Negative for ear discharge, ear pain, hearing loss, mouth sores, nosebleeds and sinus pressure.    Eyes: Negative.  Negative for photophobia, pain, discharge, redness and itching.   Respiratory: Negative for cough, chest tightness and shortness of breath.     Cardiovascular: Negative for chest pain, palpitations and leg swelling.   Gastrointestinal: Negative for abdominal distention, blood in stool and nausea.   Endocrine: Negative for cold intolerance, heat intolerance, polydipsia and polyphagia.   Genitourinary: Negative for flank pain, genital sores and hematuria.   Musculoskeletal: Positive for arthralgias. Negative for back pain, gait problem, joint swelling, myalgias, neck pain and neck stiffness.   Skin: Negative for color change, pallor and rash.   Neurological: Negative for dizziness, weakness, light-headedness and headaches.   Hematological: Negative for adenopathy. Does not bruise/bleed easily.   Psychiatric/Behavioral: Negative for decreased concentration and hallucinations. The patient is not nervous/anxious.              Objective:        Physical Exam   Constitutional: He is well-developed, well-nourished, and in no distress. No distress.   HENT:   Head: Normocephalic and atraumatic.   Right Ear: External ear normal.   Left Ear: External ear normal.   Nose: Nose normal.   Mouth/Throat: Oropharynx is clear and moist. No oropharyngeal exudate.   Eyes: Conjunctivae and EOM are normal. Pupils are equal, round, and reactive to light. Right eye exhibits no discharge. Left eye exhibits no discharge. No scleral icterus.   Neck: Normal range of motion. Neck supple. No JVD present. No tracheal deviation present. No thyromegaly present.   Cardiovascular: Normal rate, normal heart sounds and intact distal pulses.  Exam reveals no gallop and no friction rub.    No murmur heard.  Pulmonary/Chest: Effort normal. No stridor. No respiratory distress. He has no wheezes. He has no rales. He exhibits no tenderness.   Abdominal: Soft. Bowel sounds are normal. He exhibits no distension and no mass. There is no tenderness. There is no rebound and no guarding.   Lymphadenopathy:     He has no cervical adenopathy.   Neurological: No cranial nerve deficit. Coordination normal.    Skin: Skin is warm and dry. No rash noted. He is not diaphoretic. No erythema. No pallor.     Musculoskeletal: Normal range of motion. He exhibits no edema, tenderness or deformity.     Labs:  Gilma-+  Ssa+  Ssb+  +RNP  Ccp,rf-negative      Right hand xray (5/2019): I personally reviewed; Soft tissue swelling tip of long finger.  Negative for fracture.     Assessment:    70 year old male with type II DM, cataracts, HTN, gout,  Sjogrens, urolithiasis, CHF, hypothyroidism, CKD here for follow up. He was previously followed by Dr. Lau.  He reports that he was diagnosed with Sjogrens when he had dry eyes and dry mouth in 2014. Serologies are +GILMA, +SSA,+SSB, and +RNP. He denies raynauds, rashes, oral ulcers or symptoms of SLE. His main issue before we met was inflammatory arthritis which was being treated with plaquenil, imuran, and medrol.     When we initially met in feb 2019 , he was taking plaquenil 200mg po BID, azathioprine 50mg po TID and medrol 4mg po qqday. His last rheumatologist discontinued his medications and patient decided to restart the medications himself since he was having so much pain. He developed imuran toxicity so all his medications were discontinued. He is asymptomatic from Sjogrens at this time.      # tophaceous gout, but had allergy to allopurinol so tried uloric.  He has been taking uloric 40mg a day and doing well.  -Labs     #Inflammatory arthritis: secondary to Sjogrens. Was previously on plaquenil and imuran but developed cytopenias so had to stop.  Doing much better on Orencia so will continue.  Given +GILMA and +RNP, will avoid ANTI- TNF therapy  Continue orencia 125 mg sub q once a week   Wean from  prednisone 5 mg a day to 5mg every other day for 2 weeks and then stop      #osteporosis: he had recent dexa scan showing osteoporosis.  Have asked him to discuss this with endo who he sees for diabetes.        30 * minutes of total time spent on the encounter, which includes face to  face time and non-face to face time preparing to see the patient (eg, review of tests), Obtaining and/or reviewing separately obtained history, Documenting clinical information in the electronic or other health record, Independently interpreting results (not separately reported) and communicating results to the patient/family/caregiver, or Care coordination (not separately reported).

## 2022-08-29 NOTE — PROGRESS NOTES
Answers submitted by the patient for this visit:  Rheumatology Questionnaire (Submitted on 8/29/2022)  fever: No  eye redness: No  mouth sores: No  headaches: No  shortness of breath: Yes  chest pain: No  trouble swallowing: No  diarrhea: No  constipation: Yes  unexpected weight change: No  genital sore: No  During the last 3 days, have you had a skin rash?: No  Bruises or bleeds easily: No  cough: No

## 2022-08-29 NOTE — PROGRESS NOTES
Subjective:       Patient ID: Darrion Bennett is a 69 y.o. male.    Chief Complaint: Follow-up     HPI  69 year old male with type II, cataracts, HTN, gout,  Sjogrens, urolithiasis, CHF, hypothyroidism, CKD here for evaluation.  He reports that he was diagnosed with Sjogrens when he had dry eyes and dry mouth in 2014.. He takes plaquenil 200mg po BID. He is  taking azathioprine 50mg po TID and medrol 4mg po qqday.   He was put on imuran by Dr. Corona.  He was followed by Dr. Corona from 2014 to 2017.  In October 2018, imuran and medrol was stopped. Patient restarted imuran in November 2018.  Reports that he feels that imuran helps him with joint.   Today he denies pain, stiffness or swelling.  He denies sry eyes or dry mouth.  Denies trouble making a fist.    He was diagnosed in January in left aspect of foot with pain, swelling and redness.         Interval history:  He is taking uloric 40mg once a day. He is taking prednisone 5 mg a day. He is taking Orencia once a week for 7 weeks.  He has noticed improvement in pain and swelling.  He has pain and swelling in both hands.He also has pain in shoulder, feet, knees. He is stiff for a long time in morning.  .Denies any hip pain or, jaw claudication, or visual changes. He reports his hands and shoulders feel better.    Past Medical History:   Diagnosis Date    Anemia     Arthritis     Cataract     Chronic constipation     Diabetes mellitus, type 2     Glaucoma     Hypertension     MCTD (mixed connective tissue disease)     Sjogren's disease     Ulcerative colitis     Urolithiasis        Review of Systems   Constitutional: Negative for activity change, appetite change, chills, diaphoresis, fatigue and fever.   HENT: Negative for ear discharge, ear pain, hearing loss, mouth sores, nosebleeds and sinus pressure.    Eyes: Negative.  Negative for photophobia, pain, discharge, redness and itching.   Respiratory: Negative for cough, chest tightness and shortness of breath.     Cardiovascular: Negative for chest pain, palpitations and leg swelling.   Gastrointestinal: Negative for abdominal distention, blood in stool and nausea.   Endocrine: Negative for cold intolerance, heat intolerance, polydipsia and polyphagia.   Genitourinary: Negative for flank pain, genital sores and hematuria.   Musculoskeletal: Positive for arthralgias. Negative for back pain, gait problem, joint swelling, myalgias, neck pain and neck stiffness.   Skin: Negative for color change, pallor and rash.   Neurological: Negative for dizziness, weakness, light-headedness and headaches.   Hematological: Negative for adenopathy. Does not bruise/bleed easily.   Psychiatric/Behavioral: Negative for decreased concentration and hallucinations. The patient is not nervous/anxious.              Objective:        Physical Exam   Constitutional: He is well-developed, well-nourished, and in no distress. No distress.   HENT:   Head: Normocephalic and atraumatic.   Right Ear: External ear normal.   Left Ear: External ear normal.   Nose: Nose normal.   Mouth/Throat: Oropharynx is clear and moist. No oropharyngeal exudate.   Eyes: Conjunctivae and EOM are normal. Pupils are equal, round, and reactive to light. Right eye exhibits no discharge. Left eye exhibits no discharge. No scleral icterus.   Neck: Normal range of motion. Neck supple. No JVD present. No tracheal deviation present. No thyromegaly present.   Cardiovascular: Normal rate, normal heart sounds and intact distal pulses.  Exam reveals no gallop and no friction rub.    No murmur heard.  Pulmonary/Chest: Effort normal. No stridor. No respiratory distress. He has no wheezes. He has no rales. He exhibits no tenderness.   Abdominal: Soft. Bowel sounds are normal. He exhibits no distension and no mass. There is no tenderness. There is no rebound and no guarding.   Lymphadenopathy:     He has no cervical adenopathy.   Neurological: No cranial nerve deficit. Coordination normal.    Skin: Skin is warm and dry. No rash noted. He is not diaphoretic. No erythema. No pallor.     Musculoskeletal: Normal range of motion. He exhibits no edema, tenderness or deformity.     Labs:  Gilma-+  Ssa+  Ssb+  +RNP  Ccp,rf-negative      Right hand xray (5/2019): I personally reviewed; Soft tissue swelling tip of long finger.  Negative for fracture.     Assessment:    70 year old male with type II DM, cataracts, HTN, gout,  Sjogrens, urolithiasis, CHF, hypothyroidism, CKD here for follow up. He was previously followed by Dr. Lau.  He reports that he was diagnosed with Sjogrens when he had dry eyes and dry mouth in 2014. Serologies are +GILMA, +SSA,+SSB, and +RNP. He denies raynauds, rashes, oral ulcers or symptoms of SLE. His main issue before we met was inflammatory arthritis which was being treated with plaquenil, imuran, and medrol.     When we initially met in feb 2019 , he was taking plaquenil 200mg po BID, azathioprine 50mg po TID and medrol 4mg po qqday. His last rheumatologist discontinued his medications and patient decided to restart the medications himself since he was having so much pain. He developed imuran toxicity so all his medications were discontinued. He is asymptomatic from Sjogrens at this time.      # tophaceous gout, but had allergy to allopurinol so tried uloric.  He has been taking uloric 40mg a day and doing well.  -Labs     #Inflammatory arthritis: secondary to Sjogrens. Was previously on plaquenil and imuran but developed cytopenias so had to stop.  Doing much better on Orencia so will continue.  Given +GILMA and +RNP, will avoid ANTI- TNF therapy  Continue orencia 125 mg sub q once a week   Continue prednisone 5 mg a day      #osteporosis: he had recent dexa scan showing osteoporosis.  Have asked him to discuss this with endo who he sees for diabetes.      rtc in 12  weeks    The patient location is: home  The chief complaint leading to consultation is: joint pain

## 2022-08-31 ENCOUNTER — HOSPITAL ENCOUNTER (OUTPATIENT)
Dept: CARDIOLOGY | Facility: HOSPITAL | Age: 72
Discharge: HOME | End: 2022-08-31
Attending: INTERNAL MEDICINE
Payer: MEDICARE

## 2022-08-31 ENCOUNTER — HOSPITAL ENCOUNTER (OUTPATIENT)
Dept: RADIOLOGY | Facility: HOSPITAL | Age: 72
Discharge: HOME | End: 2022-08-31
Attending: INTERNAL MEDICINE
Payer: MEDICARE

## 2022-08-31 DIAGNOSIS — R07.9 CHEST PAIN, UNSPECIFIED TYPE: ICD-10-CM

## 2022-08-31 DIAGNOSIS — I10 HYPERTENSION, UNSPECIFIED TYPE: ICD-10-CM

## 2022-08-31 LAB
ASCENDING AORTA: 3.21 CM
AV INDEX (PROSTH): 0.61
AV MEAN GRADIENT: 8 MMHG
AV PEAK GRADIENT: 15 MMHG
AV VALVE AREA: 1.96 CM2
AV VELOCITY RATIO: 0.55
CV ECHO LV RWT: 0.58 CM
CV STRESS BASE HR: 65 BPM
DIASTOLIC BLOOD PRESSURE: 84 MMHG
DOP CALC AO PEAK VEL: 1.96 M/S
DOP CALC AO VTI: 44.98 CM
DOP CALC LVOT AREA: 3.2 CM2
DOP CALC LVOT DIAMETER: 2.02 CM
DOP CALC LVOT PEAK VEL: 1.08 M/S
DOP CALC LVOT STROKE VOLUME: 88.09 CM3
DOP CALCLVOT PEAK VEL VTI: 27.5 CM
E WAVE DECELERATION TIME: 214.16 MSEC
E/A RATIO: 0.69
E/E' RATIO: 20 M/S
ECHO LV POSTERIOR WALL: 1.3 CM (ref 0.6–1.1)
EJECTION FRACTION: 60 %
FRACTIONAL SHORTENING: 35 % (ref 28–44)
INTERVENTRICULAR SEPTUM: 1.29 CM (ref 0.6–1.1)
IVRT: 111.32 MSEC
LA MAJOR: 6.1 CM
LA MINOR: 6.28 CM
LA WIDTH: 4.59 CM
LEFT ATRIUM SIZE: 4.6 CM
LEFT ATRIUM VOLUME: 111.07 CM3
LEFT INTERNAL DIMENSION IN SYSTOLE: 2.9 CM (ref 2.1–4)
LEFT VENTRICLE DIASTOLIC VOLUME: 89.86 ML
LEFT VENTRICLE SYSTOLIC VOLUME: 32.33 ML
LEFT VENTRICULAR INTERNAL DIMENSION IN DIASTOLE: 4.45 CM (ref 3.5–6)
LEFT VENTRICULAR MASS: 217.58 G
LV LATERAL E/E' RATIO: 12.5 M/S
LV SEPTAL E/E' RATIO: 50 M/S
MV PEAK A VEL: 1.44 M/S
MV PEAK E VEL: 1 M/S
MV STENOSIS PRESSURE HALF TIME: 62.11 MS
MV VALVE AREA P 1/2 METHOD: 3.54 CM2
NUC STRESS DIASTOLIC VOLUME INDEX: 67
NUC STRESS EJECTION FRACTION: 72 %
NUC STRESS SYSTOLIC VOLUME INDEX: 19
OHS CV CPX 85 PERCENT MAX PREDICTED HEART RATE MALE: 126
OHS CV CPX MAX PREDICTED HEART RATE: 148
OHS CV CPX PATIENT IS FEMALE: 0
OHS CV CPX PATIENT IS MALE: 1
OHS CV CPX PEAK DIASTOLIC BLOOD PRESSURE: 79 MMHG
OHS CV CPX PEAK HEAR RATE: 87 BPM
OHS CV CPX PEAK RATE PRESSURE PRODUCT: NORMAL
OHS CV CPX PEAK SYSTOLIC BLOOD PRESSURE: 165 MMHG
OHS CV CPX PERCENT MAX PREDICTED HEART RATE ACHIEVED: 59
OHS CV CPX RATE PRESSURE PRODUCT PRESENTING: NORMAL
PISA TR MAX VEL: 2.86 M/S
PULM VEIN S/D RATIO: 1.03
PV PEAK D VEL: 0.31 M/S
PV PEAK S VEL: 0.32 M/S
PV PEAK VELOCITY: 1.29 CM/S
RA MAJOR: 5.29 CM
RA PRESSURE: 3 MMHG
RA WIDTH: 3.85 CM
RIGHT VENTRICULAR END-DIASTOLIC DIMENSION: 3.64 CM
RV TISSUE DOPPLER FREE WALL SYSTOLIC VELOCITY 1 (APICAL 4 CHAMBER VIEW): 7.55 CM/S
STJ: 2.79 CM
SYSTOLIC BLOOD PRESSURE: 199 MMHG
TDI LATERAL: 0.08 M/S
TDI SEPTAL: 0.02 M/S
TDI: 0.05 M/S
TR MAX PG: 33 MMHG
TRICUSPID ANNULAR PLANE SYSTOLIC EXCURSION: 1.65 CM
TV REST PULMONARY ARTERY PRESSURE: 36 MMHG

## 2022-08-31 PROCEDURE — A9502 TC99M TETROFOSMIN: HCPCS

## 2022-08-31 PROCEDURE — 93017 CV STRESS TEST TRACING ONLY: CPT

## 2022-08-31 PROCEDURE — 93016 STRESS TEST WITH MYOCARDIAL PERFUSION (CUPID ONLY): ICD-10-PCS | Mod: ,,, | Performed by: INTERNAL MEDICINE

## 2022-08-31 PROCEDURE — 93018 STRESS TEST WITH MYOCARDIAL PERFUSION (CUPID ONLY): ICD-10-PCS | Mod: ,,, | Performed by: INTERNAL MEDICINE

## 2022-08-31 PROCEDURE — 63600175 PHARM REV CODE 636 W HCPCS: Performed by: INTERNAL MEDICINE

## 2022-08-31 PROCEDURE — 93306 ECHO (CUPID ONLY): ICD-10-PCS | Mod: 26,,, | Performed by: INTERNAL MEDICINE

## 2022-08-31 PROCEDURE — 93306 TTE W/DOPPLER COMPLETE: CPT

## 2022-08-31 PROCEDURE — 93306 TTE W/DOPPLER COMPLETE: CPT | Mod: 26,,, | Performed by: INTERNAL MEDICINE

## 2022-08-31 PROCEDURE — 78452 STRESS TEST WITH MYOCARDIAL PERFUSION (CUPID ONLY): ICD-10-PCS | Mod: 26,,, | Performed by: INTERNAL MEDICINE

## 2022-08-31 PROCEDURE — 78452 HT MUSCLE IMAGE SPECT MULT: CPT | Mod: 26,,, | Performed by: INTERNAL MEDICINE

## 2022-08-31 PROCEDURE — 93018 CV STRESS TEST I&R ONLY: CPT | Mod: ,,, | Performed by: INTERNAL MEDICINE

## 2022-08-31 PROCEDURE — 93016 CV STRESS TEST SUPVJ ONLY: CPT | Mod: ,,, | Performed by: INTERNAL MEDICINE

## 2022-08-31 RX ORDER — REGADENOSON 0.08 MG/ML
0.4 INJECTION, SOLUTION INTRAVENOUS ONCE
Status: COMPLETED | OUTPATIENT
Start: 2022-08-31 | End: 2022-08-31

## 2022-08-31 RX ADMIN — REGADENOSON 0.4 MG: 0.08 INJECTION, SOLUTION INTRAVENOUS at 09:08

## 2022-09-14 NOTE — PROVIDER PROGRESS NOTES - EMERGENCY DEPT.
Encounter Date: 2/20/2019    ED Physician Progress Notes        Physician Note:   Heart rate 92; sinus rhythm with 1st degree AV block  No previous ECG for comparison    EKG - STEMI Decision  Initial Reading: No STEMI present.      Hung Schuster DO  Dept of Emergency Medicine   [facility]  Spectralink: [id number]    
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

## 2022-09-21 ENCOUNTER — SPECIALTY PHARMACY (OUTPATIENT)
Dept: PHARMACY | Facility: CLINIC | Age: 72
End: 2022-09-21
Payer: MEDICARE

## 2022-09-21 DIAGNOSIS — M06.9 RHEUMATOID ARTHRITIS INVOLVING MULTIPLE JOINTS: ICD-10-CM

## 2022-09-21 RX ORDER — ABATACEPT 125 MG/ML
125 INJECTION, SOLUTION SUBCUTANEOUS WEEKLY
Qty: 4 ML | Refills: 11 | Status: SHIPPED | OUTPATIENT
Start: 2022-09-21 | End: 2023-08-17 | Stop reason: SDUPTHER

## 2022-09-21 NOTE — TELEPHONE ENCOUNTER
Incoming call from patient for orencia refill -- next dose due 9/28. No active script, was printed. [facility] to request new rx be sent to [facility]. Patient is aware we will follow up once received.

## 2022-09-22 NOTE — TELEPHONE ENCOUNTER
Specialty Pharmacy - Refill Coordination    Specialty Medication Orders Linked to Encounter      Flowsheet Row Most Recent Value   Medication #1 abatacept (ORENCIA CLICKJECT) 125 mg/mL AtIn (Order#[id number], Rx#)            Refill Questions - Documented Responses      Flowsheet Row Most Recent Value   Patient Availability and HIPAA Verification    Does patient want to proceed with activity? Yes   HIPAA/medical authority confirmed? Yes   Relationship to patient of person spoken to? Spouse/Significant Other   Refill Screening Questions    Changes to allergies? No   Changes to medications? No   Unplanned office visit, urgent care, ED, or hospital admission in the last 4 weeks? No   How does patient/caregiver feel medication is working? Excellent   Financial problems or insurance changes? No   How many doses of your specialty medications were missed in the last 4 weeks? 0   Would patient like to speak to a pharmacist? No   When does the patient need to receive the medication? 09/28/22   Refill Delivery Questions    How will the patient receive the medication? Delivery Sara   When does the patient need to receive the medication? 09/28/22   Shipping Address Home   Address in [facility] confirmed and updated if neccessary? Yes   Expected Copay ($) 0   Is the patient able to afford the medication copay? Yes   Payment Method zero copay   Days supply of Refill 28   Supplies needed? No supplies needed   Refill activity completed? Yes   Refill activity plan Refill scheduled   Shipment/Pickup Date: 09/23/22            Current Outpatient Medications   Medication Sig    abatacept (ORENCIA CLICKJECT) 125 mg/mL AtIn Inject 125 mg into the skin once a week.    alcohol swabs (BD ALCOHOL SWABS) PadM USE  4 TIMES PER DAY    amLODIPine (NORVASC) 5 MG tablet Take 1 tablet (5 mg total) by mouth 2 (two) times daily.    ammonium lactate 12 % Crea Apply 1 application topically once daily.    atorvastatin (LIPITOR) 80 MG tablet TAKE 1  "TABLET EVERY DAY    blood glucose control, low (TRUE METRIX LEVEL 1) Soln Use as indicated    BOOSTRIX TDAP 2.5-8-5 Lf-mcg-Lf/0.5mL Syrg injection     cloNIDine 0.2 mg/24 hr td ptwk (CATAPRES) 0.2 mg/24 hr PLACE 1 PATCH ONTO THE SKIN EVERY 7 DAYS.    diclofenac sodium (VOLTAREN) 1 % Gel Apply 2 g topically 4 (four) times daily as needed. Apply to aching joints    dorzolamide (TRUSOPT) 2 % ophthalmic solution Place 1 drop into both eyes 2 (two) times daily.    dulaglutide (TRULICITY) 1.5 mg/0.5 mL pen injector Inject 1.5 mg into the skin every 7 days.    febuxostat (ULORIC) 40 mg Tab Take 1 tablet (40 mg total) by mouth once daily.    hydrALAZINE (APRESOLINE) 50 MG tablet Take 1 tablet (50 mg total) by mouth every 8 (eight) hours.    INJECTAFER 50 iron mg/mL injection     insulin (LANTUS SOLOSTAR U-100 INSULIN) glargine 100 units/mL SubQ pen Inject 5 units once daily    insulin aspart U-100 (NOVOLOG FLEXPEN U-100 INSULIN) 100 unit/mL (3 mL) InPn pen Inject 4 units three times daily before each meal with sliding scale.    lancets (TRUEPLUS LANCETS) 33 gauge Misc Test glucose 4x/day. Dx code e11.65    linaCLOtide (LINZESS) 72 mcg Cap capsule Take 1 capsule (72 mcg total) by mouth before breakfast.    multivit with min-folic acid 200 mcg Chew     omeprazole (PRILOSEC) 20 MG capsule TAKE 2 CAPSULES (40 MG TOTAL) BY MOUTH ONCE DAILY.    pen needle, diabetic (BD ULTRA-FINE RICHARD PEN NEEDLE) 32 gauge x 5/32" Ndle 1 Device by Misc.(Non-Drug; Combo Route) route 4 (four) times daily.    predniSONE (DELTASONE) 5 MG tablet TAKE 1 TABLET BY MOUTH EVERY DAY    PROLIA 60 mg/mL Syrg     torsemide (DEMADEX) 10 MG Tab Take 1 tablet (10 mg total) by mouth 2 (two) times a day.    traMADoL (ULTRAM) 50 mg tablet TAKE 1 TABLET BY MOUTH EVERY 12 HOURS AS NEEDED FOR PAIN.    travoprost (TRAVATAN Z) 0.004 % ophthalmic solution Place 1 drop into both eyes every evening.    triamcinolone acetonide 0.1% (KENALOG) 0.1 % cream Apply topically 2 " (two) times daily. for 10 days    TRUE METRIX GLUCOSE METER Misc USE AS DIRECTED    TRUE METRIX GLUCOSE TEST STRIP Strp TEST GLUCOSE 4 TIMES PER DAY.    valsartan (DIOVAN) 160 MG tablet Take 1 tablet (160 mg total) by mouth 2 (two) times daily.   Last reviewed on 8/29/2022  4:27 PM by Joe Yanez MA    Review of patient's allergies indicates:   Allergen Reactions    Penicillins Nausea And Vomiting    Rosuvastatin      Muscle pain     Allopurinol analogues Rash    Last reviewed on  9/12/2022 11:36 AM by Shamir Fonseca      Tasks added this encounter   No tasks added.   Tasks due within next 3 months   9/21/2022 - Refill Call (Auto Added)     Diamond Calvo, PharmD  [facility]  [address] [place] [id number]  Phone: [phone]  Fax: [phone]

## 2022-09-24 ENCOUNTER — IMMUNIZATION (OUTPATIENT)
Dept: OBSTETRICS AND GYNECOLOGY | Facility: CLINIC | Age: 72
End: 2022-09-24
Payer: MEDICARE

## 2022-09-24 DIAGNOSIS — Z23 NEED FOR INFLUENZA VACCINATION: Primary | ICD-10-CM

## 2022-09-24 PROCEDURE — G0008 ADMIN INFLUENZA VIRUS VAC: HCPCS | Mod: S$GLB,,, | Performed by: INTERNAL MEDICINE

## 2022-09-24 PROCEDURE — 90694 FLU VACCINE - QUADRIVALENT - ADJUVANTED: ICD-10-PCS | Mod: S$GLB,,, | Performed by: INTERNAL MEDICINE

## 2022-09-24 PROCEDURE — 90694 VACC AIIV4 NO PRSRV 0.5ML IM: CPT | Mod: S$GLB,,, | Performed by: INTERNAL MEDICINE

## 2022-09-24 PROCEDURE — G0008 FLU VACCINE - QUADRIVALENT - ADJUVANTED: ICD-10-PCS | Mod: S$GLB,,, | Performed by: INTERNAL MEDICINE

## 2022-09-25 NOTE — PROGRESS NOTES
This note was created by combination of typed  and M-Modal dictation.  Transcription errors may be present.  If there are any questions, please contact me.    Assessment and Plan:   Non-intractable vomiting with nausea, unspecified vomiting type  Constipation, unspecified constipation type  -suspect due to constipation?  But check out other possibilities.  Euvolemic today does not appear volume overloaded.  Reports that blood sugars have been good throughout all this so does not sound like hyperglycemia versus hypoglycemia.  No dysuria no cough but check chest x-ray check urinalysis rule out occult infection  Otherwise recommended to take Linzess he has not had a bowel movement in 2 days.  To take that on a regular basis.  Family notes that he burps a lot.  Talked about the possibility of diabetic gastroparesis.  But given that Linzess does work, I would use that more on p.r.n. basis use that rather than explore other medications for this   We talked about seeing GI for consultation, he is agreeable to hold off on GI consultation at this time.  -     Urinalysis, Reflex to Urine Culture Urine, Clean Catch; Future; Expected date: 09/26/2022  -     X-Ray Chest PA And Lateral; Future; Expected date: 09/26/2022    Jackson's esophagus without dysplasia  -needs update EGD.  Ordered.  -     Ambulatory referral/consult to Endo Procedure ; Future; Expected date: 09/27/2022      Essential hypertension  -check TSH.  History of abnormal TSH.  Not on medication for this.  -     TSH; Future; Expected date: 09/26/2022    Diastolic heart failure, NYHA class 2  -euvolemic today on exam.     Stage 3a chronic kidney disease  -check labs. Has been ordered by nephrology and scheduled for October but move up labs. Make sure no acute worsening of CKD that could have impact on N/V  -     Cancel: COMPREHENSIVE METABOLIC PANEL; Future; Expected date: 09/26/2022  -     Cancel: Phosphorus; Future; Expected date: 09/26/2022  -      Cancel: PTH, Intact; Future; Expected date: 09/26/2022    Abnormal TSH  -check labs.   -     TSH; Future; Expected date: 09/26/2022        There are no discontinued medications.    meds sent this encounter:       Follow Up: No follow-ups on file.    Subjective:     Chief Complaint   Patient presents with    Nausea       HPI  Darrion is a 72 y.o. male, last appointment with this clinic was 8/23/2022.  Social History     Tobacco Use    Smoking status: Never    Smokeless tobacco: Never   Substance Use Topics    Alcohol use: No          Social History     Social History Narrative    Not on file       Last visit mid august  Constipation off linzess. Higher dose linzess with diarrhea.  Restart at 72.  Chronic back pain. Tramadol  DM2 followed by endo. On CGM  HTN, HFpEF, CKD stage 3a. BP stable. BP was underdosing hyderalazine but BP last time was good. No changes.  Followed by nephrology. Followed by cards. Torsemide prn for edema.   Hyperlipidemia/statin  Sjogrens. Rheumatology.  Osteoporosis, with history of compression fracture, endocrinology.  Prolia.  Gout, on urate lowering therapy.  Rheumatology.  Most recent uric acid level good  Polymyalgia rheumatica, steroid    He is here accompanied by family member.  History from the patient supplemented by family.    Had episode of some nausea that lasted from Friday until Sunday.  On Sunday he vomited 3 times.  Watery vomit.  No gross blood.    He has been constipated all week.  Had a bowel movement on Monday, did not have a bowel movement saw on Friday he took Linzess.  But 8 hours later he had a bowel movement.  Has not had a bowel movement since.  He also had his flu shot on Friday.  No accompanying fevers or chills.    No blood in stool.  Today he feels okay.  No nausea.    Family member notes that he burps a lot, all the time.  Saw GI remotely back in 2016.    No accompanying chest pain.  No dysuria.    He wonders why he gets episodes of constipation.   Sometimes does not have to take a Linzess and can have a bowel movement.  Remote history of GI consultation back in 2016.  He has a history of Jackson's esophagus.  I do not see any recent EGD.  Last colonoscopy was 2018.     Prednisone every other day.     Glucose has been stable throughout all this.  But did have a low of 80s last night.      Tramadol prn use for pain.  Last use was on Friday.      Answers submitted by the patient for this visit:  Abdominal Pain Questionnaire (Submitted on 9/26/2022)  Chief Complaint: Abdominal pain  Chronicity: recurrent  Onset: in the past 7 days  Onset quality: gradual  Frequency: constantly  Progression since onset: gradually worsening  Pain - numeric: 8/10  Pain quality: cramping, sharp  anorexia: Yes  arthralgias: Yes  belching: Yes  constipation: Yes  diarrhea: No  dysuria: No  fever: No  flatus: No  frequency: No  headaches: No  hematochezia: No  hematuria: No  melena: No  myalgias: Yes  nausea: Yes  weight loss: No  Diagnostic workup: lower endoscopy  Pain severity: severe  Treatments tried: acetaminophen, antacids  Improvement on treatment: mild  abdominal surgery: No  colon cancer: No  Crohn's disease: No  gallstones: Yes  GERD: Yes  irritable bowel syndrome: Yes  kidney stones: Yes  pancreatitis: No  PUD: No  ulcerative colitis: No  UTI: No      Patient Care Team:  Farooq Domingo MD as PCP - General (Internal Medicine)  Tin Chambers MD (Gastroenterology)  Harrison Brannon MD (Cardiology)  Roxanna Salazar MD as Nurse Practitioner (Rheumatology)  Malcolm Irwin MD as  (Nephrology)  Amy Gamez MA as Care Coordinator  Dangelo Hall MD as Consulting Physician (Ophthalmology)  [facility] (Gastroenterology)  Kassandra Eason as Digital Medicine Health   Shamir Fonseca, Adriana as Hypertension Digital Medicine Clinician (Pharmacist)  Farooq Domingo MD as Hypertension Digital Medicine  Responsible Provider (Internal Medicine)  Farooq Domingo MD as Hyperlipidemia Digital Medicine Responsible Provider (Internal Medicine)  Shamir Fonseca PharmD as Hyperlipidemia Digital Medicine Clinician  [employer] as Hypertension Digital Medicine Contract    Patient Active Problem List    Diagnosis Date Noted    Dyshidrotic eczema 02/04/2022    Long-term insulin use 02/01/2022    Dyspnea on exertion 08/02/2021     +diastolic dysfunction + anemia.  pft with restrictive physiology and decreased dlco.  Ct with mosaic attenuation and enlarge pa suggesting volume overload.    9/16/21 CT chest:  Subtle mosaic attenuation pattern, nonspecific can be seen in the setting of inflammatory small airway disease, also can be seen with occlusive vascular disease and subtle change of increased pulmonary venous pressure.  3 mm pulmonary nodule right upper lobe, Fleischner Society guidelines for nodules less than 6 mm in individuals with low risk for lung malignancy: no follow-up needed, and individuals with high risk for lung malignancy: optional chest  CT at 12 months      JAZLYN (obstructive sleep apnea) 08/02/2021     ahi of 21; rdi of 38.   Result of sleep study d/w patient.  Recommend treatment.  Patient agree to apap.  Desensitization protocol d/w patient      History of stroke 07/20/2021 6/2021 MRI brain old stroke in thalamus      Mobitz I 06/27/2021    Bilateral carotid artery stenosis on CTA 6/26/21 06/27/2021 6/26/21 CTA head and neck  Atherosclerotic plaque throughout the aortic arch with mild shaggy soft atherosclerotic plaque in the posterior arch near the junction of the transverse and descending thoracic aorta.  Minimal atherosclerotic plaque in the cervical carotids with no stenosis or dissection within the cervical vessels.  Moderate to severe atherosclerotic plaque within the carotid siphons with 50-60% diameter stenosis, right slightly greater than left.  Moderate plaque within the V4 segment of the left  vertebral segment.  No large vessel occlusion or aneurysm intracranially.     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 05/02/2019    Anemia of chronic renal failure, stage 3b 03/13/2019    Current chronic use of systemic steroids 02/27/2019    Compression fracture of body of thoracic vertebra on XR 2/2019; 2/2019 alendronate 02/27/2019    Neutropenia 02/26/2019    Jackson's esophagus without dysplasia 03/14/2016    Anemia from plaquenil and imuran 03/14/2016    Diastolic heart failure, NYHA class 2 09/26/2014 05/12/2021 TTE LV normal size with concentric remodeling and normal systolic function LVEF 65%.  Grade 2 diastolic dysfunction.  Severe left atrial enlargement.  Normal RV size and systolic function.  CVP 3.  PA pressure 36. Sinuses of Valsalva mildly dilated.  05/12/2021 nuclear medicine stress test normal perfusion.  No evidence of ischemia or infarction.  LVEF 75%.  Normal wall motion post stress.  During stress, rare PVCs.  Hypertensive response exercise.    -ct with mosaic attenuation.  Recommend 10 m g 2 times per week.  Recommend daily demodex      BMI 23.0-23.9, adult 07/15/2014    Essential hypertension 05/13/2014 6/2019 TTE normal LV systolic and diastolic function; ABIs  normal  6/27/21 TTE LV normal size with concentric hypertrophy and normal systolic function LVEF 65%.  Grade 1 diastolic dysfunction.  Normal RV size with normal systolic function.  Moderate left atrial enlargement.  Mild right atrial enlargement.  Mild aortic valve stenosis.  Valve area 1.67, peak velocity 1.95, mean gradient 9. Normal CVP 3. PA pressure 16.         Controlled type 2 diabetes mellitus with diabetic polyneuropathy, with long-term current use of insulin 05/13/2014    Pure hypercholesterolemia 05/13/2014 05/12/2021 nuclear medicine stress test normal perfusion.  No evidence of ischemia or infarction.  LVEF 75%.  Normal wall motion post stress.  During stress, rare PVCs.  Hypertensive response exercise.      MCTD (mixed connective tissue disease) 05/13/2014    Sjogren's disease 05/13/2014    Bilateral edema of lower extremity 6/2019 TTE normal LV systolic and diastolic function; ABIs normal 05/13/2014    Glaucoma 05/13/2014    Celiac disease 11/12/2013     Overview:   Gluten intolerant         PAST MEDICAL PROBLEMS, PAST SURGICAL HISTORY: please see relevant portions of the electronic medical record    ALLERGIES AND MEDICATIONS: updated and reviewed.  Review of patient's allergies indicates:   Allergen Reactions    Penicillins Nausea And Vomiting    Rosuvastatin      Muscle pain     Allopurinol analogues Rash       Medication List with Changes/Refills   Current Medications    ABATACEPT (ORENCIA CLICKJECT) 125 MG/ML ATIN    Inject 125 mg into the skin once a week.    ALCOHOL SWABS (BD ALCOHOL SWABS) PADM    USE  4 TIMES PER DAY    AMLODIPINE (NORVASC) 5 MG TABLET    Take 1 tablet (5 mg total) by mouth 2 (two) times daily.    AMMONIUM LACTATE 12 % CREA    Apply 1 application topically once daily.    ATORVASTATIN (LIPITOR) 80 MG TABLET    TAKE 1 TABLET EVERY DAY    BLOOD GLUCOSE CONTROL, LOW (TRUE METRIX LEVEL 1) SOLN    Use as indicated    BOOSTRIX TDAP 2.5-8-5 LF-MCG-LF/0.5ML SYRG  "INJECTION        CLONIDINE 0.2 MG/24 HR TD PTWK (CATAPRES) 0.2 MG/24 HR    PLACE 1 PATCH ONTO THE SKIN EVERY 7 DAYS.    DICLOFENAC SODIUM (VOLTAREN) 1 % GEL    Apply 2 g topically 4 (four) times daily as needed. Apply to aching joints    DORZOLAMIDE (TRUSOPT) 2 % OPHTHALMIC SOLUTION    Place 1 drop into both eyes 2 (two) times daily.    DULAGLUTIDE (TRULICITY) 1.5 MG/0.5 ML PEN INJECTOR    Inject 1.5 mg into the skin every 7 days.    FEBUXOSTAT (ULORIC) 40 MG TAB    Take 1 tablet (40 mg total) by mouth once daily.    HYDRALAZINE (APRESOLINE) 50 MG TABLET    Take 1 tablet (50 mg total) by mouth every 8 (eight) hours.    INJECTAFER 50 IRON MG/ML INJECTION        INSULIN (LANTUS SOLOSTAR U-100 INSULIN) GLARGINE 100 UNITS/ML SUBQ PEN    Inject 5 units once daily    INSULIN ASPART U-100 (NOVOLOG FLEXPEN U-100 INSULIN) 100 UNIT/ML (3 ML) INPN PEN    Inject 4 units three times daily before each meal with sliding scale.    LANCETS (TRUEPLUS LANCETS) 33 GAUGE MISC    Test glucose 4x/day. Dx code e11.65    LINACLOTIDE (LINZESS) 72 MCG CAP CAPSULE    Take 1 capsule (72 mcg total) by mouth before breakfast.    MULTIVIT WITH MIN-FOLIC ACID 200 MCG CHEW        OMEPRAZOLE (PRILOSEC) 20 MG CAPSULE    TAKE 2 CAPSULES (40 MG TOTAL) BY MOUTH ONCE DAILY.    PEN NEEDLE, DIABETIC (BD ULTRA-FINE RICHARD PEN NEEDLE) 32 GAUGE X 5/32" NDLE    1 Device by Misc.(Non-Drug; Combo Route) route 4 (four) times daily.    PREDNISONE (DELTASONE) 5 MG TABLET    TAKE 1 TABLET BY MOUTH EVERY DAY    PROLIA 60 MG/ML SYRG        TORSEMIDE (DEMADEX) 10 MG TAB    Take 1 tablet (10 mg total) by mouth 2 (two) times a day.    TRAMADOL (ULTRAM) 50 MG TABLET    TAKE 1 TABLET BY MOUTH EVERY 12 HOURS AS NEEDED FOR PAIN.    TRAVOPROST (TRAVATAN Z) 0.004 % OPHTHALMIC SOLUTION    Place 1 drop into both eyes every evening.    TRIAMCINOLONE ACETONIDE 0.1% (KENALOG) 0.1 % CREAM    Apply topically 2 (two) times daily. for 10 days    TRUE METRIX GLUCOSE METER MISC    USE AS " "DIRECTED    TRUE METRIX GLUCOSE TEST STRIP STRP    TEST GLUCOSE 4 TIMES PER DAY.    VALSARTAN (DIOVAN) 160 MG TABLET    Take 1 tablet (160 mg total) by mouth 2 (two) times daily.          Objective:   Physical Exam   Vitals:    09/26/22 0844   BP: 128/60   BP Location: Left arm   Patient Position: Sitting   BP Method: Medium (Manual)   Pulse: 79   Temp: 98 °F (36.7 °C)   TempSrc: Oral   SpO2: 97%   Weight: 65.2 kg (143 lb 11.8 oz)   Height: 5' 6" (1.676 m)    Body mass index is 23.2 kg/m².            Physical Exam  Constitutional:       General: He is not in acute distress.     Appearance: He is well-developed.   Eyes:      Extraocular Movements: Extraocular movements intact.   Cardiovascular:      Rate and Rhythm: Normal rate and regular rhythm.      Heart sounds: Normal heart sounds. No murmur heard.     Comments: No JVD  Pulmonary:      Effort: Pulmonary effort is normal.      Breath sounds: Normal breath sounds.   Abdominal:      General: There is no distension.      Palpations: There is no mass.      Tenderness: There is no abdominal tenderness. There is no guarding.      Comments: Bowel sounds normal timbre   Musculoskeletal:         General: Normal range of motion.      Right lower leg: No edema.      Left lower leg: No edema.      Comments: Legs warm and perfused, posterior tibial pulses 3+   Skin:     General: Skin is warm and dry.   Neurological:      Mental Status: He is alert and oriented to person, place, and time.      Coordination: Coordination normal.   Psychiatric:         Behavior: Behavior normal.         Thought Content: Thought content normal.         "

## 2022-09-26 ENCOUNTER — HOSPITAL ENCOUNTER (OUTPATIENT)
Dept: RADIOLOGY | Facility: HOSPITAL | Age: 72
Discharge: HOME | End: 2022-09-26
Attending: INTERNAL MEDICINE
Payer: MEDICARE

## 2022-09-26 ENCOUNTER — OFFICE VISIT (OUTPATIENT)
Dept: FAMILY MEDICINE | Facility: CLINIC | Age: 72
End: 2022-09-26
Payer: MEDICARE

## 2022-09-26 VITALS
WEIGHT: 143.75 LBS | SYSTOLIC BLOOD PRESSURE: 128 MMHG | BODY MASS INDEX: 23.1 KG/M2 | HEIGHT: 66 IN | OXYGEN SATURATION: 97 % | TEMPERATURE: 98 F | HEART RATE: 79 BPM | DIASTOLIC BLOOD PRESSURE: 60 MMHG

## 2022-09-26 DIAGNOSIS — I50.30 DIASTOLIC HEART FAILURE, NYHA CLASS 2: ICD-10-CM

## 2022-09-26 DIAGNOSIS — K22.70 BARRETT'S ESOPHAGUS WITHOUT DYSPLASIA: ICD-10-CM

## 2022-09-26 DIAGNOSIS — R79.89 ABNORMAL TSH: ICD-10-CM

## 2022-09-26 DIAGNOSIS — N18.31 STAGE 3A CHRONIC KIDNEY DISEASE: ICD-10-CM

## 2022-09-26 DIAGNOSIS — K59.00 CONSTIPATION, UNSPECIFIED CONSTIPATION TYPE: ICD-10-CM

## 2022-09-26 DIAGNOSIS — R11.2 NON-INTRACTABLE VOMITING WITH NAUSEA, UNSPECIFIED VOMITING TYPE: Primary | ICD-10-CM

## 2022-09-26 DIAGNOSIS — R11.2 NON-INTRACTABLE VOMITING WITH NAUSEA, UNSPECIFIED VOMITING TYPE: ICD-10-CM

## 2022-09-26 DIAGNOSIS — I10 ESSENTIAL HYPERTENSION: ICD-10-CM

## 2022-09-26 PROCEDURE — 3062F POS MACROALBUMINURIA REV: CPT | Mod: CPTII,S$GLB,, | Performed by: INTERNAL MEDICINE

## 2022-09-26 PROCEDURE — 1159F PR MEDICATION LIST DOCUMENTED IN MEDICAL RECORD: ICD-10-PCS | Mod: CPTII,S$GLB,, | Performed by: INTERNAL MEDICINE

## 2022-09-26 PROCEDURE — 3074F SYST BP LT 130 MM HG: CPT | Mod: CPTII,S$GLB,, | Performed by: INTERNAL MEDICINE

## 2022-09-26 PROCEDURE — 71046 X-RAY EXAM CHEST 2 VIEWS: CPT | Mod: TC,FY,PO

## 2022-09-26 PROCEDURE — 1101F PR PT FALLS ASSESS DOC 0-1 FALLS W/OUT INJ PAST YR: ICD-10-PCS | Mod: CPTII,S$GLB,, | Performed by: INTERNAL MEDICINE

## 2022-09-26 PROCEDURE — 1159F MED LIST DOCD IN RCRD: CPT | Mod: CPTII,S$GLB,, | Performed by: INTERNAL MEDICINE

## 2022-09-26 PROCEDURE — 3066F NEPHROPATHY DOC TX: CPT | Mod: CPTII,S$GLB,, | Performed by: INTERNAL MEDICINE

## 2022-09-26 PROCEDURE — 71046 XR CHEST PA AND LATERAL: ICD-10-PCS | Mod: 26,,, | Performed by: RADIOLOGY

## 2022-09-26 PROCEDURE — 3078F PR MOST RECENT DIASTOLIC BLOOD PRESSURE < 80 MM HG: ICD-10-PCS | Mod: CPTII,S$GLB,, | Performed by: INTERNAL MEDICINE

## 2022-09-26 PROCEDURE — 3288F PR FALLS RISK ASSESSMENT DOCUMENTED: ICD-10-PCS | Mod: CPTII,S$GLB,, | Performed by: INTERNAL MEDICINE

## 2022-09-26 PROCEDURE — 1101F PT FALLS ASSESS-DOCD LE1/YR: CPT | Mod: CPTII,S$GLB,, | Performed by: INTERNAL MEDICINE

## 2022-09-26 PROCEDURE — 99999 PR PBB SHADOW E&M-EST. PATIENT-LVL V: ICD-10-PCS | Mod: PBBFAC,,, | Performed by: INTERNAL MEDICINE

## 2022-09-26 PROCEDURE — 1160F PR REVIEW ALL MEDS BY PRESCRIBER/CLIN PHARMACIST DOCUMENTED: ICD-10-PCS | Mod: CPTII,S$GLB,, | Performed by: INTERNAL MEDICINE

## 2022-09-26 PROCEDURE — 3288F FALL RISK ASSESSMENT DOCD: CPT | Mod: CPTII,S$GLB,, | Performed by: INTERNAL MEDICINE

## 2022-09-26 PROCEDURE — 71046 X-RAY EXAM CHEST 2 VIEWS: CPT | Mod: 26,,, | Performed by: RADIOLOGY

## 2022-09-26 PROCEDURE — 3044F PR MOST RECENT HEMOGLOBIN A1C LEVEL <7.0%: ICD-10-PCS | Mod: CPTII,S$GLB,, | Performed by: INTERNAL MEDICINE

## 2022-09-26 PROCEDURE — 1160F RVW MEDS BY RX/DR IN RCRD: CPT | Mod: CPTII,S$GLB,, | Performed by: INTERNAL MEDICINE

## 2022-09-26 PROCEDURE — 3008F PR BODY MASS INDEX (BMI) DOCUMENTED: ICD-10-PCS | Mod: CPTII,S$GLB,, | Performed by: INTERNAL MEDICINE

## 2022-09-26 PROCEDURE — 4010F ACE/ARB THERAPY RXD/TAKEN: CPT | Mod: CPTII,S$GLB,, | Performed by: INTERNAL MEDICINE

## 2022-09-26 PROCEDURE — 3008F BODY MASS INDEX DOCD: CPT | Mod: CPTII,S$GLB,, | Performed by: INTERNAL MEDICINE

## 2022-09-26 PROCEDURE — 3078F DIAST BP <80 MM HG: CPT | Mod: CPTII,S$GLB,, | Performed by: INTERNAL MEDICINE

## 2022-09-26 PROCEDURE — 3062F PR POS MACROALBUMINURIA RESULT DOCUMENTED/REVIEW: ICD-10-PCS | Mod: CPTII,S$GLB,, | Performed by: INTERNAL MEDICINE

## 2022-09-26 PROCEDURE — 3074F PR MOST RECENT SYSTOLIC BLOOD PRESSURE < 130 MM HG: ICD-10-PCS | Mod: CPTII,S$GLB,, | Performed by: INTERNAL MEDICINE

## 2022-09-26 PROCEDURE — 99214 OFFICE O/P EST MOD 30 MIN: CPT | Mod: S$GLB,,, | Performed by: INTERNAL MEDICINE

## 2022-09-26 PROCEDURE — 99999 PR PBB SHADOW E&M-EST. PATIENT-LVL V: CPT | Mod: PBBFAC,,, | Performed by: INTERNAL MEDICINE

## 2022-09-26 PROCEDURE — 99214 PR OFFICE/OUTPT VISIT, EST, LEVL IV, 30-39 MIN: ICD-10-PCS | Mod: S$GLB,,, | Performed by: INTERNAL MEDICINE

## 2022-09-26 PROCEDURE — 3066F PR DOCUMENTATION OF TREATMENT FOR NEPHROPATHY: ICD-10-PCS | Mod: CPTII,S$GLB,, | Performed by: INTERNAL MEDICINE

## 2022-09-26 PROCEDURE — 1126F PR PAIN SEVERITY QUANTIFIED, NO PAIN PRESENT: ICD-10-PCS | Mod: CPTII,S$GLB,, | Performed by: INTERNAL MEDICINE

## 2022-09-26 PROCEDURE — 3044F HG A1C LEVEL LT 7.0%: CPT | Mod: CPTII,S$GLB,, | Performed by: INTERNAL MEDICINE

## 2022-09-26 PROCEDURE — 1126F AMNT PAIN NOTED NONE PRSNT: CPT | Mod: CPTII,S$GLB,, | Performed by: INTERNAL MEDICINE

## 2022-09-26 PROCEDURE — 4010F PR ACE/ARB THEARPY RXD/TAKEN: ICD-10-PCS | Mod: CPTII,S$GLB,, | Performed by: INTERNAL MEDICINE

## 2022-09-27 ENCOUNTER — IMMUNIZATION (OUTPATIENT)
Dept: OBSTETRICS AND GYNECOLOGY | Facility: CLINIC | Age: 72
End: 2022-09-27
Payer: MEDICARE

## 2022-09-27 DIAGNOSIS — Z23 NEED FOR VACCINATION: Primary | ICD-10-CM

## 2022-09-27 PROCEDURE — 91312 COVID-19, MRNA, LNP-S, BIVALENT BOOSTER, PF, 30 MCG/0.3 ML DOSE: ICD-10-PCS | Mod: S$GLB,,, | Performed by: FAMILY MEDICINE

## 2022-09-27 PROCEDURE — 0124A COVID-19, MRNA, LNP-S, BIVALENT BOOSTER, PF, 30 MCG/0.3 ML DOSE: CPT | Mod: PBBFAC | Performed by: FAMILY MEDICINE

## 2022-09-27 PROCEDURE — 91312 COVID-19, MRNA, LNP-S, BIVALENT BOOSTER, PF, 30 MCG/0.3 ML DOSE: CPT | Mod: S$GLB,,, | Performed by: FAMILY MEDICINE

## 2022-09-29 ENCOUNTER — OFFICE VISIT (OUTPATIENT)
Dept: CARDIOLOGY | Facility: CLINIC | Age: 72
End: 2022-09-29
Payer: MEDICARE

## 2022-09-29 VITALS
HEIGHT: 66 IN | BODY MASS INDEX: 22.94 KG/M2 | DIASTOLIC BLOOD PRESSURE: 74 MMHG | WEIGHT: 142.75 LBS | HEART RATE: 92 BPM | SYSTOLIC BLOOD PRESSURE: 132 MMHG | RESPIRATION RATE: 18 BRPM | OXYGEN SATURATION: 98 %

## 2022-09-29 DIAGNOSIS — Z96.1 PSEUDOPHAKIA OF BOTH EYES: ICD-10-CM

## 2022-09-29 DIAGNOSIS — R29.898 DECREASED PINCH STRENGTH: ICD-10-CM

## 2022-09-29 DIAGNOSIS — M35.00 SJOGREN'S SYNDROME, WITH UNSPECIFIED ORGAN INVOLVEMENT: ICD-10-CM

## 2022-09-29 DIAGNOSIS — M25.672 DECREASED RANGE OF MOTION OF BOTH ANKLES: ICD-10-CM

## 2022-09-29 DIAGNOSIS — E11.42 CONTROLLED TYPE 2 DIABETES MELLITUS WITH DIABETIC POLYNEUROPATHY, WITH LONG-TERM CURRENT USE OF INSULIN: ICD-10-CM

## 2022-09-29 DIAGNOSIS — S22.000A COMPRESSION FRACTURE OF BODY OF THORACIC VERTEBRA: ICD-10-CM

## 2022-09-29 DIAGNOSIS — I44.1 MOBITZ I: ICD-10-CM

## 2022-09-29 DIAGNOSIS — N18.31 STAGE 3A CHRONIC KIDNEY DISEASE: ICD-10-CM

## 2022-09-29 DIAGNOSIS — K22.70 BARRETT'S ESOPHAGUS WITHOUT DYSPLASIA: ICD-10-CM

## 2022-09-29 DIAGNOSIS — Z79.52 CURRENT CHRONIC USE OF SYSTEMIC STEROIDS: ICD-10-CM

## 2022-09-29 DIAGNOSIS — M25.671 DECREASED RANGE OF MOTION OF BOTH ANKLES: ICD-10-CM

## 2022-09-29 DIAGNOSIS — H52.7 REFRACTIVE ERROR: ICD-10-CM

## 2022-09-29 DIAGNOSIS — D64.9 ANEMIA, UNSPECIFIED TYPE: ICD-10-CM

## 2022-09-29 DIAGNOSIS — M1A.9XX1 TOPHACEOUS GOUT: ICD-10-CM

## 2022-09-29 DIAGNOSIS — I10 ESSENTIAL HYPERTENSION: Primary | ICD-10-CM

## 2022-09-29 DIAGNOSIS — M06.9 RHEUMATOID ARTHRITIS, INVOLVING UNSPECIFIED SITE, UNSPECIFIED WHETHER RHEUMATOID FACTOR PRESENT: ICD-10-CM

## 2022-09-29 DIAGNOSIS — I65.23 BILATERAL CAROTID ARTERY STENOSIS: ICD-10-CM

## 2022-09-29 DIAGNOSIS — I70.0 AORTIC ATHEROSCLEROSIS: ICD-10-CM

## 2022-09-29 DIAGNOSIS — Z79.4 CONTROLLED TYPE 2 DIABETES MELLITUS WITH DIABETIC POLYNEUROPATHY, WITH LONG-TERM CURRENT USE OF INSULIN: ICD-10-CM

## 2022-09-29 DIAGNOSIS — I50.30 DIASTOLIC HEART FAILURE, NYHA CLASS 2: ICD-10-CM

## 2022-09-29 DIAGNOSIS — E78.00 PURE HYPERCHOLESTEROLEMIA: ICD-10-CM

## 2022-09-29 PROCEDURE — 3062F POS MACROALBUMINURIA REV: CPT | Mod: CPTII,S$GLB,, | Performed by: INTERNAL MEDICINE

## 2022-09-29 PROCEDURE — 1101F PR PT FALLS ASSESS DOC 0-1 FALLS W/OUT INJ PAST YR: ICD-10-PCS | Mod: CPTII,S$GLB,, | Performed by: INTERNAL MEDICINE

## 2022-09-29 PROCEDURE — 99214 PR OFFICE/OUTPT VISIT, EST, LEVL IV, 30-39 MIN: ICD-10-PCS | Mod: S$GLB,,, | Performed by: INTERNAL MEDICINE

## 2022-09-29 PROCEDURE — 99999 PR PBB SHADOW E&M-EST. PATIENT-LVL V: CPT | Mod: PBBFAC,,, | Performed by: INTERNAL MEDICINE

## 2022-09-29 PROCEDURE — 1159F PR MEDICATION LIST DOCUMENTED IN MEDICAL RECORD: ICD-10-PCS | Mod: CPTII,S$GLB,, | Performed by: INTERNAL MEDICINE

## 2022-09-29 PROCEDURE — 4010F ACE/ARB THERAPY RXD/TAKEN: CPT | Mod: CPTII,S$GLB,, | Performed by: INTERNAL MEDICINE

## 2022-09-29 PROCEDURE — 1160F PR REVIEW ALL MEDS BY PRESCRIBER/CLIN PHARMACIST DOCUMENTED: ICD-10-PCS | Mod: CPTII,S$GLB,, | Performed by: INTERNAL MEDICINE

## 2022-09-29 PROCEDURE — 3008F PR BODY MASS INDEX (BMI) DOCUMENTED: ICD-10-PCS | Mod: CPTII,S$GLB,, | Performed by: INTERNAL MEDICINE

## 2022-09-29 PROCEDURE — 1101F PT FALLS ASSESS-DOCD LE1/YR: CPT | Mod: CPTII,S$GLB,, | Performed by: INTERNAL MEDICINE

## 2022-09-29 PROCEDURE — 3078F DIAST BP <80 MM HG: CPT | Mod: CPTII,S$GLB,, | Performed by: INTERNAL MEDICINE

## 2022-09-29 PROCEDURE — 3288F FALL RISK ASSESSMENT DOCD: CPT | Mod: CPTII,S$GLB,, | Performed by: INTERNAL MEDICINE

## 2022-09-29 PROCEDURE — 3008F BODY MASS INDEX DOCD: CPT | Mod: CPTII,S$GLB,, | Performed by: INTERNAL MEDICINE

## 2022-09-29 PROCEDURE — 3066F PR DOCUMENTATION OF TREATMENT FOR NEPHROPATHY: ICD-10-PCS | Mod: CPTII,S$GLB,, | Performed by: INTERNAL MEDICINE

## 2022-09-29 PROCEDURE — 3066F NEPHROPATHY DOC TX: CPT | Mod: CPTII,S$GLB,, | Performed by: INTERNAL MEDICINE

## 2022-09-29 PROCEDURE — 3075F SYST BP GE 130 - 139MM HG: CPT | Mod: CPTII,S$GLB,, | Performed by: INTERNAL MEDICINE

## 2022-09-29 PROCEDURE — 3078F PR MOST RECENT DIASTOLIC BLOOD PRESSURE < 80 MM HG: ICD-10-PCS | Mod: CPTII,S$GLB,, | Performed by: INTERNAL MEDICINE

## 2022-09-29 PROCEDURE — 3075F PR MOST RECENT SYSTOLIC BLOOD PRESS GE 130-139MM HG: ICD-10-PCS | Mod: CPTII,S$GLB,, | Performed by: INTERNAL MEDICINE

## 2022-09-29 PROCEDURE — 99999 PR PBB SHADOW E&M-EST. PATIENT-LVL V: ICD-10-PCS | Mod: PBBFAC,,, | Performed by: INTERNAL MEDICINE

## 2022-09-29 PROCEDURE — 1160F RVW MEDS BY RX/DR IN RCRD: CPT | Mod: CPTII,S$GLB,, | Performed by: INTERNAL MEDICINE

## 2022-09-29 PROCEDURE — 4010F PR ACE/ARB THEARPY RXD/TAKEN: ICD-10-PCS | Mod: CPTII,S$GLB,, | Performed by: INTERNAL MEDICINE

## 2022-09-29 PROCEDURE — 99214 OFFICE O/P EST MOD 30 MIN: CPT | Mod: S$GLB,,, | Performed by: INTERNAL MEDICINE

## 2022-09-29 PROCEDURE — 1126F PR PAIN SEVERITY QUANTIFIED, NO PAIN PRESENT: ICD-10-PCS | Mod: CPTII,S$GLB,, | Performed by: INTERNAL MEDICINE

## 2022-09-29 PROCEDURE — 3044F HG A1C LEVEL LT 7.0%: CPT | Mod: CPTII,S$GLB,, | Performed by: INTERNAL MEDICINE

## 2022-09-29 PROCEDURE — 3288F PR FALLS RISK ASSESSMENT DOCUMENTED: ICD-10-PCS | Mod: CPTII,S$GLB,, | Performed by: INTERNAL MEDICINE

## 2022-09-29 PROCEDURE — 1126F AMNT PAIN NOTED NONE PRSNT: CPT | Mod: CPTII,S$GLB,, | Performed by: INTERNAL MEDICINE

## 2022-09-29 PROCEDURE — 3062F PR POS MACROALBUMINURIA RESULT DOCUMENTED/REVIEW: ICD-10-PCS | Mod: CPTII,S$GLB,, | Performed by: INTERNAL MEDICINE

## 2022-09-29 PROCEDURE — 1159F MED LIST DOCD IN RCRD: CPT | Mod: CPTII,S$GLB,, | Performed by: INTERNAL MEDICINE

## 2022-09-29 PROCEDURE — 3044F PR MOST RECENT HEMOGLOBIN A1C LEVEL <7.0%: ICD-10-PCS | Mod: CPTII,S$GLB,, | Performed by: INTERNAL MEDICINE

## 2022-09-29 NOTE — PROGRESS NOTES
CARDIOVASCULAR CONSULTATION    REASON FOR CONSULT:   Darrion Bennett is a 72 y.o. male who presents for establishing care with Cardiology.      HISTORY OF PRESENT ILLNESS:     Notes from September 2019:  Patient here for follow-up today.  Denies any chest pains at rest on exertion, orthopnea, PND.  Denies any other cardiac symptoms.    Notes from June 2019:  Patient is here for follow-up today.  Denies any chest pains at rest on exertion, orthopnea, PND.  Denies any.  Of dizziness or passing out.  Blood pressure is much better controlled in clinic today.    Initial clinic visit:  Patient is a very pleasant 69-year-old man with a past medical history significant for hypertension, diabetes, Sjogren's disease who wants to establish care with [facility].  Patient has been followed with [facility].  Patient states that in 2014 he was diagnosed with heart failure.  He states that he had passed out and had gone to the hospital, he was feeling short of breath also and at that point he was told that he had heart failure.  I can see an echocardiogram from his outside medical records in 2014 which showed normal left ventricular systolic function.  He also had a stress echocardiogram done in 2013 which did not reveal any ischemia.  He denies any chest pains at rest on exertion, orthopnea, PND, swelling of feet.  States that since then he has been okay and walks about 2-3 blocks every day.  On walking about 2-3 blocks he does experience some tightness in his left calf.  This goes away after rest.  He denies any resting calf tightness.  He denies any ulcer on his feet.  He states that he checks his blood pressure at home and has found that it is elevated around 160-170 mm of systolic multiple times.      Notes from March 2021:  Patient here for follow-up.  Denies any chest pains at rest on exertion, orthopnea, PND, swelling of feet.  Mostly blood pressure has been uncontrolled at home.  Staying around 160 mmHg.  Very  compliant with medications.  EKG done in the clinic today was personally reviewed and demonstrates sinus bradycardia with left anterior fascicular block, poor R-wave progression.      Notes from April 21:  Patient here for follow-up.  Now states that he has been experiencing dyspnea on exertion which is worse than prior.  Very concerned about that.  No chest pains or tightness, but it is gets short of breath on walking short distances and this is lifestyle limiting.  Denies orthopnea, PND, swelling of feet.  Also states that the blood pressure is staying around 140-160 mmHg at home.      Notes from May 2021:  Patient here for follow-up.  Stress test did not show any significant ischemia.  Echo showed normal left ventricle systolic function.  Symptoms have improved.  States stop taking his Crestor because it was causing myalgias      The left ventricle is normal in size with concentric remodeling and normal systolic function.  The estimated ejection fraction is 65%.  Grade II left ventricular diastolic dysfunction.  Severe left atrial enlargement.  Normal right ventricular size with normal right ventricular systolic function.  Mild right atrial enlargement.  Mild mitral regurgitation.  Normal central venous pressure (3 mmHg).  The estimated PA systolic pressure is 36 mmHg.  The sinuses of Valsalva is mildly dilated.  Normal myocardial perfusion scan. There is no evidence of myocardial ischemia or infarction.    The gated perfusion images showed an ejection fraction of 75% post stress.    There is normal wall motion post stress.    The EKG portion of this study is negative for ischemia.    The patient reported no chest pain during the stress test.    During stress, rare PVCs are noted.    Hypertensive response to exercise.  AL interval did increase during exercise.      Notes from July 2021:  Patient here for follow-up.  Has been experiencing dyspnea on exertion.  States it is getting worse.  Has an appointment with  pulmonology coming up.  Is requesting evaluation of CBC and BNP.  As well as a chest x-ray.      The left ventricle is normal in size with concentric hypertrophy and normal systolic function.  The estimated ejection fraction is 65%.  Grade I left ventricular diastolic dysfunction.  Normal right ventricular size with normal right ventricular systolic function.  Moderate left atrial enlargement.  Mild right atrial enlargement.  There is mild aortic valve stenosis.  Aortic valve area is 1.67 cm2; peak velocity is 1.95 m/s; mean gradient is 9 mmHg.  Normal central venous pressure (3 mmHg).  The estimated PA systolic pressure is 16 mmHg.      Notes from dec 21: doing fine. No cp, orthopnea, pnd.       February 2022:  Patient follow-up.  Doing fine.  Occasionally gets swelling in his feet at the end of the day..  States he takes torsemide every Saturday and Sunday.  States he was told by his nephrologist to do so.    Notes from August 2022: Patient states that his nephrologist got off his amlodipine and since then his blood pressure has been uncontrolled.  He is taking hydralazine at 50 mg t.i.d..  Blood pressure is running around 170 mmHg.  Also had an episode of chest pressure last week.  Did not seek medical attention for that.  Denies orthopnea, PND.      Notes from September 2022:  Patient here for follow-up.  Has been chest pain-free.  Stress test did not show any significant ischemia.  Echo showed normal left ventricle systolic function.      Normal myocardial perfusion scan. There is no evidence of myocardial ischemia or infarction.    The gated perfusion images showed an ejection fraction of 72% post stress.    There is normal wall motion post stress.    The EKG portion of this study is negative for ischemia.    The patient reported no chest pain during the stress test.    During stress, frequent PVCs are noted.      The left ventricle is normal in size with mild concentric hypertrophy and normal systolic  function.  The estimated ejection fraction is 60%.  Grade I left ventricular diastolic dysfunction.  Normal right ventricular size with normal right ventricular systolic function.  Moderate left atrial enlargement.  Mild right atrial enlargement.  Mild mitral regurgitation.  Mild pulmonic regurgitation.  Normal central venous pressure (3 mmHg).  The estimated PA systolic pressure is 36 mmHg.  There is mild pulmonary hypertension.    PAST MEDICAL HISTORY:     Past Medical History:   Diagnosis Date    Anemia     Arthritis     Cataract     CHF (congestive heart failure)     Chronic constipation     Diabetes mellitus, type 2     Felon of finger of left hand 5/11/2019    Glaucoma     Hyperlipidemia 5/13/2014    Hypertension     MCTD (mixed connective tissue disease)     Sjogren's disease     Ulcerative colitis     Urolithiasis        PAST SURGICAL HISTORY:     Past Surgical History:   Procedure Laterality Date    CATARACT EXTRACTION Bilateral 2005    COLONOSCOPY  2014    EYE SURGERY         ALLERGIES AND MEDICATION:     Review of patient's allergies indicates:   Allergen Reactions    Penicillins Nausea And Vomiting    Rosuvastatin      Muscle pain     Allopurinol analogues Rash        Medication List            Accurate as of September 29, 2022  9:03 AM. If you have any questions, ask your nurse or doctor.                CONTINUE taking these medications      alcohol swabs Padm  Commonly known as: BD ALCOHOL SWABS  USE  4 TIMES PER DAY     amLODIPine 5 MG tablet  Commonly known as: NORVASC  Take 1 tablet (5 mg total) by mouth 2 (two) times daily.     ammonium lactate 12 % Crea  Apply 1 application topically once daily.     atorvastatin 80 MG tablet  Commonly known as: LIPITOR  TAKE 1 TABLET EVERY DAY     BOOSTRIX TDAP 2.5-8-5 Lf-mcg-Lf/0.5mL Syrg injection  Generic drug: diphth,pertus(acell),tetanus     cloNIDine 0.2 mg/24 hr td ptwk 0.2 mg/24 hr  Commonly known as: CATAPRES  PLACE 1 PATCH ONTO THE SKIN EVERY 7 DAYS.    "  diclofenac sodium 1 % Gel  Commonly known as: VOLTAREN  Apply 2 g topically 4 (four) times daily as needed. Apply to aching joints     dorzolamide 2 % ophthalmic solution  Commonly known as: TRUSOPT  Place 1 drop into both eyes 2 (two) times daily.     febuxostat 40 mg Tab  Commonly known as: ULORIC  Take 1 tablet (40 mg total) by mouth once daily.     hydrALAZINE 50 MG tablet  Commonly known as: APRESOLINE  Take 1 tablet (50 mg total) by mouth every 8 (eight) hours.     INJECTAFER 50 mg iron/mL injection  Generic drug: ferric carboxymaltose     insulin aspart U-100 100 unit/mL (3 mL) Inpn pen  Commonly known as: NovoLOG Flexpen U-100 Insulin  Inject 4 units three times daily before each meal with sliding scale.     insulin glargine 100 units/mL SubQ pen  Commonly known as: LANTUS SOLOSTAR U-100 INSULIN  Inject 5 units once daily     lancets 33 gauge Misc  Commonly known as: TRUEPLUS LANCETS  Test glucose 4x/day. Dx code e11.65     linaCLOtide 72 mcg Cap capsule  Commonly known as: LINZESS  Take 1 capsule (72 mcg total) by mouth before breakfast.     multivit with min-folic acid 200 mcg Chew     omeprazole 20 MG capsule  Commonly known as: PRILOSEC  TAKE 2 CAPSULES (40 MG TOTAL) BY MOUTH ONCE DAILY.     ORENCIA CLICKJECT 125 mg/mL Atin  Generic drug: abatacept  Inject 125 mg into the skin once a week.     pen needle, diabetic 32 gauge x 5/32" Ndle  Commonly known as: BD ULTRA-FINE RICHARD PEN NEEDLE  1 Device by Misc.(Non-Drug; Combo Route) route 4 (four) times daily.     predniSONE 5 MG tablet  Commonly known as: DELTASONE  TAKE 1 TABLET BY MOUTH EVERY DAY     PROLIA 60 mg/mL Syrg  Generic drug: denosumab     torsemide 10 MG Tab  Commonly known as: DEMADEX  Take 1 tablet (10 mg total) by mouth 2 (two) times a day.     traMADoL 50 mg tablet  Commonly known as: ULTRAM  TAKE 1 TABLET BY MOUTH EVERY 12 HOURS AS NEEDED FOR PAIN.     travoprost 0.004 % ophthalmic solution  Commonly known as: TRAVATAN Z  Place 1 drop into " both eyes every evening.     triamcinolone acetonide 0.1% 0.1 % cream  Commonly known as: KENALOG  Apply topically 2 (two) times daily. for 10 days     TRUE METRIX GLUCOSE METER Misc  Generic drug: blood-glucose meter  USE AS DIRECTED     TRUE METRIX GLUCOSE TEST STRIP Strp  Generic drug: blood sugar diagnostic  TEST GLUCOSE 4 TIMES PER DAY.     TRUE METRIX LEVEL 1 Soln  Generic drug: blood glucose control, low  Use as indicated     TRULICITY 1.5 mg/0.5 mL pen injector  Generic drug: dulaglutide  Inject 1.5 mg into the skin every 7 days.     valsartan 160 MG tablet  Commonly known as: DIOVAN  Take 1 tablet (160 mg total) by mouth 2 (two) times daily.              SOCIAL HISTORY:     Social History     Socioeconomic History    Marital status:     Number of children: 3   Tobacco Use    Smoking status: Never    Smokeless tobacco: Never   Substance and Sexual Activity    Alcohol use: No    Drug use: Yes     Comment: ultram 1 - 2 tabs a week     Social Determinants of Health     Financial Resource Strain: Low Risk     Difficulty of Paying Living Expenses: Not hard at all   Food Insecurity: No Food Insecurity    Worried About Running Out of Food in the Last Year: Never true    Ran Out of Food in the Last Year: Never true   Transportation Needs: No Transportation Needs    Lack of Transportation (Medical): No    Lack of Transportation (Non-Medical): No   Physical Activity: Insufficiently Active    Days of Exercise per Week: 1 day    Minutes of Exercise per Session: 20 min   Stress: No Stress Concern Present    Feeling of Stress : Not at all   Social Connections: Moderately Integrated    Frequency of Communication with Friends and Family: Once a week    Frequency of Social Gatherings with Friends and Family: Once a week    Attends Zoroastrian Services: 1 to 4 times per year    Active Member of Clubs or Organizations: Yes    Attends Club or Organization Meetings: 1 to 4 times per year    Marital Status:    Housing  Stability: Low Risk     Unable to Pay for Housing in the Last Year: No    Number of Places Lived in the Last Year: 1    Unstable Housing in the Last Year: No       FAMILY HISTORY:     Family History   Problem Relation Age of Onset    Hypertension Father     Stroke Father     Cataracts Father     Glaucoma Father     Cataracts Mother     No Known Problems Sister     No Known Problems Brother     Rheum arthritis Maternal Aunt     Rheum arthritis Maternal Uncle     No Known Problems Paternal Aunt     No Known Problems Paternal Uncle     No Known Problems Maternal Grandmother     No Known Problems Maternal Grandfather     Throat cancer Paternal Grandmother     Glaucoma Paternal Grandfather     No Known Problems Daughter     No Known Problems Son     Celiac disease Neg Hx     Colon cancer Neg Hx     Cirrhosis Neg Hx     Colon polyps Neg Hx     Crohn's disease Neg Hx     Cystic fibrosis Neg Hx     Hemochromatosis Neg Hx     Esophageal cancer Neg Hx     Inflammatory bowel disease Neg Hx     Irritable bowel syndrome Neg Hx     Liver cancer Neg Hx     Liver disease Neg Hx     Rectal cancer Neg Hx     Stomach cancer Neg Hx     Ulcerative colitis Neg Hx     Chase's disease Neg Hx     Amblyopia Neg Hx     Blindness Neg Hx     Cancer Neg Hx     Diabetes Neg Hx     Macular degeneration Neg Hx     Retinal detachment Neg Hx     Strabismus Neg Hx     Thyroid disease Neg Hx     Melanoma Neg Hx        REVIEW OF SYSTEMS:   Review of Systems   Constitutional: Negative.    HENT: Negative.    Eyes: Negative.    Respiratory: Negative.    Gastrointestinal: Negative.    Genitourinary: Negative.    Musculoskeletal: Negative.    Skin: Negative.    Neurological: Negative.    Endo/Heme/Allergies: Negative.    Psychiatric/Behavioral: Negative.    All other systems reviewed and are negative.      A 10 point review of systems was performed and all the pertinent positives have been mentioned. Rest of review of systems was negative.        PHYSICAL  "EXAM:     Vitals:    09/29/22 0834   Pulse: 92   Resp: 18    Body mass index is 23.04 kg/m².  Weight: 64.8 kg (142 lb 12 oz)   Height: 5' 6" (167.6 cm)     Physical Exam  Vitals and nursing note reviewed.   Constitutional:       Appearance: Normal appearance. He is well-developed.   HENT:      Head: Normocephalic and atraumatic.      Right Ear: Hearing normal.      Left Ear: Hearing normal.      Nose: Nose normal.   Eyes:      General: Lids are normal.      Conjunctiva/sclera: Conjunctivae normal.      Pupils: Pupils are equal, round, and reactive to light.   Cardiovascular:      Rate and Rhythm: Normal rate and regular rhythm.      Pulses: Normal pulses.      Heart sounds: Murmur heard.    Systolic murmur is present with a grade of 2/6.  Pulmonary:      Effort: Pulmonary effort is normal.      Breath sounds: Normal breath sounds.   Abdominal:      Palpations: Abdomen is soft.      Tenderness: There is no abdominal tenderness.   Musculoskeletal:         General: No deformity.      Cervical back: Normal range of motion and neck supple.   Skin:     General: Skin is warm and dry.   Neurological:      Mental Status: He is alert and oriented to person, place, and time.   Psychiatric:         Speech: Speech normal.           DATA:     Laboratory:  CBC:  Recent Labs   Lab 02/01/22  0844 03/28/22  0712 09/26/22  0950   WBC 6.09  6.09 7.10 7.10   Hemoglobin 11.9 L  11.9 L 12.3 L 12.2 L   Hematocrit 38.8 L  38.8 L 38.7 L 38.3 L   Platelets 197  197 268 157       CHEMISTRIES:  Recent Labs   Lab 05/19/21  0856 06/27/21  0409 06/28/21  0438 02/01/22  0844 03/28/22  0712 04/22/22  0845 09/26/22  0950   Glucose 103 136 H   < > 88  88 134 H 133 H 118 H   Sodium 138 137   < > 141  141 137 136 137   Potassium 4.1 4.2   < > 4.5  4.5 4.4 4.7 4.3   BUN 31 H 32 H   < > 30 H  30 H 30 H 26 H 24 H   Creatinine 1.6 H 1.4   < > 1.4  1.4 1.2 1.3 1.4   eGFR if  49 A 58 A   < > 58 A  58 A >60.0 >60.0  --    eGFR if non "  43 A 50 A   < > 50 A  50 A >60.0 54.5 A  --    Calcium 9.3 9.1   < > 8.9  8.9 9.5 9.4 8.3 L   Magnesium 1.8 2.0  --   --   --   --   --     < > = values in this interval not displayed.       CARDIAC BIOMARKERS:  Recent Labs   Lab 04/29/20  0928 06/27/21  0409   CPK 33 50   CPK MB  --  1.7   Troponin I  --  0.026       COAGS:  Recent Labs   Lab 06/27/21  0409   INR 1.0       LIPIDS/LFTS:  Recent Labs   Lab 06/28/21  0438 10/01/21  0917 02/01/22  0844 03/28/22  0712 04/22/22  0845 07/05/22  0713 09/26/22  0950   Cholesterol 157  --  220 H  --   --  135  --    Triglycerides 92  --  100  --   --  70  --    HDL 41  --  59  --   --  41  --    LDL Cholesterol 97.6  --  141.0  --   --  80.0  --    Non-HDL Cholesterol 116  --  161  --   --  94  --    AST 11   < > 15  15 16 38  --  21   ALT 7 L   < > 11  11 16 32  --  18    < > = values in this interval not displayed.       Hemoglobin A1C   Date Value Ref Range Status   07/05/2022 6.4 (H) 4.0 - 5.6 % Final     Comment:     ADA Screening Guidelines:  5.7-6.4%  Consistent with prediabetes  >or=6.5%  Consistent with diabetes    High levels of fetal hemoglobin interfere with the HbA1C  assay. Heterozygous hemoglobin variants (HbS, HgC, etc)do  not significantly interfere with this assay.   However, presence of multiple variants may affect accuracy.     03/28/2022 6.3 (H) 4.0 - 5.6 % Final     Comment:     ADA Screening Guidelines:  5.7-6.4%  Consistent with prediabetes  >or=6.5%  Consistent with diabetes    High levels of fetal hemoglobin interfere with the HbA1C  assay. Heterozygous hemoglobin variants (HbS, HgC, etc)do  not significantly interfere with this assay.   However, presence of multiple variants may affect accuracy.     11/29/2021 6.6 (H) 4.0 - 5.6 % Final     Comment:     ADA Screening Guidelines:  5.7-6.4%  Consistent with prediabetes  >or=6.5%  Consistent with diabetes    High levels of fetal hemoglobin interfere with the HbA1C  assay.  Heterozygous hemoglobin variants (HbS, HgC, etc)do  not significantly interfere with this assay.   However, presence of multiple variants may affect accuracy.     10/16/2018 6.2 (H) 4.0 - 5.7 % Final     Comment:     Dr. Jurgen Ward ( Endocrinology )       TSH  Recent Labs   Lab 05/19/21  0856 06/27/21  0409 09/26/22  0950   TSH 4.040 H 4.339 H 2.281       The 10-year ASCVD risk score (Lamont SAAVEDRA, et al., 2019) is: 37.8%    Values used to calculate the score:      Age: 72 years      Sex: Male      Is Non- : No      Diabetic: Yes      Tobacco smoker: No      Systolic Blood Pressure: 128 mmHg      Is BP treated: Yes      HDL Cholesterol: 41 mg/dL      Total Cholesterol: 135 mg/dL           Cardiovascular Testing:    EKG: (personally reviewed tracing)  Sinus bradycardia with first-degree AV block left anterior fascicle, septal infarct.    KIRT in June 2019:  Normal resting KIRT/PVR waveforms bilaterally.  Normal exercise KIRT bilaterally (with minimal drop from resting KIRT measurements).        2D echocardiogram in June 2019:  Normal left ventricular systolic function. The estimated ejection fraction is 65%  Moderate left atrial enlargement.  Normal LV diastolic function.  Mild right atrial enlargement.  Normal central venous pressure (3 mm Hg).  The estimated PA systolic pressure is 14 mm Hg            2D echocardiogram 2014 [facility]    HEIGHT: 167.6 cm  WEIGHT: 74.8 kg  BP: 157/82  BSA:  MEASUREMENTS  ------------  IVSd: 1.2 cm  LVIDd: 3.8 cm  LVPWd: 1.2 cm  LVIDs: 2.6 cm  LA Diam: 3.2 cm  Ao Diam: 3.1 cm  LAESV Index (A-L): 32.40 ml/m²  LVCO Dopp: 6.98 l/min  LVCI Dopp: 3.79 l/minm²    FINDINGS  -------  CPT CODE:73943-67.  Transthoracic echocardiogram (complete).  The attending physician   listed herein personally reviewed all stored images and directly supervised the   fellow-in-training (if listed) in the study's acquistion and/or report   generation.     Study priority:   Routine.  ICD-9 Indication(s):786.05 - Dyspnea.  Study Quality:The study was technically adequate.  ECG Rhythm:Sinus rhythm.  CHAMBER SIZE:All cardiac chamber sizes are within normal limits.  AORTA:Normal aortic root and proximal ascending aorta.  VALVULAR STRUCTURES:The visualized cardiac valves are structurally normal.  LEFT/RIGHT VENTRICULAR FUNCTION:LV size, wall thickness and systolic function are normal, with an EF > 55%.       The right ventricle is normal in size and function.  DOPPLER:  Color:No valvular insufficiencies.  DIASTOLOGY:The diastolic filling pattern is normal for the age of the patient.  PERICARDIUM / EFFUSIONS:No effusions noted.  INFERIOR VENA CAVA:Normal size inferior vena cava.  PA PRESSURE:The estimated systolic pulmonary artery pressure is normal at < 35 mm Hg (RA   pressure = 3 mm Hg).  CARDIOLOGY:    Electronically signed:  STAFF:ELIAS JAIME M.D.  Fellow:G. MOHSEN, M.D.    CONCLUSIONS  -----------  1. LV size, wall thickness and systolic function are normal, with an EF > 55%.  2. The diastolic filling pattern is normal for the age of the patient.    ASSESSMENT AND PLAN     Patient Active Problem List   Diagnosis    Essential hypertension    Controlled type 2 diabetes mellitus with diabetic polyneuropathy, with long-term current use of insulin    Pure hypercholesterolemia    MCTD (mixed connective tissue disease)    Sjogren's disease    Bilateral edema of lower extremity 6/2019 TTE normal LV systolic and diastolic function; ABIs normal    Glaucoma    BMI 23.0-23.9, adult    Jackson's esophagus without dysplasia    Anemia from plaquenil and imuran    Neutropenia    Current chronic use of systemic steroids    Compression fracture of body of thoracic vertebra on XR 2/2019; 2/2019 alendronate    Anemia of chronic renal failure, stage 3b    Chronic constipation not responding to linzess, miralax, senna    Screening for colon cancer 07/26/2018 colonoscopy transverse colon polyp path showing  benign inflammatory polyp.    Tophaceous gout    Pseudophakia of both eyes    Refractive error    Aortic atherosclerosis    Celiac disease    Diastolic heart failure, NYHA class 2    Stage 3a chronic kidney disease    Joint pain    Decreased range of motion of both ankles    Decreased range of motion of both wrists    Decreased pinch strength    RA (rheumatoid arthritis)    Decreased  strength    Swelling of both hands    Secondary hyperparathyroidism of renal origin    Osteoporosis    Mobitz I    Bilateral carotid artery stenosis on CTA 6/26/21    History of stroke    Dyspnea on exertion    JAZLYN (obstructive sleep apnea)    Long-term insulin use    Dyshidrotic eczema       1.  Chest pressure:  Now resolved.  Stress test did not show any significant ischemia.  Echo showed normal left ventricle systolic function.    2.  Hypertension:  Well controlled on current therapy.  Continue current therapy    3.  Dyslipidemia:  Tolerating lipitor    4.  ABIs in June 2019 were within normal limits.    5.  Chronic kidney disease    7. Elevated TSH.   rx per primary    8. Torsemide to be used as needed for swelling of feet.    Follow-up in 6 months      Thank you very much for involving me in the care of your patient.  Please do not hesitate to contact me if there are any questions.      Greg Alvares MD, FACC, [facility]  Interventional Cardiologist, [facility].           This note was dictated with the help of speech recognition software.  There might be un-intended errors and/or substitutions.

## 2022-10-19 ENCOUNTER — SPECIALTY PHARMACY (OUTPATIENT)
Dept: PHARMACY | Facility: CLINIC | Age: 72
End: 2022-10-19
Payer: MEDICARE

## 2022-10-19 NOTE — TELEPHONE ENCOUNTER
Specialty Pharmacy - Refill Coordination    Specialty Medication Orders Linked to Encounter      Flowsheet Row Most Recent Value   Medication #1 abatacept (ORENCIA CLICKJECT) 125 mg/mL AtIn (Order#[id number], Rx#[id number])            Refill Questions - Documented Responses      Flowsheet Row Most Recent Value   Patient Availability and HIPAA Verification    Does patient want to proceed with activity? Yes   HIPAA/medical authority confirmed? Yes   Relationship to patient of person spoken to? Spouse/Significant Other   Refill Screening Questions    Changes to allergies? No   Changes to medications? No   New conditions since last clinic visit? No   Unplanned office visit, urgent care, ED, or hospital admission in the last 4 weeks? No   How does patient/caregiver feel medication is working? Good   Financial problems or insurance changes? No   How many doses of your specialty medications were missed in the last 4 weeks? 0   Would patient like to speak to a pharmacist? No   When does the patient need to receive the medication? 10/26/22   Refill Delivery Questions    How will the patient receive the medication? MEDRx   When does the patient need to receive the medication? 10/26/22   Shipping Address Home   Address in [facility] confirmed and updated if neccessary? Yes   Expected Copay ($) 0   Is the patient able to afford the medication copay? Yes   Payment Method zero copay   Days supply of Refill 28   Supplies needed? No supplies needed   Refill activity completed? Yes   Refill activity plan Refill scheduled   Shipment/Pickup Date: 10/21/22            Current Outpatient Medications   Medication Sig    abatacept (ORENCIA CLICKJECT) 125 mg/mL AtIn Inject 125 mg into the skin once a week.    alcohol swabs (BD ALCOHOL SWABS) PadM USE  4 TIMES PER DAY    amLODIPine (NORVASC) 5 MG tablet Take 1 tablet (5 mg total) by mouth 2 (two) times daily.    ammonium lactate 12 % Crea Apply 1 application topically once daily.     "atorvastatin (LIPITOR) 80 MG tablet TAKE 1 TABLET EVERY DAY    blood glucose control, low (TRUE METRIX LEVEL 1) Soln Use as indicated    BOOSTRIX TDAP 2.5-8-5 Lf-mcg-Lf/0.5mL Syrg injection     cloNIDine 0.2 mg/24 hr td ptwk (CATAPRES) 0.2 mg/24 hr PLACE 1 PATCH ONTO THE SKIN EVERY 7 DAYS.    diclofenac sodium (VOLTAREN) 1 % Gel Apply 2 g topically 4 (four) times daily as needed. Apply to aching joints    dorzolamide (TRUSOPT) 2 % ophthalmic solution Place 1 drop into both eyes 2 (two) times daily.    dulaglutide (TRULICITY) 1.5 mg/0.5 mL pen injector Inject 1.5 mg into the skin every 7 days.    febuxostat (ULORIC) 40 mg Tab Take 1 tablet (40 mg total) by mouth once daily.    hydrALAZINE (APRESOLINE) 50 MG tablet Take 1 tablet (50 mg total) by mouth every 8 (eight) hours.    INJECTAFER 50 iron mg/mL injection     insulin (LANTUS SOLOSTAR U-100 INSULIN) glargine 100 units/mL SubQ pen Inject 5 units once daily    insulin aspart U-100 (NOVOLOG FLEXPEN U-100 INSULIN) 100 unit/mL (3 mL) InPn pen Inject 4 units three times daily before each meal with sliding scale.    lancets (TRUEPLUS LANCETS) 33 gauge Misc Test glucose 4x/day. Dx code e11.65    linaCLOtide (LINZESS) 72 mcg Cap capsule Take 1 capsule (72 mcg total) by mouth before breakfast.    multivit with min-folic acid 200 mcg Chew     omeprazole (PRILOSEC) 20 MG capsule TAKE 2 CAPSULES (40 MG TOTAL) BY MOUTH ONCE DAILY.    pen needle, diabetic (BD ULTRA-FINE RICHARD PEN NEEDLE) 32 gauge x 5/32" Ndle 1 Device by Misc.(Non-Drug; Combo Route) route 4 (four) times daily.    predniSONE (DELTASONE) 5 MG tablet TAKE 1 TABLET BY MOUTH EVERY DAY    PROLIA 60 mg/mL Syrg     torsemide (DEMADEX) 10 MG Tab Take 1 tablet (10 mg total) by mouth 2 (two) times a day.    traMADoL (ULTRAM) 50 mg tablet TAKE 1 TABLET BY MOUTH EVERY 12 HOURS AS NEEDED FOR PAIN.    travoprost (TRAVATAN Z) 0.004 % ophthalmic solution Place 1 drop into both eyes every evening.    triamcinolone acetonide 0.1% " (KENALOG) 0.1 % cream Apply topically 2 (two) times daily. for 10 days    TRUE METRIX GLUCOSE METER Misc USE AS DIRECTED    TRUE METRIX GLUCOSE TEST STRIP Strp TEST GLUCOSE 4 TIMES PER DAY.    valsartan (DIOVAN) 160 MG tablet Take 1 tablet (160 mg total) by mouth 2 (two) times daily.   Last reviewed on 9/29/2022  9:11 AM by Greg Alvares MD    Review of patient's allergies indicates:   Allergen Reactions    Penicillins Nausea And Vomiting    Rosuvastatin      Muscle pain     Allopurinol analogues Rash    Last reviewed on  10/17/2022 8:10 AM by Shamir Fonseca      Tasks added this encounter   11/16/2022 - Refill Call (Auto Added)   Tasks due within next 3 months   No tasks due.     [facility]  [address] [facility] [address]  Phone: [phone]  Fax: [phone]

## 2022-10-19 NOTE — PROGRESS NOTES
CC: This 69 y.o.  male  is here for evaluation of  T2DM along with comorbidities indicated in the Visit Diagnosis section of this encounter.    HPI: Darrion Bennett was diagnosed with T2DM at age 35. Oral medications started years later.    Previously seen by Dr. Marrufo's office, Endocrinology at [place]. He is transferred all his care to [facility].     Prior visit 8/12/19  Patient has started Xultophy and dc'd all other DM meds as previously advised. Denies GI upset or other complaints on Xultophy. Appetite continues to be low but no worse.   Plan Will decrease Xultophy dose d/t low FBGs but this will unfortunately affect his daytime postprandial BGs. Will see if we need to change therapy at next visit. Advised pt -   Please lower Xultophy dose to 7 units once daily.   Maintain healthy diet.   Continue to test blood sugar 2x/day.   Please call if blood sugars continue to be less than 80.   Return to clinic in 4 weeks with a1c prior. Will not start glp1-RA monotherapy like Trulicity because he already has low A1c.       Interval history  a1c is the same at 6.9%.               PMHX: Sjogren's syndrome, gouty arthritis - sees Rheumatology     LAST DIABETES EDUCATION: multiple times, years ago     HOSPITALIZED FOR DIABETES  -  No     PRESCRIBED DIABETES MEDICATIONS: Xultophy 7 units once daily     Misses medication doses - No    DM COMPLICATIONS: nephropathy    SIGNIFICANT DIABETES MED HISTORY: has been told that metformin is bad for his kidneys and that's why he stopped it   Lantus, Humalog, and Tradjenta stopped at initial visit 7/2019 and he was switched to Xultophy.     SELF MONITORING BLOOD GLUCOSE: Checks blood glucose at home 2x/day fasting and predinner.                 HYPOGLYCEMIC EPISODES: feels different at BGs in the 60s.      CURRENT DIET: drinks water, diet soda; has some juice to take with his meds. Eats 3 meals/day but does not have an appetite to eat. Skips breakfast sometimes. Lunch is steamed  vegetables. Eats home cooked foods, no processed foods.     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 No acute distress.   PSYCH: AAOx3, appropriate mood and affect, conversant, well-groomed. Judgement and insight good.   NEURO: Cranial nerves grossly intact. Speech clear, no tremor.   CHEST: Respirations even and unlabored.         [facility] - 4/23/19  Total chol 188 - HDL 44 - trig 98 -   Creatinine 1.37   eGFR non AA - 52   A1c 5.9%   Hemoglobin 10/hematocrit 31  Vitamin D 42      Hemoglobin A1C   Date Value Ref Range Status   09/19/2019 6.9 (H) 4.0 - 5.6 % Final     Comment:     ADA Screening Guidelines:  5.7-6.4%  Consistent with prediabetes  >or=6.5%  Consistent with diabetes  High levels of fetal hemoglobin interfere with the HbA1C  assay. Heterozygous hemoglobin variants (HbS, HgC, etc)do  not significantly interfere with this assay.   However, presence of multiple variants may affect accuracy.     06/11/2019 6.9 (H) 4.0 - 5.6 % Final     Comment:     ADA Screening Guidelines:  5.7-6.4%  Consistent with prediabetes  >or=6.5%  Consistent with diabetes  High levels of fetal hemoglobin interfere with the HbA1C  assay. Heterozygous hemoglobin variants (HbS, HgC, etc)do  not significantly interfere with this assay.   However, presence of multiple variants may affect accuracy.     10/16/2018 6.2 (H) 4.0 - 5.7 % Final     Comment:     Dr. Jurgen Ward ( Endocrinology )   05/30/2014 7.9 (H) 4.8 - 5.6 % Final     Comment:              Increased risk for  diabetes: 5.7 - 6.4           Diabetes: >6.4           Glycemic control for adults with diabetes: <7.0          Ref. Range 9/19/2019 08:13   Glucose Latest Ref Range: 70 - 110 mg/dL 170 (H)   C-Peptide Latest Ref Range: 0.78 - 5.19 ng/mL 1.33         Chemistry        Component Value Date/Time     (L) 09/19/2019 0813    K 4.4 09/19/2019 0813     09/19/2019 0813    CO2 22 (L) 09/19/2019 0813    BUN 22 09/19/2019 0813    CREATININE 1.6 (H) 09/19/2019 0813     (H) 09/19/2019 0813     (H) 09/19/2019 0813        Component Value Date/Time    CALCIUM 9.3 09/19/2019 0813    ALKPHOS 69 09/19/2019 0813    AST 16 09/19/2019 0813    ALT 11 09/19/2019 0813    BILITOT 0.4 09/19/2019 0813    ESTGFRAFRICA 50.1 (A) 09/19/2019 0813    EGFRNONAA 43.3 (A) 09/19/2019 0813          Lab Results   Component Value Date    LDLCALC 59 10/16/2018       Lab Results   Component Value Date    MICALBCREAT 519 (H) 10/16/2018               ASSESSMENT and PLAN:    A1C GOAL: 7.5 %         1. Diabetes mellitus type 2, uncontrolled, with complications  A1c is lower than expected compared to glucose readings - perhaps because d/t anemia.   Will restart prandial insulin -  start Novolog 3 units before lunch to help avoid glucoses in the 200s.   Continue Xultophy 7 units once daily.   Test blood sugar 2-3x/day.   Return to clinic in 3 months with labs prior. Please call with any issues.     Hemoglobin A1c   2. CKD (chronic kidney disease) stage 3, GFR 30-59 ml/min  Avoid hypoglycemia.   Improve glycemic control.      3. Essential hypertension  Controlled            No orders of the defined types were placed in this encounter.       No follow-ups on file.          Estlander Flap (Upper To Lower Lip) Text: The defect of the lower lip was assessed and measured.  Given the location and size of the defect, an Estlander flap was deemed most appropriate.  Using a sterile surgical marker, an appropriate Estlander flap was measured and drawn on the upper lip. Local anesthesia was then infiltrated. A scalpel was then used to incise the lateral aspect of the flap, through skin, muscle and mucosa, leaving the flap pedicled medially.  The flap was then rotated and positioned to fill the lower lip defect.  The flap was then sutured into place with a three layer technique, closing the orbicularis oris muscle layer with subcutaneous buried sutures, followed by a mucosal layer and an epidermal layer.

## 2022-10-25 ENCOUNTER — LAB VISIT (OUTPATIENT)
Dept: LAB | Facility: HOSPITAL | Age: 72
End: 2022-10-25
Attending: INTERNAL MEDICINE
Payer: MEDICARE

## 2022-10-25 DIAGNOSIS — M1A.9XX1 TOPHACEOUS GOUT: ICD-10-CM

## 2022-10-25 DIAGNOSIS — E11.649 TYPE 2 DIABETES MELLITUS WITH HYPOGLYCEMIA WITHOUT COMA, WITH LONG-TERM CURRENT USE OF INSULIN: ICD-10-CM

## 2022-10-25 DIAGNOSIS — Z79.4 TYPE 2 DIABETES MELLITUS WITH HYPOGLYCEMIA WITHOUT COMA, WITH LONG-TERM CURRENT USE OF INSULIN: ICD-10-CM

## 2022-10-25 LAB
BASOPHILS # BLD AUTO: 0.03 K/UL (ref 0–0.2)
BASOPHILS NFR BLD: 0.4 % (ref 0–1.9)
CRP SERPL-MCNC: 0.6 MG/L (ref 0–8.2)
DIFFERENTIAL METHOD: ABNORMAL
EOSINOPHIL # BLD AUTO: 0.2 K/UL (ref 0–0.5)
EOSINOPHIL NFR BLD: 2.1 % (ref 0–8)
ERYTHROCYTE [DISTWIDTH] IN BLOOD BY AUTOMATED COUNT: 14.7 % (ref 11.5–14.5)
ERYTHROCYTE [SEDIMENTATION RATE] IN BLOOD BY PHOTOMETRIC METHOD: 21 MM/HR (ref 0–23)
ESTIMATED AVG GLUCOSE: 140 MG/DL (ref 68–131)
HBA1C MFR BLD: 6.5 % (ref 4–5.6)
HBV CORE AB SERPL QL IA: NORMAL
HBV SURFACE AG SERPL QL IA: NORMAL
HCT VFR BLD AUTO: 36.9 % (ref 40–54)
HGB BLD-MCNC: 11.4 G/DL (ref 14–18)
IMM GRANULOCYTES # BLD AUTO: 0.03 K/UL (ref 0–0.04)
IMM GRANULOCYTES NFR BLD AUTO: 0.4 % (ref 0–0.5)
LYMPHOCYTES # BLD AUTO: 1.6 K/UL (ref 1–4.8)
LYMPHOCYTES NFR BLD: 20.4 % (ref 18–48)
MCH RBC QN AUTO: 29.3 PG (ref 27–31)
MCHC RBC AUTO-ENTMCNC: 30.9 G/DL (ref 32–36)
MCV RBC AUTO: 95 FL (ref 82–98)
MONOCYTES # BLD AUTO: 1.2 K/UL (ref 0.3–1)
MONOCYTES NFR BLD: 15.9 % (ref 4–15)
NEUTROPHILS # BLD AUTO: 4.7 K/UL (ref 1.8–7.7)
NEUTROPHILS NFR BLD: 60.8 % (ref 38–73)
NRBC BLD-RTO: 0 /100 WBC
PLATELET # BLD AUTO: 187 K/UL (ref 150–450)
PMV BLD AUTO: 12.7 FL (ref 9.2–12.9)
RBC # BLD AUTO: 3.89 M/UL (ref 4.6–6.2)
URATE SERPL-MCNC: 3.3 MG/DL (ref 3.4–7)
WBC # BLD AUTO: 7.76 K/UL (ref 3.9–12.7)

## 2022-10-25 PROCEDURE — 86704 HEP B CORE ANTIBODY TOTAL: CPT | Performed by: INTERNAL MEDICINE

## 2022-10-25 PROCEDURE — 85652 RBC SED RATE AUTOMATED: CPT | Performed by: INTERNAL MEDICINE

## 2022-10-25 PROCEDURE — 36415 COLL VENOUS BLD VENIPUNCTURE: CPT | Mod: PO | Performed by: INTERNAL MEDICINE

## 2022-10-25 PROCEDURE — 87340 HEPATITIS B SURFACE AG IA: CPT | Performed by: INTERNAL MEDICINE

## 2022-10-25 PROCEDURE — 84550 ASSAY OF BLOOD/URIC ACID: CPT | Performed by: INTERNAL MEDICINE

## 2022-10-25 PROCEDURE — 86140 C-REACTIVE PROTEIN: CPT | Performed by: INTERNAL MEDICINE

## 2022-10-25 PROCEDURE — 83036 HEMOGLOBIN GLYCOSYLATED A1C: CPT | Performed by: NURSE PRACTITIONER

## 2022-10-25 PROCEDURE — 85025 COMPLETE CBC W/AUTO DIFF WBC: CPT | Performed by: INTERNAL MEDICINE

## 2022-11-01 ENCOUNTER — OFFICE VISIT (OUTPATIENT)
Dept: ENDOCRINOLOGY | Facility: CLINIC | Age: 72
End: 2022-11-01
Payer: MEDICARE

## 2022-11-01 VITALS
TEMPERATURE: 98 F | BODY MASS INDEX: 23.44 KG/M2 | HEART RATE: 80 BPM | SYSTOLIC BLOOD PRESSURE: 155 MMHG | WEIGHT: 145.19 LBS | DIASTOLIC BLOOD PRESSURE: 75 MMHG

## 2022-11-01 DIAGNOSIS — R73.9 STEROID-INDUCED HYPERGLYCEMIA: ICD-10-CM

## 2022-11-01 DIAGNOSIS — E11.8 CONTROLLED TYPE 2 DIABETES MELLITUS WITH COMPLICATION, WITH LONG-TERM CURRENT USE OF INSULIN: Primary | ICD-10-CM

## 2022-11-01 DIAGNOSIS — T38.0X5A STEROID-INDUCED HYPERGLYCEMIA: ICD-10-CM

## 2022-11-01 DIAGNOSIS — Z79.4 CONTROLLED TYPE 2 DIABETES MELLITUS WITH COMPLICATION, WITH LONG-TERM CURRENT USE OF INSULIN: Primary | ICD-10-CM

## 2022-11-01 DIAGNOSIS — N18.31 STAGE 3A CHRONIC KIDNEY DISEASE: ICD-10-CM

## 2022-11-01 PROCEDURE — 1101F PR PT FALLS ASSESS DOC 0-1 FALLS W/OUT INJ PAST YR: ICD-10-PCS | Mod: CPTII,S$GLB,, | Performed by: NURSE PRACTITIONER

## 2022-11-01 PROCEDURE — 3288F PR FALLS RISK ASSESSMENT DOCUMENTED: ICD-10-PCS | Mod: CPTII,S$GLB,, | Performed by: NURSE PRACTITIONER

## 2022-11-01 PROCEDURE — 3288F FALL RISK ASSESSMENT DOCD: CPT | Mod: CPTII,S$GLB,, | Performed by: NURSE PRACTITIONER

## 2022-11-01 PROCEDURE — 99999 PR PBB SHADOW E&M-EST. PATIENT-LVL III: CPT | Mod: PBBFAC,,, | Performed by: NURSE PRACTITIONER

## 2022-11-01 PROCEDURE — 95251 PR GLUCOSE MONITOR, 72 HOUR, PHYS INTERP: ICD-10-PCS | Mod: S$GLB,,, | Performed by: NURSE PRACTITIONER

## 2022-11-01 PROCEDURE — 1160F PR REVIEW ALL MEDS BY PRESCRIBER/CLIN PHARMACIST DOCUMENTED: ICD-10-PCS | Mod: CPTII,S$GLB,, | Performed by: NURSE PRACTITIONER

## 2022-11-01 PROCEDURE — 1159F MED LIST DOCD IN RCRD: CPT | Mod: CPTII,S$GLB,, | Performed by: NURSE PRACTITIONER

## 2022-11-01 PROCEDURE — 3066F PR DOCUMENTATION OF TREATMENT FOR NEPHROPATHY: ICD-10-PCS | Mod: CPTII,S$GLB,, | Performed by: NURSE PRACTITIONER

## 2022-11-01 PROCEDURE — 3077F PR MOST RECENT SYSTOLIC BLOOD PRESSURE >= 140 MM HG: ICD-10-PCS | Mod: CPTII,S$GLB,, | Performed by: NURSE PRACTITIONER

## 2022-11-01 PROCEDURE — 95251 CONT GLUC MNTR ANALYSIS I&R: CPT | Mod: S$GLB,,, | Performed by: NURSE PRACTITIONER

## 2022-11-01 PROCEDURE — 1125F PR PAIN SEVERITY QUANTIFIED, PAIN PRESENT: ICD-10-PCS | Mod: CPTII,S$GLB,, | Performed by: NURSE PRACTITIONER

## 2022-11-01 PROCEDURE — 4010F ACE/ARB THERAPY RXD/TAKEN: CPT | Mod: CPTII,S$GLB,, | Performed by: NURSE PRACTITIONER

## 2022-11-01 PROCEDURE — 4010F PR ACE/ARB THEARPY RXD/TAKEN: ICD-10-PCS | Mod: CPTII,S$GLB,, | Performed by: NURSE PRACTITIONER

## 2022-11-01 PROCEDURE — 99999 PR PBB SHADOW E&M-EST. PATIENT-LVL III: ICD-10-PCS | Mod: PBBFAC,,, | Performed by: NURSE PRACTITIONER

## 2022-11-01 PROCEDURE — 3078F PR MOST RECENT DIASTOLIC BLOOD PRESSURE < 80 MM HG: ICD-10-PCS | Mod: CPTII,S$GLB,, | Performed by: NURSE PRACTITIONER

## 2022-11-01 PROCEDURE — 99214 PR OFFICE/OUTPT VISIT, EST, LEVL IV, 30-39 MIN: ICD-10-PCS | Mod: S$GLB,,, | Performed by: NURSE PRACTITIONER

## 2022-11-01 PROCEDURE — 3044F PR MOST RECENT HEMOGLOBIN A1C LEVEL <7.0%: ICD-10-PCS | Mod: CPTII,S$GLB,, | Performed by: NURSE PRACTITIONER

## 2022-11-01 PROCEDURE — 1159F PR MEDICATION LIST DOCUMENTED IN MEDICAL RECORD: ICD-10-PCS | Mod: CPTII,S$GLB,, | Performed by: NURSE PRACTITIONER

## 2022-11-01 PROCEDURE — 3044F HG A1C LEVEL LT 7.0%: CPT | Mod: CPTII,S$GLB,, | Performed by: NURSE PRACTITIONER

## 2022-11-01 PROCEDURE — 3062F PR POS MACROALBUMINURIA RESULT DOCUMENTED/REVIEW: ICD-10-PCS | Mod: CPTII,S$GLB,, | Performed by: NURSE PRACTITIONER

## 2022-11-01 PROCEDURE — 3062F POS MACROALBUMINURIA REV: CPT | Mod: CPTII,S$GLB,, | Performed by: NURSE PRACTITIONER

## 2022-11-01 PROCEDURE — 1101F PT FALLS ASSESS-DOCD LE1/YR: CPT | Mod: CPTII,S$GLB,, | Performed by: NURSE PRACTITIONER

## 2022-11-01 PROCEDURE — 1125F AMNT PAIN NOTED PAIN PRSNT: CPT | Mod: CPTII,S$GLB,, | Performed by: NURSE PRACTITIONER

## 2022-11-01 PROCEDURE — 3077F SYST BP >= 140 MM HG: CPT | Mod: CPTII,S$GLB,, | Performed by: NURSE PRACTITIONER

## 2022-11-01 PROCEDURE — 3078F DIAST BP <80 MM HG: CPT | Mod: CPTII,S$GLB,, | Performed by: NURSE PRACTITIONER

## 2022-11-01 PROCEDURE — 99214 OFFICE O/P EST MOD 30 MIN: CPT | Mod: S$GLB,,, | Performed by: NURSE PRACTITIONER

## 2022-11-01 PROCEDURE — 3066F NEPHROPATHY DOC TX: CPT | Mod: CPTII,S$GLB,, | Performed by: NURSE PRACTITIONER

## 2022-11-01 PROCEDURE — 1160F RVW MEDS BY RX/DR IN RCRD: CPT | Mod: CPTII,S$GLB,, | Performed by: NURSE PRACTITIONER

## 2022-11-01 RX ORDER — PEN NEEDLE, DIABETIC 30 GX3/16"
1 NEEDLE, DISPOSABLE MISCELLANEOUS 4 TIMES DAILY
Qty: 400 EACH | Refills: 3 | Status: SHIPPED | OUTPATIENT
Start: 2022-11-01 | End: 2023-08-21

## 2022-11-01 RX ORDER — INSULIN ASPART 100 [IU]/ML
INJECTION, SOLUTION INTRAVENOUS; SUBCUTANEOUS
Qty: 30 ML | Refills: 2 | Status: SHIPPED | OUTPATIENT
Start: 2022-11-01 | End: 2023-02-03

## 2022-11-01 NOTE — PROGRESS NOTES
CC: This 72 y.o.  male  is here for evaluation of  T2DM along with comorbidities indicated in the Visit Diagnosis section of this encounter.    HPI: Darrion Bennett was diagnosed with T2DM at age 35. Oral medications started years later.      Medical hx: rheumatoid arthritis on chronic prednisone, TIA, diastolic HF, carotid artery stenosis   Takes prednisone 5 mg once daily.         Prior visit 7/2022  a1c is about the same from 6.3 to 6.4%.   He has increased Lantus from 5 to 6 units hs.   BP elevated today at 163/80. Amlodipine stopped and he is now taking reduced dose of hydralazine at 25 mg TID, per his nephrologist Dr. Gary.   Plan Reviewed how to rotate insulin injection sites.   Reduce Lantus from 6 to 5 units nightly.   Reduce Novolog to 4 units before meals to avoid post-meal hypoglycemia, particularly after dinner.   Reviewed hypoglycemia treatment.   Return to clinic in 3 months with labs prior.     Interval history  A1c remains low, from 6.4 to now 6.5%.   Pt reports BGs are still doing well.   He has reduced Novolog from 4-5 to 3-4 units ac.       PHX: CKD stage 3, Diastolic heart failure, NYHA class 2;  Sjogren's syndrome, gouty arthritis - sees Rheumatology     LAST DIABETES EDUCATION: multiple times, years ago     HOSPITALIZED FOR DIABETES  -  No      DIABETES MEDICATIONS:   Trulicity 1.5 mg once daily   Lantus 5 units every night   Novolog 3-4 units ac  - mostly 4 units; adds 1 unit if BG > 160     Misses medication doses - forgets Novolog 2-3x month     Rotation of insulin injections - across upper abdomen   Changes pen neeedles - yes     DM COMPLICATIONS: nephropathy and cerebrovascular disease    SIGNIFICANT DIABETES MED HISTORY: has been told that metformin is bad for his kidneys and that's why he stopped it   - Lantus, Humalog, and Tradjenta stopped at initial visit 7/2019 and he was switched to Xultophy.   - Xultophy stopped and switched to Trulicity and Lantus 2/2020  - Discussed switching  from Novolog to glimepiride but he prefers  Injections over orals.       SELF MONITORING BLOOD GLUCOSE: Checks blood glucose at home 4x/day at least with Freestyle Dariel 2 reader.   Interpretation: BGs overall stable but he does have mild hypoglycemia after meals occasionally.   Highest glucoses are in low to mid 200s, if he forgets Novolog.             HYPOGLYCEMIC EPISODES: denies symptoms in the 60s        CURRENT DIET: drinks water, diet soda; has some orange juice to take with his meds. Eats 3 meals/day. Lunch is the biggest meal.  Eats home cooked foods, no processed foods.     CURRENT EXERCISE: none; previously exercised in gym.     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 No acute distress.   PSYCH: AAOx3, appropriate mood and affect, conversant, well-groomed. Judgement and insight good.   NEURO: Cranial nerves grossly intact. Speech clear, no tremor.   CHEST: Respirations even and unlabored.             Hemoglobin A1C   Date Value Ref Range Status   10/25/2022 6.5 (H) 4.0 - 5.6 % Final     Comment:     ADA Screening Guidelines:  5.7-6.4%  Consistent with prediabetes  >or=6.5%  Consistent with diabetes    High levels of fetal hemoglobin interfere with the HbA1C  assay. Heterozygous hemoglobin variants (HbS, HgC, etc)do  not significantly interfere with this assay.   However, presence of multiple variants may affect accuracy.     07/05/2022 6.4 (H) 4.0 - 5.6 % Final     Comment:     ADA Screening Guidelines:  5.7-6.4%  Consistent with prediabetes  >or=6.5%  Consistent with diabetes    High levels of fetal hemoglobin interfere with the HbA1C  assay.  Heterozygous hemoglobin variants (HbS, HgC, etc)do  not significantly interfere with this assay.   However, presence of multiple variants may affect accuracy.     03/28/2022 6.3 (H) 4.0 - 5.6 % Final     Comment:     ADA Screening Guidelines:  5.7-6.4%  Consistent with prediabetes  >or=6.5%  Consistent with diabetes    High levels of fetal hemoglobin interfere with the HbA1C  assay. Heterozygous hemoglobin variants (HbS, HgC, etc)do  not significantly interfere with this assay.   However, presence of multiple variants may affect accuracy.     10/16/2018 6.2 (H) 4.0 - 5.7 % Final     Comment:     Dr. Jurgen Ward ( Endocrinology )        Ref. Range 9/19/2019 08:13   Glucose Latest Ref Range: 70 - 110 mg/dL 170 (H)   C-Peptide Latest Ref Range: 0.78 - 5.19 ng/mL 1.33        Ref. Range 8/2/2021 07:00   Glucose Latest Ref Range: 70 - 110 mg/dL 120 (H)   C-Peptide Latest Ref Range: 0.78 - 5.19 ng/mL 1.11       Chemistry        Component Value Date/Time     09/26/2022 0950    K 4.3 09/26/2022 0950     09/26/2022 0950    CO2 18 (L) 09/26/2022 0950    BUN 24 (H) 09/26/2022 0950    CREATININE 1.4 09/26/2022 0950     (H) 09/26/2022 0950        Component Value Date/Time    CALCIUM 8.3 (L) 09/26/2022 0950    ALKPHOS 58 09/26/2022 0950    AST 21 09/26/2022 0950    ALT 18 09/26/2022 0950    BILITOT 0.5 09/26/2022 0950    ESTGFRAFRICA >60.0 04/22/2022 0845    EGFRNONAA 54.5 (A) 04/22/2022 0845          Lab Results   Component Value Date    LDLCALC 80.0 07/05/2022      Latest Reference Range & Units 07/05/22 07:13   Cholesterol 120 - 199 mg/dL 135   HDL 40 - 75 mg/dL 41   HDL/Cholesterol Ratio 20.0 - 50.0 % 30.4   LDL Cholesterol External 63.0 - 159.0 mg/dL 80.0   Non-HDL Cholesterol mg/dL 94   Total Cholesterol/HDL Ratio 2.0 - 5.0  3.3   Triglycerides 30 - 150 mg/dL 70       Lab Results   Component Value Date    MICALBCREAT 888.4 (H) 04/22/2022               ASSESSMENT and PLAN:    A1C GOAL: 7.5 %       1.  "Controlled type 2 diabetes mellitus with complication, with long-term current use of insulin  Reduce Novolog to 3 units ac, take 4 units before large meals.   Rtc in 3 mo with labs prior.     pen needle, diabetic (BD ULTRA-FINE RICHARD PEN NEEDLE) 32 gauge x 5/32" Ndle    insulin aspart U-100 (NOVOLOG FLEXPEN U-100 INSULIN) 100 unit/mL (3 mL) InPn pen    Hemoglobin A1C      2. Steroid-induced hyperglycemia  Increases insulin resistance.   Prandial needs > basal       3. Stage 3a chronic kidney disease  F/u with Nephrology              Patient is testing blood sugars 4x/day and taking 4 injections of insulin a day. The patient's insulin treatment regimen requires frequent adjustments by the patient on the basis of therapeutic CGM testing results.       Orders Placed This Encounter   Procedures    Hemoglobin A1C     Standing Status:   Future     Standing Expiration Date:   12/31/2023          No follow-ups on file.       "

## 2022-11-01 NOTE — PATIENT INSTRUCTIONS
Reviewed how to rotate insulin injection sites.   Inject Novolog 3 units before most meals, 4 units before large meals.

## 2022-11-03 DIAGNOSIS — J06.9 VIRAL URI WITH COUGH: ICD-10-CM

## 2022-11-03 RX ORDER — IPRATROPIUM BROMIDE 42 UG/1
SPRAY, METERED NASAL
Qty: 30 ML | Refills: 0 | OUTPATIENT
Start: 2022-11-03

## 2022-11-03 NOTE — TELEPHONE ENCOUNTER
----- Message from Ella Limerick sent at 11/3/2022 11:35 AM CDT -----  Regarding: Requesting Refill  .Type: RX Refill Request    Who Called:  self     Have you contacted your pharmacy:    Refill or New Rx: Refill     RX Name and Strength: nasal spray     Preferred Pharmacy with phone number: .    [facility]/pharmacy #[id number] - [place] - [address]  [address]  [place] [address]  Phone: [phone] Fax: [phone]    Local or Mail Order:local     Ordering Provider: Dair     Would the patient rather a call back or a response via [facility]?  Call     Best Call Back Number: .[phone]

## 2022-11-03 NOTE — TELEPHONE ENCOUNTER
Refill Decision Note   Darrion Bennett  is requesting a refill authorization.  Brief Assessment and Rationale for Refill:  Quick Discontinue     Medication Therapy Plan:    Pharmacy is requesting new scripts for the following medications without required information, (sig/ frequency/qty/etc)      Medication Reconciliation Completed: No     Comments: Pharmacies have been requesting medications for patients without required information, (sig, frequency, qty, etc.). In addition, requests are sent for medication(s) pt. are currently not taking, and medications patients have never taken.    We have spoken to the pharmacies about these request types and advised their teams previously that we are unable to assess these New Script requests and require all details for these requests. This is a known issue and has been reported.     Note composed:12:21 PM 11/03/2022

## 2022-11-03 NOTE — TELEPHONE ENCOUNTER
----- Message from Che Seth sent at 11/3/2022  3:53 PM CDT -----  Regarding: [phone]  Type: Patient Call Back    Who called:self    What is the request in detail:pt states a nasal spray prescription was not to his pharmacy. Would like to speak with someone in the office.    Can the clinic reply by [facility]?no    Would the patient rather a call back or a response via [facility]? Call back    Best call back number:[phone] (home)

## 2022-11-03 NOTE — TELEPHONE ENCOUNTER
No new care gaps identified.  [facility] Embedded Care Gaps. Reference number: [id number]. 11/03/2022   11:58:47 AM RAYSAT

## 2022-11-03 NOTE — TELEPHONE ENCOUNTER
No new care gaps identified.  [facility] Embedded Care Gaps. Reference number: [id number]. 11/03/2022   4:03:52 PM CDT

## 2022-11-04 RX ORDER — IPRATROPIUM BROMIDE 42 UG/1
SPRAY, METERED NASAL
Qty: 30 ML | Refills: 0 | Status: SHIPPED | OUTPATIENT
Start: 2022-11-04 | End: 2022-11-26

## 2022-11-16 ENCOUNTER — SPECIALTY PHARMACY (OUTPATIENT)
Dept: PHARMACY | Facility: CLINIC | Age: 72
End: 2022-11-16
Payer: MEDICARE

## 2022-11-16 NOTE — TELEPHONE ENCOUNTER
Specialty Pharmacy - Refill Coordination    Specialty Medication Orders Linked to Encounter      Flowsheet Row Most Recent Value   Medication #1 abatacept (ORENCIA CLICKJECT) 125 mg/mL AtIn (Order#[id number], Rx#[id number])            Refill Questions - Documented Responses      Flowsheet Row Most Recent Value   Patient Availability and HIPAA Verification    Does patient want to proceed with activity? Yes   HIPAA/medical authority confirmed? Yes   Relationship to patient of person spoken to? Self   Refill Screening Questions    Changes to allergies? No   Changes to medications? No   New conditions since last clinic visit? No   Unplanned office visit, urgent care, ED, or hospital admission in the last 4 weeks? No   How does patient/caregiver feel medication is working? Good   Financial problems or insurance changes? No   How many doses of your specialty medications were missed in the last 4 weeks? 0   Would patient like to speak to a pharmacist? No   When does the patient need to receive the medication? 11/23/22   Refill Delivery Questions    How will the patient receive the medication? MEDRx   When does the patient need to receive the medication? 11/23/22   Shipping Address Home   Address in [facility] confirmed and updated if neccessary? Yes   Expected Copay ($) 0   Is the patient able to afford the medication copay? Yes   Payment Method zero copay   Days supply of Refill 28   Supplies needed? No supplies needed   Refill activity completed? Yes   Refill activity plan Refill scheduled   Shipment/Pickup Date: 11/17/22            Current Outpatient Medications   Medication Sig    abatacept (ORENCIA CLICKJECT) 125 mg/mL AtIn Inject 125 mg into the skin once a week.    alcohol swabs (BD ALCOHOL SWABS) PadM USE  4 TIMES PER DAY    amLODIPine (NORVASC) 5 MG tablet Take 1 tablet (5 mg total) by mouth 2 (two) times daily.    ammonium lactate 12 % Crea Apply 1 application topically once daily.    atorvastatin  "(LIPITOR) 80 MG tablet TAKE 1 TABLET EVERY DAY    blood glucose control, low (TRUE METRIX LEVEL 1) Soln Use as indicated    BOOSTRIX TDAP 2.5-8-5 Lf-mcg-Lf/0.5mL Syrg injection     cloNIDine 0.2 mg/24 hr td ptwk (CATAPRES) 0.2 mg/24 hr PLACE 1 PATCH ONTO THE SKIN EVERY 7 DAYS.    diclofenac sodium (VOLTAREN) 1 % Gel Apply 2 g topically 4 (four) times daily as needed. Apply to aching joints    dorzolamide (TRUSOPT) 2 % ophthalmic solution Place 1 drop into both eyes 2 (two) times daily.    dulaglutide (TRULICITY) 1.5 mg/0.5 mL pen injector Inject 1.5 mg into the skin every 7 days.    febuxostat (ULORIC) 40 mg Tab Take 1 tablet (40 mg total) by mouth once daily.    hydrALAZINE (APRESOLINE) 50 MG tablet Take 1 tablet (50 mg total) by mouth every 8 (eight) hours.    INJECTAFER 50 iron mg/mL injection     insulin (LANTUS SOLOSTAR U-100 INSULIN) glargine 100 units/mL SubQ pen Inject 5 units once daily    insulin aspart U-100 (NOVOLOG FLEXPEN U-100 INSULIN) 100 unit/mL (3 mL) InPn pen Inject 3-4 units three times daily before each meal with sliding scale.    ipratropium (ATROVENT) 42 mcg (0.06 %) nasal spray INSTILL 2 SPRAYS BY NASAL ROUTE 3 TIMES DAILY. AS NEEDED FOR NASAL CONGESTION AND DRIP FOR 7 DAYS Strength: 42 mcg (0.06 %)    lancets (TRUEPLUS LANCETS) 33 gauge Misc Test glucose 4x/day. Dx code e11.65    linaCLOtide (LINZESS) 72 mcg Cap capsule Take 1 capsule (72 mcg total) by mouth before breakfast.    multivit with min-folic acid 200 mcg Chew     omeprazole (PRILOSEC) 20 MG capsule TAKE 2 CAPSULES (40 MG TOTAL) BY MOUTH ONCE DAILY.    pen needle, diabetic (BD ULTRA-FINE RICHARD PEN NEEDLE) 32 gauge x 5/32" Ndle 1 Device by Misc.(Non-Drug; Combo Route) route 4 (four) times daily.    predniSONE (DELTASONE) 5 MG tablet TAKE 1 TABLET BY MOUTH EVERY DAY    PROLIA 60 mg/mL Syrg     torsemide (DEMADEX) 10 MG Tab Take 1 tablet (10 mg total) by mouth 2 (two) times a day.    traMADoL (ULTRAM) 50 mg tablet TAKE 1 TABLET BY " MOUTH EVERY 12 HOURS AS NEEDED FOR PAIN.    travoprost (TRAVATAN Z) 0.004 % ophthalmic solution Place 1 drop into both eyes every evening.    triamcinolone acetonide 0.1% (KENALOG) 0.1 % cream Apply topically 2 (two) times daily. for 10 days    TRUE METRIX GLUCOSE METER Misc USE AS DIRECTED    TRUE METRIX GLUCOSE TEST STRIP Strp TEST GLUCOSE 4 TIMES PER DAY.    valsartan (DIOVAN) 160 MG tablet TAKE 1 TABLET (160 MG TOTAL) BY MOUTH TWICE DAILY (RX LINKED TO [id number])   Last reviewed on 11/3/2022  4:02 PM by Juana Whipple LPN    Review of patient's allergies indicates:   Allergen Reactions    Penicillins Nausea And Vomiting    Rosuvastatin      Muscle pain     Allopurinol analogues Rash    Last reviewed on  11/1/2022 8:54 AM by Bianca Mares      Tasks added this encounter   12/14/2022 - Refill Call (Auto Added)   Tasks due within next 3 months   No tasks due.     [facility]  [address]  [facility] [address]  Phone: [phone]  Fax: [phone]

## 2022-11-26 DIAGNOSIS — J06.9 VIRAL URI WITH COUGH: ICD-10-CM

## 2022-11-26 RX ORDER — IPRATROPIUM BROMIDE 42 UG/1
SPRAY, METERED NASAL
Qty: 45 ML | Refills: 3 | Status: SHIPPED | OUTPATIENT
Start: 2022-11-26 | End: 2023-04-25 | Stop reason: ALTCHOICE

## 2022-11-26 NOTE — TELEPHONE ENCOUNTER
No new care gaps identified.  [facility] Embedded Care Gaps. Reference number: [id number]. 11/26/2022   9:32:09 AM CST

## 2022-11-27 NOTE — TELEPHONE ENCOUNTER
Refill Decision Note   Darrion Bennett  is requesting a refill authorization.  Brief Assessment and Rationale for Refill:  Approve     Medication Therapy Plan:       Medication Reconciliation Completed: No   Comments:     No Care Gaps recommended.     Note composed:10:19 PM 11/26/2022

## 2022-11-28 ENCOUNTER — INFUSION (OUTPATIENT)
Dept: INFUSION THERAPY | Facility: HOSPITAL | Age: 72
End: 2022-11-28
Attending: HOSPITALIST
Payer: MEDICARE

## 2022-11-28 VITALS
SYSTOLIC BLOOD PRESSURE: 154 MMHG | RESPIRATION RATE: 18 BRPM | TEMPERATURE: 98 F | DIASTOLIC BLOOD PRESSURE: 67 MMHG | HEART RATE: 82 BPM | OXYGEN SATURATION: 99 %

## 2022-11-28 DIAGNOSIS — N18.31 STAGE 3A CHRONIC KIDNEY DISEASE: ICD-10-CM

## 2022-11-28 DIAGNOSIS — M81.0 OSTEOPOROSIS, UNSPECIFIED OSTEOPOROSIS TYPE, UNSPECIFIED PATHOLOGICAL FRACTURE PRESENCE: Primary | ICD-10-CM

## 2022-11-28 PROCEDURE — 96372 THER/PROPH/DIAG INJ SC/IM: CPT

## 2022-11-28 PROCEDURE — 63600175 PHARM REV CODE 636 W HCPCS: Mod: JG | Performed by: HOSPITALIST

## 2022-11-28 RX ADMIN — DENOSUMAB 60 MG: 60 INJECTION SUBCUTANEOUS at 08:11

## 2022-11-28 NOTE — PLAN OF CARE
Patient arrived to unit for Prolia injection. Confirmed pt taking Calcium and Vitamin D Supplements. Denies jaw pain or any recent dental procedures or dental issues.Pt does report mild hip pain bilaterally over the last month. Encouraged pt to call Dr. García if it continues or worsens. Plan of care reviewed, patient agreeable to plan. Patient tolerated injection well.VSS. Discharge instructions reviewed, patient instructed to return 5/29/23 for next Prolia. Patient ambulated off unit unassisted by self. Patient in NAD at time of discharge.

## 2022-12-05 ENCOUNTER — PATIENT MESSAGE (OUTPATIENT)
Dept: ADMINISTRATIVE | Facility: OTHER | Age: 72
End: 2022-12-05
Payer: MEDICARE

## 2022-12-14 ENCOUNTER — SPECIALTY PHARMACY (OUTPATIENT)
Dept: PHARMACY | Facility: CLINIC | Age: 72
End: 2022-12-14
Payer: MEDICARE

## 2022-12-14 NOTE — TELEPHONE ENCOUNTER
Specialty Pharmacy - Refill Coordination    Specialty Medication Orders Linked to Encounter      Flowsheet Row Most Recent Value   Medication #1 abatacept (ORENCIA CLICKJECT) 125 mg/mL AtIn (Order#[id number], Rx#)            Refill Questions - Documented Responses      Flowsheet Row Most Recent Value   Patient Availability and HIPAA Verification    Does patient want to proceed with activity? Yes   HIPAA/medical authority confirmed? Yes   Relationship to patient of person spoken to? Self   Refill Screening Questions    Changes to allergies? No   Changes to medications? No   New conditions since last clinic visit? No   Unplanned office visit, urgent care, ED, or hospital admission in the last 4 weeks? No   How does patient/caregiver feel medication is working? Very good   Financial problems or insurance changes? No   How many doses of your specialty medications were missed in the last 4 weeks? 0   Would patient like to speak to a pharmacist? No   When does the patient need to receive the medication? 12/21/22   Refill Delivery Questions    How will the patient receive the medication? MEDRx   When does the patient need to receive the medication? 12/21/22   Shipping Address Home   Address in [facility] confirmed and updated if neccessary? Yes   Expected Copay ($) 0   Is the patient able to afford the medication copay? Yes   Payment Method zero copay   Days supply of Refill 28   Supplies needed? No supplies needed   Refill activity completed? Yes   Refill activity plan Refill scheduled   Shipment/Pickup Date: 12/15/22            Current Outpatient Medications   Medication Sig    ipratropium (ATROVENT) 42 mcg (0.06 %) nasal spray INSTILL 2 SPRAYS BY NASAL ROUTE 3 TIMES DAILY. AS NEEDED FOR NASAL CONGESTION AND DRIP FOR 7 DAYS STRENGTH: 42 MCG (0.06 %)    abatacept (ORENCIA CLICKJECT) 125 mg/mL AtIn Inject 125 mg into the skin once a week.    alcohol swabs (BD ALCOHOL SWABS) PadM USE  4 TIMES PER DAY    amLODIPine  "(NORVASC) 5 MG tablet Take 1 tablet (5 mg total) by mouth 2 (two) times daily.    ammonium lactate 12 % Crea Apply 1 application topically once daily.    atorvastatin (LIPITOR) 80 MG tablet TAKE 1 TABLET EVERY DAY    blood glucose control, low (TRUE METRIX LEVEL 1) Soln Use as indicated    BOOSTRIX TDAP 2.5-8-5 Lf-mcg-Lf/0.5mL Syrg injection     cloNIDine 0.2 mg/24 hr td ptwk (CATAPRES) 0.2 mg/24 hr PLACE 1 PATCH ONTO THE SKIN EVERY 7 DAYS.    diclofenac sodium (VOLTAREN) 1 % Gel Apply 2 g topically 4 (four) times daily as needed. Apply to aching joints    dorzolamide (TRUSOPT) 2 % ophthalmic solution Place 1 drop into both eyes 2 (two) times daily.    dulaglutide (TRULICITY) 1.5 mg/0.5 mL pen injector Inject 1.5 mg into the skin every 7 days.    febuxostat (ULORIC) 40 mg Tab Take 1 tablet (40 mg total) by mouth once daily.    hydrALAZINE (APRESOLINE) 50 MG tablet Take 1 tablet (50 mg total) by mouth every 8 (eight) hours.    INJECTAFER 50 iron mg/mL injection     insulin (LANTUS SOLOSTAR U-100 INSULIN) glargine 100 units/mL SubQ pen Inject 5 units once daily    insulin aspart U-100 (NOVOLOG FLEXPEN U-100 INSULIN) 100 unit/mL (3 mL) InPn pen Inject 3-4 units three times daily before each meal with sliding scale.    lancets (TRUEPLUS LANCETS) 33 gauge Misc Test glucose 4x/day. Dx code e11.65    linaCLOtide (LINZESS) 72 mcg Cap capsule Take 1 capsule (72 mcg total) by mouth before breakfast.    multivit with min-folic acid 200 mcg Chew     omeprazole (PRILOSEC) 20 MG capsule TAKE 2 CAPSULES (40 MG TOTAL) BY MOUTH ONCE DAILY.    pen needle, diabetic (BD ULTRA-FINE RICHARD PEN NEEDLE) 32 gauge x 5/32" Ndle 1 Device by Misc.(Non-Drug; Combo Route) route 4 (four) times daily.    predniSONE (DELTASONE) 5 MG tablet TAKE 1 TABLET BY MOUTH EVERY DAY    PROLIA 60 mg/mL Syrg     torsemide (DEMADEX) 10 MG Tab Take 1 tablet (10 mg total) by mouth 2 (two) times a day.    traMADoL (ULTRAM) 50 mg tablet TAKE 1 TABLET BY MOUTH EVERY 12 " HOURS AS NEEDED FOR PAIN.    travoprost (TRAVATAN Z) 0.004 % ophthalmic solution Place 1 drop into both eyes every evening.    triamcinolone acetonide 0.1% (KENALOG) 0.1 % cream Apply topically 2 (two) times daily. for 10 days    TRUE METRIX GLUCOSE METER Misc USE AS DIRECTED    TRUE METRIX GLUCOSE TEST STRIP Strp TEST GLUCOSE 4 TIMES PER DAY.    valsartan (DIOVAN) 160 MG tablet TAKE 1 TABLET (160 MG TOTAL) BY MOUTH TWICE DAILY (RX LINKED TO [id number])   Last reviewed on 11/28/2022  1:41 PM by Mihaela Pierson, RN    Review of patient's allergies indicates:   Allergen Reactions    Penicillins Nausea And Vomiting    Rosuvastatin      Muscle pain     Allopurinol analogues Rash    Last reviewed on  11/28/2022 8:20 AM by Mihaela Pierson      Tasks added this encounter   1/11/2023 - Refill Call (Auto Added)   Tasks due within next 3 months   No tasks due.     Allie Mares, PharmD  [facility]  [address] [place] [address]  Phone: [phone]  Fax: [phone]

## 2023-01-11 ENCOUNTER — SPECIALTY PHARMACY (OUTPATIENT)
Dept: PHARMACY | Facility: CLINIC | Age: 73
End: 2023-01-11
Payer: MEDICARE

## 2023-01-11 NOTE — TELEPHONE ENCOUNTER
Specialty Pharmacy - Refill Coordination    Specialty Medication Orders Linked to Encounter      Flowsheet Row Most Recent Value   Medication #1 abatacept (ORENCIA CLICKJECT) 125 mg/mL AtIn (Order#[id number], Rx#[id number])            Refill Questions - Documented Responses      Flowsheet Row Most Recent Value   Patient Availability and HIPAA Verification    Does patient want to proceed with activity? Yes   HIPAA/medical authority confirmed? Yes   Relationship to patient of person spoken to? Self   Refill Screening Questions    Changes to allergies? No   Changes to medications? No   New conditions since last clinic visit? No   Unplanned office visit, urgent care, ED, or hospital admission in the last 4 weeks? No   Financial problems or insurance changes? No   How many doses of your specialty medications were missed in the last 4 weeks? 0   Would patient like to speak to a pharmacist? No   When does the patient need to receive the medication? 01/18/23   Refill Delivery Questions    How will the patient receive the medication? MEDRx   When does the patient need to receive the medication? 01/18/23   Shipping Address Home   Address in [facility] confirmed and updated if neccessary? Yes   Expected Copay ($) 10.35   Is the patient able to afford the medication copay? Yes   Payment Method zero copay   Days supply of Refill 28   Supplies needed? No supplies needed   Refill activity completed? Yes   Refill activity plan Refill scheduled   Shipment/Pickup Date: 01/12/23            Current Outpatient Medications   Medication Sig    ipratropium (ATROVENT) 42 mcg (0.06 %) nasal spray INSTILL 2 SPRAYS BY NASAL ROUTE 3 TIMES DAILY. AS NEEDED FOR NASAL CONGESTION AND DRIP FOR 7 DAYS STRENGTH: 42 MCG (0.06 %)    abatacept (ORENCIA CLICKJECT) 125 mg/mL AtIn Inject 125 mg into the skin once a week.    alcohol swabs (BD ALCOHOL SWABS) PadM USE  4 TIMES PER DAY    amLODIPine (NORVASC) 5 MG tablet Take 1 tablet (5 mg total) by  "mouth 2 (two) times daily.    ammonium lactate 12 % Crea Apply 1 application topically once daily.    atorvastatin (LIPITOR) 80 MG tablet TAKE 1 TABLET EVERY DAY    blood glucose control, low (TRUE METRIX LEVEL 1) Soln Use as indicated    BOOSTRIX TDAP 2.5-8-5 Lf-mcg-Lf/0.5mL Syrg injection     cloNIDine 0.2 mg/24 hr td ptwk (CATAPRES) 0.2 mg/24 hr PLACE 1 PATCH ONTO THE SKIN EVERY 7 DAYS.    diclofenac sodium (VOLTAREN) 1 % Gel Apply 2 g topically 4 (four) times daily as needed. Apply to aching joints    dorzolamide (TRUSOPT) 2 % ophthalmic solution PLACE 1 DROP INTO BOTH EYES 2 (TWO) TIMES DAILY.    dulaglutide (TRULICITY) 1.5 mg/0.5 mL pen injector Inject 1.5 mg into the skin every 7 days.    febuxostat (ULORIC) 40 mg Tab Take 1 tablet (40 mg total) by mouth once daily.    hydrALAZINE (APRESOLINE) 50 MG tablet Take 1 tablet (50 mg total) by mouth every 8 (eight) hours.    INJECTAFER 50 iron mg/mL injection     insulin (LANTUS SOLOSTAR U-100 INSULIN) glargine 100 units/mL SubQ pen Inject 5 units once daily    insulin aspart U-100 (NOVOLOG FLEXPEN U-100 INSULIN) 100 unit/mL (3 mL) InPn pen Inject 3-4 units three times daily before each meal with sliding scale.    lancets (TRUEPLUS LANCETS) 33 gauge Misc Test glucose 4x/day. Dx code e11.65    linaCLOtide (LINZESS) 72 mcg Cap capsule Take 1 capsule (72 mcg total) by mouth before breakfast.    multivit with min-folic acid 200 mcg Chew     omeprazole (PRILOSEC) 20 MG capsule TAKE 2 CAPSULES (40 MG TOTAL) BY MOUTH ONCE DAILY.    pen needle, diabetic (BD ULTRA-FINE RICHARD PEN NEEDLE) 32 gauge x 5/32" Ndle 1 Device by Misc.(Non-Drug; Combo Route) route 4 (four) times daily.    predniSONE (DELTASONE) 5 MG tablet TAKE 1 TABLET BY MOUTH EVERY DAY    PROLIA 60 mg/mL Syrg     torsemide (DEMADEX) 10 MG Tab Take 1 tablet (10 mg total) by mouth 2 (two) times a day.    traMADoL (ULTRAM) 50 mg tablet TAKE 1 TABLET BY MOUTH EVERY 12 HOURS AS NEEDED FOR PAIN.    travoprost (TRAVATAN Z) " 0.004 % ophthalmic solution Place 1 drop into both eyes every evening.    triamcinolone acetonide 0.1% (KENALOG) 0.1 % cream Apply topically 2 (two) times daily. for 10 days    TRUE METRIX GLUCOSE METER Misc USE AS DIRECTED    TRUE METRIX GLUCOSE TEST STRIP Strp TEST GLUCOSE 4 TIMES PER DAY.    valsartan (DIOVAN) 160 MG tablet TAKE 1 TABLET TWICE DAILY   Last reviewed on 11/28/2022  1:41 PM by Mihaela Pierson RN    Review of patient's allergies indicates:   Allergen Reactions    Penicillins Nausea And Vomiting    Rosuvastatin      Muscle pain     Allopurinol analogues Rash    Last reviewed on  11/28/2022 8:20 AM by Mihaela Pierson      Tasks added this encounter   2/8/2023 - Refill Call (Auto Added)   Tasks due within next 3 months   No tasks due.     Jolanta Mares, PharmD  [facility]  [address] [place] [address]  Phone: [phone]  Fax: [phone]

## 2023-01-27 ENCOUNTER — LAB VISIT (OUTPATIENT)
Dept: LAB | Facility: HOSPITAL | Age: 73
End: 2023-01-27
Attending: NURSE PRACTITIONER
Payer: MEDICARE

## 2023-01-27 DIAGNOSIS — E11.8 CONTROLLED TYPE 2 DIABETES MELLITUS WITH COMPLICATION, WITH LONG-TERM CURRENT USE OF INSULIN: ICD-10-CM

## 2023-01-27 DIAGNOSIS — Z79.4 CONTROLLED TYPE 2 DIABETES MELLITUS WITH COMPLICATION, WITH LONG-TERM CURRENT USE OF INSULIN: ICD-10-CM

## 2023-01-27 LAB
ESTIMATED AVG GLUCOSE: 148 MG/DL (ref 68–131)
HBA1C MFR BLD: 6.8 % (ref 4–5.6)

## 2023-01-27 PROCEDURE — 36415 COLL VENOUS BLD VENIPUNCTURE: CPT | Mod: HCNC,PO | Performed by: NURSE PRACTITIONER

## 2023-01-27 PROCEDURE — 83036 HEMOGLOBIN GLYCOSYLATED A1C: CPT | Mod: HCNC | Performed by: NURSE PRACTITIONER

## 2023-02-02 ENCOUNTER — PES CALL (OUTPATIENT)
Dept: ADMINISTRATIVE | Facility: CLINIC | Age: 73
End: 2023-02-02
Payer: MEDICARE

## 2023-02-02 NOTE — PROGRESS NOTES
CC: This 72 y.o.  male  is here for evaluation of  T2DM along with comorbidities indicated in the Visit Diagnosis section of this encounter.    HPI: Darrion Bennett was diagnosed with T2DM at age 35. Oral medications started years later.      Medical hx: rheumatoid arthritis on chronic prednisone, TIA, diastolic HF, carotid artery stenosis   Takes prednisone 5 mg once daily.       Prior visit 11/2022  A1c remains low, from 6.4 to now 6.5%.   Pt reports BGs are still doing well.   He has reduced Novolog from 4-5 to 3-4 units ac.   Plan Reduce Novolog to 3 units ac, take 4 units before large meals.   Rtc in 3 mo with labs prior.     Interval hx  A1c is up from 6.5 to 6.8%.     He is taking higher doses of insulin: Lantus 6 units and  Novolog 5-6-6 units before meals. His appetite has increased. 5 lb weight gain since lov. Pt is interested in increasing Trulicity.   Dariel CGM averages over the last few months: 126-160       PHX: CKD stage 3, Diastolic heart failure, NYHA class 2;  Sjogren's syndrome, gouty arthritis - sees Rheumatology     LAST DIABETES EDUCATION: multiple times, years ago     HOSPITALIZED FOR DIABETES  -  No      DIABETES MEDICATIONS:   Trulicity 1.5 mg once daily   Lantus 5 units every night   Novolog 3-4 units ac  - mostly 4 units; adds 1 unit if BG > 160     Misses medication doses - denies     Rotation of insulin injections - across abdomen   Changes pen neeedles - yes     DM COMPLICATIONS: nephropathy and cerebrovascular disease    SIGNIFICANT DIABETES MED HISTORY: has been told that metformin is bad for his kidneys and that's why he stopped it   - Lantus, Humalog, and Tradjenta stopped at initial visit 7/2019 and he was switched to Xultophy.   - Xultophy stopped and switched to Trulicity and Lantus 2/2020  - Discussed switching from Novolog to glimepiride but he prefers  Injections over orals.       SELF MONITORING BLOOD GLUCOSE: Checks blood glucose at home 4x/day at least with Freestyle  Dariel 2 reader.   Interpretation: BGs overall stable, lowest BGs in the 70s tend to follow breakfast d/t aggressive prandial insulin dose. Postprandial excursions noted after lunch (especially during vacation) and after bedtime snack (trail mix with dried fruit).       HYPOGLYCEMIC EPISODES: denies symptoms in the 60s        CURRENT DIET: drinks water, diet soda; has some orange juice to take with his meds. Eats 3 meals/day. Lunch is the biggest meal.  Eats home cooked foods, no processed foods.     CURRENT EXERCISE: none; previously exercised in gym.     SOCIAL: his son is neurologist, daughter in law is gastroenterologist, daughter is internal med resident       BP (!) 154/80   Pulse 81   Temp 97.4 °F (36.3 °C)   Wt 68 kg (150 lb)   BMI 24.21 kg/m²       ROS:   CONSTITUTIONAL: Appetite normal, + fatigue  GI: no constipation, no nausea       PHYSICAL EXAM:  GENERAL: Well developed, well nourished. No acute distress.   PSYCH: AAOx3, appropriate mood and affect, conversant, well-groomed. Judgement and insight good.   NEURO: Cranial nerves grossly intact. Speech clear, no tremor.   CHEST: Respirations even and unlabored.   CV: + trace pitting pretibial edema     Foot exam:     Protective Sensation (w/ 10 gram monofilament):  Right: Intact  Left: Intact    Visual Inspection:  Skin Breakdown -  Neither    Pedal Pulses:   Right: Present  Left: Present    Posterior tibialis:   Right:Present  Left: Present      Hemoglobin A1C   Date Value Ref Range Status   01/27/2023 6.8 (H) 4.0 - 5.6 % Final     Comment:     ADA Screening Guidelines:  5.7-6.4%  Consistent with prediabetes  >or=6.5%  Consistent with diabetes    High levels of fetal hemoglobin interfere with the HbA1C  assay. Heterozygous hemoglobin variants (HbS, HgC, etc)do  not significantly interfere with this assay.   However, presence of multiple variants may affect accuracy.     10/25/2022 6.5 (H) 4.0 - 5.6 % Final     Comment:     ADA Screening  Guidelines:  5.7-6.4%  Consistent with prediabetes  >or=6.5%  Consistent with diabetes    High levels of fetal hemoglobin interfere with the HbA1C  assay. Heterozygous hemoglobin variants (HbS, HgC, etc)do  not significantly interfere with this assay.   However, presence of multiple variants may affect accuracy.     07/05/2022 6.4 (H) 4.0 - 5.6 % Final     Comment:     ADA Screening Guidelines:  5.7-6.4%  Consistent with prediabetes  >or=6.5%  Consistent with diabetes    High levels of fetal hemoglobin interfere with the HbA1C  assay. Heterozygous hemoglobin variants (HbS, HgC, etc)do  not significantly interfere with this assay.   However, presence of multiple variants may affect accuracy.     10/16/2018 6.2 (H) 4.0 - 5.7 % Final     Comment:     Dr. Jurgen Ward ( Endocrinology )        Ref. Range 9/19/2019 08:13   Glucose Latest Ref Range: 70 - 110 mg/dL 170 (H)   C-Peptide Latest Ref Range: 0.78 - 5.19 ng/mL 1.33        Ref. Range 8/2/2021 07:00   Glucose Latest Ref Range: 70 - 110 mg/dL 120 (H)   C-Peptide Latest Ref Range: 0.78 - 5.19 ng/mL 1.11       Chemistry        Component Value Date/Time     09/26/2022 0950    K 4.3 09/26/2022 0950     09/26/2022 0950    CO2 18 (L) 09/26/2022 0950    BUN 24 (H) 09/26/2022 0950    CREATININE 1.4 09/26/2022 0950     (H) 09/26/2022 0950        Component Value Date/Time    CALCIUM 8.3 (L) 09/26/2022 0950    ALKPHOS 58 09/26/2022 0950    AST 21 09/26/2022 0950    ALT 18 09/26/2022 0950    BILITOT 0.5 09/26/2022 0950    ESTGFRAFRICA >60.0 04/22/2022 0845    EGFRNONAA 54.5 (A) 04/22/2022 0845          Lab Results   Component Value Date    LDLCALC 80.0 07/05/2022      Latest Reference Range & Units 07/05/22 07:13   Cholesterol 120 - 199 mg/dL 135   HDL 40 - 75 mg/dL 41   HDL/Cholesterol Ratio 20.0 - 50.0 % 30.4   LDL Cholesterol External 63.0 - 159.0 mg/dL 80.0   Non-HDL Cholesterol mg/dL 94   Total Cholesterol/HDL Ratio 2.0 - 5.0  3.3   Triglycerides 30 -  150 mg/dL 70       Lab Results   Component Value Date    MICALBCREAT 888.4 (H) 04/22/2022               ASSESSMENT and PLAN:    A1C GOAL: 7.5 %     1. Controlled type 2 diabetes mellitus with complication, with long-term current use of insulin  Increase Trulicity to 4.5 mg weekly. -- if it's on backorder, we can switch to Ozempic 2 mg weekly. Reviewed potential side effects.   Continue Lantus 6 units once daily.   Reduce Novolog to 3 units before breakfast, continue 6 units before lunch and dinner.   If blood sugars drop less than 90 after lunch/dinner, then decrease down to 5 units.     Return to clinic in 3 months with labs prior.     Hemoglobin A1C      2. History of stroke  Continue Trulicity       3. Stage 3a chronic kidney disease  Avoid hypoglycemia. - continue CGM with low alert   Continue ARB         4. Hyperlipidemia, unspecified hyperlipidemia type  Continue atorvastatin           Patient is testing blood sugars 4x/day and taking 4 injections of insulin a day. The patient's insulin treatment regimen requires frequent adjustments by the patient on the basis of therapeutic CGM testing results.       Orders Placed This Encounter   Procedures    Hemoglobin A1C     Standing Status:   Future     Standing Expiration Date:   4/3/2024          Follow up in about 3 months (around 5/3/2023).

## 2023-02-03 ENCOUNTER — OFFICE VISIT (OUTPATIENT)
Dept: ENDOCRINOLOGY | Facility: CLINIC | Age: 73
End: 2023-02-03
Payer: MEDICARE

## 2023-02-03 VITALS
WEIGHT: 150 LBS | BODY MASS INDEX: 24.21 KG/M2 | DIASTOLIC BLOOD PRESSURE: 80 MMHG | TEMPERATURE: 97 F | HEART RATE: 81 BPM | SYSTOLIC BLOOD PRESSURE: 154 MMHG

## 2023-02-03 DIAGNOSIS — E78.5 HYPERLIPIDEMIA, UNSPECIFIED HYPERLIPIDEMIA TYPE: ICD-10-CM

## 2023-02-03 DIAGNOSIS — N18.31 STAGE 3A CHRONIC KIDNEY DISEASE: ICD-10-CM

## 2023-02-03 DIAGNOSIS — E11.8 CONTROLLED TYPE 2 DIABETES MELLITUS WITH COMPLICATION, WITH LONG-TERM CURRENT USE OF INSULIN: Primary | ICD-10-CM

## 2023-02-03 DIAGNOSIS — E11.649 TYPE 2 DIABETES MELLITUS WITH HYPOGLYCEMIA WITHOUT COMA, WITH LONG-TERM CURRENT USE OF INSULIN: ICD-10-CM

## 2023-02-03 DIAGNOSIS — Z79.4 CONTROLLED TYPE 2 DIABETES MELLITUS WITH COMPLICATION, WITH LONG-TERM CURRENT USE OF INSULIN: Primary | ICD-10-CM

## 2023-02-03 DIAGNOSIS — Z86.73 HISTORY OF STROKE: ICD-10-CM

## 2023-02-03 DIAGNOSIS — Z79.4 TYPE 2 DIABETES MELLITUS WITH HYPOGLYCEMIA WITHOUT COMA, WITH LONG-TERM CURRENT USE OF INSULIN: ICD-10-CM

## 2023-02-03 PROCEDURE — 3008F BODY MASS INDEX DOCD: CPT | Mod: HCNC,CPTII,S$GLB, | Performed by: NURSE PRACTITIONER

## 2023-02-03 PROCEDURE — 3044F PR MOST RECENT HEMOGLOBIN A1C LEVEL <7.0%: ICD-10-PCS | Mod: HCNC,CPTII,S$GLB, | Performed by: NURSE PRACTITIONER

## 2023-02-03 PROCEDURE — 3072F LOW RISK FOR RETINOPATHY: CPT | Mod: HCNC,CPTII,S$GLB, | Performed by: NURSE PRACTITIONER

## 2023-02-03 PROCEDURE — 3077F PR MOST RECENT SYSTOLIC BLOOD PRESSURE >= 140 MM HG: ICD-10-PCS | Mod: HCNC,CPTII,S$GLB, | Performed by: NURSE PRACTITIONER

## 2023-02-03 PROCEDURE — 95251 PR GLUCOSE MONITOR, 72 HOUR, PHYS INTERP: ICD-10-PCS | Mod: HCNC,S$GLB,, | Performed by: NURSE PRACTITIONER

## 2023-02-03 PROCEDURE — 3044F HG A1C LEVEL LT 7.0%: CPT | Mod: HCNC,CPTII,S$GLB, | Performed by: NURSE PRACTITIONER

## 2023-02-03 PROCEDURE — 99214 PR OFFICE/OUTPT VISIT, EST, LEVL IV, 30-39 MIN: ICD-10-PCS | Mod: HCNC,S$GLB,, | Performed by: NURSE PRACTITIONER

## 2023-02-03 PROCEDURE — 3079F DIAST BP 80-89 MM HG: CPT | Mod: HCNC,CPTII,S$GLB, | Performed by: NURSE PRACTITIONER

## 2023-02-03 PROCEDURE — 99999 PR PBB SHADOW E&M-EST. PATIENT-LVL V: CPT | Mod: PBBFAC,HCNC,, | Performed by: NURSE PRACTITIONER

## 2023-02-03 PROCEDURE — 95251 CONT GLUC MNTR ANALYSIS I&R: CPT | Mod: HCNC,S$GLB,, | Performed by: NURSE PRACTITIONER

## 2023-02-03 PROCEDURE — 1159F PR MEDICATION LIST DOCUMENTED IN MEDICAL RECORD: ICD-10-PCS | Mod: HCNC,CPTII,S$GLB, | Performed by: NURSE PRACTITIONER

## 2023-02-03 PROCEDURE — 1160F PR REVIEW ALL MEDS BY PRESCRIBER/CLIN PHARMACIST DOCUMENTED: ICD-10-PCS | Mod: HCNC,CPTII,S$GLB, | Performed by: NURSE PRACTITIONER

## 2023-02-03 PROCEDURE — 3072F PR LOW RISK FOR RETINOPATHY: ICD-10-PCS | Mod: HCNC,CPTII,S$GLB, | Performed by: NURSE PRACTITIONER

## 2023-02-03 PROCEDURE — 99214 OFFICE O/P EST MOD 30 MIN: CPT | Mod: HCNC,S$GLB,, | Performed by: NURSE PRACTITIONER

## 2023-02-03 PROCEDURE — 1159F MED LIST DOCD IN RCRD: CPT | Mod: HCNC,CPTII,S$GLB, | Performed by: NURSE PRACTITIONER

## 2023-02-03 PROCEDURE — 3008F PR BODY MASS INDEX (BMI) DOCUMENTED: ICD-10-PCS | Mod: HCNC,CPTII,S$GLB, | Performed by: NURSE PRACTITIONER

## 2023-02-03 PROCEDURE — 99999 PR PBB SHADOW E&M-EST. PATIENT-LVL V: ICD-10-PCS | Mod: PBBFAC,HCNC,, | Performed by: NURSE PRACTITIONER

## 2023-02-03 PROCEDURE — 3079F PR MOST RECENT DIASTOLIC BLOOD PRESSURE 80-89 MM HG: ICD-10-PCS | Mod: HCNC,CPTII,S$GLB, | Performed by: NURSE PRACTITIONER

## 2023-02-03 PROCEDURE — 4010F ACE/ARB THERAPY RXD/TAKEN: CPT | Mod: HCNC,CPTII,S$GLB, | Performed by: NURSE PRACTITIONER

## 2023-02-03 PROCEDURE — 4010F PR ACE/ARB THEARPY RXD/TAKEN: ICD-10-PCS | Mod: HCNC,CPTII,S$GLB, | Performed by: NURSE PRACTITIONER

## 2023-02-03 PROCEDURE — 1160F RVW MEDS BY RX/DR IN RCRD: CPT | Mod: HCNC,CPTII,S$GLB, | Performed by: NURSE PRACTITIONER

## 2023-02-03 PROCEDURE — 3077F SYST BP >= 140 MM HG: CPT | Mod: HCNC,CPTII,S$GLB, | Performed by: NURSE PRACTITIONER

## 2023-02-03 RX ORDER — DULAGLUTIDE 4.5 MG/.5ML
4.5 INJECTION, SOLUTION SUBCUTANEOUS
Qty: 4 PEN | Refills: 3 | Status: SHIPPED | OUTPATIENT
Start: 2023-02-03 | End: 2023-04-25 | Stop reason: SDUPTHER

## 2023-02-03 RX ORDER — INSULIN ASPART 100 [IU]/ML
INJECTION, SOLUTION INTRAVENOUS; SUBCUTANEOUS
Qty: 30 ML | Refills: 2 | Status: SHIPPED | OUTPATIENT
Start: 2023-02-03 | End: 2023-10-16

## 2023-02-03 RX ORDER — INSULIN GLARGINE 100 [IU]/ML
INJECTION, SOLUTION SUBCUTANEOUS
Qty: 15 ML | Refills: 2 | Status: SHIPPED | OUTPATIENT
Start: 2023-02-03 | End: 2023-08-16

## 2023-02-03 NOTE — PATIENT INSTRUCTIONS
Increase Trulicity to 4.5 mg weekly. -- if it's on backorder, we can switch to Ozempic 2 mg weekly. Reviewed potential side effects.   Continue Lantus 6 units once daily.   Reduce Novolog to 3 units before breakfast, continue 6 units before lunch and dinner.   If blood sugars drop less than 90 after lunch/dinner, then decrease down to 5 units.     Return to clinic in 3 months with labs prior.

## 2023-02-07 DIAGNOSIS — Z00.00 ENCOUNTER FOR MEDICARE ANNUAL WELLNESS EXAM: ICD-10-CM

## 2023-02-08 ENCOUNTER — SPECIALTY PHARMACY (OUTPATIENT)
Dept: PHARMACY | Facility: CLINIC | Age: 73
End: 2023-02-08
Payer: MEDICARE

## 2023-02-08 DIAGNOSIS — M06.9 RHEUMATOID ARTHRITIS, INVOLVING UNSPECIFIED SITE, UNSPECIFIED WHETHER RHEUMATOID FACTOR PRESENT: Primary | ICD-10-CM

## 2023-02-08 NOTE — TELEPHONE ENCOUNTER
Specialty Pharmacy - Refill Coordination  Specialty Pharmacy - Medication/Referral Authorization    Specialty Medication Orders Linked to Encounter      Flowsheet Row Most Recent Value   Medication #1 abatacept (ORENCIA CLICKJECT) 125 mg/mL AtIn (Order#[id number], Rx#)            Refill Questions - Documented Responses      Flowsheet Row Most Recent Value   Patient Availability and HIPAA Verification    Does patient want to proceed with activity? Yes   HIPAA/medical authority confirmed? Yes   Relationship to patient of person spoken to? Spouse/Significant Other   Refill Screening Questions    Changes to allergies? No   Changes to medications? No   New conditions since last clinic visit? No   Unplanned office visit, urgent care, ED, or hospital admission in the last 4 weeks? No   How does patient/caregiver feel medication is working? Good   Financial problems or insurance changes? No   How many doses of your specialty medications were missed in the last 4 weeks? 0   Would patient like to speak to a pharmacist? No   When does the patient need to receive the medication? 02/15/23   Refill Delivery Questions    How will the patient receive the medication? MEDRx   When does the patient need to receive the medication? 02/15/23   Shipping Address Home   Address in [facility] confirmed and updated if neccessary? Yes   Expected Copay ($) 10.35   Is the patient able to afford the medication copay? Yes   Payment Method CC on file   Days supply of Refill 28   Supplies needed? No supplies needed   Refill activity completed? Yes   Refill activity plan Refill scheduled   Shipment/Pickup Date: 02/13/23            Current Outpatient Medications   Medication Sig    abatacept (ORENCIA CLICKJECT) 125 mg/mL AtIn Inject 125 mg into the skin once a week.    alcohol swabs (BD ALCOHOL SWABS) PadM USE  4 TIMES PER DAY    amLODIPine (NORVASC) 5 MG tablet Take 1 tablet (5 mg total) by mouth 2 (two) times daily.    ammonium lactate 12 %  "Crea Apply 1 application topically once daily.    atorvastatin (LIPITOR) 80 MG tablet TAKE 1 TABLET EVERY DAY    blood glucose control, low (TRUE METRIX LEVEL 1) Soln Use as indicated    BOOSTRIX TDAP 2.5-8-5 Lf-mcg-Lf/0.5mL Syrg injection     cloNIDine 0.2 mg/24 hr td ptwk (CATAPRES) 0.2 mg/24 hr PLACE 1 PATCH ONTO THE SKIN EVERY 7 DAYS.    diclofenac sodium (VOLTAREN) 1 % Gel Apply 2 g topically 4 (four) times daily as needed. Apply to aching joints    dorzolamide (TRUSOPT) 2 % ophthalmic solution PLACE 1 DROP INTO BOTH EYES 2 (TWO) TIMES DAILY.    dulaglutide (TRULICITY) 4.5 mg/0.5 mL pen injector Inject 4.5 mg into the skin every 7 days.    febuxostat (ULORIC) 40 mg Tab Take 1 tablet (40 mg total) by mouth once daily.    hydrALAZINE (APRESOLINE) 50 MG tablet Take 1 tablet (50 mg total) by mouth every 8 (eight) hours.    INJECTAFER 50 iron mg/mL injection     insulin (LANTUS SOLOSTAR U-100 INSULIN) glargine 100 units/mL SubQ pen Inject 6 units once daily    insulin aspart U-100 (NOVOLOG FLEXPEN U-100 INSULIN) 100 unit/mL (3 mL) InPn pen Inject 3-6 units three times daily before each meal with sliding scale.    ipratropium (ATROVENT) 42 mcg (0.06 %) nasal spray INSTILL 2 SPRAYS BY NASAL ROUTE 3 TIMES DAILY. AS NEEDED FOR NASAL CONGESTION AND DRIP FOR 7 DAYS STRENGTH: 42 MCG (0.06 %)    lancets (TRUEPLUS LANCETS) 33 gauge Misc Test glucose 4x/day. Dx code e11.65    linaCLOtide (LINZESS) 72 mcg Cap capsule Take 1 capsule (72 mcg total) by mouth before breakfast.    multivit with min-folic acid 200 mcg Chew     omeprazole (PRILOSEC) 20 MG capsule TAKE 2 CAPSULES (40 MG TOTAL) BY MOUTH ONCE DAILY.    pen needle, diabetic (BD ULTRA-FINE RICHARD PEN NEEDLE) 32 gauge x 5/32" Ndle 1 Device by Misc.(Non-Drug; Combo Route) route 4 (four) times daily.    predniSONE (DELTASONE) 5 MG tablet TAKE 1 TABLET BY MOUTH EVERY DAY    PROLIA 60 mg/mL Syrg     torsemide (DEMADEX) 10 MG Tab Take 1 tablet (10 mg total) by mouth 2 (two) times " a day.    traMADoL (ULTRAM) 50 mg tablet TAKE 1 TABLET BY MOUTH EVERY 12 HOURS AS NEEDED FOR PAIN.    travoprost (TRAVATAN Z) 0.004 % ophthalmic solution Place 1 drop into both eyes every evening.    triamcinolone acetonide 0.1% (KENALOG) 0.1 % cream Apply topically 2 (two) times daily. for 10 days    TRUE METRIX GLUCOSE METER Misc USE AS DIRECTED    TRUE METRIX GLUCOSE TEST STRIP Strp TEST GLUCOSE 4 TIMES PER DAY.    valsartan (DIOVAN) 160 MG tablet TAKE 1 TABLET TWICE DAILY   Last reviewed on 2/3/2023  9:03 AM by Bianca Mares NP    Review of patient's allergies indicates:   Allergen Reactions    Penicillins Nausea And Vomiting    Rosuvastatin      Muscle pain     Allopurinol analogues Rash    Last reviewed on  2/3/2023 9:03 AM by Bianca Mares      Tasks added this encounter   3/8/2023 - Refill Call (Auto Added)   Tasks due within next 3 months   No tasks due.     [facility] - [facility]ty Pharm[facility]  [address] [facility] [address]  Phone: [phone]  Fax: [phone]

## 2023-02-08 NOTE — TELEPHONE ENCOUNTER
Orencia PA submitted via [facility]  Key: [id number] - PA Case ID: [id number]    PA Case: [id number]  Status: Approved  Coverage Starts on: 1/1/2023 12:00:00 AM, Coverage Ends on: 12/31/2023 12:00:00 AM.     Pt has Medicare LIS level 1. $10.35 copay.

## 2023-02-08 NOTE — TELEPHONE ENCOUNTER
Incoming call from patient requesting orenica refill -- next dose due 2/15. PA required and patient aware we will follow up once approved. Routing to [facility].

## 2023-02-09 DIAGNOSIS — Z00.00 ENCOUNTER FOR MEDICARE ANNUAL WELLNESS EXAM: ICD-10-CM

## 2023-02-13 ENCOUNTER — OFFICE VISIT (OUTPATIENT)
Dept: RHEUMATOLOGY | Facility: CLINIC | Age: 73
End: 2023-02-13
Payer: MEDICARE

## 2023-02-13 DIAGNOSIS — M1A.9XX1 TOPHACEOUS GOUT: Primary | ICD-10-CM

## 2023-02-13 DIAGNOSIS — M06.9 RHEUMATOID ARTHRITIS INVOLVING MULTIPLE JOINTS: ICD-10-CM

## 2023-02-13 PROCEDURE — 99214 PR OFFICE/OUTPT VISIT, EST, LEVL IV, 30-39 MIN: ICD-10-PCS | Mod: HCNC,S$GLB,, | Performed by: INTERNAL MEDICINE

## 2023-02-13 PROCEDURE — 4010F ACE/ARB THERAPY RXD/TAKEN: CPT | Mod: HCNC,CPTII,S$GLB, | Performed by: INTERNAL MEDICINE

## 2023-02-13 PROCEDURE — 3044F HG A1C LEVEL LT 7.0%: CPT | Mod: HCNC,CPTII,S$GLB, | Performed by: INTERNAL MEDICINE

## 2023-02-13 PROCEDURE — 3044F PR MOST RECENT HEMOGLOBIN A1C LEVEL <7.0%: ICD-10-PCS | Mod: HCNC,CPTII,S$GLB, | Performed by: INTERNAL MEDICINE

## 2023-02-13 PROCEDURE — 4010F PR ACE/ARB THEARPY RXD/TAKEN: ICD-10-PCS | Mod: HCNC,CPTII,S$GLB, | Performed by: INTERNAL MEDICINE

## 2023-02-13 PROCEDURE — 3072F LOW RISK FOR RETINOPATHY: CPT | Mod: HCNC,CPTII,S$GLB, | Performed by: INTERNAL MEDICINE

## 2023-02-13 PROCEDURE — 3072F PR LOW RISK FOR RETINOPATHY: ICD-10-PCS | Mod: HCNC,CPTII,S$GLB, | Performed by: INTERNAL MEDICINE

## 2023-02-13 PROCEDURE — 99214 OFFICE O/P EST MOD 30 MIN: CPT | Mod: HCNC,S$GLB,, | Performed by: INTERNAL MEDICINE

## 2023-02-14 ENCOUNTER — LAB VISIT (OUTPATIENT)
Dept: LAB | Facility: HOSPITAL | Age: 73
End: 2023-02-14
Attending: INTERNAL MEDICINE
Payer: MEDICARE

## 2023-02-14 ENCOUNTER — PATIENT MESSAGE (OUTPATIENT)
Dept: RHEUMATOLOGY | Facility: CLINIC | Age: 73
End: 2023-02-14
Payer: MEDICARE

## 2023-02-14 ENCOUNTER — OFFICE VISIT (OUTPATIENT)
Dept: CARDIOLOGY | Facility: CLINIC | Age: 73
End: 2023-02-14
Payer: MEDICARE

## 2023-02-14 VITALS
WEIGHT: 148.81 LBS | SYSTOLIC BLOOD PRESSURE: 132 MMHG | BODY MASS INDEX: 23.92 KG/M2 | HEIGHT: 66 IN | RESPIRATION RATE: 18 BRPM | HEART RATE: 97 BPM | OXYGEN SATURATION: 98 % | DIASTOLIC BLOOD PRESSURE: 66 MMHG

## 2023-02-14 DIAGNOSIS — M35.00 SJOGREN'S SYNDROME, WITH UNSPECIFIED ORGAN INVOLVEMENT: ICD-10-CM

## 2023-02-14 DIAGNOSIS — K59.09 CHRONIC CONSTIPATION: ICD-10-CM

## 2023-02-14 DIAGNOSIS — H52.7 REFRACTIVE ERROR: ICD-10-CM

## 2023-02-14 DIAGNOSIS — I50.30 DIASTOLIC HEART FAILURE, NYHA CLASS 2: ICD-10-CM

## 2023-02-14 DIAGNOSIS — D64.9 ANEMIA, UNSPECIFIED TYPE: ICD-10-CM

## 2023-02-14 DIAGNOSIS — M25.50 ARTHRALGIA, UNSPECIFIED JOINT: ICD-10-CM

## 2023-02-14 DIAGNOSIS — M25.672 DECREASED RANGE OF MOTION OF BOTH ANKLES: ICD-10-CM

## 2023-02-14 DIAGNOSIS — N18.31 STAGE 3A CHRONIC KIDNEY DISEASE: ICD-10-CM

## 2023-02-14 DIAGNOSIS — I65.23 BILATERAL CAROTID ARTERY STENOSIS: ICD-10-CM

## 2023-02-14 DIAGNOSIS — E78.00 PURE HYPERCHOLESTEROLEMIA: ICD-10-CM

## 2023-02-14 DIAGNOSIS — Z96.1 PSEUDOPHAKIA OF BOTH EYES: ICD-10-CM

## 2023-02-14 DIAGNOSIS — M25.671 DECREASED RANGE OF MOTION OF BOTH ANKLES: ICD-10-CM

## 2023-02-14 DIAGNOSIS — Z79.4 CONTROLLED TYPE 2 DIABETES MELLITUS WITH DIABETIC POLYNEUROPATHY, WITH LONG-TERM CURRENT USE OF INSULIN: ICD-10-CM

## 2023-02-14 DIAGNOSIS — I10 ESSENTIAL HYPERTENSION: Primary | ICD-10-CM

## 2023-02-14 DIAGNOSIS — M1A.9XX1 TOPHACEOUS GOUT: ICD-10-CM

## 2023-02-14 DIAGNOSIS — S22.000A COMPRESSION FRACTURE OF BODY OF THORACIC VERTEBRA: ICD-10-CM

## 2023-02-14 DIAGNOSIS — K90.0 CELIAC DISEASE: ICD-10-CM

## 2023-02-14 DIAGNOSIS — E11.42 CONTROLLED TYPE 2 DIABETES MELLITUS WITH DIABETIC POLYNEUROPATHY, WITH LONG-TERM CURRENT USE OF INSULIN: ICD-10-CM

## 2023-02-14 DIAGNOSIS — I49.3 PVC (PREMATURE VENTRICULAR CONTRACTION): ICD-10-CM

## 2023-02-14 PROCEDURE — 99214 PR OFFICE/OUTPT VISIT, EST, LEVL IV, 30-39 MIN: ICD-10-PCS | Mod: HCNC,S$GLB,, | Performed by: INTERNAL MEDICINE

## 2023-02-14 PROCEDURE — 4010F ACE/ARB THERAPY RXD/TAKEN: CPT | Mod: HCNC,CPTII,S$GLB, | Performed by: INTERNAL MEDICINE

## 2023-02-14 PROCEDURE — 3072F PR LOW RISK FOR RETINOPATHY: ICD-10-PCS | Mod: HCNC,CPTII,S$GLB, | Performed by: INTERNAL MEDICINE

## 2023-02-14 PROCEDURE — 93000 ELECTROCARDIOGRAM COMPLETE: CPT | Mod: HCNC,S$GLB,, | Performed by: INTERNAL MEDICINE

## 2023-02-14 PROCEDURE — 4010F PR ACE/ARB THEARPY RXD/TAKEN: ICD-10-PCS | Mod: HCNC,CPTII,S$GLB, | Performed by: INTERNAL MEDICINE

## 2023-02-14 PROCEDURE — 3008F BODY MASS INDEX DOCD: CPT | Mod: HCNC,CPTII,S$GLB, | Performed by: INTERNAL MEDICINE

## 2023-02-14 PROCEDURE — 86480 TB TEST CELL IMMUN MEASURE: CPT | Mod: HCNC | Performed by: INTERNAL MEDICINE

## 2023-02-14 PROCEDURE — 1159F MED LIST DOCD IN RCRD: CPT | Mod: HCNC,CPTII,S$GLB, | Performed by: INTERNAL MEDICINE

## 2023-02-14 PROCEDURE — 1125F PR PAIN SEVERITY QUANTIFIED, PAIN PRESENT: ICD-10-PCS | Mod: HCNC,CPTII,S$GLB, | Performed by: INTERNAL MEDICINE

## 2023-02-14 PROCEDURE — 3044F HG A1C LEVEL LT 7.0%: CPT | Mod: HCNC,CPTII,S$GLB, | Performed by: INTERNAL MEDICINE

## 2023-02-14 PROCEDURE — 3008F PR BODY MASS INDEX (BMI) DOCUMENTED: ICD-10-PCS | Mod: HCNC,CPTII,S$GLB, | Performed by: INTERNAL MEDICINE

## 2023-02-14 PROCEDURE — 99999 PR PBB SHADOW E&M-EST. PATIENT-LVL V: ICD-10-PCS | Mod: PBBFAC,HCNC,, | Performed by: INTERNAL MEDICINE

## 2023-02-14 PROCEDURE — 1101F PR PT FALLS ASSESS DOC 0-1 FALLS W/OUT INJ PAST YR: ICD-10-PCS | Mod: HCNC,CPTII,S$GLB, | Performed by: INTERNAL MEDICINE

## 2023-02-14 PROCEDURE — 3078F PR MOST RECENT DIASTOLIC BLOOD PRESSURE < 80 MM HG: ICD-10-PCS | Mod: HCNC,CPTII,S$GLB, | Performed by: INTERNAL MEDICINE

## 2023-02-14 PROCEDURE — 1160F RVW MEDS BY RX/DR IN RCRD: CPT | Mod: HCNC,CPTII,S$GLB, | Performed by: INTERNAL MEDICINE

## 2023-02-14 PROCEDURE — 80053 COMPREHEN METABOLIC PANEL: CPT | Mod: HCNC | Performed by: INTERNAL MEDICINE

## 2023-02-14 PROCEDURE — 3078F DIAST BP <80 MM HG: CPT | Mod: HCNC,CPTII,S$GLB, | Performed by: INTERNAL MEDICINE

## 2023-02-14 PROCEDURE — 3288F PR FALLS RISK ASSESSMENT DOCUMENTED: ICD-10-PCS | Mod: HCNC,CPTII,S$GLB, | Performed by: INTERNAL MEDICINE

## 2023-02-14 PROCEDURE — 99214 OFFICE O/P EST MOD 30 MIN: CPT | Mod: HCNC,S$GLB,, | Performed by: INTERNAL MEDICINE

## 2023-02-14 PROCEDURE — 36415 COLL VENOUS BLD VENIPUNCTURE: CPT | Mod: HCNC,PO | Performed by: INTERNAL MEDICINE

## 2023-02-14 PROCEDURE — 1125F AMNT PAIN NOTED PAIN PRSNT: CPT | Mod: HCNC,CPTII,S$GLB, | Performed by: INTERNAL MEDICINE

## 2023-02-14 PROCEDURE — 3075F SYST BP GE 130 - 139MM HG: CPT | Mod: HCNC,CPTII,S$GLB, | Performed by: INTERNAL MEDICINE

## 2023-02-14 PROCEDURE — 1160F PR REVIEW ALL MEDS BY PRESCRIBER/CLIN PHARMACIST DOCUMENTED: ICD-10-PCS | Mod: HCNC,CPTII,S$GLB, | Performed by: INTERNAL MEDICINE

## 2023-02-14 PROCEDURE — 93000 EKG 12-LEAD: ICD-10-PCS | Mod: HCNC,S$GLB,, | Performed by: INTERNAL MEDICINE

## 2023-02-14 PROCEDURE — 1101F PT FALLS ASSESS-DOCD LE1/YR: CPT | Mod: HCNC,CPTII,S$GLB, | Performed by: INTERNAL MEDICINE

## 2023-02-14 PROCEDURE — 3075F PR MOST RECENT SYSTOLIC BLOOD PRESS GE 130-139MM HG: ICD-10-PCS | Mod: HCNC,CPTII,S$GLB, | Performed by: INTERNAL MEDICINE

## 2023-02-14 PROCEDURE — 3044F PR MOST RECENT HEMOGLOBIN A1C LEVEL <7.0%: ICD-10-PCS | Mod: HCNC,CPTII,S$GLB, | Performed by: INTERNAL MEDICINE

## 2023-02-14 PROCEDURE — 99999 PR PBB SHADOW E&M-EST. PATIENT-LVL V: CPT | Mod: PBBFAC,HCNC,, | Performed by: INTERNAL MEDICINE

## 2023-02-14 PROCEDURE — 1159F PR MEDICATION LIST DOCUMENTED IN MEDICAL RECORD: ICD-10-PCS | Mod: HCNC,CPTII,S$GLB, | Performed by: INTERNAL MEDICINE

## 2023-02-14 PROCEDURE — 99499 UNLISTED E&M SERVICE: CPT | Mod: S$GLB,,, | Performed by: INTERNAL MEDICINE

## 2023-02-14 PROCEDURE — 3072F LOW RISK FOR RETINOPATHY: CPT | Mod: HCNC,CPTII,S$GLB, | Performed by: INTERNAL MEDICINE

## 2023-02-14 PROCEDURE — 3288F FALL RISK ASSESSMENT DOCD: CPT | Mod: HCNC,CPTII,S$GLB, | Performed by: INTERNAL MEDICINE

## 2023-02-14 NOTE — PROGRESS NOTES
CARDIOVASCULAR CONSULTATION    REASON FOR CONSULT:   Darrion Bennett is a 73 y.o. male who presents for establishing care with Cardiology.      HISTORY OF PRESENT ILLNESS:     Notes from September 2019:  Patient here for follow-up today.  Denies any chest pains at rest on exertion, orthopnea, PND.  Denies any other cardiac symptoms.    Notes from June 2019:  Patient is here for follow-up today.  Denies any chest pains at rest on exertion, orthopnea, PND.  Denies any.  Of dizziness or passing out.  Blood pressure is much better controlled in clinic today.    Initial clinic visit:  Patient is a very pleasant 69-year-old man with a past medical history significant for hypertension, diabetes, Sjogren's disease who wants to establish care with [facility].  Patient has been followed with [facility].  Patient states that in 2014 he was diagnosed with heart failure.  He states that he had passed out and had gone to the hospital, he was feeling short of breath also and at that point he was told that he had heart failure.  I can see an echocardiogram from his outside medical records in 2014 which showed normal left ventricular systolic function.  He also had a stress echocardiogram done in 2013 which did not reveal any ischemia.  He denies any chest pains at rest on exertion, orthopnea, PND, swelling of feet.  States that since then he has been okay and walks about 2-3 blocks every day.  On walking about 2-3 blocks he does experience some tightness in his left calf.  This goes away after rest.  He denies any resting calf tightness.  He denies any ulcer on his feet.  He states that he checks his blood pressure at home and has found that it is elevated around 160-170 mm of systolic multiple times.      Notes from March 2021:  Patient here for follow-up.  Denies any chest pains at rest on exertion, orthopnea, PND, swelling of feet.  Mostly blood pressure has been uncontrolled at home.  Staying around 160 mmHg.  Very  compliant with medications.  EKG done in the clinic today was personally reviewed and demonstrates sinus bradycardia with left anterior fascicular block, poor R-wave progression.      Notes from April 21:  Patient here for follow-up.  Now states that he has been experiencing dyspnea on exertion which is worse than prior.  Very concerned about that.  No chest pains or tightness, but it is gets short of breath on walking short distances and this is lifestyle limiting.  Denies orthopnea, PND, swelling of feet.  Also states that the blood pressure is staying around 140-160 mmHg at home.      Notes from May 2021:  Patient here for follow-up.  Stress test did not show any significant ischemia.  Echo showed normal left ventricle systolic function.  Symptoms have improved.  States stop taking his Crestor because it was causing myalgias      The left ventricle is normal in size with concentric remodeling and normal systolic function.  The estimated ejection fraction is 65%.  Grade II left ventricular diastolic dysfunction.  Severe left atrial enlargement.  Normal right ventricular size with normal right ventricular systolic function.  Mild right atrial enlargement.  Mild mitral regurgitation.  Normal central venous pressure (3 mmHg).  The estimated PA systolic pressure is 36 mmHg.  The sinuses of Valsalva is mildly dilated.  Normal myocardial perfusion scan. There is no evidence of myocardial ischemia or infarction.    The gated perfusion images showed an ejection fraction of 75% post stress.    There is normal wall motion post stress.    The EKG portion of this study is negative for ischemia.    The patient reported no chest pain during the stress test.    During stress, rare PVCs are noted.    Hypertensive response to exercise.  CO interval did increase during exercise.      Notes from July 2021:  Patient here for follow-up.  Has been experiencing dyspnea on exertion.  States it is getting worse.  Has an appointment with  pulmonology coming up.  Is requesting evaluation of CBC and BNP.  As well as a chest x-ray.      The left ventricle is normal in size with concentric hypertrophy and normal systolic function.  The estimated ejection fraction is 65%.  Grade I left ventricular diastolic dysfunction.  Normal right ventricular size with normal right ventricular systolic function.  Moderate left atrial enlargement.  Mild right atrial enlargement.  There is mild aortic valve stenosis.  Aortic valve area is 1.67 cm2; peak velocity is 1.95 m/s; mean gradient is 9 mmHg.  Normal central venous pressure (3 mmHg).  The estimated PA systolic pressure is 16 mmHg.      Notes from dec 21: doing fine. No cp, orthopnea, pnd.       February 2022:  Patient follow-up.  Doing fine.  Occasionally gets swelling in his feet at the end of the day..  States he takes torsemide every Saturday and Sunday.  States he was told by his nephrologist to do so.    Notes from August 2022: Patient states that his nephrologist got off his amlodipine and since then his blood pressure has been uncontrolled.  He is taking hydralazine at 50 mg t.i.d..  Blood pressure is running around 170 mmHg.  Also had an episode of chest pressure last week.  Did not seek medical attention for that.  Denies orthopnea, PND.      Notes from September 2022:  Patient here for follow-up.  Has been chest pain-free.  Stress test did not show any significant ischemia.  Echo showed normal left ventricle systolic function.      Normal myocardial perfusion scan. There is no evidence of myocardial ischemia or infarction.    The gated perfusion images showed an ejection fraction of 72% post stress.    There is normal wall motion post stress.    The EKG portion of this study is negative for ischemia.    The patient reported no chest pain during the stress test.    During stress, frequent PVCs are noted.      The left ventricle is normal in size with mild concentric hypertrophy and normal systolic  function.  The estimated ejection fraction is 60%.  Grade I left ventricular diastolic dysfunction.  Normal right ventricular size with normal right ventricular systolic function.  Moderate left atrial enlargement.  Mild right atrial enlargement.  Mild mitral regurgitation.  Mild pulmonic regurgitation.  Normal central venous pressure (3 mmHg).  The estimated PA systolic pressure is 36 mmHg.  There is mild pulmonary hypertension.      Feb 23:  Patient here for follow-up.  Denies any chest pains at rest on exertion, orthopnea, PND.  Occasional swelling of feet as well as hence.  Is on chronic prednisone.        PAST MEDICAL HISTORY:     Past Medical History:   Diagnosis Date    Anemia     Arthritis     Cataract     CHF (congestive heart failure)     Chronic constipation     Diabetes mellitus, type 2     Felon of finger of left hand 05/11/2019    Glaucoma     Hyperlipidemia 05/13/2014    Hypertension     MCTD (mixed connective tissue disease)     Personal history of colonic polyps     Sjogren's disease     Ulcerative colitis     Urolithiasis        PAST SURGICAL HISTORY:     Past Surgical History:   Procedure Laterality Date    CATARACT EXTRACTION Bilateral 2005    COLONOSCOPY  2014    EYE SURGERY         ALLERGIES AND MEDICATION:     Review of patient's allergies indicates:   Allergen Reactions    Penicillins Nausea And Vomiting    Rosuvastatin      Muscle pain     Allopurinol analogues Rash        Medication List            Accurate as of February 14, 2023  9:14 AM. If you have any questions, ask your nurse or doctor.                CONTINUE taking these medications      alcohol swabs Padm  Commonly known as: BD ALCOHOL SWABS  USE  4 TIMES PER DAY     amLODIPine 5 MG tablet  Commonly known as: NORVASC  Take 1 tablet (5 mg total) by mouth 2 (two) times daily.     ammonium lactate 12 % Crea  Apply 1 application topically once daily.     atorvastatin 80 MG tablet  Commonly known as: LIPITOR  TAKE 1 TABLET EVERY DAY    "  BOOSTRIX TDAP 2.5-8-5 Lf-mcg-Lf/0.5mL Syrg injection  Generic drug: diphth,pertus(acell),tetanus     cloNIDine 0.2 mg/24 hr td ptwk 0.2 mg/24 hr  Commonly known as: CATAPRES  PLACE 1 PATCH ONTO THE SKIN EVERY 7 DAYS.     diclofenac sodium 1 % Gel  Commonly known as: VOLTAREN  Apply 2 g topically 4 (four) times daily as needed. Apply to aching joints     dorzolamide 2 % ophthalmic solution  Commonly known as: TRUSOPT  PLACE 1 DROP INTO BOTH EYES 2 (TWO) TIMES DAILY.     febuxostat 40 mg Tab  Commonly known as: ULORIC  Take 1 tablet (40 mg total) by mouth once daily.     hydrALAZINE 50 MG tablet  Commonly known as: APRESOLINE  Take 1 tablet (50 mg total) by mouth every 8 (eight) hours.     INJECTAFER 50 mg iron/mL injection  Generic drug: ferric carboxymaltose     insulin aspart U-100 100 unit/mL (3 mL) Inpn pen  Commonly known as: NovoLOG FlexPen U-100 Insulin  Inject 3-6 units three times daily before each meal with sliding scale.     insulin glargine 100 units/mL SubQ pen  Commonly known as: LANTUS SOLOSTAR U-100 INSULIN  Inject 6 units once daily     ipratropium 42 mcg (0.06 %) nasal spray  Commonly known as: ATROVENT  INSTILL 2 SPRAYS BY NASAL ROUTE 3 TIMES DAILY. AS NEEDED FOR NASAL CONGESTION AND DRIP FOR 7 DAYS STRENGTH: 42 MCG (0.06 %)     lancets 33 gauge Misc  Commonly known as: TRUEPLUS LANCETS  Test glucose 4x/day. Dx code e11.65     linaCLOtide 72 mcg Cap capsule  Commonly known as: LINZESS  Take 1 capsule (72 mcg total) by mouth before breakfast.     multivit with min-folic acid 200 mcg Chew     omeprazole 20 MG capsule  Commonly known as: PRILOSEC  TAKE 2 CAPSULES (40 MG TOTAL) BY MOUTH ONCE DAILY.     ORENCIA CLICKJECT 125 mg/mL Atin  Generic drug: abatacept  Inject 125 mg into the skin once a week.     pen needle, diabetic 32 gauge x 5/32" Ndle  Commonly known as: BD ULTRA-FINE RICHARD PEN NEEDLE  1 Device by Misc.(Non-Drug; Combo Route) route 4 (four) times daily.     predniSONE 5 MG tablet  Commonly " known as: DELTASONE  TAKE 1 TABLET BY MOUTH EVERY DAY     PROLIA 60 mg/mL Syrg  Generic drug: denosumab     torsemide 10 MG Tab  Commonly known as: DEMADEX  Take 1 tablet (10 mg total) by mouth 2 (two) times a day.     traMADoL 50 mg tablet  Commonly known as: ULTRAM  TAKE 1 TABLET BY MOUTH EVERY 12 HOURS AS NEEDED FOR PAIN.     travoprost 0.004 % ophthalmic solution  Commonly known as: TRAVATAN Z  Place 1 drop into both eyes every evening.     triamcinolone acetonide 0.1% 0.1 % cream  Commonly known as: KENALOG  Apply topically 2 (two) times daily. for 10 days     TRUE METRIX GLUCOSE METER Misc  Generic drug: blood-glucose meter  USE AS DIRECTED     TRUE METRIX GLUCOSE TEST STRIP Strp  Generic drug: blood sugar diagnostic  TEST GLUCOSE 4 TIMES PER DAY.     TRUE METRIX LEVEL 1 Soln  Generic drug: blood glucose control, low  Use as indicated     TRULICITY 4.5 mg/0.5 mL pen injector  Generic drug: dulaglutide  Inject 4.5 mg into the skin every 7 days.     valsartan 160 MG tablet  Commonly known as: DIOVAN  TAKE 1 TABLET TWICE DAILY              SOCIAL HISTORY:     Social History     Socioeconomic History    Marital status:     Number of children: 3   Tobacco Use    Smoking status: Never    Smokeless tobacco: Never   Substance and Sexual Activity    Alcohol use: No    Drug use: Yes     Comment: ultram 1 - 2 tabs a week     Social Determinants of Health     Financial Resource Strain: Unknown    Difficulty of Paying Living Expenses: Patient refused   Food Insecurity: Unknown    Worried About Running Out of Food in the Last Year: Patient refused    Ran Out of Food in the Last Year: Patient refused   Transportation Needs: No Transportation Needs    Lack of Transportation (Medical): No    Lack of Transportation (Non-Medical): No   Physical Activity: Inactive    Days of Exercise per Week: 0 days    Minutes of Exercise per Session: 0 min   Stress: No Stress Concern Present    Feeling of Stress : Not at all   Social  Connections: Unknown    Frequency of Communication with Friends and Family: Once a week    Frequency of Social Gatherings with Friends and Family: Once a week    Active Member of Clubs or Organizations: Yes    Attends Club or Organization Meetings: More than 4 times per year    Marital Status:    Housing Stability: Low Risk     Unable to Pay for Housing in the Last Year: No    Number of Places Lived in the Last Year: 1    Unstable Housing in the Last Year: No       FAMILY HISTORY:     Family History   Problem Relation Age of Onset    Hypertension Father     Stroke Father     Cataracts Father     Glaucoma Father     Cataracts Mother     No Known Problems Sister     No Known Problems Brother     Rheum arthritis Maternal Aunt     Rheum arthritis Maternal Uncle     No Known Problems Paternal Aunt     No Known Problems Paternal Uncle     No Known Problems Maternal Grandmother     No Known Problems Maternal Grandfather     Throat cancer Paternal Grandmother     Glaucoma Paternal Grandfather     No Known Problems Daughter     No Known Problems Son     Celiac disease Neg Hx     Colon cancer Neg Hx     Cirrhosis Neg Hx     Colon polyps Neg Hx     Crohn's disease Neg Hx     Cystic fibrosis Neg Hx     Hemochromatosis Neg Hx     Esophageal cancer Neg Hx     Inflammatory bowel disease Neg Hx     Irritable bowel syndrome Neg Hx     Liver cancer Neg Hx     Liver disease Neg Hx     Rectal cancer Neg Hx     Stomach cancer Neg Hx     Ulcerative colitis Neg Hx     Chase's disease Neg Hx     Amblyopia Neg Hx     Blindness Neg Hx     Cancer Neg Hx     Diabetes Neg Hx     Macular degeneration Neg Hx     Retinal detachment Neg Hx     Strabismus Neg Hx     Thyroid disease Neg Hx     Melanoma Neg Hx        REVIEW OF SYSTEMS:   Review of Systems   Constitutional: Negative.    HENT: Negative.    Eyes: Negative.    Respiratory: Negative.    Gastrointestinal: Negative.    Genitourinary: Negative.    Musculoskeletal: Negative.    Skin:  "Negative.    Neurological: Negative.    Endo/Heme/Allergies: Negative.    Psychiatric/Behavioral: Negative.    All other systems reviewed and are negative.      A 10 point review of systems was performed and all the pertinent positives have been mentioned. Rest of review of systems was negative.        PHYSICAL EXAM:     Vitals:    02/14/23 0855   BP: 132/66   Pulse: 97   Resp: 18    Body mass index is 24.02 kg/m².  Weight: 67.5 kg (148 lb 13 oz)   Height: 5' 6" (167.6 cm)     Physical Exam  Vitals and nursing note reviewed.   Constitutional:       Appearance: Normal appearance. He is well-developed.   HENT:      Head: Normocephalic and atraumatic.      Right Ear: Hearing normal.      Left Ear: Hearing normal.      Nose: Nose normal.   Eyes:      General: Lids are normal.      Conjunctiva/sclera: Conjunctivae normal.      Pupils: Pupils are equal, round, and reactive to light.   Cardiovascular:      Rate and Rhythm: Normal rate and regular rhythm.      Pulses: Normal pulses.      Heart sounds: Murmur heard.    Systolic murmur is present with a grade of 2/6.  Pulmonary:      Effort: Pulmonary effort is normal.      Breath sounds: Normal breath sounds.   Abdominal:      Palpations: Abdomen is soft.      Tenderness: There is no abdominal tenderness.   Musculoskeletal:         General: No deformity.      Cervical back: Normal range of motion and neck supple.   Skin:     General: Skin is warm and dry.   Neurological:      Mental Status: He is alert and oriented to person, place, and time.   Psychiatric:         Speech: Speech normal.           DATA:     Laboratory:  CBC:  Recent Labs   Lab 03/28/22  0712 09/26/22  0950 10/25/22  0816   WBC 7.10 7.10 7.76   Hemoglobin 12.3 L 12.2 L 11.4 L   Hematocrit 38.7 L 38.3 L 36.9 L   Platelets 268 157 187       CHEMISTRIES:  Recent Labs   Lab 05/19/21  0856 06/27/21  0409 06/28/21  0438 02/01/22  0844 03/28/22  0712 04/22/22  0845 09/26/22  0950   Glucose 103 136 H   < > 88  88 " 134 H 133 H 118 H   Sodium 138 137   < > 141  141 137 136 137   Potassium 4.1 4.2   < > 4.5  4.5 4.4 4.7 4.3   BUN 31 H 32 H   < > 30 H  30 H 30 H 26 H 24 H   Creatinine 1.6 H 1.4   < > 1.4  1.4 1.2 1.3 1.4   eGFR if  49 A 58 A   < > 58 A  58 A >60.0 >60.0  --    eGFR if non  43 A 50 A   < > 50 A  50 A >60.0 54.5 A  --    Calcium 9.3 9.1   < > 8.9  8.9 9.5 9.4 8.3 L   Magnesium 1.8 2.0  --   --   --   --   --     < > = values in this interval not displayed.       CARDIAC BIOMARKERS:  Recent Labs   Lab 04/29/20 0928 06/27/21  0409   CPK 33 50   CPK MB  --  1.7   Troponin I  --  0.026       COAGS:  Recent Labs   Lab 06/26/21 2016 06/27/21  0409   INR 1.1 1.0       LIPIDS/LFTS:  Recent Labs   Lab 06/28/21  0438 10/01/21  0917 02/01/22  0844 03/28/22  0712 04/22/22  0845 07/05/22  0713 09/26/22  0950   Cholesterol 157  --  220 H  --   --  135  --    Triglycerides 92  --  100  --   --  70  --    HDL 41  --  59  --   --  41  --    LDL Cholesterol 97.6  --  141.0  --   --  80.0  --    Non-HDL Cholesterol 116  --  161  --   --  94  --    AST 11   < > 15  15 16 38  --  21   ALT 7 L   < > 11  11 16 32  --  18    < > = values in this interval not displayed.       Hemoglobin A1C   Date Value Ref Range Status   01/27/2023 6.8 (H) 4.0 - 5.6 % Final     Comment:     ADA Screening Guidelines:  5.7-6.4%  Consistent with prediabetes  >or=6.5%  Consistent with diabetes    High levels of fetal hemoglobin interfere with the HbA1C  assay. Heterozygous hemoglobin variants (HbS, HgC, etc)do  not significantly interfere with this assay.   However, presence of multiple variants may affect accuracy.     10/25/2022 6.5 (H) 4.0 - 5.6 % Final     Comment:     ADA Screening Guidelines:  5.7-6.4%  Consistent with prediabetes  >or=6.5%  Consistent with diabetes    High levels of fetal hemoglobin interfere with the HbA1C  assay. Heterozygous hemoglobin variants (HbS, HgC, etc)do  not significantly interfere  with this assay.   However, presence of multiple variants may affect accuracy.     07/05/2022 6.4 (H) 4.0 - 5.6 % Final     Comment:     ADA Screening Guidelines:  5.7-6.4%  Consistent with prediabetes  >or=6.5%  Consistent with diabetes    High levels of fetal hemoglobin interfere with the HbA1C  assay. Heterozygous hemoglobin variants (HbS, HgC, etc)do  not significantly interfere with this assay.   However, presence of multiple variants may affect accuracy.     10/16/2018 6.2 (H) 4.0 - 5.7 % Final     Comment:     Dr. Jurgen Ward ( Endocrinology )       TSH  Recent Labs   Lab 05/19/21  0856 06/27/21  0409 09/26/22  0950   TSH 4.040 H 4.339 H 2.281       The 10-year ASCVD risk score (Lamont SAAVEDRA, et al., 2019) is: 41.9%    Values used to calculate the score:      Age: 73 years      Sex: Male      Is Non- : No      Diabetic: Yes      Tobacco smoker: No      Systolic Blood Pressure: 132 mmHg      Is BP treated: Yes      HDL Cholesterol: 41 mg/dL      Total Cholesterol: 135 mg/dL           Cardiovascular Testing:    EKG: (personally reviewed tracing)  Sinus bradycardia with first-degree AV block left anterior fascicle, septal infarct.    KIRT in June 2019:  Normal resting KIRT/PVR waveforms bilaterally.  Normal exercise KIRT bilaterally (with minimal drop from resting KIRT measurements).        2D echocardiogram in June 2019:  Normal left ventricular systolic function. The estimated ejection fraction is 65%  Moderate left atrial enlargement.  Normal LV diastolic function.  Mild right atrial enlargement.  Normal central venous pressure (3 mm Hg).  The estimated PA systolic pressure is 14 mm Hg            2D echocardiogram 2014 done at [facility]    HEIGHT: 167.6 cm  WEIGHT: 74.8 kg  BP: 157/82  BSA:  MEASUREMENTS  ------------  IVSd: 1.2 cm  LVIDd: 3.8 cm  LVPWd: 1.2 cm  LVIDs: 2.6 cm  LA Diam: 3.2 cm  Ao Diam: 3.1 cm  LAESV Index (A-L): 32.40 ml/m²  LVCO Dopp: 6.98 l/min  LVCI Dopp: 3.79  l/minm²    FINDINGS  -------  CPT CODE:34027-28.  Transthoracic echocardiogram (complete).  The attending physician   listed herein personally reviewed all stored images and directly supervised the   fellow-in-training (if listed) in the study's acquistion and/or report   generation.     Study priority:  Routine.  ICD-9 Indication(s):786.05 - Dyspnea.  Study Quality:The study was technically adequate.  ECG Rhythm:Sinus rhythm.  CHAMBER SIZE:All cardiac chamber sizes are within normal limits.  AORTA:Normal aortic root and proximal ascending aorta.  VALVULAR STRUCTURES:The visualized cardiac valves are structurally normal.  LEFT/RIGHT VENTRICULAR FUNCTION:LV size, wall thickness and systolic function are normal, with an EF > 55%.       The right ventricle is normal in size and function.  DOPPLER:  Color:No valvular insufficiencies.  DIASTOLOGY:The diastolic filling pattern is normal for the age of the patient.  PERICARDIUM / EFFUSIONS:No effusions noted.  INFERIOR VENA CAVA:Normal size inferior vena cava.  PA PRESSURE:The estimated systolic pulmonary artery pressure is normal at < 35 mm Hg (RA   pressure = 3 mm Hg).  CARDIOLOGY:    Electronically signed:  STAFF:ELIAS JAIME M.D.  Fellow:G. MOHSEN, M.D.    CONCLUSIONS  -----------  1. LV size, wall thickness and systolic function are normal, with an EF > 55%.  2. The diastolic filling pattern is normal for the age of the patient.    ASSESSMENT AND PLAN     Patient Active Problem List   Diagnosis    Essential hypertension    Controlled type 2 diabetes mellitus with diabetic polyneuropathy, with long-term current use of insulin    Pure hypercholesterolemia    MCTD (mixed connective tissue disease)    Sjogren's disease    Bilateral edema of lower extremity 6/2019 TTE normal LV systolic and diastolic function; ABIs normal    Glaucoma    BMI 23.0-23.9, adult    Jackson's esophagus without dysplasia    Anemia from plaquenil and imuran    Neutropenia    Current chronic use of  systemic steroids    Compression fracture of body of thoracic vertebra on XR 2/2019; 2/2019 alendronate    Anemia of chronic renal failure, stage 3b    Chronic constipation not responding to linzess, miralax, senna    Screening for colon cancer 07/26/2018 colonoscopy transverse colon polyp path showing benign inflammatory polyp.    Tophaceous gout    Pseudophakia of both eyes    Refractive error    Aortic atherosclerosis    Celiac disease    Diastolic heart failure, NYHA class 2    Stage 3a chronic kidney disease    Joint pain    Decreased range of motion of both ankles    Decreased range of motion of both wrists    Decreased pinch strength    RA (rheumatoid arthritis)    Decreased  strength    Swelling of both hands    Secondary hyperparathyroidism of renal origin    Osteoporosis    Mobitz I    Bilateral carotid artery stenosis on CTA 6/26/21    History of stroke    Dyspnea on exertion    JAZLYN (obstructive sleep apnea)    Long-term insulin use    Dyshidrotic eczema       1.  Chest pressure:  Now resolved.  Stress test did not show any significant ischemia.  Echo showed normal left ventricle systolic function.    2.  Hypertension:  Well controlled on current therapy.  Continue current therapy    3.  Dyslipidemia:  Tolerating lipitor    4.  ABIs in June 2019 were within normal limits.    5.  Chronic kidney disease    7. Elevated TSH.   rx per primary    8. Torsemide to be used as needed for swelling of feet.     9. Carotid bruit: moderate plaque per ct scan in 2021. Regular surveillance with carotid us.    10:  Frequent PVCs:  Check Holter    Follow-up after Holter      Thank you very much for involving me in the care of your patient.  Please do not hesitate to contact me if there are any questions.      Greg Alvares MD, FACC, [facility]  Interventional Cardiologist, [facility].           This note was dictated with the help of speech recognition software.  There might be un-intended errors and/or  substitutions.

## 2023-02-14 NOTE — PROGRESS NOTES
Subjective:       Patient ID: Darrion Bennett is a 69 y.o. male.    Chief Complaint: Follow-up     HPI  69 year old male with type II, cataracts, HTN, gout,  Sjogrens, urolithiasis, CHF, hypothyroidism, CKD here for evaluation.  He reports that he was diagnosed with Sjogrens when he had dry eyes and dry mouth in 2014.. He takes plaquenil 200mg po BID. He is  taking azathioprine 50mg po TID and medrol 4mg po qqday.   He was put on imuran by Dr. Corona.  He was followed by Dr. Corona from 2014 to 2017.  In October 2018, imuran and medrol was stopped. Patient restarted imuran in November 2018.  Reports that he feels that imuran helps him with joint.   Today he denies pain, stiffness or swelling.  He denies sry eyes or dry mouth.  Denies trouble making a fist.    He was diagnosed in January in left aspect of foot with pain, swelling and redness.         Interval history:  He is taking uloric 40mg once a day. He is taking prednisone 5 mg a day. He is taking Orencia once a week.He has noticed improvement in pain and swelling.  He has pain and swelling in both hands.He also has pain in shoulder, feet, knees. He is stiff for a long time in morning.  Denies any hip pain or, jaw claudication, or visual changes. He has pain in lateral hips.     Past Medical History:   Diagnosis Date    Anemia     Arthritis     Cataract     Chronic constipation     Diabetes mellitus, type 2     Glaucoma     Hypertension     MCTD (mixed connective tissue disease)     Sjogren's disease     Ulcerative colitis     Urolithiasis        Review of Systems   Constitutional: Negative for activity change, appetite change, chills, diaphoresis, fatigue and fever.   HENT: Negative for ear discharge, ear pain, hearing loss, mouth sores, nosebleeds and sinus pressure.    Eyes: Negative.  Negative for photophobia, pain, discharge, redness and itching.   Respiratory: Negative for cough, chest tightness and shortness of breath.    Cardiovascular: Negative for chest  pain, palpitations and leg swelling.   Gastrointestinal: Negative for abdominal distention, blood in stool and nausea.   Endocrine: Negative for cold intolerance, heat intolerance, polydipsia and polyphagia.   Genitourinary: Negative for flank pain, genital sores and hematuria.   Musculoskeletal: Positive for arthralgias. Negative for back pain, gait problem, joint swelling, myalgias, neck pain and neck stiffness.   Skin: Negative for color change, pallor and rash.   Neurological: Negative for dizziness, weakness, light-headedness and headaches.   Hematological: Negative for adenopathy. Does not bruise/bleed easily.   Psychiatric/Behavioral: Negative for decreased concentration and hallucinations. The patient is not nervous/anxious.              Objective:        Physical Exam   Constitutional: He is well-developed, well-nourished, and in no distress. No distress.   HENT:   Head: Normocephalic and atraumatic.   Right Ear: External ear normal.   Left Ear: External ear normal.   Nose: Nose normal.   Mouth/Throat: Oropharynx is clear and moist. No oropharyngeal exudate.   Eyes: Conjunctivae and EOM are normal. Pupils are equal, round, and reactive to light. Right eye exhibits no discharge. Left eye exhibits no discharge. No scleral icterus.   Neck: Normal range of motion. Neck supple. No JVD present. No tracheal deviation present. No thyromegaly present.   Cardiovascular: Normal rate, normal heart sounds and intact distal pulses.  Exam reveals no gallop and no friction rub.    No murmur heard.  Pulmonary/Chest: Effort normal. No stridor. No respiratory distress. He has no wheezes. He has no rales. He exhibits no tenderness.   Abdominal: Soft. Bowel sounds are normal. He exhibits no distension and no mass. There is no tenderness. There is no rebound and no guarding.   Lymphadenopathy:     He has no cervical adenopathy.   Neurological: No cranial nerve deficit. Coordination normal.   Skin: Skin is warm and dry. No rash  noted. He is not diaphoretic. No erythema. No pallor.     Musculoskeletal: Normal range of motion. He exhibits no edema, tenderness or deformity.     Labs:  Gilma-+  Ssa+  Ssb+  +RNP  Ccp,rf-negative      Right hand xray (5/2019): I personally reviewed; Soft tissue swelling tip of long finger.  Negative for fracture.     Assessment:    73 year old male with type II DM, cataracts, HTN, gout,  Sjogrens, urolithiasis, CHF, hypothyroidism, CKD here for follow up. He was previously followed by Dr. Lau.  He reports that he was diagnosed with Sjogrens when he had dry eyes and dry mouth in 2014. Serologies are +GILMA, +SSA,+SSB, and +RNP. He denies raynauds, rashes, oral ulcers or symptoms of SLE. His main issue before we met was inflammatory arthritis which was being treated with plaquenil, imuran, and medrol.     When we initially met in feb 2019 , he was taking plaquenil 200mg po BID, azathioprine 50mg po TID and medrol 4mg po qqday. His last rheumatologist discontinued his medications and patient decided to restart the medications himself since he was having so much pain. He developed imuran toxicity so all his medications were discontinued. He is asymptomatic from Sjogrens at this time.      # tophaceous gout, but had allergy to allopurinol so tried uloric.  He has been taking uloric 40mg a day and doing well.  -Labs     #Inflammatory arthritis: secondary to Sjogrens. Was previously on plaquenil and imuran but developed cytopenias so had to stop.  Doing much better on Orencia so will continue.  Given +GILMA and +RNP, will avoid ANTI- TNF therapy  Continue orencia 125 mg sub q once a week   Wean from  prednisone 5 mg a day to 5mg every other day for 2 weeks and then stop      #osteporosis: he had recent dexa scan showing osteoporosis.  Have asked him to discuss this with endo who he sees for diabetes.        30 * minutes of total time spent on the encounter, which includes face to face time and non-face to face time  preparing to see the patient (eg, review of tests), Obtaining and/or reviewing separately obtained history, Documenting clinical information in the electronic or other health record, Independently interpreting results (not separately reported) and communicating results to the patient/family/caregiver, or Care coordination (not separately reported).               Answers submitted by the patient for this visit:  Rheumatology Questionnaire (Submitted on 2/8/2023)  fever: No  eye redness: No  mouth sores: No  headaches: No  shortness of breath: No  chest pain: No  trouble swallowing: No  diarrhea: No  constipation: Yes  unexpected weight change: No  genital sore: No  During the last 3 days, have you had a skin rash?: No  Bruises or bleeds easily: Yes  cough: No

## 2023-02-15 LAB
ALBUMIN SERPL BCP-MCNC: 3.5 G/DL (ref 3.5–5.2)
ALP SERPL-CCNC: 73 U/L (ref 55–135)
ALT SERPL W/O P-5'-P-CCNC: 20 U/L (ref 10–44)
ANION GAP SERPL CALC-SCNC: 9 MMOL/L (ref 8–16)
AST SERPL-CCNC: 21 U/L (ref 10–40)
BILIRUB SERPL-MCNC: 0.4 MG/DL (ref 0.1–1)
BUN SERPL-MCNC: 25 MG/DL (ref 8–23)
CALCIUM SERPL-MCNC: 9.3 MG/DL (ref 8.7–10.5)
CHLORIDE SERPL-SCNC: 107 MMOL/L (ref 95–110)
CO2 SERPL-SCNC: 22 MMOL/L (ref 23–29)
CREAT SERPL-MCNC: 1.4 MG/DL (ref 0.5–1.4)
EST. GFR  (NO RACE VARIABLE): 53.1 ML/MIN/1.73 M^2
GLUCOSE SERPL-MCNC: 147 MG/DL (ref 70–110)
POTASSIUM SERPL-SCNC: 4.4 MMOL/L (ref 3.5–5.1)
PROT SERPL-MCNC: 6.7 G/DL (ref 6–8.4)
SODIUM SERPL-SCNC: 138 MMOL/L (ref 136–145)

## 2023-02-16 LAB
GAMMA INTERFERON BACKGROUND BLD IA-ACNC: 0.16 IU/ML
M TB IFN-G CD4+ BCKGRND COR BLD-ACNC: 0.03 IU/ML
MITOGEN IGNF BCKGRD COR BLD-ACNC: 2.2 IU/ML
TB GOLD PLUS: NEGATIVE
TB2 - NIL: 0.02 IU/ML

## 2023-02-22 ENCOUNTER — HOSPITAL ENCOUNTER (OUTPATIENT)
Dept: CARDIOLOGY | Facility: HOSPITAL | Age: 73
Discharge: HOME | End: 2023-02-22
Attending: INTERNAL MEDICINE
Payer: MEDICARE

## 2023-02-22 DIAGNOSIS — I10 ESSENTIAL HYPERTENSION: ICD-10-CM

## 2023-02-22 DIAGNOSIS — I49.3 PVC (PREMATURE VENTRICULAR CONTRACTION): ICD-10-CM

## 2023-02-22 PROCEDURE — 93227 HOLTER MONITOR - 24 HOUR (CUPID ONLY): ICD-10-PCS | Mod: HCNC,,, | Performed by: INTERNAL MEDICINE

## 2023-02-22 PROCEDURE — 93225 XTRNL ECG REC<48 HRS REC: CPT | Mod: HCNC

## 2023-02-22 PROCEDURE — 93227 XTRNL ECG REC<48 HR R&I: CPT | Mod: HCNC,,, | Performed by: INTERNAL MEDICINE

## 2023-02-23 LAB
OHS CV EVENT MONITOR DAY: 0
OHS CV HOLTER LENGTH DECIMAL HOURS: 23.98
OHS CV HOLTER LENGTH HOURS: 23
OHS CV HOLTER LENGTH MINUTES: 59
OHS CV HOLTER SINUS AVERAGE HR: 66
OHS CV HOLTER SINUS MAX HR: 101
OHS CV HOLTER SINUS MIN HR: 45

## 2023-02-24 ENCOUNTER — PATIENT MESSAGE (OUTPATIENT)
Dept: CARDIOLOGY | Facility: CLINIC | Age: 73
End: 2023-02-24
Payer: MEDICARE

## 2023-02-24 ENCOUNTER — TELEPHONE (OUTPATIENT)
Dept: ADMINISTRATIVE | Facility: CLINIC | Age: 73
End: 2023-02-24
Payer: MEDICARE

## 2023-02-24 NOTE — TELEPHONE ENCOUNTER
Called pt; no answer; could not leave a message due to line kept ringing; I was calling to confirm pt's in office EAWV appt on 2/27/23

## 2023-02-27 ENCOUNTER — OFFICE VISIT (OUTPATIENT)
Dept: CARDIOLOGY | Facility: CLINIC | Age: 73
End: 2023-02-27
Payer: MEDICARE

## 2023-02-27 ENCOUNTER — OFFICE VISIT (OUTPATIENT)
Dept: FAMILY MEDICINE | Facility: CLINIC | Age: 73
End: 2023-02-27
Payer: MEDICARE

## 2023-02-27 VITALS
SYSTOLIC BLOOD PRESSURE: 118 MMHG | DIASTOLIC BLOOD PRESSURE: 64 MMHG | BODY MASS INDEX: 23.46 KG/M2 | OXYGEN SATURATION: 95 % | RESPIRATION RATE: 18 BRPM | HEART RATE: 78 BPM | HEIGHT: 66 IN | WEIGHT: 145.94 LBS

## 2023-02-27 VITALS
HEART RATE: 86 BPM | TEMPERATURE: 98 F | WEIGHT: 145.94 LBS | HEIGHT: 66 IN | DIASTOLIC BLOOD PRESSURE: 62 MMHG | OXYGEN SATURATION: 97 % | SYSTOLIC BLOOD PRESSURE: 128 MMHG | BODY MASS INDEX: 23.46 KG/M2

## 2023-02-27 DIAGNOSIS — I70.0 AORTIC ATHEROSCLEROSIS: ICD-10-CM

## 2023-02-27 DIAGNOSIS — M06.9 RHEUMATOID ARTHRITIS, INVOLVING UNSPECIFIED SITE, UNSPECIFIED WHETHER RHEUMATOID FACTOR PRESENT: ICD-10-CM

## 2023-02-27 DIAGNOSIS — E11.42 CONTROLLED TYPE 2 DIABETES MELLITUS WITH DIABETIC POLYNEUROPATHY, WITH LONG-TERM CURRENT USE OF INSULIN: ICD-10-CM

## 2023-02-27 DIAGNOSIS — Z79.4 LONG-TERM INSULIN USE: ICD-10-CM

## 2023-02-27 DIAGNOSIS — M35.1 MCTD (MIXED CONNECTIVE TISSUE DISEASE): ICD-10-CM

## 2023-02-27 DIAGNOSIS — E11.42 CONTROLLED TYPE 2 DIABETES MELLITUS WITH DIABETIC POLYNEUROPATHY, WITH LONG-TERM CURRENT USE OF INSULIN: Primary | ICD-10-CM

## 2023-02-27 DIAGNOSIS — N18.31 STAGE 3A CHRONIC KIDNEY DISEASE: ICD-10-CM

## 2023-02-27 DIAGNOSIS — N18.31 ANEMIA OF CHRONIC RENAL FAILURE, STAGE 3A: ICD-10-CM

## 2023-02-27 DIAGNOSIS — M81.0 OSTEOPOROSIS, UNSPECIFIED OSTEOPOROSIS TYPE, UNSPECIFIED PATHOLOGICAL FRACTURE PRESENCE: ICD-10-CM

## 2023-02-27 DIAGNOSIS — M25.631 DECREASED RANGE OF MOTION OF BOTH WRISTS: ICD-10-CM

## 2023-02-27 DIAGNOSIS — D63.1 ANEMIA OF CHRONIC RENAL FAILURE, STAGE 3A: ICD-10-CM

## 2023-02-27 DIAGNOSIS — Z79.4 CONTROLLED TYPE 2 DIABETES MELLITUS WITH DIABETIC POLYNEUROPATHY, WITH LONG-TERM CURRENT USE OF INSULIN: ICD-10-CM

## 2023-02-27 DIAGNOSIS — Z79.4 CONTROLLED TYPE 2 DIABETES MELLITUS WITH DIABETIC POLYNEUROPATHY, WITH LONG-TERM CURRENT USE OF INSULIN: Primary | ICD-10-CM

## 2023-02-27 DIAGNOSIS — M35.00 SJOGREN'S SYNDROME, WITH UNSPECIFIED ORGAN INVOLVEMENT: ICD-10-CM

## 2023-02-27 DIAGNOSIS — M25.632 DECREASED RANGE OF MOTION OF BOTH WRISTS: ICD-10-CM

## 2023-02-27 DIAGNOSIS — Z00.00 ENCOUNTER FOR PREVENTIVE HEALTH EXAMINATION: Primary | ICD-10-CM

## 2023-02-27 DIAGNOSIS — I10 ESSENTIAL HYPERTENSION: ICD-10-CM

## 2023-02-27 DIAGNOSIS — D70.9 NEUTROPENIA, UNSPECIFIED TYPE: ICD-10-CM

## 2023-02-27 DIAGNOSIS — I50.30 DIASTOLIC HEART FAILURE, NYHA CLASS 2: ICD-10-CM

## 2023-02-27 DIAGNOSIS — E78.00 PURE HYPERCHOLESTEROLEMIA: ICD-10-CM

## 2023-02-27 DIAGNOSIS — N25.81 SECONDARY HYPERPARATHYROIDISM OF RENAL ORIGIN: ICD-10-CM

## 2023-02-27 DIAGNOSIS — I65.23 BILATERAL CAROTID ARTERY STENOSIS: ICD-10-CM

## 2023-02-27 PROCEDURE — 3008F BODY MASS INDEX DOCD: CPT | Mod: HCNC,CPTII,S$GLB, | Performed by: NURSE PRACTITIONER

## 2023-02-27 PROCEDURE — 1170F FXNL STATUS ASSESSED: CPT | Mod: HCNC,CPTII,S$GLB, | Performed by: NURSE PRACTITIONER

## 2023-02-27 PROCEDURE — 1101F PR PT FALLS ASSESS DOC 0-1 FALLS W/OUT INJ PAST YR: ICD-10-PCS | Mod: HCNC,CPTII,S$GLB, | Performed by: NURSE PRACTITIONER

## 2023-02-27 PROCEDURE — 1159F MED LIST DOCD IN RCRD: CPT | Mod: HCNC,CPTII,S$GLB, | Performed by: INTERNAL MEDICINE

## 2023-02-27 PROCEDURE — 99214 OFFICE O/P EST MOD 30 MIN: CPT | Mod: HCNC,S$GLB,, | Performed by: INTERNAL MEDICINE

## 2023-02-27 PROCEDURE — 99999 PR PBB SHADOW E&M-EST. PATIENT-LVL V: ICD-10-PCS | Mod: PBBFAC,HCNC,, | Performed by: NURSE PRACTITIONER

## 2023-02-27 PROCEDURE — 4010F ACE/ARB THERAPY RXD/TAKEN: CPT | Mod: HCNC,CPTII,S$GLB, | Performed by: INTERNAL MEDICINE

## 2023-02-27 PROCEDURE — 1101F PT FALLS ASSESS-DOCD LE1/YR: CPT | Mod: HCNC,CPTII,S$GLB, | Performed by: NURSE PRACTITIONER

## 2023-02-27 PROCEDURE — 99999 PR PBB SHADOW E&M-EST. PATIENT-LVL V: ICD-10-PCS | Mod: PBBFAC,HCNC,, | Performed by: INTERNAL MEDICINE

## 2023-02-27 PROCEDURE — 99999 PR PBB SHADOW E&M-EST. PATIENT-LVL V: CPT | Mod: PBBFAC,HCNC,, | Performed by: NURSE PRACTITIONER

## 2023-02-27 PROCEDURE — 3078F PR MOST RECENT DIASTOLIC BLOOD PRESSURE < 80 MM HG: ICD-10-PCS | Mod: HCNC,CPTII,S$GLB, | Performed by: NURSE PRACTITIONER

## 2023-02-27 PROCEDURE — 3044F HG A1C LEVEL LT 7.0%: CPT | Mod: HCNC,CPTII,S$GLB, | Performed by: NURSE PRACTITIONER

## 2023-02-27 PROCEDURE — 1126F PR PAIN SEVERITY QUANTIFIED, NO PAIN PRESENT: ICD-10-PCS | Mod: HCNC,CPTII,S$GLB, | Performed by: INTERNAL MEDICINE

## 2023-02-27 PROCEDURE — 4010F ACE/ARB THERAPY RXD/TAKEN: CPT | Mod: HCNC,CPTII,S$GLB, | Performed by: NURSE PRACTITIONER

## 2023-02-27 PROCEDURE — 4010F PR ACE/ARB THEARPY RXD/TAKEN: ICD-10-PCS | Mod: HCNC,CPTII,S$GLB, | Performed by: INTERNAL MEDICINE

## 2023-02-27 PROCEDURE — 3008F PR BODY MASS INDEX (BMI) DOCUMENTED: ICD-10-PCS | Mod: HCNC,CPTII,S$GLB, | Performed by: INTERNAL MEDICINE

## 2023-02-27 PROCEDURE — 3072F PR LOW RISK FOR RETINOPATHY: ICD-10-PCS | Mod: HCNC,CPTII,S$GLB, | Performed by: NURSE PRACTITIONER

## 2023-02-27 PROCEDURE — G0439 PR MEDICARE ANNUAL WELLNESS SUBSEQUENT VISIT: ICD-10-PCS | Mod: HCNC,S$GLB,, | Performed by: NURSE PRACTITIONER

## 2023-02-27 PROCEDURE — 3288F FALL RISK ASSESSMENT DOCD: CPT | Mod: HCNC,CPTII,S$GLB, | Performed by: NURSE PRACTITIONER

## 2023-02-27 PROCEDURE — 3044F PR MOST RECENT HEMOGLOBIN A1C LEVEL <7.0%: ICD-10-PCS | Mod: HCNC,CPTII,S$GLB, | Performed by: NURSE PRACTITIONER

## 2023-02-27 PROCEDURE — 1101F PT FALLS ASSESS-DOCD LE1/YR: CPT | Mod: HCNC,CPTII,S$GLB, | Performed by: INTERNAL MEDICINE

## 2023-02-27 PROCEDURE — 3288F FALL RISK ASSESSMENT DOCD: CPT | Mod: HCNC,CPTII,S$GLB, | Performed by: INTERNAL MEDICINE

## 2023-02-27 PROCEDURE — G0439 PPPS, SUBSEQ VISIT: HCPCS | Mod: HCNC,S$GLB,, | Performed by: NURSE PRACTITIONER

## 2023-02-27 PROCEDURE — 3044F PR MOST RECENT HEMOGLOBIN A1C LEVEL <7.0%: ICD-10-PCS | Mod: HCNC,CPTII,S$GLB, | Performed by: INTERNAL MEDICINE

## 2023-02-27 PROCEDURE — 3008F PR BODY MASS INDEX (BMI) DOCUMENTED: ICD-10-PCS | Mod: HCNC,CPTII,S$GLB, | Performed by: NURSE PRACTITIONER

## 2023-02-27 PROCEDURE — 1170F PR FUNCTIONAL STATUS ASSESSED: ICD-10-PCS | Mod: HCNC,CPTII,S$GLB, | Performed by: NURSE PRACTITIONER

## 2023-02-27 PROCEDURE — 3288F PR FALLS RISK ASSESSMENT DOCUMENTED: ICD-10-PCS | Mod: HCNC,CPTII,S$GLB, | Performed by: NURSE PRACTITIONER

## 2023-02-27 PROCEDURE — 4010F PR ACE/ARB THEARPY RXD/TAKEN: ICD-10-PCS | Mod: HCNC,CPTII,S$GLB, | Performed by: NURSE PRACTITIONER

## 2023-02-27 PROCEDURE — 99214 PR OFFICE/OUTPT VISIT, EST, LEVL IV, 30-39 MIN: ICD-10-PCS | Mod: HCNC,S$GLB,, | Performed by: INTERNAL MEDICINE

## 2023-02-27 PROCEDURE — 3078F PR MOST RECENT DIASTOLIC BLOOD PRESSURE < 80 MM HG: ICD-10-PCS | Mod: HCNC,CPTII,S$GLB, | Performed by: INTERNAL MEDICINE

## 2023-02-27 PROCEDURE — 1101F PR PT FALLS ASSESS DOC 0-1 FALLS W/OUT INJ PAST YR: ICD-10-PCS | Mod: HCNC,CPTII,S$GLB, | Performed by: INTERNAL MEDICINE

## 2023-02-27 PROCEDURE — 3044F HG A1C LEVEL LT 7.0%: CPT | Mod: HCNC,CPTII,S$GLB, | Performed by: INTERNAL MEDICINE

## 2023-02-27 PROCEDURE — 3078F DIAST BP <80 MM HG: CPT | Mod: HCNC,CPTII,S$GLB, | Performed by: NURSE PRACTITIONER

## 2023-02-27 PROCEDURE — 3074F PR MOST RECENT SYSTOLIC BLOOD PRESSURE < 130 MM HG: ICD-10-PCS | Mod: HCNC,CPTII,S$GLB, | Performed by: NURSE PRACTITIONER

## 2023-02-27 PROCEDURE — 1126F AMNT PAIN NOTED NONE PRSNT: CPT | Mod: HCNC,CPTII,S$GLB, | Performed by: INTERNAL MEDICINE

## 2023-02-27 PROCEDURE — 3008F BODY MASS INDEX DOCD: CPT | Mod: HCNC,CPTII,S$GLB, | Performed by: INTERNAL MEDICINE

## 2023-02-27 PROCEDURE — 3288F PR FALLS RISK ASSESSMENT DOCUMENTED: ICD-10-PCS | Mod: HCNC,CPTII,S$GLB, | Performed by: INTERNAL MEDICINE

## 2023-02-27 PROCEDURE — 3072F LOW RISK FOR RETINOPATHY: CPT | Mod: HCNC,CPTII,S$GLB, | Performed by: NURSE PRACTITIONER

## 2023-02-27 PROCEDURE — 3074F SYST BP LT 130 MM HG: CPT | Mod: HCNC,CPTII,S$GLB, | Performed by: INTERNAL MEDICINE

## 2023-02-27 PROCEDURE — 1160F RVW MEDS BY RX/DR IN RCRD: CPT | Mod: HCNC,CPTII,S$GLB, | Performed by: INTERNAL MEDICINE

## 2023-02-27 PROCEDURE — G9919 SCRN ND POS ND PROV OF REC: HCPCS | Mod: HCNC,CPTII,S$GLB, | Performed by: NURSE PRACTITIONER

## 2023-02-27 PROCEDURE — 3072F PR LOW RISK FOR RETINOPATHY: ICD-10-PCS | Mod: HCNC,CPTII,S$GLB, | Performed by: INTERNAL MEDICINE

## 2023-02-27 PROCEDURE — 1159F PR MEDICATION LIST DOCUMENTED IN MEDICAL RECORD: ICD-10-PCS | Mod: HCNC,CPTII,S$GLB, | Performed by: INTERNAL MEDICINE

## 2023-02-27 PROCEDURE — 3074F PR MOST RECENT SYSTOLIC BLOOD PRESSURE < 130 MM HG: ICD-10-PCS | Mod: HCNC,CPTII,S$GLB, | Performed by: INTERNAL MEDICINE

## 2023-02-27 PROCEDURE — G9919 PR SCREENING AND POSITIVE: ICD-10-PCS | Mod: HCNC,CPTII,S$GLB, | Performed by: NURSE PRACTITIONER

## 2023-02-27 PROCEDURE — 1160F PR REVIEW ALL MEDS BY PRESCRIBER/CLIN PHARMACIST DOCUMENTED: ICD-10-PCS | Mod: HCNC,CPTII,S$GLB, | Performed by: INTERNAL MEDICINE

## 2023-02-27 PROCEDURE — 3074F SYST BP LT 130 MM HG: CPT | Mod: HCNC,CPTII,S$GLB, | Performed by: NURSE PRACTITIONER

## 2023-02-27 PROCEDURE — 3072F LOW RISK FOR RETINOPATHY: CPT | Mod: HCNC,CPTII,S$GLB, | Performed by: INTERNAL MEDICINE

## 2023-02-27 PROCEDURE — 3078F DIAST BP <80 MM HG: CPT | Mod: HCNC,CPTII,S$GLB, | Performed by: INTERNAL MEDICINE

## 2023-02-27 PROCEDURE — 99999 PR PBB SHADOW E&M-EST. PATIENT-LVL V: CPT | Mod: PBBFAC,HCNC,, | Performed by: INTERNAL MEDICINE

## 2023-02-27 RX ORDER — CLONIDINE 0.2 MG/24H
1 PATCH, EXTENDED RELEASE TRANSDERMAL
Qty: 12 PATCH | Refills: 3 | Status: SHIPPED | OUTPATIENT
Start: 2023-02-27 | End: 2024-02-27

## 2023-02-27 NOTE — PATIENT INSTRUCTIONS
Counseling and Referral of Other Preventative  (Italic type indicates deductible and co-insurance are waived)    Patient Name: Darrion Bennett  Today's Date: 2/27/2023    Health Maintenance       Date Due Completion Date    Aspirin/Antiplatelet Therapy Never done ---    DEXA Scan 01/27/2023 1/27/2021    Diabetes Urine Screening 04/22/2023 4/22/2022    Lipid Panel 07/05/2023 7/5/2022    Colorectal Cancer Screening 07/26/2023 7/26/2018    Hemoglobin A1c 07/27/2023 1/27/2023    Eye Exam 08/02/2023 8/2/2022    Foot Exam 02/03/2024 2/3/2023    Override on 10/20/2021: Done    Override on 10/19/2020: Done    Override on 3/19/2019: Done    High Dose Statin 02/27/2024 2/27/2023    TETANUS VACCINE 05/27/2031 5/27/2021        No orders of the defined types were placed in this encounter.      The following information is provided to all patients.  This information is to help you find resources for any of the problems found today that may be affecting your health:                Living healthy guide: www.[facility].[place].gov      Understanding Diabetes: www.diabetes.org      Eating healthy: www.cdc.gov/healthyweight      CDC home safety checklist: www.cdc.gov/steadi/patient.html      Agency on Aging: www.goea.[place].[facility]      Alcoholics anonymous (AA): www.aa.org      Physical Activity: www.jing.nih.gov/kk3zkrr      Tobacco use: www.quitwithusla.org

## 2023-02-27 NOTE — PROGRESS NOTES
"  Darrion Bennett presented for a  Medicare AWV and comprehensive Health Risk Assessment today. The following components were reviewed and updated:    Medical history  Family History  Social history  Allergies and Current Medications  Health Risk Assessment  Health Maintenance  Care Team     Patient screened moderate and/or high risk for one or more social determinants of health (SDOH). Patient connected to community resources through the ED Navigator.      ** See Completed Assessments for Annual Wellness Visit within the encounter summary.**       The following assessments were completed:  Living Situation  CAGE  Depression Screening  Timed Get Up and Go  Whisper Test  Cognitive Function Screening  Nutrition Screening  ADL Screening  PAQ Screening          Vitals:    02/27/23 0755   BP: 128/62   Pulse: 86   Temp: 98 °F (36.7 °C)   TempSrc: Oral   SpO2: 97%   Weight: 66.2 kg (145 lb 15.1 oz)   Height: 5' 6" (1.676 m)     Body mass index is 23.56 kg/m².  Physical Exam  Vitals and nursing note reviewed.   Constitutional:       Appearance: Normal appearance.   Cardiovascular:      Rate and Rhythm: Normal rate.      Pulses: Normal pulses.      Heart sounds: Normal heart sounds.   Pulmonary:      Effort: Pulmonary effort is normal.      Breath sounds: Normal breath sounds.      Comments: slight expiratory wheeze  Musculoskeletal:         General: Normal range of motion.   Neurological:      Mental Status: He is alert and oriented to person, place, and time.   Psychiatric:         Mood and Affect: Mood normal.         Behavior: Behavior normal.           Diagnoses and health risks identified today and associated recommendations/orders:    1. Encounter for preventive health examination  Pt was seen today for an Annual Wellness visit. Healthcare maintenance and screening recommendations were discussed and updated as indicated. Return in one year for AWV.    Review current opioid prescriptions:yes  Screen for potential " Substance Use Disorders:yes  Patient is aware of non-narcotic pain options  Followed by prescribing provider    2. Aortic atherosclerosis  The current medical regimen is effective;  continue present plan and medications.    3. Diastolic heart failure, NYHA class 2  Asymptomatic. The current medical regimen is effective;  continue present plan and medications.    4. Rheumatoid arthritis, involving unspecified site, unspecified whether rheumatoid factor present  The current medical regimen is effective;  continue present plan and medications.    5. MCTD (mixed connective tissue disease)  The current medical regimen is effective;  continue present plan and medications.    6. Sjogren's syndrome, with unspecified organ involvement  The current medical regimen is effective;  continue present plan and medications.    7. Controlled type 2 diabetes mellitus with diabetic polyneuropathy, with long-term current use of insulin  8. Long-term insulin use  The current medical regimen is effective;  continue present plan and medications.  Hemoglobin A1C   Date Value Ref Range Status   01/27/2023 6.8 (H) 4.0 - 5.6 % Final             10/25/2022 6.5 (H) 4.0 - 5.6 % Final             07/05/2022 6.4 (H) 4.0 - 5.6 % Final             10/16/2018 6.2 (H) 4.0 - 5.7 % Final     Comment:     Dr. Jurgen Ward ( Endocrinology )   9. Stage 3a chronic kidney disease  The current medical regimen is effective;  continue present plan and medications.    10. Anemia of chronic renal failure, stage 3a  The current medical regimen is effective;  continue present plan and medications.    11. Secondary hyperparathyroidism of renal origin  The current medical regimen is effective;  continue present plan and medications.    12. Neutropenia, unspecified type  The current medical regimen is effective;  continue present plan and medications.    13. Essential hypertension  The current medical regimen is effective;  continue present plan and  medications.    14. Pure hypercholesterolemia    15. Bilateral carotid artery stenosis on CTA 6/26/21  The current medical regimen is effective;  continue present plan and medications.    16. Osteoporosis, unspecified osteoporosis type, unspecified pathological fracture presence  The current medical regimen is effective;  continue present plan and medications.        Provided Darrion with a 5-10 year written screening schedule and personal prevention plan. Recommendations were developed using the USPSTF age appropriate recommendations. Education, counseling, and referrals were provided as needed. After Visit Summary printed and given to patient which includes a list of additional screenings\tests needed.    Follow up in about 1 year (around 2/27/2024).    LIZANDRO Warren      I offered to discuss advanced care planning, including how to pick a person who would make decisions for you if you were unable to make them for yourself, called a health care power of , and what kind of decisions you might make such as use of life sustaining treatments such as ventilators and tube feeding when faced with a life limiting illness recorded on a living will that they will need to know. (How you want to be cared for as you near the end of your natural life)     X Patient is interested in learning more about how to make advanced directives.  I provided them paperwork and offered to discuss this with them.

## 2023-02-27 NOTE — PROGRESS NOTES
CARDIOVASCULAR CONSULTATION    REASON FOR CONSULT:   Darrion Bennett is a 73 y.o. male who presents for establishing care with Cardiology.      HISTORY OF PRESENT ILLNESS:     Notes from September 2019:  Patient here for follow-up today.  Denies any chest pains at rest on exertion, orthopnea, PND.  Denies any other cardiac symptoms.    Notes from June 2019:  Patient is here for follow-up today.  Denies any chest pains at rest on exertion, orthopnea, PND.  Denies any.  Of dizziness or passing out.  Blood pressure is much better controlled in clinic today.    Initial clinic visit:  Patient is a very pleasant 69-year-old man with a past medical history significant for hypertension, diabetes, Sjogren's disease who wants to establish care with [facility].  Patient has been followed with [facility].  Patient states that in 2014 he was diagnosed with heart failure.  He states that he had passed out and had gone to the hospital, he was feeling short of breath also and at that point he was told that he had heart failure.  I can see an echocardiogram from his outside medical records in 2014 which showed normal left ventricular systolic function.  He also had a stress echocardiogram done in 2013 which did not reveal any ischemia.  He denies any chest pains at rest on exertion, orthopnea, PND, swelling of feet.  States that since then he has been okay and walks about 2-3 blocks every day.  On walking about 2-3 blocks he does experience some tightness in his left calf.  This goes away after rest.  He denies any resting calf tightness.  He denies any ulcer on his feet.  He states that he checks his blood pressure at home and has found that it is elevated around 160-170 mm of systolic multiple times.      Notes from March 2021:  Patient here for follow-up.  Denies any chest pains at rest on exertion, orthopnea, PND, swelling of feet.  Mostly blood pressure has been uncontrolled at home.  Staying around 160 mmHg.  Very  compliant with medications.  EKG done in the clinic today was personally reviewed and demonstrates sinus bradycardia with left anterior fascicular block, poor R-wave progression.      Notes from April 21:  Patient here for follow-up.  Now states that he has been experiencing dyspnea on exertion which is worse than prior.  Very concerned about that.  No chest pains or tightness, but it is gets short of breath on walking short distances and this is lifestyle limiting.  Denies orthopnea, PND, swelling of feet.  Also states that the blood pressure is staying around 140-160 mmHg at home.      Notes from May 2021:  Patient here for follow-up.  Stress test did not show any significant ischemia.  Echo showed normal left ventricle systolic function.  Symptoms have improved.  States stop taking his Crestor because it was causing myalgias      The left ventricle is normal in size with concentric remodeling and normal systolic function.  The estimated ejection fraction is 65%.  Grade II left ventricular diastolic dysfunction.  Severe left atrial enlargement.  Normal right ventricular size with normal right ventricular systolic function.  Mild right atrial enlargement.  Mild mitral regurgitation.  Normal central venous pressure (3 mmHg).  The estimated PA systolic pressure is 36 mmHg.  The sinuses of Valsalva is mildly dilated.  Normal myocardial perfusion scan. There is no evidence of myocardial ischemia or infarction.    The gated perfusion images showed an ejection fraction of 75% post stress.    There is normal wall motion post stress.    The EKG portion of this study is negative for ischemia.    The patient reported no chest pain during the stress test.    During stress, rare PVCs are noted.    Hypertensive response to exercise.  PA interval did increase during exercise.      Notes from July 2021:  Patient here for follow-up.  Has been experiencing dyspnea on exertion.  States it is getting worse.  Has an appointment with  pulmonology coming up.  Is requesting evaluation of CBC and BNP.  As well as a chest x-ray.      The left ventricle is normal in size with concentric hypertrophy and normal systolic function.  The estimated ejection fraction is 65%.  Grade I left ventricular diastolic dysfunction.  Normal right ventricular size with normal right ventricular systolic function.  Moderate left atrial enlargement.  Mild right atrial enlargement.  There is mild aortic valve stenosis.  Aortic valve area is 1.67 cm2; peak velocity is 1.95 m/s; mean gradient is 9 mmHg.  Normal central venous pressure (3 mmHg).  The estimated PA systolic pressure is 16 mmHg.      Notes from dec 21: doing fine. No cp, orthopnea, pnd.       February 2022:  Patient follow-up.  Doing fine.  Occasionally gets swelling in his feet at the end of the day..  States he takes torsemide every Saturday and Sunday.  States he was told by his nephrologist to do so.    Notes from August 2022: Patient states that his nephrologist got off his amlodipine and since then his blood pressure has been uncontrolled.  He is taking hydralazine at 50 mg t.i.d..  Blood pressure is running around 170 mmHg.  Also had an episode of chest pressure last week.  Did not seek medical attention for that.  Denies orthopnea, PND.      Notes from September 2022:  Patient here for follow-up.  Has been chest pain-free.  Stress test did not show any significant ischemia.  Echo showed normal left ventricle systolic function.      Normal myocardial perfusion scan. There is no evidence of myocardial ischemia or infarction.    The gated perfusion images showed an ejection fraction of 72% post stress.    There is normal wall motion post stress.    The EKG portion of this study is negative for ischemia.    The patient reported no chest pain during the stress test.    During stress, frequent PVCs are noted.      The left ventricle is normal in size with mild concentric hypertrophy and normal systolic  function.  The estimated ejection fraction is 60%.  Grade I left ventricular diastolic dysfunction.  Normal right ventricular size with normal right ventricular systolic function.  Moderate left atrial enlargement.  Mild right atrial enlargement.  Mild mitral regurgitation.  Mild pulmonic regurgitation.  Normal central venous pressure (3 mmHg).  The estimated PA systolic pressure is 36 mmHg.  There is mild pulmonary hypertension.      Feb 23:  Patient here for follow-up.  Denies any chest pains at rest on exertion, orthopnea, PND.  Occasional swelling of feet as well as hence.  Is on chronic prednisone.      2/27/23: doing fine. No cp, orthopnea, pnd, dizziness or syncope.    Sinus rhythm with heart rates varying between 45 and 101 BPM with an average of 66 BPM.  There were occasional PVCs totalling 607 and averaging 25.31 per hour. There were 5 couplets. There were 88 bigeminal cycles.  There were very frequent PACs totalling 3884 and averaging 161.97 per hour.  Frequent episodes of Mobitz I second degree AVB      PAST MEDICAL HISTORY:     Past Medical History:   Diagnosis Date    Anemia     Arthritis     Cataract     CHF (congestive heart failure)     Chronic constipation     Diabetes mellitus, type 2     Felon of finger of left hand 05/11/2019    Glaucoma     Hyperlipidemia 05/13/2014    Hypertension     MCTD (mixed connective tissue disease)     Personal history of colonic polyps     Sjogren's disease     Ulcerative colitis     Urolithiasis        PAST SURGICAL HISTORY:     Past Surgical History:   Procedure Laterality Date    CATARACT EXTRACTION Bilateral 2005    COLONOSCOPY  2014    EYE SURGERY         ALLERGIES AND MEDICATION:     Review of patient's allergies indicates:   Allergen Reactions    Penicillins Nausea And Vomiting    Rosuvastatin      Muscle pain     Allopurinol analogues Rash        Medication List            Accurate as of February 27, 2023  8:52 AM. If you have any questions, ask your nurse or  doctor.                CONTINUE taking these medications      alcohol swabs Padm  Commonly known as: BD ALCOHOL SWABS  USE  4 TIMES PER DAY     amLODIPine 5 MG tablet  Commonly known as: NORVASC  Take 1 tablet (5 mg total) by mouth 2 (two) times daily.     ammonium lactate 12 % Crea  Apply 1 application topically once daily.     atorvastatin 80 MG tablet  Commonly known as: LIPITOR  TAKE 1 TABLET EVERY DAY     BOOSTRIX TDAP 2.5-8-5 Lf-mcg-Lf/0.5mL Syrg injection  Generic drug: diphth,pertus(acell),tetanus     cloNIDine 0.2 mg/24 hr td ptwk 0.2 mg/24 hr  Commonly known as: CATAPRES  PLACE 1 PATCH ONTO THE SKIN EVERY 7 DAYS.     diclofenac sodium 1 % Gel  Commonly known as: VOLTAREN  Apply 2 g topically 4 (four) times daily as needed. Apply to aching joints     dorzolamide 2 % ophthalmic solution  Commonly known as: TRUSOPT  PLACE 1 DROP INTO BOTH EYES 2 (TWO) TIMES DAILY.     febuxostat 40 mg Tab  Commonly known as: ULORIC  Take 1 tablet (40 mg total) by mouth once daily.     hydrALAZINE 50 MG tablet  Commonly known as: APRESOLINE  Take 1 tablet (50 mg total) by mouth every 8 (eight) hours.     INJECTAFER 50 mg iron/mL injection  Generic drug: ferric carboxymaltose     insulin aspart U-100 100 unit/mL (3 mL) Inpn pen  Commonly known as: NovoLOG FlexPen U-100 Insulin  Inject 3-6 units three times daily before each meal with sliding scale.     insulin glargine 100 units/mL SubQ pen  Commonly known as: LANTUS SOLOSTAR U-100 INSULIN  Inject 6 units once daily     ipratropium 42 mcg (0.06 %) nasal spray  Commonly known as: ATROVENT  INSTILL 2 SPRAYS BY NASAL ROUTE 3 TIMES DAILY. AS NEEDED FOR NASAL CONGESTION AND DRIP FOR 7 DAYS STRENGTH: 42 MCG (0.06 %)     lancets 33 gauge Misc  Commonly known as: TRUEPLUS LANCETS  Test glucose 4x/day. Dx code e11.65     linaCLOtide 72 mcg Cap capsule  Commonly known as: LINZESS  Take 1 capsule (72 mcg total) by mouth before breakfast.     multivit with min-folic acid 200 mcg Chew    "  omeprazole 20 MG capsule  Commonly known as: PRILOSEC  TAKE 2 CAPSULES (40 MG TOTAL) BY MOUTH ONCE DAILY.     ORENCIA CLICKJECT 125 mg/mL Atin  Generic drug: abatacept  Inject 125 mg into the skin once a week.     pen needle, diabetic 32 gauge x 5/32" Ndle  Commonly known as: BD ULTRA-FINE RICHARD PEN NEEDLE  1 Device by Misc.(Non-Drug; Combo Route) route 4 (four) times daily.     predniSONE 5 MG tablet  Commonly known as: DELTASONE  TAKE 1 TABLET BY MOUTH EVERY DAY     PROLIA 60 mg/mL Syrg  Generic drug: denosumab     torsemide 10 MG Tab  Commonly known as: DEMADEX  Take 1 tablet (10 mg total) by mouth 2 (two) times a day.     traMADoL 50 mg tablet  Commonly known as: ULTRAM  TAKE 1 TABLET BY MOUTH EVERY 12 HOURS AS NEEDED FOR PAIN.     travoprost 0.004 % ophthalmic solution  Commonly known as: TRAVATAN Z  Place 1 drop into both eyes every evening.     triamcinolone acetonide 0.1% 0.1 % cream  Commonly known as: KENALOG  Apply topically 2 (two) times daily. for 10 days     TRUE METRIX GLUCOSE METER Misc  Generic drug: blood-glucose meter  USE AS DIRECTED     TRUE METRIX GLUCOSE TEST STRIP Strp  Generic drug: blood sugar diagnostic  TEST GLUCOSE 4 TIMES PER DAY.     TRUE METRIX LEVEL 1 Soln  Generic drug: blood glucose control, low  Use as indicated     TRULICITY 4.5 mg/0.5 mL pen injector  Generic drug: dulaglutide  Inject 4.5 mg into the skin every 7 days.     valsartan 160 MG tablet  Commonly known as: DIOVAN  TAKE 1 TABLET TWICE DAILY              SOCIAL HISTORY:     Social History     Socioeconomic History    Marital status:     Number of children: 3   Tobacco Use    Smoking status: Never     Passive exposure: Never    Smokeless tobacco: Never   Substance and Sexual Activity    Alcohol use: No    Drug use: Yes     Comment: ultram 1 - 2 tabs a week    Sexual activity: Not Currently     Partners: Female     Social Determinants of Health     Financial Resource Strain: Unknown    Difficulty of Paying Living " Expenses: Patient refused   Food Insecurity: Unknown    Worried About Running Out of Food in the Last Year: Patient refused    Ran Out of Food in the Last Year: Patient refused   Transportation Needs: No Transportation Needs    Lack of Transportation (Medical): No    Lack of Transportation (Non-Medical): No   Physical Activity: Insufficiently Active    Days of Exercise per Week: 2 days    Minutes of Exercise per Session: 10 min   Stress: No Stress Concern Present    Feeling of Stress : Not at all   Social Connections: Unknown    Frequency of Communication with Friends and Family: Three times a week    Frequency of Social Gatherings with Friends and Family: Once a week    Active Member of Clubs or Organizations: Yes    Attends Club or Organization Meetings: 1 to 4 times per year    Marital Status:    Housing Stability: Low Risk     Unable to Pay for Housing in the Last Year: No    Number of Places Lived in the Last Year: 1    Unstable Housing in the Last Year: No       FAMILY HISTORY:     Family History   Problem Relation Age of Onset    Hypertension Father     Stroke Father     Cataracts Father     Glaucoma Father     Cataracts Mother     No Known Problems Sister     No Known Problems Brother     Rheum arthritis Maternal Aunt     Rheum arthritis Maternal Uncle     No Known Problems Paternal Aunt     No Known Problems Paternal Uncle     No Known Problems Maternal Grandmother     No Known Problems Maternal Grandfather     Throat cancer Paternal Grandmother     Glaucoma Paternal Grandfather     No Known Problems Daughter     No Known Problems Son     Celiac disease Neg Hx     Colon cancer Neg Hx     Cirrhosis Neg Hx     Colon polyps Neg Hx     Crohn's disease Neg Hx     Cystic fibrosis Neg Hx     Hemochromatosis Neg Hx     Esophageal cancer Neg Hx     Inflammatory bowel disease Neg Hx     Irritable bowel syndrome Neg Hx     Liver cancer Neg Hx     Liver disease Neg Hx     Rectal cancer Neg Hx     Stomach cancer  "Neg Hx     Ulcerative colitis Neg Hx     Chase's disease Neg Hx     Amblyopia Neg Hx     Blindness Neg Hx     Cancer Neg Hx     Diabetes Neg Hx     Macular degeneration Neg Hx     Retinal detachment Neg Hx     Strabismus Neg Hx     Thyroid disease Neg Hx     Melanoma Neg Hx        REVIEW OF SYSTEMS:   Review of Systems   Constitutional: Negative.    HENT: Negative.    Eyes: Negative.    Respiratory: Negative.    Gastrointestinal: Negative.    Genitourinary: Negative.    Musculoskeletal: Negative.    Skin: Negative.    Neurological: Negative.    Endo/Heme/Allergies: Negative.    Psychiatric/Behavioral: Negative.    All other systems reviewed and are negative.      A 10 point review of systems was performed and all the pertinent positives have been mentioned. Rest of review of systems was negative.        PHYSICAL EXAM:     Vitals:    02/27/23 0818   BP: 118/64   Pulse: 78   Resp: 18    Body mass index is 23.56 kg/m².  Weight: 66.2 kg (145 lb 15.1 oz)   Height: 5' 6" (167.6 cm)     Physical Exam  Vitals and nursing note reviewed.   Constitutional:       Appearance: Normal appearance. He is well-developed.   HENT:      Head: Normocephalic and atraumatic.      Right Ear: Hearing normal.      Left Ear: Hearing normal.      Nose: Nose normal.   Eyes:      General: Lids are normal.      Conjunctiva/sclera: Conjunctivae normal.      Pupils: Pupils are equal, round, and reactive to light.   Cardiovascular:      Rate and Rhythm: Normal rate and regular rhythm.      Pulses: Normal pulses.      Heart sounds: Murmur heard.    Systolic murmur is present with a grade of 2/6.  Pulmonary:      Effort: Pulmonary effort is normal.      Breath sounds: Normal breath sounds.   Abdominal:      Palpations: Abdomen is soft.      Tenderness: There is no abdominal tenderness.   Musculoskeletal:         General: No deformity.      Cervical back: Normal range of motion and neck supple.   Skin:     General: Skin is warm and dry.   Neurological: "      Mental Status: He is alert and oriented to person, place, and time.   Psychiatric:         Speech: Speech normal.           DATA:     Laboratory:  CBC:  Recent Labs   Lab 03/28/22  0712 09/26/22  0950 10/25/22  0816   WBC 7.10 7.10 7.76   Hemoglobin 12.3 L 12.2 L 11.4 L   Hematocrit 38.7 L 38.3 L 36.9 L   Platelets 268 157 187       CHEMISTRIES:  Recent Labs   Lab 05/19/21  0856 06/27/21  0409 06/28/21 0438 02/01/22  0844 03/28/22  0712 04/22/22  0845 09/26/22  0950 02/15/23  0700   Glucose 103 136 H   < > 88  88 134 H 133 H 118 H 147 H   Sodium 138 137   < > 141  141 137 136 137 138   Potassium 4.1 4.2   < > 4.5  4.5 4.4 4.7 4.3 4.4   BUN 31 H 32 H   < > 30 H  30 H 30 H 26 H 24 H 25 H   Creatinine 1.6 H 1.4   < > 1.4  1.4 1.2 1.3 1.4 1.4   eGFR if  49 A 58 A   < > 58 A  58 A >60.0 >60.0  --   --    eGFR if non  43 A 50 A   < > 50 A  50 A >60.0 54.5 A  --   --    Calcium 9.3 9.1   < > 8.9  8.9 9.5 9.4 8.3 L 9.3   Magnesium 1.8 2.0  --   --   --   --   --   --     < > = values in this interval not displayed.       CARDIAC BIOMARKERS:  Recent Labs   Lab 04/29/20 0928 06/27/21 0409   CPK 33 50   CPK MB  --  1.7   Troponin I  --  0.026       COAGS:  Recent Labs   Lab 06/26/21 2016 06/27/21  0409   INR 1.1 1.0       LIPIDS/LFTS:  Recent Labs   Lab 06/28/21 0438 10/01/21  0917 02/01/22  0844 03/28/22  0712 04/22/22  0845 07/05/22  0713 09/26/22  0950 02/15/23  0700   Cholesterol 157  --  220 H  --   --  135  --   --    Triglycerides 92  --  100  --   --  70  --   --    HDL 41  --  59  --   --  41  --   --    LDL Cholesterol 97.6  --  141.0  --   --  80.0  --   --    Non-HDL Cholesterol 116  --  161  --   --  94  --   --    AST 11   < > 15  15   < > 38  --  21 21   ALT 7 L   < > 11  11   < > 32  --  18 20    < > = values in this interval not displayed.       Hemoglobin A1C   Date Value Ref Range Status   01/27/2023 6.8 (H) 4.0 - 5.6 % Final     Comment:     ADA Screening  Guidelines:  5.7-6.4%  Consistent with prediabetes  >or=6.5%  Consistent with diabetes    High levels of fetal hemoglobin interfere with the HbA1C  assay. Heterozygous hemoglobin variants (HbS, HgC, etc)do  not significantly interfere with this assay.   However, presence of multiple variants may affect accuracy.     10/25/2022 6.5 (H) 4.0 - 5.6 % Final     Comment:     ADA Screening Guidelines:  5.7-6.4%  Consistent with prediabetes  >or=6.5%  Consistent with diabetes    High levels of fetal hemoglobin interfere with the HbA1C  assay. Heterozygous hemoglobin variants (HbS, HgC, etc)do  not significantly interfere with this assay.   However, presence of multiple variants may affect accuracy.     07/05/2022 6.4 (H) 4.0 - 5.6 % Final     Comment:     ADA Screening Guidelines:  5.7-6.4%  Consistent with prediabetes  >or=6.5%  Consistent with diabetes    High levels of fetal hemoglobin interfere with the HbA1C  assay. Heterozygous hemoglobin variants (HbS, HgC, etc)do  not significantly interfere with this assay.   However, presence of multiple variants may affect accuracy.     10/16/2018 6.2 (H) 4.0 - 5.7 % Final     Comment:     Dr. Jurgen Ward ( Endocrinology )       TSH  Recent Labs   Lab 05/19/21  0856 06/27/21  0409 09/26/22  0950   TSH 4.040 H 4.339 H 2.281       The 10-year ASCVD risk score (Lamont DK, et al., 2019) is: 35.9%    Values used to calculate the score:      Age: 73 years      Sex: Male      Is Non- : No      Diabetic: Yes      Tobacco smoker: No      Systolic Blood Pressure: 118 mmHg      Is BP treated: Yes      HDL Cholesterol: 41 mg/dL      Total Cholesterol: 135 mg/dL           Cardiovascular Testing:    EKG: (personally reviewed tracing)  Sinus bradycardia with first-degree AV block left anterior fascicle, septal infarct.    KIRT in June 2019:  Normal resting KIRT/PVR waveforms bilaterally.  Normal exercise KIRT bilaterally (with minimal drop from resting KIRT  measurements).        2D echocardiogram in June 2019:  Normal left ventricular systolic function. The estimated ejection fraction is 65%  Moderate left atrial enlargement.  Normal LV diastolic function.  Mild right atrial enlargement.  Normal central venous pressure (3 mm Hg).  The estimated PA systolic pressure is 14 mm Hg            2D echocardiogram 2014 done at [facility]    HEIGHT: 167.6 cm  WEIGHT: 74.8 kg  BP: 157/82  BSA:  MEASUREMENTS  ------------  IVSd: 1.2 cm  LVIDd: 3.8 cm  LVPWd: 1.2 cm  LVIDs: 2.6 cm  LA Diam: 3.2 cm  Ao Diam: 3.1 cm  LAESV Index (A-L): 32.40 ml/m²  LVCO Dopp: 6.98 l/min  LVCI Dopp: 3.79 l/minm²    FINDINGS  -------  CPT CODE:67862-48.  Transthoracic echocardiogram (complete).  The attending physician   listed herein personally reviewed all stored images and directly supervised the   fellow-in-training (if listed) in the study's acquistion and/or report   generation.     Study priority:  Routine.  ICD-9 Indication(s):786.05 - Dyspnea.  Study Quality:The study was technically adequate.  ECG Rhythm:Sinus rhythm.  CHAMBER SIZE:All cardiac chamber sizes are within normal limits.  AORTA:Normal aortic root and proximal ascending aorta.  VALVULAR STRUCTURES:The visualized cardiac valves are structurally normal.  LEFT/RIGHT VENTRICULAR FUNCTION:LV size, wall thickness and systolic function are normal, with an EF > 55%.       The right ventricle is normal in size and function.  DOPPLER:  Color:No valvular insufficiencies.  DIASTOLOGY:The diastolic filling pattern is normal for the age of the patient.  PERICARDIUM / EFFUSIONS:No effusions noted.  INFERIOR VENA CAVA:Normal size inferior vena cava.  PA PRESSURE:The estimated systolic pulmonary artery pressure is normal at < 35 mm Hg (RA   pressure = 3 mm Hg).  CARDIOLOGY:    Electronically signed:  STAFF:ELIAS JAIME M.D.  Fellow:G. MOHSEN, M.D.    CONCLUSIONS  -----------  1. LV size, wall thickness and systolic function are normal, with an EF >  55%.  2. The diastolic filling pattern is normal for the age of the patient.    ASSESSMENT AND PLAN     Patient Active Problem List   Diagnosis    Essential hypertension    Controlled type 2 diabetes mellitus with diabetic polyneuropathy, with long-term current use of insulin    Pure hypercholesterolemia    MCTD (mixed connective tissue disease)    Sjogren's disease    Bilateral edema of lower extremity 6/2019 TTE normal LV systolic and diastolic function; ABIs normal    Glaucoma    BMI 23.0-23.9, adult    Jackson's esophagus without dysplasia    Anemia from plaquenil and imuran    Neutropenia    Current chronic use of systemic steroids    Compression fracture of body of thoracic vertebra on XR 2/2019; 2/2019 alendronate    Anemia of chronic renal failure, stage 3b    Chronic constipation not responding to linzess, miralax, senna    Screening for colon cancer 07/26/2018 colonoscopy transverse colon polyp path showing benign inflammatory polyp.    Tophaceous gout    Pseudophakia of both eyes    Refractive error    Aortic atherosclerosis    Celiac disease    Diastolic heart failure, NYHA class 2    Stage 3a chronic kidney disease    Joint pain    Decreased range of motion of both ankles    Decreased range of motion of both wrists    Decreased pinch strength    RA (rheumatoid arthritis)    Decreased  strength    Swelling of both hands    Secondary hyperparathyroidism of renal origin    Osteoporosis    Mobitz I    Bilateral carotid artery stenosis on CTA 6/26/21    History of stroke    Dyspnea on exertion    JAZLYN (obstructive sleep apnea)    Long-term insulin use    Dyshidrotic eczema       1.  Chest pressure:  Now resolved.  Stress test did not show any significant ischemia.  Echo showed normal left ventricle systolic function.    2.  Hypertension:  Well controlled on current therapy.  Continue current therapy    3.  Dyslipidemia:  Tolerating lipitor    4.  ABIs in June 2019 were within normal limits.    5.  Chronic  kidney disease    7. Elevated TSH.   rx per primary    8. Torsemide to be used as needed for swelling of feet.     9. Carotid bruit: moderate plaque per ct scan in 2021. Regular surveillance with carotid us.    10:  Frequent PVCs:  asymptomatic.    Follow-up after 6 m      Thank you very much for involving me in the care of your patient.  Please do not hesitate to contact me if there are any questions.      Greg Alvares MD, FACC, [facility]  Interventional Cardiologist, [facility].           This note was dictated with the help of speech recognition software.  There might be un-intended errors and/or substitutions.

## 2023-03-01 ENCOUNTER — PATIENT MESSAGE (OUTPATIENT)
Dept: ADMINISTRATIVE | Facility: OTHER | Age: 73
End: 2023-03-01
Payer: MEDICARE

## 2023-03-08 ENCOUNTER — SPECIALTY PHARMACY (OUTPATIENT)
Dept: PHARMACY | Facility: CLINIC | Age: 73
End: 2023-03-08
Payer: MEDICARE

## 2023-03-08 NOTE — TELEPHONE ENCOUNTER
Specialty Pharmacy - Refill Coordination    Specialty Medication Orders Linked to Encounter      Flowsheet Row Most Recent Value   Medication #1 abatacept (ORENCIA CLICKJECT) 125 mg/mL AtIn (Order#[id number], Rx#)            Refill Questions - Documented Responses      Flowsheet Row Most Recent Value   Patient Availability and HIPAA Verification    Does patient want to proceed with activity? Yes   HIPAA/medical authority confirmed? Yes   Relationship to patient of person spoken to? Self   Refill Screening Questions    Changes to allergies? No   Changes to medications? No   New conditions since last clinic visit? No   Unplanned office visit, urgent care, ED, or hospital admission in the last 4 weeks? No   How does patient/caregiver feel medication is working? Very good   Financial problems or insurance changes? No   How many doses of your specialty medications were missed in the last 4 weeks? 0   Would patient like to speak to a pharmacist? No   When does the patient need to receive the medication? 03/15/23   Refill Delivery Questions    How will the patient receive the medication? MED[facility]   When does the patient need to receive the medication? 03/15/23   Shipping Address Home   Address in [facility] confirmed and updated if neccessary? Yes   Expected Copay ($) 0   Is the patient able to afford the medication copay? Yes   Payment Method zero copay   Days supply of Refill 28   Supplies needed? No supplies needed   Refill activity completed? Yes   Refill activity plan Refill scheduled   Shipment/Pickup Date: 03/09/23            Current Outpatient Medications   Medication Sig    abatacept (ORENCIA CLICKJECT) 125 mg/mL AtIn Inject 125 mg into the skin once a week.    alcohol swabs (BD ALCOHOL SWABS) PadM USE  4 TIMES PER DAY    amLODIPine (NORVASC) 5 MG tablet Take 1 tablet (5 mg total) by mouth 2 (two) times daily.    ammonium lactate 12 % Crea Apply 1 application topically once daily.    atorvastatin (LIPITOR) 80  "MG tablet TAKE 1 TABLET EVERY DAY    blood glucose control, low (TRUE METRIX LEVEL 1) Soln Use as indicated    BOOSTRIX TDAP 2.5-8-5 Lf-mcg-Lf/0.5mL Syrg injection     cloNIDine 0.2 mg/24 hr td ptwk (CATAPRES) 0.2 mg/24 hr Place 1 patch onto the skin every 7 days.    diclofenac sodium (VOLTAREN) 1 % Gel Apply 2 g topically 4 (four) times daily as needed. Apply to aching joints (Patient not taking: Reported on 2/27/2023)    dorzolamide (TRUSOPT) 2 % ophthalmic solution PLACE 1 DROP INTO BOTH EYES 2 (TWO) TIMES DAILY.    dulaglutide (TRULICITY) 4.5 mg/0.5 mL pen injector Inject 4.5 mg into the skin every 7 days.    febuxostat (ULORIC) 40 mg Tab Take 1 tablet (40 mg total) by mouth once daily.    hydrALAZINE (APRESOLINE) 50 MG tablet Take 1 tablet (50 mg total) by mouth every 8 (eight) hours.    INJECTAFER 50 iron mg/mL injection     insulin (LANTUS SOLOSTAR U-100 INSULIN) glargine 100 units/mL SubQ pen Inject 6 units once daily    insulin aspart U-100 (NOVOLOG FLEXPEN U-100 INSULIN) 100 unit/mL (3 mL) InPn pen Inject 3-6 units three times daily before each meal with sliding scale.    ipratropium (ATROVENT) 42 mcg (0.06 %) nasal spray INSTILL 2 SPRAYS BY NASAL ROUTE 3 TIMES DAILY. AS NEEDED FOR NASAL CONGESTION AND DRIP FOR 7 DAYS STRENGTH: 42 MCG (0.06 %)    lancets (TRUEPLUS LANCETS) 33 gauge Misc Test glucose 4x/day. Dx code e11.65    linaCLOtide (LINZESS) 72 mcg Cap capsule Take 1 capsule (72 mcg total) by mouth before breakfast.    multivit with min-folic acid 200 mcg Chew     omeprazole (PRILOSEC) 20 MG capsule TAKE 2 CAPSULES (40 MG TOTAL) BY MOUTH ONCE DAILY.    pen needle, diabetic (BD ULTRA-FINE RICHARD PEN NEEDLE) 32 gauge x 5/32" Ndle 1 Device by Misc.(Non-Drug; Combo Route) route 4 (four) times daily.    predniSONE (DELTASONE) 5 MG tablet TAKE 1 TABLET BY MOUTH EVERY DAY    PROLIA 60 mg/mL Syrg     torsemide (DEMADEX) 10 MG Tab Take 1 tablet (10 mg total) by mouth 2 (two) times a day.    traMADoL (ULTRAM) 50 " mg tablet TAKE 1 TABLET BY MOUTH EVERY 12 HOURS AS NEEDED FOR PAIN.    travoprost (TRAVATAN Z) 0.004 % ophthalmic solution Place 1 drop into both eyes every evening.    triamcinolone acetonide 0.1% (KENALOG) 0.1 % cream Apply topically 2 (two) times daily. for 10 days    TRUE METRIX GLUCOSE METER Misc USE AS DIRECTED    TRUE METRIX GLUCOSE TEST STRIP Strp TEST GLUCOSE 4 TIMES PER DAY.    valsartan (DIOVAN) 160 MG tablet TAKE 1 TABLET TWICE DAILY   Last reviewed on 2/27/2023  8:59 AM by Greg Alvares MD    Review of patient's allergies indicates:   Allergen Reactions    Penicillins Nausea And Vomiting    Rosuvastatin      Muscle pain     Allopurinol analogues Rash    Last reviewed on  2/27/2023 8:59 AM by Greg Alvares      Tasks added this encounter   4/5/2023 - Refill Call (Auto Added)   Tasks due within next 3 months   5/24/2023 - Clinical - Follow Up Assesement (Annual)     Allie Mares, PharmD  [facility]  [address] [place] [id number]  Phone: [phone]  Fax: [phone]

## 2023-03-14 ENCOUNTER — TELEPHONE (OUTPATIENT)
Dept: PHARMACY | Facility: CLINIC | Age: 73
End: 2023-03-14
Payer: MEDICARE

## 2023-03-29 ENCOUNTER — OFFICE VISIT (OUTPATIENT)
Dept: PODIATRY | Facility: CLINIC | Age: 73
End: 2023-03-29
Payer: MEDICARE

## 2023-03-29 ENCOUNTER — OFFICE VISIT (OUTPATIENT)
Dept: NEPHROLOGY | Facility: CLINIC | Age: 73
End: 2023-03-29
Payer: MEDICARE

## 2023-03-29 VITALS
BODY MASS INDEX: 23.4 KG/M2 | SYSTOLIC BLOOD PRESSURE: 130 MMHG | DIASTOLIC BLOOD PRESSURE: 80 MMHG | HEART RATE: 78 BPM | WEIGHT: 145 LBS | OXYGEN SATURATION: 98 %

## 2023-03-29 VITALS — HEIGHT: 66 IN | BODY MASS INDEX: 23.46 KG/M2 | WEIGHT: 145.94 LBS

## 2023-03-29 DIAGNOSIS — N18.31 STAGE 3A CHRONIC KIDNEY DISEASE: ICD-10-CM

## 2023-03-29 DIAGNOSIS — I10 ESSENTIAL HYPERTENSION: Primary | ICD-10-CM

## 2023-03-29 DIAGNOSIS — E11.49 TYPE II DIABETES MELLITUS WITH NEUROLOGICAL MANIFESTATIONS: Primary | ICD-10-CM

## 2023-03-29 DIAGNOSIS — M20.40 HAMMER TOE, UNSPECIFIED LATERALITY: ICD-10-CM

## 2023-03-29 PROCEDURE — 99999 PR PBB SHADOW E&M-EST. PATIENT-LVL IV: ICD-10-PCS | Mod: PBBFAC,HCNC,, | Performed by: INTERNAL MEDICINE

## 2023-03-29 PROCEDURE — 3008F BODY MASS INDEX DOCD: CPT | Mod: HCNC,CPTII,S$GLB, | Performed by: INTERNAL MEDICINE

## 2023-03-29 PROCEDURE — 3066F NEPHROPATHY DOC TX: CPT | Mod: HCNC,CPTII,S$GLB, | Performed by: INTERNAL MEDICINE

## 2023-03-29 PROCEDURE — 3072F LOW RISK FOR RETINOPATHY: CPT | Mod: HCNC,CPTII,S$GLB, | Performed by: INTERNAL MEDICINE

## 2023-03-29 PROCEDURE — 1159F MED LIST DOCD IN RCRD: CPT | Mod: HCNC,CPTII,S$GLB, | Performed by: INTERNAL MEDICINE

## 2023-03-29 PROCEDURE — 99999 PR PBB SHADOW E&M-EST. PATIENT-LVL IV: CPT | Mod: PBBFAC,HCNC,, | Performed by: INTERNAL MEDICINE

## 2023-03-29 PROCEDURE — 99214 PR OFFICE/OUTPT VISIT, EST, LEVL IV, 30-39 MIN: ICD-10-PCS | Mod: HCNC,S$GLB,, | Performed by: PODIATRIST

## 2023-03-29 PROCEDURE — 99215 OFFICE O/P EST HI 40 MIN: CPT | Mod: HCNC,S$GLB,, | Performed by: INTERNAL MEDICINE

## 2023-03-29 PROCEDURE — 3079F PR MOST RECENT DIASTOLIC BLOOD PRESSURE 80-89 MM HG: ICD-10-PCS | Mod: HCNC,CPTII,S$GLB, | Performed by: INTERNAL MEDICINE

## 2023-03-29 PROCEDURE — 1101F PT FALLS ASSESS-DOCD LE1/YR: CPT | Mod: HCNC,CPTII,S$GLB, | Performed by: PODIATRIST

## 2023-03-29 PROCEDURE — 3044F PR MOST RECENT HEMOGLOBIN A1C LEVEL <7.0%: ICD-10-PCS | Mod: HCNC,CPTII,S$GLB, | Performed by: PODIATRIST

## 2023-03-29 PROCEDURE — 1126F PR PAIN SEVERITY QUANTIFIED, NO PAIN PRESENT: ICD-10-PCS | Mod: HCNC,CPTII,S$GLB, | Performed by: PODIATRIST

## 2023-03-29 PROCEDURE — 1125F AMNT PAIN NOTED PAIN PRSNT: CPT | Mod: HCNC,CPTII,S$GLB, | Performed by: INTERNAL MEDICINE

## 2023-03-29 PROCEDURE — 3072F LOW RISK FOR RETINOPATHY: CPT | Mod: HCNC,CPTII,S$GLB, | Performed by: PODIATRIST

## 2023-03-29 PROCEDURE — 3066F PR DOCUMENTATION OF TREATMENT FOR NEPHROPATHY: ICD-10-PCS | Mod: HCNC,CPTII,S$GLB, | Performed by: INTERNAL MEDICINE

## 2023-03-29 PROCEDURE — 1101F PT FALLS ASSESS-DOCD LE1/YR: CPT | Mod: HCNC,CPTII,S$GLB, | Performed by: INTERNAL MEDICINE

## 2023-03-29 PROCEDURE — 4010F ACE/ARB THERAPY RXD/TAKEN: CPT | Mod: HCNC,CPTII,S$GLB, | Performed by: INTERNAL MEDICINE

## 2023-03-29 PROCEDURE — 99999 PR PBB SHADOW E&M-EST. PATIENT-LVL V: CPT | Mod: PBBFAC,HCNC,, | Performed by: PODIATRIST

## 2023-03-29 PROCEDURE — 1101F PR PT FALLS ASSESS DOC 0-1 FALLS W/OUT INJ PAST YR: ICD-10-PCS | Mod: HCNC,CPTII,S$GLB, | Performed by: PODIATRIST

## 2023-03-29 PROCEDURE — 4010F PR ACE/ARB THEARPY RXD/TAKEN: ICD-10-PCS | Mod: HCNC,CPTII,S$GLB, | Performed by: INTERNAL MEDICINE

## 2023-03-29 PROCEDURE — 3288F FALL RISK ASSESSMENT DOCD: CPT | Mod: HCNC,CPTII,S$GLB, | Performed by: PODIATRIST

## 2023-03-29 PROCEDURE — 3044F HG A1C LEVEL LT 7.0%: CPT | Mod: HCNC,CPTII,S$GLB, | Performed by: PODIATRIST

## 2023-03-29 PROCEDURE — 3008F BODY MASS INDEX DOCD: CPT | Mod: HCNC,CPTII,S$GLB, | Performed by: PODIATRIST

## 2023-03-29 PROCEDURE — 1101F PR PT FALLS ASSESS DOC 0-1 FALLS W/OUT INJ PAST YR: ICD-10-PCS | Mod: HCNC,CPTII,S$GLB, | Performed by: INTERNAL MEDICINE

## 2023-03-29 PROCEDURE — 3079F DIAST BP 80-89 MM HG: CPT | Mod: HCNC,CPTII,S$GLB, | Performed by: INTERNAL MEDICINE

## 2023-03-29 PROCEDURE — 99215 PR OFFICE/OUTPT VISIT, EST, LEVL V, 40-54 MIN: ICD-10-PCS | Mod: HCNC,S$GLB,, | Performed by: INTERNAL MEDICINE

## 2023-03-29 PROCEDURE — 4010F ACE/ARB THERAPY RXD/TAKEN: CPT | Mod: HCNC,CPTII,S$GLB, | Performed by: PODIATRIST

## 2023-03-29 PROCEDURE — 1159F PR MEDICATION LIST DOCUMENTED IN MEDICAL RECORD: ICD-10-PCS | Mod: HCNC,CPTII,S$GLB, | Performed by: INTERNAL MEDICINE

## 2023-03-29 PROCEDURE — 4010F PR ACE/ARB THEARPY RXD/TAKEN: ICD-10-PCS | Mod: HCNC,CPTII,S$GLB, | Performed by: PODIATRIST

## 2023-03-29 PROCEDURE — 3044F PR MOST RECENT HEMOGLOBIN A1C LEVEL <7.0%: ICD-10-PCS | Mod: HCNC,CPTII,S$GLB, | Performed by: INTERNAL MEDICINE

## 2023-03-29 PROCEDURE — 3072F PR LOW RISK FOR RETINOPATHY: ICD-10-PCS | Mod: HCNC,CPTII,S$GLB, | Performed by: PODIATRIST

## 2023-03-29 PROCEDURE — 1159F MED LIST DOCD IN RCRD: CPT | Mod: HCNC,CPTII,S$GLB, | Performed by: PODIATRIST

## 2023-03-29 PROCEDURE — 3288F PR FALLS RISK ASSESSMENT DOCUMENTED: ICD-10-PCS | Mod: HCNC,CPTII,S$GLB, | Performed by: PODIATRIST

## 2023-03-29 PROCEDURE — 1159F PR MEDICATION LIST DOCUMENTED IN MEDICAL RECORD: ICD-10-PCS | Mod: HCNC,CPTII,S$GLB, | Performed by: PODIATRIST

## 2023-03-29 PROCEDURE — 3288F PR FALLS RISK ASSESSMENT DOCUMENTED: ICD-10-PCS | Mod: HCNC,CPTII,S$GLB, | Performed by: INTERNAL MEDICINE

## 2023-03-29 PROCEDURE — 3288F FALL RISK ASSESSMENT DOCD: CPT | Mod: HCNC,CPTII,S$GLB, | Performed by: INTERNAL MEDICINE

## 2023-03-29 PROCEDURE — 1126F AMNT PAIN NOTED NONE PRSNT: CPT | Mod: HCNC,CPTII,S$GLB, | Performed by: PODIATRIST

## 2023-03-29 PROCEDURE — 3075F SYST BP GE 130 - 139MM HG: CPT | Mod: HCNC,CPTII,S$GLB, | Performed by: INTERNAL MEDICINE

## 2023-03-29 PROCEDURE — 3072F PR LOW RISK FOR RETINOPATHY: ICD-10-PCS | Mod: HCNC,CPTII,S$GLB, | Performed by: INTERNAL MEDICINE

## 2023-03-29 PROCEDURE — 1125F PR PAIN SEVERITY QUANTIFIED, PAIN PRESENT: ICD-10-PCS | Mod: HCNC,CPTII,S$GLB, | Performed by: INTERNAL MEDICINE

## 2023-03-29 PROCEDURE — 3075F PR MOST RECENT SYSTOLIC BLOOD PRESS GE 130-139MM HG: ICD-10-PCS | Mod: HCNC,CPTII,S$GLB, | Performed by: INTERNAL MEDICINE

## 2023-03-29 PROCEDURE — 3044F HG A1C LEVEL LT 7.0%: CPT | Mod: HCNC,CPTII,S$GLB, | Performed by: INTERNAL MEDICINE

## 2023-03-29 PROCEDURE — 99999 PR PBB SHADOW E&M-EST. PATIENT-LVL V: ICD-10-PCS | Mod: PBBFAC,HCNC,, | Performed by: PODIATRIST

## 2023-03-29 PROCEDURE — 99214 OFFICE O/P EST MOD 30 MIN: CPT | Mod: HCNC,S$GLB,, | Performed by: PODIATRIST

## 2023-03-29 PROCEDURE — 3008F PR BODY MASS INDEX (BMI) DOCUMENTED: ICD-10-PCS | Mod: HCNC,CPTII,S$GLB, | Performed by: INTERNAL MEDICINE

## 2023-03-29 PROCEDURE — 3008F PR BODY MASS INDEX (BMI) DOCUMENTED: ICD-10-PCS | Mod: HCNC,CPTII,S$GLB, | Performed by: PODIATRIST

## 2023-03-29 RX ORDER — FERROUS GLUCONATE 324(37.5)
324 TABLET ORAL DAILY
Qty: 90 TABLET | Refills: 0 | Status: SHIPPED | OUTPATIENT
Start: 2023-03-29 | End: 2023-11-29

## 2023-03-29 RX ORDER — DOCUSATE SODIUM 100 MG/1
100 CAPSULE, LIQUID FILLED ORAL 2 TIMES DAILY
Qty: 180 CAPSULE | Refills: 0 | Status: SHIPPED | OUTPATIENT
Start: 2023-03-29 | End: 2023-06-27

## 2023-03-29 NOTE — PROGRESS NOTES
Subjective:      Patient ID: Darrion Bennett is a 73 y.o. male.    Chief Complaint: Diabetes Mellitus and Diabetic Foot Exam (2/27/23 [facility])    Darrion is a 73 y.o. male who presents to the clinic upon referral from Dr. Nikky issa. provider found  for evaluation and treatment of diabetic feet. Darrion has a past medical history of Anemia, Arthritis, Cataract, CHF (congestive heart failure), Chronic constipation, Diabetes mellitus, type 2, Felon of finger of left hand (05/11/2019), Glaucoma, Hyperlipidemia (05/13/2014), Hypertension, MCTD (mixed connective tissue disease), Personal history of colonic polyps, Sjogren's disease, Ulcerative colitis, and Urolithiasis. Presents for diabetic foot risk assessment.       PCP: Farooq Domingo MD    Date Last Seen by PCP:   Chief Complaint   Patient presents with    Diabetes Mellitus    Diabetic Foot Exam     2/27/23 [facility]       Current shoe gear:  rx shoes    Hemoglobin A1C   Date Value Ref Range Status   01/27/2023 6.8 (H) 4.0 - 5.6 % Final     Comment:     ADA Screening Guidelines:  5.7-6.4%  Consistent with prediabetes  >or=6.5%  Consistent with diabetes    High levels of fetal hemoglobin interfere with the HbA1C  assay. Heterozygous hemoglobin variants (HbS, HgC, etc)do  not significantly interfere with this assay.   However, presence of multiple variants may affect accuracy.     10/25/2022 6.5 (H) 4.0 - 5.6 % Final     Comment:     ADA Screening Guidelines:  5.7-6.4%  Consistent with prediabetes  >or=6.5%  Consistent with diabetes    High levels of fetal hemoglobin interfere with the HbA1C  assay. Heterozygous hemoglobin variants (HbS, HgC, etc)do  not significantly interfere with this assay.   However, presence of multiple variants may affect accuracy.     07/05/2022 6.4 (H) 4.0 - 5.6 % Final     Comment:     ADA Screening Guidelines:  5.7-6.4%  Consistent with prediabetes  >or=6.5%  Consistent with diabetes    High levels of fetal hemoglobin interfere with the  HbA1C  assay. Heterozygous hemoglobin variants (HbS, HgC, etc)do  not significantly interfere with this assay.   However, presence of multiple variants may affect accuracy.     10/16/2018 6.2 (H) 4.0 - 5.7 % Final     Comment:     Dr. Jurgen Ward ( Endocrinology )     Patient Active Problem List   Diagnosis    Essential hypertension    Controlled type 2 diabetes mellitus with diabetic polyneuropathy, with long-term current use of insulin    Pure hypercholesterolemia    MCTD (mixed connective tissue disease)    Sjogren's disease    Bilateral edema of lower extremity 6/2019 TTE normal LV systolic and diastolic function; ABIs normal    Glaucoma    BMI 23.0-23.9, adult    Jackson's esophagus without dysplasia    Anemia from plaquenil and imuran    Neutropenia    Current chronic use of systemic steroids    Compression fracture of body of thoracic vertebra on XR 2/2019; 2/2019 alendronate    Anemia of chronic renal failure, stage 3b    Chronic constipation not responding to linzess, miralax, senna    Screening for colon cancer 07/26/2018 colonoscopy transverse colon polyp path showing benign inflammatory polyp.    Tophaceous gout    Pseudophakia of both eyes    Refractive error    Aortic atherosclerosis    Celiac disease    Diastolic heart failure, NYHA class 2    Stage 3a chronic kidney disease    Joint pain    Decreased range of motion of both ankles    Decreased range of motion of both wrists    Decreased pinch strength    RA (rheumatoid arthritis)    Decreased  strength    Swelling of both hands    Secondary hyperparathyroidism of renal origin    Osteoporosis    Mobitz I    Bilateral carotid artery stenosis on CTA 6/26/21    History of stroke    Dyspnea on exertion    JAZLYN (obstructive sleep apnea)    Long-term insulin use    Dyshidrotic eczema     Current Outpatient Medications on File Prior to Visit   Medication Sig Dispense Refill    abatacept (ORENCIA CLICKJECT) 125 mg/mL AtIn Inject 125 mg into the skin  once a week. 4 mL 11    alcohol swabs (BD ALCOHOL SWABS) PadM USE  4 TIMES PER  each 3    amLODIPine (NORVASC) 5 MG tablet TAKE 1 TABLET (5 MG TOTAL) BY MOUTH 2 (TWO) TIMES DAILY. 180 tablet 1    ammonium lactate 12 % Crea Apply 1 application topically once daily. 140 g 5    atorvastatin (LIPITOR) 80 MG tablet TAKE 1 TABLET EVERY DAY 90 tablet 1    blood glucose control, low (TRUE METRIX LEVEL 1) Soln Use as indicated 1 each 0    BOOSTRIX TDAP 2.5-8-5 Lf-mcg-Lf/0.5mL Syrg injection       cloNIDine 0.2 mg/24 hr td ptwk (CATAPRES) 0.2 mg/24 hr Place 1 patch onto the skin every 7 days. 12 patch 3    diclofenac sodium (VOLTAREN) 1 % Gel Apply 2 g topically 4 (four) times daily as needed. Apply to aching joints 100 g 3    dorzolamide (TRUSOPT) 2 % ophthalmic solution PLACE 1 DROP INTO BOTH EYES 2 (TWO) TIMES DAILY. 10 mL 3    dulaglutide (TRULICITY) 4.5 mg/0.5 mL pen injector Inject 4.5 mg into the skin every 7 days. 4 pen 3    febuxostat (ULORIC) 40 mg Tab Take 1 tablet (40 mg total) by mouth once daily. 30 tablet 11    hydrALAZINE (APRESOLINE) 50 MG tablet Take 1 tablet (50 mg total) by mouth every 8 (eight) hours. 90 tablet 11    INJECTAFER 50 iron mg/mL injection       insulin (LANTUS SOLOSTAR U-100 INSULIN) glargine 100 units/mL SubQ pen Inject 6 units once daily 15 mL 2    insulin aspart U-100 (NOVOLOG FLEXPEN U-100 INSULIN) 100 unit/mL (3 mL) InPn pen Inject 3-6 units three times daily before each meal with sliding scale. 30 mL 2    ipratropium (ATROVENT) 42 mcg (0.06 %) nasal spray INSTILL 2 SPRAYS BY NASAL ROUTE 3 TIMES DAILY. AS NEEDED FOR NASAL CONGESTION AND DRIP FOR 7 DAYS STRENGTH: 42 MCG (0.06 %) 45 mL 3    lancets (TRUEPLUS LANCETS) 33 gauge Misc Test glucose 4x/day. Dx code e11.65 400 each 3    linaCLOtide (LINZESS) 72 mcg Cap capsule Take 1 capsule (72 mcg total) by mouth before breakfast. 90 capsule 3    multivit with min-folic acid 200 mcg Chew       pen needle, diabetic (BD ULTRA-FINE RICHARD PEN  "NEEDLE) 32 gauge x 5/32" Ndle 1 Device by Misc.(Non-Drug; Combo Route) route 4 (four) times daily. 400 each 3    predniSONE (DELTASONE) 5 MG tablet TAKE 1 TABLET BY MOUTH EVERY DAY 90 tablet 3    PROLIA 60 mg/mL Syrg       torsemide (DEMADEX) 10 MG Tab Take 1 tablet (10 mg total) by mouth 2 (two) times a day. 45 tablet 1    traMADoL (ULTRAM) 50 mg tablet TAKE 1 TABLET BY MOUTH EVERY 12 HOURS AS NEEDED FOR PAIN. 60 tablet 0    travoprost (TRAVATAN Z) 0.004 % ophthalmic solution Place 1 drop into both eyes every evening. 3 each 4    TRUE METRIX GLUCOSE METER Misc USE AS DIRECTED 1 each 0    TRUE METRIX GLUCOSE TEST STRIP Strp TEST GLUCOSE 4 TIMES PER DAY. 400 strip 3    valsartan (DIOVAN) 160 MG tablet TAKE 1 TABLET TWICE DAILY 180 tablet 3    omeprazole (PRILOSEC) 20 MG capsule TAKE 2 CAPSULES (40 MG TOTAL) BY MOUTH ONCE DAILY. 180 capsule 3    triamcinolone acetonide 0.1% (KENALOG) 0.1 % cream Apply topically 2 (two) times daily. for 10 days 45 g 1     No current facility-administered medications on file prior to visit.     Review of patient's allergies indicates:   Allergen Reactions    Penicillins Nausea And Vomiting    Rosuvastatin      Muscle pain     Allopurinol analogues Rash        Past Surgical History:   Procedure Laterality Date    CATARACT EXTRACTION Bilateral 2005    COLONOSCOPY  2014    EYE SURGERY       Family History   Problem Relation Age of Onset    Hypertension Father     Stroke Father     Cataracts Father     Glaucoma Father     Cataracts Mother     No Known Problems Sister     No Known Problems Brother     Rheum arthritis Maternal Aunt     Rheum arthritis Maternal Uncle     No Known Problems Paternal Aunt     No Known Problems Paternal Uncle     No Known Problems Maternal Grandmother     No Known Problems Maternal Grandfather     Throat cancer Paternal Grandmother     Glaucoma Paternal Grandfather     No Known Problems Daughter     No Known Problems Son     Celiac disease Neg Hx     Colon cancer " Neg Hx     Cirrhosis Neg Hx     Colon polyps Neg Hx     Crohn's disease Neg Hx     Cystic fibrosis Neg Hx     Hemochromatosis Neg Hx     Esophageal cancer Neg Hx     Inflammatory bowel disease Neg Hx     Irritable bowel syndrome Neg Hx     Liver cancer Neg Hx     Liver disease Neg Hx     Rectal cancer Neg Hx     Stomach cancer Neg Hx     Ulcerative colitis Neg Hx     Chase's disease Neg Hx     Amblyopia Neg Hx     Blindness Neg Hx     Cancer Neg Hx     Diabetes Neg Hx     Macular degeneration Neg Hx     Retinal detachment Neg Hx     Strabismus Neg Hx     Thyroid disease Neg Hx     Melanoma Neg Hx      Social History     Socioeconomic History    Marital status:     Number of children: 3   Tobacco Use    Smoking status: Never     Passive exposure: Never    Smokeless tobacco: Never   Substance and Sexual Activity    Alcohol use: No    Drug use: Yes     Comment: ultram 1 - 2 tabs a week    Sexual activity: Not Currently     Partners: Female     Social Determinants of Health     Financial Resource Strain: Unknown    Difficulty of Paying Living Expenses: Patient refused   Food Insecurity: Unknown    Worried About Running Out of Food in the Last Year: Patient refused    Ran Out of Food in the Last Year: Patient refused   Transportation Needs: No Transportation Needs    Lack of Transportation (Medical): No    Lack of Transportation (Non-Medical): No   Physical Activity: Insufficiently Active    Days of Exercise per Week: 2 days    Minutes of Exercise per Session: 10 min   Stress: No Stress Concern Present    Feeling of Stress : Not at all   Social Connections: Unknown    Frequency of Communication with Friends and Family: Three times a week    Frequency of Social Gatherings with Friends and Family: Once a week    Active Member of Clubs or Organizations: Yes    Attends Club or Organization Meetings: 1 to 4 times per year    Marital Status:    Housing Stability: Low Risk     Unable to Pay for Housing in the  "Last Year: No    Number of Places Lived in the Last Year: 1    Unstable Housing in the Last Year: No     Review of Systems   Constitutional: Negative for chills, fever and malaise/fatigue.   Cardiovascular:  Positive for leg swelling. Negative for chest pain and claudication.   Respiratory:  Negative for cough and shortness of breath.    Skin:  Positive for dry skin and nail changes. Negative for itching and rash.   Musculoskeletal:  Positive for arthritis, back pain and joint pain. Negative for falls, joint swelling and muscle weakness.   Gastrointestinal:  Negative for diarrhea, nausea and vomiting.   Neurological:  Negative for numbness, paresthesias, tremors and weakness.   Psychiatric/Behavioral:  Negative for altered mental status and hallucinations.        Objective:       Vitals:    03/29/23 0848   Weight: 66.2 kg (145 lb 15.1 oz)   Height: 5' 6" (1.676 m)   PainSc: 0-No pain       Physical Exam  Vitals and nursing note reviewed.   Constitutional:       Appearance: He is not diaphoretic.      Comments: General: Pt. is well-developed, well-nourished, appears stated age, in no acute distress, alert and oriented x 3. No evidence of depression, anxiety, or agitation. Calm, cooperative, and communicative. Appropriate interactions and affect.       Cardiovascular:      Pulses:           Dorsalis pedis pulses are 2+ on the right side and 2+ on the left side.        Posterior tibial pulses are 1+ on the right side and 1+ on the left side.      Comments: There is decreased digital hair. Skin is atrophic  Musculoskeletal:      Right ankle: No swelling. No tenderness. Normal range of motion.      Right Achilles Tendon: No defects.      Left ankle: No swelling. No tenderness. Normal range of motion.      Left Achilles Tendon: No defects.      Right foot: Normal range of motion. No tenderness.      Left foot: Normal range of motion. No tenderness.      Comments: There is equinus deformity bilateral with decreased " dorsiflexion at the ankle joint bilateral.     Decreased first MPJ range of motion both weightbearing and nonweightbearing, no crepitus observed the first MP joint, + dorsal flag sign. Mild  bunion deformity is observed .    Patient has hammertoes of digits 2-5 bilateral partially reducible without symptom today.    Fat pad atrophy to heels and met heads bilateral         Skin:     General: Skin is warm and dry.      Coloration: Skin is not pale.      Findings: No abrasion, ecchymosis, erythema, lesion or rash.      Nails: There is no clubbing.      Comments: Toenails 1-5 of the left foot are thickened by 2-3 mm, discolored/yellowed, dystrophic, brittle with subungual debris    Interdigital Spaces clean, dry and without evidence of break in skin integrity  Xerosis noted B/L    Neurological:      Sensory: Sensory deficit present.      Comments: F[place]-Kenneyd 5.07 monofilamant testing is diminished Fareed feet. Sharp/dull sensation diminished Bilaterally. Light touch absent Bilaterally.       Psychiatric:         Speech: Speech normal.             Assessment:       Encounter Diagnoses   Name Primary?    Type II diabetes mellitus with neurological manifestations Yes    Hammer toe, unspecified laterality          Plan:       Darrion was seen today for diabetes mellitus and diabetic foot exam.    Diagnoses and all orders for this visit:    Type II diabetes mellitus with neurological manifestations  -     DIABETIC SHOES FOR HOME USE    Hammer toe, unspecified laterality  -     DIABETIC SHOES FOR HOME USE    I counseled the patient on his conditions, their implications and medical management.    - Shoe inspection. Diabetic Foot Education. Patient reminded of the importance of good nutrition and blood sugar control to help prevent podiatric complications of diabetes. Patient instructed on proper foot hygeine. We discussed wearing proper shoe gear, daily foot inspections, never walking without protective shoe gear, caution  putting sharp instruments to feet     - Discussed DM foot care:  Wear comfortable, proper fitting shoes. Wash feet daily. Dry well. After drying, apply moisturizer to feet (no lotion to webspaces). Inspect feet daily for skin breaks, blisters, swelling, or redness. Wear cotton socks (preferably white)  Change socks every day. Do NOT walk barefoot. Do NOT use heating pads or warm/hot water soaks       Rx diabetic shoes with custom molded inserts to be worn at all times while ambulating. Prescription provided with list of local retailers.     F/u one year DM foot exam sooner PRN.

## 2023-03-29 NOTE — PROGRESS NOTES
"CHIEF COMPLAINT/HPI: Darrion is a 73 y.o. man with PMH of HTN , DM , Sjogrna disease HFpEF , CKD   Was following with Dr. Vizcaino   First saw him 4/2022, here for follow up.     He is shivering today and stated " I am always cold "     Past Medical History:   Diagnosis Date    Anemia     Arthritis     Cataract     CHF (congestive heart failure)     Chronic constipation     Diabetes mellitus, type 2     Felon of finger of left hand 05/11/2019    Glaucoma     Hyperlipidemia 05/13/2014    Hypertension     MCTD (mixed connective tissue disease)     Personal history of colonic polyps     Sjogren's disease     Ulcerative colitis     Urolithiasis        Darrion reports that he has never smoked. He has never been exposed to tobacco smoke. He has never used smokeless tobacco. He reports current drug use. He reports that he does not drink alcohol.    Family History   Problem Relation Age of Onset    Hypertension Father     Stroke Father     Cataracts Father     Glaucoma Father     Cataracts Mother     No Known Problems Sister     No Known Problems Brother     Rheum arthritis Maternal Aunt     Rheum arthritis Maternal Uncle     No Known Problems Paternal Aunt     No Known Problems Paternal Uncle     No Known Problems Maternal Grandmother     No Known Problems Maternal Grandfather     Throat cancer Paternal Grandmother     Glaucoma Paternal Grandfather     No Known Problems Daughter     No Known Problems Son     Celiac disease Neg Hx     Colon cancer Neg Hx     Cirrhosis Neg Hx     Colon polyps Neg Hx     Crohn's disease Neg Hx     Cystic fibrosis Neg Hx     Hemochromatosis Neg Hx     Esophageal cancer Neg Hx     Inflammatory bowel disease Neg Hx     Irritable bowel syndrome Neg Hx     Liver cancer Neg Hx     Liver disease Neg Hx     Rectal cancer Neg Hx     Stomach cancer Neg Hx     Ulcerative colitis Neg Hx     Chase's disease Neg Hx     Amblyopia Neg Hx     Blindness Neg Hx     Cancer Neg Hx     Diabetes Neg Hx     Macular " degeneration Neg Hx     Retinal detachment Neg Hx     Strabismus Neg Hx     Thyroid disease Neg Hx     Melanoma Neg Hx        ROS:    Review of Systems   Constitutional:  Negative for activity change, appetite change, chills, fever and unexpected weight change.   HENT:  Negative for congestion, ear discharge, hearing loss, nosebleeds, sore throat and tinnitus.    Eyes:  Negative for photophobia, pain, redness and visual disturbance.   Respiratory:  Negative for cough, chest tightness, shortness of breath and wheezing.    Cardiovascular:  Negative for chest pain, palpitations and leg swelling.   Gastrointestinal:  Negative for abdominal distention, constipation, diarrhea, nausea and vomiting.   Endocrine: Negative for cold intolerance, heat intolerance, polydipsia and polyuria.   Genitourinary:  Negative for decreased urine volume, difficulty urinating, dysuria, flank pain and hematuria.   Musculoskeletal:  Negative for arthralgias, gait problem, joint swelling and neck stiffness.   Skin:  Negative for rash.   Neurological:  Negative for dizziness, tremors, weakness, numbness and headaches.   Psychiatric/Behavioral:  Negative for confusion and sleep disturbance.        PE:    Vitals:    03/29/23 1441   BP: 130/80   Pulse: 78   SpO2: 98%   Weight: 65.8 kg (145 lb)         Physical Exam  Constitutional:       General: He is not in acute distress.     Appearance: Normal appearance. He is normal weight.   HENT:      Head: Normocephalic and atraumatic.      Nose: Nose normal.      Mouth/Throat:      Mouth: Mucous membranes are moist.      Pharynx: Oropharynx is clear. No oropharyngeal exudate or posterior oropharyngeal erythema.   Eyes:      Extraocular Movements: Extraocular movements intact.      Conjunctiva/sclera: Conjunctivae normal.      Pupils: Pupils are equal, round, and reactive to light.   Cardiovascular:      Rate and Rhythm: Normal rate and regular rhythm.      Pulses: Normal pulses.      Heart sounds: Normal  heart sounds. No murmur heard.    No friction rub. No gallop.   Pulmonary:      Effort: Pulmonary effort is normal. No respiratory distress.      Breath sounds: Normal breath sounds. No stridor. No wheezing or rales.   Abdominal:      General: Abdomen is flat. Bowel sounds are normal.      Palpations: Abdomen is soft.      Tenderness: There is no abdominal tenderness. There is no guarding or rebound.   Musculoskeletal:         General: No swelling. Normal range of motion.      Cervical back: Normal range of motion and neck supple.      Right lower leg: No edema.      Left lower leg: No edema.   Skin:     General: Skin is warm.      Coloration: Skin is not jaundiced or pale.      Findings: No lesion.   Neurological:      General: No focal deficit present.      Mental Status: He is alert and oriented to person, place, and time.      Motor: No weakness.   Psychiatric:         Mood and Affect: Mood normal.       Impression/Plan:    No diagnosis found.  # CKD3a: in patient with HTN , diastolic dysfunction, DM and MCTD   Counseled on BP and glycemic control   Counseled on avoiding NSAID's ( not taking)      Lab Results   Component Value Date    CREATININE 1.4 02/15/2023     Protein Creatinine Ratios:    Prot/Creat Ratio, Urine   Date Value Ref Range Status   04/22/2022 1.37 (H) 0.00 - 0.20 Final   11/29/2021 2.00 (H) 0.00 - 0.20 Final   04/28/2021 0.88 (H) 0.00 - 0.20 Final         Acid-Base:   Lab Results   Component Value Date     02/15/2023    K 4.4 02/15/2023    CO2 22 (L) 02/15/2023     #HTN: Blood pressures , borderline , he on multiple ( not max dose ) BP medication. Cont valsartan, torsemide, hydralazine, clonidine patch, amlodipine.    # Renal osteodystrophy: PTH   Lab Results   Component Value Date    .0 (H) 09/26/2022    CALCIUM 9.3 02/15/2023    PHOS 2.4 (L) 09/26/2022       # Anemia:   Lab Results   Component Value Date    HGB 11.4 (L) 10/25/2022    He is shivering ?FABIOLA , will start po iron.and  due to constipation will add colace.    # DM:  Last HbA1C   Lab Results   Component Value Date    HGBA1C 6.8 (H) 01/27/2023       # Lipid management:   Lab Results   Component Value Date    LDLCALC 80.0 07/05/2022       # ESRD planing: none    Follow up in 4-6 m

## 2023-04-04 ENCOUNTER — PATIENT MESSAGE (OUTPATIENT)
Dept: RHEUMATOLOGY | Facility: CLINIC | Age: 73
End: 2023-04-04
Payer: MEDICARE

## 2023-04-05 ENCOUNTER — SPECIALTY PHARMACY (OUTPATIENT)
Dept: PHARMACY | Facility: CLINIC | Age: 73
End: 2023-04-05
Payer: MEDICARE

## 2023-04-05 DIAGNOSIS — M79.642 BILATERAL HAND PAIN: ICD-10-CM

## 2023-04-05 DIAGNOSIS — M25.551 BILATERAL HIP PAIN: Primary | ICD-10-CM

## 2023-04-05 DIAGNOSIS — M25.552 BILATERAL HIP PAIN: Primary | ICD-10-CM

## 2023-04-05 DIAGNOSIS — M79.641 BILATERAL HAND PAIN: ICD-10-CM

## 2023-04-05 NOTE — TELEPHONE ENCOUNTER
Specialty Pharmacy - Refill Coordination    Specialty Medication Orders Linked to Encounter      Flowsheet Row Most Recent Value   Medication #1 abatacept (ORENCIA CLICKJECT) 125 mg/mL AtIn (Order#[id number], Rx#[id number])            Refill Questions - Documented Responses      Flowsheet Row Most Recent Value   Patient Availability and HIPAA Verification    Does patient want to proceed with activity? Yes   HIPAA/medical authority confirmed? Yes   Relationship to patient of person spoken to? Spouse/Significant Other   Refill Screening Questions    Changes to allergies? No   Changes to medications? No   New conditions since last clinic visit? No   Unplanned office visit, urgent care, ED, or hospital admission in the last 4 weeks? No   How does patient/caregiver feel medication is working? Good   Financial problems or insurance changes? No   How many doses of your specialty medications were missed in the last 4 weeks? 0   Would patient like to speak to a pharmacist? No   When does the patient need to receive the medication? 04/12/23   Refill Delivery Questions    How will the patient receive the medication? MED[facility]   When does the patient need to receive the medication? 04/12/23   Shipping Address Home   Address in [facility] confirmed and updated if neccessary? Yes   Expected Copay ($) 0   Is the patient able to afford the medication copay? Yes   Payment Method zero copay   Days supply of Refill 28   Supplies needed? No supplies needed   Refill activity completed? Yes   Refill activity plan Refill scheduled   Shipment/Pickup Date: 04/10/23            Current Outpatient Medications   Medication Sig    abatacept (ORENCIA CLICKJECT) 125 mg/mL AtIn Inject 125 mg into the skin once a week.    alcohol swabs (BD ALCOHOL SWABS) PadM USE  4 TIMES PER DAY    amLODIPine (NORVASC) 5 MG tablet TAKE 1 TABLET (5 MG TOTAL) BY MOUTH 2 (TWO) TIMES DAILY.    ammonium lactate 12 % Crea Apply 1 application topically once daily.     "atorvastatin (LIPITOR) 80 MG tablet TAKE 1 TABLET EVERY DAY    blood glucose control, low (TRUE METRIX LEVEL 1) Soln Use as indicated    BOOSTRIX TDAP 2.5-8-5 Lf-mcg-Lf/0.5mL Syrg injection     cloNIDine 0.2 mg/24 hr td ptwk (CATAPRES) 0.2 mg/24 hr Place 1 patch onto the skin every 7 days.    diclofenac sodium (VOLTAREN) 1 % Gel Apply 2 g topically 4 (four) times daily as needed. Apply to aching joints    docusate sodium (COLACE) 100 MG capsule Take 1 capsule (100 mg total) by mouth 2 (two) times daily.    dorzolamide (TRUSOPT) 2 % ophthalmic solution PLACE 1 DROP INTO BOTH EYES 2 (TWO) TIMES DAILY.    dulaglutide (TRULICITY) 4.5 mg/0.5 mL pen injector Inject 4.5 mg into the skin every 7 days.    febuxostat (ULORIC) 40 mg Tab Take 1 tablet (40 mg total) by mouth once daily.    ferrous gluconate 324 mg (37.5 mg iron) Tab tablet Take 1 tablet (324 mg total) by mouth once daily.    hydrALAZINE (APRESOLINE) 50 MG tablet Take 1 tablet (50 mg total) by mouth every 8 (eight) hours.    INJECTAFER 50 iron mg/mL injection     insulin (LANTUS SOLOSTAR U-100 INSULIN) glargine 100 units/mL SubQ pen Inject 6 units once daily    insulin aspart U-100 (NOVOLOG FLEXPEN U-100 INSULIN) 100 unit/mL (3 mL) InPn pen Inject 3-6 units three times daily before each meal with sliding scale.    ipratropium (ATROVENT) 42 mcg (0.06 %) nasal spray INSTILL 2 SPRAYS BY NASAL ROUTE 3 TIMES DAILY. AS NEEDED FOR NASAL CONGESTION AND DRIP FOR 7 DAYS STRENGTH: 42 MCG (0.06 %)    lancets (TRUEPLUS LANCETS) 33 gauge Misc Test glucose 4x/day. Dx code e11.65    linaCLOtide (LINZESS) 72 mcg Cap capsule Take 1 capsule (72 mcg total) by mouth before breakfast.    multivit with min-folic acid 200 mcg Chew     omeprazole (PRILOSEC) 20 MG capsule TAKE 2 CAPSULES (40 MG TOTAL) BY MOUTH ONCE DAILY.    pen needle, diabetic (BD ULTRA-FINE RICHARD PEN NEEDLE) 32 gauge x 5/32" Ndle 1 Device by Misc.(Non-Drug; Combo Route) route 4 (four) times daily.    predniSONE " (DELTASONE) 5 MG tablet TAKE 1 TABLET BY MOUTH EVERY DAY    PROLIA 60 mg/mL Syrg     torsemide (DEMADEX) 10 MG Tab Take 1 tablet (10 mg total) by mouth 2 (two) times a day.    traMADoL (ULTRAM) 50 mg tablet TAKE 1 TABLET BY MOUTH EVERY 12 HOURS AS NEEDED FOR PAIN.    travoprost (TRAVATAN Z) 0.004 % ophthalmic solution Place 1 drop into both eyes every evening.    triamcinolone acetonide 0.1% (KENALOG) 0.1 % cream Apply topically 2 (two) times daily. for 10 days    TRUE METRIX GLUCOSE METER Misc USE AS DIRECTED    TRUE METRIX GLUCOSE TEST STRIP Strp TEST GLUCOSE 4 TIMES PER DAY.    valsartan (DIOVAN) 160 MG tablet TAKE 1 TABLET TWICE DAILY   Last reviewed on 3/29/2023  2:41 PM by Jenae May MA    Review of patient's allergies indicates:   Allergen Reactions    Penicillins Nausea And Vomiting    Rosuvastatin      Muscle pain     Allopurinol analogues Rash    Last reviewed on  3/29/2023 2:40 PM by Jenae May      Tasks added this encounter   5/3/2023 - Refill Call (Auto Added)   Tasks due within next 3 months   5/24/2023 - Clinical - Follow Up Assesement (Annual)     [facility] - Specialty Pharmacy  [facility] [address]  [facility] [address]  Phone: [phone]  Fax: [phone]

## 2023-04-12 ENCOUNTER — CLINICAL SUPPORT (OUTPATIENT)
Dept: REHABILITATION | Facility: HOSPITAL | Age: 73
End: 2023-04-12
Payer: MEDICARE

## 2023-04-12 DIAGNOSIS — M79.641 PAIN IN BOTH HANDS: ICD-10-CM

## 2023-04-12 DIAGNOSIS — M79.641 BILATERAL HAND PAIN: ICD-10-CM

## 2023-04-12 DIAGNOSIS — M79.642 PAIN IN BOTH HANDS: ICD-10-CM

## 2023-04-12 DIAGNOSIS — M79.89 BILATERAL HAND SWELLING: ICD-10-CM

## 2023-04-12 DIAGNOSIS — M79.642 BILATERAL HAND PAIN: ICD-10-CM

## 2023-04-12 PROCEDURE — 97166 OT EVAL MOD COMPLEX 45 MIN: CPT

## 2023-04-12 NOTE — PLAN OF CARE
[facility]  Occupational Therapy Initial Evaluation    Date: 4/12/2023  Name: Darrion Bennett  [facility] Number: [id number]    Therapy Diagnosis:   Encounter Diagnoses   Name Primary?    Bilateral hand pain     Pain in both hands     Bilateral hand swelling      Physician: Roxanna Salazar MD    Physician Orders: eval and treat  Medical Diagnosis: Bilateral hand pain [M79.641, M79.642]  Surgical Procedure and Date: n/a OR Date of Injury/Onset: for years, but reports ~1-2 weeks ago they had worsened.   Evaluation Date: 4/12/23  Insurance Authorization Period Expiration: 12/29/23  Plan of Care Expiration: 6/9/23  Date of Return to MD: 4/25/23 with PCP  Visit # / Visits authorized: 1 / 1  FOTO: 4/12/23  50% limitation    Precautions:  Standard, Diabetes, CHF    Time In: 8:05  Time Out: 9:00  Total Appointment Time (timed & untimed codes): 55 minutes      SUBJECTIVE         History of Current Condition/Mechanism of Injury: Darrion reports: his pain and swelling has improved since Sunday, but reports it can fluctuate. He reports his hands swell and hurt, R>L. He reports that can make it difficult to do functional things. Reports he takes Orencia every Wednesday. Pt also c/o stiffness in fingers.     Falls: none    Involved Side: bilateral  Dominant Side: Right  Imaging:  no recent imaging available of hands  Prior Therapy: had prior OT at the [facility]  Occupation:  retired  Working presently: retired  Duties: n/a    Functional Limitations/Social History:    Previous functional status includes: Independent with all ADLs.     Current Functional Status   Home/Living environment: lives with their spouse      Limitation of Functional Status as follows:   ADLs/IADLs:     - Feeding: I    - Bathing: I    - Dressing/Grooming: I, but sometimes has trouble with buttons    - Driving: has only 1 car, does not drive    -when the hands swell, he is unable to hold or open things     Leisure: enjoys cooking,  but has some difficulty.     Pain:  Functional Pain Scale Rating 0-10: Current 0/10, worst 7/10, best 0/10   Location: generalized pain in B hands to wrist  Description: Aching  Aggravating Factors: swelling, grasping,  Easing Factors: nothing , improved pain when there is less swelling.     Patient's Goals for Therapy: pt is unsure,     Medical History:   Past Medical History:   Diagnosis Date    Anemia     Arthritis     Cataract     CHF (congestive heart failure)     Chronic constipation     Diabetes mellitus, type 2     Felon of finger of left hand 05/11/2019    Glaucoma     Hyperlipidemia 05/13/2014    Hypertension     MCTD (mixed connective tissue disease)     Personal history of colonic polyps     Sjogren's disease     Ulcerative colitis     Urolithiasis        Surgical History:    has a past surgical history that includes Eye surgery; Colonoscopy (2014); and Cataract extraction (Bilateral, 2005).    Medications:   has a current medication list which includes the following prescription(s): orencia clickject, alcohol swabs, amlodipine, ammonium lactate, atorvastatin, true metrix level 1, boostrix tdap, clonidine 0.2 mg/24 hr td ptwk, diclofenac sodium, docusate sodium, dorzolamide, trulicity, febuxostat, ferrous gluconate, hydralazine, injectafer, insulin, insulin aspart u-100, ipratropium, lancets, linaclotide, multivit with min-folic acid, omeprazole, pen needle, diabetic, prednisone, prolia, torsemide, tramadol, travoprost, triamcinolone acetonide 0.1%, true metrix glucose meter, true metrix glucose test strip, and valsartan.    Allergies:   Review of patient's allergies indicates:   Allergen Reactions    Penicillins Nausea And Vomiting    Rosuvastatin      Muscle pain     Allopurinol analogues Rash          OBJECTIVE     Observation/Appearance: mild swelling noted in hands, tight with making composite fist.     Edema. Measured in centimeters.   4/13/2023 4/13/2023    Left Right   Wrist Crease 16.9 16.8    DPC 20.5 21.5   MCPs 20.5 20.7         Elbow and Wrist ROM. Measured in degrees.   4/13/2023 4/13/2023    Left Right   Elbow Ext/Flex     Supination/Pronation     Wrist Ext/Flex 50/35 50/35   Wrist RD/UD 10/20 10/23     Hand ROM. Measured in degrees.  Gross composite fist WFL, stiffness noted in fingers, especially PIPs, R>L   4/13/2023 4/13/2023    Left Right        Thumb: MP                  IP         Rad ADD/ABD WFL WFL       Pal ADD/ABD WFL WFL          Opposition WFL to base of SF WFL to base of SF      Strength (Dynamometer) and Pinch Strength (Pinch Gauge)  Measured in pounds.   4/13/2023 4/13/2023    Left Right   Rung II 34 36   Whaley Pinch 14.5 13   3pt Pinch 12 11   2pt Pinch 8 8     Sensation: denies numbness and tingling    Manual Muscle Test   4/13/2023 4/13/2023    Left Right   Wrist Extension      Wrist Flexion     Radial Deviation     Ulnar Deviation     Supination     Pronation     Elbow Extension     Elbow Flexion           Limitation/Restriction for FOTO Hand Survey    FOTO scores for Darrion Bennett on 4/12/2023.   FOTO documents entered into [employer] - see Media section.    Limitation Score: 50%         Treatment   Total Treatment time (time-based codes) separate from Evaluation: 20 minutes    Darrion received the treatments listed below:     Supervised modalities after being cleared for contradictions: Hot Pack - Patient received MH x 10 min to B hands to increase blood flow, circulation and tissue elasticity prior to therex      Therapeutic exercises to develop strength, endurance, ROM, and flexibility for 10 minutes, including:  AROM   Wrist  Ext/flx  RD/UD    X 10 reps each    AROM   Spreads  Wave  Hook  Straight fist  Composite fist  Lifts X 10 reps each   AROM thumb:  Composite flexion    X 10 reps each    Theraputty yellow  -molding/rolling  -gripping   X 2'       Patient Education and Home Exercises      Education provided:   - educated on HEP as above. Also educated on retrograde massage  when needed, and issued compression gloves for each hand as tolerated.   -issued yellow putty    Written Home Exercises Provided: yes.  Exercises were reviewed and Darrion was able to demonstrate them prior to the end of the session.  Darrion demonstrated good  understanding of the education provided. See EMR under Patient Instructions for exercises provided during therapy sessions.     Pt was advised to perform these exercises free of pain, and to stop performing them if pain occurs.    Patient/Family Education: role of OT, goals for OT, scheduling/cancellations - pt verbalized understanding. Discussed insurance limitations with patient.    ASSESSMENT     Darrion Bennett is a 73 y.o. male referred to outpatient occupational therapy and presents with a medical diagnosis of B hand pain and swelling.  Patient presents with the following therapy deficits: Decreased ROM, Decreased  strength, Decreased pinch strength, Decreased muscle strength, Decreased functional hand use, Increased pain, Edema, and Joint Stiffness and demonstrates limitations as described in the chart below. Following medical record review it is determined that pt will benefit from occupational therapy services in order to maximize pain free and/or functional use of bilateral hands. The following goals were discussed with the patient and patient is in agreement with them as to be addressed in the treatment plan. The patient's rehab potential is Fair.     Anticipated barriers to occupational therapy: occasional swelling, CHF  Pt has no cultural, educational or language barriers to learning provided.    Profile and History Assessment of Occupational Performance Level of Clinical Decision Making Complexity Score   Occupational Profile:   Darrion Bennett is a 73 y.o. male who lives with their spouse and is retired Darrion Bennett has difficulty with  ADLs and IADLs as listed previously, which  Affecting hisdaily functional abilities.      Comorbidities:    has  a past medical history of Anemia, Arthritis, Cataract, CHF (congestive heart failure), Chronic constipation, Diabetes mellitus, type 2, Felon of finger of left hand, Glaucoma, Hyperlipidemia, Hypertension, MCTD (mixed connective tissue disease), Personal history of colonic polyps, Sjogren's disease, Ulcerative colitis, and Urolithiasis.    Medical and Therapy History Review:   Expanded               Performance Deficits    Physical:  Joint Mobility  Muscle Power/Strength  Muscle Endurance  Edema   Strength  Pinch Strength  Fine Motor Coordination  Pain    Cognitive:  No Deficits    Psychosocial:    Habits  Routines     Clinical Decision Making:  moderate    Assessment Process:  Detailed Assessments    Modification/Need for Assistance:  Minimal-Moderate Modifications/Assistance    Intervention Selection:  Several Treatment Options       moderate  Based on PMHX, co morbidities , data from assessments and functional level of assistance required with task and clinical presentation directly impacting function.         Goals:   The following goals were discussed with the patient and patient is in agreement with them as to be addressed in the treatment plan.   Long Term Goals (LTGs); to be met by discharge.  1) Pt will report pain no higher than 1-2/10 with ADLs, cooking, and opening things  2) Pt will have an improved FOTO score by at least 15 points  3) Pt will demonstrate improved B  strength by at least 3# for grasping and opening things    Short Term Goals (STGs); to be met within 4 weeks (5/12/23).  1) Pt will report or demonstrate independence with HEP, joint protection, and edema management.    PLAN   Plan of Care Certification: 4/12/2023 to 6/9/23.     Outpatient Occupational Therapy 1 times weekly for 6-8 weeks to include the following interventions: Paraffin, Fluidotherapy, Manual therapy/joint mobilizations, Modalities for pain management, Therapeutic exercises/activities., Strengthening, Edema Control,  Joint Protection, and Energy Conservation.  Pt requested a transfer to clinic closer to his house.       SHERRI Calle, SHANNAN      I CERTIFY THE NEED FOR THESE SERVICES FURNISHED UNDER THIS PLAN OF TREATMENT AND WHILE UNDER MY CARE  Physician's comments:      Physician's Signature: ___________________________________________________

## 2023-04-12 NOTE — PATIENT INSTRUCTIONS
[facility], OCCUPATIONAL THERAPY  HOME EXERCISE PROGRAM               Complete massage 2-3 minutes 2 times a day:    Massage hand when feeling swollen    Complete 10 repetitions of each exercise 3-4 times a day:            [facility]  HOME EXERCISE PROGRAM     Complete the following strengthening program 1x/day.                _     negative...

## 2023-04-13 ENCOUNTER — PATIENT MESSAGE (OUTPATIENT)
Dept: FAMILY MEDICINE | Facility: CLINIC | Age: 73
End: 2023-04-13
Payer: MEDICARE

## 2023-04-13 ENCOUNTER — CLINICAL SUPPORT (OUTPATIENT)
Dept: REHABILITATION | Facility: HOSPITAL | Age: 73
End: 2023-04-13
Attending: INTERNAL MEDICINE
Payer: MEDICARE

## 2023-04-13 DIAGNOSIS — M25.651 HIP JOINT STIFFNESS, BILATERAL: ICD-10-CM

## 2023-04-13 DIAGNOSIS — M25.652 HIP JOINT STIFFNESS, BILATERAL: ICD-10-CM

## 2023-04-13 DIAGNOSIS — M25.552 BILATERAL HIP PAIN: ICD-10-CM

## 2023-04-13 DIAGNOSIS — M25.551 BILATERAL HIP PAIN: ICD-10-CM

## 2023-04-13 DIAGNOSIS — R26.2 DIFFICULTY WALKING: ICD-10-CM

## 2023-04-13 PROBLEM — M25.671 DECREASED RANGE OF MOTION OF BOTH ANKLES: Status: RESOLVED | Noted: 2020-10-20 | Resolved: 2023-04-13

## 2023-04-13 PROBLEM — R29.898 DECREASED PINCH STRENGTH: Status: RESOLVED | Noted: 2020-10-20 | Resolved: 2023-04-13

## 2023-04-13 PROBLEM — M25.672 DECREASED RANGE OF MOTION OF BOTH ANKLES: Status: RESOLVED | Noted: 2020-10-20 | Resolved: 2023-04-13

## 2023-04-13 PROBLEM — M79.89 BILATERAL HAND SWELLING: Status: ACTIVE | Noted: 2023-04-13

## 2023-04-13 PROBLEM — M79.641 PAIN IN BOTH HANDS: Status: ACTIVE | Noted: 2023-04-13

## 2023-04-13 PROBLEM — R29.898 DECREASED GRIP STRENGTH: Status: RESOLVED | Noted: 2020-12-29 | Resolved: 2023-04-13

## 2023-04-13 PROBLEM — M79.642 PAIN IN BOTH HANDS: Status: ACTIVE | Noted: 2023-04-13

## 2023-04-13 PROBLEM — M25.631 DECREASED RANGE OF MOTION OF BOTH WRISTS: Status: RESOLVED | Noted: 2020-10-20 | Resolved: 2023-04-13

## 2023-04-13 PROBLEM — M79.89 SWELLING OF BOTH HANDS: Status: RESOLVED | Noted: 2020-12-29 | Resolved: 2023-04-13

## 2023-04-13 PROBLEM — M25.50 JOINT PAIN: Status: RESOLVED | Noted: 2020-10-20 | Resolved: 2023-04-13

## 2023-04-13 PROBLEM — M25.632 DECREASED RANGE OF MOTION OF BOTH WRISTS: Status: RESOLVED | Noted: 2020-10-20 | Resolved: 2023-04-13

## 2023-04-13 PROCEDURE — 97110 THERAPEUTIC EXERCISES: CPT | Mod: HCNC,PN

## 2023-04-13 PROCEDURE — 97161 PT EVAL LOW COMPLEX 20 MIN: CPT | Mod: HCNC,PN

## 2023-04-13 PROCEDURE — 97140 MANUAL THERAPY 1/> REGIONS: CPT | Mod: HCNC,PN

## 2023-04-13 NOTE — PLAN OF CARE
[facility]  Physical Therapy Initial Evaluation    Name: Darrion Bennett  Clinic Number: [id number]    Therapy Diagnosis:   Encounter Diagnoses   Name Primary?    Bilateral hip pain     Difficulty walking     Hip joint stiffness, bilateral      Physician: Roxanna Salazar MD    Physician Orders: PT Eval and Treat   Medical Diagnosis from Referral: M25.551,M25.552 (ICD-10-CM) - Bilateral hip pain  Evaluation Date: 4/13/2023  Plan of Care Expiration: 7/13/23    Authorization Period Expiration: 12/31/23  Visit # / Visits authorized: 1/ 20      Time In: 1230  Time Out: 1330    Total Billable Time: 60 minutes    Precautions: Standard    Subjective   Date of onset: 6 months    History of current condition:   Darrion  is a 73 year old male presenting with c/o bilateral hip pain. He reports an insidious onset 6 months ago. He denies any recent diagnostics.   He states he gets injections for the arthritis in his hands.  The patient denies a history of falls or use of an assistive device.  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 He states he is unsure of what to do for his hip pain. He c/o of a stabbing pain with walking, causing him to want to stop.  His goal is to get rid of his pain.      Medical History:   Past Medical History:   Diagnosis Date    Anemia     Arthritis     Cataract     CHF (congestive heart failure)     Chronic constipation     Diabetes mellitus, type 2     Felon of finger of left hand 05/11/2019    Glaucoma     Hyperlipidemia 05/13/2014    Hypertension     MCTD (mixed connective tissue disease)     Personal history of colonic polyps     Sjogren's disease     Ulcerative colitis     Urolithiasis        Surgical History:   Darrion Bennett  has a past surgical history that includes Eye surgery; Colonoscopy (2014); and Cataract extraction (Bilateral, 2005).    Medications:   Darrion has a current medication list which includes the following prescription(s): orencia clickject,  alcohol swabs, amlodipine, ammonium lactate, atorvastatin, true metrix level 1, boostrix tdap, clonidine 0.2 mg/24 hr td ptwk, diclofenac sodium, docusate sodium, dorzolamide, trulicity, febuxostat, ferrous gluconate, hydralazine, injectafer, insulin, insulin aspart u-100, ipratropium, lancets, linaclotide, multivit with min-folic acid, omeprazole, pen needle, diabetic, prednisone, prolia, torsemide, tramadol, travoprost, triamcinolone acetonide 0.1%, true metrix glucose meter, true metrix glucose test strip, and valsartan.    Allergies:   Review of patient's allergies indicates:   Allergen Reactions    Penicillins Nausea And Vomiting    Rosuvastatin      Muscle pain     Allopurinol analogues Rash        Imaging: none    Prior Therapy: current, hand  Social History:  lives with wife  Occupation: retired jeweler  Prior Level of Function: independent  Current Level of Function:  independent    Pain:  Current 8/10, worst 8/10, best 5/10   Location: bilateral hips/thighs   Description: stabbing    Aggravating Factors: walking (1/4 blocks), standing, sitting, cooking  Easing Factors: rest, pain medication to sleep    Pts goals:   His goal is to get rid of his pain.     Objective     Observation:  pt presents ambulating with an antalgic gait, decreased step length. Slow walking speed.  Stands with a flat back posture.   Palpation: mild tenderness bilateral greater trochanters, IT bands      Range of Motion/Strength:      Lumbar AROM: Degrees   Flexion 30   Extension 10   Right side bending 10   Left side bending 10   Lumbar quadrant reveals: negative lumbar quadrant    AROM: Bilateral LE: Grossly WFL, mild limitation in hip ROM in all planes ,bilaterally  MMT:  Right LE: 4-   Left LE: 4-  Abdominal Strength: 3+/5    Flexibility: hip flexor length: fair      Hamstring Length: fair    Bed Mobility:Independent  Transfers: Independent    Special Tests:   -LLD:negative  -Slump: Negative  -SLR: negative  -Hip scour:  negative  -Carolin's: Negative  -SIJ gapping and compression: Negative      FOTO: in media    TREATMENT     Treatment Time In: 1300  Treatment Time Out: 1330    Total Treatment time separate from Evaluation: 30 minutes    Darrion received therapeutic exercises to develop strength, endurance, ROM, flexibility, posture and core stabilization for 15 minutes including:     LTR x 20  Hip flexor stretch 30 sec x 3  PPT x 20  Hip fall out x 20    Darrion received the following manual therapy techniques:  were applied to the: bilateral hips for 10 minutes, including:  Long arc hip distraction, inferior glides, bilateral      Education provided:   - HEP compliance    Written Home Exercises Provided: yes.    Exercises were reviewed and aDrrion was able to demonstrate them prior to the end of the session.  Darrion demonstrated good  understanding of the education provided.     See EMR under Patient Instructions for exercises provided 4/13/2023.    Assessment     Pt presents with signs and symptoms consistent with referring diagnosis. Evaluation has determined a decrease in functional status and subjective and objective deficits that can be addressed by physical therapy intervention. Pt demonstrates pain limiting functional activities. Decreased flexibility and strength limiting normal movement patterns. Decreased segmental motion. Decreased postural strength and awareness. Positive special testing. Decreased participation in functional and recreational activities. Subjective and objective measures are addressed by goals in the plan of care.  Patient/family are involved in the development of these goals. Patient/family are educated about current injury and treatment.       Plan of care was dicussed with patient. Pt will benefit from skilled outpatient Physical Therapy to address the deficits stated above and in the chart below, provide pt/family education, and to maximize pt's level of independence. Pt's spiritual, cultural and  educational needs considered and patient is agreeable to the plan of care and goals as stated below:     Pt prognosis is Good.  Anticipated Barriers for therapy: none    Medical Necessity is demonstrated by the following  History  Co-morbidities and personal factors that may impact the plan of care Co-morbidities:   Anemia   Arthritis   Cataract   CHF (congestive heart failure)   Chronic constipation   Diabetes mellitus, type 2   Felon of finger of left hand   Glaucoma   Hyperlipidemia   Hypertension   MCTD (mixed connective tissue disease)   Personal history of colonic polyps   Sjogren's disease   Ulcerative colitis   Urolithiasis       Personal Factors:   no deficits     low   Examination  Body Structures and Functions, activity limitations and participation restrictions that may impact the plan of care Body Regions:   back  lower extremities    Body Systems:    gross symmetry  ROM  strength  gait  transfers  transitions    Participation Restrictions:       Activity limitations:   Learning and applying knowledge  no deficits    General Tasks and Commands  no deficits    Communication  no deficits    Mobility  walking    Self care  no deficits    Domestic Life  shopping  cooking  doing house work (cleaning house, washing dishes, laundry)    Interactions/Relationships  no deficits    Life Areas  no deficits    Community and Social Life  community life         low   Clinical Presentation stable and uncomplicated low   Decision Making/ Complexity Score: low     Goals:    Short Term Goals (4 Weeks):     1.Pt to increase strength by a 1/2 grade of muscles test to allow for improvement in functional activities such as performing chores.  2.Pt to improve range of motion by 25% to allow for improved functional mobility to allow for improvement in IADLs.   3.Pt to report compliance with HEP and demonstrate proper exercise technique to PT to show competence with self management of condition.  4.Decrease pain by 25% during  functional activities.    Long Term Goals (12 Weeks):     1. Increase ROM to allow improved joint biomechanics during functional activities.   2.Increase trunk and lower extremity strength to within normal limits during functional activities.   3. Independent with home exercise program.   4. Full return to functional activities with manageable complaints.  5. Patient to demonstrate improved posture and body mechanics.  6. Decrease pain by 75% during functional activities.    Plan     Plan of care Certification: 4/13/2023 to 7/13/23.    Recommended Treatment Plan: 2 times per week for 12 weeks with treatments to consist of:  Neuromuscular and postural re-education,  training, therapeutic exercise, therapeutic activities,balance training, gait training, manual therapy, soft tissue mobilization, ROM exercises, Cardiovascular,  Postural stabilization, manual traction, spinal mobilization, moist heat, cryotherapy, electrical stimulation, ultrasound, home exercise education and planning.    Sukumar Horton, PT

## 2023-04-18 ENCOUNTER — TELEPHONE (OUTPATIENT)
Dept: FAMILY MEDICINE | Facility: CLINIC | Age: 73
End: 2023-04-18
Payer: MEDICARE

## 2023-04-18 NOTE — TELEPHONE ENCOUNTER
----- Message from Amrit Skelton MA sent at 4/18/2023 10:46 AM CDT -----  Type: Patient Call Back    Who called:Michael    What is the request in detail: still need the notes pertaining to the pt. So the order can be filled ..     Can the clinic reply by [facility]?No    Would the patient rather a call back or a response via [facility]? yes    Best call back number: [phone]

## 2023-04-19 ENCOUNTER — PATIENT OUTREACH (OUTPATIENT)
Dept: ADMINISTRATIVE | Facility: HOSPITAL | Age: 73
End: 2023-04-19
Payer: MEDICARE

## 2023-04-20 ENCOUNTER — LAB VISIT (OUTPATIENT)
Dept: LAB | Facility: HOSPITAL | Age: 73
End: 2023-04-20
Attending: NURSE PRACTITIONER
Payer: MEDICARE

## 2023-04-20 DIAGNOSIS — M1A.9XX1 TOPHACEOUS GOUT: ICD-10-CM

## 2023-04-20 DIAGNOSIS — Z79.4 CONTROLLED TYPE 2 DIABETES MELLITUS WITH COMPLICATION, WITH LONG-TERM CURRENT USE OF INSULIN: ICD-10-CM

## 2023-04-20 DIAGNOSIS — E11.8 CONTROLLED TYPE 2 DIABETES MELLITUS WITH COMPLICATION, WITH LONG-TERM CURRENT USE OF INSULIN: ICD-10-CM

## 2023-04-20 DIAGNOSIS — M06.9 RHEUMATOID ARTHRITIS INVOLVING MULTIPLE JOINTS: ICD-10-CM

## 2023-04-20 LAB
ALBUMIN SERPL BCP-MCNC: 3.4 G/DL (ref 3.5–5.2)
ALP SERPL-CCNC: 70 U/L (ref 55–135)
ALT SERPL W/O P-5'-P-CCNC: 17 U/L (ref 10–44)
ANION GAP SERPL CALC-SCNC: 12 MMOL/L (ref 8–16)
AST SERPL-CCNC: 17 U/L (ref 10–40)
BASOPHILS # BLD AUTO: 0.03 K/UL (ref 0–0.2)
BASOPHILS NFR BLD: 0.4 % (ref 0–1.9)
BILIRUB SERPL-MCNC: 0.3 MG/DL (ref 0.1–1)
BUN SERPL-MCNC: 25 MG/DL (ref 8–23)
CALCIUM SERPL-MCNC: 9.2 MG/DL (ref 8.7–10.5)
CHLORIDE SERPL-SCNC: 108 MMOL/L (ref 95–110)
CO2 SERPL-SCNC: 20 MMOL/L (ref 23–29)
CREAT SERPL-MCNC: 1.4 MG/DL (ref 0.5–1.4)
CRP SERPL-MCNC: 1.6 MG/L (ref 0–8.2)
DIFFERENTIAL METHOD: ABNORMAL
EOSINOPHIL # BLD AUTO: 0.1 K/UL (ref 0–0.5)
EOSINOPHIL NFR BLD: 1.2 % (ref 0–8)
ERYTHROCYTE [DISTWIDTH] IN BLOOD BY AUTOMATED COUNT: 14.7 % (ref 11.5–14.5)
ERYTHROCYTE [SEDIMENTATION RATE] IN BLOOD BY PHOTOMETRIC METHOD: 50 MM/HR (ref 0–23)
EST. GFR  (NO RACE VARIABLE): 53.1 ML/MIN/1.73 M^2
ESTIMATED AVG GLUCOSE: 146 MG/DL (ref 68–131)
GLUCOSE SERPL-MCNC: 113 MG/DL (ref 70–110)
HBA1C MFR BLD: 6.7 % (ref 4–5.6)
HBV CORE AB SERPL QL IA: NORMAL
HBV SURFACE AG SERPL QL IA: NORMAL
HCT VFR BLD AUTO: 39.4 % (ref 40–54)
HGB BLD-MCNC: 12.7 G/DL (ref 14–18)
IMM GRANULOCYTES # BLD AUTO: 0.03 K/UL (ref 0–0.04)
IMM GRANULOCYTES NFR BLD AUTO: 0.4 % (ref 0–0.5)
LYMPHOCYTES # BLD AUTO: 2 K/UL (ref 1–4.8)
LYMPHOCYTES NFR BLD: 23.6 % (ref 18–48)
MCH RBC QN AUTO: 30.1 PG (ref 27–31)
MCHC RBC AUTO-ENTMCNC: 32.2 G/DL (ref 32–36)
MCV RBC AUTO: 93 FL (ref 82–98)
MONOCYTES # BLD AUTO: 1.3 K/UL (ref 0.3–1)
MONOCYTES NFR BLD: 15.4 % (ref 4–15)
NEUTROPHILS # BLD AUTO: 4.9 K/UL (ref 1.8–7.7)
NEUTROPHILS NFR BLD: 59 % (ref 38–73)
NRBC BLD-RTO: 0 /100 WBC
PLATELET # BLD AUTO: 253 K/UL (ref 150–450)
PMV BLD AUTO: 12.2 FL (ref 9.2–12.9)
POTASSIUM SERPL-SCNC: 4.5 MMOL/L (ref 3.5–5.1)
PROT SERPL-MCNC: 6.8 G/DL (ref 6–8.4)
RBC # BLD AUTO: 4.22 M/UL (ref 4.6–6.2)
SODIUM SERPL-SCNC: 140 MMOL/L (ref 136–145)
WBC # BLD AUTO: 8.27 K/UL (ref 3.9–12.7)

## 2023-04-20 PROCEDURE — 36415 COLL VENOUS BLD VENIPUNCTURE: CPT | Mod: HCNC,PO | Performed by: INTERNAL MEDICINE

## 2023-04-20 PROCEDURE — 84550 ASSAY OF BLOOD/URIC ACID: CPT | Mod: HCNC | Performed by: INTERNAL MEDICINE

## 2023-04-20 PROCEDURE — 85652 RBC SED RATE AUTOMATED: CPT | Mod: HCNC | Performed by: INTERNAL MEDICINE

## 2023-04-20 PROCEDURE — 80053 COMPREHEN METABOLIC PANEL: CPT | Mod: HCNC | Performed by: INTERNAL MEDICINE

## 2023-04-20 PROCEDURE — 86140 C-REACTIVE PROTEIN: CPT | Mod: HCNC | Performed by: INTERNAL MEDICINE

## 2023-04-20 PROCEDURE — 83036 HEMOGLOBIN GLYCOSYLATED A1C: CPT | Mod: HCNC | Performed by: NURSE PRACTITIONER

## 2023-04-20 PROCEDURE — 85025 COMPLETE CBC W/AUTO DIFF WBC: CPT | Mod: HCNC | Performed by: INTERNAL MEDICINE

## 2023-04-20 PROCEDURE — 86704 HEP B CORE ANTIBODY TOTAL: CPT | Mod: HCNC | Performed by: INTERNAL MEDICINE

## 2023-04-20 PROCEDURE — 87340 HEPATITIS B SURFACE AG IA: CPT | Mod: HCNC | Performed by: INTERNAL MEDICINE

## 2023-04-20 PROCEDURE — 86480 TB TEST CELL IMMUN MEASURE: CPT | Mod: HCNC | Performed by: INTERNAL MEDICINE

## 2023-04-21 DIAGNOSIS — M1A.9XX1 TOPHACEOUS GOUT: ICD-10-CM

## 2023-04-21 LAB
GAMMA INTERFERON BACKGROUND BLD IA-ACNC: 0.11 IU/ML
M TB IFN-G CD4+ BCKGRND COR BLD-ACNC: 0.02 IU/ML
MITOGEN IGNF BCKGRD COR BLD-ACNC: 1.52 IU/ML
TB GOLD PLUS: NEGATIVE
TB2 - NIL: 0.02 IU/ML
URATE SERPL-MCNC: 4.3 MG/DL (ref 3.4–7)

## 2023-04-21 RX ORDER — FEBUXOSTAT 40 MG/1
40 TABLET, FILM COATED ORAL DAILY
Qty: 30 TABLET | Refills: 11 | Status: SHIPPED | OUTPATIENT
Start: 2023-04-21

## 2023-04-24 NOTE — PROGRESS NOTES
This note was created by combination of typed  and M-Modal dictation.  Transcription errors may be present.  If there are any questions, please contact me.    Assessment and Plan:   Assessment and Plan   Normal physical exam  -Up-to-date colonoscopy  Needs EGD    Controlled type 2 diabetes mellitus with diabetic polyneuropathy, with long-term current use of insulin  -Managed by diabetes nurse practitioner.  Reports that mail order does not have an active prescription for Trulicity.  I re-sent that in therefore him.  4.5.  On insulin.  Has upcoming follow-up with endocrinology.  -     dulaglutide (TRULICITY) 4.5 mg/0.5 mL pen injector; Inject 4.5 mg into the skin every 7 days.  Dispense: 4 pen; Refill: 3    Jackson's esophagus without dysplasia  -needs to update EGD. On PPI.  -     Ambulatory referral/consult to Endo Procedure ; Future; Expected date: 04/26/2023    Essential hypertension  Diastolic heart failure, NYHA class 2  -BP stable.    Stage 3a chronic kidney disease  Anemia of chronic renal failure, stage 3b  -Followed by Nephrology.  CKD stage IIIA with proteinuria.  Had reported cold intolerance and Nephrology suggested iron supplement.  He can not tolerate because of constipation.  Recommended trial of ferrous gluconate to see if he can tolerate that without significant constipation exacerbation.  If intolerant, recommended leafy greens, dietary sources of iron    Pure hypercholesterolemia  Bilateral carotid artery stenosis on CTA 6/26/21  Aortic atherosclerosis  -check future labs on statin.  -     Lipid Panel; Future; Expected date: 10/24/2023      Rheumatoid arthritis, involving unspecified site, unspecified whether rheumatoid factor present  -RA, PMR, upcoming physical therapy.  Notes continued muscle pain in the thighs/hips, defer to Rheumatology    Tophaceous gout  -On Uloric.  Pre visit labs uric acid level good.  No changes.    Osteoporosis, unspecified osteoporosis type,  unspecified pathological fracture presence  Compression fracture of body of thoracic vertebra on XR 2019; 2019 alendronate  -Last vitamin-D level , mildly low.  Taking multivitamin.  Recommended additional vitamin-D supplement 800 IU daily.  Upcoming Prolia infusion   Update bone density scan  -     Vitamin D; Future; Expected date: 2023  -     DXA Bone Density Axial Skeleton 1 or more sites; Future; Expected date: 2023    Nonallergic rhinitis  -refill flonase  -     fluticasone propionate (FLONASE) 50 mcg/actuation nasal spray; 1 spray (50 mcg total) by Each Nostril route once daily.  Dispense: 48 g; Refill: 11            Medications Discontinued During This Encounter   Medication Reason    dulaglutide (TRULICITY) 4.5 mg/0.5 mL pen injector Reorder    BOOSTRIX TDAP 2.5-8-5 Lf-mcg-Lf/0.5mL Syrg injection     ipratropium (ATROVENT) 42 mcg (0.06 %) nasal spray Therapy completed       meds sent this encounter:  Medications Ordered This Encounter   Medications    dulaglutide (TRULICITY) 4.5 mg/0.5 mL pen injector     Sig: Inject 4.5 mg into the skin every 7 days.     Dispense:  4 pen     Refill:  3    fluticasone propionate (FLONASE) 50 mcg/actuation nasal spray     Si spray (50 mcg total) by Each Nostril route once daily.     Dispense:  48 g     Refill:  11       Follow Up:   Future Appointments   Date Time Provider Department Center   2023  8:00 AM Farooq Domingo MD [facility]   2023  8:30 AM SHERRI Amador [facility] [place] [facility]   2023  8:30 AM Bianca Mares NP [facility]   2023  8:30 AM SHERRI Amador [facility]   2023  9:45 AM Sukumar Horton, PT [facility] [place] [facility]   5/15/2023  8:30 AM SHERRI Amador [facility] [place] [facility]   5/15/2023  9:45 AM Sukumar Horton, PT [facility]   2023  8:30 AM SHERRI Amador [facility]   2023  9:45 AM Irena Gurrola PTA [facility]  [facility] [place] [facility]   5/29/2023  8:30 AM CHAIR 01 WB WB CHEMO [facility]   5/30/2023  7:45 AM Tennille Phipps, OT [facility] OP [facility]   5/30/2023  9:45 AM Sukumar Horton, PT [facility]   6/5/2023  8:30 AM AmishaSHERRI Catalan [facility]          Subjective:   Subjective   Chief Complaint   Patient presents with    Annual Exam       HPI  Darrion is a 73 y.o. male.     Social History     Tobacco Use    Smoking status: Never     Passive exposure: Never    Smokeless tobacco: Never   Substance Use Topics    Alcohol use: No          Social History     Social History Narrative    Not on file       Last appointment with this clinic was 2/27/2023. Last visit with me 9/26/2022   To summarize last visit and events leading up to today:  Constipation off linzess. Higher dose linzess with diarrhea.  Restart at 72.  He wonders why he gets episodes of constipation.  Sometimes does not have to take a Linzess and can have a bowel movement.  Remote history of GI consultation back in 2016.  He has a history of Jackson's esophagus.  I do not see any recent EGD.  Last colonoscopy was 2018.   recommended to take Linzess if he has not had a bowel movement in 2 days.  To take that on a regular basis   Talked about the possibility of diabetic gastroparesis.  But given that Linzess does work, I would use that more on p.r.n. basis use that rather than explore other medications for this   We talked about seeing GI for consultation, he is agreeable to hold off on GI consultation at this time.  needs update EGD.  Ordered.  Chronic back pain. Tramadol  DM2 followed by endo. On CGM  HTN, HFpEF, CKD stage 3a. BP stable. was underdosing hydralazine but BP last time was good. No changes.  Followed by nephrology. Followed by cards. Torsemide prn for edema.   Hyperlipidemia/statin  Sjogrens. Rheumatology.  Osteoporosis, with history of compression fracture, endocrinology.  Prolia. Last infusion 11/2022  Gout, on urate  lowering therapy.  Rheumatology.  Most recent uric acid level good  Polymyalgia rheumatica, steroid    CBC mild anemia seen previously, stable.  White blood count normal.    TSH normal.  Previously mildly elevated.  Not on levothyroxine   CMP with CKD seen previously.  Overall stable.    Urinalysis showed protein, seen previously but no evidence of pyuria/infection.  Chest x-ray was negative  Done to rule out possibility of infection as cause of his nausea and vomiting.    Results to patient via [employer].  Take Linzess if he does not have a bowel movement every 2 days.    Saw cardiology end of September.  Stable.  Torsemide p.r.n..  Chest pressure resolved.  Stress testing negative for ischemia.  Echo normal LV systolic function.  Follow-up 6 months  Saw cardiology in follow-up in February.  PVCs ordered Holter. Holter without significant arrhythmias.    Saw diabetes nurse practitioner in early November.  Reduced NovoLog  Subsequent follow-up, A1c went up a little though still at goal.  He was interested in increasing dose of Trulicity.  Increased Trulicity.  Stay on Lantus 6, NovoLog 3 units breakfast, 6 units lunch and dinner    Saw rheumatology in follow-up in February.  Stable on Uloric. stable on Orencia.  Wean prednisone to stop.  DEXA with osteoporosis, instructed to follow-up with endocrinology    Saw nephrology.  Had complained of cold intolerance.  Possible iron-deficiency, started on iron    Pre visit labs  CBC anemia  Sed rate elevated  Creatinine 1.4 stable CKD stage IIIA  Albumin mildly low 3.4  A1c 6.7 from 6.8  HBV negative.    Today's visit:  Please note: Chronic medical problems that are stable but are being addressed at this visit may not be listed/documented here, but may be addressed in more detail in the Assessment and Plan section.   Below are salient features of chronic medical conditions, or new/acute conditions and their details.    Complaining of muscle pain in the thighs. To go for PT per  rheum rec's. To start in early may.  Staying hydrated   2021 vit D mild low  Taking MVI chewable.   Not taking vit D supplement.     Reports that the pharmacy told him that DM NP canceled the trulicity. I don't see that she did.  I see she sent the rx for trulicity to mail order.  He still has 1 week of trulicity 3 left.      Needs flonase.  Takes when weather is cold    Needs RF on prednisone.  Per rheum. Wants it changed to mail order    Torsemide every other day.    Hydralazine TID as rx'd.     Constipation taking linzess prn.  Estimates 1-2 times a week.       Answers submitted by the patient for this visit:  Review of Systems Questionnaire (Submitted on 4/20/2023)  activity change: No  unexpected weight change: No  neck pain: No  hearing loss: No  rhinorrhea: No  trouble swallowing: No  eye discharge: No  visual disturbance: No  chest tightness: No  wheezing: No  chest pain: No  palpitations: No  blood in stool: No  constipation: Yes  vomiting: No  diarrhea: No  polydipsia: No  polyuria: No  difficulty urinating: No  urgency: No  hematuria: No  joint swelling: No  arthralgias: Yes  headaches: No  weakness: No  confusion: No  dysphoric mood: No    Patient Care Team:  Farooq Domingo MD as PCP - General (Internal Medicine)  Tin Chambers MD (Gastroenterology)  Harrison Brannon MD (Cardiology)  Roxanna Salazar MD as Nurse Practitioner (Rheumatology)  Malcolm Irwin MD as  (Nephrology)  Dangelo Hall MD as Consulting Physician (Ophthalmology)  [facility] (Gastroenterology)  Kassandra Eason as Digital Medicine Health   Elizabet BustamanteD as Hypertension Digital Medicine Clinician (Pharmacist)  Farooq Domingo MD as Hypertension Digital Medicine Responsible Provider (Internal Medicine)  Farooq Domingo MD as Hyperlipidemia Digital Medicine Responsible Provider (Internal Medicine)  Elizabet BustamanteD as Hyperlipidemia Digital  Medicine Clinician  Jenn García MA as Care Coordinator  Elizabet BarakatD as Pharmacist    Patient Active Problem List    Diagnosis Date Noted    Difficulty walking 04/13/2023    Hip joint stiffness, bilateral 04/13/2023    Pain in both hands 04/13/2023    Bilateral hand swelling 04/13/2023    Dyshidrotic eczema 02/04/2022    Long-term insulin use 02/01/2022    Dyspnea on exertion 08/02/2021     +diastolic dysfunction + anemia.  pft with restrictive physiology and decreased dlco.  Ct with mosaic attenuation and enlarge pa suggesting volume overload.    9/16/21 CT chest:  Subtle mosaic attenuation pattern, nonspecific can be seen in the setting of inflammatory small airway disease, also can be seen with occlusive vascular disease and subtle change of increased pulmonary venous pressure.  3 mm pulmonary nodule right upper lobe, Fleischner Society guidelines for nodules less than 6 mm in individuals with low risk for lung malignancy: no follow-up needed, and individuals with high risk for lung malignancy: optional chest  CT at 12 months      JAZLYN (obstructive sleep apnea) 08/02/2021     ahi of 21; rdi of 38.   Result of sleep study d/w patient.  Recommend treatment.  Patient agree to apap.  Desensitization protocol d/w patient        History of stroke 07/20/2021 6/2021 MRI brain old stroke in thalamus        Mobitz I 06/27/2021    Bilateral carotid artery stenosis on CTA 6/26/21 06/27/2021 6/26/21 CTA head and neck  Atherosclerotic plaque throughout the aortic arch with mild shaggy soft atherosclerotic plaque in the posterior arch near the junction of the transverse and descending thoracic aorta.  Minimal atherosclerotic plaque in the cervical carotids with no stenosis or dissection within the cervical vessels.  Moderate to severe atherosclerotic plaque within the carotid siphons with 50-60% diameter stenosis, right slightly greater than left.  Moderate plaque within the V4 segment of the left  vertebral segment.  No large vessel occlusion or aneurysm intracranially.        Osteoporosis 05/19/2021    Secondary hyperparathyroidism of renal origin 02/08/2021    RA (rheumatoid arthritis) 12/09/2020    Stage 3a chronic kidney disease 01/16/2020    Aortic atherosclerosis 09/24/2019    Pseudophakia of both eyes 08/27/2019    Refractive error 08/27/2019    Tophaceous gout 05/09/2019    Chronic constipation not responding to linzess, miralax, senna 05/02/2019    Screening for colon cancer 07/26/2018 colonoscopy transverse colon polyp path showing benign inflammatory polyp. 05/02/2019    Anemia of chronic renal failure, stage 3b 03/13/2019    Current chronic use of systemic steroids 02/27/2019    Compression fracture of body of thoracic vertebra on XR 2/2019; 2/2019 alendronate 02/27/2019    Neutropenia 02/26/2019    Jackson's esophagus without dysplasia 03/14/2016    Anemia from plaquenil and imuran 03/14/2016    Diastolic heart failure, NYHA class 2 09/26/2014 05/12/2021 TTE LV normal size with concentric remodeling and normal systolic function LVEF 65%.  Grade 2 diastolic dysfunction.  Severe left atrial enlargement.  Normal RV size and systolic function.  CVP 3.  PA pressure 36. Sinuses of Valsalva mildly dilated.  05/12/2021 nuclear medicine stress test normal perfusion.  No evidence of ischemia or infarction.  LVEF 75%.  Normal wall motion post stress.  During stress, rare PVCs.  Hypertensive response exercise.    -ct with mosaic attenuation.  Recommend 10 m g 2 times per week.  Recommend daily demodex        BMI 23.0-23.9, adult 07/15/2014    Essential hypertension 05/13/2014 6/2019 TTE normal LV systolic and diastolic function; ABIs normal  6/27/21 TTE LV normal size with concentric hypertrophy and normal systolic function LVEF 65%.  Grade 1 diastolic dysfunction.  Normal RV size with normal systolic function.  Moderate left atrial enlargement.  Mild right atrial enlargement.  Mild aortic valve  stenosis.  Valve area 1.67, peak velocity 1.95, mean gradient 9. Normal CVP 3. PA pressure 16.           Controlled type 2 diabetes mellitus with diabetic polyneuropathy, with long-term current use of insulin 05/13/2014    Pure hypercholesterolemia 05/13/2014 05/12/2021 nuclear medicine stress test normal perfusion.  No evidence of ischemia or infarction.  LVEF 75%.  Normal wall motion post stress.  During stress, rare PVCs.  Hypertensive response exercise.        MCTD (mixed connective tissue disease) 05/13/2014    Sjogren's disease 05/13/2014    Bilateral edema of lower extremity 6/2019 TTE normal LV systolic and diastolic function; ABIs normal 05/13/2014    Glaucoma 05/13/2014    Celiac disease 11/12/2013     Overview:   Gluten intolerant           PAST MEDICAL PROBLEMS, PAST SURGICAL HISTORY: please see relevant portions of the electronic medical record    ALLERGIES AND MEDICATIONS: updated and reviewed.  Review of patient's allergies indicates:   Allergen Reactions    Penicillins Nausea And Vomiting    Rosuvastatin      Muscle pain     Allopurinol analogues Rash       Medication List with Changes/Refills   Current Medications    ABATACEPT (ORENCIA CLICKJECT) 125 MG/ML ATIN    Inject 125 mg into the skin once a week.    ALCOHOL SWABS (BD ALCOHOL SWABS) PADM    USE  4 TIMES PER DAY    AMLODIPINE (NORVASC) 5 MG TABLET    TAKE 1 TABLET (5 MG TOTAL) BY MOUTH 2 (TWO) TIMES DAILY.    AMMONIUM LACTATE 12 % CREA    Apply 1 application topically once daily.    ATORVASTATIN (LIPITOR) 80 MG TABLET    TAKE 1 TABLET EVERY DAY    BLOOD GLUCOSE CONTROL, LOW (TRUE METRIX LEVEL 1) SOLN    Use as indicated    BOOSTRIX TDAP 2.5-8-5 LF-MCG-LF/0.5ML SYRG INJECTION        CLONIDINE 0.2 MG/24 HR TD PTWK (CATAPRES) 0.2 MG/24 HR    Place 1 patch onto the skin every 7 days.    DICLOFENAC SODIUM (VOLTAREN) 1 % GEL    Apply 2 g topically 4 (four) times daily as needed. Apply to aching joints    DOCUSATE SODIUM (COLACE) 100 MG CAPSULE    " Take 1 capsule (100 mg total) by mouth 2 (two) times daily.    DORZOLAMIDE (TRUSOPT) 2 % OPHTHALMIC SOLUTION    PLACE 1 DROP INTO BOTH EYES 2 (TWO) TIMES DAILY.    DULAGLUTIDE (TRULICITY) 4.5 MG/0.5 ML PEN INJECTOR    Inject 4.5 mg into the skin every 7 days.    FEBUXOSTAT (ULORIC) 40 MG TAB    Take 1 tablet (40 mg total) by mouth once daily.    FERROUS GLUCONATE 324 MG (37.5 MG IRON) TAB TABLET    Take 1 tablet (324 mg total) by mouth once daily.    HYDRALAZINE (APRESOLINE) 50 MG TABLET    Take 1 tablet (50 mg total) by mouth every 8 (eight) hours.    INJECTAFER 50 IRON MG/ML INJECTION        INSULIN (LANTUS SOLOSTAR U-100 INSULIN) GLARGINE 100 UNITS/ML SUBQ PEN    Inject 6 units once daily    INSULIN ASPART U-100 (NOVOLOG FLEXPEN U-100 INSULIN) 100 UNIT/ML (3 ML) INPN PEN    Inject 3-6 units three times daily before each meal with sliding scale.    IPRATROPIUM (ATROVENT) 42 MCG (0.06 %) NASAL SPRAY    INSTILL 2 SPRAYS BY NASAL ROUTE 3 TIMES DAILY. AS NEEDED FOR NASAL CONGESTION AND DRIP FOR 7 DAYS STRENGTH: 42 MCG (0.06 %)    LANCETS (TRUEPLUS LANCETS) 33 GAUGE MISC    Test glucose 4x/day. Dx code e11.65    LINACLOTIDE (LINZESS) 72 MCG CAP CAPSULE    Take 1 capsule (72 mcg total) by mouth before breakfast.    MULTIVIT WITH MIN-FOLIC ACID 200 MCG CHEW        OMEPRAZOLE (PRILOSEC) 20 MG CAPSULE    TAKE 2 CAPSULES (40 MG TOTAL) BY MOUTH ONCE DAILY.    PEN NEEDLE, DIABETIC (BD ULTRA-FINE RICHARD PEN NEEDLE) 32 GAUGE X 5/32" NDLE    1 Device by Misc.(Non-Drug; Combo Route) route 4 (four) times daily.    PREDNISONE (DELTASONE) 5 MG TABLET    TAKE 1 TABLET BY MOUTH EVERY DAY    PROLIA 60 MG/ML SYRG        TORSEMIDE (DEMADEX) 10 MG TAB    TAKE 1 TABLET EVERY OTHER DAY    TRAMADOL (ULTRAM) 50 MG TABLET    TAKE 1 TABLET EVERY 12 HOURS AS NEEDED FOR PAIN    TRAVOPROST (TRAVATAN Z) 0.004 % OPHTHALMIC SOLUTION    Place 1 drop into both eyes every evening.    TRIAMCINOLONE ACETONIDE 0.1% (KENALOG) 0.1 % CREAM    Apply topically 2 " "(two) times daily. for 10 days    TRUE METRIX GLUCOSE METER MISC    USE AS DIRECTED    TRUE METRIX GLUCOSE TEST STRIP STRP    TEST GLUCOSE 4 TIMES PER DAY.    VALSARTAN (DIOVAN) 160 MG TABLET    TAKE 1 TABLET TWICE DAILY         Objective:   Objective   Physical Exam   Vitals:    04/25/23 0759   BP: 138/64   Pulse: 76   Temp: 98.2 °F (36.8 °C)   TempSrc: Oral   SpO2: 99%   Weight: 67.7 kg (149 lb 2.3 oz)   Height: 5' 6" (1.676 m)    Body mass index is 24.07 kg/m².            Physical Exam  Constitutional:       Appearance: Normal appearance. He is well-developed.   HENT:      Right Ear: Tympanic membrane and external ear normal.      Left Ear: Tympanic membrane and external ear normal.      Nose: Nose normal.   Eyes:      General: No scleral icterus.     Conjunctiva/sclera: Conjunctivae normal.   Neck:      Thyroid: No thyroid mass or thyromegaly.      Trachea: Trachea normal.   Cardiovascular:      Rate and Rhythm: Normal rate and regular rhythm.      Heart sounds: Normal heart sounds, S1 normal and S2 normal. No murmur heard.  Pulmonary:      Effort: Pulmonary effort is normal.      Breath sounds: Normal breath sounds.   Abdominal:      General: There is no distension.      Palpations: Abdomen is soft. There is no hepatomegaly, splenomegaly or mass.      Tenderness: There is no abdominal tenderness.   Musculoskeletal:         General: No deformity.      Right lower leg: Edema present.      Left lower leg: Edema present.      Comments: Mild ankle edema bilaterally; PT pulses 3+   Lymphadenopathy:      Cervical: No cervical adenopathy.   Skin:     General: Skin is warm and dry.      Findings: No rash (on exposed skin).      Comments: On exposed skin   Neurological:      Mental Status: He is alert and oriented to person, place, and time.      Cranial Nerves: No cranial nerve deficit.      Sensory: No sensory deficit.      Deep Tendon Reflexes: Reflexes are normal and symmetric.   Psychiatric:         Speech: Speech " normal.         Behavior: Behavior normal.         Thought Content: Thought content normal.         Judgment: Judgment normal.       Component      Latest Ref Rng & Units 4/20/2023   WBC      3.90 - 12.70 K/uL 8.27   RBC      4.60 - 6.20 M/uL 4.22 (L)   Hemoglobin      14.0 - 18.0 g/dL 12.7 (L)   Hematocrit      40.0 - 54.0 % 39.4 (L)   MCV      82 - 98 fL 93   MCH      27.0 - 31.0 pg 30.1   MCHC      32.0 - 36.0 g/dL 32.2   RDW      11.5 - 14.5 % 14.7 (H)   Platelets      150 - 450 K/uL 253   MPV      9.2 - 12.9 fL 12.2   Immature Granulocytes      0.0 - 0.5 % 0.4   Gran # (ANC)      1.8 - 7.7 K/uL 4.9   Immature Grans (Abs)      0.00 - 0.04 K/uL 0.03   Lymph #      1.0 - 4.8 K/uL 2.0   Mono #      0.3 - 1.0 K/uL 1.3 (H)   Eos #      0.0 - 0.5 K/uL 0.1   Baso #      0.00 - 0.20 K/uL 0.03   nRBC      0 /100 WBC 0   Gran %      38.0 - 73.0 % 59.0   Lymph %      18.0 - 48.0 % 23.6   Mono %      4.0 - 15.0 % 15.4 (H)   Eosinophil %      0.0 - 8.0 % 1.2   Basophil %      0.0 - 1.9 % 0.4   Differential Method       Automated   Sodium      136 - 145 mmol/L 140   Potassium      3.5 - 5.1 mmol/L 4.5   Chloride      95 - 110 mmol/L 108   CO2      23 - 29 mmol/L 20 (L)   Glucose      70 - 110 mg/dL 113 (H)   BUN      8 - 23 mg/dL 25 (H)   Creatinine      0.5 - 1.4 mg/dL 1.4   Calcium      8.7 - 10.5 mg/dL 9.2   PROTEIN TOTAL      6.0 - 8.4 g/dL 6.8   Albumin      3.5 - 5.2 g/dL 3.4 (L)   BILIRUBIN TOTAL      0.1 - 1.0 mg/dL 0.3   Alkaline Phosphatase      55 - 135 U/L 70   AST      10 - 40 U/L 17   ALT      10 - 44 U/L 17   Anion Gap      8 - 16 mmol/L 12   eGFR      >60 mL/min/1.73 m:2 53.1 (A)   NIL      IU/mL 0.68436   TB1 - Nil      IU/mL 0.017   TB2 - Nil      IU/mL 0.016   Mitogen - Nil      IU/mL 1.520   TB Gold Plus      Negative Negative   Urine Microalbumin      ug/mL 942.0   Creatinine, Urine      23.0 - 375.0 mg/dL 72.0   MICROALB/CREAT RATIO      0.0 - 30.0 ug/mg 1308.3 (H)   Hemoglobin A1C External      4.0 -  5.6 % 6.7 (H)   Estimated Avg Glucose      68 - 131 mg/dL 146 (H)   CRP      0.0 - 8.2 mg/L 1.6   Sed Rate      0 - 23 mm/Hr 50 (H)   Hep B Core Total Ab      Non-reactive Non-reactive   Hepatitis B Surface Ag      Non-reactive Non-reactive   Uric Acid      3.4 - 7.0 mg/dL 4.3

## 2023-04-25 ENCOUNTER — OFFICE VISIT (OUTPATIENT)
Dept: FAMILY MEDICINE | Facility: CLINIC | Age: 73
End: 2023-04-25
Payer: MEDICARE

## 2023-04-25 VITALS
TEMPERATURE: 98 F | WEIGHT: 149.13 LBS | DIASTOLIC BLOOD PRESSURE: 64 MMHG | HEIGHT: 66 IN | HEART RATE: 76 BPM | BODY MASS INDEX: 23.97 KG/M2 | OXYGEN SATURATION: 99 % | SYSTOLIC BLOOD PRESSURE: 138 MMHG

## 2023-04-25 DIAGNOSIS — J31.0 NONALLERGIC RHINITIS: ICD-10-CM

## 2023-04-25 DIAGNOSIS — I50.30 DIASTOLIC HEART FAILURE, NYHA CLASS 2: ICD-10-CM

## 2023-04-25 DIAGNOSIS — E78.00 PURE HYPERCHOLESTEROLEMIA: ICD-10-CM

## 2023-04-25 DIAGNOSIS — I10 ESSENTIAL HYPERTENSION: ICD-10-CM

## 2023-04-25 DIAGNOSIS — M81.0 OSTEOPOROSIS, UNSPECIFIED OSTEOPOROSIS TYPE, UNSPECIFIED PATHOLOGICAL FRACTURE PRESENCE: ICD-10-CM

## 2023-04-25 DIAGNOSIS — I70.0 AORTIC ATHEROSCLEROSIS: ICD-10-CM

## 2023-04-25 DIAGNOSIS — M1A.9XX1 TOPHACEOUS GOUT: ICD-10-CM

## 2023-04-25 DIAGNOSIS — M06.9 RHEUMATOID ARTHRITIS, INVOLVING UNSPECIFIED SITE, UNSPECIFIED WHETHER RHEUMATOID FACTOR PRESENT: ICD-10-CM

## 2023-04-25 DIAGNOSIS — K22.70 BARRETT'S ESOPHAGUS WITHOUT DYSPLASIA: ICD-10-CM

## 2023-04-25 DIAGNOSIS — I65.23 BILATERAL CAROTID ARTERY STENOSIS: ICD-10-CM

## 2023-04-25 DIAGNOSIS — D63.1 ANEMIA OF CHRONIC RENAL FAILURE, STAGE 3B: ICD-10-CM

## 2023-04-25 DIAGNOSIS — N18.31 STAGE 3A CHRONIC KIDNEY DISEASE: ICD-10-CM

## 2023-04-25 DIAGNOSIS — N18.32 ANEMIA OF CHRONIC RENAL FAILURE, STAGE 3B: ICD-10-CM

## 2023-04-25 DIAGNOSIS — Z00.00 NORMAL PHYSICAL EXAM: Primary | ICD-10-CM

## 2023-04-25 DIAGNOSIS — Z79.4 CONTROLLED TYPE 2 DIABETES MELLITUS WITH DIABETIC POLYNEUROPATHY, WITH LONG-TERM CURRENT USE OF INSULIN: ICD-10-CM

## 2023-04-25 DIAGNOSIS — M35.3 POLYMYALGIA RHEUMATICA: ICD-10-CM

## 2023-04-25 DIAGNOSIS — E11.42 CONTROLLED TYPE 2 DIABETES MELLITUS WITH DIABETIC POLYNEUROPATHY, WITH LONG-TERM CURRENT USE OF INSULIN: ICD-10-CM

## 2023-04-25 DIAGNOSIS — S22.000A COMPRESSION FRACTURE OF BODY OF THORACIC VERTEBRA: ICD-10-CM

## 2023-04-25 PROCEDURE — 3066F NEPHROPATHY DOC TX: CPT | Mod: HCNC,CPTII,S$GLB, | Performed by: INTERNAL MEDICINE

## 2023-04-25 PROCEDURE — 99397 PR PREVENTIVE VISIT,EST,65 & OVER: ICD-10-PCS | Mod: HCNC,S$GLB,, | Performed by: INTERNAL MEDICINE

## 2023-04-25 PROCEDURE — 1125F AMNT PAIN NOTED PAIN PRSNT: CPT | Mod: HCNC,CPTII,S$GLB, | Performed by: INTERNAL MEDICINE

## 2023-04-25 PROCEDURE — 3078F PR MOST RECENT DIASTOLIC BLOOD PRESSURE < 80 MM HG: ICD-10-PCS | Mod: HCNC,CPTII,S$GLB, | Performed by: INTERNAL MEDICINE

## 2023-04-25 PROCEDURE — 1101F PR PT FALLS ASSESS DOC 0-1 FALLS W/OUT INJ PAST YR: ICD-10-PCS | Mod: HCNC,CPTII,S$GLB, | Performed by: INTERNAL MEDICINE

## 2023-04-25 PROCEDURE — 1159F MED LIST DOCD IN RCRD: CPT | Mod: HCNC,CPTII,S$GLB, | Performed by: INTERNAL MEDICINE

## 2023-04-25 PROCEDURE — 1101F PT FALLS ASSESS-DOCD LE1/YR: CPT | Mod: HCNC,CPTII,S$GLB, | Performed by: INTERNAL MEDICINE

## 2023-04-25 PROCEDURE — 4010F PR ACE/ARB THEARPY RXD/TAKEN: ICD-10-PCS | Mod: HCNC,CPTII,S$GLB, | Performed by: INTERNAL MEDICINE

## 2023-04-25 PROCEDURE — 3288F PR FALLS RISK ASSESSMENT DOCUMENTED: ICD-10-PCS | Mod: HCNC,CPTII,S$GLB, | Performed by: INTERNAL MEDICINE

## 2023-04-25 PROCEDURE — 1159F PR MEDICATION LIST DOCUMENTED IN MEDICAL RECORD: ICD-10-PCS | Mod: HCNC,CPTII,S$GLB, | Performed by: INTERNAL MEDICINE

## 2023-04-25 PROCEDURE — 99397 PER PM REEVAL EST PAT 65+ YR: CPT | Mod: HCNC,S$GLB,, | Performed by: INTERNAL MEDICINE

## 2023-04-25 PROCEDURE — 99999 PR PBB SHADOW E&M-EST. PATIENT-LVL V: ICD-10-PCS | Mod: PBBFAC,HCNC,, | Performed by: INTERNAL MEDICINE

## 2023-04-25 PROCEDURE — 3072F PR LOW RISK FOR RETINOPATHY: ICD-10-PCS | Mod: HCNC,CPTII,S$GLB, | Performed by: INTERNAL MEDICINE

## 2023-04-25 PROCEDURE — 3072F LOW RISK FOR RETINOPATHY: CPT | Mod: HCNC,CPTII,S$GLB, | Performed by: INTERNAL MEDICINE

## 2023-04-25 PROCEDURE — 4010F ACE/ARB THERAPY RXD/TAKEN: CPT | Mod: HCNC,CPTII,S$GLB, | Performed by: INTERNAL MEDICINE

## 2023-04-25 PROCEDURE — 3078F DIAST BP <80 MM HG: CPT | Mod: HCNC,CPTII,S$GLB, | Performed by: INTERNAL MEDICINE

## 2023-04-25 PROCEDURE — 1160F RVW MEDS BY RX/DR IN RCRD: CPT | Mod: HCNC,CPTII,S$GLB, | Performed by: INTERNAL MEDICINE

## 2023-04-25 PROCEDURE — 3075F PR MOST RECENT SYSTOLIC BLOOD PRESS GE 130-139MM HG: ICD-10-PCS | Mod: HCNC,CPTII,S$GLB, | Performed by: INTERNAL MEDICINE

## 2023-04-25 PROCEDURE — 99999 PR PBB SHADOW E&M-EST. PATIENT-LVL V: CPT | Mod: PBBFAC,HCNC,, | Performed by: INTERNAL MEDICINE

## 2023-04-25 PROCEDURE — 3044F PR MOST RECENT HEMOGLOBIN A1C LEVEL <7.0%: ICD-10-PCS | Mod: HCNC,CPTII,S$GLB, | Performed by: INTERNAL MEDICINE

## 2023-04-25 PROCEDURE — 3008F BODY MASS INDEX DOCD: CPT | Mod: HCNC,CPTII,S$GLB, | Performed by: INTERNAL MEDICINE

## 2023-04-25 PROCEDURE — 3075F SYST BP GE 130 - 139MM HG: CPT | Mod: HCNC,CPTII,S$GLB, | Performed by: INTERNAL MEDICINE

## 2023-04-25 PROCEDURE — 3288F FALL RISK ASSESSMENT DOCD: CPT | Mod: HCNC,CPTII,S$GLB, | Performed by: INTERNAL MEDICINE

## 2023-04-25 PROCEDURE — 1160F PR REVIEW ALL MEDS BY PRESCRIBER/CLIN PHARMACIST DOCUMENTED: ICD-10-PCS | Mod: HCNC,CPTII,S$GLB, | Performed by: INTERNAL MEDICINE

## 2023-04-25 PROCEDURE — 3066F PR DOCUMENTATION OF TREATMENT FOR NEPHROPATHY: ICD-10-PCS | Mod: HCNC,CPTII,S$GLB, | Performed by: INTERNAL MEDICINE

## 2023-04-25 PROCEDURE — 1125F PR PAIN SEVERITY QUANTIFIED, PAIN PRESENT: ICD-10-PCS | Mod: HCNC,CPTII,S$GLB, | Performed by: INTERNAL MEDICINE

## 2023-04-25 PROCEDURE — 3062F POS MACROALBUMINURIA REV: CPT | Mod: HCNC,CPTII,S$GLB, | Performed by: INTERNAL MEDICINE

## 2023-04-25 PROCEDURE — 3008F PR BODY MASS INDEX (BMI) DOCUMENTED: ICD-10-PCS | Mod: HCNC,CPTII,S$GLB, | Performed by: INTERNAL MEDICINE

## 2023-04-25 PROCEDURE — 3044F HG A1C LEVEL LT 7.0%: CPT | Mod: HCNC,CPTII,S$GLB, | Performed by: INTERNAL MEDICINE

## 2023-04-25 PROCEDURE — 3062F PR POS MACROALBUMINURIA RESULT DOCUMENTED/REVIEW: ICD-10-PCS | Mod: HCNC,CPTII,S$GLB, | Performed by: INTERNAL MEDICINE

## 2023-04-25 RX ORDER — PREDNISONE 5 MG/1
5 TABLET ORAL DAILY
Qty: 90 TABLET | Refills: 3 | Status: SHIPPED | OUTPATIENT
Start: 2023-04-25

## 2023-04-25 RX ORDER — DULAGLUTIDE 4.5 MG/.5ML
4.5 INJECTION, SOLUTION SUBCUTANEOUS
Qty: 4 PEN | Refills: 3 | Status: SHIPPED | OUTPATIENT
Start: 2023-04-25 | End: 2023-06-28

## 2023-04-25 RX ORDER — FLUTICASONE PROPIONATE 50 MCG
1 SPRAY, SUSPENSION (ML) NASAL DAILY
Qty: 48 G | Refills: 11 | Status: SHIPPED | OUTPATIENT
Start: 2023-04-25

## 2023-04-25 NOTE — TELEPHONE ENCOUNTER
Care Due:                  Date            Visit Type   Department     Provider  --------------------------------------------------------------------------------                                [facility]                              FOLLOWUP/OF  MED/ INTERNAL  Last Visit: 04-      FICE VISIT   MED/ MELVI Domingo  Next Visit: None Scheduled  None         None Found                                                            Last  Test          Frequency    Reason                     Performed    Due Date  --------------------------------------------------------------------------------    Lipid Panel.  12 months..  atorvastatin.............  07- 06-    [facility] Embedded Care Gaps. Reference number: [id number]. 4/25/2023   8:32:17 AM CDT

## 2023-04-27 NOTE — PROGRESS NOTES
[facility]  Occupational Therapy Treatment Note    Date: 5/1/2023  Name: Darrion Bennett  Clinic Number: [id number]    Therapy Diagnosis:   Encounter Diagnoses   Name Primary?    Pain in both hands Yes    Bilateral hand swelling      Physician: Roxanna Salazar MD    Physician Orders: eval and treat  Medical Diagnosis: Bilateral hand pain [M79.641, M79.642]  Surgical Procedure and Date: n/a OR Date of Injury/Onset: for years, but reports ~1-2 weeks ago they had worsened.   Evaluation Date: 4/12/23  Insurance Authorization Period Expiration: 12/29/23  Plan of Care Expiration: 6/9/23  Date of Return to MD: 4/25/23 with PCP  Visit # / Visits authorized: 1 / 12 (eval not included)  FOTO: 4/12/23  50% limitation     Precautions:  Standard, Diabetes, CHF     Time In: 8:28 am  Time Out: 9:18 am  Total Appointment Time (timed & untimed codes): 48  minutes      SUBJECTIVE     Pt reports: My hands hurt and are stiff.  Darrion was compliant with home exercise program given last session.   Response to previous treatment: no comments regarding hands post eval  Functional change: nothing reported     Pain: 5/10  Location: bilateral hands hands      OBJECTIVE     Objective Measures updated at progress report unless specified.     Treatment       Darrion received the following supervised modalities after being cleared for contraindications for 8 minutes:  -Patient received paraffin bath to bilateral hand(s) for 8 minutes to increase blood flow, circulation, pain management and for tissue elasticity prior to therex.      Darrion received therapeutic exercises for 40 minutes including:  -fisted hands x 10 reps  -finger abduction/adduction on table top x 10 reps   -isolated finger abduction/adduction on table top x 10 reps each finger  -thumb palmar and radial abduction x 10 reps each  -wrist extension/flexion x 10 reps  -MP flexion x 10 reps  -hook, flat and full fits x 10 reps each  -thumb opposition to tips x  10 reps each    Patient Education and Home Exercises      Education provided:   -education regarding joint protection principles of using larger joints to decrease stress on hands- written materials provided  -encouraged to acquire home paraffin unit  - Progress towards goals     Written Home Exercises Provided: Patient instructed to cont prior HEP.  Exercises were reviewed and Darrion was able to demonstrate them prior to the end of the session.  Darrion demonstrated good  understanding of the HEP provided. See EMR under Patient Instructions for exercises provided during therapy sessions.       Assessment     Pt would continue to benefit from skilled OT. Darrion had improvement in his finger range of motion post therapy session.  He appeared to somewhat understand joint protections principles and how a home unit could assist with his daily pain.      Darrion is progressing well towards his goals and there are no updates to goals at this time. Pt prognosis is Good.     Pt will continue to benefit from skilled outpatient occupational therapy to address the deficits listed in the problem list on initial evaluation provide pt/family education and to maximize pt's level of independence in the home and community environment.     Pt's spiritual, cultural and educational needs considered and pt agreeable to plan of care and goals.    Anticipated barriers to occupational therapy: occasional swelling    Goals:  The following goals were discussed with the patient and patient is in agreement with them as to be addressed in the treatment plan.   Long Term Goals (LTGs); to be met by discharge.  1) Pt will report pain no higher than 1-2/10 with ADLs, cooking, and opening things  2) Pt will have an improved FOTO score by at least 15 points  3) Pt will demonstrate improved B  strength by at least 3# for grasping and opening things     Short Term Goals (STGs); to be met within 4 weeks (5/12/23).  1) Pt will report or demonstrate  independence with HEP, joint protection, and edema management.occ    PLAN     Application of paraffin, continue education regarding joint protection principles / application, active range of motion and strengthening of .  Updates/Grading for next session: none at this time      SHERRI Amador

## 2023-05-01 ENCOUNTER — CLINICAL SUPPORT (OUTPATIENT)
Dept: REHABILITATION | Facility: HOSPITAL | Age: 73
End: 2023-05-01
Attending: INTERNAL MEDICINE
Payer: MEDICARE

## 2023-05-01 DIAGNOSIS — M79.89 BILATERAL HAND SWELLING: ICD-10-CM

## 2023-05-01 DIAGNOSIS — M79.641 PAIN IN BOTH HANDS: Primary | ICD-10-CM

## 2023-05-01 DIAGNOSIS — M79.642 PAIN IN BOTH HANDS: Primary | ICD-10-CM

## 2023-05-01 PROCEDURE — 97110 THERAPEUTIC EXERCISES: CPT | Mod: HCNC,PN

## 2023-05-03 ENCOUNTER — SPECIALTY PHARMACY (OUTPATIENT)
Dept: PHARMACY | Facility: CLINIC | Age: 73
End: 2023-05-03
Payer: MEDICARE

## 2023-05-03 NOTE — TELEPHONE ENCOUNTER
Specialty Pharmacy - Refill Coordination    Specialty Medication Orders Linked to Encounter      Flowsheet Row Most Recent Value   Medication #1 abatacept (ORENCIA CLICKJECT) 125 mg/mL AtIn (Order#[id number], Rx#[id number])            Refill Questions - Documented Responses      Flowsheet Row Most Recent Value   Patient Availability and HIPAA Verification    Does patient want to proceed with activity? Yes   HIPAA/medical authority confirmed? Yes   Relationship to patient of person spoken to? Self   Refill Screening Questions    Changes to allergies? No   Changes to medications? No   New conditions since last clinic visit? No   Unplanned office visit, urgent care, ED, or hospital admission in the last 4 weeks? No   How does patient/caregiver feel medication is working? Good   Financial problems or insurance changes? No   How many doses of your specialty medications were missed in the last 4 weeks? 0   Would patient like to speak to a pharmacist? No   When does the patient need to receive the medication? 05/10/23   Refill Delivery Questions    How will the patient receive the medication? [facility]   When does the patient need to receive the medication? 05/10/23   Shipping Address Home   Address in [facility] confirmed and updated if neccessary? Yes   Expected Copay ($) 0   Is the patient able to afford the medication copay? Yes   Payment Method zero copay   Days supply of Refill 28   Supplies needed? No supplies needed   Refill activity completed? Yes   Refill activity plan Refill scheduled   Shipment/Pickup Date: 05/08/23            Current Outpatient Medications   Medication Sig    abatacept (ORENCIA CLICKJECT) 125 mg/mL AtIn Inject 125 mg into the skin once a week.    alcohol swabs (BD ALCOHOL SWABS) PadM USE  4 TIMES PER DAY    amLODIPine (NORVASC) 5 MG tablet TAKE 1 TABLET (5 MG TOTAL) BY MOUTH 2 (TWO) TIMES DAILY.    ammonium lactate 12 % Crea Apply 1 application topically once daily.    atorvastatin  "(LIPITOR) 80 MG tablet TAKE 1 TABLET EVERY DAY    blood glucose control, low (TRUE METRIX LEVEL 1) Soln Use as indicated    cloNIDine 0.2 mg/24 hr td ptwk (CATAPRES) 0.2 mg/24 hr Place 1 patch onto the skin every 7 days.    diclofenac sodium (VOLTAREN) 1 % Gel Apply 2 g topically 4 (four) times daily as needed. Apply to aching joints    docusate sodium (COLACE) 100 MG capsule Take 1 capsule (100 mg total) by mouth 2 (two) times daily. (Patient not taking: Reported on 4/25/2023)    dorzolamide (TRUSOPT) 2 % ophthalmic solution PLACE 1 DROP INTO BOTH EYES 2 (TWO) TIMES DAILY.    dulaglutide (TRULICITY) 4.5 mg/0.5 mL pen injector Inject 4.5 mg into the skin every 7 days.    febuxostat (ULORIC) 40 mg Tab Take 1 tablet (40 mg total) by mouth once daily.    ferrous gluconate 324 mg (37.5 mg iron) Tab tablet Take 1 tablet (324 mg total) by mouth once daily.    fluticasone propionate (FLONASE) 50 mcg/actuation nasal spray 1 spray (50 mcg total) by Each Nostril route once daily.    hydrALAZINE (APRESOLINE) 50 MG tablet Take 1 tablet (50 mg total) by mouth every 8 (eight) hours.    INJECTAFER 50 iron mg/mL injection     insulin (LANTUS SOLOSTAR U-100 INSULIN) glargine 100 units/mL SubQ pen Inject 6 units once daily    insulin aspart U-100 (NOVOLOG FLEXPEN U-100 INSULIN) 100 unit/mL (3 mL) InPn pen Inject 3-6 units three times daily before each meal with sliding scale.    lancets (TRUEPLUS LANCETS) 33 gauge Misc Test glucose 4x/day. Dx code e11.65    linaCLOtide (LINZESS) 72 mcg Cap capsule Take 1 capsule (72 mcg total) by mouth before breakfast.    multivit with min-folic acid 200 mcg Chew     omeprazole (PRILOSEC) 20 MG capsule TAKE 2 CAPSULES (40 MG TOTAL) BY MOUTH ONCE DAILY.    pen needle, diabetic (BD ULTRA-FINE RICHARD PEN NEEDLE) 32 gauge x 5/32" Ndle 1 Device by Misc.(Non-Drug; Combo Route) route 4 (four) times daily.    predniSONE (DELTASONE) 5 MG tablet Take 1 tablet (5 mg total) by mouth once daily.    PROLIA 60 mg/mL " Syrg     torsemide (DEMADEX) 10 MG Tab TAKE 1 TABLET EVERY OTHER DAY    traMADoL (ULTRAM) 50 mg tablet TAKE 1 TABLET EVERY 12 HOURS AS NEEDED FOR PAIN    travoprost (TRAVATAN Z) 0.004 % ophthalmic solution Place 1 drop into both eyes every evening.    triamcinolone acetonide 0.1% (KENALOG) 0.1 % cream Apply topically 2 (two) times daily. for 10 days (Patient not taking: Reported on 4/25/2023)    TRUE METRIX GLUCOSE METER Misc USE AS DIRECTED    TRUE METRIX GLUCOSE TEST STRIP Strp TEST GLUCOSE 4 TIMES PER DAY.    valsartan (DIOVAN) 160 MG tablet TAKE 1 TABLET TWICE DAILY   Last reviewed on 4/25/2023  8:04 AM by Farooq Domingo MD    Review of patient's allergies indicates:   Allergen Reactions    Penicillins Nausea And Vomiting    Rosuvastatin      Muscle pain     Allopurinol analogues Rash    Last reviewed on  4/25/2023 8:05 AM by Monica Jain      Tasks added this encounter   No tasks added.   Tasks due within next 3 months   5/24/2023 - Clinical Assessment (1 year recurrence)  5/3/2023 - Refill Coordination Outreach (1 time occurrence)     [facility]  [address] [facility] [address]  Phone: [phone]  Fax: [phone]

## 2023-05-04 NOTE — PROGRESS NOTES
[facility]  Occupational Therapy Treatment Note    Date: 5/8/2023  Name: Darrion Bennett  Clinic Number: [id number]    Therapy Diagnosis:   Encounter Diagnoses   Name Primary?    Pain in both hands Yes    Bilateral hand swelling      Physician: Roxanna Salazar MD    Physician Orders: eval and treat  Medical Diagnosis: Bilateral hand pain [M79.641, M79.642]  Surgical Procedure and Date: n/a OR Date of Injury/Onset: for years, but reports ~1-2 weeks ago they had worsened.   Evaluation Date: 4/12/23  Insurance Authorization Period Expiration: 12/29/23  Plan of Care Expiration: 6/9/23  Date of Return to MD: 4/25/23 with PCP  Visit # / Visits authorized: 2 / 12 (eval not included)  FOTO: 4/12/23  50% limitation     Precautions:  Standard, Diabetes, CHF     Time In: 8:27  am  Time Out: 9:13 am  Total Appointment Time (timed & untimed codes): 46  minutes      SUBJECTIVE     Pt reports: My hands are feeling better.  I use my forearm  to hold groceries  Darrion was compliant with home exercise program provided   Response to previous treatment: My hands felt a little better.  Functional change: less pain with finger motion    Pain: 4/10  Location: bilateral hands hands      OBJECTIVE     Objective Measures updated at progress report unless specified.     Treatment       Darrion received the following supervised modalities after being cleared for contraindications for 8 minutes:  -Patient received paraffin bath to bilateral hand(s) for 8 minutes to increase blood flow, circulation, pain management and for tissue elasticity prior to therex.      Darrion received therapeutic exercises for 38 minutes including:  -fisted hands x 10 reps  -rotation of spheres in hand x 2 min  -isolated finger extension from table top x 10 reps each finger  -ergo gripper 1 red and yellow band x 3 minutes  -isolated finger abduction/adduction on table top x 10 reps each finger  -wrist extension/flexion x 10 reps  -thumb palmar  and radial abduction x 10 reps each  -MP flexion x 10 reps  -flat and full fits x 10 reps each    Patient Education and Home Exercises      Education provided:   -encouraged to acquire home paraffin unit  - Progress towards goals     Written Home Exercises Provided: Patient instructed to cont prior HEP.  Exercises were reviewed and Darrion was able to demonstrate them prior to the end of the session.  Darrion demonstrated good  understanding of the HEP provided. See EMR under Patient Instructions for exercises provided during therapy sessions.       Assessment     Pt would continue to benefit from skilled OT. Darrion had improvement in his finger range upon arrival to therapy and reduction in his joint pain.  Tolerated resistive gripping well.  No increase in pain post therapy session.     Drarion is progressing well towards his goals and there are no updates to goals at this time. Pt prognosis is Good.     Pt will continue to benefit from skilled outpatient occupational therapy to address the deficits listed in the problem list on initial evaluation provide pt/family education and to maximize pt's level of independence in the home and community environment.     Pt's spiritual, cultural and educational needs considered and pt agreeable to plan of care and goals.    Anticipated barriers to occupational therapy: occasional swelling    Goals:  The following goals were discussed with the patient and patient is in agreement with them as to be addressed in the treatment plan.   Long Term Goals (LTGs); to be met by discharge.  1) Pt will report pain no higher than 1-2/10 with ADLs, cooking, and opening things  2) Pt will have an improved FOTO score by at least 15 points  3) Pt will demonstrate improved B  strength by at least 3# for grasping and opening things     Short Term Goals (STGs); to be met within 4 weeks (5/12/23).  1) Pt will report or demonstrate independence with HEP, joint protection, and edema management.    PLAN      Application of paraffin, continue education regarding joint protection principles / application, active range of motion and strengthening of .  Updates/Grading for next session: none at this time      SHERRI Amador

## 2023-05-05 ENCOUNTER — OFFICE VISIT (OUTPATIENT)
Dept: ENDOCRINOLOGY | Facility: CLINIC | Age: 73
End: 2023-05-05
Payer: MEDICARE

## 2023-05-05 VITALS
SYSTOLIC BLOOD PRESSURE: 156 MMHG | WEIGHT: 148 LBS | DIASTOLIC BLOOD PRESSURE: 71 MMHG | TEMPERATURE: 98 F | HEART RATE: 77 BPM | BODY MASS INDEX: 23.89 KG/M2

## 2023-05-05 DIAGNOSIS — T38.0X5A STEROID-INDUCED HYPERGLYCEMIA: ICD-10-CM

## 2023-05-05 DIAGNOSIS — Z86.73 HISTORY OF STROKE: ICD-10-CM

## 2023-05-05 DIAGNOSIS — R73.9 STEROID-INDUCED HYPERGLYCEMIA: ICD-10-CM

## 2023-05-05 DIAGNOSIS — Z79.4 TYPE 2 DIABETES MELLITUS WITH HYPOGLYCEMIA WITHOUT COMA, WITH LONG-TERM CURRENT USE OF INSULIN: Primary | ICD-10-CM

## 2023-05-05 DIAGNOSIS — E11.649 TYPE 2 DIABETES MELLITUS WITH HYPOGLYCEMIA WITHOUT COMA, WITH LONG-TERM CURRENT USE OF INSULIN: Primary | ICD-10-CM

## 2023-05-05 PROCEDURE — 3008F PR BODY MASS INDEX (BMI) DOCUMENTED: ICD-10-PCS | Mod: CPTII,S$GLB,, | Performed by: NURSE PRACTITIONER

## 2023-05-05 PROCEDURE — 3066F NEPHROPATHY DOC TX: CPT | Mod: CPTII,S$GLB,, | Performed by: NURSE PRACTITIONER

## 2023-05-05 PROCEDURE — 1160F PR REVIEW ALL MEDS BY PRESCRIBER/CLIN PHARMACIST DOCUMENTED: ICD-10-PCS | Mod: CPTII,S$GLB,, | Performed by: NURSE PRACTITIONER

## 2023-05-05 PROCEDURE — 3044F HG A1C LEVEL LT 7.0%: CPT | Mod: CPTII,S$GLB,, | Performed by: NURSE PRACTITIONER

## 2023-05-05 PROCEDURE — 99214 OFFICE O/P EST MOD 30 MIN: CPT | Mod: S$GLB,,, | Performed by: NURSE PRACTITIONER

## 2023-05-05 PROCEDURE — 3077F SYST BP >= 140 MM HG: CPT | Mod: CPTII,S$GLB,, | Performed by: NURSE PRACTITIONER

## 2023-05-05 PROCEDURE — 1159F PR MEDICATION LIST DOCUMENTED IN MEDICAL RECORD: ICD-10-PCS | Mod: CPTII,S$GLB,, | Performed by: NURSE PRACTITIONER

## 2023-05-05 PROCEDURE — 1159F MED LIST DOCD IN RCRD: CPT | Mod: CPTII,S$GLB,, | Performed by: NURSE PRACTITIONER

## 2023-05-05 PROCEDURE — 3078F DIAST BP <80 MM HG: CPT | Mod: CPTII,S$GLB,, | Performed by: NURSE PRACTITIONER

## 2023-05-05 PROCEDURE — 4010F ACE/ARB THERAPY RXD/TAKEN: CPT | Mod: CPTII,S$GLB,, | Performed by: NURSE PRACTITIONER

## 2023-05-05 PROCEDURE — 99999 PR PBB SHADOW E&M-EST. PATIENT-LVL V: ICD-10-PCS | Mod: PBBFAC,,, | Performed by: NURSE PRACTITIONER

## 2023-05-05 PROCEDURE — 99999 PR PBB SHADOW E&M-EST. PATIENT-LVL V: CPT | Mod: PBBFAC,,, | Performed by: NURSE PRACTITIONER

## 2023-05-05 PROCEDURE — 3078F PR MOST RECENT DIASTOLIC BLOOD PRESSURE < 80 MM HG: ICD-10-PCS | Mod: CPTII,S$GLB,, | Performed by: NURSE PRACTITIONER

## 2023-05-05 PROCEDURE — 1160F RVW MEDS BY RX/DR IN RCRD: CPT | Mod: CPTII,S$GLB,, | Performed by: NURSE PRACTITIONER

## 2023-05-05 PROCEDURE — 3072F LOW RISK FOR RETINOPATHY: CPT | Mod: CPTII,S$GLB,, | Performed by: NURSE PRACTITIONER

## 2023-05-05 PROCEDURE — 99214 PR OFFICE/OUTPT VISIT, EST, LEVL IV, 30-39 MIN: ICD-10-PCS | Mod: S$GLB,,, | Performed by: NURSE PRACTITIONER

## 2023-05-05 PROCEDURE — 3072F PR LOW RISK FOR RETINOPATHY: ICD-10-PCS | Mod: CPTII,S$GLB,, | Performed by: NURSE PRACTITIONER

## 2023-05-05 PROCEDURE — 95251 PR GLUCOSE MONITOR, 72 HOUR, PHYS INTERP: ICD-10-PCS | Mod: S$GLB,,, | Performed by: NURSE PRACTITIONER

## 2023-05-05 PROCEDURE — 3062F POS MACROALBUMINURIA REV: CPT | Mod: CPTII,S$GLB,, | Performed by: NURSE PRACTITIONER

## 2023-05-05 PROCEDURE — 3066F PR DOCUMENTATION OF TREATMENT FOR NEPHROPATHY: ICD-10-PCS | Mod: CPTII,S$GLB,, | Performed by: NURSE PRACTITIONER

## 2023-05-05 PROCEDURE — 3008F BODY MASS INDEX DOCD: CPT | Mod: CPTII,S$GLB,, | Performed by: NURSE PRACTITIONER

## 2023-05-05 PROCEDURE — 3077F PR MOST RECENT SYSTOLIC BLOOD PRESSURE >= 140 MM HG: ICD-10-PCS | Mod: CPTII,S$GLB,, | Performed by: NURSE PRACTITIONER

## 2023-05-05 PROCEDURE — 3044F PR MOST RECENT HEMOGLOBIN A1C LEVEL <7.0%: ICD-10-PCS | Mod: CPTII,S$GLB,, | Performed by: NURSE PRACTITIONER

## 2023-05-05 PROCEDURE — 3062F PR POS MACROALBUMINURIA RESULT DOCUMENTED/REVIEW: ICD-10-PCS | Mod: CPTII,S$GLB,, | Performed by: NURSE PRACTITIONER

## 2023-05-05 PROCEDURE — 4010F PR ACE/ARB THEARPY RXD/TAKEN: ICD-10-PCS | Mod: CPTII,S$GLB,, | Performed by: NURSE PRACTITIONER

## 2023-05-05 PROCEDURE — 95251 CONT GLUC MNTR ANALYSIS I&R: CPT | Mod: S$GLB,,, | Performed by: NURSE PRACTITIONER

## 2023-05-05 NOTE — PATIENT INSTRUCTIONS
Continue current regimen for now.   If Trulicity 4.5 mg proves to be effective at lowering blood sugars and appetite where glucoses start dropping often less than 80, then reduce Novolog by 1 unit before each meal (2-5-4 units or even 0-5-4 units before meals).     Discussed how prednisone elevates glucoses and how he will need to reduce or perhaps skip Novolog on those days.     Return to clinic in 3 months with labs prior.

## 2023-05-05 NOTE — PROGRESS NOTES
CC: This 73 y.o.  male  is here for evaluation of  T2DM along with comorbidities indicated in the Visit Diagnosis section of this encounter.    HPI: Darrion Bennett was diagnosed with T2DM at age 35. Oral medications started years later.      Medical hx: rheumatoid arthritis on chronic prednisone, TIA, diastolic HF, carotid artery stenosis           Prior visit 2/2023  A1c is up from 6.5 to 6.8%.   He is taking higher doses of insulin: Lantus 6 units and  Novolog 5-6-6 units before meals. His appetite has increased. 5 lb weight gain since lov. Pt is interested in increasing Trulicity.   Dariel CGM averages over the last few months: 126-160   Plan Increase Trulicity to 4.5 mg weekly. -- if it's on backorder, we can switch to Ozempic 2 mg weekly. Reviewed potential side effects.   Continue Lantus 6 units once daily.   Reduce Novolog to 3 units before breakfast, continue 6 units before lunch and dinner.   If blood sugars drop less than 90 after lunch/dinner, then decrease down to 5 units.   Return to clinic in 3 months with labs prior.     Interval hx  A1c remains stable, from 6.8 in Jan to now 6.7%.   Pt was not able to get Trulicity 4.5 mg until yesterday. He's been taking Trulicity 1.5 mg weekly.     Reports he's  been eating too much and wants to lose weight. Looks forward to continuing higher dose of Trulicity.     He is rx'd to take prednisone 5 mg once daily but he only takes it maybe 4x/week.       PHX: CKD stage 3, Diastolic heart failure, NYHA class 2;  Sjogren's syndrome, gouty arthritis - sees Rheumatology     LAST DIABETES EDUCATION: multiple times, years ago     HOSPITALIZED FOR DIABETES  -  No      DIABETES MEDICATIONS:   Trulicity 1.5 mg once daily   Lantus  6 units every night   Novolog 3-6-5 units ac     Misses medication doses - denies     Rotation of insulin injections - across abdomen   Changes pen neeedles - yes     DM COMPLICATIONS: nephropathy and cerebrovascular disease    SIGNIFICANT  DIABETES MED HISTORY: has been told that metformin is bad for his kidneys and that's why he stopped it   - Lantus, Humalog, and Tradjenta stopped at initial visit 7/2019 and he was switched to Xultophy.   - Xultophy stopped and switched to Trulicity and Lantus 2/2020  - Discussed switching from Novolog to glimepiride but he prefers  Injections over orals.       SELF MONITORING BLOOD GLUCOSE: Checks blood glucose at home 4x/day at least with Freestyle Dariel 2 reader.     CGM reading during visit is 58 (steady) compared to POCT glucose 94.       Interpretation: BGs overall stable. CGM readings drop as low as 50-60s after meals in the afternoon.  Glucoses karina as high as 300s when he was on vacation and forgot Novolog ac.           HYPOGLYCEMIC EPISODES: pt reports he rarely has symptoms of hypoglycemia.  Generally asymptomatic when he gets low CGM alerts.        CURRENT DIET: drinks water, diet soda; has some orange juice to take with his meds. Eats 3 meals/day. Lunch is the biggest meal.  Eats home cooked foods, rarely eats processed foods.     CURRENT EXERCISE: none; previously exercised in gym.     SOCIAL: his son is neurologist, daughter in law is gastroenterologist, daughter is internal med resident       BP (!) 156/71   Pulse 77   Temp 98 °F (36.7 °C)   Wt 67.1 kg (148 lb)   BMI 23.89 kg/m²       ROS:   CONSTITUTIONAL: Appetite normal, denies fatigue  GI: no constipation, no nausea       PHYSICAL EXAM:  GENERAL: Well developed, well nourished. No acute distress.   PSYCH: AAOx3, appropriate mood and affect, conversant, well-groomed. Judgement and insight good.   NEURO: Cranial nerves grossly intact. Speech clear, no tremor.   CHEST: Respirations even and unlabored.         Hemoglobin A1C   Date Value Ref Range Status   04/20/2023 6.7 (H) 4.0 - 5.6 % Final     Comment:     ADA Screening Guidelines:  5.7-6.4%  Consistent with prediabetes  >or=6.5%  Consistent with diabetes    High levels of fetal hemoglobin  interfere with the HbA1C  assay. Heterozygous hemoglobin variants (HbS, HgC, etc)do  not significantly interfere with this assay.   However, presence of multiple variants may affect accuracy.     01/27/2023 6.8 (H) 4.0 - 5.6 % Final     Comment:     ADA Screening Guidelines:  5.7-6.4%  Consistent with prediabetes  >or=6.5%  Consistent with diabetes    High levels of fetal hemoglobin interfere with the HbA1C  assay. Heterozygous hemoglobin variants (HbS, HgC, etc)do  not significantly interfere with this assay.   However, presence of multiple variants may affect accuracy.     10/25/2022 6.5 (H) 4.0 - 5.6 % Final     Comment:     ADA Screening Guidelines:  5.7-6.4%  Consistent with prediabetes  >or=6.5%  Consistent with diabetes    High levels of fetal hemoglobin interfere with the HbA1C  assay. Heterozygous hemoglobin variants (HbS, HgC, etc)do  not significantly interfere with this assay.   However, presence of multiple variants may affect accuracy.     10/16/2018 6.2 (H) 4.0 - 5.7 % Final     Comment:     Dr. Jurgen Ward ( Endocrinology )        Ref. Range 9/19/2019 08:13   Glucose Latest Ref Range: 70 - 110 mg/dL 170 (H)   C-Peptide Latest Ref Range: 0.78 - 5.19 ng/mL 1.33        Ref. Range 8/2/2021 07:00   Glucose Latest Ref Range: 70 - 110 mg/dL 120 (H)   C-Peptide Latest Ref Range: 0.78 - 5.19 ng/mL 1.11       Chemistry        Component Value Date/Time     04/20/2023 0656    K 4.5 04/20/2023 0656     04/20/2023 0656    CO2 20 (L) 04/20/2023 0656    BUN 25 (H) 04/20/2023 0656    CREATININE 1.4 04/20/2023 0656     (H) 04/20/2023 0656        Component Value Date/Time    CALCIUM 9.2 04/20/2023 0656    ALKPHOS 70 04/20/2023 0656    AST 17 04/20/2023 0656    ALT 17 04/20/2023 0656    BILITOT 0.3 04/20/2023 0656    ESTGFRAFRICA >60.0 04/22/2022 0845    EGFRNONAA 54.5 (A) 04/22/2022 0845         Latest Reference Range & Units 04/20/23 06:56   BUN 8 - 23 mg/dL 25 (H)   Creatinine 0.5 - 1.4 mg/dL  1.4   eGFR >60 mL/min/1.73 m^2 53.1 !   (H): Data is abnormally high  !: Data is abnormal  Lab Results   Component Value Date    LDLCALC 80.0 07/05/2022      Latest Reference Range & Units 07/05/22 07:13   Cholesterol 120 - 199 mg/dL 135   HDL 40 - 75 mg/dL 41   HDL/Cholesterol Ratio 20.0 - 50.0 % 30.4   LDL Cholesterol External 63.0 - 159.0 mg/dL 80.0   Non-HDL Cholesterol mg/dL 94   Total Cholesterol/HDL Ratio 2.0 - 5.0  3.3   Triglycerides 30 - 150 mg/dL 70       Lab Results   Component Value Date    MICALBCREAT 1308.3 (H) 04/20/2023               ASSESSMENT and PLAN:    A1C GOAL: 7.5 %     1. Type 2 diabetes mellitus with hypoglycemia without coma, with long-term current use of insulin  Suspect frequent CGM readings in the 60s may be falsely low since pt tends to be asymptomatic.    Continue current regimen for now.   If Trulicity 4.5 mg proves to be effective at lowering blood sugars and appetite where glucoses start dropping often less than 80, then reduce Novolog by 1 unit before each meal (2-5-4 units or even 0-5-4 units before meals).     Discussed how prednisone elevates glucoses and how he will need to reduce or perhaps skip Novolog on those days.     Return to clinic in 3 months with labs prior.     POCT Glucose, Hand-Held Device    Hemoglobin A1C    Lipid Panel      2. History of stroke  Maintain glucose control. Continue Trulicity       3. Steroid-induced hyperglycemia  As above.             Patient is testing blood sugars 4x/day and taking 4 injections of insulin a day. The patient's insulin treatment regimen requires frequent adjustments by the patient on the basis of therapeutic CGM testing results.       Orders Placed This Encounter   Procedures    Hemoglobin A1C     Standing Status:   Future     Standing Expiration Date:   7/3/2024    Lipid Panel     Standing Status:   Future     Standing Expiration Date:   5/4/2024    POCT Glucose, Hand-Held Device          Follow up in about 3 months (around  8/5/2023).

## 2023-05-08 ENCOUNTER — CLINICAL SUPPORT (OUTPATIENT)
Dept: REHABILITATION | Facility: HOSPITAL | Age: 73
End: 2023-05-08
Payer: MEDICARE

## 2023-05-08 ENCOUNTER — CLINICAL SUPPORT (OUTPATIENT)
Dept: REHABILITATION | Facility: HOSPITAL | Age: 73
End: 2023-05-08
Attending: INTERNAL MEDICINE
Payer: MEDICARE

## 2023-05-08 DIAGNOSIS — R26.2 DIFFICULTY WALKING: Primary | ICD-10-CM

## 2023-05-08 DIAGNOSIS — M79.89 BILATERAL HAND SWELLING: ICD-10-CM

## 2023-05-08 DIAGNOSIS — M79.642 PAIN IN BOTH HANDS: Primary | ICD-10-CM

## 2023-05-08 DIAGNOSIS — M25.652 HIP JOINT STIFFNESS, BILATERAL: ICD-10-CM

## 2023-05-08 DIAGNOSIS — M79.641 PAIN IN BOTH HANDS: Primary | ICD-10-CM

## 2023-05-08 DIAGNOSIS — M25.651 HIP JOINT STIFFNESS, BILATERAL: ICD-10-CM

## 2023-05-08 PROCEDURE — 97140 MANUAL THERAPY 1/> REGIONS: CPT | Mod: PN

## 2023-05-08 PROCEDURE — 97110 THERAPEUTIC EXERCISES: CPT | Mod: PN

## 2023-05-08 PROCEDURE — 97018 PARAFFIN BATH THERAPY: CPT | Mod: PN

## 2023-05-08 NOTE — PROGRESS NOTES
Physical Therapy Daily Treatment Note     Name: Darrion Bennett  Clinic Number: [id number]    Therapy Diagnosis:   Encounter Diagnoses   Name Primary?    Difficulty walking Yes    Hip joint stiffness, bilateral      Physician: Roxanna Salazar MD    Visit Date: 5/8/2023    Physician Orders: PT Eval and Treat   Medical Diagnosis from Referral: M25.551,M25.552 (ICD-10-CM) - Bilateral hip pain  Evaluation Date: 4/13/2023  Plan of Care Expiration: 7/13/23     Authorization Period Expiration: 12/31/23  Visit # / Visits authorized: 1/ 20    Time In: 0915  Time Out: 1010    Total Billable Time: 55 minutes    Precautions: Standard    Subjective     Pt reports: the hips have been feeling better.   .  He was compliant with home exercise program.  Response to previous treatment: good  Functional change: none    Pain: 3/10  Location: bilateral hips      Objective     Darrion received therapeutic exercises to develop strength, endurance, ROM, flexibility, posture and core stabilization for 40 minutes including:      Nu step x 8 min  LTR x 20  Hip flexor stretch 30 sec x 3  PPT x 20  Hip fall out x 20  Ball squeeze 2 x 10  DKTC with t-ball x 20  Bridge 2 x 10  LAQ 3 x 10 3lbs     Darrion received the following manual therapy techniques:  were applied to the: bilateral hips for 10 minutes, including:  Long arc hip distraction, inferior glides, bilateral      Education provided:   - HEP complaince    Written Home Exercises Provided: Patient instructed to cont prior HEP.  Exercises were reviewed and Darrion was able to demonstrate them prior to the end of the session.  Darrion demonstrated good  understanding of the education provided.     See EMR under Patient Instructions for exercises provided 5/8/2023.    Assessment     Pt presents ambulating with guarded gait, decreased lumbopelvic mobility.  Requires min cueing with pelvic mobility and abdominal response with supine therex.  No c/o increased discomfort with prescribed activities.  Good  response to initiation of lumbar stabilization melissa Maier is progressing well towards his goals.       Pt prognosis is Good.     Pt will continue to benefit from skilled outpatient physical therapy to address the deficits listed in the problem list box on initial evaluation, provide pt/family education and to maximize pt's level of independence in the home and community environment.     Pt's spiritual, cultural and educational needs considered and pt agreeable to plan of care and goals.     Anticipated barriers to physical therapy: none    Short Term Goals (4 Weeks):     1.Pt to increase strength by a 1/2 grade of muscles test to allow for improvement in functional activities such as performing chores.  2.Pt to improve range of motion by 25% to allow for improved functional mobility to allow for improvement in IADLs.   3.Pt to report compliance with HEP and demonstrate proper exercise technique to PT to show competence with self management of condition.  4.Decrease pain by 25% during functional activities.    Long Term Goals (12 Weeks):     1. Increase ROM to allow improved joint biomechanics during functional activities.   2.Increase trunk and lower extremity strength to within normal limits during functional activities.   3. Independent with home exercise program.   4. Full return to functional activities with manageable complaints.  5. Patient to demonstrate improved posture and body mechanics.  6. Decrease pain by 75% during functional activities.       Plan     Progress hip mobility and LE stabilization activities next visit.     Sukumar Horton, PT

## 2023-05-15 ENCOUNTER — CLINICAL SUPPORT (OUTPATIENT)
Dept: REHABILITATION | Facility: HOSPITAL | Age: 73
End: 2023-05-15
Payer: MEDICARE

## 2023-05-15 ENCOUNTER — CLINICAL SUPPORT (OUTPATIENT)
Dept: REHABILITATION | Facility: HOSPITAL | Age: 73
End: 2023-05-15
Attending: INTERNAL MEDICINE
Payer: MEDICARE

## 2023-05-15 ENCOUNTER — TELEPHONE (OUTPATIENT)
Dept: FAMILY MEDICINE | Facility: CLINIC | Age: 73
End: 2023-05-15
Payer: MEDICARE

## 2023-05-15 DIAGNOSIS — M79.641 PAIN IN BOTH HANDS: Primary | ICD-10-CM

## 2023-05-15 DIAGNOSIS — M25.651 HIP JOINT STIFFNESS, BILATERAL: ICD-10-CM

## 2023-05-15 DIAGNOSIS — R26.2 DIFFICULTY WALKING: Primary | ICD-10-CM

## 2023-05-15 DIAGNOSIS — M25.652 HIP JOINT STIFFNESS, BILATERAL: ICD-10-CM

## 2023-05-15 DIAGNOSIS — M79.642 PAIN IN BOTH HANDS: Primary | ICD-10-CM

## 2023-05-15 DIAGNOSIS — M79.89 BILATERAL HAND SWELLING: ICD-10-CM

## 2023-05-15 PROCEDURE — 97018 PARAFFIN BATH THERAPY: CPT | Mod: PN

## 2023-05-15 PROCEDURE — 97140 MANUAL THERAPY 1/> REGIONS: CPT | Mod: PN

## 2023-05-15 PROCEDURE — 97110 THERAPEUTIC EXERCISES: CPT | Mod: PN

## 2023-05-15 NOTE — TELEPHONE ENCOUNTER
----- Message from Valencia Callaway sent at 5/15/2023  8:59 AM CDT -----  .Type: Patient Call Back    Who called: Michael    What is the request in detail: needs most recent clinical notes sent    Can the clinic reply by [facility]? call    Would the patient rather a call back or a response via [facility]? call    Best call back number: [phone] ext [phone]    Additional Information: fax [phone]

## 2023-05-15 NOTE — PROGRESS NOTES
Physical Therapy Progress Note     Name: Darrion Bennett  Clinic Number: [id number]    Therapy Diagnosis:   Encounter Diagnoses   Name Primary?    Difficulty walking Yes    Hip joint stiffness, bilateral      Physician: Roxanna Salazar MD    Visit Date: 5/15/2023    Physician Orders: PT Eval and Treat   Medical Diagnosis from Referral: M25.551,M25.552 (ICD-10-CM) - Bilateral hip pain  Evaluation Date: 4/13/2023  Plan of Care Expiration: 7/13/23     Authorization Period Expiration: 12/31/23  Visit # / Visits authorized: 3/ 20    Time In: 0945  Time Out: 1040    Total Billable Time: 55 minutes    Precautions: Standard    Subjective     Pt reports: the hips have been improving with therapy.   .  He was compliant with home exercise program.  Response to previous treatment: good  Functional change: none    Pain: 3/10  Location: bilateral hips      Objective       Range of Motion/Strength:       Lumbar AROM: Degrees   Flexion 38   Extension 10   Right side bending 12   Left side bending 12   Lumbar quadrant reveals: negative lumbar quadrant     AROM: Bilateral LE: Grossly WFL  MMT:  Right LE: 4-   Left LE: 4-  Abdominal Strength: 3+/5     Darrion received therapeutic exercises to develop strength, endurance, ROM, flexibility, posture and core stabilization for 45 minutes including:      Nu step x 8 min  LTR x 20  Hip flexor stretch 30 sec x 3  PPT x 20  Hip fall out x 20  Ball squeeze 2 x 10  DKTC with t-ball x 20  Bridge 2 x 10  LAQ 3 x 10 3lbs  Supine resisted hip abd RTB 3 x 10  Heel raises 3 x 10  Standing hip abd 3 x 10     Darrion received the following manual therapy techniques:  were applied to the: bilateral hips for 10 minutes, including:  Long arc hip distraction, inferior glides, bilateral      Education provided:   - HEP complaince    Written Home Exercises Provided: Patient instructed to cont prior HEP.  Exercises were reviewed and Darrion was able to demonstrate them prior to the end of the session.  Darrion  demonstrated good  understanding of the education provided.     See EMR under Patient Instructions for exercises provided 5/15/2023.    Assessment     Pt presents ambulating with guarded gait, decreased lumbopelvic mobility.  Requires continued min cueing with pelvic mobility and abdominal response with supine therex.  No c/o increased discomfort with prescribed activities.  Good response to progression of lumbar stabilization therex. Darrion is progressing well towards his goals.       Pt prognosis is Good.     Pt will continue to benefit from skilled outpatient physical therapy to address the deficits listed in the problem list box on initial evaluation, provide pt/family education and to maximize pt's level of independence in the home and community environment.     Pt's spiritual, cultural and educational needs considered and pt agreeable to plan of care and goals.     Anticipated barriers to physical therapy: none    Short Term Goals (4 Weeks):  Updated 5/15/23  Partially MET in progress    1.Pt to increase strength by a 1/2 grade of muscles test to allow for improvement in functional activities such as performing chores. Not MET  2.Pt to improve range of motion by 25% to allow for improved functional mobility to allow for improvement in IADLs.   3.Pt to report compliance with HEP and demonstrate proper exercise technique to PT to show competence with self management of condition.  4.Decrease pain by 25% during functional activities.    Long Term Goals (12 Weeks):     1. Increase ROM to allow improved joint biomechanics during functional activities.   2.Increase trunk and lower extremity strength to within normal limits during functional activities.   3. Independent with home exercise program.   4. Full return to functional activities with manageable complaints.  5. Patient to demonstrate improved posture and body mechanics.  6. Decrease pain by 75% during functional activities.     Plan     Progress hip mobility  and LE stabilization activities next visit.     Sukumar Horton, PT

## 2023-05-15 NOTE — PROGRESS NOTES
[facility]  Occupational Therapy Treatment / Discharge Note    Date: 5/15/2023  Name: Darrion Bennett  Clinic Number: [id number]    Therapy Diagnosis:   Encounter Diagnoses   Name Primary?    Pain in both hands Yes    Bilateral hand swelling      Physician: Roxanna Salazar MD    Physician Orders: eval and treat  Medical Diagnosis: Bilateral hand pain [M79.641, M79.642]  Surgical Procedure and Date: n/a OR Date of Injury/Onset: for years, but reports ~1-2 weeks ago they had worsened.   Evaluation Date: 4/12/23  Insurance Authorization Period Expiration: 12/29/23  Plan of Care Expiration: 6/9/23  Date of Return to MD: 4/25/23 with PCP  Visit # / Visits authorized: 3 / 12 (eval not included)  FOTO: 4/12/23  50% limitation     Precautions:  Standard, Diabetes, CHF     Time In: 8:37 am  Time Out: 9:15 am  Total Appointment Time (timed & untimed codes): 38 minutes    Date of last visit:  5/15/23  Total visits: 4    SUBJECTIVE     Pt reports: I do feel like my hands are getting better. I got a paraffin unit for home.  Darrion was compliant with home exercise program provided   Response to previous treatment:  hands feel better after therapy  Functional change: l have changed how I open bottles and cans.    Pain: 3/10  Location: bilateral hands hands      OBJECTIVE     Objective Measures updated at progress report unless specified.      Strength (Dynamometer) and Pinch Strength (Pinch Gauge)  Measured in pounds.    4/13/2023 5/15/23 4/13/2023 5/15/23     Left Left Right Right    Rung II 34 37.7 36 41.6   Whaley Pinch 14.5  13    3pt Pinch 12  11    2pt Pinch 8  8      Treatment     Darrion received the following supervised modalities after being cleared for contraindications for 8 minutes:  -Patient received paraffin bath to bilateral hand(s) for 8 minutes to increase blood flow, circulation, pain management and for tissue elasticity prior to therex.      Darrion received therapeutic exercises for 30  minutes including:  -flat fisted hands x 10 reps  -thumb opposition to each finger x 10 rep  -pinky slides x 10 reps each hand    Patient Education and Home Exercises      Education provided:   -encouraged to acquire home paraffin unit  - Progress towards goals     Written Home Exercises Provided: Patient instructed to cont prior HEP.  Exercises were reviewed and Darrion was able to demonstrate them prior to the end of the session.  Darrion demonstrated good  understanding of the HEP provided. See EMR under Patient Instructions for exercises provided during therapy sessions.       Assessment      Darrion is reporting overall improvement in his hand pain.  He has acquired a paraffin unit for home.  Pt has had a reduction in his hand pain and has expressed greater ease of using his hands.    Pt's spiritual, cultural and educational needs considered and pt agreeable to plan of care and goals.    Pt will be discharged  secondary to  a significant reduction in his hand pain and is independent with his HEP    Anticipated barriers to occupational therapy: occasional swelling    Goals:  The following goals were discussed with the patient and patient is in agreement with them as to be addressed in the treatment plan.   Long Term Goals (LTGs); to be met by discharge.  1) Pt will report pain no higher than 1-2/10 with ADLs, cooking, and opening things- progressed  2) Pt will have an improved FOTO score by at least 15 points- pt was not given a FOTO upon evaluation   3) Pt will demonstrate improved B  strength by at least 3# for grasping and opening things - met 5/15/23     Short Term Goals (STGs); to be met within 4 weeks (5/12/23).  1) Pt will report or demonstrate independence with HEP, joint protection, and edema management.- met    PLAN     Patient will be discharged from occupational therapy.    SHERRI Amador

## 2023-05-19 DIAGNOSIS — H40.1132 PRIMARY OPEN ANGLE GLAUCOMA (POAG) OF BOTH EYES, MODERATE STAGE: ICD-10-CM

## 2023-05-19 RX ORDER — DORZOLAMIDE HCL 20 MG/ML
1 SOLUTION/ DROPS OPHTHALMIC 2 TIMES DAILY
Qty: 10 ML | Refills: 1 | Status: SHIPPED | OUTPATIENT
Start: 2023-05-19 | End: 2023-08-02

## 2023-05-20 RX ORDER — HYDRALAZINE HYDROCHLORIDE 25 MG/1
75 TABLET, FILM COATED ORAL EVERY 8 HOURS
Qty: 270 TABLET | Refills: 2 | Status: SHIPPED | OUTPATIENT
Start: 2023-05-20 | End: 2023-10-16

## 2023-05-20 RX ORDER — HYDRALAZINE HYDROCHLORIDE 50 MG/1
50 TABLET, FILM COATED ORAL EVERY 8 HOURS
Qty: 90 TABLET | Refills: 2 | Status: SHIPPED | OUTPATIENT
Start: 2023-05-20 | End: 2023-05-20 | Stop reason: SDUPTHER

## 2023-05-22 ENCOUNTER — CLINICAL SUPPORT (OUTPATIENT)
Dept: REHABILITATION | Facility: HOSPITAL | Age: 73
End: 2023-05-22
Payer: MEDICARE

## 2023-05-22 DIAGNOSIS — M25.652 HIP JOINT STIFFNESS, BILATERAL: ICD-10-CM

## 2023-05-22 DIAGNOSIS — M25.651 HIP JOINT STIFFNESS, BILATERAL: ICD-10-CM

## 2023-05-22 DIAGNOSIS — R26.2 DIFFICULTY WALKING: Primary | ICD-10-CM

## 2023-05-22 PROCEDURE — 97110 THERAPEUTIC EXERCISES: CPT | Mod: PN,CQ

## 2023-05-22 NOTE — PROGRESS NOTES
Physical Therapy Progress Note     Name: Darrion Bennett  Clinic Number: [id number]    Therapy Diagnosis:   Encounter Diagnoses   Name Primary?    Difficulty walking Yes    Hip joint stiffness, bilateral      Physician: Roxanna Salazar MD    Visit Date: 5/22/2023    Physician Orders: PT Eval and Treat   Medical Diagnosis from Referral: M25.551,M25.552 (ICD-10-CM) - Bilateral hip pain  Evaluation Date: 4/13/2023  Plan of Care Expiration: 7/13/23     Authorization Period Expiration: 12/31/23  Visit # / Visits authorized: 4/ 20    Time In: 0945AM  Time Out: 1040AM    Total Billable Time: 55 minutes    Precautions: Standard    Subjective     Pt reports: the hips have been improving with therapy.   .  He was compliant with home exercise program.  Response to previous treatment: good  Functional change: none    Pain: 3/10  Location: bilateral hips      Objective       Range of Motion/Strength:       Lumbar AROM: Degrees   Flexion 38   Extension 10   Right side bending 12   Left side bending 12   Lumbar quadrant reveals: negative lumbar quadrant     AROM: Bilateral LE: Grossly WFL  MMT:  Right LE: 4-   Left LE: 4-  Abdominal Strength: 3+/5     Darrion received therapeutic exercises to develop strength, endurance, ROM, flexibility, posture and core stabilization for 45 minutes including:      Recumbent Bike x 8 min  LTR x 20  Hip flexor stretch 30 sec x 3/ea  PPT x 20  Hip fall out x 20-Resume NV  Ball squeeze 2 x 10  DKTC with t-ball x 20  Bridge w/ RTB 3 x 10  LAQ 3 x 10 3lbs-Resume NV  Supine resisted hip abd RTB 3 x 10  Heel raises 3 x 10  Standing hip abd 3 x 10     Darrion received the following manual therapy techniques:  were applied to the: bilateral hips for 00 minutes, including:  Long arc hip distraction, inferior glides, bilateral      Education provided:   - HEP complaince    Written Home Exercises Provided: Patient instructed to cont prior HEP.  Exercises were reviewed and Darrion was able to demonstrate them prior  to the end of the session.  Darrion demonstrated good  understanding of the education provided.     See EMR under Patient Instructions for exercises provided 5/22/2023.    Assessment     Pt continues to present ambulating with a guarded gait and decreased lumbopelvic mobility.  Continued with therEx from previous sessions, and pt requires minimal cueing with pelvic mobility and core activation with supine therex.  No c/o increased discomfort with prescribed activities.  Good response to progression of lumbar stabilization therex.     Darrion is progressing well towards his goals.     Pt prognosis is Good.     Pt will continue to benefit from skilled outpatient physical therapy to address the deficits listed in the problem list box on initial evaluation, provide pt/family education and to maximize pt's level of independence in the home and community environment.     Pt's spiritual, cultural and educational needs considered and pt agreeable to plan of care and goals.     Anticipated barriers to physical therapy: none    Short Term Goals (4 Weeks):  Updated 5/15/23  Partially MET in progress    1.Pt to increase strength by a 1/2 grade of muscles test to allow for improvement in functional activities such as performing chores. Not MET  2.Pt to improve range of motion by 25% to allow for improved functional mobility to allow for improvement in IADLs.   3.Pt to report compliance with HEP and demonstrate proper exercise technique to PT to show competence with self management of condition.  4.Decrease pain by 25% during functional activities.    Long Term Goals (12 Weeks):     1. Increase ROM to allow improved joint biomechanics during functional activities.   2.Increase trunk and lower extremity strength to within normal limits during functional activities.   3. Independent with home exercise program.   4. Full return to functional activities with manageable complaints.  5. Patient to demonstrate improved posture and body  mechanics.  6. Decrease pain by 75% during functional activities.     Plan     Progress hip mobility and LE stabilization activities next visit.     Irena Gurrola, PTA    05/22/2023

## 2023-05-29 ENCOUNTER — SPECIALTY PHARMACY (OUTPATIENT)
Dept: PHARMACY | Facility: CLINIC | Age: 73
End: 2023-05-29
Payer: MEDICARE

## 2023-05-29 ENCOUNTER — INFUSION (OUTPATIENT)
Dept: INFUSION THERAPY | Facility: HOSPITAL | Age: 73
End: 2023-05-29
Attending: HOSPITALIST
Payer: MEDICARE

## 2023-05-29 VITALS
SYSTOLIC BLOOD PRESSURE: 124 MMHG | TEMPERATURE: 98 F | DIASTOLIC BLOOD PRESSURE: 58 MMHG | OXYGEN SATURATION: 99 % | RESPIRATION RATE: 18 BRPM | HEART RATE: 83 BPM

## 2023-05-29 DIAGNOSIS — N18.31 STAGE 3A CHRONIC KIDNEY DISEASE: ICD-10-CM

## 2023-05-29 DIAGNOSIS — M81.0 OSTEOPOROSIS, UNSPECIFIED OSTEOPOROSIS TYPE, UNSPECIFIED PATHOLOGICAL FRACTURE PRESENCE: Primary | ICD-10-CM

## 2023-05-29 DIAGNOSIS — M06.9 RHEUMATOID ARTHRITIS, INVOLVING UNSPECIFIED SITE, UNSPECIFIED WHETHER RHEUMATOID FACTOR PRESENT: Primary | ICD-10-CM

## 2023-05-29 PROCEDURE — 63600175 PHARM REV CODE 636 W HCPCS: Mod: JZ,JG | Performed by: HOSPITALIST

## 2023-05-29 PROCEDURE — 96372 THER/PROPH/DIAG INJ SC/IM: CPT

## 2023-05-29 RX ADMIN — DENOSUMAB 60 MG: 60 INJECTION SUBCUTANEOUS at 08:05

## 2023-05-29 NOTE — TELEPHONE ENCOUNTER
Specialty Pharmacy - Refill Coordination  Specialty Pharmacy - Clinical Reassessment    Specialty Medication Orders Linked to Encounter      Flowsheet Row Most Recent Value   Medication #1 abatacept (ORENCIA CLICKJECT) 125 mg/mL AtIn (Order#[id number], Rx#)            Refill Questions - Documented Responses      Flowsheet Row Most Recent Value   Patient Availability and HIPAA Verification    Does patient want to proceed with activity? Yes   HIPAA/medical authority confirmed? Yes   Relationship to patient of person spoken to? Spouse/Significant Other   Refill Screening Questions    Changes to allergies? No   Changes to medications? No   New conditions since last clinic visit? No   How does patient/caregiver feel medication is working? Very good   How many doses of your specialty medications were missed in the last 4 weeks? 0   When does the patient need to receive the medication? 06/07/23   Refill Delivery Questions    How will the patient receive the medication? [facility]   When does the patient need to receive the medication? 06/07/23   Shipping Address Home   Address in [facility] confirmed and updated if neccessary? Yes   Expected Copay ($) 0   Is the patient able to afford the medication copay? Yes   Payment Method zero copay   Days supply of Refill 28   Supplies needed? No supplies needed   Refill activity completed? Yes   Refill activity plan Refill scheduled   Shipment/Pickup Date: 06/01/23            Current Outpatient Medications   Medication Sig    abatacept (ORENCIA CLICKJECT) 125 mg/mL AtIn Inject 125 mg into the skin once a week.    alcohol swabs (BD ALCOHOL SWABS) PadM USE  4 TIMES PER DAY    amLODIPine (NORVASC) 5 MG tablet TAKE 1 TABLET (5 MG TOTAL) BY MOUTH 2 (TWO) TIMES DAILY.    ammonium lactate 12 % Crea Apply 1 application topically once daily.    atorvastatin (LIPITOR) 80 MG tablet TAKE 1 TABLET EVERY DAY    blood glucose control, low (TRUE METRIX LEVEL 1) Soln Use as indicated    cloNIDine  "0.2 mg/24 hr td ptwk (CATAPRES) 0.2 mg/24 hr Place 1 patch onto the skin every 7 days.    diclofenac sodium (VOLTAREN) 1 % Gel Apply 2 g topically 4 (four) times daily as needed. Apply to aching joints    docusate sodium (COLACE) 100 MG capsule Take 1 capsule (100 mg total) by mouth 2 (two) times daily.    dorzolamide (TRUSOPT) 2 % ophthalmic solution PLACE 1 DROP INTO BOTH EYES 2 (TWO) TIMES DAILY.    dulaglutide (TRULICITY) 4.5 mg/0.5 mL pen injector Inject 4.5 mg into the skin every 7 days.    febuxostat (ULORIC) 40 mg Tab Take 1 tablet (40 mg total) by mouth once daily.    ferrous gluconate 324 mg (37.5 mg iron) Tab tablet Take 1 tablet (324 mg total) by mouth once daily.    fluticasone propionate (FLONASE) 50 mcg/actuation nasal spray 1 spray (50 mcg total) by Each Nostril route once daily.    hydrALAZINE (APRESOLINE) 25 MG tablet Take 3 tablets (75 mg total) by mouth every 8 (eight) hours.    INJECTAFER 50 iron mg/mL injection     insulin (LANTUS SOLOSTAR U-100 INSULIN) glargine 100 units/mL SubQ pen Inject 6 units once daily    insulin aspart U-100 (NOVOLOG FLEXPEN U-100 INSULIN) 100 unit/mL (3 mL) InPn pen Inject 3-6 units three times daily before each meal with sliding scale.    lancets (TRUEPLUS LANCETS) 33 gauge Misc Test glucose 4x/day. Dx code e11.65    linaCLOtide (LINZESS) 72 mcg Cap capsule Take 1 capsule (72 mcg total) by mouth before breakfast.    multivit with min-folic acid 200 mcg Chew     omeprazole (PRILOSEC) 20 MG capsule TAKE 2 CAPSULES (40 MG TOTAL) BY MOUTH ONCE DAILY.    pen needle, diabetic (BD ULTRA-FINE RICHARD PEN NEEDLE) 32 gauge x 5/32" Ndle 1 Device by Misc.(Non-Drug; Combo Route) route 4 (four) times daily.    predniSONE (DELTASONE) 5 MG tablet Take 1 tablet (5 mg total) by mouth once daily.    PROLIA 60 mg/mL Syrg     torsemide (DEMADEX) 10 MG Tab TAKE 1 TABLET EVERY OTHER DAY    traMADoL (ULTRAM) 50 mg tablet TAKE 1 TABLET EVERY 12 HOURS AS NEEDED FOR PAIN    travoprost (TRAVATAN " Z) 0.004 % ophthalmic solution Place 1 drop into both eyes every evening.    triamcinolone acetonide 0.1% (KENALOG) 0.1 % cream Apply topically 2 (two) times daily. for 10 days (Patient not taking: Reported on 4/25/2023)    TRUE METRIX GLUCOSE METER Misc USE AS DIRECTED    TRUE METRIX GLUCOSE TEST STRIP Strp TEST GLUCOSE 4 TIMES PER DAY.    valsartan (DIOVAN) 160 MG tablet TAKE 1 TABLET TWICE DAILY   Last reviewed on 5/5/2023  8:41 AM by Bianca Mares NP    Review of patient's allergies indicates:   Allergen Reactions    Penicillins Nausea And Vomiting    Rosuvastatin      Muscle pain     Allopurinol analogues Rash    Last reviewed on  5/29/2023 11:46 AM by Alexandra Simon      Tasks added this encounter   2/29/2024 - Clinical Assessment (1 year recurrence)   Tasks due within next 3 months   No tasks due.     Alexandra Simon, PharmD  [facility]  [address] [place] [address]  Phone: [phone]  Fax: [phone]

## 2023-05-29 NOTE — TELEPHONE ENCOUNTER
Specialty Pharmacy - Refill Coordination  Specialty Pharmacy - Clinical Reassessment    Specialty Medication Orders Linked to Encounter      Flowsheet Row Most Recent Value   Medication #1 abatacept (ORENCIA CLICKJECT) 125 mg/mL AtIn (Order#[id number], Rx#)          Patient Diagnosis   M06.9 - Rheumatoid arthritis, involving unspecified site, unspecified whether rheumatoid factor present    Darrion Bennett is a 73 y.o. male, who is followed by the specialty pharmacy service for management and education of his RA.  He has been on therapy with [facility] since 12/2020. I have reviewed his electronic medical record and current medication list and determined that specialty medication adjustment Is not needed at this time.    Patient has not experienced adverse events.  He Is adherent reporting 0 missed doses since last review.  Adherence has been encouraged with the following mechanism(s): proactive refill calls.  He is meeting goals of therapy and will continue treatment.        5/29/2023 5/3/2023 4/5/2023 3/8/2023 2/8/2023 1/11/2023 12/14/2022   Follow Up Review   # of missed doses 0 0 0 0 0 0 0   New Medications? No No No No No No No   New Conditions? No No No No No No No   New Allergies? No No No No No No No   Med Effective? Very good Good Good Very good Good  Very good   Urgent Care?  No No No No No No   Requested Pharmacist?  No No No No No No         Therapy is appropriate to continue.    Therapy is effective: Yes  Promis-10 completed.   Recommendations: none at this time.  Review Method: Patient Contact    Tasks added this encounter   2/29/2024 - Clinical Assessment (1 year recurrence)   Tasks due within next 3 months   No tasks due.     Alexandra Franklin, PharmD  [facility]  [address] [facility] [address]  Phone: [phone]  Fax: [phone]

## 2023-05-30 ENCOUNTER — CLINICAL SUPPORT (OUTPATIENT)
Dept: REHABILITATION | Facility: HOSPITAL | Age: 73
End: 2023-05-30
Payer: MEDICARE

## 2023-05-30 DIAGNOSIS — M25.652 HIP JOINT STIFFNESS, BILATERAL: ICD-10-CM

## 2023-05-30 DIAGNOSIS — M25.651 HIP JOINT STIFFNESS, BILATERAL: ICD-10-CM

## 2023-05-30 DIAGNOSIS — R26.2 DIFFICULTY WALKING: Primary | ICD-10-CM

## 2023-05-30 PROCEDURE — 97140 MANUAL THERAPY 1/> REGIONS: CPT | Mod: PN

## 2023-05-30 PROCEDURE — 97110 THERAPEUTIC EXERCISES: CPT | Mod: PN

## 2023-05-30 NOTE — PROGRESS NOTES
Physical Therapy Progress Note     Name: Darrion Bennett  Clinic Number: [id number]    Therapy Diagnosis:   Encounter Diagnoses   Name Primary?    Difficulty walking Yes    Hip joint stiffness, bilateral      Physician: Roxanna Salazar MD    Visit Date: 5/30/2023    Physician Orders: PT Eval and Treat   Medical Diagnosis from Referral: M25.551,M25.552 (ICD-10-CM) - Bilateral hip pain  Evaluation Date: 4/13/2023  Plan of Care Expiration: 7/13/23     Authorization Period Expiration: 12/31/23  Visit # / Visits authorized: 4/ 20    Time In: 0940  Time Out: 1042    Total Billable Time: 58 minutes    Precautions: Standard    Subjective     Pt reports: the hips have been improving with therapy. States the back is giving him trouble today.   .  He was compliant with home exercise program.  Response to previous treatment: good  Functional change: none    Pain: 3/10  Location: bilateral hips      Objective     Darrion received therapeutic exercises to develop strength, endurance, ROM, flexibility, posture and core stabilization for 45 minutes including:      Recumbent Bike x 8 min  LTR x 20  Hip flexor stretch 30 sec x 3/ea  PPT x 20  Hip fall out x 2  Ball squeeze 2 x 10  DKTC with t-ball x 20  Bridge w/ RTB 3 x 10  LAQ 3 x 10 3lbs-Resume NV  Supine resisted hip abd RTB 3 x 10  Heel raises 3 x 10: np  Standing hip abd 3 x 10; np     Darrion received the following manual therapy techniques:  were applied to the: bilateral hips for 10 minutes, including:  Long arc hip distraction, inferior glides, bilateral      Education provided:   - HEP complaince    Written Home Exercises Provided: Patient instructed to cont prior HEP.  Exercises were reviewed and Darrion was able to demonstrate them prior to the end of the session.  Darrion demonstrated good  understanding of the education provided.     See EMR under Patient Instructions for exercises provided 5/30/2023.    Assessment     Pt presents ambulating with guarded gait, decreased  lumbopelvic mobility.  Requires continued min cueing with pelvic mobility and abdominal response with supine therex.  No c/o increased discomfort with prescribed activities.  Good response to exercise progression consisting of lumbar and hip ROM and stabilization to improve functional mobility and gait. . Darrion is progressing well towards his goals.     Pt prognosis is Good.     Pt will continue to benefit from skilled outpatient physical therapy to address the deficits listed in the problem list box on initial evaluation, provide pt/family education and to maximize pt's level of independence in the home and community environment.     Pt's spiritual, cultural and educational needs considered and pt agreeable to plan of care and goals.     Anticipated barriers to physical therapy: none    Short Term Goals (4 Weeks):  Updated 5/15/23  Partially MET in progress    1.Pt to increase strength by a 1/2 grade of muscles test to allow for improvement in functional activities such as performing chores. Not MET  2.Pt to improve range of motion by 25% to allow for improved functional mobility to allow for improvement in IADLs.   3.Pt to report compliance with HEP and demonstrate proper exercise technique to PT to show competence with self management of condition.  4.Decrease pain by 25% during functional activities.    Long Term Goals (12 Weeks):     1. Increase ROM to allow improved joint biomechanics during functional activities.   2.Increase trunk and lower extremity strength to within normal limits during functional activities.   3. Independent with home exercise program.   4. Full return to functional activities with manageable complaints.  5. Patient to demonstrate improved posture and body mechanics.  6. Decrease pain by 75% during functional activities.     Plan     Progress hip mobility and LE stabilization activities next visit.     Sukumar Horton, PT    05/22/2023

## 2023-06-15 ENCOUNTER — CLINICAL SUPPORT (OUTPATIENT)
Dept: REHABILITATION | Facility: HOSPITAL | Age: 73
End: 2023-06-15
Payer: MEDICARE

## 2023-06-15 DIAGNOSIS — M25.652 HIP JOINT STIFFNESS, BILATERAL: ICD-10-CM

## 2023-06-15 DIAGNOSIS — R26.2 DIFFICULTY WALKING: Primary | ICD-10-CM

## 2023-06-15 DIAGNOSIS — M25.651 HIP JOINT STIFFNESS, BILATERAL: ICD-10-CM

## 2023-06-15 PROCEDURE — 97110 THERAPEUTIC EXERCISES: CPT | Mod: PN,CQ

## 2023-06-15 NOTE — PROGRESS NOTES
"  Physical Therapy Progress Note     Name: Darrion Bennett  Clinic Number: [id number]    Therapy Diagnosis:   Encounter Diagnoses   Name Primary?    Difficulty walking Yes    Hip joint stiffness, bilateral      Physician: Roxanna Salazar MD    Visit Date: 6/15/2023    Physician Orders: PT Eval and Treat   Medical Diagnosis from Referral: M25.551,M25.552 (ICD-10-CM) - Bilateral hip pain  Evaluation Date: 4/13/2023  Plan of Care Expiration: 7/13/23     Authorization Period Expiration: 12/31/23  Visit # / Visits authorized: 6/ 20    Time In: 0815AM  Time Out: 0912AM    Total Billable Time: 57 minutes    Precautions: Standard    Subjective     Pt reports: his back is doing better, but his left hip continues to bother him. Overall, he does feel as though he has improved.    He was compliant with home exercise program.  Response to previous treatment: good  Functional change: none    Pain: 3/10  Location: bilateral hips      Objective     Darrion received therapeutic exercises to develop strength, endurance, ROM, flexibility, posture and core stabilization for 47 minutes including:      Recumbent Bike x 5 min  LTR x 20  PPT 20x5"  Hip fall out w/ GTB 2 x 10  Ball squeeze 2 x 10, 3"  DKTC with t-ball x 20  Bridge w/ RTB 3 x 10  LAQ 3 x 10 3lbs/ea  +Matrix Hip Abd 35# 3x8  +Matrix Hip Add 35# 3x8      Darrion received the following manual therapy techniques:  were applied to the: bilateral hips for 10 minutes, including:  Long arc hip distraction, inferior glides, bilateral      Education provided:   - HEP complaince    Written Home Exercises Provided: Patient instructed to cont prior HEP.  Exercises were reviewed and Darrion was able to demonstrate them prior to the end of the session.  Darrion demonstrated good  understanding of the education provided.     See EMR under Patient Instructions for exercises provided 6/15/2023.    Assessment     Darrion tolerated the above tx session well with minimal c/o pain in left hip at arrival. Pt " reports overall he's seen improvements in symptoms. Continues to require min cueing with pelvic mobility and abdominal response with supine therex.  Added additional hip strengthening this date with no c/o increased discomfort.  Good response to exercise progression consisting of lumbar and hip ROM and stabilization to improve functional mobility and gait.      Darrion is progressing well towards his goals.     Pt prognosis is Good.     Pt will continue to benefit from skilled outpatient physical therapy to address the deficits listed in the problem list box on initial evaluation, provide pt/family education and to maximize pt's level of independence in the home and community environment.     Pt's spiritual, cultural and educational needs considered and pt agreeable to plan of care and goals.     Anticipated barriers to physical therapy: none    Short Term Goals (4 Weeks):  Updated 5/15/23  Partially MET in progress    1.Pt to increase strength by a 1/2 grade of muscles test to allow for improvement in functional activities such as performing chores. Not MET  2.Pt to improve range of motion by 25% to allow for improved functional mobility to allow for improvement in IADLs.   3.Pt to report compliance with HEP and demonstrate proper exercise technique to PT to show competence with self management of condition.  4.Decrease pain by 25% during functional activities.    Long Term Goals (12 Weeks):     1. Increase ROM to allow improved joint biomechanics during functional activities.   2.Increase trunk and lower extremity strength to within normal limits during functional activities.   3. Independent with home exercise program.   4. Full return to functional activities with manageable complaints.  5. Patient to demonstrate improved posture and body mechanics.  6. Decrease pain by 75% during functional activities.     Plan     Continue to progress hip mobility and LE stabilization per pt tolerance.     Irena Gurrola, PTA     06/15/2023

## 2023-06-17 DIAGNOSIS — E11.8 CONTROLLED TYPE 2 DIABETES MELLITUS WITH COMPLICATION, WITH LONG-TERM CURRENT USE OF INSULIN: ICD-10-CM

## 2023-06-17 DIAGNOSIS — Z79.4 CONTROLLED TYPE 2 DIABETES MELLITUS WITH COMPLICATION, WITH LONG-TERM CURRENT USE OF INSULIN: ICD-10-CM

## 2023-06-19 ENCOUNTER — CLINICAL SUPPORT (OUTPATIENT)
Dept: REHABILITATION | Facility: HOSPITAL | Age: 73
End: 2023-06-19
Payer: MEDICARE

## 2023-06-19 DIAGNOSIS — M25.651 HIP JOINT STIFFNESS, BILATERAL: ICD-10-CM

## 2023-06-19 DIAGNOSIS — R26.2 DIFFICULTY WALKING: Primary | ICD-10-CM

## 2023-06-19 DIAGNOSIS — M25.652 HIP JOINT STIFFNESS, BILATERAL: ICD-10-CM

## 2023-06-19 PROCEDURE — 97110 THERAPEUTIC EXERCISES: CPT | Mod: PN,CQ

## 2023-06-19 RX ORDER — CALCIUM CITRATE/VITAMIN D3 200MG-6.25
TABLET ORAL
Qty: 400 STRIP | Refills: 3 | Status: SHIPPED | OUTPATIENT
Start: 2023-06-19

## 2023-06-19 RX ORDER — ISOPROPYL ALCOHOL 70 ML/100ML
SWAB TOPICAL
Qty: 400 EACH | Refills: 3 | Status: SHIPPED | OUTPATIENT
Start: 2023-06-19

## 2023-06-19 NOTE — PROGRESS NOTES
"  Physical Therapy Progress Note     Name: Darrion Bennett  Clinic Number: [id number]    Therapy Diagnosis:   Encounter Diagnoses   Name Primary?    Difficulty walking Yes    Hip joint stiffness, bilateral      Physician: Roxanna Salazar MD    Visit Date: 6/19/2023    Physician Orders: PT Eval and Treat   Medical Diagnosis from Referral: M25.551,M25.552 (ICD-10-CM) - Bilateral hip pain  Evaluation Date: 4/13/2023  Plan of Care Expiration: 7/13/23     Authorization Period Expiration: 12/31/23  Visit # / Visits authorized: 6/ 20    Time In: 0830AM  Time Out: 0930AM    Total Billable Time: 60 minutes    Precautions: Standard    Subjective     Pt reports: his hip and back are doing better.    He was compliant with home exercise program.  Response to previous treatment: good  Functional change: none    Pain: 3/10  Location: bilateral hips      Objective     Darrion received therapeutic exercises to develop strength, endurance, ROM, flexibility, posture and core stabilization for 47 minutes including:      Recumbent Bike x 5 min  LTR w/ ball x 30  DKTC with t-ball x 30  PPT 20x5"  Bridge w/ GTB 3 x 10  LAQ 3 x 10 5lbs/ea  Matrix Hip Abd 35# 3x8  Matrix Hip Add 35# 3x8  +Straight Leg Deadlift to 12in Plyo 2x10    Darrion received the following manual therapy techniques:  were applied to the: bilateral hips for 00 minutes, including:  Long arc hip distraction, inferior glides, bilateral      Education provided:   - HEP complaince    Written Home Exercises Provided: Patient instructed to cont prior HEP.  Exercises were reviewed and Darrion was able to demonstrate them prior to the end of the session.  Darrion demonstrated good  understanding of the education provided.     See EMR under Patient Instructions for exercises provided 6/19/2023.    Assessment     Darrion tolerated the above tx session well without any c/o pain. Continued with supine core strengthening this date with min to moderate cueing for proper activation. Also continued " with progressions to hip strengthening with no c/o increased discomfort. Added straight leg deadlifts as a compound exercise for improved strength in [facility]. Good response to exercise progression consisting of lumbar and hip ROM and stabilization to improve functional mobility and gait.      Darrion is progressing well towards his goals.     Pt prognosis is Good.     Pt will continue to benefit from skilled outpatient physical therapy to address the deficits listed in the problem list box on initial evaluation, provide pt/family education and to maximize pt's level of independence in the home and community environment.     Pt's spiritual, cultural and educational needs considered and pt agreeable to plan of care and goals.     Anticipated barriers to physical therapy: none    Short Term Goals (4 Weeks):  Updated 5/15/23  Partially MET in progress    1.Pt to increase strength by a 1/2 grade of muscles test to allow for improvement in functional activities such as performing chores. Not MET  2.Pt to improve range of motion by 25% to allow for improved functional mobility to allow for improvement in IADLs.   3.Pt to report compliance with HEP and demonstrate proper exercise technique to PT to show competence with self management of condition.  4.Decrease pain by 25% during functional activities.    Long Term Goals (12 Weeks):     1. Increase ROM to allow improved joint biomechanics during functional activities.   2.Increase trunk and lower extremity strength to within normal limits during functional activities.   3. Independent with home exercise program.   4. Full return to functional activities with manageable complaints.  5. Patient to demonstrate improved posture and body mechanics.  6. Decrease pain by 75% during functional activities.     Plan     Continue to progress hip mobility and LE stabilization per pt tolerance.     Irena Gurrola, PTA    06/19/2023

## 2023-06-22 ENCOUNTER — SPECIALTY PHARMACY (OUTPATIENT)
Dept: PHARMACY | Facility: CLINIC | Age: 73
End: 2023-06-22
Payer: MEDICARE

## 2023-06-22 ENCOUNTER — PATIENT MESSAGE (OUTPATIENT)
Dept: ADMINISTRATIVE | Facility: OTHER | Age: 73
End: 2023-06-22
Payer: MEDICARE

## 2023-06-22 NOTE — TELEPHONE ENCOUNTER
Specialty Pharmacy - Refill Coordination    Specialty Medication Orders Linked to Encounter      Flowsheet Row Most Recent Value   Medication #1 abatacept (ORENCIA CLICKJECT) 125 mg/mL AtIn (Order#[id number], Rx#[id number])            Refill Questions - Documented Responses      Flowsheet Row Most Recent Value   Patient Availability and HIPAA Verification    Does patient want to proceed with activity? Yes   HIPAA/medical authority confirmed? Yes   Relationship to patient of person spoken to? Spouse/Significant Other   Refill Screening Questions    Changes to allergies? No   Changes to medications? No   New conditions since last clinic visit? No   Unplanned office visit, urgent care, ED, or hospital admission in the last 4 weeks? No   How does patient/caregiver feel medication is working? Good   Financial problems or insurance changes? No   How many doses of your specialty medications were missed in the last 4 weeks? 0   Would patient like to speak to a pharmacist? No   When does the patient need to receive the medication? 06/28/23   Refill Delivery Questions    How will the patient receive the medication? [facility]   When does the patient need to receive the medication? 06/28/23   Shipping Address Home   Address in [facility] confirmed and updated if neccessary? Yes   Expected Copay ($) 0   Is the patient able to afford the medication copay? Yes   Payment Method zero copay   Days supply of Refill 28   Supplies needed? No supplies needed   Refill activity completed? Yes   Refill activity plan Refill scheduled   Shipment/Pickup Date: 06/26/23            Current Outpatient Medications   Medication Sig    abatacept (ORENCIA CLICKJECT) 125 mg/mL AtIn Inject 125 mg into the skin once a week.    alcohol swabs (DROPSAFE ALCOHOL PREP PADS) PadM USE  4 TIMES PER DAY    amLODIPine (NORVASC) 5 MG tablet TAKE 1 TABLET (5 MG TOTAL) BY MOUTH 2 (TWO) TIMES DAILY.    ammonium lactate 12 % Crea Apply 1 application topically once  "daily.    atorvastatin (LIPITOR) 80 MG tablet TAKE 1 TABLET EVERY DAY    blood glucose control, low (TRUE METRIX LEVEL 1) Soln Use as indicated    cloNIDine 0.2 mg/24 hr td ptwk (CATAPRES) 0.2 mg/24 hr Place 1 patch onto the skin every 7 days.    diclofenac sodium (VOLTAREN) 1 % Gel Apply 2 g topically 4 (four) times daily as needed. Apply to aching joints    docusate sodium (COLACE) 100 MG capsule Take 1 capsule (100 mg total) by mouth 2 (two) times daily.    dorzolamide (TRUSOPT) 2 % ophthalmic solution PLACE 1 DROP INTO BOTH EYES 2 (TWO) TIMES DAILY.    dulaglutide (TRULICITY) 4.5 mg/0.5 mL pen injector Inject 4.5 mg into the skin every 7 days.    febuxostat (ULORIC) 40 mg Tab Take 1 tablet (40 mg total) by mouth once daily.    ferrous gluconate 324 mg (37.5 mg iron) Tab tablet Take 1 tablet (324 mg total) by mouth once daily.    fluticasone propionate (FLONASE) 50 mcg/actuation nasal spray 1 spray (50 mcg total) by Each Nostril route once daily.    hydrALAZINE (APRESOLINE) 25 MG tablet Take 3 tablets (75 mg total) by mouth every 8 (eight) hours.    INJECTAFER 50 iron mg/mL injection     insulin (LANTUS SOLOSTAR U-100 INSULIN) glargine 100 units/mL SubQ pen Inject 6 units once daily    insulin aspart U-100 (NOVOLOG FLEXPEN U-100 INSULIN) 100 unit/mL (3 mL) InPn pen Inject 3-6 units three times daily before each meal with sliding scale.    lancets (TRUEPLUS LANCETS) 33 gauge Misc Test glucose 4x/day. Dx code e11.65    linaCLOtide (LINZESS) 72 mcg Cap capsule Take 1 capsule (72 mcg total) by mouth before breakfast.    multivit with min-folic acid 200 mcg Chew     omeprazole (PRILOSEC) 20 MG capsule TAKE 2 CAPSULES (40 MG TOTAL) BY MOUTH ONCE DAILY.    pen needle, diabetic (BD ULTRA-FINE RICHARD PEN NEEDLE) 32 gauge x 5/32" Ndle 1 Device by Misc.(Non-Drug; Combo Route) route 4 (four) times daily.    predniSONE (DELTASONE) 5 MG tablet Take 1 tablet (5 mg total) by mouth once daily.    PROLIA 60 mg/mL Syrg     torsemide " (DEMADEX) 10 MG Tab TAKE 1 TABLET EVERY OTHER DAY    traMADoL (ULTRAM) 50 mg tablet TAKE 1 TABLET EVERY 12 HOURS AS NEEDED FOR PAIN    travoprost (TRAVATAN Z) 0.004 % ophthalmic solution Place 1 drop into both eyes every evening.    triamcinolone acetonide 0.1% (KENALOG) 0.1 % cream Apply topically 2 (two) times daily. for 10 days (Patient not taking: Reported on 4/25/2023)    TRUE METRIX GLUCOSE METER Misc USE AS DIRECTED    TRUE METRIX GLUCOSE TEST STRIP Strp TEST BLOOD SUGAR FOUR TIMES DAILY    valsartan (DIOVAN) 160 MG tablet TAKE 1 TABLET TWICE DAILY   Last reviewed on 5/5/2023  8:41 AM by Bianca Mares NP    Review of patient's allergies indicates:   Allergen Reactions    Penicillins Nausea And Vomiting    Rosuvastatin      Muscle pain     Allopurinol analogues Rash    Last reviewed on  5/29/2023 11:46 AM by Alexandra Simon      Tasks added this encounter   No tasks added.   Tasks due within next 3 months   No tasks due.     [facility]  [address] [place] [id number]  Phone: [phone]  Fax: [phone]

## 2023-06-23 ENCOUNTER — HOSPITAL ENCOUNTER (OUTPATIENT)
Dept: RADIOLOGY | Facility: CLINIC | Age: 73
Discharge: HOME | End: 2023-06-23
Attending: INTERNAL MEDICINE
Payer: MEDICARE

## 2023-06-23 DIAGNOSIS — M81.0 OSTEOPOROSIS, UNSPECIFIED OSTEOPOROSIS TYPE, UNSPECIFIED PATHOLOGICAL FRACTURE PRESENCE: ICD-10-CM

## 2023-06-23 PROCEDURE — 77080 DXA BONE DENSITY AXIAL SKELETON 1 OR MORE SITES: ICD-10-PCS | Mod: 26,HCNC,, | Performed by: INTERNAL MEDICINE

## 2023-06-23 PROCEDURE — 77080 DXA BONE DENSITY AXIAL: CPT | Mod: TC,PO

## 2023-06-23 PROCEDURE — 77080 DXA BONE DENSITY AXIAL: CPT | Mod: 26,HCNC,, | Performed by: INTERNAL MEDICINE

## 2023-06-28 DIAGNOSIS — Z79.4 CONTROLLED TYPE 2 DIABETES MELLITUS WITH DIABETIC POLYNEUROPATHY, WITH LONG-TERM CURRENT USE OF INSULIN: ICD-10-CM

## 2023-06-28 DIAGNOSIS — E11.42 CONTROLLED TYPE 2 DIABETES MELLITUS WITH DIABETIC POLYNEUROPATHY, WITH LONG-TERM CURRENT USE OF INSULIN: ICD-10-CM

## 2023-06-28 RX ORDER — DULAGLUTIDE 4.5 MG/.5ML
INJECTION, SOLUTION SUBCUTANEOUS
Qty: 12 PEN | Refills: 1 | Status: SHIPPED | OUTPATIENT
Start: 2023-06-28 | End: 2023-08-16 | Stop reason: SDUPTHER

## 2023-07-05 NOTE — PROGRESS NOTES
6/28/23 DEXA L spine -2.7, femoral neck -2.5, total hip -1.7. FRAX score 9.1/20%. osteoporosis.  On prolia. No changes. Repeat 2 years.

## 2023-07-06 ENCOUNTER — CLINICAL SUPPORT (OUTPATIENT)
Dept: REHABILITATION | Facility: HOSPITAL | Age: 73
End: 2023-07-06
Payer: MEDICARE

## 2023-07-06 DIAGNOSIS — R26.2 DIFFICULTY WALKING: Primary | ICD-10-CM

## 2023-07-06 DIAGNOSIS — M25.651 HIP JOINT STIFFNESS, BILATERAL: ICD-10-CM

## 2023-07-06 DIAGNOSIS — M25.652 HIP JOINT STIFFNESS, BILATERAL: ICD-10-CM

## 2023-07-06 PROCEDURE — 97110 THERAPEUTIC EXERCISES: CPT | Mod: HCNC,PN

## 2023-07-06 NOTE — PROGRESS NOTES
"  Physical Therapy Progress Note     Name: Darrion Bennett  Clinic Number: [id number]    Therapy Diagnosis:   Encounter Diagnoses   Name Primary?    Difficulty walking Yes    Hip joint stiffness, bilateral      Physician: Roxanna Salazar MD    Visit Date: 7/6/2023    Physician Orders: PT Eval and Treat   Medical Diagnosis from Referral: M25.551,M25.552 (ICD-10-CM) - Bilateral hip pain  Evaluation Date: 4/13/2023  Plan of Care Expiration: 7/13/23     Authorization Period Expiration: 12/31/23  Visit # / Visits authorized: 6/ 20    Time In: 0830  Time Out: 0925    Total Billable Time: 55 minutes    Precautions: Standard    Subjective     Pt reports: his hip and back are doing better with therapy.     He was compliant with home exercise program.  Response to previous treatment: good  Functional change: none    Pain: 3/10  Location: bilateral hips      Objective     Range of Motion/Strength:       Lumbar AROM: Degrees   Flexion 42   Extension 5   Right side bending 12   Left side bending 12   Lumbar quadrant reveals: negative lumbar quadrant     AROM: Bilateral LE: Grossly WFL  MMT:  Right LE: 4-   Left LE: 4-  Abdominal Strength: 4-/5    Darrion received therapeutic exercises to develop strength, endurance, ROM, flexibility, posture and core stabilization for 47 minutes including:      Nu step x 8 min  LTR w/ ball x 30  DKTC with t-ball x 30  PPT 20x5"  Bridge w/ GTB 3 x 10  LAQ 3 x 10 5lbs/ea; np  Matrix Hip Abd 35# 3x8  Matrix knee ext 3 x 10 10 lbs  DL shuttle 3 x 10 1.5 straps  +Straight Leg Deadlift to 12in Plyo 2x10: Np    Darrion received the following manual therapy techniques:  were applied to the: bilateral hips for 00 minutes, including:  Long arc hip distraction, inferior glides, bilateral      Education provided:   - HEP complaince    Written Home Exercises Provided: Patient instructed to cont prior HEP.  Exercises were reviewed and Darrion was able to demonstrate them prior to the end of the session.  Darrion " demonstrated good  understanding of the education provided.     See EMR under Patient Instructions for exercises provided 7/6/2023.    Assessment      Pt demonstrates improved performance with pelvic mobility and abdominal response with supine therex.  No c/o increased discomfort with prescribed activities.  Good response to exercise progression consisting of lumbar and hip ROM and stabilization to improve functional mobility and gait.  Darrion is progressing well towards his goals.     Pt prognosis is Good.     Pt will continue to benefit from skilled outpatient physical therapy to address the deficits listed in the problem list box on initial evaluation, provide pt/family education and to maximize pt's level of independence in the home and community environment.     Pt's spiritual, cultural and educational needs considered and pt agreeable to plan of care and goals.     Anticipated barriers to physical therapy: none    Short Term Goals (8 Weeks):  Updated 7/6//23  Partially MET in progress    1.Pt to increase strength by a 1/2 grade of muscles test to allow for improvement in functional activities such as performing chores. Not MET  2.Pt to improve range of motion by 50% to allow for improved functional mobility to allow for improvement in IADLs.   3.Pt to report compliance with HEP and demonstrate proper exercise technique to PT to show competence with self management of condition. MET  4.Decrease pain by 25% during functional activities. MET    Long Term Goals (12 Weeks):     1. Increase ROM to allow improved joint biomechanics during functional activities.   2.Increase trunk and lower extremity strength to within normal limits during functional activities.   3. Independent with home exercise program.   4. Full return to functional activities with manageable complaints.  5. Patient to demonstrate improved posture and body mechanics.  6. Decrease pain by 75% during functional activities.     Plan     Continue to  progress hip mobility and LE stabilization per pt tolerance.  Will continue 1x per week progressing towards d/c planning.     Sukumar Horton, PT    07/06/2023

## 2023-07-14 DIAGNOSIS — E78.00 PURE HYPERCHOLESTEROLEMIA: ICD-10-CM

## 2023-07-14 RX ORDER — ATORVASTATIN CALCIUM 80 MG/1
TABLET, FILM COATED ORAL
Qty: 90 TABLET | Refills: 3 | Status: SHIPPED | OUTPATIENT
Start: 2023-07-14

## 2023-07-14 NOTE — TELEPHONE ENCOUNTER
Care Due:                  Date            Visit Type   Department     Provider  --------------------------------------------------------------------------------                                [facility]                              FOLLOWUP/OF  MED/ INTERNAL  Last Visit: 04-      [facility]ICE VISIT   MED/ MELVI Domingo  Next Visit: None Scheduled  None         None Found                                                            Last  Test          Frequency    Reason                     Performed    Due Date  --------------------------------------------------------------------------------    Lipid Panel.  12 months..  atorvastatin.............  07- 06-    [facility] Embedded Care Due Messages. Reference number: [id number].   7/14/2023 8:04:17 AM CDT

## 2023-07-14 NOTE — TELEPHONE ENCOUNTER
Refill Routing Note     Refill Routing Note   Medication(s) are not appropriate for processing by [facility] for the following reason(s):      Required labs outdated    [facility] action(s):  Defer Care Due:  Labs due            Appointments  past 12m or future 3m with PCP    Date Provider   Last Visit   4/25/2023 Farooq Domingo MD   Next Visit   Visit date not found Farooq Domingo MD   ED visits in past 90 days: 0        Note composed:8:05 AM 07/14/2023

## 2023-07-17 ENCOUNTER — SPECIALTY PHARMACY (OUTPATIENT)
Dept: PHARMACY | Facility: CLINIC | Age: 73
End: 2023-07-17
Payer: MEDICARE

## 2023-07-17 DIAGNOSIS — L20.9 ATOPIC DERMATITIS, UNSPECIFIED TYPE: ICD-10-CM

## 2023-07-17 NOTE — TELEPHONE ENCOUNTER
Specialty Pharmacy - Refill Coordination    Specialty Medication Orders Linked to Encounter      Flowsheet Row Most Recent Value   Medication #1 abatacept (ORENCIA CLICKJECT) 125 mg/mL AtIn (Order#[id number], Rx#[id number])            Refill Questions - Documented Responses      Flowsheet Row Most Recent Value   Patient Availability and HIPAA Verification    Does patient want to proceed with activity? Yes   HIPAA/medical authority confirmed? Yes   Relationship to patient of person spoken to? Spouse/Significant Other   Refill Screening Questions    Changes to allergies? No   Changes to medications? No   New conditions since last clinic visit? No   Unplanned office visit, urgent care, ED, or hospital admission in the last 4 weeks? No   How does patient/caregiver feel medication is working? Good   Financial problems or insurance changes? No   How many doses of your specialty medications were missed in the last 4 weeks? 0   Would patient like to speak to a pharmacist? No   When does the patient need to receive the medication? 07/26/23   Refill Delivery Questions    How will the patient receive the medication? MEDRx   When does the patient need to receive the medication? 07/26/23   Shipping Address Home   Address in [facility] confirmed and updated if neccessary? Yes   Expected Copay ($) 0   Is the patient able to afford the medication copay? Yes   Payment Method zero copay   Days supply of Refill 28   Supplies needed? No supplies needed   Refill activity completed? Yes   Refill activity plan Refill scheduled   Shipment/Pickup Date: 07/24/23            Current Outpatient Medications   Medication Sig    abatacept (ORENCIA CLICKJECT) 125 mg/mL AtIn Inject 125 mg into the skin once a week.    alcohol swabs (DROPSAFE ALCOHOL PREP PADS) PadM USE  4 TIMES PER DAY    amLODIPine (NORVASC) 5 MG tablet TAKE 1 TABLET (5 MG TOTAL) BY MOUTH 2 (TWO) TIMES DAILY.    ammonium lactate 12 % Crea Apply 1 application topically once  "daily.    atorvastatin (LIPITOR) 80 MG tablet TAKE 1 TABLET EVERY DAY    blood glucose control, low (TRUE METRIX LEVEL 1) Soln Use as indicated    cloNIDine 0.2 mg/24 hr td ptwk (CATAPRES) 0.2 mg/24 hr Place 1 patch onto the skin every 7 days.    diclofenac sodium (VOLTAREN) 1 % Gel Apply 2 g topically 4 (four) times daily as needed. Apply to aching joints    dorzolamide (TRUSOPT) 2 % ophthalmic solution PLACE 1 DROP INTO BOTH EYES 2 (TWO) TIMES DAILY.    dulaglutide (TRULICITY) 4.5 mg/0.5 mL pen injector INJECT 4.5MG (1 PEN) UNDER THE SKIN EVERY WEEK    febuxostat (ULORIC) 40 mg Tab Take 1 tablet (40 mg total) by mouth once daily.    ferrous gluconate 324 mg (37.5 mg iron) Tab tablet Take 1 tablet (324 mg total) by mouth once daily.    fluticasone propionate (FLONASE) 50 mcg/actuation nasal spray 1 spray (50 mcg total) by Each Nostril route once daily.    hydrALAZINE (APRESOLINE) 25 MG tablet Take 3 tablets (75 mg total) by mouth every 8 (eight) hours.    INJECTAFER 50 iron mg/mL injection     insulin (LANTUS SOLOSTAR U-100 INSULIN) glargine 100 units/mL SubQ pen Inject 6 units once daily    insulin aspart U-100 (NOVOLOG FLEXPEN U-100 INSULIN) 100 unit/mL (3 mL) InPn pen Inject 3-6 units three times daily before each meal with sliding scale.    lancets (TRUEPLUS LANCETS) 33 gauge Misc Test glucose 4x/day. Dx code e11.65    linaCLOtide (LINZESS) 72 mcg Cap capsule Take 1 capsule (72 mcg total) by mouth before breakfast.    multivit with min-folic acid 200 mcg Chew     omeprazole (PRILOSEC) 20 MG capsule TAKE 2 CAPSULES (40 MG TOTAL) BY MOUTH ONCE DAILY.    pen needle, diabetic (BD ULTRA-FINE RICHARD PEN NEEDLE) 32 gauge x 5/32" Ndle 1 Device by Misc.(Non-Drug; Combo Route) route 4 (four) times daily.    predniSONE (DELTASONE) 5 MG tablet Take 1 tablet (5 mg total) by mouth once daily.    PROLIA 60 mg/mL Syrg     torsemide (DEMADEX) 10 MG Tab TAKE 1 TABLET EVERY OTHER DAY    traMADoL (ULTRAM) 50 mg tablet TAKE 1 TABLET " EVERY 12 HOURS AS NEEDED FOR PAIN    travoprost (TRAVATAN Z) 0.004 % ophthalmic solution Place 1 drop into both eyes every evening.    triamcinolone acetonide 0.1% (KENALOG) 0.1 % cream Apply topically 2 (two) times daily. for 10 days (Patient not taking: Reported on 4/25/2023)    TRUE METRIX GLUCOSE METER Misc USE AS DIRECTED    TRUE METRIX GLUCOSE TEST STRIP Strp TEST BLOOD SUGAR FOUR TIMES DAILY    valsartan (DIOVAN) 160 MG tablet TAKE 1 TABLET TWICE DAILY   Last reviewed on 5/5/2023  8:41 AM by Bianca Mares NP    Review of patient's allergies indicates:   Allergen Reactions    Penicillins Nausea And Vomiting    Rosuvastatin      Muscle pain     Allopurinol analogues Rash    Last reviewed on  5/29/2023 11:46 AM by Alexandra Simon      Tasks added this encounter   No tasks added.   Tasks due within next 3 months   No tasks due.     [facility]  [address]  [place] [address]  Phone: [phone]  Fax: [phone]

## 2023-07-18 RX ORDER — TRIAMCINOLONE ACETONIDE 1 MG/G
CREAM TOPICAL
Qty: 45 G | Refills: 1 | Status: SHIPPED | OUTPATIENT
Start: 2023-07-18

## 2023-07-19 NOTE — TELEPHONE ENCOUNTER
Patient contacted [facility] to schedule refill. Informed his spouse/ emergency contact Pam has already called for refill scheduling. Confirmed delivery date with patient and he has no further questions about his prescription at this time

## 2023-07-20 DIAGNOSIS — N18.30 CHRONIC KIDNEY DISEASE, STAGE III (MODERATE): ICD-10-CM

## 2023-07-20 DIAGNOSIS — I10 ESSENTIAL HYPERTENSION: ICD-10-CM

## 2023-07-24 RX ORDER — TORSEMIDE 10 MG/1
10 TABLET ORAL EVERY OTHER DAY
Qty: 45 TABLET | Refills: 1 | Status: SHIPPED | OUTPATIENT
Start: 2023-07-24 | End: 2024-01-31

## 2023-08-02 DIAGNOSIS — H40.1132 PRIMARY OPEN ANGLE GLAUCOMA (POAG) OF BOTH EYES, MODERATE STAGE: ICD-10-CM

## 2023-08-02 DIAGNOSIS — K59.09 CHRONIC CONSTIPATION: ICD-10-CM

## 2023-08-02 RX ORDER — DORZOLAMIDE HCL 20 MG/ML
SOLUTION/ DROPS OPHTHALMIC
Qty: 10 ML | Refills: 2 | Status: SHIPPED | OUTPATIENT
Start: 2023-08-02 | End: 2023-08-18 | Stop reason: SDUPTHER

## 2023-08-03 RX ORDER — LINACLOTIDE 72 UG/1
72 CAPSULE, GELATIN COATED ORAL
Qty: 90 CAPSULE | Refills: 2 | Status: SHIPPED | OUTPATIENT
Start: 2023-08-03

## 2023-08-04 RX ORDER — HYDRALAZINE HYDROCHLORIDE 25 MG/1
75 TABLET, FILM COATED ORAL EVERY 8 HOURS
Qty: 810 TABLET | OUTPATIENT
Start: 2023-08-04 | End: 2023-11-02

## 2023-08-08 ENCOUNTER — CLINICAL SUPPORT (OUTPATIENT)
Dept: REHABILITATION | Facility: HOSPITAL | Age: 73
End: 2023-08-08
Payer: MEDICARE

## 2023-08-08 DIAGNOSIS — M25.651 HIP JOINT STIFFNESS, BILATERAL: ICD-10-CM

## 2023-08-08 DIAGNOSIS — M25.652 HIP JOINT STIFFNESS, BILATERAL: ICD-10-CM

## 2023-08-08 DIAGNOSIS — R26.2 DIFFICULTY WALKING: Primary | ICD-10-CM

## 2023-08-08 PROCEDURE — 97110 THERAPEUTIC EXERCISES: CPT | Mod: HCNC,PN

## 2023-08-08 NOTE — PROGRESS NOTES
"  Physical Therapy Progress Note     Name: Darrion Bennett  Clinic Number: [id number]    Therapy Diagnosis:   Encounter Diagnoses   Name Primary?    Difficulty walking Yes    Hip joint stiffness, bilateral      Physician: Roxanna Salazar MD    Visit Date: 8/8/2023    Physician Orders: PT Eval and Treat   Medical Diagnosis from Referral: M25.551,M25.552 (ICD-10-CM) - Bilateral hip pain  Evaluation Date: 4/13/2023  Plan of Care Expiration: 7/13/23     Authorization Period Expiration: 12/31/23  Visit # / Visits authorized: 7/ 20    Time In: 0820  Time Out: 0915    Total Billable Time: 40 minutes    Precautions: Standard    Subjective     Pt reports: hips are doing some better.      He was compliant with home exercise program.  Response to previous treatment: good  Functional change: none    Pain: 3/10  Location: bilateral hips      Objective     Darrion received therapeutic exercises to develop strength, endurance, ROM, flexibility, posture and core stabilization for 45 minutes including:      Nu step x 8 min  LTR w/ ball x 30  DKTC with t-ball x 30  PPT 20x5"  Bridge w/ GTB 3 x 10  LAQ 3 x 10 5lbs/ea; np  Matrix Hip Abd 35# 3x8  Matrix knee ext 3 x 10 10 lbs  DL shuttle 3 x 10 1.5 straps  Straight Leg Deadlift to 12in Plyo 2x10: Np    Darrion received the following manual therapy techniques:  were applied to the: bilateral hips for 00 minutes, including:  Long arc hip distraction, inferior glides, bilateral      Education provided:   - HEP complaince    Written Home Exercises Provided: Patient instructed to cont prior HEP.  Exercises were reviewed and Darrion was able to demonstrate them prior to the end of the session.  Darrion demonstrated good  understanding of the education provided.     See EMR under Patient Instructions for exercises provided 8/8/2023.    Assessment      Pt continues to require pelvic mobility and abdominal response with supine therex.  No c/o increased discomfort with prescribed activities.  Good " response to exercise progression consisting of lumbar and hip ROM and stabilization to improve functional mobility and gait.  Darrion is progressing well towards his goals.     Pt prognosis is Good.     Pt will continue to benefit from skilled outpatient physical therapy to address the deficits listed in the problem list box on initial evaluation, provide pt/family education and to maximize pt's level of independence in the home and community environment.     Pt's spiritual, cultural and educational needs considered and pt agreeable to plan of care and goals.     Anticipated barriers to physical therapy: none    Short Term Goals (8 Weeks):  Updated 7/6//23  Partially MET in progress    1.Pt to increase strength by a 1/2 grade of muscles test to allow for improvement in functional activities such as performing chores. Not MET  2.Pt to improve range of motion by 50% to allow for improved functional mobility to allow for improvement in IADLs.   3.Pt to report compliance with HEP and demonstrate proper exercise technique to PT to show competence with self management of condition. MET  4.Decrease pain by 25% during functional activities. MET    Long Term Goals (12 Weeks):     1. Increase ROM to allow improved joint biomechanics during functional activities.   2.Increase trunk and lower extremity strength to within normal limits during functional activities.   3. Independent with home exercise program.   4. Full return to functional activities with manageable complaints.  5. Patient to demonstrate improved posture and body mechanics.  6. Decrease pain by 75% during functional activities.     Plan     Continue to progress hip mobility and LE stabilization per pt tolerance.  Will continue 1x per week progressing towards d/c planning.     Sukumar Horton, PT    08/08/2023

## 2023-08-09 ENCOUNTER — LAB VISIT (OUTPATIENT)
Dept: LAB | Facility: HOSPITAL | Age: 73
End: 2023-08-09
Payer: MEDICARE

## 2023-08-09 DIAGNOSIS — E11.649 TYPE 2 DIABETES MELLITUS WITH HYPOGLYCEMIA WITHOUT COMA, WITH LONG-TERM CURRENT USE OF INSULIN: ICD-10-CM

## 2023-08-09 DIAGNOSIS — Z79.4 TYPE 2 DIABETES MELLITUS WITH HYPOGLYCEMIA WITHOUT COMA, WITH LONG-TERM CURRENT USE OF INSULIN: ICD-10-CM

## 2023-08-09 LAB
CHOLEST SERPL-MCNC: 137 MG/DL (ref 120–199)
CHOLEST/HDLC SERPL: 3.9 {RATIO} (ref 2–5)
ESTIMATED AVG GLUCOSE: 137 MG/DL (ref 68–131)
HBA1C MFR BLD: 6.4 % (ref 4–5.6)
HDLC SERPL-MCNC: 35 MG/DL (ref 40–75)
HDLC SERPL: 25.5 % (ref 20–50)
LDLC SERPL CALC-MCNC: 83.4 MG/DL (ref 63–159)
NONHDLC SERPL-MCNC: 102 MG/DL
TRIGL SERPL-MCNC: 93 MG/DL (ref 30–150)

## 2023-08-09 PROCEDURE — 80061 LIPID PANEL: CPT | Mod: HCNC | Performed by: NURSE PRACTITIONER

## 2023-08-09 PROCEDURE — 83036 HEMOGLOBIN GLYCOSYLATED A1C: CPT | Mod: HCNC | Performed by: NURSE PRACTITIONER

## 2023-08-09 PROCEDURE — 36415 COLL VENOUS BLD VENIPUNCTURE: CPT | Mod: HCNC,PO | Performed by: NURSE PRACTITIONER

## 2023-08-11 DIAGNOSIS — H40.1132 PRIMARY OPEN ANGLE GLAUCOMA (POAG) OF BOTH EYES, MODERATE STAGE: Primary | ICD-10-CM

## 2023-08-14 ENCOUNTER — SPECIALTY PHARMACY (OUTPATIENT)
Dept: PHARMACY | Facility: CLINIC | Age: 73
End: 2023-08-14
Payer: MEDICARE

## 2023-08-14 NOTE — TELEPHONE ENCOUNTER
Patients wife says he has injection for 8/16 and will need for 8/23, informed that rx has no refills and we will fax MD for refill authorization

## 2023-08-16 ENCOUNTER — OFFICE VISIT (OUTPATIENT)
Dept: ENDOCRINOLOGY | Facility: CLINIC | Age: 73
End: 2023-08-16
Payer: MEDICARE

## 2023-08-16 VITALS
TEMPERATURE: 98 F | HEART RATE: 91 BPM | SYSTOLIC BLOOD PRESSURE: 160 MMHG | DIASTOLIC BLOOD PRESSURE: 79 MMHG | BODY MASS INDEX: 23.4 KG/M2 | WEIGHT: 145 LBS

## 2023-08-16 DIAGNOSIS — Z86.73 HISTORY OF STROKE: ICD-10-CM

## 2023-08-16 DIAGNOSIS — Z79.4 TYPE 2 DIABETES MELLITUS WITH HYPOGLYCEMIA WITHOUT COMA, WITH LONG-TERM CURRENT USE OF INSULIN: Primary | ICD-10-CM

## 2023-08-16 DIAGNOSIS — E78.5 HYPERLIPIDEMIA, UNSPECIFIED HYPERLIPIDEMIA TYPE: ICD-10-CM

## 2023-08-16 DIAGNOSIS — E11.649 TYPE 2 DIABETES MELLITUS WITH HYPOGLYCEMIA WITHOUT COMA, WITH LONG-TERM CURRENT USE OF INSULIN: Primary | ICD-10-CM

## 2023-08-16 PROCEDURE — 99999 PR PBB SHADOW E&M-EST. PATIENT-LVL III: ICD-10-PCS | Mod: PBBFAC,HCNC,, | Performed by: NURSE PRACTITIONER

## 2023-08-16 PROCEDURE — 3008F PR BODY MASS INDEX (BMI) DOCUMENTED: ICD-10-PCS | Mod: HCNC,CPTII,S$GLB, | Performed by: NURSE PRACTITIONER

## 2023-08-16 PROCEDURE — 3077F PR MOST RECENT SYSTOLIC BLOOD PRESSURE >= 140 MM HG: ICD-10-PCS | Mod: HCNC,CPTII,S$GLB, | Performed by: NURSE PRACTITIONER

## 2023-08-16 PROCEDURE — 3072F LOW RISK FOR RETINOPATHY: CPT | Mod: HCNC,CPTII,S$GLB, | Performed by: NURSE PRACTITIONER

## 2023-08-16 PROCEDURE — 3044F HG A1C LEVEL LT 7.0%: CPT | Mod: HCNC,CPTII,S$GLB, | Performed by: NURSE PRACTITIONER

## 2023-08-16 PROCEDURE — 1159F MED LIST DOCD IN RCRD: CPT | Mod: HCNC,CPTII,S$GLB, | Performed by: NURSE PRACTITIONER

## 2023-08-16 PROCEDURE — 95251 CONT GLUC MNTR ANALYSIS I&R: CPT | Mod: HCNC,S$GLB,, | Performed by: NURSE PRACTITIONER

## 2023-08-16 PROCEDURE — 3008F BODY MASS INDEX DOCD: CPT | Mod: HCNC,CPTII,S$GLB, | Performed by: NURSE PRACTITIONER

## 2023-08-16 PROCEDURE — 3044F PR MOST RECENT HEMOGLOBIN A1C LEVEL <7.0%: ICD-10-PCS | Mod: HCNC,CPTII,S$GLB, | Performed by: NURSE PRACTITIONER

## 2023-08-16 PROCEDURE — 3078F DIAST BP <80 MM HG: CPT | Mod: HCNC,CPTII,S$GLB, | Performed by: NURSE PRACTITIONER

## 2023-08-16 PROCEDURE — 99214 PR OFFICE/OUTPT VISIT, EST, LEVL IV, 30-39 MIN: ICD-10-PCS | Mod: HCNC,S$GLB,, | Performed by: NURSE PRACTITIONER

## 2023-08-16 PROCEDURE — 1159F PR MEDICATION LIST DOCUMENTED IN MEDICAL RECORD: ICD-10-PCS | Mod: HCNC,CPTII,S$GLB, | Performed by: NURSE PRACTITIONER

## 2023-08-16 PROCEDURE — 3066F PR DOCUMENTATION OF TREATMENT FOR NEPHROPATHY: ICD-10-PCS | Mod: HCNC,CPTII,S$GLB, | Performed by: NURSE PRACTITIONER

## 2023-08-16 PROCEDURE — 4010F ACE/ARB THERAPY RXD/TAKEN: CPT | Mod: HCNC,CPTII,S$GLB, | Performed by: NURSE PRACTITIONER

## 2023-08-16 PROCEDURE — 3062F POS MACROALBUMINURIA REV: CPT | Mod: HCNC,CPTII,S$GLB, | Performed by: NURSE PRACTITIONER

## 2023-08-16 PROCEDURE — 3066F NEPHROPATHY DOC TX: CPT | Mod: HCNC,CPTII,S$GLB, | Performed by: NURSE PRACTITIONER

## 2023-08-16 PROCEDURE — 99999 PR PBB SHADOW E&M-EST. PATIENT-LVL III: CPT | Mod: PBBFAC,HCNC,, | Performed by: NURSE PRACTITIONER

## 2023-08-16 PROCEDURE — 3062F PR POS MACROALBUMINURIA RESULT DOCUMENTED/REVIEW: ICD-10-PCS | Mod: HCNC,CPTII,S$GLB, | Performed by: NURSE PRACTITIONER

## 2023-08-16 PROCEDURE — 1160F PR REVIEW ALL MEDS BY PRESCRIBER/CLIN PHARMACIST DOCUMENTED: ICD-10-PCS | Mod: HCNC,CPTII,S$GLB, | Performed by: NURSE PRACTITIONER

## 2023-08-16 PROCEDURE — 3077F SYST BP >= 140 MM HG: CPT | Mod: HCNC,CPTII,S$GLB, | Performed by: NURSE PRACTITIONER

## 2023-08-16 PROCEDURE — 4010F PR ACE/ARB THEARPY RXD/TAKEN: ICD-10-PCS | Mod: HCNC,CPTII,S$GLB, | Performed by: NURSE PRACTITIONER

## 2023-08-16 PROCEDURE — 3072F PR LOW RISK FOR RETINOPATHY: ICD-10-PCS | Mod: HCNC,CPTII,S$GLB, | Performed by: NURSE PRACTITIONER

## 2023-08-16 PROCEDURE — 95251 PR GLUCOSE MONITOR, 72 HOUR, PHYS INTERP: ICD-10-PCS | Mod: HCNC,S$GLB,, | Performed by: NURSE PRACTITIONER

## 2023-08-16 PROCEDURE — 99214 OFFICE O/P EST MOD 30 MIN: CPT | Mod: HCNC,S$GLB,, | Performed by: NURSE PRACTITIONER

## 2023-08-16 PROCEDURE — 1160F RVW MEDS BY RX/DR IN RCRD: CPT | Mod: HCNC,CPTII,S$GLB, | Performed by: NURSE PRACTITIONER

## 2023-08-16 PROCEDURE — 3078F PR MOST RECENT DIASTOLIC BLOOD PRESSURE < 80 MM HG: ICD-10-PCS | Mod: HCNC,CPTII,S$GLB, | Performed by: NURSE PRACTITIONER

## 2023-08-16 RX ORDER — DEXTROSE 4 G
TABLET,CHEWABLE ORAL
Qty: 1 EACH | Refills: 0 | Status: SHIPPED | OUTPATIENT
Start: 2023-08-16

## 2023-08-16 RX ORDER — DULAGLUTIDE 4.5 MG/.5ML
INJECTION, SOLUTION SUBCUTANEOUS
Qty: 12 PEN | Refills: 1 | Status: SHIPPED | OUTPATIENT
Start: 2023-08-16 | End: 2023-08-30

## 2023-08-16 NOTE — PATIENT INSTRUCTIONS
Continue Trulicity and Novolog 3-6-4 units before meals.   Stop Lantus.   Recommend doing fingerstick to confirm low Dariel readings as Dariel can show falsely low values. Rx for glucometer sent.   Return to clinic in 3 months with labs prior.     Travel clinic for vaccines:   To schedule an appointment, call [phone].

## 2023-08-16 NOTE — PROGRESS NOTES
CC: This 73 y.o.  male  is here for evaluation of  T2DM along with comorbidities indicated in the Visit Diagnosis section of this encounter.    HPI: Darrion Bennett was diagnosed with T2DM at age 35. Oral medications started years later.      Medical hx: rheumatoid arthritis on chronic prednisone, TIA, diastolic HF, carotid artery stenosis         Prior visit 5/2023  A1c remains stable, from 6.8 in Jan to now 6.7%.   Pt was not able to get Trulicity 4.5 mg until yesterday. He's been taking Trulicity 1.5 mg weekly.   Reports he's  been eating too much and wants to lose weight. Looks forward to continuing higher dose of Trulicity.   He is rx'd to take prednisone 5 mg once daily but he only takes it maybe 4x/week.   Plan Suspect frequent CGM readings in the 60s may be falsely low since pt tends to be asymptomatic.  Continue current regimen for now.   If Trulicity 4.5 mg proves to be effective at lowering blood sugars and appetite where glucoses start dropping often less than 80, then reduce Novolog by 1 unit before each meal (2-5-4 units or even 0-5-4 units before meals).   Discussed how prednisone elevates glucoses and how he will need to reduce or perhaps skip Novolog on those days.   Return to clinic in 3 months with labs prior.       Interval hx  A1c is down from from 6.7 to now 6.4%.   He has increased Trulicity to 4.5 mg weekly. Appetite is lower.   3 lb weight loss since lov.     He has cut down Lantus from 6 to 4 units and Humalog to 4 units before supper, so 3-6-4 units.   He takes prednisone about 3x/week, usually on Sat/Sun/Wed.   Asking about vaccines he may need if he travels to [facility].     PHX: CKD stage 3, Diastolic heart failure, NYHA class 2;  Sjogren's syndrome, gouty arthritis - sees Rheumatology     LAST DIABETES EDUCATION: multiple times, years ago     HOSPITALIZED FOR DIABETES  -  No      DIABETES MEDICATIONS:   Trulicity 4.5 mg once daily  Lantus  4 units every night  Novolog 3-6-4 units ac      Misses medication doses - denies     Rotation of insulin injections - across abdomen   Changes pen neeedles - yes     DM COMPLICATIONS: nephropathy and cerebrovascular disease    SIGNIFICANT DIABETES MED HISTORY: has been told that metformin is bad for his kidneys and that's why he stopped it   - Lantus, Humalog, and Tradjenta stopped at initial visit 7/2019 and he was switched to Xultophy.   - Xultophy stopped and switched to Trulicity and Lantus 2/2020  - Discussed switching from Novolog to glimepiride but he prefers  Injections over orals.       SELF MONITORING BLOOD GLUCOSE: Freestyle Dariel 2 reader.     Interpretation: BGs mostly at goal but he has hypoglycemia 14% of the time. CGM readings drop as low as 50-60s in the afternoon/evening and overnight d/t excessive basal insulin and perhaps also d/t excessive prandial insulin for dinner. Does not appear that his BGs are higher on days he takes prednisone.           HYPOGLYCEMIC EPISODES: pt reports he rarely has symptoms of hypoglycemia even in the 50s. Does not perform confirmation fingersticks.     CURRENT DIET: drinks water, diet soda; has some orange juice to take with his meds. Eats 3 meals/day. Lunch is the biggest meal.  Eats home cooked foods, rarely eats processed foods.     CURRENT EXERCISE: none; previously exercised in gym.     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         PHYSICAL EXAM:  GENERAL: Well developed, well nourished. No acute distress.   PSYCH: AAOx3, appropriate mood and affect, conversant, well-groomed. Judgement and insight good.   NEURO: Cranial nerves grossly intact. Speech clear, no tremor.   CHEST: Respirations even and unlabored.         Hemoglobin A1C   Date Value  Ref Range Status   08/09/2023 6.4 (H) 4.0 - 5.6 % Final     Comment:     ADA Screening Guidelines:  5.7-6.4%  Consistent with prediabetes  >or=6.5%  Consistent with diabetes    High levels of fetal hemoglobin interfere with the HbA1C  assay. Heterozygous hemoglobin variants (HbS, HgC, etc)do  not significantly interfere with this assay.   However, presence of multiple variants may affect accuracy.     04/20/2023 6.7 (H) 4.0 - 5.6 % Final     Comment:     ADA Screening Guidelines:  5.7-6.4%  Consistent with prediabetes  >or=6.5%  Consistent with diabetes    High levels of fetal hemoglobin interfere with the HbA1C  assay. Heterozygous hemoglobin variants (HbS, HgC, etc)do  not significantly interfere with this assay.   However, presence of multiple variants may affect accuracy.     01/27/2023 6.8 (H) 4.0 - 5.6 % Final     Comment:     ADA Screening Guidelines:  5.7-6.4%  Consistent with prediabetes  >or=6.5%  Consistent with diabetes    High levels of fetal hemoglobin interfere with the HbA1C  assay. Heterozygous hemoglobin variants (HbS, HgC, etc)do  not significantly interfere with this assay.   However, presence of multiple variants may affect accuracy.     10/16/2018 6.2 (H) 4.0 - 5.7 % Final     Comment:     Dr. Jurgen Ward ( Endocrinology )        Ref. Range 9/19/2019 08:13   Glucose Latest Ref Range: 70 - 110 mg/dL 170 (H)   C-Peptide Latest Ref Range: 0.78 - 5.19 ng/mL 1.33        Ref. Range 8/2/2021 07:00   Glucose Latest Ref Range: 70 - 110 mg/dL 120 (H)   C-Peptide Latest Ref Range: 0.78 - 5.19 ng/mL 1.11       Chemistry        Component Value Date/Time     04/20/2023 0656    K 4.5 04/20/2023 0656     04/20/2023 0656    CO2 20 (L) 04/20/2023 0656    BUN 25 (H) 04/20/2023 0656    CREATININE 1.4 04/20/2023 0656     (H) 04/20/2023 0656        Component Value Date/Time    CALCIUM 9.2 04/20/2023 0656    ALKPHOS 70 04/20/2023 0656    AST 17 04/20/2023 0656    ALT 17 04/20/2023 0656     BILITOT 0.3 04/20/2023 0656    ESTGFRAFRICA >60.0 04/22/2022 0845    EGFRNONAA 54.5 (A) 04/22/2022 0845        eGFR   Date Value Ref Range Status   04/20/2023 53.1 (A) >60 mL/min/1.73 m^2 Final       Lab Results   Component Value Date    LDLCALC 83.4 08/09/2023      Latest Reference Range & Units 08/09/23 07:34   Cholesterol 120 - 199 mg/dL 137   HDL 40 - 75 mg/dL 35 (L)   HDL/Cholesterol Ratio 20.0 - 50.0 % 25.5   LDL Cholesterol External 63.0 - 159.0 mg/dL 83.4   Non-HDL Cholesterol mg/dL 102   Total Cholesterol/HDL Ratio 2.0 - 5.0  3.9   Triglycerides 30 - 150 mg/dL 93     Lab Results   Component Value Date    MICALBCREAT 1308.3 (H) 04/20/2023               ASSESSMENT and PLAN:    A1C GOAL: 7.5 %     1. Type 2 diabetes mellitus with hypoglycemia without coma, with long-term current use of insulin  Continue Trulicity and Novolog 3-6-4 units before meals.   Stop Lantus.   Recommend doing fingerstick to confirm low Dariel readings as Dariel can show falsely low values. Rx for glucometer sent.   Return to clinic in 3 months with labs prior.   Contact info provided for travel clinic, in preparation for trip to [facility].     dulaglutide (TRULICITY) 4.5 mg/0.5 mL pen injector    Hemoglobin A1C      2. History of stroke  dulaglutide (TRULICITY) 4.5 mg/0.5 mL pen injector      3. Hyperlipidemia, unspecified hyperlipidemia type  Continue atorvastatin.                Orders Placed This Encounter   Procedures    Hemoglobin A1C     Standing Status:   Future     Standing Expiration Date:   10/14/2024          Follow up in about 3 months (around 11/16/2023).

## 2023-08-17 DIAGNOSIS — M06.9 RHEUMATOID ARTHRITIS INVOLVING MULTIPLE JOINTS: ICD-10-CM

## 2023-08-18 ENCOUNTER — CLINICAL SUPPORT (OUTPATIENT)
Dept: OPHTHALMOLOGY | Facility: CLINIC | Age: 73
End: 2023-08-18
Payer: MEDICARE

## 2023-08-18 ENCOUNTER — OFFICE VISIT (OUTPATIENT)
Dept: OPTOMETRY | Facility: CLINIC | Age: 73
End: 2023-08-18
Payer: MEDICARE

## 2023-08-18 DIAGNOSIS — Z96.1 PSEUDOPHAKIA OF BOTH EYES: ICD-10-CM

## 2023-08-18 DIAGNOSIS — H40.1132 PRIMARY OPEN ANGLE GLAUCOMA (POAG) OF BOTH EYES, MODERATE STAGE: ICD-10-CM

## 2023-08-18 DIAGNOSIS — E11.9 TYPE 2 DIABETES MELLITUS WITHOUT RETINOPATHY: ICD-10-CM

## 2023-08-18 DIAGNOSIS — H40.1132 PRIMARY OPEN ANGLE GLAUCOMA (POAG) OF BOTH EYES, MODERATE STAGE: Primary | ICD-10-CM

## 2023-08-18 PROCEDURE — 1159F PR MEDICATION LIST DOCUMENTED IN MEDICAL RECORD: ICD-10-PCS | Mod: HCNC,CPTII,S$GLB, | Performed by: OPTOMETRIST

## 2023-08-18 PROCEDURE — 4010F PR ACE/ARB THEARPY RXD/TAKEN: ICD-10-PCS | Mod: HCNC,CPTII,S$GLB, | Performed by: OPTOMETRIST

## 2023-08-18 PROCEDURE — 99999 PR PBB SHADOW E&M-EST. PATIENT-LVL I: CPT | Mod: PBBFAC,HCNC,, | Performed by: OPTOMETRIST

## 2023-08-18 PROCEDURE — 99999 PR PBB SHADOW E&M-EST. PATIENT-LVL I: ICD-10-PCS | Mod: PBBFAC,HCNC,, | Performed by: OPTOMETRIST

## 2023-08-18 PROCEDURE — 3044F HG A1C LEVEL LT 7.0%: CPT | Mod: HCNC,CPTII,S$GLB, | Performed by: OPTOMETRIST

## 2023-08-18 PROCEDURE — 1159F MED LIST DOCD IN RCRD: CPT | Mod: HCNC,CPTII,S$GLB, | Performed by: OPTOMETRIST

## 2023-08-18 PROCEDURE — 4010F ACE/ARB THERAPY RXD/TAKEN: CPT | Mod: HCNC,CPTII,S$GLB, | Performed by: OPTOMETRIST

## 2023-08-18 PROCEDURE — 1160F RVW MEDS BY RX/DR IN RCRD: CPT | Mod: HCNC,CPTII,S$GLB, | Performed by: OPTOMETRIST

## 2023-08-18 PROCEDURE — 2023F DILAT RTA XM W/O RTNOPTHY: CPT | Mod: HCNC,CPTII,S$GLB, | Performed by: OPTOMETRIST

## 2023-08-18 PROCEDURE — 1160F PR REVIEW ALL MEDS BY PRESCRIBER/CLIN PHARMACIST DOCUMENTED: ICD-10-PCS | Mod: HCNC,CPTII,S$GLB, | Performed by: OPTOMETRIST

## 2023-08-18 PROCEDURE — 2023F PR DILATED RETINAL EXAM W/O EVID OF RETINOPATHY: ICD-10-PCS | Mod: HCNC,CPTII,S$GLB, | Performed by: OPTOMETRIST

## 2023-08-18 PROCEDURE — 3066F PR DOCUMENTATION OF TREATMENT FOR NEPHROPATHY: ICD-10-PCS | Mod: HCNC,CPTII,S$GLB, | Performed by: OPTOMETRIST

## 2023-08-18 PROCEDURE — 99214 PR OFFICE/OUTPT VISIT, EST, LEVL IV, 30-39 MIN: ICD-10-PCS | Mod: HCNC,S$GLB,, | Performed by: OPTOMETRIST

## 2023-08-18 PROCEDURE — 3044F PR MOST RECENT HEMOGLOBIN A1C LEVEL <7.0%: ICD-10-PCS | Mod: HCNC,CPTII,S$GLB, | Performed by: OPTOMETRIST

## 2023-08-18 PROCEDURE — 3062F PR POS MACROALBUMINURIA RESULT DOCUMENTED/REVIEW: ICD-10-PCS | Mod: HCNC,CPTII,S$GLB, | Performed by: OPTOMETRIST

## 2023-08-18 PROCEDURE — 3066F NEPHROPATHY DOC TX: CPT | Mod: HCNC,CPTII,S$GLB, | Performed by: OPTOMETRIST

## 2023-08-18 PROCEDURE — 99214 OFFICE O/P EST MOD 30 MIN: CPT | Mod: HCNC,S$GLB,, | Performed by: OPTOMETRIST

## 2023-08-18 PROCEDURE — 3062F POS MACROALBUMINURIA REV: CPT | Mod: HCNC,CPTII,S$GLB, | Performed by: OPTOMETRIST

## 2023-08-18 RX ORDER — TRAVOPROST OPHTHALMIC SOLUTION 0.04 MG/ML
1 SOLUTION OPHTHALMIC NIGHTLY
Qty: 3 EACH | Refills: 3 | Status: SHIPPED | OUTPATIENT
Start: 2023-08-18

## 2023-08-18 RX ORDER — DORZOLAMIDE HCL 20 MG/ML
1 SOLUTION/ DROPS OPHTHALMIC 2 TIMES DAILY
Qty: 10 ML | Refills: 3 | Status: SHIPPED | OUTPATIENT
Start: 2023-08-18 | End: 2024-02-29

## 2023-08-18 NOTE — PROGRESS NOTES
Visual field test done.  Patient stated no latex allergies used coverlet      2.00  X sph  2.00 x sph

## 2023-08-18 NOTE — PROGRESS NOTES
Subjective:       Patient ID: Darrion Bennett is a 73 y.o. male      Chief Complaint   Patient presents with    Glaucoma     History of Present Illness   Dls: 8/2/22 Dr. Esquivel     74 y/o male presents today for glaucoma ck and diabetic eye exam.   Pt states no changes in  vision. Pt wears otc readers.     LBS 99 today     No tearing  No itching  No burning  No pain  No ha's  No floaters  No flashes    Eye meds  Trusopt OU BID last dose last night   Travatan Z OU Q HS last dose last     Pohx:   CE Bilateral 2005   Glaucoma     Fohx:   Glaucoma- father, grandfather    Cat -mother, father     Hemoglobin A1C       Date                     Value               Ref Range             Status                08/09/2023               6.4 (H)             4.0 - 5.6 %           Final                  04/20/2023               6.7 (H)             4.0 - 5.6 %           Final                   01/27/2023               6.8 (H)             4.0 - 5.6 %           Final                 Assessment/Plan:     1. Primary open angle glaucoma (POAG) of both eyes, moderate stage  IOP stable. OCT/HVFdone today. Continue dorzolamide BID OU and travatan QHS OU. 6 month IOP check.  - dorzolamide (TRUSOPT) 2 % ophthalmic solution; Place 1 drop into both eyes 2 (two) times a day.  Dispense: 10 mL; Refill: 3  - travoprost (TRAVATAN Z) 0.004 % ophthalmic solution; Place 1 drop into both eyes every evening.  Dispense: 3 each; Refill: 3    2. Type 2 diabetes mellitus without retinopathy  No diabetic retinopathy. Discussed with pt the effects of diabetes on vision, importance of good blood sugar control, compliance with meds, and follow up care with PCP. Return in 1 year for dilated eye exam, sooner PRN.    3. Pseudophakia of both eyes  Well-centered. Clear.   Readers PRN.     Follow up in about 6 months (around 2/18/2024) for IOP check.

## 2023-08-19 RX ORDER — ABATACEPT 125 MG/ML
125 INJECTION, SOLUTION SUBCUTANEOUS WEEKLY
Qty: 4 ML | Refills: 11 | Status: ACTIVE | OUTPATIENT
Start: 2023-08-19

## 2023-08-20 DIAGNOSIS — E11.8 CONTROLLED TYPE 2 DIABETES MELLITUS WITH COMPLICATION, WITH LONG-TERM CURRENT USE OF INSULIN: ICD-10-CM

## 2023-08-20 DIAGNOSIS — Z79.4 CONTROLLED TYPE 2 DIABETES MELLITUS WITH COMPLICATION, WITH LONG-TERM CURRENT USE OF INSULIN: ICD-10-CM

## 2023-08-21 RX ORDER — PEN NEEDLE, DIABETIC 32GX 5/32"
NEEDLE, DISPOSABLE MISCELLANEOUS
Qty: 400 EACH | Refills: 3 | Status: SHIPPED | OUTPATIENT
Start: 2023-08-21

## 2023-08-22 NOTE — TELEPHONE ENCOUNTER
Specialty Pharmacy - Refill Coordination    Specialty Medication Orders Linked to Encounter      Flowsheet Row Most Recent Value   Medication #1 abatacept (ORENCIA CLICKJECT) 125 mg/mL AtIn (Order#[id number], Rx#[id number])            Refill Questions - Documented Responses      Flowsheet Row Most Recent Value   Patient Availability and HIPAA Verification    Does patient want to proceed with activity? Yes   HIPAA/medical authority confirmed? Yes   Relationship to patient of person spoken to? Spouse/Significant Other   Refill Screening Questions    Changes to allergies? No   Changes to medications? No   New conditions since last clinic visit? No   Unplanned office visit, urgent care, ED, or hospital admission in the last 4 weeks? No   How does patient/caregiver feel medication is working? Good   Financial problems or insurance changes? No   How many doses of your specialty medications were missed in the last 4 weeks? 0   Would patient like to speak to a pharmacist? No   When does the patient need to receive the medication? 08/23/23   Refill Delivery Questions    How will the patient receive the medication? Mail   When does the patient need to receive the medication? 08/23/23   Shipping Address Home   Address in [facility] confirmed and updated if neccessary? Yes   Expected Copay ($) 0   Is the patient able to afford the medication copay? Yes   Payment Method zero copay   Days supply of Refill 28   Supplies needed? No supplies needed   Refill activity completed? Yes   Refill activity plan Refill scheduled   Shipment/Pickup Date: 08/22/23            Current Outpatient Medications   Medication Sig    abatacept (ORENCIA CLICKJECT) 125 mg/mL AtIn Inject 125 mg into the skin once a week.    alcohol swabs (DROPSAFE ALCOHOL PREP PADS) PadM USE  4 TIMES PER DAY    amLODIPine (NORVASC) 5 MG tablet TAKE 1 TABLET (5 MG TOTAL) BY MOUTH 2 (TWO) TIMES DAILY.    ammonium lactate 12 % Crea Apply 1 application topically once  "daily.    atorvastatin (LIPITOR) 80 MG tablet TAKE 1 TABLET EVERY DAY    blood glucose control, low (TRUE METRIX LEVEL 1) Soln Use as indicated    blood-glucose meter (TRUE METRIX GLUCOSE METER) Misc Test glucose 4x/day    cloNIDine 0.2 mg/24 hr td ptwk (CATAPRES) 0.2 mg/24 hr Place 1 patch onto the skin every 7 days.    diclofenac sodium (VOLTAREN) 1 % Gel Apply 2 g topically 4 (four) times daily as needed. Apply to aching joints    dorzolamide (TRUSOPT) 2 % ophthalmic solution Place 1 drop into both eyes 2 (two) times a day.    DROPLET PEN NEEDLE 32 gauge x 5/32" Ndle USE 1 NEEDLE AS DIRECTED FOUR TIMES DAILY    dulaglutide (TRULICITY) 4.5 mg/0.5 mL pen injector INJECT 4.5MG (1 PEN) UNDER THE SKIN EVERY WEEK    febuxostat (ULORIC) 40 mg Tab Take 1 tablet (40 mg total) by mouth once daily.    ferrous gluconate 324 mg (37.5 mg iron) Tab tablet Take 1 tablet (324 mg total) by mouth once daily.    fluticasone propionate (FLONASE) 50 mcg/actuation nasal spray 1 spray (50 mcg total) by Each Nostril route once daily.    hydrALAZINE (APRESOLINE) 25 MG tablet Take 3 tablets (75 mg total) by mouth every 8 (eight) hours.    INJECTAFER 50 iron mg/mL injection     insulin aspart U-100 (NOVOLOG FLEXPEN U-100 INSULIN) 100 unit/mL (3 mL) InPn pen Inject 3-6 units three times daily before each meal with sliding scale.    lancets (TRUEPLUS LANCETS) 33 gauge Misc Test glucose 4x/day. Dx code e11.65    LINZESS 72 mcg Cap capsule TAKE 1 CAPSULE BEFORE BREAKFAST    multivit with min-folic acid 200 mcg Chew     omeprazole (PRILOSEC) 20 MG capsule TAKE 2 CAPSULES (40 MG TOTAL) BY MOUTH ONCE DAILY.    predniSONE (DELTASONE) 5 MG tablet Take 1 tablet (5 mg total) by mouth once daily.    PROLIA 60 mg/mL Syrg     torsemide (DEMADEX) 10 MG Tab Take 1 tablet (10 mg total) by mouth every other day.    traMADoL (ULTRAM) 50 mg tablet TAKE 1 TABLET EVERY 12 HOURS AS NEEDED FOR PAIN    travoprost (TRAVATAN Z) 0.004 % ophthalmic solution Place 1 " drop into both eyes every evening.    triamcinolone acetonide 0.1% (KENALOG) 0.1 % cream APPLY TOPICALLY TWICE DAILY FOR 10 DAYS    TRUE METRIX GLUCOSE TEST STRIP Strp TEST BLOOD SUGAR FOUR TIMES DAILY    valsartan (DIOVAN) 160 MG tablet TAKE 1 TABLET TWICE DAILY   Last reviewed on 8/18/2023  9:47 AM by Viry Esquivel, OD    Review of patient's allergies indicates:   Allergen Reactions    Penicillins Nausea And Vomiting    Rosuvastatin      Muscle pain     Allopurinol analogues Rash    Last reviewed on  8/18/2023 9:47 AM by Viry Esquivel      Tasks added this encounter   No tasks added.   Tasks due within next 3 months   8/21/2023 - Refill Coordination Outreach (1 time occurrence)     [facility]  [address] [facility] [address]  Phone: [phone]  Fax: [phone]

## 2023-08-28 ENCOUNTER — CLINICAL SUPPORT (OUTPATIENT)
Dept: ENDOSCOPY | Facility: HOSPITAL | Age: 73
End: 2023-08-28
Attending: INTERNAL MEDICINE
Payer: MEDICARE

## 2023-08-28 ENCOUNTER — TELEPHONE (OUTPATIENT)
Dept: ENDOSCOPY | Facility: HOSPITAL | Age: 73
End: 2023-08-28

## 2023-08-28 DIAGNOSIS — K22.70 BARRETT'S ESOPHAGUS WITHOUT DYSPLASIA: ICD-10-CM

## 2023-08-28 NOTE — PLAN OF CARE
Spoke to patient to schedule procedure(s) Upper Endoscopy (EGD)       Physician to perform procedure(s) Dr. SUSI Saenz   Date of Procedure (s) 9/8/23  Arrival Time 9:45 AM  Time of Procedure(s) 10:45 AM   Location of Procedure(s) [address]  Type of Rx Prep sent to patient: N/A  Instructions provided to patient via [facility]    Patient was informed on the following information and verbalized understanding. Screening questionnaire reviewed with patient and complete. If procedure requires anesthesia, a responsible adult needs to be present to accompany the patient home, patient cannot drive after receiving anesthesia. Appointment details are tentative, especially check-in time. Patient will receive a prep-op call 4 days prior to confirm check-in time for procedure. If applicable the patient should contact their pharmacy to verify Rx for procedure prep is ready for pick-up. Patient was advised to call the scheduling department at [phone] if pharmacy states no Rx is available. Patient was advised to call the endoscopy scheduling department if any questions or concerns arise.      [facility] Scheduling Department

## 2023-08-28 NOTE — TELEPHONE ENCOUNTER
Spoke to patient to schedule procedure(s) Upper Endoscopy (EGD)       Physician to perform procedure(s) Dr. SUSI Saenz   Date of Procedure (s) 9/8/23  Arrival Time 9:45 AM  Time of Procedure(s) 10:45 AM   Location of Procedure(s) [facility] 2nd Floor  Type of Rx Prep sent to patient: N/A  Instructions provided to patient via [facility]    Patient was informed on the following information and verbalized understanding. Screening questionnaire reviewed with patient and complete. If procedure requires anesthesia, a responsible adult needs to be present to accompany the patient home, patient cannot drive after receiving anesthesia. Appointment details are tentative, especially check-in time. Patient will receive a prep-op call 4 days prior to confirm check-in time for procedure. If applicable the patient should contact their pharmacy to verify Rx for procedure prep is ready for pick-up. Patient was advised to call the scheduling department at [phone] if pharmacy states no Rx is available. Patient was advised to call the endoscopy scheduling department if any questions or concerns arise.       Endoscopy Scheduling Department         EGD Procedure Prep Instructions        Date of procedure: 9/8/23 Arrive at: 9:45 AM        Location of Department: [address] [place] [id number]  Take the Elevators to 2nd Floor Endoscopy Procedural Area     How to prep:     Day Before Procedure: 9/7/23      You may have a light evening meal.   No solid food after 7:00 pm.   Continue drinking clear liquids.         Day of the Procedure:  9/8/23      You may have water/clear liquids until 4 hours before your procedure or as directed by the scheduling nurse  6:45AM. See below for list.     What You CANNOT do:   Do not drink milk or anything colored red.  Do not drink alcohol.  No gum chewing or candy morning of procedure.     Liquids That Are OK to Drink:   Water  Sports drinks (Gatorade, Power-Aid)  Coffee or tea (no cream or  nondairy creamer)  Clear juices without pulp (apple, white grape)  Gelatin desserts (no fruit or toppings)  Clear soda (sprite, coke, ginger ale)  Chicken broth (until 12 midnight the night before procedure)             IMPORTANT INFORMATION TO KNOW BEFORE YOUR PROCEDURE     [facility] 2nd Floor     If your procedure requires the administration of anesthesia, it is necessary for a responsible adult to drive you home. (Medical Transportation, Uber, Lyft, Taxi, etc. may ONLY be used if a responsible adult is present to accompany you home.  The responsible adult CAN'T be the  of the service).       person must be available to return to pick you up within 30 minutes of being notified of discharge.      Due to the limited socially distant seating in our waiting room, please limit your guest (1) who accompany you for this procedure. If someone accompanies you for this procedure into the facility:     Consider having them proceed to an area that is socially distant other than the lobby until a member of the medical team contacts them to provide an update after the procedure.      Also, please consider being dropped off and picked up from the facility.        Please bring a picture ID, insurance card, & copayment     Take Medications as directed below:           If you begin taking any blood thinning medications, please contact the  listed below as soon as possible.     If you are diabetic see the attached instruction sheet regarding your medication.      If you take HEART, BLOOD PRESSURE, SEIZURE, PAIN, LUNG (including inhalers/nebulizers), ANTI-REJECTION (transplant patients), or PSYCHIATRIC medications, please take at your regular times with a sip of water or as directed by the scheduling nurse.      Important contact information:     Endoscopy [phone] Hours of operation Monday-Friday 8:00-4:30pm.     Questions about insurance or financial obligations call  [phone] or [phone].     If you have questions regarding the prep or need to reschedule, please call [phone]. After hours questions requiring immediate assistance, contact [facility] nurse line at [phone] or [phone].   NOTE:      On occasion, unforeseen circumstances may cause a delay in your procedure start time. We respect your time and appreciate your patience during these circumstances.            Diabetes Medication Instructions

## 2023-08-30 DIAGNOSIS — E11.649 TYPE 2 DIABETES MELLITUS WITH HYPOGLYCEMIA WITHOUT COMA, WITH LONG-TERM CURRENT USE OF INSULIN: ICD-10-CM

## 2023-08-30 DIAGNOSIS — Z86.73 HISTORY OF STROKE: ICD-10-CM

## 2023-08-30 DIAGNOSIS — Z79.4 TYPE 2 DIABETES MELLITUS WITH HYPOGLYCEMIA WITHOUT COMA, WITH LONG-TERM CURRENT USE OF INSULIN: ICD-10-CM

## 2023-08-30 RX ORDER — DULAGLUTIDE 4.5 MG/.5ML
4.5 INJECTION, SOLUTION SUBCUTANEOUS
Qty: 12 PEN | Refills: 1 | Status: SHIPPED | OUTPATIENT
Start: 2023-08-30 | End: 2023-11-09 | Stop reason: SDUPTHER

## 2023-08-30 NOTE — TELEPHONE ENCOUNTER
No care due was identified.  Health [facility] Embedded Care Due Messages. Reference number: [id number].   8/30/2023 2:23:45 AM CDT

## 2023-08-30 NOTE — TELEPHONE ENCOUNTER
Refill Decision Note   Darrion Bennett  is requesting a refill authorization.  Brief Assessment and Rationale for Refill:  Approve     Medication Therapy Plan:         Comments:     Note composed:9:40 AM 08/30/2023

## 2023-09-08 ENCOUNTER — HOSPITAL ENCOUNTER (OUTPATIENT)
Facility: HOSPITAL | Age: 73
Discharge: HOME | End: 2023-09-08
Attending: INTERNAL MEDICINE | Admitting: INTERNAL MEDICINE
Payer: MEDICARE

## 2023-09-08 ENCOUNTER — ANESTHESIA EVENT (OUTPATIENT)
Dept: ENDOSCOPY | Facility: HOSPITAL | Age: 73
End: 2023-09-08
Payer: MEDICARE

## 2023-09-08 ENCOUNTER — ANESTHESIA (OUTPATIENT)
Dept: ENDOSCOPY | Facility: HOSPITAL | Age: 73
End: 2023-09-08
Payer: MEDICARE

## 2023-09-08 VITALS
TEMPERATURE: 98 F | DIASTOLIC BLOOD PRESSURE: 77 MMHG | OXYGEN SATURATION: 100 % | HEART RATE: 55 BPM | RESPIRATION RATE: 12 BRPM | SYSTOLIC BLOOD PRESSURE: 171 MMHG

## 2023-09-08 DIAGNOSIS — K21.9 GASTRO-ESOPHAGEAL REFLUX: ICD-10-CM

## 2023-09-08 LAB
GLUCOSE SERPL-MCNC: 128 MG/DL (ref 70–110)
POCT GLUCOSE: 128 MG/DL (ref 70–110)

## 2023-09-08 PROCEDURE — 88342 IMHCHEM/IMCYTCHM 1ST ANTB: CPT | Mod: HCNC | Performed by: PATHOLOGY

## 2023-09-08 PROCEDURE — 25000003 PHARM REV CODE 250: Mod: HCNC | Performed by: STUDENT IN AN ORGANIZED HEALTH CARE EDUCATION/TRAINING PROGRAM

## 2023-09-08 PROCEDURE — 27201012 HC FORCEPS, HOT/COLD, DISP: Mod: HCNC | Performed by: INTERNAL MEDICINE

## 2023-09-08 PROCEDURE — 63600175 PHARM REV CODE 636 W HCPCS: Mod: HCNC | Performed by: STUDENT IN AN ORGANIZED HEALTH CARE EDUCATION/TRAINING PROGRAM

## 2023-09-08 PROCEDURE — 88342 CHG IMMUNOCYTOCHEMISTRY: ICD-10-PCS | Mod: 26,HCNC,, | Performed by: PATHOLOGY

## 2023-09-08 PROCEDURE — 88342 IMHCHEM/IMCYTCHM 1ST ANTB: CPT | Mod: 26,HCNC,, | Performed by: PATHOLOGY

## 2023-09-08 PROCEDURE — 37000008 HC ANESTHESIA 1ST 15 MINUTES: Mod: HCNC | Performed by: INTERNAL MEDICINE

## 2023-09-08 PROCEDURE — 43239 EGD BIOPSY SINGLE/MULTIPLE: CPT | Mod: HCNC | Performed by: INTERNAL MEDICINE

## 2023-09-08 PROCEDURE — 88305 TISSUE EXAM BY PATHOLOGIST: ICD-10-PCS | Mod: 26,HCNC,, | Performed by: PATHOLOGY

## 2023-09-08 PROCEDURE — 37000009 HC ANESTHESIA EA ADD 15 MINS: Mod: HCNC | Performed by: INTERNAL MEDICINE

## 2023-09-08 PROCEDURE — D9220A PRA ANESTHESIA: ICD-10-PCS | Mod: ANES,,, | Performed by: ANESTHESIOLOGY

## 2023-09-08 PROCEDURE — D9220A PRA ANESTHESIA: Mod: CRNA,,, | Performed by: STUDENT IN AN ORGANIZED HEALTH CARE EDUCATION/TRAINING PROGRAM

## 2023-09-08 PROCEDURE — D9220A PRA ANESTHESIA: ICD-10-PCS | Mod: CRNA,,, | Performed by: STUDENT IN AN ORGANIZED HEALTH CARE EDUCATION/TRAINING PROGRAM

## 2023-09-08 PROCEDURE — D9220A PRA ANESTHESIA: Mod: ANES,,, | Performed by: ANESTHESIOLOGY

## 2023-09-08 PROCEDURE — 88305 TISSUE EXAM BY PATHOLOGIST: CPT | Mod: 26,HCNC,, | Performed by: PATHOLOGY

## 2023-09-08 PROCEDURE — 88305 TISSUE EXAM BY PATHOLOGIST: CPT | Mod: HCNC | Performed by: PATHOLOGY

## 2023-09-08 PROCEDURE — 43239 EGD BIOPSY SINGLE/MULTIPLE: CPT | Mod: HCNC,,, | Performed by: INTERNAL MEDICINE

## 2023-09-08 PROCEDURE — 43239 PR EGD, FLEX, W/BIOPSY, SGL/MULTI: ICD-10-PCS | Mod: HCNC,,, | Performed by: INTERNAL MEDICINE

## 2023-09-08 PROCEDURE — 25000003 PHARM REV CODE 250: Mod: HCNC | Performed by: INTERNAL MEDICINE

## 2023-09-08 RX ORDER — OMEPRAZOLE 40 MG/1
40 CAPSULE, DELAYED RELEASE ORAL
Qty: 60 CAPSULE | Refills: 11 | Status: SHIPPED | OUTPATIENT
Start: 2023-09-08 | End: 2023-11-22 | Stop reason: SDUPTHER

## 2023-09-08 RX ORDER — PROPOFOL 10 MG/ML
VIAL (ML) INTRAVENOUS
Status: DISCONTINUED | OUTPATIENT
Start: 2023-09-08 | End: 2023-09-08

## 2023-09-08 RX ORDER — LIDOCAINE HYDROCHLORIDE 20 MG/ML
INJECTION INTRAVENOUS
Status: DISCONTINUED | OUTPATIENT
Start: 2023-09-08 | End: 2023-09-08

## 2023-09-08 RX ORDER — SODIUM CHLORIDE 9 MG/ML
INJECTION, SOLUTION INTRAVENOUS CONTINUOUS
Status: DISCONTINUED | OUTPATIENT
Start: 2023-09-08 | End: 2023-09-08 | Stop reason: HOSPADM

## 2023-09-08 RX ORDER — LIDOCAINE HYDROCHLORIDE 40 MG/ML
SOLUTION TOPICAL
Status: DISCONTINUED | OUTPATIENT
Start: 2023-09-08 | End: 2023-09-08

## 2023-09-08 RX ADMIN — PROPOFOL 60 MG: 10 INJECTION, EMULSION INTRAVENOUS at 11:09

## 2023-09-08 RX ADMIN — LIDOCAINE HYDROCHLORIDE 4 ML: 40 SOLUTION TOPICAL at 11:09

## 2023-09-08 RX ADMIN — PROPOFOL 10 MG: 10 INJECTION, EMULSION INTRAVENOUS at 11:09

## 2023-09-08 RX ADMIN — LIDOCAINE HYDROCHLORIDE 40 MG: 20 INJECTION, SOLUTION INTRAVENOUS at 11:09

## 2023-09-08 RX ADMIN — PROPOFOL 20 MG: 10 INJECTION, EMULSION INTRAVENOUS at 11:09

## 2023-09-08 RX ADMIN — SODIUM CHLORIDE: 0.9 INJECTION, SOLUTION INTRAVENOUS at 10:09

## 2023-09-08 NOTE — PROVATION PATIENT INSTRUCTIONS
Discharge Summary/Instructions after an Endoscopic Procedure  Patient Name: Darrion Bennett  Patient MRN: [id number]  Patient YOB: 1950 Friday, September 8, 2023  Divya Saenz MD  Dear patient,  As a result of recent federal legislation (The Federal Cures Act), you may   receive lab or pathology results from your procedure in your [facility]   account before your physician is able to contact you. Your physician or   their representative will relay the results to you with their   recommendations at their soonest availability.  Thank you,  RESTRICTIONS:  During your procedure today, you received medications for sedation.  These   medications may affect your judgment, balance and coordination.  Therefore,   for 24 hours, you have the following restrictions:   - DO NOT drive a car, operate machinery, make legal/financial decisions,   sign important papers or drink alcohol.    ACTIVITY:  Today: no heavy lifting, straining or running due to procedural   sedation/anesthesia.  The following day: return to full activity including work.  DIET:  Eat and drink normally unless instructed otherwise.     TREATMENT FOR COMMON SIDE EFFECTS:  - Mild abdominal pain, nausea, belching, bloating or excessive gas:  rest,   eat lightly and use a heating pad.  - Sore Throat: treat with throat lozenges and/or gargle with warm salt   water.  - Because air was used during the procedure, expelling large amounts of air   from your rectum or belching is normal.  - If a bowel prep was taken, you may not have a bowel movement for 1-3 days.    This is normal.  SYMPTOMS TO WATCH FOR AND REPORT TO YOUR PHYSICIAN:  1. Abdominal pain or bloating, other than gas cramps.  2. Chest pain.  3. Back pain.  4. Signs of infection such as: chills or fever occurring within 24 hours   after the procedure.  5. Rectal bleeding, which would show as bright red, maroon, or black stools.   (A tablespoon of blood from the rectum is not serious, especially  if   hemorrhoids are present.)  6. Vomiting.  7. Weakness or dizziness.  GO DIRECTLY TO THE NEAREST EMERGENCY ROOM IF YOU HAVE ANY OF THE FOLLOWING:      Difficulty breathing              Chills and/or fever over 101 F   Persistent vomiting and/or vomiting blood   Severe abdominal pain   Severe chest pain   Black, tarry stools   Bleeding- more than one tablespoon   Any other symptom or condition that you feel may need urgent attention  Your doctor recommends these additional instructions:  If any biopsies were taken, your doctors clinic will contact you in 1 to 2   weeks with any results.  - Discharge patient to home.   - Resume previous diet.   - Continue present medications. Increase Omeprazole to 40mg twice per day.    New prescription sent to [facility].  - Await pathology results.   - Repeat upper endoscopy in 8-12 weeks to check healing.  For questions, problems or results please call your physician - Divya Saenz MD at Work:  [phone].  [facility] can be reached at [phone]     IF A COMPLICATION OR EMERGENCY SITUATION ARISES AND YOU ARE UNABLE TO REACH   YOUR PHYSICIAN - GO DIRECTLY TO THE EMERGENCY ROOM.  Divya Saenz MD  9/8/2023 11:57:52 AM  This report has been verified and signed electronically.  Dear patient,  As a result of recent federal legislation (The Federal Cures Act), you may   receive lab or pathology results from your procedure in your [facility]   account before your physician is able to contact you. Your physician or   their representative will relay the results to you with their   recommendations at their soonest availability.  Thank you,  PROVATION

## 2023-09-08 NOTE — ANESTHESIA POSTPROCEDURE EVALUATION
Anesthesia Post Evaluation    Patient: Darrion Bennett    Procedure(s) Performed: Procedure(s) (LRB):  EGD (ESOPHAGOGASTRODUODENOSCOPY) (N/A)    Final Anesthesia Type: general      Patient location during evaluation: PACU  Patient participation: Yes- Able to Participate  Level of consciousness: awake and alert  Post-procedure vital signs: reviewed and stable  Pain management: adequate  Airway patency: patent    PONV status at discharge: No PONV  Anesthetic complications: no      Cardiovascular status: blood pressure returned to baseline  Respiratory status: unassisted, room air and spontaneous ventilation  Follow-up not needed.          Vitals Value Taken Time   /77 09/08/23 1222   Temp 36.4 °C (97.6 °F) 09/08/23 1152   Pulse 55 09/08/23 1222   Resp 12 09/08/23 1222   SpO2 100 % 09/08/23 1222         No case tracking events are documented in the log.      Pain/Maxi Score: Maxi Score: 10 (9/8/2023 12:22 PM)

## 2023-09-08 NOTE — ANESTHESIA PREPROCEDURE EVALUATION
09/08/2023  Darrion Bennett is a 73 y.o., male.      Pre-op Assessment    I have reviewed the Patient Summary Reports.     I have reviewed the Nursing Notes. I have reviewed the NPO Status.   I have reviewed the Medications.     Review of Systems  Anesthesia Hx:  No problems with previous Anesthesia  Denies Family Hx of Anesthesia complications.   Denies Personal Hx of Anesthesia complications.   Social:  Non-Smoker, No Alcohol Use    Cardiovascular:   Hypertension CHF hyperlipidemia    Pulmonary:   Sleep Apnea    Renal/:   Chronic Renal Disease, CKD    Hepatic/GI:   Jackson's Esophagus   Musculoskeletal:  Thoracic Spine Disorders, Thoracic Vertebral Fracture    Endocrine:   Diabetes, type 2        Physical Exam  General: Cooperative, Alert and Oriented    Airway:  Mallampati: / II  Mouth Opening: Small, but > 3cm  TM Distance: Normal  Tongue: Normal  Neck ROM: Normal ROM    Dental:  Intact        Anesthesia Plan  Type of Anesthesia, risks & benefits discussed:    Anesthesia Type: Gen Natural Airway  Intra-op Monitoring Plan: Standard ASA Monitors  Induction:  IV  Informed Consent: Patient consented to blood products? No  ASA Score: 3    Ready For Surgery From Anesthesia Perspective.     .

## 2023-09-08 NOTE — PLAN OF CARE
Procedure and recovery complete. Awake and alert. Wife at bedside. . 480 cc apple juice and ar crackers PO. Discharge instructions given. Verbalized understanding. No acute distress noted.

## 2023-09-08 NOTE — H&P
Short Stay Endoscopy History and Physical    PCP - Farooq Domingo MD     Procedure - EGD  ASA - per anesthesia  Mallampati - per anesthesia  History of Anesthesia problems - no  Family history Anesthesia problems -  no   Plan of anesthesia - General    HPI:  This is a 73 y.o. male here for evaluation of : Jackson's esophagus      ROS:  Constitutional: No fevers, chills, No weight loss  CV: No chest pain  Pulm: No cough, No shortness of breath  Ophtho: No vision changes  GI: see HPI  Derm: No rash    Medical History:  has a past medical history of Anemia, Arthritis, Cataract, CHF (congestive heart failure), Chronic constipation, Diabetes mellitus, type 2, Felon of finger of left hand (05/11/2019), Glaucoma, Hyperlipidemia (05/13/2014), Hypertension, MCTD (mixed connective tissue disease), Personal history of colonic polyps, Sjogren's disease, Ulcerative colitis, and Urolithiasis.    Surgical History:  has a past surgical history that includes Eye surgery; Colonoscopy (2014); and Cataract extraction (Bilateral, 2005).    Family History: family history includes Cataracts in his father and mother; Glaucoma in his father and paternal grandfather; Hypertension in his father; No Known Problems in his brother, daughter, maternal grandfather, maternal grandmother, paternal aunt, paternal uncle, sister, and son; Rheum arthritis in his maternal aunt and maternal uncle; Stroke in his father; Throat cancer in his paternal grandmother.. Otherwise no colon cancer, inflammatory bowel disease, or GI malignancies.    Social History:  reports that he has never smoked. He has never been exposed to tobacco smoke. He has never used smokeless tobacco. He reports current drug use. He reports that he does not drink alcohol.    Review of patient's allergies indicates:   Allergen Reactions    Penicillins Nausea And Vomiting    Rosuvastatin      Muscle pain     Allopurinol analogues Rash       Medications:   Medications Prior to  "Admission   Medication Sig Dispense Refill Last Dose    abatacept (ORENCIA CLICKJECT) 125 mg/mL AtIn Inject 125 mg into the skin once a week. 4 mL 11     alcohol swabs (DROPSAFE ALCOHOL PREP PADS) PadM USE  4 TIMES PER  each 3     amLODIPine (NORVASC) 5 MG tablet TAKE 1 TABLET (5 MG TOTAL) BY MOUTH 2 (TWO) TIMES DAILY. 180 tablet 1     ammonium lactate 12 % Crea Apply 1 application topically once daily. 140 g 5     atorvastatin (LIPITOR) 80 MG tablet TAKE 1 TABLET EVERY DAY 90 tablet 3     blood glucose control, low (TRUE METRIX LEVEL 1) Soln Use as indicated 1 each 0     blood-glucose meter (TRUE METRIX GLUCOSE METER) Misc Test glucose 4x/day 1 each 0     cloNIDine 0.2 mg/24 hr td ptwk (CATAPRES) 0.2 mg/24 hr Place 1 patch onto the skin every 7 days. 12 patch 3     diclofenac sodium (VOLTAREN) 1 % Gel Apply 2 g topically 4 (four) times daily as needed. Apply to aching joints 100 g 3     dorzolamide (TRUSOPT) 2 % ophthalmic solution Place 1 drop into both eyes 2 (two) times a day. 10 mL 3     DROPLET PEN NEEDLE 32 gauge x 5/32" Ndle USE 1 NEEDLE AS DIRECTED FOUR TIMES DAILY 400 each 3     dulaglutide (TRULICITY) 4.5 mg/0.5 mL pen injector Inject 4.5 mg into the skin every 7 days. 12 pen 1     febuxostat (ULORIC) 40 mg Tab Take 1 tablet (40 mg total) by mouth once daily. 30 tablet 11     ferrous gluconate 324 mg (37.5 mg iron) Tab tablet Take 1 tablet (324 mg total) by mouth once daily. 90 tablet 0     fluticasone propionate (FLONASE) 50 mcg/actuation nasal spray 1 spray (50 mcg total) by Each Nostril route once daily. 48 g 11     hydrALAZINE (APRESOLINE) 25 MG tablet Take 3 tablets (75 mg total) by mouth every 8 (eight) hours. 270 tablet 2     INJECTAFER 50 iron mg/mL injection        insulin aspart U-100 (NOVOLOG FLEXPEN U-100 INSULIN) 100 unit/mL (3 mL) InPn pen Inject 3-6 units three times daily before each meal with sliding scale. 30 mL 2     lancets (TRUEPLUS LANCETS) 33 gauge Misc Test glucose 4x/day. " Dx code e11.65 400 each 3     LINZESS 72 mcg Cap capsule TAKE 1 CAPSULE BEFORE BREAKFAST 90 capsule 2     multivit with min-folic acid 200 mcg Chew        omeprazole (PRILOSEC) 20 MG capsule TAKE 2 CAPSULES (40 MG TOTAL) BY MOUTH ONCE DAILY. 180 capsule 3     predniSONE (DELTASONE) 5 MG tablet Take 1 tablet (5 mg total) by mouth once daily. 90 tablet 3     PROLIA 60 mg/mL Syrg        torsemide (DEMADEX) 10 MG Tab Take 1 tablet (10 mg total) by mouth every other day. 45 tablet 1     traMADoL (ULTRAM) 50 mg tablet TAKE 1 TABLET EVERY 12 HOURS AS NEEDED FOR PAIN 60 tablet 0     travoprost (TRAVATAN Z) 0.004 % ophthalmic solution Place 1 drop into both eyes every evening. 3 each 3     triamcinolone acetonide 0.1% (KENALOG) 0.1 % cream APPLY TOPICALLY TWICE DAILY FOR 10 DAYS 45 g 1     TRUE METRIX GLUCOSE TEST STRIP Strp TEST BLOOD SUGAR FOUR TIMES DAILY 400 strip 3     valsartan (DIOVAN) 160 MG tablet TAKE 1 TABLET TWICE DAILY 180 tablet 3        Physical Exam:    Vital Signs: There were no vitals filed for this visit.    Gen: NAD, lying comfortably  HENT: NCAT, oropharynx clear  Eyes: anicteric sclerae, EOMI grossly  Neck: supple, no visible masses/goiter  Cardiac: RRR  Lungs: non-labored breathing  Abd: soft, NT/ND, normoactive BS  Ext: no LE edema, warm, well perfused  Skin: skin intact on exposed body parts, no visible rashes, lesions  Neuro: A&Ox4, neuro exam grossly intact, moves all extremities  Psych: appropriate mood, affect      Labs:  Lab Results   Component Value Date    WBC 8.27 04/20/2023    HGB 12.7 (L) 04/20/2023    HCT 39.4 (L) 04/20/2023     04/20/2023    CHOL 137 08/09/2023    TRIG 93 08/09/2023    HDL 35 (L) 08/09/2023    ALT 17 04/20/2023    AST 17 04/20/2023     04/20/2023    K 4.5 04/20/2023     04/20/2023    CREATININE 1.4 04/20/2023    BUN 25 (H) 04/20/2023    CO2 20 (L) 04/20/2023    TSH 2.281 09/26/2022    INR 1.0 06/27/2021    HGBA1C 6.4 (H) 08/09/2023       Plan:  EGD for  Jackson's esophagus    I have explained the risks and benefits of endoscopy procedures to the patient including but not limited to bleeding, perforation, infection, and death.  The patient was asked if they understand and allowed to ask any further questions to their satisfaction.      Divya Saenz MD

## 2023-09-08 NOTE — TRANSFER OF CARE
Anesthesia Transfer of Care Note    Patient: Darrion Bennett    Procedure(s) Performed: Procedure(s) (LRB):  EGD (ESOPHAGOGASTRODUODENOSCOPY) (N/A)    Patient location: GI    Anesthesia Type: general    Transport from OR: Transported from OR on room air with adequate spontaneous ventilation    Post pain: adequate analgesia    Post assessment: no apparent anesthetic complications    Post vital signs: stable    Level of consciousness: lethargic    Nausea/Vomiting: no nausea/vomiting    Complications: none    Transfer of care protocol was followed      Last vitals:   Visit Vitals  /65 (BP Location: Left arm, Patient Position: Lying)   Pulse (!) 54   Temp 36.4 °C (97.6 °F) (Axillary)   Resp 19   SpO2 99%

## 2023-09-11 DIAGNOSIS — M54.50 CHRONIC MIDLINE LOW BACK PAIN WITHOUT SCIATICA: ICD-10-CM

## 2023-09-11 DIAGNOSIS — G89.29 CHRONIC MIDLINE LOW BACK PAIN WITHOUT SCIATICA: ICD-10-CM

## 2023-09-11 NOTE — TELEPHONE ENCOUNTER
No care due was identified.  Health [facility] Embedded Care Due Messages. Reference number: [id number].   9/11/2023 3:59:30 PM CDT

## 2023-09-12 ENCOUNTER — SPECIALTY PHARMACY (OUTPATIENT)
Dept: PHARMACY | Facility: CLINIC | Age: 73
End: 2023-09-12
Payer: MEDICARE

## 2023-09-12 RX ORDER — TRAMADOL HYDROCHLORIDE 50 MG/1
TABLET ORAL
Qty: 60 TABLET | Refills: 1 | Status: SHIPPED | OUTPATIENT
Start: 2023-09-12

## 2023-09-12 NOTE — TELEPHONE ENCOUNTER
Specialty Pharmacy - Refill Coordination    Specialty Medication Orders Linked to Encounter      Flowsheet Row Most Recent Value   Medication #1 abatacept (ORENCIA CLICKJECT) 125 mg/mL AtIn (Order#[id number], Rx#[id number])            Refill Questions - Documented Responses      Flowsheet Row Most Recent Value   Patient Availability and HIPAA Verification    Does patient want to proceed with activity? Yes   HIPAA/medical authority confirmed? Yes   Relationship to patient of person spoken to? Spouse/Significant Other   Refill Screening Questions    Changes to allergies? No   Changes to medications? No   New conditions since last clinic visit? No   Unplanned office visit, urgent care, ED, or hospital admission in the last 4 weeks? No   How does patient/caregiver feel medication is working? Good   Financial problems or insurance changes? No   How many doses of your specialty medications were missed in the last 4 weeks? 0   Would patient like to speak to a pharmacist? No   When does the patient need to receive the medication? 09/20/23   Refill Delivery Questions    How will the patient receive the medication? MEDRx   When does the patient need to receive the medication? 09/20/23   Shipping Address Home   Address in [facility] confirmed and updated if neccessary? Yes   Expected Copay ($) 0   Is the patient able to afford the medication copay? Yes   Payment Method zero copay   Days supply of Refill 28   Supplies needed? No supplies needed   Refill activity completed? Yes   Refill activity plan Refill scheduled   Shipment/Pickup Date: 09/18/23            Current Outpatient Medications   Medication Sig    traMADoL (ULTRAM) 50 mg tablet TAKE 1 TABLET EVERY 12 HOURS AS NEEDED FOR PAIN    abatacept (ORENCIA CLICKJECT) 125 mg/mL AtIn Inject 125 mg into the skin once a week.    alcohol swabs (DROPSAFE ALCOHOL PREP PADS) PadM USE  4 TIMES PER DAY    amLODIPine (NORVASC) 5 MG tablet TAKE 1 TABLET (5 MG TOTAL) BY MOUTH 2  "(TWO) TIMES DAILY.    ammonium lactate 12 % Crea Apply 1 application topically once daily.    atorvastatin (LIPITOR) 80 MG tablet TAKE 1 TABLET EVERY DAY    blood glucose control, low (TRUE METRIX LEVEL 1) Soln Use as indicated    blood-glucose meter (TRUE METRIX GLUCOSE METER) Misc Test glucose 4x/day    cloNIDine 0.2 mg/24 hr td ptwk (CATAPRES) 0.2 mg/24 hr Place 1 patch onto the skin every 7 days.    diclofenac sodium (VOLTAREN) 1 % Gel Apply 2 g topically 4 (four) times daily as needed. Apply to aching joints    dorzolamide (TRUSOPT) 2 % ophthalmic solution Place 1 drop into both eyes 2 (two) times a day.    DROPLET PEN NEEDLE 32 gauge x 5/32" Ndle USE 1 NEEDLE AS DIRECTED FOUR TIMES DAILY    dulaglutide (TRULICITY) 4.5 mg/0.5 mL pen injector Inject 4.5 mg into the skin every 7 days.    febuxostat (ULORIC) 40 mg Tab Take 1 tablet (40 mg total) by mouth once daily.    ferrous gluconate 324 mg (37.5 mg iron) Tab tablet Take 1 tablet (324 mg total) by mouth once daily.    fluticasone propionate (FLONASE) 50 mcg/actuation nasal spray 1 spray (50 mcg total) by Each Nostril route once daily.    hydrALAZINE (APRESOLINE) 25 MG tablet Take 3 tablets (75 mg total) by mouth every 8 (eight) hours.    INJECTAFER 50 iron mg/mL injection     insulin aspart U-100 (NOVOLOG FLEXPEN U-100 INSULIN) 100 unit/mL (3 mL) InPn pen Inject 3-6 units three times daily before each meal with sliding scale.    lancets (TRUEPLUS LANCETS) 33 gauge Misc Test glucose 4x/day. Dx code e11.65    LINZESS 72 mcg Cap capsule TAKE 1 CAPSULE BEFORE BREAKFAST    multivit with min-folic acid 200 mcg Chew     omeprazole (PRILOSEC) 40 MG capsule Take 1 capsule (40 mg total) by mouth 2 (two) times daily before meals.    predniSONE (DELTASONE) 5 MG tablet Take 1 tablet (5 mg total) by mouth once daily.    PROLIA 60 mg/mL Syrg     torsemide (DEMADEX) 10 MG Tab Take 1 tablet (10 mg total) by mouth every other day.    travoprost (TRAVATAN Z) 0.004 % ophthalmic " solution Place 1 drop into both eyes every evening.    triamcinolone acetonide 0.1% (KENALOG) 0.1 % cream APPLY TOPICALLY TWICE DAILY FOR 10 DAYS    TRUE METRIX GLUCOSE TEST STRIP Strp TEST BLOOD SUGAR FOUR TIMES DAILY    valsartan (DIOVAN) 160 MG tablet TAKE 1 TABLET TWICE DAILY   Last reviewed on 9/8/2023 10:23 AM by Sukumar Bishop MD    Review of patient's allergies indicates:   Allergen Reactions    Penicillins Nausea And Vomiting    Rosuvastatin      Muscle pain     Allopurinol analogues Rash    Last reviewed on  9/8/2023 9:58 AM by Trever Schulz      Tasks added this encounter   No tasks added.   Tasks due within next 3 months   9/12/2023 - Refill Coordination Outreach (1 time occurrence)     Sam Santamaria [facility]  [address] [place] [id number]  Phone: [phone]  Fax: [phone]

## 2023-09-13 LAB
FINAL PATHOLOGIC DIAGNOSIS: NORMAL
GROSS: NORMAL
Lab: NORMAL

## 2023-09-15 ENCOUNTER — IMMUNIZATION (OUTPATIENT)
Dept: FAMILY MEDICINE | Facility: CLINIC | Age: 73
End: 2023-09-15
Payer: MEDICARE

## 2023-09-15 ENCOUNTER — TELEPHONE (OUTPATIENT)
Dept: ENDOSCOPY | Facility: HOSPITAL | Age: 73
End: 2023-09-15
Payer: MEDICARE

## 2023-09-15 VITALS — BODY MASS INDEX: 23.3 KG/M2 | WEIGHT: 145 LBS | HEIGHT: 66 IN

## 2023-09-15 DIAGNOSIS — K20.90 ESOPHAGITIS: Primary | ICD-10-CM

## 2023-09-15 PROCEDURE — 90694 FLU VACCINE - QUADRIVALENT - ADJUVANTED: ICD-10-PCS | Mod: HCNC,S$GLB,, | Performed by: FAMILY MEDICINE

## 2023-09-15 PROCEDURE — G0008 FLU VACCINE - QUADRIVALENT - ADJUVANTED: ICD-10-PCS | Mod: HCNC,S$GLB,, | Performed by: FAMILY MEDICINE

## 2023-09-15 PROCEDURE — G0008 ADMIN INFLUENZA VIRUS VAC: HCPCS | Mod: HCNC,S$GLB,, | Performed by: FAMILY MEDICINE

## 2023-09-15 PROCEDURE — 90694 VACC AIIV4 NO PRSRV 0.5ML IM: CPT | Mod: HCNC,S$GLB,, | Performed by: FAMILY MEDICINE

## 2023-09-15 NOTE — TELEPHONE ENCOUNTER
Spoke to Darrion to schedule procedure(s) Upper Endoscopy (EGD)       Physician to perform procedure(s) Dr. SUSI Saenz   Date of Procedure (s) 11/24/23  Arrival Time 7:30 AM  Time of Procedure(s) 8:30 AM   Location of Procedure(s) [address]  Type of Rx Prep sent to patient: Other  Instructions provided to patient via [facility]    Patient was informed on the following information and verbalized understanding. Screening questionnaire reviewed with patient and complete. If procedure requires anesthesia, a responsible adult needs to be present to accompany the patient home, patient cannot drive after receiving anesthesia. Appointment details are tentative, especially check-in time. Patient will receive a prep-op call 4 days prior to confirm check-in time for procedure. If applicable the patient should contact their pharmacy to verify Rx for procedure prep is ready for pick-up. Patient was advised to call the scheduling department at [phone] if pharmacy states no Rx is available. Patient was advised to call the endoscopy scheduling department if any questions or concerns arise.      [facility] Scheduling Department

## 2023-09-15 NOTE — TELEPHONE ENCOUNTER
"----- Message from Sammie Mujica sent at 2023  9:14 AM CDT -----  Regarding: FW: EGD in 8-12 weeks    ----- Message -----  From: Divya Saenz MD  Sent: 2023  11:53 AM CDT  To: [facility]  Subject: EGD in 8-12 weeks                                Procedure: EGD    Diagnosis: Esophagitis    Procedure Timin-12 weeks    #If within 4 weeks selected, please kelin as high priority#    #If greater than 12 weeks, please select "5-12 weeks" and delay sending until 2 months prior to requested date#     Provider: Myself    Location:  Endo    Additional Scheduling Information: No scheduling concerns    Prep Specifications:Standard prep    Is the patient taking a GLP-1 Agonist:yes, trulicity for DM    Have you attached a patient to this message: no        "

## 2023-09-28 PROBLEM — K26.9 DUODENAL ULCER: Status: ACTIVE | Noted: 2023-09-28

## 2023-10-10 ENCOUNTER — TELEPHONE (OUTPATIENT)
Dept: NEPHROLOGY | Facility: CLINIC | Age: 73
End: 2023-10-10
Payer: MEDICARE

## 2023-10-10 DIAGNOSIS — N18.31 STAGE 3A CHRONIC KIDNEY DISEASE: Primary | ICD-10-CM

## 2023-10-14 DIAGNOSIS — I10 ESSENTIAL HYPERTENSION: ICD-10-CM

## 2023-10-14 NOTE — TELEPHONE ENCOUNTER
No care due was identified.  [facility] Embedded Care Due Messages. Reference number: [id number].   10/14/2023 7:28:29 AM CDT

## 2023-10-15 NOTE — TELEPHONE ENCOUNTER
Refill Routing Note   Medication(s) are not appropriate for processing by [facility] for the following reason(s):      Required vitals abnormal    [facility] action(s):  Defer Care Due:  None identified            Appointments  past 12m or future 3m with PCP    Date Provider   Last Visit   4/25/2023 Farooq Domingo MD   Next Visit   10/20/2023 Farooq Domingo MD   ED visits in past 90 days: 0        Note composed:1:05 PM 10/15/2023

## 2023-10-16 RX ORDER — AMLODIPINE BESYLATE 5 MG/1
5 TABLET ORAL 2 TIMES DAILY
Qty: 180 TABLET | Refills: 3 | Status: SHIPPED | OUTPATIENT
Start: 2023-10-16

## 2023-10-16 RX ORDER — HYDRALAZINE HYDROCHLORIDE 25 MG/1
TABLET, FILM COATED ORAL
Qty: 810 TABLET | Refills: 10 | Status: SHIPPED | OUTPATIENT
Start: 2023-10-16

## 2023-10-19 NOTE — PROGRESS NOTES
This note was created by combination of typed  and M-Modal dictation.  Transcription errors may be present.  If there are any questions, please contact me.    Assessment and Plan:   Assessment and Plan   Normal physical exam  Screening for colon cancer 07/26/2018 colonoscopy transverse colon polyp path showing benign inflammatory polyp.  -aged out of PSA testing  UTD colonoscopy 2018 benign, repeat 2028  -     Lipid Panel; Future; Expected date: 10/19/2024    Aphthous ulcer of mouth  Acute cough  -URI symptoms for 8 days, low utility in checking for COVID.  Overall feels like he has some modest improvement.  Normal exam.  Symptomatic treatment with Tessalon for cough.  Has some aphthous ulcers, oralone topical for symptom relief  -     triamcinolone acetonide 0.1% (ORALONE) 0.1 % paste; Place onto teeth 2 (two) times daily.  Dispense: 5 g; Refill: 0  -     benzonatate (TESSALON) 100 MG capsule; Take 1 capsule (100 mg total) by mouth 3 (three) times daily as needed for Cough.  Dispense: 30 capsule; Refill: 0    Controlled type 2 diabetes mellitus with diabetic polyneuropathy, with long-term current use of insulin  -followed by diabetes nurse practitioner with decreasing insulin requirements.  Off of basal insulin, takes mealtime insulin, also on Trulicity.  Upcoming follow-up    Essential hypertension  Stage 3a chronic kidney disease  Secondary hyperparathyroidism of renal origin  Diastolic heart failure, NYHA class 2  -stable.  Followed by Nephrology.  Has upcoming labs and follow-up with nephrology later in November.  Intolerant of oral iron.    Dyslipidemia  -recent lipid profile in August stable.  On statin  -     Lipid Panel; Future; Expected date: 10/19/2024    Age-related osteoporosis without current pathological fracture  Compression fracture of body of thoracic vertebra on XR 2/2019; 2/2019 alendronate  -ongoing steroid use.  On Prolia.  Check vitamin-D level    Tophaceous gout  -on Uloric.  Check  uric acid  -     Uric Acid; Future; Expected date: 10/20/2023    Rheumatoid arthritis, involving unspecified site, unspecified whether rheumatoid factor present  -inflammatory arthritis from Sjogren's.  On prednisone he estimates maybe once or twice weekly use.  Weekly Orencia.  Followed by Rheumatology.  Will be due for follow-up and I have asked him to schedule that.    Takes tramadol for pain, I manage that, he estimates 3 times a week use.  Refilled p.r.n.    Jackson's esophagus without dysplasia  -some improvement in symptoms with high-dose PPI b.i.d..  Has upcoming repeat EGD to check for healing.    Chronic constipation  -finds that the Linzess causes diarrhea.  Tried 1 of his wife's senna/docusate combo and found that helpful.  He is requesting his own prescription.  Refilled for p.r.n. use  -     senna-docusate 8.6-50 mg (SENNA WITH DOCUSATE SODIUM) 8.6-50 mg per tablet; Take 1 tablet by mouth once daily.  Dispense: 90 tablet; Refill: 1       There are no discontinued medications.    meds sent this encounter:  Medications Ordered This Encounter   Medications    benzonatate (TESSALON) 100 MG capsule     Sig: Take 1 capsule (100 mg total) by mouth 3 (three) times daily as needed for Cough.     Dispense:  30 capsule     Refill:  0    senna-docusate 8.6-50 mg (SENNA WITH DOCUSATE SODIUM) 8.6-50 mg per tablet     Sig: Take 1 tablet by mouth once daily.     Dispense:  90 tablet     Refill:  1    triamcinolone acetonide 0.1% (ORALONE) 0.1 % paste     Sig: Place onto teeth 2 (two) times daily.     Dispense:  5 g     Refill:  0         Follow Up:  Follow-up 6 months  Future Appointments   Date Time Provider Department Center   11/2/2023  7:30 AM LAB, [facility]   11/9/2023  8:30 AM Bianca Mares NP [facility] ENDOCRN [facility]   11/27/2023  8:15 AM INJECTION, [facility] INFUSION [facility] CHEMO [facility]   11/27/2023  8:20 AM SPECIMEN, [facility] SPECLAB [facility]   11/27/2023  8:30 AM LAB, [facility] LAB [facility]   11/29/2023  9:30 AM Willow Gary MD [facility] NEPHRO [facility]   4/22/2024  8:40 AM Farooq Domingo MD [facility] FAM MED [facility]          Subjective:   Subjective   Chief Complaint   Patient presents with    Annual Exam       HPI  Darrion is a 73 y.o. male.     Social History     Tobacco Use    Smoking status: Never     Passive exposure: Never    Smokeless tobacco: Never   Substance Use Topics    Alcohol use: No          Social History     Social History Narrative    Not on file       Last appointment with this clinic was 4/25/2023. Last visit with me 4/25/2023   To summarize last visit and events leading up to today:  DM 2 with neuropathy followed by DM NP.  Jackson's esophagus needs to update EGD.  On PPI.    Hypertension, HFpEF  CKD stage IIIA with accompanying anemia.  Followed by Nephrology.  Proteinuria.  Intolerant of iron supplement due to constipation.  Recommended ferrous gluconate  Dyslipidemia, carotid artery stenosis, statin   Inflammatory arthritis secondary to Sjogren's, polymyalgia rheumatica, physical therapy  Tophaceous gout, Uloric   Osteoporosis with compression fracture on Prolia, update DEXA  Constipation, higher doses of Linzess with diarrhea.  Chronic back pain, tramadol    Saw DM NP 5/5, stable  And seen again in follow-up 8/16.  Stop Lantus stay on Trulicity and NovoLog    6/28/23 DEXA L spine -2.7, femoral neck -2.5, total hip -1.7. FRAX score 9.1/20%. osteoporosis. On prolia. No changes. Repeat 2 years.    9/8/23 EGD LA grade C esophagitis with bleeding. 3 cm HH, erythematous mucosa antrum. nonbleeding duodenal ulcers no stigmata of bleeding.  Path chronic inactive gastritis.  H pylori negative.  PPI 40 BID repeat EGD 8-12 weeks    Repeat EGD scheduled for 11/24    Today's visit:    Went to alen for 2 weeks  2 days after returning, URI sx  Cough, post nasal drip  Subjective fever  Same times as temp drop  It's been a week.  He also has some ulcers on the bottom inside of his  lip that are painful.  It seemed to have occurred with this.    Taking PPI BID  Before, was burping a lot  Less burping  Not in pain  Was not painful burping  Had issues with dysphagia with large food particles  That has improved    BP good today  On hydralazine, clonidine, valsartan, amlodipine  Torsemide every other day.    Tramadol estimates TIW use. Once in the evening. But can be as many as 2 in the evening.    Prednisone takes 5 mg at a time. 1-2 times a week.   Weekly orencia    Linzess for constipation. But pt inquiring about a stool softener/laxative combo.  Took his wife's, and found that that worked better. Senna/docusate      Answers submitted by the patient for this visit:  Cough Questionnaire (Submitted on 10/16/2023)  Chief Complaint: Cough  Onset: in the past 7 days  Progression since onset: waxing and waning  Frequency: every few minutes  Cough characteristics: productive of sputum  chest pain: No  chills: Yes  nasal congestion: Yes  postnasal drip: Yes  rhinorrhea: Yes  sore throat: Yes  Aggravated by: cold air  Improvement on treatment: mild    Patient Care Team:  Farooq Domingo MD as PCP - General (Internal Medicine)  Tin Chambers MD (Gastroenterology)  Harrison Brannon MD (Cardiology)  Roxanna Salazar MD as Nurse Practitioner (Rheumatology)  Malcolm Irwin MD as  (Nephrology)  Dangelo Hall MD as Consulting Physician (Ophthalmology)  [facility] (Gastroenterology)  [place] as Digital Medicine Health   Shamir Fonseca, PharmD as Hypertension Digital Medicine Clinician (Pharmacist)  Farooq Domingo MD as Hypertension Digital Medicine Responsible Provider (Internal Medicine)  Farooq Domingo MD as Hyperlipidemia Digital Medicine Responsible Provider (Internal Medicine)  Shamir Fonseca, PharmD as Hyperlipidemia Digital Medicine Clinician  Jenn García MA as Care Coordinator  [facility] as  Hypertension Digital Medicine Contract  José Luis Soliz, PharmD as Pharmacist (Pharmacist)    Patient Active Problem List    Diagnosis Date Noted    Duodenal ulcer 9/8/23 EGD LA grade C esophagitis with bleeding. 3 cm HH, erythematous mucosa antrum. nonbleeding duodenal ulcers no stigmata of bleeding.  Path chronic inactive gastritis.  H pylori ne 09/28/2023 9/8/23 EGD LA grade C esophagitis with bleeding. 3 cm HH, erythematous mucosa antrum. nonbleeding duodenal ulcers no stigmata of bleeding.  Path chronic inactive gastritis.  H pylori negative.  PPI 40 BID repeat EGD 8-12 weeks      Difficulty walking 04/13/2023    Hip joint stiffness, bilateral 04/13/2023    Dyshidrotic eczema 02/04/2022    Long-term insulin use 02/01/2022    Dyspnea on exertion 08/02/2021     +diastolic dysfunction + anemia.  pft with restrictive physiology and decreased dlco.  Ct with mosaic attenuation and enlarge pa suggesting volume overload.    9/16/21 CT chest:  Subtle mosaic attenuation pattern, nonspecific can be seen in the setting of inflammatory small airway disease, also can be seen with occlusive vascular disease and subtle change of increased pulmonary venous pressure.  3 mm pulmonary nodule right upper lobe, Fleischner Society guidelines for nodules less than 6 mm in individuals with low risk for lung malignancy: no follow-up needed, and individuals with high risk for lung malignancy: optional chest  CT at 12 months      JAZLYN (obstructive sleep apnea) 08/02/2021     ahi of 21; rdi of 38.   Result of sleep study d/w patient.  Recommend treatment.  Patient agree to apap.  Desensitization protocol d/w patient      History of stroke 07/20/2021 6/2021 MRI brain old stroke in thalamus      Mobitz I 06/27/2021    Bilateral carotid artery stenosis on CTA 6/26/21 06/27/2021 6/26/21 CTA head and neck  Atherosclerotic plaque throughout the aortic arch with mild shaggy soft atherosclerotic plaque in the posterior arch near the  junction of the transverse and descending thoracic aorta.  Minimal atherosclerotic plaque in the cervical carotids with no stenosis or dissection within the cervical vessels.  Moderate to severe atherosclerotic plaque within the carotid siphons with 50-60% diameter stenosis, right slightly greater than left.  Moderate plaque within the V4 segment of the left vertebral segment.  No large vessel occlusion or aneurysm intracranially.      Age-related osteoporosis without current pathological fracture 05/19/2021 01/27/2021 DEXA L-spine-3.1, total hip-1.6, femoral neck-2.4.  6/28/23 DEXA L spine -2.7, femoral neck -2.5, total hip -1.7. FRAX score 9.1/20%. osteoporosis.  On prolia. No changes. Repeat 2 years.      Secondary hyperparathyroidism of renal origin 02/08/2021    RA (rheumatoid arthritis) 12/09/2020    Stage 3a chronic kidney disease 01/16/2020    Aortic atherosclerosis 09/24/2019    Pseudophakia of both eyes 08/27/2019    Refractive error 08/27/2019    Tophaceous gout 05/09/2019    Chronic constipation not responding to linzess, miralax, senna 05/02/2019    Screening for colon cancer 07/26/2018 colonoscopy transverse colon polyp path showing benign inflammatory polyp. 05/02/2019    Anemia of chronic renal failure, stage 3b 03/13/2019    Current chronic use of systemic steroids 02/27/2019    Compression fracture of body of thoracic vertebra on XR 2/2019; 2/2019 alendronate 02/27/2019    Neutropenia 02/26/2019    Jackson's esophagus without dysplasia 03/14/2016 9/8/23 EGD LA grade C esophagitis with bleeding. 3 cm HH, erythematous mucosa antrum. nonbleeding duodenal ulcers no stigmata of bleeding.  Path chronic inactive gastritis.  H pylori negative.  PPI 40 BID repeat EGD 8-12 weeks      Anemia from plaquenil and imuran 03/14/2016    Diastolic heart failure, NYHA class 2 09/26/2014 05/12/2021 TTE LV normal size with concentric remodeling and normal systolic function LVEF 65%.  Grade 2 diastolic  dysfunction.  Severe left atrial enlargement.  Normal RV size and systolic function.  CVP 3.  PA pressure 36. Sinuses of Valsalva mildly dilated.  05/12/2021 nuclear medicine stress test normal perfusion.  No evidence of ischemia or infarction.  LVEF 75%.  Normal wall motion post stress.  During stress, rare PVCs.  Hypertensive response exercise.    -ct with mosaic attenuation.  Recommend 10 m g 2 times per week.  Recommend daily demodex      BMI 23.0-23.9, adult 07/15/2014    Essential hypertension 05/13/2014 6/2019 TTE normal LV systolic and diastolic function; ABIs normal  6/27/21 TTE LV normal size with concentric hypertrophy and normal systolic function LVEF 65%.  Grade 1 diastolic dysfunction.  Normal RV size with normal systolic function.  Moderate left atrial enlargement.  Mild right atrial enlargement.  Mild aortic valve stenosis.  Valve area 1.67, peak velocity 1.95, mean gradient 9. Normal CVP 3. PA pressure 16.         Controlled type 2 diabetes mellitus with diabetic polyneuropathy, with long-term current use of insulin 05/13/2014    Dyslipidemia 05/13/2014 05/12/2021 nuclear medicine stress test normal perfusion.  No evidence of ischemia or infarction.  LVEF 75%.  Normal wall motion post stress.  During stress, rare PVCs.  Hypertensive response exercise.      MCTD (mixed connective tissue disease) 05/13/2014    Sjogren's disease 05/13/2014    Bilateral edema of lower extremity 6/2019 TTE normal LV systolic and diastolic function; ABIs normal 05/13/2014    Glaucoma 05/13/2014    Celiac disease 11/12/2013     Overview:   Gluten intolerant         PAST MEDICAL PROBLEMS, PAST SURGICAL HISTORY: please see relevant portions of the electronic medical record    ALLERGIES AND MEDICATIONS: updated and reviewed.  Medication List with Changes/Refills   Current Medications    ABATACEPT (ORENCIA CLICKJECT) 125 MG/ML ATIN    Inject 125 mg into the skin once a week.    ALCOHOL SWABS (DROPSAFE ALCOHOL PREP  "PADS) PADM    USE  4 TIMES PER DAY    AMLODIPINE (NORVASC) 5 MG TABLET    TAKE 1 TABLET TWICE DAILY    AMMONIUM LACTATE 12 % CREA    Apply 1 application topically once daily.    ATORVASTATIN (LIPITOR) 80 MG TABLET    TAKE 1 TABLET EVERY DAY    BLOOD GLUCOSE CONTROL, LOW (TRUE METRIX LEVEL 1) SOLN    Use as indicated    BLOOD-GLUCOSE METER (TRUE METRIX GLUCOSE METER) MISC    Test glucose 4x/day    CLONIDINE 0.2 MG/24 HR TD PTWK (CATAPRES) 0.2 MG/24 HR    Place 1 patch onto the skin every 7 days.    DICLOFENAC SODIUM (VOLTAREN) 1 % GEL    Apply 2 g topically 4 (four) times daily as needed. Apply to aching joints    DORZOLAMIDE (TRUSOPT) 2 % OPHTHALMIC SOLUTION    Place 1 drop into both eyes 2 (two) times a day.    DROPLET PEN NEEDLE 32 GAUGE X 5/32" NDLE    USE 1 NEEDLE AS DIRECTED FOUR TIMES DAILY    DULAGLUTIDE (TRULICITY) 4.5 MG/0.5 ML PEN INJECTOR    Inject 4.5 mg into the skin every 7 days.    FEBUXOSTAT (ULORIC) 40 MG TAB    Take 1 tablet (40 mg total) by mouth once daily.    FERROUS GLUCONATE 324 MG (37.5 MG IRON) TAB TABLET    Take 1 tablet (324 mg total) by mouth once daily.    FLUTICASONE PROPIONATE (FLONASE) 50 MCG/ACTUATION NASAL SPRAY    1 spray (50 mcg total) by Each Nostril route once daily.    HYDRALAZINE (APRESOLINE) 25 MG TABLET    TAKE 3 TABLETS THREE TIMES DAILY    INJECTAFER 50 IRON MG/ML INJECTION        INSULIN ASPART U-100 (NOVOLOG FLEXPEN U-100 INSULIN) 100 UNIT/ML (3 ML) INPN PEN    INJECT 3-6 UNITS 3X DAILY BEFORE MEALS WITH SLIDING SCALE. DISCARD PEN 28 DAYS AFTER OPENING MAXIMUM DAILY DOSE 30 UNITS    LANCETS (TRUEPLUS LANCETS) 33 GAUGE MISC    Test glucose 4x/day. Dx code e11.65    LINZESS 72 MCG CAP CAPSULE    TAKE 1 CAPSULE BEFORE BREAKFAST    MULTIVIT WITH MIN-FOLIC ACID 200 MCG CHEW        OMEPRAZOLE (PRILOSEC) 40 MG CAPSULE    Take 1 capsule (40 mg total) by mouth 2 (two) times daily before meals.    PREDNISONE (DELTASONE) 5 MG TABLET    Take 1 tablet (5 mg total) by mouth once " daily.    PROLIA 60 MG/ML SYRG        TORSEMIDE (DEMADEX) 10 MG TAB    Take 1 tablet (10 mg total) by mouth every other day.    TRAMADOL (ULTRAM) 50 MG TABLET    TAKE 1 TABLET EVERY 12 HOURS AS NEEDED FOR PAIN    TRAVOPROST (TRAVATAN Z) 0.004 % OPHTHALMIC SOLUTION    Place 1 drop into both eyes every evening.    TRIAMCINOLONE ACETONIDE 0.1% (KENALOG) 0.1 % CREAM    APPLY TOPICALLY TWICE DAILY FOR 10 DAYS    TRUE METRIX GLUCOSE TEST STRIP STRP    TEST BLOOD SUGAR FOUR TIMES DAILY    VALSARTAN (DIOVAN) 160 MG TABLET    TAKE 1 TABLET TWICE DAILY         Objective:   Objective   Physical Exam   Vitals:    10/20/23 0842   BP: 130/70   BP Location: Left arm   Patient Position: Sitting   BP Method: Medium (Manual)   Pulse: 89   Temp: 99 °F (37.2 °C)   TempSrc: Oral   SpO2: 99%   Weight: 61.8 kg (136 lb 3.9 oz)    Body mass index is 21.99 kg/m².  Weight: 61.8 kg (136 lb 3.9 oz)         Physical Exam  Constitutional:       General: He is not in acute distress.     Appearance: He is well-developed.   HENT:      Right Ear: Tympanic membrane normal.      Left Ear: Tympanic membrane normal.      Mouth/Throat:      Comments: aphthous ulcer on the inside bottom lip  Eyes:      Extraocular Movements: Extraocular movements intact.   Cardiovascular:      Rate and Rhythm: Normal rate and regular rhythm.      Heart sounds: Murmur heard.      Comments: 2/6 systolic murmur  Pulmonary:      Effort: Pulmonary effort is normal.      Breath sounds: Normal breath sounds.   Abdominal:      General: There is no distension.      Tenderness: There is no abdominal tenderness. There is no guarding.   Musculoskeletal:         General: Normal range of motion.      Right lower leg: No edema.      Left lower leg: No edema.   Lymphadenopathy:      Cervical: No cervical adenopathy.   Skin:     General: Skin is warm and dry.   Neurological:      Mental Status: He is alert and oriented to person, place, and time.      Coordination: Coordination normal.    Psychiatric:         Behavior: Behavior normal.         Thought Content: Thought content normal.

## 2023-10-20 ENCOUNTER — OFFICE VISIT (OUTPATIENT)
Dept: FAMILY MEDICINE | Facility: CLINIC | Age: 73
End: 2023-10-20
Payer: MEDICARE

## 2023-10-20 VITALS
HEART RATE: 89 BPM | BODY MASS INDEX: 21.99 KG/M2 | OXYGEN SATURATION: 99 % | WEIGHT: 136.25 LBS | DIASTOLIC BLOOD PRESSURE: 70 MMHG | SYSTOLIC BLOOD PRESSURE: 130 MMHG | TEMPERATURE: 99 F

## 2023-10-20 DIAGNOSIS — K59.09 CHRONIC CONSTIPATION: ICD-10-CM

## 2023-10-20 DIAGNOSIS — N18.31 STAGE 3A CHRONIC KIDNEY DISEASE: ICD-10-CM

## 2023-10-20 DIAGNOSIS — Z12.11 SCREENING FOR COLON CANCER: ICD-10-CM

## 2023-10-20 DIAGNOSIS — N25.81 SECONDARY HYPERPARATHYROIDISM OF RENAL ORIGIN: ICD-10-CM

## 2023-10-20 DIAGNOSIS — Z00.00 NORMAL PHYSICAL EXAM: Primary | ICD-10-CM

## 2023-10-20 DIAGNOSIS — I10 ESSENTIAL HYPERTENSION: ICD-10-CM

## 2023-10-20 DIAGNOSIS — K12.0 APHTHOUS ULCER OF MOUTH: ICD-10-CM

## 2023-10-20 DIAGNOSIS — E11.42 CONTROLLED TYPE 2 DIABETES MELLITUS WITH DIABETIC POLYNEUROPATHY, WITH LONG-TERM CURRENT USE OF INSULIN: ICD-10-CM

## 2023-10-20 DIAGNOSIS — E78.5 DYSLIPIDEMIA: ICD-10-CM

## 2023-10-20 DIAGNOSIS — Z79.4 CONTROLLED TYPE 2 DIABETES MELLITUS WITH DIABETIC POLYNEUROPATHY, WITH LONG-TERM CURRENT USE OF INSULIN: ICD-10-CM

## 2023-10-20 DIAGNOSIS — K22.70 BARRETT'S ESOPHAGUS WITHOUT DYSPLASIA: ICD-10-CM

## 2023-10-20 DIAGNOSIS — M81.0 AGE-RELATED OSTEOPOROSIS WITHOUT CURRENT PATHOLOGICAL FRACTURE: ICD-10-CM

## 2023-10-20 DIAGNOSIS — M06.9 RHEUMATOID ARTHRITIS, INVOLVING UNSPECIFIED SITE, UNSPECIFIED WHETHER RHEUMATOID FACTOR PRESENT: ICD-10-CM

## 2023-10-20 DIAGNOSIS — I50.30 DIASTOLIC HEART FAILURE, NYHA CLASS 2: ICD-10-CM

## 2023-10-20 DIAGNOSIS — M1A.9XX1 TOPHACEOUS GOUT: ICD-10-CM

## 2023-10-20 DIAGNOSIS — S22.000A COMPRESSION FRACTURE OF BODY OF THORACIC VERTEBRA: ICD-10-CM

## 2023-10-20 DIAGNOSIS — R05.1 ACUTE COUGH: ICD-10-CM

## 2023-10-20 PROCEDURE — 3075F PR MOST RECENT SYSTOLIC BLOOD PRESS GE 130-139MM HG: ICD-10-PCS | Mod: HCNC,CPTII,S$GLB, | Performed by: INTERNAL MEDICINE

## 2023-10-20 PROCEDURE — 3044F PR MOST RECENT HEMOGLOBIN A1C LEVEL <7.0%: ICD-10-PCS | Mod: HCNC,CPTII,S$GLB, | Performed by: INTERNAL MEDICINE

## 2023-10-20 PROCEDURE — 3078F PR MOST RECENT DIASTOLIC BLOOD PRESSURE < 80 MM HG: ICD-10-PCS | Mod: HCNC,CPTII,S$GLB, | Performed by: INTERNAL MEDICINE

## 2023-10-20 PROCEDURE — 1159F MED LIST DOCD IN RCRD: CPT | Mod: HCNC,CPTII,S$GLB, | Performed by: INTERNAL MEDICINE

## 2023-10-20 PROCEDURE — 99999 PR PBB SHADOW E&M-EST. PATIENT-LVL V: CPT | Mod: PBBFAC,HCNC,, | Performed by: INTERNAL MEDICINE

## 2023-10-20 PROCEDURE — 3062F PR POS MACROALBUMINURIA RESULT DOCUMENTED/REVIEW: ICD-10-PCS | Mod: HCNC,CPTII,S$GLB, | Performed by: INTERNAL MEDICINE

## 2023-10-20 PROCEDURE — 99999 PR PBB SHADOW E&M-EST. PATIENT-LVL V: ICD-10-PCS | Mod: PBBFAC,HCNC,, | Performed by: INTERNAL MEDICINE

## 2023-10-20 PROCEDURE — 3288F FALL RISK ASSESSMENT DOCD: CPT | Mod: HCNC,CPTII,S$GLB, | Performed by: INTERNAL MEDICINE

## 2023-10-20 PROCEDURE — 99213 PR OFFICE/OUTPT VISIT, EST, LEVL III, 20-29 MIN: ICD-10-PCS | Mod: HCNC,25,S$GLB, | Performed by: INTERNAL MEDICINE

## 2023-10-20 PROCEDURE — 3288F PR FALLS RISK ASSESSMENT DOCUMENTED: ICD-10-PCS | Mod: HCNC,CPTII,S$GLB, | Performed by: INTERNAL MEDICINE

## 2023-10-20 PROCEDURE — 99397 PER PM REEVAL EST PAT 65+ YR: CPT | Mod: HCNC,S$GLB,, | Performed by: INTERNAL MEDICINE

## 2023-10-20 PROCEDURE — 3066F NEPHROPATHY DOC TX: CPT | Mod: HCNC,CPTII,S$GLB, | Performed by: INTERNAL MEDICINE

## 2023-10-20 PROCEDURE — 1160F PR REVIEW ALL MEDS BY PRESCRIBER/CLIN PHARMACIST DOCUMENTED: ICD-10-PCS | Mod: HCNC,CPTII,S$GLB, | Performed by: INTERNAL MEDICINE

## 2023-10-20 PROCEDURE — 99397 PR PREVENTIVE VISIT,EST,65 & OVER: ICD-10-PCS | Mod: HCNC,S$GLB,, | Performed by: INTERNAL MEDICINE

## 2023-10-20 PROCEDURE — 1160F RVW MEDS BY RX/DR IN RCRD: CPT | Mod: HCNC,CPTII,S$GLB, | Performed by: INTERNAL MEDICINE

## 2023-10-20 PROCEDURE — 3075F SYST BP GE 130 - 139MM HG: CPT | Mod: HCNC,CPTII,S$GLB, | Performed by: INTERNAL MEDICINE

## 2023-10-20 PROCEDURE — 3008F PR BODY MASS INDEX (BMI) DOCUMENTED: ICD-10-PCS | Mod: HCNC,CPTII,S$GLB, | Performed by: INTERNAL MEDICINE

## 2023-10-20 PROCEDURE — 3066F PR DOCUMENTATION OF TREATMENT FOR NEPHROPATHY: ICD-10-PCS | Mod: HCNC,CPTII,S$GLB, | Performed by: INTERNAL MEDICINE

## 2023-10-20 PROCEDURE — 4010F ACE/ARB THERAPY RXD/TAKEN: CPT | Mod: HCNC,CPTII,S$GLB, | Performed by: INTERNAL MEDICINE

## 2023-10-20 PROCEDURE — 3062F POS MACROALBUMINURIA REV: CPT | Mod: HCNC,CPTII,S$GLB, | Performed by: INTERNAL MEDICINE

## 2023-10-20 PROCEDURE — 99213 OFFICE O/P EST LOW 20 MIN: CPT | Mod: HCNC,25,S$GLB, | Performed by: INTERNAL MEDICINE

## 2023-10-20 PROCEDURE — 1159F PR MEDICATION LIST DOCUMENTED IN MEDICAL RECORD: ICD-10-PCS | Mod: HCNC,CPTII,S$GLB, | Performed by: INTERNAL MEDICINE

## 2023-10-20 PROCEDURE — 1126F PR PAIN SEVERITY QUANTIFIED, NO PAIN PRESENT: ICD-10-PCS | Mod: HCNC,CPTII,S$GLB, | Performed by: INTERNAL MEDICINE

## 2023-10-20 PROCEDURE — 1101F PR PT FALLS ASSESS DOC 0-1 FALLS W/OUT INJ PAST YR: ICD-10-PCS | Mod: HCNC,CPTII,S$GLB, | Performed by: INTERNAL MEDICINE

## 2023-10-20 PROCEDURE — 3044F HG A1C LEVEL LT 7.0%: CPT | Mod: HCNC,CPTII,S$GLB, | Performed by: INTERNAL MEDICINE

## 2023-10-20 PROCEDURE — 3078F DIAST BP <80 MM HG: CPT | Mod: HCNC,CPTII,S$GLB, | Performed by: INTERNAL MEDICINE

## 2023-10-20 PROCEDURE — 3008F BODY MASS INDEX DOCD: CPT | Mod: HCNC,CPTII,S$GLB, | Performed by: INTERNAL MEDICINE

## 2023-10-20 PROCEDURE — 4010F PR ACE/ARB THEARPY RXD/TAKEN: ICD-10-PCS | Mod: HCNC,CPTII,S$GLB, | Performed by: INTERNAL MEDICINE

## 2023-10-20 PROCEDURE — 1101F PT FALLS ASSESS-DOCD LE1/YR: CPT | Mod: HCNC,CPTII,S$GLB, | Performed by: INTERNAL MEDICINE

## 2023-10-20 PROCEDURE — 1126F AMNT PAIN NOTED NONE PRSNT: CPT | Mod: HCNC,CPTII,S$GLB, | Performed by: INTERNAL MEDICINE

## 2023-10-20 RX ORDER — AMOXICILLIN 250 MG
1 CAPSULE ORAL DAILY
Qty: 90 TABLET | Refills: 1 | Status: SHIPPED | OUTPATIENT
Start: 2023-10-20 | End: 2024-02-26 | Stop reason: SDUPTHER

## 2023-10-20 RX ORDER — TRIAMCINOLONE ACETONIDE 1 MG/G
PASTE DENTAL 2 TIMES DAILY
Qty: 5 G | Refills: 0 | Status: SHIPPED | OUTPATIENT
Start: 2023-10-20 | End: 2023-11-19

## 2023-10-20 RX ORDER — BENZONATATE 100 MG/1
100 CAPSULE ORAL 3 TIMES DAILY PRN
Qty: 30 CAPSULE | Refills: 0 | Status: SHIPPED | OUTPATIENT
Start: 2023-10-20 | End: 2023-10-30

## 2023-11-02 ENCOUNTER — LAB VISIT (OUTPATIENT)
Dept: LAB | Facility: HOSPITAL | Age: 73
End: 2023-11-02
Payer: MEDICARE

## 2023-11-02 DIAGNOSIS — Z79.4 TYPE 2 DIABETES MELLITUS WITH HYPOGLYCEMIA WITHOUT COMA, WITH LONG-TERM CURRENT USE OF INSULIN: ICD-10-CM

## 2023-11-02 DIAGNOSIS — M1A.9XX1 TOPHACEOUS GOUT: ICD-10-CM

## 2023-11-02 DIAGNOSIS — E11.649 TYPE 2 DIABETES MELLITUS WITH HYPOGLYCEMIA WITHOUT COMA, WITH LONG-TERM CURRENT USE OF INSULIN: ICD-10-CM

## 2023-11-02 DIAGNOSIS — M81.0 OSTEOPOROSIS, UNSPECIFIED OSTEOPOROSIS TYPE, UNSPECIFIED PATHOLOGICAL FRACTURE PRESENCE: ICD-10-CM

## 2023-11-02 LAB
25(OH)D3+25(OH)D2 SERPL-MCNC: 24 NG/ML (ref 30–96)
ESTIMATED AVG GLUCOSE: 148 MG/DL (ref 68–131)
HBA1C MFR BLD: 6.8 % (ref 4–5.6)
URATE SERPL-MCNC: 4.3 MG/DL (ref 3.4–7)

## 2023-11-02 PROCEDURE — 82306 VITAMIN D 25 HYDROXY: CPT | Mod: HCNC | Performed by: INTERNAL MEDICINE

## 2023-11-02 PROCEDURE — 36415 COLL VENOUS BLD VENIPUNCTURE: CPT | Mod: HCNC,PO | Performed by: INTERNAL MEDICINE

## 2023-11-02 PROCEDURE — 84550 ASSAY OF BLOOD/URIC ACID: CPT | Mod: HCNC | Performed by: INTERNAL MEDICINE

## 2023-11-02 PROCEDURE — 83036 HEMOGLOBIN GLYCOSYLATED A1C: CPT | Mod: HCNC | Performed by: NURSE PRACTITIONER

## 2023-11-05 DIAGNOSIS — N25.81 SECONDARY HYPERPARATHYROIDISM OF RENAL ORIGIN: ICD-10-CM

## 2023-11-05 DIAGNOSIS — N18.31 STAGE 3A CHRONIC KIDNEY DISEASE: Primary | ICD-10-CM

## 2023-11-05 DIAGNOSIS — M1A.9XX1 TOPHACEOUS GOUT: ICD-10-CM

## 2023-11-06 ENCOUNTER — PATIENT MESSAGE (OUTPATIENT)
Dept: ADMINISTRATIVE | Facility: OTHER | Age: 73
End: 2023-11-06
Payer: MEDICARE

## 2023-11-06 NOTE — PROGRESS NOTES
Vit D low  Hx of CKD stage 3  Uric acid good on uloric.  A1c 6.8 from 6.4  Results to pt via MyChart. Vit D take additional 1000 IU daily    Check future labs.

## 2023-11-07 ENCOUNTER — TELEPHONE (OUTPATIENT)
Dept: ENDOSCOPY | Facility: HOSPITAL | Age: 73
End: 2023-11-07
Payer: MEDICARE

## 2023-11-07 NOTE — TELEPHONE ENCOUNTER
Contacted the patient wife to reschedule procedure , informed pt there was no available time available. They decided to keep schedule time.

## 2023-11-07 NOTE — TELEPHONE ENCOUNTER
----- Message from Sammie Mujica sent at 11/7/2023 10:48 AM CST -----  Regarding: FW: Cancel Colonoscopy  Contact: Pt @ [phone]    ----- Message -----  From: Sabiha Don LPN  Sent: 11/7/2023   9:45 AM CST  To: [facility] Schedulers  Subject: FW: Cancel Colonoscopy                             ----- Message -----  From: Divina Gonzalez  Sent: 11/7/2023   9:37 AM CST  To: [facility] Staff  Subject: Cancel Colonoscopy                               Pt's wife is calling to get colonoscopy scheduled before Thanksgiving in the am. Asking for a call back

## 2023-11-08 NOTE — PROGRESS NOTES
CC: This 73 y.o.  male  is here for evaluation of  T2DM along with comorbidities indicated in the Visit Diagnosis section of this encounter.    HPI: Darrion Bennett was diagnosed with T2DM at age 35. Oral medications started years later.      Medical hx: rheumatoid arthritis on chronic prednisone, TIA, diastolic HF, carotid artery stenosis         Prior visit 8/2023  A1c is down from from 6.7 to now 6.4%.   He has increased Trulicity to 4.5 mg weekly. Appetite is lower.   3 lb weight loss since lov.   He has cut down Lantus from 6 to 4 units and Humalog to 4 units before supper, so 3-6-4 units.   He takes prednisone about 3x/week, usually on Sat/Sun/Wed.   Asking about vaccines he may need if he travels to [facility].   Plan Continue Trulicity and Novolog 3-6-4 units before meals.   Stop Lantus.   Recommend doing fingerstick to confirm low Dariel readings as Dariel can show falsely low values. Rx for glucometer sent.   Return to clinic in 3 months with labs prior.   Contact info provided for travel clinic, in preparation for trip to [facility].     Interval hx  A1c is up from 6.4 to 6.8%.   No longer taking Lantus.   He has decreased his insulin doses - Novolog 1-3-2.   He has also cut down on prednisone, taking 2x/week on Sat and Sun.       PHX: CKD stage 3, Diastolic heart failure, NYHA class 2;  Sjogren's syndrome, gouty arthritis - sees Rheumatology     LAST DIABETES EDUCATION: multiple times, years ago     HOSPITALIZED FOR DIABETES  -  No      DIABETES MEDICATIONS:   Trulicity 4.5 mg once daily  Novolog - as above     Misses medication doses - denies     Rotation of insulin injections - across abdomen   Changes pen neeedles - yes     DM COMPLICATIONS: nephropathy and cerebrovascular disease    SIGNIFICANT DIABETES MED HISTORY: has been told that metformin is bad for his kidneys and that's why he stopped it   - Lantus, Humalog, and Tradjenta stopped at initial visit 7/2019 and he was switched to Xultophy.   - Xultophy  stopped and switched to Trulicity and Lantus 2/2020  - Discussed switching from Novolog to glimepiride but he prefers  Injections over orals.       SELF MONITORING BLOOD GLUCOSE: Freestyle Dariel 2 reader.     Interpretation: BGs overall quite stable and at goal. Glucoses as low as 40s overnight - however since pt is off basal insulin, these low readings may be inaccurate. Minimal post-meal hyperglycemia.             HYPOGLYCEMIC EPISODES: symptoms: jittery  Does not recall his BG dropping to 40s last week overnight.     CURRENT DIET: drinks water, diet soda; has some orange juice to take with his meds. Eats 3 meals/day. Lunch is the biggest meal.  Eats home cooked foods, rarely eats processed foods.     CURRENT EXERCISE: none; previously exercised in gym.     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         PHYSICAL EXAM:  GENERAL: Well developed, well nourished. No acute distress.   PSYCH: AAOx3, appropriate mood and affect, conversant, well-groomed. Judgement and insight good.   NEURO: Cranial nerves grossly intact. Speech clear, no tremor.   CHEST: Respirations even and unlabored.         Hemoglobin A1C   Date Value Ref Range Status   11/02/2023 6.8 (H) 4.0 - 5.6 % Final     Comment:     ADA Screening Guidelines:  5.7-6.4%  Consistent with prediabetes  >or=6.5%  Consistent with diabetes    High levels of fetal hemoglobin interfere with the HbA1C  assay. Heterozygous hemoglobin variants (HbS, HgC, etc)do  not significantly interfere with this assay.   However, presence of multiple variants may affect accuracy.     08/09/2023 6.4 (H) 4.0 - 5.6 % Final     Comment:     ADA Screening Guidelines:  5.7-6.4%  Consistent with prediabetes  >or=6.5%  Consistent with  diabetes    High levels of fetal hemoglobin interfere with the HbA1C  assay. Heterozygous hemoglobin variants (HbS, HgC, etc)do  not significantly interfere with this assay.   However, presence of multiple variants may affect accuracy.     04/20/2023 6.7 (H) 4.0 - 5.6 % Final     Comment:     ADA Screening Guidelines:  5.7-6.4%  Consistent with prediabetes  >or=6.5%  Consistent with diabetes    High levels of fetal hemoglobin interfere with the HbA1C  assay. Heterozygous hemoglobin variants (HbS, HgC, etc)do  not significantly interfere with this assay.   However, presence of multiple variants may affect accuracy.     10/16/2018 6.2 (H) 4.0 - 5.7 % Final     Comment:     Dr. Jurgen Ward ( Endocrinology )        Ref. Range 9/19/2019 08:13   Glucose Latest Ref Range: 70 - 110 mg/dL 170 (H)   C-Peptide Latest Ref Range: 0.78 - 5.19 ng/mL 1.33        Ref. Range 8/2/2021 07:00   Glucose Latest Ref Range: 70 - 110 mg/dL 120 (H)   C-Peptide Latest Ref Range: 0.78 - 5.19 ng/mL 1.11         Component Value Date/Time     04/20/2023 0656    K 4.5 04/20/2023 0656     04/20/2023 0656    CO2 20 (L) 04/20/2023 0656    BUN 25 (H) 04/20/2023 0656    CREATININE 1.4 04/20/2023 0656     (H) 04/20/2023 0656    CALCIUM 9.2 04/20/2023 0656    ALKPHOS 70 04/20/2023 0656    AST 17 04/20/2023 0656    ALT 17 04/20/2023 0656    BILITOT 0.3 04/20/2023 0656    EGFRNORACEVR 53.1 (A) 04/20/2023 0656    ESTGFRAFRICA >60.0 04/22/2022 0845           Lab Results   Component Value Date    LDLCALC 83.4 08/09/2023      Latest Reference Range & Units 08/09/23 07:34   Cholesterol 120 - 199 mg/dL 137   HDL 40 - 75 mg/dL 35 (L)   HDL/Cholesterol Ratio 20.0 - 50.0 % 25.5   LDL Cholesterol External 63.0 - 159.0 mg/dL 83.4   Non-HDL Cholesterol mg/dL 102   Total Cholesterol/HDL Ratio 2.0 - 5.0  3.9   Triglycerides 30 - 150 mg/dL 93     Lab Results   Component Value Date    MICALBCREAT 1308.3 (H) 04/20/2023               ASSESSMENT and  PLAN:    A1C GOAL: 7.5 %     1. Type 2 diabetes mellitus with hypoglycemia without coma, with long-term current use of insulin  Continue Trulicity 4.5 mg weekly.   Stop Novolog fixed meal dosing.     Start Jardiance 10 mg once daily in the AM. - has CV, HF and renal benefits     Inject Novolog if glucose after meal is high:   Blood sugar 201 to 250, + 2 units  Blood sugar 251 to 300, + 3 units  Blood sugar 301 to 350, + 4 units   Blood sugar greater than 350, + 5 units      Recommend doing fingerstick to confirm low Dariel readings in the 40s as Dariel can show falsely low values.   Return to clinic in 3 months with labs prior.     insulin aspart U-100 (NOVOLOG FLEXPEN U-100 INSULIN) 100 unit/mL (3 mL) InPn pen    dulaglutide (TRULICITY) 4.5 mg/0.5 mL pen injector    Hemoglobin A1C    empagliflozin (JARDIANCE) 10 mg tablet      2. History of stroke  dulaglutide (TRULICITY) 4.5 mg/0.5 mL pen injector    empagliflozin (JARDIANCE) 10 mg tablet      3. Microalbuminuria  empagliflozin (JARDIANCE) 10 mg tablet      4. Diastolic heart failure, NYHA class 2  empagliflozin (JARDIANCE) 10 mg tablet            Orders Placed This Encounter   Procedures    Hemoglobin A1C     Standing Status:   Future     Standing Expiration Date:   1/7/2025          Follow up in about 3 months (around 2/9/2024).

## 2023-11-09 ENCOUNTER — OFFICE VISIT (OUTPATIENT)
Dept: ENDOCRINOLOGY | Facility: CLINIC | Age: 73
End: 2023-11-09
Payer: MEDICARE

## 2023-11-09 VITALS
WEIGHT: 135 LBS | BODY MASS INDEX: 21.79 KG/M2 | HEART RATE: 86 BPM | DIASTOLIC BLOOD PRESSURE: 80 MMHG | TEMPERATURE: 98 F | SYSTOLIC BLOOD PRESSURE: 138 MMHG

## 2023-11-09 DIAGNOSIS — E11.649 TYPE 2 DIABETES MELLITUS WITH HYPOGLYCEMIA WITHOUT COMA, WITH LONG-TERM CURRENT USE OF INSULIN: Primary | ICD-10-CM

## 2023-11-09 DIAGNOSIS — Z86.73 HISTORY OF STROKE: ICD-10-CM

## 2023-11-09 DIAGNOSIS — Z79.4 TYPE 2 DIABETES MELLITUS WITH HYPOGLYCEMIA WITHOUT COMA, WITH LONG-TERM CURRENT USE OF INSULIN: Primary | ICD-10-CM

## 2023-11-09 DIAGNOSIS — I50.30 DIASTOLIC HEART FAILURE, NYHA CLASS 2: ICD-10-CM

## 2023-11-09 DIAGNOSIS — R80.9 MICROALBUMINURIA: ICD-10-CM

## 2023-11-09 PROCEDURE — 3079F PR MOST RECENT DIASTOLIC BLOOD PRESSURE 80-89 MM HG: ICD-10-PCS | Mod: HCNC,CPTII,S$GLB, | Performed by: NURSE PRACTITIONER

## 2023-11-09 PROCEDURE — 4010F PR ACE/ARB THEARPY RXD/TAKEN: ICD-10-PCS | Mod: HCNC,CPTII,S$GLB, | Performed by: NURSE PRACTITIONER

## 2023-11-09 PROCEDURE — 99214 PR OFFICE/OUTPT VISIT, EST, LEVL IV, 30-39 MIN: ICD-10-PCS | Mod: HCNC,S$GLB,, | Performed by: NURSE PRACTITIONER

## 2023-11-09 PROCEDURE — 3008F PR BODY MASS INDEX (BMI) DOCUMENTED: ICD-10-PCS | Mod: HCNC,CPTII,S$GLB, | Performed by: NURSE PRACTITIONER

## 2023-11-09 PROCEDURE — 3075F PR MOST RECENT SYSTOLIC BLOOD PRESS GE 130-139MM HG: ICD-10-PCS | Mod: HCNC,CPTII,S$GLB, | Performed by: NURSE PRACTITIONER

## 2023-11-09 PROCEDURE — 1159F MED LIST DOCD IN RCRD: CPT | Mod: HCNC,CPTII,S$GLB, | Performed by: NURSE PRACTITIONER

## 2023-11-09 PROCEDURE — 99999 PR PBB SHADOW E&M-EST. PATIENT-LVL V: ICD-10-PCS | Mod: PBBFAC,HCNC,, | Performed by: NURSE PRACTITIONER

## 2023-11-09 PROCEDURE — 95251 PR GLUCOSE MONITOR, 72 HOUR, PHYS INTERP: ICD-10-PCS | Mod: HCNC,S$GLB,, | Performed by: NURSE PRACTITIONER

## 2023-11-09 PROCEDURE — 3062F POS MACROALBUMINURIA REV: CPT | Mod: HCNC,CPTII,S$GLB, | Performed by: NURSE PRACTITIONER

## 2023-11-09 PROCEDURE — 3066F PR DOCUMENTATION OF TREATMENT FOR NEPHROPATHY: ICD-10-PCS | Mod: HCNC,CPTII,S$GLB, | Performed by: NURSE PRACTITIONER

## 2023-11-09 PROCEDURE — 3044F PR MOST RECENT HEMOGLOBIN A1C LEVEL <7.0%: ICD-10-PCS | Mod: HCNC,CPTII,S$GLB, | Performed by: NURSE PRACTITIONER

## 2023-11-09 PROCEDURE — 95251 CONT GLUC MNTR ANALYSIS I&R: CPT | Mod: HCNC,S$GLB,, | Performed by: NURSE PRACTITIONER

## 2023-11-09 PROCEDURE — 3066F NEPHROPATHY DOC TX: CPT | Mod: HCNC,CPTII,S$GLB, | Performed by: NURSE PRACTITIONER

## 2023-11-09 PROCEDURE — 3062F PR POS MACROALBUMINURIA RESULT DOCUMENTED/REVIEW: ICD-10-PCS | Mod: HCNC,CPTII,S$GLB, | Performed by: NURSE PRACTITIONER

## 2023-11-09 PROCEDURE — 3075F SYST BP GE 130 - 139MM HG: CPT | Mod: HCNC,CPTII,S$GLB, | Performed by: NURSE PRACTITIONER

## 2023-11-09 PROCEDURE — 4010F ACE/ARB THERAPY RXD/TAKEN: CPT | Mod: HCNC,CPTII,S$GLB, | Performed by: NURSE PRACTITIONER

## 2023-11-09 PROCEDURE — 3079F DIAST BP 80-89 MM HG: CPT | Mod: HCNC,CPTII,S$GLB, | Performed by: NURSE PRACTITIONER

## 2023-11-09 PROCEDURE — 3008F BODY MASS INDEX DOCD: CPT | Mod: HCNC,CPTII,S$GLB, | Performed by: NURSE PRACTITIONER

## 2023-11-09 PROCEDURE — 3044F HG A1C LEVEL LT 7.0%: CPT | Mod: HCNC,CPTII,S$GLB, | Performed by: NURSE PRACTITIONER

## 2023-11-09 PROCEDURE — 1159F PR MEDICATION LIST DOCUMENTED IN MEDICAL RECORD: ICD-10-PCS | Mod: HCNC,CPTII,S$GLB, | Performed by: NURSE PRACTITIONER

## 2023-11-09 PROCEDURE — 1160F PR REVIEW ALL MEDS BY PRESCRIBER/CLIN PHARMACIST DOCUMENTED: ICD-10-PCS | Mod: HCNC,CPTII,S$GLB, | Performed by: NURSE PRACTITIONER

## 2023-11-09 PROCEDURE — 99214 OFFICE O/P EST MOD 30 MIN: CPT | Mod: HCNC,S$GLB,, | Performed by: NURSE PRACTITIONER

## 2023-11-09 PROCEDURE — 99999 PR PBB SHADOW E&M-EST. PATIENT-LVL V: CPT | Mod: PBBFAC,HCNC,, | Performed by: NURSE PRACTITIONER

## 2023-11-09 PROCEDURE — 1160F RVW MEDS BY RX/DR IN RCRD: CPT | Mod: HCNC,CPTII,S$GLB, | Performed by: NURSE PRACTITIONER

## 2023-11-09 RX ORDER — DULAGLUTIDE 4.5 MG/.5ML
4.5 INJECTION, SOLUTION SUBCUTANEOUS
Qty: 12 PEN | Refills: 2 | Status: SHIPPED | OUTPATIENT
Start: 2023-11-09

## 2023-11-09 RX ORDER — INSULIN ASPART 100 [IU]/ML
INJECTION, SOLUTION INTRAVENOUS; SUBCUTANEOUS
Qty: 30 ML | Refills: 10
Start: 2023-11-09

## 2023-11-09 NOTE — PATIENT INSTRUCTIONS
A1C goal: <7%  Fasting/premeal blood glucose goal:   2 hour post-meal blood glucose goal: less than 180    Continue Trulicity 4.5 mg weekly.   Stop Novolog fixed meal dosing.     Start Jardiance 10 mg once daily in the AM.     Inject Novolog if glucose after meal is high:   Blood sugar 201 to 250, + 2 units  Blood sugar 251 to 300, + 3 units  Blood sugar 301 to 350, + 4 units   Blood sugar greater than 350, + 5 units      Recommend doing fingerstick to confirm low Dariel readings in the 40s as Dariel can show falsely low values.   Return to clinic in 3 months with labs prior.

## 2023-11-16 ENCOUNTER — TELEPHONE (OUTPATIENT)
Dept: ENDOSCOPY | Facility: HOSPITAL | Age: 73
End: 2023-11-16
Payer: MEDICARE

## 2023-11-16 NOTE — TELEPHONE ENCOUNTER
Left voicemail and sent portal message for patient to call Endoscopy Scheduling to confirm Upper endoscopy (EGD)  on 11/24/23.

## 2023-11-16 NOTE — TELEPHONE ENCOUNTER
Spoke to patient's wife to reschedule procedure(s) Upper Endoscopy (EGD)       Physician to perform procedure(s) Dr. DAPHNEY Nieto  Date of Procedure (s) 11/22/23  Arrival Time 1:00 PM  Time of Procedure(s) 2:00 PM   Location of Procedure(s) [address]  Type of Rx Prep sent to patient: N/A  Instructions provided to patient via [facility]    Patient was informed on the following information and verbalized understanding. Screening questionnaire reviewed with patient and complete. If procedure requires anesthesia, a responsible adult needs to be present to accompany the patient home, patient cannot drive after receiving anesthesia. Appointment details are tentative, especially check-in time. Patient will receive a prep-op call 4 days prior to confirm check-in time for procedure. If applicable the patient should contact their pharmacy to verify Rx for procedure prep is ready for pick-up. Patient was advised to call the scheduling department at [phone] if pharmacy states no Rx is available. Patient was advised to call the endoscopy scheduling department if any questions or concerns arise.      [facility] Scheduling Department

## 2023-11-22 ENCOUNTER — ANESTHESIA EVENT (OUTPATIENT)
Dept: ENDOSCOPY | Facility: HOSPITAL | Age: 73
End: 2023-11-22
Payer: MEDICARE

## 2023-11-22 ENCOUNTER — HOSPITAL ENCOUNTER (OUTPATIENT)
Facility: HOSPITAL | Age: 73
Discharge: HOME | End: 2023-11-22
Attending: STUDENT IN AN ORGANIZED HEALTH CARE EDUCATION/TRAINING PROGRAM | Admitting: STUDENT IN AN ORGANIZED HEALTH CARE EDUCATION/TRAINING PROGRAM
Payer: MEDICARE

## 2023-11-22 ENCOUNTER — ANESTHESIA (OUTPATIENT)
Dept: ENDOSCOPY | Facility: HOSPITAL | Age: 73
End: 2023-11-22
Payer: MEDICARE

## 2023-11-22 VITALS
RESPIRATION RATE: 17 BRPM | HEART RATE: 61 BPM | SYSTOLIC BLOOD PRESSURE: 139 MMHG | TEMPERATURE: 97 F | OXYGEN SATURATION: 99 % | DIASTOLIC BLOOD PRESSURE: 72 MMHG

## 2023-11-22 DIAGNOSIS — K20.90 ESOPHAGITIS: ICD-10-CM

## 2023-11-22 LAB — POCT GLUCOSE: 108 MG/DL (ref 70–110)

## 2023-11-22 PROCEDURE — 37000008 HC ANESTHESIA 1ST 15 MINUTES: Mod: HCNC | Performed by: STUDENT IN AN ORGANIZED HEALTH CARE EDUCATION/TRAINING PROGRAM

## 2023-11-22 PROCEDURE — 37000009 HC ANESTHESIA EA ADD 15 MINS: Mod: HCNC | Performed by: STUDENT IN AN ORGANIZED HEALTH CARE EDUCATION/TRAINING PROGRAM

## 2023-11-22 PROCEDURE — 88305 TISSUE EXAM BY PATHOLOGIST: ICD-10-PCS | Mod: 26,HCNC,, | Performed by: PATHOLOGY

## 2023-11-22 PROCEDURE — 88342 IMHCHEM/IMCYTCHM 1ST ANTB: CPT | Mod: HCNC | Performed by: PATHOLOGY

## 2023-11-22 PROCEDURE — 88305 TISSUE EXAM BY PATHOLOGIST: CPT | Mod: 26,HCNC,, | Performed by: PATHOLOGY

## 2023-11-22 PROCEDURE — 82962 GLUCOSE BLOOD TEST: CPT | Mod: HCNC | Performed by: STUDENT IN AN ORGANIZED HEALTH CARE EDUCATION/TRAINING PROGRAM

## 2023-11-22 PROCEDURE — D9220A PRA ANESTHESIA: ICD-10-PCS | Mod: CRNA,,, | Performed by: STUDENT IN AN ORGANIZED HEALTH CARE EDUCATION/TRAINING PROGRAM

## 2023-11-22 PROCEDURE — 25000003 PHARM REV CODE 250: Mod: HCNC | Performed by: STUDENT IN AN ORGANIZED HEALTH CARE EDUCATION/TRAINING PROGRAM

## 2023-11-22 PROCEDURE — D9220A PRA ANESTHESIA: Mod: CRNA,,, | Performed by: STUDENT IN AN ORGANIZED HEALTH CARE EDUCATION/TRAINING PROGRAM

## 2023-11-22 PROCEDURE — D9220A PRA ANESTHESIA: ICD-10-PCS | Mod: ANES,,, | Performed by: ANESTHESIOLOGY

## 2023-11-22 PROCEDURE — 43239 EGD BIOPSY SINGLE/MULTIPLE: CPT | Mod: HCNC,,, | Performed by: STUDENT IN AN ORGANIZED HEALTH CARE EDUCATION/TRAINING PROGRAM

## 2023-11-22 PROCEDURE — 88342 CHG IMMUNOCYTOCHEMISTRY: ICD-10-PCS | Mod: 26,HCNC,, | Performed by: PATHOLOGY

## 2023-11-22 PROCEDURE — 43239 EGD BIOPSY SINGLE/MULTIPLE: CPT | Mod: HCNC | Performed by: STUDENT IN AN ORGANIZED HEALTH CARE EDUCATION/TRAINING PROGRAM

## 2023-11-22 PROCEDURE — 43239 PR EGD, FLEX, W/BIOPSY, SGL/MULTI: ICD-10-PCS | Mod: HCNC,,, | Performed by: STUDENT IN AN ORGANIZED HEALTH CARE EDUCATION/TRAINING PROGRAM

## 2023-11-22 PROCEDURE — 63600175 PHARM REV CODE 636 W HCPCS: Mod: HCNC | Performed by: STUDENT IN AN ORGANIZED HEALTH CARE EDUCATION/TRAINING PROGRAM

## 2023-11-22 PROCEDURE — 88342 IMHCHEM/IMCYTCHM 1ST ANTB: CPT | Mod: 26,HCNC,, | Performed by: PATHOLOGY

## 2023-11-22 PROCEDURE — 27201012 HC FORCEPS, HOT/COLD, DISP: Mod: HCNC | Performed by: STUDENT IN AN ORGANIZED HEALTH CARE EDUCATION/TRAINING PROGRAM

## 2023-11-22 PROCEDURE — 88305 TISSUE EXAM BY PATHOLOGIST: CPT | Mod: 59,HCNC | Performed by: PATHOLOGY

## 2023-11-22 PROCEDURE — D9220A PRA ANESTHESIA: Mod: ANES,,, | Performed by: ANESTHESIOLOGY

## 2023-11-22 RX ORDER — OMEPRAZOLE 40 MG/1
40 CAPSULE, DELAYED RELEASE ORAL EVERY MORNING
Qty: 90 CAPSULE | Refills: 3 | Status: SHIPPED | OUTPATIENT
Start: 2023-11-22

## 2023-11-22 RX ORDER — SODIUM CHLORIDE 9 MG/ML
INJECTION, SOLUTION INTRAVENOUS CONTINUOUS
Status: DISCONTINUED | OUTPATIENT
Start: 2023-11-22 | End: 2023-11-22 | Stop reason: HOSPADM

## 2023-11-22 RX ORDER — PROPOFOL 10 MG/ML
INJECTION, EMULSION INTRAVENOUS
Status: DISCONTINUED
Start: 2023-11-22 | End: 2023-11-22 | Stop reason: HOSPADM

## 2023-11-22 RX ORDER — PROPOFOL 10 MG/ML
VIAL (ML) INTRAVENOUS
Status: DISCONTINUED | OUTPATIENT
Start: 2023-11-22 | End: 2023-11-22

## 2023-11-22 RX ORDER — LIDOCAINE HYDROCHLORIDE 20 MG/ML
INJECTION, SOLUTION EPIDURAL; INFILTRATION; INTRACAUDAL; PERINEURAL
Status: DISCONTINUED
Start: 2023-11-22 | End: 2023-11-22 | Stop reason: HOSPADM

## 2023-11-22 RX ORDER — LIDOCAINE HYDROCHLORIDE 20 MG/ML
INJECTION INTRAVENOUS
Status: DISCONTINUED | OUTPATIENT
Start: 2023-11-22 | End: 2023-11-22

## 2023-11-22 RX ORDER — LIDOCAINE HYDROCHLORIDE 40 MG/ML
SOLUTION TOPICAL
Status: DISCONTINUED | OUTPATIENT
Start: 2023-11-22 | End: 2023-11-22

## 2023-11-22 RX ADMIN — GLYCOPYRROLATE 0.2 MG: 0.2 INJECTION, SOLUTION INTRAMUSCULAR; INTRAVITREAL at 08:11

## 2023-11-22 RX ADMIN — LIDOCAINE HYDROCHLORIDE 60 MG: 20 INJECTION, SOLUTION INTRAVENOUS at 08:11

## 2023-11-22 RX ADMIN — LIDOCAINE HYDROCHLORIDE 4 ML: 40 SOLUTION TOPICAL at 08:11

## 2023-11-22 RX ADMIN — PROPOFOL 50 MG: 10 INJECTION, EMULSION INTRAVENOUS at 08:11

## 2023-11-22 RX ADMIN — PROPOFOL 80 MG: 10 INJECTION, EMULSION INTRAVENOUS at 08:11

## 2023-11-22 RX ADMIN — SODIUM CHLORIDE: 0.9 INJECTION, SOLUTION INTRAVENOUS at 07:11

## 2023-11-22 NOTE — ANESTHESIA PREPROCEDURE EVALUATION
11/22/2023  Darrion Bennett is a 73 y.o., male.  To undergo Procedure(s) (LRB):  EGD (ESOPHAGOGASTRODUODENOSCOPY) (N/A)     Denies CP/SOB/GERD/MI/CVA/URI symptoms.  METS > 4  NPO > 8    Past Medical History:  Past Medical History:   Diagnosis Date    Anemia     Arthritis     Cataract     CHF (congestive heart failure)     Chronic constipation     Diabetes mellitus, type 2     Felon of finger of left hand 05/11/2019    Glaucoma     Hyperlipidemia 05/13/2014    Hypertension     MCTD (mixed connective tissue disease)     Personal history of colonic polyps     Sjogren's disease     Ulcerative colitis     Urolithiasis        Past Surgical History:  Past Surgical History:   Procedure Laterality Date    CATARACT EXTRACTION Bilateral 2005    COLONOSCOPY  2014    ESOPHAGOGASTRODUODENOSCOPY N/A 9/8/2023    Procedure: EGD (ESOPHAGOGASTRODUODENOSCOPY);  Surgeon: Divya Saenz MD;  Location: [facility];  Service: Endoscopy;  Laterality: N/A;  ref by / inst [place]-    EYE SURGERY         Social History:  Social History     Socioeconomic History    Marital status:     Number of children: 3   Tobacco Use    Smoking status: Never     Passive exposure: Never    Smokeless tobacco: Never   Substance and Sexual Activity    Alcohol use: No    Drug use: Yes     Comment: ultram 1 - 2 tabs a week    Sexual activity: Not Currently     Partners: Female     Social Determinants of Health     Financial Resource Strain: Medium Risk (11/2/2023)    Overall Financial Resource Strain (CARDIA)     Difficulty of Paying Living Expenses: Somewhat hard   Food Insecurity: Food Insecurity Present (11/2/2023)    Hunger Vital Sign     Worried About Running Out of Food in the Last Year: Sometimes true     Ran Out of Food in the Last Year: Sometimes true   Transportation Needs: No Transportation Needs (11/2/2023)    PRAPARE -  Transportation     Lack of Transportation (Medical): No     Lack of Transportation (Non-Medical): No   Physical Activity: Unknown (11/2/2023)    Exercise Vital Sign     Days of Exercise per Week: 0 days     Minutes of Exercise per Session: Patient refused   Stress: No Stress Concern Present (11/2/2023)    [employer] - Occupational Stress Questionnaire     Feeling of Stress : Only a little   Social Connections: Unknown (11/2/2023)    Social Connection and Isolation Panel [NHANES]     Frequency of Communication with Friends and Family: Once a week     Frequency of Social Gatherings with Friends and Family: Once a week     Active Member of Clubs or Organizations: Yes     Attends Club or Organization Meetings: 1 to 4 times per year     Marital Status:    Housing Stability: Low Risk  (11/2/2023)    Housing Stability Vital Sign     Unable to Pay for Housing in the Last Year: No     Number of Places Lived in the Last Year: 2     Unstable Housing in the Last Year: No       Medications:  No current facility-administered medications on file prior to encounter.     Current Outpatient Medications on File Prior to Encounter   Medication Sig Dispense Refill    abatacept (ORENCIA CLICKJECT) 125 mg/mL AtIn Inject 125 mg into the skin once a week. 4 mL 11    alcohol swabs (DROPSAFE ALCOHOL PREP PADS) PadM USE  4 TIMES PER  each 3    ammonium lactate 12 % Crea Apply 1 application topically once daily. 140 g 5    atorvastatin (LIPITOR) 80 MG tablet TAKE 1 TABLET EVERY DAY 90 tablet 3    blood glucose control, low (TRUE METRIX LEVEL 1) Soln Use as indicated 1 each 0    blood-glucose meter (TRUE METRIX GLUCOSE METER) Misc Test glucose 4x/day 1 each 0    cloNIDine 0.2 mg/24 hr td ptwk (CATAPRES) 0.2 mg/24 hr Place 1 patch onto the skin every 7 days. 12 patch 3    diclofenac sodium (VOLTAREN) 1 % Gel Apply 2 g topically 4 (four) times daily as needed. Apply to aching joints 100 g 3     "dorzolamide (TRUSOPT) 2 % ophthalmic solution Place 1 drop into both eyes 2 (two) times a day. 10 mL 3    DROPLET PEN NEEDLE 32 gauge x 5/32" Ndle USE 1 NEEDLE AS DIRECTED FOUR TIMES DAILY 400 each 3    febuxostat (ULORIC) 40 mg Tab Take 1 tablet (40 mg total) by mouth once daily. 30 tablet 11    ferrous gluconate 324 mg (37.5 mg iron) Tab tablet Take 1 tablet (324 mg total) by mouth once daily. 90 tablet 0    fluticasone propionate (FLONASE) 50 mcg/actuation nasal spray 1 spray (50 mcg total) by Each Nostril route once daily. 48 g 11    INJECTAFER 50 iron mg/mL injection       lancets (TRUEPLUS LANCETS) 33 gauge Misc Test glucose 4x/day. Dx code e11.65 400 each 3    LINZESS 72 mcg Cap capsule TAKE 1 CAPSULE BEFORE BREAKFAST 90 capsule 2    multivit with min-folic acid 200 mcg Chew       omeprazole (PRILOSEC) 40 MG capsule Take 1 capsule (40 mg total) by mouth 2 (two) times daily before meals. 60 capsule 11    predniSONE (DELTASONE) 5 MG tablet Take 1 tablet (5 mg total) by mouth once daily. 90 tablet 3    PROLIA 60 mg/mL Syrg       torsemide (DEMADEX) 10 MG Tab Take 1 tablet (10 mg total) by mouth every other day. 45 tablet 1    traMADoL (ULTRAM) 50 mg tablet TAKE 1 TABLET EVERY 12 HOURS AS NEEDED FOR PAIN 60 tablet 1    travoprost (TRAVATAN Z) 0.004 % ophthalmic solution Place 1 drop into both eyes every evening. 3 each 3    triamcinolone acetonide 0.1% (KENALOG) 0.1 % cream APPLY TOPICALLY TWICE DAILY FOR 10 DAYS 45 g 1    TRUE METRIX GLUCOSE TEST STRIP Strp TEST BLOOD SUGAR FOUR TIMES DAILY 400 strip 3       Allergies:  Review of patient's allergies indicates:   Allergen Reactions    Penicillins Nausea And Vomiting    Rosuvastatin      Muscle pain     Allopurinol analogues Rash       Active Problems:  Patient Active Problem List   Diagnosis    Essential hypertension    Controlled type 2 diabetes mellitus with diabetic polyneuropathy, with long-term current use of insulin    Dyslipidemia    MCTD (mixed connective " tissue disease)    Sjogren's disease    Bilateral edema of lower extremity 6/2019 TTE normal LV systolic and diastolic function; ABIs normal    Glaucoma    BMI 23.0-23.9, adult    Jackson's esophagus without dysplasia    Anemia from plaquenil and imuran    Neutropenia    Current chronic use of systemic steroids    Compression fracture of body of thoracic vertebra on XR 2/2019; 2/2019 alendronate    Anemia of chronic renal failure, stage 3b    Chronic constipation not responding to linzess, miralax, senna    Screening for colon cancer 07/26/2018 colonoscopy transverse colon polyp path showing benign inflammatory polyp.    Tophaceous gout    Pseudophakia of both eyes    Refractive error    Aortic atherosclerosis    Celiac disease    Diastolic heart failure, NYHA class 2    Stage 3a chronic kidney disease    RA (rheumatoid arthritis)    Secondary hyperparathyroidism of renal origin    Age-related osteoporosis without current pathological fracture    Mobitz I    Bilateral carotid artery stenosis on CTA 6/26/21    History of stroke    Dyspnea on exertion    JAZLYN (obstructive sleep apnea)    Long-term insulin use    Dyshidrotic eczema    Difficulty walking    Hip joint stiffness, bilateral    Duodenal ulcer 9/8/23 EGD LA grade C esophagitis with bleeding. 3 cm HH, erythematous mucosa antrum. nonbleeding duodenal ulcers no stigmata of bleeding.  Path chronic inactive gastritis.  H pylori ne       Diagnostic Studies:   Latest Reference Range & Units 04/20/23 06:56   WBC 3.90 - 12.70 K/uL 8.27   RBC 4.60 - 6.20 M/uL 4.22 (L)   Hemoglobin 14.0 - 18.0 g/dL 12.7 (L)   Hematocrit 40.0 - 54.0 % 39.4 (L)   MCV 82 - 98 fL 93   MCH 27.0 - 31.0 pg 30.1   MCHC 32.0 - 36.0 g/dL 32.2   RDW 11.5 - 14.5 % 14.7 (H)   Platelet Count 150 - 450 K/uL 253   MPV 9.2 - 12.9 fL 12.2   Gran % 38.0 - 73.0 % 59.0   Lymph % 18.0 - 48.0 % 23.6   Mono % 4.0 - 15.0 % 15.4 (H)   Eosinophil % 0.0 - 8.0 % 1.2   Basophil % 0.0 - 1.9 % 0.4   Immature  Granulocytes 0.0 - 0.5 % 0.4   Gran # (ANC) 1.8 - 7.7 K/uL 4.9      Latest Reference Range & Units 04/20/23 06:56   Sodium 136 - 145 mmol/L 140   Potassium 3.5 - 5.1 mmol/L 4.5   Chloride 95 - 110 mmol/L 108   CO2 23 - 29 mmol/L 20 (L)   Anion Gap 8 - 16 mmol/L 12   BUN 8 - 23 mg/dL 25 (H)   Creatinine 0.5 - 1.4 mg/dL 1.4   eGFR >60 mL/min/1.73 m^2 53.1 !     EKG (2/14/23):  Sinus rhythm with 1st degree A-V block with Premature atrial complexes with   Aberrant conduction   Possible Left atrial enlargement   Left anterior fascicular block   Anterior infarct     Nuclear Stress (8/31/22):    Normal myocardial perfusion scan. There is no evidence of myocardial ischemia or infarction.    The gated perfusion images showed an ejection fraction of 72% post stress.    There is normal wall motion post stress.    The EKG portion of this study is negative for ischemia.    The patient reported no chest pain during the stress test.    During stress, frequent PVCs are noted.    TTE (8/31/22):  The left ventricle is normal in size with mild concentric hypertrophy and normal systolic function.  The estimated ejection fraction is 60%.  Grade I left ventricular diastolic dysfunction.  Normal right ventricular size with normal right ventricular systolic function.  Moderate left atrial enlargement.  Mild right atrial enlargement.  Mild mitral regurgitation.  Mild pulmonic regurgitation.  Normal central venous pressure (3 mmHg).  The estimated PA systolic pressure is 36 mmHg.  There is mild pulmonary hypertension.    24 Hour Vitals:      See Nursing Charting For Additional Vitals      Pre-op Assessment    I have reviewed the Patient Summary Reports.     I have reviewed the Nursing Notes.       Review of Systems  Anesthesia Hx:  No problems with previous Anesthesia               Denies Personal Hx of Anesthesia complications.                    Social:  No Alcohol Use, Non-Smoker       Hematology/Oncology:       -- Anemia:                                   Cardiovascular:  Exercise tolerance: good   Hypertension    Dysrhythmias   CHF    hyperlipidemia   ECG has been reviewed.                          Pulmonary:        Sleep Apnea                Renal/:  Chronic Renal Disease, CKD renal calculi               Hepatic/GI:   PUD,     Ulcerative colitis    Jackson's esophagus          Musculoskeletal:  Arthritis   Mixed connective tissue disease    Sjogrens Disease    RA            Neurological:   CVA                                    Endocrine:  Diabetes   Trulicity, held x2 weeks            Physical Exam  General: Well nourished and Cooperative    Airway:  Mallampati: II   Mouth Opening: Normal  TM Distance: Normal    Dental:  Intact    Chest/Lungs:  Clear to auscultation, Normal Respiratory Rate    Heart:  Rate: Normal  Rhythm: Regular Rhythm        Anesthesia Plan  Type of Anesthesia, risks & benefits discussed:    Anesthesia Type: MAC, Gen Natural Airway, Gen ETT  Intra-op Monitoring Plan: Standard ASA Monitors  Post Op Pain Control Plan: multimodal analgesia  Induction:  IV  Informed Consent: Informed consent signed with the Patient and all parties understand the risks and agree with anesthesia plan.  All questions answered.   ASA Score: 3    Ready For Surgery From Anesthesia Perspective.     .

## 2023-11-22 NOTE — ANESTHESIA POSTPROCEDURE EVALUATION
Anesthesia Post Evaluation    Patient: Darrion Bennett    Procedure(s) Performed: Procedure(s) (LRB):  EGD (ESOPHAGOGASTRODUODENOSCOPY) (N/A)    Final Anesthesia Type: general      Patient location during evaluation: GI PACU  Patient participation: Yes- Able to Participate  Level of consciousness: awake and alert and oriented  Post-procedure vital signs: reviewed and stable  Pain management: adequate  Airway patency: patent    PONV status at discharge: No PONV  Anesthetic complications: no      Cardiovascular status: hemodynamically stable and blood pressure returned to baseline  Respiratory status: spontaneous ventilation, room air and unassisted  Hydration status: euvolemic  Follow-up not needed.          Vitals Value Taken Time   /72 11/22/23 0904   Temp 36.3 °C (97.3 °F) 11/22/23 0834   Pulse 61 11/22/23 0904   Resp 17 11/22/23 0904   SpO2 99 % 11/22/23 0904         Event Time   Out of Recovery 09:29:38         Pain/Maxi Score: Maxi Score: 10 (11/22/2023  9:04 AM)

## 2023-11-22 NOTE — PLAN OF CARE
Procedure and recovery complete. Awake and alert. No c/o pain or discomfort. Resp. Even and unlabored. Wife at bedside. Discharge instructions given. Verbalized understanding. No acute distress noted.

## 2023-11-22 NOTE — TRANSFER OF CARE
Anesthesia Transfer of Care Note    Patient: Darrion Bennett    Procedure(s) Performed: Procedure(s) (LRB):  EGD (ESOPHAGOGASTRODUODENOSCOPY) (N/A)    Patient location: GI    Anesthesia Type: general    Transport from OR: Transported from OR on room air with adequate spontaneous ventilation    Post pain: adequate analgesia    Post assessment: no apparent anesthetic complications    Post vital signs: stable    Level of consciousness: responds to stimulation    Nausea/Vomiting: no nausea/vomiting    Complications: none    Transfer of care protocol was followed      Last vitals: Visit Vitals  /63 (BP Location: Left arm, Patient Position: Lying)   Pulse 62   Temp 36.3 °C (97.3 °F) (Axillary)   Resp 16   SpO2 99%

## 2023-11-22 NOTE — H&P
Endoscopy History and Physical    PCP - Farooq Domingo MD    Procedure - EGD  ASA - per anesthesia  Mallampati - per anesthesia  Plan of anesthesia - MAC    HPI:  This is a 73 y.o. male here for evaluation of :  esophagitis healing    ROS:  Constitutional: No fevers, chills  CV: No chest pain  Pulm: No cough  Ophtho: No vision changes  GI: see HPI  Derm: No rash    Medical History:  has a past medical history of Anemia, Arthritis, Cataract, CHF (congestive heart failure), Chronic constipation, Diabetes mellitus, type 2, Felon of finger of left hand (05/11/2019), Glaucoma, Hyperlipidemia (05/13/2014), Hypertension, MCTD (mixed connective tissue disease), Personal history of colonic polyps, Sjogren's disease, Ulcerative colitis, and Urolithiasis.    Surgical History:  has a past surgical history that includes Eye surgery; Colonoscopy (2014); Cataract extraction (Bilateral, 2005); and Esophagogastroduodenoscopy (N/A, 9/8/2023).    Family History: family history includes Cataracts in his father and mother; Glaucoma in his father and paternal grandfather; Hypertension in his father; No Known Problems in his brother, daughter, maternal grandfather, maternal grandmother, paternal aunt, paternal uncle, sister, and son; Rheum arthritis in his maternal aunt and maternal uncle; Stroke in his father; Throat cancer in his paternal grandmother.. Otherwise no colon cancer, inflammatory bowel disease, or GI malignancies.    Social History:  reports that he has never smoked. He has never been exposed to tobacco smoke. He has never used smokeless tobacco. He reports current drug use. He reports that he does not drink alcohol.    Review of patient's allergies indicates:   Allergen Reactions    Penicillins Nausea And Vomiting    Rosuvastatin      Muscle pain     Allopurinol analogues Rash       Medications:   Medications Prior to Admission   Medication Sig Dispense Refill Last Dose    amLODIPine (NORVASC) 5 MG tablet TAKE 1 TABLET  "TWICE DAILY 180 tablet 3 11/21/2023    atorvastatin (LIPITOR) 80 MG tablet TAKE 1 TABLET EVERY DAY 90 tablet 3 11/21/2023    cloNIDine 0.2 mg/24 hr td ptwk (CATAPRES) 0.2 mg/24 hr Place 1 patch onto the skin every 7 days. 12 patch 3 Past Week    empagliflozin (JARDIANCE) 10 mg tablet Take 1 tablet (10 mg total) by mouth once daily. 90 tablet 1 11/21/2023    febuxostat (ULORIC) 40 mg Tab Take 1 tablet (40 mg total) by mouth once daily. 30 tablet 11 11/21/2023    fluticasone propionate (FLONASE) 50 mcg/actuation nasal spray 1 spray (50 mcg total) by Each Nostril route once daily. 48 g 11 11/21/2023    hydrALAZINE (APRESOLINE) 25 MG tablet TAKE 3 TABLETS THREE TIMES DAILY 810 tablet 10 11/21/2023    omeprazole (PRILOSEC) 40 MG capsule Take 1 capsule (40 mg total) by mouth 2 (two) times daily before meals. 60 capsule 11 11/21/2023    PROLIA 60 mg/mL Syrg    Past Month    torsemide (DEMADEX) 10 MG Tab Take 1 tablet (10 mg total) by mouth every other day. 45 tablet 1 11/21/2023    valsartan (DIOVAN) 160 MG tablet TAKE 1 TABLET TWICE DAILY 180 tablet 1 11/21/2023    abatacept (ORENCIA CLICKJECT) 125 mg/mL AtIn Inject 125 mg into the skin once a week. 4 mL 11     alcohol swabs (DROPSAFE ALCOHOL PREP PADS) PadM USE  4 TIMES PER  each 3     ammonium lactate 12 % Crea Apply 1 application topically once daily. 140 g 5     blood glucose control, low (TRUE METRIX LEVEL 1) Soln Use as indicated 1 each 0     blood-glucose meter (TRUE METRIX GLUCOSE METER) Misc Test glucose 4x/day 1 each 0     diclofenac sodium (VOLTAREN) 1 % Gel Apply 2 g topically 4 (four) times daily as needed. Apply to aching joints 100 g 3     dorzolamide (TRUSOPT) 2 % ophthalmic solution Place 1 drop into both eyes 2 (two) times a day. 10 mL 3     DROPLET PEN NEEDLE 32 gauge x 5/32" Ndle USE 1 NEEDLE AS DIRECTED FOUR TIMES DAILY 400 each 3     dulaglutide (TRULICITY) 4.5 mg/0.5 mL pen injector Inject 4.5 mg into the skin every 7 days. 12 pen 2 11/8/2023 "    ferrous gluconate 324 mg (37.5 mg iron) Tab tablet Take 1 tablet (324 mg total) by mouth once daily. 90 tablet 0     INJECTAFER 50 iron mg/mL injection        insulin aspart U-100 (NOVOLOG FLEXPEN U-100 INSULIN) 100 unit/mL (3 mL) InPn pen Inject Novolog if glucose after meal is high:  201-250=+2, 251-300=+3; 301-350=+4, over 350=+5 units 30 mL 10 More than a month    lancets (TRUEPLUS LANCETS) 33 gauge Misc Test glucose 4x/day. Dx code e11.65 400 each 3     LINZESS 72 mcg Cap capsule TAKE 1 CAPSULE BEFORE BREAKFAST 90 capsule 2 More than a month    multivit with min-folic acid 200 mcg Chew        predniSONE (DELTASONE) 5 MG tablet Take 1 tablet (5 mg total) by mouth once daily. 90 tablet 3     senna-docusate 8.6-50 mg (SENNA WITH DOCUSATE SODIUM) 8.6-50 mg per tablet Take 1 tablet by mouth once daily. 90 tablet 1     traMADoL (ULTRAM) 50 mg tablet TAKE 1 TABLET EVERY 12 HOURS AS NEEDED FOR PAIN 60 tablet 1     travoprost (TRAVATAN Z) 0.004 % ophthalmic solution Place 1 drop into both eyes every evening. 3 each 3     triamcinolone acetonide 0.1% (KENALOG) 0.1 % cream APPLY TOPICALLY TWICE DAILY FOR 10 DAYS 45 g 1     TRUE METRIX GLUCOSE TEST STRIP Strp TEST BLOOD SUGAR FOUR TIMES DAILY 400 strip 3          Vital Signs:   Vitals:    11/22/23 0730   BP: (!) 168/77   Pulse: 62   Resp: 12   Temp: 97.7 °F (36.5 °C)       General Appearance: Well appearing in no acute distress  Eyes:    No scleral icterus  ENT: atraumatic  Abdomen: Soft, nondistended  Extremities: no tenderness  Skin: normal color    Labs:  Lab Results   Component Value Date    WBC 8.27 04/20/2023    HGB 12.7 (L) 04/20/2023    HCT 39.4 (L) 04/20/2023     04/20/2023    CHOL 137 08/09/2023    TRIG 93 08/09/2023    HDL 35 (L) 08/09/2023    ALT 17 04/20/2023    AST 17 04/20/2023     04/20/2023    K 4.5 04/20/2023     04/20/2023    CREATININE 1.4 04/20/2023    BUN 25 (H) 04/20/2023    CO2 20 (L) 04/20/2023    TSH 2.281 09/26/2022    INR  1.0 06/27/2021    HGBA1C 6.8 (H) 11/02/2023       I have explained the risks and benefits of endoscopy procedures to the patient/their POA including but not limited to bleeding, perforation, infection, and death.  The patient/POA was asked if they understand and allowed to ask any further questions to their satisfaction.    Crystal Urbina MD

## 2023-11-22 NOTE — PROVATION PATIENT INSTRUCTIONS
Discharge Summary/Instructions after an Endoscopic Procedure  Patient Name: Darrion Bennett  Patient MRN: [id number]  Patient YOB: 1950 Wednesday, November 22, 2023  Crystal Urbina MD  Dear patient,  As a result of recent federal legislation (The Federal Cures Act), you may   receive lab or pathology results from your procedure in your [facility]   account before your physician is able to contact you. Your physician or   their representative will relay the results to you with their   recommendations at their soonest availability.  Thank you,  RESTRICTIONS:  During your procedure today, you received medications for sedation.  These   medications may affect your judgment, balance and coordination.  Therefore,   for 24 hours, you have the following restrictions:   - DO NOT drive a car, operate machinery, make legal/financial decisions,   sign important papers or drink alcohol.    ACTIVITY:  Today: no heavy lifting, straining or running due to procedural   sedation/anesthesia.  The following day: return to full activity including work.  DIET:  Eat and drink normally unless instructed otherwise.     TREATMENT FOR COMMON SIDE EFFECTS:  - Mild abdominal pain, nausea, belching, bloating or excessive gas:  rest,   eat lightly and use a heating pad.  - Sore Throat: treat with throat lozenges and/or gargle with warm salt   water.  - Because air was used during the procedure, expelling large amounts of air   from your rectum or belching is normal.  - If a bowel prep was taken, you may not have a bowel movement for 1-3 days.    This is normal.  SYMPTOMS TO WATCH FOR AND REPORT TO YOUR PHYSICIAN:  1. Abdominal pain or bloating, other than gas cramps.  2. Chest pain.  3. Back pain.  4. Signs of infection such as: chills or fever occurring within 24 hours   after the procedure.  5. Rectal bleeding, which would show as bright red, maroon, or black stools.   (A tablespoon of blood from the rectum is not serious,  especially if   hemorrhoids are present.)  6. Vomiting.  7. Weakness or dizziness.  GO DIRECTLY TO THE NEAREST EMERGENCY ROOM IF YOU HAVE ANY OF THE FOLLOWING:      Difficulty breathing              Chills and/or fever over 101 F   Persistent vomiting and/or vomiting blood   Severe abdominal pain   Severe chest pain   Black, tarry stools   Bleeding- more than one tablespoon   Any other symptom or condition that you feel may need urgent attention  Your doctor recommends these additional instructions:  If any biopsies were taken, your doctors clinic will contact you in 1 to 2   weeks with any results.  - Patient has a contact number available for emergencies.  The signs and   symptoms of potential delayed complications were discussed with the   patient.  Return to normal activities tomorrow.  Written discharge   instructions were provided to the patient.   - Discharge patient to home.   - Resume previous diet.   - Continue present medications.   - Await pathology results.   - Repeat upper endoscopy in 3 years for surveillance if Jackson's esophagus   is confirmed.  For questions, problems or results please call your physician - Crystal Urbina MD at Work:  [phone].  [facility] can be reached at [phone]     IF A COMPLICATION OR EMERGENCY SITUATION ARISES AND YOU ARE UNABLE TO REACH   YOUR PHYSICIAN - GO DIRECTLY TO THE EMERGENCY ROOM.  MD Crystal Jones MD  11/22/2023 8:36:19 AM  This report has been verified and signed electronically.  Dear patient,  As a result of recent federal legislation (The Federal Cures Act), you may   receive lab or pathology results from your procedure in your [facility]   account before your physician is able to contact you. Your physician or   their representative will relay the results to you with their   recommendations at their soonest availability.  Thank you,  PROVATION

## 2023-11-27 ENCOUNTER — INFUSION (OUTPATIENT)
Dept: INFUSION THERAPY | Facility: HOSPITAL | Age: 73
End: 2023-11-27
Attending: HOSPITALIST
Payer: MEDICARE

## 2023-11-27 VITALS
RESPIRATION RATE: 18 BRPM | DIASTOLIC BLOOD PRESSURE: 79 MMHG | SYSTOLIC BLOOD PRESSURE: 166 MMHG | OXYGEN SATURATION: 87 % | HEART RATE: 98 BPM

## 2023-11-27 DIAGNOSIS — N18.31 STAGE 3A CHRONIC KIDNEY DISEASE: Primary | ICD-10-CM

## 2023-11-27 DIAGNOSIS — M81.0 AGE-RELATED OSTEOPOROSIS WITHOUT CURRENT PATHOLOGICAL FRACTURE: ICD-10-CM

## 2023-11-27 LAB
COMMENT: NORMAL
FINAL PATHOLOGIC DIAGNOSIS: NORMAL
GROSS: NORMAL
Lab: NORMAL

## 2023-11-27 PROCEDURE — 96372 THER/PROPH/DIAG INJ SC/IM: CPT | Mod: HCNC

## 2023-11-27 PROCEDURE — 63600175 PHARM REV CODE 636 W HCPCS: Mod: JZ,JG,HCNC | Performed by: HOSPITALIST

## 2023-11-27 RX ADMIN — DENOSUMAB 60 MG: 60 INJECTION SUBCUTANEOUS at 09:11

## 2023-11-27 NOTE — PLAN OF CARE
Pt arrived to unit, ambulatory, for injection therapy Prolia. Pt alert and oriented, no complaints upon arrival. Pt tolerated Prolia injection with no complaints. [facility]. Pt discharged home upon completion in [facility].

## 2023-11-29 ENCOUNTER — OFFICE VISIT (OUTPATIENT)
Dept: NEPHROLOGY | Facility: CLINIC | Age: 73
End: 2023-11-29
Payer: MEDICARE

## 2023-11-29 VITALS
HEIGHT: 66 IN | HEART RATE: 80 BPM | SYSTOLIC BLOOD PRESSURE: 122 MMHG | WEIGHT: 133.19 LBS | BODY MASS INDEX: 21.4 KG/M2 | OXYGEN SATURATION: 94 % | DIASTOLIC BLOOD PRESSURE: 64 MMHG

## 2023-11-29 DIAGNOSIS — I10 ESSENTIAL HYPERTENSION: ICD-10-CM

## 2023-11-29 DIAGNOSIS — N18.31 STAGE 3A CHRONIC KIDNEY DISEASE: Primary | ICD-10-CM

## 2023-11-29 DIAGNOSIS — N25.81 SECONDARY HYPERPARATHYROIDISM OF RENAL ORIGIN: ICD-10-CM

## 2023-11-29 DIAGNOSIS — N18.30 CONTROLLED TYPE 2 DIABETES MELLITUS WITH STAGE 3 CHRONIC KIDNEY DISEASE, WITH LONG-TERM CURRENT USE OF INSULIN: ICD-10-CM

## 2023-11-29 DIAGNOSIS — E55.9 VITAMIN D INSUFFICIENCY: ICD-10-CM

## 2023-11-29 DIAGNOSIS — Z79.4 CONTROLLED TYPE 2 DIABETES MELLITUS WITH STAGE 3 CHRONIC KIDNEY DISEASE, WITH LONG-TERM CURRENT USE OF INSULIN: ICD-10-CM

## 2023-11-29 DIAGNOSIS — M35.00 SJOGREN'S SYNDROME, WITH UNSPECIFIED ORGAN INVOLVEMENT: ICD-10-CM

## 2023-11-29 DIAGNOSIS — E11.22 CONTROLLED TYPE 2 DIABETES MELLITUS WITH STAGE 3 CHRONIC KIDNEY DISEASE, WITH LONG-TERM CURRENT USE OF INSULIN: ICD-10-CM

## 2023-11-29 PROCEDURE — 3066F PR DOCUMENTATION OF TREATMENT FOR NEPHROPATHY: ICD-10-PCS | Mod: HCNC,CPTII,S$GLB, | Performed by: INTERNAL MEDICINE

## 2023-11-29 PROCEDURE — 3074F PR MOST RECENT SYSTOLIC BLOOD PRESSURE < 130 MM HG: ICD-10-PCS | Mod: HCNC,CPTII,S$GLB, | Performed by: INTERNAL MEDICINE

## 2023-11-29 PROCEDURE — 3078F DIAST BP <80 MM HG: CPT | Mod: HCNC,CPTII,S$GLB, | Performed by: INTERNAL MEDICINE

## 2023-11-29 PROCEDURE — 3288F PR FALLS RISK ASSESSMENT DOCUMENTED: ICD-10-PCS | Mod: HCNC,CPTII,S$GLB, | Performed by: INTERNAL MEDICINE

## 2023-11-29 PROCEDURE — 3062F PR POS MACROALBUMINURIA RESULT DOCUMENTED/REVIEW: ICD-10-PCS | Mod: HCNC,CPTII,S$GLB, | Performed by: INTERNAL MEDICINE

## 2023-11-29 PROCEDURE — 3074F SYST BP LT 130 MM HG: CPT | Mod: HCNC,CPTII,S$GLB, | Performed by: INTERNAL MEDICINE

## 2023-11-29 PROCEDURE — 1159F PR MEDICATION LIST DOCUMENTED IN MEDICAL RECORD: ICD-10-PCS | Mod: HCNC,CPTII,S$GLB, | Performed by: INTERNAL MEDICINE

## 2023-11-29 PROCEDURE — 1101F PR PT FALLS ASSESS DOC 0-1 FALLS W/OUT INJ PAST YR: ICD-10-PCS | Mod: HCNC,CPTII,S$GLB, | Performed by: INTERNAL MEDICINE

## 2023-11-29 PROCEDURE — 1126F AMNT PAIN NOTED NONE PRSNT: CPT | Mod: HCNC,CPTII,S$GLB, | Performed by: INTERNAL MEDICINE

## 2023-11-29 PROCEDURE — 1126F PR PAIN SEVERITY QUANTIFIED, NO PAIN PRESENT: ICD-10-PCS | Mod: HCNC,CPTII,S$GLB, | Performed by: INTERNAL MEDICINE

## 2023-11-29 PROCEDURE — 3008F PR BODY MASS INDEX (BMI) DOCUMENTED: ICD-10-PCS | Mod: HCNC,CPTII,S$GLB, | Performed by: INTERNAL MEDICINE

## 2023-11-29 PROCEDURE — 4010F PR ACE/ARB THEARPY RXD/TAKEN: ICD-10-PCS | Mod: HCNC,CPTII,S$GLB, | Performed by: INTERNAL MEDICINE

## 2023-11-29 PROCEDURE — 3008F BODY MASS INDEX DOCD: CPT | Mod: HCNC,CPTII,S$GLB, | Performed by: INTERNAL MEDICINE

## 2023-11-29 PROCEDURE — 4010F ACE/ARB THERAPY RXD/TAKEN: CPT | Mod: HCNC,CPTII,S$GLB, | Performed by: INTERNAL MEDICINE

## 2023-11-29 PROCEDURE — 1159F MED LIST DOCD IN RCRD: CPT | Mod: HCNC,CPTII,S$GLB, | Performed by: INTERNAL MEDICINE

## 2023-11-29 PROCEDURE — 99215 OFFICE O/P EST HI 40 MIN: CPT | Mod: HCNC,S$GLB,, | Performed by: INTERNAL MEDICINE

## 2023-11-29 PROCEDURE — 3078F PR MOST RECENT DIASTOLIC BLOOD PRESSURE < 80 MM HG: ICD-10-PCS | Mod: HCNC,CPTII,S$GLB, | Performed by: INTERNAL MEDICINE

## 2023-11-29 PROCEDURE — 3066F NEPHROPATHY DOC TX: CPT | Mod: HCNC,CPTII,S$GLB, | Performed by: INTERNAL MEDICINE

## 2023-11-29 PROCEDURE — 99999 PR PBB SHADOW E&M-EST. PATIENT-LVL IV: CPT | Mod: PBBFAC,HCNC,, | Performed by: INTERNAL MEDICINE

## 2023-11-29 PROCEDURE — 99215 PR OFFICE/OUTPT VISIT, EST, LEVL V, 40-54 MIN: ICD-10-PCS | Mod: HCNC,S$GLB,, | Performed by: INTERNAL MEDICINE

## 2023-11-29 PROCEDURE — 99999 PR PBB SHADOW E&M-EST. PATIENT-LVL IV: ICD-10-PCS | Mod: PBBFAC,HCNC,, | Performed by: INTERNAL MEDICINE

## 2023-11-29 PROCEDURE — 3062F POS MACROALBUMINURIA REV: CPT | Mod: HCNC,CPTII,S$GLB, | Performed by: INTERNAL MEDICINE

## 2023-11-29 PROCEDURE — 1101F PT FALLS ASSESS-DOCD LE1/YR: CPT | Mod: HCNC,CPTII,S$GLB, | Performed by: INTERNAL MEDICINE

## 2023-11-29 PROCEDURE — 3044F PR MOST RECENT HEMOGLOBIN A1C LEVEL <7.0%: ICD-10-PCS | Mod: HCNC,CPTII,S$GLB, | Performed by: INTERNAL MEDICINE

## 2023-11-29 PROCEDURE — 3288F FALL RISK ASSESSMENT DOCD: CPT | Mod: HCNC,CPTII,S$GLB, | Performed by: INTERNAL MEDICINE

## 2023-11-29 PROCEDURE — 3044F HG A1C LEVEL LT 7.0%: CPT | Mod: HCNC,CPTII,S$GLB, | Performed by: INTERNAL MEDICINE

## 2023-11-29 NOTE — PROGRESS NOTES
"CHIEF COMPLAINT/HPI: Darrion is a 73 y.o. man with PMH of HTN , DM with albuminuria, Sjogren disease HFpE, CKD   Was following with Dr. Vizcaino   First saw him 4/2022, here for follow up.   He is shivering today and stated " I am always cold ".    Past Medical History:   Diagnosis Date    Anemia     Arthritis     Cataract     CHF (congestive heart failure)     Chronic constipation     Diabetes mellitus, type 2     Felon of finger of left hand 05/11/2019    Glaucoma     Hyperlipidemia 05/13/2014    Hypertension     MCTD (mixed connective tissue disease)     Personal history of colonic polyps     Sjogren's disease     Ulcerative colitis     Urolithiasis        Darrion reports that he has never smoked. He has never been exposed to tobacco smoke. He has never used smokeless tobacco. He reports current drug use. He reports that he does not drink alcohol.    Family History   Problem Relation Age of Onset    Hypertension Father     Stroke Father     Cataracts Father     Glaucoma Father     Cataracts Mother     No Known Problems Sister     No Known Problems Brother     Rheum arthritis Maternal Aunt     Rheum arthritis Maternal Uncle     No Known Problems Paternal Aunt     No Known Problems Paternal Uncle     No Known Problems Maternal Grandmother     No Known Problems Maternal Grandfather     Throat cancer Paternal Grandmother     Glaucoma Paternal Grandfather     No Known Problems Daughter     No Known Problems Son     Celiac disease Neg Hx     Colon cancer Neg Hx     Cirrhosis Neg Hx     Colon polyps Neg Hx     Crohn's disease Neg Hx     Cystic fibrosis Neg Hx     Hemochromatosis Neg Hx     Esophageal cancer Neg Hx     Inflammatory bowel disease Neg Hx     Irritable bowel syndrome Neg Hx     Liver cancer Neg Hx     Liver disease Neg Hx     Rectal cancer Neg Hx     Stomach cancer Neg Hx     Ulcerative colitis Neg Hx     Chase's disease Neg Hx     Amblyopia Neg Hx     Blindness Neg Hx     Cancer Neg Hx     Diabetes Neg Hx     " "Macular degeneration Neg Hx     Retinal detachment Neg Hx     Strabismus Neg Hx     Thyroid disease Neg Hx     Melanoma Neg Hx        ROS:    Review of Systems   Constitutional:  Negative for activity change, appetite change, chills, fever and unexpected weight change.   HENT:  Negative for congestion, ear discharge, hearing loss, nosebleeds, sore throat and tinnitus.    Eyes:  Negative for photophobia, pain, redness and visual disturbance.   Respiratory:  Negative for cough, chest tightness, shortness of breath and wheezing.    Cardiovascular:  Negative for chest pain, palpitations and leg swelling.   Gastrointestinal:  Negative for abdominal distention, constipation, diarrhea, nausea and vomiting.   Endocrine: Negative for cold intolerance, heat intolerance, polydipsia and polyuria.   Genitourinary:  Negative for decreased urine volume, difficulty urinating, dysuria, flank pain and hematuria.   Musculoskeletal:  Negative for arthralgias, gait problem, joint swelling and neck stiffness.   Skin:  Negative for rash.   Neurological:  Negative for dizziness, tremors, weakness, numbness and headaches.   Psychiatric/Behavioral:  Negative for confusion and sleep disturbance.          PE:    Vitals:    11/29/23 0929   BP: 122/64   Pulse: 80   SpO2: (!) 94%   Weight: 60.4 kg (133 lb 2.5 oz)   Height: 5' 6" (1.676 m)           Physical Exam  Constitutional:       General: He is not in acute distress.     Appearance: Normal appearance. He is normal weight.   HENT:      Head: Normocephalic and atraumatic.      Nose: Nose normal.      Mouth/Throat:      Mouth: Mucous membranes are moist.      Pharynx: Oropharynx is clear. No oropharyngeal exudate or posterior oropharyngeal erythema.   Eyes:      Extraocular Movements: Extraocular movements intact.      Conjunctiva/sclera: Conjunctivae normal.      Pupils: Pupils are equal, round, and reactive to light.   Cardiovascular:      Rate and Rhythm: Normal rate and regular rhythm.      " Pulses: Normal pulses.      Heart sounds: Normal heart sounds. No murmur heard.     No friction rub. No gallop.   Pulmonary:      Effort: Pulmonary effort is normal. No respiratory distress.      Breath sounds: Normal breath sounds. No stridor. No wheezing or rales.   Abdominal:      General: Abdomen is flat. Bowel sounds are normal.      Palpations: Abdomen is soft.      Tenderness: There is no abdominal tenderness. There is no guarding or rebound.   Musculoskeletal:         General: No swelling. Normal range of motion.      Cervical back: Normal range of motion and neck supple.      Right lower leg: No edema.      Left lower leg: No edema.   Skin:     General: Skin is warm.      Coloration: Skin is not jaundiced or pale.      Findings: No lesion.   Neurological:      General: No focal deficit present.      Mental Status: He is alert and oriented to person, place, and time.      Motor: No weakness.   Psychiatric:         Mood and Affect: Mood normal.         Impression/Plan:    1. Stage 3a chronic kidney disease    2. Essential hypertension    3. Vitamin D insufficiency    4. Sjogren's syndrome, with unspecified organ involvement    5. Controlled type 2 diabetes mellitus with stage 3 chronic kidney disease, with long-term current use of insulin    6. Secondary hyperparathyroidism of renal origin      # CKD3a: in patient with HTN , diastolic dysfunction, DM and MCTD   Counseled on BP and glycemic control   Counseled on avoiding NSAID's ( not taking)  -labs in 4w and again before coming back in 4 m. S/p starting PPI and creatinine mild rise.       Lab Results   Component Value Date    CREATININE 1.5 (H) 11/27/2023     Protein Creatinine Ratios:    Prot/Creat Ratio, Urine   Date Value Ref Range Status   11/27/2023 1.91 (H) 0.00 - 0.20 Final   04/22/2022 1.37 (H) 0.00 - 0.20 Final   11/29/2021 2.00 (H) 0.00 - 0.20 Final   \    Acid-Base:   Lab Results   Component Value Date     11/27/2023    K 4.2 11/27/2023     CO2 19 (L) 11/27/2023     #HTN: Blood pressures , borderline,. Cont valsartan, torsemide, hydralazine, clonidine patch, amlodipine. Will consider starting aldactone for proteinuria and lower clonidine then,but after repeat labs in 4 weeks and  stable kidney function.    # Renal osteodystrophy: secondary hyperparathyroidism  Lab Results   Component Value Date    .0 (H) 09/26/2022    CALCIUM 9.4 11/27/2023    PHOS 2.4 (L) 09/26/2022   Taking OTC vit D.    # Anemia:   Lab Results   Component Value Date    HGB 12.0 (L) 11/27/2023    He is shivering ?FABIOLA, will repeat iron panel and if def. Will give few doses of iv iron.    # DM:  Last HbA1C   Lab Results   Component Value Date    HGBA1C 6.8 (H) 11/02/2023       # Lipid management:   Lab Results   Component Value Date    LDLCALC 83.4 08/09/2023       # ESRD planing: none    Follow up in 4 m with labs in 4 weeks and if stable will start aldactone then.

## 2023-12-03 ENCOUNTER — PATIENT MESSAGE (OUTPATIENT)
Dept: ADMINISTRATIVE | Facility: OTHER | Age: 73
End: 2023-12-03
Payer: MEDICARE

## 2023-12-12 ENCOUNTER — TELEPHONE (OUTPATIENT)
Dept: ENDOCRINOLOGY | Facility: CLINIC | Age: 73
End: 2023-12-12
Payer: MEDICARE

## 2023-12-12 NOTE — PATIENT INSTRUCTIONS
Ok to fill as per note Try the xyzal for itching    Stop crestor and allopurinol for 2 weeks.    When itching all gone - restart the crestor by itself for 2-3 weeks.    If no more itching - then restart the allopurinol and see if that is cause of itching.

## 2023-12-12 NOTE — TELEPHONE ENCOUNTER
Spoke to patient's wife, informed that pt does want to try to dexcom, would it be okay to send orders?

## 2023-12-12 NOTE — TELEPHONE ENCOUNTER
----- Message from Pina Borrero sent at 12/12/2023 10:19 AM CST -----  Regarding: [facility]/ [phone]  Type: RX Refill Request    Who Called:  [facility]    Refill or New Rx: Rx    RX Name and Strength: DEXCOM G 6    Preferred Pharmacy with phone number: [facility]    Local or Mail Order: Mail Order    Ordering Provider:  DAPHNEY Mares    Would the patient rather a call back or a response [facility]? Call back    Best Call Back Number: [phone]    Additional Information: Fax number [phone]

## 2023-12-15 ENCOUNTER — PATIENT MESSAGE (OUTPATIENT)
Dept: ADMINISTRATIVE | Facility: OTHER | Age: 73
End: 2023-12-15
Payer: MEDICARE

## 2024-01-28 DIAGNOSIS — I10 ESSENTIAL HYPERTENSION: ICD-10-CM

## 2024-01-28 DIAGNOSIS — N18.30 CHRONIC KIDNEY DISEASE, STAGE III (MODERATE): ICD-10-CM

## 2024-01-31 RX ORDER — TORSEMIDE 10 MG/1
10 TABLET ORAL EVERY OTHER DAY
Qty: 45 TABLET | Refills: 3 | Status: SHIPPED | OUTPATIENT
Start: 2024-01-31

## 2024-02-01 ENCOUNTER — PATIENT MESSAGE (OUTPATIENT)
Dept: ADMINISTRATIVE | Facility: HOSPITAL | Age: 74
End: 2024-02-01
Payer: MEDICARE

## 2024-02-01 ENCOUNTER — PATIENT OUTREACH (OUTPATIENT)
Dept: ADMINISTRATIVE | Facility: HOSPITAL | Age: 74
End: 2024-02-01
Payer: MEDICARE

## 2024-02-12 ENCOUNTER — LAB VISIT (OUTPATIENT)
Dept: LAB | Facility: HOSPITAL | Age: 74
End: 2024-02-12
Payer: MEDICARE

## 2024-02-12 DIAGNOSIS — E78.00 PURE HYPERCHOLESTEROLEMIA: ICD-10-CM

## 2024-02-12 LAB
CHOLEST SERPL-MCNC: 135 MG/DL (ref 120–199)
CHOLEST/HDLC SERPL: 3.1 {RATIO} (ref 2–5)
HDLC SERPL-MCNC: 44 MG/DL (ref 40–75)
HDLC SERPL: 32.6 % (ref 20–50)
LDLC SERPL CALC-MCNC: 76 MG/DL (ref 63–159)
NONHDLC SERPL-MCNC: 91 MG/DL
TRIGL SERPL-MCNC: 75 MG/DL (ref 30–150)

## 2024-02-12 PROCEDURE — 36415 COLL VENOUS BLD VENIPUNCTURE: CPT | Mod: PO | Performed by: INTERNAL MEDICINE

## 2024-02-12 PROCEDURE — 80061 LIPID PANEL: CPT | Performed by: INTERNAL MEDICINE

## 2024-02-19 ENCOUNTER — OFFICE VISIT (OUTPATIENT)
Dept: ENDOCRINOLOGY | Facility: CLINIC | Age: 74
End: 2024-02-19
Payer: MEDICARE

## 2024-02-19 VITALS
HEART RATE: 108 BPM | TEMPERATURE: 98 F | DIASTOLIC BLOOD PRESSURE: 70 MMHG | WEIGHT: 132 LBS | BODY MASS INDEX: 21.31 KG/M2 | SYSTOLIC BLOOD PRESSURE: 128 MMHG

## 2024-02-19 DIAGNOSIS — E78.5 HYPERLIPIDEMIA, UNSPECIFIED HYPERLIPIDEMIA TYPE: ICD-10-CM

## 2024-02-19 DIAGNOSIS — I50.30 DIASTOLIC HEART FAILURE, NYHA CLASS 2: ICD-10-CM

## 2024-02-19 DIAGNOSIS — Z79.4 TYPE 2 DIABETES MELLITUS WITH HYPOGLYCEMIA WITHOUT COMA, WITH LONG-TERM CURRENT USE OF INSULIN: Primary | ICD-10-CM

## 2024-02-19 DIAGNOSIS — Z86.73 HISTORY OF STROKE: ICD-10-CM

## 2024-02-19 DIAGNOSIS — E11.649 TYPE 2 DIABETES MELLITUS WITH HYPOGLYCEMIA WITHOUT COMA, WITH LONG-TERM CURRENT USE OF INSULIN: Primary | ICD-10-CM

## 2024-02-19 DIAGNOSIS — R80.9 MICROALBUMINURIA: ICD-10-CM

## 2024-02-19 PROCEDURE — 3074F SYST BP LT 130 MM HG: CPT | Mod: CPTII,S$GLB,, | Performed by: NURSE PRACTITIONER

## 2024-02-19 PROCEDURE — 1159F MED LIST DOCD IN RCRD: CPT | Mod: CPTII,S$GLB,, | Performed by: NURSE PRACTITIONER

## 2024-02-19 PROCEDURE — 3008F BODY MASS INDEX DOCD: CPT | Mod: CPTII,S$GLB,, | Performed by: NURSE PRACTITIONER

## 2024-02-19 PROCEDURE — 1160F RVW MEDS BY RX/DR IN RCRD: CPT | Mod: CPTII,S$GLB,, | Performed by: NURSE PRACTITIONER

## 2024-02-19 PROCEDURE — 99214 OFFICE O/P EST MOD 30 MIN: CPT | Mod: S$GLB,,, | Performed by: NURSE PRACTITIONER

## 2024-02-19 PROCEDURE — 3078F DIAST BP <80 MM HG: CPT | Mod: CPTII,S$GLB,, | Performed by: NURSE PRACTITIONER

## 2024-02-19 PROCEDURE — 99999 PR PBB SHADOW E&M-EST. PATIENT-LVL V: CPT | Mod: PBBFAC,,, | Performed by: NURSE PRACTITIONER

## 2024-02-19 RX ORDER — BLOOD-GLUCOSE SENSOR
EACH MISCELLANEOUS
Qty: 12 EACH | Refills: 3 | Status: SHIPPED | OUTPATIENT
Start: 2024-02-19

## 2024-02-19 RX ORDER — BLOOD-GLUCOSE,RECEIVER,CONT
EACH MISCELLANEOUS
Qty: 1 EACH | Refills: 0 | Status: SHIPPED | OUTPATIENT
Start: 2024-02-19

## 2024-02-19 NOTE — PROGRESS NOTES
CC: This 74 y.o.  male  is here for evaluation of  T2DM along with comorbidities indicated in the Visit Diagnosis section of this encounter.    HPI: Darrion Bennett was diagnosed with T2DM at age 35. Oral medications started years later.      Medical hx: rheumatoid arthritis on chronic prednisone, TIA, diastolic HF, carotid artery stenosis           Prior visit 11/2023  A1c is up from 6.4 to 6.8%.   No longer taking Lantus.   He has decreased his insulin doses - Novolog 1-3-2.   He has also cut down on prednisone, taking 2x/week on Sat and Sun.   Plan Continue Trulicity 4.5 mg weekly.   Stop Novolog fixed meal dosing.   Start Jardiance 10 mg once daily in the AM. - has CV, HF and renal benefits   Inject Novolog if glucose after meal is high:   Blood sugar 201 to 250, + 2 units  Blood sugar 251 to 300, + 3 units  Blood sugar 301 to 350, + 4 units   Blood sugar greater than 350, + 5 units  Recommend doing fingerstick to confirm low Dariel readings in the 40s as Dariel can show falsely low values.   Return to clinic in 3 months with labs prior.     Interval hx  No recent A1c available.   Pt started Jardiance.   Unable to download Dariel reader. Not using Novolog often.   7 day CGM average 149   30 day   90 day      PHX: CKD stage 3, Diastolic heart failure, NYHA class 2;  Sjogren's syndrome, gouty arthritis - sees Rheumatology     LAST DIABETES EDUCATION: multiple times, years ago     HOSPITALIZED FOR DIABETES  -  No      DIABETES MEDICATIONS:   Trulicity 4.5 mg once daily  Jardiance 10 mg once daily   Novolog pc with ss:  201-250=+2, 251-300=+3; 301-350=+4, over 350=+5 units      Misses medication doses - denies     Rotation of insulin injections - across abdomen   Changes pen neeedles - yes     DM COMPLICATIONS: nephropathy and cerebrovascular disease    SIGNIFICANT DIABETES MED HISTORY: has been told that metformin is bad for his kidneys and that's why he stopped it   - Lantus, Humalog, and Tradjenta  stopped at initial visit 7/2019 and he was switched to Xultophy.   - Xultophy stopped and switched to Trulicity and Lantus 2/2020  - Discussed switching from Novolog to glimepiride but he prefers  Injections over orals.   Lantus stopped 8/2023 when prednisone frequency was reduced     SELF MONITORING BLOOD GLUCOSE: Freestyle Dariel 2 reader.    Reviewed data from the last week on CGM reader - BGs are overall at within range but spike to high 200s depending on diet on occasion.       HYPOGLYCEMIC EPISODES: none recent    symptoms: gets jittery    CURRENT DIET: drinks water, diet soda; has some orange juice to take with his meds. Eats 3 meals/day. Lunch is the biggest meal.  Eats home cooked foods, rarely eats processed foods.     CURRENT EXERCISE: none; previously exercised in gym.     SOCIAL: his son is neurologist, daughter in law is gastroenterologist, daughter is internal med resident       /70   Pulse 108   Temp 97.8 °F (36.6 °C)   Wt 59.9 kg (132 lb)   BMI 21.31 kg/m²       ROS:   CONSTITUTIONAL: Appetite normal, + fatigue  GI: Denies n/v, constipation, or diarrhea.   No dizziness.       PHYSICAL EXAM:  GENERAL: Well developed, well nourished. No acute distress.   PSYCH: AAOx3, appropriate mood and affect, conversant, well-groomed. Judgement and insight good.   NEURO: Cranial nerves grossly intact. Speech clear, no tremor.   CHEST: Respirations even and unlabored.         Hemoglobin A1C   Date Value Ref Range Status   11/02/2023 6.8 (H) 4.0 - 5.6 % Final     Comment:     ADA Screening Guidelines:  5.7-6.4%  Consistent with prediabetes  >or=6.5%  Consistent with diabetes    High levels of fetal hemoglobin interfere with the HbA1C  assay. Heterozygous hemoglobin variants (HbS, HgC, etc)do  not significantly interfere with this assay.   However, presence of multiple variants may affect accuracy.     08/09/2023 6.4 (H) 4.0 - 5.6 % Final     Comment:     ADA Screening Guidelines:  5.7-6.4%  Consistent with  prediabetes  >or=6.5%  Consistent with diabetes    High levels of fetal hemoglobin interfere with the HbA1C  assay. Heterozygous hemoglobin variants (HbS, HgC, etc)do  not significantly interfere with this assay.   However, presence of multiple variants may affect accuracy.     04/20/2023 6.7 (H) 4.0 - 5.6 % Final     Comment:     ADA Screening Guidelines:  5.7-6.4%  Consistent with prediabetes  >or=6.5%  Consistent with diabetes    High levels of fetal hemoglobin interfere with the HbA1C  assay. Heterozygous hemoglobin variants (HbS, HgC, etc)do  not significantly interfere with this assay.   However, presence of multiple variants may affect accuracy.     10/16/2018 6.2 (H) 4.0 - 5.7 % Final     Comment:     Dr. Jurgen Ward ( Endocrinology )        Ref. Range 9/19/2019 08:13   Glucose Latest Ref Range: 70 - 110 mg/dL 170 (H)   C-Peptide Latest Ref Range: 0.78 - 5.19 ng/mL 1.33        Ref. Range 8/2/2021 07:00   Glucose Latest Ref Range: 70 - 110 mg/dL 120 (H)   C-Peptide Latest Ref Range: 0.78 - 5.19 ng/mL 1.11         Component Value Date/Time     11/27/2023 0810    K 4.2 11/27/2023 0810     11/27/2023 0810    CO2 19 (L) 11/27/2023 0810    BUN 23 11/27/2023 0810    CREATININE 1.5 (H) 11/27/2023 0810     (H) 11/27/2023 0810    CALCIUM 9.4 11/27/2023 0810    ALKPHOS 70 11/27/2023 0810    AST 20 11/27/2023 0810    ALT 19 11/27/2023 0810    BILITOT 0.4 11/27/2023 0810    EGFRNORACEVR 49 (A) 11/27/2023 0810    ESTGFRAFRICA >60.0 04/22/2022 0845         Lab Results   Component Value Date    CHOL 135 02/12/2024    CHOL 137 08/09/2023    CHOL 135 07/05/2022     Lab Results   Component Value Date    HDL 44 02/12/2024    HDL 35 (L) 08/09/2023    HDL 41 07/05/2022     Lab Results   Component Value Date    LDLCALC 76.0 02/12/2024    LDLCALC 83.4 08/09/2023    LDLCALC 80.0 07/05/2022     Lab Results   Component Value Date    TRIG 75 02/12/2024    TRIG 93 08/09/2023    TRIG 70 07/05/2022       Lab Results    Component Value Date    CHOLHDL 32.6 02/12/2024    CHOLHDL 25.5 08/09/2023    CHOLHDL 30.4 07/05/2022         Lab Results   Component Value Date    MICALBCREAT 1233.3 (H) 11/27/2023               ASSESSMENT and PLAN:    A1C GOAL: 7.5 %     1. Type 2 diabetes mellitus with hypoglycemia without coma, with long-term current use of insulin  Review of CGM reader indicates controlled diabetes. Unable to download full report.     Prescription for Dexcom G7 sent to [facility]  Contact office for training if able to obtain G7.     Continue current medications.   Take Novolog 2 units before eating high-carbohydrate meals.   Return to clinic in 6 months with A1c prior.   A1c in 3 months.     empagliflozin (JARDIANCE) 10 mg tablet    Hemoglobin A1C      2. Microalbuminuria  empagliflozin (JARDIANCE) 10 mg tablet      3. Diastolic heart failure, NYHA class 2  empagliflozin (JARDIANCE) 10 mg tablet      4. History of stroke  empagliflozin (JARDIANCE) 10 mg tablet      5. Hyperlipidemia, unspecified hyperlipidemia type              Orders Placed This Encounter   Procedures    Hemoglobin A1C     Standing Status:   Standing     Number of Occurrences:   2     Standing Expiration Date:   4/19/2025          Follow up in about 6 months (around 8/19/2024).

## 2024-02-19 NOTE — PATIENT INSTRUCTIONS
Prescription for Dexcom G7 sent to [facility]  Contact office for training if able to obtain G7.     Continue current medications.   Take Novolog 2 units before eating high-carbohydrate meals.   Return to clinic in 6 months with A1c prior.   A1c in 3 months.

## 2024-02-23 NOTE — PROGRESS NOTES
This note was created by combination of typed  and M-Modal dictation.  Transcription errors may be present.  If there are any questions, please contact me.    Assessment and Plan:   Assessment and Plan   Normal physical exam     Essential hypertension  Diastolic heart failure, NYHA class 2  Stage 3a chronic kidney disease  Secondary hyperparathyroidism of renal origin  Anemia of chronic renal failure, stage 3b  Chronic constipation  -finds that the senna docusate helps.  Not really using the Linzess.  Requesting refill.    Notes fatigue, is inquiring about iron-deficiency.  Used to get IV iron infusions.  Nephrology had ordered iron panel.  He is wary of resuming oral iron because of constipation.    Check the ferritin and iron panel that nephrology ordered.  If low, would refer him to Hematology-Oncology again for consideration of IV iron.  -     senna-docusate 8.6-50 mg (SENNA WITH DOCUSATE SODIUM) 8.6-50 mg per tablet; Take 1 tablet by mouth once daily.  Dispense: 90 tablet; Refill: 3    Tremor  -waxing waning tremor of the hands.  Sometimes does effect function.  Today it has actually a fairly benign exam.  He is interested in seeing Neurology for this.  Will submit the referral  -     Ambulatory referral/consult to Neurology; Future; Expected date: 03/04/2024    Chronic midline thoracic back pain  -finds it increasingly bothersome.  History of T12 compression fracture seen on previous imaging.  Update x-ray.  We talked about referral to pain clinic, he is wary of such.  He would be agreeable for referral to [facility] healthy back program.  Avoid NSAIDs.  Could consider gabapentin in the future but would like to limit polypharmacy  -     X-Ray Thoracic Spine AP Lateral; Future; Expected date: 02/26/2024  -     Ambulatory referral/consult to [facility] [employer]; Future; Expected date: 03/04/2024    Controlled type 2 diabetes mellitus with stage 3 chronic kidney disease, with long-term current use of  insulin  Controlled type 2 diabetes mellitus with diabetic polyneuropathy, with long-term current use of insulin  -managed by DM NP.  Recent start on Jardiance.  Not really taking insulin.    Rheumatoid arthritis, involving unspecified site, unspecified whether rheumatoid factor present  Sjogren's syndrome, with unspecified organ involvement  Neutropenia, unspecified type  -managed by Rheumatology    Jackson's esophagus without dysplasia  -on PPI.  Plan for indefinite use    Dyslipidemia  Aortic atherosclerosis  -on statin, pre visit labs good    Age-related osteoporosis without current pathological fracture  -on Prolia    Tophaceous gout  -on Uloric.  Last uric acid was good    Start taking vitamin-D 1000 IU in addition to multivitamin.    Plan for labs in 1 year-lipid and uric acid      Medications Discontinued During This Encounter   Medication Reason    senna-docusate 8.6-50 mg (SENNA WITH DOCUSATE SODIUM) 8.6-50 mg per tablet Reorder       meds sent this encounter:  Medications Ordered This Encounter   Medications    senna-docusate 8.6-50 mg (SENNA WITH DOCUSATE SODIUM) 8.6-50 mg per tablet     Sig: Take 1 tablet by mouth once daily.     Dispense:  90 tablet     Refill:  3         Follow Up:   Future Appointments   Date Time Provider Department Center   2/26/2024 10:45 AM [facility] XR1 300 LB LIMIT [facility] XRAY [place]   2/26/2024  3:00 PM [facility] LAB [place]   3/19/2024  9:30 AM [facility] [place]   4/22/2024  8:40 AM Farooq Domingo MD [facility] FAM MED [place]   4/26/2024  8:00 AM Roxanna Salazar MD [facility] RHEUM [address]   5/27/2024  8:30 AM INJECTION, [facility] INFUSION [facility] CHEMO [facility]   6/24/2024 10:00 AM Joelle Flores MD [facility] NEURO [facility]          Subjective:   Subjective   Chief Complaint   Patient presents with    Annual Exam       HPI  Darrion is a 74 y.o. male.     Social History     Tobacco Use    Smoking status: Never     Passive exposure: Never    Smokeless tobacco:  Never   Substance Use Topics    Alcohol use: No          Social History     Social History Narrative    Not on file       Last appointment with this clinic was 10/20/2023. Last visit with me 10/20/2023   To summarize last visit and events leading up to today:  DM 2 with neuropathy followed by DM NP.  Jackson's esophagus On PPI.    9/8/23 EGD LA grade C esophagitis with bleeding. 3 cm HH, erythematous mucosa antrum. nonbleeding duodenal ulcers no stigmata of bleeding.  Path chronic inactive gastritis.  H pylori negative.  PPI 40 BID repeat EGD 8-12 weeks  11/22/23 EGD esoph suspicious for short segment Jackson's C2M3. Benign appearing esoph stenosis; path esophagus: Mucosa with focal intestinal metaplasia.  Negative for dysplasia.  H pylori negative.  Hypertension, HFpEF  CKD stage IIIA with accompanying anemia.  Followed by Nephrology.  Proteinuria.  Intolerant of iron supplement due to constipation.  Recommended ferrous gluconate  Dyslipidemia, carotid artery stenosis, statin   Inflammatory arthritis secondary to Sjogren's, polymyalgia rheumatica, physical therapy  Tophaceous gout, Uloric   Osteoporosis with compression fracture on Prolia, update DEXA  6/28/23 DEXA L spine -2.7, femoral neck -2.5, total hip -1.7. FRAX score 9.1/20%. osteoporosis. On prolia. No changes. Repeat 2 years.  Constipation, higher doses of Linzess with diarrhea.  Chronic back pain, tramadol     Last visit with me in October.    Stable  Inflammatory arthritis from Sjogren's weekly Orencia, prednisone once to twice a week.  Followed by Rheumatology.  Tramadol p.r.n. use  Barretts, high-dose PPI b.i.d..  Repeat EGD upcoming  Started on senna/docusate.  Linzess with side effect of diarrhea    Saw endocrinology 11/9.  Started on Jardiance.  Stop NovoLog fixed meal dose.  Stay on Trulicity    11/22/23 EGD esoph suspicious for short segment Jackson's C2M3. Benign appearing esoph stenosis; path esophagus: Mucosa with focal intestinal metaplasia.   Negative for dysplasia.  H pylori negative.    Saw nephrology 11/29.  Creatinine slight bump after starting PPI.  Monitor    02/12/2024 lipid profile good    Saw endocrinology 02/19/2024.  Stable on new start Jardiance    11/02/2023 uric acid good    Today's visit:  He is here accompanied by family.  History from the patient supplemented by family.      Teeth are hurting.    Saw a dentist and recommended extraction of his lower teeth. Expensive  Painful to chew  Topical OTC pain gel.    And cloves without relief.      DM - not taking novolog or lantus.  Tolerant new start Jardiance.    PPI taking regularly.  Denies obvious side effects.    Not really using the linzess. Stool softener helps better.  Requesting refill.    Not taking iron supplement.  Due to constipation.  Family is querying if he needs IV iron again  Nephrology ordered iron tests but has not been drawn yet.  Family notes he is lethargic    He has back pain.  Thoracic back.  Has a known history of T12 compression fracture on previous imaging.  Finds it quite bothersome.  Family is concerned that he is starting to have chronic deformity/changes/lordosis    Last vit D level 11/2023 was mildly low  Not taking vitamin D dedicated supplement  Takes an MVI.  At time of labs 11/2023 was taking the MVI  And vit D was low.     Answers submitted by the patient for this visit:  Review of Systems Questionnaire (Submitted on 2/19/2024)  activity change: No  unexpected weight change: No  neck pain: No  hearing loss: No  rhinorrhea: No  trouble swallowing: Yes  eye discharge: No  visual disturbance: No  chest tightness: No  wheezing: No  chest pain: No  palpitations: No  blood in stool: No  constipation: Yes  vomiting: No  diarrhea: No  polydipsia: No  polyuria: No  difficulty urinating: No  urgency: No  hematuria: No  joint swelling: No  arthralgias: Yes  headaches: No  weakness: No  confusion: No  dysphoric mood: No      Patient Care Team:  Farooq Domingo MD as  PCP - General (Internal Medicine)  Tin Chambers MD (Gastroenterology)  Harrison Brannon MD (Cardiology)  Roxanna Salazar MD as Nurse Practitioner (Rheumatology)  Malcolm Irwin MD as  (Nephrology)  Dangelo Hall MD as Consulting Physician (Ophthalmology)  [facility] (Gastroenterology)  [facility] as Digital Medicine Health   Shamir Fonseca, PharmD as Hypertension Digital Medicine Clinician (Pharmacist)  Farooq Domingo MD as Hypertension Digital Medicine Responsible Provider (Internal Medicine)  Farooq Domingo MD as Hyperlipidemia Digital Medicine Responsible Provider (Internal Medicine)  Shamir Fonseca, PharmD as Hyperlipidemia Digital Medicine Clinician  Jenn García MA as Care Coordinator  Prakash Hayes PharmD as Pharmacist (Pharmacist)  Medicare, [place] Managed as Hypertension Digital Medicine Contract    Patient Active Problem List    Diagnosis Date Noted    Duodenal ulcer 9/8/23 EGD LA grade C esophagitis with bleeding. 3 cm HH, erythematous mucosa antrum. nonbleeding duodenal ulcers no stigmata of bleeding.  Path chronic inactive gastritis.  H pylori ne 09/28/2023 9/8/23 EGD LA grade C esophagitis with bleeding. 3 cm HH, erythematous mucosa antrum. nonbleeding duodenal ulcers no stigmata of bleeding.  Path chronic inactive gastritis.  H pylori negative.  PPI 40 BID repeat EGD 8-12 weeks      Difficulty walking 04/13/2023    Hip joint stiffness, bilateral 04/13/2023    Dyshidrotic eczema 02/04/2022    Long-term insulin use 02/01/2022    Dyspnea on exertion 08/02/2021     +diastolic dysfunction + anemia.  pft with restrictive physiology and decreased dlco.  Ct with mosaic attenuation and enlarge pa suggesting volume overload.    9/16/21 CT chest:  Subtle mosaic attenuation pattern, nonspecific can be seen in the setting of inflammatory small airway disease, also can be seen with occlusive vascular  disease and subtle change of increased pulmonary venous pressure.  3 mm pulmonary nodule right upper lobe, Fleischner Society guidelines for nodules less than 6 mm in individuals with low risk for lung malignancy: no follow-up needed, and individuals with high risk for lung malignancy: optional chest  CT at 12 months      JAZLYN (obstructive sleep apnea) 08/02/2021     ahi of 21; rdi of 38.   Result of sleep study d/w patient.  Recommend treatment.  Patient agree to apap.  Desensitization protocol d/w patient      History of stroke 07/20/2021 6/2021 MRI brain old stroke in thalamus      Mobitz I 06/27/2021    Bilateral carotid artery stenosis on CTA 6/26/21 06/27/2021 6/26/21 CTA head and neck  Atherosclerotic plaque throughout the aortic arch with mild shaggy soft atherosclerotic plaque in the posterior arch near the junction of the transverse and descending thoracic aorta.  Minimal atherosclerotic plaque in the cervical carotids with no stenosis or dissection within the cervical vessels.  Moderate to severe atherosclerotic plaque within the carotid siphons with 50-60% diameter stenosis, right slightly greater than left.  Moderate plaque within the V4 segment of the left vertebral segment.  No large vessel occlusion or aneurysm intracranially.      Age-related osteoporosis without current pathological fracture 05/19/2021 01/27/2021 DEXA L-spine-3.1, total hip-1.6, femoral neck-2.4.  6/28/23 DEXA L spine -2.7, femoral neck -2.5, total hip -1.7. FRAX score 9.1/20%. osteoporosis.  On prolia. No changes. Repeat 2 years.      Secondary hyperparathyroidism of renal origin 02/08/2021    RA (rheumatoid arthritis) 12/09/2020    Stage 3a chronic kidney disease 01/16/2020    Aortic atherosclerosis 09/24/2019    Pseudophakia of both eyes 08/27/2019    Refractive error 08/27/2019    Tophaceous gout 05/09/2019    Chronic constipation not responding to linzess, miralax, senna 05/02/2019    Screening for colon cancer  07/26/2018 colonoscopy transverse colon polyp path showing benign inflammatory polyp. 05/02/2019    Anemia of chronic renal failure, stage 3b 03/13/2019    Current chronic use of systemic steroids 02/27/2019    Compression fracture of body of thoracic vertebra on XR 2/2019; 2/2019 alendronate 02/27/2019    Neutropenia 02/26/2019    Jackson's esophagus without dysplasia 03/14/2016 9/8/23 EGD LA grade C esophagitis with bleeding. 3 cm HH, erythematous mucosa antrum. nonbleeding duodenal ulcers no stigmata of bleeding.  Path chronic inactive gastritis.  H pylori negative.  PPI 40 BID repeat EGD 8-12 weeks  11/22/23 EGD esoph suspicious for short segment Jackson's C2M3. Benign appearing esoph stenosis; path esophagus: Mucosa with focal intestinal metaplasia.  Negative for dysplasia.  H pylori negative.      Anemia from plaquenil and imuran 03/14/2016    Diastolic heart failure, NYHA class 2 09/26/2014 05/12/2021 TTE LV normal size with concentric remodeling and normal systolic function LVEF 65%.  Grade 2 diastolic dysfunction.  Severe left atrial enlargement.  Normal RV size and systolic function.  CVP 3.  PA pressure 36. Sinuses of Valsalva mildly dilated.  05/12/2021 nuclear medicine stress test normal perfusion.  No evidence of ischemia or infarction.  LVEF 75%.  Normal wall motion post stress.  During stress, rare PVCs.  Hypertensive response exercise.    -ct with mosaic attenuation.  Recommend 10 m g 2 times per week.  Recommend daily demodex      BMI 23.0-23.9, adult 07/15/2014    Essential hypertension 05/13/2014 6/2019 TTE normal LV systolic and diastolic function; ABIs normal  6/27/21 TTE LV normal size with concentric hypertrophy and normal systolic function LVEF 65%.  Grade 1 diastolic dysfunction.  Normal RV size with normal systolic function.  Moderate left atrial enlargement.  Mild right atrial enlargement.  Mild aortic valve stenosis.  Valve area 1.67, peak velocity 1.95, mean gradient 9.  Normal CVP 3. PA pressure 16.         Controlled type 2 diabetes mellitus with diabetic polyneuropathy, with long-term current use of insulin 05/13/2014    Dyslipidemia 05/13/2014 05/12/2021 nuclear medicine stress test normal perfusion.  No evidence of ischemia or infarction.  LVEF 75%.  Normal wall motion post stress.  During stress, rare PVCs.  Hypertensive response exercise.      MCTD (mixed connective tissue disease) 05/13/2014    Sjogren's disease 05/13/2014    Bilateral edema of lower extremity 6/2019 TTE normal LV systolic and diastolic function; ABIs normal 05/13/2014    Glaucoma 05/13/2014    Celiac disease 11/12/2013     Overview:   Gluten intolerant         PAST MEDICAL PROBLEMS, PAST SURGICAL HISTORY: please see relevant portions of the electronic medical record    ALLERGIES AND MEDICATIONS: updated and reviewed.  Medication List with Changes/Refills   Current Medications    ABATACEPT (ORENCIA CLICKJECT) 125 MG/ML ATIN    Inject 125 mg into the skin once a week.    ALCOHOL SWABS (DROPSAFE ALCOHOL PREP PADS) PADM    USE  4 TIMES PER DAY    AMLODIPINE (NORVASC) 5 MG TABLET    TAKE 1 TABLET TWICE DAILY    AMMONIUM LACTATE 12 % CREA    Apply 1 application topically once daily.    ATORVASTATIN (LIPITOR) 80 MG TABLET    TAKE 1 TABLET EVERY DAY    BLOOD GLUCOSE CONTROL, LOW (TRUE METRIX LEVEL 1) SOLN    Use as indicated    BLOOD-GLUCOSE METER (TRUE METRIX GLUCOSE METER) MISC    Test glucose 4x/day    BLOOD-GLUCOSE METER,CONTINUOUS (DEXCOM G7 ) MISC    Use with Dexcom G7 sensors    BLOOD-GLUCOSE SENSOR (DEXCOM G7 SENSOR) JENNIFER    Change every 10 days    CLONIDINE 0.2 MG/24 HR TD PTWK (CATAPRES) 0.2 MG/24 HR    Place 1 patch onto the skin every 7 days.    DICLOFENAC SODIUM (VOLTAREN) 1 % GEL    Apply 2 g topically 4 (four) times daily as needed. Apply to aching joints    DORZOLAMIDE (TRUSOPT) 2 % OPHTHALMIC SOLUTION    Place 1 drop into both eyes 2 (two) times a day.    DROPLET PEN NEEDLE 32 GAUGE X  "5/32" NDLE    USE 1 NEEDLE AS DIRECTED FOUR TIMES DAILY    DULAGLUTIDE (TRULICITY) 4.5 MG/0.5 ML PEN INJECTOR    Inject 4.5 mg into the skin every 7 days.    EMPAGLIFLOZIN (JARDIANCE) 10 MG TABLET    Take 1 tablet (10 mg total) by mouth once daily.    FEBUXOSTAT (ULORIC) 40 MG TAB    Take 1 tablet (40 mg total) by mouth once daily.    FERROUS GLUCONATE 324 MG (37.5 MG IRON) TAB TABLET    Take 1 tablet (324 mg total) by mouth once daily.    FLUTICASONE PROPIONATE (FLONASE) 50 MCG/ACTUATION NASAL SPRAY    1 spray (50 mcg total) by Each Nostril route once daily.    HYDRALAZINE (APRESOLINE) 25 MG TABLET    TAKE 3 TABLETS THREE TIMES DAILY    INJECTAFER 50 IRON MG/ML INJECTION        INSULIN ASPART U-100 (NOVOLOG FLEXPEN U-100 INSULIN) 100 UNIT/ML (3 ML) INPN PEN    Inject Novolog if glucose after meal is high:  201-250=+2, 251-300=+3; 301-350=+4, over 350=+5 units    LANCETS (TRUEPLUS LANCETS) 33 GAUGE MISC    Test glucose 4x/day. Dx code e11.65    LINZESS 72 MCG CAP CAPSULE    TAKE 1 CAPSULE BEFORE BREAKFAST    MULTIVIT WITH MIN-FOLIC ACID 200 MCG CHEW        OMEPRAZOLE (PRILOSEC) 40 MG CAPSULE    Take 1 capsule (40 mg total) by mouth every morning.    PREDNISONE (DELTASONE) 5 MG TABLET    Take 1 tablet (5 mg total) by mouth once daily.    PROLIA 60 MG/ML SYRG        SENNA-DOCUSATE 8.6-50 MG (SENNA WITH DOCUSATE SODIUM) 8.6-50 MG PER TABLET    Take 1 tablet by mouth once daily.    TORSEMIDE (DEMADEX) 10 MG TAB    TAKE 1 TABLET EVERY OTHER DAY    TRAMADOL (ULTRAM) 50 MG TABLET    TAKE 1 TABLET EVERY 12 HOURS AS NEEDED FOR PAIN    TRAVOPROST (TRAVATAN Z) 0.004 % OPHTHALMIC SOLUTION    Place 1 drop into both eyes every evening.    TRIAMCINOLONE ACETONIDE 0.1% (KENALOG) 0.1 % CREAM    APPLY TOPICALLY TWICE DAILY FOR 10 DAYS    TRUE METRIX GLUCOSE TEST STRIP STRP    TEST BLOOD SUGAR FOUR TIMES DAILY    VALSARTAN (DIOVAN) 160 MG TABLET    TAKE 1 TABLET TWICE DAILY         Objective:   Objective   Physical Exam   Vitals:    " "02/26/24 0859   BP: 134/66   Pulse: 86   Temp: 98 °F (36.7 °C)   TempSrc: Oral   SpO2: 99%   Weight: 57.1 kg (125 lb 12.4 oz)   Height: 5' 6" (1.676 m)    Body mass index is 20.3 kg/m².  Weight: 57.1 kg (125 lb 12.4 oz)   Height: 5' 6" (167.6 cm)     Physical Exam  Constitutional:       General: He is not in acute distress.     Appearance: He is well-developed.   Eyes:      Extraocular Movements: Extraocular movements intact.   Cardiovascular:      Rate and Rhythm: Normal rate and regular rhythm.      Heart sounds: Normal heart sounds. No murmur heard.  Pulmonary:      Effort: Pulmonary effort is normal.      Breath sounds: Normal breath sounds.   Abdominal:      General: There is no distension.      Tenderness: There is no abdominal tenderness. There is no guarding.   Musculoskeletal:         General: Normal range of motion.      Right lower leg: No edema.      Left lower leg: No edema.      Comments: Pointing towards the midline thoracic back as area of discomfort.  Some increasing lordosis but otherwise no obvious deformity, no induration   Skin:     General: Skin is warm and dry.   Neurological:      Mental Status: He is alert and oriented to person, place, and time.      Coordination: Coordination normal.   Psychiatric:         Behavior: Behavior normal.         Thought Content: Thought content normal.         Component      Latest Ref [facility] 11/27/2023 2/12/2024   WBC      3.90 - 12.70 K/uL 5.96     RBC      4.60 - 6.20 M/uL 4.11 (L)     Hemoglobin      14.0 - 18.0 g/dL 12.0 (L)     Hematocrit      40.0 - 54.0 % 37.8 (L)     MCV      82 - 98 fL 92     MCH      27.0 - 31.0 pg 29.2     MCHC      32.0 - 36.0 g/dL 31.7 (L)     RDW      11.5 - 14.5 % 14.6 (H)     Platelet Count      150 - 450 K/uL 211     MPV      9.2 - 12.9 fL 11.9     Immature Granulocytes      0.0 - 0.5 % 0.2     Gran # (ANC)      1.8 - 7.7 K/uL 3.2     Immature Grans (Abs)      0.00 - 0.04 K/uL 0.01     Lymph #      1.0 - 4.8 K/uL 1.7     Mono #   "    0.3 - 1.0 K/uL 0.8     Eos #      0.0 - 0.5 K/uL 0.2     Baso #      0.00 - 0.20 K/uL 0.03     nRBC      0 /100 WBC 0     Gran %      38.0 - 73.0 % 54.1     Lymph %      18.0 - 48.0 % 28.5     Mono %      4.0 - 15.0 % 13.8     Eos %      0.0 - 8.0 % 2.9     Basophil %      0.0 - 1.9 % 0.5     Differential Method Automated     Sodium      136 - 145 mmol/L 137     Potassium      3.5 - 5.1 mmol/L 4.2     Chloride      95 - 110 mmol/L 110     CO2      23 - 29 mmol/L 19 (L)     Glucose      70 - 110 mg/dL 165 (H)     BUN      8 - 23 mg/dL 23     Creatinine      0.5 - 1.4 mg/dL 1.5 (H)     Calcium      8.7 - 10.5 mg/dL 9.4     PROTEIN TOTAL      6.0 - 8.4 g/dL 7.1     Albumin      3.5 - 5.2 g/dL 3.4 (L)     BILIRUBIN TOTAL      0.1 - 1.0 mg/dL 0.4     ALP      55 - 135 U/L 70     AST      10 - 40 U/L 20     ALT      10 - 44 U/L 19     eGFR      >60 mL/min/1.73 m^2 49 !     Anion Gap      8 - 16 mmol/L 8     Specimen UA Urine, Clean Catch     Color, UA      Yellow, Straw, Rochelle  Yellow     Appearance, UA      Clear  Clear     pH, UA      5.0 - 8.0  7.0     Specific Gravity, UA      1.005 - 1.030  1.015     Protein, UA      Negative  1+ !     Glucose, UA      Negative  4+ !     Ketones, UA      Negative  Negative     Bilirubin (UA)      Negative  Negative     Blood, UA      Negative  Negative     NITRITE UA      Negative  Negative     UROBILINOGEN UA      <2.0 EU/dL Negative     Leukocyte Esterase, UA      Negative  Negative     Cholesterol Total      120 - 199 mg/dL  135    Triglycerides      30 - 150 mg/dL  75    HDL      40 - 75 mg/dL  44    LDL Cholesterol      63.0 - 159.0 mg/dL  76.0    HDL/Cholesterol Ratio      20.0 - 50.0 %  32.6    Total Cholesterol/HDL Ratio      2.0 - 5.0   3.1    Non-HDL Cholesterol      mg/dL  91    RBC, UA      0 - 4 /hpf 1     WBC, UA      0 - 5 /hpf 1     Bacteria, UA      None-Occ /hpf None     Yeast, UA      None  None     Hyaline Casts, UA      0-1/lpf /lpf 0     Microscopic Comment  SEE COMMENT     Urine Protein      mg/dL 76     Urine Creatinine      23.0 - 375.0 mg/dL 39.0     Urine Creatinine       39.7     Prot/Creat Ratio, Urine      0.00 - 0.20  1.91 (H)     Urine Microalbumin      ug/mL 481.0     MICROALB/CREAT RATIO      0.0 - 30.0 ug/mg 1233.3 (H)        Legend:  (L) Low  (H) High  ! Abnormal

## 2024-02-26 ENCOUNTER — HOSPITAL ENCOUNTER (OUTPATIENT)
Dept: RADIOLOGY | Facility: HOSPITAL | Age: 74
Discharge: HOME | End: 2024-02-26
Attending: INTERNAL MEDICINE
Payer: MEDICARE

## 2024-02-26 ENCOUNTER — OFFICE VISIT (OUTPATIENT)
Dept: FAMILY MEDICINE | Facility: CLINIC | Age: 74
End: 2024-02-26
Payer: MEDICARE

## 2024-02-26 VITALS
HEART RATE: 86 BPM | BODY MASS INDEX: 20.21 KG/M2 | DIASTOLIC BLOOD PRESSURE: 66 MMHG | TEMPERATURE: 98 F | WEIGHT: 125.75 LBS | HEIGHT: 66 IN | OXYGEN SATURATION: 99 % | SYSTOLIC BLOOD PRESSURE: 134 MMHG

## 2024-02-26 DIAGNOSIS — N18.32 ANEMIA OF CHRONIC RENAL FAILURE, STAGE 3B: ICD-10-CM

## 2024-02-26 DIAGNOSIS — M54.6 CHRONIC MIDLINE THORACIC BACK PAIN: ICD-10-CM

## 2024-02-26 DIAGNOSIS — M06.9 RHEUMATOID ARTHRITIS, INVOLVING UNSPECIFIED SITE, UNSPECIFIED WHETHER RHEUMATOID FACTOR PRESENT: ICD-10-CM

## 2024-02-26 DIAGNOSIS — G89.29 CHRONIC MIDLINE THORACIC BACK PAIN: ICD-10-CM

## 2024-02-26 DIAGNOSIS — M1A.9XX1 TOPHACEOUS GOUT: ICD-10-CM

## 2024-02-26 DIAGNOSIS — I50.30 DIASTOLIC HEART FAILURE, NYHA CLASS 2: ICD-10-CM

## 2024-02-26 DIAGNOSIS — Z79.4 CONTROLLED TYPE 2 DIABETES MELLITUS WITH STAGE 3 CHRONIC KIDNEY DISEASE, WITH LONG-TERM CURRENT USE OF INSULIN: ICD-10-CM

## 2024-02-26 DIAGNOSIS — K22.70 BARRETT'S ESOPHAGUS WITHOUT DYSPLASIA: ICD-10-CM

## 2024-02-26 DIAGNOSIS — R25.1 TREMOR: ICD-10-CM

## 2024-02-26 DIAGNOSIS — D63.1 ANEMIA OF CHRONIC RENAL FAILURE, STAGE 3B: ICD-10-CM

## 2024-02-26 DIAGNOSIS — E11.42 CONTROLLED TYPE 2 DIABETES MELLITUS WITH DIABETIC POLYNEUROPATHY, WITH LONG-TERM CURRENT USE OF INSULIN: ICD-10-CM

## 2024-02-26 DIAGNOSIS — E78.5 DYSLIPIDEMIA: ICD-10-CM

## 2024-02-26 DIAGNOSIS — Z00.00 NORMAL PHYSICAL EXAM: Primary | ICD-10-CM

## 2024-02-26 DIAGNOSIS — M81.0 AGE-RELATED OSTEOPOROSIS WITHOUT CURRENT PATHOLOGICAL FRACTURE: ICD-10-CM

## 2024-02-26 DIAGNOSIS — E11.22 CONTROLLED TYPE 2 DIABETES MELLITUS WITH STAGE 3 CHRONIC KIDNEY DISEASE, WITH LONG-TERM CURRENT USE OF INSULIN: ICD-10-CM

## 2024-02-26 DIAGNOSIS — I10 ESSENTIAL HYPERTENSION: ICD-10-CM

## 2024-02-26 DIAGNOSIS — K59.09 CHRONIC CONSTIPATION: ICD-10-CM

## 2024-02-26 DIAGNOSIS — Z79.4 CONTROLLED TYPE 2 DIABETES MELLITUS WITH DIABETIC POLYNEUROPATHY, WITH LONG-TERM CURRENT USE OF INSULIN: ICD-10-CM

## 2024-02-26 DIAGNOSIS — N25.81 SECONDARY HYPERPARATHYROIDISM OF RENAL ORIGIN: ICD-10-CM

## 2024-02-26 DIAGNOSIS — I70.0 AORTIC ATHEROSCLEROSIS: ICD-10-CM

## 2024-02-26 DIAGNOSIS — N18.31 STAGE 3A CHRONIC KIDNEY DISEASE: ICD-10-CM

## 2024-02-26 DIAGNOSIS — N18.30 CONTROLLED TYPE 2 DIABETES MELLITUS WITH STAGE 3 CHRONIC KIDNEY DISEASE, WITH LONG-TERM CURRENT USE OF INSULIN: ICD-10-CM

## 2024-02-26 DIAGNOSIS — M35.00 SJOGREN'S SYNDROME, WITH UNSPECIFIED ORGAN INVOLVEMENT: ICD-10-CM

## 2024-02-26 DIAGNOSIS — D70.9 NEUTROPENIA, UNSPECIFIED TYPE: ICD-10-CM

## 2024-02-26 PROCEDURE — 3078F DIAST BP <80 MM HG: CPT | Mod: CPTII,S$GLB,, | Performed by: INTERNAL MEDICINE

## 2024-02-26 PROCEDURE — 3288F FALL RISK ASSESSMENT DOCD: CPT | Mod: CPTII,S$GLB,, | Performed by: INTERNAL MEDICINE

## 2024-02-26 PROCEDURE — 3075F SYST BP GE 130 - 139MM HG: CPT | Mod: CPTII,S$GLB,, | Performed by: INTERNAL MEDICINE

## 2024-02-26 PROCEDURE — 72070 X-RAY EXAM THORAC SPINE 2VWS: CPT | Mod: 26,,, | Performed by: RADIOLOGY

## 2024-02-26 PROCEDURE — 1160F RVW MEDS BY RX/DR IN RCRD: CPT | Mod: CPTII,S$GLB,, | Performed by: INTERNAL MEDICINE

## 2024-02-26 PROCEDURE — 99397 PER PM REEVAL EST PAT 65+ YR: CPT | Mod: S$GLB,,, | Performed by: INTERNAL MEDICINE

## 2024-02-26 PROCEDURE — 1125F AMNT PAIN NOTED PAIN PRSNT: CPT | Mod: CPTII,S$GLB,, | Performed by: INTERNAL MEDICINE

## 2024-02-26 PROCEDURE — 3008F BODY MASS INDEX DOCD: CPT | Mod: CPTII,S$GLB,, | Performed by: INTERNAL MEDICINE

## 2024-02-26 PROCEDURE — 1159F MED LIST DOCD IN RCRD: CPT | Mod: CPTII,S$GLB,, | Performed by: INTERNAL MEDICINE

## 2024-02-26 PROCEDURE — 72070 X-RAY EXAM THORAC SPINE 2VWS: CPT | Mod: TC,FY,PO

## 2024-02-26 PROCEDURE — 99999 PR PBB SHADOW E&M-EST. PATIENT-LVL V: CPT | Mod: PBBFAC,,, | Performed by: INTERNAL MEDICINE

## 2024-02-26 PROCEDURE — 1101F PT FALLS ASSESS-DOCD LE1/YR: CPT | Mod: CPTII,S$GLB,, | Performed by: INTERNAL MEDICINE

## 2024-02-26 RX ORDER — AMOXICILLIN 250 MG
1 CAPSULE ORAL DAILY
Qty: 90 TABLET | Refills: 3 | Status: SHIPPED | OUTPATIENT
Start: 2024-02-26

## 2024-02-26 NOTE — PROGRESS NOTES
Thoracic spine two views: There is aortic plaque.  There is baseline DJD.  There are lower thoracic chronic wedge deformities unchanged from 02/27/2019.  Impression:  No acute process seen.  Chronic changes as above.    Results to patient via my chart.  Referred to healthy back program at office visit.  If no improvement consider Spine Clinic.  Has never tried gabapentin, could consider that.

## 2024-02-29 DIAGNOSIS — H40.1132 PRIMARY OPEN ANGLE GLAUCOMA (POAG) OF BOTH EYES, MODERATE STAGE: ICD-10-CM

## 2024-02-29 RX ORDER — DORZOLAMIDE HCL 20 MG/ML
1 SOLUTION/ DROPS OPHTHALMIC 2 TIMES DAILY
Qty: 10 ML | Refills: 2 | Status: SHIPPED | OUTPATIENT
Start: 2024-02-29 | End: 2024-05-13

## 2024-03-19 ENCOUNTER — OFFICE VISIT (OUTPATIENT)
Dept: PODIATRY | Facility: CLINIC | Age: 74
End: 2024-03-19
Payer: MEDICARE

## 2024-03-19 VITALS
SYSTOLIC BLOOD PRESSURE: 146 MMHG | BODY MASS INDEX: 20.23 KG/M2 | WEIGHT: 125.88 LBS | DIASTOLIC BLOOD PRESSURE: 67 MMHG | HEIGHT: 66 IN

## 2024-03-19 DIAGNOSIS — B35.1 ONYCHOMYCOSIS DUE TO DERMATOPHYTE: ICD-10-CM

## 2024-03-19 DIAGNOSIS — M20.40 HAMMER TOE, UNSPECIFIED LATERALITY: ICD-10-CM

## 2024-03-19 DIAGNOSIS — E11.49 TYPE II DIABETES MELLITUS WITH NEUROLOGICAL MANIFESTATIONS: Primary | ICD-10-CM

## 2024-03-19 PROCEDURE — 3077F SYST BP >= 140 MM HG: CPT | Mod: CPTII,S$GLB,, | Performed by: PODIATRIST

## 2024-03-19 PROCEDURE — 1159F MED LIST DOCD IN RCRD: CPT | Mod: CPTII,S$GLB,, | Performed by: PODIATRIST

## 2024-03-19 PROCEDURE — 3078F DIAST BP <80 MM HG: CPT | Mod: CPTII,S$GLB,, | Performed by: PODIATRIST

## 2024-03-19 PROCEDURE — 3288F FALL RISK ASSESSMENT DOCD: CPT | Mod: CPTII,S$GLB,, | Performed by: PODIATRIST

## 2024-03-19 PROCEDURE — 3008F BODY MASS INDEX DOCD: CPT | Mod: CPTII,S$GLB,, | Performed by: PODIATRIST

## 2024-03-19 PROCEDURE — 99999 PR PBB SHADOW E&M-EST. PATIENT-LVL V: CPT | Mod: PBBFAC,,, | Performed by: PODIATRIST

## 2024-03-19 PROCEDURE — 99214 OFFICE O/P EST MOD 30 MIN: CPT | Mod: S$GLB,,, | Performed by: PODIATRIST

## 2024-03-19 PROCEDURE — 3051F HG A1C>EQUAL 7.0%<8.0%: CPT | Mod: CPTII,S$GLB,, | Performed by: PODIATRIST

## 2024-03-19 PROCEDURE — 1101F PT FALLS ASSESS-DOCD LE1/YR: CPT | Mod: CPTII,S$GLB,, | Performed by: PODIATRIST

## 2024-03-19 PROCEDURE — 1126F AMNT PAIN NOTED NONE PRSNT: CPT | Mod: CPTII,S$GLB,, | Performed by: PODIATRIST

## 2024-03-19 RX ORDER — CICLOPIROX 80 MG/ML
SOLUTION TOPICAL NIGHTLY
Qty: 6.6 ML | Refills: 3 | Status: SHIPPED | OUTPATIENT
Start: 2024-03-19

## 2024-03-19 RX ORDER — DICLOFENAC SODIUM 10 MG/G
2 GEL TOPICAL DAILY
Qty: 100 G | Refills: 3 | Status: SHIPPED | OUTPATIENT
Start: 2024-03-19

## 2024-03-19 NOTE — PATIENT INSTRUCTIONS
Kerasal for fungal nails apply daily to all toenails.  Can be found at [facility]

## 2024-03-19 NOTE — PROGRESS NOTES
Subjective:      Patient ID: Darrion Bennett is a 74 y.o. male.    Chief Complaint: Diabetes Mellitus and Diabetic Foot Exam (2/26/24 Dr Domingo)    Darrion is a 74 y.o. male who presents to the clinic upon referral from Dr. Nikky issa. provider found  for evaluation and treatment of diabetic feet. Darrion has a past medical history of Anemia, Arthritis, Cataract, CHF (congestive heart failure), Chronic constipation, Diabetes mellitus, type 2, Felon of finger of left hand (05/11/2019), Glaucoma, Hyperlipidemia (05/13/2014), Hypertension, MCTD (mixed connective tissue disease), Personal history of colonic polyps, Sjogren's disease, Ulcerative colitis, and Urolithiasis. Presents for diabetic foot risk assessment.       PCP: Farooq Domingo MD    Date Last Seen by PCP:   Chief Complaint   Patient presents with    Diabetes Mellitus    Diabetic Foot Exam     2/26/24 Dr Domingo       Current shoe gear:  rx shoes    Hemoglobin A1C   Date Value Ref Range Status   02/26/2024 7.1 (H) 4.0 - 5.6 % Final     Comment:     ADA Screening Guidelines:  5.7-6.4%  Consistent with prediabetes  >or=6.5%  Consistent with diabetes    High levels of fetal hemoglobin interfere with the HbA1C  assay. Heterozygous hemoglobin variants (HbS, HgC, etc)do  not significantly interfere with this assay.   However, presence of multiple variants may affect accuracy.     11/02/2023 6.8 (H) 4.0 - 5.6 % Final     Comment:     ADA Screening Guidelines:  5.7-6.4%  Consistent with prediabetes  >or=6.5%  Consistent with diabetes    High levels of fetal hemoglobin interfere with the HbA1C  assay. Heterozygous hemoglobin variants (HbS, HgC, etc)do  not significantly interfere with this assay.   However, presence of multiple variants may affect accuracy.     08/09/2023 6.4 (H) 4.0 - 5.6 % Final     Comment:     ADA Screening Guidelines:  5.7-6.4%  Consistent with prediabetes  >or=6.5%  Consistent with diabetes    High levels of fetal hemoglobin interfere with the HbA1C  assay.  Heterozygous hemoglobin variants (HbS, HgC, etc)do  not significantly interfere with this assay.   However, presence of multiple variants may affect accuracy.     10/16/2018 6.2 (H) 4.0 - 5.7 % Final     Comment:     Dr. Jurgen Ward ( Endocrinology )     Patient Active Problem List   Diagnosis    Essential hypertension    Controlled type 2 diabetes mellitus with diabetic polyneuropathy, with long-term current use of insulin    Dyslipidemia    MCTD (mixed connective tissue disease)    Sjogren's disease    Bilateral edema of lower extremity 6/2019 TTE normal LV systolic and diastolic function; ABIs normal    Glaucoma    BMI 23.0-23.9, adult    Jackson's esophagus without dysplasia    Anemia from plaquenil and imuran    Neutropenia    Current chronic use of systemic steroids    Compression fracture of body of thoracic vertebra on XR 2/2019; 2/2019 alendronate    Anemia of chronic renal failure, stage 3b    Chronic constipation not responding to linzess, miralax, senna    Screening for colon cancer 07/26/2018 colonoscopy transverse colon polyp path showing benign inflammatory polyp.    Tophaceous gout    Pseudophakia of both eyes    Refractive error    Aortic atherosclerosis    Celiac disease    Diastolic heart failure, NYHA class 2    Stage 3a chronic kidney disease    RA (rheumatoid arthritis)    Secondary hyperparathyroidism of renal origin    Age-related osteoporosis without current pathological fracture    Mobitz I    Bilateral carotid artery stenosis on CTA 6/26/21    History of stroke    Dyspnea on exertion    JAZLYN (obstructive sleep apnea)    Long-term insulin use    Dyshidrotic eczema    Difficulty walking    Hip joint stiffness, bilateral    Duodenal ulcer 9/8/23 EGD LA grade C esophagitis with bleeding. 3 cm HH, erythematous mucosa antrum. nonbleeding duodenal ulcers no stigmata of bleeding.  Path chronic inactive gastritis.  H pylori ne     Current Outpatient Medications on File Prior to Visit  "  Medication Sig Dispense Refill    abatacept (ORENCIA CLICKJECT) 125 mg/mL AtIn Inject 125 mg into the skin once a week. 4 mL 11    alcohol swabs (DROPSAFE ALCOHOL PREP PADS) PadM USE  4 TIMES PER  each 3    amLODIPine (NORVASC) 5 MG tablet TAKE 1 TABLET TWICE DAILY 180 tablet 3    ammonium lactate 12 % Crea Apply 1 application topically once daily. 140 g 5    atorvastatin (LIPITOR) 80 MG tablet TAKE 1 TABLET EVERY DAY 90 tablet 3    blood glucose control, low (TRUE METRIX LEVEL 1) Soln Use as indicated 1 each 0    blood-glucose meter (TRUE METRIX GLUCOSE METER) Misc Test glucose 4x/day 1 each 0    blood-glucose meter,continuous (DEXCOM G7 ) Misc Use with Dexcom G7 sensors 1 each 0    blood-glucose sensor (DEXCOM G7 SENSOR) Bernie Change every 10 days 12 each 3    diclofenac sodium (VOLTAREN) 1 % Gel Apply 2 g topically 4 (four) times daily as needed. Apply to aching joints 100 g 3    dorzolamide (TRUSOPT) 2 % ophthalmic solution INSTILL 1 DROP INTO BOTH EYES TWICE DAILY 10 mL 2    DROPLET PEN NEEDLE 32 gauge x 5/32" Ndle USE 1 NEEDLE AS DIRECTED FOUR TIMES DAILY 400 each 3    dulaglutide (TRULICITY) 4.5 mg/0.5 mL pen injector Inject 4.5 mg into the skin every 7 days. 12 pen 2    empagliflozin (JARDIANCE) 10 mg tablet Take 1 tablet (10 mg total) by mouth once daily. 90 tablet 2    febuxostat (ULORIC) 40 mg Tab Take 1 tablet (40 mg total) by mouth once daily. 30 tablet 11    fluticasone propionate (FLONASE) 50 mcg/actuation nasal spray 1 spray (50 mcg total) by Each Nostril route once daily. 48 g 11    hydrALAZINE (APRESOLINE) 25 MG tablet TAKE 3 TABLETS THREE TIMES DAILY 810 tablet 10    INJECTAFER 50 iron mg/mL injection       insulin aspart U-100 (NOVOLOG FLEXPEN U-100 INSULIN) 100 unit/mL (3 mL) InPn pen Inject Novolog if glucose after meal is high:  201-250=+2, 251-300=+3; 301-350=+4, over 350=+5 units 30 mL 10    lancets (TRUEPLUS LANCETS) 33 gauge Misc Test glucose 4x/day. Dx code e11.65 400 each " 3    LINZESS 72 mcg Cap capsule TAKE 1 CAPSULE BEFORE BREAKFAST 90 capsule 2    multivit with min-folic acid 200 mcg Chew       omeprazole (PRILOSEC) 40 MG capsule Take 1 capsule (40 mg total) by mouth every morning. 90 capsule 3    predniSONE (DELTASONE) 5 MG tablet Take 1 tablet (5 mg total) by mouth once daily. 90 tablet 3    PROLIA 60 mg/mL Syrg       senna-docusate 8.6-50 mg (SENNA WITH DOCUSATE SODIUM) 8.6-50 mg per tablet Take 1 tablet by mouth once daily. 90 tablet 3    torsemide (DEMADEX) 10 MG Tab TAKE 1 TABLET EVERY OTHER DAY 45 tablet 3    traMADoL (ULTRAM) 50 mg tablet TAKE 1 TABLET EVERY 12 HOURS AS NEEDED FOR PAIN 60 tablet 1    travoprost (TRAVATAN Z) 0.004 % ophthalmic solution Place 1 drop into both eyes every evening. 3 each 3    triamcinolone acetonide 0.1% (KENALOG) 0.1 % cream APPLY TOPICALLY TWICE DAILY FOR 10 DAYS 45 g 1    TRUE METRIX GLUCOSE TEST STRIP Strp TEST BLOOD SUGAR FOUR TIMES DAILY 400 strip 3    valsartan (DIOVAN) 160 MG tablet TAKE 1 TABLET TWICE DAILY 180 tablet 1    cloNIDine 0.2 mg/24 hr td ptwk (CATAPRES) 0.2 mg/24 hr Place 1 patch onto the skin every 7 days. 12 patch 3    ferrous gluconate 324 mg (37.5 mg iron) Tab tablet Take 1 tablet (324 mg total) by mouth once daily. 90 tablet 0     No current facility-administered medications on file prior to visit.     Review of patient's allergies indicates:   Allergen Reactions    Penicillins Nausea And Vomiting    Rosuvastatin      Muscle pain     Allopurinol analogues Rash        Past Surgical History:   Procedure Laterality Date    CATARACT EXTRACTION Bilateral 2005    COLONOSCOPY  2014    ESOPHAGOGASTRODUODENOSCOPY N/A 9/8/2023    Procedure: EGD (ESOPHAGOGASTRODUODENOSCOPY);  Surgeon: Divya Saenz MD;  Location: [facility];  Service: Endoscopy;  Laterality: N/A;  ref by / inst portal-RB    ESOPHAGOGASTRODUODENOSCOPY N/A 11/22/2023    Procedure: EGD (ESOPHAGOGASTRODUODENOSCOPY);  Surgeon: Crystal Urbina MD;   Location: [facility];  Service: Endoscopy;  Laterality: N/A;  time frame 8-12 weeks  Dr. Saenz pt   prep instructions sent to pt via portal -  wl meds trulicity hold 7 days prior  diabetic  11/16- pt r/s to earlier date, pre call complete.  DBM    EYE SURGERY       Family History   Problem Relation Age of Onset    Hypertension Father     Stroke Father     Cataracts Father     Glaucoma Father     Cataracts Mother     No Known Problems Sister     No Known Problems Brother     Rheum arthritis Maternal Aunt     Rheum arthritis Maternal Uncle     No Known Problems Paternal Aunt     No Known Problems Paternal Uncle     No Known Problems Maternal Grandmother     No Known Problems Maternal Grandfather     Throat cancer Paternal Grandmother     Glaucoma Paternal Grandfather     No Known Problems Daughter     No Known Problems Son     Celiac disease Neg Hx     Colon cancer Neg Hx     Cirrhosis Neg Hx     Colon polyps Neg Hx     Crohn's disease Neg Hx     Cystic fibrosis Neg Hx     Hemochromatosis Neg Hx     Esophageal cancer Neg Hx     Inflammatory bowel disease Neg Hx     Irritable bowel syndrome Neg Hx     Liver cancer Neg Hx     Liver disease Neg Hx     Rectal cancer Neg Hx     Stomach cancer Neg Hx     Ulcerative colitis Neg Hx     Chase's disease Neg Hx     Amblyopia Neg Hx     Blindness Neg Hx     Cancer Neg Hx     Diabetes Neg Hx     Macular degeneration Neg Hx     Retinal detachment Neg Hx     Strabismus Neg Hx     Thyroid disease Neg Hx     Melanoma Neg Hx      Social History     Socioeconomic History    Marital status:     Number of children: 3   Tobacco Use    Smoking status: Never     Passive exposure: Never    Smokeless tobacco: Never   Substance and Sexual Activity    Alcohol use: No    Drug use: Yes     Comment: ultram 1 - 2 tabs a week    Sexual activity: Not Currently     Partners: Female     Social Determinants of Health     Financial Resource Strain: Low Risk  (2/12/2024)    Overall Financial  Resource Strain (CARDIA)     Difficulty of Paying Living Expenses: Not very hard   Recent Concern: Financial Resource Strain - Medium Risk (11/27/2023)    Overall Financial Resource Strain (CARDIA)     Difficulty of Paying Living Expenses: Somewhat hard   Food Insecurity: Food Insecurity Present (2/12/2024)    Hunger Vital Sign     Worried About Running Out of Food in the Last Year: Sometimes true     Ran Out of Food in the Last Year: Sometimes true   Transportation Needs: No Transportation Needs (2/12/2024)    PRAPARE - Transportation     Lack of Transportation (Medical): No     Lack of Transportation (Non-Medical): No   Physical Activity: Unknown (2/12/2024)    Exercise Vital Sign     Days of Exercise per Week: 0 days     Minutes of Exercise per Session: Patient declined   Stress: No Stress Concern Present (2/12/2024)    [employer] [place] [employer] - Occupational Stress Questionnaire     Feeling of Stress : Only a little   Social Connections: Unknown (2/12/2024)    Social Connection and Isolation Panel [NHANES]     Frequency of Communication with Friends and Family: Once a week     Frequency of Social Gatherings with Friends and Family: Once a week     Active Member of Clubs or Organizations: Yes     Attends Club or Organization Meetings: 1 to 4 times per year     Marital Status:    Housing Stability: Low Risk  (2/12/2024)    Housing Stability Vital Sign     Unable to Pay for Housing in the Last Year: No     Number of Places Lived in the Last Year: 2     Unstable Housing in the Last Year: No     Review of Systems   Constitutional: Negative for chills, fever and malaise/fatigue.   Cardiovascular:  Positive for leg swelling. Negative for chest pain and claudication.   Respiratory:  Negative for cough and shortness of breath.    Skin:  Positive for dry skin and nail changes. Negative for itching and rash.   Musculoskeletal:  Positive for arthritis, back pain and joint pain. Negative for falls,  "joint swelling and muscle weakness.   Gastrointestinal:  Negative for diarrhea, nausea and vomiting.   Neurological:  Negative for numbness, paresthesias, tremors and weakness.   Psychiatric/Behavioral:  Negative for altered mental status and hallucinations.          Objective:       Vitals:    03/19/24 0921   BP: (!) 146/67   Weight: 57.1 kg (125 lb 14.1 oz)   Height: 5' 6" (1.676 m)   PainSc: 0-No pain       Physical Exam  Vitals and nursing note reviewed.   Constitutional:       Appearance: He is not diaphoretic.      Comments: General: Pt. is well-developed, well-nourished, appears stated age, in no acute distress, alert and oriented x 3. No evidence of depression, anxiety, or agitation. Calm, cooperative, and communicative. Appropriate interactions and affect.       Cardiovascular:      Pulses:           Dorsalis pedis pulses are 2+ on the right side and 2+ on the left side.        Posterior tibial pulses are 1+ on the right side and 1+ on the left side.      Comments: There is decreased digital hair. Skin is atrophic  Musculoskeletal:      Right ankle: No swelling. No tenderness. Normal range of motion.      Right Achilles Tendon: No defects.      Left ankle: No swelling. No tenderness. Normal range of motion.      Left Achilles Tendon: No defects.      Right foot: Normal range of motion. No tenderness.      Left foot: Normal range of motion. No tenderness.      Comments: There is equinus deformity bilateral with decreased dorsiflexion at the ankle joint bilateral.     Decreased first MPJ range of motion both weightbearing and nonweightbearing, no crepitus observed the first MP joint, + dorsal flag sign. Mild  bunion deformity is observed .    Patient has hammertoes of digits 2-5 bilateral partially reducible without symptom today.    Fat pad atrophy to heels and met heads bilateral         Skin:     General: Skin is warm and dry.      Coloration: Skin is not pale.      Findings: No abrasion, ecchymosis, " erythema, lesion or rash.      Nails: There is no clubbing.      Comments: Toenails 1-5 of the left foot are thickened by 2-3 mm, discolored/yellowed, dystrophic, brittle with subungual debris    Interdigital Spaces clean, dry and without evidence of break in skin integrity  Xerosis noted B/L    Neurological:      Sensory: Sensory deficit present.      Comments: Pul[place]i-Kennedy 5.07 monofilamant testing is diminished Fareed feet. Sharp/dull sensation diminished Bilaterally. Light touch absent Bilaterally.       Psychiatric:         Speech: Speech normal.               Assessment:       Encounter Diagnoses   Name Primary?    Type II diabetes mellitus with neurological manifestations Yes    Hammer toe, unspecified laterality     Onychomycosis due to dermatophyte          Plan:       Darrion was seen today for diabetes mellitus and diabetic foot exam.    Diagnoses and all orders for this visit:    Type II diabetes mellitus with neurological manifestations  -     DIABETIC SHOES FOR HOME USE    Hammer toe, unspecified laterality  -     DIABETIC SHOES FOR HOME USE    Onychomycosis due to dermatophyte    Other orders  -     ciclopirox (PENLAC) 8 % Soln; Apply topically nightly.  -     diclofenac sodium (VOLTAREN) 1 % Gel; Apply 2 g topically once daily.      I counseled the patient on his conditions, their implications and medical management.    - Shoe inspection. Diabetic Foot Education. Patient reminded of the importance of good nutrition and blood sugar control to help prevent podiatric complications of diabetes. Patient instructed on proper foot hygeine. We discussed wearing proper shoe gear, daily foot inspections, never walking without protective shoe gear, caution putting sharp instruments to feet     - Discussed DM foot care:  Wear comfortable, proper fitting shoes. Wash feet daily. Dry well. After drying, apply moisturizer to feet (no lotion to webspaces). Inspect feet daily for skin breaks, blisters, swelling, or  redness. Wear cotton socks (preferably white)  Change socks every day. Do NOT walk barefoot. Do NOT use heating pads or warm/hot water soaks     Rx Voltaren gel to be applied to affected area up to 3-4 x daily as needed for pain    At patient's request, I discussed different treatments for toenail fungus. We discussed oral antifungals but I did not recommend them as a first line treatment since the medication is taken internally and can have side effects such as rash, taste disturbances, and liver enzyme elevation. We discussed topical Penlac to be applied daily and removed weekly. Pt. Expresses understanding and would like to try the Penlac. Rx sent to the pharmacy.     Rx diabetic shoes with custom molded inserts to be worn at all times while ambulating. Prescription provided with list of local retailers.     F/u one year DM foot exam sooner PRN.

## 2024-03-28 ENCOUNTER — PATIENT MESSAGE (OUTPATIENT)
Dept: ADMINISTRATIVE | Facility: OTHER | Age: 74
End: 2024-03-28
Payer: MEDICARE

## 2024-04-04 ENCOUNTER — CLINICAL SUPPORT (OUTPATIENT)
Dept: REHABILITATION | Facility: HOSPITAL | Age: 74
End: 2024-04-04
Attending: INTERNAL MEDICINE
Payer: MEDICARE

## 2024-04-04 DIAGNOSIS — M54.6 CHRONIC MIDLINE THORACIC BACK PAIN: ICD-10-CM

## 2024-04-04 DIAGNOSIS — G89.29 CHRONIC MIDLINE THORACIC BACK PAIN: ICD-10-CM

## 2024-04-04 DIAGNOSIS — R29.3 POOR POSTURE: Primary | ICD-10-CM

## 2024-04-04 PROCEDURE — 97161 PT EVAL LOW COMPLEX 20 MIN: CPT | Mod: PN

## 2024-04-04 NOTE — PLAN OF CARE
"[facility]  Physical Therapy Initial Evaluation    Date: 4/4/2024   Name: Darrion Bennett  [facility] Number: [id number]    Therapy Diagnosis:   Encounter Diagnoses   Name Primary?    Chronic midline thoracic back pain     Poor posture Yes     Physician: Farooq Domingo MD    Physician orders: PT Eval and Treat   Medical diagnosis from referral: M54.6,G89.29 (ICD-10-CM) - Chronic midline thoracic back pain   Evaluation date: 4/4/2024  Authorization period expiration: 2/25/25  Plan of care expiration: 6/19/2024  Reassessment due: 5/10/2024   Visit # / visits authorized: 1/1    Precautions: standard    Time In: 1000  Time Out: 1050  Total Appointment Time (timed & untimed codes): 50 minutes    Subjective   Date of onset: chronic  History of current condition - Darrion reports that he has been having back pain for the past 6-12 months. He reports that his pain is centralized around T12, but radiates superiorly and inferiorly. He reports a history of compression fractures. He reports no falls or mechanism of injury    JASPER:  Any Bowel and Bladder movement issues: no  Any falls: no  Any dizziness: occasionally when he looks up  Any Headache: yes   Any injection in lower back: steroid or epidural: none   Pain radiates: just in spine   Pain constant or intermittent: intermittent     Pain:  Current 8/10, worst 10/10, best 6/10   Location: midline thoracolumbar  Description: stabbing  Aggravating Factors: cold, sleeping wrong, lifting, carrying groceries   Easing Factors: heat pad, pain medicine, laying down     Prior Therapy: yes for hip pain and hand arthritis   Social History: no POLO; lives with wife   Occupation: former jeweler   Prior Level of Function: independent   Current Level of Function: independent     Pts goals: "To get rid of pain"    Imaging: X-ray 2/26/24  Thoracic spine two views: There is aortic plaque. There is baseline DJD. There are lower thoracic chronic wedge deformities unchanged " from 02/27/2019.        Medical History:   Past Medical History:   Diagnosis Date    Anemia     Arthritis     Cataract     CHF (congestive heart failure)     Chronic constipation     Diabetes mellitus, type 2     Felon of finger of left hand 05/11/2019    Glaucoma     Hyperlipidemia 05/13/2014    Hypertension     MCTD (mixed connective tissue disease)     Personal history of colonic polyps     Sjogren's disease     Ulcerative colitis     Urolithiasis        Surgical History:   Darrion Bennett  has a past surgical history that includes Eye surgery; Colonoscopy (2014); Cataract extraction (Bilateral, 2005); Esophagogastroduodenoscopy (N/A, 9/8/2023); and Esophagogastroduodenoscopy (N/A, 11/22/2023).    Medications:   Darrion has a current medication list which includes the following prescription(s): orencia clickject, dropsafe alcohol prep pads, amlodipine, ammonium lactate, atorvastatin, true metrix level 1, blood-glucose meter, dexcom g7 , dexcom g7 sensor, ciclopirox, clonidine 0.2 mg/24 hr td ptwk, diclofenac sodium, diclofenac sodium, dorzolamide, droplet pen needle, trulicity, empagliflozin, febuxostat, ferrous gluconate, fluticasone propionate, hydralazine, injectafer, insulin aspart u-100, lancets, linzess, multivit with min-folic acid, omeprazole, prednisone, prolia, senna-docusate 8.6-50 mg, torsemide, tramadol, travoprost, triamcinolone acetonide 0.1%, true metrix glucose test strip, and valsartan.    Allergies:   Review of patient's allergies indicates:   Allergen Reactions    Penicillins Nausea And Vomiting    Rosuvastatin      Muscle pain     Allopurinol analogues Rash          Objective     Posture Alignment: rounded shoulders, head forward    Sensation: intact to light touch    DTR:: intact to light touch    GAIT DEVIATIONS: Darrion displays no gait deviation    Lumbar Range of Motion:    Norm ROM loss   Flexion Fingers touch toes, sacral angle >/= 70 deg, uniform spinal curvature, posterior weight  shift  moderate loss   Extension ASIS surpasses toes, spine of scapulae surpasses heels, uniform spinal curve moderate loss   Side-bending right  minimal loss   Side-bending left  minimal loss   Rotation right PT observes contralateral shoulder minimal loss   Rotation left PT observes contra lateral shoulder minimal loss       Lower Extremity Strength    Right LE  Left LE    Hip flexion: 4/5 Hip flexion: 4/5   Hip extension:  4+/5 Hip extension: 4+/5   Hip abduction: 4+/5 Hip abduction: 4+/5   Hip adduction: 4+/5 Hip adduction 4+/5   Knee extension: 4+/5 Knee extension: 4+/5   Knee flexion: 4+/5 Knee flexion: 4+/5   Ankle dorsiflexion: 4+/5 Ankle dorsiflexion: 4+/5   Ankle plantarflexion: 4+/5 Ankle plantarflexion: 4+/5       Special Tests:    Test name  Result   Prone Instability Test (--)   SIJ Provocation Test(s):  RAINER, compression, distraction, sacral thrust (--)   Straight Leg Raise (--)   Neural Tension (Slump) Test (--)   Walking on toes Able   Walking on heels  Able   Shahla's sign  (+)   Bridge test Able   Active SLR Able     PT Evaluation Completed? Yes  Discussed Plan of Care with patient: Yes    See chart for FOTO      TREATMENT   Total Treatment time separate from Evaluation: 10 minutes    Darrion received therapeutic exercises to develop strength, posture, and core stabilization for 10 minutes including:    LTR  PPT  EIS    Home Exercises and Patient Education Provided    Education provided:   - PT role and POC  - HEP  - Rationale behind therapy plan    Written Home Exercises Provided: yes.  Exercises were reviewed and Darrion was able to demonstrate them prior to the end of the session.  Darrion demonstrated good  understanding of the education provided.     See EMR under Patient Instructions for exercises provided 4/4/2024.    Assessment   Darrion is a 74 y.o. male referred to outpatient Physical Therapy with a medical diagnosis of midline back pain. Pt presents with signs and symptoms including: increased  low back pain, decreased lumbar ROM, decreased LE Strength, soft tissue dysfunction, postural imbalance,impaired joint mobility, and decreased tolerance to functional activities.     Pt prognosis is Good.   Pt will benefit from skilled outpatient Physical Therapy to address the deficits stated above and in the chart below, provide pt/family education, and to maximize pt's level of independence.     Plan of care discussed with patient: Yes  Pt's spiritual, cultural and educational needs considered and patient is agreeable to the plan of care and goals as stated below:     Anticipated Barriers for therapy: none     Medical Necessity is demonstrated by the following  History  Co-morbidities and personal factors that may impact the plan of care [x] LOW: no personal factors / co-morbidities  [] MODERATE: 1-2 personal factors / co-morbidities  [] HIGH: 3+ personal factors / co-morbidities    Moderate / High Support Documentation:   Co-morbidities affecting plan of care: -    Personal Factors:   no deficits     Examination  Body Structures and Functions, activity limitations and participation restrictions that may impact the plan of care [x] LOW: addressing 1-2 elements  [] MODERATE: 3+ elements  [] HIGH: 4+ elements (please support below)    Moderate / High Support Documentation:     Clinical Presentation [x] LOW: stable  [] MODERATE: Evolving  [] HIGH: Unstable     Decision Making/ Complexity Score: low       Goals:  Short Term Goals: 4 weeks   - The patient with report compliance with HEP to maximize functional outcomes. Appropriate and ongoing  - The patient will demonstrate increase in BLE MMT by 1/2 grade to improve pt's functional mobility. Appropriate and ongoing  - The patient will decrease pain level by 50% in order to improve QOL. Appropriate and ongoing  - The patient will demonstrate 25% improvement in lumbar ROM to improve ability to perform ADLs. Appropriate and ongoing    Long Term Goals: 8 weeks   - The  patient will demonstrate understanding and performance of advanced HEP to allow for independence once discharged from therapy. Appropriate and ongoing  - The patient will demonstrate 50% improvement in lumbar ROM to improve ability to perform ADLs. Appropriate and ongoing  - The patient will demonstrate increase in BLE MMT by 1 grade to improve pt's functional mobility. Appropriate and ongoing  - The patient will report 60% functional limitation on FOTO to indicate an improvement in overall function. Appropriate and ongoing      Plan   Plan of care Certification: 4/4/2024 to 6/19/2024.    Outpatient Physical Therapy 2 times weekly for 10 weeks to include the following interventions: Manual Therapy, Neuromuscular Re-ed, Patient Education, and Therapeutic Exercise, Dry needling    Eduard Mclaughlin, PT      I CERTIFY THE NEED FOR THESE SERVICES FURNISHED UNDER THIS PLAN OF TREATMENT AND WHILE UNDER MY CARE   Physician's comments:     Physician's Signature: ___________________________________________________

## 2024-04-10 PROBLEM — R29.3 POOR POSTURE: Status: ACTIVE | Noted: 2024-04-10

## 2024-04-11 ENCOUNTER — CLINICAL SUPPORT (OUTPATIENT)
Dept: REHABILITATION | Facility: HOSPITAL | Age: 74
End: 2024-04-11
Payer: MEDICARE

## 2024-04-11 DIAGNOSIS — R29.3 POOR POSTURE: Primary | ICD-10-CM

## 2024-04-19 NOTE — PROGRESS NOTES
"[facility]   Physical Therapy Treatment Note     Name: Darrion Bennett  Clinic Number: [id number]    Therapy Diagnosis:   Encounter Diagnosis   Name Primary?    Poor posture Yes     Physician: Farooq Domingo MD    Visit Date: 4/11/2024    Physician orders: PT Eval and Treat   Medical diagnosis from referral: M54.6,G89.29 (ICD-10-CM) - Chronic midline thoracic back pain   Evaluation date: 4/4/2024  Authorization period expiration: 2/25/25  Plan of care expiration: 6/19/2024  Reassessment due: 5/10/2024   Visit # / visits authorized: 1/10 +eval    FOTO: 1/ 1  PTA Visit #: 0/5     Precautions: standard    Time In: 1000  Time Out: 1100  Total Billable Time: 55 minutes    SUBJECTIVE     Pt reports that he feels about the same since initial eval.    He was compliant with home exercise program.  Response to previous treatment: none  Functional change: none yet    Pain: 7/10  Location:  midline thoracolumbar      OBJECTIVE     Objective Measures updated at progress report unless specified.     TREATMENT     Darrion received the treatments listed below:      received therapeutic exercises to develop strength and ROM for 55 minutes including:    Nustep 8'  LTR 3'  PPT 3x10x3"  HL clams RTB 3x10  HL iso ball squeezes 3x10  Bridges 3x10  Scap squeezes 3x10  Thoracic extension over bolster 2x10  Brueggers 2x10 RTB  EIS 3x10      PATIENT EDUCATION AND HOME EXERCISES     Home Exercises Provided and Patient Education Provided     Education provided:   - PT role and POC  - HEP    Written Home Exercises Provided: Patient instructed to cont prior HEP. Exercises were reviewed and Darrion was able to demonstrate them prior to the end of the session.  Darrion demonstrated good  understanding of the education provided. See EMR under Patient Instructions for exercises provided during therapy sessions    ASSESSMENT     Darrion tolerated treatment well today. Presents to clinic reporting symptoms consistent with IE. " Continued reviewing HEP and progressed treatment by adding exercises for cervicothoracic and lumbopelvic stability and mobility. Tolerated well. Will continue to progress treatment per patient tolerance.      Darrion Is progressing well towards his goals.   Pt prognosis is Good.     Pt will continue to benefit from skilled outpatient physical therapy to address the deficits listed in the problem list box on initial evaluation, provide pt/family education and to maximize pt's level of independence in the home and community environment.     Pt's spiritual, cultural and educational needs considered and pt agreeable to plan of care and goals.     Anticipated barriers to physical therapy: none    Goals:   Short Term Goals: 4 weeks   - The patient with report compliance with HEP to maximize functional outcomes. Appropriate and ongoing  - The patient will demonstrate increase in BLE MMT by 1/2 grade to improve pt's functional mobility. Appropriate and ongoing  - The patient will decrease pain level by 50% in order to improve QOL. Appropriate and ongoing  - The patient will demonstrate 25% improvement in lumbar ROM to improve ability to perform ADLs. Appropriate and ongoing     Long Term Goals: 8 weeks   - The patient will demonstrate understanding and performance of advanced HEP to allow for independence once discharged from therapy. Appropriate and ongoing  - The patient will demonstrate 50% improvement in lumbar ROM to improve ability to perform ADLs. Appropriate and ongoing  - The patient will demonstrate increase in BLE MMT by 1 grade to improve pt's functional mobility. Appropriate and ongoing  - The patient will report 60% functional limitation on FOTO to indicate an improvement in overall function. Appropriate and ongoing    PLAN     Continue POC    Eduard Mclaughlin, PT

## 2024-04-23 ENCOUNTER — CLINICAL SUPPORT (OUTPATIENT)
Dept: REHABILITATION | Facility: HOSPITAL | Age: 74
End: 2024-04-23
Payer: MEDICARE

## 2024-04-23 DIAGNOSIS — R29.3 POOR POSTURE: Primary | ICD-10-CM

## 2024-04-23 PROCEDURE — 97110 THERAPEUTIC EXERCISES: CPT | Mod: PN

## 2024-04-25 ENCOUNTER — PATIENT MESSAGE (OUTPATIENT)
Dept: ADMINISTRATIVE | Facility: OTHER | Age: 74
End: 2024-04-25
Payer: MEDICARE

## 2024-04-30 ENCOUNTER — CLINICAL SUPPORT (OUTPATIENT)
Dept: REHABILITATION | Facility: HOSPITAL | Age: 74
End: 2024-04-30
Payer: MEDICARE

## 2024-04-30 DIAGNOSIS — R29.3 POOR POSTURE: Primary | ICD-10-CM

## 2024-04-30 PROCEDURE — 97110 THERAPEUTIC EXERCISES: CPT | Mod: PN

## 2024-05-01 NOTE — PROGRESS NOTES
"[facility]   Physical Therapy Treatment Note     Name: Darrion Bennett  Clinic Number: [id number]    Therapy Diagnosis:   Encounter Diagnosis   Name Primary?    Poor posture Yes     Physician: Farooq Domingo MD    Visit Date: 4/30/2024    Physician orders: PT Eval and Treat   Medical diagnosis from referral: M54.6,G89.29 (ICD-10-CM) - Chronic midline thoracic back pain   Evaluation date: 4/4/2024  Authorization period expiration: 2/25/25  Plan of care expiration: 6/19/2024  Reassessment due: 5/10/2024   Visit # / visits authorized: 2/10 +eval    FOTO: 1/ 1  PTA Visit #: 0/5     Precautions: standard    Time In: 900  Time Out: 1000  Total Billable Time: 55 minutes    SUBJECTIVE     Pt reports no new issues today     He was compliant with home exercise program.  Response to previous treatment: none  Functional change: none yet    Pain: 4/10  Location:  midline thoracolumbar      OBJECTIVE     Objective Measures updated at progress report unless specified.     TREATMENT     Darrion received the treatments listed below:      received therapeutic exercises to develop strength and ROM for 55 minutes including:    Nustep 8'  LTR 3'  PPT 3x10x3"  HL clams RTB 3x10  HL iso ball squeezes 3x10  Bridges 3x10  Scap squeezes 3x10  Thoracic extension over bolster 2x10  Brueggers 2x10 RTB  EIS 3x10  Cervical retraction/extensions SNAGs 2x10x3"  +Seated ball rollouts 2x10      PATIENT EDUCATION AND HOME EXERCISES     Home Exercises Provided and Patient Education Provided     Education provided:   - PT role and POC  - HEP    Written Home Exercises Provided: Patient instructed to cont prior HEP. Exercises were reviewed and Darrion was able to demonstrate them prior to the end of the session.  Darrion demonstrated good  understanding of the education provided. See EMR under Patient Instructions for exercises provided during therapy sessions    ASSESSMENT     Darrion tolerated treatment well today. Presents to clinic " reporting no new issues. Continued to perform and progress movements to improve stability and mobility of cervicothoracic and lumbopelvic regions. Will continue to progress treatment per patient tolerance.      Darrion Is progressing well towards his goals.   Pt prognosis is Good.     Pt will continue to benefit from skilled outpatient physical therapy to address the deficits listed in the problem list box on initial evaluation, provide pt/family education and to maximize pt's level of independence in the home and community environment.     Pt's spiritual, cultural and educational needs considered and pt agreeable to plan of care and goals.     Anticipated barriers to physical therapy: none    Goals:   Short Term Goals: 4 weeks   - The patient with report compliance with HEP to maximize functional outcomes. Appropriate and ongoing  - The patient will demonstrate increase in BLE MMT by 1/2 grade to improve pt's functional mobility. Appropriate and ongoing  - The patient will decrease pain level by 50% in order to improve QOL. Appropriate and ongoing  - The patient will demonstrate 25% improvement in lumbar ROM to improve ability to perform ADLs. Appropriate and ongoing     Long Term Goals: 8 weeks   - The patient will demonstrate understanding and performance of advanced HEP to allow for independence once discharged from therapy. Appropriate and ongoing  - The patient will demonstrate 50% improvement in lumbar ROM to improve ability to perform ADLs. Appropriate and ongoing  - The patient will demonstrate increase in BLE MMT by 1 grade to improve pt's functional mobility. Appropriate and ongoing  - The patient will report 60% functional limitation on FOTO to indicate an improvement in overall function. Appropriate and ongoing    PLAN     Continue POC    Eduard Mclaughlin, PT

## 2024-05-01 NOTE — PROGRESS NOTES
"[facility]   Physical Therapy Treatment Note     Name: Darrion Bennett  Clinic Number: [id number]    Therapy Diagnosis:   Encounter Diagnosis   Name Primary?    Poor posture Yes     Physician: Farooq Domingo MD    Visit Date: 4/23/2024    Physician orders: PT Eval and Treat   Medical diagnosis from referral: M54.6,G89.29 (ICD-10-CM) - Chronic midline thoracic back pain   Evaluation date: 4/4/2024  Authorization period expiration: 2/25/25  Plan of care expiration: 6/19/2024  Reassessment due: 5/10/2024   Visit # / visits authorized: 2/10 +eval    FOTO: 1/ 1  PTA Visit #: 0/5     Precautions: standard    Time In: 1000  Time Out: 1100  Total Billable Time: 55 minutes    SUBJECTIVE     Pt reports that he is doing well with no issues today. Some slight thoracic pain.     He was compliant with home exercise program.  Response to previous treatment: none  Functional change: none yet    Pain: 7/10  Location:  midline thoracolumbar      OBJECTIVE     Objective Measures updated at progress report unless specified.     TREATMENT     Darrion received the treatments listed below:      received therapeutic exercises to develop strength and ROM for 55 minutes including:    Nustep 8'  LTR 3'  PPT 3x10x3"  HL clams RTB 3x10  HL iso ball squeezes 3x10  Bridges 3x10  Scap squeezes 3x10  Thoracic extension over bolster 2x10  Brueggers 2x10 RTB  EIS 3x10  +Cervical retraction/extensions SNAGs 2x10x3"      PATIENT EDUCATION AND HOME EXERCISES     Home Exercises Provided and Patient Education Provided     Education provided:   - PT role and POC  - HEP    Written Home Exercises Provided: Patient instructed to cont prior HEP. Exercises were reviewed and Darrion was able to demonstrate them prior to the end of the session.  Darrion demonstrated good  understanding of the education provided. See EMR under Patient Instructions for exercises provided during therapy sessions    ASSESSMENT     Darrion tolerated treatment well " today. Presents to clinic reporting slight midline thoracic pain. Continued working on improving lumbopelvic and cervicothoracic stability and mobility. Tolerated all interventions well. Will continue to progress treatment per patient tolerance.      Darrion Is progressing well towards his goals.   Pt prognosis is Good.     Pt will continue to benefit from skilled outpatient physical therapy to address the deficits listed in the problem list box on initial evaluation, provide pt/family education and to maximize pt's level of independence in the home and community environment.     Pt's spiritual, cultural and educational needs considered and pt agreeable to plan of care and goals.     Anticipated barriers to physical therapy: none    Goals:   Short Term Goals: 4 weeks   - The patient with report compliance with HEP to maximize functional outcomes. Appropriate and ongoing  - The patient will demonstrate increase in BLE MMT by 1/2 grade to improve pt's functional mobility. Appropriate and ongoing  - The patient will decrease pain level by 50% in order to improve QOL. Appropriate and ongoing  - The patient will demonstrate 25% improvement in lumbar ROM to improve ability to perform ADLs. Appropriate and ongoing     Long Term Goals: 8 weeks   - The patient will demonstrate understanding and performance of advanced HEP to allow for independence once discharged from therapy. Appropriate and ongoing  - The patient will demonstrate 50% improvement in lumbar ROM to improve ability to perform ADLs. Appropriate and ongoing  - The patient will demonstrate increase in BLE MMT by 1 grade to improve pt's functional mobility. Appropriate and ongoing  - The patient will report 60% functional limitation on FOTO to indicate an improvement in overall function. Appropriate and ongoing    PLAN     Continue POC    Eduard Mclaughlin, PT

## 2024-05-02 ENCOUNTER — CLINICAL SUPPORT (OUTPATIENT)
Dept: REHABILITATION | Facility: HOSPITAL | Age: 74
End: 2024-05-02
Payer: MEDICARE

## 2024-05-02 DIAGNOSIS — R29.3 POOR POSTURE: Primary | ICD-10-CM

## 2024-05-02 DIAGNOSIS — M54.6 CHRONIC MIDLINE THORACIC BACK PAIN: ICD-10-CM

## 2024-05-02 DIAGNOSIS — G89.29 CHRONIC MIDLINE THORACIC BACK PAIN: ICD-10-CM

## 2024-05-02 PROCEDURE — 97110 THERAPEUTIC EXERCISES: CPT | Mod: PN,CQ

## 2024-05-02 NOTE — PROGRESS NOTES
"[facility]   Physical Therapy Treatment Note     Name: Darrion Bennett  Clinic Number: [id number]    Therapy Diagnosis:   Encounter Diagnoses   Name Primary?    Poor posture Yes    Chronic midline thoracic back pain      Physician: Farooq Domingo MD    Visit Date: 5/2/2024    Physician orders: PT Eval and Treat   Medical diagnosis from referral: M54.6,G89.29 (ICD-10-CM) - Chronic midline thoracic back pain   Evaluation date: 4/4/2024  Authorization period expiration: 2/25/25  Plan of care expiration: 6/19/2024  Reassessment due: 5/10/2024   Visit # / visits authorized: 3/10 +eval    FOTO: 1/ 1  PTA Visit #: 1/5     Precautions: standard    Time In: 0800am  Time Out: 0855am  Total Billable Time: 55 minutes    SUBJECTIVE     Pt reports: his back still hurts. He also has foot pain which patient states he's going to see a podiatrics on the 13th of this month.     He was compliant with home exercise program.  Response to previous treatment: none  Functional change: none yet    Pain: 7/10  Location:  midline thoracolumbar      OBJECTIVE     Objective Measures updated at progress report unless specified.     TREATMENT     Darrion received the treatments listed below:      received therapeutic exercises to develop strength and ROM for 55 minutes including:    Nustep 8' Lv 1  LTR 3' with physioball   PPT 3x10x3"  HL clams RTB 3x10  HL iso ball squeezes 3x10  Bridges 3x10  Scap squeezes 3x10  Thoracic extension over bolster 2x10  Brueggers 2x10 RTB  EIS 3x10 = not performed this visit   Cervical retraction/extensions SNAGs 2x10x3" = not performed this visit   +Seated ball rollouts 2x10  +seated chin tucks, 2x10     MHP x 8 minutes to mid-thoracic for decrease pain and for muscles relaxation after therapy exercises.       PATIENT EDUCATION AND HOME EXERCISES     Home Exercises Provided and Patient Education Provided     Education provided:   - PT role and POC  - HEP    Written Home Exercises Provided: " Patient instructed to cont prior HEP. Exercises were reviewed and Darrion was able to demonstrate them prior to the end of the session.  Darrion demonstrated good  understanding of the education provided. See EMR under Patient Instructions for exercises provided during therapy sessions    ASSESSMENT   Patient tolerate exercise fairly with moderate pain to mid-thoracic back. Patient also reports of foot pain which he has an appointment with the podiatrics soon for a further examination. Patient has difficulty with cervical retraction/extension SNAG exercise, therefore modified this exercise to chin tucks with cues for hand on chin and retract back. Patient did well with demonstration provided by therapist. Ended session with heating pack to midback for decrease pain. At this time, patient is steadily progressing towards his therapy goals.     Darrion Is progressing well towards his goals.   Pt prognosis is Good.     Pt will continue to benefit from skilled outpatient physical therapy to address the deficits listed in the problem list box on initial evaluation, provide pt/family education and to maximize pt's level of independence in the home and community environment.     Pt's spiritual, cultural and educational needs considered and pt agreeable to plan of care and goals.     Anticipated barriers to physical therapy: none    Goals:   Short Term Goals: 4 weeks   - The patient with report compliance with HEP to maximize functional outcomes. Appropriate and ongoing  - The patient will demonstrate increase in BLE MMT by 1/2 grade to improve pt's functional mobility. Appropriate and ongoing  - The patient will decrease pain level by 50% in order to improve QOL. Appropriate and ongoing  - The patient will demonstrate 25% improvement in lumbar ROM to improve ability to perform ADLs. Appropriate and ongoing     Long Term Goals: 8 weeks   - The patient will demonstrate understanding and performance of advanced HEP to allow for  independence once discharged from therapy. Appropriate and ongoing  - The patient will demonstrate 50% improvement in lumbar ROM to improve ability to perform ADLs. Appropriate and ongoing  - The patient will demonstrate increase in BLE MMT by 1 grade to improve pt's functional mobility. Appropriate and ongoing  - The patient will report 60% functional limitation on FOTO to indicate an improvement in overall function. Appropriate and ongoing    PLAN     Continue EDUAR Garland Mai, BARRON

## 2024-05-07 ENCOUNTER — CLINICAL SUPPORT (OUTPATIENT)
Dept: REHABILITATION | Facility: HOSPITAL | Age: 74
End: 2024-05-07
Payer: MEDICARE

## 2024-05-07 DIAGNOSIS — R29.3 POOR POSTURE: Primary | ICD-10-CM

## 2024-05-08 NOTE — PROGRESS NOTES
"[facility]   Physical Therapy Treatment Note     Name: Darrion Bennett  Clinic Number: [id number]    Therapy Diagnosis:   Encounter Diagnosis   Name Primary?    Poor posture Yes     Physician: Farooq Domingo MD    Visit Date: 5/7/2024    Physician orders: PT Eval and Treat   Medical diagnosis from referral: M54.6,G89.29 (ICD-10-CM) - Chronic midline thoracic back pain   Evaluation date: 4/4/2024  Authorization period expiration: 2/25/25  Plan of care expiration: 6/19/2024  Reassessment due: 5/10/2024   Visit # / visits authorized: 4/10 +eval    FOTO: 1/ 1  PTA Visit #: 1/5     Precautions: standard    Time In: 0800am  Time Out: 0855am  Total Billable Time: 55 minutes    SUBJECTIVE     Pt reports continued back pain, but some intermittent improvement.     He was compliant with home exercise program.  Response to previous treatment: none  Functional change: none yet    Pain: 4/10  Location:  midline thoracolumbar      OBJECTIVE     Objective Measures updated at progress report unless specified.     TREATMENT     Darrion received the treatments listed below:      received therapeutic exercises to develop strength and ROM for 55 minutes including:    Nustep 8' Lv 1  LTR 3' with physioball   PPT 3x10x3"  HL clams RTB 3x10  HL iso ball squeezes 3x10  Bridges 3x10  Scap squeezes 3x10  Thoracic extension over bolster 2x10  Brueggers 2x10 RTB  EIS 3x10  Cervical retraction/extensions SNAGs 2x10x3" = not performed this visit   Seated ball rollouts 2x10  seated chin tucks, 2x10     MHP x 8 minutes to mid-thoracic for decrease pain and for muscles relaxation after therapy exercises.       PATIENT EDUCATION AND HOME EXERCISES     Home Exercises Provided and Patient Education Provided     Education provided:   - PT role and POC  - HEP    Written Home Exercises Provided: Patient instructed to cont prior HEP. Exercises were reviewed and Darrion was able to demonstrate them prior to the end of the session.  " Darrion demonstrated good  understanding of the education provided. See EMR under Patient Instructions for exercises provided during therapy sessions    ASSESSMENT   Darrion tolerated treatment well today. Presents to clinic reporting continued symptoms. Continued to work on improving posture and strengthening posterior chain. Tolerated interventions well without aggravation of symptoms. Tolerated heat well. Will continue to progress treatment per patient tolerance.      Darrion Is progressing well towards his goals.   Pt prognosis is Good.     Pt will continue to benefit from skilled outpatient physical therapy to address the deficits listed in the problem list box on initial evaluation, provide pt/family education and to maximize pt's level of independence in the home and community environment.     Pt's spiritual, cultural and educational needs considered and pt agreeable to plan of care and goals.     Anticipated barriers to physical therapy: none    Goals:   Short Term Goals: 4 weeks   - The patient with report compliance with HEP to maximize functional outcomes. Appropriate and ongoing  - The patient will demonstrate increase in BLE MMT by 1/2 grade to improve pt's functional mobility. Appropriate and ongoing  - The patient will decrease pain level by 50% in order to improve QOL. Appropriate and ongoing  - The patient will demonstrate 25% improvement in lumbar ROM to improve ability to perform ADLs. Appropriate and ongoing     Long Term Goals: 8 weeks   - The patient will demonstrate understanding and performance of advanced HEP to allow for independence once discharged from therapy. Appropriate and ongoing  - The patient will demonstrate 50% improvement in lumbar ROM to improve ability to perform ADLs. Appropriate and ongoing  - The patient will demonstrate increase in BLE MMT by 1 grade to improve pt's functional mobility. Appropriate and ongoing  - The patient will report 60% functional limitation on FOTO to  indicate an improvement in overall function. Appropriate and ongoing    PLAN     Continue POC    Eduard Mclaughlin, PT

## 2024-05-12 DIAGNOSIS — H40.1132 PRIMARY OPEN ANGLE GLAUCOMA (POAG) OF BOTH EYES, MODERATE STAGE: ICD-10-CM

## 2024-05-13 ENCOUNTER — HOSPITAL ENCOUNTER (OUTPATIENT)
Dept: RADIOLOGY | Facility: HOSPITAL | Age: 74
Discharge: HOME | End: 2024-05-13
Attending: PODIATRIST
Payer: MEDICARE

## 2024-05-13 ENCOUNTER — OFFICE VISIT (OUTPATIENT)
Dept: PODIATRY | Facility: CLINIC | Age: 74
End: 2024-05-13
Payer: MEDICARE

## 2024-05-13 VITALS — WEIGHT: 125.88 LBS | BODY MASS INDEX: 20.23 KG/M2 | HEIGHT: 66 IN

## 2024-05-13 DIAGNOSIS — M79.671 PAIN IN BOTH FEET: ICD-10-CM

## 2024-05-13 DIAGNOSIS — E11.49 TYPE II DIABETES MELLITUS WITH NEUROLOGICAL MANIFESTATIONS: Primary | ICD-10-CM

## 2024-05-13 DIAGNOSIS — M79.672 PAIN IN BOTH FEET: ICD-10-CM

## 2024-05-13 DIAGNOSIS — M77.40 METATARSALGIA, UNSPECIFIED LATERALITY: ICD-10-CM

## 2024-05-13 DIAGNOSIS — D36.10 NEUROMA: ICD-10-CM

## 2024-05-13 PROCEDURE — 3051F HG A1C>EQUAL 7.0%<8.0%: CPT | Mod: CPTII,S$GLB,, | Performed by: PODIATRIST

## 2024-05-13 PROCEDURE — 1101F PT FALLS ASSESS-DOCD LE1/YR: CPT | Mod: CPTII,S$GLB,, | Performed by: PODIATRIST

## 2024-05-13 PROCEDURE — 73630 X-RAY EXAM OF FOOT: CPT | Mod: 26,50,, | Performed by: RADIOLOGY

## 2024-05-13 PROCEDURE — 1159F MED LIST DOCD IN RCRD: CPT | Mod: CPTII,S$GLB,, | Performed by: PODIATRIST

## 2024-05-13 PROCEDURE — 73630 X-RAY EXAM OF FOOT: CPT | Mod: TC,50,FY,PO

## 2024-05-13 PROCEDURE — 99214 OFFICE O/P EST MOD 30 MIN: CPT | Mod: S$GLB,,, | Performed by: PODIATRIST

## 2024-05-13 PROCEDURE — 99999 PR PBB SHADOW E&M-EST. PATIENT-LVL IV: CPT | Mod: PBBFAC,,, | Performed by: PODIATRIST

## 2024-05-13 PROCEDURE — 3288F FALL RISK ASSESSMENT DOCD: CPT | Mod: CPTII,S$GLB,, | Performed by: PODIATRIST

## 2024-05-13 RX ORDER — MELOXICAM 15 MG/1
15 TABLET ORAL DAILY
Qty: 20 TABLET | Refills: 0 | Status: SHIPPED | OUTPATIENT
Start: 2024-05-13

## 2024-05-13 RX ORDER — DORZOLAMIDE HCL 20 MG/ML
1 SOLUTION/ DROPS OPHTHALMIC 2 TIMES DAILY
Qty: 10 ML | Refills: 3 | Status: SHIPPED | OUTPATIENT
Start: 2024-05-13

## 2024-05-13 NOTE — PROGRESS NOTES
Subjective:     Patient ID: Darrion Bennett is a 74 y.o. male.    Chief Complaint: Diabetes Mellitus (2/26/24 Dr Domingo) and Foot Problem (Feet burning)    Darrion is a 74 y.o. male who presents to the podiatry clinic  with complaint of  bilateral foot pain. Onset of the symptoms was several weeks ago. Precipitating event: none known. Current symptoms include: ability to bear weight, but with some pain. Aggravating factors: any weight bearing. Symptoms have progressed to a point and plateaued. Patient has had no prior foot problems. Evaluation to date: none. Treatment to date: none. Patients rates pain 3/10 on pain scale.    Review of Systems   Constitutional: Negative for chills.   Cardiovascular:  Negative for chest pain and claudication.   Respiratory:  Negative for cough.    Skin:  Positive for color change, dry skin and nail changes.   Musculoskeletal:  Positive for joint pain.   Gastrointestinal:  Negative for nausea.   Neurological:  Positive for paresthesias. Negative for numbness.   Psychiatric/Behavioral:  The patient is not nervous/anxious.         Objective:     Physical Exam  Constitutional:       Appearance: He is well-developed.      Comments: Oriented to time, place, and person.   Cardiovascular:      Comments: DP and PT pulses are palpable bilaterally. 3 sec capillary refill time and toes and feet are warm to touch proximally .  There is  hair growth on the feet and toes b/l. There is no edema b/l. No spider veins or varicosities present b/l.     Musculoskeletal:      Comments: Equinus noted b/l ankles with < 10 deg DF noted. MMT 5/5 in DF/PF/Inv/Ev resistance with no reproduction of pain in any direction. Passive range of motion of ankle and pedal joints is painless b/l.  Moderate pain on palpation distal IM spaces 2-4 with pain radiating into adjacent toes       Feet:      Right foot:      Skin integrity: No callus or dry skin.      Left foot:      Skin integrity: No callus or dry skin.    Lymphadenopathy:      Comments: Negative lymphadenopathy bilateral popliteal fossa and tarsal tunnel.   Skin:     Comments: No open lesions, lacerations or wounds noted.Interdigital spaces clean, dry and intact b/l. No erythema noted to b/l foot.  Nails normal color and trophic qualities.     Neurological:      Mental Status: He is alert.      Comments: Light touch, proprioception, and sharp/dull sensation are all intact bilaterally. Protective threshold with the [place] monofilament is intact bilaterally.    Psychiatric:         Behavior: Behavior is cooperative.           Assessment:      Encounter Diagnosis   Name Primary?    Pain in both feet Yes     Plan:     Darrion was seen today for diabetes mellitus and foot problem.    Diagnoses and all orders for this visit:    Pain in both feet  -     X-Ray Foot Complete Bilateral; Future    Other orders  -     meloxicam (MOBIC) 15 MG tablet; Take 1 tablet (15 mg total) by mouth once daily.      I counseled the patient on his conditions, their implications and medical management.      Xray ordered B/L    Patient instructed on adequate icing techniques. Patient should ice the affected area at least once per day x 10 minutes for 10 days . I advised the patient that extra icing would also be beneficial to ensure adequate anti inflammatory effect      Mobic prescribed. Patient was instructed on dosing information. Discontinue if adverse effects occur      Metatarsal pads dispensed.     - Shoe inspection. Diabetic Foot Education. Patient reminded of the importance of good nutrition and blood sugar control to help prevent podiatric complications of diabetes. Patient instructed on proper foot hygeine. We discussed wearing proper shoe gear, daily foot inspections, never walking without protective shoe gear, caution putting sharp instruments to feet     - Discussed DM foot care:  Wear comfortable, proper fitting shoes. Wash feet daily. Dry well. After drying, apply  moisturizer to feet (no lotion to webspaces). Inspect feet daily for skin breaks, blisters, swelling, or redness. Wear cotton socks (preferably white)  Change socks every day. Do NOT walk barefoot. Do NOT use heating pads or warm/hot water soaks     RTC PRN

## 2024-05-16 ENCOUNTER — CLINICAL SUPPORT (OUTPATIENT)
Dept: REHABILITATION | Facility: HOSPITAL | Age: 74
End: 2024-05-16
Payer: MEDICARE

## 2024-05-16 DIAGNOSIS — R29.3 POOR POSTURE: Primary | ICD-10-CM

## 2024-05-21 ENCOUNTER — CLINICAL SUPPORT (OUTPATIENT)
Dept: REHABILITATION | Facility: HOSPITAL | Age: 74
End: 2024-05-21
Payer: MEDICARE

## 2024-05-21 ENCOUNTER — OFFICE VISIT (OUTPATIENT)
Dept: RHEUMATOLOGY | Facility: CLINIC | Age: 74
End: 2024-05-21
Payer: MEDICARE

## 2024-05-21 VITALS
BODY MASS INDEX: 20.89 KG/M2 | SYSTOLIC BLOOD PRESSURE: 153 MMHG | WEIGHT: 129.44 LBS | DIASTOLIC BLOOD PRESSURE: 76 MMHG | HEART RATE: 81 BPM

## 2024-05-21 DIAGNOSIS — R29.3 POOR POSTURE: Primary | ICD-10-CM

## 2024-05-21 DIAGNOSIS — M06.9 RHEUMATOID ARTHRITIS FLARE: Primary | ICD-10-CM

## 2024-05-21 PROCEDURE — 97110 THERAPEUTIC EXERCISES: CPT | Mod: PN

## 2024-05-21 PROCEDURE — 1101F PT FALLS ASSESS-DOCD LE1/YR: CPT | Mod: CPTII,S$GLB,, | Performed by: INTERNAL MEDICINE

## 2024-05-21 PROCEDURE — 3051F HG A1C>EQUAL 7.0%<8.0%: CPT | Mod: CPTII,S$GLB,, | Performed by: INTERNAL MEDICINE

## 2024-05-21 PROCEDURE — 3077F SYST BP >= 140 MM HG: CPT | Mod: CPTII,S$GLB,, | Performed by: INTERNAL MEDICINE

## 2024-05-21 PROCEDURE — 3288F FALL RISK ASSESSMENT DOCD: CPT | Mod: CPTII,S$GLB,, | Performed by: INTERNAL MEDICINE

## 2024-05-21 PROCEDURE — 1125F AMNT PAIN NOTED PAIN PRSNT: CPT | Mod: CPTII,S$GLB,, | Performed by: INTERNAL MEDICINE

## 2024-05-21 PROCEDURE — 3078F DIAST BP <80 MM HG: CPT | Mod: CPTII,S$GLB,, | Performed by: INTERNAL MEDICINE

## 2024-05-21 PROCEDURE — 99999 PR PBB SHADOW E&M-EST. PATIENT-LVL IV: CPT | Mod: PBBFAC,,, | Performed by: INTERNAL MEDICINE

## 2024-05-21 PROCEDURE — 99215 OFFICE O/P EST HI 40 MIN: CPT | Mod: S$GLB,,, | Performed by: INTERNAL MEDICINE

## 2024-05-21 PROCEDURE — 1159F MED LIST DOCD IN RCRD: CPT | Mod: CPTII,S$GLB,, | Performed by: INTERNAL MEDICINE

## 2024-05-21 NOTE — PROGRESS NOTES
Subjective:       Patient ID: Darrion Bennett is a 69 y.o. male.    Chief Complaint: Follow-up     HPI  69 year old male with type II DM, cataracts, HTN, gout,  Sjogrens, urolithiasis, CHF, hypothyroidism, CKD here for evaluation.  He reports that he was diagnosed with Sjogrens when he had dry eyes and dry mouth in 2014.. He takes plaquenil 200mg po BID. He is  taking azathioprine 50mg po TID and medrol 4mg po qqday.   He was put on imuran by Dr. Corona.  He was followed by Dr. Corona from 2014 to 2017.  In October 2018, imuran and medrol was stopped. Patient restarted imuran in November 2018.  Reports that he feels that imuran helps him with joint.   Today he denies pain, stiffness or swelling.  He denies sry eyes or dry mouth.  Denies trouble making a fist.    He was diagnosed in January in left aspect of foot with pain, swelling and redness.         Interval history:  He is taking uloric 40mg once a day. He is taking prednisone 5 mg a day on sat/sunday. He is taking Orencia once a week.He is having pain and swelling in hands.  He has pain in neck,midback, bottom of the feet. He has pain in thighs.   He has pain and swelling in both hands.He also has pain in shoulder, feet, knees. He is stiff for a long time in morning.      Past Medical History:   Diagnosis Date    Anemia     Arthritis     Cataract     Chronic constipation     Diabetes mellitus, type 2     Glaucoma     Hypertension     MCTD (mixed connective tissue disease)     Sjogren's disease     Ulcerative colitis     Urolithiasis        Review of Systems   Constitutional: Negative for activity change, appetite change, chills, diaphoresis, fatigue and fever.   HENT: Negative for ear discharge, ear pain, hearing loss, mouth sores, nosebleeds and sinus pressure.    Eyes: Negative.  Negative for photophobia, pain, discharge, redness and itching.   Respiratory: Negative for cough, chest tightness and shortness of breath.    Cardiovascular: Negative for chest pain,  palpitations and leg swelling.   Gastrointestinal: Negative for abdominal distention, blood in stool and nausea.   Endocrine: Negative for cold intolerance, heat intolerance, polydipsia and polyphagia.   Genitourinary: Negative for flank pain, genital sores and hematuria.   Musculoskeletal: Positive for arthralgias. Negative for back pain, gait problem, joint swelling, myalgias, neck pain and neck stiffness.   Skin: Negative for color change, pallor and rash.   Neurological: Negative for dizziness, weakness, light-headedness and headaches.   Hematological: Negative for adenopathy. Does not bruise/bleed easily.   Psychiatric/Behavioral: Negative for decreased concentration and hallucinations. The patient is not nervous/anxious.              Objective:        Physical Exam   Constitutional: He is well-developed, well-nourished, and in no distress. No distress.   HENT:   Head: Normocephalic and atraumatic.   Right Ear: External ear normal.   Left Ear: External ear normal.   Nose: Nose normal.   Mouth/Throat: Oropharynx is clear and moist. No oropharyngeal exudate.   Eyes: Conjunctivae and EOM are normal. Pupils are equal, round, and reactive to light. Right eye exhibits no discharge. Left eye exhibits no discharge. No scleral icterus.   Neck: Normal range of motion. Neck supple. No JVD present. No tracheal deviation present. No thyromegaly present.   Cardiovascular: Normal rate, normal heart sounds and intact distal pulses.  Exam reveals no gallop and no friction rub.    No murmur heard.  Pulmonary/Chest: Effort normal. No stridor. No respiratory distress. He has no wheezes. He has no rales. He exhibits no tenderness.   Abdominal: Soft. Bowel sounds are normal. He exhibits no distension and no mass. There is no tenderness. There is no rebound and no guarding.   Lymphadenopathy:     He has no cervical adenopathy.   Neurological: No cranial nerve deficit. Coordination normal.   Skin: Skin is warm and dry. No rash noted.  He is not diaphoretic. No erythema. No pallor.     Musculoskeletal: Normal range of motion. He exhibits no edema, tenderness or deformity.     Labs:  Gilma-+  Ssa+  Ssb+  +RNP  Ccp,rf-negative      Right hand xray (5/2019): I personally reviewed; Soft tissue swelling tip of long finger.  Negative for fracture.     Assessment:    74 year old male with type II DM, cataracts, HTN, gout,  Sjogrens, urolithiasis, CHF, hypothyroidism, CKD here for follow up. He was previously followed by Dr. Lau.  He reports that he was diagnosed with Sjogrens when he had dry eyes and dry mouth in 2014. Serologies are +GILMA, +SSA,+SSB, and +RNP. He denies raynauds, rashes, oral ulcers or symptoms of SLE. His main issue before we met was inflammatory arthritis which was being treated with plaquenil, imuran, and medrol.   When we initially met in feb 2019 , he was taking plaquenil 200mg po BID, azathioprine 50mg po TID and medrol 4mg po qqday. His last rheumatologist discontinued his medications and patient decided to restart the medications himself since he was having so much pain. He developed imuran toxicity so all his medications were discontinued.    #Inflammatory arthritis: secondary to Sjogrens. Was previously on plaquenil and imuran but developed cytopenias so had to stop.  Doing much better on Orencia but is coming in with severe flare so will switch him to Orencia infusions.  Consent signed and therapy plan in place (Risks r discussed with patient and not limited to cell count abnormalities, malignancy, allergic  reaction to medication, and increased risk of infection. Patient agrees with starting medication. )  Given +GILMA and +RNP, will avoid ANTI- TNF therapy  Increase prednisone from 5 mg a day to 10mg a day for one week and then go back to 5mg a day  labs    # tophaceous gout, but had allergy to allopurinol so tried uloric.  He has been taking uloric 40mg a day and doing well.  -Labs       #osteporosis: he had recent dexa  scan showing osteoporosis.  Have asked him to discuss this with endo who he sees for diabetes.        40 * minutes of total time spent on the encounter, which includes face to face time and non-face to face time preparing to see the patient (eg, review of tests), Obtaining and/or reviewing separately obtained history, Documenting clinical information in the electronic or other health record, Independently interpreting results (not separately reported) and communicating results to the patient/family/caregiver, or Care coordination (not separately reported).

## 2024-05-22 ENCOUNTER — TELEPHONE (OUTPATIENT)
Dept: NEUROLOGY | Facility: CLINIC | Age: 74
End: 2024-05-22
Payer: MEDICARE

## 2024-05-22 ENCOUNTER — LAB VISIT (OUTPATIENT)
Dept: LAB | Facility: HOSPITAL | Age: 74
End: 2024-05-22
Attending: INTERNAL MEDICINE
Payer: MEDICARE

## 2024-05-22 ENCOUNTER — TELEPHONE (OUTPATIENT)
Dept: FAMILY MEDICINE | Facility: CLINIC | Age: 74
End: 2024-05-22
Payer: MEDICARE

## 2024-05-22 DIAGNOSIS — E11.22 CONTROLLED TYPE 2 DIABETES MELLITUS WITH STAGE 3 CHRONIC KIDNEY DISEASE, WITH LONG-TERM CURRENT USE OF INSULIN: Primary | ICD-10-CM

## 2024-05-22 DIAGNOSIS — N18.30 CONTROLLED TYPE 2 DIABETES MELLITUS WITH STAGE 3 CHRONIC KIDNEY DISEASE, WITH LONG-TERM CURRENT USE OF INSULIN: Primary | ICD-10-CM

## 2024-05-22 DIAGNOSIS — Z79.4 CONTROLLED TYPE 2 DIABETES MELLITUS WITH STAGE 3 CHRONIC KIDNEY DISEASE, WITH LONG-TERM CURRENT USE OF INSULIN: Primary | ICD-10-CM

## 2024-05-22 DIAGNOSIS — D63.1 ANEMIA OF CHRONIC RENAL FAILURE, STAGE 3B: ICD-10-CM

## 2024-05-22 DIAGNOSIS — M1A.9XX1 TOPHACEOUS GOUT: ICD-10-CM

## 2024-05-22 DIAGNOSIS — N18.32 ANEMIA OF CHRONIC RENAL FAILURE, STAGE 3B: Primary | ICD-10-CM

## 2024-05-22 DIAGNOSIS — M06.9 RHEUMATOID ARTHRITIS FLARE: ICD-10-CM

## 2024-05-22 DIAGNOSIS — D63.1 ANEMIA OF CHRONIC RENAL FAILURE, STAGE 3B: Primary | ICD-10-CM

## 2024-05-22 DIAGNOSIS — N18.32 ANEMIA OF CHRONIC RENAL FAILURE, STAGE 3B: ICD-10-CM

## 2024-05-22 LAB
ALBUMIN SERPL BCP-MCNC: 3.3 G/DL (ref 3.5–5.2)
ALP SERPL-CCNC: 68 U/L (ref 55–135)
ALT SERPL W/O P-5'-P-CCNC: 18 U/L (ref 10–44)
ANION GAP SERPL CALC-SCNC: 10 MMOL/L (ref 8–16)
AST SERPL-CCNC: 22 U/L (ref 10–40)
BASOPHILS # BLD AUTO: 0.04 K/UL (ref 0–0.2)
BASOPHILS NFR BLD: 0.6 % (ref 0–1.9)
BILIRUB SERPL-MCNC: 0.5 MG/DL (ref 0.1–1)
BUN SERPL-MCNC: 26 MG/DL (ref 8–23)
CALCIUM SERPL-MCNC: 9.6 MG/DL (ref 8.7–10.5)
CHLORIDE SERPL-SCNC: 108 MMOL/L (ref 95–110)
CO2 SERPL-SCNC: 20 MMOL/L (ref 23–29)
CREAT SERPL-MCNC: 1.6 MG/DL (ref 0.5–1.4)
CRP SERPL-MCNC: 2.7 MG/L (ref 0–8.2)
DIFFERENTIAL METHOD BLD: ABNORMAL
EOSINOPHIL # BLD AUTO: 0.2 K/UL (ref 0–0.5)
EOSINOPHIL NFR BLD: 3.3 % (ref 0–8)
ERYTHROCYTE [DISTWIDTH] IN BLOOD BY AUTOMATED COUNT: 15.4 % (ref 11.5–14.5)
ERYTHROCYTE [SEDIMENTATION RATE] IN BLOOD BY PHOTOMETRIC METHOD: 61 MM/HR (ref 0–23)
EST. GFR  (NO RACE VARIABLE): 44.9 ML/MIN/1.73 M^2
GLUCOSE SERPL-MCNC: 104 MG/DL (ref 70–110)
HAPTOGLOB SERPL-MCNC: 165 MG/DL (ref 30–250)
HBV CORE AB SERPL QL IA: NORMAL
HBV SURFACE AG SERPL QL IA: NORMAL
HCT VFR BLD AUTO: 34.3 % (ref 40–54)
HGB BLD-MCNC: 10.9 G/DL (ref 14–18)
IMM GRANULOCYTES # BLD AUTO: 0.01 K/UL (ref 0–0.04)
IMM GRANULOCYTES NFR BLD AUTO: 0.1 % (ref 0–0.5)
LDH SERPL L TO P-CCNC: 192 U/L (ref 110–260)
LYMPHOCYTES # BLD AUTO: 1.8 K/UL (ref 1–4.8)
LYMPHOCYTES NFR BLD: 24.8 % (ref 18–48)
MCH RBC QN AUTO: 28.9 PG (ref 27–31)
MCHC RBC AUTO-ENTMCNC: 31.8 G/DL (ref 32–36)
MCV RBC AUTO: 91 FL (ref 82–98)
MONOCYTES # BLD AUTO: 1 K/UL (ref 0.3–1)
MONOCYTES NFR BLD: 13.7 % (ref 4–15)
NEUTROPHILS # BLD AUTO: 4.2 K/UL (ref 1.8–7.7)
NEUTROPHILS NFR BLD: 57.5 % (ref 38–73)
NRBC BLD-RTO: 0 /100 WBC
PLATELET # BLD AUTO: 233 K/UL (ref 150–450)
PMV BLD AUTO: 12.4 FL (ref 9.2–12.9)
POTASSIUM SERPL-SCNC: 4.4 MMOL/L (ref 3.5–5.1)
PROT SERPL-MCNC: 7 G/DL (ref 6–8.4)
RBC # BLD AUTO: 3.77 M/UL (ref 4.6–6.2)
SODIUM SERPL-SCNC: 138 MMOL/L (ref 136–145)
URATE SERPL-MCNC: 4.2 MG/DL (ref 3.4–7)
WBC # BLD AUTO: 7.22 K/UL (ref 3.9–12.7)

## 2024-05-22 PROCEDURE — 86235 NUCLEAR ANTIGEN ANTIBODY: CPT | Mod: 59 | Performed by: INTERNAL MEDICINE

## 2024-05-22 PROCEDURE — 86140 C-REACTIVE PROTEIN: CPT | Performed by: INTERNAL MEDICINE

## 2024-05-22 PROCEDURE — 86704 HEP B CORE ANTIBODY TOTAL: CPT | Performed by: INTERNAL MEDICINE

## 2024-05-22 PROCEDURE — 87340 HEPATITIS B SURFACE AG IA: CPT | Performed by: INTERNAL MEDICINE

## 2024-05-22 PROCEDURE — 83010 ASSAY OF HAPTOGLOBIN QUANT: CPT | Performed by: INTERNAL MEDICINE

## 2024-05-22 PROCEDURE — 84550 ASSAY OF BLOOD/URIC ACID: CPT | Performed by: INTERNAL MEDICINE

## 2024-05-22 PROCEDURE — 85652 RBC SED RATE AUTOMATED: CPT | Performed by: INTERNAL MEDICINE

## 2024-05-22 PROCEDURE — 80053 COMPREHEN METABOLIC PANEL: CPT | Performed by: INTERNAL MEDICINE

## 2024-05-22 PROCEDURE — 86039 ANTINUCLEAR ANTIBODIES (ANA): CPT | Performed by: INTERNAL MEDICINE

## 2024-05-22 PROCEDURE — 86038 ANTINUCLEAR ANTIBODIES: CPT | Performed by: INTERNAL MEDICINE

## 2024-05-22 PROCEDURE — 36415 COLL VENOUS BLD VENIPUNCTURE: CPT | Mod: PO | Performed by: INTERNAL MEDICINE

## 2024-05-22 PROCEDURE — 83615 LACTATE (LD) (LDH) ENZYME: CPT | Performed by: INTERNAL MEDICINE

## 2024-05-22 PROCEDURE — 85025 COMPLETE CBC W/AUTO DIFF WBC: CPT | Performed by: INTERNAL MEDICINE

## 2024-05-22 RX ORDER — DIPHENHYDRAMINE HYDROCHLORIDE 50 MG/ML
25 INJECTION INTRAMUSCULAR; INTRAVENOUS
OUTPATIENT
Start: 2024-05-22

## 2024-05-22 RX ORDER — HEPARIN 100 UNIT/ML
500 SYRINGE INTRAVENOUS
OUTPATIENT
Start: 2024-05-22

## 2024-05-22 RX ORDER — EPINEPHRINE 0.3 MG/.3ML
0.3 INJECTION SUBCUTANEOUS ONCE AS NEEDED
OUTPATIENT
Start: 2024-05-22

## 2024-05-22 RX ORDER — SODIUM CHLORIDE 0.9 % (FLUSH) 0.9 %
10 SYRINGE (ML) INJECTION
OUTPATIENT
Start: 2024-05-22

## 2024-05-22 RX ORDER — ACETAMINOPHEN 325 MG/1
650 TABLET ORAL
OUTPATIENT
Start: 2024-05-22

## 2024-05-22 RX ORDER — DIPHENHYDRAMINE HYDROCHLORIDE 50 MG/ML
50 INJECTION INTRAMUSCULAR; INTRAVENOUS ONCE AS NEEDED
OUTPATIENT
Start: 2024-05-22

## 2024-05-22 NOTE — TELEPHONE ENCOUNTER
----- Message from Gloria Reed sent at 5/22/2024  8:08 AM CDT -----  Regarding: request for a referral to hematology  Type:  Patient Requesting Referral    Who Called: Mrs. Bennett (wife of patient)    Referral to What Specialty: hematology     Reason for Referral: per Mrs. Bennett, she stated that the patient's blood level/ count has been low for the past 2 years     Does the patient want the referral with a specific physician?: no     [facility] the [facility]     Would the patient rather a call back or a response via [facility]? Call back     Best Call Back Number: [phone]

## 2024-05-22 NOTE — TELEPHONE ENCOUNTER
Last seen by Hematology in 2022, looks like his hematologist has left the system.    At the time no role for erythropoietin   Will submit referral though it looks like his blood counts have been stable.

## 2024-05-23 ENCOUNTER — LAB VISIT (OUTPATIENT)
Dept: LAB | Facility: HOSPITAL | Age: 74
End: 2024-05-23
Payer: MEDICARE

## 2024-05-23 DIAGNOSIS — Z79.4 CONTROLLED TYPE 2 DIABETES MELLITUS WITH STAGE 3 CHRONIC KIDNEY DISEASE, WITH LONG-TERM CURRENT USE OF INSULIN: ICD-10-CM

## 2024-05-23 DIAGNOSIS — D63.1 ANEMIA OF CHRONIC RENAL FAILURE, STAGE 3B: ICD-10-CM

## 2024-05-23 DIAGNOSIS — N18.30 CONTROLLED TYPE 2 DIABETES MELLITUS WITH STAGE 3 CHRONIC KIDNEY DISEASE, WITH LONG-TERM CURRENT USE OF INSULIN: ICD-10-CM

## 2024-05-23 DIAGNOSIS — N18.32 ANEMIA OF CHRONIC RENAL FAILURE, STAGE 3B: ICD-10-CM

## 2024-05-23 DIAGNOSIS — M1A.9XX1 TOPHACEOUS GOUT: Primary | ICD-10-CM

## 2024-05-23 DIAGNOSIS — E11.22 CONTROLLED TYPE 2 DIABETES MELLITUS WITH STAGE 3 CHRONIC KIDNEY DISEASE, WITH LONG-TERM CURRENT USE OF INSULIN: ICD-10-CM

## 2024-05-23 DIAGNOSIS — M1A.9XX1 TOPHACEOUS GOUT: ICD-10-CM

## 2024-05-23 LAB
ANA PATTERN 1: NORMAL
ANA SER QL IF: POSITIVE
ANA TITR SER IF: NORMAL {TITER}
BASOPHILS # BLD AUTO: 0.03 K/UL (ref 0–0.2)
BASOPHILS NFR BLD: 0.4 % (ref 0–1.9)
DIFFERENTIAL METHOD BLD: ABNORMAL
EOSINOPHIL # BLD AUTO: 0.2 K/UL (ref 0–0.5)
EOSINOPHIL NFR BLD: 2.8 % (ref 0–8)
ERYTHROCYTE [DISTWIDTH] IN BLOOD BY AUTOMATED COUNT: 14.8 % (ref 11.5–14.5)
ESTIMATED AVG GLUCOSE: 143 MG/DL (ref 68–131)
FERRITIN SERPL-MCNC: 220 NG/ML (ref 20–300)
HBA1C MFR BLD: 6.6 % (ref 4–5.6)
HCT VFR BLD AUTO: 35.6 % (ref 40–54)
HGB BLD-MCNC: 11.2 G/DL (ref 14–18)
IMM GRANULOCYTES # BLD AUTO: 0.02 K/UL (ref 0–0.04)
IMM GRANULOCYTES NFR BLD AUTO: 0.3 % (ref 0–0.5)
IRON SERPL-MCNC: 50 UG/DL (ref 45–160)
IRON SERPL-MCNC: 50 UG/DL (ref 45–160)
LYMPHOCYTES # BLD AUTO: 2.2 K/UL (ref 1–4.8)
LYMPHOCYTES NFR BLD: 28.3 % (ref 18–48)
MCH RBC QN AUTO: 27.9 PG (ref 27–31)
MCHC RBC AUTO-ENTMCNC: 31.5 G/DL (ref 32–36)
MCV RBC AUTO: 89 FL (ref 82–98)
MONOCYTES # BLD AUTO: 1 K/UL (ref 0.3–1)
MONOCYTES NFR BLD: 13.7 % (ref 4–15)
NEUTROPHILS # BLD AUTO: 4.2 K/UL (ref 1.8–7.7)
NEUTROPHILS NFR BLD: 54.5 % (ref 38–73)
NRBC BLD-RTO: 0 /100 WBC
PLATELET # BLD AUTO: 249 K/UL (ref 150–450)
PMV BLD AUTO: 11.8 FL (ref 9.2–12.9)
RBC # BLD AUTO: 4.01 M/UL (ref 4.6–6.2)
SATURATED IRON: 21 % (ref 20–50)
TOTAL IRON BINDING CAPACITY: 235 UG/DL (ref 250–450)
TRANSFERRIN SERPL-MCNC: 159 MG/DL (ref 200–375)
URATE SERPL-MCNC: 4.4 MG/DL (ref 3.4–7)
WBC # BLD AUTO: 7.61 K/UL (ref 3.9–12.7)

## 2024-05-23 PROCEDURE — 83540 ASSAY OF IRON: CPT | Performed by: INTERNAL MEDICINE

## 2024-05-23 PROCEDURE — 82728 ASSAY OF FERRITIN: CPT | Performed by: INTERNAL MEDICINE

## 2024-05-23 PROCEDURE — 36415 COLL VENOUS BLD VENIPUNCTURE: CPT | Mod: PO | Performed by: INTERNAL MEDICINE

## 2024-05-23 PROCEDURE — 83036 HEMOGLOBIN GLYCOSYLATED A1C: CPT | Performed by: INTERNAL MEDICINE

## 2024-05-23 PROCEDURE — 85025 COMPLETE CBC W/AUTO DIFF WBC: CPT | Performed by: INTERNAL MEDICINE

## 2024-05-23 PROCEDURE — 84550 ASSAY OF BLOOD/URIC ACID: CPT | Performed by: INTERNAL MEDICINE

## 2024-05-23 RX ORDER — FEBUXOSTAT 40 MG/1
40 TABLET, FILM COATED ORAL DAILY
Qty: 30 TABLET | Refills: 11 | Status: SHIPPED | OUTPATIENT
Start: 2024-05-23

## 2024-05-24 LAB
ANTI SM ANTIBODY: 0.23 RATIO (ref 0–0.99)
ANTI SM/RNP ANTIBODY: 5.12 RATIO (ref 0–0.99)
ANTI-SM INTERPRETATION: NEGATIVE
ANTI-SM/RNP INTERPRETATION: POSITIVE
ANTI-SSA ANTIBODY: 1.69 RATIO (ref 0–0.99)
ANTI-SSA INTERPRETATION: POSITIVE
ANTI-SSB ANTIBODY: 4.12 RATIO (ref 0–0.99)
ANTI-SSB INTERPRETATION: POSITIVE
DSDNA AB SER-ACNC: ABNORMAL [IU]/ML

## 2024-05-27 ENCOUNTER — LAB VISIT (OUTPATIENT)
Dept: LAB | Facility: HOSPITAL | Age: 74
End: 2024-05-27
Attending: INTERNAL MEDICINE
Payer: MEDICARE

## 2024-05-27 ENCOUNTER — INFUSION (OUTPATIENT)
Dept: INFUSION THERAPY | Facility: HOSPITAL | Age: 74
End: 2024-05-27
Attending: HOSPITALIST
Payer: MEDICARE

## 2024-05-27 ENCOUNTER — OFFICE VISIT (OUTPATIENT)
Dept: HEMATOLOGY/ONCOLOGY | Facility: CLINIC | Age: 74
End: 2024-05-27
Payer: MEDICARE

## 2024-05-27 VITALS
HEART RATE: 78 BPM | OXYGEN SATURATION: 99 % | DIASTOLIC BLOOD PRESSURE: 57 MMHG | RESPIRATION RATE: 16 BRPM | SYSTOLIC BLOOD PRESSURE: 120 MMHG | TEMPERATURE: 98 F

## 2024-05-27 VITALS
BODY MASS INDEX: 20.09 KG/M2 | DIASTOLIC BLOOD PRESSURE: 72 MMHG | OXYGEN SATURATION: 99 % | SYSTOLIC BLOOD PRESSURE: 121 MMHG | WEIGHT: 125 LBS | HEIGHT: 66 IN | HEART RATE: 76 BPM

## 2024-05-27 DIAGNOSIS — G89.29 CHRONIC NECK PAIN: ICD-10-CM

## 2024-05-27 DIAGNOSIS — N18.9 CHRONIC KIDNEY DISEASE, UNSPECIFIED CKD STAGE: ICD-10-CM

## 2024-05-27 DIAGNOSIS — M54.6 CHRONIC MIDLINE THORACIC BACK PAIN: ICD-10-CM

## 2024-05-27 DIAGNOSIS — M81.0 AGE-RELATED OSTEOPOROSIS WITHOUT CURRENT PATHOLOGICAL FRACTURE: ICD-10-CM

## 2024-05-27 DIAGNOSIS — N18.31 STAGE 3A CHRONIC KIDNEY DISEASE: Primary | ICD-10-CM

## 2024-05-27 DIAGNOSIS — G89.29 CHRONIC MIDLINE THORACIC BACK PAIN: ICD-10-CM

## 2024-05-27 DIAGNOSIS — D63.1 ANEMIA OF CHRONIC RENAL FAILURE, STAGE 3B: Primary | ICD-10-CM

## 2024-05-27 DIAGNOSIS — M1A.9XX1 TOPHACEOUS GOUT: ICD-10-CM

## 2024-05-27 DIAGNOSIS — M54.2 CHRONIC NECK PAIN: ICD-10-CM

## 2024-05-27 DIAGNOSIS — N18.32 ANEMIA OF CHRONIC RENAL FAILURE, STAGE 3B: Primary | ICD-10-CM

## 2024-05-27 LAB
BACTERIA #/AREA URNS HPF: ABNORMAL /HPF
BILIRUB UR QL STRIP: NEGATIVE
CLARITY UR: CLEAR
COLOR UR: YELLOW
CREAT UR-MCNC: 106.9 MG/DL (ref 23–375)
GLUCOSE UR QL STRIP: ABNORMAL
HGB UR QL STRIP: NEGATIVE
HYALINE CASTS #/AREA URNS LPF: 3 /LPF
KETONES UR QL STRIP: NEGATIVE
LEUKOCYTE ESTERASE UR QL STRIP: NEGATIVE
MICROSCOPIC COMMENT: ABNORMAL
NITRITE UR QL STRIP: NEGATIVE
PH UR STRIP: 7 [PH] (ref 5–8)
PROT UR QL STRIP: ABNORMAL
PROT UR-MCNC: 77 MG/DL
PROT/CREAT UR: 0.72 MG/G{CREAT} (ref 0–0.2)
RBC #/AREA URNS HPF: 0 /HPF (ref 0–4)
SP GR UR STRIP: 1.01 (ref 1–1.03)
SQUAMOUS #/AREA URNS HPF: 0 /HPF
URN SPEC COLLECT METH UR: ABNORMAL
UROBILINOGEN UR STRIP-ACNC: NEGATIVE EU/DL
WBC #/AREA URNS HPF: 3 /HPF (ref 0–5)
YEAST URNS QL MICRO: ABNORMAL

## 2024-05-27 PROCEDURE — 63600175 PHARM REV CODE 636 W HCPCS: Mod: JZ,JG | Performed by: HOSPITALIST

## 2024-05-27 PROCEDURE — 1125F AMNT PAIN NOTED PAIN PRSNT: CPT | Mod: CPTII,S$GLB,, | Performed by: INTERNAL MEDICINE

## 2024-05-27 PROCEDURE — 3044F HG A1C LEVEL LT 7.0%: CPT | Mod: CPTII,S$GLB,, | Performed by: INTERNAL MEDICINE

## 2024-05-27 PROCEDURE — 1101F PT FALLS ASSESS-DOCD LE1/YR: CPT | Mod: CPTII,S$GLB,, | Performed by: INTERNAL MEDICINE

## 2024-05-27 PROCEDURE — 3288F FALL RISK ASSESSMENT DOCD: CPT | Mod: CPTII,S$GLB,, | Performed by: INTERNAL MEDICINE

## 2024-05-27 PROCEDURE — 96372 THER/PROPH/DIAG INJ SC/IM: CPT

## 2024-05-27 PROCEDURE — 3066F NEPHROPATHY DOC TX: CPT | Mod: CPTII,S$GLB,, | Performed by: INTERNAL MEDICINE

## 2024-05-27 PROCEDURE — 3074F SYST BP LT 130 MM HG: CPT | Mod: CPTII,S$GLB,, | Performed by: INTERNAL MEDICINE

## 2024-05-27 PROCEDURE — 81000 URINALYSIS NONAUTO W/SCOPE: CPT | Performed by: INTERNAL MEDICINE

## 2024-05-27 PROCEDURE — 99999 PR PBB SHADOW E&M-EST. PATIENT-LVL V: CPT | Mod: PBBFAC,,, | Performed by: INTERNAL MEDICINE

## 2024-05-27 PROCEDURE — 3078F DIAST BP <80 MM HG: CPT | Mod: CPTII,S$GLB,, | Performed by: INTERNAL MEDICINE

## 2024-05-27 PROCEDURE — 3008F BODY MASS INDEX DOCD: CPT | Mod: CPTII,S$GLB,, | Performed by: INTERNAL MEDICINE

## 2024-05-27 PROCEDURE — 99213 OFFICE O/P EST LOW 20 MIN: CPT | Mod: S$GLB,,, | Performed by: INTERNAL MEDICINE

## 2024-05-27 PROCEDURE — 84156 ASSAY OF PROTEIN URINE: CPT | Performed by: INTERNAL MEDICINE

## 2024-05-27 RX ADMIN — DENOSUMAB 60 MG: 60 INJECTION SUBCUTANEOUS at 08:05

## 2024-05-27 NOTE — PROGRESS NOTES
Subjective     Patient ID: Darrion Bennett is a 74 y.o. male.    Chief Complaint: No chief complaint on file.    HPI  Diagnosis:  Anemia in CKD        Chief Complaint: Anemia       Interval History: Mr. Bennett is here for follow up.  He is a former patient of Dr. Hicks.     74 year old man with multiple comorbidities including MCTD, Sjogren's, UC, diastolic HF, diabetes, essential hypertension, CKD stage III, and anemia of chronic disease.     He has received IV Fe in past.  with interval response in hemoglobin and normalization of RDW.  Denies epistaxis, hemoptysis, hematemesis, hematochezia, melena, or hematuria.  Always feels cold (chronic).    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  Son completed neuro fellowship and daughter is going to be in primary care.  He is accompanied by wife.     He reports that he was diagnosed with Sjogrens when he had dry eyes and dry mouth in 2014..    He reports chronic fatigue  He is intolerant of Fe supp  He has received IV Fe in past  No SOB/CP  No lightheadedness    Past Medical History:   Diagnosis Date    Anemia     Arthritis     Cataract     CHF (congestive heart failure)     Chronic constipation     Diabetes mellitus, type 2     Felon of finger of left hand 05/11/2019    Glaucoma     Hyperlipidemia 05/13/2014    Hypertension     MCTD (mixed connective tissue disease)     Personal history of colonic polyps     Sjogren's disease     Ulcerative colitis     Urolithiasis        Past Surgical History:   Procedure Laterality Date    CATARACT EXTRACTION Bilateral 2005    COLONOSCOPY  2014    ESOPHAGOGASTRODUODENOSCOPY N/A 9/8/2023    Procedure: EGD (ESOPHAGOGASTRODUODENOSCOPY);  Surgeon: Divya Saenz MD;  Location: [facility];  Service: Endoscopy;  Laterality: N/A;  ref by / inst portal-RB    ESOPHAGOGASTRODUODENOSCOPY N/A 11/22/2023    Procedure: EGD (ESOPHAGOGASTRODUODENOSCOPY);  Surgeon: Crystal Urbina MD;  Location: [facility];   "Service: Endoscopy;  Laterality: N/A;  time frame 8-12 weeks  Dr. Saenz pt   prep instructions sent to pt via portal -[facility] meds trulicity hold 7 days prior  diabetic  11/16- pt r/s to earlier date, pre call complete.  DBM    EYE SURGERY          Review of Systems   Constitutional:  Positive for fatigue. Negative for appetite change, fever and unexpected weight change.   HENT:  Negative for mouth sores.    Eyes:  Negative for visual disturbance.   Respiratory:  Negative for cough and shortness of breath.    Cardiovascular:  Negative for chest pain.   Gastrointestinal:  Negative for abdominal pain and diarrhea.   Genitourinary:  Negative for frequency.   Musculoskeletal:  Positive for back pain (chronic) and neck pain (chronic).   Integumentary:  Negative for rash.   Neurological:  Negative for headaches.   Hematological:  Negative for adenopathy.   Psychiatric/Behavioral:  The patient is not nervous/anxious.         Objective   Vitals:    05/27/24 0854   BP: 121/72   Pulse: 76   SpO2: 99%   Weight: 56.7 kg (125 lb)   Height: 5' 6" (1.676 m)       Physical Exam  Constitutional:       Appearance: He is well-developed.   HENT:      Head: Normocephalic.      Mouth/Throat:      Pharynx: No oropharyngeal exudate.   Eyes:      General: No scleral icterus.        Right eye: No discharge.         Left eye: No discharge.      Conjunctiva/sclera: Conjunctivae normal.   Neck:      Thyroid: No thyromegaly.   Cardiovascular:      Rate and Rhythm: Normal rate and regular rhythm.      Heart sounds: Normal heart sounds. No murmur heard.  Pulmonary:      Effort: Pulmonary effort is normal.      Breath sounds: Normal breath sounds. No wheezing or rales.   Abdominal:      General: Bowel sounds are normal.      Palpations: Abdomen is soft.      Tenderness: There is no abdominal tenderness. There is no guarding or rebound.   Musculoskeletal:         General: No swelling. Normal range of motion.      Cervical back: Normal range of motion " and neck supple.   Lymphadenopathy:      Cervical: No cervical adenopathy.      Upper Body:      Right upper body: No supraclavicular adenopathy.      Left upper body: No supraclavicular adenopathy.   Skin:     Findings: No erythema or rash.   Neurological:      Mental Status: He is alert and oriented to person, place, and time.      Cranial Nerves: No cranial nerve deficit.   Psychiatric:         Mood and Affect: Mood normal.         Behavior: Behavior normal.       Lab Results   Component Value Date    WBC 7.61 05/23/2024    HGB 11.2 (L) 05/23/2024    HCT 35.6 (L) 05/23/2024    MCV 89 05/23/2024     05/23/2024       Lab Results   Component Value Date    UIBC 197 06/20/2014    IRON 50 05/23/2024    IRON 50 05/23/2024    TRANSFERRIN 159 (L) 05/23/2024    TIBC 235 (L) 05/23/2024    LABIRON 15 06/20/2014    FESATURATED 21 05/23/2024             Assessment and Plan            enal/      Anemia of chronic renal failure, stage 3b - Primary     Current Assessment & Plan       Anemia of chronic renal disease, CKD stage IIIB.    He has undergone IV Fe in past  Labs reviewed  No indication for IV Fe .  -no role of epo at this hemoglobin level  -continue follow up with primary care.           Stage 3a chronic kidney disease     Current Assessment & Plan       Creatinine 1.6mg/ml on 5/22/24  -continue medical management with pcp                       Chronic back and neck pain  Ambulatory Referral to Healthy Back Program              Ambulatory Referral to Healthy Back program [facility]  Labs  1 week prior to f/u 3mos

## 2024-05-28 ENCOUNTER — CLINICAL SUPPORT (OUTPATIENT)
Dept: REHABILITATION | Facility: HOSPITAL | Age: 74
End: 2024-05-28
Payer: MEDICARE

## 2024-05-28 DIAGNOSIS — R29.3 POOR POSTURE: Primary | ICD-10-CM

## 2024-05-28 PROCEDURE — 97110 THERAPEUTIC EXERCISES: CPT | Mod: PN

## 2024-05-29 NOTE — PROGRESS NOTES
"[facility]   Physical Therapy Treatment Note     Name: Darrion Bennett  Clinic Number: [id number]    Therapy Diagnosis:   Encounter Diagnosis   Name Primary?    Poor posture Yes     Physician: Farooq Domingo MD    Visit Date: 5/16/2024    Physician orders: PT Eval and Treat   Medical diagnosis from referral: M54.6,G89.29 (ICD-10-CM) - Chronic midline thoracic back pain   Evaluation date: 4/4/2024  Authorization period expiration: 2/25/25  Plan of care expiration: 6/19/2024  Reassessment due: 5/10/2024   Visit # / visits authorized: 4/10 +eval    FOTO: 1/ 1  PTA Visit #: 1/5     Precautions: standard    Time In: 1000  Time Out: 1100  Total Billable Time: 55 minutes    SUBJECTIVE     Pt reports intermittent relief from back and thoracic discomfort.     He was compliant with home exercise program.  Response to previous treatment: none  Functional change: none yet    Pain: 4/10  Location:  midline thoracolumbar      OBJECTIVE     Objective Measures updated at progress report unless specified.     TREATMENT     Darrion received the treatments listed below:      received therapeutic exercises to develop strength and ROM for 55 minutes including:    Nustep 8' Lv 1  LTR 3' with physioball   PPT 3x10x3"  HL clams RTB 3x10  HL iso ball squeezes 3x10  Bridges 3x10  Scap squeezes 3x10  Thoracic extension over bolster 2x10  Brueggers 2x10 RTB  EIS 3x10  Seated ball rollouts 2x10  seated chin tucks, 2x10     MHP x 8 minutes to mid-thoracic for decrease pain and for muscles relaxation after therapy exercises.       PATIENT EDUCATION AND HOME EXERCISES     Home Exercises Provided and Patient Education Provided     Education provided:   - PT role and POC  - HEP    Written Home Exercises Provided: Patient instructed to cont prior HEP. Exercises were reviewed and Darrion was able to demonstrate them prior to the end of the session.  Darrion demonstrated good  understanding of the education provided. See EMR under " Patient Instructions for exercises provided during therapy sessions    ASSESSMENT   Darrion tolerated treatment well today. Presents to clinic reporting intermittent symptoms at this time. Continued working on building lumbopelvic and cervicothoracic stability and mobility. Tolerated session well with no increase in symptoms. Will continue to progress treatment per patient tolerance.      Darrion Is progressing well towards his goals.   Pt prognosis is Good.     Pt will continue to benefit from skilled outpatient physical therapy to address the deficits listed in the problem list box on initial evaluation, provide pt/family education and to maximize pt's level of independence in the home and community environment.     Pt's spiritual, cultural and educational needs considered and pt agreeable to plan of care and goals.     Anticipated barriers to physical therapy: none    Goals:   Short Term Goals: 4 weeks   - The patient with report compliance with HEP to maximize functional outcomes. Appropriate and ongoing  - The patient will demonstrate increase in BLE MMT by 1/2 grade to improve pt's functional mobility. Appropriate and ongoing  - The patient will decrease pain level by 50% in order to improve QOL. Appropriate and ongoing  - The patient will demonstrate 25% improvement in lumbar ROM to improve ability to perform ADLs. Appropriate and ongoing     Long Term Goals: 8 weeks   - The patient will demonstrate understanding and performance of advanced HEP to allow for independence once discharged from therapy. Appropriate and ongoing  - The patient will demonstrate 50% improvement in lumbar ROM to improve ability to perform ADLs. Appropriate and ongoing  - The patient will demonstrate increase in BLE MMT by 1 grade to improve pt's functional mobility. Appropriate and ongoing  - The patient will report 60% functional limitation on FOTO to indicate an improvement in overall function. Appropriate and ongoing    PLAN      Continue POC    Eduard Mclaughlin, PT

## 2024-05-30 ENCOUNTER — PATIENT MESSAGE (OUTPATIENT)
Dept: ADMINISTRATIVE | Facility: OTHER | Age: 74
End: 2024-05-30
Payer: MEDICARE

## 2024-06-04 NOTE — PROGRESS NOTES
"[facility]   Physical Therapy Treatment Note     Name: Darrion Bennett  Clinic Number: [id number]    Therapy Diagnosis:   Encounter Diagnosis   Name Primary?    Poor posture Yes     Physician: Farooq Domingo MD    Visit Date: 5/21/2024    Physician orders: PT Eval and Treat   Medical diagnosis from referral: M54.6,G89.29 (ICD-10-CM) - Chronic midline thoracic back pain   Evaluation date: 4/4/2024  Authorization period expiration: 2/25/25  Plan of care expiration: 6/19/2024  Visit # / visits authorized: 5/10 +eval    FOTO: 1/ 1  PTA Visit #: 1/5     Precautions: standard    Time In: 800  Time Out: 900  Total Billable Time: 55 minutes    SUBJECTIVE     Pt reports some relief at times, but still has some midback discomfort    He was compliant with home exercise program.  Response to previous treatment: none  Functional change: none yet    Pain: 4/10  Location:  midline thoracolumbar      OBJECTIVE     Objective Measures updated at progress report unless specified.     TREATMENT     Darrion received the treatments listed below:      received therapeutic exercises to develop strength and ROM for 55 minutes including:    Nustep 8' Lv 1  LTR 3' with physioball   PPT 3x10x3"  HL clams RTB 3x10  HL iso ball squeezes 3x10  Bridges 3x10  Scap squeezes 3x10  Thoracic extension over bolster 2x10  Brueggers 2x10 RTB  EIS 3x10  Seated ball rollouts 2x10  seated chin tucks, 2x10   +Machine rows 20# 2x10    MHP x 8 minutes to mid-thoracic for decrease pain and for muscles relaxation after therapy exercises.       PATIENT EDUCATION AND HOME EXERCISES     Home Exercises Provided and Patient Education Provided     Education provided:   - PT role and POC  - HEP    Written Home Exercises Provided: Patient instructed to cont prior HEP. Exercises were reviewed and Darrion was able to demonstrate them prior to the end of the session.  Darrion demonstrated good  understanding of the education provided. See EMR under " Patient Instructions for exercises provided during therapy sessions    ASSESSMENT   Darrion tolerated treatment well today. Presents to clinic reporting intermittent pain. Continued working to improve posture and lumbopelvic stability and mobility. Added machine rows to continue to improve thoracic strength. Will continue to progress treatment per patient tolerance.      Darrion Is progressing well towards his goals.   Pt prognosis is Good.     Pt will continue to benefit from skilled outpatient physical therapy to address the deficits listed in the problem list box on initial evaluation, provide pt/family education and to maximize pt's level of independence in the home and community environment.     Pt's spiritual, cultural and educational needs considered and pt agreeable to plan of care and goals.     Anticipated barriers to physical therapy: none    Goals:   Short Term Goals: 4 weeks   - The patient with report compliance with HEP to maximize functional outcomes. Appropriate and ongoing  - The patient will demonstrate increase in BLE MMT by 1/2 grade to improve pt's functional mobility. Appropriate and ongoing  - The patient will decrease pain level by 50% in order to improve QOL. Appropriate and ongoing  - The patient will demonstrate 25% improvement in lumbar ROM to improve ability to perform ADLs. Appropriate and ongoing     Long Term Goals: 8 weeks   - The patient will demonstrate understanding and performance of advanced HEP to allow for independence once discharged from therapy. Appropriate and ongoing  - The patient will demonstrate 50% improvement in lumbar ROM to improve ability to perform ADLs. Appropriate and ongoing  - The patient will demonstrate increase in BLE MMT by 1 grade to improve pt's functional mobility. Appropriate and ongoing  - The patient will report 60% functional limitation on FOTO to indicate an improvement in overall function. Appropriate and ongoing    PLAN     Continue EDUAR Chapa  Arnoldo, NEYMAR

## 2024-06-05 ENCOUNTER — OFFICE VISIT (OUTPATIENT)
Dept: NEPHROLOGY | Facility: CLINIC | Age: 74
End: 2024-06-05
Payer: MEDICARE

## 2024-06-05 ENCOUNTER — CLINICAL SUPPORT (OUTPATIENT)
Dept: REHABILITATION | Facility: HOSPITAL | Age: 74
End: 2024-06-05
Attending: PHYSICAL MEDICINE & REHABILITATION
Payer: MEDICARE

## 2024-06-05 VITALS
HEART RATE: 72 BPM | OXYGEN SATURATION: 98 % | DIASTOLIC BLOOD PRESSURE: 60 MMHG | BODY MASS INDEX: 19.56 KG/M2 | WEIGHT: 121.69 LBS | SYSTOLIC BLOOD PRESSURE: 120 MMHG | HEIGHT: 66 IN

## 2024-06-05 VITALS
BODY MASS INDEX: 20.09 KG/M2 | SYSTOLIC BLOOD PRESSURE: 140 MMHG | HEIGHT: 66 IN | WEIGHT: 125 LBS | DIASTOLIC BLOOD PRESSURE: 66 MMHG | HEART RATE: 76 BPM

## 2024-06-05 DIAGNOSIS — G89.29 CHRONIC MIDLINE THORACIC BACK PAIN: ICD-10-CM

## 2024-06-05 DIAGNOSIS — N18.31 STAGE 3A CHRONIC KIDNEY DISEASE: Primary | ICD-10-CM

## 2024-06-05 DIAGNOSIS — Z79.4 CONTROLLED TYPE 2 DIABETES MELLITUS WITH STAGE 3 CHRONIC KIDNEY DISEASE, WITH LONG-TERM CURRENT USE OF INSULIN: ICD-10-CM

## 2024-06-05 DIAGNOSIS — S22.080S COMPRESSION FRACTURE OF T12 VERTEBRA, SEQUELA: Primary | ICD-10-CM

## 2024-06-05 DIAGNOSIS — M35.00 SJOGREN'S SYNDROME, WITH UNSPECIFIED ORGAN INVOLVEMENT: ICD-10-CM

## 2024-06-05 DIAGNOSIS — I10 ESSENTIAL HYPERTENSION: ICD-10-CM

## 2024-06-05 DIAGNOSIS — Z76.89 SEEN BY HEALTH AND WELLBEING COACH: Primary | ICD-10-CM

## 2024-06-05 DIAGNOSIS — E11.22 CONTROLLED TYPE 2 DIABETES MELLITUS WITH STAGE 3 CHRONIC KIDNEY DISEASE, WITH LONG-TERM CURRENT USE OF INSULIN: ICD-10-CM

## 2024-06-05 DIAGNOSIS — M54.2 CHRONIC NECK PAIN: ICD-10-CM

## 2024-06-05 DIAGNOSIS — E55.9 VITAMIN D INSUFFICIENCY: ICD-10-CM

## 2024-06-05 DIAGNOSIS — N18.30 CONTROLLED TYPE 2 DIABETES MELLITUS WITH STAGE 3 CHRONIC KIDNEY DISEASE, WITH LONG-TERM CURRENT USE OF INSULIN: ICD-10-CM

## 2024-06-05 DIAGNOSIS — M54.6 CHRONIC MIDLINE THORACIC BACK PAIN: ICD-10-CM

## 2024-06-05 DIAGNOSIS — N25.81 SECONDARY HYPERPARATHYROIDISM OF RENAL ORIGIN: ICD-10-CM

## 2024-06-05 DIAGNOSIS — G89.29 CHRONIC NECK PAIN: ICD-10-CM

## 2024-06-05 PROCEDURE — 3066F NEPHROPATHY DOC TX: CPT | Mod: CPTII,S$GLB,, | Performed by: INTERNAL MEDICINE

## 2024-06-05 PROCEDURE — 99215 OFFICE O/P EST HI 40 MIN: CPT | Mod: S$GLB,,, | Performed by: INTERNAL MEDICINE

## 2024-06-05 PROCEDURE — 3288F FALL RISK ASSESSMENT DOCD: CPT | Mod: CPTII,S$GLB,, | Performed by: INTERNAL MEDICINE

## 2024-06-05 PROCEDURE — 1125F AMNT PAIN NOTED PAIN PRSNT: CPT | Mod: CPTII,S$GLB,, | Performed by: INTERNAL MEDICINE

## 2024-06-05 PROCEDURE — 99204 OFFICE O/P NEW MOD 45 MIN: CPT | Mod: ,,, | Performed by: PHYSICAL MEDICINE & REHABILITATION

## 2024-06-05 PROCEDURE — 3078F DIAST BP <80 MM HG: CPT | Mod: CPTII,S$GLB,, | Performed by: INTERNAL MEDICINE

## 2024-06-05 PROCEDURE — 3044F HG A1C LEVEL LT 7.0%: CPT | Mod: CPTII,S$GLB,, | Performed by: INTERNAL MEDICINE

## 2024-06-05 PROCEDURE — 1159F MED LIST DOCD IN RCRD: CPT | Mod: CPTII,S$GLB,, | Performed by: INTERNAL MEDICINE

## 2024-06-05 PROCEDURE — 1101F PT FALLS ASSESS-DOCD LE1/YR: CPT | Mod: CPTII,S$GLB,, | Performed by: INTERNAL MEDICINE

## 2024-06-05 PROCEDURE — 99999 PR PBB SHADOW E&M-EST. PATIENT-LVL IV: CPT | Mod: PBBFAC,,, | Performed by: INTERNAL MEDICINE

## 2024-06-05 PROCEDURE — 3074F SYST BP LT 130 MM HG: CPT | Mod: CPTII,S$GLB,, | Performed by: INTERNAL MEDICINE

## 2024-06-05 NOTE — PROGRESS NOTES
Health  met with patient to complete initial outcomes for the Healthy Back lumbar program.  Questions were reviewed with patient and discussed with Dr. Easley. The patient will meet with Dr. Easley to determine program enrollment.         6/4/2024    11:47 AM   HealthyBack Questionnaire    Location of your worst pain: Mid back (above shoulder blades)   Please select the location of any additional pain: (hold down the control key, and click to select multiple responses) Mid back - Above shoulder blades    Mid back - Below shoulder blades   Did the pain begin after an event, injury, or accident? No   How long has this pain been going on?  1 year   Please indicate how the pain is changing.  No Change   What is the WORST level of pain that you have experienced in the last week?  8   What is the LEAST level of pain that you have experienced in the past week?  6   What can you NOT do not that you used to be able to do?  Lift things   Are your limitations mainly due to your pain?  Yes   What are your additional complaints, if any? (hold down the control key, and click to select multiple responses) Weakness   Is there ever a time during the day when your pain stops, even for a brief moment, before returning? Yes   If yes, when your pain stops, does it disappear completely? Is it totally gone? No   Does looking down make your typical pain worse? No   Morning: Same   Afternoon: Same   Evening:  Same   Nighttime: Same   Standing:  Worse   Lying on stomach: Same   Lying on back: Worse   Sitting:  Worse   Walking: Worse   Using a pillow: Better   Emergency department  No   Health care providers (hold down the control key, and click to select multiple responses) Family doctor    Rheumatologist   Investigations done (hold down the control key, and click to select multiple responses) X-ray   Procedures (hold down the control key, and click to select multiple responses) None   Have you had any surgery on your neck? No    Please kelin what you are experiencing. (hold down the control key, and click to select multiple responses) Shortness of breath    Arthritic joint pain    Muscle pain in arms or legs    Joint swelling    Difficulty with walking or balance   First activity you would like to perform better:  Walk   Second activity you would like to perform better: Stand   Third activity you would like to perform better: Lift   What is your occupation?  Retired   How did you hear about the Healthyback program?  Physician

## 2024-06-05 NOTE — PROGRESS NOTES
CHIEF COMPLAINT/HPI: Darrion is a 74 y.o. man with PMH of HTN , DM with albuminuria, Sjogren disease HFpEF, CKD.   Was following with Dr. Vizcaino   First saw him 4/2022, here for follow up.     6/5/24: here for follow up. Feels ok. Denied any CP,SOB,N/V.    Past Medical History:   Diagnosis Date    Anemia     Arthritis     Cataract     CHF (congestive heart failure)     Chronic constipation     Diabetes mellitus, type 2     Felon of finger of left hand 05/11/2019    Glaucoma     Hyperlipidemia 05/13/2014    Hypertension     MCTD (mixed connective tissue disease)     Personal history of colonic polyps     Sjogren's disease     Ulcerative colitis     Urolithiasis        Darrion reports that he has never smoked. He has never been exposed to tobacco smoke. He has never used smokeless tobacco. He reports current drug use. He reports that he does not drink alcohol.    Family History   Problem Relation Name Age of Onset    Hypertension Father      Stroke Father      Cataracts Father      Glaucoma Father      Cataracts Mother      No Known Problems Sister      No Known Problems Brother      Rheum arthritis Maternal Aunt      Rheum arthritis Maternal Uncle      No Known Problems Paternal Aunt      No Known Problems Paternal Uncle      No Known Problems Maternal Grandmother      No Known Problems Maternal Grandfather      Throat cancer Paternal Grandmother      Glaucoma Paternal Grandfather      No Known Problems Daughter      No Known Problems Son x2     Celiac disease Neg Hx      Colon cancer Neg Hx      Cirrhosis Neg Hx      Colon polyps Neg Hx      Crohn's disease Neg Hx      Cystic fibrosis Neg Hx      Hemochromatosis Neg Hx      Esophageal cancer Neg Hx      Inflammatory bowel disease Neg Hx      Irritable bowel syndrome Neg Hx      Liver cancer Neg Hx      Liver disease Neg Hx      Rectal cancer Neg Hx      Stomach cancer Neg Hx      Ulcerative colitis Neg Hx      Chase's disease Neg Hx      Amblyopia Neg Hx      Blindness  "Neg Hx      Cancer Neg Hx      Diabetes Neg Hx      Macular degeneration Neg Hx      Retinal detachment Neg Hx      Strabismus Neg Hx      Thyroid disease Neg Hx      Melanoma Neg Hx         ROS:    Review of Systems   Constitutional:  Negative for activity change, appetite change, chills, fever and unexpected weight change.   HENT:  Negative for congestion, ear discharge, hearing loss, nosebleeds, sore throat and tinnitus.    Eyes:  Negative for photophobia, pain, redness and visual disturbance.   Respiratory:  Negative for cough, chest tightness, shortness of breath and wheezing.    Cardiovascular:  Negative for chest pain, palpitations and leg swelling.   Gastrointestinal:  Negative for abdominal distention, constipation, diarrhea, nausea and vomiting.   Endocrine: Negative for cold intolerance, heat intolerance, polydipsia and polyuria.   Genitourinary:  Negative for decreased urine volume, difficulty urinating, dysuria, flank pain and hematuria.   Musculoskeletal:  Negative for arthralgias, gait problem, joint swelling and neck stiffness.   Skin:  Negative for rash.   Neurological:  Negative for dizziness, tremors, weakness, numbness and headaches.   Psychiatric/Behavioral:  Negative for confusion and sleep disturbance.      PE:    Vitals:    06/05/24 1111   BP: 120/60   Pulse: 72   SpO2: 98%   Weight: 55.2 kg (121 lb 11.1 oz)   Height: 5' 6" (1.676 m)       Physical Exam  Constitutional:       General: He is not in acute distress.     Appearance: Normal appearance. He is normal weight.   HENT:      Head: Normocephalic and atraumatic.      Nose: Nose normal.      Mouth/Throat:      Mouth: Mucous membranes are moist.      Pharynx: Oropharynx is clear. No oropharyngeal exudate or posterior oropharyngeal erythema.   Eyes:      Extraocular Movements: Extraocular movements intact.      Conjunctiva/sclera: Conjunctivae normal.      Pupils: Pupils are equal, round, and reactive to light.   Cardiovascular:      Rate " and Rhythm: Normal rate and regular rhythm.      Pulses: Normal pulses.      Heart sounds: Normal heart sounds. No murmur heard.     No friction rub. No gallop.   Pulmonary:      Effort: Pulmonary effort is normal. No respiratory distress.      Breath sounds: Normal breath sounds. No stridor. No wheezing or rales.   Abdominal:      General: Abdomen is flat. Bowel sounds are normal.      Palpations: Abdomen is soft.      Tenderness: There is no abdominal tenderness. There is no guarding or rebound.   Musculoskeletal:         General: No swelling. Normal range of motion.      Cervical back: Normal range of motion and neck supple.      Right lower leg: No edema.      Left lower leg: No edema.   Skin:     General: Skin is warm.      Coloration: Skin is not jaundiced or pale.      Findings: No lesion.   Neurological:      General: No focal deficit present.      Mental Status: He is alert and oriented to person, place, and time.      Motor: No weakness.   Psychiatric:         Mood and Affect: Mood normal.         Impression/Plan:    1. Stage 3a chronic kidney disease    2. Essential hypertension    3. Vitamin D insufficiency    4. Controlled type 2 diabetes mellitus with stage 3 chronic kidney disease, with long-term current use of insulin    5. Sjogren's syndrome, with unspecified organ involvement    6. Secondary hyperparathyroidism of renal origin        # CKD3a with mild rise in creatinine by 0.2 from baseline ~1.4, possibly progression of CKD. in patient with HTN , diastolic dysfunction, DM and MCTD.  -ROGELIO hernandez.  Counseled on BP and glycemic control   Counseled on avoiding NSAID's ( not taking)  -will repeat labs on Tuesday to evaluate trend of kidney function.       Lab Results   Component Value Date    CREATININE 1.6 (H) 05/22/2024     Protein Creatinine Ratios:    Prot/Creat Ratio, Urine   Date Value Ref Range Status   05/27/2024 0.72 (H) 0.00 - 0.20 Final   11/27/2023 1.91 (H) 0.00 - 0.20 Final   04/22/2022 1.37  (H) 0.00 - 0.20 Final   \    Acid-Base:   Lab Results   Component Value Date     05/22/2024    K 4.4 05/22/2024    CO2 20 (L) 05/22/2024     #HTN: Blood pressures , borderline,. Cont valsartan, torsemide, hydralazine, clonidine patch, amlodipine. Will consider starting aldactone for proteinuria and lower clonidine then,but after repeat labs in 4 weeks and  stable kidney function.    # Renal osteodystrophy: secondary hyperparathyroidism  Lab Results   Component Value Date    .0 (H) 09/26/2022    CALCIUM 9.6 05/22/2024    PHOS 2.4 (L) 09/26/2022   Taking OTC vit D.    # Anemia:   Lab Results   Component Value Date    HGB 11.2 (L) 05/23/2024    He is shivering ?FABIOLA, will repeat iron panel and if def. Will give few doses of iv iron.    # DM:  Last HbA1C   Lab Results   Component Value Date    HGBA1C 6.6 (H) 05/23/2024       # Lipid management:   Lab Results   Component Value Date    LDLCALC 91.0 02/26/2024     Visit today included increased complexity associated with the care of the episodic problem CKD, HTN, DM, Secondary hyperparathyroid addressed and managing the longitudinal care of the patient due to the serious and/or complex managed problem(s) .      # ESRD planing: none    Follow up in 4 m with labs in 4 weeks and if stable will start aldactone then.

## 2024-06-06 ENCOUNTER — TELEPHONE (OUTPATIENT)
Dept: PAIN MEDICINE | Facility: CLINIC | Age: 74
End: 2024-06-06
Payer: MEDICARE

## 2024-06-07 ENCOUNTER — PATIENT OUTREACH (OUTPATIENT)
Dept: ADMINISTRATIVE | Facility: HOSPITAL | Age: 74
End: 2024-06-07
Payer: MEDICARE

## 2024-06-07 NOTE — PROGRESS NOTES
"[facility]   Physical Therapy Treatment Note     Name: Darrion Bennett  Clinic Number: [id number]    Therapy Diagnosis:   Encounter Diagnosis   Name Primary?    Poor posture Yes     Physician: Farooq Domingo MD    Visit Date: 5/28/2024    Physician orders: PT Eval and Treat   Medical diagnosis from referral: M54.6,G89.29 (ICD-10-CM) - Chronic midline thoracic back pain   Evaluation date: 4/4/2024  Authorization period expiration: 2/25/25  Plan of care expiration: 6/19/2024  Visit # / visits authorized: 6/10 +eval    FOTO: 1/ 1  PTA Visit #: 1/5     Precautions: standard    Time In: 800  Time Out: 900  Total Billable Time: 55 minutes    SUBJECTIVE     Pt reports some relief at times, but still has some midback discomfort    He was compliant with home exercise program.  Response to previous treatment: none  Functional change: none yet    Pain: 4/10  Location:  midline thoracolumbar      OBJECTIVE     Objective Measures updated at progress report unless specified.     TREATMENT     Darrion received the treatments listed below:      received therapeutic exercises to develop strength and ROM for 55 minutes including:    Nustep 8' Lv 1  LTR 3' with physioball   PPT 3x10x3"  HL clams RTB 3x10  HL iso ball squeezes 3x10  Bridges 3x10  Scap squeezes 3x10  Thoracic extension over bolster 2x10  Brueggers 2x10 RTB  EIS 3x10  Seated ball rollouts 2x10  seated chin tucks, 2x10   +Machine rows 20# 2x10    MHP x 8 minutes to mid-thoracic for decrease pain and for muscles relaxation after therapy exercises.       PATIENT EDUCATION AND HOME EXERCISES     Home Exercises Provided and Patient Education Provided     Education provided:   - PT role and POC  - HEP    Written Home Exercises Provided: Patient instructed to cont prior HEP. Exercises were reviewed and Darrion was able to demonstrate them prior to the end of the session.  Darrion demonstrated good  understanding of the education provided. See EMR under " Patient Instructions for exercises provided during therapy sessions    ASSESSMENT   Darrion tolerated treatment well today. Presents to clinic reporting continued intermittent thoracic and lumbar pain. Continues demonstrating postural and strength deficits in thoracic and lumbar spine. Continued working on exercises to help improve stability and mobility of cervicothoracic and lumbopelvic regions. Will continue to progress treatment per patient tolerance.       Darrion Is progressing well towards his goals.   Pt prognosis is Good.     Pt will continue to benefit from skilled outpatient physical therapy to address the deficits listed in the problem list box on initial evaluation, provide pt/family education and to maximize pt's level of independence in the home and community environment.     Pt's spiritual, cultural and educational needs considered and pt agreeable to plan of care and goals.     Anticipated barriers to physical therapy: none    Goals:   Short Term Goals: 4 weeks   - The patient with report compliance with HEP to maximize functional outcomes. Appropriate and ongoing  - The patient will demonstrate increase in BLE MMT by 1/2 grade to improve pt's functional mobility. Appropriate and ongoing  - The patient will decrease pain level by 50% in order to improve QOL. Appropriate and ongoing  - The patient will demonstrate 25% improvement in lumbar ROM to improve ability to perform ADLs. Appropriate and ongoing     Long Term Goals: 8 weeks   - The patient will demonstrate understanding and performance of advanced HEP to allow for independence once discharged from therapy. Appropriate and ongoing  - The patient will demonstrate 50% improvement in lumbar ROM to improve ability to perform ADLs. Appropriate and ongoing  - The patient will demonstrate increase in BLE MMT by 1 grade to improve pt's functional mobility. Appropriate and ongoing  - The patient will report 60% functional limitation on FOTO to indicate an  improvement in overall function. Appropriate and ongoing    PLAN     Continue POC    Eduard Mclaughlin, PT

## 2024-06-11 ENCOUNTER — LAB VISIT (OUTPATIENT)
Dept: LAB | Facility: HOSPITAL | Age: 74
End: 2024-06-11
Attending: INTERNAL MEDICINE
Payer: MEDICARE

## 2024-06-11 DIAGNOSIS — N18.31 STAGE 3A CHRONIC KIDNEY DISEASE: ICD-10-CM

## 2024-06-11 LAB
ALBUMIN SERPL BCP-MCNC: 3.4 G/DL (ref 3.5–5.2)
ALP SERPL-CCNC: 72 U/L (ref 55–135)
ALT SERPL W/O P-5'-P-CCNC: 14 U/L (ref 10–44)
ANION GAP SERPL CALC-SCNC: 9 MMOL/L (ref 8–16)
AST SERPL-CCNC: 19 U/L (ref 10–40)
BILIRUB SERPL-MCNC: 0.3 MG/DL (ref 0.1–1)
BUN SERPL-MCNC: 28 MG/DL (ref 8–23)
CALCIUM SERPL-MCNC: 8.9 MG/DL (ref 8.7–10.5)
CHLORIDE SERPL-SCNC: 107 MMOL/L (ref 95–110)
CO2 SERPL-SCNC: 18 MMOL/L (ref 23–29)
CREAT SERPL-MCNC: 1.9 MG/DL (ref 0.5–1.4)
EST. GFR  (NO RACE VARIABLE): 36.6 ML/MIN/1.73 M^2
GLUCOSE SERPL-MCNC: 126 MG/DL (ref 70–110)
POTASSIUM SERPL-SCNC: 4.9 MMOL/L (ref 3.5–5.1)
PROT SERPL-MCNC: 7 G/DL (ref 6–8.4)
SODIUM SERPL-SCNC: 134 MMOL/L (ref 136–145)

## 2024-06-11 PROCEDURE — 80053 COMPREHEN METABOLIC PANEL: CPT | Performed by: INTERNAL MEDICINE

## 2024-06-11 PROCEDURE — 36415 COLL VENOUS BLD VENIPUNCTURE: CPT | Mod: PO | Performed by: INTERNAL MEDICINE

## 2024-06-18 ENCOUNTER — OFFICE VISIT (OUTPATIENT)
Dept: NEUROLOGY | Facility: CLINIC | Age: 74
End: 2024-06-18
Payer: MEDICARE

## 2024-06-18 ENCOUNTER — PATIENT MESSAGE (OUTPATIENT)
Dept: NEUROLOGY | Facility: CLINIC | Age: 74
End: 2024-06-18
Payer: MEDICARE

## 2024-06-18 VITALS
HEART RATE: 80 BPM | HEIGHT: 66 IN | SYSTOLIC BLOOD PRESSURE: 135 MMHG | DIASTOLIC BLOOD PRESSURE: 63 MMHG | BODY MASS INDEX: 19.88 KG/M2 | WEIGHT: 123.69 LBS

## 2024-06-18 DIAGNOSIS — R25.1 TREMOR: ICD-10-CM

## 2024-06-18 DIAGNOSIS — G20.C PARKINSONISM, UNSPECIFIED PARKINSONISM TYPE: Primary | ICD-10-CM

## 2024-06-18 PROCEDURE — 99999 PR PBB SHADOW E&M-EST. PATIENT-LVL V: CPT | Mod: PBBFAC,,, | Performed by: INTERNAL MEDICINE

## 2024-06-18 PROCEDURE — 3075F SYST BP GE 130 - 139MM HG: CPT | Mod: CPTII,S$GLB,, | Performed by: INTERNAL MEDICINE

## 2024-06-18 PROCEDURE — 3078F DIAST BP <80 MM HG: CPT | Mod: CPTII,S$GLB,, | Performed by: INTERNAL MEDICINE

## 2024-06-18 PROCEDURE — 99214 OFFICE O/P EST MOD 30 MIN: CPT | Mod: S$GLB,,, | Performed by: INTERNAL MEDICINE

## 2024-06-18 PROCEDURE — 1101F PT FALLS ASSESS-DOCD LE1/YR: CPT | Mod: CPTII,S$GLB,, | Performed by: INTERNAL MEDICINE

## 2024-06-18 PROCEDURE — 4010F ACE/ARB THERAPY RXD/TAKEN: CPT | Mod: CPTII,S$GLB,, | Performed by: INTERNAL MEDICINE

## 2024-06-18 PROCEDURE — 3008F BODY MASS INDEX DOCD: CPT | Mod: CPTII,S$GLB,, | Performed by: INTERNAL MEDICINE

## 2024-06-18 PROCEDURE — 3066F NEPHROPATHY DOC TX: CPT | Mod: CPTII,S$GLB,, | Performed by: INTERNAL MEDICINE

## 2024-06-18 PROCEDURE — 3288F FALL RISK ASSESSMENT DOCD: CPT | Mod: CPTII,S$GLB,, | Performed by: INTERNAL MEDICINE

## 2024-06-18 PROCEDURE — 3044F HG A1C LEVEL LT 7.0%: CPT | Mod: CPTII,S$GLB,, | Performed by: INTERNAL MEDICINE

## 2024-06-18 RX ORDER — CARBIDOPA AND LEVODOPA 25; 100 MG/1; MG/1
1 TABLET ORAL 3 TIMES DAILY
Qty: 90 TABLET | Refills: 11 | Status: SHIPPED | OUTPATIENT
Start: 2024-06-18 | End: 2024-06-18

## 2024-06-18 NOTE — PROGRESS NOTES
GENERAL NEUROLOGY VISIT   06/18/2024  History:    Patient is a 74 y.o. right-handed male with past medical history of Sjogren's disease, ulcerative colitis, hypertension, hyperlipidemia, diabetes, CHF presenting to the clinic for evaluation of tremors.  Here with his wife who also contributes to the history.    Wife reports tremors have been more noticeable in the last couple of years.  Tremors are mixed, noted with both at rest as well as with activity.  They are equally bad in both hands.  Wife notices good and bad days but overall tremors have been worsening recently.  Has trouble with holding tumbler and using silverware, spills food on himself.  Handwriting is squiggly but did not report reduced size.  Does note tremors worsening as he approaches the target.  Endorses tremors while at rest as well, noticeable tremors at rest during today's visit as well.  Denies any head tremors or voice tremors.  Wife mentioned that patient has slowed down with walking.  Denies falls.  Some questionable REM behavior disorder recently, occasionally yells in sleep which is new for him.  Reports feeling constipated.  Patient denies any change in voice or slurred speech, however wife does notice that facial expressions have gotten more flat.  Feels lightheaded upon getting up from a seated position occasionally.  Otherwise mood is good, gets 6-8 hours asleep.  Denies being on any antipsychotics or other dopamine blocking agents in the past.    Past Medical History:   Diagnosis Date    Anemia     Arthritis     Cataract     CHF (congestive heart failure)     Chronic constipation     Diabetes mellitus, type 2     Felon of finger of left hand 05/11/2019    Glaucoma     Hyperlipidemia 05/13/2014    Hypertension     MCTD (mixed connective tissue disease)     Personal history of colonic polyps     Sjogren's disease     Ulcerative colitis     Urolithiasis        Past Surgical History:   Procedure Laterality Date    CATARACT EXTRACTION  Bilateral 2005    COLONOSCOPY  2014    ESOPHAGOGASTRODUODENOSCOPY N/A 9/8/2023    Procedure: EGD (ESOPHAGOGASTRODUODENOSCOPY);  Surgeon: Divya Saenz MD;  Location: [facility] END[facility];  Service: Endoscopy;  Laterality: N/A;  ref by / inst portal-RB    ESOPHAGOGASTRODUODENOSCOPY N/A 11/22/2023    Procedure: EGD (ESOPHAGOGASTRODUODENOSCOPY);  Surgeon: Crystal Urbina MD;  Location: [facility] END[facility];  Service: Endoscopy;  Laterality: N/A;  time frame 8-12 weeks  Dr. Saenz pt   prep instructions sent to pt via portal -  wl meds trulicity hold 7 days prior  diabetic  11/16- pt r/s to earlier date, pre call complete.  DBM    EYE SURGERY         Social History     Socioeconomic History    Marital status:     Number of children: 3   Tobacco Use    Smoking status: Never     Passive exposure: Never    Smokeless tobacco: Never   Substance and Sexual Activity    Alcohol use: No    Drug use: Yes     Comment: ultram 1 - 2 tabs a week    Sexual activity: Not Currently     Partners: Female     Social Determinants of Health     Financial Resource Strain: Low Risk  (2/12/2024)    Overall Financial Resource Strain (CARDIA)     Difficulty of Paying Living Expenses: Not very hard   Recent Concern: Financial Resource Strain - Medium Risk (11/27/2023)    Overall Financial Resource Strain (CARDIA)     Difficulty of Paying Living Expenses: Somewhat hard   Food Insecurity: Food Insecurity Present (2/12/2024)    Hunger Vital Sign     Worried About Running Out of Food in the Last Year: Sometimes true     Ran Out of Food in the Last Year: Sometimes true   Transportation Needs: No Transportation Needs (2/12/2024)    PRAPARE - Transportation     Lack of Transportation (Medical): No     Lack of Transportation (Non-Medical): No   Physical Activity: Unknown (2/12/2024)    Exercise Vital Sign     Days of Exercise per Week: 0 days   Stress: No Stress Concern Present (2/12/2024)    [employer] - Occupational Stress  "Questionnaire     Feeling of Stress : Only a little   Housing Stability: Low Risk  (2/12/2024)    Housing Stability Vital Sign     Unable to Pay for Housing in the Last Year: No     Number of Places Lived in the Last Year: 2     Unstable Housing in the Last Year: No       Review of patient's allergies indicates:   Allergen Reactions    Imuran [azathioprine]      Pancytopenia    Penicillins Nausea And Vomiting    Rosuvastatin      Muscle pain     Allopurinol analogues Rash       Current Outpatient Medications on File Prior to Visit   Medication Sig Dispense Refill    abatacept (ORENCIA CLICKJECT) 125 mg/mL AtIn Inject 125 mg into the skin once a week. 4 mL 11    alcohol swabs (DROPSAFE ALCOHOL PREP PADS) PadM USE  4 TIMES PER  each 3    amLODIPine (NORVASC) 5 MG tablet TAKE 1 TABLET TWICE DAILY 180 tablet 3    ammonium lactate 12 % Crea Apply 1 application topically once daily. 140 g 5    atorvastatin (LIPITOR) 80 MG tablet TAKE 1 TABLET EVERY DAY 90 tablet 3    blood glucose control, low (TRUE METRIX LEVEL 1) Soln Use as indicated 1 each 0    blood-glucose meter (TRUE METRIX GLUCOSE METER) Misc Test glucose 4x/day 1 each 0    blood-glucose meter,continuous (DEXCOM G7 ) Misc Use with Dexcom G7 sensors 1 each 0    blood-glucose sensor (DEXCOM G7 SENSOR) Bernie Change every 10 days 12 each 3    ciclopirox (PENLAC) 8 % Soln Apply topically nightly. 6.6 mL 3    cloNIDine 0.2 mg/24 hr td ptwk (CATAPRES) 0.2 mg/24 hr Place 1 patch onto the skin every 7 days. 12 patch 3    diclofenac sodium (VOLTAREN) 1 % Gel Apply 2 g topically 4 (four) times daily as needed. Apply to aching joints 100 g 3    diclofenac sodium (VOLTAREN) 1 % Gel Apply 2 g topically once daily. 100 g 3    dorzolamide (TRUSOPT) 2 % ophthalmic solution INSTILL 1 DROP INTO BOTH EYES TWICE DAILY 10 mL 3    DROPLET PEN NEEDLE 32 gauge x 5/32" Ndle USE 1 NEEDLE AS DIRECTED FOUR TIMES DAILY 400 each 3    dulaglutide (TRULICITY) 4.5 mg/0.5 mL pen " injector Inject 4.5 mg into the skin every 7 days. 12 pen 2    empagliflozin (JARDIANCE) 10 mg tablet Take 1 tablet (10 mg total) by mouth once daily. 90 tablet 2    febuxostat (ULORIC) 40 mg Tab Take 1 tablet (40 mg total) by mouth once daily. 30 tablet 11    ferrous gluconate 324 mg (37.5 mg iron) Tab tablet Take 1 tablet (324 mg total) by mouth once daily. (Patient taking differently: Take 324 mg by mouth once daily. Not taking) 90 tablet 0    fluticasone propionate (FLONASE) 50 mcg/actuation nasal spray 1 spray (50 mcg total) by Each Nostril route once daily. 48 g 11    hydrALAZINE (APRESOLINE) 25 MG tablet TAKE 3 TABLETS THREE TIMES DAILY 810 tablet 10    INJECTAFER 50 iron mg/mL injection       insulin aspart U-100 (NOVOLOG FLEXPEN U-100 INSULIN) 100 unit/mL (3 mL) InPn pen Inject Novolog if glucose after meal is high:  201-250=+2, 251-300=+3; 301-350=+4, over 350=+5 units 30 mL 10    lancets (TRUEPLUS LANCETS) 33 gauge Misc Test glucose 4x/day. Dx code e11.65 400 each 3    LINZESS 72 mcg Cap capsule TAKE 1 CAPSULE BEFORE BREAKFAST 90 capsule 2    meloxicam (MOBIC) 15 MG tablet Take 1 tablet (15 mg total) by mouth once daily. 20 tablet 0    multivit with min-folic acid 200 mcg Chew       omeprazole (PRILOSEC) 40 MG capsule Take 1 capsule (40 mg total) by mouth every morning. 90 capsule 3    predniSONE (DELTASONE) 5 MG tablet Take 1 tablet (5 mg total) by mouth once daily. 90 tablet 3    PROLIA 60 mg/mL Syrg       senna-docusate 8.6-50 mg (SENNA WITH DOCUSATE SODIUM) 8.6-50 mg per tablet Take 1 tablet by mouth once daily. 90 tablet 3    torsemide (DEMADEX) 10 MG Tab TAKE 1 TABLET EVERY OTHER DAY 45 tablet 3    traMADoL (ULTRAM) 50 mg tablet TAKE 1 TABLET EVERY 12 HOURS AS NEEDED FOR PAIN 60 tablet 1    travoprost (TRAVATAN Z) 0.004 % ophthalmic solution Place 1 drop into both eyes every evening. 3 each 3    triamcinolone acetonide 0.1% (KENALOG) 0.1 % cream APPLY TOPICALLY TWICE DAILY FOR 10 DAYS 45 g 1    TRUE  METRIX GLUCOSE TEST STRIP Strp TEST BLOOD SUGAR FOUR TIMES DAILY 400 strip 3    valsartan (DIOVAN) 160 MG tablet TAKE 1 TABLET TWICE DAILY 180 tablet 1     No current facility-administered medications on file prior to visit.        Family history:  Noncontributory    Review Of Systems     Constitutional Negative for fevers, chills, weigh loss   HEENT Negative for hearing loss, dysphagia, sore throat, diplopia   Respiratory Negative for shortness of breath, cough    Cardiovascular Negative for chest pain, palpitations    Gastrointestinal Negative for constipation, diarrhea, early satiety    Skin Negative for rashes    Musculoskeletal Negative for joint pains, myalgias.   Neurological See Above    Psychological Negative for sleep disturbances.    Heme/Lymph Negative for easy bruising, easy bleeding    Endocrine Negative for polyuria, polydypsia     Physical Exam:     Physical Examination  There were no vitals taken for this visit.  There is no height or weight on file to calculate BMI.      Neurological Exam  Mental Status:   Alert and oriented to name, date, location  Recent/remote memory, registration, attention span/concentration, fund of knowledge intact by history.    CN:   II, III, IV, VI: PERRL, EOMI, no nystagmus, fundus not visualized due to inadequate dilation.V: intact to fine touch, temperature, pain.VII: symmetrical facial movement, nice smile, no drooping.VIII: grossly intact to hearing XII: tongue midline    Motor:   5/5 in all 4 extremities                Reflexes:   No Clonus, down going toes b/l.    Sensation: on both UEs and LEs    Intact in all modalities    Coordination:    Tremor:  Present at rest, more in right hand Dysmetria:  Present bilaterally on finger-to-nose.  Spiral test and handwriting scanned into the media    Gait:    Normal gait, reduced arm drift on the right side  Pull test negative    Impression:   #tremors  Patient has both action and resting tremors.  Noted to have other motor  and non motor parkinsonian symptoms with slowed gait, REM behavior disorder.  Overall picture concerning for parkinsonian syndrome and we will obtain MRI brain and DaTSCAN to evaluate this further.  Patient does not have any other features concerning for secondary parkinsonism.  Has not never been placed on dopamine blocking agents in the past.  Patient has not had any falls.  Discussed treatment/Sinemet trial with the patient and his wife present today.  They choose to continue watchful waiting at this time while waiting for above scans to be completed prior to being started on treatment.  Patient has not had any falls.  We will hold off on PT and OT evaluation and we will discuss this if ambulation worsens in future.    Plan:   - MRI brain w wo contrast  - DaTSCAN ordered        RTC 3 months or sooner if needed    Time spent on this encounter: 45 minutes. This includes face to face time and non-face to face time preparing to see the patient (eg, review of tests), obtaining and/or reviewing separately obtained history, documenting clinical information in the electronic or other health record, independently interpreting results and communicating results to the patient/family/caregiver, or care coordinator.     Joelle Flores MD  Neurology

## 2024-06-19 ENCOUNTER — HOSPITAL ENCOUNTER (OUTPATIENT)
Dept: RADIOLOGY | Facility: HOSPITAL | Age: 74
Discharge: HOME | End: 2024-06-19
Attending: INTERNAL MEDICINE
Payer: MEDICARE

## 2024-06-19 DIAGNOSIS — G20.C PARKINSONISM, UNSPECIFIED PARKINSONISM TYPE: ICD-10-CM

## 2024-06-19 PROCEDURE — 70553 MRI BRAIN STEM W/O & W/DYE: CPT | Mod: TC

## 2024-06-19 PROCEDURE — 25500020 PHARM REV CODE 255: Performed by: INTERNAL MEDICINE

## 2024-06-19 PROCEDURE — 70553 MRI BRAIN STEM W/O & W/DYE: CPT | Mod: 26,,, | Performed by: RADIOLOGY

## 2024-06-19 PROCEDURE — A9585 GADOBUTROL INJECTION: HCPCS | Performed by: INTERNAL MEDICINE

## 2024-06-19 RX ORDER — GADOBUTROL 604.72 MG/ML
5 INJECTION INTRAVENOUS
Status: COMPLETED | OUTPATIENT
Start: 2024-06-19 | End: 2024-06-19

## 2024-06-19 RX ADMIN — GADOBUTROL 5 ML: 604.72 INJECTION INTRAVENOUS at 08:06

## 2024-06-20 ENCOUNTER — PATIENT MESSAGE (OUTPATIENT)
Dept: ADMINISTRATIVE | Facility: OTHER | Age: 74
End: 2024-06-20
Payer: MEDICARE

## 2024-06-25 ENCOUNTER — CLINICAL SUPPORT (OUTPATIENT)
Dept: REHABILITATION | Facility: HOSPITAL | Age: 74
End: 2024-06-25
Attending: INTERNAL MEDICINE
Payer: MEDICARE

## 2024-06-25 ENCOUNTER — CLINICAL SUPPORT (OUTPATIENT)
Dept: REHABILITATION | Facility: HOSPITAL | Age: 74
End: 2024-06-25
Attending: PHYSICAL MEDICINE & REHABILITATION
Payer: MEDICARE

## 2024-06-25 DIAGNOSIS — M54.2 CHRONIC NECK PAIN: ICD-10-CM

## 2024-06-25 DIAGNOSIS — R29.3 POOR POSTURE: Primary | ICD-10-CM

## 2024-06-25 DIAGNOSIS — S22.080S COMPRESSION FRACTURE OF T12 VERTEBRA, SEQUELA: ICD-10-CM

## 2024-06-25 DIAGNOSIS — R25.1 TREMOR: ICD-10-CM

## 2024-06-25 DIAGNOSIS — G89.29 CHRONIC MIDLINE THORACIC BACK PAIN: ICD-10-CM

## 2024-06-25 DIAGNOSIS — G89.29 CHRONIC NECK PAIN: ICD-10-CM

## 2024-06-25 DIAGNOSIS — M54.6 CHRONIC MIDLINE THORACIC BACK PAIN: ICD-10-CM

## 2024-06-25 DIAGNOSIS — Z74.09 IMPAIRED FUNCTIONAL MOBILITY, BALANCE, GAIT, AND ENDURANCE: Primary | ICD-10-CM

## 2024-06-25 PROCEDURE — 97530 THERAPEUTIC ACTIVITIES: CPT

## 2024-06-25 PROCEDURE — 97162 PT EVAL MOD COMPLEX 30 MIN: CPT | Mod: KX,PN

## 2024-06-25 PROCEDURE — 97161 PT EVAL LOW COMPLEX 20 MIN: CPT

## 2024-06-25 NOTE — PLAN OF CARE
[facility]  Physical Therapy Cervical Evaluation      Name: Darrion Bennett  Clinic Number: [id number]    Therapy Diagnosis:   Encounter Diagnoses   Name Primary?    Chronic neck pain     Chronic midline thoracic back pain     Compression fracture of T12 vertebra, sequela     Poor posture Yes     Physician: Nalini Easley, *    Physician Orders: PT Eval and Treat   Medical Diagnosis from Referral:   M54.2,G89.29 (ICD-10-CM) - Chronic neck pain   M54.6,G89.29 (ICD-10-CM) - Chronic midline thoracic back pain   S22.080D (ICD-10-CM) - Compression fracture of T12 vertebra with routine healing, subsequent encounter     Evaluation Date: 6/25/2024  Authorization Period Expiration: 12/31/24  Plan of Care Expiration: 9/3/2024  Reassessment Due: 7/25/2024  Visit # / Visits authorized: 1/1 Keep HOB elevated  MedX Testing:MedX testing visit 2    Time In: 850  Time Out: 940  Total Billable Time: 50 minutes  INSURANCE and OUTCOMES: Fee for Service with FOTO Outcomes 1/3    Precautions: standard, HTN, and CHF,DM    Pattern of pain determined: 1REN      Subjective     Date of onset: 5 years  History of current condition: Drarion reports he has been experiencing mid thoracic and cervical pain x 5 years without incident.  Pt reports pain is progressing with age. Progressive pain from mid back up into cervical region L>R. Pt also reports pain into B hands. Cervical/mid back dominant, constant. worse; bending fwd/lifting/AM/standing prolonged/looking up/walking   Better: heating pad/laying flat on a hard surface  Pain described as a stabbing pain.       As per MD:  Mr Bennett is a 75 yo male sent in consultation by Dr. Mccollum for evaluation for the healthy back cervical program. he has midbackk pain for 5 years no event started pain and pain has been constant and has been increasing. The pain is midline thoracic back dominant, and radiates up the back to the neck. The pain is achy and sharp.  There is no tingling, numbness,or burning. There is weakness in back. The pain is constant 6-8/10. It is worse with looking down, turning to the left, sits, stands, lifting, bending, walking and climbing stairs. It is better with lying on heating pad and on back with knees elevated. He has been to pt for his back with no relief. He has not been to chiropractor had injections or surgery. His goals are to stand, walking and lift.   Medical History:   Past Medical History:   Diagnosis Date    Anemia     Arthritis     Cataract     CHF (congestive heart failure)     Chronic constipation     Diabetes mellitus, type 2     Felon of finger of left hand 05/11/2019    Glaucoma     Hyperlipidemia 05/13/2014    Hypertension     MCTD (mixed connective tissue disease)     Personal history of colonic polyps     Sjogren's disease     Ulcerative colitis     Urolithiasis        Surgical History:   Darrion Bennett  has a past surgical history that includes Eye surgery; Colonoscopy (2014); Cataract extraction (Bilateral, 2005); Esophagogastroduodenoscopy (N/A, 9/8/2023); and Esophagogastroduodenoscopy (N/A, 11/22/2023).    Medications:   Darrion has a current medication list which includes the following prescription(s): orencia clickject, dropsafe alcohol prep pads, amlodipine, ammonium lactate, atorvastatin, true metrix level 1, blood-glucose meter, dexcom g7 , dexcom g7 sensor, ciclopirox, clonidine 0.2 mg/24 hr td ptwk, diclofenac sodium, diclofenac sodium, dorzolamide, droplet pen needle, trulicity, empagliflozin, febuxostat, ferrous gluconate, fluticasone propionate, hydralazine, injectafer, insulin aspart u-100, lancets, linzess, meloxicam, multivit with min-folic acid, omeprazole, prednisone, prolia, senna-docusate 8.6-50 mg, torsemide, tramadol, travoprost, triamcinolone acetonide 0.1%, true metrix glucose test strip, and valsartan.    Allergies:   Review of patient's allergies indicates:   Allergen Reactions    Imuran  "[azathioprine]      Pancytopenia    Penicillins Nausea And Vomiting    Rosuvastatin      Muscle pain     Allopurinol analogues Rash        Imaging: X-ray   FINDINGS:  Thoracic spine two views: There is aortic plaque.  There is baseline DJD.  There are lower thoracic chronic wedge deformities unchanged from 02/27/2019.    Prior Therapy: previous PT, 6 sessions without improvements  Prior Treatment: none  Social History:  lives with their spouse  Leisure: gardening      Prior Level of Function: I  Current Level of Function: difficulty with standing to cook/lift anything  DME owned/used: no  Gym Membership: no    Pain:  Current 8/10, worst 10/10, best 7/10   Location: bilateral neck  and torso   Description: stabbing  Aggravating Factors: Standing, Bending, Morning, Flexing, and Lifting  Easing Factors: lying down and heating pad  Disturbed Sleep: no    Pattern of pain questions:  1.  Where is your pain the worst? Mid back  2.  Is your pain constant or intermittent? constant  3.  Does bending forward make your typical pain worse? yes  4.  Since the start of your neck pain, has there been a change in your bowel or bladder? no  5.  What can't you do now that you use to be able to do? Lift/standing prolonged    Pts goals: "decrease pain"    Red Flag Screening:   Cough/Sneeze Strain: (+)  Bladder/bowel: (--)  Falls: (--)  Night pain: (--)  Unexplained weight loss: (+) x 1 year  General health: poor/fair    Objective      Postural examination/scapula alignment: Rounded shoulder and Head forward/inc kyphosis ,dec cervical lordosis      Skin integrity:WNL , bruising on forearms  Edema: Mild- B hands  Sitting: poor  Standing: poor  Correction of posture: better with lumbar roll    Range of Motion - MOVEMENT LOSS    ROM Loss   Flexion minimal loss c/ pain mid back   Extension moderate loss, with pain mid back region   Side bending Right moderate loss and major loss   Side bending Left minimal loss and moderate loss   Rotation " Right moderate loss   Rotation Left minimal loss   Protraction minimal loss   Retraction  moderate loss     Upper Extremity Strength 3+4- B UE  30# b     NEUROLOGICAL SCREEN:    Sensory Deficits: Intact B LE's    Special Tests:   Test Name  Testing Result   Compression (--)   Distraction (--)   Neural Tension Test (--)   Saddle Sensation (--)     Reflexes:    Left Right   Biceps  2+ 2+   Brachioradialis  2+ 2+   Clonus (--) (--)   Babinski N/T N/T     REPEATED TEST MOVEMENTS:   Repeated Protraction in Sitting no worse   Repeated Flexion in Sitting no worse   Repeated Retraction in Sitting  pain during motion  no worse   Repeated Retraction Extension in Sitting no worse   Repeated Protraction in lying no worse   Repeated Flexion in lying pain during motion  worse   Repeated Retraction in lying better   Repeated Retraction Extension in lying no better         Cervical MedX testing Chart  Cervical MedX Testing visit 2      GAIT:  Assistive Device used: none  Level of Assistance: independent  Patient displays the following gait deviations: decreased rc,arm swing and stride length..     Intake Outcome Measure for FOTO cervical Survey    Therapist reviewed FOTO scores for Darrion Bennett on 6/25/2024.   FOTO documents entered into [employer] - see Media section.    Intake Score: 41% functional ability  Goal Score: 57% functional ability          Treatment         Written Home Exercises Provided: yes.    HEP AS FOLLOWS:  Open book  Scap squeeze  Cervical retraction in supine    Exercises were reviewed and Darrion was able to demonstrate prior to the end of the session. Darrion demonstrated good  understanding of the education provided.     See EMR under Patient Instructions for exercises provided 6/25/2024.    MedX Testing:  MedX testing to be performed next visit      Therapeutic Education/Activity provided for 15 minutes:   - Patient was given an [facility] Visit 1 handout which discusses the following:  - what to  expect in therapy  - an overview of the program, including health coaching and wellness  - importance of spinal hygiene, proper posture, lifting mechanics, sleep quality, and nutrition/hydration   - Rosey roll trialed, recommended, and purchase information was provided.  - Patient received a handout regarding anticipated muscular soreness following the isometric test and strategies for management were reviewed with patient including stretching, using ice and scheduled rest.   - Patient received verbal education on the following:   - Healthy Back program,   - purpose of the isometric test,   - safe progression of neck strengthening, wellness approach, and systemic strengthening.   - safe usage of MedX machine and testing protocols.      Assessment     Darrion is a 74 y.o. male referred to [facility] with a medical diagnosis of   M54.2,G89.29 (ICD-10-CM) - Chronic neck pain   M54.6,G89.29 (ICD-10-CM) - Chronic midline thoracic back pain   S22.080D (ICD-10-CM) - Compression fracture of T12 vertebra with routine healing, subsequent encounter   . Pt presents with poor posture, decreased UE strength, decreased trunk strength, gait with deviations, 41% FOTO ability score, decreased cervical ROM and decreased thoracic mobility.  Pt is a good candidate for HB program to maximize core strength, mobility and endurance to return him to previous level of function without pain.    Pain Pattern: 1REN       Pt prognosis is Good.     Pt will benefit from skilled outpatient Physical Therapy to address the deficits stated above and in the chart below, to provide pt/family education, and to maximize pt's level of independence.     Plan of care discussed with patient: Yes  Pt's spiritual, cultural and educational needs considered and patient is agreeable to the plan of care and goals as stated below:     Anticipated Barriers for therapy: nil    PT Evaluation Completed? Testing visit 2    Medical necessity is demonstrated by  the following problem list:      History  Co-morbidities and personal factors that may impact the plan of care [] LOW: no personal factors / co-morbidities  [x] MODERATE: 1-2 personal factors / co-morbidities  [] HIGH: 3+ personal factors / co-morbidities    Moderate / High Support Documentation:   Co-morbidities affecting plan of care: DM/HTN/CHF    Personal Factors:   lifestyle     Examination  Body Structures and Functions, activity limitations and participation restrictions that may impact the plan of care [] LOW: addressing 1-2 elements  [x] MODERATE: 3+ elements  [] HIGH: 4+ elements (please support below)  Poor posture, decreased B UE strength, decreased cervical ROM, decreased thoracic mobility  Moderate / High Support Documentation:     Clinical Presentation [x] LOW: stable  [] MODERATE: Evolving  [] HIGH: Unstable     Decision Making/ Complexity Score: low       GOALS: Pt is in agreement with the following goals.    Short term goals: 6 weeks or 10 visits   - Pt will demonstratte increased cervical MedX ROM as measured by med ex by 9 degrees from initial test which results in improved  ROM of neck for ease with ADLs and driving. Appropriate and Ongoing  - Pt will demonstrate independence with reducing or controlling symptoms with ther ex, movement, or position independently, able to reduce pain 1-2 points on pain scale using strategies taught in therapy.  Appropriate and Ongoing  - Pt will demonstrate increased MedX average isometric strength value by 20% with  when compared to the initial testing resulting in improved ability to perform bending, lifting, and carrying activities safely and confidently. Appropriate and Ongoing    Long term goals: 10 weeks or 20 visits  - Pt will demonstratte increased cervical MedX ROM as measured by med ex by 15degrees from initial test which results in functional ROM of neck for ease with ADLs and driving.  Appropriate and Ongoing  - Pt will demonstrate increased MedX  average isometric strength value  by 40% from initial test to improve ability to lift and carry, and sustain good posture while performing ADL's. Appropriate and Ongoing  - Pt will demonstrate reduced pain and improved functional outcomes as reported on the FOTO by reaching an intake score of >/= 57% functional ability in order to demonstrate subjective improvement in patient's condition. . Appropriate and Ongoing  - Pt will demonstrate independence with reducing or controlling symptoms with ther ex, movement, or position independently, able to reduce pain 2-4 points on pain scale using strategies taught in therapy. Appropriate and Ongoing  - Pt will demonstrate independence with the HEP at discharge. Appropriate and Ongoing  - Pt(patient goal)will be able to stand to cook dinner  > 1hour without onset of LBP Appropriate and Ongoing    Plan     Outpatient physical therapy 2x week for 10 weeks or 20 visits to include the following:   - Patient education  - Therapeutic exercise  - Manual therapy  - Performance testing   - Neuromuscular Re-education  - Therapeutic activity   - Modalities    Pt may be seen by PTA as part of the rehabilitation team.     Therapist: Selene Morris, PT  6/25/2024

## 2024-06-26 DIAGNOSIS — H40.1132 PRIMARY OPEN ANGLE GLAUCOMA (POAG) OF BOTH EYES, MODERATE STAGE: ICD-10-CM

## 2024-06-26 RX ORDER — TRAVOPROST OPHTHALMIC SOLUTION 0.04 MG/ML
1 SOLUTION OPHTHALMIC NIGHTLY
Qty: 7.5 EACH | Refills: 0 | Status: SHIPPED | OUTPATIENT
Start: 2024-06-26

## 2024-06-27 ENCOUNTER — PATIENT MESSAGE (OUTPATIENT)
Dept: NEUROLOGY | Facility: CLINIC | Age: 74
End: 2024-06-27
Payer: MEDICARE

## 2024-06-28 ENCOUNTER — CLINICAL SUPPORT (OUTPATIENT)
Dept: REHABILITATION | Facility: HOSPITAL | Age: 74
End: 2024-06-28
Attending: PHYSICAL MEDICINE & REHABILITATION
Payer: MEDICARE

## 2024-06-28 DIAGNOSIS — R29.3 POOR POSTURE: Primary | ICD-10-CM

## 2024-06-28 PROCEDURE — 97110 THERAPEUTIC EXERCISES: CPT

## 2024-06-28 PROCEDURE — 97750 PHYSICAL PERFORMANCE TEST: CPT

## 2024-06-28 NOTE — TELEPHONE ENCOUNTER
I spoke w/pt and pt will be [facility]   [facility] will call pt with an appt and pt has been notified of this   Order has been faxed and no auth is needed           Thank you

## 2024-06-28 NOTE — PROGRESS NOTES
" [facility] Physical Therapy Treatment      Name: Darrion Bennett  Clinic Number: [id number]    Therapy Diagnosis:   Encounter Diagnosis   Name Primary?    Poor posture Yes     Physician: Nalini Easley, *    Visit Date: 2024    Physician Orders: PT Eval and Treat  Medical Diagnosis: Tremor [R25.1], Cervicalgia [M54.2], Other chronic pain [G89.29], Wedge compression fracture of t11-T12 vertebra, subsequent encounter for fracture with routine healing [S22.080D]   Evaluation Date: 2024  Authorization Period Expiration: 24  Plan of Care Expiration: 9/3/2024  Reassessment Due: 2024  Visit # / Visits authorized:  Keep HOB elevated  MedX Testing:MedX testing visit 2    Time In: 8:00AM  Time Out: 9:00AM  Total Billable Time: 55 minutes  INSURANCE and OUTCOMES: Fee for Service with FOTO Outcomes 1/3    Precautions: Standard, Diabetes, CHF, and HTN, tremor    Pattern of pain determined: 1 WILLIE    Subjective   Darrion reports moderate pain in the midback at present.      Patient reports tolerating previous visit no adverse effects.  Patient reports their pain to be 7/10 on a 0-10 scale with 0 being no pain and 10 being the worst pain imaginable.  Pain Location: bilateral neck and thoracic     Work and leisure: retired/gardening  Pt goals: "decrease pain"    Objective     Baseline IM Testing Results:   Date of testin2024   Initial: 24 Midpoint: Final   ROM  deg     Max Peak Torque 273      Min Peak Torque 124      Flex/Ext Ratio 2.2:1     % below normative data -51%     % gain from initial N/a       Outcomes:  Initial score:  Visit 5 score:  Goal:      Treatment    Darrion received the treatments listed below:      MedX testing performed day 2: Patient  received neuromuscular education to engage spinal musculature correctly for motor control and engagement of musculature for 15 minutes including the MedX exercise component and practice and standard testing. MedX dynamic " exercise and baseline isometric test performed with instructions to guide the patient safely through the testing procedure. Patient instructed to perform isometric test correctly and safely while building to an optimal force with a pain-free effort. Patient also instructed that they should feel support/pressure from MedX restraints but no pain/discomfort, and encouraged to report any pain to therapist. Patient demonstrated appropriate understanding of information and tolerance of test.  Education regarding purpose of test, safety during test given, and reviewed possible more soreness and strategies.            6/28/2024     8:02 AM   [facility]   Visit Number 2   VAS Pain Rating 7   Cervical Stretches - Retraction In Lying 10   Retraction in Sitting 10   Scapular Retraction 10   Cervical Extension Seat Pad 1   Seat Adjustment 476   Top Dead Center 66   Counterweight 1   Cervical Flexion 102   Cervical Extension 39   Cervical Peak Torque 273 ft. lbs.   Ice - Sitting 5 mins.      therapeutic exercises to develop strength, endurance, ROM, flexibility, posture, and core stabilization for 45 minutes including:  Open book x10  Cervical retraction in supine x10  Scap squeeze x10  +No money ER with yellow theraband x10  +RIS x10  +Seated thoracic extension over 1/2 foam x10  +paloff press x10  +serratus slides with pillowcase x10    Peripheral muscle strengthening which included 1 set of 15-20 repetitions at a slow, controlled 10-13 second per rep pace focused on strengthening supporting musculature for improved body mechanics and functional mobility.  Pt and therapist focused on proper form during treatment to ensure optimal strengthening of each targeted muscle group.  Machines were utilized including torso rotation, leg press, hip abd and hip add, leg ext.  Leg curl, triceps, biceps, chest and row added visit 3        cold pack for 5 minutes to thoracic spine in sitting.    Home Exercises Provided and Patient  Education Provided   Home exercises include:Open books, scap squeeze, cervical retraction in supine  Cardio program:visit 5  Lifting education date:visit 11  Posture/Lumbar roll: recommended  Fridge Magnet Discharge handout (date given):  Equipment at home/gym membership: none      Education provided:   - HEP  - POC   - Purpose and procedure for MedX isometric testing    Written Home Exercises Provided: Patient instructed to cont prior HEP.  Exercises were reviewed and Darrion was able to demonstrate them prior to the end of the session.  Darrion demonstrated good  understanding of the education provided.     See EMR under Patient Instructions for exercises provided prior visit.          Assessment     Pt presents to second healthy back visit reporting minimal change and moderate thoracic pain today. He was able to demo HEP with Mod VC for form. Added exercises for thoracic mobility and periscapular strengthening, which he tolerated well. Noted overall slow movements and he reported dizziness with quick head movement. Pt received isometric testing today on  Cervical MedX. He tested at 51% below age norms, indicating weakness of cervical musculature. Begin dynamic strengthening next visit at 80% of max torque.  Pt was also able to complete half of the peripheral strengthening exercises without increased discomfort and will complete the complete circuit next visit as tolerated.    Patient is making good progress towards established goals.  Pt will continue to benefit from skilled outpatient physical therapy to address the deficits stated in the impairment chart, provide pt/family education and to maximize pt's level of independence in the home and community environment.     Anticipated Barriers for therapy: tremors - currently seeing neuro PT  Pt's spiritual, cultural and educational needs considered and pt agreeable to plan of care and goals as stated below:             Goals:   Short term goals: 6 weeks or 10 visits   -  Pt will demonstratte increased cervical MedX ROM as measured by med ex by 9 degrees from initial test which results in improved  ROM of neck for ease with ADLs and driving. Appropriate and Ongoing  - Pt will demonstrate independence with reducing or controlling symptoms with ther ex, movement, or position independently, able to reduce pain 1-2 points on pain scale using strategies taught in therapy.  Appropriate and Ongoing  - Pt will demonstrate increased MedX average isometric strength value by 20% with  when compared to the initial testing resulting in improved ability to perform bending, lifting, and carrying activities safely and confidently. Appropriate and Ongoing     Long term goals: 10 weeks or 20 visits  - Pt will demonstratte increased cervical MedX ROM as measured by med ex by 15degrees from initial test which results in functional ROM of neck for ease with ADLs and driving.  Appropriate and Ongoing  - Pt will demonstrate increased MedX average isometric strength value  by 40% from initial test to improve ability to lift and carry, and sustain good posture while performing ADL's. Appropriate and Ongoing  - Pt will demonstrate reduced pain and improved functional outcomes as reported on the FOTO by reaching an intake score of >/= 57% functional ability in order to demonstrate subjective improvement in patient's condition. . Appropriate and Ongoing  - Pt will demonstrate independence with reducing or controlling symptoms with ther ex, movement, or position independently, able to reduce pain 2-4 points on pain scale using strategies taught in therapy. Appropriate and Ongoing  - Pt will demonstrate independence with the HEP at discharge. Appropriate and Ongoing  - Pt(patient goal)will be able to stand to cook dinner  > 1hour without onset of LBP Appropriate and Ongoing         Plan   Continue with established Plan of Care towards established PT goals.       Therapist: Cait Garcia,  PT  Co-treated with Selene Morris PT  6/28/2024

## 2024-07-10 ENCOUNTER — CLINICAL SUPPORT (OUTPATIENT)
Dept: REHABILITATION | Facility: HOSPITAL | Age: 74
End: 2024-07-10
Payer: MEDICARE

## 2024-07-10 DIAGNOSIS — R29.3 POOR POSTURE: Primary | ICD-10-CM

## 2024-07-10 PROCEDURE — 97110 THERAPEUTIC EXERCISES: CPT

## 2024-07-10 PROCEDURE — 97112 NEUROMUSCULAR REEDUCATION: CPT

## 2024-07-10 NOTE — PROGRESS NOTES
" [facility]      Name: Darrion Bennett  Clinic Number: [id number]    Therapy Diagnosis:   Encounter Diagnosis   Name Primary?    Poor posture Yes       Physician: Joelle Flores MD    Visit Date: 7/10/2024    Physician Orders: PT Eval and Treat  Medical Diagnosis: Tremor [R25.1], Cervicalgia [M54.2], Other chronic pain [G89.29], Wedge compression fracture of t11-T12 vertebra, subsequent encounter for fracture with routine healing [S22.080D]   Evaluation Date: 2024  Authorization Period Expiration: 24  Plan of Care Expiration: 9/3/2024  Reassessment Due: 2024  Visit # / Visits authorized: 3/20 Keep HOB elevated  MedX Testing:MedX testing visit 2    Time In: 9:00AM  Time Out: 10:00AM  Total Billable Time: 55 minutes  INSURANCE and OUTCOMES: Fee for Service with FOTO Outcomes 1/3    Precautions: Standard, Diabetes, CHF, and HTN, tremor    Pattern of pain determined: 1 WILLIE    Subjective   Darrion reports he continues to have 7/10 pain in the back and neck. He went to [place] to see family friends last week, which was a nice trip.      Patient reports tolerating previous visit no adverse effects.  Patient reports their pain to be 7/10 on a 0-10 scale with 0 being no pain and 10 being the worst pain imaginable.  Pain Location: bilateral neck and thoracic     Work and leisure: retired/gardening  Pt goals: "decrease pain"    Objective     Baseline IM Testing Results:   Date of testin2024   Initial: 24 Midpoint: Final   ROM  deg     Max Peak Torque 273      Min Peak Torque 124      Flex/Ext Ratio 2.2:1     % below normative data -51%     % gain from initial N/a       Outcomes:  Initial score:  Visit 5 score:  Goal:      Treatment    Darrion received the treatments listed below:      Darrion received neuromuscular education  to isolate and engage spinal stabilization musculature correctly for motor control and coordination to aid in function and posture for 10 " "minutes on the Medical Medx Machine.  Patient performed MedX dynamic exercise with emphasis on spinal muscular control using pacer throughout  active range of motion. Therapist assisted patient in achieving optimal exertion for neural reeducation and endurance training by using the  Rigo Exertion Rating scale, by instructing the patient to aim for mid range of exertion, performing 15-20 repetitions, slowly, correctly,and safely.           7/10/2024     9:01 AM   [facility]   Visit Number 3   VAS Pain Rating 7   Cervical Stretches - Retraction In Lying 10   Retraction in Sitting 10   Sidebending 2   Scapular Retraction 10   Cervical Weight 201 lbs   Repetitions 15   Rating of Perceived Exertion 4   Ice - Sitting 5 mins.      therapeutic exercises to develop strength, endurance, ROM, flexibility, posture, and core stabilization for 45 minutes including:  +UT/LS stretch 2x20"  Open book x10  Cervical retraction in supine x10  Scap squeeze x10  No money ER with yellow theraband x10  RIS x10  Seated thoracic extension over 1/2 foam x10  paloff press x10  serratus slides with pillowcase x10 - NP    Peripheral muscle strengthening which included 1 set of 15-20 repetitions at a slow, controlled 10-13 second per rep pace focused on strengthening supporting musculature for improved body mechanics and functional mobility.  Pt and therapist focused on proper form during treatment to ensure optimal strengthening of each targeted muscle group.  Machines were utilized including torso rotation, leg press, hip abd and hip add, leg ext.  Leg curl, triceps, biceps, chest and row added visit 3        cold pack for 5 minutes to thoracic spine in sitting.    Home Exercises Provided and Patient Education Provided   Home exercises include:Open books, scap squeeze, cervical retraction in supine  Cardio program:visit 5  Lifting education date:visit 11  Posture/Lumbar roll: recommended  Fridge Magnet Discharge handout (date " given):  Equipment at home/gym membership: none      Education provided:   - HEP  - POC   - Purpose and procedure for MedX isometric testing    Written Home Exercises Provided: Patient instructed to cont prior HEP.  Exercises were reviewed and Darrion was able to demonstrate them prior to the end of the session.  Darrion demonstrated good  understanding of the education provided.     See EMR under Patient Instructions for exercises provided prior visit.          Assessment     Pt presents to second healthy back visit reporting minimal change and moderate thoracic pain today. He was able to demo HEP with Mod VC for form. Added exercises for thoracic mobility and periscapular strengthening, which he tolerated well. Noted overall slow movements and he reported dizziness with quick head movement. Pt was able to start neuro reeducation training, strengthening, and endurance training on the cervical MedX at <80% of max peak torque due to high flex:ext ratio on initial test. Pt was able to complete 15 reps, with 4/10 RPE.  Pt was also able to complete full circuit of peripheral strengthening exercises without increased discomfort.    Patient is making good progress towards established goals.  Pt will continue to benefit from skilled outpatient physical therapy to address the deficits stated in the impairment chart, provide pt/family education and to maximize pt's level of independence in the home and community environment.     Anticipated Barriers for therapy: tremors - currently seeing neuro PT  Pt's spiritual, cultural and educational needs considered and pt agreeable to plan of care and goals as stated below:             Goals:   Short term goals: 6 weeks or 10 visits   - Pt will demonstratte increased cervical MedX ROM as measured by med ex by 9 degrees from initial test which results in improved  ROM of neck for ease with ADLs and driving. Appropriate and Ongoing  - Pt will demonstrate independence with reducing or  controlling symptoms with ther ex, movement, or position independently, able to reduce pain 1-2 points on pain scale using strategies taught in therapy.  Appropriate and Ongoing  - Pt will demonstrate increased MedX average isometric strength value by 20% with  when compared to the initial testing resulting in improved ability to perform bending, lifting, and carrying activities safely and confidently. Appropriate and Ongoing     Long term goals: 10 weeks or 20 visits  - Pt will demonstratte increased cervical MedX ROM as measured by med ex by 15degrees from initial test which results in functional ROM of neck for ease with ADLs and driving.  Appropriate and Ongoing  - Pt will demonstrate increased MedX average isometric strength value  by 40% from initial test to improve ability to lift and carry, and sustain good posture while performing ADL's. Appropriate and Ongoing  - Pt will demonstrate reduced pain and improved functional outcomes as reported on the FOTO by reaching an intake score of >/= 57% functional ability in order to demonstrate subjective improvement in patient's condition. . Appropriate and Ongoing  - Pt will demonstrate independence with reducing or controlling symptoms with ther ex, movement, or position independently, able to reduce pain 2-4 points on pain scale using strategies taught in therapy. Appropriate and Ongoing  - Pt will demonstrate independence with the HEP at discharge. Appropriate and Ongoing  - Pt(patient goal)will be able to stand to cook dinner  > 1hour without onset of LBP Appropriate and Ongoing         Plan   Continue with established Plan of Care towards established PT goals.       Therapist: Cait Garcia, PT  7/10/2024

## 2024-07-11 ENCOUNTER — OFFICE VISIT (OUTPATIENT)
Dept: PAIN MEDICINE | Facility: CLINIC | Age: 74
End: 2024-07-11
Payer: MEDICARE

## 2024-07-11 ENCOUNTER — PATIENT MESSAGE (OUTPATIENT)
Dept: NEUROLOGY | Facility: CLINIC | Age: 74
End: 2024-07-11
Payer: MEDICARE

## 2024-07-11 VITALS
DIASTOLIC BLOOD PRESSURE: 61 MMHG | HEART RATE: 61 BPM | WEIGHT: 127.88 LBS | HEIGHT: 66 IN | SYSTOLIC BLOOD PRESSURE: 115 MMHG | BODY MASS INDEX: 20.55 KG/M2

## 2024-07-11 DIAGNOSIS — G89.29 CHRONIC MIDLINE THORACIC BACK PAIN: ICD-10-CM

## 2024-07-11 DIAGNOSIS — M54.6 CHRONIC MIDLINE THORACIC BACK PAIN: ICD-10-CM

## 2024-07-11 DIAGNOSIS — M47.896 OTHER SPONDYLOSIS, LUMBAR REGION: ICD-10-CM

## 2024-07-11 DIAGNOSIS — G89.29 CHRONIC NECK PAIN: ICD-10-CM

## 2024-07-11 DIAGNOSIS — R53.1 SUBJECTIVE WEAKNESS: ICD-10-CM

## 2024-07-11 DIAGNOSIS — M54.2 CHRONIC NECK PAIN: ICD-10-CM

## 2024-07-11 DIAGNOSIS — G89.29 CHRONIC MIDLINE LOW BACK PAIN WITHOUT SCIATICA: Primary | ICD-10-CM

## 2024-07-11 DIAGNOSIS — M54.50 CHRONIC MIDLINE LOW BACK PAIN WITHOUT SCIATICA: Primary | ICD-10-CM

## 2024-07-11 DIAGNOSIS — S22.080S COMPRESSION FRACTURE OF T12 VERTEBRA, SEQUELA: ICD-10-CM

## 2024-07-11 PROCEDURE — 1125F AMNT PAIN NOTED PAIN PRSNT: CPT | Mod: CPTII,S$GLB,, | Performed by: STUDENT IN AN ORGANIZED HEALTH CARE EDUCATION/TRAINING PROGRAM

## 2024-07-11 PROCEDURE — 99204 OFFICE O/P NEW MOD 45 MIN: CPT | Mod: S$GLB,,, | Performed by: STUDENT IN AN ORGANIZED HEALTH CARE EDUCATION/TRAINING PROGRAM

## 2024-07-11 PROCEDURE — 1159F MED LIST DOCD IN RCRD: CPT | Mod: CPTII,S$GLB,, | Performed by: STUDENT IN AN ORGANIZED HEALTH CARE EDUCATION/TRAINING PROGRAM

## 2024-07-11 PROCEDURE — 1101F PT FALLS ASSESS-DOCD LE1/YR: CPT | Mod: CPTII,S$GLB,, | Performed by: STUDENT IN AN ORGANIZED HEALTH CARE EDUCATION/TRAINING PROGRAM

## 2024-07-11 PROCEDURE — 3074F SYST BP LT 130 MM HG: CPT | Mod: CPTII,S$GLB,, | Performed by: STUDENT IN AN ORGANIZED HEALTH CARE EDUCATION/TRAINING PROGRAM

## 2024-07-11 PROCEDURE — 4010F ACE/ARB THERAPY RXD/TAKEN: CPT | Mod: CPTII,S$GLB,, | Performed by: STUDENT IN AN ORGANIZED HEALTH CARE EDUCATION/TRAINING PROGRAM

## 2024-07-11 PROCEDURE — 3066F NEPHROPATHY DOC TX: CPT | Mod: CPTII,S$GLB,, | Performed by: STUDENT IN AN ORGANIZED HEALTH CARE EDUCATION/TRAINING PROGRAM

## 2024-07-11 PROCEDURE — 3008F BODY MASS INDEX DOCD: CPT | Mod: CPTII,S$GLB,, | Performed by: STUDENT IN AN ORGANIZED HEALTH CARE EDUCATION/TRAINING PROGRAM

## 2024-07-11 PROCEDURE — 99999 PR PBB SHADOW E&M-EST. PATIENT-LVL V: CPT | Mod: PBBFAC,,, | Performed by: STUDENT IN AN ORGANIZED HEALTH CARE EDUCATION/TRAINING PROGRAM

## 2024-07-11 PROCEDURE — 3044F HG A1C LEVEL LT 7.0%: CPT | Mod: CPTII,S$GLB,, | Performed by: STUDENT IN AN ORGANIZED HEALTH CARE EDUCATION/TRAINING PROGRAM

## 2024-07-11 PROCEDURE — 3078F DIAST BP <80 MM HG: CPT | Mod: CPTII,S$GLB,, | Performed by: STUDENT IN AN ORGANIZED HEALTH CARE EDUCATION/TRAINING PROGRAM

## 2024-07-11 PROCEDURE — 3288F FALL RISK ASSESSMENT DOCD: CPT | Mod: CPTII,S$GLB,, | Performed by: STUDENT IN AN ORGANIZED HEALTH CARE EDUCATION/TRAINING PROGRAM

## 2024-07-11 RX ORDER — LIDOCAINE 50 MG/G
1 PATCH TOPICAL DAILY
Qty: 90 PATCH | Refills: 1 | Status: SHIPPED | OUTPATIENT
Start: 2024-07-11 | End: 2025-01-07

## 2024-07-11 NOTE — PROGRESS NOTES
Chronic Pain - New Consult    Referring Physician: Nalini Easley, *    Date: 07/11/2024     Re: Darrion Bennett  MR#: [id number]  YOB: 1950  Age: 74 y.o.    Chief Complaint:   Chief Complaint   Patient presents with    Neck Pain     **This note is dictated using the M*Modal Fluency Direct word recognition program. There are word recognition mistakes that are occasionally missed on review.**    ASSESSMENT: 74 y.o. year old male with back and neck pain, consistent with     1. Chronic midline low back pain without sciatica  LIDOcaine (LIDODERM) 5 %    Acupuncture      2. Chronic neck pain  Ambulatory referral/consult to Pain Clinic      3. Chronic midline thoracic back pain  Ambulatory referral/consult to Pain Clinic      4. Compression fracture of T12 vertebra, sequela  Ambulatory referral/consult to Pain Clinic    MRI Spine Cervical-Thoracic-Lumbar Without Contrast (XPD)    LIDOcaine (LIDODERM) 5 %      5. Subjective weakness  MRI Spine Cervical-Thoracic-Lumbar Without Contrast (XPD)      6. Other spondylosis, lumbar region  Acupuncture        PLAN:     Left sided neck pain  -suspect spondylosis  -MRI to evaluate given subjective weakness to r/o more severe issues    Chronic low thoracic/upper lumbar pain  -likely sequela of chronic compression fracture  -new MRI to evaluate  -trial lidoderm patches  -trial acupuncture  -continue healthy back    - RTC after MRI to see how healthy back/lidocaine/acupuncture is helping  - Counseled patient regarding the importance of activity modification and physical therapy.    The above plan and management options were discussed at length with patient. Patient is in agreement with the above and verbalized understanding. It will be communicated with the referring physician via electronic record, fax, or mail.  Lab/study reports reviewed were important and necessary because subsequent medical and treatment recommendations required review of the above lab/study  reports. Images viewed/reviewed above were important and necessary because subsequent medical and treatment recommendations required review of the reviewed image(s).     Electronically signed by:  Diony Sánchez DO  07/11/2024    =========================================================================================================    SUBJECTIVE:    Darrion Bennett is a 74 y.o. male presents to the clinic for the evaluation of middle back pain. The pain started 1 year ago following no inciting event and symptoms have been unchanged. The patient states that he had a compression fracture in 2015 because he was on heavy steroids for GI issues and this caused osteoprosis. He is on Prolia for osteoporosis now. He was on meloxicam but told to stop because of kidney problems.  He uses a heating pad and tramadol and tylenol for the pain. The pain comes and goes.  The pain has gradually been getting worse since it happened.    Pain Description:    The pain is located in the middle back area and radiates to the left sided neck .    At BEST  7/10   At WORST  10/10 on the WORST day.    On average pain is rated as 7/10.   Today the pain is rated as 9/10  The pain is continuous.  The pain is described as stabbing    Symptoms interfere with daily activity and sleeping.   Exacerbating factors: daily activities.    Mitigating factors heat and medications.   He reports 6 hours of sleep per night.    Physical Therapy/Home Exercise:  healthy back program    Current Pain Medications:    - Tramadol 50mg QD-BID, Tylenol qhs,     Failed Pain Medications:    - cannot take NSAIDs due to CKD3    Pain Treatment Therapies:    Pain procedures:   none  Physical Therapy: yes currently in healthy back  Chiropractor:none  Acupuncture: none  TENS unit: none  Spinal decompression: none  Joint replacement: none    Patient denies urinary incontinence and bowel incontinence.  Patient denies any suicidal or homicidal ideations     report:   Reviewed and consistent with medication use as prescribed.    Imaging:   XR Thoracic 02/2024:    Thoracic spine two views: There is aortic plaque.  There is baseline DJD.  There are lower thoracic chronic wedge deformities unchanged from 02/27/2019.         7/11/2024     8:30 AM   Pain Disability Index (PDI)   Family/Home Responsibilities: 7   Recreation: 7   Occupation: 7   Sexual Behavior: 7   Self Care: 7   Life-Support Activities: 7   Pain Disability Index (PDI) 49        Past Medical History:   Diagnosis Date    Anemia     Arthritis     Cataract     CHF (congestive heart failure)     Chronic constipation     Diabetes mellitus, type 2     Felon of finger of left hand 05/11/2019    Glaucoma     Hyperlipidemia 05/13/2014    Hypertension     MCTD (mixed connective tissue disease)     Personal history of colonic polyps     Sjogren's disease     Ulcerative colitis     Urolithiasis      Past Surgical History:   Procedure Laterality Date    CATARACT EXTRACTION Bilateral 2005    COLONOSCOPY  2014    ESOPHAGOGASTRODUODENOSCOPY N/A 9/8/2023    Procedure: EGD (ESOPHAGOGASTRODUODENOSCOPY);  Surgeon: Divya Saenz MD;  Location: [facility];  Service: Endoscopy;  Laterality: N/A;  ref by / inst portal-RB    ESOPHAGOGASTRODUODENOSCOPY N/A 11/22/2023    Procedure: EGD (ESOPHAGOGASTRODUODENOSCOPY);  Surgeon: Crystal Urbina MD;  Location: [facility];  Service: Endoscopy;  Laterality: N/A;  time frame 8-12 weeks  Dr. Saenz pt   prep instructions sent to pt via portal -  wl meds trulicity hold 7 days prior  diabetic  11/16- pt r/s to earlier date, pre call complete.  DBM    EYE SURGERY       Social History     Socioeconomic History    Marital status:     Number of children: 3   Tobacco Use    Smoking status: Never     Passive exposure: Never    Smokeless tobacco: Never   Substance and Sexual Activity    Alcohol use: No    Drug use: Yes     Comment: ultram 1 - 2 tabs a week    Sexual activity: Not Currently      Partners: Female     Social Determinants of Health     Financial Resource Strain: Low Risk  (2/12/2024)    Overall Financial Resource Strain (CARDIA)     Difficulty of Paying Living Expenses: Not very hard   Recent Concern: Financial Resource Strain - Medium Risk (11/27/2023)    Overall Financial Resource Strain (CARDIA)     Difficulty of Paying Living Expenses: Somewhat hard   Food Insecurity: Food Insecurity Present (2/12/2024)    Hunger Vital Sign     Worried About Running Out of Food in the Last Year: Sometimes true     Ran Out of Food in the Last Year: Sometimes true   Transportation Needs: No Transportation Needs (2/12/2024)    PRAPARE - Transportation     Lack of Transportation (Medical): No     Lack of Transportation (Non-Medical): No   Physical Activity: Unknown (2/12/2024)    Exercise Vital Sign     Days of Exercise per Week: 0 days   Stress: No Stress Concern Present (2/12/2024)    [employer] - Occupational Stress Questionnaire     Feeling of Stress : Only a little   Housing Stability: Low Risk  (2/12/2024)    Housing Stability Vital Sign     Unable to Pay for Housing in the Last Year: No     Number of Places Lived in the Last Year: 2     Unstable Housing in the Last Year: No     Family History   Problem Relation Name Age of Onset    Hypertension Father      Stroke Father      Cataracts Father      Glaucoma Father      Cataracts Mother      No Known Problems Sister      No Known Problems Brother      Rheum arthritis Maternal Aunt      Rheum arthritis Maternal Uncle      No Known Problems Paternal Aunt      No Known Problems Paternal Uncle      No Known Problems Maternal Grandmother      No Known Problems Maternal Grandfather      Throat cancer Paternal Grandmother      Glaucoma Paternal Grandfather      No Known Problems Daughter      No Known Problems Son x2     Celiac disease Neg Hx      Colon cancer Neg Hx      Cirrhosis Neg Hx      Colon polyps Neg Hx      Crohn's  disease Neg Hx      Cystic fibrosis Neg Hx      Hemochromatosis Neg Hx      Esophageal cancer Neg Hx      Inflammatory bowel disease Neg Hx      Irritable bowel syndrome Neg Hx      Liver cancer Neg Hx      Liver disease Neg Hx      Rectal cancer Neg Hx      Stomach cancer Neg Hx      Ulcerative colitis Neg Hx      Chase's disease Neg Hx      Amblyopia Neg Hx      Blindness Neg Hx      Cancer Neg Hx      Diabetes Neg Hx      Macular degeneration Neg Hx      Retinal detachment Neg Hx      Strabismus Neg Hx      Thyroid disease Neg Hx      Melanoma Neg Hx         Review of patient's allergies indicates:   Allergen Reactions    Imuran [azathioprine]      Pancytopenia    Penicillins Nausea And Vomiting    Rosuvastatin      Muscle pain     Allopurinol analogues Rash       Current Outpatient Medications   Medication Sig    abatacept (ORENCIA CLICKJECT) 125 mg/mL AtIn Inject 125 mg into the skin once a week.    alcohol swabs (DROPSAFE ALCOHOL PREP PADS) PadM USE  4 TIMES PER DAY    amLODIPine (NORVASC) 5 MG tablet TAKE 1 TABLET TWICE DAILY    ammonium lactate 12 % Crea Apply 1 application topically once daily.    atorvastatin (LIPITOR) 80 MG tablet TAKE 1 TABLET EVERY DAY    blood glucose control, low (TRUE METRIX LEVEL 1) Soln Use as indicated    blood-glucose meter (TRUE METRIX GLUCOSE METER) Misc Test glucose 4x/day    blood-glucose meter,continuous (DEXCOM G7 ) Misc Use with Dexcom G7 sensors    blood-glucose sensor (DEXCOM G7 SENSOR) Bernie Change every 10 days    ciclopirox (PENLAC) 8 % Soln Apply topically nightly.    cloNIDine 0.2 mg/24 hr td ptwk (CATAPRES) 0.2 mg/24 hr Place 1 patch onto the skin every 7 days.    diclofenac sodium (VOLTAREN) 1 % Gel Apply 2 g topically 4 (four) times daily as needed. Apply to aching joints    diclofenac sodium (VOLTAREN) 1 % Gel Apply 2 g topically once daily.    dorzolamide (TRUSOPT) 2 % ophthalmic solution INSTILL 1 DROP INTO BOTH EYES TWICE DAILY    DROPLET PEN NEEDLE  "32 gauge x 5/32" Ndle USE 1 NEEDLE AS DIRECTED FOUR TIMES DAILY    dulaglutide (TRULICITY) 4.5 mg/0.5 mL pen injector Inject 4.5 mg into the skin every 7 days.    empagliflozin (JARDIANCE) 10 mg tablet Take 1 tablet (10 mg total) by mouth once daily.    febuxostat (ULORIC) 40 mg Tab Take 1 tablet (40 mg total) by mouth once daily.    ferrous gluconate 324 mg (37.5 mg iron) Tab tablet Take 1 tablet (324 mg total) by mouth once daily. (Patient taking differently: Take 324 mg by mouth once daily. Not taking)    fluticasone propionate (FLONASE) 50 mcg/actuation nasal spray 1 spray (50 mcg total) by Each Nostril route once daily.    hydrALAZINE (APRESOLINE) 25 MG tablet TAKE 3 TABLETS THREE TIMES DAILY    INJECTAFER 50 iron mg/mL injection     insulin aspart U-100 (NOVOLOG FLEXPEN U-100 INSULIN) 100 unit/mL (3 mL) InPn pen Inject Novolog if glucose after meal is high:  201-250=+2, 251-300=+3; 301-350=+4, over 350=+5 units    lancets (TRUEPLUS LANCETS) 33 gauge Misc Test glucose 4x/day. Dx code e11.65    LIDOcaine (LIDODERM) 5 % Place 1 patch onto the skin once daily. Remove & Discard patch within 12 hours or as directed by MD MCCABE 72 mcg Cap capsule TAKE 1 CAPSULE BEFORE BREAKFAST    meloxicam (MOBIC) 15 MG tablet Take 1 tablet (15 mg total) by mouth once daily.    multivit with min-folic acid 200 mcg Chew     omeprazole (PRILOSEC) 40 MG capsule Take 1 capsule (40 mg total) by mouth every morning.    predniSONE (DELTASONE) 5 MG tablet Take 1 tablet (5 mg total) by mouth once daily.    PROLIA 60 mg/mL Syrg     senna-docusate 8.6-50 mg (SENNA WITH DOCUSATE SODIUM) 8.6-50 mg per tablet Take 1 tablet by mouth once daily.    torsemide (DEMADEX) 10 MG Tab TAKE 1 TABLET EVERY OTHER DAY    traMADoL (ULTRAM) 50 mg tablet TAKE 1 TABLET EVERY 12 HOURS AS NEEDED FOR PAIN    travoprost (TRAVATAN Z) 0.004 % ophthalmic solution Place 1 drop into both eyes every evening.    triamcinolone acetonide 0.1% (KENALOG) 0.1 % cream APPLY " "TOPICALLY TWICE DAILY FOR 10 DAYS    TRUE METRIX GLUCOSE TEST STRIP Strp TEST BLOOD SUGAR FOUR TIMES DAILY    valsartan (DIOVAN) 160 MG tablet TAKE 1 TABLET TWICE DAILY     No current facility-administered medications for this visit.       REVIEW OF SYSTEMS:    GENERAL:  No weight loss, malaise or fevers.  HEENT:   No recent changes in vision or hearing  NECK:  Negative for lumps, no difficulty with swallowing.  RESPIRATORY:  Negative for cough, wheezing or shortness of breath, patient denies any recent URI.  CARDIOVASCULAR:  Negative for chest pain, leg swelling or palpitations.  GI:  Negative for abdominal discomfort, blood in stools or black stools or change in bowel habits.  MUSCULOSKELETAL:  See HPI.  SKIN:  Negative for lesions, rash, and itching.  PSYCH:  No mood disorder or recent psychosocial stressors.  Patients sleep is not disturbed secondary to pain.  HEMATOLOGY/LYMPHOLOGY:  Negative for prolonged bleeding, bruising easily or swollen nodes.  Patient is not currently taking any anti-coagulants  NEURO:   No history of headaches, syncope, paralysis, seizures or tremors.  All other reviewed and negative other than HPI.    OBJECTIVE:    /61 (BP Location: Right arm, Patient Position: Sitting)   Pulse 61   Ht 5' 6" (1.676 m)   Wt 58 kg (127 lb 13.9 oz)   BMI 20.64 kg/m²     PHYSICAL EXAMINATION:    GENERAL: Well appearing, in no acute distress, alert and oriented x3.  PSYCH:  Mood and affect appropriate.  SKIN: Skin color, texture, turgor normal, no rashes or lesions.  HEAD/FACE:  Normocephalic, atraumatic. Cranial nerves grossly intact.    NECK:   - Positive pain to palpation over the cervical paraspinous muscles. Left low paraspinals  - Spurling  Positive for neck pain  - Positive pain with neck extension, rotation and lateral flexion.     CV: RRR with palpation of the radial artery.  PULM: CTAB. No evidence of respiratory difficulty, symmetric chest rise.  GI:  Soft and non-tender.    BACK:   - No " obvious deformity or signs of trauma, Normal lumbar lordotic curve  - Positive spinous process tenderness low throacic  - Negative paravertebral tenderness  - Negative pain to palpation over the facet joints of the lumbar spine.   - Did not perform QL / Iliac crest / Glut tenderness  - Slump test is Negative for radicular pain  - Slump test is Negative for back pain  - Supine Straight leg raising is Did not perform for radicular pain  - Supine Straight leg raising is Did not perform for back pain  - Lumbar ROM is diminished in Flexion with pain  - Lumbar ROM is diminished in Extension with pain  - Lumbar ROM is diminished in Lateral Flexion with pain  - Did not perform Sustained Hip Flexion test (for discogenic pain)  - Positive Altered Gait, Posture  - Axial facet loading test Did not perform on the bilateral side(s)    SI Joint exam:  - Negative SI joint tenderness to palpation  - Geovanny's sign Did not perform  - Yeoman's Test: Did not perform for SI joint pain indicating anterior SI ligament involvement. Did not perform for anterior thigh pain/paresthesia which indicates femoral nerve stretch.  - Gaenslen's Test:Did not perform  - Finger Kamilla's Sign:Negative  - SI compression test:Did not perform  - SI distraction test:Did not perform  - Thigh Thrust: Did not perform  - SI Thrust: Did not perform    MUSKULOSKELETAL:    EXTREMITIES:   Hip Exam:  - Log Roll Did not perform  - FADIR Did not perform  - Stinchfield Did not perform  - Hip Scour Did not perform  - GTB Tenderness Negative    MUSCULOSKELETAL:  No atrophy or tone abnormalities are noted in the UE or LE.  No deformities, edema, or skin discoloration are noted on visible skin. Good capillary refill.    NEURO: Bilateral upper and lower extremity coordination and muscle stretch reflexes are physiologic and symmetric.      NEUROLOGICAL EXAM:  MENTAL STATUS: A x O x 3, good concentration, speech is fluent and goal directed  MEMORY: recent and remote are  intact  CN: CN2-12 grossly intact  MOTOR: 4+-5/5 in all muscle groups of UE and LE  DTRs: 0 intact symmetric UE and LE  Sensation:    -no Loss of sensation in a left upper, left lower, right upper, and right lower  arm and leg  distribution.  Pittman: absent on the bilateral side(s)  Clonus: absent on the bilateral side(s)

## 2024-07-12 ENCOUNTER — CLINICAL SUPPORT (OUTPATIENT)
Dept: REHABILITATION | Facility: HOSPITAL | Age: 74
End: 2024-07-12
Payer: MEDICARE

## 2024-07-12 DIAGNOSIS — R29.3 POOR POSTURE: Primary | ICD-10-CM

## 2024-07-12 PROCEDURE — 97112 NEUROMUSCULAR REEDUCATION: CPT

## 2024-07-12 PROCEDURE — 97110 THERAPEUTIC EXERCISES: CPT

## 2024-07-12 NOTE — PROGRESS NOTES
" [facility]      Name: Darrion Bennett  Clinic Number: [id number]    Therapy Diagnosis:   Encounter Diagnosis   Name Primary?    Poor posture Yes         Physician: Joelle Flores MD    Visit Date: 2024    Physician Orders: PT Eval and Treat  Medical Diagnosis: Tremor [R25.1], Cervicalgia [M54.2], Other chronic pain [G89.29], Wedge compression fracture of t11-T12 vertebra, subsequent encounter for fracture with routine healing [S22.080D]   Evaluation Date: 2024  Authorization Period Expiration: 24  Plan of Care Expiration: 9/3/2024  Reassessment Due: 2024  Visit # / Visits authorized:  Keep HOB elevated  MedX Testing:MedX testing visit 2    Time In: 9:00AM  Time Out: 10:00AM  Total Billable Time: 30 minutes 1:1  INSURANCE and OUTCOMES: Fee for Service with FOTO Outcomes 1/3    Precautions: Standard, Diabetes, CHF, and HTN, tremor    Pattern of pain determined: 1 WILLIE    Subjective   Darrion reports he feels okay this morning, a little bit better than last time.    Patient reports tolerating previous visit no adverse effects.  Patient reports their pain to be 6/10 on a 0-10 scale with 0 being no pain and 10 being the worst pain imaginable.  Pain Location: bilateral neck and thoracic     Work and leisure: retired/gardening  Pt goals: "decrease pain"    Objective     Baseline IM Testing Results:   Date of testin2024   Initial: 24 Midpoint: Final   ROM  deg     Max Peak Torque 273      Min Peak Torque 124      Flex/Ext Ratio 2.2:1     % below normative data -51%     % gain from initial N/a       Outcomes:  Initial score:  Visit 5 score:  Goal:      Treatment    Darrion received the treatments listed below:      Darrion received neuromuscular education  to isolate and engage spinal stabilization musculature correctly for motor control and coordination to aid in function and posture for 10 minutes on the Medical Medx Machine.  Patient performed MedX " "dynamic exercise with emphasis on spinal muscular control using pacer throughout  active range of motion. Therapist assisted patient in achieving optimal exertion for neural reeducation and endurance training by using the  Rigo Exertion Rating scale, by instructing the patient to aim for mid range of exertion, performing 15-20 repetitions, slowly, correctly,and safely.           7/12/2024     9:02 AM   [facility]   Visit Number 4   VAS Pain Rating 6   Cervical Stretches - Retraction In Lying 10   Retraction in Sitting 10   Retraction with Extension 10   Sidebending 2   Scapular Retraction 10   Cervical Weight 201 lbs   Repetitions 18   Rating of Perceived Exertion 3      therapeutic exercises to develop strength, endurance, ROM, flexibility, posture, and core stabilization for 50 minutes including:  +UT/LS stretch 2x20"  Scap squeeze x10  No money ER with RTB x10  RIS x10  +retraction+extension with towel support x10  Open book x10  Cervical retraction in supine x10  Seated thoracic extension over 1/2 foam x10  paloff press x10  serratus slides with pillowcase x10     Peripheral muscle strengthening which included 1 set of 15-20 repetitions at a slow, controlled 10-13 second per rep pace focused on strengthening supporting musculature for improved body mechanics and functional mobility.  Pt and therapist focused on proper form during treatment to ensure optimal strengthening of each targeted muscle group.  Machines were utilized including torso rotation, leg press, hip abd and hip add, leg ext.  Leg curl, triceps, biceps, chest and row added visit 3        cold pack for 5 minutes to thoracic spine in sitting.    Home Exercises Provided and Patient Education Provided   Home exercises include:Open books, scap squeeze, cervical retraction in supine  Cardio program:visit 5  Lifting education date:visit 11  Posture/Lumbar roll: recommended  Fridge Magnet Discharge handout (date given):  Equipment at home/gym " membership: none      Education provided:   - HEP  - POC   - Purpose and procedure for MedX isometric testing    Written Home Exercises Provided: Patient instructed to cont prior HEP.  Exercises were reviewed and Darrion was able to demonstrate them prior to the end of the session.  Darrion demonstrated good  understanding of the education provided.     See EMR under Patient Instructions for exercises provided prior visit.          Assessment     Pt presents to therapy today reporting slight improvement in midback pain. Added cervical retraction with extension today which he tolerated well. He requires some verbal cues for cervical retraction to avoid dropping chin into flexion. Cervical MedX resistance was maintained at 201 in.lbs following a warm-up at 50% resistance. Pt was able to complete 18 reps, with 3/10 RPE. Noted difficulty with pacing during warmup that improved during full resistance exercise. Pt was also able to complete full circuit of peripheral strengthening exercises without increased discomfort.    Patient is making good progress towards established goals.  Pt will continue to benefit from skilled outpatient physical therapy to address the deficits stated in the impairment chart, provide pt/family education and to maximize pt's level of independence in the home and community environment.     Anticipated Barriers for therapy: tremors - currently seeing neuro PT  Pt's spiritual, cultural and educational needs considered and pt agreeable to plan of care and goals as stated below:             Goals:   Short term goals: 6 weeks or 10 visits   - Pt will demonstratte increased cervical MedX ROM as measured by med ex by 9 degrees from initial test which results in improved  ROM of neck for ease with ADLs and driving. Appropriate and Ongoing  - Pt will demonstrate independence with reducing or controlling symptoms with ther ex, movement, or position independently, able to reduce pain 1-2 points on pain scale  using strategies taught in therapy.  Appropriate and Ongoing  - Pt will demonstrate increased MedX average isometric strength value by 20% with  when compared to the initial testing resulting in improved ability to perform bending, lifting, and carrying activities safely and confidently. Appropriate and Ongoing     Long term goals: 10 weeks or 20 visits  - Pt will demonstratte increased cervical MedX ROM as measured by med ex by 15degrees from initial test which results in functional ROM of neck for ease with ADLs and driving.  Appropriate and Ongoing  - Pt will demonstrate increased MedX average isometric strength value  by 40% from initial test to improve ability to lift and carry, and sustain good posture while performing ADL's. Appropriate and Ongoing  - Pt will demonstrate reduced pain and improved functional outcomes as reported on the FOTO by reaching an intake score of >/= 57% functional ability in order to demonstrate subjective improvement in patient's condition. . Appropriate and Ongoing  - Pt will demonstrate independence with reducing or controlling symptoms with ther ex, movement, or position independently, able to reduce pain 2-4 points on pain scale using strategies taught in therapy. Appropriate and Ongoing  - Pt will demonstrate independence with the HEP at discharge. Appropriate and Ongoing  - Pt(patient goal)will be able to stand to cook dinner  > 1hour without onset of LBP Appropriate and Ongoing         Plan   Continue with established Plan of Care towards established PT goals.       Therapist: Cait Garcia, PT  7/12/2024

## 2024-07-15 DIAGNOSIS — M06.9 RHEUMATOID ARTHRITIS INVOLVING MULTIPLE JOINTS: ICD-10-CM

## 2024-07-15 RX ORDER — ABATACEPT 125 MG/ML
125 INJECTION, SOLUTION SUBCUTANEOUS WEEKLY
Qty: 4 ML | Refills: 11 | Status: ACTIVE | OUTPATIENT
Start: 2024-07-15

## 2024-07-16 NOTE — TELEPHONE ENCOUNTER
I spoke w/dis/[facility] and they faxed over the results for the nm,shalom scan and I scanned in media mgt and pt has been notified that we did receive the results       Thank you

## 2024-07-17 ENCOUNTER — CLINICAL SUPPORT (OUTPATIENT)
Dept: REHABILITATION | Facility: HOSPITAL | Age: 74
End: 2024-07-17
Payer: MEDICARE

## 2024-07-17 DIAGNOSIS — R29.3 POOR POSTURE: Primary | ICD-10-CM

## 2024-07-17 PROCEDURE — 97110 THERAPEUTIC EXERCISES: CPT

## 2024-07-17 PROCEDURE — 97112 NEUROMUSCULAR REEDUCATION: CPT

## 2024-07-17 NOTE — PROGRESS NOTES
" [facility]      Name: Darrion Bennett  Clinic Number: [id number]    Therapy Diagnosis:   Encounter Diagnosis   Name Primary?    Poor posture Yes     Physician: Joelle Flores MD    Visit Date: 2024    Physician Orders: PT Eval and Treat  Medical Diagnosis: Tremor [R25.1], Cervicalgia [M54.2], Other chronic pain [G89.29], Wedge compression fracture of t11-T12 vertebra, subsequent encounter for fracture with routine healing [S22.080D]   Evaluation Date: 2024  Authorization Period Expiration: 24  Plan of Care Expiration: 9/3/2024  Reassessment Due: 2024  Visit # / Visits authorized:  Keep HOB elevated  MedX Testing:MedX testing visit 2    Time In: 9:00 AM  Time Out: 10:00 AM  Total Billable Time: 55  minutes    INSURANCE and OUTCOMES: Fee for Service with FOTO Outcomes 2/3    Precautions: Standard, Diabetes, CHF, and HTN, tremor    Pattern of pain determined: 1 WILLIE    Subjective   Darrion reports that his neck pain is minimal (Did require review of pain analog scale for proper rating today). States that he has some soreness after receiving his first acupuncture treatment yesterday. States that he enjoys exercising on the Cervical MedX machine.    Patient reports tolerating previous visit no adverse effects.  Patient reports their pain to be 3/10 on a 0-10 scale with 0 being no pain and 10 being the worst pain imaginable.  Pain Location: bilateral neck and thoracic     Work and leisure: retired/gardening  Pt goals: "decrease pain"    Objective     Baseline IM Testing Results:   Date of testin2024   Initial: 24 Midpoint: Final   ROM  deg     Max Peak Torque 273      Min Peak Torque 124      Flex/Ext Ratio 2.2:1     % below normative data -51%     % gain from initial N/a       Outcomes:  Initial score: 41%  Visit 5 score: 49%  Goal:      Treatment    Darrion received the treatments listed below:      Darrion received neuromuscular education  to " "isolate and engage spinal stabilization musculature correctly for motor control and coordination to aid in function and posture for 10 minutes on the Medical Medx Machine.  Patient performed MedX dynamic exercise with emphasis on spinal muscular control using pacer throughout  active range of motion. Therapist assisted patient in achieving optimal exertion for neural reeducation and endurance training by using the  Rigo Exertion Rating scale, by instructing the patient to aim for mid range of exertion, performing 15-20 repetitions, slowly, correctly,and safely.           7/19/2024     9:09 AM   [facility]   Visit Number 6   VAS Pain Rating 3   Time 5   Retraction in Lying 10   Retraction in Sitting 10   Retraction with Extension 10   Sidebending 2   Scapular Retraction 15   Cervical Weight 210 lbs   Repetitions 15   Rating of Perceived Exertion 3      therapeutic exercises to develop strength, endurance, ROM, flexibility, posture, and core stabilization for 45 minutes including:    UT/LS stretch 2x20"  Scap retract/No money ER with RTB x 15  RIS x10 (cues- "pull away from finger"  retraction+extension with towel support x10  +cervical rotation stretch w/towel 5 x 5 sec  Open book x10  Cervical retraction in supine x10  Seated thoracic extension over 1/2 foam x10  paloff press x10--NP  serratus slides  x10     Peripheral muscle strengthening which included 1 set of 15-20 repetitions at a slow, controlled 10-13 second per rep pace focused on strengthening supporting musculature for improved body mechanics and functional mobility.  Pt and therapist focused on proper form during treatment to ensure optimal strengthening of each targeted muscle group.  Machines were utilized including torso rotation, leg press, hip abd and hip add, leg ext.  Leg curl, triceps, biceps, chest and row added visit 3    cold pack for 5 minutes to thoracic spine in sitting.    Home Exercises Provided and Patient Education " Provided   Home exercises include:Open books, scap squeeze, cervical retraction in supine  Cardio program:visit 5 :7/17/24  Lifting education date:visit 11  Posture/Lumbar roll: recommended  Fridge Magnet Discharge handout (date given):  Equipment at home/gym membership: none    Education provided:   -  cues w/ex's  MedX performance  Precor ex performance    Written Home Exercises Provided: Patient instructed to cont prior HEP.  Exercises were reviewed and Darrion was able to demonstrate them prior to the end of the session.  Darrion demonstrated good  understanding of the education provided.     See EMR under Patient Instructions for exercises provided prior visit.    Assessment   Darrion returns with minimal c/o neck stiffness/discomfort which is rated as 3/10 currently. Treatment continued with flexibility, strengthening and neuromuscular reeducation exs. Added cervical rotational stretch with towel assist and progressed reps for scap retract/no money ex. He was able to perform exs with min cues and without increased pain. He did have a brief episode of dizziness after cervical rotation stretch that resolved quickly. Cervical MedX resistance was increased to 210 in.lbs and he completed 15 reps with a RPE = 3/10.  Pt was also able to complete peripheral strengthening exercises without increased discomfort. Will continue per HB protocol and patient tolerance.     Patient is making  progress towards established goals.  Pt will continue to benefit from skilled outpatient physical therapy to address the deficits stated in the impairment chart, provide pt/family education and to maximize pt's level of independence in the home and community environment.     Anticipated Barriers for therapy: tremors - currently seeing neuro PT  Pt's spiritual, cultural and educational needs considered and pt agreeable to plan of care and goals as stated below:     Goals:   Short term goals: 6 weeks or 10 visits   - Pt will demonstratte  increased cervical MedX ROM as measured by med ex by 9 degrees from initial test which results in improved  ROM of neck for ease with ADLs and driving. Appropriate and Ongoing  - Pt will demonstrate independence with reducing or controlling symptoms with ther ex, movement, or position independently, able to reduce pain 1-2 points on pain scale using strategies taught in therapy.  Appropriate and Ongoing  - Pt will demonstrate increased MedX average isometric strength value by 20% with  when compared to the initial testing resulting in improved ability to perform bending, lifting, and carrying activities safely and confidently. Appropriate and Ongoing     Long term goals: 10 weeks or 20 visits  - Pt will demonstratte increased cervical MedX ROM as measured by med ex by 15degrees from initial test which results in functional ROM of neck for ease with ADLs and driving.  Appropriate and Ongoing  - Pt will demonstrate increased MedX average isometric strength value  by 40% from initial test to improve ability to lift and carry, and sustain good posture while performing ADL's. Appropriate and Ongoing  - Pt will demonstrate reduced pain and improved functional outcomes as reported on the FOTO by reaching an intake score of >/= 57% functional ability in order to demonstrate subjective improvement in patient's condition. . Appropriate and Ongoing  - Pt will demonstrate independence with reducing or controlling symptoms with ther ex, movement, or position independently, able to reduce pain 2-4 points on pain scale using strategies taught in therapy. Appropriate and Ongoing  - Pt will demonstrate independence with the HEP at discharge. Appropriate and Ongoing  - Pt(patient goal)will be able to stand to cook dinner  > 1hour without onset of LBP Appropriate and Ongoing    Plan   Continue with established Plan of Care towards established PT goals.     Therapist: Spencer Taylor, PTA  7/17/2024

## 2024-07-17 NOTE — PROGRESS NOTES
"  [facility]      Name: Darrion Bennett  Clinic Number: [id number]    Therapy Diagnosis:   Encounter Diagnosis   Name Primary?    Poor posture Yes           Physician: Joelle Flores MD    Visit Date: 2024    Physician Orders: PT Eval and Treat  Medical Diagnosis: Tremor [R25.1], Cervicalgia [M54.2], Other chronic pain [G89.29], Wedge compression fracture of t11-T12 vertebra, subsequent encounter for fracture with routine healing [S22.080D]   Evaluation Date: 2024  Authorization Period Expiration: 24  Plan of Care Expiration: 9/3/2024  Reassessment Due: 2024  Visit # / Visits authorized:  Keep HOB elevated  MedX Testing:MedX testing visit 2    Time In: 855  Time Out: 945  Total Billable Time: 45 minutes 1:1  INSURANCE and OUTCOMES: Fee for Service with FOTO Outcomes 2/3    Precautions: Standard, Diabetes, CHF, and HTN, tremor    Pattern of pain determined: 1 WILLIE    Subjective   Darrion reports he feels good this morning.  Pt reports he feels he is moving better.    Patient reports tolerating previous visit no adverse effects.  Patient reports their pain to be 6/10 on a 0-10 scale with 0 being no pain and 10 being the worst pain imaginable.  Pain Location: bilateral neck and thoracic     Work and leisure: retired/gardening  Pt goals: "decrease pain"    Objective     Baseline IM Testing Results:   Date of testin2024   Initial: 24 Midpoint: Final   ROM  deg     Max Peak Torque 273      Min Peak Torque 124      Flex/Ext Ratio 2.2:1     % below normative data -51%     % gain from initial N/a       Outcomes:  Initial score: 41%  Visit 5 score: 49%  Goal:      Treatment    Darrion received the treatments listed below:      Darrion received neuromuscular education  to isolate and engage spinal stabilization musculature correctly for motor control and coordination to aid in function and posture for 10 minutes on the Medical Medx Machine.  Patient " "performed MedX dynamic exercise with emphasis on spinal muscular control using pacer throughout  active range of motion. Therapist assisted patient in achieving optimal exertion for neural reeducation and endurance training by using the  Rigo Exertion Rating scale, by instructing the patient to aim for mid range of exertion, performing 15-20 repetitions, slowly, correctly,and safely.           7/17/2024     9:32 AM   [facility]   Visit Number 5   VAS Pain Rating 5   Time 5   Cervical Stretches - Retraction In Lying 10   Retraction in Sitting 10   Retraction with Extension 10   Sidebending 2   Scapular Retraction 10   Cervical Weight 201 lbs   Repetitions 20       therapeutic exercises to develop strength, endurance, ROM, flexibility, posture, and core stabilization for 50 minutes including:  +UT/LS stretch 2x20"  Scap squeeze x10  No money ER with RTB x10  RIS x10  +retraction+extension with towel support x10  Open book x10  Cervical retraction in supine x10  Seated thoracic extension over 1/2 foam x10  paloff press x10  serratus slides with pillowcase x10     Peripheral muscle strengthening which included 1 set of 15-20 repetitions at a slow, controlled 10-13 second per rep pace focused on strengthening supporting musculature for improved body mechanics and functional mobility.  Pt and therapist focused on proper form during treatment to ensure optimal strengthening of each targeted muscle group.  Machines were utilized including torso rotation, leg press, hip abd and hip add, leg ext.  Leg curl, triceps, biceps, chest and row added visit 3        cold pack for 5 minutes to thoracic spine in sitting.    Home Exercises Provided and Patient Education Provided   Home exercises include:Open books, scap squeeze, cervical retraction in supine  Cardio program:visit 5 :7/17/24  Lifting education date:visit 11  Posture/Lumbar roll: recommended  Fridge Magnet Discharge handout (date given):  Equipment at home/gym " membership: none      Education provided:   - HEP  - POC   - Purpose and procedure for MedX isometric testing    Written Home Exercises Provided: Patient instructed to cont prior HEP.  Exercises were reviewed and Darrion was able to demonstrate them prior to the end of the session.  Darrion demonstrated good  understanding of the education provided.     See EMR under Patient Instructions for exercises provided prior visit.          Assessment     Pt presents to therapy today reporting slight improvement in midback pain.  Cervical MedX resistance was maintained at 201 in.lbs following a warm-up at 50% resistance. Pt was able to complete 20reps, with 3/10 RPE.  Pt was also able to complete full circuit of peripheral strengthening exercises without increased discomfort.  Foto score today reflects improvement.  Cardio hand out reviewed with pt today    Patient is making good progress towards established goals.  Pt will continue to benefit from skilled outpatient physical therapy to address the deficits stated in the impairment chart, provide pt/family education and to maximize pt's level of independence in the home and community environment.     Anticipated Barriers for therapy: tremors - currently seeing neuro PT  Pt's spiritual, cultural and educational needs considered and pt agreeable to plan of care and goals as stated below:             Goals:   Short term goals: 6 weeks or 10 visits   - Pt will demonstratte increased cervical MedX ROM as measured by med ex by 9 degrees from initial test which results in improved  ROM of neck for ease with ADLs and driving. Appropriate and Ongoing  - Pt will demonstrate independence with reducing or controlling symptoms with ther ex, movement, or position independently, able to reduce pain 1-2 points on pain scale using strategies taught in therapy.  Appropriate and Ongoing  - Pt will demonstrate increased MedX average isometric strength value by 20% with  when compared to the initial  testing resulting in improved ability to perform bending, lifting, and carrying activities safely and confidently. Appropriate and Ongoing     Long term goals: 10 weeks or 20 visits  - Pt will demonstratte increased cervical MedX ROM as measured by med ex by 15degrees from initial test which results in functional ROM of neck for ease with ADLs and driving.  Appropriate and Ongoing  - Pt will demonstrate increased MedX average isometric strength value  by 40% from initial test to improve ability to lift and carry, and sustain good posture while performing ADL's. Appropriate and Ongoing  - Pt will demonstrate reduced pain and improved functional outcomes as reported on the FOTO by reaching an intake score of >/= 57% functional ability in order to demonstrate subjective improvement in patient's condition. . Appropriate and Ongoing  - Pt will demonstrate independence with reducing or controlling symptoms with ther ex, movement, or position independently, able to reduce pain 2-4 points on pain scale using strategies taught in therapy. Appropriate and Ongoing  - Pt will demonstrate independence with the HEP at discharge. Appropriate and Ongoing  - Pt(patient goal)will be able to stand to cook dinner  > 1hour without onset of LBP Appropriate and Ongoing         Plan   Continue with established Plan of Care towards established PT goals.       Therapist: Selene Morris, PT  7/17/2024

## 2024-07-18 ENCOUNTER — CLINICAL SUPPORT (OUTPATIENT)
Dept: REHABILITATION | Facility: HOSPITAL | Age: 74
End: 2024-07-18
Attending: STUDENT IN AN ORGANIZED HEALTH CARE EDUCATION/TRAINING PROGRAM
Payer: MEDICARE

## 2024-07-18 ENCOUNTER — TELEPHONE (OUTPATIENT)
Dept: RHEUMATOLOGY | Facility: CLINIC | Age: 74
End: 2024-07-18
Payer: MEDICARE

## 2024-07-18 DIAGNOSIS — M54.2 NECK PAIN, CHRONIC: ICD-10-CM

## 2024-07-18 DIAGNOSIS — M54.9 MID BACK PAIN, CHRONIC: ICD-10-CM

## 2024-07-18 DIAGNOSIS — M47.896 OTHER SPONDYLOSIS, LUMBAR REGION: ICD-10-CM

## 2024-07-18 DIAGNOSIS — M54.50 CHRONIC MIDLINE LOW BACK PAIN WITHOUT SCIATICA: ICD-10-CM

## 2024-07-18 DIAGNOSIS — G89.29 MID BACK PAIN, CHRONIC: ICD-10-CM

## 2024-07-18 DIAGNOSIS — G89.29 UPPER BACK PAIN, CHRONIC: ICD-10-CM

## 2024-07-18 DIAGNOSIS — G89.29 NECK PAIN, CHRONIC: ICD-10-CM

## 2024-07-18 DIAGNOSIS — M54.59 OTHER LOW BACK PAIN: Primary | ICD-10-CM

## 2024-07-18 DIAGNOSIS — M54.9 UPPER BACK PAIN, CHRONIC: ICD-10-CM

## 2024-07-18 DIAGNOSIS — G89.29 CHRONIC MIDLINE LOW BACK PAIN WITHOUT SCIATICA: ICD-10-CM

## 2024-07-18 PROCEDURE — 97810 ACUP 1/> WO ESTIM 1ST 15 MIN: CPT | Mod: PN

## 2024-07-18 PROCEDURE — 97811 ACUP 1/> W/O ESTIM EA ADD 15: CPT | Mod: PN

## 2024-07-18 NOTE — TELEPHONE ENCOUNTER
Spoke to pt's wife, Pam.    He has gotten more comfortable with Orencia injections and is reporting feeling better (reported flare in May visit with Dr. Salazar). He would like to continue injections instead of infusions.    Thanked patient for their time and informed MDO.

## 2024-07-18 NOTE — PROGRESS NOTES
"  Acupuncture Evaluation Note     Name: Darrion Bennett  [facility] Number: [id number]    Traditional Chinese Medicine (TCM) Diagnosis: Qi Stagnation and Blood Stasis  Medical Diagnosis: Chronic pain in lower back, mid back and upper back  Encounter Diagnoses   Name Primary?    Chronic midline low back pain without sciatica     Other spondylosis, lumbar region         Evaluation Date: 7/18/2024    Visit #/Visits authorized: [unfilled] 1/12    Precautions: Diabetes and Immunosuppression, Compression fracture of body of thoracic vertebra, Diastolic heart failure , Bilateral carotid artery stenosis on CTA 6/26/21 , MCTD (mixed connective tissue disease), Sjogren's disease, RA (rheumatoid arthritis), Anemia from plaquenil and imuran   Subjective     Chief Concern: Chronic pain in lower back, mid back and upper back for 2 years    Cancer Related Symptoms: None    Medical necessity is demonstrated by the following IMPAIRMENTS: Medical Necessity: Decreased mobility limits day to day activities, social, and emergent situations and Decreased quality of life                Aggravating Factors:  standing and prolonged sitting   Relieving Factors:  heat    Symptom Description:     Quality:  Sharp  Severity:  9  Frequency:  continuously    Previous Treatments Tried:  Medication, Imaging, and Therapy     HEENT:  Glaucoma , Pseudophakia of both eyes     Chest:  Aortic atherosclerosis     Digestion:     Diet: in general, a "healthy" diet     Fluids: denies use, is drinking plenty of fluids, none  Taste/Appetite: not very good   Symptoms: none    Sleep: good    Energy Levels:  5/10    Psychological Symptoms:  None    Other Symptoms: MCTD (mixed connective tissue disease), Sjogren's disease, RA (rheumatoid arthritis),        Objective     Observation: Looks weak    Tongue:      Body:  swollen edges   Color:  pink   Coating:  thin,  and white,    Pulse:        wiry and deep       New Findings:  None    Treatment     Treatment Principles:  " Increase Qi and Blood, stop pain    Acupuncture points used:    Bilateral points: Rahul Alano Mikaela Ji + 7, Zoe on lower back + 3, GB 20, Zoe on neck + 3, Zoe on upper back + 3  Unilateral points:  Left:  Right:  Auricular Treatment:  None  Needles In: 34  Needles Out: 34  Needles W/O STIM placed: 8: 30 AM  [place] W/O STIM removed: 9: 40 AM      Other Traditional Chinese Medicine Modalities - None    Assessment     After treatment, patient felt good     Patient prognosis is Good.     Patient will continue to benefit from acupuncture treatment to address the deficits listed in the problem list box on initial evaluation, provide patient family education and to maximize pt's level of independence in the home and community environment.     Patient's spiritual, cultural and educational needs considered and pt agreeable to plan of care and goals.     Anticipated barriers to treatment: None    Plan     Recommend 1 /week for 12 sessions then re-assess.      Education:  Patient is aware of cumulative benefit of acupuncture

## 2024-07-19 ENCOUNTER — CLINICAL SUPPORT (OUTPATIENT)
Dept: REHABILITATION | Facility: HOSPITAL | Age: 74
End: 2024-07-19
Payer: MEDICARE

## 2024-07-19 DIAGNOSIS — R29.3 POOR POSTURE: Primary | ICD-10-CM

## 2024-07-19 PROCEDURE — 97112 NEUROMUSCULAR REEDUCATION: CPT | Mod: CQ

## 2024-07-19 PROCEDURE — 97110 THERAPEUTIC EXERCISES: CPT | Mod: CQ

## 2024-07-24 ENCOUNTER — CLINICAL SUPPORT (OUTPATIENT)
Dept: REHABILITATION | Facility: HOSPITAL | Age: 74
End: 2024-07-24
Payer: MEDICARE

## 2024-07-24 DIAGNOSIS — R29.3 POOR POSTURE: Primary | ICD-10-CM

## 2024-07-24 PROCEDURE — 97112 NEUROMUSCULAR REEDUCATION: CPT | Mod: CQ

## 2024-07-24 PROCEDURE — 97110 THERAPEUTIC EXERCISES: CPT | Mod: CQ

## 2024-07-24 NOTE — PROGRESS NOTES
"  [facility]      Name: Darrion Bennett  Clinic Number: [id number]    Therapy Diagnosis:   Encounter Diagnosis   Name Primary?    Poor posture Yes     Physician: Joelle Flores MD    Visit Date: 2024    Physician Orders: PT Eval and Treat  Medical Diagnosis: Tremor [R25.1], Cervicalgia [M54.2], Other chronic pain [G89.29], Wedge compression fracture of t11-T12 vertebra, subsequent encounter for fracture with routine healing [S22.080D]   Evaluation Date: 2024  Authorization Period Expiration: 24  Plan of Care Expiration: 9/3/2024  Reassessment Due: 2024  Visit # / Visits authorized:  Keep HOB elevated  MedX Testing:MedX testing visit 2    Time In: 8:55 AM  Time Out:  10:00 AM  Total Billable Time:30  minutes  (1 on 1 time)  INSURANCE and OUTCOMES: Fee for Service with FOTO Outcomes 2/3    Precautions: Standard, Diabetes, CHF, and HTN, tremor    Pattern of pain determined: 1 WILLIE    Subjective   Darrion reports that his neck pain is minimal. States that it feels good to exercise.    Patient reports tolerating previous visit no adverse effects.  Patient reports their pain to be 3/10 on a 0-10 scale with 0 being no pain and 10 being the worst pain imaginable.  Pain Location: bilateral neck and thoracic     Work and leisure: retired/gardening  Pt goals: "decrease pain"    Objective     Baseline IM Testing Results:   Date of testin2024   Initial: 24 Midpoint: Final   ROM  deg     Max Peak Torque 273      Min Peak Torque 124      Flex/Ext Ratio 2.2:1     % below normative data -51%     % gain from initial N/a       Outcomes:  Initial score: 41%  Visit 5 score: 49%  Goal:      Treatment    Darrion received the treatments listed below:      Darrion received neuromuscular education  to isolate and engage spinal stabilization musculature correctly for motor control and coordination to aid in function and posture for 10 minutes on the Medical Medx Machine.  " "Patient performed MedX dynamic exercise with emphasis on spinal muscular control using pacer throughout  active range of motion. Therapist assisted patient in achieving optimal exertion for neural reeducation and endurance training by using the  Rigo Exertion Rating scale, by instructing the patient to aim for mid range of exertion, performing 15-20 repetitions, slowly, correctly,and safely.           7/24/2024     8:59 AM   [facility] - Short   Visit Number 7   VAS Pain Rating 3   Retraction in Lying 10   Retraction in Sitting 10   Retraction with Extension 10   Sidebending 2   Scapular Retraction 15   Cervical Weight 210 lbs   Repetitions 18   Rating of Perceived Exertion 3       therapeutic exercises to develop strength, endurance, ROM, flexibility, posture, and core stabilization for 50 minutes including:    UT/LS stretch 2x20"  Scap retract/No money ER with GTB x 15  RIS x10 (cues)  retraction+extension with towel support x10  cervical rotation stretch w/towel 5 x 5 sec  Open book x10  Cervical retraction in supine x10  Seated thoracic extension over 1/2 foam x10  paloff press x10--NP  serratus slides + Lift off x10     Peripheral muscle strengthening which included 1 set of 15-20 repetitions at a slow, controlled 10-13 second per rep pace focused on strengthening supporting musculature for improved body mechanics and functional mobility.  Pt and therapist focused on proper form during treatment to ensure optimal strengthening of each targeted muscle group.  Machines were utilized including torso rotation, leg press, hip abd and hip add, leg ext.  Leg curl, triceps, biceps, chest and row added visit 3    cold pack for 5 minutes to thoracic spine in sitting.    Home Exercises Provided and Patient Education Provided   Home exercises include:Open books, scap squeeze, cervical retraction in supine  Cardio program:visit 5 :7/17/24  Lifting education date:visit 11  Posture/Lumbar roll: recommended  Fridge " Magnet Discharge handout (date given):  Equipment at home/gym membership: none    Education provided:   -  cues w/ex's  MedX performance  Precor ex performance    Written Home Exercises Provided: Patient instructed to cont prior HEP.  Exercises were reviewed and Darrion was able to demonstrate them prior to the end of the session.  Darrion demonstrated good  understanding of the education provided.     See EMR under Patient Instructions for exercises provided prior visit.    Assessment   Darrion returns with minimal c/o neck stiffness/discomfort which is rated as 3/10 currently. Treatment continued with flexibility, strengthening and neuromuscular reeducation exs. Added lift off with serratus wall slides and GTB for scap retract/no money ex.. He was able to perform exs with verbal and tactile cues and without increased pain. Cervical MedX resistance was maintained at 210 in.lbs and he completed 18 reps with a RPE = 3/10.  Pt was also able to complete peripheral strengthening exercises without increased discomfort. Will continue per HB protocol and patient tolerance.     Patient is making  progress towards established goals.  Pt will continue to benefit from skilled outpatient physical therapy to address the deficits stated in the impairment chart, provide pt/family education and to maximize pt's level of independence in the home and community environment.     Anticipated Barriers for therapy: tremors - currently seeing neuro PT  Pt's spiritual, cultural and educational needs considered and pt agreeable to plan of care and goals as stated below:     Goals:   Short term goals: 6 weeks or 10 visits   - Pt will demonstratte increased cervical MedX ROM as measured by med ex by 9 degrees from initial test which results in improved  ROM of neck for ease with ADLs and driving. Appropriate and Ongoing  - Pt will demonstrate independence with reducing or controlling symptoms with ther ex, movement, or position independently, able  to reduce pain 1-2 points on pain scale using strategies taught in therapy.  Appropriate and Ongoing  - Pt will demonstrate increased MedX average isometric strength value by 20% with  when compared to the initial testing resulting in improved ability to perform bending, lifting, and carrying activities safely and confidently. Appropriate and Ongoing     Long term goals: 10 weeks or 20 visits  - Pt will demonstratte increased cervical MedX ROM as measured by med ex by 15degrees from initial test which results in functional ROM of neck for ease with ADLs and driving.  Appropriate and Ongoing  - Pt will demonstrate increased MedX average isometric strength value  by 40% from initial test to improve ability to lift and carry, and sustain good posture while performing ADL's. Appropriate and Ongoing  - Pt will demonstrate reduced pain and improved functional outcomes as reported on the FOTO by reaching an intake score of >/= 57% functional ability in order to demonstrate subjective improvement in patient's condition. . Appropriate and Ongoing  - Pt will demonstrate independence with reducing or controlling symptoms with ther ex, movement, or position independently, able to reduce pain 2-4 points on pain scale using strategies taught in therapy. Appropriate and Ongoing  - Pt will demonstrate independence with the HEP at discharge. Appropriate and Ongoing  - Pt(patient goal)will be able to stand to cook dinner  > 1hour without onset of LBP Appropriate and Ongoing    Plan   Continue with established Plan of Care towards established PT goals.     Therapist: Spencer Taylor, BARRON  7/24/2024

## 2024-07-25 ENCOUNTER — CLINICAL SUPPORT (OUTPATIENT)
Dept: REHABILITATION | Facility: HOSPITAL | Age: 74
End: 2024-07-25
Payer: MEDICARE

## 2024-07-25 DIAGNOSIS — M54.59 OTHER LOW BACK PAIN: Primary | ICD-10-CM

## 2024-07-25 DIAGNOSIS — G89.29 MID BACK PAIN, CHRONIC: ICD-10-CM

## 2024-07-25 DIAGNOSIS — M54.9 MID BACK PAIN, CHRONIC: ICD-10-CM

## 2024-07-25 DIAGNOSIS — M54.9 UPPER BACK PAIN, CHRONIC: ICD-10-CM

## 2024-07-25 DIAGNOSIS — G89.29 UPPER BACK PAIN, CHRONIC: ICD-10-CM

## 2024-07-25 PROCEDURE — 97814 ACUP 1/> W/ESTIM EA ADDL 15: CPT | Mod: PN

## 2024-07-25 PROCEDURE — 97810 ACUP 1/> WO ESTIM 1ST 15 MIN: CPT | Mod: PN

## 2024-07-25 NOTE — PROGRESS NOTES
Acupuncture Treatment Note     Name: Darrion Bennett  [facility] Number: [id number]    Traditional Chinese Medicine Diagnosis: Qi Stagnation and Blood Stasis   Physician: Diony Sánchez,*    Date of Service: 7/25/2024     Medical Diagnosis: Chronic pain in lower back, mid back and upper back       Encounter Diagnoses   Name Primary?    Chronic midline low back pain without sciatica      Other spondylosis, lumbar region      Visit #/Visits authorized: 2/ 12     Precautions: Diabetes and Immunosuppression, Compression fracture of body of thoracic vertebra, Diastolic heart failure , Bilateral carotid artery stenosis on CTA 6/26/21 , MCTD (mixed connective tissue disease), Sjogren's disease, RA (rheumatoid arthritis), Anemia from plaquenil and imuran     Subjective     Chief Complaint:  Chronic pain in lower back, mid back and upper back for 2 years     Cancer Related Symptoms: None    Medical necessity is demonstrated by the following IMPAIRMENTS: Medical Necessity: Decreased mobility limits day to day activities, social, and emergent situations and Decreased quality of life    Response to Previous Treatment:  good    Quality of Symptoms (Better/Worse):  better    Other Condition/Symptoms:   MCTD (mixed connective tissue disease), Sjogren's disease, RA (rheumatoid arthritis),     Objective      New Findings:  None    Treatment Principles:  Increase Qi and Blood, stop pain     Acupuncture points used:    Bilateral points:Rahul Tuo Mikaela Ji + 4, Zoe on lower back + 3,  Zoe on Mid back + 6  Unilateral points:  Left:  Right:  EA: Right: Zoe on mid back + 2, Zoe on lower back + 2  EA: Left Zoe on mid back + 2, Zoe on lower back + 2  Auricular Treatment:  None  Needles In: 26  Needles Out: 26  [place] W/ STIM placed: 8:35 AM  Needles W/ STIM removed: 9: 15 AM    Other Traditional Chinese Medicine Modalities:  None    Recommendations:  PT    Education:  Patient is aware of cumulative effect of acupuncture      Assessment       Analysis of Treatment:  Patient felt good    Pt prognosis is Good.     Patient will continue to benefit from acupuncture treatment to address the deficits listed in the problem list box on initial evaluation, provide patient family education and to maximize pt's level of independence in the home and community environment.     Patient's spiritual, cultural and educational needs considered and pt agreeable to plan of care and goals.     Anticipated barriers to treatment: None    Plan     Recommend       1  /week for   12 treatments and re-assess.

## 2024-07-26 ENCOUNTER — CLINICAL SUPPORT (OUTPATIENT)
Dept: REHABILITATION | Facility: HOSPITAL | Age: 74
End: 2024-07-26
Payer: MEDICARE

## 2024-07-26 DIAGNOSIS — R29.3 POOR POSTURE: Primary | ICD-10-CM

## 2024-07-26 PROCEDURE — 97110 THERAPEUTIC EXERCISES: CPT

## 2024-07-26 PROCEDURE — 97112 NEUROMUSCULAR REEDUCATION: CPT

## 2024-07-26 NOTE — PROGRESS NOTES
"  [facility]      Name: Darrion Bennett  Clinic Number: [id number]    Therapy Diagnosis:   Encounter Diagnosis   Name Primary?    Poor posture Yes     Physician: Joelle Flores MD    Visit Date: 2024    Physician Orders: PT Eval and Treat  Medical Diagnosis: Tremor [R25.1], Cervicalgia [M54.2], Other chronic pain [G89.29], Wedge compression fracture of t11-T12 vertebra, subsequent encounter for fracture with routine healing [S22.080D]   Evaluation Date: 2024  Authorization Period Expiration: 24  Plan of Care Expiration: 9/3/2024  Reassessment Due: 24  Visit # / Visits authorized:  Keep HOB elevated  MedX Testing:MedX testing visit 2    Time In: 8:50AM  Time Out:  950  Total Billable Time:55 minutes  (1 on 1 time)  INSURANCE and OUTCOMES: Fee for Service with FOTO Outcomes 2/3    Precautions: Standard, Diabetes, CHF, and HTN, tremor    Pattern of pain determined: 1 WILLIE    Subjective   Darrion reports that his neck pain is minimal. Pt reports having acupuncture yesterday, which he feels is very helpful.    Patient reports tolerating previous visit no adverse effects.  Patient reports their pain to be 3/10 on a 0-10 scale with 0 being no pain and 10 being the worst pain imaginable.  Pain Location: bilateral neck and thoracic     Work and leisure: retired/gardening  Pt goals: "decrease pain"    Objective     Baseline IM Testing Results:   Date of testin2024   Initial: 24 Midpoint: Final   ROM  deg     Max Peak Torque 273      Min Peak Torque 124      Flex/Ext Ratio 2.2:1     % below normative data -51%     % gain from initial N/a       Outcomes:  Initial score: 41%  Visit 5 score: 49%  Goal:    ROM Loss 24   Flexion minimal loss c/ pain mid back WFL   Extension moderate loss, with pain mid back region Mod loss   Side bending Right moderate loss and major loss Mod-major loss   Side bending Left minimal loss and moderate loss Mod-major loss " "  Rotation Right moderate loss Min loss   Rotation Left minimal loss Min loss   Protraction minimal loss Min loss   Retraction  moderate loss Min-mod loss        Treatment    Darrion received the treatments listed below:      Darrion received neuromuscular education  to isolate and engage spinal stabilization musculature correctly for motor control and coordination to aid in function and posture for 10 minutes on the Medical Medx Machine.  Patient performed MedX dynamic exercise with emphasis on spinal muscular control using pacer throughout  active range of motion. Therapist assisted patient in achieving optimal exertion for neural reeducation and endurance training by using the  Rigo Exertion Rating scale, by instructing the patient to aim for mid range of exertion, performing 15-20 repetitions, slowly, correctly,and safely.           7/26/2024     9:24 AM   [facility]   Visit Number 8   VAS Pain Rating 3   Time 5   Cervical Stretches - Retraction In Lying 10   Retraction in Sitting 10   Retraction with Extension 10   Sidebending 2   Scapular Retraction 15   Cervical Weight 210 lbs   Repetitions 20   Rating of Perceived Exertion 3   Ice - Z Lie (in min.) 5       therapeutic exercises to develop strength, endurance, ROM, flexibility, posture, and core stabilization for 50 minutes including:    UT/LS stretch 2x20"  Scap retract/No money ER with GTB x 15  RIS x10 (cues)  retraction+extension with towel support x10  cervical rotation stretch w/towel 5 x 5 sec  Open book x10  Cervical retraction in supine x10  Seated thoracic extension over 1/2 foam x10  paloff press x10--NP  serratus slides + Lift off x10     Peripheral muscle strengthening which included 1 set of 15-20 repetitions at a slow, controlled 10-13 second per rep pace focused on strengthening supporting musculature for improved body mechanics and functional mobility.  Pt and therapist focused on proper form during treatment to ensure optimal " strengthening of each targeted muscle group.  Machines were utilized including torso rotation, leg press, hip abd and hip add, leg ext.  Leg curl, triceps, biceps, chest and row added visit 3    cold pack for 5 minutes to thoracic spine in sitting.    Home Exercises Provided and Patient Education Provided   Home exercises include:Open books, scap squeeze, cervical retraction in supine  Cardio program:visit 5 :7/17/24  Lifting education date:visit 11  Posture/Lumbar roll: recommended  Fridge Magnet Discharge handout (date given):  Equipment at home/gym membership: none    Education provided:   -  cues w/ex's  MedX performance  Precor ex performance    Written Home Exercises Provided: Patient instructed to cont prior HEP.  Exercises were reviewed and Darrion was able to demonstrate them prior to the end of the session.  Darrion demonstrated good  understanding of the education provided.     See EMR under Patient Instructions for exercises provided prior visit.    Assessment   Darrion returns with minimal c/o neck stiffness/discomfort which is rated as 3/10 currently.  Pt's ROM reassessed with improvements noted in rotation and flexion.  Treatment continued with flexibility, strengthening and neuromuscular reeducation exs. He was able to perform exs with verbal and tactile cues and without increased pain. Cervical MedX resistance was maintained at 210 in.lbs and he completed 20reps with a RPE = 3/10.  Pt was also able to complete peripheral strengthening exercises without increased discomfort. Will continue per HB protocol and patient tolerance.     Patient is making  progress towards established goals.  Pt will continue to benefit from skilled outpatient physical therapy to address the deficits stated in the impairment chart, provide pt/family education and to maximize pt's level of independence in the home and community environment.     Anticipated Barriers for therapy: tremors - currently seeing neuro PT  Pt's spiritual,  cultural and educational needs considered and pt agreeable to plan of care and goals as stated below:     Goals:   Short term goals: 6 weeks or 10 visits   - Pt will demonstratte increased cervical MedX ROM as measured by med ex by 9 degrees from initial test which results in improved  ROM of neck for ease with ADLs and driving. Appropriate and Ongoing  - Pt will demonstrate independence with reducing or controlling symptoms with ther ex, movement, or position independently, able to reduce pain 1-2 points on pain scale using strategies taught in therapy.  Appropriate and Ongoing  - Pt will demonstrate increased MedX average isometric strength value by 20% with  when compared to the initial testing resulting in improved ability to perform bending, lifting, and carrying activities safely and confidently. Appropriate and Ongoing     Long term goals: 10 weeks or 20 visits  - Pt will demonstratte increased cervical MedX ROM as measured by med ex by 15degrees from initial test which results in functional ROM of neck for ease with ADLs and driving.  Appropriate and Ongoing  - Pt will demonstrate increased MedX average isometric strength value  by 40% from initial test to improve ability to lift and carry, and sustain good posture while performing ADL's. Appropriate and Ongoing  - Pt will demonstrate reduced pain and improved functional outcomes as reported on the FOTO by reaching an intake score of >/= 57% functional ability in order to demonstrate subjective improvement in patient's condition. . Appropriate and Ongoing  - Pt will demonstrate independence with reducing or controlling symptoms with ther ex, movement, or position independently, able to reduce pain 2-4 points on pain scale using strategies taught in therapy. Appropriate and Ongoing  - Pt will demonstrate independence with the HEP at discharge. Appropriate and Ongoing  - Pt(patient goal)will be able to stand to cook dinner  > 1hour without onset of LBP  Appropriate and Ongoing    Plan   Continue with established Plan of Care towards established PT goals.     Therapist: Selene Morris, PT  7/26/2024

## 2024-07-30 ENCOUNTER — CLINICAL SUPPORT (OUTPATIENT)
Dept: REHABILITATION | Facility: HOSPITAL | Age: 74
End: 2024-07-30
Payer: MEDICARE

## 2024-07-30 DIAGNOSIS — Z74.09 IMPAIRED FUNCTIONAL MOBILITY, BALANCE, GAIT, AND ENDURANCE: Primary | ICD-10-CM

## 2024-07-30 PROCEDURE — 97112 NEUROMUSCULAR REEDUCATION: CPT | Mod: KX,PN

## 2024-07-30 PROCEDURE — 97110 THERAPEUTIC EXERCISES: CPT | Mod: KX,PN

## 2024-07-31 ENCOUNTER — DOCUMENTATION ONLY (OUTPATIENT)
Dept: REHABILITATION | Facility: HOSPITAL | Age: 74
End: 2024-07-31
Payer: MEDICARE

## 2024-07-31 ENCOUNTER — CLINICAL SUPPORT (OUTPATIENT)
Dept: REHABILITATION | Facility: HOSPITAL | Age: 74
End: 2024-07-31
Payer: MEDICARE

## 2024-07-31 DIAGNOSIS — R29.3 POOR POSTURE: Primary | ICD-10-CM

## 2024-07-31 PROBLEM — Z74.09 IMPAIRED FUNCTIONAL MOBILITY, BALANCE, GAIT, AND ENDURANCE: Status: ACTIVE | Noted: 2024-07-31

## 2024-07-31 PROCEDURE — 97112 NEUROMUSCULAR REEDUCATION: CPT | Mod: CQ

## 2024-07-31 PROCEDURE — 97110 THERAPEUTIC EXERCISES: CPT | Mod: CQ

## 2024-07-31 NOTE — PROGRESS NOTES
"  [facility] Physical Therapy Treatment      Name: Darrion Bennett  Clinic Number: [id number]    Therapy Diagnosis:   Encounter Diagnosis   Name Primary?    Poor posture Yes     Physician: Joelle Flores MD    Visit Date: 2024    Physician Orders: PT Eval and Treat  Medical Diagnosis: Tremor [R25.1], Cervicalgia [M54.2], Other chronic pain [G89.29], Wedge compression fracture of t11-T12 vertebra, subsequent encounter for fracture with routine healing [S22.080D]   Evaluation Date: 2024  Authorization Period Expiration: 24  Plan of Care Expiration: 9/3/2024  Reassessment Due: 24  Visit # / Visits authorized:  Keep HOB elevated  MedX Testing:MedX testing visit 2    Time In: 8:45 AM  Time Out: 9:45 AM  Total Billable Time: 55 minutes    INSURANCE and OUTCOMES: Fee for Service with FOTO Outcomes 2/3    Precautions: Standard, Diabetes, CHF, and HTN, tremor    Pattern of pain determined: 1 WILLIE    Subjective   Darrion reports minimal neck/pain/stiffness currently. States that he feels better overall.     Patient reports tolerating previous visit no adverse effects.  Patient reports their pain to be 3/10 on a 0-10 scale with 0 being no pain and 10 being the worst pain imaginable.  Pain Location: bilateral neck and thoracic     Work and leisure: retired/gardening  Pt goals: "decrease pain"    Objective     Baseline IM Testing Results:   Date of testin2024   Initial: 24 Midpoint: Final   ROM  deg     Max Peak Torque 273      Min Peak Torque 124      Flex/Ext Ratio 2.2:1     % below normative data -51%     % gain from initial N/a       Outcomes:  Initial score: 41%  Visit 5 score: 49%  Goal:    ROM Loss 24   Flexion minimal loss c/ pain mid back WFL   Extension moderate loss, with pain mid back region Mod loss   Side bending Right moderate loss and major loss Mod-major loss   Side bending Left minimal loss and moderate loss Mod-major loss   Rotation Right moderate loss Min " "loss   Rotation Left minimal loss Min loss   Protraction minimal loss Min loss   Retraction  moderate loss Min-mod loss        Treatment    Darrion received the treatments listed below:      Darrion received neuromuscular education  to isolate and engage spinal stabilization musculature correctly for motor control and coordination to aid in function and posture for 10 minutes on the Medical Medx Machine.  Patient performed MedX dynamic exercise with emphasis on spinal muscular control using pacer throughout  active range of motion. Therapist assisted patient in achieving optimal exertion for neural reeducation and endurance training by using the  Rigo Exertion Rating scale, by instructing the patient to aim for mid range of exertion, performing 15-20 repetitions, slowly, correctly,and safely.           7/31/2024     9:01 AM   [facility] - Short   Visit Number 9   VAS Pain Rating 3   Treadmill Time (in min.) 5 min   Retraction in Lying 10   Retraction in Sitting 10   Retraction with Extension 10   Sidebending 2   Scapular Retraction 15   Cervical Weight 219 lbs   Repetitions 15   Rating of Perceived Exertion 3      therapeutic exercises to develop strength, endurance, ROM, flexibility, posture, and core stabilization for 45 minutes including:    UT/LS stretch 2x20"  Scap retract/No money ER with GTB x 15  RIS x10 (cues) + self over pressure  retraction+extension with towel support x10  cervical rotation stretch w/towel 5 x 5 sec (cues)  Open book x10  Cervical retraction in supine x10  Seated thoracic extension over 1/2 foam x10  paloff press x10--NP  serratus slides + Lift off x10   +H-Abd with RTB x 10 (1/2 foam roll support)    Peripheral muscle strengthening which included 1 set of 15-20 repetitions at a slow, controlled 10-13 second per rep pace focused on strengthening supporting musculature for improved body mechanics and functional mobility.  Pt and therapist focused on proper form during treatment to " ensure optimal strengthening of each targeted muscle group.  Machines were utilized including torso rotation, leg press, hip abd and hip add, leg ext.  Leg curl, triceps, biceps, chest and row added visit 3    cold pack for 5 minutes to cervical area in sitting.    Home Exercises Provided and Patient Education Provided   Home exercises include:Open books, scap squeeze, cervical retraction in supine  Cardio program:visit 5 :7/17/24  Lifting education date:visit 11  Posture/Lumbar roll: recommended  Fridge Magnet Discharge handout (date given):  Equipment at home/gym membership: none    Education provided:   -  cues w/ex's  MedX performance  Precor ex performance    Written Home Exercises Provided: Patient instructed to cont prior HEP.  Exercises were reviewed and Darrion was able to demonstrate them prior to the end of the session.  Darrion demonstrated good  understanding of the education provided.     See EMR under Patient Instructions for exercises provided prior visit.    Assessment   Darrion returns with minimal c/o neck stiffness/discomfort which is rated as 3/10 currently and improved overall.  Treatment continued with flexibility, strengthening and neuromuscular reeducation exs. He was able to perform exs with verbal and tactile cues and without increased pain. (Increased cues for RIS- added self OP today). He was also progressed to perform Standing H-abd for additional scapulothoracic strengthening. Cervical MedX resistance was increased to 219 in.lbs and he completed 15 reps with a RPE = 3/10.  Pt was also able to complete peripheral strengthening exercises without increased discomfort. Will continue per HB protocol and patient tolerance.     Patient is making  progress towards established goals.  Pt will continue to benefit from skilled outpatient physical therapy to address the deficits stated in the impairment chart, provide pt/family education and to maximize pt's level of independence in the home and  community environment.     Anticipated Barriers for therapy: tremors - currently seeing neuro PT  Pt's spiritual, cultural and educational needs considered and pt agreeable to plan of care and goals as stated below:     Goals:   Short term goals: 6 weeks or 10 visits   - Pt will demonstratte increased cervical MedX ROM as measured by med ex by 9 degrees from initial test which results in improved  ROM of neck for ease with ADLs and driving. Appropriate and Ongoing  - Pt will demonstrate independence with reducing or controlling symptoms with ther ex, movement, or position independently, able to reduce pain 1-2 points on pain scale using strategies taught in therapy.  Appropriate and Ongoing  - Pt will demonstrate increased MedX average isometric strength value by 20% with  when compared to the initial testing resulting in improved ability to perform bending, lifting, and carrying activities safely and confidently. Appropriate and Ongoing     Long term goals: 10 weeks or 20 visits  - Pt will demonstratte increased cervical MedX ROM as measured by med ex by 15degrees from initial test which results in functional ROM of neck for ease with ADLs and driving.  Appropriate and Ongoing  - Pt will demonstrate increased MedX average isometric strength value  by 40% from initial test to improve ability to lift and carry, and sustain good posture while performing ADL's. Appropriate and Ongoing  - Pt will demonstrate reduced pain and improved functional outcomes as reported on the FOTO by reaching an intake score of >/= 57% functional ability in order to demonstrate subjective improvement in patient's condition. . Appropriate and Ongoing  - Pt will demonstrate independence with reducing or controlling symptoms with ther ex, movement, or position independently, able to reduce pain 2-4 points on pain scale using strategies taught in therapy. Appropriate and Ongoing  - Pt will demonstrate independence with the HEP at discharge.  Appropriate and Ongoing  - Pt(patient goal)will be able to stand to cook dinner  > 1hour without onset of LBP Appropriate and Ongoing    Plan   Continue with established Plan of Care towards established PT goals.     Therapist: Spencer Taylor, PTA  7/31/2024

## 2024-07-31 NOTE — PROGRESS NOTES
PT/PTA met face to face to discuss pt's treatment plan and progress towards established goals. Pt will be seen by a physical therapist minimally every 6th visit or every 30 days.    Spencer Taylor PTA

## 2024-08-01 ENCOUNTER — CLINICAL SUPPORT (OUTPATIENT)
Dept: REHABILITATION | Facility: HOSPITAL | Age: 74
End: 2024-08-01
Payer: MEDICARE

## 2024-08-01 DIAGNOSIS — G89.29 NECK PAIN, CHRONIC: ICD-10-CM

## 2024-08-01 DIAGNOSIS — M54.9 MID BACK PAIN, CHRONIC: ICD-10-CM

## 2024-08-01 DIAGNOSIS — G89.29 MID BACK PAIN, CHRONIC: ICD-10-CM

## 2024-08-01 DIAGNOSIS — M54.2 NECK PAIN, CHRONIC: ICD-10-CM

## 2024-08-01 DIAGNOSIS — M54.59 OTHER LOW BACK PAIN: Primary | ICD-10-CM

## 2024-08-01 PROCEDURE — 97810 ACUP 1/> WO ESTIM 1ST 15 MIN: CPT | Mod: PN

## 2024-08-01 PROCEDURE — 97814 ACUP 1/> W/ESTIM EA ADDL 15: CPT | Mod: PN

## 2024-08-01 NOTE — PROGRESS NOTES
Acupuncture Treatment Note     Name: Darrion Bennett  [facility] Number: [id number]    Traditional Chinese Medicine Diagnosis: Qi Stagnation and Blood Stasis   Physician: Diony Sánchez,*    Date of Service: 8/1/2024     Medical Diagnosis: Chronic pain in lower back, mid back and upper back       Encounter Diagnoses   Name Primary?    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     Subjective     Chief Complaint:  Chronic pain in lower back, mid back and upper back for 2 years     Cancer Related Symptoms: None    Medical necessity is demonstrated by the following IMPAIRMENTS: Medical Necessity: Decreased mobility limits day to day activities, social, and emergent situations and Decreased quality of life    Response to Previous Treatment:  good    Quality of Symptoms (Better/Worse):  better    Other Condition/Symptoms:   MCTD (mixed connective tissue disease), Sjogren's disease, RA (rheumatoid arthritis),     Objective      New Findings:  None    Treatment Principles:  Increase Qi and Blood, stop pain     Acupuncture points used:    Bilateral points:Rahul Tuo Mikaela Ji + 4,  Zoe on Mid back + 2, GB 20, Zoe on neck + 4  Left: Zoe on mid back + 4  Right:  EA: Left Zoe on mid back + 4,   Auricular Treatment:  None  Needles In: 26  Needles Out: 26  [place] W/ STIM placed: 8:35 AM  Needles W/ STIM removed: 9: 15 AM    Other Traditional Chinese Medicine Modalities:  None    Recommendations:  PT    Education:  Patient is aware of cumulative effect of acupuncture      Assessment      Analysis of Treatment:  Patient felt good    Pt prognosis is Good.      Patient will continue to benefit from acupuncture treatment to address the deficits listed in the problem list box on initial evaluation, provide patient family education and to maximize pt's level of independence in the home and community environment.     Patient's spiritual, cultural and educational needs considered and pt agreeable to plan of care and goals.     Anticipated barriers to treatment: None    Plan     Recommend       1  /week for   12 treatments and re-assess.

## 2024-08-01 NOTE — PROGRESS NOTES
[facility]   Physical Therapy Treatment Note      Name: Darrion Bennett  [facility] Number: [id number]    Therapy Diagnosis:   Encounter Diagnosis   Name Primary?    Impaired functional mobility, balance, gait, and endurance Yes     Physician: Joelle Flores MD    Visit Date: 7/30/2024    Physician Orders: PT Eval and Treat   Medical Diagnosis from Referral: R25.1 (ICD-10-CM) - Tremor   Evaluation Date: 6/25/2024  Authorization Period Expiration: 6/18/2025  Plan of Care Expiration: 9/25/2024  Progress Note Due: 8/30/2024  Visit # / Visits authorized: 1/ 20+ ? (auth includes other current therapies) KX Modifier         Precautions: Fall; Hx of T11/T12 wedge compression fracture with routine healing; CHF     Time In: 8:02  Time Out: 8:47  Total Billable Time: 45 minutes (40 billable)      Subjective     Darrion reports same status of balance and walking since evaluation last visit.    He was compliant with home exercise program.      Pain: 0/10    Objective      Objective Measures updated at progress report unless specified.     Treatment     Darrion received the treatments listed below:      therapeutic exercises to develop strength and endurance for 17 minutes including:    -Nu-step, Level 3- x 8 minutes    -standing hip abduction- 2 x 10 each side    -R LAQ- x 15      *rest breaks provided throughout as needed      neuromuscular re-education activities to improve: Balance and Sense for 23 minutes, including:     -Functional Gait Assessment   -all elements   -increased rounds of gait with head turns, gait with eyes closed, tandem gait, and stairs for practice    -alternating cone taps, with wide cones- x 15 each    -recent history and activities since evaluation       *rest breaks provided throughout as needed      therapeutic activities to improve functional performance for 0  minutes, including:    gait training to improve functional mobility and safety for 0  minutes, including:      Patient  Education and Home Exercises       Education provided:   -education on PD/ diagnostics   -discussion of physical therapy plan of care      Written Home Exercises Provided: to be provided within treatment.  Exercises were reviewed and Darrion was able to demonstrate them prior to the end of the session.  Darrion demonstrated good  understanding of the education provided.     Assessment     Mr. Bennett tolerated first session well. He acclimated well to hip abduction strengthening and right knee-extension strengthening. Functional Gait Assessment revealed significant deficits for dynamic and community gait, most notably tandem gait, head turns, head nods, and eyes-closed. Gait speed deficits are also highly evident. Patient remains appropriate for skilled physical therapy.     Darrion Is progressing well towards his goals.   Patient prognosis is Good.     Patient will continue to benefit from skilled outpatient physical therapy to address the deficits listed in the problem list box on initial evaluation, provide pt/family education and to maximize pt's level of independence in the home and community environment.     Patient's spiritual, cultural and educational needs considered and pt agreeable to plan of care and goals.     Anticipated barriers to physical therapy: upper back/ neck pain; inconclusive Dx    Goals:   Short Term Goals: 4 weeks   -Patient will increase right knee-extension strength to 4+/5 for improved standing activities stability and endurance.  -Patient will increase single-limb stability to 5 seconds each side.  -Patient will ambulate with consistent heel initial-contact and with increased left arm-swing x 300 feet to demonstrate improved gait quality and safety.  -Patient will ambulate over unlevel surface without loss of stability x 50 feet to demonstrate improved confidence and safety with outside gait.     Long Term Goals: 8 weeks   -Patient will achieve Timed Up and Go of </= 11 seconds to demonstrate  improved gait stability and reduced fall risk.  -Patient will complete 5-Times Sit-to- 14 seconds to demonstrate improved functional capacity and reduced fall risk.  -Patient will ambulate for 10 minutes, overground or on treadmill, to demonstrate increased gait endurance.  -Patient will improve Functional Gait Assessment score to 23/30 to demonstrate improved dynamic/ community gait and reduced fall risk.     Plan     Plan of care Certification: 6/25/2024 to 9/25/2024     Outpatient Physical Therapy 2-1 times weekly for 8 weeks to include the following interventions: Gait Training, Neuromuscular Re-ed, Self Care, Therapeutic Activities, and Therapeutic Exercise.       Maggie More, PT, DPT    7/30/2024

## 2024-08-02 ENCOUNTER — CLINICAL SUPPORT (OUTPATIENT)
Dept: REHABILITATION | Facility: HOSPITAL | Age: 74
End: 2024-08-02
Payer: MEDICARE

## 2024-08-02 DIAGNOSIS — R29.3 POOR POSTURE: Primary | ICD-10-CM

## 2024-08-02 PROCEDURE — 97110 THERAPEUTIC EXERCISES: CPT

## 2024-08-02 PROCEDURE — 97750 PHYSICAL PERFORMANCE TEST: CPT

## 2024-08-02 NOTE — PROGRESS NOTES
"  [facility]      Name: Darrion Bennett  Clinic Number: [id number]    Therapy Diagnosis:   Encounter Diagnosis   Name Primary?    Poor posture Yes       Physician: Joelle Flores MD    Visit Date: 2024    Physician Orders: PT Eval and Treat  Medical Diagnosis: Tremor [R25.1], Cervicalgia [M54.2], Other chronic pain [G89.29], Wedge compression fracture of t11-T12 vertebra, subsequent encounter for fracture with routine healing [S22.080D]   Evaluation Date: 2024  Authorization Period Expiration: 24  Plan of Care Expiration: 9/3/2024  Reassessment Due: 24  Visit # / Visits authorized: 10/20 Keep HOB elevated  MedX Testing:MedX testing visit 2    Time In: 9:00 AM  Time Out: 10:00 AM  Total Billable Time: 55 minutes    INSURANCE and OUTCOMES: Fee for Service with FOTO Outcomes 2/3    Precautions: Standard, Diabetes, CHF, and HTN, tremor    Pattern of pain determined: 1 WILLIE    Subjective   Darrion reports feeling good today.     Patient reports tolerating previous visit no adverse effects.  Patient reports their pain to be 4/10 on a 0-10 scale with 0 being no pain and 10 being the worst pain imaginable.  Pain Location: bilateral neck and thoracic     Work and leisure: retired/gardening  Pt goals: "decrease pain"    Objective     Baseline IM Testing Results:   Date of testin2024   Initial: 24 Midpoint: Final   ROM  deg  deg    Max Peak Torque 273  281    Min Peak Torque 124  151    Flex/Ext Ratio 2.2:1 1.9:1    % below normative data -51% -35%    % gain from initial N/a +26%      Outcomes:  Initial score: 41%  Visit 5 score: 49%  Visit 10 score: 69%  Goal:    ROM Loss 24   Flexion minimal loss c/ pain mid back WFL   Extension moderate loss, with pain mid back region Mod loss   Side bending Right moderate loss and major loss Mod-major loss   Side bending Left minimal loss and moderate loss Mod-major loss   Rotation Right moderate loss Min loss " "  Rotation Left minimal loss Min loss   Protraction minimal loss Min loss   Retraction  moderate loss Min-mod loss        Treatment    Darrion received the treatments listed below:      Darrion received neuromuscular education  to isolate and engage spinal stabilization musculature correctly for motor control and coordination to aid in function and posture for 10 minutes on the Medical Medx Machine.  Patient performed MedX dynamic exercise with emphasis on spinal muscular control using pacer throughout  active range of motion. Therapist assisted patient in achieving optimal exertion for neural reeducation and endurance training by using the  Rigo Exertion Rating scale, by instructing the patient to aim for mid range of exertion, performing 15-20 repetitions, slowly, correctly,and safely.           8/2/2024     9:07 AM   [facility]   Visit Number 10   VAS Pain Rating 4   Time 5   Cervical Stretches - Retraction In Lying 10   Retraction in Sitting 10   Retraction with Extension 10   Sidebending 2   Scapular Retraction 15   Cervical Flexion 102   Cervical Extension 39   Cervical Peak Torque 281 ft. lbs.   Ice - Z Lie (in min.) 5      therapeutic exercises to develop strength, endurance, ROM, flexibility, posture, and core stabilization for 45 minutes including:    UT/LS stretch 2x20"  Scap retract/No money ER with GTB x 15  RIS x10 (cues) + self over pressure  retraction+extension with towel support x10  cervical rotation stretch w/towel 5 x 5 sec (cues)  Open book x10  Cervical retraction in supine x10  Seated thoracic extension over 1/2 foam x10  paloff press x10--NP  serratus slides + Lift off x10 - NP  +H-Abd with RTB x 10 (1/2 foam roll support)    Peripheral muscle strengthening which included 1 set of 15-20 repetitions at a slow, controlled 10-13 second per rep pace focused on strengthening supporting musculature for improved body mechanics and functional mobility.  Pt and therapist focused on proper form " during treatment to ensure optimal strengthening of each targeted muscle group.  Machines were utilized including torso rotation, leg press, hip abd and hip add, leg ext.  Leg curl, triceps, biceps, chest and row added visit 3    cold pack for 5 minutes to cervical area in sitting.    Home Exercises Provided and Patient Education Provided   Home exercises include:Open books, scap squeeze, cervical retraction in supine  Cardio program:visit 5 :7/17/24  Lifting education date:visit 11  Posture/Lumbar roll: recommended  Fridge Magnet Discharge handout (date given):  Equipment at home/gym membership: none    Education provided:   -  cues w/ex's  MedX performance  Precor ex performance    Written Home Exercises Provided: Patient instructed to cont prior HEP.  Exercises were reviewed and Darrion was able to demonstrate them prior to the end of the session.  Darrion demonstrated good  understanding of the education provided.     See EMR under Patient Instructions for exercises provided prior visit.    Assessment   Patient has attended 10 visits at [facility] which included MD evaluation, PT evaluation with isometric testing, and physical therapy treatment including HEP instruction, education, aerobic activity, dynamic strengthening on MedX equipment for the spine, and whole body strengthening on MedX equipment with increasing resistance. Patient demonstrates increased ability to reduce symptoms, improve posture, improve ROM, and improve strength, as stated below:    -Improved posture, recommended using lumbar roll  -Improved strength at each test point on lumbar MedX isometric test with 26% average improvement noted with reduced pain noted by patient.  -Initial outcome tool score 41% functional ability and current outcome tool score 69% functional ability indicating reduced pain and improved function.         Patient is making  progress towards established goals.  Pt will continue to benefit from skilled outpatient  physical therapy to address the deficits stated in the impairment chart, provide pt/family education and to maximize pt's level of independence in the home and community environment.     Anticipated Barriers for therapy: tremors - currently seeing neuro PT  Pt's spiritual, cultural and educational needs considered and pt agreeable to plan of care and goals as stated below:     Goals:   Short term goals: 6 weeks or 10 visits   - Pt will demonstratte increased cervical MedX ROM as measured by med ex by 9 degrees from initial test which results in improved  ROM of neck for ease with ADLs and driving. Appropriate and Ongoing  - Pt will demonstrate independence with reducing or controlling symptoms with ther ex, movement, or position independently, able to reduce pain 1-2 points on pain scale using strategies taught in therapy.  Appropriate and Ongoing  - Pt will demonstrate increased MedX average isometric strength value by 20% with  when compared to the initial testing resulting in improved ability to perform bending, lifting, and carrying activities safely and confidently. Appropriate and Ongoing     Long term goals: 10 weeks or 20 visits  - Pt will demonstratte increased cervical MedX ROM as measured by med ex by 15degrees from initial test which results in functional ROM of neck for ease with ADLs and driving.  Appropriate and Ongoing  - Pt will demonstrate increased MedX average isometric strength value  by 40% from initial test to improve ability to lift and carry, and sustain good posture while performing ADL's. Appropriate and Ongoing  - Pt will demonstrate reduced pain and improved functional outcomes as reported on the FOTO by reaching an intake score of >/= 57% functional ability in order to demonstrate subjective improvement in patient's condition. . Appropriate and Ongoing  - Pt will demonstrate independence with reducing or controlling symptoms with ther ex, movement, or position independently, able to  reduce pain 2-4 points on pain scale using strategies taught in therapy. Appropriate and Ongoing  - Pt will demonstrate independence with the HEP at discharge. Appropriate and Ongoing  - Pt(patient goal)will be able to stand to cook dinner  > 1hour without onset of LBP Appropriate and Ongoing    Plan   Continue with established Plan of Care towards established PT goals.     Therapist: Cait Garcia, PT  8/2/2024

## 2024-08-05 ENCOUNTER — CLINICAL SUPPORT (OUTPATIENT)
Dept: REHABILITATION | Facility: HOSPITAL | Age: 74
End: 2024-08-05
Payer: MEDICARE

## 2024-08-05 DIAGNOSIS — Z74.09 IMPAIRED FUNCTIONAL MOBILITY, BALANCE, GAIT, AND ENDURANCE: Primary | ICD-10-CM

## 2024-08-05 PROCEDURE — 97112 NEUROMUSCULAR REEDUCATION: CPT | Mod: KX,PN

## 2024-08-05 PROCEDURE — 97110 THERAPEUTIC EXERCISES: CPT | Mod: KX,PN

## 2024-08-12 ENCOUNTER — HOSPITAL ENCOUNTER (OUTPATIENT)
Dept: RADIOLOGY | Facility: HOSPITAL | Age: 74
Discharge: HOME | End: 2024-08-12
Attending: STUDENT IN AN ORGANIZED HEALTH CARE EDUCATION/TRAINING PROGRAM
Payer: MEDICARE

## 2024-08-12 ENCOUNTER — PATIENT MESSAGE (OUTPATIENT)
Dept: ADMINISTRATIVE | Facility: OTHER | Age: 74
End: 2024-08-12
Payer: MEDICARE

## 2024-08-12 DIAGNOSIS — S22.080S COMPRESSION FRACTURE OF T12 VERTEBRA, SEQUELA: ICD-10-CM

## 2024-08-12 DIAGNOSIS — R53.1 SUBJECTIVE WEAKNESS: ICD-10-CM

## 2024-08-12 PROCEDURE — 72141 MRI NECK SPINE W/O DYE: CPT | Mod: TC

## 2024-08-12 PROCEDURE — 72146 MRI CHEST SPINE W/O DYE: CPT | Mod: TC

## 2024-08-12 PROCEDURE — 72148 MRI LUMBAR SPINE W/O DYE: CPT | Mod: TC

## 2024-08-13 ENCOUNTER — CLINICAL SUPPORT (OUTPATIENT)
Dept: REHABILITATION | Facility: HOSPITAL | Age: 74
End: 2024-08-13
Payer: MEDICARE

## 2024-08-13 DIAGNOSIS — Z74.09 IMPAIRED FUNCTIONAL MOBILITY, BALANCE, GAIT, AND ENDURANCE: Primary | ICD-10-CM

## 2024-08-13 PROCEDURE — 97110 THERAPEUTIC EXERCISES: CPT | Mod: KX,PN

## 2024-08-13 PROCEDURE — 97112 NEUROMUSCULAR REEDUCATION: CPT | Mod: KX,PN

## 2024-08-13 NOTE — PROGRESS NOTES
[facility]   Physical Therapy Treatment Note      Name: Darrion Bennett  [facility] Number: [id number]    Therapy Diagnosis:   Encounter Diagnosis   Name Primary?    Impaired functional mobility, balance, gait, and endurance Yes     Physician: Joelle Flores MD    Visit Date: 8/13/2024    Physician Orders: PT Eval and Treat   Medical Diagnosis from Referral: R25.1 (ICD-10-CM) - Tremor   Evaluation Date: 6/25/2024  Authorization Period Expiration: 6/18/2025  Plan of Care Expiration: 9/25/2024  Progress Note Due: 8/30/2024  Visit # / Visits authorized: 3/ 20? (auth includes other current therapies) KX Modifier         Precautions: Fall; Hx of T11/T12 wedge compression fracture with routine healing; CHF     Time In: 8:46  Time Out: 9:29  Total Billable Time: 43 minutes       Subjective     Darrion reports same status of balance difficulties, and that he had noticeable difficult time keeping up with grandchildren this past week while visiting them out of town.    He was compliant with home exercise program.      Pain: 0/10    Objective      Objective Measures updated at progress report unless specified.     Treatment     Darrion received the treatments listed below:      therapeutic exercises to develop strength and endurance for 14 minutes including:    -Nu-step, Level 2.5- x 8 minutes    -standing hip abduction- 2 x 10 each side   -cues for R alignment     -standing heel-raises- x 20      *rest breaks provided throughout as needed      neuromuscular re-education activities to improve: Balance and Sense for 29  minutes, including:      On Turf:    -tandem gait- 25 ft x 4 rounds    -gait mechanics, rhythm, and speed training- 60 ft x 6 rounds   -modeling and pace-keeping by therapist    In Parallel Bars:    -side-stepping over low hurdles- 4 hurdles x 6 rounds    -balance with FT on foam, EC- 3 x 30 seconds       *rest breaks provided throughout as needed      therapeutic activities to improve  functional performance for 0  minutes, including:    gait training to improve functional mobility and safety for 0  minutes, including:      Patient Education and Home Exercises       Education provided:   -education on PD/ diagnostics   -discussion of physical therapy plan of care      Written Home Exercises Provided: to be provided within treatment.  Exercises were reviewed and Darrion was able to demonstrate them prior to the end of the session.  Darrion demonstrated good  understanding of the education provided.     Assessment     Mr. Bennett tolerated session well. He demonstrated deficient footing, decreased coordination, and change in pace with turning and direction-changing during gait. Side-stepping over hurdles was added today to work on multi-directional foot clearance. Sensory interaction in balance activities were performed well today. Patient remains appropriate for skilled physical therapy.     Darrion Is progressing well towards his goals.   Patient prognosis is Good.     Patient will continue to benefit from skilled outpatient physical therapy to address the deficits listed in the problem list box on initial evaluation, provide pt/family education and to maximize pt's level of independence in the home and community environment.     Patient's spiritual, cultural and educational needs considered and pt agreeable to plan of care and goals.     Anticipated barriers to physical therapy: upper back/ neck pain; inconclusive Dx    Goals:   Short Term Goals: 4 weeks   -Patient will increase right knee-extension strength to 4+/5 for improved standing activities stability and endurance.  -Patient will increase single-limb stability to 5 seconds each side.  -Patient will ambulate with consistent heel initial-contact and with increased left arm-swing x 300 feet to demonstrate improved gait quality and safety.  -Patient will ambulate over unlevel surface without loss of stability x 50 feet to demonstrate improved  confidence and safety with outside gait.     Long Term Goals: 8 weeks   -Patient will achieve Timed Up and Go of </= 11 seconds to demonstrate improved gait stability and reduced fall risk.  -Patient will complete 5-Times Sit-to- 14 seconds to demonstrate improved functional capacity and reduced fall risk.  -Patient will ambulate for 10 minutes, overground or on treadmill, to demonstrate increased gait endurance.  -Patient will improve Functional Gait Assessment score to 23/30 to demonstrate improved dynamic/ community gait and reduced fall risk.     Plan     Plan of care Certification: 6/25/2024 to 9/25/2024     Outpatient Physical Therapy 2-1 times weekly for 8 weeks to include the following interventions: Gait Training, Neuromuscular Re-ed, Self Care, Therapeutic Activities, and Therapeutic Exercise.       Maggie More, PT, DPT    08/13/2024

## 2024-08-14 ENCOUNTER — CLINICAL SUPPORT (OUTPATIENT)
Dept: REHABILITATION | Facility: HOSPITAL | Age: 74
End: 2024-08-14
Payer: MEDICARE

## 2024-08-14 DIAGNOSIS — M54.2 NECK PAIN, CHRONIC: ICD-10-CM

## 2024-08-14 DIAGNOSIS — G89.29 MID BACK PAIN, CHRONIC: ICD-10-CM

## 2024-08-14 DIAGNOSIS — M54.59 OTHER LOW BACK PAIN: Primary | ICD-10-CM

## 2024-08-14 DIAGNOSIS — G89.29 NECK PAIN, CHRONIC: ICD-10-CM

## 2024-08-14 DIAGNOSIS — M54.9 MID BACK PAIN, CHRONIC: ICD-10-CM

## 2024-08-14 PROCEDURE — 97810 ACUP 1/> WO ESTIM 1ST 15 MIN: CPT | Mod: PN

## 2024-08-14 PROCEDURE — 97811 ACUP 1/> W/O ESTIM EA ADD 15: CPT | Mod: PN

## 2024-08-14 NOTE — PROGRESS NOTES
Acupuncture Treatment Note     Name: Darrion Bennett  [facility] Number: [id number]    Traditional Chinese Medicine Diagnosis: Qi Stagnation and Blood Stasis   Physician: Diony Sánchez,*    Date of Service: 8/14/2024     Medical Diagnosis: Chronic pain in lower back, mid back and upper back       Encounter Diagnoses   Name Primary?    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     Subjective     Chief Complaint:  Chronic pain in lower back, mid back and upper back for 2 years     Cancer Related Symptoms: None    Medical necessity is demonstrated by the following IMPAIRMENTS: Medical Necessity: Decreased mobility limits day to day activities, social, and emergent situations and Decreased quality of life    Response to Previous Treatment:  good    Quality of Symptoms (Better/Worse):  better    Other Condition/Symptoms:   MCTD (mixed connective tissue disease), Sjogren's disease, RA (rheumatoid arthritis),     Objective      New Findings:  None    Treatment Principles:  Increase Qi and Blood, stop pain     Acupuncture points used:    Bilateral points:GB 20, Zoe on neck + 4  Left: Zoe on mid back + 3, Rahul Alano Mikaela Ji + 8,  UB 13, UN 15, UB 17, UB 18, UB 21, UB 23  Right:  Auricular Treatment:  None  Needles In: 27  Needles Out: 27  Needles W/O STIM placed: 8:20 AM  [place] W/O STIM removed: 9: 00 AM    Other Traditional Chinese Medicine Modalities:  None    Recommendations:  PT    Education:  Patient is aware of cumulative effect of acupuncture      Assessment      Analysis of Treatment:  Patient felt good    Pt prognosis is Good.     Patient will  continue to benefit from acupuncture treatment to address the deficits listed in the problem list box on initial evaluation, provide patient family education and to maximize pt's level of independence in the home and community environment.     Patient's spiritual, cultural and educational needs considered and pt agreeable to plan of care and goals.     Anticipated barriers to treatment: None    Plan     Recommend       1  /week for   12 treatments and re-assess.

## 2024-08-15 ENCOUNTER — OFFICE VISIT (OUTPATIENT)
Dept: CARDIOLOGY | Facility: CLINIC | Age: 74
End: 2024-08-15
Payer: MEDICARE

## 2024-08-15 VITALS
HEART RATE: 77 BPM | RESPIRATION RATE: 18 BRPM | WEIGHT: 127.63 LBS | OXYGEN SATURATION: 100 % | BODY MASS INDEX: 20.51 KG/M2 | SYSTOLIC BLOOD PRESSURE: 122 MMHG | HEIGHT: 66 IN | DIASTOLIC BLOOD PRESSURE: 60 MMHG

## 2024-08-15 DIAGNOSIS — I10 ESSENTIAL HYPERTENSION: Primary | ICD-10-CM

## 2024-08-15 DIAGNOSIS — M35.00 SJOGREN'S SYNDROME, WITH UNSPECIFIED ORGAN INVOLVEMENT: ICD-10-CM

## 2024-08-15 DIAGNOSIS — I49.3 PVC (PREMATURE VENTRICULAR CONTRACTION): ICD-10-CM

## 2024-08-15 DIAGNOSIS — N18.31 STAGE 3A CHRONIC KIDNEY DISEASE: ICD-10-CM

## 2024-08-15 DIAGNOSIS — E78.00 PURE HYPERCHOLESTEROLEMIA: ICD-10-CM

## 2024-08-15 DIAGNOSIS — I70.0 AORTIC ATHEROSCLEROSIS: ICD-10-CM

## 2024-08-15 DIAGNOSIS — I50.30 DIASTOLIC HEART FAILURE, NYHA CLASS 2: ICD-10-CM

## 2024-08-15 DIAGNOSIS — R06.02 SOB (SHORTNESS OF BREATH): ICD-10-CM

## 2024-08-15 DIAGNOSIS — E11.42 CONTROLLED TYPE 2 DIABETES MELLITUS WITH DIABETIC POLYNEUROPATHY, WITH LONG-TERM CURRENT USE OF INSULIN: ICD-10-CM

## 2024-08-15 DIAGNOSIS — M25.632 DECREASED RANGE OF MOTION OF BOTH WRISTS: ICD-10-CM

## 2024-08-15 DIAGNOSIS — M25.631 DECREASED RANGE OF MOTION OF BOTH WRISTS: ICD-10-CM

## 2024-08-15 DIAGNOSIS — Z79.4 CONTROLLED TYPE 2 DIABETES MELLITUS WITH DIABETIC POLYNEUROPATHY, WITH LONG-TERM CURRENT USE OF INSULIN: ICD-10-CM

## 2024-08-15 LAB
OHS QRS DURATION: 86 MS
OHS QTC CALCULATION: 443 MS

## 2024-08-15 PROCEDURE — 99999 PR PBB SHADOW E&M-EST. PATIENT-LVL V: CPT | Mod: PBBFAC,,, | Performed by: INTERNAL MEDICINE

## 2024-08-15 PROCEDURE — 93000 ELECTROCARDIOGRAM COMPLETE: CPT | Mod: S$GLB,,, | Performed by: INTERNAL MEDICINE

## 2024-08-15 NOTE — PROGRESS NOTES
CARDIOVASCULAR CONSULTATION    REASON FOR CONSULT:   Darrion Bennett is a 74 y.o. male who presents for establishing care with Cardiology.      HISTORY OF PRESENT ILLNESS:     Notes from September 2019:  Patient here for follow-up today.  Denies any chest pains at rest on exertion, orthopnea, PND.  Denies any other cardiac symptoms.    Notes from June 2019:  Patient is here for follow-up today.  Denies any chest pains at rest on exertion, orthopnea, PND.  Denies any.  Of dizziness or passing out.  Blood pressure is much better controlled in clinic today.    Initial clinic visit:  Patient is a very pleasant 69-year-old man with a past medical history significant for hypertension, diabetes, Sjogren's disease who wants to establish care with [facility].  Patient has been followed with [facility].  Patient states that in 2014 he was diagnosed with heart failure.  He states that he had passed out and had gone to the hospital, he was feeling short of breath also and at that point he was told that he had heart failure.  I can see an echocardiogram from his outside medical records in 2014 which showed normal left ventricular systolic function.  He also had a stress echocardiogram done in 2013 which did not reveal any ischemia.  He denies any chest pains at rest on exertion, orthopnea, PND, swelling of feet.  States that since then he has been okay and walks about 2-3 blocks every day.  On walking about 2-3 blocks he does experience some tightness in his left calf.  This goes away after rest.  He denies any resting calf tightness.  He denies any ulcer on his feet.  He states that he checks his blood pressure at home and has found that it is elevated around 160-170 mm of systolic multiple times.      Notes from March 2021:  Patient here for follow-up.  Denies any chest pains at rest on exertion, orthopnea, PND, swelling of feet.  Mostly blood pressure has been uncontrolled at home.  Staying around 160 mmHg.  Very  compliant with medications.  EKG done in the clinic today was personally reviewed and demonstrates sinus bradycardia with left anterior fascicular block, poor R-wave progression.      Notes from April 21:  Patient here for follow-up.  Now states that he has been experiencing dyspnea on exertion which is worse than prior.  Very concerned about that.  No chest pains or tightness, but it is gets short of breath on walking short distances and this is lifestyle limiting.  Denies orthopnea, PND, swelling of feet.  Also states that the blood pressure is staying around 140-160 mmHg at home.      Notes from May 2021:  Patient here for follow-up.  Stress test did not show any significant ischemia.  Echo showed normal left ventricle systolic function.  Symptoms have improved.  States stop taking his Crestor because it was causing myalgias      The left ventricle is normal in size with concentric remodeling and normal systolic function.  The estimated ejection fraction is 65%.  Grade II left ventricular diastolic dysfunction.  Severe left atrial enlargement.  Normal right ventricular size with normal right ventricular systolic function.  Mild right atrial enlargement.  Mild mitral regurgitation.  Normal central venous pressure (3 mmHg).  The estimated PA systolic pressure is 36 mmHg.  The sinuses of Valsalva is mildly dilated.  Normal myocardial perfusion scan. There is no evidence of myocardial ischemia or infarction.    The gated perfusion images showed an ejection fraction of 75% post stress.    There is normal wall motion post stress.    The EKG portion of this study is negative for ischemia.    The patient reported no chest pain during the stress test.    During stress, rare PVCs are noted.    Hypertensive response to exercise.  AR interval did increase during exercise.      Notes from July 2021:  Patient here for follow-up.  Has been experiencing dyspnea on exertion.  States it is getting worse.  Has an appointment with  pulmonology coming up.  Is requesting evaluation of CBC and BNP.  As well as a chest x-ray.      The left ventricle is normal in size with concentric hypertrophy and normal systolic function.  The estimated ejection fraction is 65%.  Grade I left ventricular diastolic dysfunction.  Normal right ventricular size with normal right ventricular systolic function.  Moderate left atrial enlargement.  Mild right atrial enlargement.  There is mild aortic valve stenosis.  Aortic valve area is 1.67 cm2; peak velocity is 1.95 m/s; mean gradient is 9 mmHg.  Normal central venous pressure (3 mmHg).  The estimated PA systolic pressure is 16 mmHg.      Notes from dec 21: doing fine. No cp, orthopnea, pnd.       February 2022:  Patient follow-up.  Doing fine.  Occasionally gets swelling in his feet at the end of the day..  States he takes torsemide every Saturday and Sunday.  States he was told by his nephrologist to do so.    Notes from August 2022: Patient states that his nephrologist got off his amlodipine and since then his blood pressure has been uncontrolled.  He is taking hydralazine at 50 mg t.i.d..  Blood pressure is running around 170 mmHg.  Also had an episode of chest pressure last week.  Did not seek medical attention for that.  Denies orthopnea, PND.      Notes from September 2022:  Patient here for follow-up.  Has been chest pain-free.  Stress test did not show any significant ischemia.  Echo showed normal left ventricle systolic function.      Normal myocardial perfusion scan. There is no evidence of myocardial ischemia or infarction.    The gated perfusion images showed an ejection fraction of 72% post stress.    There is normal wall motion post stress.    The EKG portion of this study is negative for ischemia.    The patient reported no chest pain during the stress test.    During stress, frequent PVCs are noted.      The left ventricle is normal in size with mild concentric hypertrophy and normal systolic  function.  The estimated ejection fraction is 60%.  Grade I left ventricular diastolic dysfunction.  Normal right ventricular size with normal right ventricular systolic function.  Moderate left atrial enlargement.  Mild right atrial enlargement.  Mild mitral regurgitation.  Mild pulmonic regurgitation.  Normal central venous pressure (3 mmHg).  The estimated PA systolic pressure is 36 mmHg.  There is mild pulmonary hypertension.      Feb 23:  Patient here for follow-up.  Denies any chest pains at rest on exertion, orthopnea, PND.  Occasional swelling of feet as well as hence.  Is on chronic prednisone.      2/27/23: doing fine. No cp, orthopnea, pnd, dizziness or syncope.    Sinus rhythm with heart rates varying between 45 and 101 BPM with an average of 66 BPM.  There were occasional PVCs totalling 607 and averaging 25.31 per hour. There were 5 couplets. There were 88 bigeminal cycles.  There were very frequent PACs totalling 3884 and averaging 161.97 per hour.  Frequent episodes of Mobitz I second degree AVB    Notes from August 24:  Patient here for follow-up.  Denies chest pains at rest on exertion, orthopnea, PND.  Main complaint is dyspnea on exertion.  States can only walk from here to the door of the office and then gets short of breath.  Feels that this has been getting worse progressively.      PAST MEDICAL HISTORY:     Past Medical History:   Diagnosis Date    Anemia     Arthritis     Cataract     CHF (congestive heart failure)     Chronic constipation     Diabetes mellitus, type 2     Felon of finger of left hand 05/11/2019    Glaucoma     Hyperlipidemia 05/13/2014    Hypertension     MCTD (mixed connective tissue disease)     Personal history of colonic polyps     Sjogren's disease     Ulcerative colitis     Urolithiasis        PAST SURGICAL HISTORY:     Past Surgical History:   Procedure Laterality Date    CATARACT EXTRACTION Bilateral 2005    COLONOSCOPY  2014    ESOPHAGOGASTRODUODENOSCOPY N/A  9/8/2023    Procedure: EGD (ESOPHAGOGASTRODUODENOSCOPY);  Surgeon: Divya Saenz MD;  Location: [facility] ENDO;  Service: Endoscopy;  Laterality: N/A;  ref by / pranav portal-    ESOPHAGOGASTRODUODENOSCOPY N/A 11/22/2023    Procedure: EGD (ESOPHAGOGASTRODUODENOSCOPY);  Surgeon: Crystal Urbina MD;  Location: [facility] ENDO;  Service: Endoscopy;  Laterality: N/A;  time frame 8-12 weeks  Dr. Saenz pt   prep instructions sent to pt via portal -  wl meds trulicity hold 7 days prior  diabetic  11/16- pt r/s to earlier date, pre call complete.  DBM    EYE SURGERY         ALLERGIES AND MEDICATION:     Review of patient's allergies indicates:   Allergen Reactions    Imuran [azathioprine]      Pancytopenia    Penicillins Nausea And Vomiting    Rosuvastatin      Muscle pain     Allopurinol analogues Rash        Medication List            Accurate as of August 15, 2024  9:21 AM. If you have any questions, ask your nurse or doctor.                CHANGE how you take these medications      ferrous gluconate 324 mg (37.5 mg iron) Tab tablet  Take 1 tablet (324 mg total) by mouth once daily.  What changed: additional instructions            CONTINUE taking these medications      amLODIPine 5 MG tablet  Commonly known as: NORVASC  TAKE 1 TABLET TWICE DAILY     ammonium lactate 12 % Crea  Apply 1 application topically once daily.     atorvastatin 80 MG tablet  Commonly known as: LIPITOR  TAKE 1 TABLET EVERY DAY     blood-glucose meter Misc  Commonly known as: TRUE METRIX GLUCOSE METER  Test glucose 4x/day     ciclopirox 8 % Soln  Commonly known as: PENLAC  Apply topically nightly.     DEXCOM G7  Misc  Generic drug: blood-glucose meter,continuous  Use with Dexcom G7 sensors     DEXCOM G7 SENSOR Bernie  Generic drug: blood-glucose sensor  Change every 10 days     * diclofenac sodium 1 % Gel  Commonly known as: VOLTAREN  Apply 2 g topically 4 (four) times daily as needed. Apply to aching joints     * diclofenac sodium 1 %  "Gel  Commonly known as: VOLTAREN  Apply 2 g topically once daily.     dorzolamide 2 % ophthalmic solution  Commonly known as: TRUSOPT  INSTILL 1 DROP INTO BOTH EYES TWICE DAILY     DROPLET PEN NEEDLE 32 gauge x 5/32" Ndle  Generic drug: pen needle, diabetic  USE 1 NEEDLE AS DIRECTED FOUR TIMES DAILY     DROPSAFE ALCOHOL PREP PADS Padm  Generic drug: alcohol swabs  USE  4 TIMES PER DAY     empagliflozin 10 mg tablet  Commonly known as: JARDIANCE  Take 1 tablet (10 mg total) by mouth once daily.     febuxostat 40 mg Tab  Commonly known as: ULORIC  Take 1 tablet (40 mg total) by mouth once daily.     fluticasone propionate 50 mcg/actuation nasal spray  Commonly known as: FLONASE  1 spray (50 mcg total) by Each Nostril route once daily.     hydrALAZINE 25 MG tablet  Commonly known as: APRESOLINE  TAKE 3 TABLETS THREE TIMES DAILY     INJECTAFER 50 mg iron/mL injection  Generic drug: ferric carboxymaltose     insulin aspart U-100 100 unit/mL (3 mL) Inpn pen  Commonly known as: NovoLOG Flexpen U-100 Insulin  Inject Novolog if glucose after meal is high:  201-250=+2, 251-300=+3; 301-350=+4, over 350=+5 units     lancets 33 gauge Misc  Commonly known as: TRUEPLUS LANCETS  Test glucose 4x/day. Dx code e11.65     LIDOcaine 5 %  Commonly known as: LIDODERM  Place 1 patch onto the skin once daily. Remove & Discard patch within 12 hours or as directed by MD     multivit with min-folic acid 200 mcg Chew     omeprazole 40 MG capsule  Commonly known as: PRILOSEC  Take 1 capsule (40 mg total) by mouth every morning.     ORENCIA CLICKJECT 125 mg/mL Atin  Generic drug: abatacept  Inject 125 mg into the skin once a week.     predniSONE 5 MG tablet  Commonly known as: DELTASONE  Take 1 tablet (5 mg total) by mouth once daily.     PROLIA 60 mg/mL Syrg  Generic drug: denosumab     senna-docusate 8.6-50 mg 8.6-50 mg per tablet  Commonly known as: SENNA WITH DOCUSATE SODIUM  Take 1 tablet by mouth once daily.     torsemide 10 MG " Tab  Commonly known as: DEMADEX  TAKE 1 TABLET EVERY OTHER DAY     traMADoL 50 mg tablet  Commonly known as: ULTRAM  TAKE 1 TABLET EVERY 12 HOURS AS NEEDED FOR PAIN     travoprost 0.004 % ophthalmic solution  Commonly known as: TRAVATAN Z  Place 1 drop into both eyes every evening.     triamcinolone acetonide 0.1% 0.1 % cream  Commonly known as: KENALOG  APPLY TOPICALLY TWICE DAILY FOR 10 DAYS     TRUE METRIX GLUCOSE TEST STRIP Strp  Generic drug: blood sugar diagnostic  TEST BLOOD SUGAR FOUR TIMES DAILY     TRUE METRIX LEVEL 1 Soln  Generic drug: blood glucose control, low  Use as indicated     TRULICITY 4.5 mg/0.5 mL pen injector  Generic drug: dulaglutide  Inject 4.5 mg into the skin every 7 days.     valsartan 160 MG tablet  Commonly known as: DIOVAN  TAKE 1 TABLET TWICE DAILY           * This list has 2 medication(s) that are the same as other medications prescribed for you. Read the directions carefully, and ask your doctor or other care provider to review them with you.                  SOCIAL HISTORY:     Social History     Socioeconomic History    Marital status:     Number of children: 3   Tobacco Use    Smoking status: Never     Passive exposure: Never    Smokeless tobacco: Never   Substance and Sexual Activity    Alcohol use: No    Drug use: Yes     Comment: ultram 1 - 2 tabs a week    Sexual activity: Not Currently     Partners: Female     Social Determinants of Health     Financial Resource Strain: Low Risk  (2/12/2024)    Overall Financial Resource Strain (CARDIA)     Difficulty of Paying Living Expenses: Not very hard   Recent Concern: Financial Resource Strain - Medium Risk (11/27/2023)    Overall Financial Resource Strain (CARDIA)     Difficulty of Paying Living Expenses: Somewhat hard   Food Insecurity: Food Insecurity Present (2/12/2024)    Hunger Vital Sign     Worried About Running Out of Food in the Last Year: Sometimes true     Ran Out of Food in the Last Year: Sometimes true    Transportation Needs: No Transportation Needs (2/12/2024)    PRAPARE - Transportation     Lack of Transportation (Medical): No     Lack of Transportation (Non-Medical): No   Physical Activity: Unknown (2/12/2024)    Exercise Vital Sign     Days of Exercise per Week: 0 days   Stress: No Stress Concern Present (2/12/2024)    [employer] - Occupational Stress Questionnaire     Feeling of Stress : Only a little   Housing Stability: Low Risk  (2/12/2024)    Housing Stability Vital Sign     Unable to Pay for Housing in the Last Year: No     Number of Places Lived in the Last Year: 2     Unstable Housing in the Last Year: No       FAMILY HISTORY:     Family History   Problem Relation Name Age of Onset    Hypertension Father      Stroke Father      Cataracts Father      Glaucoma Father      Cataracts Mother      No Known Problems Sister      No Known Problems Brother      Rheum arthritis Maternal Aunt      Rheum arthritis Maternal Uncle      No Known Problems Paternal Aunt      No Known Problems Paternal Uncle      No Known Problems Maternal Grandmother      No Known Problems Maternal Grandfather      Throat cancer Paternal Grandmother      Glaucoma Paternal Grandfather      No Known Problems Daughter      No Known Problems Son x2     Celiac disease Neg Hx      Colon cancer Neg Hx      Cirrhosis Neg Hx      Colon polyps Neg Hx      Crohn's disease Neg Hx      Cystic fibrosis Neg Hx      Hemochromatosis Neg Hx      Esophageal cancer Neg Hx      Inflammatory bowel disease Neg Hx      Irritable bowel syndrome Neg Hx      Liver cancer Neg Hx      Liver disease Neg Hx      Rectal cancer Neg Hx      Stomach cancer Neg Hx      Ulcerative colitis Neg Hx      Chase's disease Neg Hx      Amblyopia Neg Hx      Blindness Neg Hx      Cancer Neg Hx      Diabetes Neg Hx      Macular degeneration Neg Hx      Retinal detachment Neg Hx      Strabismus Neg Hx      Thyroid disease Neg Hx      Melanoma Neg Hx    "      REVIEW OF SYSTEMS:   Review of Systems   Constitutional: Negative.    HENT: Negative.    Eyes: Negative.    Respiratory: Negative.    Gastrointestinal: Negative.    Genitourinary: Negative.    Musculoskeletal: Negative.    Skin: Negative.    Neurological: Negative.    Endo/Heme/Allergies: Negative.    Psychiatric/Behavioral: Negative.    All other systems reviewed and are negative.      A 10 point review of systems was performed and all the pertinent positives have been mentioned. Rest of review of systems was negative.        PHYSICAL EXAM:     Vitals:    08/15/24 0835   BP: 122/60   Pulse: 77   Resp: 18    Body mass index is 20.6 kg/m².  Weight: 57.9 kg (127 lb 10.3 oz)   Height: 5' 6" (167.6 cm)     Physical Exam  Vitals and nursing note reviewed.   Constitutional:       Appearance: Normal appearance. He is well-developed.   HENT:      Head: Normocephalic and atraumatic.      Right Ear: Hearing normal.      Left Ear: Hearing normal.      Nose: Nose normal.   Eyes:      General: Lids are normal.      Conjunctiva/sclera: Conjunctivae normal.      Pupils: Pupils are equal, round, and reactive to light.   Cardiovascular:      Rate and Rhythm: Normal rate and regular rhythm.      Pulses: Normal pulses.      Heart sounds: Murmur heard.    Systolic murmur is present with a grade of 2/6.  Pulmonary:      Effort: Pulmonary effort is normal.      Breath sounds: Normal breath sounds.   Abdominal:      Palpations: Abdomen is soft.      Tenderness: There is no abdominal tenderness.   Musculoskeletal:         General: No deformity.      Cervical back: Normal range of motion and neck supple.   Skin:     General: Skin is warm and dry.   Neurological:      Mental Status: He is alert and oriented to person, place, and time.   Psychiatric:         Speech: Speech normal.           DATA:     Laboratory:  CBC:  Recent Labs   Lab 11/27/23  0810 05/22/24  0710 05/23/24  0710   WBC 5.96 7.22 7.61   Hemoglobin 12.0 L 10.9 L 11.2 L "   Hematocrit 37.8 L 34.3 L 35.6 L   Platelets 211 233 249       CHEMISTRIES:  Recent Labs   Lab 02/01/22  0844 03/28/22  0712 04/22/22  0845 09/26/22  0950 11/27/23  0810 05/22/24  0710 06/11/24  0705   Glucose 88  88 134 H 133 H   < > 165 H 104 126 H   Sodium 141  141 137 136   < > 137 138 134 L   Potassium 4.5  4.5 4.4 4.7   < > 4.2 4.4 4.9   BUN 30 H  30 H 30 H 26 H   < > 23 26 H 28 H   Creatinine 1.4  1.4 1.2 1.3   < > 1.5 H 1.6 H 1.9 H   eGFR if  58 A  58 A >60.0 >60.0  --   --   --   --    eGFR if non African American 50 A  50 A >60.0 54.5 A  --   --   --   --    Calcium 8.9  8.9 9.5 9.4   < > 9.4 9.6 8.9    < > = values in this interval not displayed.       CARDIAC BIOMARKERS:          COAGS:          LIPIDS/LFTS:  Recent Labs   Lab 08/09/23  0734 11/27/23  0810 02/12/24  0710 02/26/24  0941 05/22/24  0710 06/11/24  0705   Cholesterol 137  --  135 154  --   --    Triglycerides 93  --  75 135  --   --    HDL 35 L  --  44 36 L  --   --    LDL Cholesterol 83.4  --  76.0 91.0  --   --    Non-HDL Cholesterol 102  --  91 118  --   --    AST  --  20  --   --  22 19   ALT  --  19  --   --  18 14       Hemoglobin A1C   Date Value Ref Range Status   05/23/2024 6.6 (H) 4.0 - 5.6 % Final     Comment:     ADA Screening Guidelines:  5.7-6.4%  Consistent with prediabetes  >or=6.5%  Consistent with diabetes    High levels of fetal hemoglobin interfere with the HbA1C  assay. Heterozygous hemoglobin variants (HbS, HgC, etc)do  not significantly interfere with this assay.   However, presence of multiple variants may affect accuracy.     02/26/2024 7.1 (H) 4.0 - 5.6 % Final     Comment:     ADA Screening Guidelines:  5.7-6.4%  Consistent with prediabetes  >or=6.5%  Consistent with diabetes    High levels of fetal hemoglobin interfere with the HbA1C  assay. Heterozygous hemoglobin variants (HbS, HgC, etc)do  not significantly interfere with this assay.   However, presence of multiple variants may affect  accuracy.     11/02/2023 6.8 (H) 4.0 - 5.6 % Final     Comment:     ADA Screening Guidelines:  5.7-6.4%  Consistent with prediabetes  >or=6.5%  Consistent with diabetes    High levels of fetal hemoglobin interfere with the HbA1C  assay. Heterozygous hemoglobin variants (HbS, HgC, etc)do  not significantly interfere with this assay.   However, presence of multiple variants may affect accuracy.     10/16/2018 6.2 (H) 4.0 - 5.7 % Final     Comment:     Dr. Jurgen Ward ( Endocrinology )       TSH  Recent Labs   Lab 09/26/22  0950   TSH 2.281       The 10-year ASCVD risk score (Lamont SAAVEDRA, et al., 2019) is: 43.1%    Values used to calculate the score:      Age: 74 years      Sex: Male      Is Non- : No      Diabetic: Yes      Tobacco smoker: No      Systolic Blood Pressure: 122 mmHg      Is BP treated: Yes      HDL Cholesterol: 36 mg/dL      Total Cholesterol: 154 mg/dL           Cardiovascular Testing:    EKG: (personally reviewed tracing)  Sinus bradycardia with first-degree AV block left anterior fascicle, septal infarct.    KIRT in June 2019:  Normal resting KIRT/PVR waveforms bilaterally.  Normal exercise KIRT bilaterally (with minimal drop from resting KIRT measurements).        2D echocardiogram in June 2019:  Normal left ventricular systolic function. The estimated ejection fraction is 65%  Moderate left atrial enlargement.  Normal LV diastolic function.  Mild right atrial enlargement.  Normal central venous pressure (3 mm Hg).  The estimated PA systolic pressure is 14 mm Hg            2D echocardiogram 2014 [facility]    HEIGHT: 167.6 cm  WEIGHT: 74.8 kg  BP: 157/82  BSA:  MEASUREMENTS  ------------  IVSd: 1.2 cm  LVIDd: 3.8 cm  LVPWd: 1.2 cm  LVIDs: 2.6 cm  LA Diam: 3.2 cm  Ao Diam: 3.1 cm  LAESV Index (A-L): 32.40 ml/m²  LVCO Dopp: 6.98 l/min  LVCI Dopp: 3.79 l/minm²    FINDINGS  -------  CPT CODE:36811-19.  Transthoracic echocardiogram (complete).  The attending physician   listed  herein personally reviewed all stored images and directly supervised the   fellow-in-training (if listed) in the study's acquistion and/or report   generation.     Study priority:  Routine.  ICD-9 Indication(s):786.05 - Dyspnea.  Study Quality:The study was technically adequate.  ECG Rhythm:Sinus rhythm.  CHAMBER SIZE:All cardiac chamber sizes are within normal limits.  AORTA:Normal aortic root and proximal ascending aorta.  VALVULAR STRUCTURES:The visualized cardiac valves are structurally normal.  LEFT/RIGHT VENTRICULAR FUNCTION:LV size, wall thickness and systolic function are normal, with an EF > 55%.       The right ventricle is normal in size and function.  DOPPLER:  Color:No valvular insufficiencies.  DIASTOLOGY:The diastolic filling pattern is normal for the age of the patient.  PERICARDIUM / EFFUSIONS:No effusions noted.  INFERIOR VENA CAVA:Normal size inferior vena cava.  PA PRESSURE:The estimated systolic pulmonary artery pressure is normal at < 35 mm Hg (RA   pressure = 3 mm Hg).  CARDIOLOGY:    Electronically signed:  STAFF:ELIAS JAIME M.D.  Fellow:G. MOHSEN, M.D.    CONCLUSIONS  -----------  1. LV size, wall thickness and systolic function are normal, with an EF > 55%.  2. The diastolic filling pattern is normal for the age of the patient.    ASSESSMENT AND PLAN     Patient Active Problem List   Diagnosis    Essential hypertension    Controlled type 2 diabetes mellitus with diabetic polyneuropathy, with long-term current use of insulin    Dyslipidemia    MCTD (mixed connective tissue disease)    Sjogren's disease    Bilateral edema of lower extremity 6/2019 TTE normal LV systolic and diastolic function; ABIs normal    Glaucoma    BMI 23.0-23.9, adult    Jackson's esophagus without dysplasia    Anemia from plaquenil and imuran    Neutropenia    Current chronic use of systemic steroids    Compression fracture of body of thoracic vertebra on XR 2/2019; 2/2019 alendronate    Anemia of chronic renal failure,  stage 3b    Chronic constipation not responding to linzess, miralax, senna    Screening for colon cancer 07/26/2018 colonoscopy transverse colon polyp path showing benign inflammatory polyp.    Tophaceous gout    Pseudophakia of both eyes    Refractive error    Aortic atherosclerosis    Celiac disease    Diastolic heart failure, NYHA class 2    Stage 3a chronic kidney disease    RA (rheumatoid arthritis)    Secondary hyperparathyroidism of renal origin    Age-related osteoporosis without current pathological fracture    Mobitz I    Bilateral carotid artery stenosis on CTA 6/26/21    History of stroke    Dyspnea on exertion    JAZLYN (obstructive sleep apnea)    Long-term insulin use    Dyshidrotic eczema    Difficulty walking    Hip joint stiffness, bilateral    Duodenal ulcer 9/8/23 EGD LA grade C esophagitis with bleeding. 3 cm HH, erythematous mucosa antrum. nonbleeding duodenal ulcers no stigmata of bleeding.  Path chronic inactive gastritis.  H pylori ne    Poor posture    Impaired functional mobility, balance, gait, and endurance       1.  Chest pressure:  Now resolved.  Stress test 2022 did not show any significant ischemia.  Echo showed normal left ventricle systolic function.    2.  Hypertension:  Well controlled on current therapy.  Continue current therapy    3.  Dyslipidemia:  Tolerating lipitor    4.  ABIs in June 2019 were within normal limits.    5.  Chronic kidney disease    7. Elevated TSH.   rx per primary    8. Torsemide to be used as needed for swelling of feet.     9. Carotid bruit: moderate plaque per ct scan in 2021. Regular surveillance with carotid us.    10:  Frequent PVCs:  asymptomatic.    11. Dyspnea on exertion: Getting worse.  Repeat stress test and echocardiogram.    Follow-up after testing      Thank you very much for involving me in the care of your patient.  Please do not hesitate to contact me if there are any questions.      Greg Alvares MD, FACC, [facility]  Interventional  Cardiologist, [facility].           This note was dictated with the help of speech recognition software.  There might be un-intended errors and/or substitutions.

## 2024-08-16 ENCOUNTER — CLINICAL SUPPORT (OUTPATIENT)
Dept: REHABILITATION | Facility: HOSPITAL | Age: 74
End: 2024-08-16
Payer: MEDICARE

## 2024-08-16 DIAGNOSIS — Z74.09 IMPAIRED FUNCTIONAL MOBILITY, BALANCE, GAIT, AND ENDURANCE: Primary | ICD-10-CM

## 2024-08-16 PROCEDURE — 97112 NEUROMUSCULAR REEDUCATION: CPT | Mod: KX,PN

## 2024-08-16 PROCEDURE — 97110 THERAPEUTIC EXERCISES: CPT | Mod: KX,PN

## 2024-08-16 NOTE — PROGRESS NOTES
[facility]   Physical Therapy Treatment Note      Name: Darrion Bennett  [facility] Number: [id number]    Therapy Diagnosis:   Encounter Diagnosis   Name Primary?    Impaired functional mobility, balance, gait, and endurance Yes     Physician: Joelle Flores MD    Visit Date: 8/16/2024    Physician Orders: PT Eval and Treat   Medical Diagnosis from Referral: R25.1 (ICD-10-CM) - Tremor   Evaluation Date: 6/25/2024  Authorization Period Expiration: 6/18/2025  Plan of Care Expiration: 9/25/2024  Progress Note Due: 8/30/2024  Visit # / Visits authorized: 4/ 20? (auth includes other current therapies) KX Modifier         Precautions: Fall; Hx of T11/T12 wedge compression fracture with routine healing; CHF     Time In: 8:04  Time Out: 8:45  Total Billable Time: 41 minutes       Subjective     Darrion reports no new updates today.    He was compliant with home exercise program.      Pain: 0/10    Objective      Objective Measures updated at progress report unless specified.     Treatment     Darrion received the treatments listed below:      therapeutic exercises to develop strength and endurance for 8 minutes including:    -Nu-step, Level 3- x 8 minutes      *rest breaks provided throughout as needed    neuromuscular re-education activities to improve: Balance and Sense for 33  minutes, including:      -side-stepping inside //, no UE support- 9 ft x 10 rounds    -side-stepping over low hurdles, no UE support- 4 hurdles x 8 rounds     -forward gait over low hurdles, reciprocal, no UEs- 4 hurdles x 8 rounds    -R stance with L double cone tap, wide cones- x 25   -close SBA to CGA    -tandem gait on turf- 25 ft x 6 rounds    -balance with staggered stance on foam, EC- 2 x 30 seconds    -R foot back    *rest breaks provided throughout as needed      therapeutic activities to improve functional performance for 0  minutes, including:    gait training to improve functional mobility and safety for 0  minutes,  including:      Patient Education and Home Exercises       Education provided:   -education on PD/ diagnostics   -discussion of physical therapy plan of care      Written Home Exercises Provided: to be provided within treatment.  Exercises were reviewed and Darrion was able to demonstrate them prior to the end of the session.  Darrion demonstrated good  understanding of the education provided.     Assessment     Mr. Bennett tolerated session well. Right lateral foot catch and impaired lateral stepping was demonstrated with standard side-steps today; thus lateral stepping over hurdles was practice for increasing step-height. Patient was challenged, but completed these well considering previous deficits. Increased right lower extremity weight-bearing/ strengthening was completed today. Patient was challenged by double cone taps for this purpose and with balance-challenge; but he still completed well. Patient remains appropriate for skilled physical therapy.     Darrion Is progressing well towards his goals.   Patient prognosis is Good.     Patient will continue to benefit from skilled outpatient physical therapy to address the deficits listed in the problem list box on initial evaluation, provide pt/family education and to maximize pt's level of independence in the home and community environment.     Patient's spiritual, cultural and educational needs considered and pt agreeable to plan of care and goals.     Anticipated barriers to physical therapy: upper back/ neck pain; inconclusive Dx    Goals:   Short Term Goals: 4 weeks   -Patient will increase right knee-extension strength to 4+/5 for improved standing activities stability and endurance.  -Patient will increase single-limb stability to 5 seconds each side.  -Patient will ambulate with consistent heel initial-contact and with increased left arm-swing x 300 feet to demonstrate improved gait quality and safety.  -Patient will ambulate over unlevel surface without loss  of stability x 50 feet to demonstrate improved confidence and safety with outside gait.     Long Term Goals: 8 weeks   -Patient will achieve Timed Up and Go of </= 11 seconds to demonstrate improved gait stability and reduced fall risk.  -Patient will complete 5-Times Sit-to- 14 seconds to demonstrate improved functional capacity and reduced fall risk.  -Patient will ambulate for 10 minutes, overground or on treadmill, to demonstrate increased gait endurance.  -Patient will improve Functional Gait Assessment score to 23/30 to demonstrate improved dynamic/ community gait and reduced fall risk.     Plan     Plan of care Certification: 6/25/2024 to 9/25/2024     Outpatient Physical Therapy 2-1 times weekly for 8 weeks to include the following interventions: Gait Training, Neuromuscular Re-ed, Self Care, Therapeutic Activities, and Therapeutic Exercise.       Maggie More, PT, DPT    08/16/2024

## 2024-08-19 ENCOUNTER — LAB VISIT (OUTPATIENT)
Dept: LAB | Facility: HOSPITAL | Age: 74
End: 2024-08-19
Payer: MEDICARE

## 2024-08-19 ENCOUNTER — CLINICAL SUPPORT (OUTPATIENT)
Dept: REHABILITATION | Facility: HOSPITAL | Age: 74
End: 2024-08-19
Payer: MEDICARE

## 2024-08-19 DIAGNOSIS — Z74.09 IMPAIRED FUNCTIONAL MOBILITY, BALANCE, GAIT, AND ENDURANCE: Primary | ICD-10-CM

## 2024-08-19 DIAGNOSIS — N18.32 ANEMIA OF CHRONIC RENAL FAILURE, STAGE 3B: ICD-10-CM

## 2024-08-19 DIAGNOSIS — D63.1 ANEMIA OF CHRONIC RENAL FAILURE, STAGE 3B: ICD-10-CM

## 2024-08-19 LAB
ALBUMIN SERPL BCP-MCNC: 3.5 G/DL (ref 3.5–5.2)
ALP SERPL-CCNC: 64 U/L (ref 55–135)
ALT SERPL W/O P-5'-P-CCNC: 18 U/L (ref 10–44)
ANION GAP SERPL CALC-SCNC: 7 MMOL/L (ref 8–16)
AST SERPL-CCNC: 17 U/L (ref 10–40)
BASOPHILS # BLD AUTO: 0.02 K/UL (ref 0–0.2)
BASOPHILS NFR BLD: 0.3 % (ref 0–1.9)
BILIRUB SERPL-MCNC: 0.5 MG/DL (ref 0.1–1)
BUN SERPL-MCNC: 42 MG/DL (ref 8–23)
CALCIUM SERPL-MCNC: 9.4 MG/DL (ref 8.7–10.5)
CHLORIDE SERPL-SCNC: 108 MMOL/L (ref 95–110)
CO2 SERPL-SCNC: 21 MMOL/L (ref 23–29)
CREAT SERPL-MCNC: 1.8 MG/DL (ref 0.5–1.4)
DIFFERENTIAL METHOD BLD: ABNORMAL
EOSINOPHIL # BLD AUTO: 0.2 K/UL (ref 0–0.5)
EOSINOPHIL NFR BLD: 2.9 % (ref 0–8)
ERYTHROCYTE [DISTWIDTH] IN BLOOD BY AUTOMATED COUNT: 15.3 % (ref 11.5–14.5)
EST. GFR  (NO RACE VARIABLE): 39 ML/MIN/1.73 M^2
FERRITIN SERPL-MCNC: 180 NG/ML (ref 20–300)
GLUCOSE SERPL-MCNC: 162 MG/DL (ref 70–110)
HCT VFR BLD AUTO: 36.7 % (ref 40–54)
HGB BLD-MCNC: 11.4 G/DL (ref 14–18)
IGA SERPL-MCNC: 534 MG/DL (ref 40–350)
IGG SERPL-MCNC: 1441 MG/DL (ref 650–1600)
IGM SERPL-MCNC: 71 MG/DL (ref 50–300)
IMM GRANULOCYTES # BLD AUTO: 0.01 K/UL (ref 0–0.04)
IMM GRANULOCYTES NFR BLD AUTO: 0.1 % (ref 0–0.5)
IRON SERPL-MCNC: 60 UG/DL (ref 45–160)
LYMPHOCYTES # BLD AUTO: 1.8 K/UL (ref 1–4.8)
LYMPHOCYTES NFR BLD: 24.7 % (ref 18–48)
MCH RBC QN AUTO: 28.4 PG (ref 27–31)
MCHC RBC AUTO-ENTMCNC: 31.1 G/DL (ref 32–36)
MCV RBC AUTO: 92 FL (ref 82–98)
MONOCYTES # BLD AUTO: 1.1 K/UL (ref 0.3–1)
MONOCYTES NFR BLD: 14.4 % (ref 4–15)
NEUTROPHILS # BLD AUTO: 4.2 K/UL (ref 1.8–7.7)
NEUTROPHILS NFR BLD: 57.6 % (ref 38–73)
NRBC BLD-RTO: 0 /100 WBC
PLATELET # BLD AUTO: 194 K/UL (ref 150–450)
PMV BLD AUTO: 12.7 FL (ref 9.2–12.9)
POTASSIUM SERPL-SCNC: 5.1 MMOL/L (ref 3.5–5.1)
PROT SERPL-MCNC: 6.8 G/DL (ref 6–8.4)
RBC # BLD AUTO: 4.01 M/UL (ref 4.6–6.2)
SATURATED IRON: 23 % (ref 20–50)
SODIUM SERPL-SCNC: 136 MMOL/L (ref 136–145)
TOTAL IRON BINDING CAPACITY: 265 UG/DL (ref 250–450)
TRANSFERRIN SERPL-MCNC: 179 MG/DL (ref 200–375)
WBC # BLD AUTO: 7.29 K/UL (ref 3.9–12.7)

## 2024-08-19 PROCEDURE — 85025 COMPLETE CBC W/AUTO DIFF WBC: CPT | Performed by: INTERNAL MEDICINE

## 2024-08-19 PROCEDURE — 86334 IMMUNOFIX E-PHORESIS SERUM: CPT | Mod: 26,,, | Performed by: PATHOLOGY

## 2024-08-19 PROCEDURE — 83521 IG LIGHT CHAINS FREE EACH: CPT | Performed by: INTERNAL MEDICINE

## 2024-08-19 PROCEDURE — 82728 ASSAY OF FERRITIN: CPT | Performed by: INTERNAL MEDICINE

## 2024-08-19 PROCEDURE — 83540 ASSAY OF IRON: CPT | Performed by: INTERNAL MEDICINE

## 2024-08-19 PROCEDURE — 97112 NEUROMUSCULAR REEDUCATION: CPT | Mod: KX,PN

## 2024-08-19 PROCEDURE — 80053 COMPREHEN METABOLIC PANEL: CPT | Performed by: INTERNAL MEDICINE

## 2024-08-19 PROCEDURE — 86334 IMMUNOFIX E-PHORESIS SERUM: CPT | Performed by: INTERNAL MEDICINE

## 2024-08-19 PROCEDURE — 84165 PROTEIN E-PHORESIS SERUM: CPT | Performed by: INTERNAL MEDICINE

## 2024-08-19 PROCEDURE — 82784 ASSAY IGA/IGD/IGG/IGM EACH: CPT | Mod: 59 | Performed by: INTERNAL MEDICINE

## 2024-08-19 PROCEDURE — 36415 COLL VENOUS BLD VENIPUNCTURE: CPT | Mod: PO | Performed by: INTERNAL MEDICINE

## 2024-08-19 PROCEDURE — 84165 PROTEIN E-PHORESIS SERUM: CPT | Mod: 26,,, | Performed by: PATHOLOGY

## 2024-08-19 PROCEDURE — 97110 THERAPEUTIC EXERCISES: CPT | Mod: KX,PN

## 2024-08-19 NOTE — PROGRESS NOTES
"[facility]   Physical Therapy Treatment Note      Name: Darrion Bennett  Clinic Number: [id number]    Therapy Diagnosis:   Encounter Diagnosis   Name Primary?    Impaired functional mobility, balance, gait, and endurance Yes     Physician: Joelle Flores MD    Visit Date: 8/19/2024    Physician Orders: PT Eval and Treat   Medical Diagnosis from Referral: R25.1 (ICD-10-CM) - Tremor   Evaluation Date: 6/25/2024  Authorization Period Expiration: 6/18/2025  Plan of Care Expiration: 9/25/2024  Progress Note Due: 8/30/2024  Visit # / Visits authorized: 5/ 20? (auth includes other current therapies) KX Modifier         Precautions: Fall; Hx of T11/T12 wedge compression fracture with routine healing; CHF     Time In: 8:51  Time Out: 9:36  Total Billable Time: 45 minutes (43 billable)      Subjective     Darrion reports balance was "off and on" at home over the weekend.    He was compliant with home exercise program.      Pain: 0/10    Objective      Objective Measures updated at progress report unless specified.     Treatment     Darrion received the treatments listed below:      therapeutic exercises to develop strength and endurance for 9 minutes including:    -Nu-step, Level 2.5- x 9 minutes      *rest breaks provided throughout as needed    neuromuscular re-education activities to improve: Balance and Sense for 34  minutes, including:      -side-stepping on turf- 9 ft x 10 rounds    -alternating cone taps to wide cones- x 10 each    -modified single-leg stance with 1 foot on wide cone- 10-second holds x 5 each side     -R-biased mini-squats with L foot forward on wobble-cushion:   -with R UE to bar- x 7   -without UE support- x 8    -four-square stepping:   -clockwise- x 7   -counter-clockwise- x 7    -balance with feet together on foam, EC- 2 x 30 seconds       *rest breaks provided throughout as needed      therapeutic activities to improve functional performance for 0  minutes, " including:    gait training to improve functional mobility and safety for 0  minutes, including:      Patient Education and Home Exercises       Education provided:   -education on PD/ diagnostics   -discussion of physical therapy plan of care      Written Home Exercises Provided: to be provided within treatment.  Exercises were reviewed and Darrion was able to demonstrate them prior to the end of the session.  Darrion demonstrated good  understanding of the education provided.     Assessment     Mr. Bennett tolerated session well. Upgraded dynamic balance was challenging for him; but he still completed well and shows progression to being appropriate for higher-level balance and gait challenges. Right lower extremity biased activities continue to highlight deficits there; but patient is performing these well. Four-square stepping integrated today for complex gait situations; and patient responded well. He remains appropriate for skilled physical therapy.      Darrion Is progressing well towards his goals.   Patient prognosis is Good.     Patient will continue to benefit from skilled outpatient physical therapy to address the deficits listed in the problem list box on initial evaluation, provide pt/family education and to maximize pt's level of independence in the home and community environment.     Patient's spiritual, cultural and educational needs considered and pt agreeable to plan of care and goals.     Anticipated barriers to physical therapy: upper back/ neck pain; inconclusive Dx    Goals:   Short Term Goals: 4 weeks   -Patient will increase right knee-extension strength to 4+/5 for improved standing activities stability and endurance.  -Patient will increase single-limb stability to 5 seconds each side.  -Patient will ambulate with consistent heel initial-contact and with increased left arm-swing x 300 feet to demonstrate improved gait quality and safety.  -Patient will ambulate over unlevel surface without loss  of stability x 50 feet to demonstrate improved confidence and safety with outside gait.     Long Term Goals: 8 weeks   -Patient will achieve Timed Up and Go of </= 11 seconds to demonstrate improved gait stability and reduced fall risk.  -Patient will complete 5-Times Sit-to- 14 seconds to demonstrate improved functional capacity and reduced fall risk.  -Patient will ambulate for 10 minutes, overground or on treadmill, to demonstrate increased gait endurance.  -Patient will improve Functional Gait Assessment score to 23/30 to demonstrate improved dynamic/ community gait and reduced fall risk.     Plan     Plan of care Certification: 6/25/2024 to 9/25/2024     Outpatient Physical Therapy 2-1 times weekly for 8 weeks to include the following interventions: Gait Training, Neuromuscular Re-ed, Self Care, Therapeutic Activities, and Therapeutic Exercise.       Maggie More, PT, DPT    08/19/2024

## 2024-08-20 ENCOUNTER — LAB VISIT (OUTPATIENT)
Dept: LAB | Facility: HOSPITAL | Age: 74
End: 2024-08-20
Attending: INTERNAL MEDICINE
Payer: MEDICARE

## 2024-08-20 ENCOUNTER — OFFICE VISIT (OUTPATIENT)
Dept: RHEUMATOLOGY | Facility: CLINIC | Age: 74
End: 2024-08-20
Payer: MEDICARE

## 2024-08-20 VITALS
BODY MASS INDEX: 20.41 KG/M2 | WEIGHT: 127 LBS | HEIGHT: 66 IN | SYSTOLIC BLOOD PRESSURE: 140 MMHG | HEART RATE: 66 BPM | DIASTOLIC BLOOD PRESSURE: 67 MMHG

## 2024-08-20 DIAGNOSIS — M06.9 RHEUMATOID ARTHRITIS FLARE: Primary | ICD-10-CM

## 2024-08-20 DIAGNOSIS — M1A.9XX1 TOPHACEOUS GOUT: ICD-10-CM

## 2024-08-20 DIAGNOSIS — D84.821 IMMUNODEFICIENCY DUE TO DRUG THERAPY: ICD-10-CM

## 2024-08-20 DIAGNOSIS — Z79.899 IMMUNODEFICIENCY DUE TO DRUG THERAPY: ICD-10-CM

## 2024-08-20 DIAGNOSIS — M06.9 RHEUMATOID ARTHRITIS FLARE: ICD-10-CM

## 2024-08-20 LAB
ALBUMIN SERPL ELPH-MCNC: 3.48 G/DL (ref 3.35–5.55)
ALPHA1 GLOB SERPL ELPH-MCNC: 0.26 G/DL (ref 0.17–0.41)
ALPHA2 GLOB SERPL ELPH-MCNC: 0.71 G/DL (ref 0.43–0.99)
B-GLOBULIN SERPL ELPH-MCNC: 0.81 G/DL (ref 0.5–1.1)
BACTERIA #/AREA URNS AUTO: NORMAL /HPF
BILIRUB UR QL STRIP: NEGATIVE
CLARITY UR REFRACT.AUTO: CLEAR
COLOR UR AUTO: YELLOW
CREAT UR-MCNC: 41 MG/DL (ref 23–375)
GAMMA GLOB SERPL ELPH-MCNC: 1.34 G/DL (ref 0.67–1.58)
GLUCOSE UR QL STRIP: ABNORMAL
HGB UR QL STRIP: NEGATIVE
HYALINE CASTS UR QL AUTO: 0 /LPF
INTERPRETATION SERPL IFE-IMP: NORMAL
KAPPA LC SER QL IA: 12.83 MG/DL (ref 0.33–1.94)
KAPPA LC/LAMBDA SER IA: 1.46 (ref 0.26–1.65)
KETONES UR QL STRIP: NEGATIVE
LAMBDA LC SER QL IA: 8.81 MG/DL (ref 0.57–2.63)
LEUKOCYTE ESTERASE UR QL STRIP: NEGATIVE
MICROSCOPIC COMMENT: NORMAL
NITRITE UR QL STRIP: NEGATIVE
PH UR STRIP: 7 [PH] (ref 5–8)
PROT SERPL-MCNC: 6.6 G/DL (ref 6–8.4)
PROT UR QL STRIP: ABNORMAL
PROT UR-MCNC: 42 MG/DL (ref 0–15)
PROT/CREAT UR: 1.02 MG/G{CREAT} (ref 0–0.2)
RBC #/AREA URNS AUTO: 1 /HPF (ref 0–4)
SP GR UR STRIP: 1.01 (ref 1–1.03)
URN SPEC COLLECT METH UR: ABNORMAL
WBC #/AREA URNS AUTO: 3 /HPF (ref 0–5)
YEAST UR QL AUTO: NORMAL

## 2024-08-20 PROCEDURE — 99999 PR PBB SHADOW E&M-EST. PATIENT-LVL IV: CPT | Mod: PBBFAC,,, | Performed by: INTERNAL MEDICINE

## 2024-08-20 PROCEDURE — 3078F DIAST BP <80 MM HG: CPT | Mod: CPTII,S$GLB,, | Performed by: INTERNAL MEDICINE

## 2024-08-20 PROCEDURE — 81001 URINALYSIS AUTO W/SCOPE: CPT | Performed by: INTERNAL MEDICINE

## 2024-08-20 PROCEDURE — 3066F NEPHROPATHY DOC TX: CPT | Mod: CPTII,S$GLB,, | Performed by: INTERNAL MEDICINE

## 2024-08-20 PROCEDURE — 3044F HG A1C LEVEL LT 7.0%: CPT | Mod: CPTII,S$GLB,, | Performed by: INTERNAL MEDICINE

## 2024-08-20 PROCEDURE — 99215 OFFICE O/P EST HI 40 MIN: CPT | Mod: S$GLB,,, | Performed by: INTERNAL MEDICINE

## 2024-08-20 PROCEDURE — 1159F MED LIST DOCD IN RCRD: CPT | Mod: CPTII,S$GLB,, | Performed by: INTERNAL MEDICINE

## 2024-08-20 PROCEDURE — 3077F SYST BP >= 140 MM HG: CPT | Mod: CPTII,S$GLB,, | Performed by: INTERNAL MEDICINE

## 2024-08-20 PROCEDURE — 4010F ACE/ARB THERAPY RXD/TAKEN: CPT | Mod: CPTII,S$GLB,, | Performed by: INTERNAL MEDICINE

## 2024-08-20 PROCEDURE — 82570 ASSAY OF URINE CREATININE: CPT | Performed by: INTERNAL MEDICINE

## 2024-08-20 PROCEDURE — 1101F PT FALLS ASSESS-DOCD LE1/YR: CPT | Mod: CPTII,S$GLB,, | Performed by: INTERNAL MEDICINE

## 2024-08-20 PROCEDURE — 1125F AMNT PAIN NOTED PAIN PRSNT: CPT | Mod: CPTII,S$GLB,, | Performed by: INTERNAL MEDICINE

## 2024-08-20 PROCEDURE — 3288F FALL RISK ASSESSMENT DOCD: CPT | Mod: CPTII,S$GLB,, | Performed by: INTERNAL MEDICINE

## 2024-08-20 PROCEDURE — 3008F BODY MASS INDEX DOCD: CPT | Mod: CPTII,S$GLB,, | Performed by: INTERNAL MEDICINE

## 2024-08-20 NOTE — PROGRESS NOTES
Subjective:       Patient ID: Darrion Bennett is a 69 y.o. male.    Chief Complaint: Follow-up     HPI  69 year old male with type II DM, cataracts, HTN, gout,  Sjogrens, urolithiasis, CHF, hypothyroidism, CKD here for evaluation.  He reports that he was diagnosed with Sjogrens when he had dry eyes and dry mouth in 2014.. He takes plaquenil 200mg po BID. He is  taking azathioprine 50mg po TID and medrol 4mg po qqday.   He was put on imuran by Dr. Corona.  He was followed by Dr. Corona from 2014 to 2017.  In October 2018, imuran and medrol was stopped. Patient restarted imuran in November 2018.  Reports that he feels that imuran helps him with joint.   Today he denies pain, stiffness or swelling.  He denies sry eyes or dry mouth.  Denies trouble making a fist.    He was diagnosed in January in left aspect of foot with pain, swelling and redness.         Interval history:  He is taking uloric 40mg once a day. He is taking prednisone 5 mg a day on sat/sunday.  He had MRI of lumbar stenosis. He is taking Orencia once a week.He is having pain and swelling in hands.He has pain in neck,midback, bottom of the feet. He has pain in thighs.   He has pain and swelling in both hands.He also has pain in shoulder, feet, knees. He is stiff for a long time in morning.      Past Medical History:   Diagnosis Date    Anemia     Arthritis     Cataract     Chronic constipation     Diabetes mellitus, type 2     Glaucoma     Hypertension     MCTD (mixed connective tissue disease)     Sjogren's disease     Ulcerative colitis     Urolithiasis        Review of Systems   Constitutional: Negative for activity change, appetite change, chills, diaphoresis, fatigue and fever.   HENT: Negative for ear discharge, ear pain, hearing loss, mouth sores, nosebleeds and sinus pressure.    Eyes: Negative.  Negative for photophobia, pain, discharge, redness and itching.   Respiratory: Negative for cough, chest tightness and shortness of breath.     Cardiovascular: Negative for chest pain, palpitations and leg swelling.   Gastrointestinal: Negative for abdominal distention, blood in stool and nausea.   Endocrine: Negative for cold intolerance, heat intolerance, polydipsia and polyphagia.   Genitourinary: Negative for flank pain, genital sores and hematuria.   Musculoskeletal: Positive for arthralgias. Negative for back pain, gait problem, joint swelling, myalgias, neck pain and neck stiffness.   Skin: Negative for color change, pallor and rash.   Neurological: Negative for dizziness, weakness, light-headedness and headaches.   Hematological: Negative for adenopathy. Does not bruise/bleed easily.   Psychiatric/Behavioral: Negative for decreased concentration and hallucinations. The patient is not nervous/anxious.              Objective:        Physical Exam   Constitutional: He is well-developed, well-nourished, and in no distress. No distress.   HENT:   Head: Normocephalic and atraumatic.   Right Ear: External ear normal.   Left Ear: External ear normal.   Nose: Nose normal.   Mouth/Throat: Oropharynx is clear and moist. No oropharyngeal exudate.   Eyes: Conjunctivae and EOM are normal. Pupils are equal, round, and reactive to light. Right eye exhibits no discharge. Left eye exhibits no discharge. No scleral icterus.   Neck: Normal range of motion. Neck supple. No JVD present. No tracheal deviation present. No thyromegaly present.   Cardiovascular: Normal rate, normal heart sounds and intact distal pulses.  Exam reveals no gallop and no friction rub.    No murmur heard.  Pulmonary/Chest: Effort normal. No stridor. No respiratory distress. He has no wheezes. He has no rales. He exhibits no tenderness.   Abdominal: Soft. Bowel sounds are normal. He exhibits no distension and no mass. There is no tenderness. There is no rebound and no guarding.   Lymphadenopathy:     He has no cervical adenopathy.   Neurological: No cranial nerve deficit. Coordination normal.    Skin: Skin is warm and dry. No rash noted. He is not diaphoretic. No erythema. No pallor.     Musculoskeletal: Normal range of motion. He exhibits no edema, tenderness or deformity.     Labs:  Gilma-+  Ssa+  Ssb+  +RNP  Ccp,rf-negative      Right hand xray (5/2019): I personally reviewed; Soft tissue swelling tip of long finger.  Negative for fracture.     Assessment:    74 year old male with type II DM, cataracts, HTN, gout,  Sjogrens, urolithiasis, CHF, hypothyroidism, CKD here for follow up. He was previously followed by Dr. Lau.  He reports that he was diagnosed with Sjogrens when he had dry eyes and dry mouth in 2014. Serologies are +GILMA, +SSA,+SSB, and +RNP. He denies raynauds, rashes, oral ulcers or symptoms of SLE. His main issue before we met was inflammatory arthritis which was being treated with plaquenil, imuran, and medrol.   When we initially met in feb 2019 , he was taking plaquenil 200mg po BID, azathioprine 50mg po TID and medrol 4mg po qqday. His last rheumatologist discontinued his medications and patient decided to restart the medications himself since he was having so much pain. He developed imuran toxicity so all his medications were discontinued.    #Inflammatory arthritis: secondary to Sjogrens. Was previously on plaquenil and imuran but developed cytopenias so had to stop.  Doing much better on Orencia but is coming in with severe flare so will switch him to Orencia infusions if his uric acid is at goal.  If uric acid is not at goal, will increase his uloric.  Consent signed and therapy plan in place (Risks discussed with patient and not limited to cell count abnormalities, malignancy, allergic  reaction to medication, and increased risk of infection. Patient agrees with starting medication. 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  He has been taking uloric  40mg a day and doing well.  PLAN TO TARGET URIC ACID TO 5 OR LESS.  -Labs       #osteoporosis: he had recent dexa scan showing osteoporosis.  Have asked him to discuss this with endo who he sees for diabetes.        40 * minutes of total time spent on the encounter, which includes face to face time and non-face to face time preparing to see the patient (eg, review of tests), Obtaining and/or reviewing separately obtained history, Documenting clinical information in the electronic or other health record, Independently interpreting results (not separately reported) and communicating results to the patient/family/caregiver, or Care coordination (not separately reported).

## 2024-08-21 ENCOUNTER — OFFICE VISIT (OUTPATIENT)
Dept: PAIN MEDICINE | Facility: CLINIC | Age: 74
End: 2024-08-21
Payer: MEDICARE

## 2024-08-21 ENCOUNTER — CLINICAL SUPPORT (OUTPATIENT)
Dept: REHABILITATION | Facility: HOSPITAL | Age: 74
End: 2024-08-21
Payer: MEDICARE

## 2024-08-21 VITALS
DIASTOLIC BLOOD PRESSURE: 61 MMHG | WEIGHT: 127 LBS | SYSTOLIC BLOOD PRESSURE: 107 MMHG | HEIGHT: 66 IN | BODY MASS INDEX: 20.41 KG/M2 | HEART RATE: 61 BPM

## 2024-08-21 DIAGNOSIS — M54.2 CHRONIC NECK PAIN: Primary | ICD-10-CM

## 2024-08-21 DIAGNOSIS — G89.29 CHRONIC MIDLINE LOW BACK PAIN WITHOUT SCIATICA: ICD-10-CM

## 2024-08-21 DIAGNOSIS — R29.3 POOR POSTURE: Primary | ICD-10-CM

## 2024-08-21 DIAGNOSIS — R53.1 SUBJECTIVE WEAKNESS: ICD-10-CM

## 2024-08-21 DIAGNOSIS — G89.29 CHRONIC NECK PAIN: Primary | ICD-10-CM

## 2024-08-21 DIAGNOSIS — M54.50 CHRONIC MIDLINE LOW BACK PAIN WITHOUT SCIATICA: ICD-10-CM

## 2024-08-21 DIAGNOSIS — M47.816 LUMBAR SPONDYLOSIS: ICD-10-CM

## 2024-08-21 LAB
PATHOLOGIST INTERPRETATION IFE: NORMAL
PATHOLOGIST INTERPRETATION SPE: NORMAL

## 2024-08-21 PROCEDURE — 97112 NEUROMUSCULAR REEDUCATION: CPT | Mod: CQ

## 2024-08-21 PROCEDURE — 1125F AMNT PAIN NOTED PAIN PRSNT: CPT | Mod: CPTII,S$GLB,, | Performed by: STUDENT IN AN ORGANIZED HEALTH CARE EDUCATION/TRAINING PROGRAM

## 2024-08-21 PROCEDURE — 4010F ACE/ARB THERAPY RXD/TAKEN: CPT | Mod: CPTII,S$GLB,, | Performed by: STUDENT IN AN ORGANIZED HEALTH CARE EDUCATION/TRAINING PROGRAM

## 2024-08-21 PROCEDURE — 3078F DIAST BP <80 MM HG: CPT | Mod: CPTII,S$GLB,, | Performed by: STUDENT IN AN ORGANIZED HEALTH CARE EDUCATION/TRAINING PROGRAM

## 2024-08-21 PROCEDURE — 1101F PT FALLS ASSESS-DOCD LE1/YR: CPT | Mod: CPTII,S$GLB,, | Performed by: STUDENT IN AN ORGANIZED HEALTH CARE EDUCATION/TRAINING PROGRAM

## 2024-08-21 PROCEDURE — 3044F HG A1C LEVEL LT 7.0%: CPT | Mod: CPTII,S$GLB,, | Performed by: STUDENT IN AN ORGANIZED HEALTH CARE EDUCATION/TRAINING PROGRAM

## 2024-08-21 PROCEDURE — 3066F NEPHROPATHY DOC TX: CPT | Mod: CPTII,S$GLB,, | Performed by: STUDENT IN AN ORGANIZED HEALTH CARE EDUCATION/TRAINING PROGRAM

## 2024-08-21 PROCEDURE — 3008F BODY MASS INDEX DOCD: CPT | Mod: CPTII,S$GLB,, | Performed by: STUDENT IN AN ORGANIZED HEALTH CARE EDUCATION/TRAINING PROGRAM

## 2024-08-21 PROCEDURE — 99214 OFFICE O/P EST MOD 30 MIN: CPT | Mod: S$GLB,,, | Performed by: STUDENT IN AN ORGANIZED HEALTH CARE EDUCATION/TRAINING PROGRAM

## 2024-08-21 PROCEDURE — 1159F MED LIST DOCD IN RCRD: CPT | Mod: CPTII,S$GLB,, | Performed by: STUDENT IN AN ORGANIZED HEALTH CARE EDUCATION/TRAINING PROGRAM

## 2024-08-21 PROCEDURE — 3074F SYST BP LT 130 MM HG: CPT | Mod: CPTII,S$GLB,, | Performed by: STUDENT IN AN ORGANIZED HEALTH CARE EDUCATION/TRAINING PROGRAM

## 2024-08-21 PROCEDURE — 99999 PR PBB SHADOW E&M-EST. PATIENT-LVL IV: CPT | Mod: PBBFAC,,, | Performed by: STUDENT IN AN ORGANIZED HEALTH CARE EDUCATION/TRAINING PROGRAM

## 2024-08-21 PROCEDURE — 97110 THERAPEUTIC EXERCISES: CPT | Mod: CQ

## 2024-08-21 PROCEDURE — 3288F FALL RISK ASSESSMENT DOCD: CPT | Mod: CPTII,S$GLB,, | Performed by: STUDENT IN AN ORGANIZED HEALTH CARE EDUCATION/TRAINING PROGRAM

## 2024-08-21 NOTE — PROGRESS NOTES
"  [facility]      Name: Darrion Bennett  Clinic Number: [id number]    Therapy Diagnosis:   Encounter Diagnosis   Name Primary?    Poor posture Yes       Physician: Joelle Flores MD    Visit Date: 2024    Physician Orders: PT Eval and Treat  Medical Diagnosis: Tremor [R25.1], Cervicalgia [M54.2], Other chronic pain [G89.29], Wedge compression fracture of t11-T12 vertebra, subsequent encounter for fracture with routine healing [S22.080D]   Evaluation Date: 2024  Authorization Period Expiration: 24  Plan of Care Expiration: 9/3/2024  Reassessment Due: 24  Visit # / Visits authorized:  Keep HOB elevated  MedX Testing:MedX testing visit 2    Time In: 9:10  AM (Late arrival)  Time Out: 10:00 AM  Total Billable Time: 25  minutes  1 on1 time  INSURANCE and OUTCOMES: Fee for Service with FOTO Outcomes 2/3    Precautions: Standard, Diabetes, CHF, and HTN, tremor    Pattern of pain determined: 1 WILLIE    Subjective   Darrion reports that he is feeling rushed today due to pain management appt earlier and traffic. States that his neck is doing well today and is without c/o pain currently, just stiffness.     Patient reports tolerating previous visit no adverse effects.  Patient reports their pain to be 0/10 on a 0-10 scale with 0 being no pain and 10 being the worst pain imaginable.  Pain Location: bilateral neck and thoracic     Work and leisure: retired/gardening  Pt goals: "decrease pain"    Objective     Baseline IM Testing Results:   Date of testin2024   Initial: 24 Midpoint: Final   ROM  deg  deg    Max Peak Torque 273  281    Min Peak Torque 124  151    Flex/Ext Ratio 2.2:1 1.9:1    % below normative data -51% -35%    % gain from initial N/a +26%      Outcomes:  Initial score: 41%  Visit 5 score: 49%  Visit 10 score: 69%  Goal:    ROM Loss 24   Flexion minimal loss c/ pain mid back WFL   Extension moderate loss, with pain mid back region " "Mod loss   Side bending Right moderate loss and major loss Mod-major loss   Side bending Left minimal loss and moderate loss Mod-major loss   Rotation Right moderate loss Min loss   Rotation Left minimal loss Min loss   Protraction minimal loss Min loss   Retraction  moderate loss Min-mod loss        Treatment    Darrion received the treatments listed below:      Darrion received neuromuscular education  to isolate and engage spinal stabilization musculature correctly for motor control and coordination to aid in function and posture for 10 minutes on the Medical Medx Machine.  Patient performed MedX dynamic exercise with emphasis on spinal muscular control using pacer throughout  active range of motion. Therapist assisted patient in achieving optimal exertion for neural reeducation and endurance training by using the  Rigo Exertion Rating scale, by instructing the patient to aim for mid range of exertion, performing 15-20 repetitions, slowly, correctly,and safely.           8/21/2024     9:25 AM   [facility] - Short   Visit Number 11   VAS Pain Rating 0   Retraction in Sitting 10   Retraction with Extension 10   Sidebending 1   Scapular Retraction 15   Cervical Weight 219 lbs   Repetitions 15   Rating of Perceived Exertion 3       therapeutic exercises to develop strength, endurance, ROM, flexibility, posture, and core stabilization for  40 minutes including:    UT/LS stretch x20"  Scap retract/No money ER with GTB x 15  RIS x10 (cues) + self over pressure  retraction+extension with towel support x10  cervical rotation stretch w/towel 5 x 5 sec (cues)   Cervical retraction in supine x10--NP  Seated thoracic extension over 1/2 foam x10  paloff press x10--NP  serratus slides + Lift off x10 - NP  H-Abd with RTB x 10 (1/2 foam roll support)  Standing open book x 10  +lifting education   Floor<->waist lift   Hip hinge   Golfer's lift    Peripheral muscle strengthening which included 1 set of 15-20 repetitions at a " slow, controlled 10-13 second per rep pace focused on strengthening supporting musculature for improved body mechanics and functional mobility.  Pt and therapist focused on proper form during treatment to ensure optimal strengthening of each targeted muscle group.  Machines were utilized including torso rotation, leg press, hip abd and hip add, leg ext.  Leg curl, triceps, biceps, chest and row added visit 3    cold pack for 5 minutes to cervical area in sitting.    Home Exercises Provided and Patient Education Provided   Home exercises include:Open books, scap squeeze, cervical retraction in supine  Cardio program:visit 5 :7/17/24  Lifting education date:visit 11 8/21/24  Posture/Lumbar roll: recommended  Fridge Magnet Discharge handout (date given):  Equipment at home/gym membership: none    Education provided:   -  cues w/ex's  MedX performance  Precor ex performance    Written Home Exercises Provided: Patient instructed to cont prior HEP.  Exercises were reviewed and Darrion was able to demonstrate them prior to the end of the session.  Darrion demonstrated good  understanding of the education provided.     See EMR under Patient Instructions for exercises provided prior visit.    Assessment   Darrion returns after a 2.5 week absence from therapy with minimal stiffness without c/o pain currently. He arrived late for today's appts due to pain med appt also today. Treatment continued with flexibility, strengthening and neuromuscular reeducation exs. He was able to perform exs with verbal and tactile cues and without increased pain. Min report of dizziness with performance of cervical rotation stretch with towel, this was resolved with cues to decrease intensity of the stretch. He was also educated on the do's and don'ts of lifting this visit requiring some cues for technique.  Cervical MedX resistance was maintained at 219 in.lbs and he completed 15 reps with a RPE = 3/10.  Pt was also able to complete peripheral  strengthening exercises without increased discomfort. Will continue per HB protocol and patient tolerance.     Patient is making  progress towards established goals.  Pt will continue to benefit from skilled outpatient physical therapy to address the deficits stated in the impairment chart, provide pt/family education and to maximize pt's level of independence in the home and community environment.     Anticipated Barriers for therapy: tremors - currently seeing neuro PT  Pt's spiritual, cultural and educational needs considered and pt agreeable to plan of care and goals as stated below:     Goals:   Short term goals: 6 weeks or 10 visits   - Pt will demonstratte increased cervical MedX ROM as measured by med ex by 9 degrees from initial test which results in improved  ROM of neck for ease with ADLs and driving. Appropriate and Ongoing  - Pt will demonstrate independence with reducing or controlling symptoms with ther ex, movement, or position independently, able to reduce pain 1-2 points on pain scale using strategies taught in therapy.  Appropriate and Ongoing  - Pt will demonstrate increased MedX average isometric strength value by 20% with  when compared to the initial testing resulting in improved ability to perform bending, lifting, and carrying activities safely and confidently. Appropriate and Ongoing     Long term goals: 10 weeks or 20 visits  - Pt will demonstratte increased cervical MedX ROM as measured by med ex by 15degrees from initial test which results in functional ROM of neck for ease with ADLs and driving.  Appropriate and Ongoing  - Pt will demonstrate increased MedX average isometric strength value  by 40% from initial test to improve ability to lift and carry, and sustain good posture while performing ADL's. Appropriate and Ongoing  - Pt will demonstrate reduced pain and improved functional outcomes as reported on the FOTO by reaching an intake score of >/= 57% functional ability in order to  demonstrate subjective improvement in patient's condition. . Appropriate and Ongoing  - Pt will demonstrate independence with reducing or controlling symptoms with ther ex, movement, or position independently, able to reduce pain 2-4 points on pain scale using strategies taught in therapy. Appropriate and Ongoing  - Pt will demonstrate independence with the HEP at discharge. Appropriate and Ongoing  - Pt(patient goal)will be able to stand to cook dinner  > 1hour without onset of LBP Appropriate and Ongoing    Plan   Continue with established Plan of Care towards established PT goals.     Therapist: Spencer Taylor, PTA  8/21/2024

## 2024-08-21 NOTE — PROGRESS NOTES
Chronic Pain - f/u    Referring Physician: No ref. provider found    Date: 08/21/2024     Re: Darrion Bennett  MR#: [id number]  YOB: 1950  Age: 74 y.o.    Chief Complaint:   Chief Complaint   Patient presents with    Back Pain     **This note is dictated using the M*Modal Fluency Direct word recognition program. There are word recognition mistakes that are occasionally missed on review.**    ASSESSMENT: 74 y.o. year old male with back and neck pain, consistent with     1. Chronic neck pain        2. Subjective weakness        3. Chronic midline low back pain without sciatica        4. Lumbar spondylosis            PLAN:     Left sided neck pain  -suspect spondylosis  -MRI does not show severe compression.  If we did a procedure would recommend b/l C3,4,5 MBB/RFA.   -continue conservative care    Chronic low thoracic/upper lumbar pain  -likely sequela of chronic compression fracture  -new MRI to evaluate.  This showed severe stenosis at L4-5 and facet arthropathy.  Discussed MNBB/RFA vs BECKY.  They defer  -discussed   -continue lidoderm patches  -continue acupuncture  -continue healthy back    - RTC 6m  - Counseled patient regarding the importance of activity modification and physical therapy.    The above plan and management options were discussed at length with patient. Patient is in agreement with the above and verbalized understanding. It will be communicated with the referring physician via electronic record, fax, or mail.  Lab/study reports reviewed were important and necessary because subsequent medical and treatment recommendations required review of the above lab/study reports. Images viewed/reviewed above were important and necessary because subsequent medical and treatment recommendations required review of the reviewed image(s).     Electronically signed by:  Diony Sánchez  DO  08/21/2024    =========================================================================================================    SUBJECTIVE:    Interval History 8/21/2024:   Darrion Bennett is a 74 y.o. male presents to the clinic for follow up.  Since last visit the pain has is unchanged.  Patient states that he is doing well.  He is doing healthy back and acupuncture. He has done 4 sessions of acupuncture.    The pain is located in the back area and radiates to the upper left neck .  The pain is described as stabbing    At BEST  5/10   At WORST  8/10 on the WORST day.    On average pain is rated as 6/10.   Today the pain is rated as 6/10  Symptoms interfere with daily activity.   Exacerbating factors: Standing.    Mitigating factors heating pad.     Current pain medications: Tramadol 50mg QD-BID, Tylenol qhs,   Failed Pain Medications: cannot take NSAIDs due to CKD3    Initial hx:  Darrion Bennett is a 74 y.o. male presents to the clinic for the evaluation of middle back pain. The pain started 1 year ago following no inciting event and symptoms have been unchanged. The patient states that he had a compression fracture in 2015 because he was on heavy steroids for GI issues and this caused osteoprosis. He is on Prolia for osteoporosis now. He was on meloxicam but told to stop because of kidney problems.  He uses a heating pad and tramadol and tylenol for the pain. The pain comes and goes.  The pain has gradually been getting worse since it happened.    Pain Description:    The pain is located in the middle back area and radiates to the left sided neck .    At BEST  7/10   At WORST  10/10 on the WORST day.    On average pain is rated as 7/10.   Today the pain is rated as 9/10  The pain is continuous.  The pain is described as stabbing    Symptoms interfere with daily activity and sleeping.   Exacerbating factors: daily activities.    Mitigating factors heat and medications.   He reports 6 hours of sleep per  night.    Physical Therapy/Home Exercise:  healthy back program    Current Pain Medications:    - Tramadol 50mg QD-BID, Tylenol qhs,     Failed Pain Medications:    - cannot take NSAIDs due to CKD3    Pain Treatment Therapies:    Pain procedures:   none  Physical Therapy: yes currently in healthy back  Chiropractor:none  Acupuncture: none  TENS unit: none  Spinal decompression: none  Joint replacement: none    Patient denies urinary incontinence and bowel incontinence.  Patient denies any suicidal or homicidal ideations     report:  Reviewed and consistent with medication use as prescribed.    Imaging:   XR Thoracic 02/2024:    Thoracic spine two views: There is aortic plaque.  There is baseline DJD.  There are lower thoracic chronic wedge deformities unchanged from 02/27/2019.     MRI CTL 08/2024:  Vertebra: No acute fracture.  Compression deformity of the T12 inferior endplate, with mild anterior height loss and 4 mm osseous retropulsion.  No significant associated spinal canal stenosis.   Small L2 vertebral body hemangioma.  Minimal bone marrow edema cortical endplates of L4-L5 may represent type 1 Modic change.     Discs: Mild multilevel degenerative disc disease with height loss and desiccation.  Degenerative endplate edema at C5-C6, and to a lesser degree C4-C5, C6-C7, and L4 posteroinferior endplate.  No associated increased intradiscal signal or retropharyngeal edema/thickening.     Cord:   The conus terminates at the L1-L2 level.  Redundant configuration of the cauda equina at the stenotic L4-L5 level, detailed below.     Focal degenerative findings as below.     C2-C3: No spinal canal stenosis or neural foraminal narrowing.     C3-C4: Small posterior disc osteophyte complex.  No spinal canal stenosis or neural foraminal narrowing.     C4-C5:  Posterior disc osteophyte complex with uncovertebral spurring.  Resultant mild bilateral neural foraminal narrowing, left greater than right.  Mild spinal canal  stenosis.     C5-C6:  Posterior disc osteophyte complex with uncovertebral spurring.  Resultant mild bilateral neural foraminal narrowing, left greater than right.  Mild spinal canal stenosis.     C6-C7:  Posterior disc osteophyte complex with uncovertebral spurring.  Resultant mild bilateral neural foraminal narrowing.     C7-T1:  No spinal canal stenosis or neural foraminal narrowing.     T1-T12: No significant spinal canal stenosis or neural foraminal narrowing.     T12-L1: Compression deformity of the T12 inferior endplate with osseous retropulsion as detailed above.  No spinal canal stenosis or neural foraminal narrowing.     L1-L2: Mild disc bulge.  Resultant mild left neural foraminal narrowing.     L2-L3: Disc bulge with mild facet arthropathy and ligamentum flavum thickening.Resultant mild bilateral neural foraminal narrowing.  Mild lateral recess stenosis.     L3-L4: Disc bulge with mild facet arthropathy and ligamentum flavum thickening.Resultant mild bilateral neural foraminal narrowing.  Mild lateral recess stenosis.     L4-L5: Broad-based protrusion of disc material with advanced facet arthropathy and bulky ligamentum flavum thickening.  Resultant severe spinal canal stenosis.  Mild bilateral neural foraminal narrowing.  Posterior annular fissure.  Edema signal in the L4 spinous process and L4-L5 interspinous ligament with interspinous bursitis.  Bilateral extracanalicular facet synovial cysts.     L5-S1: Mild disc bulge and facet arthropathy.  No spinal canal stenosis or neural foraminal narrowing.     Miscellaneous: Slow flow within the left jugular vein.  Renal cysts.     Impression:     Degenerative changes of the cervical and lumbar spine as detailed above.  Findings are most pronounced at L4-L5 with severe spinal canal stenosis at that level.     Remote compression deformity of the T12 inferior endplate, similar appearance compared to priors.  Mild anterior height loss and osseous retropulsion,  without spinal canal stenosis at that level.        8/21/2024     8:13 AM 7/11/2024     8:30 AM   Pain Disability Index (PDI)   Family/Home Responsibilities: 6 7   Recreation: 6 7   Occupation: 6 7   Sexual Behavior: 6 7   Self Care: 6 7   Life-Support Activities: 6 7   Pain Disability Index (PDI) 42 49        Past Medical History:   Diagnosis Date    Anemia     Arthritis     Cataract     CHF (congestive heart failure)     Chronic constipation     Diabetes mellitus, type 2     Felon of finger of left hand 05/11/2019    Glaucoma     Hyperlipidemia 05/13/2014    Hypertension     MCTD (mixed connective tissue disease)     Personal history of colonic polyps     Sjogren's disease     Ulcerative colitis     Urolithiasis      Past Surgical History:   Procedure Laterality Date    CATARACT EXTRACTION Bilateral 2005    COLONOSCOPY  2014    ESOPHAGOGASTRODUODENOSCOPY N/A 9/8/2023    Procedure: EGD (ESOPHAGOGASTRODUODENOSCOPY);  Surgeon: Divya Saenz MD;  Location: [facility];  Service: Endoscopy;  Laterality: N/A;  ref by / inst portal-RB    ESOPHAGOGASTRODUODENOSCOPY N/A 11/22/2023    Procedure: EGD (ESOPHAGOGASTRODUODENOSCOPY);  Surgeon: Crystal Urbina MD;  Location: [facility];  Service: Endoscopy;  Laterality: N/A;  time frame 8-12 weeks  Dr. Saenz pt   prep instructions sent to pt via portal -[facility] meds trulicity hold 7 days prior  diabetic  11/16- pt r/s to earlier date, pre call complete.  DBM    EYE SURGERY       Social History     Socioeconomic History    Marital status:     Number of children: 3   Tobacco Use    Smoking status: Never     Passive exposure: Never    Smokeless tobacco: Never   Substance and Sexual Activity    Alcohol use: No    Drug use: Yes     Comment: ultram 1 - 2 tabs a week    Sexual activity: Not Currently     Partners: Female     Social Determinants of Health     Financial Resource Strain: Low Risk  (2/12/2024)    Overall Financial Resource Strain (CARDIA)     Difficulty of  Paying Living Expenses: Not very hard   Recent Concern: Financial Resource Strain - Medium Risk (11/27/2023)    Overall Financial Resource Strain (CARDIA)     Difficulty of Paying Living Expenses: Somewhat hard   Food Insecurity: Food Insecurity Present (2/12/2024)    Hunger Vital Sign     Worried About Running Out of Food in the Last Year: Sometimes true     Ran Out of Food in the Last Year: Sometimes true   Transportation Needs: No Transportation Needs (2/12/2024)    PRAPARE - Transportation     Lack of Transportation (Medical): No     Lack of Transportation (Non-Medical): No   Physical Activity: Unknown (2/12/2024)    Exercise Vital Sign     Days of Exercise per Week: 0 days   Stress: No Stress Concern Present (2/12/2024)    [employer] - Occupational Stress Questionnaire     Feeling of Stress : Only a little   Housing Stability: Low Risk  (2/12/2024)    Housing Stability Vital Sign     Unable to Pay for Housing in the Last Year: No     Number of Places Lived in the Last Year: 2     Unstable Housing in the Last Year: No     Family History   Problem Relation Name Age of Onset    Hypertension Father      Stroke Father      Cataracts Father      Glaucoma Father      Cataracts Mother      No Known Problems Sister      No Known Problems Brother      Rheum arthritis Maternal Aunt      Rheum arthritis Maternal Uncle      No Known Problems Paternal Aunt      No Known Problems Paternal Uncle      No Known Problems Maternal Grandmother      No Known Problems Maternal Grandfather      Throat cancer Paternal Grandmother      Glaucoma Paternal Grandfather      No Known Problems Daughter      No Known Problems Son x2     Celiac disease Neg Hx      Colon cancer Neg Hx      Cirrhosis Neg Hx      Colon polyps Neg Hx      Crohn's disease Neg Hx      Cystic fibrosis Neg Hx      Hemochromatosis Neg Hx      Esophageal cancer Neg Hx      Inflammatory bowel disease Neg Hx      Irritable bowel syndrome Neg  "Hx      Liver cancer Neg Hx      Liver disease Neg Hx      Rectal cancer Neg Hx      Stomach cancer Neg Hx      Ulcerative colitis Neg Hx      Chase's disease Neg Hx      Amblyopia Neg Hx      Blindness Neg Hx      Cancer Neg Hx      Diabetes Neg Hx      Macular degeneration Neg Hx      Retinal detachment Neg Hx      Strabismus Neg Hx      Thyroid disease Neg Hx      Melanoma Neg Hx         Review of patient's allergies indicates:   Allergen Reactions    Imuran [azathioprine]      Pancytopenia    Penicillins Nausea And Vomiting    Rosuvastatin      Muscle pain     Allopurinol analogues Rash       Current Outpatient Medications   Medication Sig    abatacept (ORENCIA CLICKJECT) 125 mg/mL AtIn Inject 125 mg into the skin once a week.    alcohol swabs (DROPSAFE ALCOHOL PREP PADS) PadM USE  4 TIMES PER DAY    amLODIPine (NORVASC) 5 MG tablet TAKE 1 TABLET TWICE DAILY    ammonium lactate 12 % Crea Apply 1 application topically once daily.    atorvastatin (LIPITOR) 80 MG tablet TAKE 1 TABLET EVERY DAY    blood glucose control, low (TRUE METRIX LEVEL 1) Soln Use as indicated    blood-glucose meter (TRUE METRIX GLUCOSE METER) Misc Test glucose 4x/day    blood-glucose meter,continuous (DEXCOM G7 ) Misc Use with Dexcom G7 sensors    blood-glucose sensor (DEXCOM G7 SENSOR) Bernie Change every 10 days    ciclopirox (PENLAC) 8 % Soln Apply topically nightly.    diclofenac sodium (VOLTAREN) 1 % Gel Apply 2 g topically 4 (four) times daily as needed. Apply to aching joints    diclofenac sodium (VOLTAREN) 1 % Gel Apply 2 g topically once daily.    dorzolamide (TRUSOPT) 2 % ophthalmic solution INSTILL 1 DROP INTO BOTH EYES TWICE DAILY    DROPLET PEN NEEDLE 32 gauge x 5/32" Ndle USE 1 NEEDLE AS DIRECTED FOUR TIMES DAILY    dulaglutide (TRULICITY) 4.5 mg/0.5 mL pen injector Inject 4.5 mg into the skin every 7 days.    empagliflozin (JARDIANCE) 10 mg tablet Take 1 tablet (10 mg total) by mouth once daily.    febuxostat (ULORIC) " 40 mg Tab Take 1 tablet (40 mg total) by mouth once daily.    fluticasone propionate (FLONASE) 50 mcg/actuation nasal spray 1 spray (50 mcg total) by Each Nostril route once daily.    hydrALAZINE (APRESOLINE) 25 MG tablet TAKE 3 TABLETS THREE TIMES DAILY    INJECTAFER 50 iron mg/mL injection     insulin aspart U-100 (NOVOLOG FLEXPEN U-100 INSULIN) 100 unit/mL (3 mL) InPn pen Inject Novolog if glucose after meal is high:  201-250=+2, 251-300=+3; 301-350=+4, over 350=+5 units    lancets (TRUEPLUS LANCETS) 33 gauge Misc Test glucose 4x/day. Dx code e11.65    LIDOcaine (LIDODERM) 5 % Place 1 patch onto the skin once daily. Remove & Discard patch within 12 hours or as directed by MD    multivit with min-folic acid 200 mcg Chew     omeprazole (PRILOSEC) 40 MG capsule Take 1 capsule (40 mg total) by mouth every morning.    predniSONE (DELTASONE) 5 MG tablet Take 1 tablet (5 mg total) by mouth once daily.    PROLIA 60 mg/mL Syrg     senna-docusate 8.6-50 mg (SENNA WITH DOCUSATE SODIUM) 8.6-50 mg per tablet Take 1 tablet by mouth once daily.    torsemide (DEMADEX) 10 MG Tab TAKE 1 TABLET EVERY OTHER DAY    traMADoL (ULTRAM) 50 mg tablet TAKE 1 TABLET EVERY 12 HOURS AS NEEDED FOR PAIN    travoprost (TRAVATAN Z) 0.004 % ophthalmic solution Place 1 drop into both eyes every evening.    triamcinolone acetonide 0.1% (KENALOG) 0.1 % cream APPLY TOPICALLY TWICE DAILY FOR 10 DAYS    TRUE METRIX GLUCOSE TEST STRIP Strp TEST BLOOD SUGAR FOUR TIMES DAILY    valsartan (DIOVAN) 160 MG tablet TAKE 1 TABLET TWICE DAILY    ferrous gluconate 324 mg (37.5 mg iron) Tab tablet Take 1 tablet (324 mg total) by mouth once daily. (Patient taking differently: Take 324 mg by mouth once daily. Not taking)     No current facility-administered medications for this visit.       REVIEW OF SYSTEMS:    GENERAL:  No weight loss, malaise or fevers.  HEENT:   No recent changes in vision or hearing  NECK:  Negative for lumps, no difficulty with  "swallowing.  RESPIRATORY:  Negative for cough, wheezing or shortness of breath, patient denies any recent URI.  CARDIOVASCULAR:  Negative for chest pain, leg swelling or palpitations.  GI:  Negative for abdominal discomfort, blood in stools or black stools or change in bowel habits.  MUSCULOSKELETAL:  See HPI.  SKIN:  Negative for lesions, rash, and itching.  PSYCH:  No mood disorder or recent psychosocial stressors.  Patients sleep is not disturbed secondary to pain.  HEMATOLOGY/LYMPHOLOGY:  Negative for prolonged bleeding, bruising easily or swollen nodes.  Patient is not currently taking any anti-coagulants  NEURO:   No history of headaches, syncope, paralysis, seizures or tremors.  All other reviewed and negative other than HPI.    OBJECTIVE:    /61 (BP Location: Right arm, Patient Position: Sitting)   Pulse 61   Ht 5' 6" (1.676 m)   Wt 57.6 kg (126 lb 15.8 oz)   BMI 20.50 kg/m²     PHYSICAL EXAMINATION:    GENERAL: Well appearing, in no acute distress, alert and oriented x3.  PSYCH:  Mood and affect appropriate.  SKIN: Skin color, texture, turgor normal, no rashes or lesions.  HEAD/FACE:  Normocephalic, atraumatic. Cranial nerves grossly intact.    NECK:   - Positive pain to palpation over the cervical paraspinous muscles.   - Spurling  Positive for neck pain  - Positive pain with neck extension, rotation and lateral flexion.     CV: RRR with palpation of the radial artery.  PULM: CTAB. No evidence of respiratory difficulty, symmetric chest rise.  GI:  Soft and non-tender.    BACK:   - No obvious deformity or signs of trauma, Normal lumbar lordotic curve  - Positive spinous process tenderness low throacic  - Negative paravertebral tenderness  - Positive pain to palpation over the facet joints of the lumbar spine.   - Did not perform QL / Iliac crest / Glut tenderness  - Slump test is Negative for radicular pain  - Slump test is Negative for back pain  - Supine Straight leg raising is Did not perform " for radicular pain  - Supine Straight leg raising is Did not perform for back pain  - Lumbar ROM is diminished in Flexion with pain  - Lumbar ROM is diminished in Extension with pain  - Lumbar ROM is diminished in Lateral Flexion with pain  - Did not perform Sustained Hip Flexion test (for discogenic pain)  - Positive Altered Gait, Posture  - Axial facet loading test Did not perform on the bilateral side(s)    SI Joint exam:  - Negative SI joint tenderness to palpation  - Geovanny's sign Did not perform  - Yeoman's Test: Did not perform for SI joint pain indicating anterior SI ligament involvement. Did not perform for anterior thigh pain/paresthesia which indicates femoral nerve stretch.  - Gaenslen's Test:Did not perform  - Finger Kamilla's Sign:Negative  - SI compression test:Did not perform  - SI distraction test:Did not perform  - Thigh Thrust: Did not perform  - SI Thrust: Did not perform    MUSKULOSKELETAL:    EXTREMITIES:   Hip Exam:  - Log Roll Did not perform  - FADIR Did not perform  - Stinchfield Did not perform  - Hip Scour Did not perform  - GTB Tenderness Negative    MUSCULOSKELETAL:  No atrophy or tone abnormalities are noted in the UE or LE.  No deformities, edema, or skin discoloration are noted on visible skin. Good capillary refill.    NEURO: Bilateral upper and lower extremity coordination and muscle stretch reflexes are physiologic and symmetric.      NEUROLOGICAL EXAM:  MENTAL STATUS: A x O x 3, good concentration, speech is fluent and goal directed  MEMORY: recent and remote are intact  CN: CN2-12 grossly intact  MOTOR: 4+-5/5 in all muscle groups of UE and LE  DTRs: 0 intact symmetric UE and LE  Sensation:    -no Loss of sensation in a left upper, left lower, right upper, and right lower  arm and leg  distribution.  Pittman: absent on the bilateral side(s)  Clonus: absent on the bilateral side(s)

## 2024-08-22 ENCOUNTER — CLINICAL SUPPORT (OUTPATIENT)
Dept: REHABILITATION | Facility: HOSPITAL | Age: 74
End: 2024-08-22
Payer: MEDICARE

## 2024-08-22 DIAGNOSIS — Z74.09 IMPAIRED FUNCTIONAL MOBILITY, BALANCE, GAIT, AND ENDURANCE: Primary | ICD-10-CM

## 2024-08-22 PROCEDURE — 97112 NEUROMUSCULAR REEDUCATION: CPT | Mod: KX,PN

## 2024-08-22 PROCEDURE — 97110 THERAPEUTIC EXERCISES: CPT | Mod: KX,PN

## 2024-08-22 NOTE — PROGRESS NOTES
"[facility]   Physical Therapy Treatment Note      Name: Darrion Bennett  [facility] Number: [id number]    Therapy Diagnosis:   Encounter Diagnosis   Name Primary?    Impaired functional mobility, balance, gait, and endurance Yes     Physician: Joelle Flores MD    Visit Date: 8/22/2024    Physician Orders: PT Eval and Treat   Medical Diagnosis from Referral: R25.1 (ICD-10-CM) - Tremor   Evaluation Date: 6/25/2024  Authorization Period Expiration: 6/18/2025  Plan of Care Expiration: 9/25/2024  Progress Note Due: 8/30/2024  Visit # / Visits authorized: 6/ 20? (auth includes other current therapies) KX Modifier         Precautions: Fall; Hx of T11/T12 wedge compression fracture with routine healing; CHF     Time In: 8:47  Time Out: 9:32  Total Billable Time: 45 minutes (42 billable)      Subjective     Darrion reports, "doing alright".    He was compliant with home exercise program.      Pain: 0/10    Objective      Objective Measures updated at progress report unless specified.     Treatment     Darrion received the treatments listed below:      therapeutic exercises to develop strength and endurance for 12 minutes including:    -Nu-step, Level 3- x 4 minutes    -R isolated ankle dorsiflexion, seated with mirror:   -guided with VCs and TCs- x 5   -active- x 15      *rest breaks provided throughout as needed    neuromuscular re-education activities to improve: Balance and Sense for 30 minutes, including:      -Treadmill without UEs, @1.2 mph- x 5 minutes   -cues for mechanics    -forward gait over low hurdles- 4 hurdles x 8 rounds    -high march gait, inside // without UE support- 9 ft x 6 rounds  -high march gait inside // while holding 4# medicine ball- 9 ft x 6 rounds    -gait with Tidal Tank cylinder static hold- x 60 ft  -gait with Tidal Tank cylinder chest-press- 2 x 60 ft      *rest breaks provided throughout as needed      therapeutic activities to improve functional performance for 0  " minutes, including:    gait training to improve functional mobility and safety for 0  minutes, including:      Patient Education and Home Exercises       Education provided:   -education on PD/ diagnostics   -discussion of physical therapy plan of care      Written Home Exercises Provided: to be provided within treatment.  Exercises were reviewed and Darrion was able to demonstrate them prior to the end of the session.  Darrion demonstrated good  understanding of the education provided.     Assessment     Mr. Bennett tolerated session well. He responded well, though challenged by treadmill gait training without upper extremity support. Cues were needed for right>left mechanics for increasing push-off and heel-strike. Patient made partial corrections with cueing. He was challenged by high march gait with medicine ball, but completed well for first trial. Favorable response to forward gait over low hurdles and to isolated right dorsiflexion. Carry-over of ankle/foot mechanics notable with gait at end of session. Patient remains appropriate for skilled physical therapy.     Darrion Is progressing well towards his goals.   Patient prognosis is Good.     Patient will continue to benefit from skilled outpatient physical therapy to address the deficits listed in the problem list box on initial evaluation, provide pt/family education and to maximize pt's level of independence in the home and community environment.     Patient's spiritual, cultural and educational needs considered and pt agreeable to plan of care and goals.     Anticipated barriers to physical therapy: upper back/ neck pain; inconclusive Dx    Goals:   Short Term Goals: 4 weeks   -Patient will increase right knee-extension strength to 4+/5 for improved standing activities stability and endurance.  -Patient will increase single-limb stability to 5 seconds each side.  -Patient will ambulate with consistent heel initial-contact and with increased left arm-swing x 300  feet to demonstrate improved gait quality and safety.  -Patient will ambulate over unlevel surface without loss of stability x 50 feet to demonstrate improved confidence and safety with outside gait.     Long Term Goals: 8 weeks   -Patient will achieve Timed Up and Go of </= 11 seconds to demonstrate improved gait stability and reduced fall risk.  -Patient will complete 5-Times Sit-to- 14 seconds to demonstrate improved functional capacity and reduced fall risk.  -Patient will ambulate for 10 minutes, overground or on treadmill, to demonstrate increased gait endurance.  -Patient will improve Functional Gait Assessment score to 23/30 to demonstrate improved dynamic/ community gait and reduced fall risk.     Plan     Plan of care Certification: 6/25/2024 to 9/25/2024     Outpatient Physical Therapy 2-1 times weekly for 8 weeks to include the following interventions: Gait Training, Neuromuscular Re-ed, Self Care, Therapeutic Activities, and Therapeutic Exercise.       Maggie More, PT, DPT    08/22/2024

## 2024-08-23 ENCOUNTER — CLINICAL SUPPORT (OUTPATIENT)
Dept: REHABILITATION | Facility: HOSPITAL | Age: 74
End: 2024-08-23
Payer: MEDICARE

## 2024-08-23 DIAGNOSIS — R29.3 POOR POSTURE: Primary | ICD-10-CM

## 2024-08-23 PROCEDURE — 97112 NEUROMUSCULAR REEDUCATION: CPT

## 2024-08-23 PROCEDURE — 97110 THERAPEUTIC EXERCISES: CPT

## 2024-08-23 NOTE — PROGRESS NOTES
"  [facility] Physical Therapy Treatment      Name: Darrion Bennett  Clinic Number: [id number]    Therapy Diagnosis:   Encounter Diagnosis   Name Primary?    Poor posture Yes         Physician: Joelle Flores MD    Visit Date: 2024    Physician Orders: PT Eval and Treat  Medical Diagnosis: Tremor [R25.1], Cervicalgia [M54.2], Other chronic pain [G89.29], Wedge compression fracture of t11-T12 vertebra, subsequent encounter for fracture with routine healing [S22.080D]   Evaluation Date: 2024  Authorization Period Expiration: 24  Plan of Care Expiration: 9/3/2024  Reassessment Due: 24  Visit # / Visits authorized:  Keep HOB elevated  MedX Testing:MedX testing visit 2    Time In: 845  AM   Time Out: 945  Total Billable Time: 55  INSURANCE and OUTCOMES: Fee for Service with FOTO Outcomes 2/3    Precautions: Standard, Diabetes, CHF, and HTN, tremor    Pattern of pain determined: 1 WILLIE    Subjective   Darrion reports that he is not having any pain today  Patient reports tolerating previous visit no adverse effects.  Patient reports their pain to be 0/10 on a 0-10 scale with 0 being no pain and 10 being the worst pain imaginable.  Pain Location: bilateral neck and thoracic     Work and leisure: retired/gardening  Pt goals: "decrease pain"    Objective     Baseline IM Testing Results:   Date of testin2024   Initial: 24 Midpoint: Final   ROM  deg  deg    Max Peak Torque 273  281    Min Peak Torque 124  151    Flex/Ext Ratio 2.2:1 1.9:1    % below normative data -51% -35%    % gain from initial N/a +26%      Outcomes:  Initial score: 41%  Visit 5 score: 49%  Visit 10 score: 69%  Goal:    ROM Loss 24   Flexion minimal loss c/ pain mid back WFL   Extension moderate loss, with pain mid back region Mod loss   Side bending Right moderate loss and major loss Mod-major loss   Side bending Left minimal loss and moderate loss Mod-major loss   Rotation Right moderate loss Min " "loss   Rotation Left minimal loss Min loss   Protraction minimal loss Min loss   Retraction  moderate loss Min-mod loss        Treatment    Darrion received the treatments listed below:      Darrion received neuromuscular education  to isolate and engage spinal stabilization musculature correctly for motor control and coordination to aid in function and posture for 10 minutes on the Medical Medx Machine.  Patient performed MedX dynamic exercise with emphasis on spinal muscular control using pacer throughout  active range of motion. Therapist assisted patient in achieving optimal exertion for neural reeducation and endurance training by using the  Rigo Exertion Rating scale, by instructing the patient to aim for mid range of exertion, performing 15-20 repetitions, slowly, correctly,and safely.           8/23/2024     9:00 AM   [facility]   Visit Number 12   VAS Pain Rating 0   Treadmill Time (in min.) 5 min   Retraction in Sitting 10   Retraction with Extension 10   Sidebending 1   Scapular Retraction 15   Cervical Flexion 105   Cervical Extension 39   Cervical Weight 219 lbs   Repetitions 20   Rating of Perceived Exertion 4   Ice - Z Lie (in min.) 5       therapeutic exercises to develop strength, endurance, ROM, flexibility, posture, and core stabilization for  40 minutes including:    UT/LS stretch x20"  Scap retract/No money ER with GTB x 20x  RIS x10 (cues) + self over pressure  retraction+extension with towel support x10  cervical rotation stretch w/towel 5 x 5 sec (cues)   Cervical retraction in supine x10--NP  Seated thoracic extension over 1/2 foam x15x  H-Abd with RTB x 10 (1/2 foam roll support)  Standing open book x 10    Peripheral muscle strengthening which included 1 set of 15-20 repetitions at a slow, controlled 10-13 second per rep pace focused on strengthening supporting musculature for improved body mechanics and functional mobility.  Pt and therapist focused on proper form during treatment to " ensure optimal strengthening of each targeted muscle group.  Machines were utilized including torso rotation, leg press, hip abd and hip add, leg ext.  Leg curl, triceps, biceps, chest and row added visit 3    cold pack for 5 minutes to cervical area in sitting.    Home Exercises Provided and Patient Education Provided   Home exercises include:Open books, scap squeeze, cervical retraction in supine  Cardio program:visit 5 :7/17/24  Lifting education date:visit 11 8/21/24  Posture/Lumbar roll: recommended  Fridge Magnet Discharge handout (date given):  Equipment at home/gym membership: none    Education provided:   -  cues w/ex's  MedX performance  Precor ex performance    Written Home Exercises Provided: Patient instructed to cont prior HEP.  Exercises were reviewed and Darrion was able to demonstrate them prior to the end of the session.  Darrion demonstrated good  understanding of the education provided.     See EMR under Patient Instructions for exercises provided prior visit.    Assessment   Darrion returns today without pain.Treatment continued with flexibility, strengthening and neuromuscular reeducation exs,added reps for no money and horizontal abd. He was able to perform exs with verbal and tactile cues and without increased pain.   Cervical MedX resistance was maintained at 219 in.lbs and he completed 15 reps with a RPE = 3/10.  Increased flexion by 3 degrees on Med X. Pt was also able to complete peripheral strengthening exercises without increased discomfort. Will continue per HB protocol and patient tolerance.     Patient is making  progress towards established goals.  Pt will continue to benefit from skilled outpatient physical therapy to address the deficits stated in the impairment chart, provide pt/family education and to maximize pt's level of independence in the home and community environment.     Anticipated Barriers for therapy: tremors - currently seeing neuro PT  Pt's spiritual, cultural and  educational needs considered and pt agreeable to plan of care and goals as stated below:     Goals:   Short term goals: 6 weeks or 10 visits   - Pt will demonstratte increased cervical MedX ROM as measured by med ex by 9 degrees from initial test which results in improved  ROM of neck for ease with ADLs and driving. Appropriate and Ongoing  - Pt will demonstrate independence with reducing or controlling symptoms with ther ex, movement, or position independently, able to reduce pain 1-2 points on pain scale using strategies taught in therapy.  Appropriate and Ongoing  - Pt will demonstrate increased MedX average isometric strength value by 20% with  when compared to the initial testing resulting in improved ability to perform bending, lifting, and carrying activities safely and confidently. Appropriate and Ongoing     Long term goals: 10 weeks or 20 visits  - Pt will demonstratte increased cervical MedX ROM as measured by med ex by 15degrees from initial test which results in functional ROM of neck for ease with ADLs and driving.  Appropriate and Ongoing  - Pt will demonstrate increased MedX average isometric strength value  by 40% from initial test to improve ability to lift and carry, and sustain good posture while performing ADL's. Appropriate and Ongoing  - Pt will demonstrate reduced pain and improved functional outcomes as reported on the FOTO by reaching an intake score of >/= 57% functional ability in order to demonstrate subjective improvement in patient's condition. . Appropriate and Ongoing  - Pt will demonstrate independence with reducing or controlling symptoms with ther ex, movement, or position independently, able to reduce pain 2-4 points on pain scale using strategies taught in therapy. Appropriate and Ongoing  - Pt will demonstrate independence with the HEP at discharge. Appropriate and Ongoing  - Pt(patient goal)will be able to stand to cook dinner  > 1hour without onset of LBP Appropriate and  Ongoing    Plan   Continue with established Plan of Care towards established PT goals.     Therapist: Selene Morris, PT  8/23/2024

## 2024-08-26 ENCOUNTER — CLINICAL SUPPORT (OUTPATIENT)
Dept: REHABILITATION | Facility: HOSPITAL | Age: 74
End: 2024-08-26
Payer: MEDICARE

## 2024-08-26 DIAGNOSIS — Z74.09 IMPAIRED FUNCTIONAL MOBILITY, BALANCE, GAIT, AND ENDURANCE: Primary | ICD-10-CM

## 2024-08-26 PROCEDURE — 97110 THERAPEUTIC EXERCISES: CPT | Mod: KX,PN

## 2024-08-26 PROCEDURE — 97112 NEUROMUSCULAR REEDUCATION: CPT | Mod: KX,PN

## 2024-08-26 NOTE — PROGRESS NOTES
"[facility]   Physical Therapy Treatment Note      Name: Darrion Bennett  Clinic Number: [id number]    Therapy Diagnosis:   Encounter Diagnosis   Name Primary?    Impaired functional mobility, balance, gait, and endurance Yes     Physician: Joelle Flores MD    Visit Date: 8/26/2024    Physician Orders: PT Eval and Treat   Medical Diagnosis from Referral: R25.1 (ICD-10-CM) - Tremor   Evaluation Date: 6/25/2024  Authorization Period Expiration: 6/18/2025  Plan of Care Expiration: 9/25/2024  Progress Note Due: 8/30/2024  Visit # / Visits authorized: 7/ 20? (auth includes other current therapies) KX Modifier         Precautions: Fall; Hx of T11/T12 wedge compression fracture with routine healing; CHF     Time In: 8:10  Time Out: 8:50  Total Billable Time: 40 minutes      Subjective     Darrion reports that main balance problems are related to turning and single-leg activities such as donning pants.    He was compliant with home exercise program.      Pain: 0/10    Objective      Objective Measures updated at progress report unless specified.     Treatment     Darrion received the treatments listed below:      therapeutic exercises to develop strength and endurance for 10 minutes including:    -Nu-step, Level 3- x 8 minutes    -step-ups to 4" step with 1 UE support- x 15      *rest breaks provided throughout as needed    neuromuscular re-education activities to improve: Balance and Sense for 30 minutes, including:      (-Treadmill without UEs, @1.2 mph- NP today   -cues for mechanics)    -step-ups to 4" steps without UE support- x 15    -forward gait over low hurdles- 4 hurdles x 8 rounds    -lateral stepping over low hurdles- 4 hurdles x 8 rounds    -four-square stepping:   -clockwise, slow to increased speed- x 7 rounds   -counter-clockwise, slow to increased speed- x 7 rounds     -gait with non-anticipatory pivot-turns- x 5        (NP today  -high march with B UE carrying  -gait with Tidal Tank " cylinder static hold- x 60 ft  -gait with Tidal Tank cylinder chest-press- 2 x 60 ft )      *rest breaks provided throughout as needed      therapeutic activities to improve functional performance for 0  minutes, including:    gait training to improve functional mobility and safety for 0  minutes, including:      Patient Education and Home Exercises       Education provided:   -education on PD/ diagnostics   -discussion of physical therapy plan of care      Written Home Exercises Provided: to be provided within treatment.  Exercises were reviewed and Darrion was able to demonstrate them prior to the end of the session.  Darrion demonstrated good  understanding of the education provided.     Assessment     Mr. Bennett tolerated session well. He had intermittent challenge with lateral stepping over hurdles, but completed well overall. Balance and dynamic gait was upgraded today as patient is doing better with previous level. He responded well to initiation of four-square stepping, though difficulty was evident. More turning and single-leg practice planned. Patient remains appropriate for skilled physical therapy.     Darrion Is progressing well towards his goals.   Patient prognosis is Good.     Patient will continue to benefit from skilled outpatient physical therapy to address the deficits listed in the problem list box on initial evaluation, provide pt/family education and to maximize pt's level of independence in the home and community environment.     Patient's spiritual, cultural and educational needs considered and pt agreeable to plan of care and goals.     Anticipated barriers to physical therapy: upper back/ neck pain; inconclusive Dx    Goals:   Short Term Goals: 4 weeks   -Patient will increase right knee-extension strength to 4+/5 for improved standing activities stability and endurance.  -Patient will increase single-limb stability to 5 seconds each side.  -Patient will ambulate with consistent heel  initial-contact and with increased left arm-swing x 300 feet to demonstrate improved gait quality and safety.  -Patient will ambulate over unlevel surface without loss of stability x 50 feet to demonstrate improved confidence and safety with outside gait.     Long Term Goals: 8 weeks   -Patient will achieve Timed Up and Go of </= 11 seconds to demonstrate improved gait stability and reduced fall risk.  -Patient will complete 5-Times Sit-to- 14 seconds to demonstrate improved functional capacity and reduced fall risk.  -Patient will ambulate for 10 minutes, overground or on treadmill, to demonstrate increased gait endurance.  -Patient will improve Functional Gait Assessment score to 23/30 to demonstrate improved dynamic/ community gait and reduced fall risk.     Plan     Plan of care Certification: 6/25/2024 to 9/25/2024     Outpatient Physical Therapy 2-1 times weekly for 8 weeks to include the following interventions: Gait Training, Neuromuscular Re-ed, Self Care, Therapeutic Activities, and Therapeutic Exercise.       Maggie More, PT, DPT    08/26/2024

## 2024-08-28 ENCOUNTER — PATIENT MESSAGE (OUTPATIENT)
Dept: RHEUMATOLOGY | Facility: CLINIC | Age: 74
End: 2024-08-28
Payer: MEDICARE

## 2024-08-28 ENCOUNTER — CLINICAL SUPPORT (OUTPATIENT)
Dept: REHABILITATION | Facility: HOSPITAL | Age: 74
End: 2024-08-28
Payer: MEDICARE

## 2024-08-28 ENCOUNTER — LAB VISIT (OUTPATIENT)
Dept: LAB | Facility: HOSPITAL | Age: 74
End: 2024-08-28
Attending: INTERNAL MEDICINE
Payer: MEDICARE

## 2024-08-28 DIAGNOSIS — Z79.4 TYPE 2 DIABETES MELLITUS WITH HYPOGLYCEMIA WITHOUT COMA, WITH LONG-TERM CURRENT USE OF INSULIN: ICD-10-CM

## 2024-08-28 DIAGNOSIS — E11.649 TYPE 2 DIABETES MELLITUS WITH HYPOGLYCEMIA WITHOUT COMA, WITH LONG-TERM CURRENT USE OF INSULIN: ICD-10-CM

## 2024-08-28 DIAGNOSIS — M1A.9XX1 TOPHACEOUS GOUT: Primary | ICD-10-CM

## 2024-08-28 DIAGNOSIS — R29.3 POOR POSTURE: Primary | ICD-10-CM

## 2024-08-28 LAB
ESTIMATED AVG GLUCOSE: 151 MG/DL (ref 68–131)
HBA1C MFR BLD: 6.9 % (ref 4–5.6)

## 2024-08-28 PROCEDURE — 97110 THERAPEUTIC EXERCISES: CPT

## 2024-08-28 PROCEDURE — 36415 COLL VENOUS BLD VENIPUNCTURE: CPT | Mod: PO | Performed by: NURSE PRACTITIONER

## 2024-08-28 PROCEDURE — 83036 HEMOGLOBIN GLYCOSYLATED A1C: CPT | Performed by: NURSE PRACTITIONER

## 2024-08-28 PROCEDURE — 97112 NEUROMUSCULAR REEDUCATION: CPT

## 2024-08-28 RX ORDER — FEBUXOSTAT 40 MG/1
40 TABLET, FILM COATED ORAL 2 TIMES DAILY
Qty: 60 TABLET | Refills: 11 | Status: SHIPPED | OUTPATIENT
Start: 2024-08-28

## 2024-08-28 NOTE — PROGRESS NOTES
" [facility]      Name: Darrion Bennett  Clinic Number: [id number]    Therapy Diagnosis:   Encounter Diagnosis   Name Primary?    Poor posture Yes         Physician: Joelle Flores MD    Visit Date: 2024    Physician Orders: PT Eval and Treat  Medical Diagnosis: Tremor [R25.1], Cervicalgia [M54.2], Other chronic pain [G89.29], Wedge compression fracture of t11-T12 vertebra, subsequent encounter for fracture with routine healing [S22.080D]   Evaluation Date: 2024  Authorization Period Expiration: 24  Plan of Care Expiration: 9/3/2024  Reassessment Due: 24  Visit # / Visits authorized:  Keep HOB elevated  MedX Testing:MedX testing visit 2    Time In: 9:30  AM   Time Out: 1030 AM  Total Billable Time: 30 min 1:1  INSURANCE and OUTCOMES: Fee for Service with FOTO Outcomes 2/3    Precautions: Standard, Diabetes, CHF, and HTN, tremor    Pattern of pain determined: 1 WILLIE    Subjective   Darrion denies pain at start of session.    Patient reports tolerating previous visit no adverse effects.  Patient reports their pain to be 0/10 on a 0-10 scale with 0 being no pain and 10 being the worst pain imaginable.  Pain Location: bilateral neck and thoracic     Work and leisure: retired/gardening  Pt goals: "decrease pain"    Objective     Baseline IM Testing Results:   Date of testin2024   Initial: 24 Midpoint: Final   ROM  deg  deg    Max Peak Torque 273  281    Min Peak Torque 124  151    Flex/Ext Ratio 2.2:1 1.9:1    % below normative data -51% -35%    % gain from initial N/a +26%      Outcomes:  Initial score: 41%  Visit 5 score: 49%  Visit 10 score: 69%  Goal:      ROM Loss 24   Flexion minimal loss c/ pain mid back WFL WFL   Extension moderate loss, with pain mid back region Mod loss Min loss   Side bending Right moderate loss and major loss Mod-major loss Mod loss   Side bending Left minimal loss and moderate loss Mod-major loss " "Mod loss   Rotation Right moderate loss Min loss Min loss   Rotation Left minimal loss Min loss Min loss   Protraction minimal loss Min loss Min loss   Retraction  moderate loss Min-mod loss Min loss        Treatment    Darrion received the treatments listed below:      Darrion received neuromuscular education  to isolate and engage spinal stabilization musculature correctly for motor control and coordination to aid in function and posture for 10 minutes on the Medical Medx Machine.  Patient performed MedX dynamic exercise with emphasis on spinal muscular control using pacer throughout  active range of motion. Therapist assisted patient in achieving optimal exertion for neural reeducation and endurance training by using the  Rigo Exertion Rating scale, by instructing the patient to aim for mid range of exertion, performing 15-20 repetitions, slowly, correctly,and safely.           8/28/2024    10:32 AM   [facility]   Visit Number 13   VAS Pain Rating 0   Treadmill Time (in min.) 5 min   Retraction in Sitting 10   Retraction with Extension 10   Flexion 2   Sidebending 2   Rotation 5   Scapular Retraction 15   Cervical Weight 228 lbs   Repetitions 17   Rating of Perceived Exertion 3         therapeutic exercises to develop strength, endurance, ROM, flexibility, posture, and core stabilization for  40 minutes including:    UT/LS stretch x20"  Scap retract/No money ER with GTB x 20  RIS x10 (cues) + self over pressure  retraction+extension with towel support x10  cervical rotation stretch w/towel 5 x 5 sec (cues)   Cervical retraction in supine x10--NP  Seated thoracic extension over 1/2 foam x 15  H-Abd with RTB x 10 (1/2 foam roll support)  Standing open book x 10    Peripheral muscle strengthening which included 1 set of 15-20 repetitions at a slow, controlled 10-13 second per rep pace focused on strengthening supporting musculature for improved body mechanics and functional mobility.  Pt and therapist focused " on proper form during treatment to ensure optimal strengthening of each targeted muscle group.  Machines were utilized including torso rotation, leg press, hip abd and hip add, leg ext.  Leg curl, triceps, biceps, chest and row added visit 3    cold pack for 5 minutes to cervical area in sitting.    Home Exercises Provided and Patient Education Provided   Home exercises include:Open books, scap squeeze, cervical retraction in supine  Cardio program:visit 5 :7/17/24  Lifting education date:visit 11 8/21/24  Posture/Lumbar roll: recommended  Fridge Magnet Discharge handout (date given):  Equipment at home/gym membership: none    Education provided:   -  cues w/ex's  MedX performance  Precor ex performance    Written Home Exercises Provided: Patient instructed to cont prior HEP.  Exercises were reviewed and Darrion was able to demonstrate them prior to the end of the session.  Darrion demonstrated good  understanding of the education provided.     See EMR under Patient Instructions for exercises provided prior visit.    Assessment   Darrion returns today without pain. Treatment continued with flexibility, strengthening and neuromuscular reeducation exs.  He was able to perform exs with verbal and tactile cues and without increased pain.   Cervical MedX resistance was increased to 228 in.lbs and he completed 17 reps with a RPE = 3/10.   Pt was also able to complete peripheral strengthening exercises without increased discomfort. Will continue per HB protocol and patient tolerance.     Patient is making  progress towards established goals.  Pt will continue to benefit from skilled outpatient physical therapy to address the deficits stated in the impairment chart, provide pt/family education and to maximize pt's level of independence in the home and community environment.     Anticipated Barriers for therapy: tremors - currently seeing neuro PT  Pt's spiritual, cultural and educational needs considered and pt agreeable to plan  of care and goals as stated below:     Goals:   Short term goals: 6 weeks or 10 visits   - Pt will demonstratte increased cervical MedX ROM as measured by med ex by 9 degrees from initial test which results in improved  ROM of neck for ease with ADLs and driving. Appropriate and Ongoing  - Pt will demonstrate independence with reducing or controlling symptoms with ther ex, movement, or position independently, able to reduce pain 1-2 points on pain scale using strategies taught in therapy.  Appropriate and Ongoing  - Pt will demonstrate increased MedX average isometric strength value by 20% with  when compared to the initial testing resulting in improved ability to perform bending, lifting, and carrying activities safely and confidently. Appropriate and Ongoing     Long term goals: 10 weeks or 20 visits  - Pt will demonstratte increased cervical MedX ROM as measured by med ex by 15degrees from initial test which results in functional ROM of neck for ease with ADLs and driving.  Appropriate and Ongoing  - Pt will demonstrate increased MedX average isometric strength value  by 40% from initial test to improve ability to lift and carry, and sustain good posture while performing ADL's. Appropriate and Ongoing  - Pt will demonstrate reduced pain and improved functional outcomes as reported on the FOTO by reaching an intake score of >/= 57% functional ability in order to demonstrate subjective improvement in patient's condition. . Appropriate and Ongoing  - Pt will demonstrate independence with reducing or controlling symptoms with ther ex, movement, or position independently, able to reduce pain 2-4 points on pain scale using strategies taught in therapy. Appropriate and Ongoing  - Pt will demonstrate independence with the HEP at discharge. Appropriate and Ongoing  - Pt(patient goal)will be able to stand to cook dinner  > 1hour without onset of LBP Appropriate and Ongoing    Plan   Continue with established Plan of Care  towards established PT goals.     Therapist: Susanna Cabrera, PT  8/28/2024

## 2024-08-30 ENCOUNTER — HOSPITAL ENCOUNTER (OUTPATIENT)
Dept: CARDIOLOGY | Facility: HOSPITAL | Age: 74
Discharge: HOME | End: 2024-08-30
Attending: INTERNAL MEDICINE
Payer: MEDICARE

## 2024-08-30 ENCOUNTER — HOSPITAL ENCOUNTER (OUTPATIENT)
Dept: RADIOLOGY | Facility: HOSPITAL | Age: 74
Discharge: HOME | End: 2024-08-30
Attending: INTERNAL MEDICINE
Payer: MEDICARE

## 2024-08-30 DIAGNOSIS — R06.02 SOB (SHORTNESS OF BREATH): ICD-10-CM

## 2024-08-30 LAB
ASCENDING AORTA: 3.55 CM
AV INDEX (PROSTH): 0.48
AV MEAN GRADIENT: 6 MMHG
AV PEAK GRADIENT: 12 MMHG
AV VALVE AREA BY VELOCITY RATIO: 1.79 CM²
AV VALVE AREA: 1.84 CM²
AV VELOCITY RATIO: 0.47
CV ECHO LV RWT: 0.69 CM
CV STRESS BASE HR: 55 BPM
DIASTOLIC BLOOD PRESSURE: 65 MMHG
DOP CALC AO PEAK VEL: 1.7 M/S
DOP CALC AO VTI: 45.5 CM
DOP CALC LVOT AREA: 3.8 CM2
DOP CALC LVOT DIAMETER: 2.2 CM
DOP CALC LVOT PEAK VEL: 0.8 M/S
DOP CALC LVOT STROKE VOLUME: 83.59 CM3
DOP CALCLVOT PEAK VEL VTI: 22 CM
E WAVE DECELERATION TIME: 250.06 MSEC
E/A RATIO: 1.2
E/E' RATIO: 16.38 M/S
ECHO LV POSTERIOR WALL: 1.38 CM (ref 0.6–1.1)
FRACTIONAL SHORTENING: 41 % (ref 28–44)
INTERVENTRICULAR SEPTUM: 1.47 CM (ref 0.6–1.1)
IVC DIAMETER: 1.43 CM
LA MAJOR: 4.31 CM
LA MINOR: 3.8 CM
LA WIDTH: 4.9 CM
LEFT ATRIUM SIZE: 3.96 CM
LEFT ATRIUM VOLUME: 66.62 CM3
LEFT INTERNAL DIMENSION IN SYSTOLE: 2.36 CM (ref 2.1–4)
LEFT VENTRICLE DIASTOLIC VOLUME: 69.89 ML
LEFT VENTRICLE SYSTOLIC VOLUME: 19.36 ML
LEFT VENTRICULAR INTERNAL DIMENSION IN DIASTOLE: 4 CM (ref 3.5–6)
LEFT VENTRICULAR MASS: 214.77 G
LV LATERAL E/E' RATIO: 11.91 M/S
LV SEPTAL E/E' RATIO: 26.2 M/S
LVED V (TEICH): 69.89 ML
LVES V (TEICH): 19.36 ML
LVOT MG: 1.56 MMHG
LVOT MV: 0.59 CM/S
MV PEAK A VEL: 1.09 M/S
MV PEAK E VEL: 1.31 M/S
MV STENOSIS PRESSURE HALF TIME: 72.52 MS
MV VALVE AREA P 1/2 METHOD: 3.03 CM2
NUC REST EJECTION FRACTION: 75
OHS CV CPX 1 MINUTE RECOVERY HEART RATE: 91 BPM
OHS CV CPX 85 PERCENT MAX PREDICTED HEART RATE MALE: 124
OHS CV CPX ESTIMATED METS: 7
OHS CV CPX MAX PREDICTED HEART RATE: 146
OHS CV CPX PATIENT IS FEMALE: 0
OHS CV CPX PATIENT IS MALE: 1
OHS CV CPX PEAK DIASTOLIC BLOOD PRESSURE: 59 MMHG
OHS CV CPX PEAK HEAR RATE: 133 BPM
OHS CV CPX PEAK RATE PRESSURE PRODUCT: NORMAL
OHS CV CPX PEAK SYSTOLIC BLOOD PRESSURE: 170 MMHG
OHS CV CPX PERCENT MAX PREDICTED HEART RATE ACHIEVED: 91
OHS CV CPX RATE PRESSURE PRODUCT PRESENTING: 5830
OHS CV RV/LV RATIO: 0.85 CM
PISA TR MAX VEL: 2.22 M/S
PV PEAK GRADIENT: 5 MMHG
PV PEAK VELOCITY: 1.15 M/S
RA MAJOR: 3.94 CM
RA PRESSURE ESTIMATED: 3 MMHG
RA WIDTH: 2.7 CM
RIGHT VENTRICLE DIASTOLIC BASEL DIMENSION: 3.4 CM
RIGHT VENTRICULAR END-DIASTOLIC DIMENSION: 3.38 CM
RV TB RVSP: 5 MMHG
RV TISSUE DOPPLER FREE WALL SYSTOLIC VELOCITY 1 (APICAL 4 CHAMBER VIEW): 8.9 CM/S
SINUS: 3.49 CM
STJ: 3.82 CM
STRESS ECHO POST EXERCISE DUR MIN: 6 MINUTES
STRESS ECHO POST EXERCISE DUR SEC: 53 SECONDS
SYSTOLIC BLOOD PRESSURE: 106 MMHG
TDI LATERAL: 0.11 M/S
TDI SEPTAL: 0.05 M/S
TDI: 0.08 M/S
TR MAX PG: 20 MMHG
TRICUSPID ANNULAR PLANE SYSTOLIC EXCURSION: 1.65 CM
TV REST PULMONARY ARTERY PRESSURE: 23 MMHG

## 2024-08-30 PROCEDURE — A9502 TC99M TETROFOSMIN: HCPCS | Performed by: INTERNAL MEDICINE

## 2024-08-30 PROCEDURE — 78452 HT MUSCLE IMAGE SPECT MULT: CPT | Mod: 26,,, | Performed by: INTERNAL MEDICINE

## 2024-08-30 PROCEDURE — 93017 CV STRESS TEST TRACING ONLY: CPT

## 2024-08-30 PROCEDURE — 93018 CV STRESS TEST I&R ONLY: CPT | Mod: ,,, | Performed by: INTERNAL MEDICINE

## 2024-08-30 PROCEDURE — 93016 CV STRESS TEST SUPVJ ONLY: CPT | Mod: ,,, | Performed by: INTERNAL MEDICINE

## 2024-08-30 PROCEDURE — 78452 HT MUSCLE IMAGE SPECT MULT: CPT

## 2024-08-30 PROCEDURE — 93306 TTE W/DOPPLER COMPLETE: CPT

## 2024-08-30 PROCEDURE — 93306 TTE W/DOPPLER COMPLETE: CPT | Mod: 26,,, | Performed by: INTERNAL MEDICINE

## 2024-08-30 RX ADMIN — TETROFOSMIN 10.3 MILLICURIE: 1.38 INJECTION, POWDER, LYOPHILIZED, FOR SOLUTION INTRAVENOUS at 07:08

## 2024-08-30 RX ADMIN — TETROFOSMIN 30.7 MILLICURIE: 1.38 INJECTION, POWDER, LYOPHILIZED, FOR SOLUTION INTRAVENOUS at 08:08

## 2024-08-30 NOTE — PROGRESS NOTES
" [facility]      Name: Darrion Bennett  Clinic Number: [id number]    Therapy Diagnosis:   Encounter Diagnosis   Name Primary?    Poor posture Yes     Physician: Joelle Flores MD    Visit Date: 9/3/2024    Physician Orders: PT Eval and Treat  Medical Diagnosis: Tremor [R25.1], Cervicalgia [M54.2], Other chronic pain [G89.29], Wedge compression fracture of t11-T12 vertebra, subsequent encounter for fracture with routine healing [S22.080D]   Evaluation Date: 2024  Authorization Period Expiration: 24  Plan of Care Expiration: 9/3/2024  Reassessment Due: 24  Visit # / Visits authorized:  Keep HOB elevated  MedX Testing:MedX testing visit 2    Time In: 9:00 AM   Time Out: 1005 AM  Total Billable Time: 30 min 1:1  INSURANCE and OUTCOMES: Fee for Service with FOTO Outcomes 2/3    Precautions: Standard, Diabetes, CHF, and HTN, tremor    Pattern of pain determined: 1 WILLIE    Subjective   Darrion reports minimal pain upon arrival to session    Patient reports tolerating previous visit no adverse effects.  Patient reports their pain to be 1/10 on a 0-10 scale with 0 being no pain and 10 being the worst pain imaginable.  Pain Location: bilateral neck and thoracic     Work and leisure: retired/gardening  Pt goals: "decrease pain"    Objective     Baseline IM Testing Results:   Date of testin2024   Initial: 24 Midpoint: Final   ROM  deg  deg    Max Peak Torque 273  281    Min Peak Torque 124  151    Flex/Ext Ratio 2.2:1 1.9:1    % below normative data -51% -35%    % gain from initial N/a +26%      Outcomes:  Initial score: 41%  Visit 5 score: 49%  Visit 10 score: 69%  Goal:      ROM Loss 24   Flexion minimal loss c/ pain mid back WFL WFL   Extension moderate loss, with pain mid back region Mod loss Min loss   Side bending Right moderate loss and major loss Mod-major loss Mod loss   Side bending Left minimal loss and moderate loss Mod-major " "loss Mod loss   Rotation Right moderate loss Min loss Min loss   Rotation Left minimal loss Min loss Min loss   Protraction minimal loss Min loss Min loss   Retraction  moderate loss Min-mod loss Min loss        Treatment    Darrion received the treatments listed below:      Darrion received neuromuscular education  to isolate and engage spinal stabilization musculature correctly for motor control and coordination to aid in function and posture for 10 minutes on the Medical Medx Machine.  Patient performed MedX dynamic exercise with emphasis on spinal muscular control using pacer throughout  active range of motion. Therapist assisted patient in achieving optimal exertion for neural reeducation and endurance training by using the  Rigo Exertion Rating scale, by instructing the patient to aim for mid range of exertion, performing 15-20 repetitions, slowly, correctly,and safely.           9/3/2024     9:09 AM   [facility]   Visit Number 14   VAS Pain Rating 1   Treadmill Time (in min.) 5 min   Retraction in Sitting 10   Retraction with Extension 10   Flexion 2   Sidebending 2   Rotation 5   Scapular Retraction 15   Cervical Weight 228 lbs   Repetitions 20   Rating of Perceived Exertion 3   Ice - Z Lie (in min.) 5     therapeutic exercises to develop strength, endurance, ROM, flexibility, posture, and core stabilization for  40 minutes including:    UT/LS stretch x20"  Scap retract/No money ER with GTB x 20  RIS x10 (cues) + self over pressure  retraction+extension with towel support x10  cervical rotation stretch w/towel 5 x 5 sec (cues)  Cervical retraction in supine x10--NP  Seated thoracic extension over 1/2 foam x 15  H-Abd with RTB x 10 (1/2 foam roll support)  Standing open book x 10    Peripheral muscle strengthening which included 1 set of 15-20 repetitions at a slow, controlled 10-13 second per rep pace focused on strengthening supporting musculature for improved body mechanics and functional mobility.  " Pt and therapist focused on proper form during treatment to ensure optimal strengthening of each targeted muscle group.  Machines were utilized including torso rotation, leg press, hip abd and hip add, leg ext.  Leg curl, triceps, biceps, chest and row added visit 3    cold pack for 5 minutes to cervical area in sitting.    Home Exercises Provided and Patient Education Provided   Home exercises include:Open books, scap squeeze, cervical retraction in supine  Cardio program:visit 5 :7/17/24  Lifting education date:visit 11 8/21/24  Posture/Lumbar roll: recommended  Fridge Magnet Discharge handout (date given):  Equipment at home/gym membership: none    Education provided:   -  cues w/ex's  MedX performance  Precor ex performance    Written Home Exercises Provided: Patient instructed to cont prior HEP.  Exercises were reviewed and Darrion was able to demonstrate them prior to the end of the session.  Darrion demonstrated good  understanding of the education provided.     See EMR under Patient Instructions for exercises provided prior visit.    Assessment   Darrion returns with minimal pain reported upon arrival to session. Treatment continued with flexibility, strengthening and neuromuscular reeducation exs.  He was able to perform exs with verbal and tactile cues and without increased pain. Cervical MedX resistance was maintained at 228 in/lbs and he completed 20 reps with a RPE = 3/10.  Pt was also able to complete peripheral strengthening exercises without increased discomfort. Will continue per HB protocol and patient tolerance.     Patient is making  progress towards established goals.  Pt will continue to benefit from skilled outpatient physical therapy to address the deficits stated in the impairment chart, provide pt/family education and to maximize pt's level of independence in the home and community environment.     Anticipated Barriers for therapy: tremors - currently seeing neuro PT  Pt's spiritual, cultural and  educational needs considered and pt agreeable to plan of care and goals as stated below:     Goals:     Short term goals: 6 weeks or 10 visits   - Pt will demonstratte increased cervical MedX ROM as measured by med ex by 9 degrees from initial test which results in improved  ROM of neck for ease with ADLs and driving. Appropriate and Ongoing  - Pt will demonstrate independence with reducing or controlling symptoms with ther ex, movement, or position independently, able to reduce pain 1-2 points on pain scale using strategies taught in therapy.  Appropriate and Ongoing  - Pt will demonstrate increased MedX average isometric strength value by 20% with  when compared to the initial testing resulting in improved ability to perform bending, lifting, and carrying activities safely and confidently. Appropriate and Ongoing     Long term goals: 10 weeks or 20 visits  - Pt will demonstratte increased cervical MedX ROM as measured by med ex by 15degrees from initial test which results in functional ROM of neck for ease with ADLs and driving.  Appropriate and Ongoing  - Pt will demonstrate increased MedX average isometric strength value  by 40% from initial test to improve ability to lift and carry, and sustain good posture while performing ADL's. Appropriate and Ongoing  - Pt will demonstrate reduced pain and improved functional outcomes as reported on the FOTO by reaching an intake score of >/= 57% functional ability in order to demonstrate subjective improvement in patient's condition. . Appropriate and Ongoing  - Pt will demonstrate independence with reducing or controlling symptoms with ther ex, movement, or position independently, able to reduce pain 2-4 points on pain scale using strategies taught in therapy. Appropriate and Ongoing  - Pt will demonstrate independence with the HEP at discharge. Appropriate and Ongoing  - Pt(patient goal)will be able to stand to cook dinner  > 1hour without onset of LBP Appropriate and  Ongoing    Plan   Continue with established Plan of Care towards established PT goals.     Therapist: Emma Abbasi, PT  9/3/2024

## 2024-09-03 ENCOUNTER — TELEPHONE (OUTPATIENT)
Dept: HEMATOLOGY/ONCOLOGY | Facility: CLINIC | Age: 74
End: 2024-09-03
Payer: MEDICARE

## 2024-09-03 ENCOUNTER — CLINICAL SUPPORT (OUTPATIENT)
Dept: REHABILITATION | Facility: HOSPITAL | Age: 74
End: 2024-09-03
Payer: MEDICARE

## 2024-09-03 DIAGNOSIS — R29.3 POOR POSTURE: Primary | ICD-10-CM

## 2024-09-03 PROCEDURE — 97112 NEUROMUSCULAR REEDUCATION: CPT

## 2024-09-03 PROCEDURE — 97110 THERAPEUTIC EXERCISES: CPT

## 2024-09-04 ENCOUNTER — OFFICE VISIT (OUTPATIENT)
Dept: ENDOCRINOLOGY | Facility: CLINIC | Age: 74
End: 2024-09-04
Payer: MEDICARE

## 2024-09-04 VITALS
TEMPERATURE: 98 F | BODY MASS INDEX: 21.14 KG/M2 | SYSTOLIC BLOOD PRESSURE: 134 MMHG | WEIGHT: 131 LBS | HEART RATE: 76 BPM | DIASTOLIC BLOOD PRESSURE: 60 MMHG

## 2024-09-04 DIAGNOSIS — E11.8 CONTROLLED TYPE 2 DIABETES MELLITUS WITH COMPLICATION, WITH LONG-TERM CURRENT USE OF INSULIN: Primary | ICD-10-CM

## 2024-09-04 DIAGNOSIS — Z79.4 CONTROLLED TYPE 2 DIABETES MELLITUS WITH COMPLICATION, WITH LONG-TERM CURRENT USE OF INSULIN: Primary | ICD-10-CM

## 2024-09-04 DIAGNOSIS — R80.9 MICROALBUMINURIA: ICD-10-CM

## 2024-09-04 DIAGNOSIS — I50.30 DIASTOLIC HEART FAILURE, NYHA CLASS 2: ICD-10-CM

## 2024-09-04 DIAGNOSIS — Z86.73 HISTORY OF STROKE: ICD-10-CM

## 2024-09-04 PROCEDURE — 3078F DIAST BP <80 MM HG: CPT | Mod: CPTII,S$GLB,, | Performed by: NURSE PRACTITIONER

## 2024-09-04 PROCEDURE — 1159F MED LIST DOCD IN RCRD: CPT | Mod: CPTII,S$GLB,, | Performed by: NURSE PRACTITIONER

## 2024-09-04 PROCEDURE — 99999 PR PBB SHADOW E&M-EST. PATIENT-LVL V: CPT | Mod: PBBFAC,,, | Performed by: NURSE PRACTITIONER

## 2024-09-04 PROCEDURE — G2211 COMPLEX E/M VISIT ADD ON: HCPCS | Mod: S$GLB,,, | Performed by: NURSE PRACTITIONER

## 2024-09-04 PROCEDURE — 4010F ACE/ARB THERAPY RXD/TAKEN: CPT | Mod: CPTII,S$GLB,, | Performed by: NURSE PRACTITIONER

## 2024-09-04 PROCEDURE — 3066F NEPHROPATHY DOC TX: CPT | Mod: CPTII,S$GLB,, | Performed by: NURSE PRACTITIONER

## 2024-09-04 PROCEDURE — 3008F BODY MASS INDEX DOCD: CPT | Mod: CPTII,S$GLB,, | Performed by: NURSE PRACTITIONER

## 2024-09-04 PROCEDURE — 3044F HG A1C LEVEL LT 7.0%: CPT | Mod: CPTII,S$GLB,, | Performed by: NURSE PRACTITIONER

## 2024-09-04 PROCEDURE — 99214 OFFICE O/P EST MOD 30 MIN: CPT | Mod: S$GLB,,, | Performed by: NURSE PRACTITIONER

## 2024-09-04 PROCEDURE — 1160F RVW MEDS BY RX/DR IN RCRD: CPT | Mod: CPTII,S$GLB,, | Performed by: NURSE PRACTITIONER

## 2024-09-04 PROCEDURE — 3075F SYST BP GE 130 - 139MM HG: CPT | Mod: CPTII,S$GLB,, | Performed by: NURSE PRACTITIONER

## 2024-09-04 RX ORDER — DULAGLUTIDE 4.5 MG/.5ML
4.5 INJECTION, SOLUTION SUBCUTANEOUS
Qty: 12 PEN | Refills: 1 | Status: SHIPPED | OUTPATIENT
Start: 2024-09-04

## 2024-09-04 NOTE — PROGRESS NOTES
CC: This 74 y.o.  male  is here for evaluation of  T2DM along with comorbidities indicated in the Visit Diagnosis section of this encounter.    HPI: Darrion Bennett was diagnosed with T2DM at age 35. Oral medications started years later.      Medical hx: rheumatoid arthritis on chronic prednisone, TIA, diastolic HF, carotid artery stenosis       Prior visit 2/19/24  No recent A1c available.   Pt started Jardiance.   Unable to download Dariel reader. Not using Novolog often.   7 day CGM average 149   30 day   90 day    Plan Review of CGM reader indicates controlled diabetes. Unable to download full report.   Prescription for Dexcom G7 sent to [facility]  Contact office for training if able to obtain G7.   Continue current medications.   Take Novolog 2 units before eating high-carbohydrate meals.   Return to clinic in 6 months with A1c prior.   A1c in 3 months.     Interval hx  A1c is up a bit from 6.6 to 6.9%.   Weight is stable.   Again unable to download Dariel 2 reader.   7 day CGM average 132  14 day   90 day      Pt has not taken prednisone for a couple of months.       PHX: CKD stage 3, Diastolic heart failure, NYHA class 2;  Sjogren's syndrome, gouty arthritis - sees Rheumatology     LAST DIABETES EDUCATION: multiple times, years ago     HOSPITALIZED FOR DIABETES  -  No     DIABETES MEDICATIONS:   Trulicity 4.5 mg once weekly on Wednesdays   Jardiance 10 mg once daily   Novolog pc with ss:  201-250=+2, 251-300=+3; 301-350=+4, over 350=+5 units      Misses medication doses - denies     Rotation of insulin injections - across abdomen   Changes pen neeedles - yes     DM COMPLICATIONS: nephropathy and cerebrovascular disease    SIGNIFICANT DIABETES MED HISTORY: has been told that metformin is bad for his kidneys and that's why he stopped it   - Lantus, Humalog, and Tradjenta stopped at initial visit 7/2019 and he was switched to Xultophy.   - Xultophy stopped and  switched to Trulicity and Lantus 2/2020  - Discussed switching from Novolog to glimepiride but he prefers  Injections over orals.   Lantus stopped 8/2023 when prednisone frequency was reduced   Jardiance started 11/2023      SELF MONITORING BLOOD GLUCOSE: Freestyle Dariel 2 reader.     Pt reports his postmeal BGs are < 200.   Review of CGM reader show glucoses well within range.       HYPOGLYCEMIC EPISODES: none recent    symptoms: gets jittery    CURRENT DIET: drinks water, diet soda; has some orange juice to take with his meds. Eats 3 meals/day. Lunch is the biggest meal.  Eats home cooked foods, rarely eats processed foods.     CURRENT EXERCISE: none; previously exercised in gym.     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   No dizziness.       PHYSICAL EXAM:  GENERAL: Well developed, well nourished. No acute distress.   PSYCH: AAOx3, appropriate mood and affect, conversant, well-groomed. Judgement and insight good.   NEURO: Cranial nerves grossly intact. Speech clear, no tremor.   CHEST: Respirations even and unlabored.         Hemoglobin A1C   Date Value Ref Range Status   08/28/2024 6.9 (H) 4.0 - 5.6 % Final     Comment:     ADA Screening Guidelines:  5.7-6.4%  Consistent with prediabetes  >or=6.5%  Consistent with diabetes    High levels of fetal hemoglobin interfere with the HbA1C  assay. Heterozygous hemoglobin variants (HbS, HgC, etc)do  not significantly interfere with this assay.   However, presence of multiple variants may affect accuracy.     05/23/2024 6.6 (H) 4.0 - 5.6 % Final     Comment:     ADA Screening Guidelines:  5.7-6.4%  Consistent with prediabetes  >or=6.5%  Consistent with diabetes    High levels of fetal hemoglobin interfere with the  HbA1C  assay. Heterozygous hemoglobin variants (HbS, HgC, etc)do  not significantly interfere with this assay.   However, presence of multiple variants may affect accuracy.     02/26/2024 7.1 (H) 4.0 - 5.6 % Final     Comment:     ADA Screening Guidelines:  5.7-6.4%  Consistent with prediabetes  >or=6.5%  Consistent with diabetes    High levels of fetal hemoglobin interfere with the HbA1C  assay. Heterozygous hemoglobin variants (HbS, HgC, etc)do  not significantly interfere with this assay.   However, presence of multiple variants may affect accuracy.     10/16/2018 6.2 (H) 4.0 - 5.7 % Final     Comment:     Dr. Jurgen Ward ( Endocrinology )        Ref. Range 9/19/2019 08:13   Glucose Latest Ref Range: 70 - 110 mg/dL 170 (H)   C-Peptide Latest Ref Range: 0.78 - 5.19 ng/mL 1.33        Ref. Range 8/2/2021 07:00   Glucose Latest Ref Range: 70 - 110 mg/dL 120 (H)   C-Peptide Latest Ref Range: 0.78 - 5.19 ng/mL 1.11         Component Value Date/Time     08/19/2024 0807    K 5.1 08/19/2024 0807     08/19/2024 0807    CO2 21 (L) 08/19/2024 0807    BUN 42 (H) 08/19/2024 0807    CREATININE 1.8 (H) 08/19/2024 0807     (H) 08/19/2024 0807    CALCIUM 9.4 08/19/2024 0807    ALKPHOS 64 08/19/2024 0807    AST 17 08/19/2024 0807    ALT 18 08/19/2024 0807    BILITOT 0.5 08/19/2024 0807    EGFRNORACEVR 39.0 (A) 08/19/2024 0807    ESTGFRAFRICA >60.0 04/22/2022 0845         Lab Results   Component Value Date    CHOL 154 02/26/2024    CHOL 135 02/12/2024    CHOL 137 08/09/2023     Lab Results   Component Value Date    HDL 36 (L) 02/26/2024    HDL 44 02/12/2024    HDL 35 (L) 08/09/2023     Lab Results   Component Value Date    LDLCALC 91.0 02/26/2024    LDLCALC 76.0 02/12/2024    LDLCALC 83.4 08/09/2023     Lab Results   Component Value Date    TRIG 135 02/26/2024    TRIG 75 02/12/2024    TRIG 93 08/09/2023       Lab Results   Component Value Date    CHOLHDL 23.4 02/26/2024    CHOLHDL 32.6 02/12/2024    CHOLHDL  25.5 08/09/2023         Lab Results   Component Value Date    MICALBCREAT 1233.3 (H) 11/27/2023               ASSESSMENT and PLAN:    A1C GOAL: 7.5 %     1. Controlled type 2 diabetes mellitus with complication, with long-term current use of insulin  Diabetes remains well-controlled on Trulicity and Jardiance, with minimal insulin insulin.   Continue current tx.   Prescription for new [employer] 3 system sent to [facility].   Return to clinic in as needed.   Follow up with Dr. Domingo for chronic diabetes management.     empagliflozin (JARDIANCE) 10 mg tablet    dulaglutide (TRULICITY) 4.5 mg/0.5 mL pen injector      2. Microalbuminuria  empagliflozin (JARDIANCE) 10 mg tablet      3. Diastolic heart failure, NYHA class 2  empagliflozin (JARDIANCE) 10 mg tablet      4. History of stroke  empagliflozin (JARDIANCE) 10 mg tablet    dulaglutide (TRULICITY) 4.5 mg/0.5 mL pen injector               No orders of the defined types were placed in this encounter.         Follow up if symptoms worsen or fail to improve.

## 2024-09-04 NOTE — PATIENT INSTRUCTIONS
Diabetes remains well-controlled on Trulicity and Jardiance, with minimal insulin insulin.   Continue current tx.   Prescription for new Dariel 3 system sent to [facility].   Return to clinic in as needed.   Follow up with Dr. Domingo for chronic diabetes management.

## 2024-09-04 NOTE — Clinical Note
Bailey Domingo, pt has been transferred back to [facility] with controlled DM. Now that he's not taking prednisone often, glucoses are very stable. We've scheduled him f/u with you in late March. Thanks, Bianca

## 2024-09-09 ENCOUNTER — CLINICAL SUPPORT (OUTPATIENT)
Dept: REHABILITATION | Facility: HOSPITAL | Age: 74
End: 2024-09-09
Payer: MEDICARE

## 2024-09-09 DIAGNOSIS — Z74.09 IMPAIRED FUNCTIONAL MOBILITY, BALANCE, GAIT, AND ENDURANCE: Primary | ICD-10-CM

## 2024-09-09 PROCEDURE — 97110 THERAPEUTIC EXERCISES: CPT | Mod: KX,PN

## 2024-09-09 PROCEDURE — 97112 NEUROMUSCULAR REEDUCATION: CPT | Mod: KX,PN

## 2024-09-09 NOTE — PROGRESS NOTES
[facility]   Physical Therapy Treatment Note      Name: Darrion Bennett  [facility] Number: [id number]    Therapy Diagnosis:   Encounter Diagnosis   Name Primary?    Impaired functional mobility, balance, gait, and endurance Yes     Physician: Joelle Flores MD    Visit Date: 9/9/2024    Physician Orders: PT Eval and Treat   Medical Diagnosis from Referral: R25.1 (ICD-10-CM) - Tremor   Evaluation Date: 6/25/2024  Authorization Period Expiration: 6/18/2025  Plan of Care Expiration: 9/25/2024  Progress Note Due: 8/30/2024  Visit # / Visits authorized: 8/32? (auth includes other current therapies) KX Modifier         Precautions: Fall; Hx of T11/T12 wedge compression fracture with routine healing; CHF     Time In: 8:48  Time Out: 9:33  Total Billable Time: 45 minutes (40 billable)      Subjective     Darrion reports still having near-falls, often with turning, but no falls.    He was compliant with home exercise program.      Pain: 0/10    Objective      Objective Measures updated at progress report unless specified.     Treatment     Darrion received the treatments listed below:      therapeutic exercises to develop strength and endurance for 13 minutes including:    -Nu-step, Level 3- x 8 minutes    -standing heel-raises- x 25   -cue for form x 1      *rest breaks provided throughout as needed    neuromuscular re-education activities to improve: Balance and Sense for 27 minutes, including:      (-Treadmill without UEs, @1.2 mph- NP today   -cues for mechanics)    -pivot-turns with gait- x 12    -backwards gait over open ground- 40 ft x 3 rounds     -four-square stepping over pool noodles:   -clockwise, slow to increased speed- x 8 rounds   -counter-clockwise, slow to increased speed- x 8 rounds     -forward gait over low hurdles, with reciprocal pattern- 4 hurdles x 8 rounds    -descending ramp- x 4 rounds     (NP today  -high march with B UE carrying  -gait with Tidal [employer] cylinder static hold- x  60 ft  -gait with Tidal [employer] cylinder chest-press- 2 x 60 ft )      *rest breaks provided throughout as needed      therapeutic activities to improve functional performance for 0  minutes, including:    gait training to improve functional mobility and safety for 0  minutes, including:      Patient Education and Home Exercises       Education provided:   -education on PD/ diagnostics   -discussion of physical therapy plan of care      Written Home Exercises Provided: to be provided within treatment.  Exercises were reviewed and Darrion was able to demonstrate them prior to the end of the session.  Darrion demonstrated good  understanding of the education provided.     Assessment     Mr. Bennett tolerated session well. He performed pivot-turns better today without loss of stability. Range and form with heel-raises was also improved today. Patient was challenged by upgrade of four-square stepping over obstacle, but still completed very well for first trial of this upgrade. He also progressed with using reciprocal pattern during hurdles, though this was challenging for him. Patient remains appropriate for skilled physical therapy.     Darrion Is progressing well towards his goals.   Patient prognosis is Good.     Patient will continue to benefit from skilled outpatient physical therapy to address the deficits listed in the problem list box on initial evaluation, provide pt/family education and to maximize pt's level of independence in the home and community environment.     Patient's spiritual, cultural and educational needs considered and pt agreeable to plan of care and goals.     Anticipated barriers to physical therapy: upper back/ neck pain; inconclusive Dx    Goals:   Short Term Goals: 4 weeks   -Patient will increase right knee-extension strength to 4+/5 for improved standing activities stability and endurance.  -Patient will increase single-limb stability to 5 seconds each side.  -Patient will ambulate with consistent  heel initial-contact and with increased left arm-swing x 300 feet to demonstrate improved gait quality and safety.  -Patient will ambulate over unlevel surface without loss of stability x 50 feet to demonstrate improved confidence and safety with outside gait.     Long Term Goals: 8 weeks   -Patient will achieve Timed Up and Go of </= 11 seconds to demonstrate improved gait stability and reduced fall risk.  -Patient will complete 5-Times Sit-to- 14 seconds to demonstrate improved functional capacity and reduced fall risk.  -Patient will ambulate for 10 minutes, overground or on treadmill, to demonstrate increased gait endurance.  -Patient will improve Functional Gait Assessment score to 23/30 to demonstrate improved dynamic/ community gait and reduced fall risk.     Plan     Plan of care Certification: 6/25/2024 to 9/25/2024     Outpatient Physical Therapy 2-1 times weekly for 8 weeks to include the following interventions: Gait Training, Neuromuscular Re-ed, Self Care, Therapeutic Activities, and Therapeutic Exercise.       Maggie More, PT, DPT    09/09/2024

## 2024-09-12 ENCOUNTER — OFFICE VISIT (OUTPATIENT)
Dept: CARDIOLOGY | Facility: CLINIC | Age: 74
End: 2024-09-12
Payer: MEDICARE

## 2024-09-12 DIAGNOSIS — N18.31 STAGE 3A CHRONIC KIDNEY DISEASE: ICD-10-CM

## 2024-09-12 DIAGNOSIS — M25.631 DECREASED RANGE OF MOTION OF BOTH WRISTS: ICD-10-CM

## 2024-09-12 DIAGNOSIS — M25.632 DECREASED RANGE OF MOTION OF BOTH WRISTS: ICD-10-CM

## 2024-09-12 DIAGNOSIS — I10 ESSENTIAL HYPERTENSION: ICD-10-CM

## 2024-09-12 DIAGNOSIS — I70.0 AORTIC ATHEROSCLEROSIS: ICD-10-CM

## 2024-09-12 DIAGNOSIS — I50.30 DIASTOLIC HEART FAILURE, NYHA CLASS 2: ICD-10-CM

## 2024-09-12 DIAGNOSIS — M35.00 SJOGREN'S SYNDROME, WITH UNSPECIFIED ORGAN INVOLVEMENT: ICD-10-CM

## 2024-09-12 DIAGNOSIS — R06.02 SOB (SHORTNESS OF BREATH): Primary | ICD-10-CM

## 2024-09-12 DIAGNOSIS — E11.42 CONTROLLED TYPE 2 DIABETES MELLITUS WITH DIABETIC POLYNEUROPATHY, WITH LONG-TERM CURRENT USE OF INSULIN: ICD-10-CM

## 2024-09-12 DIAGNOSIS — E78.00 PURE HYPERCHOLESTEROLEMIA: ICD-10-CM

## 2024-09-12 DIAGNOSIS — Z79.4 CONTROLLED TYPE 2 DIABETES MELLITUS WITH DIABETIC POLYNEUROPATHY, WITH LONG-TERM CURRENT USE OF INSULIN: ICD-10-CM

## 2024-09-12 NOTE — PROGRESS NOTES
CARDIOVASCULAR CONSULTATION    REASON FOR CONSULT:   Darrion Bennett is a 74 y.o. male who presents for establishing care with Cardiology.      HISTORY OF PRESENT ILLNESS:     Notes from September 2019:  Patient here for follow-up today.  Denies any chest pains at rest on exertion, orthopnea, PND.  Denies any other cardiac symptoms.    Notes from June 2019:  Patient is here for follow-up today.  Denies any chest pains at rest on exertion, orthopnea, PND.  Denies any.  Of dizziness or passing out.  Blood pressure is much better controlled in clinic today.    Initial clinic visit:  Patient is a very pleasant 69-year-old man with a past medical history significant for hypertension, diabetes, Sjogren's disease who wants to establish care with [facility].  Patient has been followed with [facility].  Patient states that in 2014 he was diagnosed with heart failure.  He states that he had passed out and had gone to the hospital, he was feeling short of breath also and at that point he was told that he had heart failure.  I can see an echocardiogram from his outside medical records in 2014 which showed normal left ventricular systolic function.  He also had a stress echocardiogram done in 2013 which did not reveal any ischemia.  He denies any chest pains at rest on exertion, orthopnea, PND, swelling of feet.  States that since then he has been okay and walks about 2-3 blocks every day.  On walking about 2-3 blocks he does experience some tightness in his left calf.  This goes away after rest.  He denies any resting calf tightness.  He denies any ulcer on his feet.  He states that he checks his blood pressure at home and has found that it is elevated around 160-170 mm of systolic multiple times.      Notes from March 2021:  Patient here for follow-up.  Denies any chest pains at rest on exertion, orthopnea, PND, swelling of feet.  Mostly blood pressure has been uncontrolled at home.  Staying around 160 mmHg.  Very  compliant with medications.  EKG done in the clinic today was personally reviewed and demonstrates sinus bradycardia with left anterior fascicular block, poor R-wave progression.      Notes from April 21:  Patient here for follow-up.  Now states that he has been experiencing dyspnea on exertion which is worse than prior.  Very concerned about that.  No chest pains or tightness, but it is gets short of breath on walking short distances and this is lifestyle limiting.  Denies orthopnea, PND, swelling of feet.  Also states that the blood pressure is staying around 140-160 mmHg at home.      Notes from May 2021:  Patient here for follow-up.  Stress test did not show any significant ischemia.  Echo showed normal left ventricle systolic function.  Symptoms have improved.  States stop taking his Crestor because it was causing myalgias      The left ventricle is normal in size with concentric remodeling and normal systolic function.  The estimated ejection fraction is 65%.  Grade II left ventricular diastolic dysfunction.  Severe left atrial enlargement.  Normal right ventricular size with normal right ventricular systolic function.  Mild right atrial enlargement.  Mild mitral regurgitation.  Normal central venous pressure (3 mmHg).  The estimated PA systolic pressure is 36 mmHg.  The sinuses of Valsalva is mildly dilated.  Normal myocardial perfusion scan. There is no evidence of myocardial ischemia or infarction.    The gated perfusion images showed an ejection fraction of 75% post stress.    There is normal wall motion post stress.    The EKG portion of this study is negative for ischemia.    The patient reported no chest pain during the stress test.    During stress, rare PVCs are noted.    Hypertensive response to exercise.  OH interval did increase during exercise.      Notes from July 2021:  Patient here for follow-up.  Has been experiencing dyspnea on exertion.  States it is getting worse.  Has an appointment with  pulmonology coming up.  Is requesting evaluation of CBC and BNP.  As well as a chest x-ray.      The left ventricle is normal in size with concentric hypertrophy and normal systolic function.  The estimated ejection fraction is 65%.  Grade I left ventricular diastolic dysfunction.  Normal right ventricular size with normal right ventricular systolic function.  Moderate left atrial enlargement.  Mild right atrial enlargement.  There is mild aortic valve stenosis.  Aortic valve area is 1.67 cm2; peak velocity is 1.95 m/s; mean gradient is 9 mmHg.  Normal central venous pressure (3 mmHg).  The estimated PA systolic pressure is 16 mmHg.      Notes from dec 21: doing fine. No cp, orthopnea, pnd.       February 2022:  Patient follow-up.  Doing fine.  Occasionally gets swelling in his feet at the end of the day..  States he takes torsemide every Saturday and Sunday.  States he was told by his nephrologist to do so.    Notes from August 2022: Patient states that his nephrologist got off his amlodipine and since then his blood pressure has been uncontrolled.  He is taking hydralazine at 50 mg t.i.d..  Blood pressure is running around 170 mmHg.  Also had an episode of chest pressure last week.  Did not seek medical attention for that.  Denies orthopnea, PND.      Notes from September 2022:  Patient here for follow-up.  Has been chest pain-free.  Stress test did not show any significant ischemia.  Echo showed normal left ventricle systolic function.      Normal myocardial perfusion scan. There is no evidence of myocardial ischemia or infarction.    The gated perfusion images showed an ejection fraction of 72% post stress.    There is normal wall motion post stress.    The EKG portion of this study is negative for ischemia.    The patient reported no chest pain during the stress test.    During stress, frequent PVCs are noted.      The left ventricle is normal in size with mild concentric hypertrophy and normal systolic  function.  The estimated ejection fraction is 60%.  Grade I left ventricular diastolic dysfunction.  Normal right ventricular size with normal right ventricular systolic function.  Moderate left atrial enlargement.  Mild right atrial enlargement.  Mild mitral regurgitation.  Mild pulmonic regurgitation.  Normal central venous pressure (3 mmHg).  The estimated PA systolic pressure is 36 mmHg.  There is mild pulmonary hypertension.      Feb 23:  Patient here for follow-up.  Denies any chest pains at rest on exertion, orthopnea, PND.  Occasional swelling of feet as well as hence.  Is on chronic prednisone.      2/27/23: doing fine. No cp, orthopnea, pnd, dizziness or syncope.    Sinus rhythm with heart rates varying between 45 and 101 BPM with an average of 66 BPM.  There were occasional PVCs totalling 607 and averaging 25.31 per hour. There were 5 couplets. There were 88 bigeminal cycles.  There were very frequent PACs totalling 3884 and averaging 161.97 per hour.  Frequent episodes of Mobitz I second degree AVB    Notes from August 24:  Patient here for follow-up.  Denies chest pains at rest on exertion, orthopnea, PND.  Main complaint is dyspnea on exertion.  States can only walk from here to the door of the office and then gets short of breath.  Feels that this has been getting worse progressively.      Notes from September 24: Patient here for follow-up.  Denies chest pains at rest on exertion, orthopnea, PND.  Breathing has gotten better after Jardiance    Results for orders placed or performed in visit on 08/15/24   IN OFFICE EKG 12-LEAD (to [place])    Collection Time: 08/15/24  7:44 AM   Result Value Ref Range    QRS Duration 86 ms    [facility] QTC Calculation 443 ms    Narrative    Test Reason : I10,    Vent. Rate : 071 BPM     Atrial Rate : 071 BPM     P-R Int : 380 ms          QRS Dur : 086 ms      QT Int : 408 ms       P-R-T Axes : 063 270 064 degrees     QTc Int : 443 ms    Sinus rhythm with 1st degree A-V  block  Right superior axis deviation  Septal infarct (cited on or before 14-FEB-2023)  Abnormal ECG  When compared with ECG of 14-FEB-2023 10:01,    Questionable change in initial forces of Septal leads    Confirmed by Gage Church MD ([id number]) on 8/15/2024 4:15:41 PM    Referred By:  ELVIA           Confirmed By:Gage Church MD       Results for orders placed during the hospital encounter of 08/30/24    Echo    Interpretation Summary    Left Ventricle: Increased wall thickness. There is normal systolic function with a visually estimated ejection fraction of 65 - 70%.    Right Ventricle: Normal right ventricular cavity size. Systolic function is normal.    Left Atrium: Left atrium is mildly dilated.    Aortic Valve: There is mild stenosis. Aortic valve area by VTI is 1.84 cm². Aortic valve peak velocity is 1.70 m/s. Mean gradient is 6 mmHg. The dimensionless index is 0.48.    Mitral Valve: There is mild regurgitation.    Pulmonary Artery: The estimated pulmonary artery systolic pressure is 23 mmHg.    IVC/SVC: Normal venous pressure at 3 mmHg.      Results for orders placed during the hospital encounter of 08/30/24    Nuclear Stress - Cardiology Interpreted    Interpretation Summary    The patient exercised for 6 minutes 53 seconds on a Jean-Paul protocol, corresponding to a functional capacity of 7METS, achieving a peak heart rate of 133 bpm, which is 91% of the age predicted maximum heart rate.    Normal myocardial perfusion scan. There is no evidence of myocardial ischemia or infarction.    The gated perfusion images showed an ejection fraction of 75% at rest.    There is normal wall motion at rest and post-stress.    The ECG portion of the study is negative for ischemia.    The patient reported no chest pain during the stress test.    During stress, frequent PVCs are noted.      No results found for this or any previous visit.          PAST MEDICAL HISTORY:     Past Medical History:   Diagnosis Date    Anemia      Arthritis     Cataract     CHF (congestive heart failure)     Chronic constipation     Diabetes mellitus, type 2     Felon of finger of left hand 05/11/2019    Glaucoma     Hyperlipidemia 05/13/2014    Hypertension     MCTD (mixed connective tissue disease)     Personal history of colonic polyps     Sjogren's disease     Ulcerative colitis     Urolithiasis        PAST SURGICAL HISTORY:     Past Surgical History:   Procedure Laterality Date    CATARACT EXTRACTION Bilateral 2005    COLONOSCOPY  2014    ESOPHAGOGASTRODUODENOSCOPY N/A 9/8/2023    Procedure: EGD (ESOPHAGOGASTRODUODENOSCOPY);  Surgeon: Divya Saenz MD;  Location: [facility];  Service: Endoscopy;  Laterality: N/A;  ref by / inst portal-RB    ESOPHAGOGASTRODUODENOSCOPY N/A 11/22/2023    Procedure: EGD (ESOPHAGOGASTRODUODENOSCOPY);  Surgeon: Crystal Urbina MD;  Location: [facility];  Service: Endoscopy;  Laterality: N/A;  time frame 8-12 weeks  Dr. Saenz pt   prep [facility] pt [facility]  diabetic  11/16- pt r/s to earlier date, pre call complete.  [facility]    EYE SURGERY         ALLERGIES AND MEDICATION:     Review of patient's allergies indicates:   Allergen Reactions    Imuran [azathioprine]      Pancytopenia    Penicillins Nausea And Vomiting    Rosuvastatin      Muscle pain     Allopurinol analogues Rash        Medication List            Accurate as of September 12, 2024  9:49 AM. If you have any questions, ask your nurse or doctor.                CHANGE how you take these medications      ferrous gluconate 324 mg (37.5 mg iron) Tab tablet  Take 1 tablet (324 mg total) by mouth once daily.  What changed: additional instructions            CONTINUE taking these medications      amLODIPine 5 MG tablet  Commonly known as: NORVASC  TAKE 1 TABLET TWICE DAILY     ammonium lactate 12 % Crea  Apply 1 application topically once daily.     atorvastatin 80 MG tablet  Commonly known as: LIPITOR  TAKE 1  "TABLET EVERY DAY     blood-glucose meter Misc  Commonly known as: TRUE METRIX GLUCOSE METER  Test glucose 4x/day     ciclopirox 8 % Soln  Commonly known as: PENLAC  Apply topically nightly.     * diclofenac sodium 1 % Gel  Commonly known as: VOLTAREN  Apply 2 g topically 4 (four) times daily as needed. Apply to aching joints     * diclofenac sodium 1 % Gel  Commonly known as: VOLTAREN  Apply 2 g topically once daily.     dorzolamide 2 % ophthalmic solution  Commonly known as: TRUSOPT  INSTILL 1 DROP INTO BOTH EYES TWICE DAILY     DROPLET PEN NEEDLE 32 gauge x 5/32" Ndle  Generic drug: pen needle, diabetic  USE 1 NEEDLE AS DIRECTED FOUR TIMES DAILY     DROPSAFE ALCOHOL PREP PADS Padm  Generic drug: alcohol swabs  USE  4 TIMES PER DAY     empagliflozin 10 mg tablet  Commonly known as: JARDIANCE  Take 1 tablet (10 mg total) by mouth once daily.     febuxostat 40 mg Tab  Commonly known as: ULORIC  Take 1 tablet (40 mg total) by mouth 2 (two) times a day.     fluticasone propionate 50 mcg/actuation nasal spray  Commonly known as: FLONASE  1 spray (50 mcg total) by Each Nostril route once daily.     hydrALAZINE 25 MG tablet  Commonly known as: APRESOLINE  TAKE 3 TABLETS THREE TIMES DAILY     INJECTAFER 50 mg iron/mL injection  Generic drug: ferric carboxymaltose     insulin aspart U-100 100 unit/mL (3 mL) Inpn pen  Commonly known as: NovoLOG Flexpen U-100 Insulin  Inject Novolog if glucose after meal is high:  201-250=+2, 251-300=+3; 301-350=+4, over 350=+5 units     lancets 33 gauge Misc  Commonly known as: TRUEPLUS LANCETS  Test glucose 4x/day. Dx code e11.65     LIDOcaine 5 %  Commonly known as: LIDODERM  Place 1 patch onto the skin once daily. Remove & Discard patch within 12 hours or as directed by MD     multivit with min-folic acid 200 mcg Chew     omeprazole 40 MG capsule  Commonly known as: PRILOSEC  Take 1 capsule (40 mg total) by mouth every morning.     ORENCIA CLICKJECT 125 mg/mL Atin  Generic drug: " abatacept  Inject 125 mg into the skin once a week.     predniSONE 5 MG tablet  Commonly known as: DELTASONE  Take 1 tablet (5 mg total) by mouth once daily.     PROLIA 60 mg/mL Syrg  Generic drug: denosumab     senna-docusate 8.6-50 mg 8.6-50 mg per tablet  Commonly known as: SENNA WITH DOCUSATE SODIUM  Take 1 tablet by mouth once daily.     torsemide 10 MG Tab  Commonly known as: DEMADEX  TAKE 1 TABLET EVERY OTHER DAY     traMADoL 50 mg tablet  Commonly known as: ULTRAM  TAKE 1 TABLET EVERY 12 HOURS AS NEEDED FOR PAIN     travoprost 0.004 % ophthalmic solution  Commonly known as: TRAVATAN Z  Place 1 drop into both eyes every evening.     triamcinolone acetonide 0.1% 0.1 % cream  Commonly known as: KENALOG  APPLY TOPICALLY TWICE DAILY FOR 10 DAYS     TRUE METRIX GLUCOSE TEST STRIP Strp  Generic drug: blood sugar diagnostic  TEST BLOOD SUGAR FOUR TIMES DAILY     TRUE METRIX LEVEL 1 Soln  Generic drug: blood glucose control, low  Use as indicated     TRULICITY 4.5 mg/0.5 mL pen injector  Generic drug: dulaglutide  Inject 4.5 mg into the skin every 7 days.     valsartan 160 MG tablet  Commonly known as: DIOVAN  TAKE 1 TABLET TWICE DAILY           * This list has 2 medication(s) that are the same as other medications prescribed for you. Read the directions carefully, and ask your doctor or other care provider to review them with you.                  SOCIAL HISTORY:     Social History     Socioeconomic History    Marital status:     Number of children: 3   Tobacco Use    Smoking status: Never     Passive exposure: Never    Smokeless tobacco: Never   Substance and Sexual Activity    Alcohol use: No    Drug use: Yes     Comment: ultram 1 - 2 tabs a week    Sexual activity: Not Currently     Partners: Female     Social Determinants of Health     Financial Resource Strain: Low Risk  (2/12/2024)    Overall Financial Resource Strain (CARDIA)     Difficulty of Paying Living Expenses: Not very hard   Recent Concern:  Financial Resource Strain - Medium Risk (11/27/2023)    Overall Financial Resource Strain (CARDIA)     Difficulty of Paying Living Expenses: Somewhat hard   Food Insecurity: Food Insecurity Present (2/12/2024)    Hunger Vital Sign     Worried About Running Out of Food in the Last Year: Sometimes true     Ran Out of Food in the Last Year: Sometimes true   Transportation Needs: No Transportation Needs (2/12/2024)    PRAPARE - Transportation     Lack of Transportation (Medical): No     Lack of Transportation (Non-Medical): No   Physical Activity: Unknown (2/12/2024)    Exercise Vital Sign     Days of Exercise per Week: 0 days   Stress: No Stress Concern Present (2/12/2024)    [employer] [place] [employer] - Occupational Stress Questionnaire     Feeling of Stress : Only a little   Housing Stability: Low Risk  (2/12/2024)    Housing Stability Vital Sign     Unable to Pay for Housing in the Last Year: No     Number of Places Lived in the Last Year: 2     Unstable Housing in the Last Year: No       FAMILY HISTORY:     Family History   Problem Relation Name Age of Onset    Hypertension Father      Stroke Father      Cataracts Father      Glaucoma Father      Cataracts Mother      No Known Problems Sister      No Known Problems Brother      Rheum arthritis Maternal Aunt      Rheum arthritis Maternal Uncle      No Known Problems Paternal Aunt      No Known Problems Paternal Uncle      No Known Problems Maternal Grandmother      No Known Problems Maternal Grandfather      Throat cancer Paternal Grandmother      Glaucoma Paternal Grandfather      No Known Problems Daughter      No Known Problems Son x2     Celiac disease Neg Hx      Colon cancer Neg Hx      Cirrhosis Neg Hx      Colon polyps Neg Hx      Crohn's disease Neg Hx      Cystic fibrosis Neg Hx      Hemochromatosis Neg Hx      Esophageal cancer Neg Hx      Inflammatory bowel disease Neg Hx      Irritable bowel syndrome Neg Hx      Liver cancer Neg Hx       Liver disease Neg Hx      Rectal cancer Neg Hx      Stomach cancer Neg Hx      Ulcerative colitis Neg Hx      Chase's disease Neg Hx      Amblyopia Neg Hx      Blindness Neg Hx      Cancer Neg Hx      Diabetes Neg Hx      Macular degeneration Neg Hx      Retinal detachment Neg Hx      Strabismus Neg Hx      Thyroid disease Neg Hx      Melanoma Neg Hx         REVIEW OF SYSTEMS:   Review of Systems   Constitutional: Negative.    HENT: Negative.    Eyes: Negative.    Respiratory: Negative.    Gastrointestinal: Negative.    Genitourinary: Negative.    Musculoskeletal: Negative.    Skin: Negative.    Neurological: Negative.    Endo/Heme/Allergies: Negative.    Psychiatric/Behavioral: Negative.    All other systems reviewed and are negative.      A 10 point review of systems was performed and all the pertinent positives have been mentioned. Rest of review of systems was negative.        PHYSICAL EXAM:     There were no vitals filed for this visit.   There is no height or weight on file to calculate BMI.            Physical Exam  Vitals and nursing note reviewed.   Constitutional:       Appearance: Normal appearance. He is well-developed.   HENT:      Head: Normocephalic and atraumatic.      Right Ear: Hearing normal.      Left Ear: Hearing normal.      Nose: Nose normal.   Eyes:      General: Lids are normal.      Conjunctiva/sclera: Conjunctivae normal.      Pupils: Pupils are equal, round, and reactive to light.   Cardiovascular:      Rate and Rhythm: Normal rate and regular rhythm.      Pulses: Normal pulses.      Heart sounds: Murmur heard.    Systolic murmur is present with a grade of 2/6.  Pulmonary:      Effort: Pulmonary effort is normal.      Breath sounds: Normal breath sounds.   Abdominal:      Palpations: Abdomen is soft.      Tenderness: There is no abdominal tenderness.   Musculoskeletal:         General: No deformity.      Cervical back: Normal range of motion and neck supple.   Skin:     General: Skin is  warm and dry.   Neurological:      Mental Status: He is alert and oriented to person, place, and time.   Psychiatric:         Speech: Speech normal.           DATA:     Laboratory:  CBC:  Recent Labs   Lab 05/22/24  0710 05/23/24  0710 08/19/24  0807   WBC 7.22 7.61 7.29   Hemoglobin 10.9 L 11.2 L 11.4 L   Hematocrit 34.3 L 35.6 L 36.7 L   Platelets 233 249 194       CHEMISTRIES:  Recent Labs   Lab 02/01/22  0844 03/28/22  0712 04/22/22  0845 09/26/22  0950 05/22/24  0710 06/11/24  0705 08/19/24  0807   Glucose 88  88 134 H 133 H   < > 104 126 H 162 H   Sodium 141  141 137 136   < > 138 134 L 136   Potassium 4.5  4.5 4.4 4.7   < > 4.4 4.9 5.1   BUN 30 H  30 H 30 H 26 H   < > 26 H 28 H 42 H   Creatinine 1.4  1.4 1.2 1.3   < > 1.6 H 1.9 H 1.8 H   eGFR if  58 A  58 A >60.0 >60.0  --   --   --   --    eGFR if non African American 50 A  50 A >60.0 54.5 A  --   --   --   --    Calcium 8.9  8.9 9.5 9.4   < > 9.6 8.9 9.4    < > = values in this interval not displayed.       CARDIAC BIOMARKERS:          COAGS:          LIPIDS/LFTS:  Recent Labs   Lab 08/09/23  0734 11/27/23  0810 02/12/24  0710 02/26/24  0941 05/22/24  0710 06/11/24  0705 08/19/24  0807   Cholesterol 137  --  135 154  --   --   --    Triglycerides 93  --  75 135  --   --   --    HDL 35 L  --  44 36 L  --   --   --    LDL Cholesterol 83.4  --  76.0 91.0  --   --   --    Non-HDL Cholesterol 102  --  91 118  --   --   --    AST  --    < >  --   --  22 19 17   ALT  --    < >  --   --  18 14 18    < > = values in this interval not displayed.       Hemoglobin A1C   Date Value Ref Range Status   08/28/2024 6.9 (H) 4.0 - 5.6 % Final     Comment:     ADA Screening Guidelines:  5.7-6.4%  Consistent with prediabetes  >or=6.5%  Consistent with diabetes    High levels of fetal hemoglobin interfere with the HbA1C  assay. Heterozygous hemoglobin variants (HbS, HgC, etc)do  not significantly interfere with this assay.   However, presence of multiple  variants may affect accuracy.     05/23/2024 6.6 (H) 4.0 - 5.6 % Final     Comment:     ADA Screening Guidelines:  5.7-6.4%  Consistent with prediabetes  >or=6.5%  Consistent with diabetes    High levels of fetal hemoglobin interfere with the HbA1C  assay. Heterozygous hemoglobin variants (HbS, HgC, etc)do  not significantly interfere with this assay.   However, presence of multiple variants may affect accuracy.     02/26/2024 7.1 (H) 4.0 - 5.6 % Final     Comment:     ADA Screening Guidelines:  5.7-6.4%  Consistent with prediabetes  >or=6.5%  Consistent with diabetes    High levels of fetal hemoglobin interfere with the HbA1C  assay. Heterozygous hemoglobin variants (HbS, HgC, etc)do  not significantly interfere with this assay.   However, presence of multiple variants may affect accuracy.     10/16/2018 6.2 (H) 4.0 - 5.7 % Final     Comment:     Dr. Jurgen Ward ( Endocrinology )       TSH  Recent Labs   Lab 09/26/22  0950   TSH 2.281       The 10-year ASCVD risk score (Lamont DK, et al., 2019) is: 48.7%    Values used to calculate the score:      Age: 74 years      Sex: Male      Is Non- : No      Diabetic: Yes      Tobacco smoker: No      Systolic Blood Pressure: 134 mmHg      Is BP treated: Yes      HDL Cholesterol: 36 mg/dL      Total Cholesterol: 154 mg/dL           Cardiovascular Testing:    EKG: (personally reviewed tracing)  Sinus bradycardia with first-degree AV block left anterior fascicle, septal infarct.    KIRT in June 2019:  Normal resting KIRT/PVR waveforms bilaterally.  Normal exercise KIRT bilaterally (with minimal drop from resting KIRT measurements).        2D echocardiogram in June 2019:  Normal left ventricular systolic function. The estimated ejection fraction is 65%  Moderate left atrial enlargement.  Normal LV diastolic function.  Mild right atrial enlargement.  Normal central venous pressure (3 mm Hg).  The estimated PA systolic pressure is 14 mm Hg            2D  echocardiogram 2014 done at [facility]    HEIGHT: 167.6 cm  WEIGHT: 74.8 kg  BP: 157/82  BSA:  MEASUREMENTS  ------------  IVSd: 1.2 cm  LVIDd: 3.8 cm  LVPWd: 1.2 cm  LVIDs: 2.6 cm  LA Diam: 3.2 cm  Ao Diam: 3.1 cm  LAESV Index (A-L): 32.40 ml/m²  LVCO Dopp: 6.98 l/min  LVCI Dopp: 3.79 l/minm²    FINDINGS  -------  CPT CODE:39575-01.  Transthoracic echocardiogram (complete).  The attending physician   listed herein personally reviewed all stored images and directly supervised the   fellow-in-training (if listed) in the study's acquistion and/or report   generation.     Study priority:  Routine.  ICD-9 Indication(s):786.05 - Dyspnea.  Study Quality:The study was technically adequate.  ECG Rhythm:Sinus rhythm.  CHAMBER SIZE:All cardiac chamber sizes are within normal limits.  AORTA:Normal aortic root and proximal ascending aorta.  VALVULAR STRUCTURES:The visualized cardiac valves are structurally normal.  LEFT/RIGHT VENTRICULAR FUNCTION:LV size, wall thickness and systolic function are normal, with an EF > 55%.       The right ventricle is normal in size and function.  DOPPLER:  Color:No valvular insufficiencies.  DIASTOLOGY:The diastolic filling pattern is normal for the age of the patient.  PERICARDIUM / EFFUSIONS:No effusions noted.  INFERIOR VENA CAVA:Normal size inferior vena cava.  PA PRESSURE:The estimated systolic pulmonary artery pressure is normal at < 35 mm Hg (RA   pressure = 3 mm Hg).  CARDIOLOGY:    Electronically signed:  STAFF:ELIAS JAIME M.D.  Fellow:G. MOHSEN, M.D.    CONCLUSIONS  -----------  1. LV size, wall thickness and systolic function are normal, with an EF > 55%.  2. The diastolic filling pattern is normal for the age of the patient.    ASSESSMENT AND PLAN     Patient Active Problem List   Diagnosis    Essential hypertension    Controlled type 2 diabetes mellitus with diabetic polyneuropathy, with long-term current use of insulin    Dyslipidemia    MCTD (mixed connective tissue disease)     Sjogren's disease    Bilateral edema of lower extremity 6/2019 TTE normal LV systolic and diastolic function; ABIs normal    Glaucoma    BMI 23.0-23.9, adult    Jackson's esophagus without dysplasia    Anemia from plaquenil and imuran    Neutropenia    Current chronic use of systemic steroids    Compression fracture of body of thoracic vertebra on XR 2/2019; 2/2019 alendronate    Anemia of chronic renal failure, stage 3b    Chronic constipation not responding to linzess, miralax, senna    Screening for colon cancer 07/26/2018 colonoscopy transverse colon polyp path showing benign inflammatory polyp.    Tophaceous gout    Pseudophakia of both eyes    Refractive error    Aortic atherosclerosis    Celiac disease    Diastolic heart failure, NYHA class 2    Stage 3a chronic kidney disease    RA (rheumatoid arthritis)    Secondary hyperparathyroidism of renal origin    Age-related osteoporosis without current pathological fracture    Mobitz I    Bilateral carotid artery stenosis on CTA 6/26/21    History of stroke    Dyspnea on exertion    JAZLYN (obstructive sleep apnea)    Long-term insulin use    Dyshidrotic eczema    Difficulty walking    Hip joint stiffness, bilateral    Duodenal ulcer 9/8/23 EGD LA grade C esophagitis with bleeding. 3 cm HH, erythematous mucosa antrum. nonbleeding duodenal ulcers no stigmata of bleeding.  Path chronic inactive gastritis.  H pylori ne    Poor posture    Impaired functional mobility, balance, gait, and endurance       1.  Chest pressure:  Now resolved.  Stress test 2024 did not show any significant ischemia.  Echo showed normal left ventricle systolic function.    2.  Hypertension:  Well controlled on current therapy.  Continue current therapy    3.  Dyslipidemia:  Tolerating lipitor    4.  ABIs in June 2019 were within normal limits.    5.  Chronic kidney disease    7. Elevated TSH.   rx per primary    8. Torsemide to be used as needed for swelling of feet.     9. Carotid bruit:  moderate plaque per ct scan in 2021. Regular surveillance with carotid us.    10:  Frequent PVCs:  asymptomatic.      Follow-up after 6 m      Thank you very much for involving me in the care of your patient.  Please do not hesitate to contact me if there are any questions.      Greg Alvares MD, FACC, [facility]  Interventional Cardiologist, [facility].           This note was dictated with the help of speech recognition software.  There might be un-intended errors and/or substitutions.

## 2024-09-13 ENCOUNTER — CLINICAL SUPPORT (OUTPATIENT)
Dept: REHABILITATION | Facility: HOSPITAL | Age: 74
End: 2024-09-13
Payer: MEDICARE

## 2024-09-13 DIAGNOSIS — Z74.09 IMPAIRED FUNCTIONAL MOBILITY, BALANCE, GAIT, AND ENDURANCE: Primary | ICD-10-CM

## 2024-09-13 PROCEDURE — 97110 THERAPEUTIC EXERCISES: CPT | Mod: KX,PN

## 2024-09-13 PROCEDURE — 97112 NEUROMUSCULAR REEDUCATION: CPT | Mod: KX,PN

## 2024-09-13 NOTE — PROGRESS NOTES
"[facility]   Physical Therapy Treatment Note      Name: Darrion Bennett  [facility] Number: [id number]    Therapy Diagnosis:   Encounter Diagnosis   Name Primary?    Impaired functional mobility, balance, gait, and endurance Yes     Physician: Joelle Flores MD    Visit Date: 9/13/2024    Physician Orders: PT Eval and Treat   Medical Diagnosis from Referral: R25.1 (ICD-10-CM) - Tremor   Evaluation Date: 6/25/2024  Authorization Period Expiration: 6/18/2025  Plan of Care Expiration: 9/25/2024  Progress Note Due: 8/30/2024  Visit # / Visits authorized: 9/32? (auth includes other current therapies) KX Modifier         Precautions: Fall; Hx of T11/T12 wedge compression fracture with routine healing; CHF     Time In: 8:47  Time Out: 9:32  Total Billable Time: 45 minutes (40 billable)      Subjective     Darrion reports no new updates today.    He was compliant with home exercise program.      Pain: 0/10    Objective      Objective Measures updated at progress report unless specified.     Treatment     Darrion received the treatments listed below:      therapeutic exercises to develop strength and endurance for 9 minutes including:    -Recumbent bike, Level 3- x 9 minutes      *rest breaks provided throughout as needed    neuromuscular re-education activities to improve: Balance and Sense for 31 minutes, including:      (-Treadmill without UEs, @1.2 mph- NP today   -cues for mechanics)    -forward step-ups 6" step without UE support- x 20    -lateral step-ups 6" step without UE support- x 15 each direction     -four-square stepping over pool noodles:   -clockwise- x 6 rounds   -counter-clockwise- x 6 rounds     -gait with Tidal Tank cylinder:   -held at chest-level, short lever-arm- 50 ft x 3 rounds   -held at chest-level, long lever-arm- 50 ft x 4 rounds    -cone-tap gait inside //, with wide cones- 5 cones x 4 rounds       *rest breaks provided throughout as needed    therapeutic activities to improve " functional performance for 0  minutes, including:    gait training to improve functional mobility and safety for 0  minutes, including:      Patient Education and Home Exercises       Education provided:   -education on PD/ diagnostics   -discussion of physical therapy plan of care      Written Home Exercises Provided: to be provided within treatment.  Exercises were reviewed and Darrion was able to demonstrate them prior to the end of the session.  Darrion demonstrated good  understanding of the education provided.     Assessment     Mr. Bennett tolerated session well. He demonstrated improved dynamic balance and gait with stability maintained relatively well during forward step-ups, lateral step-ups, and gait with long lever-arm Tidal Tank. Four-square stepping was also much improved today. Patient was challenged by progressed activity of cone-tap gait, but completed well for first trial. More practice planned with this. Patient remains appropriate for skilled physical therapy.      Darrion Is progressing well towards his goals.   Patient prognosis is Good.     Patient will continue to benefit from skilled outpatient physical therapy to address the deficits listed in the problem list box on initial evaluation, provide pt/family education and to maximize pt's level of independence in the home and community environment.     Patient's spiritual, cultural and educational needs considered and pt agreeable to plan of care and goals.     Anticipated barriers to physical therapy: upper back/ neck pain; inconclusive Dx    Goals:   Short Term Goals: 4 weeks   -Patient will increase right knee-extension strength to 4+/5 for improved standing activities stability and endurance.  -Patient will increase single-limb stability to 5 seconds each side.  -Patient will ambulate with consistent heel initial-contact and with increased left arm-swing x 300 feet to demonstrate improved gait quality and safety.  -Patient will ambulate over  unlevel surface without loss of stability x 50 feet to demonstrate improved confidence and safety with outside gait.     Long Term Goals: 8 weeks   -Patient will achieve Timed Up and Go of </= 11 seconds to demonstrate improved gait stability and reduced fall risk.  -Patient will complete 5-Times Sit-to- 14 seconds to demonstrate improved functional capacity and reduced fall risk.  -Patient will ambulate for 10 minutes, overground or on treadmill, to demonstrate increased gait endurance.  -Patient will improve Functional Gait Assessment score to 23/30 to demonstrate improved dynamic/ community gait and reduced fall risk.     Plan     Plan of care Certification: 6/25/2024 to 9/25/2024     Outpatient Physical Therapy 2-1 times weekly for 8 weeks to include the following interventions: Gait Training, Neuromuscular Re-ed, Self Care, Therapeutic Activities, and Therapeutic Exercise.       Maggie More, PT, DPT    09/13/2024

## 2024-09-16 ENCOUNTER — CLINICAL SUPPORT (OUTPATIENT)
Dept: REHABILITATION | Facility: HOSPITAL | Age: 74
End: 2024-09-16
Payer: MEDICARE

## 2024-09-16 ENCOUNTER — TELEPHONE (OUTPATIENT)
Dept: FAMILY MEDICINE | Facility: CLINIC | Age: 74
End: 2024-09-16
Payer: MEDICARE

## 2024-09-16 DIAGNOSIS — M25.651 HIP JOINT STIFFNESS, BILATERAL: Primary | ICD-10-CM

## 2024-09-16 DIAGNOSIS — Z74.09 IMPAIRED FUNCTIONAL MOBILITY, BALANCE, GAIT, AND ENDURANCE: ICD-10-CM

## 2024-09-16 DIAGNOSIS — M25.652 HIP JOINT STIFFNESS, BILATERAL: Primary | ICD-10-CM

## 2024-09-16 DIAGNOSIS — Z74.09 IMPAIRED FUNCTIONAL MOBILITY, BALANCE, GAIT, AND ENDURANCE: Primary | ICD-10-CM

## 2024-09-16 PROCEDURE — 97110 THERAPEUTIC EXERCISES: CPT | Mod: KX,PN

## 2024-09-16 PROCEDURE — 97112 NEUROMUSCULAR REEDUCATION: CPT | Mod: KX,PN

## 2024-09-16 NOTE — TELEPHONE ENCOUNTER
----- Message from Hector Alfredo Chintan sent at 9/16/2024 11:42 AM CDT -----  Regarding: Wilma with [employer]  Type:Callback     Who called: Wilma with [employer]     What is the request in detail: Stated that the wife is requesting a Rolator for the patient. Please reach out to them as soon as possible.     Can the clinic reply by [facility]? No     Would the patient rather a call back or a response via [facility]? Callback     Best call back number: [phone]      Additional Information:

## 2024-09-17 ENCOUNTER — CLINICAL SUPPORT (OUTPATIENT)
Dept: REHABILITATION | Facility: HOSPITAL | Age: 74
End: 2024-09-17
Payer: MEDICARE

## 2024-09-17 DIAGNOSIS — G89.29 UPPER BACK PAIN, CHRONIC: ICD-10-CM

## 2024-09-17 DIAGNOSIS — M54.59 OTHER LOW BACK PAIN: Primary | ICD-10-CM

## 2024-09-17 DIAGNOSIS — M54.9 UPPER BACK PAIN, CHRONIC: ICD-10-CM

## 2024-09-17 DIAGNOSIS — G89.29 MID BACK PAIN, CHRONIC: ICD-10-CM

## 2024-09-17 DIAGNOSIS — M54.9 MID BACK PAIN, CHRONIC: ICD-10-CM

## 2024-09-17 PROCEDURE — 97810 ACUP 1/> WO ESTIM 1ST 15 MIN: CPT | Mod: PN

## 2024-09-17 PROCEDURE — 97811 ACUP 1/> W/O ESTIM EA ADD 15: CPT | Mod: PN

## 2024-09-17 NOTE — PROGRESS NOTES
Acupuncture Treatment Note     Name: Darrion Bennett  [facility] Number: [id number]    Traditional Chinese Medicine Diagnosis: Qi Stagnation and Blood Stasis   Physician: Diony Sánchez,*    Date of Service: 9/17/2024     Medical Diagnosis: Chronic pain in lower back, mid back and upper back       Encounter Diagnoses   Name Primary?    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     Subjective     Chief Complaint:  Chronic pain in lower back, mid back and upper back for 2 years     Cancer Related Symptoms: None    Medical necessity is demonstrated by the following IMPAIRMENTS: Medical Necessity: Decreased mobility limits day to day activities, social, and emergent situations and Decreased quality of life    Response to Previous Treatment:  good    Quality of Symptoms (Better/Worse):  better    Other Condition/Symptoms:   MCTD (mixed connective tissue disease), Sjogren's disease, RA (rheumatoid arthritis),     Objective      New Findings:  None    Treatment Principles:  Increase Qi and Blood, stop pain     Acupuncture points used:    Bilateral points:Rahul Tuo Mikaela Ji + 6, Zoe on mid back + 2, UB 13, UN 15, UB 17,  UB 21, UB 23  Left:   Right:  Auricular Treatment:  None  Needles In: 26  Needles Out: 26  Needles W/O STIM placed: 8:30 AM  [place] W/O STIM removed: 9: 00 AM    Other Traditional Chinese Medicine Modalities:  None    Recommendations:  PT    Education:  Patient is aware of cumulative effect of acupuncture      Assessment      Analysis of Treatment:  Patient felt good    Pt prognosis is Good.     Patient will continue to benefit from  acupuncture treatment to address the deficits listed in the problem list box on initial evaluation, provide patient family education and to maximize pt's level of independence in the home and community environment.     Patient's spiritual, cultural and educational needs considered and pt agreeable to plan of care and goals.     Anticipated barriers to treatment: None    Plan     Recommend       1  /week for   12 treatments and re-assess.

## 2024-09-17 NOTE — PROGRESS NOTES
[facility]   Physical Therapy Treatment Note      Name: Darrion Bennett  [facility] Number: [id number]    Therapy Diagnosis:   Encounter Diagnosis   Name Primary?    Impaired functional mobility, balance, gait, and endurance Yes     Physician: Joelle Flores MD    Visit Date: 9/16/2024    Physician Orders: PT Eval and Treat   Medical Diagnosis from Referral: R25.1 (ICD-10-CM) - Tremor   Evaluation Date: 6/25/2024  Authorization Period Expiration: 6/18/2025  Plan of Care Expiration: 9/25/2024  Progress Note Due: 8/30/2024  Visit # / Visits authorized: 10/32? (auth includes other current therapies) KX Modifier         Precautions: Fall; Hx of T11/T12 wedge compression fracture with routine healing; CHF     Time In: 8:45  Time Out: 9:30  Total Billable Time: 45 minutes       Subjective     Darrion reports noticing improvement with turning during gait and with balance overall.    He was compliant with home exercise program.      Pain: 0/10    Objective      Objective Measures updated at progress report unless specified.     Treatment     Darrion received the treatments listed below:      therapeutic exercises to develop strength and endurance for 20 minutes including:    -Nu-step with B LEs and B UEs, Level 2.5- x 10 minutes    -standing hip abduction- 2 x 10 each side    -cues for form    -Cybex hip abduction, 30#- 2 x 10      *rest breaks provided throughout as needed    neuromuscular re-education activities to improve: Balance and Sense for 25 minutes, including:      (-Treadmill without UEs, @1.2 mph- NP today   -cues for mechanics)    -forward gait over low hurdles:   -step-to pattern- 4 hurdles x 2 rounds   -reciprocal pattern- 4 hurdles x 10 rounds    -four-square stepping over pool noodles:   -clockwise- x 8 rounds   -counter-clockwise- x 8 rounds     -backwards gait inside // without UE support- 9 ft x 6 rounds    -gait with Tidal Tank cylinder- NP today   -held at chest-level, long  lever-arm      *rest breaks provided throughout as needed    therapeutic activities to improve functional performance for 0  minutes, including:    gait training to improve functional mobility and safety for 0  minutes, including:      Patient Education and Home Exercises       Education provided:   -education on PD/ diagnostics   -discussion of physical therapy plan of care      Written Home Exercises Provided: to be provided within treatment.  Exercises were reviewed and Darrion was able to demonstrate them prior to the end of the session.  Darrion demonstrated good  understanding of the education provided.     Assessment     Mr. Bennett tolerated session well. Upgrades with maintenance of stability demonstrate progress; and today these included reciprocal pattern with hurdles, improved stability with 4-square stepping, and improved backwards gait. Hip abduction weakness and dynamic balance/ gait deficits still partially present and to be worked on as recently with next two sessions. Patient remains appropriate for skilled physical therapy.     Darrion Is progressing well towards his goals.   Patient prognosis is Good.     Patient will continue to benefit from skilled outpatient physical therapy to address the deficits listed in the problem list box on initial evaluation, provide pt/family education and to maximize pt's level of independence in the home and community environment.     Patient's spiritual, cultural and educational needs considered and pt agreeable to plan of care and goals.     Anticipated barriers to physical therapy: upper back/ neck pain; inconclusive Dx    Goals:   Short Term Goals: 4 weeks   -Patient will increase right knee-extension strength to 4+/5 for improved standing activities stability and endurance.  -Patient will increase single-limb stability to 5 seconds each side.  -Patient will ambulate with consistent heel initial-contact and with increased left arm-swing x 300 feet to demonstrate  improved gait quality and safety.  -Patient will ambulate over unlevel surface without loss of stability x 50 feet to demonstrate improved confidence and safety with outside gait.     Long Term Goals: 8 weeks   -Patient will achieve Timed Up and Go of </= 11 seconds to demonstrate improved gait stability and reduced fall risk.  -Patient will complete 5-Times Sit-to- 14 seconds to demonstrate improved functional capacity and reduced fall risk.  -Patient will ambulate for 10 minutes, overground or on treadmill, to demonstrate increased gait endurance.  -Patient will improve Functional Gait Assessment score to 23/30 to demonstrate improved dynamic/ community gait and reduced fall risk.     Plan     Plan of care Certification: 6/25/2024 to 9/25/2024     Outpatient Physical Therapy 2-1 times weekly for 8 weeks to include the following interventions: Gait Training, Neuromuscular Re-ed, Self Care, Therapeutic Activities, and Therapeutic Exercise.       Maggie More, PT, DPT    09/16/2024

## 2024-09-18 ENCOUNTER — PATIENT OUTREACH (OUTPATIENT)
Dept: ADMINISTRATIVE | Facility: HOSPITAL | Age: 74
End: 2024-09-18
Payer: MEDICARE

## 2024-09-20 ENCOUNTER — CLINICAL SUPPORT (OUTPATIENT)
Dept: REHABILITATION | Facility: HOSPITAL | Age: 74
End: 2024-09-20
Payer: MEDICARE

## 2024-09-20 ENCOUNTER — LAB VISIT (OUTPATIENT)
Dept: LAB | Facility: HOSPITAL | Age: 74
End: 2024-09-20
Attending: INTERNAL MEDICINE
Payer: MEDICARE

## 2024-09-20 DIAGNOSIS — N18.31 STAGE 3A CHRONIC KIDNEY DISEASE: ICD-10-CM

## 2024-09-20 DIAGNOSIS — Z74.09 IMPAIRED FUNCTIONAL MOBILITY, BALANCE, GAIT, AND ENDURANCE: Primary | ICD-10-CM

## 2024-09-20 LAB
ALBUMIN SERPL BCP-MCNC: 3.5 G/DL (ref 3.5–5.2)
ALP SERPL-CCNC: 64 U/L (ref 55–135)
ALT SERPL W/O P-5'-P-CCNC: 13 U/L (ref 10–44)
ANION GAP SERPL CALC-SCNC: 9 MMOL/L (ref 8–16)
AST SERPL-CCNC: 20 U/L (ref 10–40)
BASOPHILS # BLD AUTO: 0.02 K/UL (ref 0–0.2)
BASOPHILS NFR BLD: 0.3 % (ref 0–1.9)
BILIRUB SERPL-MCNC: 0.4 MG/DL (ref 0.1–1)
BUN SERPL-MCNC: 31 MG/DL (ref 8–23)
CALCIUM SERPL-MCNC: 9.5 MG/DL (ref 8.7–10.5)
CHLORIDE SERPL-SCNC: 106 MMOL/L (ref 95–110)
CO2 SERPL-SCNC: 21 MMOL/L (ref 23–29)
CREAT SERPL-MCNC: 1.9 MG/DL (ref 0.5–1.4)
DIFFERENTIAL METHOD BLD: ABNORMAL
EOSINOPHIL # BLD AUTO: 0.3 K/UL (ref 0–0.5)
EOSINOPHIL NFR BLD: 3.6 % (ref 0–8)
ERYTHROCYTE [DISTWIDTH] IN BLOOD BY AUTOMATED COUNT: 15.3 % (ref 11.5–14.5)
EST. GFR  (NO RACE VARIABLE): 36.6 ML/MIN/1.73 M^2
GLUCOSE SERPL-MCNC: 127 MG/DL (ref 70–110)
HCT VFR BLD AUTO: 36 % (ref 40–54)
HGB BLD-MCNC: 11.3 G/DL (ref 14–18)
IMM GRANULOCYTES # BLD AUTO: 0.01 K/UL (ref 0–0.04)
IMM GRANULOCYTES NFR BLD AUTO: 0.1 % (ref 0–0.5)
LYMPHOCYTES # BLD AUTO: 1.8 K/UL (ref 1–4.8)
LYMPHOCYTES NFR BLD: 26.3 % (ref 18–48)
MCH RBC QN AUTO: 29 PG (ref 27–31)
MCHC RBC AUTO-ENTMCNC: 31.4 G/DL (ref 32–36)
MCV RBC AUTO: 93 FL (ref 82–98)
MONOCYTES # BLD AUTO: 1.1 K/UL (ref 0.3–1)
MONOCYTES NFR BLD: 16.4 % (ref 4–15)
NEUTROPHILS # BLD AUTO: 3.7 K/UL (ref 1.8–7.7)
NEUTROPHILS NFR BLD: 53.3 % (ref 38–73)
NRBC BLD-RTO: 0 /100 WBC
PLATELET # BLD AUTO: 192 K/UL (ref 150–450)
PMV BLD AUTO: 12.1 FL (ref 9.2–12.9)
POTASSIUM SERPL-SCNC: 5.2 MMOL/L (ref 3.5–5.1)
PROT SERPL-MCNC: 7.1 G/DL (ref 6–8.4)
RBC # BLD AUTO: 3.89 M/UL (ref 4.6–6.2)
SODIUM SERPL-SCNC: 136 MMOL/L (ref 136–145)
WBC # BLD AUTO: 6.89 K/UL (ref 3.9–12.7)

## 2024-09-20 PROCEDURE — 80053 COMPREHEN METABOLIC PANEL: CPT | Performed by: INTERNAL MEDICINE

## 2024-09-20 PROCEDURE — 36415 COLL VENOUS BLD VENIPUNCTURE: CPT | Mod: PO | Performed by: INTERNAL MEDICINE

## 2024-09-20 PROCEDURE — 85025 COMPLETE CBC W/AUTO DIFF WBC: CPT | Performed by: INTERNAL MEDICINE

## 2024-09-20 PROCEDURE — 97112 NEUROMUSCULAR REEDUCATION: CPT | Mod: KX,PN

## 2024-09-20 PROCEDURE — 97110 THERAPEUTIC EXERCISES: CPT | Mod: KX,PN

## 2024-09-20 NOTE — PROGRESS NOTES
[facility]   Physical Therapy Treatment Note      Name: Darrion Bennett  [facility] Number: [id number]    Therapy Diagnosis:   Encounter Diagnosis   Name Primary?    Impaired functional mobility, balance, gait, and endurance Yes     Physician: Joelle Flores MD    Visit Date: 9/20/2024    Physician Orders: PT Eval and Treat   Medical Diagnosis from Referral: R25.1 (ICD-10-CM) - Tremor   Evaluation Date: 6/25/2024  Authorization Period Expiration: 6/18/2025  Plan of Care Expiration: 9/25/2024  Progress Note Due: 8/30/2024  Visit # / Visits authorized: 11/32? (auth includes other current therapies) KX Modifier         Precautions: Fall; Hx of T11/T12 wedge compression fracture with routine healing; CHF     Time In: 8:49  Time Out: 9:31  Total Billable Time: 42 minutes       Subjective     Darrion reports no longer noticing difficulty with balance or any activities at home/ in community.    He was compliant with home exercise program.      Pain: 0/10    Objective      Objective Measures updated at progress report unless specified.     Treatment     Darrion received the treatments listed below:      therapeutic exercises to develop strength and endurance for 11 minutes including:    -Nu-step with B LEs and B UEs, Level 3- x 5 minutes    -standing hip abduction- 3 x 10 each side    -cues for form      *rest breaks provided throughout as needed    neuromuscular re-education activities to improve: Balance and Sense for 31 minutes, including:      -Treadmill @ 0.9 to 1.2 mph- x 7 minutes   -cues for mechanics    -forward gait over low hurdles, with reciprocal pattern- 4 hurdles x 9 rounds    -lateral gait over low hurdles- 4 hurdles x 8 rounds     -alternating cone-taps, to wide cones, while standing on foam- x 15 each   -alternating cone-taps, to skinny cones, while standing on foam- x 12 each      *rest breaks provided throughout as needed    therapeutic activities to improve functional performance for 0   minutes, including:    gait training to improve functional mobility and safety for 0  minutes, including:      Patient Education and Home Exercises       Education provided:   -education on PD/ diagnostics   -discussion of physical therapy plan of care      Written Home Exercises Provided: to be provided within treatment.  Exercises were reviewed and Darrion was able to demonstrate them prior to the end of the session.  Darrion demonstrated good  understanding of the education provided.     Assessment     Mr. Bennett tolerated session well. Gait mechanics on treadmill performed well, and at normal speed. Patient was challenged only minimally by hurdles, demonstrating improvement. Cone taps from foam were upgraded today with skinny cones. Patient was challenged by this, but still completed well overall. Discharge planned for next session.       Darrion Is progressing well towards his goals.   Patient prognosis is Good.     Patient will continue to benefit from skilled outpatient physical therapy to address the deficits listed in the problem list box on initial evaluation, provide pt/family education and to maximize pt's level of independence in the home and community environment.     Patient's spiritual, cultural and educational needs considered and pt agreeable to plan of care and goals.     Anticipated barriers to physical therapy: upper back/ neck pain; inconclusive Dx    Goals:   Short Term Goals: 4 weeks   -Patient will increase right knee-extension strength to 4+/5 for improved standing activities stability and endurance.  -Patient will increase single-limb stability to 5 seconds each side.  -Patient will ambulate with consistent heel initial-contact and with increased left arm-swing x 300 feet to demonstrate improved gait quality and safety.  -Patient will ambulate over unlevel surface without loss of stability x 50 feet to demonstrate improved confidence and safety with outside gait.     Long Term Goals: 8 weeks    -Patient will achieve Timed Up and Go of </= 11 seconds to demonstrate improved gait stability and reduced fall risk.  -Patient will complete 5-Times Sit-to- 14 seconds to demonstrate improved functional capacity and reduced fall risk.  -Patient will ambulate for 10 minutes, overground or on treadmill, to demonstrate increased gait endurance.  -Patient will improve Functional Gait Assessment score to 23/30 to demonstrate improved dynamic/ community gait and reduced fall risk.     Plan     Plan of care Certification: 6/25/2024 to 9/25/2024     Outpatient Physical Therapy 2-1 times weekly for 8 weeks to include the following interventions: Gait Training, Neuromuscular Re-ed, Self Care, Therapeutic Activities, and Therapeutic Exercise.       Maggei More, PT, DPT    09/20/2024

## 2024-09-23 DIAGNOSIS — I10 ESSENTIAL HYPERTENSION: ICD-10-CM

## 2024-09-23 NOTE — TELEPHONE ENCOUNTER
No care due was identified.  [facility] Embedded Care Due Messages. Reference number: [id number].   9/23/2024 2:43:13 PM CDT

## 2024-09-24 RX ORDER — VALSARTAN 160 MG/1
TABLET ORAL
Qty: 180 TABLET | Refills: 3 | Status: SHIPPED | OUTPATIENT
Start: 2024-09-24

## 2024-09-24 NOTE — TELEPHONE ENCOUNTER
Refill Routing Note   Medication(s) are not appropriate for processing by [facility] for the following reason(s):        Required labs abnormal    [facility] action(s):  Defer             Appointments  past 12m or future 3m with PCP    Date Provider   Last Visit   Visit date not found Farooq Domingo MD   Next Visit   Visit date not found Farooq Domingo MD   ED visits in past 90 days: 0        Note composed:8:51 AM 09/24/2024

## 2024-09-25 ENCOUNTER — OFFICE VISIT (OUTPATIENT)
Dept: NEPHROLOGY | Facility: CLINIC | Age: 74
End: 2024-09-25
Payer: MEDICARE

## 2024-09-25 ENCOUNTER — OFFICE VISIT (OUTPATIENT)
Dept: HEMATOLOGY/ONCOLOGY | Facility: CLINIC | Age: 74
End: 2024-09-25
Payer: MEDICARE

## 2024-09-25 VITALS
WEIGHT: 127.44 LBS | HEART RATE: 67 BPM | SYSTOLIC BLOOD PRESSURE: 116 MMHG | DIASTOLIC BLOOD PRESSURE: 64 MMHG | HEIGHT: 66 IN | BODY MASS INDEX: 20.48 KG/M2

## 2024-09-25 VITALS
HEIGHT: 66 IN | DIASTOLIC BLOOD PRESSURE: 60 MMHG | WEIGHT: 127.44 LBS | BODY MASS INDEX: 20.48 KG/M2 | SYSTOLIC BLOOD PRESSURE: 120 MMHG | OXYGEN SATURATION: 98 % | HEART RATE: 69 BPM

## 2024-09-25 DIAGNOSIS — G89.29 CHRONIC MIDLINE THORACIC BACK PAIN: ICD-10-CM

## 2024-09-25 DIAGNOSIS — N18.32 ANEMIA OF CHRONIC RENAL FAILURE, STAGE 3B: Primary | ICD-10-CM

## 2024-09-25 DIAGNOSIS — M54.6 CHRONIC MIDLINE THORACIC BACK PAIN: ICD-10-CM

## 2024-09-25 DIAGNOSIS — D63.1 ANEMIA OF CHRONIC RENAL FAILURE, STAGE 3B: Primary | ICD-10-CM

## 2024-09-25 DIAGNOSIS — M35.00 SJOGREN'S SYNDROME, WITH UNSPECIFIED ORGAN INVOLVEMENT: Primary | ICD-10-CM

## 2024-09-25 DIAGNOSIS — N18.9 CHRONIC KIDNEY DISEASE, UNSPECIFIED CKD STAGE: ICD-10-CM

## 2024-09-25 DIAGNOSIS — N18.32 STAGE 3B CHRONIC KIDNEY DISEASE: ICD-10-CM

## 2024-09-25 PROCEDURE — 3066F NEPHROPATHY DOC TX: CPT | Mod: CPTII,S$GLB,, | Performed by: INTERNAL MEDICINE

## 2024-09-25 PROCEDURE — 99999 PR PBB SHADOW E&M-EST. PATIENT-LVL V: CPT | Mod: PBBFAC,,, | Performed by: INTERNAL MEDICINE

## 2024-09-25 PROCEDURE — 3044F HG A1C LEVEL LT 7.0%: CPT | Mod: CPTII,S$GLB,, | Performed by: INTERNAL MEDICINE

## 2024-09-25 PROCEDURE — 99213 OFFICE O/P EST LOW 20 MIN: CPT | Mod: S$GLB,,, | Performed by: INTERNAL MEDICINE

## 2024-09-25 PROCEDURE — 3008F BODY MASS INDEX DOCD: CPT | Mod: CPTII,S$GLB,, | Performed by: INTERNAL MEDICINE

## 2024-09-25 PROCEDURE — 3078F DIAST BP <80 MM HG: CPT | Mod: CPTII,S$GLB,, | Performed by: INTERNAL MEDICINE

## 2024-09-25 PROCEDURE — 99999 PR PBB SHADOW E&M-EST. PATIENT-LVL IV: CPT | Mod: PBBFAC,,, | Performed by: INTERNAL MEDICINE

## 2024-09-25 PROCEDURE — 1126F AMNT PAIN NOTED NONE PRSNT: CPT | Mod: CPTII,S$GLB,, | Performed by: INTERNAL MEDICINE

## 2024-09-25 PROCEDURE — 99214 OFFICE O/P EST MOD 30 MIN: CPT | Mod: S$GLB,,, | Performed by: INTERNAL MEDICINE

## 2024-09-25 PROCEDURE — 3288F FALL RISK ASSESSMENT DOCD: CPT | Mod: CPTII,S$GLB,, | Performed by: INTERNAL MEDICINE

## 2024-09-25 PROCEDURE — 4010F ACE/ARB THERAPY RXD/TAKEN: CPT | Mod: CPTII,S$GLB,, | Performed by: INTERNAL MEDICINE

## 2024-09-25 PROCEDURE — 1101F PT FALLS ASSESS-DOCD LE1/YR: CPT | Mod: CPTII,S$GLB,, | Performed by: INTERNAL MEDICINE

## 2024-09-25 PROCEDURE — 1159F MED LIST DOCD IN RCRD: CPT | Mod: CPTII,S$GLB,, | Performed by: INTERNAL MEDICINE

## 2024-09-25 PROCEDURE — 3074F SYST BP LT 130 MM HG: CPT | Mod: CPTII,S$GLB,, | Performed by: INTERNAL MEDICINE

## 2024-09-25 PROCEDURE — G2211 COMPLEX E/M VISIT ADD ON: HCPCS | Mod: S$GLB,,, | Performed by: INTERNAL MEDICINE

## 2024-09-25 PROCEDURE — 3062F POS MACROALBUMINURIA REV: CPT | Mod: CPTII,S$GLB,, | Performed by: INTERNAL MEDICINE

## 2024-09-25 RX ORDER — ACETAMINOPHEN 500 MG
2000 TABLET ORAL DAILY
Qty: 90 CAPSULE | Refills: 0 | Status: SHIPPED | OUTPATIENT
Start: 2024-09-25 | End: 2024-12-24

## 2024-09-25 NOTE — PROGRESS NOTES
Subjective     Patient ID: Darrion Bennett is a 74 y.o. male.    Chief Complaint: No chief complaint on file.    HPI  Diagnosis:  Anemia in CKD        Chief Complaint: Anemia   Interval History: Mr. Bennett is here for follow up.  He is a former patient of Dr. Hicks.     74 year old man with multiple comorbidities including MCTD, Sjogren's, UC, diastolic HF, diabetes, essential hypertension, CKD stage III, and anemia of chronic disease.     He has received IV Fe in past.  with interval response in hemoglobin and normalization of RDW.  Denies epistaxis, hemoptysis, hematemesis, hematochezia, melena, or hematuria.  Always feels cold (chronic).    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  Son completed neuro fellowship and daughter is going to be in primary care.  He is accompanied by wife.     He reports that he was diagnosed with Sjogrens when he had dry eyes and dry mouth in 2014..    Interval Hx; Accompanied by wife  Wife provides a lot of history   No new issues   He completed healthy back program   He reports chronic fatigue,stable  He has also undergone acupuncture   He is intolerant of Fe supp  He has received IV Fe in past  No SOB/CP  No lightheadedness    Past Medical History:   Diagnosis Date    Anemia     Arthritis     Cataract     CHF (congestive heart failure)     Chronic constipation     Diabetes mellitus, type 2     Felon of finger of left hand 05/11/2019    Glaucoma     Hyperlipidemia 05/13/2014    Hypertension     MCTD (mixed connective tissue disease)     Personal history of colonic polyps     Sjogren's disease     Ulcerative colitis     Urolithiasis        Past Surgical History:   Procedure Laterality Date    CATARACT EXTRACTION Bilateral 2005    COLONOSCOPY  2014    ESOPHAGOGASTRODUODENOSCOPY N/A 9/8/2023    Procedure: EGD (ESOPHAGOGASTRODUODENOSCOPY);  Surgeon: Divya Saenz MD;  Location: [facility];  Service: Endoscopy;  Laterality: N/A;  ref by / inst  "portal-RB    ESOPHAGOGASTRODUODENOSCOPY N/A 11/22/2023    Procedure: EGD (ESOPHAGOGASTRODUODENOSCOPY);  Surgeon: Crystal Urbina MD;  Location: [facility];  Service: Endoscopy;  Laterality: N/A;  time frame 8-12 weeks  Dr. Saenz pt   prep instructions sent to pt via portal -  wl meds trulicity hold 7 days prior  diabetic  11/16- pt r/s to earlier date, pre call complete.  DBM    EYE SURGERY          Review of Systems   Constitutional:  Positive for fatigue. Negative for appetite change, fever and unexpected weight change.   HENT:  Negative for mouth sores.    Eyes:  Negative for visual disturbance.   Respiratory:  Negative for cough and shortness of breath.    Cardiovascular:  Negative for chest pain.   Gastrointestinal:  Negative for abdominal pain and diarrhea.   Genitourinary:  Negative for frequency.   Musculoskeletal:  Positive for back pain (chronic) and neck pain (chronic).   Integumentary:  Negative for rash.   Neurological:  Negative for headaches.   Hematological:  Negative for adenopathy.   Psychiatric/Behavioral:  The patient is not nervous/anxious.           Objective   Vitals:    09/25/24 0838   BP: 116/64   BP Location: Right arm   Patient Position: Sitting   Pulse: 67   Weight: 57.8 kg (127 lb 6.8 oz)   Height: 5' 6" (1.676 m)       Physical Exam  Constitutional:       Appearance: He is well-developed.   HENT:      Head: Normocephalic.   Eyes:      General: No scleral icterus.        Right eye: No discharge.         Left eye: No discharge.      Conjunctiva/sclera: Conjunctivae normal.   Cardiovascular:      Rate and Rhythm: Normal rate and regular rhythm.      Heart sounds: Normal heart sounds. No murmur heard.  Pulmonary:      Effort: Pulmonary effort is normal.      Breath sounds: Normal breath sounds. No wheezing or rales.   Abdominal:      General: Bowel sounds are normal.      Palpations: Abdomen is soft.      Tenderness: There is no abdominal tenderness. There is no guarding or rebound. "   Musculoskeletal:         General: No swelling. Normal range of motion.      Cervical back: Normal range of motion.   Skin:     Findings: No erythema or rash.   Neurological:      Mental Status: He is alert and oriented to person, place, and time.      Cranial Nerves: No cranial nerve deficit.   Psychiatric:         Mood and Affect: Mood normal.         Behavior: Behavior normal.       Lab Results   Component Value Date    WBC 6.89 09/20/2024    HGB 11.3 (L) 09/20/2024    HCT 36.0 (L) 09/20/2024    MCV 93 09/20/2024     09/20/2024       Lab Results   Component Value Date    UIBC 197 06/20/2014    IRON 60 08/19/2024    TRANSFERRIN 179 (L) 08/19/2024    TIBC 265 08/19/2024    LABIRON 15 06/20/2014    FESATURATED 23 08/19/2024             Assessment and Plan            enal/      Anemia of chronic renal failure, stage 3b - Primary     Current Assessment & Plan       Anemia of chronic renal disease, CKD stage IIIB.    He has undergone IV Fe in past  Labs reviewed  Hb remains stable 11g/dl range   No indication for IV Fe .  -no role of epo at this hemoglobin level  Cbc, Fe studies, Vit D in  2mos  Cbc, Fe studies, cmp prior to follow up 4mos  -continue follow up with primary care for med mgmt           Stage 3a chronic kidney disease     Current Assessment & Plan       Creatinine 1.9mg/ml on 9/20/24  -continue medical management with pcp                       Chronic back pain  Stable   Completed Healthy Back Program ( referred last visit)   Completed acupuncture   Follow up with PCP for med mgmt                    Cbc, Fe studies, Vit D in  2mos  Cbc, Fe studies, cmp prior to follow up 4mos      Visit today included increased complexity associated with the care of the episodic problem anemia in CKD addressed and managing the longitudinal care of the patient due to the serious and/or complex managed problem(s) anemia in CKD

## 2024-09-25 NOTE — PROGRESS NOTES
CHIEF COMPLAINT/HPI: Darrion is a 74 y.o. man with PMH of HTN , DM with albuminuria, Sjogren disease HFpEF, CKD.   Was following with Dr. Vizcaino   First saw him 4/2022, here for follow up.     6/5/24: here for follow up. Feels ok. Denied any CP,SOB,N/V.    9/25/24: here for follow up. Feels ok. K is mildly elevated. His daughter gave birth to a little girl and staying with them, he seems happy and doing well.    Past Medical History:   Diagnosis Date    Anemia     Arthritis     Cataract     CHF (congestive heart failure)     Chronic constipation     Diabetes mellitus, type 2     Felon of finger of left hand 05/11/2019    Glaucoma     Hyperlipidemia 05/13/2014    Hypertension     MCTD (mixed connective tissue disease)     Personal history of colonic polyps     Sjogren's disease     Ulcerative colitis     Urolithiasis        Darrion reports that he has never smoked. He has never been exposed to tobacco smoke. He has never used smokeless tobacco. He reports current drug use. He reports that he does not drink alcohol.    Family History   Problem Relation Name Age of Onset    Hypertension Father      Stroke Father      Cataracts Father      Glaucoma Father      Cataracts Mother      No Known Problems Sister      No Known Problems Brother      Rheum arthritis Maternal Aunt      Rheum arthritis Maternal Uncle      No Known Problems Paternal Aunt      No Known Problems Paternal Uncle      No Known Problems Maternal Grandmother      No Known Problems Maternal Grandfather      Throat cancer Paternal Grandmother      Glaucoma Paternal Grandfather      No Known Problems Daughter      No Known Problems Son x2     Celiac disease Neg Hx      Colon cancer Neg Hx      Cirrhosis Neg Hx      Colon polyps Neg Hx      Crohn's disease Neg Hx      Cystic fibrosis Neg Hx      Hemochromatosis Neg Hx      Esophageal cancer Neg Hx      Inflammatory bowel disease Neg Hx      Irritable bowel syndrome Neg Hx      Liver cancer Neg Hx      Liver  "disease Neg Hx      Rectal cancer Neg Hx      Stomach cancer Neg Hx      Ulcerative colitis Neg Hx      Chase's disease Neg Hx      Amblyopia Neg Hx      Blindness Neg Hx      Cancer Neg Hx      Diabetes Neg Hx      Macular degeneration Neg Hx      Retinal detachment Neg Hx      Strabismus Neg Hx      Thyroid disease Neg Hx      Melanoma Neg Hx         ROS:    Review of Systems   Constitutional:  Negative for activity change, appetite change, chills, fever and unexpected weight change.   HENT:  Negative for congestion, ear discharge, hearing loss, nosebleeds, sore throat and tinnitus.    Eyes:  Negative for photophobia, pain, redness and visual disturbance.   Respiratory:  Negative for cough, chest tightness, shortness of breath and wheezing.    Cardiovascular:  Negative for chest pain, palpitations and leg swelling.   Gastrointestinal:  Negative for abdominal distention, constipation, diarrhea, nausea and vomiting.   Endocrine: Negative for cold intolerance, heat intolerance, polydipsia and polyuria.   Genitourinary:  Negative for decreased urine volume, difficulty urinating, dysuria, flank pain and hematuria.   Musculoskeletal:  Negative for arthralgias, gait problem, joint swelling and neck stiffness.   Skin:  Negative for rash.   Neurological:  Negative for dizziness, tremors, weakness, numbness and headaches.   Psychiatric/Behavioral:  Negative for confusion and sleep disturbance.      PE:    Vitals:    09/25/24 1001   BP: 120/60   Pulse: 69   SpO2: 98%   Weight: 57.8 kg (127 lb 6.8 oz)   Height: 5' 6" (1.676 m)         Physical Exam  Constitutional:       General: He is not in acute distress.     Appearance: Normal appearance. He is normal weight.   HENT:      Head: Normocephalic and atraumatic.      Nose: Nose normal.      Mouth/Throat:      Mouth: Mucous membranes are moist.      Pharynx: Oropharynx is clear. No oropharyngeal exudate or posterior oropharyngeal erythema.   Eyes:      Extraocular Movements: " Extraocular movements intact.      Conjunctiva/sclera: Conjunctivae normal.      Pupils: Pupils are equal, round, and reactive to light.   Cardiovascular:      Rate and Rhythm: Normal rate and regular rhythm.      Pulses: Normal pulses.      Heart sounds: Normal heart sounds. No murmur heard.     No friction rub. No gallop.   Pulmonary:      Effort: Pulmonary effort is normal. No respiratory distress.      Breath sounds: Normal breath sounds. No stridor. No wheezing or rales.   Abdominal:      General: Abdomen is flat. Bowel sounds are normal.      Palpations: Abdomen is soft.      Tenderness: There is no abdominal tenderness. There is no guarding or rebound.   Musculoskeletal:         General: No swelling. Normal range of motion.      Cervical back: Normal range of motion and neck supple.      Right lower leg: No edema.      Left lower leg: No edema.   Skin:     General: Skin is warm.      Coloration: Skin is not jaundiced or pale.      Findings: No lesion.   Neurological:      General: No focal deficit present.      Mental Status: He is alert and oriented to person, place, and time.      Motor: No weakness.   Psychiatric:         Mood and Affect: Mood normal.         Impression/Plan:    1. Sjogren's syndrome, with unspecified organ involvement    2. Stage 3b chronic kidney disease        # CKD3a with mild rise in creatinine by 0.2 from baseline ~1.4, possibly progression of CKD. in patient with HTN , diastolic dysfunction, DM and MCTD.   in June patient had worsening of creatinine - and creatinine went to 1.9 and since then it stayed around 1.9.   -UA bland.  Counseled on BP and glycemic control   Counseled on avoiding NSAID's ( not taking)  -will repeat labs on Tuesday to evaluate trend of kidney function.       Lab Results   Component Value Date    CREATININE 1.9 (H) 09/20/2024     Protein Creatinine Ratios:    Prot/Creat Ratio, Urine   Date Value Ref Range Status   09/20/2024 0.84 (H) 0.00 - 0.20 Final    08/20/2024 1.02 (H) 0.00 - 0.20 Final   05/27/2024 0.72 (H) 0.00 - 0.20 Final   \    Acid-Base:   Lab Results   Component Value Date     09/20/2024    K 5.2 (H) 09/20/2024    CO2 21 (L) 09/20/2024     #HTN: Blood pressures , borderline,. Cont valsartan, torsemide, hydralazine, clonidine patch, amlodipine. Will consider starting aldactone for proteinuria and lower clonidine then,but after repeat labs in 4 weeks and  stable kidney function.    # Renal osteodystrophy: secondary hyperparathyroidism  Lab Results   Component Value Date    .0 (H) 09/26/2022    CALCIUM 9.5 09/20/2024    PHOS 2.4 (L) 09/26/2022   Taking OTC vit D.  Starting cholecalciferol 2000u dialy     # Anemia:   Lab Results   Component Value Date    HGB 11.3 (L) 09/20/2024    He is shivering ?FABIOLA, will repeat iron panel and if def. Will give few doses of iv iron.    # DM:  Last HbA1C   Lab Results   Component Value Date    HGBA1C 6.9 (H) 08/28/2024       # Lipid management:   Lab Results   Component Value Date    LDLCALC 91.0 02/26/2024     Visit today included increased complexity associated with the care of the episodic problem CKD, HTN, DM, Secondary hyperparathyroid addressed and managing the longitudinal care of the patient due to the serious and/or complex managed problem(s) .    Time spent reviewing chart visits, labs, vitals and seeing patient face to face discussing labs and dietary changes to improve hyperkalemia along with resources given to the patient( list of low K diet to follow and high K to avoid)     # ESRD planing: none    Follow up in 4 m with labs in 4 weeks and if stable will start aldactone then.

## 2024-09-26 ENCOUNTER — CLINICAL SUPPORT (OUTPATIENT)
Dept: REHABILITATION | Facility: HOSPITAL | Age: 74
End: 2024-09-26
Payer: MEDICARE

## 2024-09-26 DIAGNOSIS — M54.59 OTHER LOW BACK PAIN: Primary | ICD-10-CM

## 2024-09-26 DIAGNOSIS — M54.9 MID BACK PAIN, CHRONIC: ICD-10-CM

## 2024-09-26 DIAGNOSIS — G89.29 MID BACK PAIN, CHRONIC: ICD-10-CM

## 2024-09-26 DIAGNOSIS — G89.29 UPPER BACK PAIN, CHRONIC: ICD-10-CM

## 2024-09-26 DIAGNOSIS — M54.9 UPPER BACK PAIN, CHRONIC: ICD-10-CM

## 2024-09-26 PROCEDURE — 97810 ACUP 1/> WO ESTIM 1ST 15 MIN: CPT | Mod: PN

## 2024-09-26 PROCEDURE — 97811 ACUP 1/> W/O ESTIM EA ADD 15: CPT | Mod: PN

## 2024-09-26 NOTE — PROGRESS NOTES
Acupuncture Treatment Note     Name: Darrion Bennett  [facility] Number: [id number]    Traditional Chinese Medicine Diagnosis: Qi Stagnation and Blood Stasis   Physician: Diony Sánchez,*    Date of Service: 9/26/2024     Medical Diagnosis: Chronic pain in lower back, mid back and upper back       Encounter Diagnoses   Name Primary?    Chronic midline low back pain without sciatica      Other spondylosis, lumbar region      Visit #/Visits authorized: 6 / 12     Precautions: Diabetes and Immunosuppression, Compression fracture of body of thoracic vertebra, Diastolic heart failure , Bilateral carotid artery stenosis on CTA 6/26/21 , MCTD (mixed connective tissue disease), Sjogren's disease, RA (rheumatoid arthritis), Anemia from plaquenil and imuran     Subjective     Chief Complaint:  Chronic pain in lower back, mid back and upper back for 2 years     Cancer Related Symptoms: None    Medical necessity is demonstrated by the following IMPAIRMENTS: Medical Necessity: Decreased mobility limits day to day activities, social, and emergent situations and Decreased quality of life    Response to Previous Treatment:  good    Quality of Symptoms (Better/Worse):  better    Other Condition/Symptoms:   MCTD (mixed connective tissue disease), Sjogren's disease, RA (rheumatoid arthritis),     Objective      New Findings:  None    Treatment Principles:  Increase Qi and Blood, stop pain     Acupuncture points used:    Bilateral points:Rahul Tuo Mikaela Ji + 10,   Left:   Right:  Auricular Treatment:  None  Needles In: 20  Needles Out: 20  Needles W/O STIM placed: 9:00 AM  [place] W/O STIM removed: 9: 30 AM    Other Traditional Chinese Medicine Modalities:  None    Recommendations:  PT    Education:  Patient is aware of cumulative effect of acupuncture      Assessment      Analysis of Treatment:  Patient felt good    Pt prognosis is Good.     Patient will continue to benefit from acupuncture treatment to address the deficits listed in the  problem list box on initial evaluation, provide patient family education and to maximize pt's level of independence in the home and community environment.     Patient's spiritual, cultural and educational needs considered and pt agreeable to plan of care and goals.     Anticipated barriers to treatment: None    Plan     Recommend       1  /week for   12 treatments and re-assess.

## 2024-09-27 ENCOUNTER — CLINICAL SUPPORT (OUTPATIENT)
Dept: REHABILITATION | Facility: HOSPITAL | Age: 74
End: 2024-09-27
Payer: MEDICARE

## 2024-09-27 DIAGNOSIS — Z74.09 IMPAIRED FUNCTIONAL MOBILITY, BALANCE, GAIT, AND ENDURANCE: Primary | ICD-10-CM

## 2024-09-27 PROCEDURE — 97112 NEUROMUSCULAR REEDUCATION: CPT | Mod: KX,PN

## 2024-09-27 PROCEDURE — 97110 THERAPEUTIC EXERCISES: CPT | Mod: KX,PN

## 2024-09-28 NOTE — PROGRESS NOTES
[facility]   Physical Therapy Treatment Note      Name: Darrion Bennett  [facility] Number: [id number]    Therapy Diagnosis:   Encounter Diagnosis   Name Primary?    Impaired functional mobility, balance, gait, and endurance Yes     Physician: Joelle Flores MD    Visit Date: 9/27/2024    Physician Orders: PT Eval and Treat   Medical Diagnosis from Referral: R25.1 (ICD-10-CM) - Tremor   Evaluation Date: 6/25/2024  Authorization Period Expiration: 6/18/2025  Plan of Care Expiration: 9/25/2024  Progress Note Due: 8/30/2024  Visit # / Visits authorized: 12/32? (auth includes other current therapies) KX Modifier         Precautions: Fall; Hx of T11/T12 wedge compression fracture with routine healing; CHF     Time In: 8:52  Time Out: 9:34  Total Billable Time: 42 minutes       Subjective     Darrion reports no new updates.     He was compliant with home exercise program.      Pain: 0/10    Objective      Objective Measures updated at progress report unless specified.     Treatment     Darrion received the treatments listed below:      therapeutic exercises to develop strength and endurance for 18 minutes including:    -Nu-step with B LEs and B UEs, Level 3- x 8 minutes    -standing hip abduction- 3 x 10 each side     -standing heel-raises- x 30      *rest breaks provided throughout as needed    neuromuscular re-education activities to improve: Balance and Sense for 24  minutes, including:      -forward gait over low hurdles, with reciprocal pattern- 4 hurdles x 10 rounds    -lateral gait over low hurdles- 4 hurdles x 8 rounds     -backwards gait- x 6 laps    -four-square stepping over pool-noodles:   -clockwise- x 7 rounds   -counter-clockwise- x 7 rounds     -discussion of home activities and upcoming PT discharge       *rest breaks provided throughout as needed    therapeutic activities to improve functional performance for 0  minutes, including:    gait training to improve functional mobility and  safety for 0  minutes, including:      Patient Education and Home Exercises       Education provided:   -education on PD/ diagnostics   -discussion of physical therapy plan of care      Written Home Exercises Provided: to be provided within treatment.  Exercises were reviewed and Darrion was able to demonstrate them prior to the end of the session.  Darrion demonstrated good  understanding of the education provided.     Assessment     Mr. Bennett tolerated session well. He had some difficulty with reciprocal pattern of hurdles, but still completed well. Four-square weight-shifts and footing also performed well overall. Patient likely to be appropriate for discharge next session. Patient remains appropriate for skilled physical therapy.       Darrion Is progressing well towards his goals.   Patient prognosis is Good.     Patient will continue to benefit from skilled outpatient physical therapy to address the deficits listed in the problem list box on initial evaluation, provide pt/family education and to maximize pt's level of independence in the home and community environment.     Patient's spiritual, cultural and educational needs considered and pt agreeable to plan of care and goals.     Anticipated barriers to physical therapy: upper back/ neck pain; inconclusive Dx    Goals:   Short Term Goals: 4 weeks   -Patient will increase right knee-extension strength to 4+/5 for improved standing activities stability and endurance.  -Patient will increase single-limb stability to 5 seconds each side.  -Patient will ambulate with consistent heel initial-contact and with increased left arm-swing x 300 feet to demonstrate improved gait quality and safety.  -Patient will ambulate over unlevel surface without loss of stability x 50 feet to demonstrate improved confidence and safety with outside gait.     Long Term Goals: 8 weeks   -Patient will achieve Timed Up and Go of </= 11 seconds to demonstrate improved gait stability and  reduced fall risk.  -Patient will complete 5-Times Sit-to- 14 seconds to demonstrate improved functional capacity and reduced fall risk.  -Patient will ambulate for 10 minutes, overground or on treadmill, to demonstrate increased gait endurance.  -Patient will improve Functional Gait Assessment score to 23/30 to demonstrate improved dynamic/ community gait and reduced fall risk.     Plan     Plan of care Certification: 6/25/2024 to 9/25/2024     Outpatient Physical Therapy 2-1 times weekly for 8 weeks to include the following interventions: Gait Training, Neuromuscular Re-ed, Self Care, Therapeutic Activities, and Therapeutic Exercise.       Maggie More, PT, DPT    9/27/2024

## 2024-09-30 ENCOUNTER — LAB VISIT (OUTPATIENT)
Dept: LAB | Facility: HOSPITAL | Age: 74
End: 2024-09-30
Attending: INTERNAL MEDICINE
Payer: MEDICARE

## 2024-09-30 DIAGNOSIS — M1A.9XX1 TOPHACEOUS GOUT: ICD-10-CM

## 2024-09-30 LAB
ALBUMIN SERPL BCP-MCNC: 3.4 G/DL (ref 3.5–5.2)
ALP SERPL-CCNC: 55 U/L (ref 55–135)
ALT SERPL W/O P-5'-P-CCNC: 18 U/L (ref 10–44)
ANION GAP SERPL CALC-SCNC: 9 MMOL/L (ref 8–16)
AST SERPL-CCNC: 22 U/L (ref 10–40)
BILIRUB SERPL-MCNC: 0.5 MG/DL (ref 0.1–1)
BUN SERPL-MCNC: 35 MG/DL (ref 8–23)
CALCIUM SERPL-MCNC: 9 MG/DL (ref 8.7–10.5)
CHLORIDE SERPL-SCNC: 107 MMOL/L (ref 95–110)
CO2 SERPL-SCNC: 21 MMOL/L (ref 23–29)
CREAT SERPL-MCNC: 1.7 MG/DL (ref 0.5–1.4)
EST. GFR  (NO RACE VARIABLE): 41.8 ML/MIN/1.73 M^2
GLUCOSE SERPL-MCNC: 102 MG/DL (ref 70–110)
POTASSIUM SERPL-SCNC: 4.9 MMOL/L (ref 3.5–5.1)
PROT SERPL-MCNC: 7 G/DL (ref 6–8.4)
SODIUM SERPL-SCNC: 137 MMOL/L (ref 136–145)
URATE SERPL-MCNC: 2.7 MG/DL (ref 3.4–7)

## 2024-09-30 PROCEDURE — 80053 COMPREHEN METABOLIC PANEL: CPT | Performed by: INTERNAL MEDICINE

## 2024-09-30 PROCEDURE — 36415 COLL VENOUS BLD VENIPUNCTURE: CPT | Mod: PO | Performed by: INTERNAL MEDICINE

## 2024-09-30 PROCEDURE — 84550 ASSAY OF BLOOD/URIC ACID: CPT | Performed by: INTERNAL MEDICINE

## 2024-10-03 ENCOUNTER — CLINICAL SUPPORT (OUTPATIENT)
Dept: REHABILITATION | Facility: HOSPITAL | Age: 74
End: 2024-10-03
Payer: MEDICARE

## 2024-10-03 DIAGNOSIS — G89.29 MID BACK PAIN, CHRONIC: ICD-10-CM

## 2024-10-03 DIAGNOSIS — M54.59 OTHER LOW BACK PAIN: Primary | ICD-10-CM

## 2024-10-03 DIAGNOSIS — M54.9 UPPER BACK PAIN, CHRONIC: ICD-10-CM

## 2024-10-03 DIAGNOSIS — M54.9 MID BACK PAIN, CHRONIC: ICD-10-CM

## 2024-10-03 DIAGNOSIS — G89.29 UPPER BACK PAIN, CHRONIC: ICD-10-CM

## 2024-10-03 PROCEDURE — 97811 ACUP 1/> W/O ESTIM EA ADD 15: CPT | Mod: PN

## 2024-10-03 PROCEDURE — 97810 ACUP 1/> WO ESTIM 1ST 15 MIN: CPT | Mod: PN

## 2024-10-03 NOTE — PROGRESS NOTES
Acupuncture Treatment Note     Name: Darrion Bennett  [facility] Number: [id number]    Traditional Chinese Medicine Diagnosis: Qi Stagnation and Blood Stasis   Physician: Diony Sánchez,*    Date of Service: 10/3/2024     Medical Diagnosis: Chronic pain in lower back, mid back and upper back       Encounter Diagnoses   Name Primary?    Chronic midline low back pain without sciatica      Other spondylosis, lumbar region      Visit #/Visits authorized: 7 / 12     Precautions: Diabetes and Immunosuppression, Compression fracture of body of thoracic vertebra, Diastolic heart failure , Bilateral carotid artery stenosis on CTA 6/26/21 , MCTD (mixed connective tissue disease), Sjogren's disease, RA (rheumatoid arthritis), Anemia from plaquenil and imuran     Subjective     Chief Complaint:  Chronic pain in lower back, mid back and upper back for 2 years     Cancer Related Symptoms: None    Medical necessity is demonstrated by the following IMPAIRMENTS: Medical Necessity: Decreased mobility limits day to day activities, social, and emergent situations and Decreased quality of life    Response to Previous Treatment:  good    Quality of Symptoms (Better/Worse):  better    Other Condition/Symptoms:   MCTD (mixed connective tissue disease), Sjogren's disease, RA (rheumatoid arthritis),     Objective      New Findings:  None    Treatment Principles:  Increase Qi and Blood, stop pain     Acupuncture points used:    Bilateral points:Rahul Tuo Mikaela Ji + 10,   Left:   Right:  Auricular Treatment:  None  Needles In: 20  Needles Out: 20  Needles W/O STIM placed: 9:00 AM  [place] W/O STIM removed: 9: 45AM    Other Traditional Chinese Medicine Modalities:  None    Recommendations:  PT    Education:  Patient is aware of cumulative effect of acupuncture      Assessment      Analysis of Treatment:  Patient felt good    Pt prognosis is Good.     Patient will continue to benefit from acupuncture treatment to address the deficits listed in the  problem list box on initial evaluation, provide patient family education and to maximize pt's level of independence in the home and community environment.     Patient's spiritual, cultural and educational needs considered and pt agreeable to plan of care and goals.     Anticipated barriers to treatment: None    Plan     Recommend       1  /week for   12 treatments and re-assess.

## 2024-10-10 ENCOUNTER — CLINICAL SUPPORT (OUTPATIENT)
Dept: REHABILITATION | Facility: HOSPITAL | Age: 74
End: 2024-10-10
Payer: MEDICARE

## 2024-10-10 DIAGNOSIS — G89.29 UPPER BACK PAIN, CHRONIC: ICD-10-CM

## 2024-10-10 DIAGNOSIS — M54.9 UPPER BACK PAIN, CHRONIC: ICD-10-CM

## 2024-10-10 DIAGNOSIS — M54.59 OTHER LOW BACK PAIN: Primary | ICD-10-CM

## 2024-10-10 DIAGNOSIS — M54.9 MID BACK PAIN, CHRONIC: ICD-10-CM

## 2024-10-10 DIAGNOSIS — G89.29 MID BACK PAIN, CHRONIC: ICD-10-CM

## 2024-10-10 PROCEDURE — 97811 ACUP 1/> W/O ESTIM EA ADD 15: CPT | Mod: PN

## 2024-10-10 PROCEDURE — 97810 ACUP 1/> WO ESTIM 1ST 15 MIN: CPT | Mod: PN

## 2024-10-10 NOTE — PROGRESS NOTES
Acupuncture Treatment Note     Name: Darrion Bennett  [facility] Number: [id number]    Traditional Chinese Medicine Diagnosis: Qi Stagnation and Blood Stasis   Physician: Diony Sánchez,*    Date of Service: 10/10/2024     Medical Diagnosis: Chronic pain in lower back, mid back and upper back       Encounter Diagnoses   Name Primary?    Chronic midline low back pain without sciatica      Other spondylosis, lumbar region      Visit #/Visits authorized: 8 / 12     Precautions: Diabetes and Immunosuppression, Compression fracture of body of thoracic vertebra, Diastolic heart failure , Bilateral carotid artery stenosis on CTA 6/26/21 , MCTD (mixed connective tissue disease), Sjogren's disease, RA (rheumatoid arthritis), Anemia from plaquenil and imuran     Subjective     Chief Complaint:  Chronic pain in lower back, mid back and upper back for 2 years     Cancer Related Symptoms: None    Medical necessity is demonstrated by the following IMPAIRMENTS: Medical Necessity: Decreased mobility limits day to day activities, social, and emergent situations and Decreased quality of life    Response to Previous Treatment:  good    Quality of Symptoms (Better/Worse):  better    Other Condition/Symptoms:   MCTD (mixed connective tissue disease), Sjogren's disease, RA (rheumatoid arthritis),     Objective      New Findings:  None    Treatment Principles:  Increase Qi and Blood, stop pain     Acupuncture points used:    Bilateral points:Rahul Alano Mikaela Ji + 9,   Left:   Right:  Auricular Treatment:  None  Needles In: 18  Needles Out: 18  Needles W/O STIM placed: 9:00 AM  [place] W/O STIM removed: 9: 45AM    Other Traditional Chinese Medicine Modalities:  None    Recommendations:  PT    Education:  Patient is aware of cumulative effect of acupuncture      Assessment      Analysis of Treatment:  Patient felt good    Pt prognosis is Good.     Patient will continue to benefit from acupuncture treatment to address the deficits listed in the  problem list box on initial evaluation, provide patient family education and to maximize pt's level of independence in the home and community environment.     Patient's spiritual, cultural and educational needs considered and pt agreeable to plan of care and goals.     Anticipated barriers to treatment: None    Plan     Recommend       1  /week for   12 treatments and re-assess.

## 2024-10-13 DIAGNOSIS — I10 ESSENTIAL HYPERTENSION: ICD-10-CM

## 2024-10-13 DIAGNOSIS — E78.00 PURE HYPERCHOLESTEROLEMIA: ICD-10-CM

## 2024-10-13 RX ORDER — ATORVASTATIN CALCIUM 80 MG/1
TABLET, FILM COATED ORAL
Qty: 90 TABLET | Refills: 1 | Status: SHIPPED | OUTPATIENT
Start: 2024-10-13

## 2024-10-13 RX ORDER — AMLODIPINE BESYLATE 5 MG/1
5 TABLET ORAL 2 TIMES DAILY
Qty: 180 TABLET | Refills: 1 | Status: SHIPPED | OUTPATIENT
Start: 2024-10-13

## 2024-10-13 NOTE — TELEPHONE ENCOUNTER
No care due was identified.  [facility] Embedded Care Due Messages. Reference number: [id number].   10/13/2024 12:15:20 PM CDT

## 2024-10-14 ENCOUNTER — PATIENT MESSAGE (OUTPATIENT)
Dept: RHEUMATOLOGY | Facility: CLINIC | Age: 74
End: 2024-10-14
Payer: MEDICARE

## 2024-10-14 NOTE — TELEPHONE ENCOUNTER
Refill Decision Note   Darrion Bennett  is requesting a refill authorization.  Brief Assessment and Rationale for Refill:  Approve     Medication Therapy Plan:        Comments:     Note composed:7:43 PM 10/13/2024            Spoke with pt states he has thoughts of suicide and worthlessness nearly every day. Thinks that the medication he is on right now Buspar 15 MG is not helping and is causing those thoughts perhaps. Pt was given the suicide hotline number. States right now at this moment he does not have those thoughts though. Told pt to call the suicide hotline number or our office if he feels like he is going to harm himself.   Pt wants to wean off Buspirone- please advise.  Can pt start a new medication for depression as well as the Ketamine infusions.  Please review and advise.

## 2024-10-15 ENCOUNTER — IMMUNIZATION (OUTPATIENT)
Dept: FAMILY MEDICINE | Facility: CLINIC | Age: 74
End: 2024-10-15
Payer: MEDICARE

## 2024-10-15 DIAGNOSIS — Z23 FLU VACCINE NEED: Primary | ICD-10-CM

## 2024-10-15 PROCEDURE — 90653 IIV ADJUVANT VACCINE IM: CPT | Mod: S$GLB,,, | Performed by: INTERNAL MEDICINE

## 2024-10-15 PROCEDURE — G0008 ADMIN INFLUENZA VIRUS VAC: HCPCS | Mod: S$GLB,,, | Performed by: INTERNAL MEDICINE

## 2024-10-15 NOTE — PROGRESS NOTES
High dose flu vaccine given to right deltoid IM. Instructed to wait 15 mins. No adverse drug reaction noted. VIS given to patient.

## 2024-10-18 RX ORDER — HYDRALAZINE HYDROCHLORIDE 25 MG/1
TABLET, FILM COATED ORAL
Qty: 810 TABLET | Refills: 3 | Status: SHIPPED | OUTPATIENT
Start: 2024-10-18

## 2024-10-21 ENCOUNTER — TELEPHONE (OUTPATIENT)
Dept: NEUROLOGY | Facility: CLINIC | Age: 74
End: 2024-10-21
Payer: MEDICARE

## 2024-10-30 ENCOUNTER — INFUSION (OUTPATIENT)
Dept: INFUSION THERAPY | Facility: HOSPITAL | Age: 74
End: 2024-10-30
Attending: HOSPITALIST
Payer: MEDICARE

## 2024-10-30 VITALS
RESPIRATION RATE: 16 BRPM | SYSTOLIC BLOOD PRESSURE: 132 MMHG | TEMPERATURE: 98 F | HEART RATE: 78 BPM | WEIGHT: 125.44 LBS | BODY MASS INDEX: 20.16 KG/M2 | OXYGEN SATURATION: 99 % | HEIGHT: 66 IN | DIASTOLIC BLOOD PRESSURE: 60 MMHG

## 2024-10-30 DIAGNOSIS — M06.9 RHEUMATOID ARTHRITIS, INVOLVING UNSPECIFIED SITE, UNSPECIFIED WHETHER RHEUMATOID FACTOR PRESENT: Primary | ICD-10-CM

## 2024-10-30 PROCEDURE — 63600175 PHARM REV CODE 636 W HCPCS: Performed by: INTERNAL MEDICINE

## 2024-10-30 PROCEDURE — 25000003 PHARM REV CODE 250: Performed by: INTERNAL MEDICINE

## 2024-10-30 PROCEDURE — 96365 THER/PROPH/DIAG IV INF INIT: CPT

## 2024-10-30 PROCEDURE — 96375 TX/PRO/DX INJ NEW DRUG ADDON: CPT

## 2024-10-30 RX ORDER — SODIUM CHLORIDE 0.9 % (FLUSH) 0.9 %
10 SYRINGE (ML) INJECTION
OUTPATIENT
Start: 2024-11-13

## 2024-10-30 RX ORDER — ACETAMINOPHEN 325 MG/1
650 TABLET ORAL
Status: COMPLETED | OUTPATIENT
Start: 2024-10-30 | End: 2024-10-30

## 2024-10-30 RX ORDER — ACETAMINOPHEN 325 MG/1
650 TABLET ORAL
OUTPATIENT
Start: 2024-11-13

## 2024-10-30 RX ORDER — EPINEPHRINE 0.3 MG/.3ML
0.3 INJECTION SUBCUTANEOUS ONCE AS NEEDED
OUTPATIENT
Start: 2024-11-13

## 2024-10-30 RX ORDER — DIPHENHYDRAMINE HYDROCHLORIDE 50 MG/ML
50 INJECTION INTRAMUSCULAR; INTRAVENOUS ONCE AS NEEDED
OUTPATIENT
Start: 2024-11-13

## 2024-10-30 RX ORDER — HEPARIN 100 UNIT/ML
500 SYRINGE INTRAVENOUS
OUTPATIENT
Start: 2024-11-13

## 2024-10-30 RX ORDER — DIPHENHYDRAMINE HYDROCHLORIDE 50 MG/ML
25 INJECTION INTRAMUSCULAR; INTRAVENOUS
OUTPATIENT
Start: 2024-11-13

## 2024-10-30 RX ORDER — DIPHENHYDRAMINE HYDROCHLORIDE 50 MG/ML
25 INJECTION INTRAMUSCULAR; INTRAVENOUS
Status: COMPLETED | OUTPATIENT
Start: 2024-10-30 | End: 2024-10-30

## 2024-10-30 RX ADMIN — ACETAMINOPHEN 650 MG: 325 TABLET ORAL at 09:10

## 2024-10-30 RX ADMIN — SODIUM CHLORIDE 500 MG: 9 INJECTION, SOLUTION INTRAVENOUS at 09:10

## 2024-10-30 RX ADMIN — DIPHENHYDRAMINE HYDROCHLORIDE 25 MG: 50 INJECTION INTRAMUSCULAR; INTRAVENOUS at 09:10

## 2024-11-04 RX ORDER — OMEPRAZOLE 40 MG/1
40 CAPSULE, DELAYED RELEASE ORAL EVERY MORNING
Qty: 90 CAPSULE | Refills: 3 | Status: SHIPPED | OUTPATIENT
Start: 2024-11-04

## 2024-11-12 ENCOUNTER — OFFICE VISIT (OUTPATIENT)
Dept: PULMONOLOGY | Facility: CLINIC | Age: 74
End: 2024-11-12
Payer: MEDICARE

## 2024-11-12 VITALS
WEIGHT: 127.19 LBS | HEIGHT: 66 IN | HEART RATE: 79 BPM | BODY MASS INDEX: 20.44 KG/M2 | RESPIRATION RATE: 18 BRPM | DIASTOLIC BLOOD PRESSURE: 69 MMHG | OXYGEN SATURATION: 97 % | SYSTOLIC BLOOD PRESSURE: 128 MMHG

## 2024-11-12 DIAGNOSIS — I50.30 DIASTOLIC HEART FAILURE, NYHA CLASS 2: ICD-10-CM

## 2024-11-12 DIAGNOSIS — J98.4 RESTRICTIVE LUNG DISEASE: ICD-10-CM

## 2024-11-12 DIAGNOSIS — G47.33 OSA (OBSTRUCTIVE SLEEP APNEA): Primary | ICD-10-CM

## 2024-11-12 DIAGNOSIS — Z71.89 GOALS OF CARE, COUNSELING/DISCUSSION: ICD-10-CM

## 2024-11-12 DIAGNOSIS — R06.02 SOB (SHORTNESS OF BREATH): ICD-10-CM

## 2024-11-12 PROCEDURE — 3062F POS MACROALBUMINURIA REV: CPT | Mod: CPTII,S$GLB,, | Performed by: INTERNAL MEDICINE

## 2024-11-12 PROCEDURE — 3288F FALL RISK ASSESSMENT DOCD: CPT | Mod: CPTII,S$GLB,, | Performed by: INTERNAL MEDICINE

## 2024-11-12 PROCEDURE — 1126F AMNT PAIN NOTED NONE PRSNT: CPT | Mod: CPTII,S$GLB,, | Performed by: INTERNAL MEDICINE

## 2024-11-12 PROCEDURE — 99497 ADVNCD CARE PLAN 30 MIN: CPT | Mod: S$GLB,,, | Performed by: INTERNAL MEDICINE

## 2024-11-12 PROCEDURE — 1159F MED LIST DOCD IN RCRD: CPT | Mod: CPTII,S$GLB,, | Performed by: INTERNAL MEDICINE

## 2024-11-12 PROCEDURE — 3008F BODY MASS INDEX DOCD: CPT | Mod: CPTII,S$GLB,, | Performed by: INTERNAL MEDICINE

## 2024-11-12 PROCEDURE — 1101F PT FALLS ASSESS-DOCD LE1/YR: CPT | Mod: CPTII,S$GLB,, | Performed by: INTERNAL MEDICINE

## 2024-11-12 PROCEDURE — 3074F SYST BP LT 130 MM HG: CPT | Mod: CPTII,S$GLB,, | Performed by: INTERNAL MEDICINE

## 2024-11-12 PROCEDURE — 99999 PR PBB SHADOW E&M-EST. PATIENT-LVL V: CPT | Mod: PBBFAC,,, | Performed by: INTERNAL MEDICINE

## 2024-11-12 PROCEDURE — 3078F DIAST BP <80 MM HG: CPT | Mod: CPTII,S$GLB,, | Performed by: INTERNAL MEDICINE

## 2024-11-12 PROCEDURE — 99204 OFFICE O/P NEW MOD 45 MIN: CPT | Mod: S$GLB,,, | Performed by: INTERNAL MEDICINE

## 2024-11-12 PROCEDURE — 3044F HG A1C LEVEL LT 7.0%: CPT | Mod: CPTII,S$GLB,, | Performed by: INTERNAL MEDICINE

## 2024-11-12 PROCEDURE — 4010F ACE/ARB THERAPY RXD/TAKEN: CPT | Mod: CPTII,S$GLB,, | Performed by: INTERNAL MEDICINE

## 2024-11-12 PROCEDURE — 3066F NEPHROPATHY DOC TX: CPT | Mod: CPTII,S$GLB,, | Performed by: INTERNAL MEDICINE

## 2024-11-12 NOTE — PROGRESS NOTES
Darrion Bennett  was seen as a follow up.  Our last encounter was 9/30/21.      CHIEF COMPLAINT:  Shortness of Breath      HISTORY OF PRESENT ILLNESS: Darrion Bennett is a 74 y.o. male  has a past medical history of Anemia, Arthritis, Cataract, CHF (congestive heart failure), Chronic constipation, Diabetes mellitus, type 2, Felon of finger of left hand (05/11/2019), Glaucoma, Hyperlipidemia (05/13/2014), Hypertension, MCTD (mixed connective tissue disease), Personal history of colonic polyps, Sjogren's disease, Ulcerative colitis, and Urolithiasis.    Our first encounter was 8/2/21.  Patient with chronic pastrana x 1/2 mile.  Patient is a lifelong non-smoker.  No fever/chill.  No coughing or wheezing.  No chest pain.  No orthopnea.  No pnd.  No lower extremities swelling.  Patient was seen by Dr. Alvares and determined to have diastolic dysfunction.  Recent nuclear stress test 5/12/21 without evidence of cad.      patient underwent pft with restrictive physiology and decreased dlco.  Ct 9/16/21 of chest revealed bilateral mosaic attenuation.  Patient is taking furosemide prn.  Wob is stable.    No coughing or wheezing.  Sedentary life style but denied pastrana with adl.  Still cook for family.    S/p 9/16/21 hsat with ahi of 21.  Patient was set up with apap.  Patient refused apap set up.  Per patient, he endorsed restful.      SLEEP ROUTINE:  Activity the hour prior to sleep: watch tv    Bed partner:  wife  Time to bed:  10 pm   Lights off:  off   Sleep onset latency:  few minutes        Disruptions or awakenings:    3 times (no issue going back to sleep)    Wakeup time:      6:30 am   Perceived sleep quality:  rested        Daytime naps:      occasional  nap in the afternoon  Weekend sleep routine:      same  Caffeine use: denied   exercise habit:   denied        PAST MEDICAL HISTORY:    Active Ambulatory Problems     Diagnosis Date Noted    Essential hypertension 05/13/2014    Controlled type 2 diabetes mellitus with diabetic  polyneuropathy, with long-term current use of insulin 05/13/2014    Dyslipidemia 05/13/2014    MCTD (mixed connective tissue disease) 05/13/2014    Sjogren's disease 05/13/2014    Bilateral edema of lower extremity 6/2019 TTE normal LV systolic and diastolic function; ABIs normal 05/13/2014    Glaucoma 05/13/2014    BMI 23.0-23.9, adult 07/15/2014    Jackson's esophagus without dysplasia 03/14/2016    Anemia from plaquenil and imuran 03/14/2016    Neutropenia 02/26/2019    Current chronic use of systemic steroids 02/27/2019    Compression fracture of body of thoracic vertebra on XR 2/2019; 2/2019 alendronate 02/27/2019    Anemia of chronic renal failure, stage 3b 03/13/2019    Chronic constipation not responding to linzess, miralax, senna 05/02/2019    Goals of care, counseling/discussion 05/02/2019    Tophaceous gout 05/09/2019    Pseudophakia of both eyes 08/27/2019    Refractive error 08/27/2019    Aortic atherosclerosis 09/24/2019    Celiac disease 11/12/2013    Diastolic heart failure, NYHA class 2 09/26/2014    Stage 3a chronic kidney disease 01/16/2020    RA (rheumatoid arthritis) 12/09/2020    Secondary hyperparathyroidism of renal origin 02/08/2021    Age-related osteoporosis without current pathological fracture 05/19/2021    Mobitz I 06/27/2021    Bilateral carotid artery stenosis on CTA 6/26/21 06/27/2021    History of stroke 07/20/2021    Dyspnea on exertion 08/02/2021    JAZLYN (obstructive sleep apnea) 08/02/2021    Long-term insulin use 02/01/2022    Dyshidrotic eczema 02/04/2022    Difficulty walking 04/13/2023    Hip joint stiffness, bilateral 04/13/2023    Duodenal ulcer 9/8/23 EGD LA grade C esophagitis with bleeding. 3 cm HH, erythematous mucosa antrum. nonbleeding duodenal ulcers no stigmata of bleeding.  Path chronic inactive gastritis.  H pylori ne 09/28/2023    Poor posture 04/10/2024    Impaired functional mobility, balance, gait, and endurance 07/31/2024    Restrictive lung disease 11/12/2024      Resolved Ambulatory Problems     Diagnosis Date Noted    Abdominal bloating 03/14/2016    Abdominal distension 03/14/2016    Thrombocytopenia 02/26/2019    Chronic systolic congestive heart failure though TTE 2014 normal 05/02/2019    Felon of finger of left hand 05/11/2019    Muscle weakness of lower extremity 08/16/2019    Postural imbalance 08/16/2019    Impaired functional mobility and endurance 08/16/2019    Pleural effusion 10/15/2019    Connective tissue disorder 01/16/2020    Joint pain 10/20/2020    Decreased range of motion of both ankles 10/20/2020    Decreased range of motion of both wrists 10/20/2020    Decreased pinch strength 10/20/2020    Decreased  strength 12/29/2020    Swelling of both hands 12/29/2020    TIA (transient ischemic attack) 06/26/2021    Elevated d-dimer 06/27/2021    Pain in both hands 04/13/2023    Bilateral hand swelling 04/13/2023     Past Medical History:   Diagnosis Date    Arthritis     Cataract     CHF (congestive heart failure)     Diabetes mellitus, type 2     Hyperlipidemia 05/13/2014    Hypertension     Personal history of colonic polyps     Ulcerative colitis     Urolithiasis                 PAST SURGICAL HISTORY:    Past Surgical History:   Procedure Laterality Date    CATARACT EXTRACTION Bilateral 2005    COLONOSCOPY  2014    ESOPHAGOGASTRODUODENOSCOPY N/A 9/8/2023    Procedure: EGD (ESOPHAGOGASTRODUODENOSCOPY);  Surgeon: Divya Saenz MD;  Location: [facility];  Service: Endoscopy;  Laterality: N/A;  ref by / inst portal-    ESOPHAGOGASTRODUODENOSCOPY N/A 11/22/2023    Procedure: EGD (ESOPHAGOGASTRODUODENOSCOPY);  Surgeon: Crystal Urbina MD;  Location: [facility];  Service: Endoscopy;  Laterality: N/A;  time frame 8-12 weeks  Dr. Saenz pt   prep instructions sent to pt via portal -  wl meds trulicity hold 7 days prior  diabetic  11/16- pt r/s to earlier date, pre call complete.  DBM    EYE SURGERY           FAMILY HISTORY:                 Family History   Problem Relation Name Age of Onset    Hypertension Father      Stroke Father      Cataracts Father      Glaucoma Father      Cataracts Mother      No Known Problems Sister      No Known Problems Brother      Rheum arthritis Maternal Aunt      Rheum arthritis Maternal Uncle      No Known Problems Paternal Aunt      No Known Problems Paternal Uncle      No Known Problems Maternal Grandmother      No Known Problems Maternal Grandfather      Throat cancer Paternal Grandmother      Glaucoma Paternal Grandfather      No Known Problems Daughter      No Known Problems Son x2     Celiac disease Neg Hx      Colon cancer Neg Hx      Cirrhosis Neg Hx      Colon polyps Neg Hx      Crohn's disease Neg Hx      Cystic fibrosis Neg Hx      Hemochromatosis Neg Hx      Esophageal cancer Neg Hx      Inflammatory bowel disease Neg Hx      Irritable bowel syndrome Neg Hx      Liver cancer Neg Hx      Liver disease Neg Hx      Rectal cancer Neg Hx      Stomach cancer Neg Hx      Ulcerative colitis Neg Hx      Chase's disease Neg Hx      Amblyopia Neg Hx      Blindness Neg Hx      Cancer Neg Hx      Diabetes Neg Hx      Macular degeneration Neg Hx      Retinal detachment Neg Hx      Strabismus Neg Hx      Thyroid disease Neg Hx      Melanoma Neg Hx         SOCIAL HISTORY:          Tobacco:   Social History     Tobacco Use   Smoking Status Never    Passive exposure: Never   Smokeless Tobacco Never     alcohol use:    Social History     Substance and Sexual Activity   Alcohol Use No               Occupation:  .      ALLERGIES:    Review of patient's allergies indicates:   Allergen Reactions    Allopurinol Other (See Comments)    Azathioprine Other (See Comments)     Pancytopenia    Penicillins Nausea And Vomiting and Other (See Comments)    Rosuvastatin Other (See Comments)     Muscle pain    Allopurinol analogues Rash       CURRENT MEDICATIONS:    Current Outpatient Medications   Medication Sig Dispense  "Refill    alcohol swabs (DROPSAFE ALCOHOL PREP PADS) PadM USE  4 TIMES PER  each 3    amLODIPine (NORVASC) 5 MG tablet TAKE 1 TABLET TWICE DAILY 180 tablet 1    ammonium lactate 12 % Crea Apply 1 application topically once daily. 140 g 5    atorvastatin (LIPITOR) 80 MG tablet TAKE 1 TABLET EVERY DAY 90 tablet 1    blood glucose control, low (TRUE METRIX LEVEL 1) Soln Use as indicated 1 each 0    blood-glucose meter (TRUE METRIX GLUCOSE METER) Misc Test glucose 4x/day 1 each 0    ciclopirox (PENLAC) 8 % Soln Apply topically nightly. 6.6 mL 3    diclofenac sodium (VOLTAREN) 1 % Gel Apply 2 g topically 4 (four) times daily as needed. Apply to aching joints 100 g 3    dorzolamide (TRUSOPT) 2 % ophthalmic solution INSTILL 1 DROP INTO BOTH EYES TWICE DAILY 10 mL 3    DROPLET PEN NEEDLE 32 gauge x 5/32" Ndle USE 1 NEEDLE AS DIRECTED FOUR TIMES DAILY 400 each 3    dulaglutide (TRULICITY) 4.5 mg/0.5 mL pen injector Inject 4.5 mg into the skin every 7 days. 12 pen 1    empagliflozin (JARDIANCE) 10 mg tablet Take 1 tablet (10 mg total) by mouth once daily. 90 tablet 1    febuxostat (ULORIC) 40 mg Tab Take 1 tablet (40 mg total) by mouth 2 (two) times a day. 60 tablet 11    fluticasone propionate (FLONASE) 50 mcg/actuation nasal spray 1 spray (50 mcg total) by Each Nostril route once daily. 48 g 11    hydrALAZINE (APRESOLINE) 25 MG tablet TAKE 3 TABLETS THREE TIMES DAILY 810 tablet 3    insulin aspart U-100 (NOVOLOG FLEXPEN U-100 INSULIN) 100 unit/mL (3 mL) InPn pen Inject Novolog if glucose after meal is high:  201-250=+2, 251-300=+3; 301-350=+4, over 350=+5 units 30 mL 10    lancets (TRUEPLUS LANCETS) 33 gauge Misc Test glucose 4x/day. Dx code e11.65 400 each 3    LIDOcaine (LIDODERM) 5 % Place 1 patch onto the skin once daily. Remove & Discard patch within 12 hours or as directed by MD Sam patch 1    multivit with min-folic acid 200 mcg Chew       omeprazole (PRILOSEC) 40 MG capsule Take 1 capsule (40 mg total) by " mouth every morning. 90 capsule 3    PROLIA 60 mg/mL Syrg       senna-docusate 8.6-50 mg (SENNA WITH DOCUSATE SODIUM) 8.6-50 mg per tablet Take 1 tablet by mouth once daily. 90 tablet 3    torsemide (DEMADEX) 10 MG Tab TAKE 1 TABLET EVERY OTHER DAY 45 tablet 3    traMADoL (ULTRAM) 50 mg tablet TAKE 1 TABLET EVERY 12 HOURS AS NEEDED FOR PAIN 60 tablet 0    travoprost (TRAVATAN Z) 0.004 % ophthalmic solution Place 1 drop into both eyes every evening. 7.5 each 0    triamcinolone acetonide 0.1% (KENALOG) 0.1 % cream APPLY TOPICALLY TWICE DAILY FOR 10 DAYS 45 g 1    TRUE METRIX GLUCOSE TEST STRIP Strp TEST BLOOD SUGAR FOUR TIMES DAILY 400 strip 3    valsartan (DIOVAN) 160 MG tablet TAKE 1 TABLET TWICE DAILY 180 tablet 3    cholecalciferol, vitamin D3, (VITAMIN D3) 50 mcg (2,000 unit) Cap capsule Take 1 capsule (2,000 Units total) by mouth once daily. (Patient not taking: Reported on 11/12/2024) 90 capsule 0    diclofenac sodium (VOLTAREN) 1 % Gel Apply 2 g topically once daily. 100 g 3    ferrous gluconate 324 mg (37.5 mg iron) Tab tablet Take 1 tablet (324 mg total) by mouth once daily. (Patient taking differently: Take 324 mg by mouth once daily. Not taking) 90 tablet 0    INJECTAFER 50 iron mg/mL injection  (Patient not taking: Reported on 11/12/2024)      predniSONE (DELTASONE) 5 MG tablet Take 1 tablet (5 mg total) by mouth once daily. (Patient not taking: Reported on 11/12/2024) 90 tablet 3     No current facility-administered medications for this visit.                  REVIEW OF SYSTEMS:     Pulmonary related symptoms as per HPI.  Gen:  no weight loss, no fever, no night sweat  HEENT:  no visual changes, no sore throat, no hearing loss; +dry mouth  CV:  No chest pain, no orthopnea, no PND  GI:  no melena, no hematochezia, no diarhea, no constipation.  +chronic gerd treated with prilosec  :  no dysuria, no hematuria, no hesistancy, no dribbling  Neuro:  no syncope, no vertigo, no tinitus  Psych:  No homocide or  "suicide ideation; no depression.  Endocrine:  No heat or cold intolerance.  Sleep:  +mild snoring; no witnessed apnea.  Sleep thru the night.  Chronic fatigue    Otherwise, a balance of systems reviewed is negative.          PHYSICAL EXAM:  Vitals:    11/12/24 1054   BP: 128/69   Pulse: 79   Resp: 18   SpO2: 97%   Weight: 57.7 kg (127 lb 3.3 oz)   Height: 5' 6" (1.676 m)   PainSc: 0-No pain     Body mass index is 20.53 kg/m².     GENERAL:  well develop; no apparent distress  HEENT:  no nasal congestion; no discharge noted; class 2 modified mallampatti.   NECK:  supple; no palpable masses.  CARDIO: regular rate and rhythm  PULM:  clear to auscultation bilaterally; no intercostals retractions; no accessory muscle usage   ABDOMEN:  soft nontender/nondistended.  +bowel sound  EXTREMITIES no cc; trace edema.  NEURO:  CN II-XII intact.  5/5 motor in all extremities.  sensation grossly intact   to light touch.  PSYCH:  normal affect.  Alert and oriented x 4    LABS  Pulmonary Functions Testing Results(personally reviewed):    PFT 8/10/21 Ratio of 85%; FVC 1.91 L (63%); FEV1 1.41 L (68%); TLC 3.53 L (60%); dlco 10 (45%); mvv 87%    6 min walk 320 m 100- on room air    ABG (personally reviewed):  none  CXR (personally reviewed):  7/12/21  CT CHEST(personally reviewed):  9/16/21 Mosaic attenuation bilaterally.  Vascular congestion vs small airway disease.  PA root 30 mm.     hsat 9-16-21 ahi of 21; rdi of 38    Echo 6/27/21  The left ventricle is normal in size with concentric hypertrophy and normal systolic function.  The estimated ejection fraction is 65%.  Grade I left ventricular diastolic dysfunction.  Normal right ventricular size with normal right ventricular systolic function.  Moderate left atrial enlargement.  Mild right atrial enlargement.  There is mild aortic valve stenosis.  Aortic valve area is 1.67 cm2; peak velocity is 1.95 m/s; mean gradient is 9 mmHg.  Normal central venous pressure (3 mmHg).  The " estimated PA systolic pressure is 16 mmHg.      ASSESSMENT/PLAN  Problem List Items Addressed This Visit       Diastolic heart failure, NYHA class 2    Overview     05/12/2021 TTE LV normal size with concentric remodeling and normal systolic function LVEF 65%.  Grade 2 diastolic dysfunction.  Severe left atrial enlargement.  Normal RV size and systolic function.  CVP 3.  PA pressure 36. Sinuses of Valsalva mildly dilated.  05/12/2021 nuclear medicine stress test normal perfusion.  No evidence of ischemia or infarction.  LVEF 75%.  Normal wall motion post stress.  During stress, rare PVCs.  Hypertensive response exercise.    ct with mosaic attenuation.  Recommend daily demodex  Pft with mild obstruction.           Goals of care, counseling/discussion    Overview     During this visit, I engaged the patient in the advance care planning process.  The patient and I reviewed the role for advance directives and their purpose in directing future healthcare if the patient's unable to speak for him/herself.  At this point in time, the patient does have full decision-making capacity.  We discussed different extreme health states that patient could experience, and reviewed what kind of medical care patient would want in those situations.  The patient communicated that if he was comatose and had little chance of a meaningful recovery, he would NOT want machines/life-sustaining treatments used.  In addition to the above preference, patient  HAS completed a living will to reflect these preferences.  The patient HAS designated his daughter, Trudy Jama, as healthcare power of  to make decisions on her behalf.  In the case of cardiopulmonary arrest, patient does wish for CPR, intubation or cardioversion.  Advise patient to bring living will to us to scan into EPIC.      16 minutes spent discussing GOC.          JAZLYN (obstructive sleep apnea) - Primary    Overview     ahi of 21; rdi of 38.   Result of sleep study d/w  patient.   Recommend treatment.    Patient deferred treatment.  He is aware of increased cardiovascular morbidities a/w untreated leigha.          Restrictive lung disease    Overview     Pft with restrictive lung physiology - ild vs extrinsic restriction a/w kyphosis.    Ct with mild mosaic attenuation.     Vascular congestion vs small airway disease.   Clinically asymptomatic  Will repeat pft           Relevant Orders    Complete PFT with bronchodilator     Other Visit Diagnoses       SOB (shortness of breath)                Patient will No follow-ups on file. with analia.

## 2024-11-13 ENCOUNTER — INFUSION (OUTPATIENT)
Dept: INFUSION THERAPY | Facility: HOSPITAL | Age: 74
End: 2024-11-13
Attending: INTERNAL MEDICINE
Payer: MEDICARE

## 2024-11-13 VITALS
BODY MASS INDEX: 20.6 KG/M2 | TEMPERATURE: 98 F | DIASTOLIC BLOOD PRESSURE: 64 MMHG | SYSTOLIC BLOOD PRESSURE: 146 MMHG | RESPIRATION RATE: 16 BRPM | WEIGHT: 127.63 LBS | OXYGEN SATURATION: 99 % | HEART RATE: 83 BPM

## 2024-11-13 DIAGNOSIS — M06.9 RHEUMATOID ARTHRITIS, INVOLVING UNSPECIFIED SITE, UNSPECIFIED WHETHER RHEUMATOID FACTOR PRESENT: Primary | ICD-10-CM

## 2024-11-13 PROCEDURE — 25000003 PHARM REV CODE 250: Performed by: INTERNAL MEDICINE

## 2024-11-13 PROCEDURE — 63600175 PHARM REV CODE 636 W HCPCS: Performed by: INTERNAL MEDICINE

## 2024-11-13 RX ORDER — SODIUM CHLORIDE 0.9 % (FLUSH) 0.9 %
10 SYRINGE (ML) INJECTION
Status: DISCONTINUED | OUTPATIENT
Start: 2024-11-13 | End: 2024-11-13 | Stop reason: HOSPADM

## 2024-11-13 RX ORDER — DIPHENHYDRAMINE HYDROCHLORIDE 50 MG/ML
25 INJECTION INTRAMUSCULAR; INTRAVENOUS
Status: COMPLETED | OUTPATIENT
Start: 2024-11-13 | End: 2024-11-13

## 2024-11-13 RX ORDER — ACETAMINOPHEN 325 MG/1
650 TABLET ORAL
Status: COMPLETED | OUTPATIENT
Start: 2024-11-13 | End: 2024-11-13

## 2024-11-13 RX ORDER — DIPHENHYDRAMINE HYDROCHLORIDE 50 MG/ML
50 INJECTION INTRAMUSCULAR; INTRAVENOUS ONCE AS NEEDED
OUTPATIENT
Start: 2024-11-27

## 2024-11-13 RX ORDER — ACETAMINOPHEN 325 MG/1
650 TABLET ORAL
OUTPATIENT
Start: 2024-11-27

## 2024-11-13 RX ORDER — SODIUM CHLORIDE 0.9 % (FLUSH) 0.9 %
10 SYRINGE (ML) INJECTION
OUTPATIENT
Start: 2024-11-27

## 2024-11-13 RX ORDER — HEPARIN 100 UNIT/ML
500 SYRINGE INTRAVENOUS
OUTPATIENT
Start: 2024-11-27

## 2024-11-13 RX ORDER — DIPHENHYDRAMINE HYDROCHLORIDE 50 MG/ML
25 INJECTION INTRAMUSCULAR; INTRAVENOUS
OUTPATIENT
Start: 2024-11-27

## 2024-11-13 RX ORDER — EPINEPHRINE 0.3 MG/.3ML
0.3 INJECTION SUBCUTANEOUS ONCE AS NEEDED
OUTPATIENT
Start: 2024-11-27

## 2024-11-13 RX ADMIN — ACETAMINOPHEN 650 MG: 325 TABLET ORAL at 09:11

## 2024-11-13 RX ADMIN — SODIUM CHLORIDE 500 MG: 9 INJECTION, SOLUTION INTRAVENOUS at 10:11

## 2024-11-13 RX ADMIN — DIPHENHYDRAMINE HYDROCHLORIDE 25 MG: 50 INJECTION INTRAMUSCULAR; INTRAVENOUS at 10:11

## 2024-11-13 NOTE — NURSING
Patient arrived to unit ambulatory, for IV therapy. Patient alert and oriented with no new or worsening complaints. Patient received ordered pre-meds during this encounter, medication administered per plan. Patient was stuck 11 times for IV access, 5 separate nurses attempted including PACU RN with ultrasound. Supervisor, Gretchen was able to obtain IV access on the last try. Patient tolerated infusion with no complaints or signs of reaction, vital signs stable, no apparent distress at this time. Patient ambulatory upon discharge.

## 2024-11-20 ENCOUNTER — HOSPITAL ENCOUNTER (OUTPATIENT)
Dept: RESPIRATORY THERAPY | Facility: HOSPITAL | Age: 74
Discharge: HOME | End: 2024-11-20
Attending: INTERNAL MEDICINE
Payer: MEDICARE

## 2024-11-20 DIAGNOSIS — J98.4 RESTRICTIVE LUNG DISEASE: ICD-10-CM

## 2024-11-20 RX ORDER — ALBUTEROL SULFATE 2.5 MG/.5ML
2.5 SOLUTION RESPIRATORY (INHALATION) ONCE
Status: DISCONTINUED | OUTPATIENT
Start: 2024-11-20 | End: 2024-11-20

## 2024-11-20 NOTE — PROCEDURES
Patient arrived for Pft but test incomplete. Attempted several times but patient was not able to understand commands after several times explaining and demonstrating. Attempted FVC and DLCO and was not able to get anything accurate.

## 2024-11-22 ENCOUNTER — LAB VISIT (OUTPATIENT)
Dept: LAB | Facility: HOSPITAL | Age: 74
End: 2024-11-22
Attending: INTERNAL MEDICINE
Payer: MEDICARE

## 2024-11-22 DIAGNOSIS — D63.1 ANEMIA OF CHRONIC RENAL FAILURE, STAGE 3B: ICD-10-CM

## 2024-11-22 DIAGNOSIS — N18.32 ANEMIA OF CHRONIC RENAL FAILURE, STAGE 3B: ICD-10-CM

## 2024-11-22 LAB
25(OH)D3+25(OH)D2 SERPL-MCNC: 34 NG/ML (ref 30–96)
BASOPHILS # BLD AUTO: 0.03 K/UL (ref 0–0.2)
BASOPHILS NFR BLD: 0.5 % (ref 0–1.9)
DIFFERENTIAL METHOD BLD: ABNORMAL
EOSINOPHIL # BLD AUTO: 0.2 K/UL (ref 0–0.5)
EOSINOPHIL NFR BLD: 3.1 % (ref 0–8)
ERYTHROCYTE [DISTWIDTH] IN BLOOD BY AUTOMATED COUNT: 14.3 % (ref 11.5–14.5)
FERRITIN SERPL-MCNC: 193 NG/ML (ref 20–300)
HCT VFR BLD AUTO: 36.1 % (ref 40–54)
HGB BLD-MCNC: 11 G/DL (ref 14–18)
IMM GRANULOCYTES # BLD AUTO: 0.02 K/UL (ref 0–0.04)
IMM GRANULOCYTES NFR BLD AUTO: 0.3 % (ref 0–0.5)
IRON SERPL-MCNC: 55 UG/DL (ref 45–160)
LYMPHOCYTES # BLD AUTO: 1.9 K/UL (ref 1–4.8)
LYMPHOCYTES NFR BLD: 29.1 % (ref 18–48)
MCH RBC QN AUTO: 28.6 PG (ref 27–31)
MCHC RBC AUTO-ENTMCNC: 30.5 G/DL (ref 32–36)
MCV RBC AUTO: 94 FL (ref 82–98)
MONOCYTES # BLD AUTO: 0.9 K/UL (ref 0.3–1)
MONOCYTES NFR BLD: 13.3 % (ref 4–15)
NEUTROPHILS # BLD AUTO: 3.5 K/UL (ref 1.8–7.7)
NEUTROPHILS NFR BLD: 53.7 % (ref 38–73)
NRBC BLD-RTO: 0 /100 WBC
PLATELET # BLD AUTO: 191 K/UL (ref 150–450)
PMV BLD AUTO: 13 FL (ref 9.2–12.9)
RBC # BLD AUTO: 3.85 M/UL (ref 4.6–6.2)
SATURATED IRON: 21 % (ref 20–50)
TOTAL IRON BINDING CAPACITY: 258 UG/DL (ref 250–450)
TRANSFERRIN SERPL-MCNC: 174 MG/DL (ref 200–375)
WBC # BLD AUTO: 6.53 K/UL (ref 3.9–12.7)

## 2024-11-22 PROCEDURE — 82306 VITAMIN D 25 HYDROXY: CPT | Performed by: INTERNAL MEDICINE

## 2024-11-22 PROCEDURE — 85025 COMPLETE CBC W/AUTO DIFF WBC: CPT | Performed by: INTERNAL MEDICINE

## 2024-11-22 PROCEDURE — 84466 ASSAY OF TRANSFERRIN: CPT | Performed by: INTERNAL MEDICINE

## 2024-11-22 PROCEDURE — 82728 ASSAY OF FERRITIN: CPT | Performed by: INTERNAL MEDICINE

## 2024-11-22 PROCEDURE — 36415 COLL VENOUS BLD VENIPUNCTURE: CPT | Mod: PO | Performed by: INTERNAL MEDICINE

## 2024-11-27 ENCOUNTER — INFUSION (OUTPATIENT)
Dept: INFUSION THERAPY | Facility: HOSPITAL | Age: 74
End: 2024-11-27
Attending: INTERNAL MEDICINE
Payer: MEDICARE

## 2024-11-27 VITALS — WEIGHT: 125.69 LBS | BODY MASS INDEX: 20.28 KG/M2

## 2024-11-27 DIAGNOSIS — M06.9 RHEUMATOID ARTHRITIS, INVOLVING UNSPECIFIED SITE, UNSPECIFIED WHETHER RHEUMATOID FACTOR PRESENT: Primary | ICD-10-CM

## 2024-11-27 PROCEDURE — 96375 TX/PRO/DX INJ NEW DRUG ADDON: CPT

## 2024-11-27 PROCEDURE — 25000003 PHARM REV CODE 250: Performed by: INTERNAL MEDICINE

## 2024-11-27 PROCEDURE — 96365 THER/PROPH/DIAG IV INF INIT: CPT

## 2024-11-27 PROCEDURE — 63600175 PHARM REV CODE 636 W HCPCS: Mod: JZ,JA,JG | Performed by: INTERNAL MEDICINE

## 2024-11-27 RX ORDER — ACETAMINOPHEN 325 MG/1
650 TABLET ORAL
OUTPATIENT
Start: 2024-12-25

## 2024-11-27 RX ORDER — DIPHENHYDRAMINE HYDROCHLORIDE 50 MG/ML
25 INJECTION INTRAMUSCULAR; INTRAVENOUS
Status: COMPLETED | OUTPATIENT
Start: 2024-11-27 | End: 2024-11-27

## 2024-11-27 RX ORDER — DIPHENHYDRAMINE HYDROCHLORIDE 50 MG/ML
25 INJECTION INTRAMUSCULAR; INTRAVENOUS
OUTPATIENT
Start: 2024-12-25

## 2024-11-27 RX ORDER — HEPARIN 100 UNIT/ML
500 SYRINGE INTRAVENOUS
OUTPATIENT
Start: 2024-12-25

## 2024-11-27 RX ORDER — DIPHENHYDRAMINE HYDROCHLORIDE 50 MG/ML
50 INJECTION INTRAMUSCULAR; INTRAVENOUS ONCE AS NEEDED
OUTPATIENT
Start: 2024-12-25

## 2024-11-27 RX ORDER — SODIUM CHLORIDE 0.9 % (FLUSH) 0.9 %
10 SYRINGE (ML) INJECTION
OUTPATIENT
Start: 2024-12-25

## 2024-11-27 RX ORDER — ACETAMINOPHEN 325 MG/1
650 TABLET ORAL
Status: COMPLETED | OUTPATIENT
Start: 2024-11-27 | End: 2024-11-27

## 2024-11-27 RX ORDER — EPINEPHRINE 0.3 MG/.3ML
0.3 INJECTION SUBCUTANEOUS ONCE AS NEEDED
OUTPATIENT
Start: 2024-12-25

## 2024-11-27 RX ADMIN — DIPHENHYDRAMINE HYDROCHLORIDE 25 MG: 50 INJECTION INTRAMUSCULAR; INTRAVENOUS at 10:11

## 2024-11-27 RX ADMIN — ACETAMINOPHEN 650 MG: 325 TABLET ORAL at 10:11

## 2024-11-27 RX ADMIN — SODIUM CHLORIDE 500 MG: 9 INJECTION, SOLUTION INTRAVENOUS at 10:11

## 2024-11-27 NOTE — PLAN OF CARE
Pt ambulated to clinic w/o difficulty. Pt received Oren[place] infusion via 24g, L wrist. Pt tolerated infusion well. No s/s of a reaction. VSS, in NAD. Care plan discussed with pt. Pt provided a printed copy of appointment schedule.        Problem: Fall Injury Risk  Goal: Absence of Fall and Fall-Related Injury  Outcome: Progressing

## 2024-11-29 DIAGNOSIS — I10 ESSENTIAL HYPERTENSION: ICD-10-CM

## 2024-11-29 DIAGNOSIS — N18.30 CHRONIC KIDNEY DISEASE, STAGE III (MODERATE): ICD-10-CM

## 2024-11-29 RX ORDER — TORSEMIDE 10 MG/1
10 TABLET ORAL EVERY OTHER DAY
Qty: 45 TABLET | Refills: 3 | Status: SHIPPED | OUTPATIENT
Start: 2024-11-29

## 2025-01-07 ENCOUNTER — INFUSION (OUTPATIENT)
Dept: INFUSION THERAPY | Facility: HOSPITAL | Age: 75
End: 2025-01-07
Attending: INTERNAL MEDICINE
Payer: MEDICARE

## 2025-01-07 VITALS
SYSTOLIC BLOOD PRESSURE: 167 MMHG | TEMPERATURE: 98 F | DIASTOLIC BLOOD PRESSURE: 74 MMHG | OXYGEN SATURATION: 99 % | RESPIRATION RATE: 16 BRPM | WEIGHT: 127.44 LBS | HEART RATE: 84 BPM | BODY MASS INDEX: 20.57 KG/M2

## 2025-01-07 DIAGNOSIS — M06.9 RHEUMATOID ARTHRITIS, INVOLVING UNSPECIFIED SITE, UNSPECIFIED WHETHER RHEUMATOID FACTOR PRESENT: Primary | ICD-10-CM

## 2025-01-07 PROCEDURE — 25000003 PHARM REV CODE 250: Performed by: INTERNAL MEDICINE

## 2025-01-07 PROCEDURE — 96365 THER/PROPH/DIAG IV INF INIT: CPT

## 2025-01-07 PROCEDURE — 63600175 PHARM REV CODE 636 W HCPCS: Mod: JZ,JA,TB | Performed by: INTERNAL MEDICINE

## 2025-01-07 PROCEDURE — 96375 TX/PRO/DX INJ NEW DRUG ADDON: CPT

## 2025-01-07 RX ORDER — SODIUM CHLORIDE 0.9 % (FLUSH) 0.9 %
10 SYRINGE (ML) INJECTION
OUTPATIENT
Start: 2025-01-21

## 2025-01-07 RX ORDER — DIPHENHYDRAMINE HYDROCHLORIDE 50 MG/ML
50 INJECTION INTRAMUSCULAR; INTRAVENOUS ONCE AS NEEDED
OUTPATIENT
Start: 2025-01-21

## 2025-01-07 RX ORDER — HEPARIN 100 UNIT/ML
500 SYRINGE INTRAVENOUS
OUTPATIENT
Start: 2025-01-21

## 2025-01-07 RX ORDER — DIPHENHYDRAMINE HYDROCHLORIDE 50 MG/ML
25 INJECTION INTRAMUSCULAR; INTRAVENOUS
Status: COMPLETED | OUTPATIENT
Start: 2025-01-07 | End: 2025-01-07

## 2025-01-07 RX ORDER — ACETAMINOPHEN 325 MG/1
650 TABLET ORAL
OUTPATIENT
Start: 2025-01-21

## 2025-01-07 RX ORDER — DIPHENHYDRAMINE HYDROCHLORIDE 50 MG/ML
25 INJECTION INTRAMUSCULAR; INTRAVENOUS
OUTPATIENT
Start: 2025-01-21

## 2025-01-07 RX ORDER — EPINEPHRINE 0.3 MG/.3ML
0.3 INJECTION SUBCUTANEOUS ONCE AS NEEDED
OUTPATIENT
Start: 2025-01-21

## 2025-01-07 RX ORDER — ACETAMINOPHEN 325 MG/1
650 TABLET ORAL
Status: COMPLETED | OUTPATIENT
Start: 2025-01-07 | End: 2025-01-07

## 2025-01-07 RX ADMIN — DIPHENHYDRAMINE HYDROCHLORIDE 25 MG: 50 INJECTION INTRAMUSCULAR; INTRAVENOUS at 09:01

## 2025-01-07 RX ADMIN — SODIUM CHLORIDE 500 MG: 9 INJECTION, SOLUTION INTRAVENOUS at 10:01

## 2025-01-07 RX ADMIN — ACETAMINOPHEN 650 MG: 325 TABLET ORAL at 09:01

## 2025-01-07 NOTE — PLAN OF CARE
Patient ambulated onto unit for Orencia infusion, no s/s of distress, VSS. Plan of care reviewed with patient. Premedications (tylenol & benadryl administered). Orencia administered via IV. Treatment was tolerated well. Next appt reminder given. Patient ambulated off unit, no s/s of distress.

## 2025-01-14 ENCOUNTER — LAB VISIT (OUTPATIENT)
Dept: LAB | Facility: HOSPITAL | Age: 75
End: 2025-01-14
Attending: INTERNAL MEDICINE
Payer: MEDICARE

## 2025-01-14 DIAGNOSIS — H40.1132 PRIMARY OPEN ANGLE GLAUCOMA (POAG) OF BOTH EYES, MODERATE STAGE: Primary | ICD-10-CM

## 2025-01-14 DIAGNOSIS — N18.32 STAGE 3B CHRONIC KIDNEY DISEASE: ICD-10-CM

## 2025-01-14 DIAGNOSIS — M35.00 SJOGREN'S SYNDROME, WITH UNSPECIFIED ORGAN INVOLVEMENT: ICD-10-CM

## 2025-01-14 LAB
ALBUMIN SERPL BCP-MCNC: 3.4 G/DL (ref 3.5–5.2)
ALP SERPL-CCNC: 84 U/L (ref 40–150)
ALT SERPL W/O P-5'-P-CCNC: 20 U/L (ref 10–44)
ANION GAP SERPL CALC-SCNC: 10 MMOL/L (ref 8–16)
AST SERPL-CCNC: 25 U/L (ref 10–40)
BASOPHILS # BLD AUTO: 0.02 K/UL (ref 0–0.2)
BASOPHILS NFR BLD: 0.3 % (ref 0–1.9)
BILIRUB SERPL-MCNC: 0.4 MG/DL (ref 0.1–1)
BUN SERPL-MCNC: 34 MG/DL (ref 8–23)
CALCIUM SERPL-MCNC: 9.4 MG/DL (ref 8.7–10.5)
CHLORIDE SERPL-SCNC: 107 MMOL/L (ref 95–110)
CO2 SERPL-SCNC: 20 MMOL/L (ref 23–29)
CREAT SERPL-MCNC: 1.6 MG/DL (ref 0.5–1.4)
DIFFERENTIAL METHOD BLD: ABNORMAL
EOSINOPHIL # BLD AUTO: 0.2 K/UL (ref 0–0.5)
EOSINOPHIL NFR BLD: 2.5 % (ref 0–8)
ERYTHROCYTE [DISTWIDTH] IN BLOOD BY AUTOMATED COUNT: 15.1 % (ref 11.5–14.5)
EST. GFR  (NO RACE VARIABLE): 44.9 ML/MIN/1.73 M^2
GLUCOSE SERPL-MCNC: 95 MG/DL (ref 70–110)
HCT VFR BLD AUTO: 38 % (ref 40–54)
HGB BLD-MCNC: 11.6 G/DL (ref 14–18)
IMM GRANULOCYTES # BLD AUTO: 0.01 K/UL (ref 0–0.04)
IMM GRANULOCYTES NFR BLD AUTO: 0.1 % (ref 0–0.5)
LYMPHOCYTES # BLD AUTO: 2.5 K/UL (ref 1–4.8)
LYMPHOCYTES NFR BLD: 36.7 % (ref 18–48)
MCH RBC QN AUTO: 28.4 PG (ref 27–31)
MCHC RBC AUTO-ENTMCNC: 30.5 G/DL (ref 32–36)
MCV RBC AUTO: 93 FL (ref 82–98)
MONOCYTES # BLD AUTO: 0.9 K/UL (ref 0.3–1)
MONOCYTES NFR BLD: 13.3 % (ref 4–15)
NEUTROPHILS # BLD AUTO: 3.2 K/UL (ref 1.8–7.7)
NEUTROPHILS NFR BLD: 47.1 % (ref 38–73)
NRBC BLD-RTO: 0 /100 WBC
PHOSPHATE SERPL-MCNC: 4.1 MG/DL (ref 2.7–4.5)
PLATELET # BLD AUTO: 192 K/UL (ref 150–450)
PMV BLD AUTO: 12.5 FL (ref 9.2–12.9)
POTASSIUM SERPL-SCNC: 4.4 MMOL/L (ref 3.5–5.1)
PROT SERPL-MCNC: 7.6 G/DL (ref 6–8.4)
PTH-INTACT SERPL-MCNC: 50 PG/ML (ref 9–77)
RBC # BLD AUTO: 4.09 M/UL (ref 4.6–6.2)
SODIUM SERPL-SCNC: 137 MMOL/L (ref 136–145)
WBC # BLD AUTO: 6.82 K/UL (ref 3.9–12.7)

## 2025-01-14 PROCEDURE — 80053 COMPREHEN METABOLIC PANEL: CPT | Performed by: INTERNAL MEDICINE

## 2025-01-14 PROCEDURE — 36415 COLL VENOUS BLD VENIPUNCTURE: CPT | Mod: PO | Performed by: INTERNAL MEDICINE

## 2025-01-14 PROCEDURE — 85025 COMPLETE CBC W/AUTO DIFF WBC: CPT | Performed by: INTERNAL MEDICINE

## 2025-01-14 PROCEDURE — 84100 ASSAY OF PHOSPHORUS: CPT | Performed by: INTERNAL MEDICINE

## 2025-01-14 PROCEDURE — 83970 ASSAY OF PARATHORMONE: CPT | Performed by: INTERNAL MEDICINE

## 2025-01-15 ENCOUNTER — OFFICE VISIT (OUTPATIENT)
Dept: NEPHROLOGY | Facility: CLINIC | Age: 75
End: 2025-01-15
Payer: MEDICARE

## 2025-01-15 VITALS
WEIGHT: 125.25 LBS | DIASTOLIC BLOOD PRESSURE: 60 MMHG | HEART RATE: 75 BPM | HEIGHT: 66 IN | SYSTOLIC BLOOD PRESSURE: 122 MMHG | BODY MASS INDEX: 20.13 KG/M2 | OXYGEN SATURATION: 99 %

## 2025-01-15 DIAGNOSIS — E55.9 VITAMIN D INSUFFICIENCY: ICD-10-CM

## 2025-01-15 DIAGNOSIS — N18.32 STAGE 3B CHRONIC KIDNEY DISEASE: Primary | ICD-10-CM

## 2025-01-15 DIAGNOSIS — N18.32 STAGE 3B CHRONIC KIDNEY DISEASE: ICD-10-CM

## 2025-01-15 DIAGNOSIS — E11.22 CONTROLLED TYPE 2 DIABETES MELLITUS WITH STAGE 3 CHRONIC KIDNEY DISEASE, WITH LONG-TERM CURRENT USE OF INSULIN: ICD-10-CM

## 2025-01-15 DIAGNOSIS — I10 ESSENTIAL HYPERTENSION: Primary | ICD-10-CM

## 2025-01-15 DIAGNOSIS — Z79.4 CONTROLLED TYPE 2 DIABETES MELLITUS WITH STAGE 3 CHRONIC KIDNEY DISEASE, WITH LONG-TERM CURRENT USE OF INSULIN: ICD-10-CM

## 2025-01-15 DIAGNOSIS — N18.30 CONTROLLED TYPE 2 DIABETES MELLITUS WITH STAGE 3 CHRONIC KIDNEY DISEASE, WITH LONG-TERM CURRENT USE OF INSULIN: ICD-10-CM

## 2025-01-15 DIAGNOSIS — N25.81 SECONDARY HYPERPARATHYROIDISM OF RENAL ORIGIN: ICD-10-CM

## 2025-01-15 PROCEDURE — 99999 PR PBB SHADOW E&M-EST. PATIENT-LVL IV: CPT | Mod: PBBFAC,,, | Performed by: INTERNAL MEDICINE

## 2025-01-15 PROCEDURE — 1126F AMNT PAIN NOTED NONE PRSNT: CPT | Mod: CPTII,S$GLB,, | Performed by: INTERNAL MEDICINE

## 2025-01-15 PROCEDURE — 3066F NEPHROPATHY DOC TX: CPT | Mod: CPTII,S$GLB,, | Performed by: INTERNAL MEDICINE

## 2025-01-15 PROCEDURE — 3074F SYST BP LT 130 MM HG: CPT | Mod: CPTII,S$GLB,, | Performed by: INTERNAL MEDICINE

## 2025-01-15 PROCEDURE — 99214 OFFICE O/P EST MOD 30 MIN: CPT | Mod: S$GLB,,, | Performed by: INTERNAL MEDICINE

## 2025-01-15 PROCEDURE — 3078F DIAST BP <80 MM HG: CPT | Mod: CPTII,S$GLB,, | Performed by: INTERNAL MEDICINE

## 2025-01-15 PROCEDURE — 1101F PT FALLS ASSESS-DOCD LE1/YR: CPT | Mod: CPTII,S$GLB,, | Performed by: INTERNAL MEDICINE

## 2025-01-15 PROCEDURE — 3008F BODY MASS INDEX DOCD: CPT | Mod: CPTII,S$GLB,, | Performed by: INTERNAL MEDICINE

## 2025-01-15 PROCEDURE — 1159F MED LIST DOCD IN RCRD: CPT | Mod: CPTII,S$GLB,, | Performed by: INTERNAL MEDICINE

## 2025-01-15 PROCEDURE — 3288F FALL RISK ASSESSMENT DOCD: CPT | Mod: CPTII,S$GLB,, | Performed by: INTERNAL MEDICINE

## 2025-01-15 NOTE — PROGRESS NOTES
CHIEF COMPLAINT/HPI: Darrion is a 74 y.o. man with PMH of HTN , DM with albuminuria, Sjogren disease HFpEF, CKD.   Was following with Dr. Vizcaino   First saw him 4/2022, here for follow up.   6/5/24: here for follow up. Feels ok. Denied any CP,SOB,N/V.  9/25/24: here for follow up. Feels ok. K is mildly elevated. His daughter gave birth to a little girl and staying with them, he seems happy and doing well.    1/15/25: here with his wife, very pleasant couple. She is concerned about his creatinine.     Past Medical History:   Diagnosis Date    Anemia     Arthritis     Cataract     CHF (congestive heart failure)     Chronic constipation     Diabetes mellitus, type 2     Felon of finger of left hand 05/11/2019    Glaucoma     Hyperlipidemia 05/13/2014    Hypertension     MCTD (mixed connective tissue disease)     Personal history of colonic polyps     Sjogren's disease     Ulcerative colitis     Urolithiasis        Darrion reports that he has never smoked. He has never been exposed to tobacco smoke. He has never used smokeless tobacco. He reports current drug use. He reports that he does not drink alcohol.    Family History   Problem Relation Name Age of Onset    Hypertension Father      Stroke Father      Cataracts Father      Glaucoma Father      Cataracts Mother      No Known Problems Sister      No Known Problems Brother      Rheum arthritis Maternal Aunt      Rheum arthritis Maternal Uncle      No Known Problems Paternal Aunt      No Known Problems Paternal Uncle      No Known Problems Maternal Grandmother      No Known Problems Maternal Grandfather      Throat cancer Paternal Grandmother      Glaucoma Paternal Grandfather      No Known Problems Daughter      No Known Problems Son x2     Celiac disease Neg Hx      Colon cancer Neg Hx      Cirrhosis Neg Hx      Colon polyps Neg Hx      Crohn's disease Neg Hx      Cystic fibrosis Neg Hx      Hemochromatosis Neg Hx      Esophageal cancer Neg Hx      Inflammatory bowel  "disease Neg Hx      Irritable bowel syndrome Neg Hx      Liver cancer Neg Hx      Liver disease Neg Hx      Rectal cancer Neg Hx      Stomach cancer Neg Hx      Ulcerative colitis Neg Hx      Chase's disease Neg Hx      Amblyopia Neg Hx      Blindness Neg Hx      Cancer Neg Hx      Diabetes Neg Hx      Macular degeneration Neg Hx      Retinal detachment Neg Hx      Strabismus Neg Hx      Thyroid disease Neg Hx      Melanoma Neg Hx         ROS:    Review of Systems   Constitutional:  Negative for activity change, appetite change, chills, fever and unexpected weight change.   HENT:  Negative for congestion, ear discharge, hearing loss, nosebleeds, sore throat and tinnitus.    Eyes:  Negative for photophobia, pain, redness and visual disturbance.   Respiratory:  Negative for cough, chest tightness, shortness of breath and wheezing.    Cardiovascular:  Negative for chest pain, palpitations and leg swelling.   Gastrointestinal:  Negative for abdominal distention, constipation, diarrhea, nausea and vomiting.   Endocrine: Negative for cold intolerance, heat intolerance, polydipsia and polyuria.   Genitourinary:  Negative for decreased urine volume, difficulty urinating, dysuria, flank pain and hematuria.   Musculoskeletal:  Negative for arthralgias, gait problem, joint swelling and neck stiffness.   Skin:  Negative for rash.   Neurological:  Negative for dizziness, tremors, weakness, numbness and headaches.   Psychiatric/Behavioral:  Negative for confusion and sleep disturbance.      PE:    Vitals:    01/15/25 0937   BP: 122/60   Pulse: 75   SpO2: 99%   Weight: 56.8 kg (125 lb 3.5 oz)   Height: 5' 6" (1.676 m)         Physical Exam  Constitutional:       General: He is not in acute distress.     Appearance: Normal appearance. He is normal weight.   HENT:      Head: Normocephalic and atraumatic.      Nose: Nose normal.      Mouth/Throat:      Mouth: Mucous membranes are moist.      Pharynx: Oropharynx is clear. No " oropharyngeal exudate or posterior oropharyngeal erythema.   Eyes:      Extraocular Movements: Extraocular movements intact.      Conjunctiva/sclera: Conjunctivae normal.      Pupils: Pupils are equal, round, and reactive to light.   Cardiovascular:      Rate and Rhythm: Normal rate and regular rhythm.      Pulses: Normal pulses.      Heart sounds: Normal heart sounds. No murmur heard.     No friction rub. No gallop.   Pulmonary:      Effort: Pulmonary effort is normal. No respiratory distress.      Breath sounds: Normal breath sounds. No stridor. No wheezing or rales.   Abdominal:      General: Abdomen is flat. Bowel sounds are normal.      Palpations: Abdomen is soft.      Tenderness: There is no abdominal tenderness. There is no guarding or rebound.   Musculoskeletal:         General: No swelling. Normal range of motion.      Cervical back: Normal range of motion and neck supple.      Right lower leg: No edema.      Left lower leg: No edema.   Skin:     General: Skin is warm.      Coloration: Skin is not jaundiced or pale.      Findings: No lesion.   Neurological:      General: No focal deficit present.      Mental Status: He is alert and oriented to person, place, and time.      Motor: No weakness.   Psychiatric:         Mood and Affect: Mood normal.       Impression/Plan:    1. Essential hypertension    2. Stage 3b chronic kidney disease    3. Controlled type 2 diabetes mellitus with stage 3 chronic kidney disease, with long-term current use of insulin    4. Secondary hyperparathyroidism of renal origin    5. Vitamin D insufficiency        # CKD3a with mild rise in creatinine by 0.2 from baseline ~1.4, possibly progression of CKD. in patient with HTN , diastolic dysfunction, DM and MCTD.   in June patient had worsening of creatinine - and creatinine went to 1.9 and since then it stayed around 1.9.   -UA bland.  Counseled on BP and glycemic control   Counseled on avoiding NSAID's ( not taking)  -will repeat labs  on Tuesday to evaluate trend of kidney function.       Lab Results   Component Value Date    CREATININE 1.6 (H) 01/14/2025     Protein Creatinine Ratios:    Prot/Creat Ratio, Urine   Date Value Ref Range Status   01/14/2025 0.70 (H) 0.00 - 0.20 Final   09/20/2024 0.84 (H) 0.00 - 0.20 Final   08/20/2024 1.02 (H) 0.00 - 0.20 Final   \    Acid-Base:   Lab Results   Component Value Date     01/14/2025    K 4.4 01/14/2025    CO2 20 (L) 01/14/2025     #HTN: Blood pressures acceptable. Cont valsartan, torsemide, hydralazine, amlodipine.  Counseled on low salt diet.  # Renal osteodystrophy: secondary hyperparathyroidism  Lab Results   Component Value Date    PTH 50.0 01/14/2025    CALCIUM 9.4 01/14/2025    PHOS 4.1 01/14/2025   Taking OTC vit D.  Starting cholecalciferol 2000u dialy     # Anemia:   Lab Results   Component Value Date    HGB 11.6 (L) 01/14/2025    He is shivering ?FABIOLA, will repeat iron panel and if def. Will give few doses of iv iron.    # DM:  Last HbA1C   Lab Results   Component Value Date    HGBA1C 6.9 (H) 08/28/2024       # Lipid management:   Lab Results   Component Value Date    LDLCALC 91.0 02/26/2024     Visit today included increased complexity associated with the care of the episodic problem CKD, HTN, DM, Secondary hyperparathyroid addressed and managing the longitudinal care of the patient due to the serious and/or complex managed problem(s) .    50 min spent reviewing chart visits, labs, vitals and seeing patient face to face discussing labs and dietary changes to improve hyperkalemia along with resources given to the patient( list of low K diet to follow and high K to avoid)     # ESRD planing: none    Follow up in 4 m with labs in 4 weeks and if stable will start aldactone then.

## 2025-01-17 DIAGNOSIS — E11.8 CONTROLLED TYPE 2 DIABETES MELLITUS WITH COMPLICATION, WITH LONG-TERM CURRENT USE OF INSULIN: ICD-10-CM

## 2025-01-17 DIAGNOSIS — Z79.4 CONTROLLED TYPE 2 DIABETES MELLITUS WITH COMPLICATION, WITH LONG-TERM CURRENT USE OF INSULIN: ICD-10-CM

## 2025-01-17 DIAGNOSIS — Z86.73 HISTORY OF STROKE: ICD-10-CM

## 2025-01-17 RX ORDER — DULAGLUTIDE 4.5 MG/.5ML
INJECTION, SOLUTION SUBCUTANEOUS
Qty: 12 PEN | Refills: 0 | Status: SHIPPED | OUTPATIENT
Start: 2025-01-17

## 2025-01-24 ENCOUNTER — LAB VISIT (OUTPATIENT)
Dept: LAB | Facility: HOSPITAL | Age: 75
End: 2025-01-24
Attending: INTERNAL MEDICINE
Payer: MEDICARE

## 2025-01-24 DIAGNOSIS — D63.1 ANEMIA OF CHRONIC RENAL FAILURE, STAGE 3B: ICD-10-CM

## 2025-01-24 DIAGNOSIS — N18.32 ANEMIA OF CHRONIC RENAL FAILURE, STAGE 3B: ICD-10-CM

## 2025-01-24 LAB
ALBUMIN SERPL BCP-MCNC: 3.6 G/DL (ref 3.5–5.2)
ALP SERPL-CCNC: 90 U/L (ref 40–150)
ALT SERPL W/O P-5'-P-CCNC: 21 U/L (ref 10–44)
ANION GAP SERPL CALC-SCNC: 12 MMOL/L (ref 8–16)
AST SERPL-CCNC: 27 U/L (ref 10–40)
BASOPHILS # BLD AUTO: 0.03 K/UL (ref 0–0.2)
BASOPHILS NFR BLD: 0.5 % (ref 0–1.9)
BILIRUB SERPL-MCNC: 0.3 MG/DL (ref 0.1–1)
BUN SERPL-MCNC: 38 MG/DL (ref 8–23)
CALCIUM SERPL-MCNC: 9.5 MG/DL (ref 8.7–10.5)
CHLORIDE SERPL-SCNC: 107 MMOL/L (ref 95–110)
CO2 SERPL-SCNC: 18 MMOL/L (ref 23–29)
CREAT SERPL-MCNC: 1.7 MG/DL (ref 0.5–1.4)
DIFFERENTIAL METHOD BLD: ABNORMAL
EOSINOPHIL # BLD AUTO: 0.2 K/UL (ref 0–0.5)
EOSINOPHIL NFR BLD: 2.5 % (ref 0–8)
ERYTHROCYTE [DISTWIDTH] IN BLOOD BY AUTOMATED COUNT: 15.2 % (ref 11.5–14.5)
EST. GFR  (NO RACE VARIABLE): 41.8 ML/MIN/1.73 M^2
FERRITIN SERPL-MCNC: 200 NG/ML (ref 20–300)
GLUCOSE SERPL-MCNC: 101 MG/DL (ref 70–110)
HCT VFR BLD AUTO: 40 % (ref 40–54)
HGB BLD-MCNC: 12.1 G/DL (ref 14–18)
IMM GRANULOCYTES # BLD AUTO: 0.01 K/UL (ref 0–0.04)
IMM GRANULOCYTES NFR BLD AUTO: 0.2 % (ref 0–0.5)
IRON SERPL-MCNC: 67 UG/DL (ref 45–160)
LYMPHOCYTES # BLD AUTO: 2 K/UL (ref 1–4.8)
LYMPHOCYTES NFR BLD: 33.2 % (ref 18–48)
MCH RBC QN AUTO: 28.4 PG (ref 27–31)
MCHC RBC AUTO-ENTMCNC: 30.3 G/DL (ref 32–36)
MCV RBC AUTO: 94 FL (ref 82–98)
MONOCYTES # BLD AUTO: 0.9 K/UL (ref 0.3–1)
MONOCYTES NFR BLD: 14.5 % (ref 4–15)
NEUTROPHILS # BLD AUTO: 2.9 K/UL (ref 1.8–7.7)
NEUTROPHILS NFR BLD: 49.1 % (ref 38–73)
NRBC BLD-RTO: 0 /100 WBC
PLATELET # BLD AUTO: 190 K/UL (ref 150–450)
PMV BLD AUTO: 13 FL (ref 9.2–12.9)
POTASSIUM SERPL-SCNC: 4.7 MMOL/L (ref 3.5–5.1)
PROT SERPL-MCNC: 8.1 G/DL (ref 6–8.4)
RBC # BLD AUTO: 4.26 M/UL (ref 4.6–6.2)
SATURATED IRON: 23 % (ref 20–50)
SODIUM SERPL-SCNC: 137 MMOL/L (ref 136–145)
TOTAL IRON BINDING CAPACITY: 295 UG/DL (ref 250–450)
TRANSFERRIN SERPL-MCNC: 199 MG/DL (ref 200–375)
WBC # BLD AUTO: 5.94 K/UL (ref 3.9–12.7)

## 2025-01-24 PROCEDURE — 82728 ASSAY OF FERRITIN: CPT | Performed by: INTERNAL MEDICINE

## 2025-01-24 PROCEDURE — 84466 ASSAY OF TRANSFERRIN: CPT | Performed by: INTERNAL MEDICINE

## 2025-01-24 PROCEDURE — 85025 COMPLETE CBC W/AUTO DIFF WBC: CPT | Performed by: INTERNAL MEDICINE

## 2025-01-24 PROCEDURE — 36415 COLL VENOUS BLD VENIPUNCTURE: CPT | Mod: PO | Performed by: INTERNAL MEDICINE

## 2025-01-24 PROCEDURE — 80053 COMPREHEN METABOLIC PANEL: CPT | Performed by: INTERNAL MEDICINE

## 2025-01-29 ENCOUNTER — OFFICE VISIT (OUTPATIENT)
Dept: HEMATOLOGY/ONCOLOGY | Facility: CLINIC | Age: 75
End: 2025-01-29
Payer: MEDICARE

## 2025-01-29 VITALS
HEART RATE: 76 BPM | WEIGHT: 126.56 LBS | DIASTOLIC BLOOD PRESSURE: 77 MMHG | HEIGHT: 66 IN | SYSTOLIC BLOOD PRESSURE: 138 MMHG | BODY MASS INDEX: 20.34 KG/M2

## 2025-01-29 DIAGNOSIS — D63.1 ANEMIA OF CHRONIC RENAL FAILURE, STAGE 3B: Primary | ICD-10-CM

## 2025-01-29 DIAGNOSIS — N18.32 ANEMIA OF CHRONIC RENAL FAILURE, STAGE 3B: Primary | ICD-10-CM

## 2025-01-29 DIAGNOSIS — G89.29 CHRONIC MIDLINE THORACIC BACK PAIN: ICD-10-CM

## 2025-01-29 DIAGNOSIS — M54.6 CHRONIC MIDLINE THORACIC BACK PAIN: ICD-10-CM

## 2025-01-29 DIAGNOSIS — N18.9 CHRONIC KIDNEY DISEASE, UNSPECIFIED CKD STAGE: ICD-10-CM

## 2025-01-29 PROCEDURE — 99999 PR PBB SHADOW E&M-EST. PATIENT-LVL V: CPT | Mod: PBBFAC,,, | Performed by: INTERNAL MEDICINE

## 2025-01-29 NOTE — PROGRESS NOTES
Subjective     Patient ID: Darrion Bennett is a 74 y.o. male.    Chief Complaint: No chief complaint on file.    HPI  Diagnosis:  Anemia in CKD        Chief Complaint: Anemia   Interval History: Mr. Bennett is here for follow up.  He is a former patient of Dr. Hicks.     74 year old man with multiple comorbidities including MCTD, Sjogren's, UC, diastolic HF, diabetes, essential hypertension, CKD stage III, and anemia of chronic disease.     He has received IV Fe in past.  with interval response in hemoglobin and normalization of RDW.  Denies epistaxis, hemoptysis, hematemesis, hematochezia, melena, or hematuria.  Always feels cold (chronic).    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  Son completed neuro fellowship and daughter is going to be in primary care.  He is accompanied by wife.     He reports that he was diagnosed with Sjogrens when he had dry eyes and dry mouth in 2014..    Interval Hx; Alone at visit   No new issues   He completed healthy back program   He reports chronic fatigue,improved   He is intolerant of Fe supp  He has received IV Fe in past  No SOB/CP  No lightheadedness    Past Medical History:   Diagnosis Date    Anemia     Arthritis     Cataract     CHF (congestive heart failure)     Chronic constipation     Diabetes mellitus, type 2     Felon of finger of left hand 05/11/2019    Glaucoma     Hyperlipidemia 05/13/2014    Hypertension     MCTD (mixed connective tissue disease)     Personal history of colonic polyps     Sjogren's disease     Ulcerative colitis     Urolithiasis        Past Surgical History:   Procedure Laterality Date    CATARACT EXTRACTION Bilateral 2005    COLONOSCOPY  2014    ESOPHAGOGASTRODUODENOSCOPY N/A 9/8/2023    Procedure: EGD (ESOPHAGOGASTRODUODENOSCOPY);  Surgeon: Divya Saenz MD;  Location: [facility];  Service: Endoscopy;  Laterality: N/A;  ref by / inst portal-RB    ESOPHAGOGASTRODUODENOSCOPY N/A 11/22/2023    Procedure: EGD  "(ESOPHAGOGASTRODUODENOSCOPY);  Surgeon: Crystal Urbina MD;  Location: [facility];  Service: Endoscopy;  Laterality: N/A;  time frame 8-12 weeks  Dr. Saenz pt   prep instructions sent to pt via portal -[facility] trulicity hold 7 days prior  diabetic  11/16- pt r/s to earlier date, pre call complete.  DBM    EYE SURGERY          Review of Systems   Constitutional:  Positive for fatigue. Negative for appetite change, fever and unexpected weight change.   HENT:  Negative for mouth sores.    Eyes:  Negative for visual disturbance.   Respiratory:  Negative for cough and shortness of breath.    Cardiovascular:  Negative for chest pain.   Gastrointestinal:  Negative for abdominal pain and diarrhea.   Genitourinary:  Negative for frequency.   Musculoskeletal:  Positive for back pain (chronic) and neck pain (chronic).   Integumentary:  Negative for rash.   Neurological:  Negative for headaches.   Hematological:  Negative for adenopathy.   Psychiatric/Behavioral:  The patient is not nervous/anxious.           Objective   Vitals:    01/29/25 0942   BP: 138/77   Pulse: 76   SpO2: (P) 99%   Weight: 57.4 kg (126 lb 8.7 oz)   Height: 5' 6" (1.676 m)       Physical Exam  Constitutional:       Appearance: He is well-developed.   HENT:      Head: Normocephalic.   Eyes:      General: No scleral icterus.        Right eye: No discharge.         Left eye: No discharge.      Conjunctiva/sclera: Conjunctivae normal.   Cardiovascular:      Rate and Rhythm: Normal rate and regular rhythm.      Heart sounds: Normal heart sounds. No murmur heard.  Pulmonary:      Effort: Pulmonary effort is normal.      Breath sounds: Normal breath sounds. No wheezing or rales.   Abdominal:      General: Bowel sounds are normal.      Palpations: Abdomen is soft.      Tenderness: There is no abdominal tenderness. There is no guarding or rebound.   Musculoskeletal:         General: No swelling. Normal range of motion.      Cervical back: Normal range of " motion.   Skin:     Findings: No erythema or rash.   Neurological:      Mental Status: He is alert and oriented to person, place, and time.      Cranial Nerves: No cranial nerve deficit.   Psychiatric:         Mood and Affect: Mood normal.         Behavior: Behavior normal.       Lab Results   Component Value Date    WBC 5.94 01/24/2025    HGB 12.1 (L) 01/24/2025    HCT 40.0 01/24/2025    MCV 94 01/24/2025     01/24/2025       Lab Results   Component Value Date    UIBC 197 06/20/2014    IRON 67 01/24/2025    TRANSFERRIN 199 (L) 01/24/2025    TIBC 295 01/24/2025    LABIRON 15 06/20/2014    FESATURATED 23 01/24/2025             Assessment and Plan            enal/      Anemia of chronic renal failure, stage 3b - Primary     Current Assessment & Plan       Anemia of chronic renal disease, CKD stage IIIB.    He has undergone IV Fe in past  Labs reviewed  Hb remains stable -12.1g/dl range   No indication for IV Fe .  -no role of epo at this hemoglobin level  Cbc, Fe studies,  in  2mos  Cbc, Fe studies, cmp prior to follow up 4mos  -continue follow up with primary care for med mgmt           Stage 3a chronic kidney disease     Current Assessment & Plan       Creatinine 1.9mg/ml on 9/20/24  Cr level 1.7mg/ml on 1/24/25   -continue medical management with pcp                       Chronic back pain  Stable   Completed Healthy Back Program ( referred last visit)   Completed acupuncture   Follow up with PCP for med mgmt                    Cbc, Fe studies,  in  2mos  Cbc, Fe studies, cmp 1 week  prior to follow up 4mos      Visit today included increased complexity associated with the care of the episodic problem anemia in CKD addressed and managing the longitudinal care of the patient due to the serious and/or complex managed problem(s) anemia in CKD

## 2025-02-03 DIAGNOSIS — M1A.9XX1 TOPHACEOUS GOUT: ICD-10-CM

## 2025-02-04 ENCOUNTER — INFUSION (OUTPATIENT)
Dept: INFUSION THERAPY | Facility: HOSPITAL | Age: 75
End: 2025-02-04
Attending: INTERNAL MEDICINE
Payer: MEDICARE

## 2025-02-04 VITALS
HEART RATE: 82 BPM | OXYGEN SATURATION: 99 % | RESPIRATION RATE: 18 BRPM | SYSTOLIC BLOOD PRESSURE: 136 MMHG | WEIGHT: 126.75 LBS | DIASTOLIC BLOOD PRESSURE: 61 MMHG | BODY MASS INDEX: 20.46 KG/M2 | TEMPERATURE: 98 F

## 2025-02-04 DIAGNOSIS — M06.9 RHEUMATOID ARTHRITIS, INVOLVING UNSPECIFIED SITE, UNSPECIFIED WHETHER RHEUMATOID FACTOR PRESENT: Primary | ICD-10-CM

## 2025-02-04 LAB
HBV CORE AB SERPL QL IA: NORMAL
HBV SURFACE AG SERPL QL IA: NORMAL

## 2025-02-04 PROCEDURE — 63600175 PHARM REV CODE 636 W HCPCS: Performed by: INTERNAL MEDICINE

## 2025-02-04 PROCEDURE — 86704 HEP B CORE ANTIBODY TOTAL: CPT | Performed by: INTERNAL MEDICINE

## 2025-02-04 PROCEDURE — 25000003 PHARM REV CODE 250: Performed by: INTERNAL MEDICINE

## 2025-02-04 PROCEDURE — 96365 THER/PROPH/DIAG IV INF INIT: CPT

## 2025-02-04 PROCEDURE — 96375 TX/PRO/DX INJ NEW DRUG ADDON: CPT

## 2025-02-04 PROCEDURE — 87340 HEPATITIS B SURFACE AG IA: CPT | Performed by: INTERNAL MEDICINE

## 2025-02-04 RX ORDER — DIPHENHYDRAMINE HYDROCHLORIDE 50 MG/ML
50 INJECTION INTRAMUSCULAR; INTRAVENOUS ONCE AS NEEDED
Status: CANCELLED | OUTPATIENT
Start: 2025-03-04

## 2025-02-04 RX ORDER — DIPHENHYDRAMINE HYDROCHLORIDE 50 MG/ML
25 INJECTION INTRAMUSCULAR; INTRAVENOUS
Status: COMPLETED | OUTPATIENT
Start: 2025-02-04 | End: 2025-02-04

## 2025-02-04 RX ORDER — FEBUXOSTAT 40 MG/1
40 TABLET, FILM COATED ORAL 2 TIMES DAILY
Qty: 60 TABLET | Refills: 11 | Status: SHIPPED | OUTPATIENT
Start: 2025-02-04

## 2025-02-04 RX ORDER — ACETAMINOPHEN 325 MG/1
650 TABLET ORAL
Status: COMPLETED | OUTPATIENT
Start: 2025-02-04 | End: 2025-02-04

## 2025-02-04 RX ORDER — SODIUM CHLORIDE 0.9 % (FLUSH) 0.9 %
10 SYRINGE (ML) INJECTION
Status: CANCELLED | OUTPATIENT
Start: 2025-03-04

## 2025-02-04 RX ORDER — DIPHENHYDRAMINE HYDROCHLORIDE 50 MG/ML
25 INJECTION INTRAMUSCULAR; INTRAVENOUS
Status: CANCELLED | OUTPATIENT
Start: 2025-03-04

## 2025-02-04 RX ORDER — ACETAMINOPHEN 325 MG/1
650 TABLET ORAL
Status: CANCELLED | OUTPATIENT
Start: 2025-03-04

## 2025-02-04 RX ORDER — HEPARIN 100 UNIT/ML
500 SYRINGE INTRAVENOUS
Status: CANCELLED | OUTPATIENT
Start: 2025-03-04

## 2025-02-04 RX ORDER — EPINEPHRINE 0.3 MG/.3ML
0.3 INJECTION SUBCUTANEOUS ONCE AS NEEDED
Status: CANCELLED | OUTPATIENT
Start: 2025-03-04

## 2025-02-04 RX ADMIN — DIPHENHYDRAMINE HYDROCHLORIDE 25 MG: 50 INJECTION INTRAMUSCULAR; INTRAVENOUS at 09:02

## 2025-02-04 RX ADMIN — ACETAMINOPHEN 650 MG: 325 TABLET ORAL at 09:02

## 2025-02-04 RX ADMIN — SODIUM CHLORIDE 500 MG: 9 INJECTION, SOLUTION INTRAVENOUS at 09:02

## 2025-02-04 NOTE — PLAN OF CARE
Pt arrived to the Infusion unit for IV medication today. Pt alert and oriented x4, ambulates independently with steady gait. Pt reports no new symptoms or concerns at this time. Pt consents to plan of care for today. Hep B labs drawn for review prior to treatment. Pt given pre-medications of Tylenol 650 mg PO & Benadryl 25mg IVP.  Pt administered Abatacept (Orencia) 500mg in NaCL 100mL at 200mL/hr over 30 minutes. Pt tolerated infusion well with no adverse reactions. Pt given calendar with upcoming appointments and verbalizes no additional needs at this time. Pt ambulatory upon discharge in no acute distress.      Problem: Adult Inpatient Plan of Care  Goal: Optimal Comfort and Wellbeing  Outcome: Met

## 2025-02-19 ENCOUNTER — PATIENT MESSAGE (OUTPATIENT)
Dept: ADMINISTRATIVE | Facility: OTHER | Age: 75
End: 2025-02-19
Payer: MEDICARE

## 2025-02-21 ENCOUNTER — OFFICE VISIT (OUTPATIENT)
Dept: PAIN MEDICINE | Facility: CLINIC | Age: 75
End: 2025-02-21
Payer: MEDICARE

## 2025-02-21 VITALS — HEART RATE: 80 BPM | DIASTOLIC BLOOD PRESSURE: 62 MMHG | OXYGEN SATURATION: 99 % | SYSTOLIC BLOOD PRESSURE: 135 MMHG

## 2025-02-21 DIAGNOSIS — S22.080S COMPRESSION FRACTURE OF T12 VERTEBRA, SEQUELA: ICD-10-CM

## 2025-02-21 DIAGNOSIS — M47.896 OTHER SPONDYLOSIS, LUMBAR REGION: ICD-10-CM

## 2025-02-21 DIAGNOSIS — G89.29 CHRONIC NECK PAIN: Primary | ICD-10-CM

## 2025-02-21 DIAGNOSIS — M47.816 LUMBAR SPONDYLOSIS: ICD-10-CM

## 2025-02-21 DIAGNOSIS — M54.2 CHRONIC NECK PAIN: Primary | ICD-10-CM

## 2025-02-21 PROCEDURE — 99999 PR PBB SHADOW E&M-EST. PATIENT-LVL IV: CPT | Mod: PBBFAC,,, | Performed by: STUDENT IN AN ORGANIZED HEALTH CARE EDUCATION/TRAINING PROGRAM

## 2025-02-21 NOTE — PROGRESS NOTES
Chronic Pain - f/u    Referring Physician: No ref. provider found    Date: 02/21/2025     Re: Darrion Bennett  MR#: [id number]  YOB: 1950  Age: 75 y.o.    Chief Complaint:   Chief Complaint   Patient presents with    Follow-up     6m- f/u     **This note is dictated using the M*Modal Fluency Direct word recognition program. There are word recognition mistakes that are occasionally missed on review.**    ASSESSMENT: 75 y.o. year old male with back and neck pain, consistent with     1. Chronic neck pain  Ambulatory Referral/Consult to Physical Therapy/Occupational Therapy      2. Lumbar spondylosis  Ambulatory Referral/Consult to Physical Therapy/Occupational Therapy      3. Compression fracture of T12 vertebra, sequela  Ambulatory Referral/Consult to Physical Therapy/Occupational Therapy      4. Other spondylosis, lumbar region            PLAN:     Left sided neck pain  -suspect spondylosis  -MRI does not show severe compression.  If we did a procedure would recommend b/l C3,4,5 MBB/RFA.   -continue conservative care    Chronic low thoracic/upper lumbar pain  -likely sequela of chronic compression fracture  -new MRI results discussed.  This showed severe stenosis at L4-5 and facet arthropathy.  Discussed MNBB/RFA vs BECKY.  He defers  -if we did a procedure I recommended a b/l T12-L1 TFESI first  -The next procedure to consider would be a b/l T10,11,12 MBB/RFA  -New PT referral  -continue lidoderm patches  -finished acupuncture  -finished healthy back. Helps while he is doing it. He has a hard time doing the exercises at home.    - RTC 6m  - Counseled patient regarding the importance of activity modification and physical therapy.    The above plan and management options were discussed at length with patient. Patient is in agreement with the above and verbalized understanding. It will be communicated with the referring physician via electronic record, fax, or mail.  Lab/study reports reviewed were important  and necessary because subsequent medical and treatment recommendations required review of the above lab/study reports. Images viewed/reviewed above were important and necessary because subsequent medical and treatment recommendations required review of the reviewed image(s).     Electronically signed by:  Diony Sánchez DO  02/21/2025    =========================================================================================================    SUBJECTIVE:  Interval History 2/21/2025:   Darrion Bennett is a 75 y.o. male presents to the clinic for follow up.  Since last visit the pain has is unchanged.  The worst pain is located in the upper low back and will occasionally radiate into the left abdomen.      The pain is located in the Mid back area and does not radiate.  The pain is described as sharp    At BEST  5/10   At WORST  8/10 on the WORST day.    On average pain is rated as 5/10.   Today the pain is rated as 6/10  Symptoms interfere with daily activity and sleeping.   Exacerbating factors: Sitting, Standing, Bending, Walking, and Night Time.    Mitigating factors heat and medications.     Current pain medications: Tramadol 50mg QD-BID, Tylenol qhs,   Failed Pain Medications: cannot take NSAIDs due to CKD3    Interval History 8/21/2024:   Darrion Bennett is a 75 y.o. male presents to the clinic for follow up.  Since last visit the pain has is unchanged.  Patient states that he is doing well.  He is doing healthy back and acupuncture. He has done 4 sessions of acupuncture.    The pain is located in the back area and radiates to the upper left neck .  The pain is described as stabbing    At BEST  5/10   At WORST  8/10 on the WORST day.    On average pain is rated as 6/10.   Today the pain is rated as 6/10  Symptoms interfere with daily activity.   Exacerbating factors: Standing.    Mitigating factors heating pad.     Initial hx:  Darrion Bennett is a 75 y.o. male presents to the clinic for the evaluation of  middle back pain. The pain started 1 year ago following no inciting event and symptoms have been unchanged. The patient states that he had a compression fracture in 2015 because he was on heavy steroids for GI issues and this caused osteoprosis. He is on Prolia for osteoporosis now. He was on meloxicam but told to stop because of kidney problems.  He uses a heating pad and tramadol and tylenol for the pain. The pain comes and goes.  The pain has gradually been getting worse since it happened.    Pain Description:    The pain is located in the middle back area and radiates to the left sided neck .    At BEST  7/10   At WORST  10/10 on the WORST day.    On average pain is rated as 7/10.   Today the pain is rated as 9/10  The pain is continuous.  The pain is described as stabbing    Symptoms interfere with daily activity and sleeping.   Exacerbating factors: daily activities.    Mitigating factors heat and medications.   He reports 6 hours of sleep per night.    Physical Therapy/Home Exercise:  healthy back program    Current Pain Medications:    - Tramadol 50mg QD-BID, Tylenol qhs,     Failed Pain Medications:    - cannot take NSAIDs due to CKD3    Pain Treatment Therapies:    Pain procedures:   none  Physical Therapy: yes currently in STYLIGHT back  Chiropractor:none  Acupuncture: none  TENS unit: none  Spinal decompression: none  Joint replacement: none    Patient denies urinary incontinence and bowel incontinence.  Patient denies any suicidal or homicidal ideations     report:  Reviewed and consistent with medication use as prescribed.    Imaging:   XR Thoracic 02/2024:    Thoracic spine two views: There is aortic plaque.  There is baseline DJD.  There are lower thoracic chronic wedge deformities unchanged from 02/27/2019.     MRI CTL 08/2024:  Vertebra: No acute fracture.  Compression deformity of the T12 inferior endplate, with mild anterior height loss and 4 mm osseous retropulsion.  No significant associated  spinal canal stenosis.   Small L2 vertebral body hemangioma.  Minimal bone marrow edema cortical endplates of L4-L5 may represent type 1 Modic change.     Discs: Mild multilevel degenerative disc disease with height loss and desiccation.  Degenerative endplate edema at C5-C6, and to a lesser degree C4-C5, C6-C7, and L4 posteroinferior endplate.  No associated increased intradiscal signal or retropharyngeal edema/thickening.     Cord:   The conus terminates at the L1-L2 level.  Redundant configuration of the cauda equina at the stenotic L4-L5 level, detailed below.     Focal degenerative findings as below.     C2-C3: No spinal canal stenosis or neural foraminal narrowing.     C3-C4: Small posterior disc osteophyte complex.  No spinal canal stenosis or neural foraminal narrowing.     C4-C5:  Posterior disc osteophyte complex with uncovertebral spurring.  Resultant mild bilateral neural foraminal narrowing, left greater than right.  Mild spinal canal stenosis.     C5-C6:  Posterior disc osteophyte complex with uncovertebral spurring.  Resultant mild bilateral neural foraminal narrowing, left greater than right.  Mild spinal canal stenosis.     C6-C7:  Posterior disc osteophyte complex with uncovertebral spurring.  Resultant mild bilateral neural foraminal narrowing.     C7-T1:  No spinal canal stenosis or neural foraminal narrowing.     T1-T12: No significant spinal canal stenosis or neural foraminal narrowing.     T12-L1: Compression deformity of the T12 inferior endplate with osseous retropulsion as detailed above.  No spinal canal stenosis or neural foraminal narrowing.     L1-L2: Mild disc bulge.  Resultant mild left neural foraminal narrowing.     L2-L3: Disc bulge with mild facet arthropathy and ligamentum flavum thickening.Resultant mild bilateral neural foraminal narrowing.  Mild lateral recess stenosis.     L3-L4: Disc bulge with mild facet arthropathy and ligamentum flavum thickening.Resultant mild bilateral  neural foraminal narrowing.  Mild lateral recess stenosis.     L4-L5: Broad-based protrusion of disc material with advanced facet arthropathy and bulky ligamentum flavum thickening.  Resultant severe spinal canal stenosis.  Mild bilateral neural foraminal narrowing.  Posterior annular fissure.  Edema signal in the L4 spinous process and L4-L5 interspinous ligament with interspinous bursitis.  Bilateral extracanalicular facet synovial cysts.     L5-S1: Mild disc bulge and facet arthropathy.  No spinal canal stenosis or neural foraminal narrowing.     Miscellaneous: Slow flow within the left jugular vein.  Renal cysts.     Impression:     Degenerative changes of the cervical and lumbar spine as detailed above.  Findings are most pronounced at L4-L5 with severe spinal canal stenosis at that level.     Remote compression deformity of the T12 inferior endplate, similar appearance compared to priors.  Mild anterior height loss and osseous retropulsion, without spinal canal stenosis at that level.        2/21/2025     9:19 AM 8/21/2024     8:13 AM 7/11/2024     8:30 AM   Pain Disability Index (PDI)   Family/Home Responsibilities: 6 6 7   Recreation: 6 6 7   Occupation: 6 6 7   Sexual Behavior: 6 6 7   Self Care: 6 6 7   Life-Support Activities: 6 6 7   Pain Disability Index (PDI) 42 42 49        Past Medical History:   Diagnosis Date    Anemia     Arthritis     Cataract     CHF (congestive heart failure)     Chronic constipation     Diabetes mellitus, type 2     Felon of finger of left hand 05/11/2019    Glaucoma     Hyperlipidemia 05/13/2014    Hypertension     MCTD (mixed connective tissue disease)     Personal history of colonic polyps     Sjogren's disease     Ulcerative colitis     Urolithiasis      Past Surgical History:   Procedure Laterality Date    CATARACT EXTRACTION Bilateral 2005    COLONOSCOPY  2014    ESOPHAGOGASTRODUODENOSCOPY N/A 9/8/2023    Procedure: EGD (ESOPHAGOGASTRODUODENOSCOPY);  Surgeon: Dany  Divya KAPLAN MD;  Location: [facility] ENDO;  Service: Endoscopy;  Laterality: N/A;  ref by / inst portal-RB    ESOPHAGOGASTRODUODENOSCOPY N/A 11/22/2023    Procedure: EGD (ESOPHAGOGASTRODUODENOSCOPY);  Surgeon: Crystal Urbina MD;  Location: [facility] ENDO;  Service: Endoscopy;  Laterality: N/A;  time frame 8-12 weeks  Dr. Saenz pt   prep instructions sent to pt via portal -  wl meds trulicity hold 7 days prior  diabetic  11/16- pt r/s to earlier date, pre call complete.  DBM    EYE SURGERY       Social History     Socioeconomic History    Marital status:     Number of children: 3   Tobacco Use    Smoking status: Never     Passive exposure: Never    Smokeless tobacco: Never   Substance and Sexual Activity    Alcohol use: No    Drug use: Yes     Comment: ultram 1 - 2 tabs a week    Sexual activity: Not Currently     Partners: Female     Social Drivers of Health     Financial Resource Strain: Low Risk  (2/12/2024)    Overall Financial Resource Strain (CARDIA)     Difficulty of Paying Living Expenses: Not very hard   Recent Concern: Financial Resource Strain - Medium Risk (11/27/2023)    Overall Financial Resource Strain (CARDIA)     Difficulty of Paying Living Expenses: Somewhat hard   Food Insecurity: Food Insecurity Present (2/12/2024)    Hunger Vital Sign     Worried About Running Out of Food in the Last Year: Sometimes true     Ran Out of Food in the Last Year: Sometimes true   Transportation Needs: No Transportation Needs (2/12/2024)    PRAPARE - Transportation     Lack of Transportation (Medical): No     Lack of Transportation (Non-Medical): No   Physical Activity: Unknown (2/12/2024)    Exercise Vital Sign     Days of Exercise per Week: 0 days   Stress: No Stress Concern Present (2/12/2024)    [employer] - Occupational Stress Questionnaire     Feeling of Stress : Only a little   Housing Stability: Low Risk  (2/12/2024)    Housing Stability Vital Sign     Unable to Pay for  Housing in the Last Year: No     Number of Places Lived in the Last Year: 2     Unstable Housing in the Last Year: No     Family History   Problem Relation Name Age of Onset    Hypertension Father      Stroke Father      Cataracts Father      Glaucoma Father      Cataracts Mother      No Known Problems Sister      No Known Problems Brother      Rheum arthritis Maternal Aunt      Rheum arthritis Maternal Uncle      No Known Problems Paternal Aunt      No Known Problems Paternal Uncle      No Known Problems Maternal Grandmother      No Known Problems Maternal Grandfather      Throat cancer Paternal Grandmother      Glaucoma Paternal Grandfather      No Known Problems Daughter      No Known Problems Son x2     Celiac disease Neg Hx      Colon cancer Neg Hx      Cirrhosis Neg Hx      Colon polyps Neg Hx      Crohn's disease Neg Hx      Cystic fibrosis Neg Hx      Hemochromatosis Neg Hx      Esophageal cancer Neg Hx      Inflammatory bowel disease Neg Hx      Irritable bowel syndrome Neg Hx      Liver cancer Neg Hx      Liver disease Neg Hx      Rectal cancer Neg Hx      Stomach cancer Neg Hx      Ulcerative colitis Neg Hx      Chase's disease Neg Hx      Amblyopia Neg Hx      Blindness Neg Hx      Cancer Neg Hx      Diabetes Neg Hx      Macular degeneration Neg Hx      Retinal detachment Neg Hx      Strabismus Neg Hx      Thyroid disease Neg Hx      Melanoma Neg Hx         Review of patient's allergies indicates:   Allergen Reactions    Allopurinol Other (See Comments)    Azathioprine Other (See Comments)     Pancytopenia    Penicillins Nausea And Vomiting and Other (See Comments)    Rosuvastatin Other (See Comments)     Muscle pain    Allopurinol analogues Rash       Current Outpatient Medications   Medication Sig    alcohol swabs (DROPSAFE ALCOHOL PREP PADS) PadM USE  4 TIMES PER DAY    amLODIPine (NORVASC) 5 MG tablet TAKE 1 TABLET TWICE DAILY    ammonium lactate 12 % Crea Apply 1 application topically once daily.  "   atorvastatin (LIPITOR) 80 MG tablet TAKE 1 TABLET EVERY DAY    blood glucose control, low (TRUE METRIX LEVEL 1) Soln Use as indicated    blood-glucose meter (TRUE METRIX GLUCOSE METER) Misc Test glucose 4x/day    ciclopirox (PENLAC) 8 % Soln Apply topically nightly.    diclofenac sodium (VOLTAREN) 1 % Gel Apply 2 g topically 4 (four) times daily as needed. Apply to aching joints    diclofenac sodium (VOLTAREN) 1 % Gel Apply 2 g topically once daily.    dorzolamide (TRUSOPT) 2 % ophthalmic solution INSTILL 1 DROP INTO BOTH EYES TWICE DAILY    DROPLET PEN NEEDLE 32 gauge x 5/32" Ndle USE 1 NEEDLE AS DIRECTED FOUR TIMES DAILY    dulaglutide (TRULICITY) 4.5 mg/0.5 mL pen injector INJECT 4.5MG (1 PEN) UNDER THE SKIN EVERY WEEK    empagliflozin (JARDIANCE) 10 mg tablet Take 1 tablet (10 mg total) by mouth once daily.    febuxostat (ULORIC) 40 mg Tab Take 1 tablet (40 mg total) by mouth 2 (two) times a day.    fluticasone propionate (FLONASE) 50 mcg/actuation nasal spray 1 spray (50 mcg total) by Each Nostril route once daily.    hydrALAZINE (APRESOLINE) 25 MG tablet TAKE 3 TABLETS THREE TIMES DAILY    INJECTAFER 50 iron mg/mL injection     insulin aspart U-100 (NOVOLOG FLEXPEN U-100 INSULIN) 100 unit/mL (3 mL) InPn pen Inject Novolog if glucose after meal is high:  201-250=+2, 251-300=+3; 301-350=+4, over 350=+5 units    lancets (TRUEPLUS LANCETS) 33 gauge Misc Test glucose 4x/day. Dx code e11.65    multivit with min-folic acid 200 mcg Chew     omeprazole (PRILOSEC) 40 MG capsule Take 1 capsule (40 mg total) by mouth every morning.    predniSONE (DELTASONE) 5 MG tablet Take 1 tablet (5 mg total) by mouth once daily.    PROLIA 60 mg/mL Syrg     senna-docusate 8.6-50 mg (SENNA WITH DOCUSATE SODIUM) 8.6-50 mg per tablet Take 1 tablet by mouth once daily.    torsemide (DEMADEX) 10 MG Tab TAKE 1 TABLET EVERY OTHER DAY    traMADoL (ULTRAM) 50 mg tablet TAKE 1 TABLET EVERY 12 HOURS AS NEEDED FOR PAIN    travoprost (TRAVATAN " Z) 0.004 % ophthalmic solution Place 1 drop into both eyes every evening.    triamcinolone acetonide 0.1% (KENALOG) 0.1 % cream APPLY TOPICALLY TWICE DAILY FOR 10 DAYS    TRUE METRIX GLUCOSE TEST STRIP Strp TEST BLOOD SUGAR FOUR TIMES DAILY    valsartan (DIOVAN) 160 MG tablet TAKE 1 TABLET TWICE DAILY     No current facility-administered medications for this visit.       REVIEW OF SYSTEMS:    GENERAL:  No weight loss, malaise or fevers.  HEENT:   No recent changes in vision or hearing  NECK:  Negative for lumps, no difficulty with swallowing.  RESPIRATORY:  Negative for cough, wheezing or shortness of breath, patient denies any recent URI.  CARDIOVASCULAR:  Negative for chest pain, leg swelling or palpitations.  GI:  Negative for abdominal discomfort, blood in stools or black stools or change in bowel habits.  MUSCULOSKELETAL:  See HPI.  SKIN:  Negative for lesions, rash, and itching.  PSYCH:  No mood disorder or recent psychosocial stressors.  Patients sleep is not disturbed secondary to pain.  HEMATOLOGY/LYMPHOLOGY:  Negative for prolonged bleeding, bruising easily or swollen nodes.  Patient is not currently taking any anti-coagulants  NEURO:   No history of headaches, syncope, paralysis, seizures or tremors.  All other reviewed and negative other than HPI.    OBJECTIVE:    /62 (BP Location: Left arm, Patient Position: Sitting)   Pulse 80   SpO2 99%     PHYSICAL EXAMINATION:    GENERAL: Well appearing, in no acute distress, alert and oriented x3.  PSYCH:  Mood and affect appropriate.  SKIN: Skin color, texture, turgor normal, no rashes or lesions.  HEAD/FACE:  Normocephalic, atraumatic. Cranial nerves grossly intact.    NECK:   - Positive pain to palpation over the cervical paraspinous muscles.   - Spurling  Positive for neck pain  - Positive pain with neck extension, rotation and lateral flexion.     CV: RRR with palpation of the radial artery.  PULM: CTAB. No evidence of respiratory difficulty, symmetric  chest rise.  GI:  Soft and non-tender.    BACK:   - No obvious deformity or signs of trauma, Normal lumbar lordotic curve  - Positive spinous process tenderness low throacic  - Positive paravertebral tenderness  - Positive pain to palpation over the facet joints of the lumbar spine.   - Did not perform QL / Iliac crest / Glut tenderness  - Slump test is Negative for radicular pain  - Slump test is Negative for back pain  - Supine Straight leg raising is Did not perform for radicular pain  - Supine Straight leg raising is Did not perform for back pain  - Lumbar ROM is diminished in Flexion with pain  - Lumbar ROM is diminished in Extension with pain  - Lumbar ROM is diminished in Lateral Flexion with pain  - Did not perform Sustained Hip Flexion test (for discogenic pain)  - Positive Altered Gait, Posture  - Axial facet loading test Did not perform on the bilateral side(s)    SI Joint exam:  - Negative SI joint tenderness to palpation  - Geovanny's sign Did not perform  - Yeoman's Test: Did not perform for SI joint pain indicating anterior SI ligament involvement. Did not perform for anterior thigh pain/paresthesia which indicates femoral nerve stretch.  - Gaenslen's Test:Did not perform  - Finger Kamilla's Sign:Negative  - SI compression test:Did not perform  - SI distraction test:Did not perform  - Thigh Thrust: Did not perform  - SI Thrust: Did not perform    MUSKULOSKELETAL:    EXTREMITIES:   Hip Exam:  - Log Roll Did not perform  - FADIR Did not perform  - Stinchfield Did not perform  - Hip Scour Did not perform  - GTB Tenderness Negative    MUSCULOSKELETAL:  No atrophy or tone abnormalities are noted in the UE or LE.  No deformities, edema, or skin discoloration are noted on visible skin. Good capillary refill.    NEURO: Bilateral upper and lower extremity coordination and muscle stretch reflexes are physiologic and symmetric.      NEUROLOGICAL EXAM:  MENTAL STATUS: A x O x 3, good concentration, speech is fluent  and goal directed  MEMORY: recent and remote are intact  CN: CN2-12 grossly intact  MOTOR: 4+-5/5 in all muscle groups of UE and LE  DTRs: 0 intact symmetric UE and LE  Sensation:    -no Loss of sensation in a left upper, left lower, right upper, and right lower  arm and leg  distribution.  Pittman: absent on the bilateral side(s)  Clonus: absent on the bilateral side(s)

## 2025-03-03 ENCOUNTER — INFUSION (OUTPATIENT)
Dept: INFUSION THERAPY | Facility: HOSPITAL | Age: 75
End: 2025-03-03
Payer: MEDICARE

## 2025-03-03 VITALS
BODY MASS INDEX: 20.42 KG/M2 | OXYGEN SATURATION: 97 % | DIASTOLIC BLOOD PRESSURE: 62 MMHG | SYSTOLIC BLOOD PRESSURE: 129 MMHG | HEART RATE: 89 BPM | TEMPERATURE: 98 F | RESPIRATION RATE: 16 BRPM | WEIGHT: 126.56 LBS

## 2025-03-03 DIAGNOSIS — M06.9 RHEUMATOID ARTHRITIS, INVOLVING UNSPECIFIED SITE, UNSPECIFIED WHETHER RHEUMATOID FACTOR PRESENT: Primary | ICD-10-CM

## 2025-03-03 PROCEDURE — 63600175 PHARM REV CODE 636 W HCPCS: Mod: JZ,JA,TB | Performed by: INTERNAL MEDICINE

## 2025-03-03 PROCEDURE — 96365 THER/PROPH/DIAG IV INF INIT: CPT

## 2025-03-03 PROCEDURE — 96375 TX/PRO/DX INJ NEW DRUG ADDON: CPT

## 2025-03-03 PROCEDURE — 25000003 PHARM REV CODE 250: Performed by: INTERNAL MEDICINE

## 2025-03-03 RX ORDER — HEPARIN 100 UNIT/ML
500 SYRINGE INTRAVENOUS
OUTPATIENT
Start: 2025-03-31

## 2025-03-03 RX ORDER — ACETAMINOPHEN 325 MG/1
650 TABLET ORAL
OUTPATIENT
Start: 2025-03-31

## 2025-03-03 RX ORDER — DIPHENHYDRAMINE HYDROCHLORIDE 50 MG/ML
25 INJECTION INTRAMUSCULAR; INTRAVENOUS
OUTPATIENT
Start: 2025-03-31

## 2025-03-03 RX ORDER — EPINEPHRINE 0.3 MG/.3ML
0.3 INJECTION SUBCUTANEOUS ONCE AS NEEDED
OUTPATIENT
Start: 2025-03-31

## 2025-03-03 RX ORDER — DIPHENHYDRAMINE HYDROCHLORIDE 50 MG/ML
50 INJECTION INTRAMUSCULAR; INTRAVENOUS ONCE AS NEEDED
OUTPATIENT
Start: 2025-03-31

## 2025-03-03 RX ORDER — ACETAMINOPHEN 325 MG/1
650 TABLET ORAL
Status: COMPLETED | OUTPATIENT
Start: 2025-03-03 | End: 2025-03-03

## 2025-03-03 RX ORDER — DIPHENHYDRAMINE HYDROCHLORIDE 50 MG/ML
25 INJECTION INTRAMUSCULAR; INTRAVENOUS
Status: COMPLETED | OUTPATIENT
Start: 2025-03-03 | End: 2025-03-03

## 2025-03-03 RX ORDER — SODIUM CHLORIDE 0.9 % (FLUSH) 0.9 %
10 SYRINGE (ML) INJECTION
OUTPATIENT
Start: 2025-03-31

## 2025-03-03 RX ADMIN — ACETAMINOPHEN 650 MG: 325 TABLET ORAL at 09:03

## 2025-03-03 RX ADMIN — DIPHENHYDRAMINE HYDROCHLORIDE 25 MG: 50 INJECTION INTRAMUSCULAR; INTRAVENOUS at 09:03

## 2025-03-03 RX ADMIN — SODIUM CHLORIDE 500 MG: 9 INJECTION, SOLUTION INTRAVENOUS at 10:03

## 2025-03-03 NOTE — PLAN OF CARE
Patient arrived on unit for Orencia infusion. Patient is awake, alert, and oriented x4, denies any new complaints or worsening signs and symptoms since last visit. Placed 24g PIV in right hand, administered pre-medication: Tylenol PO and Benadryl IVP, administered Orencia IV over 30 min, tolerated well with no signs or symptoms of adverse reaction, removed IV. Patient denies any questions or concerns at this time. Discharged home upon completion of treatments in NAD.    Please see MAR and flowsheets for any additional information.      Problem: Adult Inpatient Plan of Care  Goal: Optimal Comfort and Wellbeing  Outcome: Met

## 2025-03-07 ENCOUNTER — HOSPITAL ENCOUNTER (OUTPATIENT)
Facility: HOSPITAL | Age: 75
Discharge: HOME | End: 2025-03-08
Attending: STUDENT IN AN ORGANIZED HEALTH CARE EDUCATION/TRAINING PROGRAM
Payer: MEDICARE

## 2025-03-07 DIAGNOSIS — R55 NEAR SYNCOPE: ICD-10-CM

## 2025-03-07 DIAGNOSIS — M79.604 LEG PAIN, BILATERAL: ICD-10-CM

## 2025-03-07 DIAGNOSIS — M43.16 ANTEROLISTHESIS OF LUMBAR SPINE: Primary | Chronic | ICD-10-CM

## 2025-03-07 DIAGNOSIS — R79.89 ELEVATED TROPONIN: ICD-10-CM

## 2025-03-07 DIAGNOSIS — M79.605 LEG PAIN, BILATERAL: ICD-10-CM

## 2025-03-07 DIAGNOSIS — I65.29 CAROTID STENOSIS: ICD-10-CM

## 2025-03-07 DIAGNOSIS — R07.9 CHEST PAIN: ICD-10-CM

## 2025-03-07 DIAGNOSIS — I50.30 (HFPEF) HEART FAILURE WITH PRESERVED EJECTION FRACTION: ICD-10-CM

## 2025-03-07 DIAGNOSIS — R00.1 BRADYCARDIA: ICD-10-CM

## 2025-03-07 PROBLEM — Z86.73 HISTORY OF STROKE: Chronic | Status: ACTIVE | Noted: 2021-07-20

## 2025-03-07 PROBLEM — I65.23 BILATERAL CAROTID ARTERY STENOSIS: Chronic | Status: ACTIVE | Noted: 2021-06-27

## 2025-03-07 LAB
ALBUMIN SERPL BCP-MCNC: 3.4 G/DL (ref 3.5–5.2)
ALP SERPL-CCNC: 106 U/L (ref 40–150)
ALT SERPL W/O P-5'-P-CCNC: 18 U/L (ref 10–44)
ANION GAP SERPL CALC-SCNC: 11 MMOL/L (ref 8–16)
AST SERPL-CCNC: 23 U/L (ref 10–40)
BACTERIA #/AREA URNS HPF: ABNORMAL /HPF
BASOPHILS # BLD AUTO: 0.01 K/UL (ref 0–0.2)
BASOPHILS NFR BLD: 0.1 % (ref 0–1.9)
BILIRUB SERPL-MCNC: 0.5 MG/DL (ref 0.1–1)
BILIRUB UR QL STRIP: NEGATIVE
BNP SERPL-MCNC: 110 PG/ML (ref 0–99)
BUN SERPL-MCNC: 25 MG/DL (ref 8–23)
CALCIUM SERPL-MCNC: 9.1 MG/DL (ref 8.7–10.5)
CHLORIDE SERPL-SCNC: 106 MMOL/L (ref 95–110)
CK SERPL-CCNC: 181 U/L (ref 20–200)
CLARITY UR: CLEAR
CO2 SERPL-SCNC: 20 MMOL/L (ref 23–29)
COLOR UR: YELLOW
CREAT SERPL-MCNC: 1.7 MG/DL (ref 0.5–1.4)
DIFFERENTIAL METHOD BLD: ABNORMAL
EOSINOPHIL # BLD AUTO: 0.1 K/UL (ref 0–0.5)
EOSINOPHIL NFR BLD: 0.8 % (ref 0–8)
ERYTHROCYTE [DISTWIDTH] IN BLOOD BY AUTOMATED COUNT: 14.2 % (ref 11.5–14.5)
EST. GFR  (NO RACE VARIABLE): 42 ML/MIN/1.73 M^2
GLUCOSE SERPL-MCNC: 119 MG/DL (ref 70–110)
GLUCOSE UR QL STRIP: ABNORMAL
HCT VFR BLD AUTO: 37.8 % (ref 40–54)
HGB BLD-MCNC: 12.4 G/DL (ref 14–18)
HGB UR QL STRIP: NEGATIVE
HYALINE CASTS #/AREA URNS LPF: 0 /LPF
IMM GRANULOCYTES # BLD AUTO: 0.02 K/UL (ref 0–0.04)
IMM GRANULOCYTES NFR BLD AUTO: 0.2 % (ref 0–0.5)
KETONES UR QL STRIP: NEGATIVE
LEUKOCYTE ESTERASE UR QL STRIP: ABNORMAL
LYMPHOCYTES # BLD AUTO: 1.2 K/UL (ref 1–4.8)
LYMPHOCYTES NFR BLD: 12.9 % (ref 18–48)
MAGNESIUM SERPL-MCNC: 1.8 MG/DL (ref 1.6–2.6)
MCH RBC QN AUTO: 29.2 PG (ref 27–31)
MCHC RBC AUTO-ENTMCNC: 32.8 G/DL (ref 32–36)
MCV RBC AUTO: 89 FL (ref 82–98)
MICROSCOPIC COMMENT: ABNORMAL
MONOCYTES # BLD AUTO: 0.9 K/UL (ref 0.3–1)
MONOCYTES NFR BLD: 10.4 % (ref 4–15)
NEUTROPHILS # BLD AUTO: 6.8 K/UL (ref 1.8–7.7)
NEUTROPHILS NFR BLD: 75.6 % (ref 38–73)
NITRITE UR QL STRIP: NEGATIVE
NRBC BLD-RTO: 0 /100 WBC
PH UR STRIP: 7 [PH] (ref 5–8)
PLATELET # BLD AUTO: 211 K/UL (ref 150–450)
PMV BLD AUTO: 11.8 FL (ref 9.2–12.9)
POTASSIUM SERPL-SCNC: 4.5 MMOL/L (ref 3.5–5.1)
PROT SERPL-MCNC: 8.1 G/DL (ref 6–8.4)
PROT UR QL STRIP: ABNORMAL
RBC # BLD AUTO: 4.25 M/UL (ref 4.6–6.2)
RBC #/AREA URNS HPF: 0 /HPF (ref 0–4)
SODIUM SERPL-SCNC: 137 MMOL/L (ref 136–145)
SP GR UR STRIP: 1.01 (ref 1–1.03)
T4 FREE SERPL-MCNC: 1.29 NG/DL (ref 0.71–1.51)
TROPONIN I SERPL DL<=0.01 NG/ML-MCNC: 0.04 NG/ML (ref 0–0.03)
TROPONIN I SERPL DL<=0.01 NG/ML-MCNC: 0.07 NG/ML (ref 0–0.03)
TSH SERPL DL<=0.005 MIU/L-ACNC: 4.25 UIU/ML (ref 0.4–4)
URN SPEC COLLECT METH UR: ABNORMAL
UROBILINOGEN UR STRIP-ACNC: NEGATIVE EU/DL
VIT B12 SERPL-MCNC: 332 PG/ML (ref 210–950)
WBC # BLD AUTO: 9.01 K/UL (ref 3.9–12.7)
WBC #/AREA URNS HPF: 8 /HPF (ref 0–5)
YEAST URNS QL MICRO: ABNORMAL

## 2025-03-07 PROCEDURE — 84484 ASSAY OF TROPONIN QUANT: CPT | Mod: 91 | Performed by: STUDENT IN AN ORGANIZED HEALTH CARE EDUCATION/TRAINING PROGRAM

## 2025-03-07 PROCEDURE — 84443 ASSAY THYROID STIM HORMONE: CPT | Performed by: STUDENT IN AN ORGANIZED HEALTH CARE EDUCATION/TRAINING PROGRAM

## 2025-03-07 PROCEDURE — 83880 ASSAY OF NATRIURETIC PEPTIDE: CPT | Performed by: STUDENT IN AN ORGANIZED HEALTH CARE EDUCATION/TRAINING PROGRAM

## 2025-03-07 PROCEDURE — 85025 COMPLETE CBC W/AUTO DIFF WBC: CPT | Performed by: STUDENT IN AN ORGANIZED HEALTH CARE EDUCATION/TRAINING PROGRAM

## 2025-03-07 PROCEDURE — G0378 HOSPITAL OBSERVATION PER HR: HCPCS

## 2025-03-07 PROCEDURE — 80053 COMPREHEN METABOLIC PANEL: CPT | Performed by: STUDENT IN AN ORGANIZED HEALTH CARE EDUCATION/TRAINING PROGRAM

## 2025-03-07 PROCEDURE — 93005 ELECTROCARDIOGRAM TRACING: CPT

## 2025-03-07 PROCEDURE — 82607 VITAMIN B-12: CPT | Performed by: STUDENT IN AN ORGANIZED HEALTH CARE EDUCATION/TRAINING PROGRAM

## 2025-03-07 PROCEDURE — 84439 ASSAY OF FREE THYROXINE: CPT | Performed by: STUDENT IN AN ORGANIZED HEALTH CARE EDUCATION/TRAINING PROGRAM

## 2025-03-07 PROCEDURE — 83735 ASSAY OF MAGNESIUM: CPT | Performed by: STUDENT IN AN ORGANIZED HEALTH CARE EDUCATION/TRAINING PROGRAM

## 2025-03-07 PROCEDURE — 81000 URINALYSIS NONAUTO W/SCOPE: CPT | Performed by: STUDENT IN AN ORGANIZED HEALTH CARE EDUCATION/TRAINING PROGRAM

## 2025-03-07 PROCEDURE — 82550 ASSAY OF CK (CPK): CPT | Performed by: STUDENT IN AN ORGANIZED HEALTH CARE EDUCATION/TRAINING PROGRAM

## 2025-03-07 PROCEDURE — 25000003 PHARM REV CODE 250: Performed by: STUDENT IN AN ORGANIZED HEALTH CARE EDUCATION/TRAINING PROGRAM

## 2025-03-07 PROCEDURE — 99285 EMERGENCY DEPT VISIT HI MDM: CPT | Mod: 25

## 2025-03-07 RX ORDER — NALOXONE HCL 0.4 MG/ML
0.02 VIAL (ML) INJECTION
Status: DISCONTINUED | OUTPATIENT
Start: 2025-03-07 | End: 2025-03-08 | Stop reason: HOSPADM

## 2025-03-07 RX ORDER — TALC
6 POWDER (GRAM) TOPICAL NIGHTLY PRN
Status: DISCONTINUED | OUTPATIENT
Start: 2025-03-07 | End: 2025-03-08 | Stop reason: HOSPADM

## 2025-03-07 RX ORDER — ALUMINUM HYDROXIDE, MAGNESIUM HYDROXIDE, AND SIMETHICONE 1200; 120; 1200 MG/30ML; MG/30ML; MG/30ML
30 SUSPENSION ORAL 4 TIMES DAILY PRN
Status: DISCONTINUED | OUTPATIENT
Start: 2025-03-07 | End: 2025-03-08 | Stop reason: HOSPADM

## 2025-03-07 RX ORDER — DEXTROMETHORPHAN POLISTIREX 30 MG/5 ML
1 SUSPENSION, EXTENDED RELEASE 12 HR ORAL DAILY PRN
Status: DISCONTINUED | OUTPATIENT
Start: 2025-03-07 | End: 2025-03-08 | Stop reason: HOSPADM

## 2025-03-07 RX ORDER — IBUPROFEN 200 MG
16 TABLET ORAL
Status: DISCONTINUED | OUTPATIENT
Start: 2025-03-07 | End: 2025-03-08 | Stop reason: HOSPADM

## 2025-03-07 RX ORDER — IBUPROFEN 200 MG
24 TABLET ORAL
Status: DISCONTINUED | OUTPATIENT
Start: 2025-03-07 | End: 2025-03-08 | Stop reason: HOSPADM

## 2025-03-07 RX ORDER — GLUCAGON 1 MG
1 KIT INJECTION
Status: DISCONTINUED | OUTPATIENT
Start: 2025-03-07 | End: 2025-03-08 | Stop reason: HOSPADM

## 2025-03-07 RX ORDER — TIZANIDINE 2 MG/1
2 TABLET ORAL ONCE
Status: COMPLETED | OUTPATIENT
Start: 2025-03-07 | End: 2025-03-07

## 2025-03-07 RX ORDER — ONDANSETRON HYDROCHLORIDE 2 MG/ML
4 INJECTION, SOLUTION INTRAVENOUS EVERY 6 HOURS PRN
Status: DISCONTINUED | OUTPATIENT
Start: 2025-03-07 | End: 2025-03-08 | Stop reason: HOSPADM

## 2025-03-07 RX ORDER — PROCHLORPERAZINE EDISYLATE 5 MG/ML
5 INJECTION INTRAMUSCULAR; INTRAVENOUS EVERY 6 HOURS PRN
Status: DISCONTINUED | OUTPATIENT
Start: 2025-03-07 | End: 2025-03-08 | Stop reason: HOSPADM

## 2025-03-07 RX ORDER — SODIUM CHLORIDE 9 MG/ML
500 INJECTION, SOLUTION INTRAVENOUS
Status: ACTIVE | OUTPATIENT
Start: 2025-03-07 | End: 2025-03-08

## 2025-03-07 RX ORDER — ACETAMINOPHEN 325 MG/1
650 TABLET ORAL EVERY 4 HOURS PRN
Status: DISCONTINUED | OUTPATIENT
Start: 2025-03-07 | End: 2025-03-08 | Stop reason: HOSPADM

## 2025-03-07 RX ORDER — ACETAMINOPHEN 500 MG
1000 TABLET ORAL
Status: COMPLETED | OUTPATIENT
Start: 2025-03-07 | End: 2025-03-07

## 2025-03-07 RX ORDER — SODIUM CHLORIDE 0.9 % (FLUSH) 0.9 %
10 SYRINGE (ML) INJECTION EVERY 12 HOURS PRN
Status: DISCONTINUED | OUTPATIENT
Start: 2025-03-07 | End: 2025-03-08 | Stop reason: HOSPADM

## 2025-03-07 RX ORDER — POLYETHYLENE GLYCOL 3350 17 G/17G
17 POWDER, FOR SOLUTION ORAL DAILY
Status: DISCONTINUED | OUTPATIENT
Start: 2025-03-08 | End: 2025-03-08

## 2025-03-07 RX ADMIN — TIZANIDINE 2 MG: 2 TABLET ORAL at 02:03

## 2025-03-07 RX ADMIN — ACETAMINOPHEN 1000 MG: 500 TABLET ORAL at 02:03

## 2025-03-07 NOTE — ASSESSMENT & PLAN NOTE
Creatine stable for now at what appears to be his recent baseline (1.6-1.7). Follows warren Gary. BMP reviewed- noted Estimated Creatinine Clearance: 30.4 mL/min (A) (based on SCr of 1.7 mg/dL (H)). according to latest data. Based on current GFR, CKD stage is stage 3 - GFR 30-59.  Monitor UOP and serial BMP and adjust therapy as needed. Renally dose meds. Avoid nephrotoxic medications and procedures.

## 2025-03-07 NOTE — HPI
"Darrion Bennett is a 75 y.o. male with  Carotid stenosis (50-60% diameter stenosis R>L as of 2021), Frequent episodes of Mobitz I AV blocks since 2021 follows w [facility], HFpEF  , Insulin-dependent DM2 with hyperglycemic complications, HTN, HLD, and CKD 3  who presented to [facility] ED on 03/07/2025 for eval and treatment of syncope.  He was making breakfast this morning when he started feeling burning in his hands lasting for a minute, then tried to go sit in living room but felt he couldn't walk so he slowly lowered himself down to the kitchen floor.  He says this was witnessed by multiple family members who then picked him up and took him to sit on the sofa and then called 911.  "My legs gave out completely."  He denies LOC, aura, presyncope, dizziness, lightheadedness, vision changes, chest pain, dyspnea, nausea, vomiting, or any other symptom.  "Everything was fine except for my legs."  He reports daily muscular weakness and soreness mostly in his R shoulder but sometimes in his legs, to the point that he sometimes cannot sleep.  He has been on atorvastatin for 3-4 years.   No notable recent healthcare encounters.    ED course notable for the following: MRI: At L4-5 there is degenerative anterolisthesis with type I degenerative endplate changes, disc bulging, facet and ligamentous hypertrophy and a left paracentral disc herniation/extrusion with mild superior migration of disc material. This constellation of findings results in approaching severe central stenosis with effacement of much of the CSF signal around the nerve roots of the cauda equina with mild right and approaching moderate left foraminal stenosis.  Full strength and sensation in all extremities on exam.  Trop 0.03 -> 0.07.  VS unremarkable.  They were placed in observation under the care of [facility] for further evaluation and treatment.    "

## 2025-03-07 NOTE — PLAN OF CARE
Case Management Assessment     PCP: Farooq Domingo  Ph[facility]    Patient Arrived From: home   Existing Help at Home: spouse    Barriers to Discharge: none    Discharge Plan:    A. Home with family   B. Home with family     03/07/25 [id number]   Discharge Planning   Assessment Type Discharge Planning Brief Assessment   Resource/Environmental Concerns none   Support Systems Spouse/significant other   Equipment Currently Used at Home none   Current Living Arrangements home   Patient/Family Anticipates Transition to home with family   Patient/Family Anticipated Services at Transition none   DME Needed Upon Discharge  none   Discharge Plan A Home with family   Discharge Plan B Home with family

## 2025-03-07 NOTE — ED PROVIDER NOTES
Encounter Date: 3/7/2025    SCRIBE #1 NOTE: I, Anali Okeefe, am scribing for, and in the presence of,  Gabby Whitman MD. I have scribed the following portions of the note - Other sections scribed: HPI, ROS, PE.       History     Chief Complaint   Patient presents with    Leg Pain     Pt BIB EMS, c/o bilateral leg cramps x 30 minutes. Pt denies any falls or dizziness. Per EMS, pt is showing 2nd degree heart block on monitor. Pt denies any CP, SOB, abd pain or n/v/d     75 y.o M with PMHx of Anemia, Arthritis, CHF, CKD stage III, DM type 2, HLD, HTN, MCTD, and Sjogren's disease presents to the ED via EMS transportation accompanied by his daughter c/o near syncopal episode today associated with bilateral lower leg pain that began this morning. Pt reports that he was cooking breakfast when he began to experience a burning sensation to palms of his bilateral hands. He notes that the sensation lasted for 1 minute and resolved. He states that he then attempted to take a step but was unable to do so as he suddenly began to experience pain to the bilateral lower legs and feet (to the top of the feet radiating to the toes).     Pt's daughter reports that pt began to feel weak and lose his balance prompting her to assist him down to the floor. Pt reports that he is presently continuing to experience the symptoms, noting that the pain is exacerbated with standing. He also reports neck pain which occasionally worsens with exertion, a dry mouth, cough (for 2 days), and a decreased appetite (for 2 days).     No history of stroke. He is followed by Cardiology and last underwent a stress test 1 year ago. Pt denies difficulty urinating, urinary incontinence, urine color change, diarrhea, constipation, back pain, chest pain, leg swelling, syncope, or any other associated symptoms. No alleviating factors.     The history is provided by the patient.     Review of patient's allergies indicates:   Allergen Reactions    Allopurinol  Other (See Comments)    Azathioprine Other (See Comments)     Pancytopenia    Penicillins Nausea And Vomiting and Other (See Comments)    Rosuvastatin Other (See Comments)     Muscle pain    Allopurinol analogues Rash     Past Medical History:   Diagnosis Date    Anemia     Arthritis     Cataract     CHF (congestive heart failure)     Chronic constipation     Diabetes mellitus, type 2     Felon of finger of left hand 05/11/2019    Glaucoma     Hyperlipidemia 05/13/2014    Hypertension     MCTD (mixed connective tissue disease)     Personal history of colonic polyps     Sjogren's disease     Ulcerative colitis     Urolithiasis      Past Surgical History:   Procedure Laterality Date    CATARACT EXTRACTION Bilateral 2005    COLONOSCOPY  2014    ESOPHAGOGASTRODUODENOSCOPY N/A 9/8/2023    Procedure: EGD (ESOPHAGOGASTRODUODENOSCOPY);  Surgeon: Divya Saenz MD;  Location: [facility] ENDO;  Service: Endoscopy;  Laterality: N/A;  ref by / inst portal-    ESOPHAGOGASTRODUODENOSCOPY N/A 11/22/2023    Procedure: EGD (ESOPHAGOGASTRODUODENOSCOPY);  Surgeon: Crystal Urbina MD;  Location: [facility] ENDO;  Service: Endoscopy;  Laterality: N/A;  time frame 8-12 weeks  Dr. Saenz pt   prep instructions sent to pt via portal -[facility] meds trulicity hold 7 days prior  diabetic  11/16- pt r/s to earlier date, pre call complete.  DBM    EYE SURGERY       Family History   Problem Relation Name Age of Onset    Hypertension Father      Stroke Father      Cataracts Father      Glaucoma Father      Cataracts Mother      No Known Problems Sister      No Known Problems Brother      Rheum arthritis Maternal Aunt      Rheum arthritis Maternal Uncle      No Known Problems Paternal Aunt      No Known Problems Paternal Uncle      No Known Problems Maternal Grandmother      No Known Problems Maternal Grandfather      Throat cancer Paternal Grandmother      Glaucoma Paternal Grandfather      No Known Problems Daughter      No Known Problems Son  x2     Celiac disease Neg Hx      Colon cancer Neg Hx      Cirrhosis Neg Hx      Colon polyps Neg Hx      Crohn's disease Neg Hx      Cystic fibrosis Neg Hx      Hemochromatosis Neg Hx      Esophageal cancer Neg Hx      Inflammatory bowel disease Neg Hx      Irritable bowel syndrome Neg Hx      Liver cancer Neg Hx      Liver disease Neg Hx      Rectal cancer Neg Hx      Stomach cancer Neg Hx      Ulcerative colitis Neg Hx      Chase's disease Neg Hx      Amblyopia Neg Hx      Blindness Neg Hx      Cancer Neg Hx      Diabetes Neg Hx      Macular degeneration Neg Hx      Retinal detachment Neg Hx      Strabismus Neg Hx      Thyroid disease Neg Hx      Melanoma Neg Hx       Social History[1]  Review of Systems   Constitutional:  Positive for appetite change (decreased). Negative for fever.   HENT:  Negative for sore throat.         (+) dry mouth   Eyes:  Negative for visual disturbance.   Respiratory:  Positive for cough. Negative for shortness of breath.    Cardiovascular:  Negative for chest pain.   Gastrointestinal:  Negative for constipation, diarrhea and nausea.   Genitourinary:  Negative for difficulty urinating and dysuria.        (-) urinary incontinence  (-) urine color change   Musculoskeletal:  Positive for neck pain. Negative for back pain.        (+) bilateral lower leg and foot pain (to top of the feet radiating to the toes)   Skin:  Negative for rash.   Neurological:  Negative for syncope and weakness.        (-) loss of consciousness       Physical Exam     Initial Vitals [03/07/25 0821]   BP Pulse Resp Temp SpO2   133/64 (!) 52 20 97.8 °F (36.6 °C) 96 %      MAP       --         Physical Exam    Nursing note and vitals reviewed.  Constitutional: He appears well-developed and well-nourished.  Non-toxic appearance. No distress.   HENT:   Head: Normocephalic and atraumatic.   Eyes: Conjunctivae are normal.   Cardiovascular:  Intact distal pulses.           Pulmonary/Chest: Effort normal and breath sounds  normal. No respiratory distress. He has no wheezes. He has no rhonchi.   Abdominal: Abdomen is soft. He exhibits no distension. There is no abdominal tenderness. There is no guarding.   Musculoskeletal:         General: No tenderness or edema.     Neurological: He is alert and oriented to person, place, and time.   Hyperesthesia present in LE distal to knees bilaterally in L5 dermatome, decreased dorsiflexion and plantarflexion strength bilaterally, leg lift 1 second bilaterally antigravity   Skin: Skin is warm and dry.   Psychiatric: He has a normal mood and affect.         ED Course   Procedures  Labs Reviewed   CBC W/ AUTO DIFFERENTIAL - Abnormal       Result Value    WBC 9.01      RBC 4.25 (*)     Hemoglobin 12.4 (*)     Hematocrit 37.8 (*)     MCV 89      MCH 29.2      MCHC 32.8      RDW 14.2      Platelets 211      MPV 11.8      Immature Granulocytes 0.2      Gran # (ANC) 6.8      Immature Grans (Abs) 0.02      Lymph # 1.2      Mono # 0.9      Eos # 0.1      Baso # 0.01      nRBC 0      Gran % 75.6 (*)     Lymph % 12.9 (*)     Mono % 10.4      Eosinophil % 0.8      Basophil % 0.1      Differential Method Automated     COMPREHENSIVE METABOLIC PANEL - Abnormal    Sodium 137      Potassium 4.5      Chloride 106      CO2 20 (*)     Glucose 119 (*)     BUN 25 (*)     Creatinine 1.7 (*)     Calcium 9.1      Total Protein 8.1      Albumin 3.4 (*)     Total Bilirubin 0.5      Alkaline Phosphatase 106      AST 23      ALT 18      eGFR 42 (*)     Anion Gap 11     TROPONIN I - Abnormal    Troponin I 0.035 (*)    B-TYPE NATRIURETIC PEPTIDE - Abnormal     (*)    TSH - Abnormal    TSH 4.246 (*)    TROPONIN I - Abnormal    Troponin I 0.072 (*)    MAGNESIUM    Magnesium 1.8     CK         T4, FREE    Free T4 1.29     VITAMIN B12   URINALYSIS, REFLEX TO URINE CULTURE   URINALYSIS MICROSCOPIC          Imaging Results              MRI Lumbar Spine Without Contrast (In process)                      US Lower  Extremity Veins Bilateral (In process)                      CT Lumbar Spine Without Contrast (Final result)  Result time 03/07/25 10:42:08      Final result by Martín Babb MD (03/07/25 10:42:08)                   Impression:      1. No evidence of acute lumbar spine fracture.  2. Degenerative findings of the lumbar spine, as discussed.  3. Additional details of chronic and incidental findings, as provided in the body of the report.      Electronically signed by: Martín Babb  Date:    03/07/2025  Time:    10:42               Narrative:    EXAMINATION:  CT LUMBAR SPINE WITHOUT CONTRAST    CLINICAL HISTORY:  Low back pain, progressive neurologic deficit;    TECHNIQUE:  Low-dose axial, sagittal and coronal reformations are obtained through the lumbar spine.  Contrast was not administered.    COMPARISON:  None.    FINDINGS:    Fractures: No acute lumbar spine fracture is present.    Alignment: Minimal retrolisthesis of L1 on L2.  Grade 1 anterolisthesis of L4 on L5.    Discs: Degenerative disc disease.    Vertebral bodies: Degenerative plate change.    Paraspinal soft tissues: Unremarkable.    Although CT is inferior to MRI in the evaluation of spinal canal and foraminal soft tissues, level-wise findings are as follows:    L1-L2: There is no significant spinal canal or foraminal stenosis.    L2-L3: There is no significant spinal canal or foraminal stenosis. Shallow diffuse disc bulge with facet arthropathy and ligamentum flavum thickening.    L3-L4: Diffuse disc bulge with moderate facet arthropathy and ligamentum flavum thickening.  Mild central spinal stenosis.  Minimal bilateral inferior foraminal narrowing.    L4-L5: Diffuse disc bulge with moderate facet arthropathy and ligamentum flavum thickening.  Moderate central spinal canal stenosis and mild bilateral foraminal narrowing.    L5-S1: Diffuse disc bulge with facet arthropathy ligamentum flavum thickening.  No significant central spinal canal stenosis.   Minimal bilateral foraminal narrowing.    Imaged sacroiliac joints: Degenerative changes.    Imaged abdomen, pelvis, and retroperitoneum: Cortical atrophy of both kidneys.  Nonobstructing left renal calculus measuring on the order of 7 mm at the mid to lower pole.  Aortoiliac atherosclerosis.                                       X-Ray Chest AP Portable (Final result)  Result time 03/07/25 09:47:32      Final result by Martín Babb MD (03/07/25 09:47:32)                   Impression:      No convincing evidence of acute cardiopulmonary disease.      Electronically signed by: Martín Babb  Date:    03/07/2025  Time:    09:47               Narrative:    EXAMINATION:  XR CHEST AP PORTABLE    CLINICAL HISTORY:  Syncope and collapse    TECHNIQUE:  Single frontal view of the chest was performed.    COMPARISON:  Chest radiograph performed 09/26/2022, 09:36 hours.    FINDINGS:  Monitoring leads over the chest.  Grossly unchanged cardiomediastinal contours.  Enlarged cardiac silhouette.  Atherosclerosis of the aorta    Chronic interstitial coarsening is noted, similar appearance to 09/26/2022 radiograph    Chronic elevation the right hemidiaphragm    No definite pneumothorax or large volume pleural effusion.    No acute findings in the visualized abdomen.    Osseous and soft tissue structures appear without no definite acute change.                                       Medications   0.9% NaCl infusion (has no administration in time range)   acetaminophen tablet 1,000 mg (1,000 mg Oral Given 3/7/25 1410)   tiZANidine tablet 2 mg (2 mg Oral Given 3/7/25 1410)     Medical Decision Making  75 y.o M with PMHx of Anemia, Arthritis, CHF, CKD stage III, DM type 2, HLD, HTN, MCTD, and Sjogren's disease presents to the ED via EMS transportation accompanied by his daughter c/o syncopal episode, bilateral lower leg pain that began this morning.       Differential diagnosis include but are not limited to: neuropathy,  arrhythmia, ACS, DVT, lumbar stenosis, myalgias    Does have chronic findings of lumbar stenosis on previous MRI, however syncopal episode and generalized weakness, associated with severe leg pain, is a new symptom that started acutely today.  Does report dyspnea, neck pains with exertion in the past few weeks, but no chest pain with exertion (has cervical stenosis issues, difficult to ascertain from patient history if neck pain is with exertion or postural/muscular).  Has Wenckebach rhythm on EKG here, which he has had history of before, no high-degree heart block.  Myalgias are in the anterior legs/shins, less consistent with DVT or myalgias from statins, and also somewhat correlating with L5 dermatome bilaterally, with weakness in dorsiflexion/plantar flexion, however no cord compression symptoms including urinary incontinence/retention, bowel incontinence to suggest acute cord compression.  Will obtain MRI workup of low spine, DVT study.    Troponin trending upwards, does follow with [facility] with most recent stress test performed in 2024 for ischemia, however given dyspnea with exertion symptoms, as well as heart score of 6, will place in observation for ACS pathway workup, cardiac monitoring.    Amount and/or Complexity of Data Reviewed  Labs: ordered. Decision-making details documented in ED Course.  Radiology: ordered.  ECG/medicine tests: ordered and independent interpretation performed.    Risk  OTC drugs.  Prescription drug management.      Additional MDM:   Heart Score:    History:          Slightly suspicious.  ECG:             Nonspecific repolarisation disturbance  Age:               >65 years  Risk factors: >= 3 risk factors or history of atherosclerotic disease  Troponin:       1-2x normal limit  Heart Score = 6             Scribe Attestation:   Scribe #1: I performed the above scribed service and the documentation accurately describes the services I performed. I attest to the accuracy of the note.         ED Course as of 03/07/25 1556   Fri Mar 07, 2025   1122 Troponin I(!): 0.035  Likely due to some component of decreased renal clearance, as well as [LF]   1123 BNP(!): 110 [LF]   1339 Patient reassessed, reports pain in anterior shins and feet has improved somewhat, however still with significant weakness in leg lift and dorsiflexion and plantar flexion [LF]   1409 Discussed labs with daughter on the phone, with no changes from patient's baseline in his hemoglobin, kidney function, normal CPK, normal magnesium [LF]      ED Course User Index  [LF] Gabby Whitman MD I, Lorraine Fei, personally performed the services described in this documentation. All medical record entries made by the scribe were at my direction and in my presence. I have reviewed the chart and agree that the record reflects my personal performance and is accurate and complete.       Clinical Impression:  Final diagnoses:  [R00.1] Bradycardia  [R55] Near syncope  [M79.604, M79.605] Leg pain, bilateral                     [1]   Social History  Tobacco Use    Smoking status: Never     Passive exposure: Never    Smokeless tobacco: Never   Substance Use Topics    Alcohol use: No    Drug use: Yes     Comment: ultram 1 - 2 tabs a week        Gabby Whitman MD  03/08/25 2823

## 2025-03-08 VITALS
BODY MASS INDEX: 20.55 KG/M2 | HEIGHT: 66 IN | OXYGEN SATURATION: 98 % | TEMPERATURE: 98 F | RESPIRATION RATE: 18 BRPM | HEART RATE: 71 BPM | DIASTOLIC BLOOD PRESSURE: 65 MMHG | SYSTOLIC BLOOD PRESSURE: 110 MMHG | WEIGHT: 127.88 LBS

## 2025-03-08 DIAGNOSIS — J31.0 NONALLERGIC RHINITIS: ICD-10-CM

## 2025-03-08 PROBLEM — M43.16 ANTEROLISTHESIS OF LUMBAR SPINE: Status: ACTIVE | Noted: 2025-03-08
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
## 2025-03-08 PROBLEM — R79.89 ELEVATED TROPONIN: Status: ACTIVE | Noted: 2025-03-08

## 2025-03-08 PROBLEM — M43.16 ANTEROLISTHESIS OF LUMBAR SPINE: Chronic | Status: ACTIVE | Noted: 2025-03-08

## 2025-03-08 LAB
ALBUMIN SERPL BCP-MCNC: 2.8 G/DL (ref 3.5–5.2)
ALP SERPL-CCNC: 92 U/L (ref 40–150)
ALT SERPL W/O P-5'-P-CCNC: 18 U/L (ref 10–44)
ANION GAP SERPL CALC-SCNC: 6 MMOL/L (ref 8–16)
ASCENDING AORTA: 3.61 CM
AST SERPL-CCNC: 23 U/L (ref 10–40)
AV INDEX (PROSTH): 0.68
AV MEAN GRADIENT: 12 MMHG
AV PEAK GRADIENT: 18 MMHG
AV VALVE AREA BY VELOCITY RATIO: 1.8 CM²
AV VALVE AREA: 1.9 CM²
AV VELOCITY RATIO: 0.62
BASOPHILS # BLD AUTO: 0.01 K/UL (ref 0–0.2)
BASOPHILS NFR BLD: 0.2 % (ref 0–1.9)
BILIRUB SERPL-MCNC: 0.4 MG/DL (ref 0.1–1)
BSA FOR ECHO PROCEDURE: 1.64 M2
BUN SERPL-MCNC: 32 MG/DL (ref 8–23)
CALCIUM SERPL-MCNC: 8.6 MG/DL (ref 8.7–10.5)
CHLORIDE SERPL-SCNC: 107 MMOL/L (ref 95–110)
CO2 SERPL-SCNC: 22 MMOL/L (ref 23–29)
CREAT SERPL-MCNC: 1.7 MG/DL (ref 0.5–1.4)
CV ECHO LV RWT: 0.56 CM
DIFFERENTIAL METHOD BLD: ABNORMAL
DOP CALC AO PEAK VEL: 2.1 M/S
DOP CALC AO VTI: 45.3 CM
DOP CALC LVOT AREA: 2.8 CM2
DOP CALC LVOT DIAMETER: 1.9 CM
DOP CALC LVOT PEAK VEL: 1.3 M/S
DOP CALC LVOT STROKE VOLUME: 87.3 CM3
DOP CALCLVOT PEAK VEL VTI: 30.8 CM
E WAVE DECELERATION TIME: 277 MSEC
E/A RATIO: 0.75
E/E' RATIO: 19 M/S
ECHO LV POSTERIOR WALL: 1.1 CM (ref 0.6–1.1)
EOSINOPHIL # BLD AUTO: 0.1 K/UL (ref 0–0.5)
EOSINOPHIL NFR BLD: 2 % (ref 0–8)
ERYTHROCYTE [DISTWIDTH] IN BLOOD BY AUTOMATED COUNT: 14.2 % (ref 11.5–14.5)
EST. GFR  (NO RACE VARIABLE): 42 ML/MIN/1.73 M^2
FRACTIONAL SHORTENING: 41 % (ref 28–44)
GLUCOSE SERPL-MCNC: 140 MG/DL (ref 70–110)
HCT VFR BLD AUTO: 32.8 % (ref 40–54)
HGB BLD-MCNC: 10.6 G/DL (ref 14–18)
IMM GRANULOCYTES # BLD AUTO: 0.02 K/UL (ref 0–0.04)
IMM GRANULOCYTES NFR BLD AUTO: 0.3 % (ref 0–0.5)
INTERVENTRICULAR SEPTUM: 1.2 CM (ref 0.6–1.1)
IVC DIAMETER: 1.59 CM
IVRT: 120 MSEC
LA MAJOR: 5.3 CM
LA MINOR: 5.5 CM
LA WIDTH: 4.1 CM
LEFT ATRIUM SIZE: 4.8 CM
LEFT ATRIUM VOLUME INDEX: 55 ML/M2
LEFT ATRIUM VOLUME: 90 CM3
LEFT INTERNAL DIMENSION IN SYSTOLE: 2.3 CM (ref 2.1–4)
LEFT VENTRICLE DIASTOLIC VOLUME INDEX: 41.21 ML/M2
LEFT VENTRICLE DIASTOLIC VOLUME: 68 ML
LEFT VENTRICLE MASS INDEX: 90.6 G/M2
LEFT VENTRICLE SYSTOLIC VOLUME INDEX: 10.9 ML/M2
LEFT VENTRICLE SYSTOLIC VOLUME: 18 ML
LEFT VENTRICULAR INTERNAL DIMENSION IN DIASTOLE: 3.9 CM (ref 3.5–6)
LEFT VENTRICULAR MASS: 149.5 G
LV LATERAL E/E' RATIO: 23 M/S
LV SEPTAL E/E' RATIO: 16.4 M/S
LVED V (TEICH): 67.69 ML
LVES V (TEICH): 17.96 ML
LVOT MG: 4.64 MMHG
LVOT MV: 1.04 CM/S
LYMPHOCYTES # BLD AUTO: 1 K/UL (ref 1–4.8)
LYMPHOCYTES NFR BLD: 17.3 % (ref 18–48)
MAGNESIUM SERPL-MCNC: 1.7 MG/DL (ref 1.6–2.6)
MCH RBC QN AUTO: 28.7 PG (ref 27–31)
MCHC RBC AUTO-ENTMCNC: 32.3 G/DL (ref 32–36)
MCV RBC AUTO: 89 FL (ref 82–98)
MONOCYTES # BLD AUTO: 0.8 K/UL (ref 0.3–1)
MONOCYTES NFR BLD: 13.1 % (ref 4–15)
MV PEAK A VEL: 1.53 M/S
MV PEAK E VEL: 1.15 M/S
MV STENOSIS PRESSURE HALF TIME: 80.4 MS
MV VALVE AREA P 1/2 METHOD: 2.74 CM2
NEUTROPHILS # BLD AUTO: 4 K/UL (ref 1.8–7.7)
NEUTROPHILS NFR BLD: 67.1 % (ref 38–73)
NRBC BLD-RTO: 0 /100 WBC
OHS CV RV/LV RATIO: 0.77 CM
PHOSPHATE SERPL-MCNC: 3.6 MG/DL (ref 2.7–4.5)
PISA TR MAX VEL: 3.3 M/S
PLATELET # BLD AUTO: 178 K/UL (ref 150–450)
PMV BLD AUTO: 12.1 FL (ref 9.2–12.9)
POTASSIUM SERPL-SCNC: 4.5 MMOL/L (ref 3.5–5.1)
PROT SERPL-MCNC: 6.8 G/DL (ref 6–8.4)
PV PEAK GRADIENT: 11 MMHG
PV PEAK VELOCITY: 1.66 M/S
RA MAJOR: 5.25 CM
RA PRESSURE ESTIMATED: 3 MMHG
RA WIDTH: 3.1 CM
RBC # BLD AUTO: 3.69 M/UL (ref 4.6–6.2)
RIGHT VENTRICLE DIASTOLIC BASEL DIMENSION: 3 CM
RIGHT VENTRICULAR END-DIASTOLIC DIMENSION: 3.01 CM
RV TB RVSP: 6 MMHG
RV TISSUE DOPPLER FREE WALL SYSTOLIC VELOCITY 1 (APICAL 4 CHAMBER VIEW): 12.88 CM/S
SINUS: 3.59 CM
SODIUM SERPL-SCNC: 135 MMOL/L (ref 136–145)
STJ: 3.03 CM
TDI LATERAL: 0.05 M/S
TDI SEPTAL: 0.07 M/S
TDI: 0.06 M/S
TR MAX PG: 44 MMHG
TRICUSPID ANNULAR PLANE SYSTOLIC EXCURSION: 1.99 CM
TROPONIN I SERPL DL<=0.01 NG/ML-MCNC: 0.37 NG/ML (ref 0–0.03)
TROPONIN I SERPL DL<=0.01 NG/ML-MCNC: 0.4 NG/ML (ref 0–0.03)
TV PEAK GRADIENT: 4 MMHG
TV REST PULMONARY ARTERY PRESSURE: 47 MMHG
WBC # BLD AUTO: 5.97 K/UL (ref 3.9–12.7)
Z-SCORE OF LEFT VENTRICULAR DIMENSION IN END DIASTOLE: -1.72
Z-SCORE OF LEFT VENTRICULAR DIMENSION IN END SYSTOLE: -1.75

## 2025-03-08 PROCEDURE — 25000242 PHARM REV CODE 250 ALT 637 W/ HCPCS

## 2025-03-08 PROCEDURE — 84100 ASSAY OF PHOSPHORUS: CPT

## 2025-03-08 PROCEDURE — 85025 COMPLETE CBC W/AUTO DIFF WBC: CPT

## 2025-03-08 PROCEDURE — 83036 HEMOGLOBIN GLYCOSYLATED A1C: CPT | Performed by: PHYSICIAN ASSISTANT

## 2025-03-08 PROCEDURE — 25000003 PHARM REV CODE 250

## 2025-03-08 PROCEDURE — 83735 ASSAY OF MAGNESIUM: CPT

## 2025-03-08 PROCEDURE — G0378 HOSPITAL OBSERVATION PER HR: HCPCS

## 2025-03-08 PROCEDURE — 84484 ASSAY OF TROPONIN QUANT: CPT | Performed by: PHYSICIAN ASSISTANT

## 2025-03-08 PROCEDURE — 36415 COLL VENOUS BLD VENIPUNCTURE: CPT | Performed by: PHYSICIAN ASSISTANT

## 2025-03-08 PROCEDURE — 80053 COMPREHEN METABOLIC PANEL: CPT

## 2025-03-08 PROCEDURE — 36415 COLL VENOUS BLD VENIPUNCTURE: CPT

## 2025-03-08 RX ORDER — AMLODIPINE BESYLATE 5 MG/1
5 TABLET ORAL 2 TIMES DAILY
Status: DISCONTINUED | OUTPATIENT
Start: 2025-03-08 | End: 2025-03-08 | Stop reason: HOSPADM

## 2025-03-08 RX ORDER — ATORVASTATIN CALCIUM 40 MG/1
80 TABLET, FILM COATED ORAL DAILY
Status: DISCONTINUED | OUTPATIENT
Start: 2025-03-08 | End: 2025-03-08 | Stop reason: HOSPADM

## 2025-03-08 RX ORDER — VALSARTAN 80 MG/1
160 TABLET ORAL 2 TIMES DAILY
Status: DISCONTINUED | OUTPATIENT
Start: 2025-03-08 | End: 2025-03-08 | Stop reason: HOSPADM

## 2025-03-08 RX ORDER — TORSEMIDE 10 MG/1
10 TABLET ORAL EVERY OTHER DAY
Status: DISCONTINUED | OUTPATIENT
Start: 2025-03-08 | End: 2025-03-08

## 2025-03-08 RX ORDER — TORSEMIDE 10 MG/1
10 TABLET ORAL DAILY
Status: DISCONTINUED | OUTPATIENT
Start: 2025-03-09 | End: 2025-03-08 | Stop reason: HOSPADM

## 2025-03-08 RX ORDER — FLUTICASONE PROPIONATE 50 MCG
1 SPRAY, SUSPENSION (ML) NASAL DAILY
Status: DISCONTINUED | OUTPATIENT
Start: 2025-03-08 | End: 2025-03-08 | Stop reason: HOSPADM

## 2025-03-08 RX ORDER — PANTOPRAZOLE SODIUM 40 MG/1
40 TABLET, DELAYED RELEASE ORAL DAILY
Status: DISCONTINUED | OUTPATIENT
Start: 2025-03-08 | End: 2025-03-08 | Stop reason: HOSPADM

## 2025-03-08 RX ORDER — HYDRALAZINE HYDROCHLORIDE 25 MG/1
25 TABLET, FILM COATED ORAL EVERY 8 HOURS
Status: DISCONTINUED | OUTPATIENT
Start: 2025-03-08 | End: 2025-03-08 | Stop reason: HOSPADM

## 2025-03-08 RX ADMIN — ATORVASTATIN CALCIUM 80 MG: 40 TABLET, FILM COATED ORAL at 09:03

## 2025-03-08 RX ADMIN — VALSARTAN 160 MG: 80 TABLET, FILM COATED ORAL at 09:03

## 2025-03-08 RX ADMIN — PANTOPRAZOLE SODIUM 40 MG: 40 TABLET, DELAYED RELEASE ORAL at 09:03

## 2025-03-08 RX ADMIN — AMLODIPINE BESYLATE 5 MG: 5 TABLET ORAL at 09:03

## 2025-03-08 RX ADMIN — HYDRALAZINE HYDROCHLORIDE 25 MG: 25 TABLET ORAL at 06:03

## 2025-03-08 RX ADMIN — HYDRALAZINE HYDROCHLORIDE 25 MG: 25 TABLET ORAL at 01:03

## 2025-03-08 RX ADMIN — FLUTICASONE PROPIONATE 50 MCG: 50 SPRAY, METERED NASAL at 09:03

## 2025-03-08 RX ADMIN — TORSEMIDE 10 MG: 10 TABLET ORAL at 09:03

## 2025-03-08 RX ADMIN — POLYETHYLENE GLYCOL 3350 17 G: 17 POWDER, FOR SOLUTION ORAL at 09:03

## 2025-03-08 NOTE — ASSESSMENT & PLAN NOTE
Anterolisthesis    Fall was controlled, not preceded by any symptom, and I believe was due to leg weakness.  MRI highly concerning for spinal impingement as cause.  Will monitor in Obs overnight and consult Ortho but anticipate further workup +/- operation should it ultimately come to that being done as outpatient.

## 2025-03-08 NOTE — PLAN OF CARE
Problem: Adult Inpatient Plan of Care  Goal: Plan of Care Review  Outcome: Progressing  Flowsheets (Taken 3/8/2025 8601)  Plan of Care Reviewed With: patient

## 2025-03-08 NOTE — CONSULTS
"[facility] [place] - Telemetry  Cardiology  Consult Note    Patient Name: Darrion Bennett  MRN: [id number]  Admission Date: 3/7/2025  Hospital Length of Stay: 0 days  Code Status: Full Code   Attending Provider: Peyton Rodriguez, *   Consulting Provider: Keith Tristan MD  Primary Care Physician: Farooq Domingo MD  Principal Problem:Fall    Patient information was obtained from patient and ER records.     Inpatient consult to Cardiology  Consult performed by: Keith Tristan MD  Consult ordered by: Keny Rojas PA-C        Subjective:     Chief Complaint:  Troponin elevation    HPI:   Darrion Bennett is a 75 y.o. male with  Carotid stenosis (50-60% diameter stenosis R>L as of 2021), Frequent episodes of Mobitz I AV blocks since 2021 follows w Dia, HFpEF  , Insulin-dependent DM2 with hyperglycemic complications, HTN, HLD, and CKD 3  who presented to [facility] ED on 03/07/2025 for eval and treatment of syncope.  He was making breakfast this morning when he started feeling burning in his hands lasting for a minute, then tried to go sit in living room but felt he couldn't walk so he slowly lowered himself down to the kitchen floor.  He says this was witnessed by multiple family members who then picked him up and took him to sit on the sofa and then called 911.  "My legs gave out completely."  He denies LOC, aura, presyncope, dizziness, lightheadedness, vision changes, chest pain, dyspnea, nausea, vomiting, or any other symptom.  "Everything was fine except for my legs."  He reports daily muscular weakness and soreness mostly in his R shoulder but sometimes in his legs, to the point that he sometimes cannot sleep.  He has been on atorvastatin for 3-4 years.   No notable recent healthcare encounters.     ED course notable for the following: MRI: At L4-5 there is degenerative anterolisthesis with type I degenerative endplate changes, disc bulging, facet and ligamentous hypertrophy and a left paracentral disc " herniation/extrusion with mild superior migration of disc material. This constellation of findings results in approaching severe central stenosis with effacement of much of the CSF signal around the nerve roots of the cauda equina with mild right and approaching moderate left foraminal stenosis.  Full strength and sensation in all extremities on exam.  Trop 0.03 -> 0.07.  VS unremarkable.  They were placed in observation under the care of [facility] for further evaluation and treatment.    Cardiology is consulted for troponin elevation.  He follows up with Dr. Alvares.    Mr. Bennett this 75-year-old male with past medical history of hypertension, hyperlipidemia, CKD stage 3 with baseline creatinine around 1.6-1.8, mild nonobstructive carotid artery disease and type 2 diabetes mellitus who was admitted to the hospital after a presyncopal episode.  Apparently he was in the kitchen and then he tried to go to living room.  On his way to return to living room, he legs gave way and he slowly lowered himself down but his son in law caught him and prevented him from falling down.  Immediately 911 was called and he was brought to the hospital.  MRI of the lumbar spine showed L4-L5 disc disease.  Neurosurgery recommended outpatient evaluation.  He denied any chest pain, shortness of breath or palpitations during or after presyncopal episode.  Troponin was checked and it was mildly elevated with subsequent troponin levels were going up.  EKG did not show any acute ST-T wave change.  Of note, he had a negative nuclear medicine stress test in August, 2024.    Past Medical History:   Diagnosis Date    Anemia     Arthritis     Cataract     CHF (congestive heart failure)     Chronic constipation     Diabetes mellitus, type 2     Felon of finger of left hand 05/11/2019    Glaucoma     Hyperlipidemia 05/13/2014    Hypertension     MCTD (mixed connective tissue disease)     Personal history of colonic polyps      "Sjogren's disease     Ulcerative colitis     Urolithiasis        Past Surgical History:   Procedure Laterality Date    CATARACT EXTRACTION Bilateral 2005    COLONOSCOPY  2014    ESOPHAGOGASTRODUODENOSCOPY N/A 9/8/2023    Procedure: EGD (ESOPHAGOGASTRODUODENOSCOPY);  Surgeon: Divya Saenz MD;  Location: [facility] ENDO;  Service: Endoscopy;  Laterality: N/A;  ref by / inst portal-RB    ESOPHAGOGASTRODUODENOSCOPY N/A 11/22/2023    Procedure: EGD (ESOPHAGOGASTRODUODENOSCOPY);  Surgeon: Crystal Urbina MD;  Location: [facility] ENDO;  Service: Endoscopy;  Laterality: N/A;  time frame 8-12 weeks  Dr. Saenz pt   prep instructions sent to pt via portal -[facility] meds trulicAce Metrix hold 7 days prior  diabetic  11/16- pt r/s to earlier date, pre call complete.  [facility]    EYE SURGERY         Review of patient's allergies indicates:   Allergen Reactions    Allopurinol Other (See Comments)    Azathioprine Other (See Comments)     Pancytopenia    Penicillins Nausea And Vomiting and Other (See Comments)    Rosuvastatin Other (See Comments)     Muscle pain    Allopurinol analogues Rash       No current facility-administered medications on file prior to encounter.     Current Outpatient Medications on File Prior to Encounter   Medication Sig    amLODIPine (NORVASC) 5 MG tablet TAKE 1 TABLET TWICE DAILY    atorvastatin (LIPITOR) 80 MG tablet TAKE 1 TABLET EVERY DAY    blood glucose control, low (TRUE METRIX LEVEL 1) Soln Use as indicated    blood-glucose meter (TRUE METRIX GLUCOSE METER) Misc Test glucose 4x/day    DROPLET PEN NEEDLE 32 gauge x 5/32" Ndle USE 1 NEEDLE AS DIRECTED FOUR TIMES DAILY    dulaglutide (TRULICITY) 4.5 mg/0.5 mL pen injector INJECT 4.5MG (1 PEN) UNDER THE SKIN EVERY WEEK    empagliflozin (JARDIANCE) 10 mg tablet Take 1 tablet (10 mg total) by mouth once daily.    febuxostat (ULORIC) 40 mg Tab Take 1 tablet (40 mg total) by mouth 2 (two) times a day.    fluticasone propionate (FLONASE) 50 mcg/actuation nasal spray " 1 spray (50 mcg total) by Each Nostril route once daily.    hydrALAZINE (APRESOLINE) 25 MG tablet TAKE 3 TABLETS THREE TIMES DAILY    lancets (TRUEPLUS LANCETS) 33 gauge Misc Test glucose 4x/day. Dx code e11.65    omeprazole (PRILOSEC) 40 MG capsule Take 1 capsule (40 mg total) by mouth every morning.    torsemide (DEMADEX) 10 MG Tab TAKE 1 TABLET EVERY OTHER DAY    TRUE METRIX GLUCOSE TEST STRIP Strp TEST BLOOD SUGAR FOUR TIMES DAILY    valsartan (DIOVAN) 160 MG tablet TAKE 1 TABLET TWICE DAILY     Family History       Problem Relation (Age of Onset)    Cataracts Father, Mother    Glaucoma Father, Paternal Grandfather    Hypertension Father    No Known Problems Sister, Brother, Paternal Aunt, Paternal Uncle, Maternal Grandmother, Maternal Grandfather, Daughter, Son    Rheum arthritis Maternal Aunt, Maternal Uncle    Stroke Father    Throat cancer Paternal Grandmother          Tobacco Use    Smoking status: Never     Passive exposure: Never    Smokeless tobacco: Never   Substance and Sexual Activity    Alcohol use: No    Drug use: Yes     Comment: ultram 1 - 2 tabs a week    Sexual activity: Not Currently     Partners: Female     Review of Systems   Cardiovascular:  Negative for chest pain, claudication, dyspnea on exertion, irregular heartbeat, leg swelling, near-syncope, orthopnea, palpitations, paroxysmal nocturnal dyspnea and syncope.   Respiratory:  Negative for cough, shortness of breath, snoring and sputum production.    Gastrointestinal:  Negative for abdominal pain, dysphagia, heartburn, nausea and vomiting.   Neurological:  Negative for dizziness, headaches, loss of balance and weakness.     Objective:     Vital Signs (Most Recent):  Temp: 99 °F (37.2 °C) (03/08/25 0800)  Pulse: 70 (03/08/25 0800)  Resp: 18 (03/08/25 0800)  BP: (!) 113/53 (03/08/25 0800)  SpO2: 98 % (03/08/25 0800) Vital Signs (24h Range):  Temp:  [97.2 °F (36.2 °C)-99.2 °F (37.3 °C)] 99 °F (37.2 °C)  Pulse:  [60-87] 70  Resp:  [17-45]  18  SpO2:  [98 %-100 %] 98 %  BP: (113-158)/(53-79) 113/53     Weight: 58 kg (127 lb 13.9 oz)  Body mass index is 20.64 kg/m².    SpO2: 98 %         Intake/Output Summary (Last 24 hours) at 3/8/2025 1118  Last data filed at 3/8/2025 0926  Gross per 24 hour   Intake 220 ml   Output 220 ml   Net 0 ml       Lines/Drains/Airways       Peripheral Intravenous Line  Duration                  Peripheral IV - Single Lumen 03/07/25 1002 20 G Posterior;Right Hand 1 day                     Physical Exam  HENT:      Head: Normocephalic and atraumatic.      Mouth/Throat:      Mouth: Mucous membranes are moist.   Eyes:      Extraocular Movements: Extraocular movements intact.      Pupils: Pupils are equal, round, and reactive to light.   Cardiovascular:      Rate and Rhythm: Normal rate and regular rhythm.      Pulses: Normal pulses.      Heart sounds: Normal heart sounds.   Pulmonary:      Effort: Pulmonary effort is normal.      Breath sounds: Normal breath sounds.   Abdominal:      General: Bowel sounds are normal.      Palpations: Abdomen is soft.   Musculoskeletal:      Left lower leg: No edema.   Skin:     General: Skin is warm.   Neurological:      General: No focal deficit present.      Mental Status: He is alert.   Psychiatric:         Mood and Affect: Mood normal.         Behavior: Behavior normal.          Current Medications:   amLODIPine  5 mg Oral BID    atorvastatin  80 mg Oral Daily    fluticasone propionate  1 spray Each Nostril Daily    hydrALAZINE  25 mg Oral Q8H    pantoprazole  40 mg Oral Daily    polyethylene glycol  17 g Oral Daily    torsemide  10 mg Oral Every other day    valsartan  160 mg Oral BID         Current Facility-Administered Medications:     acetaminophen, 650 mg, Oral, Q4H PRN    aluminum-magnesium hydroxide-simethicone, 30 mL, Oral, QID PRN    glucagon (human recombinant), 1 mg, Intramuscular, PRN    glucose, 16 g, Oral, PRN    glucose, 24 g, Oral, PRN    melatonin, 6 mg, Oral, Nightly PRN     mineral oil, 1 enema, Rectal, Daily PRN    naloxone, 0.02 mg, Intravenous, PRN    ondansetron, 4 mg, Intravenous, Q6H PRN    prochlorperazine, 5 mg, Intravenous, Q6H PRN    sodium chloride 0.9%, 10 mL, Intravenous, Q12H PRN    Laboratory (all labs reviewed):  CBC:  Recent Labs   Lab 11/22/24  0716 01/14/25  0700 01/24/25  0720 03/07/25  1003 03/08/25  0520   WBC 6.53 6.82 5.94 9.01 5.97   Hemoglobin 11.0 L 11.6 L 12.1 L 12.4 L 10.6 L   Hematocrit 36.1 L 38.0 L 40.0 37.8 L 32.8 L   Platelets 191 192 190 211 178       CHEMISTRIES:  Recent Labs   Lab 03/28/22  0712 04/22/22  0845 09/26/22  0950 09/30/24  0709 01/14/25  0700 01/24/25  0720 03/07/25  1003 03/08/25  0520   Glucose 134 H 133 H   < > 102 95 101 119 H 140 H   Sodium 137 136   < > 137 137 137 137 135 L   Potassium 4.4 4.7   < > 4.9 4.4 4.7 4.5 4.5   BUN 30 H 26 H   < > 35 H 34 H 38 H 25 H 32 H   Creatinine 1.2 1.3   < > 1.7 H 1.6 H 1.7 H 1.7 H 1.7 H   eGFR if non African American >60.0 54.5 A  --   --   --   --   --   --    eGFR  --   --    < > 41.8 A 44.9 A 41.8 A 42 A 42 A   Calcium 9.5 9.4   < > 9.0 9.4 9.5 9.1 8.6 L   Magnesium  --   --   --   --   --   --  1.8 1.7    < > = values in this interval not displayed.       CARDIAC BIOMARKERS:  Recent Labs   Lab 03/07/25  1003 03/07/25  1346 03/08/25  0826     --   --    Troponin I 0.035 H 0.072 H 0.395 H       COAGS:        LIPIDS/LFTS:  Recent Labs   Lab 07/05/22  0713 09/26/22  0950 08/09/23  0734 11/27/23  0810 02/12/24  0710 02/26/24  0941 05/22/24  0710 09/30/24  0709 01/14/25  0700 01/24/25  0720 03/07/25  1003 03/08/25  0520   Cholesterol 135  --  137  --  135 154  --   --   --   --   --   --    Triglycerides 70  --  93  --  75 135  --   --   --   --   --   --    HDL 41  --  35 L  --  44 36 L  --   --   --   --   --   --    LDL Cholesterol 80.0  --  83.4  --  76.0 91.0  --   --   --   --   --   --    Non-HDL Cholesterol 94  --  102  --  91 118  --   --   --   --   --   --    AST  --    < >  --     < >  --   --    < > 22 25 27 23 23   ALT  --    < >  --    < >  --   --    < > 18 20 21 18 18    < > = values in this interval not displayed.       BNP:  Recent Labs   Lab 03/07/25  1003    H       TSH:  Recent Labs   Lab 09/26/22  0950 03/07/25  1003   TSH 2.281 4.246 H       Free T4:  Recent Labs   Lab 03/07/25  1003   Free T4 1.29       Diagnostic Results:  ECG (personally reviewed and interpreted tracing(s)):  3/7/2025   9 AM  Sinus rhythm, Mobitz type 1 AV block, left axis deviation and poor R-wave progression    Chest X-Ray (personally reviewed and interpreted image(s)):  No acute cardiopulmonary disease    Echo:   3/8/2025    Left Ventricle: The left ventricle is normal in size. Mildly increased wall thickness. There is concentric remodeling. There is normal systolic function with a visually estimated ejection fraction of 65 - 70%. Grade II diastolic dysfunction. Elevated left ventricular filling pressure.    Right Ventricle: The right ventricle is normal in size. Systolic function is normal.    Left Atrium: severely dilated    Right Atrium: Right atrium is mildly dilated.    Aortic Valve: The aortic valve is a trileaflet valve. There is mild aortic valve sclerosis. There is mild stenosis. Aortic valve area by VTI is 1.9 cm². Aortic valve peak velocity is 2.1 m/s. Mean gradient is 12 mmHg. The dimensionless index is 0.68. There is trace aortic regurgitation. Restricted opening of right coronary cusp.    Mitral Valve: There is mild regurgitation.    Tricuspid Valve: There is mild regurgitation.    Pulmonary Artery: There is pulmonary hypertension. The estimated pulmonary artery systolic pressure is 47 mmHg.    IVC/SVC: Normal venous pressure at 3 mmHg.    8/2024    Left Ventricle: Increased wall thickness. There is normal systolic function with a visually estimated ejection fraction of 65 - 70%.    Right Ventricle: Normal right ventricular cavity size. Systolic function is normal.    Left Atrium: Left  atrium is mildly dilated.    Aortic Valve: There is mild stenosis. Aortic valve area by VTI is 1.84 cm². Aortic valve peak velocity is 1.70 m/s. Mean gradient is 6 mmHg. The dimensionless index is 0.48.    Mitral Valve: There is mild regurgitation.    Pulmonary Artery: The estimated pulmonary artery systolic pressure is 23 mmHg.    IVC/SVC: Normal venous pressure at 3 mmHg.    Stress Test: 8/2024    The patient exercised for 6 minutes 53 seconds on a Jean-Paul protocol, corresponding to a functional capacity of 7METS, achieving a peak heart rate of 133 bpm, which is 91% of the age predicted maximum heart rate.    Normal myocardial perfusion scan. There is no evidence of myocardial ischemia or infarction.    The gated perfusion images showed an ejection fraction of 75% at rest.    There is normal wall motion at rest and post-stress.    The ECG portion of the study is negative for ischemia.    The patient reported no chest pain during the stress test.    During stress, frequent PVCs are noted.    Cath: NA   Assessment and Plan:     Elevated troponin  Patient coming into the hospital after leg weakness  MRI lumbar spine showed L4/L5 disease neurosurgery recommended outpatient evaluation    Patient was completely asymptomatic from cardiac perspective  Not sure why troponin level was checked  Troponin was mildly elevated but now trending up  EKG did not show any new ST-T wave change  Echocardiogram showed normal LV systolic function without any wall motion abnormalities    Troponin elevation of unclear significance  Continue to trend troponin until peak is reached  Further clinical course is dependent on troponin trajectory (inpatient ischemia evaluation versus outpatient)    Start baby aspirin and continue atorvastatin    Anterolisthesis of L4-L5  Management per IM/neurosurgery    Bilateral carotid artery stenosis on CTA 6/26/21  Continue with baby aspirin and statin    Mobitz I AV Block  Stable  Start tele  monitoring    Stage 3a chronic kidney disease  Creatinine at baseline    Diastolic heart failure, NYHA class 2  Echo showed grade 2 diastolic dysfunction and elevated free pressure  Continue with torsemide  Continue home antihypertensive therapy (valsartan, hydralazine and amlodipine) to keep blood pressure less than 130/80    Dyslipidemia  Continue with statin        VTE Risk Mitigation (From admission, onward)           Ordered     IP VTE HIGH RISK PATIENT  Once         03/07/25 1613     Place sequential compression device  Until discontinued         03/07/25 1613                    Keith Tristan MD  Cardiology   [facility]

## 2025-03-08 NOTE — HPI
"Darrion Bennett is a 75 y.o. male with  Carotid stenosis (50-60% diameter stenosis R>L as of 2021), Frequent episodes of Mobitz I AV blocks since 2021 follows warren Alvares, HFpEF  , Insulin-dependent DM2 with hyperglycemic complications, HTN, HLD, and CKD 3  who presented to [facility] ED on 03/07/2025 for eval and treatment of syncope.  He was making breakfast this morning when he started feeling burning in his hands lasting for a minute, then tried to go sit in living room but felt he couldn't walk so he slowly lowered himself down to the kitchen floor.  He says this was witnessed by multiple family members who then picked him up and took him to sit on the sofa and then called 911.  "My legs gave out completely."  He denies LOC, aura, presyncope, dizziness, lightheadedness, vision changes, chest pain, dyspnea, nausea, vomiting, or any other symptom.  "Everything was fine except for my legs."  He reports daily muscular weakness and soreness mostly in his R shoulder but sometimes in his legs, to the point that he sometimes cannot sleep.  He has been on atorvastatin for 3-4 years.   No notable recent healthcare encounters.     ED course notable for the following: MRI: At L4-5 there is degenerative anterolisthesis with type I degenerative endplate changes, disc bulging, facet and ligamentous hypertrophy and a left paracentral disc herniation/extrusion with mild superior migration of disc material. This constellation of findings results in approaching severe central stenosis with effacement of much of the CSF signal around the nerve roots of the cauda equina with mild right and approaching moderate left foraminal stenosis.  Full strength and sensation in all extremities on exam.  Trop 0.03 -> 0.07.  VS unremarkable.  They were placed in observation under the care of [facility] for further evaluation and treatment.    Cardiology is consulted for troponin elevation.  He follows up with Dr. Alvares.    Mr. " Donald this 75-year-old male with past medical history of hypertension, hyperlipidemia, CKD stage 3 with baseline creatinine around 1.6-1.8, mild nonobstructive carotid artery disease and type 2 diabetes mellitus who was admitted to the hospital after a presyncopal episode.  Apparently he was in the kitchen and then he tried to go to living room.  On his way to return to living room, he legs gave way and he slowly lowered himself down but his son in law caught him and prevented him from falling down.  Immediately 911 was called and he was brought to the hospital.  MRI of the lumbar spine showed L4-L5 disc disease.  Neurosurgery recommended outpatient evaluation.  He denied any chest pain, shortness of breath or palpitations during or after presyncopal episode.  Troponin was checked and it was mildly elevated with subsequent troponin levels were going up.  EKG did not show any acute ST-T wave change.  Of note, he had a negative nuclear medicine stress test in August, 2024.

## 2025-03-08 NOTE — ASSESSMENT & PLAN NOTE
Echo showed grade 2 diastolic dysfunction and elevated free pressure  Continue with torsemide  Continue antihypertensive therapy to keep blood pressure less than 130/80

## 2025-03-08 NOTE — DISCHARGE INSTRUCTIONS
Our goal at [facility] is to always give you outstanding care and exceptional service. You may receive a survey from [employer] by mail, text or e-mail in the next 24-48 hours asking about the care you received with us. The survey should only take 5-10 minutes to complete and is very important to us.     Your feedback provides us with a way to recognize our staff who work tirelessly to provide the best care! Also, your responses help us learn how to improve when your experience was below our aspiration of excellence. We are always looking for ways to improve your stay. We WILL use your feedback to continue making improvements to help us provide the highest quality care. We keep your personal information and feedback confidential. We appreciate your time completing this survey and can't wait to hear from you!!!    We look forward to your continued care with us! Thanks so much for choosing [facility] for your healthcare needs!

## 2025-03-08 NOTE — HOSPITAL COURSE
Darrion Bennett 75 y.o. male placed in observation for fall and elevated troponin.  He presented in the emergency room after a fall due to lower extremity weakness.  An MRI lumbar spine was obtained with L4-L5 degenerative anterolisthesis with left paracentral disc herniation resulting in severe central stenosis.  He had resolution of back pain and no numbness or weakness to his extremities. He remained his prior level of function.  He will require outpatient neurosurgical follow up.  His troponin was incidentally found to be elevated and Cardiology was consulted.  His troponin downtrended without intervention.  He and his family elected for outpatient follow up with Cardiology to consider stress test.  He was seen by neurosurgery without plans for intervention as he is currently asymptomatic.  Ambulatory referral was placed for Neurosurgery.  He will follow up in the clinic.  At discharge he was independent in his baseline functional status.  He was chest pain-free.

## 2025-03-08 NOTE — DISCHARGE SUMMARY
"[facility] Medicine  Discharge Summary      Patient Name: Darrion Bennett  MRN: [id number]  DELICIA: [id number]  Patient Class: OP- Observation  Admission Date: 3/7/2025  Hospital Length of Stay: 0 days  Discharge Date and Time:  03/08/2025 3:45 PM  Attending Physician: Peyton Rodriguez, *   Discharging Provider: Keny Rojas PA-C  Primary Care Provider: Farooq Domingo MD    Primary Care Team: KENY ROJAS    HPI:   Darrion Bennett is a 75 y.o. male with  Carotid stenosis (50-60% diameter stenosis R>L as of 2021), Frequent episodes of Mobitz I AV blocks since 2021 follows w Dia, HFpEF  , Insulin-dependent DM2 with hyperglycemic complications, HTN, HLD, and CKD 3  who presented to [facility] ED on 03/07/2025 for eval and treatment of syncope.  He was making breakfast this morning when he started feeling burning in his hands lasting for a minute, then tried to go sit in living room but felt he couldn't walk so he slowly lowered himself down to the kitchen floor.  He says this was witnessed by multiple family members who then picked him up and took him to sit on the sofa and then called 911.  "My legs gave out completely."  He denies LOC, aura, presyncope, dizziness, lightheadedness, vision changes, chest pain, dyspnea, nausea, vomiting, or any other symptom.  "Everything was fine except for my legs."  He reports daily muscular weakness and soreness mostly in his R shoulder but sometimes in his legs, to the point that he sometimes cannot sleep.  He has been on atorvastatin for 3-4 years.   No notable recent healthcare encounters.    ED course notable for the following: MRI: At L4-5 there is degenerative anterolisthesis with type I degenerative endplate changes, disc bulging, facet and ligamentous hypertrophy and a left paracentral disc herniation/extrusion with mild superior migration of disc material. This constellation of findings results in approaching severe central stenosis with " effacement of much of the CSF signal around the nerve roots of the cauda equina with mild right and approaching moderate left foraminal stenosis.  Full strength and sensation in all extremities on exam.  Trop 0.03 -> 0.07.  VS unremarkable.  They were placed in observation under the care of [facility] for further evaluation and treatment.      * No surgery found *      Hospital Course:   Darrion Bennett 75 y.o. male placed in observation for fall and elevated troponin.  He presented in the emergency room after a fall due to lower extremity weakness.  An MRI lumbar spine was obtained with L4-L5 degenerative anterolisthesis with left paracentral disc herniation resulting in severe central stenosis.  He had resolution of back pain and no numbness or weakness to his extremities. He remained his prior level of function.  He will require outpatient neurosurgical follow up.  His troponin was incidentally found to be elevated and Cardiology was consulted.  His troponin downtrended without intervention.  He and his family elected for outpatient follow up with Cardiology to consider stress test.  He was seen by neurosurgery without plans for intervention as he is currently asymptomatic.  Ambulatory referral was placed for Neurosurgery.  He will follow up in the clinic.  At discharge he was independent in his baseline functional status.  He was chest pain-free.     Goals of Care Treatment Preferences:  Code Status: Full Code         Consults:   Consults (From admission, onward)          Status Ordering Provider     Inpatient consult to Cardiology  Once        Provider:  Keith Tristan MD    Completed STACIE PACE     Inpatient consult to Neurosurgery  Once        Provider:  Juni Brown MD    Acknowledged STACIE PACE     Inpatient consult to Orthopedic Surgery  Once        Provider:  (Not yet assigned)    Acknowledged ROSA GARCIA            Assessment & Plan  Elevated troponin  Troponin  increased to 0.3 without associated ischemic correlate. Echo with normal EF and no wall motion abnormality  Cardiology consulted-trend troponin to peak, ischemic eval based on repeat troponin  Telemetry  Essential hypertension  Patient's blood pressure range in the last 24 hours was: BP  Min: 110/65  Max: 155/79.The patient's inpatient anti-hypertensive regimen is listed below:  Current Antihypertensives  amLODIPine tablet 5 mg, 2 times daily, Oral  hydrALAZINE tablet 25 mg, Every 8 hours, Oral  valsartan tablet 160 mg, 2 times daily, Oral  torsemide tablet 10 mg, Daily, Oral    Plan  - BP is controlled, no changes needed to their regimen    Controlled type 2 diabetes mellitus with diabetic polyneuropathy, with long-term current use of insulin  Lab Results   Component Value Date    HGBA1C 6.9 (H) 08/28/2024     Sliding scale insulin, diabetic/renal diet, goal -180    Dyslipidemia  Continue home statin.  Recommend he start taking Coenzyme Q10 for possible statin myopathy given his previous experience w rosuvastatin.    MCTD (mixed connective tissue disease)  Stable.  Continue home meds.      Diastolic heart failure, NYHA class 2  Well-compensated on exam.  Continue home GDMT    Stage 3a chronic kidney disease  Creatine stable for now at what appears to be his recent baseline (1.6-1.7). Follows warren Gary. BMP reviewed- noted Estimated Creatinine Clearance: 30.8 mL/min (A) (based on SCr of 1.7 mg/dL (H)). according to latest data. Based on current GFR, CKD stage is stage 3 - GFR 30-59.  Monitor UOP and serial BMP and adjust therapy as needed. Renally dose meds. Avoid nephrotoxic medications and procedures.      Kirsten HUERTA  Known issue, follows warren Alvares.  JR.    Bilateral carotid artery stenosis on CTA 6/26/21  US displayed known plaque as previously. Continue statin  Fall    Fall was controlled, not preceded by any symptom, and I believe was due to leg weakness.  MRI highly concerning for spinal impingement as cause.     Discussed with neurosurgery outpt f/u.   Ambulatory in room without assistive device and at PLOF  Anterolisthesis of L4-L5  Discussed with neurosurgery outpt f/u  Final Active Diagnoses:    Diagnosis Date Noted POA    PRINCIPAL PROBLEM:  Elevated troponin [R79.89] 03/08/2025 Yes    Fall [W19.XXXA] 03/08/2025 Yes    Anterolisthesis of L4-L5 [M43.16] 03/08/2025 Not Applicable     Chronic    Mobitz I [I44.1] 06/27/2021 Yes    Bilateral carotid artery stenosis on CTA 6/26/21 [I65.23] 06/27/2021 Yes     Chronic    Stage 3a chronic kidney disease [N18.31] 01/16/2020 Yes    Diastolic heart failure, NYHA class 2 [I50.30] 09/26/2014 Yes     Chronic    Essential hypertension [I10] 05/13/2014 Yes     Chronic    Controlled type 2 diabetes mellitus with diabetic polyneuropathy, with long-term current use of insulin [E11.42, Z79.4] 05/13/2014 Not Applicable     Chronic    Dyslipidemia [E78.5] 05/13/2014 Yes     Chronic    MCTD (mixed connective tissue disease) [M35.1] 05/13/2014 Yes     Chronic      Problems Resolved During this Admission:       Discharged Condition: stable    Disposition: Home or Self Care    Follow Up:   Follow-up Information       Juni Brown MD. Schedule an appointment as soon as possible for a visit in 1 week(s).    Specialty: Neurosurgery  Why: Out-Patient Neurosurgery Hospital Follow-up needed in 1 week or as soon as available  Contact information:  [address] [facility] [address]  [phone]               Farooq Domingo MD. Schedule an appointment as soon as possible for a visit in 1 week(s).    Specialty: Internal Medicine  Why: [facility]Patient [facility] Follow-up needed in 1 week  Contact information:  [address] [facility] [address] [phone]               Greg Alvares MD Follow up.    Specialties: Cardiology, Interventional Cardiology  Contact information:  [address]  [address]  [facility] [address] [phone]                           Patient  "Instructions:      Ambulatory referral/consult to Neurosurgery   Standing Status: Future   Referral Priority: Routine Referral Type: Consultation   Referral Reason: Specialty Services Required   Referred to Provider: FELIX TRAYLOR Specialty: Neurosurgery   Number of Visits Requested: 1     Diet renal     Notify your health care provider if you experience any of the following:  temperature >100.4     Notify your health care provider if you experience any of the following:  persistent nausea and vomiting or diarrhea     Notify your health care provider if you experience any of the following:  increased confusion or weakness     Notify your health care provider if you experience any of the following:  persistent dizziness, light-headedness, or visual disturbances     Activity as tolerated       Significant Diagnostic Studies: Labs: CMP   Recent Labs   Lab 03/07/25  1003 03/08/25  0520    135*   K 4.5 4.5    107   CO2 20* 22*   * 140*   BUN 25* 32*   CREATININE 1.7* 1.7*   CALCIUM 9.1 8.6*   PROT 8.1 6.8   ALBUMIN 3.4* 2.8*   BILITOT 0.5 0.4   ALKPHOS 106 92   AST 23 23   ALT 18 18   ANIONGAP 11 6*    and CBC   Recent Labs   Lab 03/07/25  1003 03/08/25  0520   WBC 9.01 5.97   HGB 12.4* 10.6*   HCT 37.8* 32.8*    178       Pending Diagnostic Studies:       Procedure Component Value Units Date/Time    Hemoglobin A1c if not done in past 3 months [[id number]] Collected: 03/08/25 0520    Order Status: Sent Lab Status: No result     Specimen: Blood            Medications:  Reconciled Home Medications:      Medication List        CONTINUE taking these medications      amLODIPine 5 MG tablet  Commonly known as: NORVASC  TAKE 1 TABLET TWICE DAILY     atorvastatin 80 MG tablet  Commonly known as: LIPITOR  TAKE 1 TABLET EVERY DAY     blood-glucose meter Misc  Commonly known as: TRUE METRIX GLUCOSE METER  Test glucose 4x/day     DROPLET PEN NEEDLE 32 gauge x 5/32" Ndle  Generic drug: pen " needle, diabetic  USE 1 NEEDLE AS DIRECTED FOUR TIMES DAILY     empagliflozin 10 mg tablet  Commonly known as: JARDIANCE  Take 1 tablet (10 mg total) by mouth once daily.     febuxostat 40 mg Tab  Commonly known as: ULORIC  Take 1 tablet (40 mg total) by mouth 2 (two) times a day.     fluticasone propionate 50 mcg/actuation nasal spray  Commonly known as: FLONASE  1 spray (50 mcg total) by Each Nostril route once daily.     hydrALAZINE 25 MG tablet  Commonly known as: APRESOLINE  TAKE 3 TABLETS THREE TIMES DAILY     lancets 33 gauge Misc  Commonly known as: TRUEPLUS LANCETS  Test glucose 4x/day. Dx code e11.65     omeprazole 40 MG capsule  Commonly known as: PRILOSEC  Take 1 capsule (40 mg total) by mouth every morning.     torsemide 10 MG Tab  Commonly known as: DEMADEX  TAKE 1 TABLET EVERY OTHER DAY     TRUE METRIX GLUCOSE TEST STRIP Strp  Generic drug: blood sugar diagnostic  TEST BLOOD SUGAR FOUR TIMES DAILY     TRUE METRIX LEVEL 1 Soln  Generic drug: blood glucose control, low  Use as indicated     TRULICITY 4.5 mg/0.5 mL pen injector  Generic drug: dulaglutide  INJECT 4.5MG (1 PEN) UNDER THE SKIN EVERY WEEK     valsartan 160 MG tablet  Commonly known as: DIOVAN  TAKE 1 TABLET TWICE DAILY              Indwelling Lines/Drains at time of discharge:   Lines/Drains/Airways       None                   Time spent on the discharge of patient: 37 minutes         Keny Rojas PA-C  Department of Hospital Medicine  [facility]

## 2025-03-08 NOTE — ASSESSMENT & PLAN NOTE
Troponin increased to 0.3 without associated ischemic correlate. Echo with normal EF and no wall motion abnormality  Cardiology consulted-trend troponin to peak, ischemic eval based on repeat troponin  Telemetry

## 2025-03-08 NOTE — ASSESSMENT & PLAN NOTE
Lab Results   Component Value Date    HGBA1C 6.9 (H) 08/28/2024     Sliding scale insulin, diabetic/renal diet, goal -180

## 2025-03-08 NOTE — PLAN OF CARE
Case Management Final Discharge Note  Discharge Disposition: Home; follow-ups  New DME ordered/Company Name: None  Relevant SDOH/Transition of Care Barriers: None  Primary Caretaker and Contact Information: Darrion: [phone]  Scheduled Follow-Up Appointment: Into AVS; messages sent  Referrals Placed: None  Transportation: Family    Patient and family educated on discharge services and updated on DC plan. Bedside RNMonica notified, patient clear to discharge from Case Management Perspective.      03/08/25 1634   Final Note   Assessment Type Discharge Planning Assessment   Anticipated Discharge Disposition Home   What phone number can be called within the next 1-3 days to see how you are doing after discharge? [phone]   Hospital Resources/Appts/Education Provided Provided patient/caregiver with written discharge plan information;Appointments scheduled and added to AVS;Provided education on problems/symptoms using teachback;Post-Acute resouces added to AVS   Post-Acute Status   Post-Acute Authorization Other  (Home; follow-ups)   Other Status No Post-Acute Service Needs   Discharge Delays None known at this time

## 2025-03-08 NOTE — ASSESSMENT & PLAN NOTE
Patient's blood pressure range in the last 24 hours was: BP  Min: 133/64  Max: 160/71.The patient's inpatient anti-hypertensive regimen is listed below:  Current Antihypertensives  amLODIPine tablet 5 mg, 2 times daily, Oral  hydrALAZINE tablet 25 mg, Every 8 hours, Oral  torsemide tablet 10 mg, Every other day, Oral  valsartan tablet 160 mg, 2 times daily, Oral    Plan  - BP is controlled, no changes needed to their regimen

## 2025-03-08 NOTE — SUBJECTIVE & OBJECTIVE
Interval History: ambulatory in room, no leg pain/numbness, back pain or incontinence. No chest pain or SOB.     Review of Systems   Constitutional:  Negative for chills and fever.   Eyes:  Negative for photophobia and visual disturbance.   Respiratory:  Negative for chest tightness and shortness of breath.    Cardiovascular:  Negative for chest pain and palpitations.   Gastrointestinal:  Negative for abdominal pain, nausea and vomiting.   Genitourinary:  Negative for dysuria and hematuria.     Objective:     Vital Signs (Most Recent):  Temp: 99 °F (37.2 °C) (03/08/25 0800)  Pulse: 70 (03/08/25 0800)  Resp: 18 (03/08/25 0800)  BP: (!) 113/53 (03/08/25 0800)  SpO2: 98 % (03/08/25 0800) Vital Signs (24h Range):  Temp:  [97.2 °F (36.2 °C)-99.2 °F (37.3 °C)] 99 °F (37.2 °C)  Pulse:  [60-78] 70  Resp:  [17-19] 18  SpO2:  [98 %-100 %] 98 %  BP: (113-155)/(53-79) 113/53     Weight: 58 kg (127 lb 13.9 oz)  Body mass index is 20.64 kg/m².    Intake/Output Summary (Last 24 hours) at 3/8/2025 1221  Last data filed at 3/8/2025 0926  Gross per 24 hour   Intake 220 ml   Output 220 ml   Net 0 ml         Physical Exam  Vitals and nursing note reviewed.   Constitutional:       General: He is not in acute distress.     Appearance: He is well-developed. He is not diaphoretic.   HENT:      Head: Normocephalic and atraumatic.      Right Ear: External ear normal.      Left Ear: External ear normal.   Eyes:      General:         Right eye: No discharge.         Left eye: No discharge.      Conjunctiva/sclera: Conjunctivae normal.   Neck:      Thyroid: No thyromegaly.   Cardiovascular:      Rate and Rhythm: Normal rate and regular rhythm.      Heart sounds: No murmur heard.  Pulmonary:      Effort: Pulmonary effort is normal. No respiratory distress.      Breath sounds: Normal breath sounds.   Abdominal:      General: Bowel sounds are normal. There is no distension.      Palpations: Abdomen is soft. There is no mass.      Tenderness: There  is no abdominal tenderness.   Musculoskeletal:         General: No deformity.      Cervical back: Normal range of motion and neck supple.   Skin:     General: Skin is warm and dry.   Neurological:      Mental Status: He is alert and oriented to person, place, and time.      Sensory: No sensory deficit.      Comments: No clonus to bilateral lower extremities. No numbness 5/5 strength hip and knee flexion and planta/dorsiflexion.    Psychiatric:         Mood and Affect: Mood normal.         Behavior: Behavior normal.               Significant Labs: CBC:   Recent Labs   Lab 03/07/25  1003 03/08/25  0520   WBC 9.01 5.97   HGB 12.4* 10.6*   HCT 37.8* 32.8*    178     CMP:   Recent Labs   Lab 03/07/25  1003 03/08/25  0520    135*   K 4.5 4.5    107   CO2 20* 22*   * 140*   BUN 25* 32*   CREATININE 1.7* 1.7*   CALCIUM 9.1 8.6*   PROT 8.1 6.8   ALBUMIN 3.4* 2.8*   BILITOT 0.5 0.4   ALKPHOS 106 92   AST 23 23   ALT 18 18   ANIONGAP 11 6*     Cardiac Markers:   Recent Labs   Lab 03/07/25  1003   *     Troponin:   Recent Labs   Lab 03/07/25  1003 03/07/25  1346 03/08/25  0826   TROPONINI 0.035* 0.072* 0.395*     Urine Studies:   Recent Labs   Lab 03/07/25  1413   COLORU Yellow   APPEARANCEUA Clear   PHUR 7.0   SPECGRAV 1.015   PROTEINUA 1+*   GLUCUA 4+*   KETONESU Negative   BILIRUBINUA Negative   OCCULTUA Negative   NITRITE Negative   UROBILINOGEN Negative   LEUKOCYTESUR 2+*   RBCUA 0   WBCUA 8*   BACTERIA None   HYALINECASTS 0       Significant Imaging:   Imaging Results              US Carotid Bilateral (Final result)  Result time 03/08/25 07:01:13      Final result by Tray Srivastava MD (03/08/25 07:01:13)                   Impression:      Calcific plaque with less than 50% stenosis at the bilateral carotid bifurcations.    Additional scattered calcific plaque throughout the common carotid arteries bilaterally with a moderate stenosis suggested on the left.      Electronically signed  by: Tray Srivastava MD  Date:    03/08/2025  Time:    07:01               Narrative:    EXAMINATION:  US CAROTID BILATERAL    CLINICAL HISTORY:  Carotid stenosis;    TECHNIQUE:  Grayscale and color Doppler ultrasound examination of the carotid and vertebral artery systems bilaterally.  Stenosis estimates are per the NASCET measurement criteria.    COMPARISON:  [facility] 06/26/2021.    FINDINGS:  Right:    Internal Carotid Artery (ICA):    Peak systolic velocity 97 cm/sec    End diastolic velocity 35 cm/sec    ICA/CCA peak systolic ratio: 1.6    ICA/CCA end diastolic ratio: 2.4    Plaque formation: Mild    Vertebral artery: Antegrade flow and normal waveform.    Other: Scattered atherosclerotic plaque throughout the common carotid artery.    Left:    Internal Carotid Artery (ICA):    Peak systolic velocity 120 cm/sec    End diastolic velocity 38 cm/sec    ICA/CCA peak systolic ratio: 1.3    ICA/CCA end diastolic ratio: 0.6    Plaque formation: Mild    Vertebral artery: Antegrade flow and normal waveform.    Other: Scattered atherosclerotic plaque throughout the common carotid artery with moderate visual stenosis in the mid vessel.                                       US Lower Extremity Veins Bilateral (Final result)  Result time 03/07/25 16:50:50      Final result by Sarkis Moreno MD (03/07/25 16:50:50)                   Impression:      No evidence of deep venous thrombosis in either lower extremity.    Left popliteal fossa cyst.      Electronically signed by: Sarkis Moreno  Date:    03/07/2025  Time:    16:50               Narrative:    EXAMINATION:  US LOWER EXTREMITY VEINS BILATERAL    CLINICAL HISTORY:  Pain in right leg    TECHNIQUE:  Duplex and color flow Doppler and dynamic compression was performed of the bilateral lower extremity veins was performed.    COMPARISON:  None    FINDINGS:  Right thigh veins: The common femoral, femoral, popliteal, upper greater saphenous, and deep femoral veins are patent and free of  thrombus. The veins are normally compressible and have normal phasic flow and augmentation response.    Right calf veins: The visualized calf veins are patent.    Left thigh veins: The common femoral, femoral, popliteal, upper greater saphenous, and deep femoral veins are patent and free of thrombus. The veins are normally compressible and have normal phasic flow and augmentation response.    Left calf veins: The visualized calf veins are patent.    Miscellaneous: There is a 2.2 x 2.0 x 1.2 cm left popliteal fossa cyst.                                       MRI Lumbar Spine Without Contrast (Final result)  Result time 03/07/25 16:41:30      Final result by Yovani Christianson MD (03/07/25 16:41:30)                   Impression:      At L4-5 there is degenerative anterolisthesis with type I degenerative endplate changes, disc bulging, facet and ligamentous hypertrophy and a left paracentral disc herniation/extrusion with mild superior migration of disc material.  This constellation of findings results in approaching severe central stenosis with effacement of much of the CSF signal around the nerve roots of the cauda equina with mild right and approaching moderate left foraminal stenosis.      Electronically signed by: Yovani Christianson  Date:    03/07/2025  Time:    16:41               Narrative:    EXAMINATION:  MRI LUMBAR SPINE WITHOUT CONTRAST    CLINICAL HISTORY:  Low back pain, progressive neurologic deficit;    TECHNIQUE:  Multiplanar, multisequence MR images were acquired from the thoracolumbar junction to the sacrum without the administration of contrast.    COMPARISON:  None.    FINDINGS:  The distal conus is evaluated in a limited fashion is only the tip of the conus is identified motor slow grossly within normal limits (increased signal on STIR imaging is thought more likely volume averaging artifact unless otherwise suspected clinically).    There is no evidence of acute compression fracture or osseous  destructive process with mild chronic loss of height at T12.    Mild degenerative anterolisthesis of L4 on L5.  Small ganglion cysts protrude into the paraspinal regions at L4-5.    Degenerative disc disease with mild degenerative disc space narrowing at L4-5 and type I (Modic) degenerative endplate changes.    L4-5, there is uncovering of the disc with diffuse disc bulging and a superimposed central left paracentral disc herniation/extrusion with mild migration of disc material superiorly behind the inferior endplate of L4.  This with facet and ligamentous hypertrophy results in approaching severe central stenosis with effacement of much of the CSF signal around the nerve roots of the cauda equina.  Mild right and approaching moderate left foraminal stenosis is identified    Mild disc bulging within the remainder of the lumbar spine without advanced central or foraminal stenosis.    Presumed small bilateral renal cysts incompletely evaluated.                                       CT Lumbar Spine Without Contrast (Final result)  Result time 03/07/25 10:42:08      Final result by Martín Babb MD (03/07/25 10:42:08)                   Impression:      1. No evidence of acute lumbar spine fracture.  2. Degenerative findings of the lumbar spine, as discussed.  3. Additional details of chronic and incidental findings, as provided in the body of the report.      Electronically signed by: Martín Babb  Date:    03/07/2025  Time:    10:42               Narrative:    EXAMINATION:  CT LUMBAR SPINE WITHOUT CONTRAST    CLINICAL HISTORY:  Low back pain, progressive neurologic deficit;    TECHNIQUE:  Low-dose axial, sagittal and coronal reformations are obtained through the lumbar spine.  Contrast was not administered.    COMPARISON:  None.    FINDINGS:    Fractures: No acute lumbar spine fracture is present.    Alignment: Minimal retrolisthesis of L1 on L2.  Grade 1 anterolisthesis of L4 on L5.    Discs: Degenerative  disc disease.    Vertebral bodies: Degenerative plate change.    Paraspinal soft tissues: Unremarkable.    Although CT is inferior to MRI in the evaluation of spinal canal and foraminal soft tissues, level-wise findings are as follows:    L1-L2: There is no significant spinal canal or foraminal stenosis.    L2-L3: There is no significant spinal canal or foraminal stenosis. Shallow diffuse disc bulge with facet arthropathy and ligamentum flavum thickening.    L3-L4: Diffuse disc bulge with moderate facet arthropathy and ligamentum flavum thickening.  Mild central spinal stenosis.  Minimal bilateral inferior foraminal narrowing.    L4-L5: Diffuse disc bulge with moderate facet arthropathy and ligamentum flavum thickening.  Moderate central spinal canal stenosis and mild bilateral foraminal narrowing.    L5-S1: Diffuse disc bulge with facet arthropathy ligamentum flavum thickening.  No significant central spinal canal stenosis.  Minimal bilateral foraminal narrowing.    Imaged sacroiliac joints: Degenerative changes.    Imaged abdomen, pelvis, and retroperitoneum: Cortical atrophy of both kidneys.  Nonobstructing left renal calculus measuring on the order of 7 mm at the mid to lower pole.  Aortoiliac atherosclerosis.                                       X-Ray Chest AP Portable (Final result)  Result time 03/07/25 09:47:32      Final result by Martín Babb MD (03/07/25 09:47:32)                   Impression:      No convincing evidence of acute cardiopulmonary disease.      Electronically signed by: Martín Babb  Date:    03/07/2025  Time:    09:47               Narrative:    EXAMINATION:  XR CHEST AP PORTABLE    CLINICAL HISTORY:  Syncope and collapse    TECHNIQUE:  Single frontal view of the chest was performed.    COMPARISON:  Chest radiograph performed 09/26/2022, 09:36 hours.    FINDINGS:  Monitoring leads over the chest.  Grossly unchanged cardiomediastinal contours.  Enlarged cardiac silhouette.   Atherosclerosis of the aorta    Chronic interstitial coarsening is noted, similar appearance to 09/26/2022 radiograph    Chronic elevation the right hemidiaphragm    No definite pneumothorax or large volume pleural effusion.    No acute findings in the visualized abdomen.    Osseous and soft tissue structures appear without no definite acute change.

## 2025-03-08 NOTE — ASSESSMENT & PLAN NOTE
Although rising, because he is completely free of CP or other sxs I suspect NSTEMI Type 2- demand ischemia and not stenosis/occlusion of his coronary artery.  He has had extensive workups in the past.  Will trend Troponins through the night and get an Echo in the morning, but otherwise will hold off on Cards consult pending

## 2025-03-08 NOTE — PROGRESS NOTES
"[facility] Medicine  Progress Note    Patient Name: Darrion Bennett  MRN: [id number]  Patient Class: OP- Observation   Admission Date: 3/7/2025  Length of Stay: 0 days  Attending Physician: Peyton Rodriguez, *  Primary Care Provider: Farooq Domingo MD        Subjective     Principal Problem:Elevated troponin        HPI:  Darrion Bennett is a 75 y.o. male with  Carotid stenosis (50-60% diameter stenosis R>L as of 2021), Frequent episodes of Mobitz I AV blocks since 2021 follows w Dia, HFpEF  , Insulin-dependent DM2 with hyperglycemic complications, HTN, HLD, and CKD 3  who presented to [facility] ED on 03/07/2025 for eval and treatment of syncope.  He was making breakfast this morning when he started feeling burning in his hands lasting for a minute, then tried to go sit in living room but felt he couldn't walk so he slowly lowered himself down to the kitchen floor.  He says this was witnessed by multiple family members who then picked him up and took him to sit on the sofa and then called 911.  "My legs gave out completely."  He denies LOC, aura, presyncope, dizziness, lightheadedness, vision changes, chest pain, dyspnea, nausea, vomiting, or any other symptom.  "Everything was fine except for my legs."  He reports daily muscular weakness and soreness mostly in his R shoulder but sometimes in his legs, to the point that he sometimes cannot sleep.  He has been on atorvastatin for 3-4 years.   No notable recent healthcare encounters.    ED course notable for the following: MRI: At L4-5 there is degenerative anterolisthesis with type I degenerative endplate changes, disc bulging, facet and ligamentous hypertrophy and a left paracentral disc herniation/extrusion with mild superior migration of disc material. This constellation of findings results in approaching severe central stenosis with effacement of much of the CSF signal around the nerve roots of the cauda equina with mild right and " approaching moderate left foraminal stenosis.  Full strength and sensation in all extremities on exam.  Trop 0.03 -> 0.07.  VS unremarkable.  They were placed in observation under the care of [facility] for further evaluation and treatment.      Overview/Hospital Course:  Darrion Bennett 75 y.o. male placed in observation for fall and elevated troponin.  He presented in the emergency room after a fall due to lower extremity weakness.  An MRI lumbar spine was obtained with L4-L5 degenerative anterolisthesis with left paracentral disc herniation resulting in severe central stenosis.  He had resolution of back pain no numbness weakness to his extremities remained his prior level of function.  He will require outpatient neurosurgical follow up.  His troponin was incidentally found to be elevated and Cardiology was consulted    Interval History: ambulatory in room, no leg pain/numbness, back pain or incontinence. No chest pain or SOB.     Review of Systems   Constitutional:  Negative for chills and fever.   Eyes:  Negative for photophobia and visual disturbance.   Respiratory:  Negative for chest tightness and shortness of breath.    Cardiovascular:  Negative for chest pain and palpitations.   Gastrointestinal:  Negative for abdominal pain, nausea and vomiting.   Genitourinary:  Negative for dysuria and hematuria.     Objective:     Vital Signs (Most Recent):  Temp: 99 °F (37.2 °C) (03/08/25 0800)  Pulse: 70 (03/08/25 0800)  Resp: 18 (03/08/25 0800)  BP: (!) 113/53 (03/08/25 0800)  SpO2: 98 % (03/08/25 0800) Vital Signs (24h Range):  Temp:  [97.2 °F (36.2 °C)-99.2 °F (37.3 °C)] 99 °F (37.2 °C)  Pulse:  [60-78] 70  Resp:  [17-19] 18  SpO2:  [98 %-100 %] 98 %  BP: (113-155)/(53-79) 113/53     Weight: 58 kg (127 lb 13.9 oz)  Body mass index is 20.64 kg/m².    Intake/Output Summary (Last 24 hours) at 3/8/2025 1221  Last data filed at 3/8/2025 0971  Gross per 24 hour   Intake 220 ml   Output 220 ml   Net 0           Physical Exam  Vitals and nursing note reviewed.   Constitutional:       General: He is not in acute distress.     Appearance: He is well-developed. He is not diaphoretic.   HENT:      Head: Normocephalic and atraumatic.      Right Ear: External ear normal.      Left Ear: External ear normal.   Eyes:      General:         Right eye: No discharge.         Left eye: No discharge.      Conjunctiva/sclera: Conjunctivae normal.   Neck:      Thyroid: No thyromegaly.   Cardiovascular:      Rate and Rhythm: Normal rate and regular rhythm.      Heart sounds: No murmur heard.  Pulmonary:      Effort: Pulmonary effort is normal. No respiratory distress.      Breath sounds: Normal breath sounds.   Abdominal:      General: Bowel sounds are normal. There is no distension.      Palpations: Abdomen is soft. There is no mass.      Tenderness: There is no abdominal tenderness.   Musculoskeletal:         General: No deformity.      Cervical back: Normal range of motion and neck supple.   Skin:     General: Skin is warm and dry.   Neurological:      Mental Status: He is alert and oriented to person, place, and time.      Sensory: No sensory deficit.      Comments: No clonus to bilateral lower extremities. No numbness 5/5 strength hip and knee flexion and planta/dorsiflexion.    Psychiatric:         Mood and Affect: Mood normal.         Behavior: Behavior normal.               Significant Labs: CBC:   Recent Labs   Lab 03/07/25  1003 03/08/25  0520   WBC 9.01 5.97   HGB 12.4* 10.6*   HCT 37.8* 32.8*    178     CMP:   Recent Labs   Lab 03/07/25  1003 03/08/25  0520    135*   K 4.5 4.5    107   CO2 20* 22*   * 140*   BUN 25* 32*   CREATININE 1.7* 1.7*   CALCIUM 9.1 8.6*   PROT 8.1 6.8   ALBUMIN 3.4* 2.8*   BILITOT 0.5 0.4   ALKPHOS 106 92   AST 23 23   ALT 18 18   ANIONGAP 11 6*     Cardiac Markers:   Recent Labs   Lab 03/07/25  1003   *     Troponin:   Recent Labs   Lab 03/07/25  1003  03/07/25  1346 03/08/25  0826   TROPONINI 0.035* 0.072* 0.395*     Urine Studies:   Recent Labs   Lab 03/07/25  1413   COLORU Yellow   APPEARANCEUA Clear   PHUR 7.0   SPECGRAV 1.015   PROTEINUA 1+*   GLUCUA 4+*   KETONESU Negative   BILIRUBINUA Negative   OCCULTUA Negative   NITRITE Negative   UROBILINOGEN Negative   LEUKOCYTESUR 2+*   RBCUA 0   WBCUA 8*   BACTERIA None   HYALINECASTS 0       Significant Imaging:   Imaging Results              US Carotid Bilateral (Final result)  Result time 03/08/25 07:01:13      Final result by Tray Srivastava MD (03/08/25 07:01:13)                   Impression:      Calcific plaque with less than 50% stenosis at the bilateral carotid bifurcations.    Additional scattered calcific plaque throughout the common carotid arteries bilaterally with a moderate stenosis suggested on the left.      Electronically signed by: Tray Srivastava MD  Date:    03/08/2025  Time:    07:01               Narrative:    EXAMINATION:  US CAROTID BILATERAL    CLINICAL HISTORY:  Carotid stenosis;    TECHNIQUE:  Grayscale and color Doppler ultrasound examination of the carotid and vertebral artery systems bilaterally.  Stenosis estimates are per the NASCET measurement criteria.    COMPARISON:  CTA 06/26/2021.    FINDINGS:  Right:    Internal Carotid Artery (ICA):    Peak systolic velocity 97 cm/sec    End diastolic velocity 35 cm/sec    ICA/CCA peak systolic ratio: 1.6    ICA/CCA end diastolic ratio: 2.4    Plaque formation: Mild    Vertebral artery: Antegrade flow and normal waveform.    Other: Scattered atherosclerotic plaque throughout the common carotid artery.    Left:    Internal Carotid Artery (ICA):    Peak systolic velocity 120 cm/sec    End diastolic velocity 38 cm/sec    ICA/CCA peak systolic ratio: 1.3    ICA/CCA end diastolic ratio: 0.6    Plaque formation: Mild    Vertebral artery: Antegrade flow and normal waveform.    Other: Scattered atherosclerotic plaque throughout the common carotid  artery with moderate visual stenosis in the mid vessel.                                       US Lower Extremity Veins Bilateral (Final result)  Result time 03/07/25 16:50:50      Final result by Sarkis Moreno MD (03/07/25 16:50:50)                   Impression:      No evidence of deep venous thrombosis in either lower extremity.    Left popliteal fossa cyst.      Electronically signed by: Sarkis Moreno  Date:    03/07/2025  Time:    16:50               Narrative:    EXAMINATION:  US LOWER EXTREMITY VEINS BILATERAL    CLINICAL HISTORY:  Pain in right leg    TECHNIQUE:  Duplex and color flow Doppler and dynamic compression was performed of the bilateral lower extremity veins was performed.    COMPARISON:  None    FINDINGS:  Right thigh veins: The common femoral, femoral, popliteal, upper greater saphenous, and deep femoral veins are patent and free of thrombus. The veins are normally compressible and have normal phasic flow and augmentation response.    Right calf veins: The visualized calf veins are patent.    Left thigh veins: The common femoral, femoral, popliteal, upper greater saphenous, and deep femoral veins are patent and free of thrombus. The veins are normally compressible and have normal phasic flow and augmentation response.    Left calf veins: The visualized calf veins are patent.    Miscellaneous: There is a 2.2 x 2.0 x 1.2 cm left popliteal fossa cyst.                                       MRI Lumbar Spine Without Contrast (Final result)  Result time 03/07/25 16:41:30      Final result by Yovani Christianson MD (03/07/25 16:41:30)                   Impression:      At L4-5 there is degenerative anterolisthesis with type I degenerative endplate changes, disc bulging, facet and ligamentous hypertrophy and a left paracentral disc herniation/extrusion with mild superior migration of disc material.  This constellation of findings results in approaching severe central stenosis with effacement of much of the  CSF signal around the nerve roots of the cauda equina with mild right and approaching moderate left foraminal stenosis.      Electronically signed by: Yovani Christianson  Date:    03/07/2025  Time:    16:41               Narrative:    EXAMINATION:  MRI LUMBAR SPINE WITHOUT CONTRAST    CLINICAL HISTORY:  Low back pain, progressive neurologic deficit;    TECHNIQUE:  Multiplanar, multisequence MR images were acquired from the thoracolumbar junction to the sacrum without the administration of contrast.    COMPARISON:  None.    FINDINGS:  The distal conus is evaluated in a limited fashion is only the tip of the conus is identified motor slow grossly within normal limits (increased signal on STIR imaging is thought more likely volume averaging artifact unless otherwise suspected clinically).    There is no evidence of acute compression fracture or osseous destructive process with mild chronic loss of height at T12.    Mild degenerative anterolisthesis of L4 on L5.  Small ganglion cysts protrude into the paraspinal regions at L4-5.    Degenerative disc disease with mild degenerative disc space narrowing at L4-5 and type I (Modic) degenerative endplate changes.    L4-5, there is uncovering of the disc with diffuse disc bulging and a superimposed central left paracentral disc herniation/extrusion with mild migration of disc material superiorly behind the inferior endplate of L4.  This with facet and ligamentous hypertrophy results in approaching severe central stenosis with effacement of much of the CSF signal around the nerve roots of the cauda equina.  Mild right and approaching moderate left foraminal stenosis is identified    Mild disc bulging within the remainder of the lumbar spine without advanced central or foraminal stenosis.    Presumed small bilateral renal cysts incompletely evaluated.                                       CT Lumbar Spine Without Contrast (Final result)  Result time 03/07/25 10:42:08      Final  result by Martín Babb MD (03/07/25 10:42:08)                   Impression:      1. No evidence of acute lumbar spine fracture.  2. Degenerative findings of the lumbar spine, as discussed.  3. Additional details of chronic and incidental findings, as provided in the body of the report.      Electronically signed by: Martín Babb  Date:    03/07/2025  Time:    10:42               Narrative:    EXAMINATION:  CT LUMBAR SPINE WITHOUT CONTRAST    CLINICAL HISTORY:  Low back pain, progressive neurologic deficit;    TECHNIQUE:  Low-dose axial, sagittal and coronal reformations are obtained through the lumbar spine.  Contrast was not administered.    COMPARISON:  None.    FINDINGS:    Fractures: No acute lumbar spine fracture is present.    Alignment: Minimal retrolisthesis of L1 on L2.  Grade 1 anterolisthesis of L4 on L5.    Discs: Degenerative disc disease.    Vertebral bodies: Degenerative plate change.    Paraspinal soft tissues: Unremarkable.    Although CT is inferior to MRI in the evaluation of spinal canal and foraminal soft tissues, level-wise findings are as follows:    L1-L2: There is no significant spinal canal or foraminal stenosis.    L2-L3: There is no significant spinal canal or foraminal stenosis. Shallow diffuse disc bulge with facet arthropathy and ligamentum flavum thickening.    L3-L4: Diffuse disc bulge with moderate facet arthropathy and ligamentum flavum thickening.  Mild central spinal stenosis.  Minimal bilateral inferior foraminal narrowing.    L4-L5: Diffuse disc bulge with moderate facet arthropathy and ligamentum flavum thickening.  Moderate central spinal canal stenosis and mild bilateral foraminal narrowing.    L5-S1: Diffuse disc bulge with facet arthropathy ligamentum flavum thickening.  No significant central spinal canal stenosis.  Minimal bilateral foraminal narrowing.    Imaged sacroiliac joints: Degenerative changes.    Imaged abdomen, pelvis, and retroperitoneum: Cortical  atrophy of both kidneys.  Nonobstructing left renal calculus measuring on the order of 7 mm at the mid to lower pole.  Aortoiliac atherosclerosis.                                       X-Ray Chest AP Portable (Final result)  Result time 03/07/25 09:47:32      Final result by Martín Babb MD (03/07/25 09:47:32)                   Impression:      No convincing evidence of acute cardiopulmonary disease.      Electronically signed by: Martín Babb  Date:    03/07/2025  Time:    09:47               Narrative:    EXAMINATION:  XR CHEST AP PORTABLE    CLINICAL HISTORY:  Syncope and collapse    TECHNIQUE:  Single frontal view of the chest was performed.    COMPARISON:  Chest radiograph performed 09/26/2022, 09:36 hours.    FINDINGS:  Monitoring leads over the chest.  Grossly unchanged cardiomediastinal contours.  Enlarged cardiac silhouette.  Atherosclerosis of the aorta    Chronic interstitial coarsening is noted, similar appearance to 09/26/2022 radiograph    Chronic elevation the right hemidiaphragm    No definite pneumothorax or large volume pleural effusion.    No acute findings in the visualized abdomen.    Osseous and soft tissue structures appear without no definite acute change.                                          Assessment & Plan  Elevated troponin  Troponin increased to 0.3 without associated ischemic correlate. Echo with normal EF and no wall motion abnormality  Cardiology consulted-trend troponin to peak, ischemic eval based on repeat troponin  Telemetry  Essential hypertension  Patient's blood pressure range in the last 24 hours was: BP  Min: 113/53  Max: 155/79.The patient's inpatient anti-hypertensive regimen is listed below:  Current Antihypertensives  amLODIPine tablet 5 mg, 2 times daily, Oral  hydrALAZINE tablet 25 mg, Every 8 hours, Oral  valsartan tablet 160 mg, 2 times daily, Oral  torsemide tablet 10 mg, Daily, Oral    Plan  - BP is controlled, no changes needed to their  regimen    Controlled type 2 diabetes mellitus with diabetic polyneuropathy, with long-term current use of insulin  Lab Results   Component Value Date    HGBA1C 6.9 (H) 08/28/2024     Sliding scale insulin, diabetic/renal diet, goal -180    Dyslipidemia  Continue home statin.  Recommend he start taking Coenzyme Q10 for possible statin myopathy given his previous experience w rosuvastatin.    MCTD (mixed connective tissue disease)  Stable.  Continue home meds.      Diastolic heart failure, NYHA class 2  Well-compensated on exam.  Continue home GDMT    Stage 3a chronic kidney disease  Creatine stable for now at what appears to be his recent baseline (1.6-1.7). Follows warren Gary. BMP reviewed- noted Estimated Creatinine Clearance: 30.8 mL/min (A) (based on SCr of 1.7 mg/dL (H)). according to latest data. Based on current GFR, CKD stage is stage 3 - GFR 30-59.  Monitor UOP and serial BMP and adjust therapy as needed. Renally dose meds. Avoid nephrotoxic medications and procedures.      Kirsten I  Known issue, follows warren Alvares.  WCTM.    Bilateral carotid artery stenosis on CTA 6/26/21  US displayed known plaque as previously. Continue statin  Fall    Fall was controlled, not preceded by any symptom, and I believe was due to leg weakness.  MRI highly concerning for spinal impingement as cause.    Discussed with neurosurgery outpt f/u.   Ambulatory in room without assistive device and at PLOF  Anterolisthesis of L4-L5  Discussed with neurosurgery outpt f/u  VTE Risk Mitigation (From admission, onward)           Ordered     IP VTE HIGH RISK PATIENT  Once         03/07/25 1613     Place sequential compression device  Until discontinued         03/07/25 1613                    Discharge Planning   MECHE:      Code Status: Full Code   Medical Readiness for Discharge Date:   Discharge Plan A: Home with family   Discharge Delays: None known at this time              Keny Rojas PA-C  Department of [facility]   [place]  [facility] - Telemetry

## 2025-03-08 NOTE — ASSESSMENT & PLAN NOTE
Continue home statin.  Recommend he start taking Coenzyme Q10 for possible statin myopathy given his previous experience w rosuvastatin.

## 2025-03-08 NOTE — SUBJECTIVE & OBJECTIVE
"Past Medical History:   Diagnosis Date    Anemia     Arthritis     Cataract     CHF (congestive heart failure)     Chronic constipation     Diabetes mellitus, type 2     Felon of finger of left hand 05/11/2019    Glaucoma     Hyperlipidemia 05/13/2014    Hypertension     MCTD (mixed connective tissue disease)     Personal history of colonic polyps     Sjogren's disease     Ulcerative colitis     Urolithiasis        Past Surgical History:   Procedure Laterality Date    CATARACT EXTRACTION Bilateral 2005    COLONOSCOPY  2014    ESOPHAGOGASTRODUODENOSCOPY N/A 9/8/2023    Procedure: EGD (ESOPHAGOGASTRODUODENOSCOPY);  Surgeon: Divya Saenz MD;  Location: [facility];  Service: Endoscopy;  Laterality: N/A;  ref by / inst portal-RB    ESOPHAGOGASTRODUODENOSCOPY N/A 11/22/2023    Procedure: EGD (ESOPHAGOGASTRODUODENOSCOPY);  Surgeon: Crystal Urbina MD;  Location: [facility];  Service: Endoscopy;  Laterality: N/A;  time frame 8-12 weeks  Dr. Saenz pt   prep instructions sent to pt via portal -  wl meds trulicity hold 7 days prior  diabetic  11/16- pt r/s to earlier date, pre call complete.  [facility]    EYE SURGERY         Review of patient's allergies indicates:   Allergen Reactions    Allopurinol Other (See Comments)    Azathioprine Other (See Comments)     Pancytopenia    Penicillins Nausea And Vomiting and Other (See Comments)    Rosuvastatin Other (See Comments)     Muscle pain    Allopurinol analogues Rash       No current facility-administered medications on file prior to encounter.     Current Outpatient Medications on File Prior to Encounter   Medication Sig    amLODIPine (NORVASC) 5 MG tablet TAKE 1 TABLET TWICE DAILY    atorvastatin (LIPITOR) 80 MG tablet TAKE 1 TABLET EVERY DAY    blood glucose control, low (TRUE METRIX LEVEL 1) Soln Use as indicated    blood-glucose meter (TRUE METRIX GLUCOSE METER) Misc Test glucose 4x/day    DROPLET PEN NEEDLE 32 gauge x 5/32" Ndle USE 1 NEEDLE AS DIRECTED FOUR TIMES " DAILY    dulaglutide (TRULICITY) 4.5 mg/0.5 mL pen injector INJECT 4.5MG (1 PEN) UNDER THE SKIN EVERY WEEK    empagliflozin (JARDIANCE) 10 mg tablet Take 1 tablet (10 mg total) by mouth once daily.    febuxostat (ULORIC) 40 mg Tab Take 1 tablet (40 mg total) by mouth 2 (two) times a day.    fluticasone propionate (FLONASE) 50 mcg/actuation nasal spray 1 spray (50 mcg total) by Each Nostril route once daily.    hydrALAZINE (APRESOLINE) 25 MG tablet TAKE 3 TABLETS THREE TIMES DAILY    lancets (TRUEPLUS LANCETS) 33 gauge Misc Test glucose 4x/day. Dx code e11.65    omeprazole (PRILOSEC) 40 MG capsule Take 1 capsule (40 mg total) by mouth every morning.    torsemide (DEMADEX) 10 MG Tab TAKE 1 TABLET EVERY OTHER DAY    TRUE METRIX GLUCOSE TEST STRIP Strp TEST BLOOD SUGAR FOUR TIMES DAILY    valsartan (DIOVAN) 160 MG tablet TAKE 1 TABLET TWICE DAILY     Family History       Problem Relation (Age of Onset)    Cataracts Father, Mother    Glaucoma Father, Paternal Grandfather    Hypertension Father    No Known Problems Sister, Brother, Paternal Aunt, Paternal Uncle, Maternal Grandmother, Maternal Grandfather, Daughter, Son    Rheum arthritis Maternal Aunt, Maternal Uncle    Stroke Father    Throat cancer Paternal Grandmother          Tobacco Use    Smoking status: Never     Passive exposure: Never    Smokeless tobacco: Never   Substance and Sexual Activity    Alcohol use: No    Drug use: Yes     Comment: ultram 1 - 2 tabs a week    Sexual activity: Not Currently     Partners: Female     Review of Systems   Cardiovascular:  Negative for chest pain, claudication, dyspnea on exertion, irregular heartbeat, leg swelling, near-syncope, orthopnea, palpitations, paroxysmal nocturnal dyspnea and syncope.   Respiratory:  Negative for cough, shortness of breath, snoring and sputum production.    Gastrointestinal:  Negative for abdominal pain, dysphagia, heartburn, nausea and vomiting.   Neurological:  Negative for dizziness, headaches,  loss of balance and weakness.     Objective:     Vital Signs (Most Recent):  Temp: 99 °F (37.2 °C) (03/08/25 0800)  Pulse: 70 (03/08/25 0800)  Resp: 18 (03/08/25 0800)  BP: (!) 113/53 (03/08/25 0800)  SpO2: 98 % (03/08/25 0800) Vital Signs (24h Range):  Temp:  [97.2 °F (36.2 °C)-99.2 °F (37.3 °C)] 99 °F (37.2 °C)  Pulse:  [60-87] 70  Resp:  [17-45] 18  SpO2:  [98 %-100 %] 98 %  BP: (113-158)/(53-79) 113/53     Weight: 58 kg (127 lb 13.9 oz)  Body mass index is 20.64 kg/m².    SpO2: 98 %         Intake/Output Summary (Last 24 hours) at 3/8/2025 1118  Last data filed at 3/8/2025 0926  Gross per 24 hour   Intake 220 ml   Output 220 ml   Net 0 ml       Lines/Drains/Airways       Peripheral Intravenous Line  Duration                  Peripheral IV - Single Lumen 03/07/25 1002 20 G Posterior;Right Hand 1 day                     Physical Exam  HENT:      Head: Normocephalic and atraumatic.      Mouth/Throat:      Mouth: Mucous membranes are moist.   Eyes:      Extraocular Movements: Extraocular movements intact.      Pupils: Pupils are equal, round, and reactive to light.   Cardiovascular:      Rate and Rhythm: Normal rate and regular rhythm.      Pulses: Normal pulses.      Heart sounds: Normal heart sounds.   Pulmonary:      Effort: Pulmonary effort is normal.      Breath sounds: Normal breath sounds.   Abdominal:      General: Bowel sounds are normal.      Palpations: Abdomen is soft.   Musculoskeletal:      Left lower leg: No edema.   Skin:     General: Skin is warm.   Neurological:      General: No focal deficit present.      Mental Status: He is alert.   Psychiatric:         Mood and Affect: Mood normal.         Behavior: Behavior normal.          Current Medications:   amLODIPine  5 mg Oral BID    atorvastatin  80 mg Oral Daily    fluticasone propionate  1 spray Each Nostril Daily    hydrALAZINE  25 mg Oral Q8H    pantoprazole  40 mg Oral Daily    polyethylene glycol  17 g Oral Daily    torsemide  10 mg Oral Every  other day    valsartan  160 mg Oral BID         Current Facility-Administered Medications:     acetaminophen, 650 mg, Oral, Q4H PRN    aluminum-magnesium hydroxide-simethicone, 30 mL, Oral, QID PRN    glucagon (human recombinant), 1 mg, Intramuscular, PRN    glucose, 16 g, Oral, PRN    glucose, 24 g, Oral, PRN    melatonin, 6 mg, Oral, Nightly PRN    mineral oil, 1 enema, Rectal, Daily PRN    naloxone, 0.02 mg, Intravenous, PRN    ondansetron, 4 mg, Intravenous, Q6H PRN    prochlorperazine, 5 mg, Intravenous, Q6H PRN    sodium chloride 0.9%, 10 mL, Intravenous, Q12H PRN    Laboratory (all labs reviewed):  CBC:  Recent Labs   Lab 11/22/24  0716 01/14/25  0700 01/24/25  0720 03/07/25  1003 03/08/25  0520   WBC 6.53 6.82 5.94 9.01 5.97   Hemoglobin 11.0 L 11.6 L 12.1 L 12.4 L 10.6 L   Hematocrit 36.1 L 38.0 L 40.0 37.8 L 32.8 L   Platelets 191 192 190 211 178       CHEMISTRIES:  Recent Labs   Lab 03/28/22  0712 04/22/22  0845 09/26/22  0950 09/30/24  0709 01/14/25  0700 01/24/25  0720 03/07/25  1003 03/08/25  0520   Glucose 134 H 133 H   < > 102 95 101 119 H 140 H   Sodium 137 136   < > 137 137 137 137 135 L   Potassium 4.4 4.7   < > 4.9 4.4 4.7 4.5 4.5   BUN 30 H 26 H   < > 35 H 34 H 38 H 25 H 32 H   Creatinine 1.2 1.3   < > 1.7 H 1.6 H 1.7 H 1.7 H 1.7 H   eGFR if non African American >60.0 54.5 A  --   --   --   --   --   --    eGFR  --   --    < > 41.8 A 44.9 A 41.8 A 42 A 42 A   Calcium 9.5 9.4   < > 9.0 9.4 9.5 9.1 8.6 L   Magnesium  --   --   --   --   --   --  1.8 1.7    < > = values in this interval not displayed.       CARDIAC BIOMARKERS:  Recent Labs   Lab 03/07/25  1003 03/07/25  1346 03/08/25  0826     --   --    Troponin I 0.035 H 0.072 H 0.395 H       COAGS:        LIPIDS/LFTS:  Recent Labs   Lab 07/05/22  0713 09/26/22  0950 08/09/23  0734 11/27/23  0810 02/12/24  0710 02/26/24  0941 05/22/24  0710 09/30/24  0709 01/14/25  0700 01/24/25  0720 03/07/25  1003 03/08/25  0520   Cholesterol 135  --   137  --  135 154  --   --   --   --   --   --    Triglycerides 70  --  93  --  75 135  --   --   --   --   --   --    HDL 41  --  35 L  --  44 36 L  --   --   --   --   --   --    LDL Cholesterol 80.0  --  83.4  --  76.0 91.0  --   --   --   --   --   --    Non-HDL Cholesterol 94  --  102  --  91 118  --   --   --   --   --   --    AST  --    < >  --    < >  --   --    < > 22 25 27 23 23   ALT  --    < >  --    < >  --   --    < > 18 20 21 18 18    < > = values in this interval not displayed.       BNP:  Recent Labs   Lab 03/07/25  1003    H       TSH:  Recent Labs   Lab 09/26/22  0950 03/07/25  1003   TSH 2.281 4.246 H       Free T4:  Recent Labs   Lab 03/07/25  1003   Free T4 1.29       Diagnostic Results:  ECG (personally reviewed and interpreted tracing(s)):  3/7/2025   9 AM  Sinus rhythm, Mobitz type 1 AV block, left axis deviation and poor R-wave progression    Chest X-Ray (personally reviewed and interpreted image(s)):  No acute cardiopulmonary disease    Echo:   3/8/2025    Left Ventricle: The left ventricle is normal in size. Mildly increased wall thickness. There is concentric remodeling. There is normal systolic function with a visually estimated ejection fraction of 65 - 70%. Grade II diastolic dysfunction. Elevated left ventricular filling pressure.    Right Ventricle: The right ventricle is normal in size. Systolic function is normal.    Left Atrium: severely dilated    Right Atrium: Right atrium is mildly dilated.    Aortic Valve: The aortic valve is a trileaflet valve. There is mild aortic valve sclerosis. There is mild stenosis. Aortic valve area by VTI is 1.9 cm². Aortic valve peak velocity is 2.1 m/s. Mean gradient is 12 mmHg. The dimensionless index is 0.68. There is trace aortic regurgitation. Restricted opening of right coronary cusp.    Mitral Valve: There is mild regurgitation.    Tricuspid Valve: There is mild regurgitation.    Pulmonary Artery: There is pulmonary hypertension. The  estimated pulmonary artery systolic pressure is 47 mmHg.    IVC/SVC: Normal venous pressure at 3 mmHg.    8/2024    Left Ventricle: Increased wall thickness. There is normal systolic function with a visually estimated ejection fraction of 65 - 70%.    Right Ventricle: Normal right ventricular cavity size. Systolic function is normal.    Left Atrium: Left atrium is mildly dilated.    Aortic Valve: There is mild stenosis. Aortic valve area by VTI is 1.84 cm². Aortic valve peak velocity is 1.70 m/s. Mean gradient is 6 mmHg. The dimensionless index is 0.48.    Mitral Valve: There is mild regurgitation.    Pulmonary Artery: The estimated pulmonary artery systolic pressure is 23 mmHg.    IVC/SVC: Normal venous pressure at 3 mmHg.    Stress Test: 8/2024    The patient exercised for 6 minutes 53 seconds on a Jean-Paul protocol, corresponding to a functional capacity of 7METS, achieving a peak heart rate of 133 bpm, which is 91% of the age predicted maximum heart rate.    Normal myocardial perfusion scan. There is no evidence of myocardial ischemia or infarction.    The gated perfusion images showed an ejection fraction of 75% at rest.    There is normal wall motion at rest and post-stress.    The ECG portion of the study is negative for ischemia.    The patient reported no chest pain during the stress test.    During stress, frequent PVCs are noted.    Cath: NA

## 2025-03-08 NOTE — ASSESSMENT & PLAN NOTE
Patient's blood pressure range in the last 24 hours was: BP  Min: 110/65  Max: 155/79.The patient's inpatient anti-hypertensive regimen is listed below:  Current Antihypertensives  amLODIPine tablet 5 mg, 2 times daily, Oral  hydrALAZINE tablet 25 mg, Every 8 hours, Oral  valsartan tablet 160 mg, 2 times daily, Oral  torsemide tablet 10 mg, Daily, Oral    Plan  - BP is controlled, no changes needed to their regimen

## 2025-03-08 NOTE — PLAN OF CARE
03/08/25 0933   Post-Acute Status   Post-Acute Authorization Other  (Home; neurosurgery follow-up)   Other Status No Post-Acute Service Needs   Hospital Resources/Appts/Education Provided Provided patient/caregiver with written discharge plan information;Appointments scheduled and added to AVS;Provided education on problems/symptoms using teachback;Post-Acute resouces added to AVS   Discharge Delays None known at this time

## 2025-03-08 NOTE — ASSESSMENT & PLAN NOTE
Fall was controlled, not preceded by any symptom, and I believe was due to leg weakness.  MRI highly concerning for spinal impingement as cause.    Discussed with neurosurgery outpt f/u.   Ambulatory in room without assistive device and at OF

## 2025-03-08 NOTE — H&P
"    [facility] Medicine  History & Physical    Patient Name: Darrion Bennett  MRN: [id number]  Patient Class: OP- Observation  Admission Date: 3/7/2025  Attending Physician: Emely García DO   Primary Care Provider: Farooq Domingo MD         Patient information was obtained from patient, past medical records, and ER records.     Subjective:     Principal Problem:Fall    Chief Complaint:   Chief Complaint   Patient presents with    Leg Pain     Pt BIB EMS, c/o bilateral leg cramps x 30 minutes. Pt denies any falls or dizziness. Per EMS, pt is showing 2nd degree heart block on monitor. Pt denies any CP, SOB, abd pain or n/v/d        HPI: Darrion Bennett is a 75 y.o. male with  Carotid stenosis (50-60% diameter stenosis R>L as of 2021), Frequent episodes of Mobitz I AV blocks since 2021 follows w Dia, HFpEF  , Insulin-dependent DM2 with hyperglycemic complications, HTN, HLD, and CKD 3  who presented to [facility] ED on 03/07/2025 for eval and treatment of syncope.  He was making breakfast this morning when he started feeling burning in his hands lasting for a minute, then tried to go sit in living room but felt he couldn't walk so he slowly lowered himself down to the kitchen floor.  He says this was witnessed by multiple family members who then picked him up and took him to sit on the sofa and then called 911.  "My legs gave out completely."  He denies LOC, aura, presyncope, dizziness, lightheadedness, vision changes, chest pain, dyspnea, nausea, vomiting, or any other symptom.  "Everything was fine except for my legs."  He reports daily muscular weakness and soreness mostly in his R shoulder but sometimes in his legs, to the point that he sometimes cannot sleep.  He has been on atorvastatin for 3-4 years.   No notable recent healthcare encounters.    ED course notable for the following: MRI: At L4-5 there is degenerative anterolisthesis with type I degenerative endplate changes, disc bulging, " facet and ligamentous hypertrophy and a left paracentral disc herniation/extrusion with mild superior migration of disc material. This constellation of findings results in approaching severe central stenosis with effacement of much of the CSF signal around the nerve roots of the cauda equina with mild right and approaching moderate left foraminal stenosis.  Full strength and sensation in all extremities on exam.  Trop 0.03 -> 0.07.  VS unremarkable.  They were placed in observation under the care of [facility] for further evaluation and treatment.      Past Medical History:   Diagnosis Date    Anemia     Arthritis     Cataract     CHF (congestive heart failure)     Chronic constipation     Diabetes mellitus, type 2     Felon of finger of left hand 05/11/2019    Glaucoma     Hyperlipidemia 05/13/2014    Hypertension     MCTD (mixed connective tissue disease)     Personal history of colonic polyps     Sjogren's disease     Ulcerative colitis     Urolithiasis        Past Surgical History:   Procedure Laterality Date    CATARACT EXTRACTION Bilateral 2005    COLONOSCOPY  2014    ESOPHAGOGASTRODUODENOSCOPY N/A 9/8/2023    Procedure: EGD (ESOPHAGOGASTRODUODENOSCOPY);  Surgeon: Divya Saenz MD;  Location: [facility];  Service: Endoscopy;  Laterality: N/A;  ref by / inst portal-RB    ESOPHAGOGASTRODUODENOSCOPY N/A 11/22/2023    Procedure: EGD (ESOPHAGOGASTRODUODENOSCOPY);  Surgeon: Crystal Urbina MD;  Location: [facility];  Service: Endoscopy;  Laterality: N/A;  time frame 8-12 weeks  Dr. Saenz pt   prep instructions sent to pt via portal -[facility] meds trulicity hold 7 days prior  diabetic  11/16- pt r/s to earlier date, pre call complete.  DB    EYE SURGERY         Review of patient's allergies indicates:   Allergen Reactions    Allopurinol Other (See Comments)    Azathioprine Other (See Comments)     Pancytopenia    Penicillins Nausea And Vomiting and Other (See Comments)    Rosuvastatin  "Other (See Comments)     Muscle pain    Allopurinol analogues Rash       No current facility-administered medications on file prior to encounter.     Current Outpatient Medications on File Prior to Encounter   Medication Sig    amLODIPine (NORVASC) 5 MG tablet TAKE 1 TABLET TWICE DAILY    atorvastatin (LIPITOR) 80 MG tablet TAKE 1 TABLET EVERY DAY    blood glucose control, low (TRUE METRIX LEVEL 1) Soln Use as indicated    blood-glucose meter (TRUE METRIX GLUCOSE METER) Misc Test glucose 4x/day    DROPLET PEN NEEDLE 32 gauge x 5/32" Ndle USE 1 NEEDLE AS DIRECTED FOUR TIMES DAILY    dulaglutide (TRULICITY) 4.5 mg/0.5 mL pen injector INJECT 4.5MG (1 PEN) UNDER THE SKIN EVERY WEEK    empagliflozin (JARDIANCE) 10 mg tablet Take 1 tablet (10 mg total) by mouth once daily.    febuxostat (ULORIC) 40 mg Tab Take 1 tablet (40 mg total) by mouth 2 (two) times a day.    fluticasone propionate (FLONASE) 50 mcg/actuation nasal spray 1 spray (50 mcg total) by Each Nostril route once daily.    hydrALAZINE (APRESOLINE) 25 MG tablet TAKE 3 TABLETS THREE TIMES DAILY    lancets (TRUEPLUS LANCETS) 33 gauge Misc Test glucose 4x/day. Dx code e11.65    omeprazole (PRILOSEC) 40 MG capsule Take 1 capsule (40 mg total) by mouth every morning.    torsemide (DEMADEX) 10 MG Tab TAKE 1 TABLET EVERY OTHER DAY    TRUE METRIX GLUCOSE TEST STRIP Strp TEST BLOOD SUGAR FOUR TIMES DAILY    valsartan (DIOVAN) 160 MG tablet TAKE 1 TABLET TWICE DAILY     Family History       Problem Relation (Age of Onset)    Cataracts Father, Mother    Glaucoma Father, Paternal Grandfather    Hypertension Father    No Known Problems Sister, Brother, Paternal Aunt, Paternal Uncle, Maternal Grandmother, Maternal Grandfather, Daughter, Son    Rheum arthritis Maternal Aunt, Maternal Uncle    Stroke Father    Throat cancer Paternal Grandmother          Tobacco Use    Smoking status: Never     Passive exposure: Never    Smokeless tobacco: Never   Substance and Sexual Activity "    Alcohol use: No    Drug use: Yes     Comment: ultram 1 - 2 tabs a week    Sexual activity: Not Currently     Partners: Female     Review of Systems   Reason unable to perform ROS: ROS was performed and pertinent +s and -s are listed in HPI.     Objective:     Vital Signs (Most Recent):  Temp: 97.2 °F (36.2 °C) (03/07/25 1921)  Pulse: 71 (03/07/25 1921)  Resp: 18 (03/07/25 1921)  BP: (!) 155/79 (03/07/25 1921)  SpO2: 99 % (03/07/25 1921) Vital Signs (24h Range):  Temp:  [97.2 °F (36.2 °C)-98.4 °F (36.9 °C)] 97.2 °F (36.2 °C)  Pulse:  [52-87] 71  Resp:  [13-45] 18  SpO2:  [96 %-100 %] 99 %  BP: (133-160)/(63-79) 155/79     Weight: 58 kg (127 lb 13.9 oz)  Body mass index is 20.64 kg/m².     Physical Exam  Constitutional:       General: He is not in acute distress.     Appearance: Normal appearance. He is not ill-appearing.   HENT:      Head: Normocephalic.   Neck:      Comments: JVP not elevated  Cardiovascular:      Rate and Rhythm: Normal rate and regular rhythm.      Pulses: Normal pulses.      Heart sounds: Normal heart sounds.   Pulmonary:      Effort: Pulmonary effort is normal.      Breath sounds: Normal breath sounds.   Abdominal:      General: Abdomen is flat. Bowel sounds are normal.      Tenderness: There is no abdominal tenderness. There is no guarding.   Musculoskeletal:      Right lower leg: No edema.      Left lower leg: No edema.   Skin:     General: Skin is warm and dry.      Capillary Refill: Capillary refill takes less than 2 seconds.      Coloration: Skin is not jaundiced.   Neurological:      General: No focal deficit present.      Mental Status: He is alert and oriented to person, place, and time.      Sensory: No sensory deficit.      Motor: No weakness.      Coordination: Coordination normal.   Psychiatric:         Mood and Affect: Mood normal.         Behavior: Behavior normal.                Significant Labs: All pertinent labs within the past 24 hours have been reviewed.  CBC:   Recent  Labs   Lab 03/07/25  1003   WBC 9.01   HGB 12.4*   HCT 37.8*        CMP:   Recent Labs   Lab 03/07/25  1003      K 4.5      CO2 20*   *   BUN 25*   CREATININE 1.7*   CALCIUM 9.1   PROT 8.1   ALBUMIN 3.4*   BILITOT 0.5   ALKPHOS 106   AST 23   ALT 18   ANIONGAP 11     Troponin:   Recent Labs   Lab 03/07/25  1003 03/07/25  1346   TROPONINI 0.035* 0.072*       Significant Imaging: I have reviewed all pertinent imaging results/findings within the past 24 hours.    Assessment/Plan:     * Fall  Anterolisthesis    Fall was controlled, not preceded by any symptom, and I believe was due to leg weakness.  MRI highly concerning for spinal impingement as cause.  Will monitor in Obs overnight and consult Ortho but anticipate further workup +/- operation should it ultimately come to that being done as outpatient.      Elevated troponin  Although rising, because he is completely free of CP or other sxs I suspect NSTEMI Type 2- demand ischemia and not stenosis/occlusion of his coronary artery.  He has had extensive workups in the past.  Will trend Troponins through the night and get an Echo in the morning, but otherwise will hold off on Cards consult pending      Bilateral carotid artery stenosis on CTA 6/26/21  Repeat US pending.      Kirsten I  Known issue, follows warren Alvares.  WCTM.      Stage 3a chronic kidney disease  Creatine stable for now at what appears to be his recent baseline (1.6-1.7). Follows warren Gary. BMP reviewed- noted Estimated Creatinine Clearance: 30.4 mL/min (A) (based on SCr of 1.7 mg/dL (H)). according to latest data. Based on current GFR, CKD stage is stage 3 - GFR 30-59.  Monitor UOP and serial BMP and adjust therapy as needed. Renally dose meds. Avoid nephrotoxic medications and procedures.        Diastolic heart failure, NYHA class 2  Well-compensated on exam.  Continue home GDMT      MCTD (mixed connective tissue disease)  Stable.  Continue home meds.        Dyslipidemia  Continue home  statin.  Recommend he start taking Coenzyme Q10 for possible statin myopathy given his previous experience w rosuvastatin.      Essential hypertension  Patient's blood pressure range in the last 24 hours was: BP  Min: 133/64  Max: 160/71.The patient's inpatient anti-hypertensive regimen is listed below:  Current Antihypertensives  amLODIPine tablet 5 mg, 2 times daily, Oral  hydrALAZINE tablet 25 mg, Every 8 hours, Oral  torsemide tablet 10 mg, Every other day, Oral  valsartan tablet 160 mg, 2 times daily, Oral    Plan  - BP is controlled, no changes needed to their regimen        VTE Risk Mitigation (From admission, onward)           Ordered     IP VTE HIGH RISK PATIENT  Once         03/07/25 1613     Place sequential compression device  Until discontinued         03/07/25 1613                       On 03/08/2025, patient should be placed in hospital observation services under my care.             Dylan James MD  Department of Hospital Medicine  [facility] - Telemetry

## 2025-03-08 NOTE — ASSESSMENT & PLAN NOTE
Creatine stable for now at what appears to be his recent baseline (1.6-1.7). Follows warren Gary. BMP reviewed- noted Estimated Creatinine Clearance: 30.8 mL/min (A) (based on SCr of 1.7 mg/dL (H)). according to latest data. Based on current GFR, CKD stage is stage 3 - GFR 30-59.  Monitor UOP and serial BMP and adjust therapy as needed. Renally dose meds. Avoid nephrotoxic medications and procedures.

## 2025-03-08 NOTE — ASSESSMENT & PLAN NOTE
Patient coming into the hospital after leg weakness  MRI lumbar spine showed L4/L5 disease neurosurgery recommended outpatient evaluation    Patient was completely asymptomatic from cardiac perspective  Not sure why troponin level was checked  Troponin was mildly elevated but now trending up  EKG did not show any new ST-T wave change  Echocardiogram showed normal LV systolic function without any wall motion abnormalities    Troponin elevation of unclear significance  Continue to trend troponin until peak is reached  Further clinical course is dependent on troponin trajectory (inpatient ischemia evaluation versus outpatient)    Start baby aspirin and continue atorvastatin

## 2025-03-08 NOTE — NURSING
[facility] [place] [place] 4 [place]      3/7/2025       Skin assessed on: Admit      [x] No Pressure Injuries Present    [x]Prevention Measures Documented    [] Yes LDA  for Pressure Injury Previously documented     [] Yes New Pressure Injury Discovered   [] LDA for New Pressure Injury Added      Attending RN:  Sloane Alejandra, AJAY     Second RN:  Martha MOSS

## 2025-03-08 NOTE — NURSING
[facility], [facility] [place]  Nurses Note -- 4 Eyes      3/8/2025       Skin assessed on: Q Shift      [x] No Pressure Injuries Present    [x]Prevention Measures Documented    [] Yes LDA  for Pressure Injury Previously documented     [] Yes New Pressure Injury Discovered   [] LDA for New Pressure Injury Added      Attending RN:  Monica Miller RN     Second RN:  Nancy Don RN

## 2025-03-08 NOTE — NURSING
Pt arrive to the unit by stretcher accompanied by transport Assisted pt to bed. Shift assessment initiated.  Pt is AAOx4.  NO distress noted.

## 2025-03-08 NOTE — SUBJECTIVE & OBJECTIVE
"Past Medical History:   Diagnosis Date    Anemia     Arthritis     Cataract     CHF (congestive heart failure)     Chronic constipation     Diabetes mellitus, type 2     Felon of finger of left hand 05/11/2019    Glaucoma     Hyperlipidemia 05/13/2014    Hypertension     MCTD (mixed connective tissue disease)     Personal history of colonic polyps     Sjogren's disease     Ulcerative colitis     Urolithiasis        Past Surgical History:   Procedure Laterality Date    CATARACT EXTRACTION Bilateral 2005    COLONOSCOPY  2014    ESOPHAGOGASTRODUODENOSCOPY N/A 9/8/2023    Procedure: EGD (ESOPHAGOGASTRODUODENOSCOPY);  Surgeon: Divya Saenz MD;  Location: [facility];  Service: Endoscopy;  Laterality: N/A;  ref by / inst portal-RB    ESOPHAGOGASTRODUODENOSCOPY N/A 11/22/2023    Procedure: EGD (ESOPHAGOGASTRODUODENOSCOPY);  Surgeon: Crystal Urbina MD;  Location: [facility];  Service: Endoscopy;  Laterality: N/A;  time frame 8-12 weeks  Dr. Saenz pt   prep instructions sent to pt via portal -  wl meds trulicity hold 7 days prior  diabetic  11/16- pt r/s to earlier date, pre call complete.  [facility]    EYE SURGERY         Review of patient's allergies indicates:   Allergen Reactions    Allopurinol Other (See Comments)    Azathioprine Other (See Comments)     Pancytopenia    Penicillins Nausea And Vomiting and Other (See Comments)    Rosuvastatin Other (See Comments)     Muscle pain    Allopurinol analogues Rash       No current facility-administered medications on file prior to encounter.     Current Outpatient Medications on File Prior to Encounter   Medication Sig    amLODIPine (NORVASC) 5 MG tablet TAKE 1 TABLET TWICE DAILY    atorvastatin (LIPITOR) 80 MG tablet TAKE 1 TABLET EVERY DAY    blood glucose control, low (TRUE METRIX LEVEL 1) Soln Use as indicated    blood-glucose meter (TRUE METRIX GLUCOSE METER) Misc Test glucose 4x/day    DROPLET PEN NEEDLE 32 gauge x 5/32" Ndle USE 1 NEEDLE AS DIRECTED FOUR TIMES " DAILY    dulaglutide (TRULICITY) 4.5 mg/0.5 mL pen injector INJECT 4.5MG (1 PEN) UNDER THE SKIN EVERY WEEK    empagliflozin (JARDIANCE) 10 mg tablet Take 1 tablet (10 mg total) by mouth once daily.    febuxostat (ULORIC) 40 mg Tab Take 1 tablet (40 mg total) by mouth 2 (two) times a day.    fluticasone propionate (FLONASE) 50 mcg/actuation nasal spray 1 spray (50 mcg total) by Each Nostril route once daily.    hydrALAZINE (APRESOLINE) 25 MG tablet TAKE 3 TABLETS THREE TIMES DAILY    lancets (TRUEPLUS LANCETS) 33 gauge Misc Test glucose 4x/day. Dx code e11.65    omeprazole (PRILOSEC) 40 MG capsule Take 1 capsule (40 mg total) by mouth every morning.    torsemide (DEMADEX) 10 MG Tab TAKE 1 TABLET EVERY OTHER DAY    TRUE METRIX GLUCOSE TEST STRIP Strp TEST BLOOD SUGAR FOUR TIMES DAILY    valsartan (DIOVAN) 160 MG tablet TAKE 1 TABLET TWICE DAILY     Family History       Problem Relation (Age of Onset)    Cataracts Father, Mother    Glaucoma Father, Paternal Grandfather    Hypertension Father    No Known Problems Sister, Brother, Paternal Aunt, Paternal Uncle, Maternal Grandmother, Maternal Grandfather, Daughter, Son    Rheum arthritis Maternal Aunt, Maternal Uncle    Stroke Father    Throat cancer Paternal Grandmother          Tobacco Use    Smoking status: Never     Passive exposure: Never    Smokeless tobacco: Never   Substance and Sexual Activity    Alcohol use: No    Drug use: Yes     Comment: ultram 1 - 2 tabs a week    Sexual activity: Not Currently     Partners: Female     Review of Systems   Reason unable to perform ROS: ROS was performed and pertinent +s and -s are listed in HPI.     Objective:     Vital Signs (Most Recent):  Temp: 97.2 °F (36.2 °C) (03/07/25 1921)  Pulse: 71 (03/07/25 1921)  Resp: 18 (03/07/25 1921)  BP: (!) 155/79 (03/07/25 1921)  SpO2: 99 % (03/07/25 1921) Vital Signs (24h Range):  Temp:  [97.2 °F (36.2 °C)-98.4 °F (36.9 °C)] 97.2 °F (36.2 °C)  Pulse:  [52-87] 71  Resp:  [13-45] 18  SpO2:   [96 %-100 %] 99 %  BP: (133-160)/(63-79) 155/79     Weight: 58 kg (127 lb 13.9 oz)  Body mass index is 20.64 kg/m².     Physical Exam  Constitutional:       General: He is not in acute distress.     Appearance: Normal appearance. He is not ill-appearing.   HENT:      Head: Normocephalic.   Neck:      Comments: JVP not elevated  Cardiovascular:      Rate and Rhythm: Normal rate and regular rhythm.      Pulses: Normal pulses.      Heart sounds: Normal heart sounds.   Pulmonary:      Effort: Pulmonary effort is normal.      Breath sounds: Normal breath sounds.   Abdominal:      General: Abdomen is flat. Bowel sounds are normal.      Tenderness: There is no abdominal tenderness. There is no guarding.   Musculoskeletal:      Right lower leg: No edema.      Left lower leg: No edema.   Skin:     General: Skin is warm and dry.      Capillary Refill: Capillary refill takes less than 2 seconds.      Coloration: Skin is not jaundiced.   Neurological:      General: No focal deficit present.      Mental Status: He is alert and oriented to person, place, and time.      Sensory: No sensory deficit.      Motor: No weakness.      Coordination: Coordination normal.   Psychiatric:         Mood and Affect: Mood normal.         Behavior: Behavior normal.                Significant Labs: All pertinent labs within the past 24 hours have been reviewed.  CBC:   Recent Labs   Lab 03/07/25  1003   WBC 9.01   HGB 12.4*   HCT 37.8*        CMP:   Recent Labs   Lab 03/07/25  1003      K 4.5      CO2 20*   *   BUN 25*   CREATININE 1.7*   CALCIUM 9.1   PROT 8.1   ALBUMIN 3.4*   BILITOT 0.5   ALKPHOS 106   AST 23   ALT 18   ANIONGAP 11     Troponin:   Recent Labs   Lab 03/07/25  1003 03/07/25  1346   TROPONINI 0.035* 0.072*       Significant Imaging: I have reviewed all pertinent imaging results/findings within the past 24 hours.

## 2025-03-08 NOTE — ASSESSMENT & PLAN NOTE
Patient's blood pressure range in the last 24 hours was: BP  Min: 113/53  Max: 155/79.The patient's inpatient anti-hypertensive regimen is listed below:  Current Antihypertensives  amLODIPine tablet 5 mg, 2 times daily, Oral  hydrALAZINE tablet 25 mg, Every 8 hours, Oral  valsartan tablet 160 mg, 2 times daily, Oral  torsemide tablet 10 mg, Daily, Oral    Plan  - BP is controlled, no changes needed to their regimen

## 2025-03-08 NOTE — CARE UPDATE
Brief cardiology note:    Echocardiogram showed normal LV systolic function without wall motion abnormalities.    Troponin plateaued and now trending down.    Patient has been completely asymptomatic.    Patient can be discharged from cardiac perspective.  Please discharge him on baby aspirin and high-intensity statin.    Spoke with Dr. Alvares.  He is going to see the patient early next week.    Message sent to Dr. Alvares's MA for clinic appointment early next week.        Keith Tristan MD  [facility]

## 2025-03-09 LAB
ESTIMATED AVG GLUCOSE: 146 MG/DL (ref 68–131)
HBA1C MFR BLD: 6.7 % (ref 4–5.6)

## 2025-03-09 RX ORDER — FLUTICASONE PROPIONATE 50 MCG
1 SPRAY, SUSPENSION (ML) NASAL DAILY
Qty: 48 G | Refills: 11 | Status: SHIPPED | OUTPATIENT
Start: 2025-03-09

## 2025-03-09 NOTE — TELEPHONE ENCOUNTER
Care Due:                  Date            Visit Type   Department     Provider  --------------------------------------------------------------------------------                                MYCHART                              ANNUAL       [facility]                              CHECKUP/PHY  MED/ INTERNAL  Last Visit: 02-      S            MED/ PEDS      Farooq Domingo                               -         [facility]                              PRIMARY      MED/ INTERNAL  Next Visit: 03-      CARE ([facility])   MED/ MELVI Domingo                                                            Last  Test          Frequency    Reason                     Performed    Due Date  --------------------------------------------------------------------------------    Lipid Panel.  12 months..  atorvastatin.............  02- 02-    [facility] Embedded Care Due Messages. Reference number: [id number].   3/08/2025 8:09:02 PM CST

## 2025-03-09 NOTE — ASSESSMENT & PLAN NOTE
03/10/2025:  Blood pressure today is good.  He reports he is taking hydralazine 25 mg t.i.d. instead of the prescribed 75 mg t.i.d..  Will update that prescription.  Has not been taking torsemide.  Is taking amlodipine, valsartan.    Orders:    hydrALAZINE (APRESOLINE) 25 MG tablet; Take 1 tablet (25 mg total) by mouth every 8 (eight) hours.    Vitamin D; Future    TSH; Future

## 2025-03-09 NOTE — ASSESSMENT & PLAN NOTE
03/10/2025:  History of alendronate.  Change to Prolia.  Last Prolia injection 05/2024 so he is overdue.    Orders:    Ambulatory referral/consult to Integrative [facility]; Future    Acupuncture; Future    traMADoL (ULTRAM) 50 mg tablet; Take 1 tablet (50 mg total) by mouth every 12 (twelve) hours as needed for Pain.

## 2025-03-09 NOTE — ASSESSMENT & PLAN NOTE
03/10/2025: Stable.  Followed by pulmonology.  Who had previously try him with torsemide to see if this was worsened by volume overload.  He reports he has not been taking his torsemide in some time.

## 2025-03-09 NOTE — ASSESSMENT & PLAN NOTE
03/10/2025: Followed by Cardiology.  On Jardiance.  Not taking torsemide 4 months now.  On atorvastatin high dose.  Trial of lower dose 40 mg to see if he is having subjective myalgias causing weakness.    Orders:    atorvastatin (LIPITOR) 80 MG tablet; Take 0.5 tablets (40 mg total) by mouth once daily.

## 2025-03-09 NOTE — ASSESSMENT & PLAN NOTE
03/10/2025 does not appear he is taking chronic steroids any longer.  Followed by Rheumatology.

## 2025-03-09 NOTE — ASSESSMENT & PLAN NOTE
03/10/2025: On Uloric had previously had increase dose with subsequent improvement in uric acid levels.  Managed by Rheumatology.

## 2025-03-09 NOTE — PROGRESS NOTES
This note was created by combination of typed  and M-Modal dictation.  Transcription errors may be present.   This note was also generated with the assistance of ambient listening technology. Verbal consent was obtained by the patient and accompanying visitor(s) for the recording of patient appointment to facilitate this note. I attest to having reviewed and edited the generated note for accuracy, though some syntax or spelling errors may persist. Please contact the author of this note for any clarification.    Assessment and Plan:   Assessment and Plan   Assessment & Plan  Weakness of both lower extremities  Other spondylosis, lumbar region  03/10/2025:  Had episode of generalized weakness which started with burning sensation in the palms that seemed to resolve and then evolved into generalized weakness and ultimately felt like his legs could not support him so he collapsed.  It sounds like he is still a bit sore from the collapse and feels like he has pain in his ankles from ankle hyperextension after collapsing.  He feels like he is particularly weak in his legs from the knees on down.    Has a known history of lumbar stenosis, has been seen by pain clinic and plan was possible epidural.  Denies sensation of palpitations, vertigo, presyncope, diaphoresis.  Was seen by Cardiology during his hospitalization and has upcoming outpatient follow-up with them today.  Labs were unrevealing and nonfocal  Has upcoming consult with Neurosurgery   He and his wife are inquiring whether this could be from statin therapy.  His CPK during hospitalization was normal.  Nonetheless will try him with half tablet of atorvastatin, 40 mg.  If it is determined that this is due to canal stenosis he should resume high-dose statin.  He had previously seen acupuncture and found it very helpful in his inquiring about referral back to see them.  Referral submitted  Wife is requesting Rollator walker for when he is outside the  home.   Darrion's mobility limitation cannot be sufficiently resolved by the use of a cane. His functional mobility deficit can be sufficiently resolved with the use of a Rolling Walker. Patient's mobility limitation significantly impairs their ability to participate in one of more activities of daily living.  The use of a RW will significantly improve the patient's ability to participate in MRADLS and the patient will use it on regular basis in the home.    Orders:    Ambulatory referral/consult to Integrative Medicine; Future    Acupuncture; Future    WALKER FOR HOME USE    Essential hypertension  Stage 3a chronic kidney disease  Diastolic heart failure, NYHA class 2  03/10/2025:  Blood pressure today is good.  He reports he is taking hydralazine 25 mg t.i.d. instead of the prescribed 75 mg t.i.d..  Will update that prescription.  Has not been taking torsemide.  Is taking amlodipine, valsartan.    Orders:    hydrALAZINE (APRESOLINE) 25 MG tablet; Take 1 tablet (25 mg total) by mouth every 8 (eight) hours.    Vitamin D; Future    TSH; Future    Dyslipidemia  Aortic atherosclerosis  Bilateral carotid artery stenosis on CTA 6/26/21  03/10/2025: Followed by Cardiology.  On Jardiance.  Not taking torsemide 4 months now.  On atorvastatin high dose.  Trial of lower dose 40 mg to see if he is having subjective myalgias causing weakness.    Orders:    atorvastatin (LIPITOR) 80 MG tablet; Take 0.5 tablets (40 mg total) by mouth once daily.    Restrictive lung disease  03/10/2025: Stable.  Followed by pulmonology.  Who had previously try him with torsemide to see if this was worsened by volume overload.  He reports he has not been taking his torsemide in some time.         Controlled type 2 diabetes mellitus with diabetic polyneuropathy, with long-term current use of insulin  BMI 23.0-23.9, adult  03/10/2025: He would like to try a lower dose of Trulicity as he notes his appetite is quite diminished.  Will try him with  Trulicity 3 mg.    Orders:    dulaglutide (TRULICITY) 3 mg/0.5 mL pen injector; Inject 3 mg into the skin every 7 days.    Hemoglobin A1C; Future    Lipid Panel; Future    Age-related osteoporosis without current pathological fracture  Compression fracture of body of thoracic vertebra on XR 2/2019; 2/2019 alendronate  03/10/2025:  History of alendronate.  Change to Prolia.  Last Prolia injection 05/2024 so he is overdue.    Orders:    Ambulatory referral/consult to Integrative Medicine; Future    Acupuncture; Future    traMADoL (ULTRAM) 50 mg tablet; Take 1 tablet (50 mg total) by mouth every 12 (twelve) hours as needed for Pain.    Jackson's esophagus without dysplasia  03/10/2025: On omeprazole daily, indefinite therapy         Rheumatoid arthritis, involving unspecified site, unspecified whether rheumatoid factor present  Current chronic use of systemic steroids  03/10/2025 does not appear he is taking chronic steroids any longer.  Followed by Rheumatology.         Tophaceous gout  03/10/2025: On Uloric had previously had increase dose with subsequent improvement in uric acid levels.  Managed by Rheumatology.         History of stroke  03/10/2025: On statin.  Trial of decrease dose as above         Abnormal TSH  03/10/2025: Mildly elevated.  Continue to monitor.  Not on levothyroxine would hold on starting for TSH less than 10    Orders:    TSH; Future      Medications Discontinued During This Encounter   Medication Reason    atorvastatin (LIPITOR) 80 MG tablet Reorder    hydrALAZINE (APRESOLINE) 25 MG tablet Reorder       meds sent this encounter:     Medications Ordered This Encounter   Medications    atorvastatin (LIPITOR) 80 MG tablet     Sig: Take 0.5 tablets (40 mg total) by mouth once daily.    dulaglutide (TRULICITY) 3 mg/0.5 mL pen injector     Sig: Inject 3 mg into the skin every 7 days.     Dispense:  12 pen      Refill:  3     Reduced dose    hydrALAZINE (APRESOLINE) 25 MG tablet     Sig: Take 1  tablet (25 mg total) by mouth every 8 (eight) hours.     Dispense:  270 tablet     Refill:  3     .Pharmacy update refills, keep on file, not requesting Rx to be filled today.    traMADoL (ULTRAM) 50 mg tablet     Sig: Take 1 tablet (50 mg total) by mouth every 12 (twelve) hours as needed for Pain.     Dispense:  60 tablet     Refill:  0     Quantity prescribed more than 7 day supply? Yes, quantity medically necessary     I have reviewed the Prescription Drug Monitoring Program (PDMP) database for this patient prior to prescribing the above opioid medication:   Yes        Follow Up:  Follow-up 3 months with labs  Future Appointments   Date Time Provider Department Center   3/10/2025  2:00 PM Greg Alvares MD [facility] CARDIO [facility] [place]   3/28/2025  9:30 AM LAB, LAPALCO [facility] LAB [place]   3/28/2025 10:40 AM Farooq Domingo MD [facility] FAM MED [place]   3/31/2025  9:30 AM CHAIR 02 [facility] CHEMO [facility] [place]   4/4/2025  9:00 AM Emil Don Jr., PT [facility] OP RHB [facility] [place] - B   4/22/2025  8:30 AM Juni Brown MD [facility] NEUSUR [facility] [place]   4/28/2025  9:30 AM CHAIR 12 [facility] CHEMO [facility] [place]   5/9/2025  7:45 AM RAAD, VISUAL WATERS [facility] OPHTHAL [place]   5/9/2025  8:00 AM Viry Esquivel OD [facility] OPTOMTY [place]   5/9/2025  8:00 AM SPECIMENJANINE [facility] SPECLAB [place]   5/9/2025  8:45 AM LAB, LAPALCO [facility] LAB [place]   5/14/2025  9:30 AM Willow Gary MD [facility] NEPHRO [place]   5/22/2025  9:45 AM LAB, LAPALCO [facility] LAB [place]   5/27/2025  9:15 AM CHAIR 13 [facility] CHEMO [facility] [place] [facility]   5/29/2025 10:00 AM Grecia Mccollum MD [facility] HEM ONC [facility] [place]   8/20/2025  8:40 AM Luis Pickering, PA-C [facility]          Subjective:   Subjective   Chief Complaint   Patient presents with    Follow-up     Hospital       HPI  Darrion is a 75 y.o. male.     Social History     Socioeconomic History    Marital status:     Number of children: 3   Tobacco Use    Smoking status: Never     Passive exposure:  Never    Smokeless tobacco: Never   Substance and Sexual Activity    Alcohol use: No    Drug use: Yes     Comment: ultram 1 - 2 tabs a week    Sexual activity: Not Currently     Partners: Female     Social Drivers of Health     Financial Resource Strain: Low Risk  (3/8/2025)    Overall Financial Resource Strain (CARDIA)     Difficulty of Paying Living Expenses: Not hard at all   Food Insecurity: No Food Insecurity (3/8/2025)    Hunger Vital Sign     Worried About Running Out of Food in the Last Year: Never true     Ran Out of Food in the Last Year: Never true   Transportation Needs: No Transportation Needs (3/8/2025)    PRAPARE - Transportation     Lack of Transportation (Medical): No     Lack of Transportation (Non-Medical): No   Physical Activity: Insufficiently Active (3/8/2025)    Exercise Vital Sign     Days of Exercise per Week: 1 day     Minutes of Exercise per Session: 10 min   Stress: No Stress Concern Present (3/8/2025)    [employer] [place] - Occupational Stress Questionnaire     Feeling of Stress : Not at all   Housing Stability: Low Risk  (3/8/2025)    Housing Stability Vital Sign     Unable to Pay for Housing in the Last Year: No     Number of Times Moved in the Last Year: 0     Homeless in the Last Year: No       Last appointment with this clinic was Visit date not found. Last visit with me 2/26/2024   To summarize last visit and events leading up to today:  Hypertension, HFpEF  8/30/24 TTE LVEF 65-70%; mild AoV stenosis  8/30/24 nu stress test neg for ischemia  3/8/25 TTE LVEF 65-70%, grade 2 DD. Mild AoV sclerosis and stenosis  CKD stage IIIA with accompanying anemia.  Followed by Nephrology.  Proteinuria.  Intolerant of iron supplement due to constipation.  Recommended ferrous gluconate  Saw nephrology 11/29.  Creatinine slight bump after starting PPI.  Monitor  Dyslipidemia, carotid artery stenosis, statin   DM 2 with neuropathy followed by DM NP.   Saw endocrinology 11/9.   Started on Jardiance.  Stop NovoLog fixed meal dose.  Stay on Trulicity  Jackson's esophagus On PPI.    9/8/23 EGD LA grade C esophagitis with bleeding. 3 cm HH, erythematous mucosa antrum. nonbleeding duodenal ulcers no stigmata of bleeding.  Path chronic inactive gastritis.  H pylori negative.  PPI 40 BID repeat EGD 8-12 weeks  11/22/23 EGD esoph suspicious for short segment Jackson's C2M3. Benign appearing esoph stenosis; path esophagus: Mucosa with focal intestinal metaplasia.  Negative for dysplasia.  H pylori negative.  Constipation, higher doses of Linzess with diarrhea.  Inflammatory arthritis secondary to Sjogren's, polymyalgia rheumatica, physical therapy. weekly Orencia, prednisone once to twice a week.  Followed by Rheumatology.  Tramadol p.r.n. use  Tophaceous gout, Uloric   Tremor; seen by neurology.  Scans negative for parkinsons  6/19/24 MRI brain  Examination mildly degraded by patient motion artifact.  Mild chronic small vessel ischemic change, few punctate foci prior hemorrhage, and mild generalized cerebral volume loss.  No evidence of acute intracranial pathology.   Reportedly had DATscan with DIS, reviewed by neuro, negative.  Osteoporosis with compression fracture on Prolia, update DEXA  6/28/23 DEXA L spine -2.7, femoral neck -2.5, total hip -1.7. FRAX score 9.1/20%. osteoporosis. On prolia. No changes. Repeat 2 years.  Chronic back pain, tramadol  8/12/24 MRI C/T/L spine  Degenerative changes of the cervical and lumbar spine as detailed above. Findings are most pronounced at L4-L5 with severe spinal canal stenosis at that level.  Remote compression deformity of the T12 inferior endplate, similar appearance compared to priors. Mild anterior height loss and osseous retropulsion, without spinal canal stenosis at that level.         11/02/2023 uric acid good    Last visit with me 2/26/24 for Physical examination  HTN, CKD 3b  Fatigue history of iron deficiency history of infusion. Nephrology  ordered iron panel. History of constipation with iron; consider IV iron  Tremor wax and ane, referred to neuro  Chronic back pain, history of T12 compression fx, referred to healthy back  DM2 managed by DM NP. Recent start on Jardiance. Not really taking insulin.   RA followed by rheum  Jackson's, PPI indefinite  Osteoporosis, prolia; start vit D 1000    Ferritin better   Iron levels are similar to what they has been historically, iron sats are a bit low  He was last seen by Hematology back in 2021, had injected for done, hemoglobin levels are comparable to what they are now as well as iron levels, and recommendation was no further iron injections.    Lipid profile good on statin     5/21/24 rheum follow up  Orencia with flare change to orencia infusion and incr prednisone remporary; history of imuran toxicity  Gout stable    5/27/24 saw heme; no indication for epo no indication for IV iron    6/5/24 saw nephrology. Borderline BP consider spironolactone but check labs first.     6/18/24 neuro consult. Concerning for parkinsonian syndrome. Ordered MRI brain and DATscan    6/19/24 MRI brain  Examination mildly degraded by patient motion artifact.  Mild chronic small vessel ischemic change, few punctate foci prior hemorrhage, and mild generalized cerebral volume loss.  No evidence of acute intracranial pathology.     Reportedly had DATscan with DIS, reviewed by neuro, negative.    8/12/24 MRI C/T/L spine  Degenerative changes of the cervical and lumbar spine as detailed above. Findings are most pronounced at L4-L5 with severe spinal canal stenosis at that level.  Remote compression deformity of the T12 inferior endplate, similar appearance compared to priors. Mild anterior height loss and osseous retropulsion, without spinal canal stenosis at that level.     8/15/24 cards follow up. Dyspnea ordered testing and referred to pulm    8/30/24 TTE LVEF 65-70%; mild AoV stenosis  8/30/24 nu stress test neg for  "ischemia    8/20/24 saw rheum in follow up, plan orencia infusions.  Instead of oral.    8/28/24 rheum increased uloric goal of 5.0    9/4/24 saw DM NP; discharged from DM clinic, stable.    9/12/24 cards follow up. Stable.     9/25/24 heme follow up, no changes    9/25/24 nephrology follow up changed vit D supplement to 2000 otherwise no changes.    11/12/24 saw pulm. HFpEF, mosaic attenuation, recommended daily demodex.  JAZLYN defers treatment  Restrictive lung disease, clinically asymptomatic, plan repeat PFTs    11/20/24 PFTs  Patient arrived for Pft but test incomplete. Attempted several  times but patient was not able to understand commands after  several times explaining and demonstrating. Attempted FVC and  DLCO and was not able to get anything accurate.     1/15/25 nephro follow up, no changes.    1/29/25 heme fu/, no changes.     2/21/25 pain clinic follow up  L neck pain MRI does not show severe compression  Chronic low thoracic/upper lumbar pain. Referral PT.   "-if we did a procedure I recommended a b/l T12-L1 TFESI first  -The next procedure to consider would be a b/l T10,11,12 MBB/RFA"  Saw pain clininc 8/21/24 med mgmt. Consider C3/4/5 MBB/RFA    Admit 3/7 through 3/8/25 leg weakness. Troponin bump  Hospital Course:   Darrion Bennett 75 y.o. male placed in observation for fall and elevated troponin.  He presented in the emergency room after a fall due to lower extremity weakness.  An MRI lumbar spine was obtained with L4-L5 degenerative anterolisthesis with left paracentral disc herniation resulting in severe central stenosis.  He had resolution of back pain and no numbness or weakness to his extremities. He remained his prior level of function.  He will require outpatient neurosurgical follow up.  His troponin was incidentally found to be elevated and Cardiology was consulted.  His troponin downtrended without intervention.  He and his family elected for outpatient follow up with Cardiology to consider " stress test.  He was seen by neurosurgery without plans for intervention as he is currently asymptomatic.  Ambulatory referral was placed for Neurosurgery.  He will follow up in the clinic.  At discharge he was independent in his baseline functional status.  He was chest pain-free.     3/8/25 TTE LVEF 65-70%, grade 2 DD. Mild AoV sclerosis and stenosis    Neurosurgery 4/22  Nephrology 5/14  Heme-Onc 5/29 8/20 pain clinic    Today's visit:    History of Present Illness    HPI:  Darrion reports that on Friday morning, while in the kitchen, he suddenly felt a burning sensation in the palms of his hands lasting approximately 1 minute. This was followed by extreme weakness, rendering him unable to ambulate. Family members assisted him to the couch due to his inability to support himself. His legs became non-functional, and he could not stand independently. Emergency services were called, and he was transported to the emergency room where various tests were conducted, including CTs, echocardiograms, and MRIs, with no significant findings reported.    He continues to have weakness in his legs, particularly from the knees down, and ongoing pain in his feet, especially the right one.  Possibly this is from hyperextending his ankles when he had collapsed onto the floor.  His gait is unsteady, and he walks at a reduced pace compared to his usual speed.  He notes a bit of apprehension that he is worried this may happen again which maybe a factor.  He can stand but has difficulty walking.    His blood pressure and blood sugar were normal during the emergency visit, with blood sugar around 129-139. He attributes the persistent weakness and pain in his legs and feet to the manner in which he fell during the initial episode, noting that his legs were in a bent position when he collapsed. He reports persistent leg pain, particularly when attempting to straighten them.    He denies sweating, visual disturbances, pre-syncope,  palpitations, dyspnea, or vertigo during the initial episode.    Review of medications-taking hydralazine twenty-five t.i.d. instead of the prescribed 75 t.i.d..  Blood pressure today is good.    MEDICATIONS:  - Trulicity 4.5 mg for diabetes  - Amlodipine for blood pressure (recently discontinued)  - Valsartan for blood pressure  - Atorvastatin for cholesterol  - Uloric for gout  - Hydralazine, 1 tablet 3 times daily (3 tablets total per day), for blood pressure  - Omeprazole (Prilosec) for stomach  - Vitamin D supplement  - Hydralazine, previously prescribed as 3 tablets 3 times daily (9 tablets total per day), now reduced to 1 tablet 3 times daily (3 tablets total per day)      ROS:  General: no weight loss  Cardiovascular: no palpitations  Respiratory: no shortness of breath  Neurological: reports weakness         Answers submitted by the patient for this visit:  Review of Systems Questionnaire (Submitted on 3/8/2025)  activity change: No  unexpected weight change: No  neck pain: No  hearing loss: No  rhinorrhea: No  trouble swallowing: No  eye discharge: No  visual disturbance: No  chest tightness: No  wheezing: No  chest pain: No  palpitations: No  blood in stool: No  constipation: No  vomiting: No  diarrhea: No  polydipsia: No  polyuria: No  difficulty urinating: No  urgency: No  hematuria: No  joint swelling: No  arthralgias: Yes  headaches: No  weakness: Yes  confusion: No  dysphoric mood: No    Patient Care Team:  Farooq Domingo MD as PCP - General (Internal Medicine)  Tin Chambers MD (Gastroenterology)  Harrison Brannon MD (Cardiology)  Roxanna Salazar MD as Nurse Practitioner (Rheumatology)  Malcolm Irwin MD as  (Nephrology)  Dangelo Hall MD as Consulting Physician (Ophthalmology)  [facility] (Gastroenterology)  Shamir Fonseca, PharmD as Hypertension Digital Medicine Clinician (Pharmacist)  Farooq Domingo MD as  Hypertension Digital Medicine Responsible Provider (Internal Medicine)  Farooq Domingo MD as Hyperlipidemia Digital Medicine Responsible Provider (Internal Medicine)  Shamir Fonseca, PharmD as Hyperlipidemia Digital Medicine Clinician  Medicare, [employer] Managed as Hypertension Digital Medicine Contract    Patient Active Problem List    Diagnosis Date Noted    Elevated troponin 03/08/2025    Fall 03/08/2025    Anterolisthesis of L4-L5 03/08/2025    Bradycardia 03/07/2025     symptomatic with hypotension      Restrictive lung disease 11/12/2024     Pft with restrictive lung physiology - ild vs extrinsic restriction a/w kyphosis.    Ct with mild mosaic attenuation.     Vascular congestion vs small airway disease.   Clinically asymptomatic  Will repeat pft    11/20/24 PFTs  Patient arrived for Pft but test incomplete. Attempted several  times but patient was not able to understand commands after  several times explaining and demonstrating. Attempted FVC and  DLCO and was not able to get anything accurate.         Impaired functional mobility, balance, gait, and endurance 07/31/2024    Poor posture 04/10/2024    Duodenal ulcer 9/8/23 EGD LA grade C esophagitis with bleeding. 3 cm HH, erythematous mucosa antrum. nonbleeding duodenal ulcers no stigmata of bleeding.  Path chronic inactive gastritis.  H pylori ne 09/28/2023 9/8/23 EGD LA grade C esophagitis with bleeding. 3 cm HH, erythematous mucosa antrum. nonbleeding duodenal ulcers no stigmata of bleeding.  Path chronic inactive gastritis.  H pylori negative.  PPI 40 BID repeat EGD 8-12 weeks      Difficulty walking 04/13/2023    Hip joint stiffness, bilateral 04/13/2023    Dyshidrotic eczema 02/04/2022    Long-term insulin use 02/01/2022    Dyspnea on exertion 08/02/2021     +diastolic dysfunction + anemia.  pft with restrictive physiology and decreased dlco.  Ct with mosaic attenuation and enlarge pa suggesting volume overload.    9/16/21 CT chest:  Subtle  mosaic attenuation pattern, nonspecific can be seen in the setting of inflammatory small airway disease, also can be seen with occlusive vascular disease and subtle change of increased pulmonary venous pressure.  3 mm pulmonary nodule right upper lobe, Fleischner Society guidelines for nodules less than 6 mm in individuals with low risk for lung malignancy: no follow-up needed, and individuals with high risk for lung malignancy: optional chest  CT at 12 months      JAZLYN (obstructive sleep apnea) 08/02/2021     ahi of 21; rdi of 38.   Result of sleep study d/w patient.   Recommend treatment.    Patient deferred treatment.  He is aware of increased cardiovascular morbidities a/w untreated jazlyn.       History of stroke 07/20/2021 6/2021 MRI brain old stroke in thalamus      Mobitz I 06/27/2021    Bilateral carotid artery stenosis on CTA 6/26/21 06/27/2021 6/26/21 CTA head and neck  Atherosclerotic plaque throughout the aortic arch with mild shaggy soft atherosclerotic plaque in the posterior arch near the junction of the transverse and descending thoracic aorta.  Minimal atherosclerotic plaque in the cervical carotids with no stenosis or dissection within the cervical vessels.  Moderate to severe atherosclerotic plaque within the carotid siphons with 50-60% diameter stenosis, right slightly greater than left.  Moderate plaque within the V4 segment of the left vertebral segment.  No large vessel occlusion or aneurysm intracranially.      Age-related osteoporosis without current pathological fracture 05/19/2021 01/27/2021 DEXA L-spine-3.1, total hip-1.6, femoral neck-2.4.  6/28/23 DEXA L spine -2.7, femoral neck -2.5, total hip -1.7. FRAX score 9.1/20%. osteoporosis.  On prolia. No changes. Repeat 2 years.      Secondary hyperparathyroidism of renal origin 02/08/2021    RA (rheumatoid arthritis) 12/09/2020    Stage 3a chronic kidney disease 01/16/2020    Aortic atherosclerosis 09/24/2019    Pseudophakia of both  eyes 08/27/2019    Refractive error 08/27/2019    Tophaceous gout 05/09/2019    Chronic constipation not responding to linzess, miralax, senna 05/02/2019    Goals of care, counseling/discussion 05/02/2019     During this visit, I engaged the patient in the advance care planning process.  The patient and I reviewed the role for advance directives and their purpose in directing future healthcare if the patient's unable to speak for him/herself.  At this point in time, the patient does have full decision-making capacity.  We discussed different extreme health states that patient could experience, and reviewed what kind of medical care patient would want in those situations.  The patient communicated that if he was comatose and had little chance of a meaningful recovery, he would NOT want machines/life-sustaining treatments used.  In addition to the above preference, patient  HAS completed a living will to reflect these preferences.  The patient HAS designated his daughter, Trudy Jama, as healthcare power of  to make decisions on her behalf.  In the case of cardiopulmonary arrest, patient does wish for CPR, intubation or cardioversion.  Advise patient to bring living will to us to scan into EPIC.      16 minutes spent discussing GOC.       Anemia of chronic renal failure, stage 3b 03/13/2019    Current chronic use of systemic steroids 02/27/2019    Compression fracture of body of thoracic vertebra on XR 2/2019; 2/2019 alendronate 02/27/2019 8/12/24 MRI C/T/L spine  Degenerative changes of the cervical and lumbar spine as detailed above. Findings are most pronounced at L4-L5 with severe spinal canal stenosis at that level.  Remote compression deformity of the T12 inferior endplate, similar appearance compared to priors. Mild anterior height loss and osseous retropulsion, without spinal canal stenosis at that level.       Neutropenia 02/26/2019    Jackson's esophagus without dysplasia 03/14/2016 9/8/23  EGD LA grade C esophagitis with bleeding. 3 cm HH, erythematous mucosa antrum. nonbleeding duodenal ulcers no stigmata of bleeding.  Path chronic inactive gastritis.  H pylori negative.  PPI 40 BID repeat EGD 8-12 weeks  11/22/23 EGD esoph suspicious for short segment Jackson's C2M3. Benign appearing esoph stenosis; path esophagus: Mucosa with focal intestinal metaplasia.  Negative for dysplasia.  H pylori negative.      Anemia from plaquenil and imuran 03/14/2016    Diastolic heart failure, NYHA class 2 09/26/2014 05/12/2021 TTE LV normal size with concentric remodeling and normal systolic function LVEF 65%.  Grade 2 diastolic dysfunction.  Severe left atrial enlargement.  Normal RV size and systolic function.  CVP 3.  PA pressure 36. Sinuses of Valsalva mildly dilated.  05/12/2021 nuclear medicine stress test normal perfusion.  No evidence of ischemia or infarction.  LVEF 75%.  Normal wall motion post stress.  During stress, rare PVCs.  Hypertensive response exercise.    ct with mosaic attenuation.  Recommend daily demodex  Pft with mild obstruction.    3/8/25 TTE LVEF 65-70%, grade 2 DD. Mild AoV sclerosis and stenosis        BMI 23.0-23.9, adult 07/15/2014    Essential hypertension 05/13/2014 6/2019 TTE normal LV systolic and diastolic function; ABIs normal  6/27/21 TTE LV normal size with concentric hypertrophy and normal systolic function LVEF 65%.  Grade 1 diastolic dysfunction.  Normal RV size with normal systolic function.  Moderate left atrial enlargement.  Mild right atrial enlargement.  Mild aortic valve stenosis.  Valve area 1.67, peak velocity 1.95, mean gradient 9. Normal CVP 3. PA pressure 16.         Controlled type 2 diabetes mellitus with diabetic polyneuropathy, with long-term current use of insulin 05/13/2014    Dyslipidemia 05/13/2014 05/12/2021 nuclear medicine stress test normal perfusion.  No evidence of ischemia or infarction.  LVEF 75%.  Normal wall motion post stress.  During  "stress, rare PVCs.  Hypertensive response exercise.      MCTD (mixed connective tissue disease) 05/13/2014    Sjogren's disease 05/13/2014    Bilateral edema of lower extremity 6/2019 TTE normal LV systolic and diastolic function; ABIs normal 05/13/2014    Glaucoma 05/13/2014    Celiac disease 11/12/2013     Overview:   Gluten intolerant         PAST MEDICAL PROBLEMS, PAST SURGICAL HISTORY: please see relevant portions of the electronic medical record    ALLERGIES AND MEDICATIONS: updated and reviewed.  Medication List with Changes/Refills   Current Medications    AMLODIPINE (NORVASC) 5 MG TABLET    TAKE 1 TABLET TWICE DAILY    ATORVASTATIN (LIPITOR) 80 MG TABLET    TAKE 1 TABLET EVERY DAY    BLOOD GLUCOSE CONTROL, LOW (TRUE METRIX LEVEL 1) SOLN    Use as indicated    BLOOD-GLUCOSE METER (TRUE METRIX GLUCOSE METER) MISC    Test glucose 4x/day    DROPLET PEN NEEDLE 32 GAUGE X 5/32" NDLE    USE 1 NEEDLE AS DIRECTED FOUR TIMES DAILY    DULAGLUTIDE (TRULICITY) 4.5 MG/0.5 ML PEN INJECTOR    INJECT 4.5MG (1 PEN) UNDER THE SKIN EVERY WEEK    EMPAGLIFLOZIN (JARDIANCE) 10 MG TABLET    Take 1 tablet (10 mg total) by mouth once daily.    FEBUXOSTAT (ULORIC) 40 MG TAB    Take 1 tablet (40 mg total) by mouth 2 (two) times a day.    FLUTICASONE PROPIONATE (FLONASE) 50 MCG/ACTUATION NASAL SPRAY    1 spray (50 mcg total) by Each Nostril route once daily.    HYDRALAZINE (APRESOLINE) 25 MG TABLET    TAKE 3 TABLETS THREE TIMES DAILY    LANCETS (TRUEPLUS LANCETS) 33 GAUGE MISC    Test glucose 4x/day. Dx code e11.65    OMEPRAZOLE (PRILOSEC) 40 MG CAPSULE    Take 1 capsule (40 mg total) by mouth every morning.    TORSEMIDE (DEMADEX) 10 MG TAB    TAKE 1 TABLET EVERY OTHER DAY    TRUE METRIX GLUCOSE TEST STRIP STRP    TEST BLOOD SUGAR FOUR TIMES DAILY    VALSARTAN (DIOVAN) 160 MG TABLET    TAKE 1 TABLET TWICE DAILY         Objective:   Objective   Physical Exam   Vitals:    03/10/25 1319   BP: 120/62   Pulse: 73   Temp: 97.6 °F (36.4 °C) " "  TempSrc: Oral   SpO2: 98%   Weight: 57.1 kg (125 lb 12.4 oz)   Height: 5' 6" (1.676 m)    Body mass index is 20.3 kg/m².  Weight: 57.1 kg (125 lb 12.4 oz)   Height: 5' 6" (167.6 cm)     Physical Exam  Constitutional:       General: He is not in acute distress.     Appearance: He is well-developed.   Eyes:      Extraocular Movements: Extraocular movements intact.   Cardiovascular:      Rate and Rhythm: Normal rate and regular rhythm.      Heart sounds: Normal heart sounds. No murmur heard.  Pulmonary:      Effort: Pulmonary effort is normal.      Breath sounds: Normal breath sounds.   Musculoskeletal:         General: Normal range of motion.      Right lower leg: No edema.      Left lower leg: No edema.   Skin:     General: Skin is warm and dry.   Neurological:      Mental Status: He is alert and oriented to person, place, and time.      Coordination: Coordination normal.      Comments: Patellar DTR 3+ brisk and symmetric  Achilles DTR 2+ brisk and symmetric   Psychiatric:         Behavior: Behavior normal.         Thought Content: Thought content normal.                  "

## 2025-03-09 NOTE — ASSESSMENT & PLAN NOTE
03/10/2025: He would like to try a lower dose of Trulicity as he notes his appetite is quite diminished.  Will try him with Trulicity 3 mg.    Orders:    dulaglutide (TRULICITY) 3 mg/0.5 mL pen injector; Inject 3 mg into the skin every 7 days.    Hemoglobin A1C; Future    Lipid Panel; Future

## 2025-03-10 ENCOUNTER — OFFICE VISIT (OUTPATIENT)
Dept: FAMILY MEDICINE | Facility: CLINIC | Age: 75
End: 2025-03-10
Payer: MEDICARE

## 2025-03-10 ENCOUNTER — TELEPHONE (OUTPATIENT)
Dept: NEUROSURGERY | Facility: CLINIC | Age: 75
End: 2025-03-10
Payer: MEDICARE

## 2025-03-10 VITALS
TEMPERATURE: 98 F | HEIGHT: 66 IN | DIASTOLIC BLOOD PRESSURE: 62 MMHG | OXYGEN SATURATION: 98 % | SYSTOLIC BLOOD PRESSURE: 120 MMHG | HEART RATE: 73 BPM | WEIGHT: 125.75 LBS | BODY MASS INDEX: 20.21 KG/M2

## 2025-03-10 DIAGNOSIS — K22.70 BARRETT'S ESOPHAGUS WITHOUT DYSPLASIA: ICD-10-CM

## 2025-03-10 DIAGNOSIS — I50.30 DIASTOLIC HEART FAILURE, NYHA CLASS 2: Chronic | ICD-10-CM

## 2025-03-10 DIAGNOSIS — M81.0 AGE-RELATED OSTEOPOROSIS WITHOUT CURRENT PATHOLOGICAL FRACTURE: ICD-10-CM

## 2025-03-10 DIAGNOSIS — I65.23 BILATERAL CAROTID ARTERY STENOSIS: Chronic | ICD-10-CM

## 2025-03-10 DIAGNOSIS — M06.9 RHEUMATOID ARTHRITIS, INVOLVING UNSPECIFIED SITE, UNSPECIFIED WHETHER RHEUMATOID FACTOR PRESENT: ICD-10-CM

## 2025-03-10 DIAGNOSIS — Z86.73 HISTORY OF STROKE: ICD-10-CM

## 2025-03-10 DIAGNOSIS — Z79.4 CONTROLLED TYPE 2 DIABETES MELLITUS WITH COMPLICATION, WITH LONG-TERM CURRENT USE OF INSULIN: ICD-10-CM

## 2025-03-10 DIAGNOSIS — I70.0 AORTIC ATHEROSCLEROSIS: ICD-10-CM

## 2025-03-10 DIAGNOSIS — J98.4 RESTRICTIVE LUNG DISEASE: ICD-10-CM

## 2025-03-10 DIAGNOSIS — E11.8 CONTROLLED TYPE 2 DIABETES MELLITUS WITH COMPLICATION, WITH LONG-TERM CURRENT USE OF INSULIN: ICD-10-CM

## 2025-03-10 DIAGNOSIS — R79.89 ABNORMAL TSH: ICD-10-CM

## 2025-03-10 DIAGNOSIS — I10 ESSENTIAL HYPERTENSION: Chronic | ICD-10-CM

## 2025-03-10 DIAGNOSIS — Z79.4 CONTROLLED TYPE 2 DIABETES MELLITUS WITH DIABETIC POLYNEUROPATHY, WITH LONG-TERM CURRENT USE OF INSULIN: Chronic | ICD-10-CM

## 2025-03-10 DIAGNOSIS — M1A.9XX1 TOPHACEOUS GOUT: ICD-10-CM

## 2025-03-10 DIAGNOSIS — R29.898 WEAKNESS OF BOTH LOWER EXTREMITIES: Primary | ICD-10-CM

## 2025-03-10 DIAGNOSIS — Z79.52 CURRENT CHRONIC USE OF SYSTEMIC STEROIDS: ICD-10-CM

## 2025-03-10 DIAGNOSIS — E11.42 CONTROLLED TYPE 2 DIABETES MELLITUS WITH DIABETIC POLYNEUROPATHY, WITH LONG-TERM CURRENT USE OF INSULIN: Chronic | ICD-10-CM

## 2025-03-10 DIAGNOSIS — M47.896 OTHER SPONDYLOSIS, LUMBAR REGION: ICD-10-CM

## 2025-03-10 DIAGNOSIS — S22.000A COMPRESSION FRACTURE OF BODY OF THORACIC VERTEBRA: ICD-10-CM

## 2025-03-10 DIAGNOSIS — N18.31 STAGE 3A CHRONIC KIDNEY DISEASE: ICD-10-CM

## 2025-03-10 DIAGNOSIS — E78.5 DYSLIPIDEMIA: Chronic | ICD-10-CM

## 2025-03-10 LAB
OHS QRS DURATION: 86 MS
OHS QTC CALCULATION: 433 MS

## 2025-03-10 PROCEDURE — 3044F HG A1C LEVEL LT 7.0%: CPT | Mod: CPTII,S$GLB,, | Performed by: INTERNAL MEDICINE

## 2025-03-10 PROCEDURE — 3078F DIAST BP <80 MM HG: CPT | Mod: CPTII,S$GLB,, | Performed by: INTERNAL MEDICINE

## 2025-03-10 PROCEDURE — 99999 PR PBB SHADOW E&M-EST. PATIENT-LVL V: CPT | Mod: PBBFAC,,, | Performed by: INTERNAL MEDICINE

## 2025-03-10 PROCEDURE — 3288F FALL RISK ASSESSMENT DOCD: CPT | Mod: CPTII,S$GLB,, | Performed by: INTERNAL MEDICINE

## 2025-03-10 PROCEDURE — 1160F RVW MEDS BY RX/DR IN RCRD: CPT | Mod: CPTII,S$GLB,, | Performed by: INTERNAL MEDICINE

## 2025-03-10 PROCEDURE — 3074F SYST BP LT 130 MM HG: CPT | Mod: CPTII,S$GLB,, | Performed by: INTERNAL MEDICINE

## 2025-03-10 PROCEDURE — G2211 COMPLEX E/M VISIT ADD ON: HCPCS | Mod: S$GLB,,, | Performed by: INTERNAL MEDICINE

## 2025-03-10 PROCEDURE — 3066F NEPHROPATHY DOC TX: CPT | Mod: CPTII,S$GLB,, | Performed by: INTERNAL MEDICINE

## 2025-03-10 PROCEDURE — 1101F PT FALLS ASSESS-DOCD LE1/YR: CPT | Mod: CPTII,S$GLB,, | Performed by: INTERNAL MEDICINE

## 2025-03-10 PROCEDURE — 4010F ACE/ARB THERAPY RXD/TAKEN: CPT | Mod: CPTII,S$GLB,, | Performed by: INTERNAL MEDICINE

## 2025-03-10 PROCEDURE — 1159F MED LIST DOCD IN RCRD: CPT | Mod: CPTII,S$GLB,, | Performed by: INTERNAL MEDICINE

## 2025-03-10 PROCEDURE — 1126F AMNT PAIN NOTED NONE PRSNT: CPT | Mod: CPTII,S$GLB,, | Performed by: INTERNAL MEDICINE

## 2025-03-10 PROCEDURE — 99214 OFFICE O/P EST MOD 30 MIN: CPT | Mod: S$GLB,,, | Performed by: INTERNAL MEDICINE

## 2025-03-10 RX ORDER — ATORVASTATIN CALCIUM 80 MG/1
40 TABLET, FILM COATED ORAL DAILY
Start: 2025-03-10

## 2025-03-10 RX ORDER — HYDRALAZINE HYDROCHLORIDE 25 MG/1
25 TABLET, FILM COATED ORAL EVERY 8 HOURS
Qty: 270 TABLET | Refills: 3 | Status: SHIPPED | OUTPATIENT
Start: 2025-03-10

## 2025-03-10 RX ORDER — TRAMADOL HYDROCHLORIDE 50 MG/1
50 TABLET ORAL EVERY 12 HOURS PRN
Qty: 60 TABLET | Refills: 0 | Status: SHIPPED | OUTPATIENT
Start: 2025-03-10

## 2025-03-10 RX ORDER — DULAGLUTIDE 3 MG/.5ML
3 INJECTION, SOLUTION SUBCUTANEOUS
Qty: 12 PEN | Refills: 3 | Status: SHIPPED | OUTPATIENT
Start: 2025-03-10 | End: 2026-03-10

## 2025-03-10 NOTE — TELEPHONE ENCOUNTER
----- Message from Juni Brown MD sent at 3/8/2025  8:43 PM CST -----  Regarding: follow up  Can we pplease offer patient hospital follow up to discuss lumbar spondylosis wth either me or junior, maybe 4-6 weeks?

## 2025-03-17 ENCOUNTER — CLINICAL SUPPORT (OUTPATIENT)
Dept: REHABILITATION | Facility: HOSPITAL | Age: 75
End: 2025-03-17
Attending: STUDENT IN AN ORGANIZED HEALTH CARE EDUCATION/TRAINING PROGRAM
Payer: MEDICARE

## 2025-03-17 ENCOUNTER — OFFICE VISIT (OUTPATIENT)
Dept: CARDIOLOGY | Facility: CLINIC | Age: 75
End: 2025-03-17
Payer: MEDICARE

## 2025-03-17 VITALS
OXYGEN SATURATION: 98 % | DIASTOLIC BLOOD PRESSURE: 68 MMHG | HEART RATE: 76 BPM | WEIGHT: 125.88 LBS | HEIGHT: 66 IN | SYSTOLIC BLOOD PRESSURE: 136 MMHG | BODY MASS INDEX: 20.23 KG/M2 | RESPIRATION RATE: 18 BRPM

## 2025-03-17 DIAGNOSIS — M54.2 CHRONIC NECK PAIN: ICD-10-CM

## 2025-03-17 DIAGNOSIS — R06.02 SOB (SHORTNESS OF BREATH): ICD-10-CM

## 2025-03-17 DIAGNOSIS — S22.080S COMPRESSION FRACTURE OF T12 VERTEBRA, SEQUELA: ICD-10-CM

## 2025-03-17 DIAGNOSIS — G89.29 CHRONIC NECK PAIN: ICD-10-CM

## 2025-03-17 DIAGNOSIS — R79.89 ELEVATED TROPONIN: Primary | ICD-10-CM

## 2025-03-17 DIAGNOSIS — M47.816 LUMBAR SPONDYLOSIS: ICD-10-CM

## 2025-03-17 DIAGNOSIS — R53.1 WEAKNESS: Primary | ICD-10-CM

## 2025-03-17 DIAGNOSIS — Z74.09 IMPAIRED FUNCTIONAL MOBILITY, BALANCE, GAIT, AND ENDURANCE: ICD-10-CM

## 2025-03-17 PROCEDURE — 3078F DIAST BP <80 MM HG: CPT | Mod: CPTII,S$GLB,, | Performed by: INTERNAL MEDICINE

## 2025-03-17 PROCEDURE — 4010F ACE/ARB THERAPY RXD/TAKEN: CPT | Mod: CPTII,S$GLB,, | Performed by: INTERNAL MEDICINE

## 2025-03-17 PROCEDURE — 1101F PT FALLS ASSESS-DOCD LE1/YR: CPT | Mod: CPTII,S$GLB,, | Performed by: INTERNAL MEDICINE

## 2025-03-17 PROCEDURE — 3288F FALL RISK ASSESSMENT DOCD: CPT | Mod: CPTII,S$GLB,, | Performed by: INTERNAL MEDICINE

## 2025-03-17 PROCEDURE — 99214 OFFICE O/P EST MOD 30 MIN: CPT | Mod: S$GLB,,, | Performed by: INTERNAL MEDICINE

## 2025-03-17 PROCEDURE — 1126F AMNT PAIN NOTED NONE PRSNT: CPT | Mod: CPTII,S$GLB,, | Performed by: INTERNAL MEDICINE

## 2025-03-17 PROCEDURE — 97161 PT EVAL LOW COMPLEX 20 MIN: CPT | Mod: PN

## 2025-03-17 PROCEDURE — G2211 COMPLEX E/M VISIT ADD ON: HCPCS | Mod: S$GLB,,, | Performed by: INTERNAL MEDICINE

## 2025-03-17 PROCEDURE — 3066F NEPHROPATHY DOC TX: CPT | Mod: CPTII,S$GLB,, | Performed by: INTERNAL MEDICINE

## 2025-03-17 PROCEDURE — 1159F MED LIST DOCD IN RCRD: CPT | Mod: CPTII,S$GLB,, | Performed by: INTERNAL MEDICINE

## 2025-03-17 PROCEDURE — 3075F SYST BP GE 130 - 139MM HG: CPT | Mod: CPTII,S$GLB,, | Performed by: INTERNAL MEDICINE

## 2025-03-17 PROCEDURE — 3044F HG A1C LEVEL LT 7.0%: CPT | Mod: CPTII,S$GLB,, | Performed by: INTERNAL MEDICINE

## 2025-03-17 PROCEDURE — 97110 THERAPEUTIC EXERCISES: CPT | Mod: PN

## 2025-03-17 PROCEDURE — 99999 PR PBB SHADOW E&M-EST. PATIENT-LVL IV: CPT | Mod: PBBFAC,,, | Performed by: INTERNAL MEDICINE

## 2025-03-17 RX ORDER — AMLODIPINE BESYLATE 5 MG/1
5 TABLET ORAL 2 TIMES DAILY
Qty: 90 TABLET | Refills: 3 | Status: SHIPPED | OUTPATIENT
Start: 2025-03-17

## 2025-03-17 RX ORDER — VALSARTAN 320 MG/1
320 TABLET ORAL DAILY
Qty: 90 TABLET | Refills: 3 | Status: SHIPPED | OUTPATIENT
Start: 2025-03-17 | End: 2026-03-17

## 2025-03-17 RX ORDER — AMLODIPINE BESYLATE 5 MG/1
5 TABLET ORAL DAILY
Qty: 90 TABLET | Refills: 3 | Status: SHIPPED | OUTPATIENT
Start: 2025-03-17 | End: 2025-03-17

## 2025-03-17 NOTE — PROGRESS NOTES
[facility]  Physical Therapy Initial Evaluation    Date: 3/17/2025   Name: Darrion Bennett  [facility] Number: [id number]    Therapy Diagnosis:   Encounter Diagnoses   Name Primary?    Chronic neck pain     Lumbar spondylosis     Compression fracture of T12 vertebra, sequela     Weakness Yes    Impaired functional mobility, balance, gait, and endurance      Physician: Diony Sánchez,*    Physician Orders: PT Eval and Treat   Medical Diagnosis from Referral:   M54.2,G89.29 (ICD-10-CM) - Chronic neck pain   M47.816 (ICD-10-CM) - Lumbar spondylosis   S22.080S (ICD-10-CM) - Compression fracture of T12 vertebra, sequela     Evaluation Date: 3/17/2025  Authorization Period Expiration: 2/21/2026  Plan of Care Expiration: 6/17/2025  Visit # / Visits authorized: 1/ 1   Progress Note Due: 4/17/2025  FOTO: 1/ 1    Precautions: history of a recent fall     Time In: 5:00pm  Time Out: 6:00pm  Total Appointment Time (timed & untimed codes): 60 minutes    Subjective   Date of onset: March 3   History of current condition - Darrion reports: an epidose of acute bilateral leg weakness while cooking breakfast. He explained that he buckled to his knees after trying to get to the living room. Denies chest abnromalities after being examined by doctors. Pt says he feels weakness in his legs and doesn't feel balanced. Pt is able to walk short distances and doesn't exercise much especially when it is cold outside. Wants to work on the low back and then the legs. Denies falls since the incident. Denies using assistive device.       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     There is no evidence of acute compression fracture or osseous destructive process with mild chronic loss of height at T12.     Mild degenerative anterolisthesis of L4 on L5.  Small ganglion cysts protrude into the paraspinal regions at L4-5.     Degenerative disc disease with mild degenerative disc space narrowing at L4-5 and type I (Modic) degenerative endplate changes.     L4-5, there is uncovering of the disc with diffuse disc bulging and a superimposed central left paracentral disc herniation/extrusion with mild migration of disc material superiorly behind the inferior endplate of L4.  This with facet and ligamentous hypertrophy results in approaching severe central stenosis with effacement of much of the CSF signal around the nerve roots of the cauda equina.  Mild right and approaching moderate left foraminal stenosis is identified     Mild disc bulging within the remainder of the lumbar spine without advanced central or foraminal stenosis.     Presumed small bilateral renal cysts incompletely evaluated."     Impression:     "At L4-5 there is degenerative anterolisthesis with type I degenerative endplate changes, disc bulging, facet and ligamentous hypertrophy and a left paracentral disc herniation/extrusion with mild superior migration of disc material.  This constellation of findings results in approaching severe central stenosis with effacement of much of the CSF signal around the nerve " "roots of the cauda equina with mild right and approaching moderate left foraminal stenosis."     Medical History:   Past Medical History:   Diagnosis Date    Anemia     Arthritis     Cataract     CHF (congestive heart failure)     Chronic constipation     Diabetes mellitus, type 2     Felon of finger of left hand 05/11/2019    Glaucoma     Hyperlipidemia 05/13/2014    Hypertension     MCTD (mixed connective tissue disease)     Personal history of colonic polyps     Sjogren's disease     Ulcerative colitis     Urolithiasis        Surgical History:   Darrion Bennett  has a past surgical history that includes Eye surgery; Colonoscopy (2014); Cataract extraction (Bilateral, 2005); Esophagogastroduodenoscopy (N/A, 9/8/2023); and Esophagogastroduodenoscopy (N/A, 11/22/2023).    Medications:   Darrion has a current medication list which includes the following prescription(s): amlodipine, atorvastatin, true metrix level 1, blood-glucose meter, droplet pen needle, trulicity, trulicity, empagliflozin, febuxostat, fluticasone propionate, lancets, omeprazole, torsemide, tramadol, true metrix glucose test strip, and valsartan.    Allergies:   Review of patient's allergies indicates:   Allergen Reactions    Allopurinol Other (See Comments)    Azathioprine Other (See Comments)     Pancytopenia    Penicillins Nausea And Vomiting and Other (See Comments)    Rosuvastatin Other (See Comments)     Muscle pain    Allopurinol analogues Rash          Objective   30 sec STS : 6 reps     6 MWT: 1000 ft     GAIT DEVIATIONS: Darrion displays slow walking gait, fwd trunk, mild antalgic gait     Lumbar Range of Motion:    %  Flexion  80    Extension 100    Left Side Bending 100  Right Side Bending 100  Left rotation   75  Right Rotation   75   *= pain    Passive hip ROM in degrees:     Left Right  IR Min gonzalez  Min gonzalez  ER Min gonzalez   Min gonzalez    Lower Extremity Strength    Right LE  Left LE   Hip flexion: 4-/5 Hip flexion: 3+/5  Knee extension: 4/5 Knee " extension: 4/5  Knee flexion: 4/5 Knee flexion: 4/5  Hip extension:  4/5 Hip extension: 4/5  Hip abduction: 4/5 Hip abduction: 4/5  Hip adduction: 4/5 Hip adduction 4/5  Ankle dorsiflexion: 5/5 Ankle dorsiflexion: 5/5  Ankle plantarflexion: 4+/5 Ankle plantarflexion: 4+/5    Special Tests:  -Quadrant testing: positive  -RAINER: positive: tight but no pain  -Bridge Test: positive      PT Evaluation Completed? Yes  Discussed Plan of Care with patient: Yes    CMS Impairment/Limitation/Restriction for FOTO  Survey    Therapist reviewed FOTO scores for Darrion Bennett on 3/17/2025.   FOTO documents entered into [employer] - see Media section.    Limitation Score: Did not upload            TREATMENT   Treatment Time In: 4:45pm  Treatment Time Out: 5:00pm  Total Treatment time separate from Evaluation: 15 minutes    Darrion received therapeutic exercises to develop strength and flexibility for 15 minutes including:  LTR  Seated Hip ADD  Seated Hip ABD   Seated Marches     Darrion received the following manual therapy techniques:  were applied to the:  for 00 minutes, including:      Darrion participated in neuromuscular re-education activities to improve:  for 00 minutes. The following activities were included:      Darrion participated in dynamic functional therapeutic activities to improve functional performance for 00  minutes, including:      Darrion participated in gait training to improve functional mobility and safety for 00  minutes, including:      Darrion received  hot or cold pack for 00 minutes.      Home Exercises and Patient Education Provided    Education provided:   - PT role, intervention rationale     Written Home Exercises Provided: yes.  Exercises were reviewed and Darrion was able to demonstrate them prior to the end of the session.  Darrion demonstrated good  understanding of the education provided.     See EMR under Patient Instructions for exercises provided 3/17/2025.    Assessment   Darrion is a 75 y.o. male referred to  outpatient Physical Therapy with a medical diagnosis of low back pain with B LE weakness. Pt had a recent fall after an episode of acute B LE weakness. Pt has been seen by doctors regarding incident and was hospitalized. Upon evaluation, pt demonstrated gait dysfunction, weakness, pain, and residual pain in R ankle from recent fall. Impairments impacts his ADLs, recreational activities, and community ambulation due to decreased activity tolerance, functional capacity, and endurance. PT will emphasize impairments in order to restore pt to PLOF.     Pt prognosis is Good.   Pt will benefit from skilled outpatient Physical Therapy to address the deficits stated above and in the chart below, provide pt/family education, and to maximize pt's level of independence.     Plan of care discussed with patient: Yes  Pt's spiritual, cultural and educational needs considered and patient is agreeable to the plan of care and goals as stated below:     Anticipated Barriers for therapy: none     Medical Necessity is demonstrated by the following  History  Co-morbidities and personal factors that may impact the plan of care [x] LOW: no personal factors / co-morbidities  [] MODERATE: 1-2 personal factors / co-morbidities  [] HIGH: 3+ personal factors / co-morbidities    Moderate / High Support Documentation:   Co-morbidities affecting plan of care:   Anemia   Arthritis   Cataract   CHF (congestive heart failure)   Chronic constipation   Diabetes mellitus, type 2   Felon of finger of left hand   Glaucoma   Hyperlipidemia   Hypertension   MCTD (mixed connective tissue disease)   Personal history of colonic polyps   Sjogren's disease   Ulcerative colitis   Urolithiasis       Personal Factors:   no deficits     Examination  Body Structures and Functions, activity limitations and participation restrictions that may impact the plan of care [x] LOW: addressing 1-2 elements  [] MODERATE: 3+ elements  [] HIGH: 4+ elements (please support  below)    Moderate / High Support Documentation:      Clinical Presentation [x] LOW: stable  [] MODERATE: Evolving  [] HIGH: Unstable     Decision Making/ Complexity Score: low         GOALS: Short Term Goals: 4 weeks  1.Report decreased in pain at worse less than  <   / =  5  /10  to increase tolerance for functional activities. On going  2. Pt to improve range of motion by 25% to allow for improved functional mobility to allow for improvement in IADLs.  On going  3. Increased  B LE MMT 1/2  grade to increase tolerance for ADL and work activities. On going  4. Pt to tolerate HEP to improve ROM and independence with ADL's. On going    Long Term Goals: 8 weeks  1.Report decreased in pain at worse less than  <   / =  2  /10  to increase tolerance for functional activities. On going  2.Pt to improve range of motion by 75% to allow for improved functional mobility to allow for improvement in IADLs. On going  3.Increased B LE MMT  1 grade  to increase tolerance for ADL and work activities.On going  4. Pt will report 15% or less limitation on FOTO  survey score for neck pain disability to demonstrate decrease in disability and improvement in neck pain.On going  5. Pt to be Independent with HEP to improve ROM and independence with ADL's. On going  6. Pt will be able to walk 30 min in order to show improved tolerance for community ambulation. Ongoing   7. Pt will improve from 6 to 10 reps in 30 sec STS to show improved B LE functional strength for ADLs. Ongoing        Plan   Plan of care Certification: 3/17/2025 to 6/17/2025.    Outpatient Physical Therapy 2 times weekly for 8 weeks to include the following interventions: Gait Training, Manual Therapy, Moist Heat/ Ice, Neuromuscular Re-ed, Patient Education, Self Care, Therapeutic Activities, and Therapeutic Exercise. Dry needling    Emil Don Jr, PT

## 2025-03-17 NOTE — PROGRESS NOTES
CARDIOVASCULAR CONSULTATION    REASON FOR CONSULT:   Darrion Bennett is a 75 y.o. male who presents for establishing care with Cardiology.      HISTORY OF PRESENT ILLNESS:     Notes from September 2019:  Patient here for follow-up today.  Denies any chest pains at rest on exertion, orthopnea, PND.  Denies any other cardiac symptoms.    Notes from June 2019:  Patient is here for follow-up today.  Denies any chest pains at rest on exertion, orthopnea, PND.  Denies any.  Of dizziness or passing out.  Blood pressure is much better controlled in clinic today.    Initial clinic visit:  Patient is a very pleasant 69-year-old man with a past medical history significant for hypertension, diabetes, Sjogren's disease who wants to establish care with [facility].  Patient has been followed with [facility].  Patient states that in 2014 he was diagnosed with heart failure.  He states that he had passed out and had gone to the hospital, he was feeling short of breath also and at that point he was told that he had heart failure.  I can see an echocardiogram from his outside medical records in 2014 which showed normal left ventricular systolic function.  He also had a stress echocardiogram done in 2013 which did not reveal any ischemia.  He denies any chest pains at rest on exertion, orthopnea, PND, swelling of feet.  States that since then he has been okay and walks about 2-3 blocks every day.  On walking about 2-3 blocks he does experience some tightness in his left calf.  This goes away after rest.  He denies any resting calf tightness.  He denies any ulcer on his feet.  He states that he checks his blood pressure at home and has found that it is elevated around 160-170 mm of systolic multiple times.      Notes from March 2021:  Patient here for follow-up.  Denies any chest pains at rest on exertion, orthopnea, PND, swelling of feet.  Mostly blood pressure has been uncontrolled at home.  Staying around 160 mmHg.  Very  compliant with medications.  EKG done in the clinic today was personally reviewed and demonstrates sinus bradycardia with left anterior fascicular block, poor R-wave progression.      Notes from April 21:  Patient here for follow-up.  Now states that he has been experiencing dyspnea on exertion which is worse than prior.  Very concerned about that.  No chest pains or tightness, but it is gets short of breath on walking short distances and this is lifestyle limiting.  Denies orthopnea, PND, swelling of feet.  Also states that the blood pressure is staying around 140-160 mmHg at home.      Notes from May 2021:  Patient here for follow-up.  Stress test did not show any significant ischemia.  Echo showed normal left ventricle systolic function.  Symptoms have improved.  States stop taking his Crestor because it was causing myalgias      The left ventricle is normal in size with concentric remodeling and normal systolic function.  The estimated ejection fraction is 65%.  Grade II left ventricular diastolic dysfunction.  Severe left atrial enlargement.  Normal right ventricular size with normal right ventricular systolic function.  Mild right atrial enlargement.  Mild mitral regurgitation.  Normal central venous pressure (3 mmHg).  The estimated PA systolic pressure is 36 mmHg.  The sinuses of Valsalva is mildly dilated.  Normal myocardial perfusion scan. There is no evidence of myocardial ischemia or infarction.    The gated perfusion images showed an ejection fraction of 75% post stress.    There is normal wall motion post stress.    The EKG portion of this study is negative for ischemia.    The patient reported no chest pain during the stress test.    During stress, rare PVCs are noted.    Hypertensive response to exercise.  KS interval did increase during exercise.      Notes from July 2021:  Patient here for follow-up.  Has been experiencing dyspnea on exertion.  States it is getting worse.  Has an appointment with  pulmonology coming up.  Is requesting evaluation of CBC and BNP.  As well as a chest x-ray.      The left ventricle is normal in size with concentric hypertrophy and normal systolic function.  The estimated ejection fraction is 65%.  Grade I left ventricular diastolic dysfunction.  Normal right ventricular size with normal right ventricular systolic function.  Moderate left atrial enlargement.  Mild right atrial enlargement.  There is mild aortic valve stenosis.  Aortic valve area is 1.67 cm2; peak velocity is 1.95 m/s; mean gradient is 9 mmHg.  Normal central venous pressure (3 mmHg).  The estimated PA systolic pressure is 16 mmHg.      Notes from dec 21: doing fine. No cp, orthopnea, pnd.       February 2022:  Patient follow-up.  Doing fine.  Occasionally gets swelling in his feet at the end of the day..  States he takes torsemide every Saturday and Sunday.  States he was told by his nephrologist to do so.    Notes from August 2022: Patient states that his nephrologist got off his amlodipine and since then his blood pressure has been uncontrolled.  He is taking hydralazine at 50 mg t.i.d..  Blood pressure is running around 170 mmHg.  Also had an episode of chest pressure last week.  Did not seek medical attention for that.  Denies orthopnea, PND.      Notes from September 2022:  Patient here for follow-up.  Has been chest pain-free.  Stress test did not show any significant ischemia.  Echo showed normal left ventricle systolic function.      Normal myocardial perfusion scan. There is no evidence of myocardial ischemia or infarction.    The gated perfusion images showed an ejection fraction of 72% post stress.    There is normal wall motion post stress.    The EKG portion of this study is negative for ischemia.    The patient reported no chest pain during the stress test.    During stress, frequent PVCs are noted.      The left ventricle is normal in size with mild concentric hypertrophy and normal systolic  function.  The estimated ejection fraction is 60%.  Grade I left ventricular diastolic dysfunction.  Normal right ventricular size with normal right ventricular systolic function.  Moderate left atrial enlargement.  Mild right atrial enlargement.  Mild mitral regurgitation.  Mild pulmonic regurgitation.  Normal central venous pressure (3 mmHg).  The estimated PA systolic pressure is 36 mmHg.  There is mild pulmonary hypertension.      Feb 23:  Patient here for follow-up.  Denies any chest pains at rest on exertion, orthopnea, PND.  Occasional swelling of feet as well as hence.  Is on chronic prednisone.      2/27/23: doing fine. No cp, orthopnea, pnd, dizziness or syncope.    Sinus rhythm with heart rates varying between 45 and 101 BPM with an average of 66 BPM.  There were occasional PVCs totalling 607 and averaging 25.31 per hour. There were 5 couplets. There were 88 bigeminal cycles.  There were very frequent PACs totalling 3884 and averaging 161.97 per hour.  Frequent episodes of Mobitz I second degree AVB    Notes from August 24:  Patient here for follow-up.  Denies chest pains at rest on exertion, orthopnea, PND.  Main complaint is dyspnea on exertion.  States can only walk from here to the door of the office and then gets short of breath.  Feels that this has been getting worse progressively.      Notes from September 24: Patient here for follow-up.  Denies chest pains at rest on exertion, orthopnea, PND.  Breathing has gotten better after Jardiance    Results for orders placed or performed during the hospital encounter of 03/07/25   EKG 12-lead    Collection Time: 03/07/25  9:00 AM   Result Value Ref Range    QRS Duration 86 ms    [facility]HS QTC Calculation 433 ms    Narrative    Test Reason : R00.1,    Vent. Rate :  52 BPM     Atrial Rate :  78 BPM     P-R Int :    ms          QRS Dur :  86 ms      QT Int : 466 ms       P-R-T Axes :  58 255  58 degrees    QTcB Int : 433 ms    Sinus rhythm with 2nd degree A-V block  (Mobitz I)  Right superior axis deviation  Anteroseptal infarct When compared with ECG of 30-Aug-2024 08:32,  Significant changes have occurred  Confirmed by Keith Tristan ([id number]) on 3/10/2025 6:30:23 PM    Referred By: AAAREFERRAL SELF           Confirmed By: Keith Tristan       Results for orders placed during the hospital encounter of 08/30/24    Echo    Interpretation Summary    Left Ventricle: Increased wall thickness. There is normal systolic function with a visually estimated ejection fraction of 65 - 70%.    Right Ventricle: Normal right ventricular cavity size. Systolic function is normal.    Left Atrium: Left atrium is mildly dilated.    Aortic Valve: There is mild stenosis. Aortic valve area by VTI is 1.84 cm². Aortic valve peak velocity is 1.70 m/s. Mean gradient is 6 mmHg. The dimensionless index is 0.48.    Mitral Valve: There is mild regurgitation.    Pulmonary Artery: The estimated pulmonary artery systolic pressure is 23 mmHg.    IVC/SVC: Normal venous pressure at 3 mmHg.      Results for orders placed during the hospital encounter of 08/30/24    Nuclear Stress - Cardiology Interpreted    Interpretation Summary    The patient exercised for 6 minutes 53 seconds on a Jean-Paul protocol, corresponding to a functional capacity of 7METS, achieving a peak heart rate of 133 bpm, which is 91% of the age predicted maximum heart rate.    Normal myocardial perfusion scan. There is no evidence of myocardial ischemia or infarction.    The gated perfusion images showed an ejection fraction of 75% at rest.    There is normal wall motion at rest and post-stress.    The ECG portion of the study is negative for ischemia.    The patient reported no chest pain during the stress test.    During stress, frequent PVCs are noted.      No results found for this or any previous visit.      March 25:    CHIEF COMPLAINT:  Darrion presents today for follow up after recent hospitalization with elevated troponin  levels.  He had no cardiac symptoms.  Troponins were checked and found to be elevated at 0.3.    HISTORY OF PRESENT ILLNESS:  He experienced sudden leg weakness and buckling while in kitchen before breakfast, requiring assistance from son-in-law to prevent falling. He denies associated dizziness or loss of consciousness during the episode. A similar episode occurred two years ago. During the recent episode, his blood pressure was 134 and glucose was normal.    CARDIOVASCULAR:  Echocardiogram showed good heart function with grade 2 diastolic dysfunction.    MEDICATIONS:  He is currently taking valsartan and hydralazine 3 times daily for blood pressure management. He reports being out of amlodipine.         PAST MEDICAL HISTORY:     Past Medical History:   Diagnosis Date    Anemia     Arthritis     Cataract     CHF (congestive heart failure)     Chronic constipation     Diabetes mellitus, type 2     Felon of finger of left hand 05/11/2019    Glaucoma     Hyperlipidemia 05/13/2014    Hypertension     MCTD (mixed connective tissue disease)     Personal history of colonic polyps     Sjogren's disease     Ulcerative colitis     Urolithiasis        PAST SURGICAL HISTORY:     Past Surgical History:   Procedure Laterality Date    CATARACT EXTRACTION Bilateral 2005    COLONOSCOPY  2014    ESOPHAGOGASTRODUODENOSCOPY N/A 9/8/2023    Procedure: EGD (ESOPHAGOGASTRODUODENOSCOPY);  Surgeon: Divya Saenz MD;  Location: [facility];  Service: Endoscopy;  Laterality: N/A;  ref by / inst portal-    ESOPHAGOGASTRODUODENOSCOPY N/A 11/22/2023    Procedure: EGD (ESOPHAGOGASTRODUODENOSCOPY);  Surgeon: Crystal Urbina MD;  Location: [facility];  Service: Endoscopy;  Laterality: N/A;  time frame 8-12 weeks  Dr. Saenz pt   prep instructions [facility] to pt [facility] portal [facility]  diabetic  11/16- pt r/s to earlier date, pre call complete.  DBM    EYE SURGERY         ALLERGIES AND MEDICATION:     Review of  "patient's allergies indicates:   Allergen Reactions    Allopurinol Other (See Comments)    Azathioprine Other (See Comments)     Pancytopenia    Penicillins Nausea And Vomiting and Other (See Comments)    Rosuvastatin Other (See Comments)     Muscle pain    Allopurinol analogues Rash        Medication List            Accurate as of March 17, 2025  9:27 AM. If you have any questions, ask your nurse or doctor.                CHANGE how you take these medications      valsartan 320 MG tablet  Commonly known as: DIOVAN  Take 1 tablet (320 mg total) by mouth once daily.  What changed:   medication strength  how much to take  when to take this  Changed by: Greg Alvares MD            CONTINUE taking these medications      amLODIPine 5 MG tablet  Commonly known as: NORVASC  Take 1 tablet (5 mg total) by mouth 2 (two) times daily.     atorvastatin 80 MG tablet  Commonly known as: LIPITOR  Take 0.5 tablets (40 mg total) by mouth once daily.     blood-glucose meter Misc  Commonly known as: TRUE METRIX GLUCOSE METER  Test glucose 4x/day     DROPLET PEN NEEDLE 32 gauge x 5/32" Ndle  Generic drug: pen needle, diabetic  USE 1 NEEDLE AS DIRECTED FOUR TIMES DAILY     empagliflozin 10 mg tablet  Commonly known as: JARDIANCE  Take 1 tablet (10 mg total) by mouth once daily.     febuxostat 40 mg Tab  Commonly known as: ULORIC  Take 1 tablet (40 mg total) by mouth 2 (two) times a day.     fluticasone propionate 50 mcg/actuation nasal spray  Commonly known as: FLONASE  1 spray (50 mcg total) by Each Nostril route once daily.     lancets 33 gauge Misc  Commonly known as: TRUEPLUS LANCETS  Test glucose 4x/day. Dx code e11.65     omeprazole 40 MG capsule  Commonly known as: PRILOSEC  Take 1 capsule (40 mg total) by mouth every morning.     torsemide 10 MG Tab  Commonly known as: DEMADEX  TAKE 1 TABLET EVERY OTHER DAY     traMADoL 50 mg tablet  Commonly known as: ULTRAM  Take 1 tablet (50 mg total) by mouth every 12 (twelve) hours as needed " for Pain.     TRUE METRIX GLUCOSE TEST STRIP Strp  Generic drug: blood sugar diagnostic  TEST BLOOD SUGAR FOUR TIMES DAILY     TRUE METRIX LEVEL 1 Soln  Generic drug: blood glucose control, low  Use as indicated     * TRULICITY 4.5 mg/0.5 mL pen injector  Generic drug: dulaglutide  INJECT 4.5MG (1 PEN) UNDER THE SKIN EVERY WEEK     * TRULICITY 3 mg/0.5 mL pen injector  Generic drug: dulaglutide  Inject 3 mg into the skin every 7 days.           * This list has 2 medication(s) that are the same as other medications prescribed for you. Read the directions carefully, and ask your doctor or other care provider to review them with you.                STOP taking these medications      hydrALAZINE 25 MG tablet  Commonly known as: APRESOLINE  Stopped by: Greg Alvares MD               Where to Get Your Medications        These medications were sent to [facility] - [place], [place] - [address], [facility] [address]      Phone: [phone]   amLODIPine 5 MG tablet  valsartan 320 MG tablet         SOCIAL HISTORY:     Social History     Socioeconomic History    Marital status:     Number of children: 3   Tobacco Use    Smoking status: Never     Passive exposure: Never    Smokeless tobacco: Never   Substance and Sexual Activity    Alcohol use: No    Drug use: Yes     Comment: ultram 1 - 2 tabs a week    Sexual activity: Not Currently     Partners: Female     Social Drivers of Health     Financial Resource Strain: Low Risk  (3/8/2025)    Overall Financial Resource Strain (CARDIA)     Difficulty of Paying Living Expenses: Not hard at all   Food Insecurity: No Food Insecurity (3/8/2025)    Hunger Vital Sign     Worried About Running Out of Food in the Last Year: Never true     Ran Out of Food in the Last Year: Never true   Transportation Needs: No Transportation Needs (3/8/2025)    PRAPARE - Transportation     Lack of Transportation (Medical): No     Lack of Transportation  (Non-Medical): No   Physical Activity: Insufficiently Active (3/8/2025)    Exercise Vital Sign     Days of Exercise per Week: 1 day     Minutes of Exercise per Session: 10 min   Stress: No Stress Concern Present (3/8/2025)    [employer] [place] [employer] - Occupational Stress Questionnaire     Feeling of Stress : Not at all   Housing Stability: Low Risk  (3/8/2025)    Housing Stability Vital Sign     Unable to Pay for Housing in the Last Year: No     Number of Times Moved in the Last Year: 0     Homeless in the Last Year: No       FAMILY HISTORY:     Family History   Problem Relation Name Age of Onset    Hypertension Father      Stroke Father      Cataracts Father      Glaucoma Father      Cataracts Mother      No Known Problems Sister      No Known Problems Brother      Rheum arthritis Maternal Aunt      Rheum arthritis Maternal Uncle      No Known Problems Paternal Aunt      No Known Problems Paternal Uncle      No Known Problems Maternal Grandmother      No Known Problems Maternal Grandfather      Throat cancer Paternal Grandmother      Glaucoma Paternal Grandfather      No Known Problems Daughter      No Known Problems Son x2     Celiac disease Neg Hx      Colon cancer Neg Hx      Cirrhosis Neg Hx      Colon polyps Neg Hx      Crohn's disease Neg Hx      Cystic fibrosis Neg Hx      Hemochromatosis Neg Hx      Esophageal cancer Neg Hx      Inflammatory bowel disease Neg Hx      Irritable bowel syndrome Neg Hx      Liver cancer Neg Hx      Liver disease Neg Hx      Rectal cancer Neg Hx      Stomach cancer Neg Hx      Ulcerative colitis Neg Hx      Chase's disease Neg Hx      Amblyopia Neg Hx      Blindness Neg Hx      Cancer Neg Hx      Diabetes Neg Hx      Macular degeneration Neg Hx      Retinal detachment Neg Hx      Strabismus Neg Hx      Thyroid disease Neg Hx      Melanoma Neg Hx         REVIEW OF SYSTEMS:   Review of Systems   Constitutional: Negative.    HENT: Negative.    Eyes: Negative.   "  Respiratory: Negative.    Gastrointestinal: Negative.    Genitourinary: Negative.    Musculoskeletal: Negative.    Skin: Negative.    Neurological: Negative.    Endo/Heme/Allergies: Negative.    Psychiatric/Behavioral: Negative.    All other systems reviewed and are negative.      A 10 point review of systems was performed and all the pertinent positives have been mentioned. Rest of review of systems was negative.        PHYSICAL EXAM:     Vitals:    03/17/25 0852   BP: 136/68   Pulse: 76   Resp: 18      Body mass index is 20.32 kg/m².  Weight: 57.1 kg (125 lb 14.1 oz)   Height: 5' 6" (167.6 cm)     Physical Exam  Vitals and nursing note reviewed.   Constitutional:       Appearance: Normal appearance. He is well-developed.   HENT:      Head: Normocephalic and atraumatic.      Right Ear: Hearing normal.      Left Ear: Hearing normal.      Nose: Nose normal.   Eyes:      General: Lids are normal.      Conjunctiva/sclera: Conjunctivae normal.      Pupils: Pupils are equal, round, and reactive to light.   Cardiovascular:      Rate and Rhythm: Normal rate and regular rhythm.      Pulses: Normal pulses.      Heart sounds: Murmur heard.    Systolic murmur is present with a grade of 2/6.  Pulmonary:      Effort: Pulmonary effort is normal.      Breath sounds: Normal breath sounds.   Abdominal:      Palpations: Abdomen is soft.      Tenderness: There is no abdominal tenderness.   Musculoskeletal:         General: No deformity.      Cervical back: Normal range of motion and neck supple.   Skin:     General: Skin is warm and dry.   Neurological:      Mental Status: He is alert and oriented to person, place, and time.   Psychiatric:         Speech: Speech normal.           DATA:     Laboratory:  CBC:  Recent Labs   Lab 01/24/25  0720 03/07/25  1003 03/08/25  0520   WBC 5.94 9.01 5.97   Hemoglobin 12.1 L 12.4 L 10.6 L   Hematocrit 40.0 37.8 L 32.8 L   Platelets 190 211 178       CHEMISTRIES:  Recent Labs   Lab 03/28/22  0712 " 04/22/22  0845 09/26/22  0950 01/24/25  0720 03/07/25  1003 03/08/25  0520   Glucose 134 H 133 H   < > 101 119 H 140 H   Sodium 137 136   < > 137 137 135 L   Potassium 4.4 4.7   < > 4.7 4.5 4.5   BUN 30 H 26 H   < > 38 H 25 H 32 H   Creatinine 1.2 1.3   < > 1.7 H 1.7 H 1.7 H   eGFR if  >60.0 >60.0  --   --   --   --    eGFR if non African American >60.0 54.5 A  --   --   --   --    Calcium 9.5 9.4   < > 9.5 9.1 8.6 L   Magnesium  --   --   --   --  1.8 1.7    < > = values in this interval not displayed.       CARDIAC BIOMARKERS:  Recent Labs   Lab 03/07/25  1003 03/07/25  1346 03/08/25  0826 03/08/25  1404     --   --   --    Troponin I 0.035 H 0.072 H 0.395 H 0.368 H         COAGS:          LIPIDS/LFTS:  Recent Labs   Lab 08/09/23  0734 11/27/23  0810 02/12/24  0710 02/26/24  0941 05/22/24  0710 01/24/25  0720 03/07/25  1003 03/08/25  0520   Cholesterol 137  --  135 154  --   --   --   --    Triglycerides 93  --  75 135  --   --   --   --    HDL 35 L  --  44 36 L  --   --   --   --    LDL Cholesterol 83.4  --  76.0 91.0  --   --   --   --    Non-HDL Cholesterol 102  --  91 118  --   --   --   --    AST  --    < >  --   --    < > 27 23 23   ALT  --    < >  --   --    < > 21 18 18    < > = values in this interval not displayed.       Hemoglobin A1C   Date Value Ref Range Status   03/08/2025 6.7 (H) 4.0 - 5.6 % Final     Comment:     ADA Screening Guidelines:  5.7-6.4%  Consistent with prediabetes  >or=6.5%  Consistent with diabetes    High levels of fetal hemoglobin interfere with the HbA1C  assay. Heterozygous hemoglobin variants (HbS, HgC, etc)do  not significantly interfere with this assay.   However, presence of multiple variants may affect accuracy.     08/28/2024 6.9 (H) 4.0 - 5.6 % Final     Comment:     ADA Screening Guidelines:  5.7-6.4%  Consistent with prediabetes  >or=6.5%  Consistent with diabetes    High levels of fetal hemoglobin interfere with the HbA1C  assay. Heterozygous  hemoglobin variants (HbS, HgC, etc)do  not significantly interfere with this assay.   However, presence of multiple variants may affect accuracy.     05/23/2024 6.6 (H) 4.0 - 5.6 % Final     Comment:     ADA Screening Guidelines:  5.7-6.4%  Consistent with prediabetes  >or=6.5%  Consistent with diabetes    High levels of fetal hemoglobin interfere with the HbA1C  assay. Heterozygous hemoglobin variants (HbS, HgC, etc)do  not significantly interfere with this assay.   However, presence of multiple variants may affect accuracy.     10/16/2018 6.2 (H) 4.0 - 5.7 % Final     Comment:     Dr. Jurgen Ward ( Endocrinology )       TSH  Recent Labs   Lab 09/26/22  0950 03/07/25  1003   TSH 2.281 4.246 H       The 10-year ASCVD risk score (Lamont SAAVEDRA, et al., 2019) is: 52.1%    Values used to calculate the score:      Age: 75 years      Sex: Male      Is Non- : No      Diabetic: Yes      Tobacco smoker: No      Systolic Blood Pressure: 136 mmHg      Is BP treated: Yes      HDL Cholesterol: 36 mg/dL      Total Cholesterol: 154 mg/dL           Cardiovascular Testing:    EKG: (personally reviewed tracing)  Sinus bradycardia with first-degree AV block left anterior fascicle, septal infarct.    KIRT in June 2019:  Normal resting KIRT/PVR waveforms bilaterally.  Normal exercise KIRT bilaterally (with minimal drop from resting KIRT measurements).        2D echocardiogram in June 2019:  Normal left ventricular systolic function. The estimated ejection fraction is 65%  Moderate left atrial enlargement.  Normal LV diastolic function.  Mild right atrial enlargement.  Normal central venous pressure (3 mm Hg).  The estimated PA systolic pressure is 14 mm Hg            2D echocardiogram 2014 done at [facility]    HEIGHT: 167.6 cm  WEIGHT: 74.8 kg  BP: 157/82  BSA:  MEASUREMENTS  ------------  IVSd: 1.2 cm  LVIDd: 3.8 cm  LVPWd: 1.2 cm  LVIDs: 2.6 cm  LA Diam: 3.2 cm  Ao Diam: 3.1 cm  LAESV Index (A-L): 32.40 ml/m²  LVCO  Dopp: 6.98 l/min  LVCI Dopp: 3.79 l/minm²    FINDINGS  -------  CPT CODE:55039-56.  Transthoracic echocardiogram (complete).  The attending physician   listed herein personally reviewed all stored images and directly supervised the   fellow-in-training (if listed) in the study's acquistion and/or report   generation.     Study priority:  Routine.  ICD-9 Indication(s):786.05 - Dyspnea.  Study Quality:The study was technically adequate.  ECG Rhythm:Sinus rhythm.  CHAMBER SIZE:All cardiac chamber sizes are within normal limits.  AORTA:Normal aortic root and proximal ascending aorta.  VALVULAR STRUCTURES:The visualized cardiac valves are structurally normal.  LEFT/RIGHT VENTRICULAR FUNCTION:LV size, wall thickness and systolic function are normal, with an EF > 55%.       The right ventricle is normal in size and function.  DOPPLER:  Color:No valvular insufficiencies.  DIASTOLOGY:The diastolic filling pattern is normal for the age of the patient.  PERICARDIUM / EFFUSIONS:No effusions noted.  INFERIOR VENA CAVA:Normal size inferior vena cava.  PA PRESSURE:The estimated systolic pulmonary artery pressure is normal at < 35 mm Hg (RA   pressure = 3 mm Hg).  CARDIOLOGY:    Electronically signed:  STAFF:ELIAS JAIME M.D.  Fellow:G. MOHSEN, M.D.    CONCLUSIONS  -----------  1. LV size, wall thickness and systolic function are normal, with an EF > 55%.  2. The diastolic filling pattern is normal for the age of the patient.    ASSESSMENT AND PLAN     Patient Active Problem List   Diagnosis    Essential hypertension    Controlled type 2 diabetes mellitus with diabetic polyneuropathy, with long-term current use of insulin    Dyslipidemia    MCTD (mixed connective tissue disease)    Sjogren's disease    Bilateral edema of lower extremity 6/2019 TTE normal LV systolic and diastolic function; ABIs normal    Glaucoma    BMI 23.0-23.9, adult    Jackson's esophagus without dysplasia    Anemia from plaquenil and imuran    Neutropenia     Current chronic use of systemic steroids    Compression fracture of body of thoracic vertebra on XR 2/2019; 2/2019 alendronate    Anemia of chronic renal failure, stage 3b    Chronic constipation not responding to linzess, miralax, senna    Goals of care, counseling/discussion    Tophaceous gout    Pseudophakia of both eyes    Refractive error    Aortic atherosclerosis    Celiac disease    Diastolic heart failure, NYHA class 2    Stage 3a chronic kidney disease    RA (rheumatoid arthritis)    Secondary hyperparathyroidism of renal origin    Age-related osteoporosis without current pathological fracture    Mobitz I    Bilateral carotid artery stenosis on CTA 6/26/21    History of stroke    Dyspnea on exertion    JAZLYN (obstructive sleep apnea)    Long-term insulin use    Dyshidrotic eczema    Difficulty walking    Hip joint stiffness, bilateral    Duodenal ulcer 9/8/23 EGD LA grade C esophagitis with bleeding. 3 cm HH, erythematous mucosa antrum. nonbleeding duodenal ulcers no stigmata of bleeding.  Path chronic inactive gastritis.  H pylori ne    Poor posture    Impaired functional mobility, balance, gait, and endurance    Restrictive lung disease    Bradycardia    Elevated troponin    Fall    Anterolisthesis of L4-L5       1.  Mildly elevated troponin in the absence of cardiovascular symptoms.  Discussed various options for further evaluation including repeating a stress test versus doing coronary angiography.  After detailed discussion, patient and his family chose to proceed with a stress test for further evaluation.    2.  Hypertension:  Will try to reduce polypharmacy by stopping hydralazine.  Continue amlodipine and increase valsartan to 320 mg daily    3.  Dyslipidemia:  Tolerating lipitor    4.  ABIs in June 2019 were within normal limits.    5.  Chronic kidney disease    7. Elevated TSH.   rx per primary    8. Torsemide to be used as needed for swelling of feet.     9. Carotid bruit: moderate plaque per ct  scan in 2021. Regular surveillance with carotid us.    10:  Frequent PVCs:  asymptomatic.      Follow-up after 6 m      Thank you very much for involving me in the care of your patient.  Please do not hesitate to contact me if there are any questions.      Greg Alvares MD, FAC, [facility]  Interventional Cardiologist, [facility].       Visit today included increased complexity associated with the care of the episodic problem elevated troponins, hypertension addressed and managing the longitudinal care of the patient due to the serious and/or complex managed problem(s) Problem List[1]  .    This note was dictated with the help of speech recognition software.  There might be un-intended errors and/or substitutions.                                   [1]   Patient Active Problem List  Diagnosis    Essential hypertension    Controlled type 2 diabetes mellitus with diabetic polyneuropathy, with long-term current use of insulin    Dyslipidemia    MCTD (mixed connective tissue disease)    Sjogren's disease    Bilateral edema of lower extremity 6/2019 TTE normal LV systolic and diastolic function; ABIs normal    Glaucoma    BMI 23.0-23.9, adult    Jackson's esophagus without dysplasia    Anemia from plaquenil and imuran    Neutropenia    Current chronic use of systemic steroids    Compression fracture of body of thoracic vertebra on XR 2/2019; 2/2019 alendronate    Anemia of chronic renal failure, stage 3b    Chronic constipation not responding to linzess, miralax, senna    Goals of care, counseling/discussion    Tophaceous gout    Pseudophakia of both eyes    Refractive error    Aortic atherosclerosis    Celiac disease    Diastolic heart failure, NYHA class 2    Stage 3a chronic kidney disease    RA (rheumatoid arthritis)    Secondary hyperparathyroidism of renal origin    Age-related osteoporosis without current pathological fracture    Mobitz I    Bilateral carotid artery stenosis on CTA 6/26/21    History of stroke     Dyspnea on exertion    JAZLYN (obstructive sleep apnea)    Long-term insulin use    Dyshidrotic eczema    Difficulty walking    Hip joint stiffness, bilateral    Duodenal ulcer 9/8/23 EGD LA grade C esophagitis with bleeding. 3 cm HH, erythematous mucosa antrum. nonbleeding duodenal ulcers no stigmata of bleeding.  Path chronic inactive gastritis.  H pylori ne    Poor posture    Impaired functional mobility, balance, gait, and endurance    Restrictive lung disease    Bradycardia    Elevated troponin    Fall    Anterolisthesis of L4-L5

## 2025-03-18 ENCOUNTER — CLINICAL SUPPORT (OUTPATIENT)
Dept: REHABILITATION | Facility: HOSPITAL | Age: 75
End: 2025-03-18
Attending: INTERNAL MEDICINE
Payer: MEDICARE

## 2025-03-18 DIAGNOSIS — M54.59 OTHER LOW BACK PAIN: Primary | ICD-10-CM

## 2025-03-18 DIAGNOSIS — S22.000A COMPRESSION FRACTURE OF BODY OF THORACIC VERTEBRA: ICD-10-CM

## 2025-03-18 DIAGNOSIS — M47.896 OTHER SPONDYLOSIS, LUMBAR REGION: ICD-10-CM

## 2025-03-18 DIAGNOSIS — R29.898 WEAKNESS OF BOTH LOWER EXTREMITIES: ICD-10-CM

## 2025-03-18 PROBLEM — Z74.09 IMPAIRED FUNCTIONAL MOBILITY, BALANCE, GAIT, AND ENDURANCE: Status: ACTIVE | Noted: 2025-03-18

## 2025-03-18 PROBLEM — R53.1 WEAKNESS: Status: ACTIVE | Noted: 2025-03-18

## 2025-03-18 PROCEDURE — 97814 ACUP 1/> W/ESTIM EA ADDL 15: CPT | Mod: PN

## 2025-03-18 PROCEDURE — 97810 ACUP 1/> WO ESTIM 1ST 15 MIN: CPT | Mod: PN

## 2025-03-18 NOTE — PROGRESS NOTES
Acupuncture Treatment Note     Name: Darrion Bennett  [facility] Number: [id number]    Traditional Chinese Medicine Diagnosis: Qi Stagnation and Blood Stasis   Physician: Farooq Domingo MD    Date of Service: 3/18/2025     Medical Diagnosis: Chronic pain in lower back, mid back and upper back       Encounter Diagnoses   Name Primary?    Chronic midline low back pain without sciatica      Other spondylosis, lumbar region      Visit #/Visits authorized: 1 / 12     Precautions: Diabetes and Immunosuppression, Compression fracture of body of thoracic vertebra, Diastolic heart failure , Bilateral carotid artery stenosis on CTA 6/26/21 , MCTD (mixed connective tissue disease), Sjogren's disease, RA (rheumatoid arthritis), Anemia from plaquenil and imuran     Subjective     Chief Complaint:  Chronic pain in lower back, mid back and upper back for 2 years , Weakness of right leg    Cancer Related Symptoms: None    Medical necessity is demonstrated by the following IMPAIRMENTS: Medical Necessity: Decreased mobility limits day to day activities, social, and emergent situations and Decreased quality of life    Response to Previous Treatment:  good    Quality of Symptoms (Better/Worse):  better    Other Condition/Symptoms:   MCTD (mixed connective tissue disease), Sjogren's disease, RA (rheumatoid arthritis),     Objective      New Findings:  None    Treatment Principles:  Increase Qi and Blood, stop pain     Acupuncture points used:    Bilateral points:Rahul Tuo Mikaela Ji + 9, Zoe on neck + 4  Left:   Right: Si Ma Nina + 3, GB 31, ST 36, GB 34, SP 9, SP 6, GB 39, LV 3  EA: Right: Si Ma Nina + 3, GB 31,   EA: Eight: ST 36, GB 34, GB 39, LV 3  Auricular Treatment:  None  Needles In: 36  Needles Out: 36  Needles W/ STIM placed: 8:55 AM  Needles W/ STIM removed: 10: 00 AM    Other Traditional Chinese Medicine Modalities:  None    Recommendations:  PT    Education:  Patient is aware of cumulative effect of acupuncture      Assessment       Analysis of Treatment:  Patient felt good    Pt prognosis is Good.     Patient will continue to benefit from acupuncture treatment to address the deficits listed in the problem list box on initial evaluation, provide patient family education and to maximize pt's level of independence in the home and community environment.     Patient's spiritual, cultural and educational needs considered and pt agreeable to plan of care and goals.     Anticipated barriers to treatment: None    Plan     Recommend       1  /week for   12 treatments and re-assess.

## 2025-03-20 ENCOUNTER — PATIENT MESSAGE (OUTPATIENT)
Dept: ADMINISTRATIVE | Facility: OTHER | Age: 75
End: 2025-03-20
Payer: MEDICARE

## 2025-03-24 DIAGNOSIS — E11.8 CONTROLLED TYPE 2 DIABETES MELLITUS WITH COMPLICATION, WITH LONG-TERM CURRENT USE OF INSULIN: ICD-10-CM

## 2025-03-24 DIAGNOSIS — R80.9 MICROALBUMINURIA: ICD-10-CM

## 2025-03-24 DIAGNOSIS — I50.30 DIASTOLIC HEART FAILURE, NYHA CLASS 2: ICD-10-CM

## 2025-03-24 DIAGNOSIS — Z79.4 CONTROLLED TYPE 2 DIABETES MELLITUS WITH COMPLICATION, WITH LONG-TERM CURRENT USE OF INSULIN: ICD-10-CM

## 2025-03-24 DIAGNOSIS — Z86.73 HISTORY OF STROKE: ICD-10-CM

## 2025-03-24 RX ORDER — EMPAGLIFLOZIN 10 MG/1
10 TABLET, FILM COATED ORAL
Qty: 90 TABLET | Refills: 3 | Status: SHIPPED | OUTPATIENT
Start: 2025-03-24

## 2025-03-27 PROBLEM — M48.061 SPINAL STENOSIS OF LUMBAR REGION: Status: ACTIVE | Noted: 2025-03-27

## 2025-03-27 PROBLEM — R79.89 ELEVATED TROPONIN: Status: RESOLVED | Noted: 2025-03-08 | Resolved: 2025-03-27

## 2025-03-31 ENCOUNTER — INFUSION (OUTPATIENT)
Dept: INFUSION THERAPY | Facility: HOSPITAL | Age: 75
End: 2025-03-31
Payer: MEDICARE

## 2025-03-31 VITALS
BODY MASS INDEX: 20.32 KG/M2 | HEART RATE: 79 BPM | DIASTOLIC BLOOD PRESSURE: 74 MMHG | SYSTOLIC BLOOD PRESSURE: 133 MMHG | TEMPERATURE: 98 F | RESPIRATION RATE: 18 BRPM | WEIGHT: 125.88 LBS | OXYGEN SATURATION: 97 %

## 2025-03-31 DIAGNOSIS — M06.9 RHEUMATOID ARTHRITIS, INVOLVING UNSPECIFIED SITE, UNSPECIFIED WHETHER RHEUMATOID FACTOR PRESENT: Primary | ICD-10-CM

## 2025-03-31 PROCEDURE — 63600175 PHARM REV CODE 636 W HCPCS: Performed by: INTERNAL MEDICINE

## 2025-03-31 PROCEDURE — 25000003 PHARM REV CODE 250: Performed by: INTERNAL MEDICINE

## 2025-03-31 PROCEDURE — 96365 THER/PROPH/DIAG IV INF INIT: CPT

## 2025-03-31 PROCEDURE — 96375 TX/PRO/DX INJ NEW DRUG ADDON: CPT

## 2025-03-31 RX ORDER — ACETAMINOPHEN 325 MG/1
650 TABLET ORAL
Status: COMPLETED | OUTPATIENT
Start: 2025-03-31 | End: 2025-03-31

## 2025-03-31 RX ORDER — DIPHENHYDRAMINE HYDROCHLORIDE 50 MG/ML
25 INJECTION, SOLUTION INTRAMUSCULAR; INTRAVENOUS
OUTPATIENT
Start: 2025-04-28

## 2025-03-31 RX ORDER — EPINEPHRINE 0.3 MG/.3ML
0.3 INJECTION SUBCUTANEOUS ONCE AS NEEDED
OUTPATIENT
Start: 2025-04-28

## 2025-03-31 RX ORDER — HEPARIN 100 UNIT/ML
500 SYRINGE INTRAVENOUS
OUTPATIENT
Start: 2025-04-28

## 2025-03-31 RX ORDER — DIPHENHYDRAMINE HYDROCHLORIDE 50 MG/ML
50 INJECTION, SOLUTION INTRAMUSCULAR; INTRAVENOUS ONCE AS NEEDED
OUTPATIENT
Start: 2025-04-28

## 2025-03-31 RX ORDER — ACETAMINOPHEN 325 MG/1
650 TABLET ORAL
OUTPATIENT
Start: 2025-04-28

## 2025-03-31 RX ORDER — SODIUM CHLORIDE 0.9 % (FLUSH) 0.9 %
10 SYRINGE (ML) INJECTION
OUTPATIENT
Start: 2025-04-28

## 2025-03-31 RX ORDER — DIPHENHYDRAMINE HYDROCHLORIDE 50 MG/ML
25 INJECTION, SOLUTION INTRAMUSCULAR; INTRAVENOUS
Status: COMPLETED | OUTPATIENT
Start: 2025-03-31 | End: 2025-03-31

## 2025-03-31 RX ADMIN — SODIUM CHLORIDE 500 MG: 9 INJECTION, SOLUTION INTRAVENOUS at 09:03

## 2025-03-31 RX ADMIN — ACETAMINOPHEN 650 MG: 325 TABLET ORAL at 09:03

## 2025-03-31 RX ADMIN — DIPHENHYDRAMINE HYDROCHLORIDE 25 MG: 50 INJECTION INTRAMUSCULAR; INTRAVENOUS at 09:03

## 2025-03-31 NOTE — PLAN OF CARE
Pt arrived to unit ambulatory, alert, and oriented. Pt reports no new or worsening symptoms at this time. Pt had PIV placed in right forearm. Pt had Tylenol and Benadryl prior to infusion. Pt then received Orencia infusion over 30 minutes per plan with no S&S of complications. Pt discharged off unit in NAD.

## 2025-04-03 ENCOUNTER — HOSPITAL ENCOUNTER (OUTPATIENT)
Dept: CARDIOLOGY | Facility: HOSPITAL | Age: 75
Discharge: HOME | End: 2025-04-03
Attending: INTERNAL MEDICINE
Payer: MEDICARE

## 2025-04-03 ENCOUNTER — HOSPITAL ENCOUNTER (OUTPATIENT)
Dept: RADIOLOGY | Facility: HOSPITAL | Age: 75
Discharge: HOME | End: 2025-04-03
Attending: INTERNAL MEDICINE
Payer: MEDICARE

## 2025-04-03 DIAGNOSIS — R79.89 ELEVATED TROPONIN: ICD-10-CM

## 2025-04-03 LAB
CV STRESS BASE HR: 53 BPM
DIASTOLIC BLOOD PRESSURE: 91 MMHG
NUC REST EJECTION FRACTION: 78
OHS CV CPX 85 PERCENT MAX PREDICTED HEART RATE MALE: 123
OHS CV CPX MAX PREDICTED HEART RATE: 145
OHS CV CPX PATIENT IS FEMALE: 0
OHS CV CPX PATIENT IS MALE: 1
OHS CV CPX PEAK DIASTOLIC BLOOD PRESSURE: 51 MMHG
OHS CV CPX PEAK HEAR RATE: 83 BPM
OHS CV CPX PEAK RATE PRESSURE PRODUCT: 9794
OHS CV CPX PEAK SYSTOLIC BLOOD PRESSURE: 118 MMHG
OHS CV CPX PERCENT MAX PREDICTED HEART RATE ACHIEVED: 57
OHS CV CPX RATE PRESSURE PRODUCT PRESENTING: NORMAL
OHS CV INITIAL DOSE: 10.5 MCG/KG/MIN
OHS CV PEAK DOSE: 30.6 MCG/KG/MIN
SYSTOLIC BLOOD PRESSURE: 195 MMHG

## 2025-04-03 PROCEDURE — A9502 TC99M TETROFOSMIN: HCPCS | Performed by: INTERNAL MEDICINE

## 2025-04-03 PROCEDURE — 93017 CV STRESS TEST TRACING ONLY: CPT

## 2025-04-03 PROCEDURE — 93016 CV STRESS TEST SUPVJ ONLY: CPT | Mod: ,,, | Performed by: INTERNAL MEDICINE

## 2025-04-03 PROCEDURE — 63600175 PHARM REV CODE 636 W HCPCS: Performed by: INTERNAL MEDICINE

## 2025-04-03 PROCEDURE — 93018 CV STRESS TEST I&R ONLY: CPT | Mod: ,,, | Performed by: INTERNAL MEDICINE

## 2025-04-03 PROCEDURE — 78452 HT MUSCLE IMAGE SPECT MULT: CPT

## 2025-04-03 PROCEDURE — 78452 HT MUSCLE IMAGE SPECT MULT: CPT | Mod: 26,,, | Performed by: INTERNAL MEDICINE

## 2025-04-03 RX ORDER — REGADENOSON 0.08 MG/ML
0.4 INJECTION, SOLUTION INTRAVENOUS ONCE
Status: COMPLETED | OUTPATIENT
Start: 2025-04-03 | End: 2025-04-03

## 2025-04-03 RX ADMIN — TETROFOSMIN 30.6 MILLICURIE: 1.38 INJECTION, POWDER, LYOPHILIZED, FOR SOLUTION INTRAVENOUS at 08:04

## 2025-04-03 RX ADMIN — TETROFOSMIN 10.5 MILLICURIE: 1.38 INJECTION, POWDER, LYOPHILIZED, FOR SOLUTION INTRAVENOUS at 07:04

## 2025-04-03 RX ADMIN — REGADENOSON 0.4 MG: 0.08 INJECTION, SOLUTION INTRAVENOUS at 08:04

## 2025-04-08 ENCOUNTER — CLINICAL SUPPORT (OUTPATIENT)
Dept: REHABILITATION | Facility: HOSPITAL | Age: 75
End: 2025-04-08
Payer: MEDICARE

## 2025-04-08 DIAGNOSIS — M54.59 OTHER LOW BACK PAIN: Primary | ICD-10-CM

## 2025-04-08 DIAGNOSIS — G89.29 MID BACK PAIN, CHRONIC: ICD-10-CM

## 2025-04-08 DIAGNOSIS — M54.9 MID BACK PAIN, CHRONIC: ICD-10-CM

## 2025-04-08 DIAGNOSIS — M54.2 CHRONIC NECK PAIN: ICD-10-CM

## 2025-04-08 DIAGNOSIS — S22.000A COMPRESSION FRACTURE OF BODY OF THORACIC VERTEBRA: ICD-10-CM

## 2025-04-08 DIAGNOSIS — M54.9 UPPER BACK PAIN, CHRONIC: ICD-10-CM

## 2025-04-08 DIAGNOSIS — G89.29 UPPER BACK PAIN, CHRONIC: ICD-10-CM

## 2025-04-08 DIAGNOSIS — G89.29 CHRONIC NECK PAIN: ICD-10-CM

## 2025-04-08 PROCEDURE — 97811 ACUP 1/> W/O ESTIM EA ADD 15: CPT | Mod: PN

## 2025-04-08 PROCEDURE — 97810 ACUP 1/> WO ESTIM 1ST 15 MIN: CPT | Mod: PN

## 2025-04-08 NOTE — PROGRESS NOTES
Acupuncture Treatment Note     Name: Darrion Bennett  [facility] Number: [id number]    Traditional Chinese Medicine Diagnosis: Qi Stagnation and Blood Stasis   Physician: Farooq Domingo MD    Date of Service: 4/8/2025     Medical Diagnosis: Chronic pain in lower back, mid back and upper back       Encounter Diagnoses   Name Primary?    Chronic midline low back pain without sciatica      Other spondylosis, lumbar region      Visit #/Visits authorized: 2 / 12     Precautions: Diabetes and Immunosuppression, Compression fracture of body of thoracic vertebra, Diastolic heart failure , Bilateral carotid artery stenosis on CTA 6/26/21 , MCTD (mixed connective tissue disease), Sjogren's disease, RA (rheumatoid arthritis), Anemia from plaquenil and imuran     Subjective     Chief Complaint:  Chronic pain in lower back, mid back and upper back for 2 years , Weakness of right leg    Cancer Related Symptoms: None    Medical necessity is demonstrated by the following IMPAIRMENTS: Medical Necessity: Decreased mobility limits day to day activities, social, and emergent situations and Decreased quality of life    Response to Previous Treatment:  good    Quality of Symptoms (Better/Worse):  better    Other Condition/Symptoms:   MCTD (mixed connective tissue disease), Sjogren's disease, RA (rheumatoid arthritis),     Objective      New Findings:  None    Treatment Principles:  Increase Qi and Blood, stop pain     Acupuncture points used:    Bilateral points:Rahul Tuo Mikaela Ji + 9  Left:   Right:   Auricular Treatment:  None  Needles In: 18  Needles Out: 18  Needles W/O STIM placed: 8:50 AM  Corwith W/O STIM removed: 9: 30 AM    Other Traditional Chinese Medicine Modalities:  None    Recommendations:  PT    Education:  Patient is aware of cumulative effect of acupuncture      Assessment      Analysis of Treatment:  Patient felt good    Pt prognosis is Good.     Patient will continue to benefit from acupuncture treatment to address the  deficits listed in the problem list box on initial evaluation, provide patient family education and to maximize pt's level of independence in the home and community environment.     Patient's spiritual, cultural and educational needs considered and pt agreeable to plan of care and goals.     Anticipated barriers to treatment: None    Plan     Recommend       1  /week for   12 treatments and re-assess.

## 2025-04-10 ENCOUNTER — CLINICAL SUPPORT (OUTPATIENT)
Dept: REHABILITATION | Facility: HOSPITAL | Age: 75
End: 2025-04-10
Payer: MEDICARE

## 2025-04-10 DIAGNOSIS — R53.1 WEAKNESS: Primary | ICD-10-CM

## 2025-04-10 DIAGNOSIS — Z74.09 IMPAIRED FUNCTIONAL MOBILITY, BALANCE, GAIT, AND ENDURANCE: ICD-10-CM

## 2025-04-10 PROCEDURE — 97112 NEUROMUSCULAR REEDUCATION: CPT | Mod: PN

## 2025-04-10 PROCEDURE — 97530 THERAPEUTIC ACTIVITIES: CPT | Mod: PN

## 2025-04-10 PROCEDURE — 97110 THERAPEUTIC EXERCISES: CPT | Mod: PN

## 2025-04-10 NOTE — PROGRESS NOTES
"[facility]   Physical Therapy Treatment Note      Name: Darrion Bennett  [facility] Number: [id number]    Therapy Diagnosis:   Encounter Diagnoses   Name Primary?    Weakness Yes    Impaired functional mobility, balance, gait, and endurance      Physician: Diony Sánchez,*    Visit Date: 4/10/2025    Physician Orders: PT Eval and Treat   Medical Diagnosis from Referral:   M54.2,G89.29 (ICD-10-CM) - Chronic neck pain   M47.816 (ICD-10-CM) - Lumbar spondylosis   S22.080S (ICD-10-CM) - Compression fracture of T12 vertebra, sequela      Evaluation Date: 3/17/2025  Authorization Period Expiration: 2/21/2026  Plan of Care Expiration: 6/17/2025  Visit # / Visits authorized: 1/ 1   Progress Note Due: 4/17/2025  FOTO: 1/ 1     Precautions: history of a recent fall      Time In: 9:00am  Time Out: 10:00am  Total Appointment Time (timed & untimed codes): 60 minutes  Total Billable Time: 53 min        PTA Visit #: /5       Subjective     Patient reports: did a lot of walking on the beach when he was in the [facility]. He has been compliant with exercises. He has the usual low back pain that isn't that bad.  He was compliant with home exercise program.  Response to previous treatment: 1st treatment session   Functional change: ongoing     Pain: 6/10  Location: low back     Objective      Objective Measures updated at progress report unless specified.     Treatment     Darrion received the treatments listed below:      therapeutic exercises to develop strength and flexibility for 30 minutes including:  LTR x 2 min   DKTC x 2 min   RAINER stretch 6 x 10" each   Open books x 12 each   Seated Marches x 20  Matrix ABD 2 x10     Seated Hip ADD  Seated Hip ABD     manual therapy techniques:  were applied to the:  for 00 minutes, including:      neuromuscular re-education activities to improve: core stability and lumbar stabilization for 8 minutes. The following activities were included:  TA activation 20 x 5"  Medx " Lumbar EXT x 20     therapeutic activities to improve functional performance for 15  minutes, including:  Nustep 10 min   STS 3 x 8 20 inch box    Patient Education and Home Exercises       Education provided:   - PT role, intervention rationale     Written Home Exercises Provided: Pt instructed to continue prior HEP. Exercises were reviewed and Darrion was able to demonstrate them prior to the end of the session.  Darrion demonstrated good  understanding of the education provided. See Electronic Medical Record under Patient Instructions for exercises provided during therapy sessions    Assessment     Pt's first session since evaluation. No adverse effects from today's treatment session and irritability remained low throughout session. Will continue to address strength and mobility to reach PLOF.     Darrion Is progressing well towards his goals.   Patient prognosis is Good.     Patient will continue to benefit from skilled outpatient physical therapy to address the deficits listed in the problem list box on initial evaluation, provide pt/family education and to maximize pt's level of independence in the home and community environment.     Patient's spiritual, cultural and educational needs considered and pt agreeable to plan of care and goals.     Anticipated barriers to physical therapy: none    Goals: Short Term Goals: 4 weeks  1.Report decreased in pain at worse less than  <   / =  5  /10  to increase tolerance for functional activities. On going  2. Pt to improve range of motion by 25% to allow for improved functional mobility to allow for improvement in IADLs.  On going  3. Increased  B LE MMT 1/2  grade to increase tolerance for ADL and work activities. On going  4. Pt to tolerate HEP to improve ROM and independence with ADL's. On going     Long Term Goals: 8 weeks  1.Report decreased in pain at worse less than  <   / =  2  /10  to increase tolerance for functional activities. On going  2.Pt to improve range of  motion by 75% to allow for improved functional mobility to allow for improvement in IADLs. On going  3.Increased B LE MMT  1 grade  to increase tolerance for ADL and work activities.On going  4. Pt will report 15% or less limitation on FOTO  survey score for neck pain disability to demonstrate decrease in disability and improvement in neck pain.On going  5. Pt to be Independent with HEP to improve ROM and independence with ADL's. On going  6. Pt will be able to walk 30 min in order to show improved tolerance for community ambulation. Ongoing   7. Pt will improve from 6 to 10 reps in 30 sec STS to show improved B LE functional strength for ADLs. Ongoing    Plan     Cont POC    Emil Don Jr, PT

## 2025-04-15 ENCOUNTER — CLINICAL SUPPORT (OUTPATIENT)
Dept: REHABILITATION | Facility: HOSPITAL | Age: 75
End: 2025-04-15
Payer: MEDICARE

## 2025-04-15 DIAGNOSIS — M54.9 MID BACK PAIN, CHRONIC: ICD-10-CM

## 2025-04-15 DIAGNOSIS — S22.000A COMPRESSION FRACTURE OF BODY OF THORACIC VERTEBRA: ICD-10-CM

## 2025-04-15 DIAGNOSIS — M54.9 UPPER BACK PAIN, CHRONIC: ICD-10-CM

## 2025-04-15 DIAGNOSIS — M54.2 CHRONIC NECK PAIN: ICD-10-CM

## 2025-04-15 DIAGNOSIS — G89.29 CHRONIC NECK PAIN: ICD-10-CM

## 2025-04-15 DIAGNOSIS — M54.59 OTHER LOW BACK PAIN: Primary | ICD-10-CM

## 2025-04-15 DIAGNOSIS — G89.29 MID BACK PAIN, CHRONIC: ICD-10-CM

## 2025-04-15 DIAGNOSIS — G89.29 UPPER BACK PAIN, CHRONIC: ICD-10-CM

## 2025-04-15 PROCEDURE — 97811 ACUP 1/> W/O ESTIM EA ADD 15: CPT | Mod: PN

## 2025-04-15 PROCEDURE — 97810 ACUP 1/> WO ESTIM 1ST 15 MIN: CPT | Mod: PN

## 2025-04-15 NOTE — PROGRESS NOTES
Acupuncture Treatment Note     Name: Darrion Bennett  [facility] Number: [id number]    Traditional Chinese Medicine Diagnosis: Qi Stagnation and Blood Stasis   Physician: Farooq Domingo MD    Date of Service: 4/15/2025     Medical Diagnosis: Chronic pain in lower back, mid back and upper back       Encounter Diagnoses   Name Primary?    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     Subjective     Chief Complaint:  Chronic pain in lower back, mid back and upper back for 2 years , Weakness of right leg    Cancer Related Symptoms: None    Medical necessity is demonstrated by the following IMPAIRMENTS: Medical Necessity: Decreased mobility limits day to day activities, social, and emergent situations and Decreased quality of life    Response to Previous Treatment:  good    Quality of Symptoms (Better/Worse):  better    Other Condition/Symptoms:   MCTD (mixed connective tissue disease), Sjogren's disease, RA (rheumatoid arthritis),     Objective      New Findings:  None    Treatment Principles:  Increase Qi and Blood, stop pain     Acupuncture points used:    Bilateral points:Rahul Tuo Mikaela Ji + 9, Zoe on lower back + 4,   Left:   Right: GB 31, GB 32, Zoe on shoulder + 2  Auricular Treatment:  None  Needles In: 26  Needles Out: 26  Needles W/O STIM placed: 9:00 AM  Jay W/O STIM removed: 9: 40 AM    Other Traditional Chinese Medicine Modalities:  None    Recommendations:  PT    Education:  Patient is aware of cumulative effect of acupuncture      Assessment      Analysis of Treatment:  Patient felt good    Pt prognosis is Good.     Patient will continue  to benefit from acupuncture treatment to address the deficits listed in the problem list box on initial evaluation, provide patient family education and to maximize pt's level of independence in the home and community environment.     Patient's spiritual, cultural and educational needs considered and pt agreeable to plan of care and goals.     Anticipated barriers to treatment: None    Plan     Recommend       1  /week for   12 treatments and re-assess.

## 2025-04-17 ENCOUNTER — OFFICE VISIT (OUTPATIENT)
Dept: CARDIOLOGY | Facility: CLINIC | Age: 75
End: 2025-04-17
Payer: MEDICARE

## 2025-04-17 VITALS
SYSTOLIC BLOOD PRESSURE: 150 MMHG | HEART RATE: 72 BPM | OXYGEN SATURATION: 100 % | RESPIRATION RATE: 18 BRPM | BODY MASS INDEX: 20.48 KG/M2 | WEIGHT: 127.44 LBS | HEIGHT: 66 IN | DIASTOLIC BLOOD PRESSURE: 84 MMHG

## 2025-04-17 DIAGNOSIS — I10 ESSENTIAL HYPERTENSION: ICD-10-CM

## 2025-04-17 DIAGNOSIS — R06.02 SOB (SHORTNESS OF BREATH): Primary | ICD-10-CM

## 2025-04-17 DIAGNOSIS — I70.0 AORTIC ATHEROSCLEROSIS: ICD-10-CM

## 2025-04-17 PROCEDURE — 99214 OFFICE O/P EST MOD 30 MIN: CPT | Mod: S$GLB,,, | Performed by: INTERNAL MEDICINE

## 2025-04-17 PROCEDURE — 99999 PR PBB SHADOW E&M-EST. PATIENT-LVL IV: CPT | Mod: PBBFAC,,, | Performed by: INTERNAL MEDICINE

## 2025-04-17 PROCEDURE — 3044F HG A1C LEVEL LT 7.0%: CPT | Mod: CPTII,S$GLB,, | Performed by: INTERNAL MEDICINE

## 2025-04-17 PROCEDURE — 3079F DIAST BP 80-89 MM HG: CPT | Mod: CPTII,S$GLB,, | Performed by: INTERNAL MEDICINE

## 2025-04-17 PROCEDURE — G2211 COMPLEX E/M VISIT ADD ON: HCPCS | Mod: S$GLB,,, | Performed by: INTERNAL MEDICINE

## 2025-04-17 PROCEDURE — 1101F PT FALLS ASSESS-DOCD LE1/YR: CPT | Mod: CPTII,S$GLB,, | Performed by: INTERNAL MEDICINE

## 2025-04-17 PROCEDURE — 3066F NEPHROPATHY DOC TX: CPT | Mod: CPTII,S$GLB,, | Performed by: INTERNAL MEDICINE

## 2025-04-17 PROCEDURE — 3288F FALL RISK ASSESSMENT DOCD: CPT | Mod: CPTII,S$GLB,, | Performed by: INTERNAL MEDICINE

## 2025-04-17 PROCEDURE — 3077F SYST BP >= 140 MM HG: CPT | Mod: CPTII,S$GLB,, | Performed by: INTERNAL MEDICINE

## 2025-04-17 PROCEDURE — 4010F ACE/ARB THERAPY RXD/TAKEN: CPT | Mod: CPTII,S$GLB,, | Performed by: INTERNAL MEDICINE

## 2025-04-17 PROCEDURE — 1126F AMNT PAIN NOTED NONE PRSNT: CPT | Mod: CPTII,S$GLB,, | Performed by: INTERNAL MEDICINE

## 2025-04-17 RX ORDER — METOPROLOL SUCCINATE 25 MG/1
25 TABLET, EXTENDED RELEASE ORAL DAILY
Qty: 90 TABLET | Refills: 3 | Status: SHIPPED | OUTPATIENT
Start: 2025-04-17

## 2025-04-17 NOTE — PROGRESS NOTES
CARDIOVASCULAR CONSULTATION    REASON FOR CONSULT:   Darrion Bennett is a 75 y.o. male who presents for establishing care with Cardiology.      HISTORY OF PRESENT ILLNESS:     Notes from September 2019:  Patient here for follow-up today.  Denies any chest pains at rest on exertion, orthopnea, PND.  Denies any other cardiac symptoms.    Notes from June 2019:  Patient is here for follow-up today.  Denies any chest pains at rest on exertion, orthopnea, PND.  Denies any.  Of dizziness or passing out.  Blood pressure is much better controlled in clinic today.    Initial clinic visit:  Patient is a very pleasant 69-year-old man with a past medical history significant for hypertension, diabetes, Sjogren's disease who wants to establish care with [facility].  Patient has been followed with [facility].  Patient states that in 2014 he was diagnosed with heart failure.  He states that he had passed out and had gone to the hospital, he was feeling short of breath also and at that point he was told that he had heart failure.  I can see an echocardiogram from his outside medical records in 2014 which showed normal left ventricular systolic function.  He also had a stress echocardiogram done in 2013 which did not reveal any ischemia.  He denies any chest pains at rest on exertion, orthopnea, PND, swelling of feet.  States that since then he has been okay and walks about 2-3 blocks every day.  On walking about 2-3 blocks he does experience some tightness in his left calf.  This goes away after rest.  He denies any resting calf tightness.  He denies any ulcer on his feet.  He states that he checks his blood pressure at home and has found that it is elevated around 160-170 mm of systolic multiple times.      Notes from March 2021:  Patient here for follow-up.  Denies any chest pains at rest on exertion, orthopnea, PND, swelling of feet.  Mostly blood pressure has been uncontrolled at home.  Staying around 160 mmHg.  Very  compliant with medications.  EKG done in the clinic today was personally reviewed and demonstrates sinus bradycardia with left anterior fascicular block, poor R-wave progression.      Notes from April 21:  Patient here for follow-up.  Now states that he has been experiencing dyspnea on exertion which is worse than prior.  Very concerned about that.  No chest pains or tightness, but it is gets short of breath on walking short distances and this is lifestyle limiting.  Denies orthopnea, PND, swelling of feet.  Also states that the blood pressure is staying around 140-160 mmHg at home.      Notes from May 2021:  Patient here for follow-up.  Stress test did not show any significant ischemia.  Echo showed normal left ventricle systolic function.  Symptoms have improved.  States stop taking his Crestor because it was causing myalgias      The left ventricle is normal in size with concentric remodeling and normal systolic function.  The estimated ejection fraction is 65%.  Grade II left ventricular diastolic dysfunction.  Severe left atrial enlargement.  Normal right ventricular size with normal right ventricular systolic function.  Mild right atrial enlargement.  Mild mitral regurgitation.  Normal central venous pressure (3 mmHg).  The estimated PA systolic pressure is 36 mmHg.  The sinuses of Valsalva is mildly dilated.  Normal myocardial perfusion scan. There is no evidence of myocardial ischemia or infarction.    The gated perfusion images showed an ejection fraction of 75% post stress.    There is normal wall motion post stress.    The EKG portion of this study is negative for ischemia.    The patient reported no chest pain during the stress test.    During stress, rare PVCs are noted.    Hypertensive response to exercise.  ND interval did increase during exercise.      Notes from July 2021:  Patient here for follow-up.  Has been experiencing dyspnea on exertion.  States it is getting worse.  Has an appointment with  pulmonology coming up.  Is requesting evaluation of CBC and BNP.  As well as a chest x-ray.      The left ventricle is normal in size with concentric hypertrophy and normal systolic function.  The estimated ejection fraction is 65%.  Grade I left ventricular diastolic dysfunction.  Normal right ventricular size with normal right ventricular systolic function.  Moderate left atrial enlargement.  Mild right atrial enlargement.  There is mild aortic valve stenosis.  Aortic valve area is 1.67 cm2; peak velocity is 1.95 m/s; mean gradient is 9 mmHg.  Normal central venous pressure (3 mmHg).  The estimated PA systolic pressure is 16 mmHg.      Notes from dec 21: doing fine. No cp, orthopnea, pnd.       February 2022:  Patient follow-up.  Doing fine.  Occasionally gets swelling in his feet at the end of the day..  States he takes torsemide every Saturday and Sunday.  States he was told by his nephrologist to do so.    Notes from August 2022: Patient states that his nephrologist got off his amlodipine and since then his blood pressure has been uncontrolled.  He is taking hydralazine at 50 mg t.i.d..  Blood pressure is running around 170 mmHg.  Also had an episode of chest pressure last week.  Did not seek medical attention for that.  Denies orthopnea, PND.      Notes from September 2022:  Patient here for follow-up.  Has been chest pain-free.  Stress test did not show any significant ischemia.  Echo showed normal left ventricle systolic function.      Normal myocardial perfusion scan. There is no evidence of myocardial ischemia or infarction.    The gated perfusion images showed an ejection fraction of 72% post stress.    There is normal wall motion post stress.    The EKG portion of this study is negative for ischemia.    The patient reported no chest pain during the stress test.    During stress, frequent PVCs are noted.      The left ventricle is normal in size with mild concentric hypertrophy and normal systolic  function.  The estimated ejection fraction is 60%.  Grade I left ventricular diastolic dysfunction.  Normal right ventricular size with normal right ventricular systolic function.  Moderate left atrial enlargement.  Mild right atrial enlargement.  Mild mitral regurgitation.  Mild pulmonic regurgitation.  Normal central venous pressure (3 mmHg).  The estimated PA systolic pressure is 36 mmHg.  There is mild pulmonary hypertension.      Feb 23:  Patient here for follow-up.  Denies any chest pains at rest on exertion, orthopnea, PND.  Occasional swelling of feet as well as hence.  Is on chronic prednisone.      2/27/23: doing fine. No cp, orthopnea, pnd, dizziness or syncope.    Sinus rhythm with heart rates varying between 45 and 101 BPM with an average of 66 BPM.  There were occasional PVCs totalling 607 and averaging 25.31 per hour. There were 5 couplets. There were 88 bigeminal cycles.  There were very frequent PACs totalling 3884 and averaging 161.97 per hour.  Frequent episodes of Mobitz I second degree AVB    Notes from August 24:  Patient here for follow-up.  Denies chest pains at rest on exertion, orthopnea, PND.  Main complaint is dyspnea on exertion.  States can only walk from here to the door of the office and then gets short of breath.  Feels that this has been getting worse progressively.      Notes from September 24: Patient here for follow-up.  Denies chest pains at rest on exertion, orthopnea, PND.  Breathing has gotten better after Jardiance    Results for orders placed or performed during the hospital encounter of 03/07/25   EKG 12-lead    Collection Time: 03/07/25  9:00 AM   Result Value Ref Range    QRS Duration 86 ms    [facility] QTC Calculation 433 ms    Narrative    Test Reason : R00.1,    Vent. Rate :  52 BPM     Atrial Rate :  78 BPM     P-R Int :    ms          QRS Dur :  86 ms      QT Int : 466 ms       P-R-T Axes :  58 255  58 degrees    QTcB Int : 433 ms    Sinus rhythm with 2nd degree A-V block  (Mobitz I)  Right superior axis deviation  Anteroseptal infarct When compared with ECG of 30-Aug-2024 08:32,  Significant changes have occurred  Confirmed by Keith Tristan ([id number]) on 3/10/2025 6:30:23 PM    Referred By: AAAREFERRAL SELF           Confirmed By: Keith Tristan       Results for orders placed during the hospital encounter of 08/30/24    Echo    Interpretation Summary    Left Ventricle: Increased wall thickness. There is normal systolic function with a visually estimated ejection fraction of 65 - 70%.    Right Ventricle: Normal right ventricular cavity size. Systolic function is normal.    Left Atrium: Left atrium is mildly dilated.    Aortic Valve: There is mild stenosis. Aortic valve area by VTI is 1.84 cm². Aortic valve peak velocity is 1.70 m/s. Mean gradient is 6 mmHg. The dimensionless index is 0.48.    Mitral Valve: There is mild regurgitation.    Pulmonary Artery: The estimated pulmonary artery systolic pressure is 23 mmHg.    IVC/SVC: Normal venous pressure at 3 mmHg.      Results for orders placed during the hospital encounter of 08/30/24    Nuclear Stress - Cardiology Interpreted    Interpretation Summary    The patient exercised for 6 minutes 53 seconds on a Jean-Paul protocol, corresponding to a functional capacity of 7METS, achieving a peak heart rate of 133 bpm, which is 91% of the age predicted maximum heart rate.    Normal myocardial perfusion scan. There is no evidence of myocardial ischemia or infarction.    The gated perfusion images showed an ejection fraction of 75% at rest.    There is normal wall motion at rest and post-stress.    The ECG portion of the study is negative for ischemia.    The patient reported no chest pain during the stress test.    During stress, frequent PVCs are noted.      No results found for this or any previous visit.      March 25:    CHIEF COMPLAINT:  Darrion presents today for follow up after recent hospitalization with elevated troponin  levels.  He had no cardiac symptoms.  Troponins were checked and found to be elevated at 0.3.    HISTORY OF PRESENT ILLNESS:  He experienced sudden leg weakness and buckling while in kitchen before breakfast, requiring assistance from son-in-law to prevent falling. He denies associated dizziness or loss of consciousness during the episode. A similar episode occurred two years ago. During the recent episode, his blood pressure was 134 and glucose was normal.    CARDIOVASCULAR:  Echocardiogram showed good heart function with grade 2 diastolic dysfunction.    MEDICATIONS:  He is currently taking valsartan and hydralazine 3 times daily for blood pressure management. He reports being out of amlodipine.         Apr 25:    History of Present Illness    CHIEF COMPLAINT:  Darrion presents today for follow up of BP management    HYPERTENSION:  He reports elevated BP with current home reading of 156 since discontinuing hydralazine. He denies chest pain, tightness, dyspnea, dizziness, or syncope.    CARDIAC STATUS:  Echo showed good EF with LVH and mild aortic stenosis. Pulmonary HTN was noted with PA systolic pressure of 47. Stress test was normal with no evidence of coronary artery stenosis.    CURRENT MEDICATIONS:  He takes Amlodipine 5 mg twice daily, Atorvastatin 80 mg, Valsartan 320 mg, and Wellbutrin 320 mg. He denies current use of tramadol.       Results for orders placed during the hospital encounter of 04/03/25    Nuclear Stress - Cardiology Interpreted    Interpretation Summary    Normal myocardial perfusion scan. There is no evidence of myocardial ischemia or infarction.    There is a moderate intensity fixed perfusion abnormality in the inferior wall of the left ventricle secondary to diaphragm attenuation.    The gated perfusion images showed an ejection fraction of 78% at rest.    There is normal wall motion at rest and post-stress.    The ECG portion of the study is negative for ischemia.    The patient reported  no chest pain during the stress test.    There were no arrhythmias during stress.    PAST MEDICAL HISTORY:     Past Medical History:   Diagnosis Date    Anemia     Anterolisthesis of lumbar spine     Arthritis     Cataract     CHF (congestive heart failure)     Chronic constipation     Diabetes mellitus, type 2     Felon of finger of left hand 05/11/2019    Glaucoma     Hyperlipidemia 05/13/2014    Hypertension     MCTD (mixed connective tissue disease)     Personal history of colonic polyps     Sjogren's disease     Ulcerative colitis     Urolithiasis        PAST SURGICAL HISTORY:     Past Surgical History:   Procedure Laterality Date    CATARACT EXTRACTION Bilateral 2005    COLONOSCOPY  2014    ESOPHAGOGASTRODUODENOSCOPY N/A 9/8/2023    Procedure: EGD (ESOPHAGOGASTRODUODENOSCOPY);  Surgeon: Divya Saenz MD;  Location: [facility] ENDO;  Service: Endoscopy;  Laterality: N/A;  ref by / inst portal-    ESOPHAGOGASTRODUODENOSCOPY N/A 11/22/2023    Procedure: EGD (ESOPHAGOGASTRODUODENOSCOPY);  Surgeon: Crystal Urbina MD;  Location: [facility] ENDO;  Service: Endoscopy;  Laterality: N/A;  time frame 8-12 weeks  Dr. Saenz pt   prep instructions sent to pt via [place] -  wl meds trulicity hold 7 days prior  diabetic  11/16- pt r/s to earlier date, pre call complete.  DBM    EYE SURGERY         ALLERGIES AND MEDICATION:     Review of patient's allergies indicates:   Allergen Reactions    Allopurinol Other (See Comments)    Azathioprine Other (See Comments)     Pancytopenia    Penicillins Nausea And Vomiting and Other (See Comments)    Rosuvastatin Other (See Comments)     Muscle pain    Allopurinol analogues Rash        Medication List            Accurate as of April 17, 2025 11:59 PM. If you have any questions, ask your nurse or doctor.                START taking these medications      metoprolol succinate 25 MG 24 hr tablet  Commonly known as: TOPROL-XL  Take 1 tablet (25 mg total) by mouth once daily.  Started  "by: Greg Alvares MD            CONTINUE taking these medications      amLODIPine 5 MG tablet  Commonly known as: NORVASC  Take 1 tablet (5 mg total) by mouth 2 (two) times daily.     atorvastatin 80 MG tablet  Commonly known as: LIPITOR  Take 0.5 tablets (40 mg total) by mouth once daily.     blood-glucose meter Misc  Commonly known as: TRUE METRIX GLUCOSE METER  Test glucose 4x/day     DROPLET PEN NEEDLE 32 gauge x 5/32" Ndle  Generic drug: pen needle, diabetic  USE 1 NEEDLE AS DIRECTED FOUR TIMES DAILY     febuxostat 40 mg Tab  Commonly known as: ULORIC  Take 1 tablet (40 mg total) by mouth 2 (two) times a day.     fluticasone propionate 50 mcg/actuation nasal spray  Commonly known as: FLONASE  1 spray (50 mcg total) by Each Nostril route once daily.     JARDIANCE 10 mg tablet  Generic drug: empagliflozin  TAKE 1 TABLET ONE TIME DAILY     lancets 33 gauge Misc  Commonly known as: TRUEPLUS LANCETS  Test glucose 4x/day. Dx code e11.65     omeprazole 40 MG capsule  Commonly known as: PRILOSEC  Take 1 capsule (40 mg total) by mouth every morning.     torsemide 10 MG Tab  Commonly known as: DEMADEX  TAKE 1 TABLET EVERY OTHER DAY     traMADoL 50 mg tablet  Commonly known as: ULTRAM  Take 1 tablet (50 mg total) by mouth every 12 (twelve) hours as needed for Pain.     TRUE METRIX GLUCOSE TEST STRIP Strp  Generic drug: blood sugar diagnostic  TEST BLOOD SUGAR FOUR TIMES DAILY     TRUE METRIX LEVEL 1 Soln  Generic drug: blood glucose control, low  Use as indicated     * TRULICITY 4.5 mg/0.5 mL pen injector  Generic drug: dulaglutide  INJECT 4.5MG (1 PEN) UNDER THE SKIN EVERY WEEK     * TRULICITY 3 mg/0.5 mL pen injector  Generic drug: dulaglutide  Inject 3 mg into the skin every 7 days.     valsartan 320 MG tablet  Commonly known as: DIOVAN  Take 1 tablet (320 mg total) by mouth once daily.           * This list has 2 medication(s) that are the same as other medications prescribed for you. Read the directions carefully, and " ask your doctor or other care provider to review them with you.                   Where to Get Your Medications        These medications were sent to [facility] - [place], [place] - [address]  [address], [facility] [address]      Phone: [phone]   metoprolol succinate 25 MG 24 hr tablet         SOCIAL HISTORY:     Social History     Socioeconomic History    Marital status:     Number of children: 3   Tobacco Use    Smoking status: Never     Passive exposure: Never    Smokeless tobacco: Never   Substance and Sexual Activity    Alcohol use: No    Drug use: Not Currently    Sexual activity: Not Currently     Partners: Female     Social Drivers of Health     Financial Resource Strain: Low Risk  (3/8/2025)    Overall Financial Resource Strain (CARDIA)     Difficulty of Paying Living Expenses: Not hard at all   Food Insecurity: No Food Insecurity (3/8/2025)    Hunger Vital Sign     Worried About Running Out of Food in the Last Year: Never true     Ran Out of Food in the Last Year: Never true   Transportation Needs: No Transportation Needs (3/8/2025)    PRAPARE - Transportation     Lack of Transportation (Medical): No     Lack of Transportation (Non-Medical): No   Physical Activity: Insufficiently Active (3/8/2025)    Exercise Vital Sign     Days of Exercise per Week: 1 day     Minutes of Exercise per Session: 10 min   Stress: No Stress Concern Present (3/8/2025)    [employer] - Occupational Stress Questionnaire     Feeling of Stress : Not at all   Housing Stability: Low Risk  (3/8/2025)    Housing Stability Vital Sign     Unable to Pay for Housing in the Last Year: No     Number of Times Moved in the Last Year: 0     Homeless in the Last Year: No       FAMILY HISTORY:     Family History   Problem Relation Name Age of Onset    Cataracts Mother      Hypertension Father      Stroke Father      Cataracts Father      Glaucoma Father      No Known  "Problems Sister      No Known Problems Brother      Rheum arthritis Maternal Aunt      Rheum arthritis Maternal Uncle      No Known Problems Paternal Aunt      No Known Problems Paternal Uncle      No Known Problems Maternal Grandmother      No Known Problems Maternal Grandfather      Throat cancer Paternal Grandmother      Glaucoma Paternal Grandfather      No Known Problems Daughter      No Known Problems Son x2     No Known Problems Other      Celiac disease Neg Hx      Colon cancer Neg Hx      Cirrhosis Neg Hx      Colon polyps Neg Hx      Crohn's disease Neg Hx      Cystic fibrosis Neg Hx      Hemochromatosis Neg Hx      Esophageal cancer Neg Hx      Inflammatory bowel disease Neg Hx      Irritable bowel syndrome Neg Hx      Liver cancer Neg Hx      Liver disease Neg Hx      Rectal cancer Neg Hx      Stomach cancer Neg Hx      Ulcerative colitis Neg Hx      Chase's disease Neg Hx      Amblyopia Neg Hx      Blindness Neg Hx      Cancer Neg Hx      Diabetes Neg Hx      Macular degeneration Neg Hx      Retinal detachment Neg Hx      Strabismus Neg Hx      Thyroid disease Neg Hx      Melanoma Neg Hx         REVIEW OF SYSTEMS:   Review of Systems   Constitutional: Negative.    HENT: Negative.    Eyes: Negative.    Respiratory: Negative.    Gastrointestinal: Negative.    Genitourinary: Negative.    Musculoskeletal: Negative.    Skin: Negative.    Neurological: Negative.    Endo/Heme/Allergies: Negative.    Psychiatric/Behavioral: Negative.    All other systems reviewed and are negative.      A 10 point review of systems was performed and all the pertinent positives have been mentioned. Rest of review of systems was negative.        PHYSICAL EXAM:     Vitals:    04/17/25 0925   BP: (!) 150/84   Pulse: 72   Resp: 18      Body mass index is 20.57 kg/m².  Weight: 57.8 kg (127 lb 6.8 oz)   Height: 5' 6" (167.6 cm)     Physical Exam  Vitals and nursing note reviewed.   Constitutional:       Appearance: Normal appearance. " He is well-developed.   HENT:      Head: Normocephalic and atraumatic.      Right Ear: Hearing normal.      Left Ear: Hearing normal.      Nose: Nose normal.   Eyes:      General: Lids are normal.      Conjunctiva/sclera: Conjunctivae normal.      Pupils: Pupils are equal, round, and reactive to light.   Cardiovascular:      Rate and Rhythm: Normal rate and regular rhythm.      Pulses: Normal pulses.      Heart sounds: Murmur heard.    Systolic murmur is present with a grade of 2/6.  Pulmonary:      Effort: Pulmonary effort is normal.      Breath sounds: Normal breath sounds.   Abdominal:      Palpations: Abdomen is soft.      Tenderness: There is no abdominal tenderness.   Musculoskeletal:         General: No deformity.      Cervical back: Normal range of motion and neck supple.   Skin:     General: Skin is warm and dry.   Neurological:      Mental Status: He is alert and oriented to person, place, and time.   Psychiatric:         Speech: Speech normal.           DATA:     Laboratory:  CBC:  Recent Labs   Lab 01/24/25  0720 03/07/25  1003 03/08/25  0520   WBC 5.94 9.01 5.97   Hemoglobin 12.1 L 12.4 L 10.6 L   Hematocrit 40.0 37.8 L 32.8 L   Platelets 190 211 178       CHEMISTRIES:  Recent Labs   Lab 01/24/25  0720 03/07/25  1003 03/08/25  0520   Glucose 101 119 H 140 H   Sodium 137 137 135 L   Potassium 4.7 4.5 4.5   BUN 38 H 25 H 32 H   Creatinine 1.7 H 1.7 H 1.7 H   Calcium 9.5 9.1 8.6 L   Magnesium  --  1.8 1.7       CARDIAC BIOMARKERS:  Recent Labs   Lab 03/07/25  1003 03/07/25  1346 03/08/25  0826 03/08/25  1404     --   --   --    Troponin I 0.035 H 0.072 H 0.395 H 0.368 H         COAGS:          LIPIDS/LFTS:  Recent Labs   Lab 08/09/23  0734 11/27/23  0810 02/12/24  0710 02/26/24  0941 05/22/24  0710 01/24/25  0720 03/07/25  1003 03/08/25  0520   Cholesterol 137  --  135 154  --   --   --   --    Triglycerides 93  --  75 135  --   --   --   --    HDL 35 L  --  44 36 L  --   --   --   --    LDL  Cholesterol 83.4  --  76.0 91.0  --   --   --   --    Non-HDL Cholesterol 102  --  91 118  --   --   --   --    AST  --    < >  --   --    < > 27 23 23   ALT  --    < >  --   --    < > 21 18 18    < > = values in this interval not displayed.       Hemoglobin A1C   Date Value Ref Range Status   03/08/2025 6.7 (H) 4.0 - 5.6 % Final     Comment:     ADA Screening Guidelines:  5.7-6.4%  Consistent with prediabetes  >or=6.5%  Consistent with diabetes    High levels of fetal hemoglobin interfere with the HbA1C  assay. Heterozygous hemoglobin variants (HbS, HgC, etc)do  not significantly interfere with this assay.   However, presence of multiple variants may affect accuracy.     08/28/2024 6.9 (H) 4.0 - 5.6 % Final     Comment:     ADA Screening Guidelines:  5.7-6.4%  Consistent with prediabetes  >or=6.5%  Consistent with diabetes    High levels of fetal hemoglobin interfere with the HbA1C  assay. Heterozygous hemoglobin variants (HbS, HgC, etc)do  not significantly interfere with this assay.   However, presence of multiple variants may affect accuracy.     05/23/2024 6.6 (H) 4.0 - 5.6 % Final     Comment:     ADA Screening Guidelines:  5.7-6.4%  Consistent with prediabetes  >or=6.5%  Consistent with diabetes    High levels of fetal hemoglobin interfere with the HbA1C  assay. Heterozygous hemoglobin variants (HbS, HgC, etc)do  not significantly interfere with this assay.   However, presence of multiple variants may affect accuracy.     10/16/2018 6.2 (H) 4.0 - 5.7 % Final     Comment:     Dr. Jurgen Ward ( Endocrinology )       TSH  Recent Labs   Lab 09/26/22  0950 03/07/25  1003   TSH 2.281 4.246 H       The 10-year ASCVD risk score (Lamont SAAVEDRA, et al., 2019) is: 60.6%    Values used to calculate the score:      Age: 75 years      Sex: Male      Is Non- : No      Diabetic: Yes      Tobacco smoker: No      Systolic Blood Pressure: 155 mmHg      Is BP treated: Yes      HDL Cholesterol: 36 mg/dL       Total Cholesterol: 154 mg/dL           Cardiovascular Testing:    EKG: (personally reviewed tracing)  Sinus bradycardia with first-degree AV block left anterior fascicle, septal infarct.    KIRT in June 2019:  Normal resting KIRT/PVR waveforms bilaterally.  Normal exercise KIRT bilaterally (with minimal drop from resting KIRT measurements).        2D echocardiogram in June 2019:  Normal left ventricular systolic function. The estimated ejection fraction is 65%  Moderate left atrial enlargement.  Normal LV diastolic function.  Mild right atrial enlargement.  Normal central venous pressure (3 mm Hg).  The estimated PA systolic pressure is 14 mm Hg            2D echocardiogram 2014 done at [facility]    HEIGHT: 167.6 cm  WEIGHT: 74.8 kg  BP: 157/82  BSA:  MEASUREMENTS  ------------  IVSd: 1.2 cm  LVIDd: 3.8 cm  LVPWd: 1.2 cm  LVIDs: 2.6 cm  LA Diam: 3.2 cm  Ao Diam: 3.1 cm  LAESV Index (A-L): 32.40 ml/m²  LVCO Dopp: 6.98 l/min  LVCI Dopp: 3.79 l/minm²    FINDINGS  -------  CPT CODE:58037-87.  Transthoracic echocardiogram (complete).  The attending physician   listed herein personally reviewed all stored images and directly supervised the   fellow-in-training (if listed) in the study's acquistion and/or report   generation.     Study priority:  Routine.  ICD-9 Indication(s):786.05 - Dyspnea.  Study Quality:The study was technically adequate.  ECG Rhythm:Sinus rhythm.  CHAMBER SIZE:All cardiac chamber sizes are within normal limits.  AORTA:Normal aortic root and proximal ascending aorta.  VALVULAR STRUCTURES:The visualized cardiac valves are structurally normal.  LEFT/RIGHT VENTRICULAR FUNCTION:LV size, wall thickness and systolic function are normal, with an EF > 55%.       The right ventricle is normal in size and function.  DOPPLER:  Color:No valvular insufficiencies.  DIASTOLOGY:The diastolic filling pattern is normal for the age of the patient.  PERICARDIUM / EFFUSIONS:No effusions noted.  INFERIOR VENA CAVA:Normal size  inferior vena cava.  PA PRESSURE:The estimated systolic pulmonary artery pressure is normal at < 35 mm Hg (RA   pressure = 3 mm Hg).  CARDIOLOGY:    Electronically signed:  STAFF:ELIAS JAIME M.D.  Fellow:G. MOHSEN, M.D.    CONCLUSIONS  -----------  1. LV size, wall thickness and systolic function are normal, with an EF > 55%.  2. The diastolic filling pattern is normal for the age of the patient.    ASSESSMENT AND PLAN     Patient Active Problem List   Diagnosis    Essential hypertension    Controlled type 2 diabetes mellitus with diabetic polyneuropathy, with long-term current use of insulin    Dyslipidemia    MCTD (mixed connective tissue disease)    Sjogren's disease    Bilateral edema of lower extremity 6/2019 TTE normal LV systolic and diastolic function; ABIs normal    Glaucoma    BMI 23.0-23.9, adult    Jackson's esophagus without dysplasia    Anemia from plaquenil and imuran    Neutropenia    Current chronic use of systemic steroids    Compression fracture of body of thoracic vertebra on XR 2/2019; 2/2019 alendronate    Anemia of chronic renal failure, stage 3b    Chronic constipation not responding to linzess, miralax, senna    Goals of care, counseling/discussion    Tophaceous gout    Pseudophakia of both eyes    Refractive error    Aortic atherosclerosis    Celiac disease    Diastolic heart failure, NYHA class 2    Stage 3a chronic kidney disease    RA (rheumatoid arthritis)    Secondary hyperparathyroidism of renal origin    Age-related osteoporosis without current pathological fracture    Mobitz I    Bilateral carotid artery stenosis on CTA 6/26/21    History of stroke    Dyspnea on exertion    JAZLYN (obstructive sleep apnea)    Long-term insulin use    Dyshidrotic eczema    Difficulty walking    Hip joint stiffness, bilateral    Duodenal ulcer 9/8/23 EGD LA grade C esophagitis with bleeding. 3 cm HH, erythematous mucosa antrum. nonbleeding duodenal ulcers no stigmata of bleeding.  Path chronic inactive  gastritis.  H pylori ne    Poor posture    Restrictive lung disease    Bradycardia    Fall    Anterolisthesis of L4-L5    Weakness    Impaired functional mobility, balance, gait, and endurance    Spinal stenosis of lumbar region       No orders of the defined types were placed in this encounter.    Assessment & Plan    IMPRESSION:  Blood pressure elevated since discontinuation of hydralazine.    PLAN SUMMARY:  - Increased Wellbutrin to 320 mg  - Started metoprolol 25 mg daily, increase to 50 mg daily after one week if needed  - Continued amlodipine 5 mg twice daily  - Continued valsartan 320 mg  - Continued atorvastatin 80 mg  - Ordered echocardiogram in 6 months  - Follow up in 3 months to reassess blood pressure    HYPERTENSION:  - Darrion to monitor blood pressure at home.  - Started metoprolol 25 mg daily, increase to 50 mg daily after one week if blood pressure remains above 140 at home.  - Continued amlodipine 5 mg twice daily.  - Continued valsartan 320 mg.    CARDIOMEGALY:  - Ordered echocardiogram in 6 months to reassess pulmonary pressures.    AORTIC STENOSIS:  - Continued atorvastatin 80 mg.    PULMONARY HYPERTENSION:  - Ordered echocardiogram in 6 months to reassess pulmonary pressures.    MAJOR DEPRESSIVE DISORDER:  - Increased Wellbutrin to 320 mg.    FOLLOW-UP:  - Follow up in 3 months to reassess blood pressure.       3.  Dyslipidemia:  Tolerating lipitor    4.  ABIs in June 2019 were within normal limits.    5.  Chronic kidney disease    7. Elevated TSH.   rx per primary    8. Torsemide to be used as needed for swelling of feet.     9. Carotid bruit: moderate plaque per ct scan in 2021. Regular surveillance with carotid us.    10:  Frequent PVCs:  asymptomatic.        Thank you very much for involving me in the care of your patient.  Please do not hesitate to contact me if there are any questions.      Greg Alvares MD, FACC, [facility]  Interventional Cardiologist, [facility].       Visit today  included increased complexity associated with the care of the episodic problem elevated troponins, hypertension addressed and managing the longitudinal care of the patient due to the serious and/or complex managed problem(s) Problem List[1]  .    This note was dictated with the help of speech recognition software.  There might be un-intended errors and/or substitutions.                                     [1]   Patient Active Problem List  Diagnosis    Essential hypertension    Controlled type 2 diabetes mellitus with diabetic polyneuropathy, with long-term current use of insulin    Dyslipidemia    MCTD (mixed connective tissue disease)    Sjogren's disease    Bilateral edema of lower extremity 6/2019 TTE normal LV systolic and diastolic function; ABIs normal    Glaucoma    BMI 23.0-23.9, adult    Jackson's esophagus without dysplasia    Anemia from plaquenil and imuran    Neutropenia    Current chronic use of systemic steroids    Compression fracture of body of thoracic vertebra on XR 2/2019; 2/2019 alendronate    Anemia of chronic renal failure, stage 3b    Chronic constipation not responding to linzess, miralax, senna    Goals of care, counseling/discussion    Tophaceous gout    Pseudophakia of both eyes    Refractive error    Aortic atherosclerosis    Celiac disease    Diastolic heart failure, NYHA class 2    Stage 3a chronic kidney disease    RA (rheumatoid arthritis)    Secondary hyperparathyroidism of renal origin    Age-related osteoporosis without current pathological fracture    Mobitz I    Bilateral carotid artery stenosis on CTA 6/26/21    History of stroke    Dyspnea on exertion    JAZLYN (obstructive sleep apnea)    Long-term insulin use    Dyshidrotic eczema    Difficulty walking    Hip joint stiffness, bilateral    Duodenal ulcer 9/8/23 EGD LA grade C esophagitis with bleeding. 3 cm HH, erythematous mucosa antrum. nonbleeding duodenal ulcers no stigmata of bleeding.  Path chronic inactive gastritis.  H  pylori ne    Poor posture    Restrictive lung disease    Bradycardia    Fall    Anterolisthesis of L4-L5    Weakness    Impaired functional mobility, balance, gait, and endurance    Spinal stenosis of lumbar region

## 2025-04-19 DIAGNOSIS — M1A.9XX1 TOPHACEOUS GOUT: ICD-10-CM

## 2025-04-21 RX ORDER — FEBUXOSTAT 40 MG/1
40 TABLET, FILM COATED ORAL 2 TIMES DAILY
Qty: 60 TABLET | Refills: 11 | Status: SHIPPED | OUTPATIENT
Start: 2025-04-21

## 2025-04-22 ENCOUNTER — OFFICE VISIT (OUTPATIENT)
Dept: NEUROSURGERY | Facility: CLINIC | Age: 75
End: 2025-04-22
Attending: STUDENT IN AN ORGANIZED HEALTH CARE EDUCATION/TRAINING PROGRAM
Payer: MEDICARE

## 2025-04-22 VITALS
HEART RATE: 72 BPM | HEIGHT: 66 IN | WEIGHT: 125.88 LBS | BODY MASS INDEX: 20.23 KG/M2 | OXYGEN SATURATION: 98 % | DIASTOLIC BLOOD PRESSURE: 63 MMHG | SYSTOLIC BLOOD PRESSURE: 143 MMHG

## 2025-04-22 DIAGNOSIS — I50.30 (HFPEF) HEART FAILURE WITH PRESERVED EJECTION FRACTION: ICD-10-CM

## 2025-04-22 DIAGNOSIS — R79.89 ELEVATED TROPONIN: ICD-10-CM

## 2025-04-22 DIAGNOSIS — M43.16 ANTEROLISTHESIS OF LUMBAR SPINE: Chronic | ICD-10-CM

## 2025-04-22 PROCEDURE — 3066F NEPHROPATHY DOC TX: CPT | Mod: CPTII,S$GLB,, | Performed by: STUDENT IN AN ORGANIZED HEALTH CARE EDUCATION/TRAINING PROGRAM

## 2025-04-22 PROCEDURE — 3044F HG A1C LEVEL LT 7.0%: CPT | Mod: CPTII,S$GLB,, | Performed by: STUDENT IN AN ORGANIZED HEALTH CARE EDUCATION/TRAINING PROGRAM

## 2025-04-22 PROCEDURE — 3078F DIAST BP <80 MM HG: CPT | Mod: CPTII,S$GLB,, | Performed by: STUDENT IN AN ORGANIZED HEALTH CARE EDUCATION/TRAINING PROGRAM

## 2025-04-22 PROCEDURE — 1101F PT FALLS ASSESS-DOCD LE1/YR: CPT | Mod: CPTII,S$GLB,, | Performed by: STUDENT IN AN ORGANIZED HEALTH CARE EDUCATION/TRAINING PROGRAM

## 2025-04-22 PROCEDURE — 3288F FALL RISK ASSESSMENT DOCD: CPT | Mod: CPTII,S$GLB,, | Performed by: STUDENT IN AN ORGANIZED HEALTH CARE EDUCATION/TRAINING PROGRAM

## 2025-04-22 PROCEDURE — 1159F MED LIST DOCD IN RCRD: CPT | Mod: CPTII,S$GLB,, | Performed by: STUDENT IN AN ORGANIZED HEALTH CARE EDUCATION/TRAINING PROGRAM

## 2025-04-22 PROCEDURE — 4010F ACE/ARB THERAPY RXD/TAKEN: CPT | Mod: CPTII,S$GLB,, | Performed by: STUDENT IN AN ORGANIZED HEALTH CARE EDUCATION/TRAINING PROGRAM

## 2025-04-22 PROCEDURE — 99203 OFFICE O/P NEW LOW 30 MIN: CPT | Mod: S$GLB,,, | Performed by: STUDENT IN AN ORGANIZED HEALTH CARE EDUCATION/TRAINING PROGRAM

## 2025-04-22 PROCEDURE — 1125F AMNT PAIN NOTED PAIN PRSNT: CPT | Mod: CPTII,S$GLB,, | Performed by: STUDENT IN AN ORGANIZED HEALTH CARE EDUCATION/TRAINING PROGRAM

## 2025-04-22 PROCEDURE — 3077F SYST BP >= 140 MM HG: CPT | Mod: CPTII,S$GLB,, | Performed by: STUDENT IN AN ORGANIZED HEALTH CARE EDUCATION/TRAINING PROGRAM

## 2025-04-22 NOTE — PROGRESS NOTES
[facility]  Neurosurgery    SUBJECTIVE:     History of Present Illness:  Darrion Bennett is a 75 y.o. male past medical history significant for sjogren syndrome, CKD who presents with neck and back pain, shoulder and leg pain, diffuse arthritis pains, 2 episodes of his legs giving out with falls.  He is accompanied by his wife today.  His children are all physicians, 2 of them are neurologists (in [place], and in [place]), and he has a daughter who is a physician at [employer] locally.    Patient is telling me he has pain in both shoulders, right worse than left.  The left side of his neck hurts.  Has pain in the low back.  He has pain in the ankles.  This seems like it may radiate from the back.  He has had a couple of episodes of his legs giving out associated with falls.  This happened in mid 2024.  Full spine MRIs were obtained at that time without any explanation.  More recently he was in the hospital in March after another episode like this.    He is on injections for his Sjogren syndrome.  They are not particularly interested in any kind of surgical intervention.  They have recently started acupuncture.  He is in physical therapy.  He has been taking tramadol which is helpful.    (Not in a hospital admission)      Review of patient's allergies indicates:   Allergen Reactions    Allopurinol Other (See Comments)    Azathioprine Other (See Comments)     Pancytopenia    Penicillins Nausea And Vomiting and Other (See Comments)    Rosuvastatin Other (See Comments)     Muscle pain    Allopurinol analogues Rash       Past Medical History:   Diagnosis Date    Anemia     Arthritis     Cataract     CHF (congestive heart failure)     Chronic constipation     Diabetes mellitus, type 2     Felon of finger of left hand 05/11/2019    Glaucoma     Hyperlipidemia 05/13/2014    Hypertension     MCTD (mixed connective tissue disease)     Personal history of colonic polyps     Sjogren's disease      Ulcerative colitis     Urolithiasis      Past Surgical History:   Procedure Laterality Date    CATARACT EXTRACTION Bilateral 2005    COLONOSCOPY  2014    ESOPHAGOGASTRODUODENOSCOPY N/A 9/8/2023    Procedure: EGD (ESOPHAGOGASTRODUODENOSCOPY);  Surgeon: Divya Saenz MD;  Location: [facility] ENDO;  Service: Endoscopy;  Laterality: N/A;  ref by / inst portal-RB    ESOPHAGOGASTRODUODENOSCOPY N/A 11/22/2023    Procedure: EGD (ESOPHAGOGASTRODUODENOSCOPY);  Surgeon: Crystal Urbina MD;  Location: [facility] ENDO;  Service: Endoscopy;  Laterality: N/A;  time frame 8-12 weeks  Dr. Saenz pt   prep instructions sent to pt via portal -  wl meds trulicity hold 7 days prior  diabetic  11/16- pt r/s to earlier date, pre call complete.  DBM    EYE SURGERY       Family History   Problem Relation Name Age of Onset    Hypertension Father      Stroke Father      Cataracts Father      Glaucoma Father      Cataracts Mother      No Known Problems Sister      No Known Problems Brother      Rheum arthritis Maternal Aunt      Rheum arthritis Maternal Uncle      No Known Problems Paternal Aunt      No Known Problems Paternal Uncle      No Known Problems Maternal Grandmother      No Known Problems Maternal Grandfather      Throat cancer Paternal Grandmother      Glaucoma Paternal Grandfather      No Known Problems Daughter      No Known Problems Son x2     Celiac disease Neg Hx      Colon cancer Neg Hx      Cirrhosis Neg Hx      Colon polyps Neg Hx      Crohn's disease Neg Hx      Cystic fibrosis Neg Hx      Hemochromatosis Neg Hx      Esophageal cancer Neg Hx      Inflammatory bowel disease Neg Hx      Irritable bowel syndrome Neg Hx      Liver cancer Neg Hx      Liver disease Neg Hx      Rectal cancer Neg Hx      Stomach cancer Neg Hx      Ulcerative colitis Neg Hx      Chase's disease Neg Hx      Amblyopia Neg Hx      Blindness Neg Hx      Cancer Neg Hx      Diabetes Neg Hx      Macular degeneration Neg Hx      Retinal detachment Neg  Hx      Strabismus Neg Hx      Thyroid disease Neg Hx      Melanoma Neg Hx       Social History[1]     Review of Systems:  As noted in HPI    OBJECTIVE:     Vital Signs (Most Recent):       Physical Exam:  General: well developed, well nourished, no distress  Head: normocephalic, atraumatic  Neurologic: Alert and oriented. Thought content appropriate  Speech: Fluent  Cranial nerves: face symmetric   Eyes: pupils equal  Pulmonary: normal respirations  Sensory: intact to light touch throughout  Motor Strength: Moves all extremities spontaneously with good tone.  Full strength upper and lower extremities. No abnormal movements seen.   Noted to have swollen arthritic appearing hand joints     Delt Bi Tri Wrist ext.  IO  RUE:   5    5   5        5          5    5  LUE:      5    5   5        5          5    5   HF KE EHL DF PF  RLE   5    5     5     5    5  LLE:  5    5     5     5    5    DTR's - 2 + and symmetric   Pittman: absent  Clonus: absent    Gait: somewhat slow and antalgic Tandem Gait: No difficulty       Diagnostic Results:  I have personally reviewed imaging. My impression is as follows.    MRI lumbar spine without contrast dated 03/07/2025  There is grade 1 spondylolisthesis L4/5.  There is a broad-based disc bulge at L4/5 with a central extruded component.  There is thickened ligamentum flavum and facet arthropathy at this level.    CT lumbar spine is dated 03/07/2025  Bone density is suggestive of osteoporosis was Hounsfield units from L3-L5 measuring from .  There is a degenerative appearing dextroscoliosis in the lumbar spine.    MRI cervical spine was obtained 08/12/2024.  There are broad-based disc bulges at C3-4, C4-5, C5-6, C6-7.  There is no significant foraminal stenosis.  There was no spinal cord compression.  Cord signal is normal.    MRI thoracic spine was also obtained 08/12/2024.  No spinal cord compression.    ASSESSMENT/PLAN:     Darrion Bennett is a 75 y.o. male who presents with  a variety of pains in the setting of cervical and lumbar spondylosis, confounding issue of Sjogren syndrome  -surgical intervention is not appropriate  -in this case, it sounds like the majority of patient's pain is probably attributable to the Sjogren's syndrome  -we did discuss the hallmarks of L5 radiculopathy, because if his lumbar issues did become symptomatic I believe this is how they would present  -my recommendation for this patient was to continue with physical therapy, acupuncture, medical management of his pains.  I do not think surgery is appropriate for this  -I provided contact information, encouraged to the patient and his wife to reach out or call the office with any concerns or questions in the future, or if they become convinced that he is suffering from an L5 radiculopathy.  Even if that does become the case, we discussed that there are a variety of options we can do nonsurgically to treat this issue    Please feel free to call with any further questions.    Time spent on this encounter: 30 minutes. This includes face-to-face time and non-face to face time preparing to see the patient (eg, review of tests), obtaining and/or reviewing separately obtained history, documenting clinical information in the electronic or other health record, independently interpreting results and communicating results to the patient/family/caregiver, or care coordinator.      Juni GOLDMAN Mangham [facility]  Department of Neurosurgery  [phone]    Disclaimer: This note was dictated by speech recognition. Minor errors in transcription may be present.  Please call with any questions.           [1]   Social History  Tobacco Use    Smoking status: Never     Passive exposure: Never    Smokeless tobacco: Never   Substance Use Topics    Alcohol use: No    Drug use: Yes     Comment: ultram 1 - 2 tabs a week

## 2025-04-23 ENCOUNTER — PATIENT MESSAGE (OUTPATIENT)
Dept: OPTOMETRY | Facility: CLINIC | Age: 75
End: 2025-04-23
Payer: MEDICARE

## 2025-04-24 ENCOUNTER — TELEPHONE (OUTPATIENT)
Dept: NEUROSURGERY | Facility: CLINIC | Age: 75
End: 2025-04-24
Payer: MEDICARE

## 2025-04-24 ENCOUNTER — CLINICAL SUPPORT (OUTPATIENT)
Dept: REHABILITATION | Facility: HOSPITAL | Age: 75
End: 2025-04-24
Payer: MEDICARE

## 2025-04-24 DIAGNOSIS — R53.1 WEAKNESS: Primary | ICD-10-CM

## 2025-04-24 DIAGNOSIS — Z74.09 IMPAIRED FUNCTIONAL MOBILITY, BALANCE, GAIT, AND ENDURANCE: ICD-10-CM

## 2025-04-24 DIAGNOSIS — H40.1132 PRIMARY OPEN ANGLE GLAUCOMA (POAG) OF BOTH EYES, MODERATE STAGE: Primary | ICD-10-CM

## 2025-04-24 PROCEDURE — 97110 THERAPEUTIC EXERCISES: CPT | Mod: PN

## 2025-04-24 RX ORDER — TRAVOPROST OPHTHALMIC SOLUTION 0.04 MG/ML
1 SOLUTION OPHTHALMIC NIGHTLY
Qty: 7.5 ML | Refills: 0 | Status: SHIPPED | OUTPATIENT
Start: 2025-04-24

## 2025-04-24 NOTE — PROGRESS NOTES
"[facility]   Physical Therapy Treatment Note      Name: Darrion Bennett  [facility] Number: [id number]    Therapy Diagnosis:   Encounter Diagnoses   Name Primary?    Weakness Yes    Impaired functional mobility, balance, gait, and endurance      Physician: Doiny Sánchze,*    Visit Date: 4/24/2025    Physician Orders: PT Eval and Treat   Medical Diagnosis from Referral:   M54.2,G89.29 (ICD-10-CM) - Chronic neck pain   M47.816 (ICD-10-CM) - Lumbar spondylosis   S22.080S (ICD-10-CM) - Compression fracture of T12 vertebra, sequela      Evaluation Date: 3/17/2025  Authorization Period Expiration: 2/21/2026  Plan of Care Expiration: 6/17/2025  Visit # / Visits authorized: 1/ 1   Progress Note Due: 4/17/2025 (Reassessment to be done by PT next visit)  FOTO: 1/ 1     Precautions: history of a recent fall      Time In: 9:00am  Time Out: 10:00am  Total Appointment Time (timed & untimed codes): 60 minutes  Total Billable Time: 45 min        PTA Visit #: /5       Subjective     Patient reports: having pain from arthritis in arms and hands but no pain in the back.   He was compliant with home exercise program.  Response to previous treatment: 2nd treatment session   Functional change: ongoing     Pain: 0/10  Location: low back     Objective      Objective Measures updated at progress report unless specified.     Treatment     Darrion received the treatments listed below:      therapeutic exercises to develop strength and flexibility for 45 minutes including:  Nustep 10 min  LTR x 2 min   DKTC x 2 min   RAINER stretch 6 x 10" each   Open books x 12 each   Standing Marches 2 x 10  STS 3 x 8 20 inch box  Matrix ABD 2 x10  15#  Matrix ADD 2 x 10 15#  Medx Lumbar EXT x 20     Seated Hip ADD  Seated Hip ABD     manual therapy techniques:  were applied to the:  for 00 minutes, including:      neuromuscular re-education activities to improve: core stability and lumbar stabilization for 8 minutes. The following " "activities were included:  TA activation 20 x 5"      therapeutic activities to improve functional performance for 00  minutes, including:       Patient Education and Home Exercises       Education provided:   - PT role, intervention rationale     Written Home Exercises Provided: Pt instructed to continue prior HEP. Exercises were reviewed and Darrion was able to demonstrate them prior to the end of the session.  Darrion demonstrated good  understanding of the education provided. See Electronic Medical Record under Patient Instructions for exercises provided during therapy sessions    Assessment     Pt's 2nd session since evaluation. Continued with mobility and added strengthening for legs. No adverse effects from today's treatment session and irritability remained low throughout session. Will reassess next visit. Will continue to address strength and mobility to reach PLOF.     Darrion Is progressing well towards his goals.   Patient prognosis is Good.     Patient will continue to benefit from skilled outpatient physical therapy to address the deficits listed in the problem list box on initial evaluation, provide pt/family education and to maximize pt's level of independence in the home and community environment.     Patient's spiritual, cultural and educational needs considered and pt agreeable to plan of care and goals.     Anticipated barriers to physical therapy: none    Goals: Short Term Goals: 4 weeks  1.Report decreased in pain at worse less than  <   / =  5  /10  to increase tolerance for functional activities. On going  2. Pt to improve range of motion by 25% to allow for improved functional mobility to allow for improvement in IADLs.  On going  3. Increased  B LE MMT 1/2  grade to increase tolerance for ADL and work activities. On going  4. Pt to tolerate HEP to improve ROM and independence with ADL's. On going     Long Term Goals: 8 weeks  1.Report decreased in pain at worse less than  <   / =  2  /10  to " increase tolerance for functional activities. On going  2.Pt to improve range of motion by 75% to allow for improved functional mobility to allow for improvement in IADLs. On going  3.Increased B LE MMT  1 grade  to increase tolerance for ADL and work activities.On going  4. Pt will report 15% or less limitation on FOTO  survey score for neck pain disability to demonstrate decrease in disability and improvement in neck pain.On going  5. Pt to be Independent with HEP to improve ROM and independence with ADL's. On going  6. Pt will be able to walk 30 min in order to show improved tolerance for community ambulation. Ongoing   7. Pt will improve from 6 to 10 reps in 30 sec STS to show improved B LE functional strength for ADLs. Ongoing    Plan     Cont POC    Emil Don Jr, PT

## 2025-04-28 ENCOUNTER — INFUSION (OUTPATIENT)
Dept: INFUSION THERAPY | Facility: HOSPITAL | Age: 75
End: 2025-04-28
Payer: MEDICARE

## 2025-04-28 ENCOUNTER — TELEPHONE (OUTPATIENT)
Dept: NEPHROLOGY | Facility: CLINIC | Age: 75
End: 2025-04-28
Payer: MEDICARE

## 2025-04-28 VITALS
OXYGEN SATURATION: 100 % | DIASTOLIC BLOOD PRESSURE: 70 MMHG | WEIGHT: 129 LBS | SYSTOLIC BLOOD PRESSURE: 155 MMHG | RESPIRATION RATE: 18 BRPM | TEMPERATURE: 98 F | BODY MASS INDEX: 20.82 KG/M2 | HEART RATE: 64 BPM

## 2025-04-28 DIAGNOSIS — M06.9 RHEUMATOID ARTHRITIS, INVOLVING UNSPECIFIED SITE, UNSPECIFIED WHETHER RHEUMATOID FACTOR PRESENT: Primary | ICD-10-CM

## 2025-04-28 PROCEDURE — 96365 THER/PROPH/DIAG IV INF INIT: CPT

## 2025-04-28 PROCEDURE — 63600175 PHARM REV CODE 636 W HCPCS: Mod: JZ,JA,TB | Performed by: INTERNAL MEDICINE

## 2025-04-28 PROCEDURE — 25000003 PHARM REV CODE 250: Performed by: INTERNAL MEDICINE

## 2025-04-28 PROCEDURE — 96375 TX/PRO/DX INJ NEW DRUG ADDON: CPT

## 2025-04-28 RX ORDER — SODIUM CHLORIDE 0.9 % (FLUSH) 0.9 %
10 SYRINGE (ML) INJECTION
OUTPATIENT
Start: 2025-05-26

## 2025-04-28 RX ORDER — ACETAMINOPHEN 325 MG/1
650 TABLET ORAL
Status: COMPLETED | OUTPATIENT
Start: 2025-04-28 | End: 2025-04-28

## 2025-04-28 RX ORDER — EPINEPHRINE 0.3 MG/.3ML
0.3 INJECTION SUBCUTANEOUS ONCE AS NEEDED
OUTPATIENT
Start: 2025-05-26

## 2025-04-28 RX ORDER — DIPHENHYDRAMINE HYDROCHLORIDE 50 MG/ML
25 INJECTION, SOLUTION INTRAMUSCULAR; INTRAVENOUS
Status: COMPLETED | OUTPATIENT
Start: 2025-04-28 | End: 2025-04-28

## 2025-04-28 RX ORDER — HEPARIN 100 UNIT/ML
500 SYRINGE INTRAVENOUS
OUTPATIENT
Start: 2025-05-26

## 2025-04-28 RX ORDER — ACETAMINOPHEN 325 MG/1
650 TABLET ORAL
OUTPATIENT
Start: 2025-05-26

## 2025-04-28 RX ORDER — DIPHENHYDRAMINE HYDROCHLORIDE 50 MG/ML
25 INJECTION, SOLUTION INTRAMUSCULAR; INTRAVENOUS
OUTPATIENT
Start: 2025-05-26

## 2025-04-28 RX ORDER — DIPHENHYDRAMINE HYDROCHLORIDE 50 MG/ML
50 INJECTION, SOLUTION INTRAMUSCULAR; INTRAVENOUS ONCE AS NEEDED
OUTPATIENT
Start: 2025-05-26

## 2025-04-28 RX ADMIN — DIPHENHYDRAMINE HYDROCHLORIDE 25 MG: 50 INJECTION INTRAMUSCULAR; INTRAVENOUS at 09:04

## 2025-04-28 RX ADMIN — SODIUM CHLORIDE 500 MG: 9 INJECTION, SOLUTION INTRAVENOUS at 09:04

## 2025-04-28 RX ADMIN — ACETAMINOPHEN 650 MG: 325 TABLET ORAL at 09:04

## 2025-04-28 NOTE — PLAN OF CARE
Pt arrived to clinic for scheduled Orencia (q4w). VSS. Pre-medications of benadryl and tylenol given. Infusion over 30 mins. Tolerated well. Voices no concerns at this time. Ambulated from clinic for discharge in NAD.

## 2025-04-29 ENCOUNTER — CLINICAL SUPPORT (OUTPATIENT)
Dept: REHABILITATION | Facility: HOSPITAL | Age: 75
End: 2025-04-29
Payer: MEDICARE

## 2025-04-29 DIAGNOSIS — R53.1 WEAKNESS: Primary | ICD-10-CM

## 2025-04-29 DIAGNOSIS — Z74.09 IMPAIRED FUNCTIONAL MOBILITY, BALANCE, GAIT, AND ENDURANCE: ICD-10-CM

## 2025-04-29 PROCEDURE — 97110 THERAPEUTIC EXERCISES: CPT | Mod: PN

## 2025-04-29 NOTE — PROGRESS NOTES
[facility]   Physical Therapy Treatment Note      Name: Darrion Bennett  [facility] Number: [id number]    Therapy Diagnosis:   Encounter Diagnoses   Name Primary?    Weakness Yes    Impaired functional mobility, balance, gait, and endurance      Physician: Diony Sánchez,*    Visit Date: 4/29/2025    Physician Orders: PT Eval and Treat   Medical Diagnosis from Referral:   M54.2,G89.29 (ICD-10-CM) - Chronic neck pain   M47.816 (ICD-10-CM) - Lumbar spondylosis   S22.080S (ICD-10-CM) - Compression fracture of T12 vertebra, sequela      Evaluation Date: 3/17/2025  Authorization Period Expiration: 2/21/2026  Plan of Care Expiration: 6/17/2025  Visit # / Visits authorized: 1/ 1   Progress Note Due: 5/29/2025  FOTO: 1/ 1     Precautions: history of a recent fall      Time In: 9:00am  Time Out: 10:00am  Total Appointment Time (timed & untimed codes): 45 minutes  Total Billable Time: 35 min        PTA Visit #: /5       Subjective     Patient reports: feeling good today.   He was compliant with home exercise program.  Response to previous treatment: no concerns   Functional change: ongoing     Pain: 0/10  Location: low back     Objective      30 sec STS : 5 reps      6 MWT: 1065 ft      Lumbar Range of Motion:               %  Flexion            90     Extension        100     Left Side Bending       100  Right Side Bending     100  Left rotation              75  Right Rotation              75   *= pain        Lower Extremity Strength     Right LE                      Left LE   Hip flexion:      4+/5      Hip flexion:      4+/5  Knee extension: 5/5         4/5       Knee extension:          5/5  Knee flexion:   4/5       Knee flexion:   4/5  Hip extension:             4/5       Hip extension: 4/5  Hip abduction: 4/5       Hip abduction: 4/5  Hip adduction: 4/5       Hip adduction  4/5  Ankle dorsiflexion:       5/5       Ankle dorsiflexion:       5/5  Ankle plantarflexion:   4+/5     Ankle  "plantarflexion:   4+/5       Treatment     Darrion received the treatments listed below:      therapeutic exercises to develop strength and flexibility for 45 minutes including:  Reassessment time   Nustep 10 min  LTR x 2 min   DKTC x 2 min   RAINER stretch 6 x 10" each   Open books x 12 each   Standing Marches 2 x 10  STS 3 x 8 20 inch box  Matrix ABD 2 x10  15#  Matrix ADD 2 x 10 15#  Medx Lumbar EXT x 20     Seated Hip ADD  Seated Hip ABD     manual therapy techniques:  were applied to the:  for 00 minutes, including:      neuromuscular re-education activities to improve: core stability and lumbar stabilization for 00 minutes. The following activities were included:  TA activation 20 x 5"      therapeutic activities to improve functional performance for 00  minutes, including:       Patient Education and Home Exercises       Education provided:   - PT role, intervention rationale     Written Home Exercises Provided: Pt instructed to continue prior HEP. Exercises were reviewed and Darrion was able to demonstrate them prior to the end of the session.  Darrion demonstrated good  understanding of the education provided. See Electronic Medical Record under Patient Instructions for exercises provided during therapy sessions    Assessment     Pt reassessed. Improvement in 6 MWT. Pain is low and improved. Continued with exercises from last session with no adverse effects. Still needs more strengthening for B LE in order to reach PLOF.     Darrion Is progressing well towards his goals.   Patient prognosis is Good.     Patient will continue to benefit from skilled outpatient physical therapy to address the deficits listed in the problem list box on initial evaluation, provide pt/family education and to maximize pt's level of independence in the home and community environment.     Patient's spiritual, cultural and educational needs considered and pt agreeable to plan of care and goals.     Anticipated barriers to physical therapy: " none    Goals: Short Term Goals: 4 weeks  1.Report decreased in pain at worse less than  <   / =  5  /10  to increase tolerance for functional activities. MET  2. Pt to improve range of motion by 25% to allow for improved functional mobility to allow for improvement in IADLs.  On going  3. Increased  B LE MMT 1/2  grade to increase tolerance for ADL and work activities. On going  4. Pt to tolerate HEP to improve ROM and independence with ADL's. MET     Long Term Goals: 8 weeks  1.Report decreased in pain at worse less than  <   / =  2  /10  to increase tolerance for functional activities. On going  2.Pt to improve range of motion by 75% to allow for improved functional mobility to allow for improvement in IADLs. On going  3.Increased B LE MMT  1 grade  to increase tolerance for ADL and work activities.On going  4. Pt will report 15% or less limitation on FOTO  survey score for neck pain disability to demonstrate decrease in disability and improvement in neck pain.On going  5. Pt to be Independent with HEP to improve ROM and independence with ADL's. On going  6. Pt will be able to walk 30 min in order to show improved tolerance for community ambulation. Ongoing   7. Pt will improve from 6 to 10 reps in 30 sec STS to show improved B LE functional strength for ADLs. Ongoing    Plan     Cont POC    Emil Don Jr, PT

## 2025-05-06 ENCOUNTER — CLINICAL SUPPORT (OUTPATIENT)
Dept: REHABILITATION | Facility: HOSPITAL | Age: 75
End: 2025-05-06
Payer: MEDICARE

## 2025-05-06 DIAGNOSIS — R53.1 WEAKNESS: Primary | ICD-10-CM

## 2025-05-06 DIAGNOSIS — Z74.09 IMPAIRED FUNCTIONAL MOBILITY, BALANCE, GAIT, AND ENDURANCE: ICD-10-CM

## 2025-05-06 PROCEDURE — 97110 THERAPEUTIC EXERCISES: CPT | Mod: PN

## 2025-05-06 NOTE — PROGRESS NOTES
[facility]   Physical Therapy Treatment Note      Name: Darrion Bennett  [facility] Number: [id number]    Therapy Diagnosis:   Encounter Diagnoses   Name Primary?    Weakness Yes    Impaired functional mobility, balance, gait, and endurance      Physician: Diony Sánchez,*    Visit Date: 5/6/2025    Physician Orders: PT Eval and Treat   Medical Diagnosis from Referral:   M54.2,G89.29 (ICD-10-CM) - Chronic neck pain   M47.816 (ICD-10-CM) - Lumbar spondylosis   S22.080S (ICD-10-CM) - Compression fracture of T12 vertebra, sequela      Evaluation Date: 3/17/2025  Authorization Period Expiration: 2/21/2026  Plan of Care Expiration: 6/17/2025  Visit # / Visits authorized: 1/ 1   Progress Note Due: 5/29/2025  FOTO: 1/ 1     Precautions: history of a recent fall      Time In: 9:00am  Time Out: 10:00am  Total Appointment Time (timed & untimed codes): 45 minutes  Total Billable Time: 45 min        PTA Visit #: /5       Subjective     Patient reports: feeling good today.   He was compliant with home exercise program.  Response to previous treatment: no concerns   Functional change: ongoing     Pain: 0/10  Location: low back     Objective      30 sec STS : 5 reps      6 MWT: 1065 ft      Lumbar Range of Motion:               %  Flexion            90     Extension        100     Left Side Bending       100  Right Side Bending     100  Left rotation              75  Right Rotation              75   *= pain        Lower Extremity Strength     Right LE                      Left LE   Hip flexion:      4+/5      Hip flexion:      4+/5  Knee extension: 5/5         4/5       Knee extension:          5/5  Knee flexion:   4/5       Knee flexion:   4/5  Hip extension:             4/5       Hip extension: 4/5  Hip abduction: 4/5       Hip abduction: 4/5  Hip adduction: 4/5       Hip adduction  4/5  Ankle dorsiflexion:       5/5       Ankle dorsiflexion:       5/5  Ankle plantarflexion:   4+/5     Ankle  "plantarflexion:   4+/5       Treatment     Darrion received the treatments listed below:      therapeutic exercises to develop strength and flexibility for 45 minutes including:  Reassessment time   Nustep 10 min or Recumbent bike 10 min   LTR x 2 min   DKTC x 2 min   Open books x 12 each   +Bridges GTB 3 x 8   RAINER stretch 6 x 10" each   Standing Marches 2 x 10  STS RTB @ knees 3 x 6 18 inch box   Matrix ABD 2 x10  15#  Matrix ADD 2 x 10 15#  Medx Lumbar EXT x 20 ( not today)    Seated Hip ADD  Seated Hip ABD     manual therapy techniques:  were applied to the:  for 00 minutes, including:      neuromuscular re-education activities to improve: core stability and lumbar stabilization for 00 minutes. The following activities were included:  TA activation 20 x 5"      therapeutic activities to improve functional performance for 00  minutes, including:       Patient Education and Home Exercises       Education provided:   - PT role, intervention rationale     Written Home Exercises Provided: Pt instructed to continue prior HEP. Exercises were reviewed and Darrion was able to demonstrate them prior to the end of the session.  Darrion demonstrated good  understanding of the education provided. See Electronic Medical Record under Patient Instructions for exercises provided during therapy sessions    Assessment     Pt continues to have decreased back pain but Leg weakness is still present. Continued with strengthening with no adverse effects and will progress more as long as there are no setbacks. Still needs more strengthening for B LE in order to reach PLOF.     Darrion Is progressing well towards his goals.   Patient prognosis is Good.     Patient will continue to benefit from skilled outpatient physical therapy to address the deficits listed in the problem list box on initial evaluation, provide pt/family education and to maximize pt's level of independence in the home and community environment.     Patient's spiritual, " cultural and educational needs considered and pt agreeable to plan of care and goals.     Anticipated barriers to physical therapy: none    Goals: Short Term Goals: 4 weeks  1.Report decreased in pain at worse less than  <   / =  5  /10  to increase tolerance for functional activities. MET  2. Pt to improve range of motion by 25% to allow for improved functional mobility to allow for improvement in IADLs.  On going  3. Increased  B LE MMT 1/2  grade to increase tolerance for ADL and work activities. On going  4. Pt to tolerate HEP to improve ROM and independence with ADL's. MET     Long Term Goals: 8 weeks  1.Report decreased in pain at worse less than  <   / =  2  /10  to increase tolerance for functional activities. On going  2.Pt to improve range of motion by 75% to allow for improved functional mobility to allow for improvement in IADLs. On going  3.Increased B LE MMT  1 grade  to increase tolerance for ADL and work activities.On going  4. Pt will report 15% or less limitation on FOTO  survey score for neck pain disability to demonstrate decrease in disability and improvement in neck pain.On going  5. Pt to be Independent with HEP to improve ROM and independence with ADL's. On going  6. Pt will be able to walk 30 min in order to show improved tolerance for community ambulation. Ongoing   7. Pt will improve from 6 to 10 reps in 30 sec STS to show improved B LE functional strength for ADLs. Ongoing    Plan     Cont POC    Emil Don Jr, PT

## 2025-05-08 ENCOUNTER — CLINICAL SUPPORT (OUTPATIENT)
Dept: REHABILITATION | Facility: HOSPITAL | Age: 75
End: 2025-05-08
Payer: MEDICARE

## 2025-05-08 DIAGNOSIS — M54.59 OTHER LOW BACK PAIN: Primary | ICD-10-CM

## 2025-05-08 DIAGNOSIS — G89.29 MID BACK PAIN, CHRONIC: ICD-10-CM

## 2025-05-08 DIAGNOSIS — G89.29 UPPER BACK PAIN, CHRONIC: ICD-10-CM

## 2025-05-08 DIAGNOSIS — M54.9 UPPER BACK PAIN, CHRONIC: ICD-10-CM

## 2025-05-08 DIAGNOSIS — M54.2 CHRONIC NECK PAIN: ICD-10-CM

## 2025-05-08 DIAGNOSIS — G89.29 CHRONIC NECK PAIN: ICD-10-CM

## 2025-05-08 DIAGNOSIS — S22.000A COMPRESSION FRACTURE OF BODY OF THORACIC VERTEBRA: ICD-10-CM

## 2025-05-08 DIAGNOSIS — M54.9 MID BACK PAIN, CHRONIC: ICD-10-CM

## 2025-05-08 PROCEDURE — 97810 ACUP 1/> WO ESTIM 1ST 15 MIN: CPT | Mod: PN

## 2025-05-08 PROCEDURE — 97811 ACUP 1/> W/O ESTIM EA ADD 15: CPT | Mod: PN

## 2025-05-08 NOTE — PROGRESS NOTES
Acupuncture Treatment Note     Name: Darrion Bennett  [facility] Number: [id number]    Traditional Chinese Medicine Diagnosis: Qi Stagnation and Blood Stasis   Physician: Farooq Domingo MD    Date of Service: 5/8/2025     Medical Diagnosis: Chronic pain in lower back, mid back and upper back       Encounter Diagnoses   Name Primary?    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     Subjective     Chief Complaint:  Chronic pain in lower back, mid back and upper back for 2 years , Weakness of right leg    Cancer Related Symptoms: None    Medical necessity is demonstrated by the following IMPAIRMENTS: Medical Necessity: Decreased mobility limits day to day activities, social, and emergent situations and Decreased quality of life    Response to Previous Treatment:  good    Quality of Symptoms (Better/Worse):  better    Other Condition/Symptoms:   MCTD (mixed connective tissue disease), Sjogren's disease, RA (rheumatoid arthritis),     Objective      New Findings:  None    Treatment Principles:  Increase Qi and Blood, stop pain     Acupuncture points used:    Bilateral points:Rahul Tuo Mikaela Ji + 9, Zoe on lower back + 4, GB 20, Zoe on neck + 3  Left:   Right:   Auricular Treatment:  None  Needles In: 34  Needles Out: 34  Needles W/O STIM placed: 8:50 AM  [place] W/O STIM removed: 9: 40 AM    Other Traditional Chinese Medicine Modalities:  None    Recommendations:  PT    Education:  Patient is aware of cumulative effect of acupuncture      Assessment      Analysis of Treatment:  Patient felt good    Pt prognosis is Good.     Patient will continue to benefit  from acupuncture treatment to address the deficits listed in the problem list box on initial evaluation, provide patient family education and to maximize pt's level of independence in the home and community environment.     Patient's spiritual, cultural and educational needs considered and pt agreeable to plan of care and goals.     Anticipated barriers to treatment: None    Plan     Recommend       1  /week for   12 treatments and re-assess.

## 2025-05-09 ENCOUNTER — CLINICAL SUPPORT (OUTPATIENT)
Dept: OPHTHALMOLOGY | Facility: CLINIC | Age: 75
End: 2025-05-09
Payer: MEDICARE

## 2025-05-09 ENCOUNTER — OFFICE VISIT (OUTPATIENT)
Dept: OPTOMETRY | Facility: CLINIC | Age: 75
End: 2025-05-09
Payer: MEDICARE

## 2025-05-09 DIAGNOSIS — Z96.1 PSEUDOPHAKIA OF BOTH EYES: ICD-10-CM

## 2025-05-09 DIAGNOSIS — E11.9 TYPE 2 DIABETES MELLITUS WITHOUT RETINOPATHY: ICD-10-CM

## 2025-05-09 DIAGNOSIS — H40.1132 PRIMARY OPEN ANGLE GLAUCOMA (POAG) OF BOTH EYES, MODERATE STAGE: Primary | ICD-10-CM

## 2025-05-09 DIAGNOSIS — H52.7 REFRACTIVE ERROR: ICD-10-CM

## 2025-05-09 DIAGNOSIS — H40.1132 PRIMARY OPEN ANGLE GLAUCOMA (POAG) OF BOTH EYES, MODERATE STAGE: ICD-10-CM

## 2025-05-09 PROCEDURE — 99999 PR PBB SHADOW E&M-EST. PATIENT-LVL III: CPT | Mod: PBBFAC,,, | Performed by: OPTOMETRIST

## 2025-05-09 RX ORDER — DORZOLAMIDE HCL 20 MG/ML
1 SOLUTION/ DROPS OPHTHALMIC 2 TIMES DAILY
Qty: 10 ML | Refills: 3 | Status: SHIPPED | OUTPATIENT
Start: 2025-05-09 | End: 2026-05-09

## 2025-05-09 RX ORDER — TRAVOPROST OPHTHALMIC SOLUTION 0.04 MG/ML
1 SOLUTION OPHTHALMIC NIGHTLY
Qty: 7.5 ML | Refills: 2 | Status: SHIPPED | OUTPATIENT
Start: 2025-05-09

## 2025-05-09 NOTE — PROGRESS NOTES
Subjective:       Patient ID: Darrion Bennett is a 75 y.o. male      Chief Complaint   Patient presents with    Concerns About Ocular Health    Diabetic Eye Exam    Glaucoma     History of Present Illness  Dls: 8/18/23 Dr. Esquivel     74 y/o male presents today for glaucoma ck and diabetic eye exam.   Pt states no changes in vision. Pt states failed eye test at [facility]  Pt wears otc readers.          No tearing  No itching  No burning  No pain  No ha's  No floaters  No flashes    Eye meds  Trusopt OU BID last dose 3 weeks ago ran out of gtts   Travatan Z OU Q HS last dose last night for 1st time in 3 weeks ran out     Pohx:   CE Bilateral 2005   POAG Bilateral     Fohx:   Glaucoma- father, grandfather    Cat -mother, father     Hemoglobin A1C       Date                     Value               Ref Range             Status                03/08/2025               6.7 (H)             4.0 - 5.6 %           Final                   08/28/2024               6.9 (H)             4.0 - 5.6 %           Final                   05/23/2024               6.6 (H)             4.0 - 5.6 %           Final                    Assessment/Plan:     1. Primary open angle glaucoma (POAG) of both eyes, moderate stage (Primary)  POAG since at least 2013 followed by Dr. Álvarez, transferred to Dr. Hall pt on travatan qhs ou and trusopt bid OU. Transfer care to [facility] 2019. Pt lost to follow up 08/18/23 to 05/09/25, pt out of drops x 3 weeks. Restart travatan qhs ou and trusopt bid ou. HVF/OCT testing done today, results reviewed with pt. Pt would like to keep 4 month follow up to preschedule appts.       - dorzolamide (TRUSOPT) 2 % ophthalmic solution; Place 1 drop into both eyes 2 (two) times a day.  Dispense: 10 mL; Refill: 3  - travoprost (TRAVATAN Z) 0.004 % ophthalmic solution; Place 1 drop into both eyes every evening.  Dispense: 7.5 mL; Refill: 2    2. Type 2 diabetes mellitus without retinopathy  No diabetic retinopathy. Discussed with  pt the effects of diabetes on vision, importance of good blood sugar control, compliance with meds, and follow up care with PCP. Return in 1 year for dilated eye exam, sooner PRN.    3. Pseudophakia of both eyes  Well-centered. Clear.     4. Refractive error  Per pt has  a glasses restriction on his license, updated Mrx today. Educated patient on refractive error and discussed lens options. Dispensed updated spectacle Rx. Educated about adaptation period to new specs.    Eyeglass Final Rx       Eyeglass Final Rx         Sphere Cylinder Axis    Right -0.25 +0.75 175    Left -1.00 +1.00 180      Type: SVL    Expiration Date: 5/9/2026                  Today's visit is associated with current and anticipated ongoing medical care related to this patient's single serious/complex condition (glaucoma). Follow up is to be continued indefinitely to monitor the condition.       Follow up in about 20 weeks (around 9/26/2025) for IOP check.

## 2025-05-09 NOTE — PROGRESS NOTES
oct done ou    Assessment /Plan     For exam results, see Encounter Report.    Primary open angle glaucoma (POAG) of both eyes, moderate stage  -     [place] Visual Field - OU - Extended - Both Eyes  -     Posterior Segment OCT Optic Nerve- Both eyes      24-2  sf done ou     Rel & Fix = good ou      Coop =    good     Patient has no allergie to latex or adhesives at this time    Jthomas    Mrx    Pl + .50 x 170   od    -.75                 os

## 2025-05-13 ENCOUNTER — CLINICAL SUPPORT (OUTPATIENT)
Dept: REHABILITATION | Facility: HOSPITAL | Age: 75
End: 2025-05-13
Payer: MEDICARE

## 2025-05-13 DIAGNOSIS — R53.1 WEAKNESS: Primary | ICD-10-CM

## 2025-05-13 DIAGNOSIS — Z74.09 IMPAIRED FUNCTIONAL MOBILITY, BALANCE, GAIT, AND ENDURANCE: ICD-10-CM

## 2025-05-13 PROCEDURE — 97110 THERAPEUTIC EXERCISES: CPT | Mod: PN

## 2025-05-15 ENCOUNTER — CLINICAL SUPPORT (OUTPATIENT)
Dept: REHABILITATION | Facility: HOSPITAL | Age: 75
End: 2025-05-15
Payer: MEDICARE

## 2025-05-15 DIAGNOSIS — M54.9 MID BACK PAIN, CHRONIC: ICD-10-CM

## 2025-05-15 DIAGNOSIS — G89.29 CHRONIC NECK PAIN: ICD-10-CM

## 2025-05-15 DIAGNOSIS — S22.000A COMPRESSION FRACTURE OF BODY OF THORACIC VERTEBRA: ICD-10-CM

## 2025-05-15 DIAGNOSIS — M54.2 CHRONIC NECK PAIN: ICD-10-CM

## 2025-05-15 DIAGNOSIS — G89.29 MID BACK PAIN, CHRONIC: ICD-10-CM

## 2025-05-15 DIAGNOSIS — M54.59 OTHER LOW BACK PAIN: Primary | ICD-10-CM

## 2025-05-15 DIAGNOSIS — M54.9 UPPER BACK PAIN, CHRONIC: ICD-10-CM

## 2025-05-15 DIAGNOSIS — G89.29 UPPER BACK PAIN, CHRONIC: ICD-10-CM

## 2025-05-15 PROCEDURE — 97811 ACUP 1/> W/O ESTIM EA ADD 15: CPT | Mod: PN

## 2025-05-15 PROCEDURE — 97810 ACUP 1/> WO ESTIM 1ST 15 MIN: CPT | Mod: PN

## 2025-05-15 NOTE — PROGRESS NOTES
Acupuncture Treatment Note     Name: Darrion Bennett  [facility] Number: [id number]    Traditional Chinese Medicine Diagnosis: Qi Stagnation and Blood Stasis   Physician: Farooq Domingo MD    Date of Service: 5/15/2025     Medical Diagnosis: Chronic pain in lower back, mid back and upper back       Encounter Diagnoses   Name Primary?    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     Subjective     Chief Complaint:  Chronic pain in lower back, mid back and upper back for 2 years , Weakness of right leg    Cancer Related Symptoms: None    Medical necessity is demonstrated by the following IMPAIRMENTS: Medical Necessity: Decreased mobility limits day to day activities, social, and emergent situations and Decreased quality of life    Response to Previous Treatment:  good    Quality of Symptoms (Better/Worse):  better    Other Condition/Symptoms:   MCTD (mixed connective tissue disease), Sjogren's disease, RA (rheumatoid arthritis),     Objective      New Findings:  None    Treatment Principles:  Increase Qi and Blood, stop pain     Acupuncture points used:    Bilateral points:Rahul Tuo Mikaela Ji + 9, Zoe on lower back + 4, GB 20, Zoe on neck + 3  Left:   Right:   Auricular Treatment:  None  Needles In: 34  Needles Out: 34  Needles W/O STIM placed: 8:50 AM  [place] W/O STIM removed: 9: 40 AM    Other Traditional Chinese Medicine Modalities:  None    Recommendations:  PT    Education:  Patient is aware of cumulative effect of acupuncture      Assessment      Analysis of Treatment:  Patient felt good    Pt prognosis is Good.     Patient will continue to benefit  from acupuncture treatment to address the deficits listed in the problem list box on initial evaluation, provide patient family education and to maximize pt's level of independence in the home and community environment.     Patient's spiritual, cultural and educational needs considered and pt agreeable to plan of care and goals.     Anticipated barriers to treatment: None    Plan     Recommend       1  /week for   12 treatments and re-assess.

## 2025-05-16 ENCOUNTER — PATIENT MESSAGE (OUTPATIENT)
Dept: CARDIOLOGY | Facility: CLINIC | Age: 75
End: 2025-05-16
Payer: MEDICARE

## 2025-05-22 ENCOUNTER — LAB VISIT (OUTPATIENT)
Dept: LAB | Facility: HOSPITAL | Age: 75
End: 2025-05-22
Payer: MEDICARE

## 2025-05-22 DIAGNOSIS — D63.1 ANEMIA OF CHRONIC RENAL FAILURE, STAGE 3B: ICD-10-CM

## 2025-05-22 DIAGNOSIS — N18.32 ANEMIA OF CHRONIC RENAL FAILURE, STAGE 3B: ICD-10-CM

## 2025-05-22 LAB
ABSOLUTE EOSINOPHIL (OHS): 0.16 K/UL
ABSOLUTE MONOCYTE (OHS): 1.06 K/UL (ref 0.3–1)
ABSOLUTE NEUTROPHIL COUNT (OHS): 5.24 K/UL (ref 1.8–7.7)
BASOPHILS # BLD AUTO: 0.03 K/UL
BASOPHILS NFR BLD AUTO: 0.4 %
ERYTHROCYTE [DISTWIDTH] IN BLOOD BY AUTOMATED COUNT: 14.8 % (ref 11.5–14.5)
FERRITIN SERPL-MCNC: 191 NG/ML (ref 20–300)
HCT VFR BLD AUTO: 35.8 % (ref 40–54)
HGB BLD-MCNC: 11.1 GM/DL (ref 14–18)
IMM GRANULOCYTES # BLD AUTO: 0.01 K/UL (ref 0–0.04)
IMM GRANULOCYTES NFR BLD AUTO: 0.1 % (ref 0–0.5)
IRON SATN MFR SERPL: 17 % (ref 20–50)
IRON SERPL-MCNC: 40 UG/DL (ref 45–160)
LYMPHOCYTES # BLD AUTO: 1.75 K/UL (ref 1–4.8)
MCH RBC QN AUTO: 27.7 PG (ref 27–31)
MCHC RBC AUTO-ENTMCNC: 31 G/DL (ref 32–36)
MCV RBC AUTO: 89 FL (ref 82–98)
NUCLEATED RBC (/100WBC) (OHS): 0 /100 WBC
PLATELET # BLD AUTO: 194 K/UL (ref 150–450)
PMV BLD AUTO: 12.1 FL (ref 9.2–12.9)
RBC # BLD AUTO: 4.01 M/UL (ref 4.6–6.2)
RELATIVE EOSINOPHIL (OHS): 1.9 %
RELATIVE LYMPHOCYTE (OHS): 21.2 % (ref 18–48)
RELATIVE MONOCYTE (OHS): 12.8 % (ref 4–15)
RELATIVE NEUTROPHIL (OHS): 63.6 % (ref 38–73)
TIBC SERPL-MCNC: 232 UG/DL (ref 250–450)
TRANSFERRIN SERPL-MCNC: 157 MG/DL (ref 200–375)
WBC # BLD AUTO: 8.25 K/UL (ref 3.9–12.7)

## 2025-05-22 PROCEDURE — 36415 COLL VENOUS BLD VENIPUNCTURE: CPT | Mod: PO

## 2025-05-22 PROCEDURE — 82728 ASSAY OF FERRITIN: CPT

## 2025-05-22 PROCEDURE — 84466 ASSAY OF TRANSFERRIN: CPT

## 2025-05-22 PROCEDURE — 85025 COMPLETE CBC W/AUTO DIFF WBC: CPT

## 2025-05-27 ENCOUNTER — INFUSION (OUTPATIENT)
Dept: INFUSION THERAPY | Facility: HOSPITAL | Age: 75
End: 2025-05-27
Attending: INTERNAL MEDICINE
Payer: MEDICARE

## 2025-05-27 VITALS
OXYGEN SATURATION: 99 % | DIASTOLIC BLOOD PRESSURE: 60 MMHG | BODY MASS INDEX: 20.21 KG/M2 | TEMPERATURE: 99 F | HEART RATE: 74 BPM | WEIGHT: 125.25 LBS | RESPIRATION RATE: 16 BRPM | SYSTOLIC BLOOD PRESSURE: 129 MMHG

## 2025-05-27 DIAGNOSIS — M81.0 OSTEOPOROSIS, UNSPECIFIED OSTEOPOROSIS TYPE, UNSPECIFIED PATHOLOGICAL FRACTURE PRESENCE: ICD-10-CM

## 2025-05-27 DIAGNOSIS — N18.31 STAGE 3A CHRONIC KIDNEY DISEASE: ICD-10-CM

## 2025-05-27 DIAGNOSIS — M81.0 AGE-RELATED OSTEOPOROSIS WITHOUT CURRENT PATHOLOGICAL FRACTURE: ICD-10-CM

## 2025-05-27 DIAGNOSIS — M06.9 RHEUMATOID ARTHRITIS, INVOLVING UNSPECIFIED SITE, UNSPECIFIED WHETHER RHEUMATOID FACTOR PRESENT: Primary | ICD-10-CM

## 2025-05-27 LAB
ALBUMIN SERPL BCP-MCNC: 3.1 G/DL (ref 3.5–5.2)
ALP SERPL-CCNC: 102 UNIT/L (ref 40–150)
ALT SERPL W/O P-5'-P-CCNC: 18 UNIT/L (ref 10–44)
ANION GAP (OHS): 7 MMOL/L (ref 8–16)
AST SERPL-CCNC: 19 UNIT/L (ref 11–45)
BILIRUB SERPL-MCNC: 0.5 MG/DL (ref 0.1–1)
BUN SERPL-MCNC: 29 MG/DL (ref 8–23)
CALCIUM SERPL-MCNC: 9.2 MG/DL (ref 8.7–10.5)
CHLORIDE SERPL-SCNC: 108 MMOL/L (ref 95–110)
CO2 SERPL-SCNC: 21 MMOL/L (ref 23–29)
CREAT SERPL-MCNC: 1.6 MG/DL (ref 0.5–1.4)
GFR SERPLBLD CREATININE-BSD FMLA CKD-EPI: 45 ML/MIN/1.73/M2
GLUCOSE SERPL-MCNC: 144 MG/DL (ref 70–110)
POTASSIUM SERPL-SCNC: 4.4 MMOL/L (ref 3.5–5.1)
PROT SERPL-MCNC: 7.6 GM/DL (ref 6–8.4)
SODIUM SERPL-SCNC: 136 MMOL/L (ref 136–145)

## 2025-05-27 PROCEDURE — 63600175 PHARM REV CODE 636 W HCPCS: Mod: JZ,JA,TB | Performed by: INTERNAL MEDICINE

## 2025-05-27 PROCEDURE — 96365 THER/PROPH/DIAG IV INF INIT: CPT

## 2025-05-27 PROCEDURE — 96375 TX/PRO/DX INJ NEW DRUG ADDON: CPT

## 2025-05-27 PROCEDURE — 80053 COMPREHEN METABOLIC PANEL: CPT

## 2025-05-27 PROCEDURE — 96372 THER/PROPH/DIAG INJ SC/IM: CPT | Mod: 59

## 2025-05-27 PROCEDURE — 63600175 PHARM REV CODE 636 W HCPCS: Mod: JZ,TB | Performed by: HOSPITALIST

## 2025-05-27 PROCEDURE — 25000003 PHARM REV CODE 250: Performed by: INTERNAL MEDICINE

## 2025-05-27 RX ORDER — DIPHENHYDRAMINE HYDROCHLORIDE 50 MG/ML
25 INJECTION, SOLUTION INTRAMUSCULAR; INTRAVENOUS
Status: COMPLETED | OUTPATIENT
Start: 2025-05-27 | End: 2025-05-27

## 2025-05-27 RX ORDER — ACETAMINOPHEN 325 MG/1
650 TABLET ORAL
Status: COMPLETED | OUTPATIENT
Start: 2025-05-27 | End: 2025-05-27

## 2025-05-27 RX ADMIN — DENOSUMAB 60 MG: 60 INJECTION SUBCUTANEOUS at 10:05

## 2025-05-27 RX ADMIN — SODIUM CHLORIDE 500 MG: 9 INJECTION, SOLUTION INTRAVENOUS at 09:05

## 2025-05-27 RX ADMIN — DIPHENHYDRAMINE HYDROCHLORIDE 25 MG: 50 INJECTION INTRAMUSCULAR; INTRAVENOUS at 09:05

## 2025-05-27 RX ADMIN — ACETAMINOPHEN 650 MG: 325 TABLET ORAL at 09:05

## 2025-05-27 NOTE — PLAN OF CARE
Patient arrived to unit, ambulatory, for IV therapy Orencia and injection therapy Prolia. Pt alert and oriented with no new or worsening complaints upon arrival. Labs for Prolia injection collected without incident.    Orencia administered and infused over 30 minutes without incident. Pt tolerated with no complaints or S&S of reaction. Prolia administered subQ to the back of the right arm.    Next appointments provided and patient discharged home upon completion in [facility].

## 2025-05-28 ENCOUNTER — RESULTS FOLLOW-UP (OUTPATIENT)
Dept: RHEUMATOLOGY | Facility: HOSPITAL | Age: 75
End: 2025-05-28

## 2025-05-28 ENCOUNTER — CLINICAL SUPPORT (OUTPATIENT)
Dept: REHABILITATION | Facility: HOSPITAL | Age: 75
End: 2025-05-28
Payer: MEDICARE

## 2025-05-28 DIAGNOSIS — Z74.09 IMPAIRED FUNCTIONAL MOBILITY, BALANCE, GAIT, AND ENDURANCE: ICD-10-CM

## 2025-05-28 DIAGNOSIS — R53.1 WEAKNESS: Primary | ICD-10-CM

## 2025-05-28 PROCEDURE — 97110 THERAPEUTIC EXERCISES: CPT | Mod: PN

## 2025-05-28 NOTE — PROGRESS NOTES
[facility]   Physical Therapy Treatment Note      Name: Darrion Bennett  [facility] Number: [id number]    Therapy Diagnosis:   Encounter Diagnoses   Name Primary?    Weakness Yes    Impaired functional mobility, balance, gait, and endurance      Physician: Diony Sánchez,*    Visit Date: 5/28/2025    Physician Orders: PT Eval and Treat   Medical Diagnosis from Referral:   M54.2,G89.29 (ICD-10-CM) - Chronic neck pain   M47.816 (ICD-10-CM) - Lumbar spondylosis   S22.080S (ICD-10-CM) - Compression fracture of T12 vertebra, sequela      Evaluation Date: 3/17/2025  Authorization Period Expiration: 2/21/2026  Plan of Care Expiration: 6/17/2025  Visit # / Visits authorized: 6/ 10   Progress Note Due: 6/28/2025  FOTO: 1/ 1     Precautions: history of a recent fall      Time In: 9:00am  Time Out: 9:55am  Total Appointment Time (timed & untimed codes): 55 minutes  Total Billable Time: 30 min        PTA Visit #: /5       Subjective     Patient reports: back stops him from working and it hurts while standing . He can stand 30 min before pain comes on. Pt feels 40%. Remaining 60% is attributable to the back pain and difficulty grasping things.   He was compliant with home exercise program.  Response to previous treatment: no concerns   Functional change: ongoing     Pain: 0/10  Location: low back     Objective      30 sec STS : 5 reps --> 4 reps      6 MWT: 1065 ft -->1086     Lumbar Range of Motion:               %  Flexion            90     Extension        100     Left Side Bending       100  Right Side Bending     100  Left rotation              75  Right Rotation              75   *= pain        Lower Extremity Strength     Right LE                      Left LE   Hip flexion:      4+/5      Hip flexion:      4+/5  Knee extension: 5/5         4+/5       Knee extension:          5/5  Knee flexion:   4+/5       Knee flexion:   4+/5  Hip extension:             4/5       Hip extension: 4/5  Hip  "abduction: 4/5       Hip abduction: 4/5  Hip adduction: 5/5       Hip adduction  5/5  Ankle dorsiflexion:       5/5       Ankle dorsiflexion:       5/5  Ankle plantarflexion:   5/5     Ankle plantarflexion:   5/5       Treatment     Darrion received the treatments listed below:      therapeutic exercises to develop strength and flexibility for 30 minutes including:  Reassessment time   Nustep 10 min or Recumbent bike 10 min   Medx Lumbar EXT x 20 40#    LTR x 2 min (not done today)  DKTC x 2 min (not done today)  Open books x 12 each   +Bridges GTB 3 x 8   RAINER stretch 6 x 10" each   Not done today  Matrix ABD 2 x10  15#  Matrix ADD 2 x 10 15#      Seated Hip ADD  Seated Hip ABD     manual therapy techniques:  were applied to the:  for 00 minutes, including:      neuromuscular re-education activities to improve: core stability and lumbar stabilization for 00 minutes. The following activities were included:  TA activation 20 x 5"      therapeutic activities to improve functional performance for 15  minutes, including:   STS 18 inch RTB @ knees 3 x 6 (cue for adequate rest break between sets)  Standing Marches 2 x 10   6 inch fwd Step ups 2 x 10     Patient Education and Home Exercises       Education provided:   - PT role, intervention rationale     Written Home Exercises Provided: Pt instructed to continue prior HEP. Exercises were reviewed and Darrion was able to demonstrate them prior to the end of the session.  Darrion demonstrated good  understanding of the education provided. See Electronic Medical Record under Patient Instructions for exercises provided during therapy sessions    Assessment   Pt reassessed and he feels 40% normal. Back endurance is still a limiting factor. STS and 6 MWT show maintenance but not significant progress so far. Will continue to address endurance and low back/ LE strength in subsequent sessions to reach PLOF. Pt instructed to continue HEP with walking and STS strengthening.   Darrion Is " progressing well towards his goals.   Patient prognosis is Good.     Patient will continue to benefit from skilled outpatient physical therapy to address the deficits listed in the problem list box on initial evaluation, provide pt/family education and to maximize pt's level of independence in the home and community environment.     Patient's spiritual, cultural and educational needs considered and pt agreeable to plan of care and goals.     Anticipated barriers to physical therapy: none    Goals: Short Term Goals: 4 weeks  1.Report decreased in pain at worse less than  <   / =  5  /10  to increase tolerance for functional activities. MET  2. Pt to improve range of motion by 25% to allow for improved functional mobility to allow for improvement in IADLs.  On going  3. Increased  B LE MMT 1/2  grade to increase tolerance for ADL and work activities. On going  4. Pt to tolerate HEP to improve ROM and independence with ADL's. MET     Long Term Goals: 8 weeks  1.Report decreased in pain at worse less than  <   / =  2  /10  to increase tolerance for functional activities. On going  2.Pt to improve range of motion by 75% to allow for improved functional mobility to allow for improvement in IADLs. On going  3.Increased B LE MMT  1 grade  to increase tolerance for ADL and work activities.On going  4. Pt will report 15% or less limitation on FOTO  survey score for neck pain disability to demonstrate decrease in disability and improvement in neck pain.On going  5. Pt to be Independent with HEP to improve ROM and independence with ADL's. On going  6. Pt will be able to walk 30 min in order to show improved tolerance for community ambulation. Ongoing   7. Pt will improve from 6 to 10 reps in 30 sec STS to show improved B LE functional strength for ADLs. Ongoing    Plan     Cont POC    Emil Don Jr, PT

## 2025-05-29 ENCOUNTER — OFFICE VISIT (OUTPATIENT)
Dept: HEMATOLOGY/ONCOLOGY | Facility: CLINIC | Age: 75
End: 2025-05-29
Payer: MEDICARE

## 2025-05-29 VITALS
HEIGHT: 66 IN | BODY MASS INDEX: 20.55 KG/M2 | DIASTOLIC BLOOD PRESSURE: 75 MMHG | WEIGHT: 127.88 LBS | SYSTOLIC BLOOD PRESSURE: 138 MMHG | HEART RATE: 74 BPM | OXYGEN SATURATION: 98 % | TEMPERATURE: 98 F

## 2025-05-29 DIAGNOSIS — M54.6 CHRONIC MIDLINE THORACIC BACK PAIN: ICD-10-CM

## 2025-05-29 DIAGNOSIS — N18.9 ANEMIA DUE TO CHRONIC KIDNEY DISEASE, UNSPECIFIED CKD STAGE: Primary | ICD-10-CM

## 2025-05-29 DIAGNOSIS — I10 HYPERTENSION, UNSPECIFIED TYPE: ICD-10-CM

## 2025-05-29 DIAGNOSIS — G89.29 CHRONIC MIDLINE THORACIC BACK PAIN: ICD-10-CM

## 2025-05-29 DIAGNOSIS — D63.1 ANEMIA DUE TO CHRONIC KIDNEY DISEASE, UNSPECIFIED CKD STAGE: Primary | ICD-10-CM

## 2025-05-29 DIAGNOSIS — N18.31 STAGE 3A CHRONIC KIDNEY DISEASE: ICD-10-CM

## 2025-05-29 PROCEDURE — 99999 PR PBB SHADOW E&M-EST. PATIENT-LVL V: CPT | Mod: PBBFAC,,, | Performed by: INTERNAL MEDICINE

## 2025-05-29 RX ORDER — HEPARIN 100 UNIT/ML
500 SYRINGE INTRAVENOUS
OUTPATIENT
Start: 2025-05-29

## 2025-05-29 RX ORDER — SODIUM CHLORIDE 0.9 % (FLUSH) 0.9 %
10 SYRINGE (ML) INJECTION
OUTPATIENT
Start: 2025-05-29

## 2025-05-29 RX ORDER — EPINEPHRINE 0.3 MG/.3ML
0.3 INJECTION SUBCUTANEOUS ONCE AS NEEDED
OUTPATIENT
Start: 2025-05-29

## 2025-05-29 NOTE — PROGRESS NOTES
Subjective     Patient ID: Darrion Bennett is a 75 y.o. male.    Chief Complaint: Follow-up (Anemia )    HPI  Diagnosis:  Anemia in CKD        Chief Complaint: Anemia   Interval History: Mr. Bennett is here for follow up.  He is a former patient of Dr. Hicks.     75 year old man with multiple comorbidities including MCTD, Sjogren's, UC, diastolic HF, diabetes, essential hypertension, CKD stage III, and anemia of chronic disease.     He has received IV Fe in past.  with interval response in hemoglobin and normalization of RDW.  Denies epistaxis, hemoptysis, hematemesis, hematochezia, melena, or hematuria.  Always feels cold (chronic).    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  Son completed neuro fellowship and daughter is going to be in primary care.  He is accompanied by wife.     He reports that he was diagnosed with Sjogrens when he had dry eyes and dry mouth in 2014..    Interval Hx; Alone at visit   No new issues   He completed healthy back program   He reports chronic mild fatigue,stable    He is intolerant of Fe supp  He has received IV Fe in past  No SOB/CP  No lightheadedness    Past Medical History:   Diagnosis Date    Anemia     Anterolisthesis of lumbar spine     Arthritis     Cataract     CHF (congestive heart failure)     Chronic constipation     Diabetes mellitus, type 2     Felon of finger of left hand 05/11/2019    Glaucoma     Hyperlipidemia 05/13/2014    Hypertension     MCTD (mixed connective tissue disease)     Personal history of colonic polyps     Sjogren's disease     Ulcerative colitis     Urolithiasis        Past Surgical History:   Procedure Laterality Date    CATARACT EXTRACTION Bilateral 2005    COLONOSCOPY  2014    ESOPHAGOGASTRODUODENOSCOPY N/A 9/8/2023    Procedure: EGD (ESOPHAGOGASTRODUODENOSCOPY);  Surgeon: Divya Saenz MD;  Location: [facility];  Service: Endoscopy;  Laterality: N/A;  ref by / inst portal-RB    ESOPHAGOGASTRODUODENOSCOPY N/A  "11/22/2023    Procedure: EGD (ESOPHAGOGASTRODUODENOSCOPY);  Surgeon: Crystal Urbina MD;  Location: [facility];  Service: Endoscopy;  Laterality: N/A;  time frame 8-12 weeks  Dr. Saenz pt   prep instructions sent to pt via portal -  wl meds trulicity hold 7 days prior  diabetic  11/16- pt r/s to earlier date, pre call complete.  DBM    EYE SURGERY          Review of Systems   Constitutional:  Positive for fatigue (chronic). Negative for appetite change, fever and unexpected weight change.   HENT:  Negative for mouth sores.    Eyes:  Negative for visual disturbance.   Respiratory:  Negative for cough and shortness of breath.    Cardiovascular:  Negative for chest pain.   Gastrointestinal:  Negative for abdominal pain and diarrhea.   Genitourinary:  Negative for frequency.   Musculoskeletal:  Positive for back pain (chronic) and neck pain (chronic).   Integumentary:  Negative for rash.   Neurological:  Negative for headaches.   Hematological:  Negative for adenopathy.   Psychiatric/Behavioral:  The patient is not nervous/anxious.           Objective   Vitals:    05/29/25 0947   BP: 138/75   Pulse: 74   Temp: 97.6 °F (36.4 °C)   SpO2: 98%   Weight: 58 kg (127 lb 13.9 oz)   Height: 5' 6" (1.676 m)       Physical Exam  Constitutional:       Appearance: He is well-developed.   HENT:      Head: Normocephalic.   Eyes:      General: No scleral icterus.        Right eye: No discharge.         Left eye: No discharge.      Conjunctiva/sclera: Conjunctivae normal.   Cardiovascular:      Rate and Rhythm: Normal rate and regular rhythm.      Heart sounds: Normal heart sounds. No murmur heard.  Pulmonary:      Effort: Pulmonary effort is normal.      Breath sounds: Normal breath sounds. No wheezing or rales.   Abdominal:      General: Bowel sounds are normal.      Palpations: Abdomen is soft.      Tenderness: There is no abdominal tenderness. There is no guarding or rebound.   Musculoskeletal:         General: No swelling. " Normal range of motion.      Cervical back: Normal range of motion.   Skin:     Findings: No erythema or rash.   Neurological:      Mental Status: He is alert and oriented to person, place, and time.      Cranial Nerves: No cranial nerve deficit.   Psychiatric:         Mood and Affect: Mood normal.         Behavior: Behavior normal.       Lab Results   Component Value Date    WBC 8.25 05/22/2025    HGB 11.1 (L) 05/22/2025    HCT 35.8 (L) 05/22/2025    MCV 89 05/22/2025     05/22/2025       Lab Results   Component Value Date    UIBC 197 06/20/2014    IRON 40 (L) 05/22/2025    TRANSFERRIN 157 (L) 05/22/2025    TIBC 232 (L) 05/22/2025    LABIRON 17 (L) 05/22/2025    FESATURATED 23 01/24/2025             Assessment and Plan            enal/      Anemia of chronic renal failure, stage 3b - Primary     Current Assessment & Plan       Anemia of chronic renal disease, CKD stage IIIB.    He has undergone IV Fe in past  Labs reviewed  Hb -11.1g/dl range   Plan  IV Fe .  -no role of epo at this hemoglobin level  Cbc, Fe studies,  in  2mos             Stage 3a chronic kidney disease     Current Assessment & Plan       Creatinine 1.9mg/ml on 9/20/24  Cr level 1.7mg/ml on 1/24/25   Cr level 1.6mg/ml on 5/27/25   -continue medical management with pcp                       Chronic back pain  Stable   Completed Healthy Back Program ( referred last visit)   Completed acupuncture   Follow up with PCP for med mgmt              HTN  Well controlled   Cont BP meds per PCP/Cardiology       Sched IV Fe--- call # [phone] to schedule   Cbc, Fe studies,prior to f/u  in  2mos      Visit today included increased complexity associated with the care of the episodic problem anemia in CKD addressed and managing the longitudinal care of the patient due to the serious and/or complex managed problem(s) anemia in CKD

## 2025-06-03 ENCOUNTER — LAB VISIT (OUTPATIENT)
Dept: LAB | Facility: HOSPITAL | Age: 75
End: 2025-06-03
Attending: INTERNAL MEDICINE
Payer: MEDICARE

## 2025-06-03 DIAGNOSIS — E11.42 CONTROLLED TYPE 2 DIABETES MELLITUS WITH DIABETIC POLYNEUROPATHY, WITH LONG-TERM CURRENT USE OF INSULIN: Chronic | ICD-10-CM

## 2025-06-03 DIAGNOSIS — Z79.4 CONTROLLED TYPE 2 DIABETES MELLITUS WITH DIABETIC POLYNEUROPATHY, WITH LONG-TERM CURRENT USE OF INSULIN: Chronic | ICD-10-CM

## 2025-06-03 DIAGNOSIS — N18.32 STAGE 3B CHRONIC KIDNEY DISEASE: ICD-10-CM

## 2025-06-03 DIAGNOSIS — I10 ESSENTIAL HYPERTENSION: Chronic | ICD-10-CM

## 2025-06-03 DIAGNOSIS — D50.9 IRON DEFICIENCY ANEMIA, UNSPECIFIED IRON DEFICIENCY ANEMIA TYPE: Primary | ICD-10-CM

## 2025-06-03 DIAGNOSIS — N18.31 STAGE 3A CHRONIC KIDNEY DISEASE: ICD-10-CM

## 2025-06-03 DIAGNOSIS — R79.89 ABNORMAL TSH: ICD-10-CM

## 2025-06-03 LAB
25(OH)D3+25(OH)D2 SERPL-MCNC: 24 NG/ML (ref 30–96)
ABSOLUTE EOSINOPHIL (OHS): 0.16 K/UL
ABSOLUTE MONOCYTE (OHS): 0.87 K/UL (ref 0.3–1)
ABSOLUTE NEUTROPHIL COUNT (OHS): 4.41 K/UL (ref 1.8–7.7)
ALBUMIN SERPL BCP-MCNC: 3 G/DL (ref 3.5–5.2)
ALP SERPL-CCNC: 91 UNIT/L (ref 40–150)
ALT SERPL W/O P-5'-P-CCNC: 14 UNIT/L (ref 10–44)
ANION GAP (OHS): 8 MMOL/L (ref 8–16)
AST SERPL-CCNC: 18 UNIT/L (ref 11–45)
BASOPHILS # BLD AUTO: 0.03 K/UL
BASOPHILS NFR BLD AUTO: 0.4 %
BILIRUB SERPL-MCNC: 0.4 MG/DL (ref 0.1–1)
BUN SERPL-MCNC: 29 MG/DL (ref 8–23)
CALCIUM SERPL-MCNC: 7.9 MG/DL (ref 8.7–10.5)
CHLORIDE SERPL-SCNC: 109 MMOL/L (ref 95–110)
CHOLEST SERPL-MCNC: 137 MG/DL (ref 120–199)
CHOLEST/HDLC SERPL: 4 {RATIO} (ref 2–5)
CO2 SERPL-SCNC: 18 MMOL/L (ref 23–29)
CREAT SERPL-MCNC: 1.5 MG/DL (ref 0.5–1.4)
EAG (OHS): 160 MG/DL (ref 68–131)
ERYTHROCYTE [DISTWIDTH] IN BLOOD BY AUTOMATED COUNT: 15.3 % (ref 11.5–14.5)
GFR SERPLBLD CREATININE-BSD FMLA CKD-EPI: 48 ML/MIN/1.73/M2
GLUCOSE SERPL-MCNC: 131 MG/DL (ref 70–110)
HBA1C MFR BLD: 7.2 % (ref 4–5.6)
HCT VFR BLD AUTO: 34.8 % (ref 40–54)
HDLC SERPL-MCNC: 34 MG/DL (ref 40–75)
HDLC SERPL: 24.8 % (ref 20–50)
HGB BLD-MCNC: 10.7 GM/DL (ref 14–18)
IMM GRANULOCYTES # BLD AUTO: 0.02 K/UL (ref 0–0.04)
IMM GRANULOCYTES NFR BLD AUTO: 0.3 % (ref 0–0.5)
LDLC SERPL CALC-MCNC: 86.2 MG/DL (ref 63–159)
LYMPHOCYTES # BLD AUTO: 1.9 K/UL (ref 1–4.8)
MCH RBC QN AUTO: 27 PG (ref 27–31)
MCHC RBC AUTO-ENTMCNC: 30.7 G/DL (ref 32–36)
MCV RBC AUTO: 88 FL (ref 82–98)
NONHDLC SERPL-MCNC: 103 MG/DL
NUCLEATED RBC (/100WBC) (OHS): 0 /100 WBC
PLATELET # BLD AUTO: 216 K/UL (ref 150–450)
PMV BLD AUTO: 11.8 FL (ref 9.2–12.9)
POTASSIUM SERPL-SCNC: 4.8 MMOL/L (ref 3.5–5.1)
PROT SERPL-MCNC: 7.1 GM/DL (ref 6–8.4)
RBC # BLD AUTO: 3.97 M/UL (ref 4.6–6.2)
RELATIVE EOSINOPHIL (OHS): 2.2 %
RELATIVE LYMPHOCYTE (OHS): 25.7 % (ref 18–48)
RELATIVE MONOCYTE (OHS): 11.8 % (ref 4–15)
RELATIVE NEUTROPHIL (OHS): 59.6 % (ref 38–73)
SODIUM SERPL-SCNC: 135 MMOL/L (ref 136–145)
TRIGL SERPL-MCNC: 84 MG/DL (ref 30–150)
TSH SERPL-ACNC: 2.95 UIU/ML (ref 0.4–4)
WBC # BLD AUTO: 7.39 K/UL (ref 3.9–12.7)

## 2025-06-03 PROCEDURE — 36415 COLL VENOUS BLD VENIPUNCTURE: CPT | Mod: PO

## 2025-06-03 PROCEDURE — 85025 COMPLETE CBC W/AUTO DIFF WBC: CPT

## 2025-06-03 PROCEDURE — 80061 LIPID PANEL: CPT

## 2025-06-03 PROCEDURE — 84132 ASSAY OF SERUM POTASSIUM: CPT

## 2025-06-03 PROCEDURE — 82306 VITAMIN D 25 HYDROXY: CPT

## 2025-06-03 PROCEDURE — 83036 HEMOGLOBIN GLYCOSYLATED A1C: CPT

## 2025-06-03 PROCEDURE — 84443 ASSAY THYROID STIM HORMONE: CPT

## 2025-06-03 RX ORDER — HEPARIN 100 UNIT/ML
500 SYRINGE INTRAVENOUS
OUTPATIENT
Start: 2025-06-11

## 2025-06-03 RX ORDER — EPINEPHRINE 0.3 MG/.3ML
0.3 INJECTION SUBCUTANEOUS ONCE AS NEEDED
OUTPATIENT
Start: 2025-06-11

## 2025-06-03 RX ORDER — SODIUM CHLORIDE 0.9 % (FLUSH) 0.9 %
10 SYRINGE (ML) INJECTION
OUTPATIENT
Start: 2025-06-11

## 2025-06-05 ENCOUNTER — CLINICAL SUPPORT (OUTPATIENT)
Dept: REHABILITATION | Facility: HOSPITAL | Age: 75
End: 2025-06-05
Payer: MEDICARE

## 2025-06-05 DIAGNOSIS — S22.000A COMPRESSION FRACTURE OF BODY OF THORACIC VERTEBRA: ICD-10-CM

## 2025-06-05 DIAGNOSIS — M54.9 UPPER BACK PAIN, CHRONIC: ICD-10-CM

## 2025-06-05 DIAGNOSIS — G89.29 UPPER BACK PAIN, CHRONIC: ICD-10-CM

## 2025-06-05 DIAGNOSIS — M54.2 CHRONIC NECK PAIN: ICD-10-CM

## 2025-06-05 DIAGNOSIS — M54.9 MID BACK PAIN, CHRONIC: ICD-10-CM

## 2025-06-05 DIAGNOSIS — G89.29 MID BACK PAIN, CHRONIC: ICD-10-CM

## 2025-06-05 DIAGNOSIS — M54.59 OTHER LOW BACK PAIN: Primary | ICD-10-CM

## 2025-06-05 DIAGNOSIS — G89.29 CHRONIC NECK PAIN: ICD-10-CM

## 2025-06-05 PROCEDURE — 97810 ACUP 1/> WO ESTIM 1ST 15 MIN: CPT | Mod: PN

## 2025-06-05 PROCEDURE — 97811 ACUP 1/> W/O ESTIM EA ADD 15: CPT | Mod: PN

## 2025-06-06 RX ORDER — SODIUM BICARBONATE 650 MG/1
650 TABLET ORAL 3 TIMES DAILY
Qty: 90 TABLET | Refills: 2 | Status: SHIPPED | OUTPATIENT
Start: 2025-06-06 | End: 2025-09-04

## 2025-06-09 NOTE — ASSESSMENT & PLAN NOTE
Managed by Rheumatology.  Most recent uric acid level was very good on Uloric high dose.  Orders:    Uric Acid; Future

## 2025-06-09 NOTE — ASSESSMENT & PLAN NOTE
On PPI plan for indefinite therapy  Had listed history of celiac disease, reports maybe about 15 years ago had uncontrolled diarrhea and had prolonged hospitalization to investigate causes, they had entertained celiac but never was given any diagnosis.  We will remove from problem list

## 2025-06-09 NOTE — PROGRESS NOTES
This note was created by combination of typed  and M-Modal dictation.  Transcription errors may be present.   This note was also generated with the assistance of ambient listening technology. Verbal consent was obtained by the patient and accompanying visitor(s) for the recording of patient appointment to facilitate this note. I attest to having reviewed and edited the generated note for accuracy, though some syntax or spelling errors may persist. Please contact the author of this note for any clarification.    Assessment and Plan:   Assessment and Plan   Assessment & Plan  Controlled type 2 diabetes mellitus with diabetic polyneuropathy, with long-term current use of insulin  A1c went up.  For now no changes.  On Jardiance 10, on lower dose Trulicity.  Higher doses had caused bothersome decreased appetite and weight loss.  Doing better on the lower dose.  Orders:    Comprehensive Metabolic Panel; Future    Lipid Panel; Future    Hemoglobin A1C; Future    CBC Without Differential; Future    Essential hypertension  Diastolic heart failure, NYHA class 2  Stage 3a chronic kidney disease  Blood pressure today stable.  Cardiology started him on metoprolol presumably for the troponin bump from his hospitalization back in March.  With possible side effect of constipation.  Takes torsemide rarely he estimates maybe once every 2 weeks or so.  Appears euvolemic today.  Though he does have a little bit of a wheeze on the right  Plan for chest x-ray and if it shows any volume overload would take a dose of torsemide  On hydralazine, Cardiology had tried stopping it but his blood pressure was uncontrolled so that was added back.  On low-dose metoprolol.  On max dose valsartan.  On amlodipine mid range dose  Has upcoming follow up with Nephrology.  On sodium bicarb  Orders:    Vitamin D; Future    Wheeze  Incidental on exam.  Last chest x-ray done in March was normal.  No known history of asthma.  Appears euvolemic.   Check x-ray.  If consistent with volume overload then would dose torsemide x1  Orders:    X-Ray Chest PA And Lateral; Future    Dyslipidemia  Lipid okay, notes decreased myalgias on lower dose atorvastatin 40 mg.  History of rosuvastatin with side effect of myalgias.  No changes       Iron deficiency anemia, unspecified iron deficiency anemia type  Seen by Hematology and plan is IV iron.  Reports he has not been contacted to arrange that.  I have messaged hematology staff to assist in arranging       Sjogren's syndrome, with unspecified organ involvement  Rheumatoid arthritis, involving unspecified site, unspecified whether rheumatoid factor present  Spinal stenosis of lumbar region without neurogenic claudication  Hospitalization in March for generalized weakness.  Saw neurosurgery for the lumbar spine, felt not to be the cause of his symptoms and no surgical indications for this.    He is due for follow-up with Rheumatology.  Takes Orencia infusion.  I have asked him to reach out to Rheumatology to schedule follow up.    In regards to the back, he is inquiring about utility of pain injections.  He had previously not been interested but now might be interested in an injection.  I have asked him to schedule follow up with pain clinic to discuss.       Tophaceous gout  Managed by Rheumatology.  Most recent uric acid level was very good on Uloric high dose.  Orders:    Uric Acid; Future    Jackson's esophagus without dysplasia  Duodenal ulcer 9/8/23 EGD LA grade C esophagitis with bleeding. 3 cm HH, erythematous mucosa antrum. nonbleeding duodenal ulcers no stigmata of bleeding.  Path chronic inactive gastritis.  H pylori ne  On PPI plan for indefinite therapy  Had listed history of celiac disease, reports maybe about 15 years ago had uncontrolled diarrhea and had prolonged hospitalization to investigate causes, they had entertained celiac but never was given any diagnosis.  We will remove from problem list        Chronic constipation not responding to linzess, miralax, senna  Had previously been quiescent but now return of constipation.  Possibly related to initiating metoprolol?  History of Linzess, we will restart.  Higher doses with side effect of diarrhea  Orders:    linaCLOtide (LINZESS) 72 mcg Cap capsule; Take 1 capsule (72 mcg total) by mouth before breakfast.    Vitamin D deficiency  Pre visit labs calcium low though does correct for albumin.  Vitamin-D low.  Had previously taking vitamin-D supplement but now taking a multivitamin.  Restart vitamin-D 2000 IU daily  Orders:    cholecalciferol, vitamin D3, (VITAMIN D3) 50 mcg (2,000 unit) Tab; Take 1 tablet (2,000 Units total) by mouth once daily.    Vitamin D; Future    Bilateral hearing loss, unspecified hearing loss type  Notes issues with hearing loss.  Referral to audiology  Orders:    Ambulatory referral/consult to Audiology; Future    Has upcoming international trip to [facility] in October and is inquiring about MMR booster.  Recommended he get an MMR booster at the pharmacy    Medications Discontinued During This Encounter   Medication Reason    dulaglutide (TRULICITY) 4.5 mg/0.5 mL pen injector Dose adjustment       meds sent this encounter:  Medications Ordered This Encounter   Medications    cholecalciferol, vitamin D3, (VITAMIN D3) 50 mcg (2,000 unit) Tab     Sig: Take 1 tablet (2,000 Units total) by mouth once daily.     Dispense:  90 tablet     Refill:  3    linaCLOtide (LINZESS) 72 mcg Cap capsule     Sig: Take 1 capsule (72 mcg total) by mouth before breakfast.     Dispense:  90 capsule     Refill:  3         Follow Up:  Follow up 3 months.  Labs in 6 months.  Future Appointments   Date Time Provider Department Center   6/18/2025 10:00 AM Willow Gary MD [facility] NEPHRO [place]   6/24/2025  9:15 AM CHAIR 07 [facility] CHEMO [facility]   7/22/2025  9:15 AM CHAIR 05 [facility] CHEMO [facility]   7/25/2025  9:15 AM LAB, [facility] YESI [place]   7/30/2025   8:40 AM Grecia Mccollum MD [facility] HEM ONC [facility]   8/12/2025 10:30 AM Viry Esquivel, OD [facility] OPTOMTY [place]   8/19/2025  9:15 AM CHAIR 03 [facility]   8/20/2025  9:15 AM Diony Sánchez DO [facility] PAINMG [place]   9/26/2025  8:15 AM VoViry, OD [facility] OPTOMTY [place]   10/29/2025  2:00 PM Catracho García MD [facility] ENDOCRN [facility]   11/28/2025  9:15 AM INJECTION, [facility] INFUSION [facility] CHEMO [facility]          Subjective:   Subjective   Chief Complaint   Patient presents with    Extremity Weakness     3 months follow up for bilateral lower limbs. Symptoms are static.       VANESSA Maier is a 75 y.o. male.     Social History     Socioeconomic History    Marital status:     Number of children: 3   Tobacco Use    Smoking status: Never     Passive exposure: Never    Smokeless tobacco: Never   Substance and Sexual Activity    Alcohol use: No    Drug use: Not Currently    Sexual activity: Not Currently     Partners: Female     Social Drivers of Health     Financial Resource Strain: Low Risk  (3/8/2025)    Overall Financial Resource Strain (CARDIA)     Difficulty of Paying Living Expenses: Not hard at all   Food Insecurity: No Food Insecurity (3/8/2025)    Hunger Vital Sign     Worried About Running Out of Food in the Last Year: Never true     Ran Out of Food in the Last Year: Never true   Transportation Needs: No Transportation Needs (3/8/2025)    PRAPARE - Transportation     Lack of Transportation (Medical): No     Lack of Transportation (Non-Medical): No   Physical Activity: Insufficiently Active (3/8/2025)    Exercise Vital Sign     Days of Exercise per Week: 1 day     Minutes of Exercise per Session: 10 min   Stress: No Stress Concern Present (3/8/2025)    [employer] - Occupational Stress Questionnaire     Feeling of Stress : Not at all   Housing Stability: Low Risk  (3/8/2025)    Housing Stability Vital Sign     Unable to Pay for Housing in the Last Year:  No     Number of Times Moved in the Last Year: 0     Homeless in the Last Year: No       Last appointment with this clinic was 3/10/2025. Last visit with me 3/10/2025   To summarize last visit and events leading up to today:  Hypertension, HFpEF  8/30/24 TTE LVEF 65-70%; mild AoV stenosis  8/30/24 nu stress test neg for ischemia  3/8/25 TTE LVEF 65-70%, grade 2 DD. Mild AoV sclerosis and stenosis  CKD stage IIIA with accompanying anemia.  Followed by Nephrology.  Proteinuria.  Intolerant of iron supplement due to constipation.  Recommended ferrous gluconate.  History of iron infusion 2021  Saw nephrology 11/29.  Creatinine slight bump after starting PPI.  Monitor  5/27/24 saw heme; no indication for epo no indication for IV iron  6/5/24 saw nephrology. Borderline BP consider spironolactone but check labs first.   Dyslipidemia, carotid artery stenosis, statin   DM 2 with neuropathy no longer followed by DM NP.   Saw endocrinology 11/9.  Started on Jardiance.  Stop NovoLog fixed meal dose.    9/4/24 saw DM NP; discharged from DM clinic, stable.Stay on Trulicity  11/12/24 saw pulm for dyspnea. HFpEF, mosaic attenuation, recommended daily demodex.  JAZLYN defers treatment  Restrictive lung disease, clinically asymptomatic, plan repeat PFTs  11/20/24 PFTs  Patient arrived for Pft but test incomplete. Attempted several  times but patient was not able to understand commands after  several times explaining and demonstrating. Attempted FVC and  DLCO and was not able to get anything accurate.   Jackson's esophagus On PPI.    9/8/23 EGD LA grade C esophagitis with bleeding. 3 cm HH, erythematous mucosa antrum. nonbleeding duodenal ulcers no stigmata of bleeding.  Path chronic inactive gastritis.  H pylori negative.  PPI 40 BID repeat EGD 8-12 weeks  11/22/23 EGD esoph suspicious for short segment Jackson's C2M3. Benign appearing esoph stenosis; path esophagus: Mucosa with focal intestinal metaplasia.  Negative for dysplasia.  H  "pylori negative.  Constipation, higher doses of Linzess with diarrhea.  Inflammatory arthritis secondary to Sjogren's, polymyalgia rheumatica, physical therapy. weekly Orencia, prednisone once to twice a week.  History of imuran toxicity.  Followed by Rheumatology.  Tramadol p.r.n. use  8/20/24 saw rheum in follow up, plan orencia infusions.  Instead of oral.  Tophaceous gout, Uloric   8/28/24 rheum increased uloric goal of 5.0  Tremor; seen by neurology.  Scans negative for parkinsons  6/19/24 MRI brain  Examination mildly degraded by patient motion artifact.  Mild chronic small vessel ischemic change, few punctate foci prior hemorrhage, and mild generalized cerebral volume loss.  No evidence of acute intracranial pathology.   Reportedly had DATscan with DIS, reviewed by neuro, negative.  Osteoporosis with compression fracture on Prolia, update DEXA  6/28/23 DEXA L spine -2.7, femoral neck -2.5, total hip -1.7. FRAX score 9.1/20%. osteoporosis. On prolia. No changes. Repeat 2 years.  Chronic back pain, tramadol  8/12/24 MRI C/T/L spine  Degenerative changes of the cervical and lumbar spine as detailed above. Findings are most pronounced at L4-L5 with severe spinal canal stenosis at that level.  Remote compression deformity of the T12 inferior endplate, similar appearance compared to priors. Mild anterior height loss and osseous retropulsion, without spinal canal stenosis at that level.   2/21/25 pain clinic follow up  L neck pain MRI does not show severe compression  Chronic low thoracic/upper lumbar pain. Referral PT.   "-if we did a procedure I recommended a b/l T12-L1 TFESI first  -The next procedure to consider would be a b/l T10,11,12 MBB/RFA"  Saw pain clininc 8/21/24 med mgmt. Consider C3/4/5 MBB/RFA  Admit 3/7 through 3/8/25 leg weakness. Troponin bump  MRI lumbar spine was obtained with L4-L5 degenerative anterolisthesis with left paracentral disc herniation resulting in severe central stenosis. "   Ambulatory referral was placed for Neurosurgery.  He will follow up in the clinic.  At discharge he was independent in his baseline functional status.  He was chest pain-free.      Last visit me 03/10/2025  Leg weakness.  Lumbar spondylosis.  Episode of generalized weakness which started with a burning sensation of the palms and then evolved into generalized weakness and felt like his legs could not support him so he collapsed.  Has a known history of lumbar stenosis.  Has been seen by pain clinic and plan was possible epidural.  Was seen by Cardiology during his hospitalization and has upcoming follow up outpatient.  Also has upcoming consult with Neurosurgery.  He is inquiring as to whether this could be side effect of statin.  CPK was normal during hospitalization.  Will try him with a half tablet of atorvastatin 40 mg.  If determined that this is due to central canal stenosis, she would resume high-dose statin.  Referred to acupuncture  Hypertension stable on hydralazine 25 t.i.d. instead of 75 t.i.d..  Not been taking torsemide.  Is taking amlodipine and valsartan   Lipid, carotid stenosis followed by Cardiology.  Off of torsemide.  On Jardiance.  Restrictive lung disease followed by pulmonology.  They had previously tried him on torsemide to see if this was worsened by volume overload but he has not been taking it.    Diabetes with decreased appetite.  Will try him on lower dose Trulicity 3 mg  Osteoporosis with compression fracture history of alendronate change to Prolia.  Overdue for injection   Barretts on PPI indefinite therapy   RA no longer taking chronic steroids followed by Rheumatology  Gout on Uloric  History of stroke on statin  Abnormal TSH mildly elevated monitor    Saw cardiology 03/17/2025  Stop hydralazine continue amlodipine increase valsartan.  Check stress test    04/03/2025 nuclear stress test normal negative for ischemia    Saw cardiology 04/17/2025 in follow up.    Increase valsartan to  320   Start metoprolol 25 goal to increase to 50 after 1 week.  Stay on amlodipine 5  Follow-up 6 months with echo    04/22/2025 saw neurosurgery.  Surgical intervention not indicated.  Majority of patient's pain probably attributable to Sjogren's..  Continue PT and acupuncture    05/29/2025 saw Heme-Onc in follow-up for anemia.  Hold on erythropoietin.  Schedule IV iron follow up 2 months    Pre visit labs   CBC via stable   CMP creatinine 1.5 CKD 3A seen previously  Lipid okay LDL 86  Vitamin-D mildly low 24   A1c 7.2   TSH normal  Urine microalbumin/creatinine ratio high 693 previous 526  Urine protein/creatinine ratio 1.16 previous 0.70    Upcoming nephrology follow-up 6/18    This patient has a diagnosis of celiac disease?  Last seen by Rheumatology 08/2024 needs follow up  Has been filling Lipitor 40    Today's visit:  Here accompanied by his wife  History of Present Illness    SOCIAL HISTORY:  - upcoming visit [facility] in October.  will be going to the [facility]    HPI:  Darrion reports constipation recurrence after a 2-year absence, with bowel movements every 3 days. He is currently taking docusate sodium, 2-3 tablets, but reports it is ineffective. He previously used Linzess 72 mg, which was more effective than the higher dose of 145 mg that caused diarrhea.    He mentions weakness, which led to a hospital visit in March where a slight elevation in troponin levels was observed. He has been evaluated by a hematologist regarding his weakness and mild anemia. Neurosurgeons evaluated him for back pain but did not believe it was the cause of his symptoms, suggesting it might be related to inflammation from Sjogren's syndrome.    He reports decreased hearing, which has not been checked before.    He was evaluated at a pain clinic in February and April of this year. He initially refused an injection for his back pain but is now considering accepting the treatment.    He denies any current symptoms of fluid  overload or congestive heart failure.    MEDICATIONS:  - Trulicity 3 mg, injection, for diabetes (lowered from higher dose due to appetite interference)  - Hydralazine, 3 times daily, for blood pressure  - Torsemide (Demadex), as needed (once in 2-3 weeks), fluid pill  - Jardiance, for blood sugar and heart  - Atorvastatin (Lipitor), for cholesterol (dosage lowered, patient reports improvement in muscle aches)  - Uloric, 2 tablets daily, for gout  - Amlodipine, for blood pressure  - Metoprolol, for heart (recently added)  - Valsartan, max dose, for blood pressure  - Sodium bicarbonate 650 mg, to help with acid  - Omeprazole, for Jackson's esophagus  - Orencia, infusion on the 24th, for muscle pain  - Discontinued Crestor due to muscle aches    ROS:  ENT: reports hearing loss  Respiratory: reports wheezing  Gastrointestinal: reports constipation         Patient Care Team:  Farooq Domingo MD as PCP - General (Internal Medicine)  Tin Chambers MD (Gastroenterology)  Harrison Brannon MD (Cardiology)  Roxanna Salazar MD as Nurse Practitioner (Rheumatology)  Malcolm Irwin MD as  (Nephrology)  Dangelo Hall MD as Consulting Physician (Ophthalmology)  [facility] (Gastroenterology)  Shamir Fonseca, PharmD as Hypertension Digital Medicine Clinician (Pharmacist)  Farooq Domingo MD as Hypertension Digital Medicine Responsible Provider (Internal Medicine)  Farooq Domingo MD as Hyperlipidemia Digital Medicine Responsible Provider (Internal Medicine)  Shamir Fonseca, PharmD as Hyperlipidemia Digital Medicine Clinician  Medicare, [employer] Managed as Hypertension Digital Medicine Contract    Patient Active Problem List    Diagnosis Date Noted    Iron deficiency anemia 06/03/2025    Spinal stenosis of lumbar region without neurogenic claudication 03/27/2025     MRI on 08/12/2024 showed degenerative spine changes and severe spinal canal stenosis  at L4-L5.  04/2025 seen by Neurosurgery.  No surgical intervention indicated.  Majority of the patient's pain not attributable to lumbar stenosis      Weakness 03/18/2025    Impaired functional mobility, balance, gait, and endurance 03/18/2025    Fall 03/08/2025    Anterolisthesis of L4-L5 03/08/2025    Bradycardia 03/07/2025     symptomatic with hypotension      Restrictive lung disease 11/12/2024     Pft with restrictive lung physiology - ild vs extrinsic restriction a/w kyphosis.    Ct with mild mosaic attenuation.     Vascular congestion vs small airway disease.   Clinically asymptomatic  Will repeat pft    11/20/24 PFTs  Patient arrived for Pft but test incomplete. Attempted several  times but patient was not able to understand commands after  several times explaining and demonstrating. Attempted FVC and  DLCO and was not able to get anything accurate.         Poor posture 04/10/2024    Duodenal ulcer 9/8/23 EGD LA grade C esophagitis with bleeding. 3 cm HH, erythematous mucosa antrum. nonbleeding duodenal ulcers no stigmata of bleeding.  Path chronic inactive gastritis.  H pylori ne 09/28/2023 9/8/23 EGD LA grade C esophagitis with bleeding. 3 cm HH, erythematous mucosa antrum. nonbleeding duodenal ulcers no stigmata of bleeding.  Path chronic inactive gastritis.  H pylori negative.  PPI 40 BID repeat EGD 8-12 weeks      Difficulty walking 04/13/2023    Hip joint stiffness, bilateral 04/13/2023    Dyshidrotic eczema 02/04/2022    Long-term insulin use 02/01/2022    Dyspnea on exertion 08/02/2021     +diastolic dysfunction + anemia.  pft with restrictive physiology and decreased dlco.  Ct with mosaic attenuation and enlarge pa suggesting volume overload.    9/16/21 CT chest:  Subtle mosaic attenuation pattern, nonspecific can be seen in the setting of inflammatory small airway disease, also can be seen with occlusive vascular disease and subtle change of increased pulmonary venous pressure.  3 mm pulmonary  nodule right upper lobe, Fleischner Society guidelines for nodules less than 6 mm in individuals with low risk for lung malignancy: no follow-up needed, and individuals with high risk for lung malignancy: optional chest  CT at 12 months      JAZLYN (obstructive sleep apnea) 08/02/2021     ahi of 21; rdi of 38.   Result of sleep study d/w patient.   Recommend treatment.    Patient deferred treatment.  He is aware of increased cardiovascular morbidities a/w untreated jazlyn.       History of stroke 07/20/2021 6/2021 MRI brain old stroke in thalamus      Mobitz I 06/27/2021    Bilateral carotid artery stenosis on CTA 6/26/21 06/27/2021 6/26/21 CTA head and neck  Atherosclerotic plaque throughout the aortic arch with mild shaggy soft atherosclerotic plaque in the posterior arch near the junction of the transverse and descending thoracic aorta.  Minimal atherosclerotic plaque in the cervical carotids with no stenosis or dissection within the cervical vessels.  Moderate to severe atherosclerotic plaque within the carotid siphons with 50-60% diameter stenosis, right slightly greater than left.  Moderate plaque within the V4 segment of the left vertebral segment.  No large vessel occlusion or aneurysm intracranially.      Age-related osteoporosis without current pathological fracture 05/19/2021 01/27/2021 DEXA L-spine-3.1, total hip-1.6, femoral neck-2.4.  6/28/23 DEXA L spine -2.7, femoral neck -2.5, total hip -1.7. FRAX score 9.1/20%. osteoporosis.  On prolia. No changes. Repeat 2 years.      Secondary hyperparathyroidism of renal origin 02/08/2021    RA (rheumatoid arthritis) 12/09/2020    Stage 3a chronic kidney disease 01/16/2020    Aortic atherosclerosis 09/24/2019    Pseudophakia of both eyes 08/27/2019    Refractive error 08/27/2019    Tophaceous gout 05/09/2019    Chronic constipation not responding to linzess, miralax, senna 05/02/2019    Goals of care, counseling/discussion 05/02/2019     During this visit,  I engaged the patient in the advance care planning process.  The patient and I reviewed the role for advance directives and their purpose in directing future healthcare if the patient's unable to speak for him/herself.  At this point in time, the patient does have full decision-making capacity.  We discussed different extreme health states that patient could experience, and reviewed what kind of medical care patient would want in those situations.  The patient communicated that if he was comatose and had little chance of a meaningful recovery, he would NOT want machines/life-sustaining treatments used.  In addition to the above preference, patient  HAS completed a living will to reflect these preferences.  The patient HAS designated his daughter, Trudy Jama, as healthcare power of  to make decisions on her behalf.  In the case of cardiopulmonary arrest, patient does wish for CPR, intubation or cardioversion.  Advise patient to bring living will to us to scan into EPIC.      16 minutes spent discussing GOC.       Anemia due to chronic kidney disease 03/13/2019    Current chronic use of systemic steroids 02/27/2019    Compression fracture of body of thoracic vertebra on XR 2/2019; 2/2019 alendronate 02/27/2019 8/12/24 MRI C/T/L spine  Degenerative changes of the cervical and lumbar spine as detailed above. Findings are most pronounced at L4-L5 with severe spinal canal stenosis at that level.  Remote compression deformity of the T12 inferior endplate, similar appearance compared to priors. Mild anterior height loss and osseous retropulsion, without spinal canal stenosis at that level.       Neutropenia 02/26/2019    Jackson's esophagus without dysplasia 03/14/2016 9/8/23 EGD LA grade C esophagitis with bleeding. 3 cm HH, erythematous mucosa antrum. nonbleeding duodenal ulcers no stigmata of bleeding.  Path chronic inactive gastritis.  H pylori negative.  PPI 40 BID repeat EGD 8-12 weeks  11/22/23 EGD  esoph suspicious for short segment Jackson's C2M3. Benign appearing esoph stenosis; path esophagus: Mucosa with focal intestinal metaplasia.  Negative for dysplasia.  H pylori negative.      Anemia from plaquenil and imuran 03/14/2016    Diastolic heart failure, NYHA class 2 09/26/2014 05/12/2021 TTE LV normal size with concentric remodeling and normal systolic function LVEF 65%.  Grade 2 diastolic dysfunction.  Severe left atrial enlargement.  Normal RV size and systolic function.  CVP 3.  PA pressure 36. Sinuses of Valsalva mildly dilated.  05/12/2021 nuclear medicine stress test normal perfusion.  No evidence of ischemia or infarction.  LVEF 75%.  Normal wall motion post stress.  During stress, rare PVCs.  Hypertensive response exercise.    ct with mosaic attenuation.  Recommend daily demodex  Pft with mild obstruction.    3/8/25 TTE LVEF 65-70%, grade 2 DD. Mild AoV sclerosis and stenosis        BMI 23.0-23.9, adult 07/15/2014    Essential hypertension 05/13/2014 6/2019 TTE normal LV systolic and diastolic function; ABIs normal  6/27/21 TTE LV normal size with concentric hypertrophy and normal systolic function LVEF 65%.  Grade 1 diastolic dysfunction.  Normal RV size with normal systolic function.  Moderate left atrial enlargement.  Mild right atrial enlargement.  Mild aortic valve stenosis.  Valve area 1.67, peak velocity 1.95, mean gradient 9. Normal CVP 3. PA pressure 16.         Controlled type 2 diabetes mellitus with diabetic polyneuropathy, with long-term current use of insulin 05/13/2014    Dyslipidemia 05/13/2014 05/12/2021 nuclear medicine stress test normal perfusion.  No evidence of ischemia or infarction.  LVEF 75%.  Normal wall motion post stress.  During stress, rare PVCs.  Hypertensive response exercise.      MCTD (mixed connective tissue disease) 05/13/2014    Sjogren's disease 05/13/2014    Bilateral edema of lower extremity 6/2019 TTE normal LV systolic and diastolic function;  "ABIs normal 05/13/2014    Glaucoma 05/13/2014    Celiac disease 11/12/2013     Overview:   Gluten intolerant         PAST MEDICAL PROBLEMS, PAST SURGICAL HISTORY: please see relevant portions of the electronic medical record    ALLERGIES AND MEDICATIONS: updated and reviewed.  Medication List with Changes/Refills   Current Medications    AMLODIPINE (NORVASC) 5 MG TABLET    Take 1 tablet (5 mg total) by mouth 2 (two) times daily.    ATORVASTATIN (LIPITOR) 40 MG TABLET    Take 1 tablet (40 mg total) by mouth once daily.    BLOOD GLUCOSE CONTROL, LOW (TRUE METRIX LEVEL 1) SOLN    Use as indicated    BLOOD-GLUCOSE METER (TRUE METRIX GLUCOSE METER) MISC    Test glucose 4x/day    DORZOLAMIDE (TRUSOPT) 2 % OPHTHALMIC SOLUTION    Place 1 drop into both eyes 2 (two) times a day.    DROPLET PEN NEEDLE 32 GAUGE X 5/32" NDLE    USE 1 NEEDLE AS DIRECTED FOUR TIMES DAILY    DULAGLUTIDE (TRULICITY) 3 MG/0.5 ML PEN INJECTOR    Inject 3 mg into the skin every 7 days.    DULAGLUTIDE (TRULICITY) 4.5 MG/0.5 ML PEN INJECTOR    INJECT 4.5MG (1 PEN) UNDER THE SKIN EVERY WEEK    FEBUXOSTAT (ULORIC) 40 MG TAB    Take 1 tablet (40 mg total) by mouth 2 (two) times a day.    FLUTICASONE PROPIONATE (FLONASE) 50 MCG/ACTUATION NASAL SPRAY    1 spray (50 mcg total) by Each Nostril route once daily.    HYDRALAZINE (APRESOLINE) 25 MG TABLET    Take 1 tablet (25 mg total) by mouth 3 (three) times daily.    JARDIANCE 10 MG TABLET    TAKE 1 TABLET ONE TIME DAILY    LANCETS (TRUEPLUS LANCETS) 33 GAUGE MISC    Test glucose 4x/day. Dx code e11.65    METOPROLOL SUCCINATE (TOPROL-XL) 25 MG 24 HR TABLET    Take 1 tablet (25 mg total) by mouth once daily.    OMEPRAZOLE (PRILOSEC) 40 MG CAPSULE    Take 1 capsule (40 mg total) by mouth every morning.    SODIUM BICARBONATE 650 MG TABLET    Take 1 tablet (650 mg total) by mouth 3 (three) times daily.    TORSEMIDE (DEMADEX) 10 MG TAB    Take 1 tablet (10 mg total) by mouth as needed (swelling/weight gain).    " "TRAMADOL (ULTRAM) 50 MG TABLET    Take 1 tablet (50 mg total) by mouth every 12 (twelve) hours as needed.    TRAVOPROST (TRAVATAN Z) 0.004 % OPHTHALMIC SOLUTION    Place 1 drop into both eyes every evening.    TRUE METRIX GLUCOSE TEST STRIP STRP    TEST BLOOD SUGAR FOUR TIMES DAILY    VALSARTAN (DIOVAN) 320 MG TABLET    Take 1 tablet (320 mg total) by mouth once daily.         Objective:   Objective   Physical Exam   Vitals:    06/10/25 0853   BP: 108/68   Pulse: 66   Temp: 98.2 °F (36.8 °C)   TempSrc: Oral   SpO2: 98%   Weight: 57.6 kg (126 lb 14 oz)   Height: 5' 6" (1.676 m)    Body mass index is 20.48 kg/m².  Weight: 57.6 kg (126 lb 14 oz)   Height: 5' 6" (167.6 cm)     Physical Exam  Constitutional:       General: He is not in acute distress.     Appearance: He is well-developed.   HENT:      Right Ear: Tympanic membrane and external ear normal. There is no impacted cerumen.      Left Ear: Tympanic membrane and external ear normal. There is no impacted cerumen.   Eyes:      Extraocular Movements: Extraocular movements intact.   Cardiovascular:      Rate and Rhythm: Normal rate and regular rhythm.      Heart sounds: Normal heart sounds. No murmur heard.  Pulmonary:      Effort: Pulmonary effort is normal.      Breath sounds: Wheezing present.      Comments: Occasional inspiratory wheeze in the right side  Musculoskeletal:         General: Normal range of motion.      Right lower leg: No edema.      Left lower leg: No edema.   Lymphadenopathy:      Cervical: No cervical adenopathy.   Skin:     General: Skin is warm and dry.   Neurological:      Mental Status: He is alert and oriented to person, place, and time.      Coordination: Coordination normal.   Psychiatric:         Behavior: Behavior normal.         Thought Content: Thought content normal.                "

## 2025-06-09 NOTE — ASSESSMENT & PLAN NOTE
Blood pressure today stable.  Cardiology started him on metoprolol presumably for the troponin bump from his hospitalization back in March.  With possible side effect of constipation.  Takes torsemide rarely he estimates maybe once every 2 weeks or so.  Appears euvolemic today.  Though he does have a little bit of a wheeze on the right  Plan for chest x-ray and if it shows any volume overload would take a dose of torsemide  On hydralazine, Cardiology had tried stopping it but his blood pressure was uncontrolled so that was added back.  On low-dose metoprolol.  On max dose valsartan.  On amlodipine mid range dose  Has upcoming follow up with Nephrology.  On sodium bicarb  Orders:    Vitamin D; Future

## 2025-06-09 NOTE — ASSESSMENT & PLAN NOTE
Lipid okay, notes decreased myalgias on lower dose atorvastatin 40 mg.  History of rosuvastatin with side effect of myalgias.  No changes

## 2025-06-09 NOTE — ASSESSMENT & PLAN NOTE
Hospitalization in March for generalized weakness.  Saw neurosurgery for the lumbar spine, felt not to be the cause of his symptoms and no surgical indications for this.    He is due for follow-up with Rheumatology.  Takes Orencia infusion.  I have asked him to reach out to Rheumatology to schedule follow up.    In regards to the back, he is inquiring about utility of pain injections.  He had previously not been interested but now might be interested in an injection.  I have asked him to schedule follow up with pain clinic to discuss.

## 2025-06-09 NOTE — ASSESSMENT & PLAN NOTE
Seen by Hematology and plan is IV iron.  Reports he has not been contacted to arrange that.  I have messaged hematology staff to assist in arranging

## 2025-06-09 NOTE — ASSESSMENT & PLAN NOTE
A1c went up.  For now no changes.  On Jardiance 10, on lower dose Trulicity.  Higher doses had caused bothersome decreased appetite and weight loss.  Doing better on the lower dose.  Orders:    Comprehensive Metabolic Panel; Future    Lipid Panel; Future    Hemoglobin A1C; Future    CBC Without Differential; Future

## 2025-06-10 ENCOUNTER — OFFICE VISIT (OUTPATIENT)
Dept: FAMILY MEDICINE | Facility: CLINIC | Age: 75
End: 2025-06-10
Payer: MEDICARE

## 2025-06-10 VITALS
DIASTOLIC BLOOD PRESSURE: 68 MMHG | WEIGHT: 126.88 LBS | HEART RATE: 66 BPM | TEMPERATURE: 98 F | HEIGHT: 66 IN | OXYGEN SATURATION: 98 % | BODY MASS INDEX: 20.39 KG/M2 | SYSTOLIC BLOOD PRESSURE: 108 MMHG

## 2025-06-10 DIAGNOSIS — E11.42 CONTROLLED TYPE 2 DIABETES MELLITUS WITH DIABETIC POLYNEUROPATHY, WITH LONG-TERM CURRENT USE OF INSULIN: Primary | Chronic | ICD-10-CM

## 2025-06-10 DIAGNOSIS — M06.9 RHEUMATOID ARTHRITIS, INVOLVING UNSPECIFIED SITE, UNSPECIFIED WHETHER RHEUMATOID FACTOR PRESENT: ICD-10-CM

## 2025-06-10 DIAGNOSIS — E78.5 DYSLIPIDEMIA: Chronic | ICD-10-CM

## 2025-06-10 DIAGNOSIS — I50.30 DIASTOLIC HEART FAILURE, NYHA CLASS 2: Chronic | ICD-10-CM

## 2025-06-10 DIAGNOSIS — R06.2 WHEEZE: ICD-10-CM

## 2025-06-10 DIAGNOSIS — M48.061 SPINAL STENOSIS OF LUMBAR REGION WITHOUT NEUROGENIC CLAUDICATION: ICD-10-CM

## 2025-06-10 DIAGNOSIS — D50.9 IRON DEFICIENCY ANEMIA, UNSPECIFIED IRON DEFICIENCY ANEMIA TYPE: ICD-10-CM

## 2025-06-10 DIAGNOSIS — E55.9 VITAMIN D DEFICIENCY: ICD-10-CM

## 2025-06-10 DIAGNOSIS — H91.93 BILATERAL HEARING LOSS, UNSPECIFIED HEARING LOSS TYPE: ICD-10-CM

## 2025-06-10 DIAGNOSIS — I10 ESSENTIAL HYPERTENSION: Chronic | ICD-10-CM

## 2025-06-10 DIAGNOSIS — N18.31 STAGE 3A CHRONIC KIDNEY DISEASE: ICD-10-CM

## 2025-06-10 DIAGNOSIS — M35.00 SJOGREN'S SYNDROME, WITH UNSPECIFIED ORGAN INVOLVEMENT: ICD-10-CM

## 2025-06-10 DIAGNOSIS — K22.70 BARRETT'S ESOPHAGUS WITHOUT DYSPLASIA: ICD-10-CM

## 2025-06-10 DIAGNOSIS — Z79.4 CONTROLLED TYPE 2 DIABETES MELLITUS WITH DIABETIC POLYNEUROPATHY, WITH LONG-TERM CURRENT USE OF INSULIN: Primary | Chronic | ICD-10-CM

## 2025-06-10 DIAGNOSIS — K59.09 CHRONIC CONSTIPATION: ICD-10-CM

## 2025-06-10 DIAGNOSIS — K26.9 DUODENAL ULCER: ICD-10-CM

## 2025-06-10 DIAGNOSIS — M1A.9XX1 TOPHACEOUS GOUT: ICD-10-CM

## 2025-06-10 PROCEDURE — 99999 PR PBB SHADOW E&M-EST. PATIENT-LVL V: CPT | Mod: PBBFAC,,, | Performed by: INTERNAL MEDICINE

## 2025-06-10 RX ORDER — CHOLECALCIFEROL (VITAMIN D3) 50 MCG
2000 TABLET ORAL DAILY
Qty: 90 TABLET | Refills: 3 | Status: SHIPPED | OUTPATIENT
Start: 2025-06-10

## 2025-06-10 NOTE — ASSESSMENT & PLAN NOTE
Had previously been quiescent but now return of constipation.  Possibly related to initiating metoprolol?  History of Linzess, we will restart.  Higher doses with side effect of diarrhea  Orders:    linaCLOtide (LINZESS) 72 mcg Cap capsule; Take 1 capsule (72 mcg total) by mouth before breakfast.

## 2025-06-11 DIAGNOSIS — S22.000A COMPRESSION FRACTURE OF BODY OF THORACIC VERTEBRA: ICD-10-CM

## 2025-06-11 NOTE — TELEPHONE ENCOUNTER
No care due was identified.  [facility] Embedded Care Due Messages. Reference number: [id number].   6/11/2025 4:59:19 PM CDT

## 2025-06-12 ENCOUNTER — HOSPITAL ENCOUNTER (OUTPATIENT)
Dept: RADIOLOGY | Facility: HOSPITAL | Age: 75
Discharge: HOME | End: 2025-06-12
Attending: INTERNAL MEDICINE
Payer: MEDICARE

## 2025-06-12 ENCOUNTER — RESULTS FOLLOW-UP (OUTPATIENT)
Dept: FAMILY MEDICINE | Facility: CLINIC | Age: 75
End: 2025-06-12

## 2025-06-12 DIAGNOSIS — R06.2 WHEEZE: ICD-10-CM

## 2025-06-12 DIAGNOSIS — R06.2 WHEEZE: Primary | ICD-10-CM

## 2025-06-12 PROCEDURE — 71046 X-RAY EXAM CHEST 2 VIEWS: CPT | Mod: TC,FY,PO

## 2025-06-12 PROCEDURE — 71046 X-RAY EXAM CHEST 2 VIEWS: CPT | Mod: 26,,, | Performed by: RADIOLOGY

## 2025-06-12 RX ORDER — TRAMADOL HYDROCHLORIDE 50 MG/1
50 TABLET, FILM COATED ORAL EVERY 12 HOURS PRN
Qty: 60 TABLET | Refills: 1 | Status: SHIPPED | OUTPATIENT
Start: 2025-06-12

## 2025-06-13 ENCOUNTER — RESULTS FOLLOW-UP (OUTPATIENT)
Dept: NEPHROLOGY | Facility: CLINIC | Age: 75
End: 2025-06-13

## 2025-06-13 ENCOUNTER — CLINICAL SUPPORT (OUTPATIENT)
Dept: AUDIOLOGY | Facility: CLINIC | Age: 75
End: 2025-06-13
Payer: MEDICARE

## 2025-06-13 ENCOUNTER — TELEPHONE (OUTPATIENT)
Dept: NEPHROLOGY | Facility: CLINIC | Age: 75
End: 2025-06-13
Payer: MEDICARE

## 2025-06-13 DIAGNOSIS — H90.3 SENSORINEURAL HEARING LOSS (SNHL) OF BOTH EARS: Primary | ICD-10-CM

## 2025-06-13 DIAGNOSIS — H91.93 BILATERAL HEARING LOSS, UNSPECIFIED HEARING LOSS TYPE: ICD-10-CM

## 2025-06-13 PROCEDURE — 92557 COMPREHENSIVE HEARING TEST: CPT | Mod: S$GLB,,,

## 2025-06-13 PROCEDURE — 92567 TYMPANOMETRY: CPT | Mod: 52,S$GLB,,

## 2025-06-13 NOTE — TELEPHONE ENCOUNTER
----- Message from Willow Gary MD sent at 6/6/2025  8:21 PM CDT -----  Please let patient know I started him on sodium bicarb to buffer the acid build up( by replacing his natural consumed bicarb)   ----- Message -----  From: Lab, Background User  Sent: 6/3/2025  12:51 PM CDT  To: Willow Gary MD

## 2025-06-16 NOTE — PROGRESS NOTES
Please click on link to view Audiogram:  Document on 6/13/2025 8:50 AM by Maria Eugenia Kyle AU.D: Audiogram    Mr. Darrion Bennett, a 75 y.o. male, was seen in the clinic today for an audiological evaluation.     Tympanometry testing revealed a Type A tympanogram for the right ear. Tympanometry testing was attempted for the left ear; hermetic seal could not be maintained to obtain results.     Audiological testing revealed an essentially mild sloping to moderately-severe sensorineural hearing loss (SNHL) bilaterally. A speech reception threshold was obtained at 35 dBHL for the right ear and at 30 dBHL for the left ear. Speech discrimination was 88% for the right ear and 96% for the left ear.      Recommendations:  1. Otologic evaluation  2. Annual audiological evaluation, sooner if medically necessary or if hearing changes  3. Hearing aid consultation following medical clearance if Mr. Bennett feels hearing loss negatively impacts quality of life

## 2025-06-17 DIAGNOSIS — Z79.4 CONTROLLED TYPE 2 DIABETES MELLITUS WITH STAGE 3 CHRONIC KIDNEY DISEASE, WITH LONG-TERM CURRENT USE OF INSULIN: ICD-10-CM

## 2025-06-17 DIAGNOSIS — E87.20 METABOLIC ACIDOSIS: Primary | ICD-10-CM

## 2025-06-17 DIAGNOSIS — N18.32 STAGE 3B CHRONIC KIDNEY DISEASE: ICD-10-CM

## 2025-06-17 DIAGNOSIS — N25.81 SECONDARY HYPERPARATHYROIDISM OF RENAL ORIGIN: ICD-10-CM

## 2025-06-17 DIAGNOSIS — M35.00 SJOGREN'S SYNDROME, WITH UNSPECIFIED ORGAN INVOLVEMENT: ICD-10-CM

## 2025-06-17 DIAGNOSIS — E11.22 CONTROLLED TYPE 2 DIABETES MELLITUS WITH STAGE 3 CHRONIC KIDNEY DISEASE, WITH LONG-TERM CURRENT USE OF INSULIN: ICD-10-CM

## 2025-06-17 DIAGNOSIS — I10 ESSENTIAL HYPERTENSION: ICD-10-CM

## 2025-06-17 DIAGNOSIS — E55.9 VITAMIN D INSUFFICIENCY: ICD-10-CM

## 2025-06-17 DIAGNOSIS — N18.30 CONTROLLED TYPE 2 DIABETES MELLITUS WITH STAGE 3 CHRONIC KIDNEY DISEASE, WITH LONG-TERM CURRENT USE OF INSULIN: ICD-10-CM

## 2025-06-17 RX ORDER — DOCUSATE SODIUM 100 MG/1
100 CAPSULE, LIQUID FILLED ORAL 2 TIMES DAILY PRN
Qty: 90 CAPSULE | Refills: 0 | Status: SHIPPED | OUTPATIENT
Start: 2025-06-17

## 2025-06-17 RX ORDER — SODIUM BICARBONATE 650 MG/1
1300 TABLET ORAL 2 TIMES DAILY
Qty: 120 TABLET | Refills: 2 | Status: SHIPPED | OUTPATIENT
Start: 2025-06-17 | End: 2025-09-15

## 2025-06-20 ENCOUNTER — INFUSION (OUTPATIENT)
Dept: INFUSION THERAPY | Facility: HOSPITAL | Age: 75
End: 2025-06-20
Attending: INTERNAL MEDICINE
Payer: MEDICARE

## 2025-06-20 VITALS
OXYGEN SATURATION: 98 % | SYSTOLIC BLOOD PRESSURE: 131 MMHG | RESPIRATION RATE: 16 BRPM | TEMPERATURE: 98 F | HEART RATE: 84 BPM | DIASTOLIC BLOOD PRESSURE: 61 MMHG

## 2025-06-20 DIAGNOSIS — D50.9 IRON DEFICIENCY ANEMIA, UNSPECIFIED IRON DEFICIENCY ANEMIA TYPE: Primary | ICD-10-CM

## 2025-06-20 PROCEDURE — 63600175 PHARM REV CODE 636 W HCPCS: Performed by: INTERNAL MEDICINE

## 2025-06-20 PROCEDURE — 25000003 PHARM REV CODE 250: Performed by: INTERNAL MEDICINE

## 2025-06-20 PROCEDURE — 96366 THER/PROPH/DIAG IV INF ADDON: CPT

## 2025-06-20 PROCEDURE — 96365 THER/PROPH/DIAG IV INF INIT: CPT

## 2025-06-20 RX ORDER — EPINEPHRINE 0.3 MG/.3ML
0.3 INJECTION SUBCUTANEOUS ONCE AS NEEDED
OUTPATIENT
Start: 2025-06-27

## 2025-06-20 RX ORDER — SODIUM CHLORIDE 0.9 % (FLUSH) 0.9 %
10 SYRINGE (ML) INJECTION
OUTPATIENT
Start: 2025-06-27

## 2025-06-20 RX ORDER — HEPARIN 100 UNIT/ML
500 SYRINGE INTRAVENOUS
OUTPATIENT
Start: 2025-06-27

## 2025-06-20 RX ORDER — EPINEPHRINE 0.3 MG/.3ML
0.3 INJECTION SUBCUTANEOUS ONCE AS NEEDED
Status: DISCONTINUED | OUTPATIENT
Start: 2025-06-20 | End: 2025-06-20 | Stop reason: HOSPADM

## 2025-06-20 RX ADMIN — IRON SUCROSE 300 MG: 20 INJECTION, SOLUTION INTRAVENOUS at 08:06

## 2025-06-20 NOTE — PLAN OF CARE
Patient ambulated onto unit for Venofer 300 mg treatment #1/3, VSS, no s/s of distress. Patient complains of pain in joints and fatigue/tiredness. Patient medication education given and plan of care reviewed. Venofer 300mg infused over 90 minuites, 30 minute posttransfusion monitoring comleted. Patient tolerated treatment well, VSS, no s/s of hypersensitivity.

## 2025-06-24 ENCOUNTER — INFUSION (OUTPATIENT)
Dept: INFUSION THERAPY | Facility: HOSPITAL | Age: 75
End: 2025-06-24
Attending: STUDENT IN AN ORGANIZED HEALTH CARE EDUCATION/TRAINING PROGRAM
Payer: MEDICARE

## 2025-06-24 VITALS
WEIGHT: 129.44 LBS | SYSTOLIC BLOOD PRESSURE: 147 MMHG | RESPIRATION RATE: 17 BRPM | HEART RATE: 73 BPM | OXYGEN SATURATION: 98 % | BODY MASS INDEX: 20.89 KG/M2 | DIASTOLIC BLOOD PRESSURE: 66 MMHG

## 2025-06-24 DIAGNOSIS — M06.9 RHEUMATOID ARTHRITIS, INVOLVING UNSPECIFIED SITE, UNSPECIFIED WHETHER RHEUMATOID FACTOR PRESENT: Primary | ICD-10-CM

## 2025-06-24 PROCEDURE — 96365 THER/PROPH/DIAG IV INF INIT: CPT

## 2025-06-24 PROCEDURE — 25000003 PHARM REV CODE 250: Performed by: STUDENT IN AN ORGANIZED HEALTH CARE EDUCATION/TRAINING PROGRAM

## 2025-06-24 PROCEDURE — 63600175 PHARM REV CODE 636 W HCPCS: Mod: JZ,JA,TB | Performed by: STUDENT IN AN ORGANIZED HEALTH CARE EDUCATION/TRAINING PROGRAM

## 2025-06-24 PROCEDURE — 96375 TX/PRO/DX INJ NEW DRUG ADDON: CPT

## 2025-06-24 RX ORDER — ACETAMINOPHEN 325 MG/1
650 TABLET ORAL
Status: COMPLETED | OUTPATIENT
Start: 2025-06-24 | End: 2025-06-24

## 2025-06-24 RX ORDER — DIPHENHYDRAMINE HYDROCHLORIDE 50 MG/ML
50 INJECTION, SOLUTION INTRAMUSCULAR; INTRAVENOUS ONCE AS NEEDED
Status: DISCONTINUED | OUTPATIENT
Start: 2025-06-24 | End: 2025-06-24 | Stop reason: HOSPADM

## 2025-06-24 RX ORDER — DIPHENHYDRAMINE HYDROCHLORIDE 50 MG/ML
25 INJECTION, SOLUTION INTRAMUSCULAR; INTRAVENOUS
Status: COMPLETED | OUTPATIENT
Start: 2025-06-24 | End: 2025-06-24

## 2025-06-24 RX ORDER — EPINEPHRINE 0.3 MG/.3ML
0.3 INJECTION SUBCUTANEOUS ONCE AS NEEDED
Status: DISCONTINUED | OUTPATIENT
Start: 2025-06-24 | End: 2025-06-24 | Stop reason: HOSPADM

## 2025-06-24 RX ADMIN — ACETAMINOPHEN 650 MG: 325 TABLET ORAL at 09:06

## 2025-06-24 RX ADMIN — SODIUM CHLORIDE 500 MG: 9 INJECTION, SOLUTION INTRAVENOUS at 09:06

## 2025-06-24 RX ADMIN — DIPHENHYDRAMINE HYDROCHLORIDE 25 MG: 50 INJECTION INTRAMUSCULAR; INTRAVENOUS at 09:06

## 2025-06-24 RX ADMIN — SODIUM CHLORIDE: 9 INJECTION, SOLUTION INTRAVENOUS at 09:06

## 2025-06-24 NOTE — PLAN OF CARE
Pt arrived to the Infusion unit for maintenance iv treatment today (q4w). Pt is awake, alert, and oriented x4. Ambulates independently with steady gait. Pt reports no new symptoms, allergies, or concerns at this time. Pt consents to plan of care for today. PIV 24g placed (R) FA. Pt given pre-medications: Tylenol 650 mg PO & Benadryl 25mg IVP.  Pt administered weight-based Abatacept (Orencia) 500mg in 0.9% NaCl 100mL at 200mL/hr over 30 minutes. Pt tolerated infusion well with no adverse reactions. Pt given calendar with upcoming appointments and verbalizes no additional needs at this time. Pt ambulatory upon discharge in no acute distress.        Problem: Adult Inpatient Plan of Care  Goal: Optimal Comfort and Wellbeing  Outcome: Met     
Detail Level: Generalized
Hide Include Location In Plan Question?: No
Detail Level: Zone

## 2025-07-08 ENCOUNTER — INFUSION (OUTPATIENT)
Dept: INFUSION THERAPY | Facility: HOSPITAL | Age: 75
End: 2025-07-08
Payer: MEDICARE

## 2025-07-08 VITALS
TEMPERATURE: 98 F | SYSTOLIC BLOOD PRESSURE: 151 MMHG | RESPIRATION RATE: 18 BRPM | DIASTOLIC BLOOD PRESSURE: 73 MMHG | OXYGEN SATURATION: 99 % | HEART RATE: 79 BPM

## 2025-07-08 DIAGNOSIS — D50.9 IRON DEFICIENCY ANEMIA, UNSPECIFIED IRON DEFICIENCY ANEMIA TYPE: Primary | ICD-10-CM

## 2025-07-08 PROCEDURE — 25000003 PHARM REV CODE 250: Performed by: INTERNAL MEDICINE

## 2025-07-08 PROCEDURE — 63600175 PHARM REV CODE 636 W HCPCS: Performed by: INTERNAL MEDICINE

## 2025-07-08 PROCEDURE — 96365 THER/PROPH/DIAG IV INF INIT: CPT

## 2025-07-08 RX ORDER — SODIUM CHLORIDE 0.9 % (FLUSH) 0.9 %
10 SYRINGE (ML) INJECTION
OUTPATIENT
Start: 2025-07-15

## 2025-07-08 RX ORDER — HEPARIN 100 UNIT/ML
500 SYRINGE INTRAVENOUS
OUTPATIENT
Start: 2025-07-15

## 2025-07-08 RX ORDER — EPINEPHRINE 0.3 MG/.3ML
0.3 INJECTION SUBCUTANEOUS ONCE AS NEEDED
OUTPATIENT
Start: 2025-07-15

## 2025-07-08 RX ORDER — EPINEPHRINE 0.3 MG/.3ML
0.3 INJECTION SUBCUTANEOUS ONCE AS NEEDED
Status: DISCONTINUED | OUTPATIENT
Start: 2025-07-08 | End: 2025-07-08 | Stop reason: HOSPADM

## 2025-07-08 RX ADMIN — IRON SUCROSE 300 MG: 20 INJECTION, SOLUTION INTRAVENOUS at 09:07

## 2025-07-08 NOTE — PLAN OF CARE
Pt ambulatory to unit, AAOx4. Denies any new or worsening of symptoms since last visit. Pt received Venofer 300 infusion via 24g L wrist. Dsg c/d/i; no s/s of infection or infiltration noted. PIV flushed and patent with blood return. Pt tolerated medication well. No S&S of an adverse reaction, VSS. Denies pain or discomfort. Care plan discussed with pt. Pt verbalized understanding. Pt aware of upcoming appointment via [facility]. Pt ambulatory upon discharge in [facility].

## 2025-07-15 ENCOUNTER — INFUSION (OUTPATIENT)
Dept: INFUSION THERAPY | Facility: HOSPITAL | Age: 75
End: 2025-07-15
Attending: INTERNAL MEDICINE
Payer: MEDICARE

## 2025-07-15 VITALS
OXYGEN SATURATION: 98 % | TEMPERATURE: 98 F | SYSTOLIC BLOOD PRESSURE: 134 MMHG | DIASTOLIC BLOOD PRESSURE: 62 MMHG | RESPIRATION RATE: 18 BRPM | HEART RATE: 80 BPM

## 2025-07-15 DIAGNOSIS — D50.9 IRON DEFICIENCY ANEMIA, UNSPECIFIED IRON DEFICIENCY ANEMIA TYPE: Primary | ICD-10-CM

## 2025-07-15 PROCEDURE — 96366 THER/PROPH/DIAG IV INF ADDON: CPT

## 2025-07-15 PROCEDURE — 25000003 PHARM REV CODE 250: Performed by: INTERNAL MEDICINE

## 2025-07-15 PROCEDURE — 63600175 PHARM REV CODE 636 W HCPCS: Performed by: INTERNAL MEDICINE

## 2025-07-15 PROCEDURE — 96365 THER/PROPH/DIAG IV INF INIT: CPT

## 2025-07-15 RX ORDER — HEPARIN 100 UNIT/ML
500 SYRINGE INTRAVENOUS
Status: CANCELLED | OUTPATIENT
Start: 2025-07-15

## 2025-07-15 RX ORDER — EPINEPHRINE 0.3 MG/.3ML
0.3 INJECTION SUBCUTANEOUS ONCE AS NEEDED
Status: CANCELLED | OUTPATIENT
Start: 2025-07-15

## 2025-07-15 RX ORDER — SODIUM CHLORIDE 0.9 % (FLUSH) 0.9 %
10 SYRINGE (ML) INJECTION
Status: CANCELLED | OUTPATIENT
Start: 2025-07-15

## 2025-07-15 RX ADMIN — IRON SUCROSE 300 MG: 20 INJECTION, SOLUTION INTRAVENOUS at 08:07

## 2025-07-15 NOTE — PLAN OF CARE
Patient presented to unit for Venofer 300 mg IVPB. VSS. No complaints voiced. Venofer administered over 1.5 hours and tolerated well. Vitals remained stable. Patient ambulated off unit in [facility] at time of discharge.    Problem: Anemia  Goal: Anemia Symptom Improvement  Outcome: Progressing

## 2025-07-22 ENCOUNTER — INFUSION (OUTPATIENT)
Dept: INFUSION THERAPY | Facility: HOSPITAL | Age: 75
End: 2025-07-22
Payer: MEDICARE

## 2025-07-22 VITALS
OXYGEN SATURATION: 100 % | HEART RATE: 84 BPM | SYSTOLIC BLOOD PRESSURE: 120 MMHG | TEMPERATURE: 98 F | DIASTOLIC BLOOD PRESSURE: 64 MMHG | RESPIRATION RATE: 18 BRPM | WEIGHT: 127 LBS | BODY MASS INDEX: 20.5 KG/M2

## 2025-07-22 DIAGNOSIS — M06.9 RHEUMATOID ARTHRITIS, INVOLVING UNSPECIFIED SITE, UNSPECIFIED WHETHER RHEUMATOID FACTOR PRESENT: Primary | ICD-10-CM

## 2025-07-22 PROCEDURE — 96375 TX/PRO/DX INJ NEW DRUG ADDON: CPT

## 2025-07-22 PROCEDURE — 25000003 PHARM REV CODE 250: Performed by: INTERNAL MEDICINE

## 2025-07-22 PROCEDURE — 96365 THER/PROPH/DIAG IV INF INIT: CPT

## 2025-07-22 PROCEDURE — 63600175 PHARM REV CODE 636 W HCPCS: Mod: JZ,JA,TB | Performed by: INTERNAL MEDICINE

## 2025-07-22 RX ORDER — EPINEPHRINE 0.3 MG/.3ML
0.3 INJECTION SUBCUTANEOUS ONCE AS NEEDED
Status: DISCONTINUED | OUTPATIENT
Start: 2025-07-22 | End: 2025-07-22 | Stop reason: HOSPADM

## 2025-07-22 RX ORDER — DIPHENHYDRAMINE HYDROCHLORIDE 50 MG/ML
25 INJECTION, SOLUTION INTRAMUSCULAR; INTRAVENOUS
Status: COMPLETED | OUTPATIENT
Start: 2025-07-22 | End: 2025-07-22

## 2025-07-22 RX ORDER — ACETAMINOPHEN 325 MG/1
650 TABLET ORAL
Status: COMPLETED | OUTPATIENT
Start: 2025-07-22 | End: 2025-07-22

## 2025-07-22 RX ORDER — DIPHENHYDRAMINE HYDROCHLORIDE 50 MG/ML
50 INJECTION, SOLUTION INTRAMUSCULAR; INTRAVENOUS ONCE AS NEEDED
Status: DISCONTINUED | OUTPATIENT
Start: 2025-07-22 | End: 2025-07-22 | Stop reason: HOSPADM

## 2025-07-22 RX ADMIN — SODIUM CHLORIDE 500 MG: 9 INJECTION, SOLUTION INTRAVENOUS at 09:07

## 2025-07-22 RX ADMIN — DIPHENHYDRAMINE HYDROCHLORIDE 25 MG: 50 INJECTION INTRAMUSCULAR; INTRAVENOUS at 09:07

## 2025-07-22 RX ADMIN — ACETAMINOPHEN 650 MG: 325 TABLET ORAL at 09:07

## 2025-07-22 NOTE — PLAN OF CARE
Pt ambulatory to unit, AAOx4. Denies any new or worsening of symptoms since last visit. Pt received Orencia infusion via 24g L wrist. Dsg c/d/i; no s/s of infection or infiltration noted. PIV flushed and patent with blood return. Pt tolerated medication well. No S&S of an adverse reaction, VSS. Denies pain or discomfort. Care plan discussed with pt. Pt verbalized understanding. Pt aware of upcoming appointment via [facility]. Pt ambulatory upon discharge in [facility].

## 2025-07-25 ENCOUNTER — PATIENT OUTREACH (OUTPATIENT)
Dept: ADMINISTRATIVE | Facility: HOSPITAL | Age: 75
End: 2025-07-25
Payer: MEDICARE

## 2025-07-25 ENCOUNTER — LAB VISIT (OUTPATIENT)
Dept: LAB | Facility: HOSPITAL | Age: 75
End: 2025-07-25
Attending: INTERNAL MEDICINE
Payer: MEDICARE

## 2025-07-25 DIAGNOSIS — D63.1 ANEMIA DUE TO CHRONIC KIDNEY DISEASE, UNSPECIFIED CKD STAGE: ICD-10-CM

## 2025-07-25 DIAGNOSIS — N18.9 ANEMIA DUE TO CHRONIC KIDNEY DISEASE, UNSPECIFIED CKD STAGE: ICD-10-CM

## 2025-07-25 LAB
ABSOLUTE EOSINOPHIL (OHS): 0.13 K/UL
ABSOLUTE MONOCYTE (OHS): 0.8 K/UL (ref 0.3–1)
ABSOLUTE NEUTROPHIL COUNT (OHS): 3.61 K/UL (ref 1.8–7.7)
BASOPHILS # BLD AUTO: 0.03 K/UL
BASOPHILS NFR BLD AUTO: 0.4 %
ERYTHROCYTE [DISTWIDTH] IN BLOOD BY AUTOMATED COUNT: 16.3 % (ref 11.5–14.5)
FERRITIN SERPL-MCNC: 569 NG/ML (ref 20–300)
HCT VFR BLD AUTO: 40 % (ref 40–54)
HGB BLD-MCNC: 12.4 GM/DL (ref 14–18)
IMM GRANULOCYTES # BLD AUTO: 0.01 K/UL (ref 0–0.04)
IMM GRANULOCYTES NFR BLD AUTO: 0.1 % (ref 0–0.5)
IRON SATN MFR SERPL: 24 % (ref 20–50)
IRON SERPL-MCNC: 54 UG/DL (ref 45–160)
LYMPHOCYTES # BLD AUTO: 2.39 K/UL (ref 1–4.8)
MCH RBC QN AUTO: 27.3 PG (ref 27–31)
MCHC RBC AUTO-ENTMCNC: 31 G/DL (ref 32–36)
MCV RBC AUTO: 88 FL (ref 82–98)
NUCLEATED RBC (/100WBC) (OHS): 0 /100 WBC
PLATELET # BLD AUTO: 244 K/UL (ref 150–450)
PMV BLD AUTO: 11.3 FL (ref 9.2–12.9)
RBC # BLD AUTO: 4.54 M/UL (ref 4.6–6.2)
RELATIVE EOSINOPHIL (OHS): 1.9 %
RELATIVE LYMPHOCYTE (OHS): 34.3 % (ref 18–48)
RELATIVE MONOCYTE (OHS): 11.5 % (ref 4–15)
RELATIVE NEUTROPHIL (OHS): 51.8 % (ref 38–73)
TIBC SERPL-MCNC: 226 UG/DL (ref 250–450)
TRANSFERRIN SERPL-MCNC: 153 MG/DL (ref 200–375)
WBC # BLD AUTO: 6.97 K/UL (ref 3.9–12.7)

## 2025-07-25 PROCEDURE — 82728 ASSAY OF FERRITIN: CPT

## 2025-07-25 PROCEDURE — 36415 COLL VENOUS BLD VENIPUNCTURE: CPT | Mod: PO

## 2025-07-25 PROCEDURE — 85025 COMPLETE CBC W/AUTO DIFF WBC: CPT

## 2025-07-25 PROCEDURE — 83540 ASSAY OF IRON: CPT

## 2025-07-30 ENCOUNTER — OFFICE VISIT (OUTPATIENT)
Dept: HEMATOLOGY/ONCOLOGY | Facility: CLINIC | Age: 75
End: 2025-07-30
Payer: MEDICARE

## 2025-07-30 VITALS
HEIGHT: 66 IN | HEART RATE: 80 BPM | TEMPERATURE: 98 F | DIASTOLIC BLOOD PRESSURE: 70 MMHG | SYSTOLIC BLOOD PRESSURE: 135 MMHG | OXYGEN SATURATION: 98 % | BODY MASS INDEX: 19.74 KG/M2 | WEIGHT: 122.81 LBS

## 2025-07-30 DIAGNOSIS — M54.6 CHRONIC MIDLINE THORACIC BACK PAIN: ICD-10-CM

## 2025-07-30 DIAGNOSIS — I10 HYPERTENSION, UNSPECIFIED TYPE: ICD-10-CM

## 2025-07-30 DIAGNOSIS — N18.31 STAGE 3A CHRONIC KIDNEY DISEASE: ICD-10-CM

## 2025-07-30 DIAGNOSIS — N18.9 ANEMIA DUE TO CHRONIC KIDNEY DISEASE, UNSPECIFIED CKD STAGE: Primary | ICD-10-CM

## 2025-07-30 DIAGNOSIS — G89.29 CHRONIC MIDLINE THORACIC BACK PAIN: ICD-10-CM

## 2025-07-30 DIAGNOSIS — D63.1 ANEMIA DUE TO CHRONIC KIDNEY DISEASE, UNSPECIFIED CKD STAGE: Primary | ICD-10-CM

## 2025-07-30 PROCEDURE — 99999 PR PBB SHADOW E&M-EST. PATIENT-LVL IV: CPT | Mod: PBBFAC,,, | Performed by: INTERNAL MEDICINE

## 2025-07-30 NOTE — PROGRESS NOTES
Subjective     Patient ID: Darrion Bennett is a 75 y.o. male.    Chief Complaint: Follow-up (Anemia )    HPI  Diagnosis:  Anemia in CKD        Chief Complaint: Anemia   Interval History: Mr. Bennett is here for follow up.  He is a former patient of Dr. Hicks.     75 year old man with multiple comorbidities including MCTD, Sjogren's, UC, diastolic HF, diabetes, essential hypertension, CKD stage III, and anemia of chronic disease.     He has received IV Fe in past.  with interval response in hemoglobin and normalization of RDW.  Denies epistaxis, hemoptysis, hematemesis, hematochezia, melena, or hematuria.  Always feels cold (chronic).    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  Son completed neuro fellowship and daughter is going to be in primary care.  He is accompanied by wife.     He reports that he was diagnosed with Sjogrens when he had dry eyes and dry mouth in 2014..    Interval Hx; Alone at visit   No new issues   Less fatigued  He completed healthy back program   He reports chronic mild fatigue,stable    He is intolerant of Fe supp  He has received IV Fe in past  No SOB/CP lightheadedness    Hb 12.4g/dL on 7/25/25     Past Medical History:   Diagnosis Date    Anemia     Anterolisthesis of lumbar spine     Arthritis     Cataract     CHF (congestive heart failure)     Chronic constipation     Diabetes mellitus, type 2     Felon of finger of left hand 05/11/2019    Glaucoma     Hyperlipidemia 05/13/2014    Hypertension     MCTD (mixed connective tissue disease)     Personal history of colonic polyps     Sjogren's disease     Ulcerative colitis     Urolithiasis        Past Surgical History:   Procedure Laterality Date    CATARACT EXTRACTION Bilateral 2005    COLONOSCOPY  2014    ESOPHAGOGASTRODUODENOSCOPY N/A 9/8/2023    Procedure: EGD (ESOPHAGOGASTRODUODENOSCOPY);  Surgeon: Divya Saenz MD;  Location: [facility];  Service: Endoscopy;  Laterality: N/A;  ref by / inst  "portal-RB    ESOPHAGOGASTRODUODENOSCOPY N/A 11/22/2023    Procedure: EGD (ESOPHAGOGASTRODUODENOSCOPY);  Surgeon: Crystal Urbina MD;  Location: [facility];  Service: Endoscopy;  Laterality: N/A;  time frame 8-12 weeks  Dr. Saenz pt   prep instructions sent to pt via portal -  wl meds trulicity hold 7 days prior  diabetic  11/16- pt r/s to earlier date, pre call complete.  DBM    EYE SURGERY          Review of Systems   Constitutional:  Positive for fatigue (improved). Negative for appetite change, fever and unexpected weight change.   HENT:  Negative for mouth sores.    Eyes:  Negative for visual disturbance.   Respiratory:  Negative for cough and shortness of breath.    Cardiovascular:  Negative for chest pain.   Gastrointestinal:  Negative for abdominal pain and diarrhea.   Genitourinary:  Negative for frequency.   Musculoskeletal:  Positive for back pain (chronic) and neck pain (chronic).   Integumentary:  Negative for rash.   Neurological:  Negative for headaches.   Hematological:  Negative for adenopathy.   Psychiatric/Behavioral:  The patient is not nervous/anxious.           Objective     Vitals:    07/30/25 0847   BP: 135/70   Pulse: 80   Temp: 98.1 °F (36.7 °C)   SpO2: 98%   Weight: 55.7 kg (122 lb 12.7 oz)   Height: 5' 6" (1.676 m)           Physical Exam  Constitutional:       Appearance: He is well-developed.   HENT:      Head: Normocephalic.   Eyes:      General: No scleral icterus.        Right eye: No discharge.         Left eye: No discharge.      Conjunctiva/sclera: Conjunctivae normal.   Cardiovascular:      Rate and Rhythm: Normal rate and regular rhythm.      Heart sounds: Normal heart sounds. No murmur heard.  Pulmonary:      Effort: Pulmonary effort is normal.      Breath sounds: Normal breath sounds. No wheezing or rales.   Abdominal:      General: Bowel sounds are normal.      Palpations: Abdomen is soft.      Tenderness: There is no abdominal tenderness. There is no guarding or rebound. "   Musculoskeletal:         General: No swelling. Normal range of motion.      Cervical back: Normal range of motion.   Skin:     Findings: No erythema or rash.   Neurological:      Mental Status: He is alert and oriented to person, place, and time.      Cranial Nerves: No cranial nerve deficit.   Psychiatric:         Mood and Affect: Mood normal.         Behavior: Behavior normal.       Lab Results   Component Value Date    WBC 6.97 07/25/2025    HGB 12.4 (L) 07/25/2025    HCT 40.0 07/25/2025    MCV 88 07/25/2025     07/25/2025       Lab Results   Component Value Date    UIBC 197 06/20/2014    IRON 54 07/25/2025    TRANSFERRIN 153 (L) 07/25/2025    TIBC 226 (L) 07/25/2025    LABIRON 24 07/25/2025    FESATURATED 23 01/24/2025             Assessment and Plan            enal/      Anemia of chronic renal failure, stage 3b - Primary     Current Assessment & Plan       Anemia of chronic renal disease, CKD stage IIIB.    He has undergone IV Fe in past  Labs reviewed  Hb -12.4 g/dl  on 7/25/25   S/p  IV Fe .  Fe studies reviewed- improved  -no role of epo at this hemoglobin level  Cbc, Fe studies,  in  2mos             Stage 3a chronic kidney disease     Current Assessment & Plan       Creatinine 1.9mg/ml on 9/20/24  Cr level 1.7mg/dl on 1/24/25   Cr level 1.6mg/dl on 5/27/25   Cr level  1.5mg/dL eGFR 48 on 6/3/25   -continue medical management with pcp                       Chronic back pain  Stable   Completed Healthy Back Program ( referred last visit)   Completed acupuncture   Follow up with PCP for med mgmt              HTN  Well controlled   Cont BP meds per PCP/Cardiology         Cbc, Fe studies,prior to f/u  in  3mos- end of Nov      Visit today included increased complexity associated with the care of the episodic problem anemia in CKD addressed and managing the longitudinal care of the patient due to the serious and/or complex managed problem(s) anemia in CKD

## 2025-07-31 ENCOUNTER — OFFICE VISIT (OUTPATIENT)
Dept: PODIATRY | Facility: CLINIC | Age: 75
End: 2025-07-31
Payer: MEDICARE

## 2025-07-31 VITALS — HEIGHT: 66 IN | BODY MASS INDEX: 19.74 KG/M2 | WEIGHT: 122.81 LBS

## 2025-07-31 DIAGNOSIS — M79.671 PAIN IN BOTH FEET: ICD-10-CM

## 2025-07-31 DIAGNOSIS — M79.672 PAIN IN BOTH FEET: ICD-10-CM

## 2025-07-31 DIAGNOSIS — M77.40 METATARSALGIA, UNSPECIFIED LATERALITY: ICD-10-CM

## 2025-07-31 DIAGNOSIS — E11.49 TYPE II DIABETES MELLITUS WITH NEUROLOGICAL MANIFESTATIONS: Primary | ICD-10-CM

## 2025-07-31 PROCEDURE — 1159F MED LIST DOCD IN RCRD: CPT | Mod: CPTII,S$GLB,, | Performed by: PODIATRIST

## 2025-07-31 PROCEDURE — 3051F HG A1C>EQUAL 7.0%<8.0%: CPT | Mod: CPTII,S$GLB,, | Performed by: PODIATRIST

## 2025-07-31 PROCEDURE — 99214 OFFICE O/P EST MOD 30 MIN: CPT | Mod: S$GLB,,, | Performed by: PODIATRIST

## 2025-07-31 PROCEDURE — 3066F NEPHROPATHY DOC TX: CPT | Mod: CPTII,S$GLB,, | Performed by: PODIATRIST

## 2025-07-31 PROCEDURE — 3288F FALL RISK ASSESSMENT DOCD: CPT | Mod: CPTII,S$GLB,, | Performed by: PODIATRIST

## 2025-07-31 PROCEDURE — 4010F ACE/ARB THERAPY RXD/TAKEN: CPT | Mod: CPTII,S$GLB,, | Performed by: PODIATRIST

## 2025-07-31 PROCEDURE — 1101F PT FALLS ASSESS-DOCD LE1/YR: CPT | Mod: CPTII,S$GLB,, | Performed by: PODIATRIST

## 2025-07-31 PROCEDURE — 3062F POS MACROALBUMINURIA REV: CPT | Mod: CPTII,S$GLB,, | Performed by: PODIATRIST

## 2025-07-31 PROCEDURE — 1125F AMNT PAIN NOTED PAIN PRSNT: CPT | Mod: CPTII,S$GLB,, | Performed by: PODIATRIST

## 2025-07-31 PROCEDURE — 99999 PR PBB SHADOW E&M-EST. PATIENT-LVL IV: CPT | Mod: PBBFAC,,, | Performed by: PODIATRIST

## 2025-07-31 RX ORDER — DICLOFENAC SODIUM 10 MG/G
2 GEL TOPICAL 4 TIMES DAILY
Qty: 450 G | Refills: 3 | Status: SHIPPED | OUTPATIENT
Start: 2025-07-31

## 2025-07-31 NOTE — PROGRESS NOTES
Subjective:     Patient ID: Darrion Bennett is a 75 y.o. male.    Chief Complaint: Diabetes Mellitus (06/10/25 Dr Domingo) and Nail Care        History of Present Illness    CHIEF COMPLAINT:  - Bilateral foot pain    HPI:  Darrion presents with bilateral foot pain ongoing for approximately two months. Pain affects both feet globally and is constant, with exacerbation during walking and sleep, suggesting a combination of muscular and neuropathic pain. He has been using diabetic shoes for an extended period, which he finds comfortable. However, these shoes appear worn down and may no longer provide adequate support. He denies any recent XRs.    PREVIOUS TREATMENTS:  - Diabetic shoes: Used for an extended period, provided comfort    IMAGING:  - XR Bilateral Feet            Review of Systems   Constitutional: Negative for chills.   Cardiovascular:  Negative for chest pain and claudication.   Respiratory:  Negative for cough.    Skin:  Positive for color change, dry skin and nail changes.   Musculoskeletal:  Positive for joint pain.   Gastrointestinal:  Negative for nausea.   Neurological:  Positive for paresthesias. Negative for numbness.   Psychiatric/Behavioral:  The patient is not nervous/anxious.             Objective:   Physical Exam               Physical Exam  Constitutional:       Appearance: He is well-developed.      Comments: Oriented to time, place, and person.   Cardiovascular:      Comments: DP and PT pulses are palpable bilaterally. 3 sec capillary refill time and toes and feet are warm to touch proximally .  There is  hair growth on the feet and toes b/l. There is no edema b/l. No spider veins or varicosities present b/l.     Musculoskeletal:      Comments: Equinus noted b/l ankles with < 10 deg DF noted. MMT 5/5 in DF/PF/Inv/Ev resistance with no reproduction of pain in any direction. Passive range of motion of ankle and pedal joints is painless b/l.  Decreased stride, station of gait.  apropulsive toe off.   Increased angle and base of gait.      Patient has hammertoes of digits 2-5 bilateral partially reducible without symptom today.     Visible and palpable bunion without pain at dorsomedial 1st metatarsal head right and left.  Hallux abducted right and left partially reducible, tracks laterally without being track bound.  No ecchymosis, erythema, edema, or cardinal signs infection or signs of trauma same foot.     Fat pad atrophy to heels and met heads bilateral       Feet:      Right foot:      Skin integrity: No callus or dry skin.      Left foot:      Skin integrity: No callus or dry skin.   Lymphadenopathy:      Comments: Negative lymphadenopathy bilateral popliteal fossa and tarsal tunnel.   Skin:     Comments: No open lesions, lacerations or wounds noted.Interdigital spaces clean, dry and intact b/l. No erythema noted to b/l foot.  Nails normal color and trophic qualities.     Neurological:      Mental Status: He is alert.      Comments: Light touch, proprioception, and sharp/dull sensation are all intact bilaterally. Protective threshold with the [place] monofilament is intact bilaterally.      Subjective paresthesias with no clearly identifiable source or trigger.        Psychiatric:         Behavior: Behavior is cooperative.           Assessment and Plan:   Assessment & Plan    E11.49 Type II diabetes mellitus with neurological manifestations  M79.671, M79.672 Pain in both feet  M77.40 Metatarsalgia, unspecified laterality    PAIN IN BOTH FEET:  - Prescribed pain cream for nighttime pain relief.    METATARSALGIA, UNSPECIFIED LATERALITY:  - Recommend metatarsal pads for support during walking.  - Purchase additional metatarsal pads online if needed.          Darrion was seen today for diabetes mellitus and nail care.    Diagnoses and all orders for this visit:    Type II diabetes mellitus with neurological manifestations  -     DIABETIC SHOES FOR HOME USE    Pain in both feet  -     DIABETIC SHOES FOR  HOME USE    Metatarsalgia, unspecified laterality  -     DIABETIC SHOES FOR HOME USE    Other orders  -     diclofenac sodium (VOLTAREN ARTHRITIS PAIN) 1 % Gel; Apply 2 g topically 4 (four) times daily.      I counseled the patient on his conditions, their implications and medical management.        - Shoe inspection. Diabetic Foot Education. Patient reminded of the importance of good nutrition and blood sugar control to help prevent podiatric complications of diabetes. Patient instructed on proper foot hygeine. We discussed wearing proper shoe gear, daily foot inspections, never walking without protective shoe gear, caution putting sharp instruments to feet     - Discussed DM foot care:  Wear comfortable, proper fitting shoes. Wash feet daily. Dry well. After drying, apply moisturizer to feet (no lotion to webspaces). Inspect feet daily for skin breaks, blisters, swelling, or redness. Wear cotton socks (preferably white)  Change socks every day. Do NOT walk barefoot. Do NOT use heating pads or warm/hot water soaks     Rx Voltaren gel to be applied to affected area up to 3-4 x daily as needed for pain    Rx diabetic shoes with custom molded inserts to be worn at all times while ambulating. Prescription provided with list of local retailers.     RTC PRN     This note was generated with the assistance of ambient listening technology. Verbal consent was obtained by the patient and accompanying visitor(s) for the recording of patient appointment to facilitate this note. I attest to having reviewed and edited the generated note for accuracy, though some syntax or spelling errors may persist. Please contact the author of this note for any clarification.

## 2025-08-12 ENCOUNTER — OFFICE VISIT (OUTPATIENT)
Dept: FAMILY MEDICINE | Facility: CLINIC | Age: 75
End: 2025-08-12
Payer: MEDICARE

## 2025-08-12 VITALS
TEMPERATURE: 99 F | WEIGHT: 124.25 LBS | DIASTOLIC BLOOD PRESSURE: 78 MMHG | SYSTOLIC BLOOD PRESSURE: 142 MMHG | HEART RATE: 75 BPM | BODY MASS INDEX: 19.97 KG/M2 | OXYGEN SATURATION: 97 % | HEIGHT: 66 IN

## 2025-08-12 DIAGNOSIS — I50.30 DIASTOLIC HEART FAILURE, NYHA CLASS 2: ICD-10-CM

## 2025-08-12 DIAGNOSIS — K59.09 CHRONIC CONSTIPATION: ICD-10-CM

## 2025-08-12 DIAGNOSIS — M06.9 RHEUMATOID ARTHRITIS, INVOLVING UNSPECIFIED SITE, UNSPECIFIED WHETHER RHEUMATOID FACTOR PRESENT: ICD-10-CM

## 2025-08-12 DIAGNOSIS — R10.32 LEFT GROIN PAIN: ICD-10-CM

## 2025-08-12 DIAGNOSIS — Z00.00 ANNUAL PHYSICAL EXAM: Primary | ICD-10-CM

## 2025-08-12 DIAGNOSIS — R09.81 NASAL SINUS CONGESTION: ICD-10-CM

## 2025-08-12 DIAGNOSIS — S22.000A COMPRESSION FRACTURE OF BODY OF THORACIC VERTEBRA: ICD-10-CM

## 2025-08-12 DIAGNOSIS — I10 ESSENTIAL HYPERTENSION: ICD-10-CM

## 2025-08-12 PROCEDURE — 99999 PR PBB SHADOW E&M-EST. PATIENT-LVL V: CPT | Mod: PBBFAC,,,

## 2025-08-12 PROCEDURE — 3078F DIAST BP <80 MM HG: CPT | Mod: CPTII,S$GLB,,

## 2025-08-12 PROCEDURE — 3062F POS MACROALBUMINURIA REV: CPT | Mod: CPTII,S$GLB,,

## 2025-08-12 PROCEDURE — 1101F PT FALLS ASSESS-DOCD LE1/YR: CPT | Mod: CPTII,S$GLB,,

## 2025-08-12 PROCEDURE — 3066F NEPHROPATHY DOC TX: CPT | Mod: CPTII,S$GLB,,

## 2025-08-12 PROCEDURE — 1159F MED LIST DOCD IN RCRD: CPT | Mod: CPTII,S$GLB,,

## 2025-08-12 PROCEDURE — 3051F HG A1C>EQUAL 7.0%<8.0%: CPT | Mod: CPTII,S$GLB,,

## 2025-08-12 PROCEDURE — 3288F FALL RISK ASSESSMENT DOCD: CPT | Mod: CPTII,S$GLB,,

## 2025-08-12 PROCEDURE — 1126F AMNT PAIN NOTED NONE PRSNT: CPT | Mod: CPTII,S$GLB,,

## 2025-08-12 PROCEDURE — 99397 PER PM REEVAL EST PAT 65+ YR: CPT | Mod: S$GLB,,,

## 2025-08-12 PROCEDURE — 3077F SYST BP >= 140 MM HG: CPT | Mod: CPTII,S$GLB,,

## 2025-08-12 PROCEDURE — 4010F ACE/ARB THERAPY RXD/TAKEN: CPT | Mod: CPTII,S$GLB,,

## 2025-08-12 PROCEDURE — 1160F RVW MEDS BY RX/DR IN RCRD: CPT | Mod: CPTII,S$GLB,,

## 2025-08-13 ENCOUNTER — HOSPITAL ENCOUNTER (OUTPATIENT)
Dept: RADIOLOGY | Facility: CLINIC | Age: 75
Discharge: HOME | End: 2025-08-13
Payer: MEDICARE

## 2025-08-13 ENCOUNTER — PATIENT MESSAGE (OUTPATIENT)
Dept: FAMILY MEDICINE | Facility: CLINIC | Age: 75
End: 2025-08-13
Payer: MEDICARE

## 2025-08-13 DIAGNOSIS — S22.000A COMPRESSION FRACTURE OF BODY OF THORACIC VERTEBRA: ICD-10-CM

## 2025-08-13 PROCEDURE — 77080 DXA BONE DENSITY AXIAL: CPT | Mod: TC,PO

## 2025-08-14 ENCOUNTER — PATIENT MESSAGE (OUTPATIENT)
Dept: ADMINISTRATIVE | Facility: OTHER | Age: 75
End: 2025-08-14
Payer: MEDICARE

## 2025-08-14 ENCOUNTER — PATIENT MESSAGE (OUTPATIENT)
Dept: FAMILY MEDICINE | Facility: CLINIC | Age: 75
End: 2025-08-14
Payer: MEDICARE

## 2025-08-19 ENCOUNTER — INFUSION (OUTPATIENT)
Dept: INFUSION THERAPY | Facility: HOSPITAL | Age: 75
End: 2025-08-19
Payer: MEDICARE

## 2025-08-19 VITALS
RESPIRATION RATE: 16 BRPM | OXYGEN SATURATION: 96 % | TEMPERATURE: 99 F | HEART RATE: 88 BPM | DIASTOLIC BLOOD PRESSURE: 66 MMHG | SYSTOLIC BLOOD PRESSURE: 139 MMHG

## 2025-08-19 DIAGNOSIS — M06.9 RHEUMATOID ARTHRITIS, INVOLVING UNSPECIFIED SITE, UNSPECIFIED WHETHER RHEUMATOID FACTOR PRESENT: Primary | ICD-10-CM

## 2025-08-19 PROCEDURE — 96375 TX/PRO/DX INJ NEW DRUG ADDON: CPT

## 2025-08-19 PROCEDURE — 25000003 PHARM REV CODE 250: Performed by: INTERNAL MEDICINE

## 2025-08-19 PROCEDURE — 96365 THER/PROPH/DIAG IV INF INIT: CPT

## 2025-08-19 PROCEDURE — 63600175 PHARM REV CODE 636 W HCPCS: Performed by: INTERNAL MEDICINE

## 2025-08-19 RX ORDER — ACETAMINOPHEN 325 MG/1
650 TABLET ORAL
Status: COMPLETED | OUTPATIENT
Start: 2025-08-19 | End: 2025-08-19

## 2025-08-19 RX ORDER — DIPHENHYDRAMINE HYDROCHLORIDE 50 MG/ML
25 INJECTION, SOLUTION INTRAMUSCULAR; INTRAVENOUS
Status: COMPLETED | OUTPATIENT
Start: 2025-08-19 | End: 2025-08-19

## 2025-08-19 RX ORDER — EPINEPHRINE 0.3 MG/.3ML
0.3 INJECTION SUBCUTANEOUS ONCE AS NEEDED
Status: DISCONTINUED | OUTPATIENT
Start: 2025-08-19 | End: 2025-08-19 | Stop reason: HOSPADM

## 2025-08-19 RX ORDER — DIPHENHYDRAMINE HYDROCHLORIDE 50 MG/ML
50 INJECTION, SOLUTION INTRAMUSCULAR; INTRAVENOUS ONCE AS NEEDED
Status: DISCONTINUED | OUTPATIENT
Start: 2025-08-19 | End: 2025-08-19 | Stop reason: HOSPADM

## 2025-08-19 RX ADMIN — ACETAMINOPHEN 650 MG: 325 TABLET ORAL at 09:08

## 2025-08-19 RX ADMIN — DIPHENHYDRAMINE HYDROCHLORIDE 25 MG: 50 INJECTION INTRAMUSCULAR; INTRAVENOUS at 10:08

## 2025-08-19 RX ADMIN — SODIUM CHLORIDE 500 MG: 9 INJECTION, SOLUTION INTRAVENOUS at 10:08

## 2025-08-20 ENCOUNTER — TELEPHONE (OUTPATIENT)
Dept: PAIN MEDICINE | Facility: CLINIC | Age: 75
End: 2025-08-20
Payer: MEDICARE

## 2025-08-20 ENCOUNTER — OFFICE VISIT (OUTPATIENT)
Dept: PAIN MEDICINE | Facility: CLINIC | Age: 75
End: 2025-08-20
Payer: MEDICARE

## 2025-08-20 VITALS
BODY MASS INDEX: 19.98 KG/M2 | WEIGHT: 124.31 LBS | DIASTOLIC BLOOD PRESSURE: 62 MMHG | HEART RATE: 75 BPM | SYSTOLIC BLOOD PRESSURE: 132 MMHG | HEIGHT: 66 IN

## 2025-08-20 DIAGNOSIS — S22.080S COMPRESSION FRACTURE OF T12 VERTEBRA, SEQUELA: Primary | ICD-10-CM

## 2025-08-20 DIAGNOSIS — M47.896 OTHER SPONDYLOSIS, LUMBAR REGION: ICD-10-CM

## 2025-08-20 DIAGNOSIS — M47.816 LUMBAR SPONDYLOSIS: ICD-10-CM

## 2025-08-20 DIAGNOSIS — M54.14 THORACIC RADICULOPATHY: Primary | ICD-10-CM

## 2025-08-20 DIAGNOSIS — M54.2 CHRONIC NECK PAIN: ICD-10-CM

## 2025-08-20 DIAGNOSIS — M54.14 THORACIC RADICULOPATHY: ICD-10-CM

## 2025-08-20 DIAGNOSIS — R53.1 SUBJECTIVE WEAKNESS: ICD-10-CM

## 2025-08-20 DIAGNOSIS — G89.29 CHRONIC NECK PAIN: ICD-10-CM

## 2025-08-20 PROCEDURE — 99999 PR PBB SHADOW E&M-EST. PATIENT-LVL IV: CPT | Mod: PBBFAC,,, | Performed by: STUDENT IN AN ORGANIZED HEALTH CARE EDUCATION/TRAINING PROGRAM

## 2025-08-20 RX ORDER — OMEPRAZOLE 40 MG/1
40 CAPSULE, DELAYED RELEASE ORAL EVERY MORNING
Qty: 90 CAPSULE | Refills: 3 | Status: SHIPPED | OUTPATIENT
Start: 2025-08-20

## 2025-08-21 ENCOUNTER — PATIENT MESSAGE (OUTPATIENT)
Dept: RHEUMATOLOGY | Facility: CLINIC | Age: 75
End: 2025-08-21

## 2025-08-21 ENCOUNTER — OFFICE VISIT (OUTPATIENT)
Dept: RHEUMATOLOGY | Facility: CLINIC | Age: 75
End: 2025-08-21
Payer: MEDICARE

## 2025-08-21 ENCOUNTER — IMMUNIZATION (OUTPATIENT)
Dept: INTERNAL MEDICINE | Facility: CLINIC | Age: 75
End: 2025-08-21
Payer: MEDICARE

## 2025-08-21 VITALS
WEIGHT: 126.75 LBS | DIASTOLIC BLOOD PRESSURE: 85 MMHG | SYSTOLIC BLOOD PRESSURE: 162 MMHG | HEIGHT: 66 IN | HEART RATE: 78 BPM | BODY MASS INDEX: 20.37 KG/M2

## 2025-08-21 DIAGNOSIS — Z79.899 IMMUNODEFICIENCY DUE TO DRUG THERAPY: ICD-10-CM

## 2025-08-21 DIAGNOSIS — M94.9 DISORDER OF CARTILAGE, UNSPECIFIED: ICD-10-CM

## 2025-08-21 DIAGNOSIS — M1A.9XX1 TOPHACEOUS GOUT: Primary | ICD-10-CM

## 2025-08-21 DIAGNOSIS — D84.821 IMMUNODEFICIENCY DUE TO DRUG THERAPY: ICD-10-CM

## 2025-08-21 DIAGNOSIS — Z79.899 OTHER LONG TERM (CURRENT) DRUG THERAPY: ICD-10-CM

## 2025-08-21 DIAGNOSIS — Z23 NEED FOR VACCINATION: Primary | ICD-10-CM

## 2025-08-21 PROCEDURE — 3062F POS MACROALBUMINURIA REV: CPT | Mod: CPTII,S$GLB,, | Performed by: INTERNAL MEDICINE

## 2025-08-21 PROCEDURE — 1101F PT FALLS ASSESS-DOCD LE1/YR: CPT | Mod: CPTII,S$GLB,, | Performed by: INTERNAL MEDICINE

## 2025-08-21 PROCEDURE — 3066F NEPHROPATHY DOC TX: CPT | Mod: CPTII,S$GLB,, | Performed by: INTERNAL MEDICINE

## 2025-08-21 PROCEDURE — 99214 OFFICE O/P EST MOD 30 MIN: CPT | Mod: S$GLB,,, | Performed by: INTERNAL MEDICINE

## 2025-08-21 PROCEDURE — 1125F AMNT PAIN NOTED PAIN PRSNT: CPT | Mod: CPTII,S$GLB,, | Performed by: INTERNAL MEDICINE

## 2025-08-21 PROCEDURE — 3079F DIAST BP 80-89 MM HG: CPT | Mod: CPTII,S$GLB,, | Performed by: INTERNAL MEDICINE

## 2025-08-21 PROCEDURE — 1159F MED LIST DOCD IN RCRD: CPT | Mod: CPTII,S$GLB,, | Performed by: INTERNAL MEDICINE

## 2025-08-21 PROCEDURE — 91320 SARSCV2 VAC 30MCG TRS-SUC IM: CPT | Mod: S$GLB,,, | Performed by: INTERNAL MEDICINE

## 2025-08-21 PROCEDURE — 3051F HG A1C>EQUAL 7.0%<8.0%: CPT | Mod: CPTII,S$GLB,, | Performed by: INTERNAL MEDICINE

## 2025-08-21 PROCEDURE — 4010F ACE/ARB THERAPY RXD/TAKEN: CPT | Mod: CPTII,S$GLB,, | Performed by: INTERNAL MEDICINE

## 2025-08-21 PROCEDURE — G2211 COMPLEX E/M VISIT ADD ON: HCPCS | Mod: S$GLB,,, | Performed by: INTERNAL MEDICINE

## 2025-08-21 PROCEDURE — 3288F FALL RISK ASSESSMENT DOCD: CPT | Mod: CPTII,S$GLB,, | Performed by: INTERNAL MEDICINE

## 2025-08-21 PROCEDURE — 99999 PR PBB SHADOW E&M-EST. PATIENT-LVL IV: CPT | Mod: PBBFAC,,, | Performed by: INTERNAL MEDICINE

## 2025-08-21 PROCEDURE — 3077F SYST BP >= 140 MM HG: CPT | Mod: CPTII,S$GLB,, | Performed by: INTERNAL MEDICINE

## 2025-08-21 PROCEDURE — 90480 ADMN SARSCOV2 VAC 1/ONLY CMP: CPT | Mod: S$GLB,,, | Performed by: INTERNAL MEDICINE

## 2025-08-22 ENCOUNTER — LAB VISIT (OUTPATIENT)
Dept: LAB | Facility: HOSPITAL | Age: 75
End: 2025-08-22
Attending: INTERNAL MEDICINE
Payer: MEDICARE

## 2025-08-22 DIAGNOSIS — M1A.9XX1 TOPHACEOUS GOUT: ICD-10-CM

## 2025-08-22 LAB
ABSOLUTE EOSINOPHIL (OHS): 0.09 K/UL
ABSOLUTE MONOCYTE (OHS): 0.9 K/UL (ref 0.3–1)
ABSOLUTE NEUTROPHIL COUNT (OHS): 4.1 K/UL (ref 1.8–7.7)
ALBUMIN SERPL BCP-MCNC: 3.1 G/DL (ref 3.5–5.2)
ALP SERPL-CCNC: 67 UNIT/L (ref 40–150)
ALT SERPL W/O P-5'-P-CCNC: 9 UNIT/L (ref 0–55)
ANION GAP (OHS): 8 MMOL/L (ref 8–16)
AST SERPL-CCNC: 22 UNIT/L (ref 0–50)
BASOPHILS # BLD AUTO: 0.02 K/UL
BASOPHILS NFR BLD AUTO: 0.3 %
BILIRUB SERPL-MCNC: 0.4 MG/DL (ref 0.1–1)
BUN SERPL-MCNC: 20 MG/DL (ref 8–23)
C3 SERPL-MCNC: 78 MG/DL (ref 50–180)
C4 COMPLEMENT (OHS): 21 MG/DL (ref 11–44)
CALCIUM SERPL-MCNC: 8.5 MG/DL (ref 8.7–10.5)
CHLORIDE SERPL-SCNC: 107 MMOL/L (ref 95–110)
CO2 SERPL-SCNC: 20 MMOL/L (ref 23–29)
CREAT SERPL-MCNC: 1.3 MG/DL (ref 0.5–1.4)
CRP SERPL-MCNC: 6.7 MG/L
ERYTHROCYTE [DISTWIDTH] IN BLOOD BY AUTOMATED COUNT: 16.1 % (ref 11.5–14.5)
GFR SERPLBLD CREATININE-BSD FMLA CKD-EPI: 57 ML/MIN/1.73/M2
GLUCOSE SERPL-MCNC: 131 MG/DL (ref 70–110)
HCT VFR BLD AUTO: 37.3 % (ref 40–54)
HGB BLD-MCNC: 12.2 GM/DL (ref 14–18)
IMM GRANULOCYTES # BLD AUTO: 0.01 K/UL (ref 0–0.04)
IMM GRANULOCYTES NFR BLD AUTO: 0.1 % (ref 0–0.5)
LYMPHOCYTES # BLD AUTO: 1.89 K/UL (ref 1–4.8)
MCH RBC QN AUTO: 28.4 PG (ref 27–31)
MCHC RBC AUTO-ENTMCNC: 32.7 G/DL (ref 32–36)
MCV RBC AUTO: 87 FL (ref 82–98)
NUCLEATED RBC (/100WBC) (OHS): 0 /100 WBC
PLATELET # BLD AUTO: 195 K/UL (ref 150–450)
PMV BLD AUTO: 11.6 FL (ref 9.2–12.9)
POTASSIUM SERPL-SCNC: 4.9 MMOL/L (ref 3.5–5.1)
PROT SERPL-MCNC: 7.1 GM/DL (ref 6–8.4)
PTH-INTACT SERPL-MCNC: 270.2 PG/ML (ref 9–77)
RBC # BLD AUTO: 4.29 M/UL (ref 4.6–6.2)
RELATIVE EOSINOPHIL (OHS): 1.3 %
RELATIVE LYMPHOCYTE (OHS): 27 % (ref 18–48)
RELATIVE MONOCYTE (OHS): 12.8 % (ref 4–15)
RELATIVE NEUTROPHIL (OHS): 58.5 % (ref 38–73)
SODIUM SERPL-SCNC: 135 MMOL/L (ref 136–145)
WBC # BLD AUTO: 7.01 K/UL (ref 3.9–12.7)

## 2025-08-22 PROCEDURE — 86160 COMPLEMENT ANTIGEN: CPT

## 2025-08-22 PROCEDURE — 84075 ASSAY ALKALINE PHOSPHATASE: CPT

## 2025-08-22 PROCEDURE — 86039 ANTINUCLEAR ANTIBODIES (ANA): CPT

## 2025-08-22 PROCEDURE — 85025 COMPLETE CBC W/AUTO DIFF WBC: CPT

## 2025-08-22 PROCEDURE — 83970 ASSAY OF PARATHORMONE: CPT

## 2025-08-22 PROCEDURE — 36415 COLL VENOUS BLD VENIPUNCTURE: CPT | Mod: PO

## 2025-08-22 PROCEDURE — 86235 NUCLEAR ANTIGEN ANTIBODY: CPT | Mod: 59

## 2025-08-22 PROCEDURE — 86160 COMPLEMENT ANTIGEN: CPT | Mod: 59

## 2025-08-22 PROCEDURE — 86225 DNA ANTIBODY NATIVE: CPT

## 2025-08-22 PROCEDURE — 86235 NUCLEAR ANTIGEN ANTIBODY: CPT

## 2025-08-22 PROCEDURE — 86140 C-REACTIVE PROTEIN: CPT

## 2025-08-25 LAB
ANA (OHS): POSITIVE
ANA PATTERN 1 (OHS): ABNORMAL
ANA TITER 1 (OHS): ABNORMAL

## 2025-08-26 LAB
DSDNA ANTIBODY (OHS): NORMAL
DSDNA ANTIBODY TITER (OHS): NORMAL
SM  ANTIBODY (OHS): 0.4 RATIO
SM INTERPRETATION (OHS): NEGATIVE
SM/RNP ANTIBODY (OHS): 4.94 RATIO
SM/RNP INTERPRETATION (OHS): POSITIVE
SSA  ANTIBODY (OHS): 1.54 RATIO (ref 0–0.99)
SSA INTERPRETATION (OHS): POSITIVE
SSB  ANTIBODY (OHS): 3.53 RATIO
SSB INTERPRETATION (OHS): POSITIVE

## 2025-08-28 ENCOUNTER — OFFICE VISIT (OUTPATIENT)
Dept: CARDIOLOGY | Facility: CLINIC | Age: 75
End: 2025-08-28
Payer: MEDICARE

## 2025-08-28 VITALS
SYSTOLIC BLOOD PRESSURE: 118 MMHG | DIASTOLIC BLOOD PRESSURE: 67 MMHG | OXYGEN SATURATION: 97 % | HEART RATE: 87 BPM | RESPIRATION RATE: 18 BRPM | HEIGHT: 66 IN | WEIGHT: 124.13 LBS | BODY MASS INDEX: 19.95 KG/M2

## 2025-08-28 DIAGNOSIS — I50.42 CHRONIC COMBINED SYSTOLIC (CONGESTIVE) AND DIASTOLIC (CONGESTIVE) HEART FAILURE: ICD-10-CM

## 2025-08-28 DIAGNOSIS — I70.0 AORTIC ATHEROSCLEROSIS: ICD-10-CM

## 2025-08-28 DIAGNOSIS — I10 ESSENTIAL HYPERTENSION: Primary | ICD-10-CM

## 2025-08-28 LAB
OHS QRS DURATION: 98 MS
OHS QTC CALCULATION: 446 MS

## 2025-08-28 PROCEDURE — 3288F FALL RISK ASSESSMENT DOCD: CPT | Mod: CPTII,S$GLB,, | Performed by: INTERNAL MEDICINE

## 2025-08-28 PROCEDURE — 3074F SYST BP LT 130 MM HG: CPT | Mod: CPTII,S$GLB,, | Performed by: INTERNAL MEDICINE

## 2025-08-28 PROCEDURE — 93000 ELECTROCARDIOGRAM COMPLETE: CPT | Mod: S$GLB,,, | Performed by: INTERNAL MEDICINE

## 2025-08-28 PROCEDURE — 1159F MED LIST DOCD IN RCRD: CPT | Mod: CPTII,S$GLB,, | Performed by: INTERNAL MEDICINE

## 2025-08-28 PROCEDURE — 3062F POS MACROALBUMINURIA REV: CPT | Mod: CPTII,S$GLB,, | Performed by: INTERNAL MEDICINE

## 2025-08-28 PROCEDURE — 1125F AMNT PAIN NOTED PAIN PRSNT: CPT | Mod: CPTII,S$GLB,, | Performed by: INTERNAL MEDICINE

## 2025-08-28 PROCEDURE — 99214 OFFICE O/P EST MOD 30 MIN: CPT | Mod: S$GLB,,, | Performed by: INTERNAL MEDICINE

## 2025-08-28 PROCEDURE — 4010F ACE/ARB THERAPY RXD/TAKEN: CPT | Mod: CPTII,S$GLB,, | Performed by: INTERNAL MEDICINE

## 2025-08-28 PROCEDURE — 3066F NEPHROPATHY DOC TX: CPT | Mod: CPTII,S$GLB,, | Performed by: INTERNAL MEDICINE

## 2025-08-28 PROCEDURE — 1101F PT FALLS ASSESS-DOCD LE1/YR: CPT | Mod: CPTII,S$GLB,, | Performed by: INTERNAL MEDICINE

## 2025-08-28 PROCEDURE — 99999 PR PBB SHADOW E&M-EST. PATIENT-LVL V: CPT | Mod: PBBFAC,,, | Performed by: INTERNAL MEDICINE

## 2025-08-28 PROCEDURE — 3078F DIAST BP <80 MM HG: CPT | Mod: CPTII,S$GLB,, | Performed by: INTERNAL MEDICINE

## 2025-08-28 PROCEDURE — G2211 COMPLEX E/M VISIT ADD ON: HCPCS | Mod: S$GLB,,, | Performed by: INTERNAL MEDICINE

## 2025-08-28 PROCEDURE — 3051F HG A1C>EQUAL 7.0%<8.0%: CPT | Mod: CPTII,S$GLB,, | Performed by: INTERNAL MEDICINE

## 2025-08-28 RX ORDER — AMLODIPINE BESYLATE 5 MG/1
5 TABLET ORAL 2 TIMES DAILY
Qty: 180 TABLET | Refills: 3 | Status: SHIPPED | OUTPATIENT
Start: 2025-08-28

## 2025-08-28 RX ORDER — AMLODIPINE BESYLATE 5 MG/1
5 TABLET ORAL 2 TIMES DAILY
Qty: 180 TABLET | Refills: 3 | Status: SHIPPED | OUTPATIENT
Start: 2025-08-28 | End: 2025-08-28

## 2025-08-28 RX ORDER — HYDRALAZINE HYDROCHLORIDE 25 MG/1
25 TABLET, FILM COATED ORAL 3 TIMES DAILY
Qty: 270 TABLET | Refills: 3 | Status: SHIPPED | OUTPATIENT
Start: 2025-08-28 | End: 2026-08-28

## 2025-08-29 ENCOUNTER — TELEPHONE (OUTPATIENT)
Dept: PAIN MEDICINE | Facility: CLINIC | Age: 75
End: 2025-08-29
Payer: MEDICARE

## 2025-09-02 ENCOUNTER — RESULTS FOLLOW-UP (OUTPATIENT)
Dept: FAMILY MEDICINE | Facility: CLINIC | Age: 75
End: 2025-09-02
Payer: MEDICARE

## 2025-09-02 DIAGNOSIS — M81.0 AGE-RELATED OSTEOPOROSIS WITHOUT CURRENT PATHOLOGICAL FRACTURE: Primary | ICD-10-CM
